# Patient Record
Sex: MALE | Race: WHITE | NOT HISPANIC OR LATINO | URBAN - METROPOLITAN AREA
[De-identification: names, ages, dates, MRNs, and addresses within clinical notes are randomized per-mention and may not be internally consistent; named-entity substitution may affect disease eponyms.]

---

## 2020-07-17 ENCOUNTER — OUTPATIENT (OUTPATIENT)
Dept: OUTPATIENT SERVICES | Facility: HOSPITAL | Age: 70
LOS: 1 days | End: 2020-07-17

## 2020-07-27 DIAGNOSIS — Z01.818 ENCOUNTER FOR OTHER PREPROCEDURAL EXAMINATION: ICD-10-CM

## 2020-07-28 ENCOUNTER — APPOINTMENT (OUTPATIENT)
Dept: DISASTER EMERGENCY | Facility: CLINIC | Age: 70
End: 2020-07-28

## 2020-07-29 LAB — SARS-COV-2 N GENE NPH QL NAA+PROBE: NOT DETECTED

## 2020-07-31 ENCOUNTER — OUTPATIENT (OUTPATIENT)
Dept: OUTPATIENT SERVICES | Facility: HOSPITAL | Age: 70
LOS: 1 days | End: 2020-07-31

## 2020-07-31 ENCOUNTER — INPATIENT (INPATIENT)
Facility: HOSPITAL | Age: 70
LOS: 2 days | Discharge: NURSING, REHAB OR LONG-TERM CARE | End: 2020-08-03
Payer: COMMERCIAL

## 2020-07-31 PROCEDURE — 73560 X-RAY EXAM OF KNEE 1 OR 2: CPT | Mod: 26,RT

## 2020-08-01 ENCOUNTER — OUTPATIENT (OUTPATIENT)
Dept: OUTPATIENT SERVICES | Facility: HOSPITAL | Age: 70
LOS: 1 days | End: 2020-08-01

## 2020-08-02 ENCOUNTER — OUTPATIENT (OUTPATIENT)
Dept: OUTPATIENT SERVICES | Facility: HOSPITAL | Age: 70
LOS: 1 days | End: 2020-08-02

## 2020-08-03 ENCOUNTER — OUTPATIENT (OUTPATIENT)
Dept: OUTPATIENT SERVICES | Facility: HOSPITAL | Age: 70
LOS: 1 days | End: 2020-08-03

## 2020-08-03 ENCOUNTER — INPATIENT (INPATIENT)
Facility: HOSPITAL | Age: 70
LOS: 8 days | Discharge: HOME | End: 2020-08-12
Attending: INTERNAL MEDICINE | Admitting: INTERNAL MEDICINE
Payer: COMMERCIAL

## 2020-08-06 ENCOUNTER — OUTPATIENT (OUTPATIENT)
Dept: OUTPATIENT SERVICES | Facility: HOSPITAL | Age: 70
LOS: 1 days | End: 2020-08-06

## 2020-08-07 ENCOUNTER — OUTPATIENT (OUTPATIENT)
Dept: OUTPATIENT SERVICES | Facility: HOSPITAL | Age: 70
LOS: 1 days | End: 2020-08-07

## 2020-08-07 PROCEDURE — 93970 EXTREMITY STUDY: CPT | Mod: 26

## 2020-08-08 ENCOUNTER — OUTPATIENT (OUTPATIENT)
Dept: OUTPATIENT SERVICES | Facility: HOSPITAL | Age: 70
LOS: 1 days | End: 2020-08-08

## 2020-08-10 ENCOUNTER — OUTPATIENT (OUTPATIENT)
Dept: OUTPATIENT SERVICES | Facility: HOSPITAL | Age: 70
LOS: 1 days | End: 2020-08-10

## 2020-08-11 ENCOUNTER — OUTPATIENT (OUTPATIENT)
Dept: OUTPATIENT SERVICES | Facility: HOSPITAL | Age: 70
LOS: 1 days | End: 2020-08-11

## 2020-08-12 ENCOUNTER — OUTPATIENT (OUTPATIENT)
Dept: OUTPATIENT SERVICES | Facility: HOSPITAL | Age: 70
LOS: 1 days | End: 2020-08-12

## 2020-10-29 ENCOUNTER — FORM ENCOUNTER (OUTPATIENT)
Age: 70
End: 2020-10-29

## 2021-06-28 ENCOUNTER — APPOINTMENT (OUTPATIENT)
Dept: ORTHOPEDIC SURGERY | Facility: CLINIC | Age: 71
End: 2021-06-28

## 2021-10-12 ENCOUNTER — APPOINTMENT (OUTPATIENT)
Dept: ORTHOPEDIC SURGERY | Facility: CLINIC | Age: 71
End: 2021-10-12

## 2022-07-18 ENCOUNTER — APPOINTMENT (OUTPATIENT)
Dept: ORTHOPEDIC SURGERY | Facility: CLINIC | Age: 72
End: 2022-07-18

## 2023-08-26 ENCOUNTER — EMERGENCY (EMERGENCY)
Facility: HOSPITAL | Age: 73
LOS: 1 days | Discharge: ANOTHER HOSPITAL | End: 2023-08-26
Attending: EMERGENCY MEDICINE | Admitting: EMERGENCY MEDICINE
Payer: COMMERCIAL

## 2023-08-26 VITALS
OXYGEN SATURATION: 99 % | TEMPERATURE: 99 F | RESPIRATION RATE: 16 BRPM | DIASTOLIC BLOOD PRESSURE: 67 MMHG | SYSTOLIC BLOOD PRESSURE: 155 MMHG | WEIGHT: 179.9 LBS | HEIGHT: 69 IN | HEART RATE: 90 BPM

## 2023-08-26 LAB
ALBUMIN SERPL ELPH-MCNC: 2.9 G/DL — LOW (ref 3.3–5)
ALP SERPL-CCNC: 77 U/L — SIGNIFICANT CHANGE UP (ref 40–120)
ALT FLD-CCNC: 30 U/L — SIGNIFICANT CHANGE UP (ref 12–78)
ANION GAP SERPL CALC-SCNC: 6 MMOL/L — SIGNIFICANT CHANGE UP (ref 5–17)
APPEARANCE UR: ABNORMAL
AST SERPL-CCNC: 23 U/L — SIGNIFICANT CHANGE UP (ref 15–37)
BASOPHILS # BLD AUTO: 0 K/UL — SIGNIFICANT CHANGE UP (ref 0–0.2)
BASOPHILS NFR BLD AUTO: 0 % — SIGNIFICANT CHANGE UP (ref 0–2)
BILIRUB SERPL-MCNC: 0.3 MG/DL — SIGNIFICANT CHANGE UP (ref 0.2–1.2)
BILIRUB UR-MCNC: NEGATIVE — SIGNIFICANT CHANGE UP
BUN SERPL-MCNC: 29 MG/DL — HIGH (ref 7–23)
CALCIUM SERPL-MCNC: 9 MG/DL — SIGNIFICANT CHANGE UP (ref 8.5–10.1)
CHLORIDE SERPL-SCNC: 109 MMOL/L — HIGH (ref 96–108)
CO2 SERPL-SCNC: 25 MMOL/L — SIGNIFICANT CHANGE UP (ref 22–31)
COLOR SPEC: YELLOW — SIGNIFICANT CHANGE UP
CREAT SERPL-MCNC: 2.1 MG/DL — HIGH (ref 0.5–1.3)
DIFF PNL FLD: ABNORMAL
EGFR: 33 ML/MIN/1.73M2 — LOW
EOSINOPHIL # BLD AUTO: 0.26 K/UL — SIGNIFICANT CHANGE UP (ref 0–0.5)
EOSINOPHIL NFR BLD AUTO: 4 % — SIGNIFICANT CHANGE UP (ref 0–6)
GLUCOSE SERPL-MCNC: 132 MG/DL — HIGH (ref 70–99)
GLUCOSE UR QL: NEGATIVE MG/DL — SIGNIFICANT CHANGE UP
HCT VFR BLD CALC: 34.8 % — LOW (ref 39–50)
HGB BLD-MCNC: 11 G/DL — LOW (ref 13–17)
KETONES UR-MCNC: NEGATIVE MG/DL — SIGNIFICANT CHANGE UP
LEUKOCYTE ESTERASE UR-ACNC: ABNORMAL
LYMPHOCYTES # BLD AUTO: 0.92 K/UL — LOW (ref 1–3.3)
LYMPHOCYTES # BLD AUTO: 14 % — SIGNIFICANT CHANGE UP (ref 13–44)
MAGNESIUM SERPL-MCNC: 1.6 MG/DL — SIGNIFICANT CHANGE UP (ref 1.6–2.6)
MCHC RBC-ENTMCNC: 28.2 PG — SIGNIFICANT CHANGE UP (ref 27–34)
MCHC RBC-ENTMCNC: 31.6 GM/DL — LOW (ref 32–36)
MCV RBC AUTO: 89.2 FL — SIGNIFICANT CHANGE UP (ref 80–100)
MONOCYTES # BLD AUTO: 1.12 K/UL — HIGH (ref 0–0.9)
MONOCYTES NFR BLD AUTO: 17 % — HIGH (ref 2–14)
NEUTROPHILS # BLD AUTO: 4.2 K/UL — SIGNIFICANT CHANGE UP (ref 1.8–7.4)
NEUTROPHILS NFR BLD AUTO: 64 % — SIGNIFICANT CHANGE UP (ref 43–77)
NITRITE UR-MCNC: NEGATIVE — SIGNIFICANT CHANGE UP
NRBC # BLD: SIGNIFICANT CHANGE UP /100 WBCS (ref 0–0)
PH UR: 5 — SIGNIFICANT CHANGE UP (ref 5–8)
PLATELET # BLD AUTO: 154 K/UL — SIGNIFICANT CHANGE UP (ref 150–400)
POTASSIUM SERPL-MCNC: 3.8 MMOL/L — SIGNIFICANT CHANGE UP (ref 3.5–5.3)
POTASSIUM SERPL-SCNC: 3.8 MMOL/L — SIGNIFICANT CHANGE UP (ref 3.5–5.3)
PROT SERPL-MCNC: 7.3 G/DL — SIGNIFICANT CHANGE UP (ref 6–8.3)
PROT UR-MCNC: 100 MG/DL
RBC # BLD: 3.9 M/UL — LOW (ref 4.2–5.8)
RBC # FLD: 16 % — HIGH (ref 10.3–14.5)
SODIUM SERPL-SCNC: 140 MMOL/L — SIGNIFICANT CHANGE UP (ref 135–145)
SP GR SPEC: 1.02 — SIGNIFICANT CHANGE UP (ref 1–1.03)
UROBILINOGEN FLD QL: 1 MG/DL — SIGNIFICANT CHANGE UP (ref 0.2–1)
WBC # BLD: 6.56 K/UL — SIGNIFICANT CHANGE UP (ref 3.8–10.5)
WBC # FLD AUTO: 6.56 K/UL — SIGNIFICANT CHANGE UP (ref 3.8–10.5)

## 2023-08-26 PROCEDURE — 93010 ELECTROCARDIOGRAM REPORT: CPT

## 2023-08-26 PROCEDURE — 99284 EMERGENCY DEPT VISIT MOD MDM: CPT

## 2023-08-26 PROCEDURE — 74176 CT ABD & PELVIS W/O CONTRAST: CPT | Mod: 26,MA

## 2023-08-26 PROCEDURE — 70450 CT HEAD/BRAIN W/O DYE: CPT | Mod: 26,MA

## 2023-08-26 PROCEDURE — 71250 CT THORAX DX C-: CPT | Mod: 26,MA

## 2023-08-26 PROCEDURE — 71045 X-RAY EXAM CHEST 1 VIEW: CPT | Mod: 26

## 2023-08-26 RX ORDER — CEFUROXIME AXETIL 250 MG
1 TABLET ORAL
Qty: 20 | Refills: 0
Start: 2023-08-26 | End: 2023-09-04

## 2023-08-26 RX ORDER — AZITHROMYCIN 500 MG/1
500 TABLET, FILM COATED ORAL ONCE
Refills: 0 | Status: COMPLETED | OUTPATIENT
Start: 2023-08-26 | End: 2023-08-26

## 2023-08-26 RX ORDER — AZITHROMYCIN 500 MG/1
250 TABLET, FILM COATED ORAL
Qty: 5 | Refills: 0
Start: 2023-08-26 | End: 2023-08-30

## 2023-08-26 RX ORDER — CEFTRIAXONE 500 MG/1
1000 INJECTION, POWDER, FOR SOLUTION INTRAMUSCULAR; INTRAVENOUS ONCE
Refills: 0 | Status: COMPLETED | OUTPATIENT
Start: 2023-08-26 | End: 2023-08-26

## 2023-08-26 NOTE — ED PROVIDER NOTE - NSFOLLOWUPINSTRUCTIONS_ED_ALL_ED_FT
Urinary Tract Infection, Adult    A urinary tract infection (UTI) is an infection of any part of the urinary tract. The urinary tract includes:  The kidneys.  The ureters.  The bladder.  The urethra.  These organs make, store, and get rid of pee (urine) in the body.    What are the causes?  This infection is caused by germs (bacteria) in your genital area. These germs grow and cause swelling (inflammation) of your urinary tract.    What increases the risk?  The following factors may make you more likely to develop this condition:  Using a small, thin tube (catheter) to drain pee.  Not being able to control when you pee or poop (incontinence).  Being female. If you are female, these things can increase the risk:  Using these methods to prevent pregnancy:  A medicine that kills sperm (spermicide).  A device that blocks sperm (diaphragm).  Having low levels of a female hormone (estrogen).  Being pregnant.  You are more likely to develop this condition if:  You have genes that add to your risk.  You are sexually active.  You take antibiotic medicines.  You have trouble peeing because of:  A prostate that is bigger than normal, if you are male.  A blockage in the part of your body that drains pee from the bladder.  A kidney stone.  A nerve condition that affects your bladder.  Not getting enough to drink.  Not peeing often enough.  You have other conditions, such as:  Diabetes.  A weak disease-fighting system (immune system).  Sickle cell disease.  Gout.  Injury of the spine.  What are the signs or symptoms?  Symptoms of this condition include:  Needing to pee right away.  Peeing small amounts often.  Pain or burning when peeing.  Blood in the pee.  Pee that smells bad or not like normal.  Trouble peeing.  Pee that is cloudy.  Fluid coming from the vagina, if you are female.  Pain in the belly or lower back.  Other symptoms include:  Vomiting.  Not feeling hungry.  Feeling mixed up (confused). This may be the first symptom in older adults.  Being tired and grouchy (irritable).  A fever.  Watery poop (diarrhea).  How is this treated?  Taking antibiotic medicine.  Taking other medicines.  Drinking enough water.  In some cases, you may need to see a specialist.    Follow these instructions at home:    Medicines    Take over-the-counter and prescription medicines only as told by your doctor.  If you were prescribed an antibiotic medicine, take it as told by your doctor. Do not stop taking it even if you start to feel better.  General instructions    Make sure you:  Pee until your bladder is empty.  Do not hold pee for a long time.  Empty your bladder after sex.  Wipe from front to back after peeing or pooping if you are a female. Use each tissue one time when you wipe.  Drink enough fluid to keep your pee pale yellow.  Keep all follow-up visits.  Contact a doctor if:  You do not get better after 1–2 days.  Your symptoms go away and then come back.  Get help right away if:  You have very bad back pain.  You have very bad pain in your lower belly.  You have a fever.  You have chills.  You feeling like you will vomit or you vomit.  Summary  A urinary tract infection (UTI) is an infection of any part of the urinary tract.  This condition is caused by germs in your genital area.  There are many risk factors for a UTI.  Treatment includes antibiotic medicines.  Drink enough fluid to keep your pee pale yellow.  This information is not intended to replace advice given to you by your health care provider. Make sure you discuss any questions you have with your health care provider.

## 2023-08-26 NOTE — ED ADULT NURSE NOTE - CHIEF COMPLAINT QUOTE
Patient brought in by ambulance from [place] as reported his blood sugar was 52 and was having altered mental status FS 52 was given glucose tabs and orange juice went up to 58 and rechecked again at 70; was on D10 IV given by EMS; reported taking his own medications on oxycodone was given narcan 1 mg IV; stated they noted right forearm swelling bug bite

## 2023-08-26 NOTE — ED ADULT NURSE NOTE - NEURO GAIT
s/p debridement  will need iv abx X 6 weeks   ?PICC  adjust per ID  f/u vascular/podiatry management   local wound care   co-morbidities optimization  supportive care prn    all consultants and team members management greatly appreciated  d/c planning requires assistance

## 2023-08-26 NOTE — ED PROVIDER NOTE - NSICDXPASTMEDICALHX_GEN_ALL_CORE_FT
PAST MEDICAL HISTORY:  CHF (congestive heart failure)     Chronic obstructive pulmonary disease (COPD)     Prostate cancer     Renal insufficiency     Type II diabetes mellitus

## 2023-08-26 NOTE — ED ADULT NURSE NOTE - NSFALLHARMRISKINTERV_ED_ALL_ED

## 2023-08-26 NOTE — ED PROVIDER NOTE - NS ED ATTENDING STATEMENT MOD
This was a shared visit with the [employer]. I reviewed and verified the documentation and independently performed the documented:

## 2023-08-26 NOTE — ED PROVIDER NOTE - OBJECTIVE STATEMENT
72 M hx prostate CA, DMII, diabetic foot ulcer, hld, ADAN, insomnia, encephalopathy, htn, chf, copd, akf bibems for ams, found to by hypoglycemia. Pt states he gets his sugar checked twice a day, was in 50s this morning, increased PO intake throughout the day, but was hypoglycemic again. Takes metformin and tradjenta. Having abd discomfort. Denies f/c, cp, sob, cough, vomiting, diarrhea.    Per ems, pt found to have AMS, sugar 52, was given glucose and orange juice, glucose increased to 58, was rechecked again and it was 70, D10 IV given by EMS, given narcan due to oxycodone    metformin 2000 BID; tradjenta 5mg qAM    pmd kym

## 2023-08-26 NOTE — ED PROVIDER NOTE - PATIENT PORTAL LINK FT
You can access the FollowMyHealth Patient Portal offered by [facility] by registering at the following website: [email or web address]. By joining [employer]’s FollowMyHealth portal, you will also be able to view your health information using other applications (apps) compatible with our system.

## 2023-08-26 NOTE — ED ADULT TRIAGE NOTE - CHIEF COMPLAINT QUOTE
Patient brought in by ambulance from [place] as reported his blood sugar was 52 and was having altered mental status FS 52 was given glucose tabs and orange juice went up to 58 and rechecked again at 70; was on D10 IV given by EMS; reported taking his own medications on oxycodone was given narcan 1 mg IV; stated they noted right forearm swelling bug bite

## 2023-08-27 VITALS
TEMPERATURE: 99 F | RESPIRATION RATE: 16 BRPM | OXYGEN SATURATION: 99 % | DIASTOLIC BLOOD PRESSURE: 72 MMHG | SYSTOLIC BLOOD PRESSURE: 138 MMHG | HEART RATE: 88 BPM

## 2023-08-27 PROCEDURE — 74176 CT ABD & PELVIS W/O CONTRAST: CPT | Mod: MA

## 2023-08-27 PROCEDURE — 80053 COMPREHEN METABOLIC PANEL: CPT

## 2023-08-27 PROCEDURE — 71250 CT THORAX DX C-: CPT | Mod: MA

## 2023-08-27 PROCEDURE — 36415 COLL VENOUS BLD VENIPUNCTURE: CPT

## 2023-08-27 PROCEDURE — 99285 EMERGENCY DEPT VISIT HI MDM: CPT | Mod: 25

## 2023-08-27 PROCEDURE — 71045 X-RAY EXAM CHEST 1 VIEW: CPT

## 2023-08-27 PROCEDURE — 85025 COMPLETE CBC W/AUTO DIFF WBC: CPT

## 2023-08-27 PROCEDURE — 70450 CT HEAD/BRAIN W/O DYE: CPT | Mod: MA

## 2023-08-27 PROCEDURE — 83735 ASSAY OF MAGNESIUM: CPT

## 2023-08-27 PROCEDURE — 96375 TX/PRO/DX INJ NEW DRUG ADDON: CPT

## 2023-08-27 PROCEDURE — 93005 ELECTROCARDIOGRAM TRACING: CPT

## 2023-08-27 PROCEDURE — 96374 THER/PROPH/DIAG INJ IV PUSH: CPT

## 2023-08-27 PROCEDURE — 81001 URINALYSIS AUTO W/SCOPE: CPT

## 2023-08-27 PROCEDURE — 82962 GLUCOSE BLOOD TEST: CPT

## 2023-08-27 RX ADMIN — CEFTRIAXONE 100 MILLIGRAM(S): 500 INJECTION, POWDER, FOR SOLUTION INTRAMUSCULAR; INTRAVENOUS at 00:19

## 2023-08-27 RX ADMIN — AZITHROMYCIN 255 MILLIGRAM(S): 500 TABLET, FILM COATED ORAL at 00:19

## 2023-09-15 ASSESSMENT — KOOS JR
KOOS JR RAW SCORE: 22
BENDING TO THE FLOOR TO PICK UP OBJECT: SEVERE
STRAIGHTENING KNEE FULLY: EXTREME
STRAIGHTENING KNEE FULLY: MILD
BENDING TO THE FLOOR TO PICK UP OBJECT: MODERATE
KOOS JR RAW SCORE: 12
BENDING TO THE FLOOR TO PICK UP OBJECT: EXTREME
IMPORTED KOOS JR SCORE: 31.31
RISING FROM SITTING: EXTREME
STANDING UPRIGHT: SEVERE
IMPORTED KOOS JR SCORE: 42.28
STRAIGHTENING KNEE FULLY: MODERATE
RISING FROM SITTING: SEVERE
KOOS JR RAW SCORE: 18
HOW SEVERE IS YOUR KNEE STIFFNESS AFTER FIRST WAKING IN MORNING: MODERATE
GOING UP OR DOWN STAIRS: MODERATE
STANDING UPRIGHT: MILD
KOOS JR RAW SCORE: 17
TWISING OR PIVOTING ON KNEE: SEVERE
TWISING OR PIVOTING ON KNEE: EXTREME
STANDING UPRIGHT: MODERATE
IMPORTED KOOS JR SCORE: 44.9
IMPORTED KOOS JR SCORE: 57.14
IMPORTED LATERALITY: RIGHT
HOW SEVERE IS YOUR KNEE STIFFNESS AFTER FIRST WAKING IN MORNING: SEVERE
IMPORTED FORM: YES

## 2024-01-02 ENCOUNTER — EMERGENCY (EMERGENCY)
Facility: HOSPITAL | Age: 74
LOS: 1 days | Discharge: HOME | End: 2024-01-02
Attending: EMERGENCY MEDICINE | Admitting: EMERGENCY MEDICINE
Payer: MEDICARE

## 2024-01-02 VITALS
OXYGEN SATURATION: 93 % | DIASTOLIC BLOOD PRESSURE: 62 MMHG | HEIGHT: 70 IN | RESPIRATION RATE: 18 BRPM | SYSTOLIC BLOOD PRESSURE: 99 MMHG | TEMPERATURE: 99 F | WEIGHT: 210.1 LBS | HEART RATE: 72 BPM

## 2024-01-02 VITALS
SYSTOLIC BLOOD PRESSURE: 130 MMHG | TEMPERATURE: 97 F | OXYGEN SATURATION: 95 % | RESPIRATION RATE: 18 BRPM | DIASTOLIC BLOOD PRESSURE: 74 MMHG | HEART RATE: 63 BPM

## 2024-01-02 PROBLEM — E11.9 TYPE 2 DIABETES MELLITUS WITHOUT COMPLICATIONS: Chronic | Status: ACTIVE | Noted: 2023-08-26

## 2024-01-02 PROBLEM — N28.9 DISORDER OF KIDNEY AND URETER, UNSPECIFIED: Chronic | Status: ACTIVE | Noted: 2023-08-26

## 2024-01-02 PROBLEM — J44.9 CHRONIC OBSTRUCTIVE PULMONARY DISEASE, UNSPECIFIED: Chronic | Status: ACTIVE | Noted: 2023-08-26

## 2024-01-02 PROBLEM — I50.9 HEART FAILURE, UNSPECIFIED: Chronic | Status: ACTIVE | Noted: 2023-08-26

## 2024-01-02 PROBLEM — C61 MALIGNANT NEOPLASM OF PROSTATE: Chronic | Status: ACTIVE | Noted: 2023-08-26

## 2024-01-02 PROCEDURE — 99282 EMERGENCY DEPT VISIT SF MDM: CPT

## 2024-01-02 PROCEDURE — 99283 EMERGENCY DEPT VISIT LOW MDM: CPT

## 2024-01-02 NOTE — ED PROVIDER NOTE - CARE PROVIDERS DIRECT ADDRESSES
,DirectAddress_Unknown,[email or web address] ,DirectAddress_Unknown,[email or web address]

## 2024-01-02 NOTE — ED PROVIDER NOTE - PATIENT PORTAL LINK FT
You can access the FollowMyHealth Patient Portal offered by [facility] by registering at the following website: [email or web address]. By joining [employer]’s FollowMyHealth portal, you will also be able to view your health information using other applications (apps) compatible with our system. You can access the FollowMyHealth Patient Portal offered by [facility] by registering at the following website: [email or web address]. By joining [employer]’s FollowMyHealth portal, you will also be able to view your health information using other applications (apps) compatible with our system.

## 2024-01-02 NOTE — ED ADULT NURSE NOTE - NSFALLUNIVINTERV_ED_ALL_ED
Bed/Stretcher in lowest position, wheels locked, appropriate side rails in place/Call bell, personal items and telephone in reach/Instruct patient to call for assistance before getting out of bed/chair/stretcher/Non-slip footwear applied when patient is off stretcher/Jordan to call system/Physically safe environment - no spills, clutter or unnecessary equipment/Purposeful proactive rounding/Room/bathroom lighting operational, light cord in reach Bed/Stretcher in lowest position, wheels locked, appropriate side rails in place/Call bell, personal items and telephone in reach/Instruct patient to call for assistance before getting out of bed/chair/stretcher/Non-slip footwear applied when patient is off stretcher/Halma to call system/Physically safe environment - no spills, clutter or unnecessary equipment/Purposeful proactive rounding/Room/bathroom lighting operational, light cord in reach

## 2024-01-02 NOTE — ED PROVIDER NOTE - NSFOLLOWUPINSTRUCTIONS_ED_ALL_ED_FT
Apply over the counter saline nasal spray 3 to 4 times daily as directed. Follow up with ENT.    A nosebleed is when blood comes out of the nose. Nosebleeds are common. Usually, they are not a sign of a serious condition.    Nosebleeds can happen if a blood vessel in your nose starts to bleed or if the lining of your nose (mucous membrane) cracks. They are commonly caused by:  Allergies.  Colds.  Picking your nose.  Blowing your nose too hard.  An injury from sticking an object into your nose or getting hit in the nose.  Dry or cold air.  Less common causes of nosebleeds include:  Toxic fumes.  Something abnormal in the nose or in the air-filled spaces in the bones of the face (sinuses).  Growths in the nose, such as polyps.  Blood thinners or conditions that cause blood to clot slowly.  Certain illnesses or procedures that irritate or dry out the nasal passages.  Follow these instructions at home:  When you have a nosebleed:      Sit down and tilt your head slightly forward.  Use a clean towel or tissue to pinch your nostrils under the bony part of your nose. After 5 minutes, let go of your nose and see if bleeding starts again. Do not release pressure before that time. If there is still bleeding, repeat the pinching and holding for 5 minutes or until the bleeding stops.  Do not place tissues or gauze in the nose to stop the bleeding.  Avoid lying down and avoid tilting your head backward. That may make blood collect in the throat and cause gagging or coughing.  Use a nasal spray decongestant to help with a nosebleed as told by your health care provider.  After a nosebleed:    Avoid blowing your nose or sniffing for a number of hours.  Avoid straining, lifting, or bending at the waist for several days. You may go back to other normal activities as you are able.  If you are taking aspirin or blood thinners and you have nosebleeds, talk to your health care provider. These medicines make bleeding more likely.  Ask your health care provider if you should stop taking the medicines or if you should adjust the dose.  Do not stop taking medicines that your health care provider has recommended unless he or she tells you to stop taking them.  If your nosebleed was caused by dry mucous membranes, use over-the-counter saline nasal spray or gel and a humidifier as told by your health care provider. This will keep the mucous membranes moist and allow them to heal. If you need to use one of these products:  Choose one that is water-soluble.  Use only as much as you need and use it only as often as needed.  Do not lie down right after you use it.  If you get nosebleeds often, talk with your health care provider about medical treatments. Options may include:  Nasal cautery. This treatment stops and prevents nosebleeds by using a chemical swab or electrical device to lightly burn tiny blood vessels inside the nose.  Nasal packing. A gauze or other material is placed in the nose to keep constant pressure on the bleeding area.  Contact a health care provider if you:  Have a fever.  Get nosebleeds often or more often than usual.  Bruise very easily.  Have a nosebleed from having something stuck in your nose.  Have bleeding in your mouth.  Vomit or cough up brown material.  Have a nosebleed after you start a new medicine.  Get help right away if:  You have a nosebleed after a fall or a head injury.  Your nosebleed does not go away after 20 minutes.  You feel dizzy or weak.  You have unusual bleeding from other parts of your body.  You have unusual bruising on other parts of your body.  You become sweaty.  You vomit blood.  Summary  A nosebleed is when blood comes out of the nose. Common causes include allergies, an injury to the nose, or cold or dry air.  Initial treatment includes applying pressure for 5 minutes.  Moisturizing the nose with saline nasal spray or gel after a nosebleed may help prevent future bleeding.  Get help right away if your nosebleed does not go away after 20 minutes.  This information is not intended to replace advice given to you by your health care provider. Make sure you discuss any questions you have with your health care provider. Apply over the counter saline nasal spray 3 to 4 times daily as directed. Follow up with ENT. Follow up with [facility] for your right foot.    A nosebleed is when blood comes out of the nose. Nosebleeds are common. Usually, they are not a sign of a serious condition.    Nosebleeds can happen if a blood vessel in your nose starts to bleed or if the lining of your nose (mucous membrane) cracks. They are commonly caused by:  Allergies.  Colds.  Picking your nose.  Blowing your nose too hard.  An injury from sticking an object into your nose or getting hit in the nose.  Dry or cold air.  Less common causes of nosebleeds include:  Toxic fumes.  Something abnormal in the nose or in the air-filled spaces in the bones of the face (sinuses).  Growths in the nose, such as polyps.  Blood thinners or conditions that cause blood to clot slowly.  Certain illnesses or procedures that irritate or dry out the nasal passages.  Follow these instructions at home:  When you have a nosebleed:      Sit down and tilt your head slightly forward.  Use a clean towel or tissue to pinch your nostrils under the bony part of your nose. After 5 minutes, let go of your nose and see if bleeding starts again. Do not release pressure before that time. If there is still bleeding, repeat the pinching and holding for 5 minutes or until the bleeding stops.  Do not place tissues or gauze in the nose to stop the bleeding.  Avoid lying down and avoid tilting your head backward. That may make blood collect in the throat and cause gagging or coughing.  Use a nasal spray decongestant to help with a nosebleed as told by your health care provider.  After a nosebleed:    Avoid blowing your nose or sniffing for a number of hours.  Avoid straining, lifting, or bending at the waist for several days. You may go back to other normal activities as you are able.  If you are taking aspirin or blood thinners and you have nosebleeds, talk to your health care provider. These medicines make bleeding more likely.  Ask your health care provider if you should stop taking the medicines or if you should adjust the dose.  Do not stop taking medicines that your health care provider has recommended unless he or she tells you to stop taking them.  If your nosebleed was caused by dry mucous membranes, use over-the-counter saline nasal spray or gel and a humidifier as told by your health care provider. This will keep the mucous membranes moist and allow them to heal. If you need to use one of these products:  Choose one that is water-soluble.  Use only as much as you need and use it only as often as needed.  Do not lie down right after you use it.  If you get nosebleeds often, talk with your health care provider about medical treatments. Options may include:  Nasal cautery. This treatment stops and prevents nosebleeds by using a chemical swab or electrical device to lightly burn tiny blood vessels inside the nose.  Nasal packing. A gauze or other material is placed in the nose to keep constant pressure on the bleeding area.  Contact a health care provider if you:  Have a fever.  Get nosebleeds often or more often than usual.  Bruise very easily.  Have a nosebleed from having something stuck in your nose.  Have bleeding in your mouth.  Vomit or cough up brown material.  Have a nosebleed after you start a new medicine.  Get help right away if:  You have a nosebleed after a fall or a head injury.  Your nosebleed does not go away after 20 minutes.  You feel dizzy or weak.  You have unusual bleeding from other parts of your body.  You have unusual bruising on other parts of your body.  You become sweaty.  You vomit blood.  Summary  A nosebleed is when blood comes out of the nose. Common causes include allergies, an injury to the nose, or cold or dry air.  Initial treatment includes applying pressure for 5 minutes.  Moisturizing the nose with saline nasal spray or gel after a nosebleed may help prevent future bleeding.  Get help right away if your nosebleed does not go away after 20 minutes.  This information is not intended to replace advice given to you by your health care provider. Make sure you discuss any questions you have with your health care provider. Apply over the counter saline nasal spray 3 to 4 times daily as directed. Follow up with ENT. Follow up with [facility] wound care for your right foot.    A nosebleed is when blood comes out of the nose. Nosebleeds are common. Usually, they are not a sign of a serious condition.    Nosebleeds can happen if a blood vessel in your nose starts to bleed or if the lining of your nose (mucous membrane) cracks. They are commonly caused by:  Allergies.  Colds.  Picking your nose.  Blowing your nose too hard.  An injury from sticking an object into your nose or getting hit in the nose.  Dry or cold air.  Less common causes of nosebleeds include:  Toxic fumes.  Something abnormal in the nose or in the air-filled spaces in the bones of the face (sinuses).  Growths in the nose, such as polyps.  Blood thinners or conditions that cause blood to clot slowly.  Certain illnesses or procedures that irritate or dry out the nasal passages.  Follow these instructions at home:  When you have a nosebleed:      Sit down and tilt your head slightly forward.  Use a clean towel or tissue to pinch your nostrils under the bony part of your nose. After 5 minutes, let go of your nose and see if bleeding starts again. Do not release pressure before that time. If there is still bleeding, repeat the pinching and holding for 5 minutes or until the bleeding stops.  Do not place tissues or gauze in the nose to stop the bleeding.  Avoid lying down and avoid tilting your head backward. That may make blood collect in the throat and cause gagging or coughing.  Use a nasal spray decongestant to help with a nosebleed as told by your health care provider.  After a nosebleed:    Avoid blowing your nose or sniffing for a number of hours.  Avoid straining, lifting, or bending at the waist for several days. You may go back to other normal activities as you are able.  If you are taking aspirin or blood thinners and you have nosebleeds, talk to your health care provider. These medicines make bleeding more likely.  Ask your health care provider if you should stop taking the medicines or if you should adjust the dose.  Do not stop taking medicines that your health care provider has recommended unless he or she tells you to stop taking them.  If your nosebleed was caused by dry mucous membranes, use over-the-counter saline nasal spray or gel and a humidifier as told by your health care provider. This will keep the mucous membranes moist and allow them to heal. If you need to use one of these products:  Choose one that is water-soluble.  Use only as much as you need and use it only as often as needed.  Do not lie down right after you use it.  If you get nosebleeds often, talk with your health care provider about medical treatments. Options may include:  Nasal cautery. This treatment stops and prevents nosebleeds by using a chemical swab or electrical device to lightly burn tiny blood vessels inside the nose.  Nasal packing. A gauze or other material is placed in the nose to keep constant pressure on the bleeding area.  Contact a health care provider if you:  Have a fever.  Get nosebleeds often or more often than usual.  Bruise very easily.  Have a nosebleed from having something stuck in your nose.  Have bleeding in your mouth.  Vomit or cough up brown material.  Have a nosebleed after you start a new medicine.  Get help right away if:  You have a nosebleed after a fall or a head injury.  Your nosebleed does not go away after 20 minutes.  You feel dizzy or weak.  You have unusual bleeding from other parts of your body.  You have unusual bruising on other parts of your body.  You become sweaty.  You vomit blood.  Summary  A nosebleed is when blood comes out of the nose. Common causes include allergies, an injury to the nose, or cold or dry air.  Initial treatment includes applying pressure for 5 minutes.  Moisturizing the nose with saline nasal spray or gel after a nosebleed may help prevent future bleeding.  Get help right away if your nosebleed does not go away after 20 minutes.  This information is not intended to replace advice given to you by your health care provider. Make sure you discuss any questions you have with your health care provider.

## 2024-01-02 NOTE — ED PROVIDER NOTE - CARE PROVIDER_API CALL
Jer Rouse  Otolaryngology  [address]  [place] [address]  Phone: [phone]  Fax: [phone]  Scheduled Appointment: 01/05/2024 09:30 AM    Silvio Rodriguez  Podiatric Medicine and Surgery  [address] [place] [address]  Phone: [phone]  Fax: [phone]  Follow Up Time:    Jer Rouse  Otolaryngology  [address]  [place] [address]  Phone: [phone]  Fax: [phone]  Scheduled Appointment: 01/05/2024 09:30 AM    Silvio Rodriguez  Podiatric Medicine and Surgery  [address] [place] [address]  Phone: [phone]  Fax: [phone]  Follow Up Time:    Jer Rouse  Otolaryngology  [address]  [place] [address]  Phone: [phone]  Fax: [phone]  Scheduled Appointment: 01/05/2024 09:30 AM    Silvio Rodriguez  Podiatric Medicine and Surgery  [address] [place] [address]  Phone: [phone]  Fax: [phone]  Scheduled Appointment: 01/08/2024 12:00 PM   Jer Rouse  Otolaryngology  [address] [facility], [address]  [place] [address]  Phone: [phone]  Fax: [phone]  Scheduled Appointment: 01/05/2024 09:30 AM    Silvio Rodriguez  Podiatric Medicine and Surgery  [address] [place] [address]  Phone: [phone]  Fax: [phone]  Scheduled Appointment: 01/08/2024 12:00 PM

## 2024-01-02 NOTE — ED ADULT NURSE NOTE - OBJECTIVE STATEMENT
pt a&ox4 with c/o intermittent epistaxis x 2 weeks. thinks he takes 81 mg ASA but not best historian. denies pain. denies lightheadedness/dizziness. skin warm and dry speaking in full sentences. bleeding stopped at this time

## 2024-01-02 NOTE — ED PROVIDER NOTE - CLINICAL SUMMARY MEDICAL DECISION MAKING FREE TEXT BOX
73-year-old male with epistaxis, not on any anticoagulants, bleeding to be controlled in the ER, follow-up with outpatient ENT

## 2024-01-02 NOTE — ED PROVIDER NOTE - PROGRESS NOTE DETAILS
Bilateral nostrils irrigated with normal saline, clots removed from left nostril, applied Afrin nasal spray to each nostril, applied cautery to left nostril, applied Surgicel to left nostril, bleeding controlled, patient told to follow-up with ENT, referral coordinator is speaking with the patient to set up outpatient appointment. appointment made for ENT Dr. Rouse this Friday 9:30am, son states he will come to pick him up from the ER called [facility], [phone] no anwer, patient son Scott called, states he wants patient to be admitted, was explained that nose bleed was controlled and can f/u with ENT, son also concerned for right foot wound, was told we can set up appointment for outpatient, patient states he does not want to be admitted and agrees with plan to f/u as outpatient called [facility], [phone] no anwer, patient son Scott called, states he wants patient to be admitted, was explained that nose bleed was controlled and can f/u with ENT, son also concerned for right foot wound, was told we can set up appointment for outpatient, patient states he does not want to be admitted and agrees with plan to f/u as outpatient

## 2024-01-02 NOTE — ED ADULT TRIAGE NOTE - CHIEF COMPLAINT QUOTE
Pt brought in by EMS from assisted living facility c/o nose bleeds on and off x 1 week. Nose is not actively bleeding at this time.

## 2024-01-02 NOTE — ED PROVIDER NOTE - OBJECTIVE STATEMENT
73-year-old male PMH of prostate CA, DM2, HLD, HTN, CHF, COPD, right foot wound, presents to the emergency department by ambulance from assisted living facility with report of nosebleed.  Patient states that he has had a nosebleed on and off for the past 2 weeks mainly left nostril sometimes the right nostril, denies trauma, fever.  Patient states that he is not currently taking any anticoagulants or aspirin.

## 2024-01-02 NOTE — ED PROVIDER NOTE - PROVIDER TOKENS
PROVIDER:[[id number]],SCHEDULEDAPPT:[01/05/2024],SCHEDULEDAPPTTIME:[09:30 AM]],PROVIDER:[TOKEN:[[id number]]] PROVIDER:[TOKEN:[[id number]],SCHEDULEDAPPT:[01/05/2024],SCHEDULEDAPPTTIME:[09:30 AM]],PROVIDER:[[id number]],SCHEDULEDAPPT:[01/08/2024],SCHEDULEDAPPTTIME:[12:00 PM]]

## 2024-01-05 ENCOUNTER — APPOINTMENT (OUTPATIENT)
Dept: OTOLARYNGOLOGY | Facility: CLINIC | Age: 74
End: 2024-01-05
Payer: COMMERCIAL

## 2024-01-05 ENCOUNTER — NON-APPOINTMENT (OUTPATIENT)
Age: 74
End: 2024-01-05

## 2024-01-05 VITALS
HEART RATE: 80 BPM | WEIGHT: 220 LBS | DIASTOLIC BLOOD PRESSURE: 72 MMHG | BODY MASS INDEX: 30.8 KG/M2 | SYSTOLIC BLOOD PRESSURE: 119 MMHG | HEIGHT: 71 IN

## 2024-01-05 DIAGNOSIS — S91.309A UNSPECIFIED OPEN WOUND, UNSPECIFIED FOOT, INITIAL ENCOUNTER: ICD-10-CM

## 2024-01-05 DIAGNOSIS — D68.9 POISONING BY ANTICOAGULANT ANTAGONISTS, VITAMIN K AND OTHER COAGULANTS, ACCIDENTAL (UNINTENTIONAL), INITIAL ENCOUNTER: ICD-10-CM

## 2024-01-05 DIAGNOSIS — R04.0 EPISTAXIS: ICD-10-CM

## 2024-01-05 DIAGNOSIS — Z78.9 OTHER SPECIFIED HEALTH STATUS: ICD-10-CM

## 2024-01-05 DIAGNOSIS — J31.0 CHRONIC RHINITIS: ICD-10-CM

## 2024-01-05 DIAGNOSIS — E11.8 TYPE 2 DIABETES MELLITUS WITH UNSPECIFIED COMPLICATIONS: ICD-10-CM

## 2024-01-05 DIAGNOSIS — Z86.79 PERSONAL HISTORY OF OTHER DISEASES OF THE CIRCULATORY SYSTEM: ICD-10-CM

## 2024-01-05 DIAGNOSIS — Z87.09 PERSONAL HISTORY OF OTHER DISEASES OF THE RESPIRATORY SYSTEM: ICD-10-CM

## 2024-01-05 DIAGNOSIS — J34.2 DEVIATED NASAL SEPTUM: ICD-10-CM

## 2024-01-05 DIAGNOSIS — T45.7X1A POISONING BY ANTICOAGULANT ANTAGONISTS, VITAMIN K AND OTHER COAGULANTS, ACCIDENTAL (UNINTENTIONAL), INITIAL ENCOUNTER: ICD-10-CM

## 2024-01-05 PROCEDURE — 30903 CONTROL OF NOSEBLEED: CPT | Mod: 50

## 2024-01-05 PROCEDURE — 99204 OFFICE O/P NEW MOD 45 MIN: CPT | Mod: 25

## 2024-01-05 RX ORDER — SODIUM CHLORIDE 0.65 %
0.65 AEROSOL, SPRAY (ML) NASAL 4 TIMES DAILY
Qty: 2 | Refills: 0 | Status: ACTIVE | COMMUNITY
Start: 2024-01-05 | End: 1900-01-01

## 2024-01-05 RX ORDER — DOCUSATE SODIUM 100 MG/1
100 CAPSULE ORAL
Refills: 0 | Status: ACTIVE | COMMUNITY

## 2024-01-05 RX ORDER — CHLORHEXIDINE GLUCONATE 4 %
5 LIQUID (ML) TOPICAL
Refills: 0 | Status: ACTIVE | COMMUNITY

## 2024-01-05 RX ORDER — CEPHALEXIN 500 MG/1
500 CAPSULE ORAL
Qty: 20 | Refills: 0 | Status: ACTIVE | COMMUNITY
Start: 2024-01-05 | End: 1900-01-01

## 2024-01-05 RX ORDER — SAME BUTANEDISULFONATE/BETAINE 400-600 MG
250 POWDER IN PACKET (EA) ORAL
Refills: 0 | Status: ACTIVE | COMMUNITY

## 2024-01-05 NOTE — PROCEDURE
[FreeTextEntry1] : Left Nasal Cauterization and Left Electro Nasal Cauterization [FreeTextEntry2] : nosebleed [FreeTextEntry3] :  Procedure: Left Nasal Cauterization. After topical 4% xylocaine and oxymetazoline, the nasal vault was re-evaluated. Bleeding site identified. Cauterized with silver nitrate. Acetic acid sprayed in nostril. HemoPore inserted into nostril.  Post-procedure instructions given. Patient tolerated procedure well. Patient continued bleeding after the procedure.    Procedure: Left Electro Nasal Cauterization. 8 cc of 1% xyclocaine 1:121863 epi was injected into the left side of the nose. Cauterized with the electro-cautery. Post-procedure instructions given. Pt tolerated the procedure well.

## 2024-01-05 NOTE — HISTORY OF PRESENT ILLNESS
[de-identified] : Pt presents today with a nosebleed. Pt states that his nose has been bleeding from both nostrils. Pt states that these nosebleeds started 2 weeks ago. Pt states that his living space is dry. Pt states that he has been taking baby aspirin after his nosebleeds. Pt states that his nose was cauterized 2 days ago. Pt states that he is a diabetic.

## 2024-01-05 NOTE — REVIEW OF SYSTEMS
[Sneezing] : sneezing [Seasonal Allergies] : seasonal allergies [Post Nasal Drip] : post nasal drip [Hearing Loss] : hearing loss [Dizziness] : dizziness [Vertigo] : vertigo [Nasal Congestion] : nasal congestion [Nose Bleeds] : nose bleeds [Recurrent Sinus Infections] : recurrent sinus infections [Hoarseness] : hoarseness [Throat Clearing] : throat clearing [Negative] : Heme/Lymph

## 2024-01-05 NOTE — ASSESSMENT
[FreeTextEntry1] : Reviewed and reconciled medications, allergies, PMHx, PSHx, SocHx, FMHx.  Pt presents today with a nosebleed. Pt states that his nose has been bleeding from both nostrils. Pt states that these nosebleeds started 2 weeks ago. Pt states that his living space is dry. Pt states that he has been taking baby aspirin after his nosebleeds. Pt states that his nose was cauterized 2 days ago. Pt states that he is a diabetic.  Physical exam: right ear: cerumen impaction removed via curettage -right nostril filled with blood -left nostril filled with bloody packing. The bloody packing was removed from left nostril. Inserted cotton with Afrin and Lidocaine into nostrils bilaterally.  -2 small bleeding site on right side of septum and 1 site on the turbinate -bleeding site on left anterior septum and posterior turbinate -blood removed with suction from nose bilaterally -spur in left nostril  -deviated septum -blood leaking from the nose into the back of the throat  ENT Procedure: Left Nasal Cauterization and Left Electro Nasal Cauterization  Plan: saline spray on the right nostril only. Stop Aspirin petreolum gauze strip on the left side. Bled through the hemopore. Attempted electro cautery. Need to speak to primary care to clear him for possible cauterization in the operating room.

## 2024-01-05 NOTE — CONSULT LETTER
[Dear  ___] : Dear  [unfilled], [Consult Letter:] : I had the pleasure of evaluating your patient, [unfilled]. [Please see my note below.] : Please see my note below. [Consult Closing:] : Thank you very much for allowing me to participate in the care of this patient.  If you have any questions, please do not hesitate to contact me. [Sincerely,] : Sincerely, [FreeTextEntry3] :  Jer Rouse MD FACS

## 2024-01-05 NOTE — PHYSICAL EXAM
[] : septum deviated bilaterally [Normal] : mucosa is normal [Midline] : trachea located in midline position [Hearing Loss Right Only] : normal [Hearing Loss Left Only] : normal [FreeTextEntry8] :  cerumen impaction removed via curettage [de-identified] : right nostril filled with blood. left nostril filled with bloody packing. The bloody packing was removed from left nostril. Inserted cotton with Afrin and Lidocaine into nostrils bilaterally. 2 small bleeding site on right side of septum and 1 site on the turbinate. bleeding site on left anterior septum and posterior turbinate [de-identified] : blood leaking from the nose into the back of the throat

## 2024-01-08 ENCOUNTER — INPATIENT (INPATIENT)
Facility: HOSPITAL | Age: 74
LOS: 9 days | Discharge: HOME | End: 2024-01-18
Attending: HOSPITALIST | Admitting: HOSPITALIST
Payer: COMMERCIAL

## 2024-01-08 ENCOUNTER — APPOINTMENT (OUTPATIENT)
Dept: WOUND CARE | Facility: HOSPITAL | Age: 74
End: 2024-01-08

## 2024-01-08 VITALS
TEMPERATURE: 98 F | OXYGEN SATURATION: 97 % | RESPIRATION RATE: 16 BRPM | HEART RATE: 83 BPM | SYSTOLIC BLOOD PRESSURE: 121 MMHG | DIASTOLIC BLOOD PRESSURE: 68 MMHG | HEIGHT: 70 IN

## 2024-01-08 DIAGNOSIS — R04.0 EPISTAXIS: ICD-10-CM

## 2024-01-08 LAB
ALBUMIN SERPL ELPH-MCNC: 3.4 G/DL — SIGNIFICANT CHANGE UP (ref 3.3–5)
ALBUMIN SERPL ELPH-MCNC: 3.4 G/DL — SIGNIFICANT CHANGE UP (ref 3.3–5)
ALP SERPL-CCNC: 73 U/L — SIGNIFICANT CHANGE UP (ref 40–120)
ALP SERPL-CCNC: 73 U/L — SIGNIFICANT CHANGE UP (ref 40–120)
ALT FLD-CCNC: 13 U/L — SIGNIFICANT CHANGE UP (ref 4–41)
ALT FLD-CCNC: 13 U/L — SIGNIFICANT CHANGE UP (ref 4–41)
ANION GAP SERPL CALC-SCNC: 13 MMOL/L — SIGNIFICANT CHANGE UP (ref 7–14)
ANION GAP SERPL CALC-SCNC: 13 MMOL/L — SIGNIFICANT CHANGE UP (ref 7–14)
ANISOCYTOSIS BLD QL: SLIGHT — SIGNIFICANT CHANGE UP
ANISOCYTOSIS BLD QL: SLIGHT — SIGNIFICANT CHANGE UP
APTT BLD: 31.1 SEC — SIGNIFICANT CHANGE UP (ref 24.5–35.6)
APTT BLD: 31.1 SEC — SIGNIFICANT CHANGE UP (ref 24.5–35.6)
AST SERPL-CCNC: 13 U/L — SIGNIFICANT CHANGE UP (ref 4–40)
AST SERPL-CCNC: 13 U/L — SIGNIFICANT CHANGE UP (ref 4–40)
BASOPHILS # BLD AUTO: 0.21 K/UL — HIGH (ref 0–0.2)
BASOPHILS # BLD AUTO: 0.21 K/UL — HIGH (ref 0–0.2)
BASOPHILS NFR BLD AUTO: 2.6 % — HIGH (ref 0–2)
BASOPHILS NFR BLD AUTO: 2.6 % — HIGH (ref 0–2)
BILIRUB SERPL-MCNC: 0.2 MG/DL — SIGNIFICANT CHANGE UP (ref 0.2–1.2)
BILIRUB SERPL-MCNC: 0.2 MG/DL — SIGNIFICANT CHANGE UP (ref 0.2–1.2)
BLD GP AB SCN SERPL QL: NEGATIVE — SIGNIFICANT CHANGE UP
BUN SERPL-MCNC: 24 MG/DL — HIGH (ref 7–23)
BUN SERPL-MCNC: 24 MG/DL — HIGH (ref 7–23)
CALCIUM SERPL-MCNC: 9.2 MG/DL — SIGNIFICANT CHANGE UP (ref 8.4–10.5)
CALCIUM SERPL-MCNC: 9.2 MG/DL — SIGNIFICANT CHANGE UP (ref 8.4–10.5)
CHLORIDE SERPL-SCNC: 101 MMOL/L — SIGNIFICANT CHANGE UP (ref 98–107)
CHLORIDE SERPL-SCNC: 101 MMOL/L — SIGNIFICANT CHANGE UP (ref 98–107)
CO2 SERPL-SCNC: 24 MMOL/L — SIGNIFICANT CHANGE UP (ref 22–31)
CO2 SERPL-SCNC: 24 MMOL/L — SIGNIFICANT CHANGE UP (ref 22–31)
CREAT SERPL-MCNC: 1.08 MG/DL — SIGNIFICANT CHANGE UP (ref 0.5–1.3)
CREAT SERPL-MCNC: 1.08 MG/DL — SIGNIFICANT CHANGE UP (ref 0.5–1.3)
EGFR: 73 ML/MIN/1.73M2 — SIGNIFICANT CHANGE UP
EGFR: 73 ML/MIN/1.73M2 — SIGNIFICANT CHANGE UP
EOSINOPHIL # BLD AUTO: 0.21 K/UL — SIGNIFICANT CHANGE UP (ref 0–0.5)
EOSINOPHIL # BLD AUTO: 0.21 K/UL — SIGNIFICANT CHANGE UP (ref 0–0.5)
EOSINOPHIL NFR BLD AUTO: 2.6 % — SIGNIFICANT CHANGE UP (ref 0–6)
EOSINOPHIL NFR BLD AUTO: 2.6 % — SIGNIFICANT CHANGE UP (ref 0–6)
GIANT PLATELETS BLD QL SMEAR: PRESENT — SIGNIFICANT CHANGE UP
GIANT PLATELETS BLD QL SMEAR: PRESENT — SIGNIFICANT CHANGE UP
GLUCOSE SERPL-MCNC: 251 MG/DL — HIGH (ref 70–99)
GLUCOSE SERPL-MCNC: 251 MG/DL — HIGH (ref 70–99)
HCT VFR BLD CALC: 22.1 % — LOW (ref 39–50)
HCT VFR BLD CALC: 22.1 % — LOW (ref 39–50)
HGB BLD-MCNC: 6.9 G/DL — CRITICAL LOW (ref 13–17)
HGB BLD-MCNC: 6.9 G/DL — CRITICAL LOW (ref 13–17)
IANC: 3.61 K/UL — SIGNIFICANT CHANGE UP (ref 1.8–7.4)
IANC: 3.61 K/UL — SIGNIFICANT CHANGE UP (ref 1.8–7.4)
INR BLD: 1.16 RATIO — SIGNIFICANT CHANGE UP (ref 0.85–1.18)
INR BLD: 1.16 RATIO — SIGNIFICANT CHANGE UP (ref 0.85–1.18)
LYMPHOCYTES # BLD AUTO: 2.08 K/UL — SIGNIFICANT CHANGE UP (ref 1–3.3)
LYMPHOCYTES # BLD AUTO: 2.08 K/UL — SIGNIFICANT CHANGE UP (ref 1–3.3)
LYMPHOCYTES # BLD AUTO: 26.1 % — SIGNIFICANT CHANGE UP (ref 13–44)
LYMPHOCYTES # BLD AUTO: 26.1 % — SIGNIFICANT CHANGE UP (ref 13–44)
MACROCYTES BLD QL: SLIGHT — SIGNIFICANT CHANGE UP
MACROCYTES BLD QL: SLIGHT — SIGNIFICANT CHANGE UP
MCHC RBC-ENTMCNC: 28.5 PG — SIGNIFICANT CHANGE UP (ref 27–34)
MCHC RBC-ENTMCNC: 28.5 PG — SIGNIFICANT CHANGE UP (ref 27–34)
MCHC RBC-ENTMCNC: 31.2 GM/DL — LOW (ref 32–36)
MCHC RBC-ENTMCNC: 31.2 GM/DL — LOW (ref 32–36)
MCV RBC AUTO: 91.3 FL — SIGNIFICANT CHANGE UP (ref 80–100)
MCV RBC AUTO: 91.3 FL — SIGNIFICANT CHANGE UP (ref 80–100)
MONOCYTES # BLD AUTO: 0.56 K/UL — SIGNIFICANT CHANGE UP (ref 0–0.9)
MONOCYTES # BLD AUTO: 0.56 K/UL — SIGNIFICANT CHANGE UP (ref 0–0.9)
MONOCYTES NFR BLD AUTO: 7 % — SIGNIFICANT CHANGE UP (ref 2–14)
MONOCYTES NFR BLD AUTO: 7 % — SIGNIFICANT CHANGE UP (ref 2–14)
NEUTROPHILS # BLD AUTO: 4.71 K/UL — SIGNIFICANT CHANGE UP (ref 1.8–7.4)
NEUTROPHILS # BLD AUTO: 4.71 K/UL — SIGNIFICANT CHANGE UP (ref 1.8–7.4)
NEUTROPHILS NFR BLD AUTO: 59.1 % — SIGNIFICANT CHANGE UP (ref 43–77)
NEUTROPHILS NFR BLD AUTO: 59.1 % — SIGNIFICANT CHANGE UP (ref 43–77)
PLAT MORPH BLD: NORMAL — SIGNIFICANT CHANGE UP
PLAT MORPH BLD: NORMAL — SIGNIFICANT CHANGE UP
PLATELET # BLD AUTO: 165 K/UL — SIGNIFICANT CHANGE UP (ref 150–400)
PLATELET # BLD AUTO: 165 K/UL — SIGNIFICANT CHANGE UP (ref 150–400)
PLATELET COUNT - ESTIMATE: NORMAL — SIGNIFICANT CHANGE UP
PLATELET COUNT - ESTIMATE: NORMAL — SIGNIFICANT CHANGE UP
POLYCHROMASIA BLD QL SMEAR: SLIGHT — SIGNIFICANT CHANGE UP
POLYCHROMASIA BLD QL SMEAR: SLIGHT — SIGNIFICANT CHANGE UP
POTASSIUM SERPL-MCNC: 4.6 MMOL/L — SIGNIFICANT CHANGE UP (ref 3.5–5.3)
POTASSIUM SERPL-MCNC: 4.6 MMOL/L — SIGNIFICANT CHANGE UP (ref 3.5–5.3)
POTASSIUM SERPL-SCNC: 4.6 MMOL/L — SIGNIFICANT CHANGE UP (ref 3.5–5.3)
POTASSIUM SERPL-SCNC: 4.6 MMOL/L — SIGNIFICANT CHANGE UP (ref 3.5–5.3)
PROT SERPL-MCNC: 7 G/DL — SIGNIFICANT CHANGE UP (ref 6–8.3)
PROT SERPL-MCNC: 7 G/DL — SIGNIFICANT CHANGE UP (ref 6–8.3)
PROTHROM AB SERPL-ACNC: 12.9 SEC — SIGNIFICANT CHANGE UP (ref 9.5–13)
PROTHROM AB SERPL-ACNC: 12.9 SEC — SIGNIFICANT CHANGE UP (ref 9.5–13)
RBC # BLD: 2.42 M/UL — LOW (ref 4.2–5.8)
RBC # BLD: 2.42 M/UL — LOW (ref 4.2–5.8)
RBC # FLD: 15.7 % — HIGH (ref 10.3–14.5)
RBC # FLD: 15.7 % — HIGH (ref 10.3–14.5)
RBC BLD AUTO: ABNORMAL
RBC BLD AUTO: ABNORMAL
RH IG SCN BLD-IMP: POSITIVE — SIGNIFICANT CHANGE UP
SODIUM SERPL-SCNC: 138 MMOL/L — SIGNIFICANT CHANGE UP (ref 135–145)
SODIUM SERPL-SCNC: 138 MMOL/L — SIGNIFICANT CHANGE UP (ref 135–145)
TARGETS BLD QL SMEAR: SLIGHT — SIGNIFICANT CHANGE UP
TARGETS BLD QL SMEAR: SLIGHT — SIGNIFICANT CHANGE UP
VARIANT LYMPHS # BLD: 2.6 % — SIGNIFICANT CHANGE UP (ref 0–6)
VARIANT LYMPHS # BLD: 2.6 % — SIGNIFICANT CHANGE UP (ref 0–6)
WBC # BLD: 7.97 K/UL — SIGNIFICANT CHANGE UP (ref 3.8–10.5)
WBC # BLD: 7.97 K/UL — SIGNIFICANT CHANGE UP (ref 3.8–10.5)
WBC # FLD AUTO: 7.97 K/UL — SIGNIFICANT CHANGE UP (ref 3.8–10.5)
WBC # FLD AUTO: 7.97 K/UL — SIGNIFICANT CHANGE UP (ref 3.8–10.5)

## 2024-01-08 PROCEDURE — 99285 EMERGENCY DEPT VISIT HI MDM: CPT

## 2024-01-08 NOTE — CONSULT NOTE ADULT - ASSESSMENT
73M prostate CA, DM2, HLD, HTN, CHF, COPD, right foot wound, presented after failed outpatient treatment of epistaxis. Pt is s/p silver nitrate cautery+ surgicel placement 1/2, s/p electrocautery b/l nares with  petroleum gauze in left nares 1/5 now p/w continued bleeding 1/8

## 2024-01-08 NOTE — ED PROVIDER NOTE - PHYSICAL EXAMINATION
chronic R foot wound. In [facility]. chronic R foot wound. In [facility].

## 2024-01-08 NOTE — CONSULT NOTE ADULT - SUBJECTIVE AND OBJECTIVE BOX
CC:    HPI:       PAST MEDICAL & SURGICAL HISTORY:  Prostate cancer      Type II diabetes mellitus      Chronic obstructive pulmonary disease (COPD)      CHF (congestive heart failure)      Renal insufficiency        Allergies    IODINE (Unknown)  tetracycline (Unknown)    Intolerances      MEDICATIONS  (STANDING):    MEDICATIONS  (PRN):      Social History: **??**    Family history: No pertinent family history in first degree relatives    ROS:   ENT: all negative except as noted in HPI   CV: denies palpitations  Pulm: denies SOB, cough, hemoptysis  GI: denies change in appetite, indigestion, n/v  : denies pertinent urinary symptoms, urgency  Neuro: denies numbness/tingling, loss of sensation  Psych: denies anxiety  MS: denies muscle weakness, instability  Heme: denies easy bruising or bleeding  Endo: denies heat/cold intolerance, excessive sweating  Vascular: denies LE edema    Vital Signs Last 24 Hrs  T(C): 36.8 (08 Jan 2024 17:16), Max: 36.8 (08 Jan 2024 17:16)  T(F): 98.2 (08 Jan 2024 17:16), Max: 98.2 (08 Jan 2024 17:16)  HR: 83 (08 Jan 2024 17:16) (83 - 83)  BP: 121/68 (08 Jan 2024 17:16) (121/68 - 121/68)  BP(mean): --  RR: 16 (08 Jan 2024 17:16) (16 - 16)  SpO2: 97% (08 Jan 2024 17:16) (97% - 97%)    Parameters below as of 08 Jan 2024 17:16  Patient On (Oxygen Delivery Method): room air                              6.9    7.97  )-----------( 165      ( 08 Jan 2024 19:45 )             22.1    01-08    138  |  101  |  24<H>  ----------------------------<  251<H>  4.6   |  24  |  1.08    Ca    9.2      08 Jan 2024 19:45    TPro  7.0  /  Alb  3.4  /  TBili  0.2  /  DBili  x   /  AST  13  /  ALT  13  /  AlkPhos  73  01-08   PT/INR - ( 08 Jan 2024 19:45 )   PT: 12.9 sec;   INR: 1.16 ratio         PTT - ( 08 Jan 2024 19:45 )  PTT:31.1 sec    PHYSICAL EXAM:  Gen: NAD  Skin: No rashes, bruises, or lesions  Head: Normocephalic, Atraumatic  Face: no edema, erythema, or fluctuance. Parotid glands soft without mass  Eyes: no scleral injection  Ears: Right - ear canal clear, TM intact without effusion or erythema. No evidence of any fluid drainage. No mastoid tenderness, erythema, or ear bulging            Left - ear canal clear, TM intact without effusion or erythema. No evidence of any fluid drainage. No mastoid tenderness, erythema, or ear bulging  Nose: Nares bilaterally patent, no discharge  Mouth: No stridor, no drooling, no trismus.  Mucosa moist, tongue/uvula midline, oropharynx clear  Neck: Flat, supple, no lymphadenopathy, trachea midline, no masses  Lymphatic: No lymphadenopathy  Resp: breathing easily, no stridor  CV: no peripheral edema/cyanosis  GI: nondistended   Peripheral vascular: no JVD or edema  Neuro: facial nerve intact, no facial droop      Diagnostic Nasal Endoscopy: (Scope #2 used)    Fiberoptic Indirect laryngoscopy:  (Scope #2 used)    IMAGING/ADDITIONAL STUDIES:  CC: epistaxis    HPI: 73M DMII, HTN COPD, CKD presented after failed outpatient treatment of epistaxis      PAST MEDICAL & SURGICAL HISTORY:  Prostate cancer      Type II diabetes mellitus      Chronic obstructive pulmonary disease (COPD)      CHF (congestive heart failure)      Renal insufficiency        Allergies    IODINE (Unknown)  tetracycline (Unknown)    Intolerances      MEDICATIONS  (STANDING):    MEDICATIONS  (PRN):      Social History: **??**    Family history: No pertinent family history in first degree relatives    ROS:   ENT: all negative except as noted in HPI   CV: denies palpitations  Pulm: denies SOB, cough, hemoptysis  GI: denies change in appetite, indigestion, n/v  : denies pertinent urinary symptoms, urgency  Neuro: denies numbness/tingling, loss of sensation  Psych: denies anxiety  MS: denies muscle weakness, instability  Heme: denies easy bruising or bleeding  Endo: denies heat/cold intolerance, excessive sweating  Vascular: denies LE edema    Vital Signs Last 24 Hrs  T(C): 36.8 (08 Jan 2024 17:16), Max: 36.8 (08 Jan 2024 17:16)  T(F): 98.2 (08 Jan 2024 17:16), Max: 98.2 (08 Jan 2024 17:16)  HR: 83 (08 Jan 2024 17:16) (83 - 83)  BP: 121/68 (08 Jan 2024 17:16) (121/68 - 121/68)  BP(mean): --  RR: 16 (08 Jan 2024 17:16) (16 - 16)  SpO2: 97% (08 Jan 2024 17:16) (97% - 97%)    Parameters below as of 08 Jan 2024 17:16  Patient On (Oxygen Delivery Method): room air                              6.9    7.97  )-----------( 165      ( 08 Jan 2024 19:45 )             22.1    01-08    138  |  101  |  24<H>  ----------------------------<  251<H>  4.6   |  24  |  1.08    Ca    9.2      08 Jan 2024 19:45    TPro  7.0  /  Alb  3.4  /  TBili  0.2  /  DBili  x   /  AST  13  /  ALT  13  /  AlkPhos  73  01-08   PT/INR - ( 08 Jan 2024 19:45 )   PT: 12.9 sec;   INR: 1.16 ratio         PTT - ( 08 Jan 2024 19:45 )  PTT:31.1 sec    PHYSICAL EXAM:  Gen: NAD  Skin: No rashes, bruises, or lesions  Head: Normocephalic, Atraumatic  Face: no edema, erythema, or fluctuance. Parotid glands soft without mass  Eyes: no scleral injection  Ears: Right - ear canal clear, TM intact without effusion or erythema. No evidence of any fluid drainage. No mastoid tenderness, erythema, or ear bulging            Left - ear canal clear, TM intact without effusion or erythema. No evidence of any fluid drainage. No mastoid tenderness, erythema, or ear bulging  Nose: Nares bilaterally patent, no discharge  Mouth: No stridor, no drooling, no trismus.  Mucosa moist, tongue/uvula midline, oropharynx clear  Neck: Flat, supple, no lymphadenopathy, trachea midline, no masses  Lymphatic: No lymphadenopathy  Resp: breathing easily, no stridor  CV: no peripheral edema/cyanosis  GI: nondistended   Peripheral vascular: no JVD or edema  Neuro: facial nerve intact, no facial droop      Diagnostic Nasal Endoscopy: (Scope #2 used)    Fiberoptic Indirect laryngoscopy:  (Scope #2 used)    IMAGING/ADDITIONAL STUDIES:  CC: epistaxis    HPI:  73M prostate CA, DM2, HLD, HTN, CHF, COPD, right foot wound, presented after failed outpatient treatment of epistaxis. ENT consulted. Pt seen and examined at bedside. Pt wife at bedside. Pt explained- On 1/2 had an episode of severe  bleeding from b/l nares and was transported via ambulance from assisted living facility to [facility]. Prior to this episode patient was having nose bleeds on and off for about 2 weeks, without any known trauma.  At the hospital (silver nitrate cautery preformed and Surgicel placed-  able to stop it). Had f/u with Dr. Rouse 1/5 AM   in route to f/u appt started to bleed heavy- at appt Dr. Rouse  preformed electro cautery of b/l nares (actively coming from left nares) hard to reach d/t bone spur and placed petroleum gauze to L nares. Pt stopped taking ASA 81 1/5  Today 1/8 patient with moderate bleeding at first right nares then left nares at 12pm so notified outpatient ENT team and told to go to the ED ([facility]) for preopt. Pt explained no active bleeding since arriving to the ED, and that packing stayed in place- pt explained "tail end of packing" was dangling so I cut it. Pt reported taking antibiotic that was prescribed daily while packing in place. Pt reported weakness.      PAST MEDICAL & SURGICAL HISTORY:  Prostate cancer      Type II diabetes mellitus      Chronic obstructive pulmonary disease (COPD)      CHF (congestive heart failure)      Renal insufficiency        Allergies    IODINE (Unknown)  tetracycline (Unknown)    Intolerances      MEDICATIONS  (STANDING):    MEDICATIONS  (PRN):           Family history: No pertinent family history in first degree relatives     ROS as above     Vital Signs Last 24 Hrs  T(C): 36.8 (08 Jan 2024 17:16), Max: 36.8 (08 Jan 2024 17:16)  T(F): 98.2 (08 Jan 2024 17:16), Max: 98.2 (08 Jan 2024 17:16)  HR: 83 (08 Jan 2024 17:16) (83 - 83)  BP: 121/68 (08 Jan 2024 17:16) (121/68 - 121/68)  BP(mean): --  RR: 16 (08 Jan 2024 17:16) (16 - 16)  SpO2: 97% (08 Jan 2024 17:16) (97% - 97%)    Parameters below as of 08 Jan 2024 17:16  Patient On (Oxygen Delivery Method): room air                              6.9    7.97  )-----------( 165      ( 08 Jan 2024 19:45 )             22.1    01-08    138  |  101  |  24<H>  ----------------------------<  251<H>  4.6   |  24  |  1.08    Ca    9.2      08 Jan 2024 19:45    TPro  7.0  /  Alb  3.4  /  TBili  0.2  /  DBili  x   /  AST  13  /  ALT  13  /  AlkPhos  73  01-08   PT/INR - ( 08 Jan 2024 19:45 )   PT: 12.9 sec;   INR: 1.16 ratio         PTT - ( 08 Jan 2024 19:45 )  PTT:31.1 sec    PHYSICAL EXAM:  Gen: NAD  Skin: No rashes, bruises, or lesions  Head: Normocephalic, Atraumatic  Face: no edema, erythema, or fluctuance.    Eyes: no scleral injection  Nose: Left nares + packing in place, saturatted no oozing/active bleeding noted, RIGHT nares - minimal dried blood noted no active bleeding   Mouth: No stridor, no drooling, no trismus.  Mucosa moist, tongue/uvula midline, oropharynx clear- no dried and no active bleeding   Neck: Flat, supple   Lymphatic: No lymphadenopathy  Resp: breathing easily, no stridor  Peripheral vascular: Right lower foot wound         CC: epistaxis    HPI:  73M prostate CA, DM2, HLD, HTN, CHF, COPD, right foot wound, presented after failed outpatient treatment of epistaxis. ENT consulted. Pt seen and examined at bedside. Pt wife at bedside. Pt explained- On 1/2 had an episode of severe  bleeding from b/l nares and was transported via ambulance from assisted living facility to [facility]. Prior to this episode patient was having nose bleeds on and off for about 2 weeks, without any known trauma.  At the hospital (silver nitrate cautery preformed and Surgicel placed-  able to stop it). Had f/u with Dr. Rouse 1/5 AM   in route to f/u appt started to bleed heavy- at appt Dr. Rouse  preformed electro cautery of b/l nares (actively coming from left nares) hard to reach d/t bone spur and placed petroleum gauze to L nares. Pt stopped taking ASA 81 1/5  Today 1/8 patient with moderate bleeding at first right nares then left nares at 12pm so notified outpatient ENT team and told to go to the ED ([facility]) for preopt. Pt explained no active bleeding since arriving to the ED, and that packing stayed in place- pt explained "tail end of packing" was dangling so I cut it. Pt reported taking antibiotic that was prescribed daily while packing in place. Pt reported weakness.      PAST MEDICAL & SURGICAL HISTORY:  Prostate cancer      Type II diabetes mellitus      Chronic obstructive pulmonary disease (COPD)      CHF (congestive heart failure)      Renal insufficiency        Allergies    IODINE (Unknown)  tetracycline (Unknown)    Intolerances      MEDICATIONS  (STANDING):    MEDICATIONS  (PRN):           Family history: No pertinent family history in first degree relatives     ROS as above     Vital Signs Last 24 Hrs  T(C): 36.8 (08 Jan 2024 17:16), Max: 36.8 (08 Jan 2024 17:16)  T(F): 98.2 (08 Jan 2024 17:16), Max: 98.2 (08 Jan 2024 17:16)  HR: 83 (08 Jan 2024 17:16) (83 - 83)  BP: 121/68 (08 Jan 2024 17:16) (121/68 - 121/68)  BP(mean): --  RR: 16 (08 Jan 2024 17:16) (16 - 16)  SpO2: 97% (08 Jan 2024 17:16) (97% - 97%)    Parameters below as of 08 Jan 2024 17:16  Patient On (Oxygen Delivery Method): room air                              6.9    7.97  )-----------( 165      ( 08 Jan 2024 19:45 )             22.1    01-08    138  |  101  |  24<H>  ----------------------------<  251<H>  4.6   |  24  |  1.08    Ca    9.2      08 Jan 2024 19:45    TPro  7.0  /  Alb  3.4  /  TBili  0.2  /  DBili  x   /  AST  13  /  ALT  13  /  AlkPhos  73  01-08   PT/INR - ( 08 Jan 2024 19:45 )   PT: 12.9 sec;   INR: 1.16 ratio         PTT - ( 08 Jan 2024 19:45 )  PTT:31.1 sec    PHYSICAL EXAM:  Gen: NAD  Skin: No rashes, bruises, or lesions  Head: Normocephalic, Atraumatic  Face: no edema, erythema, or fluctuance.    Eyes: no scleral injection  Nose: Left nares + packing in place, saturatted no oozing/active bleeding noted, RIGHT nares - minimal dried blood noted no active bleeding   Mouth: No stridor, no drooling, no trismus.  Mucosa moist, tongue/uvula midline, oropharynx clear- no dried and no active bleeding   Neck: Flat, supple   Lymphatic: No lymphadenopathy  Resp: breathing easily, no stridor  Peripheral vascular: Right lower foot wound

## 2024-01-08 NOTE — ED ADULT NURSE NOTE - CHIEF COMPLAINT QUOTE
Pt. states he's been having intermittent nosebleeds since Tuesday. Seen at [place] and then seen at his PMD on Friday. Pt's left nostril packed since Friday. Nosebleed started again this AM. Denies blood thinner use. c/o headache, dizziness and fatigue. No active bleeding at this time. Pt. states he's been having intermittent nosebleeds since Tuesday. Seen at [place] and then seen at his PMD on Friday. Pt's left nostril packed since Friday. Nosebleed started again this AM. Denies blood thinner use. c/o headache, dizziness and fatigue. No active bleeding at this time.

## 2024-01-08 NOTE — ED PROVIDER NOTE - ENMT, MLM
Airway patent,  Mouth with normal mucosa. Throat has no vesicles, no oropharyngeal exudates and uvula is midline.  L nasal packing in place. dried blood around L nare

## 2024-01-08 NOTE — CONSULT NOTE ADULT - PROBLEM SELECTOR RECOMMENDATION 9
- LEFT nares + packing in place (leave in place) - needs to continue staph coverage (keflex)   - patient can wear mustache dressing - 4x4 gauze folded and taped incase of any slight oozing   - PLEASE notify ENT if patient is actively bleeding [id number]// [id number]   - Please optimize patient for OR, make NPO/IVF and obtain proper medical/ cardiac clearance. - LEFT nares + packing in place (leave in place) - needs to continue staph coverage (keflex)   - patient can wear mustache dressing - 4x4 gauze folded and taped incase of any slight oozing   - PLEASE notify ENT if patient is actively bleeding [phone]// [id number]   - Please optimize patient for OR, make NPO/IVF and obtain proper medical/ cardiac clearance.

## 2024-01-08 NOTE — ED PROVIDER NOTE - CLINICAL SUMMARY MEDICAL DECISION MAKING FREE TEXT BOX
73M DMII, HTN COPD, CKD presented after failed outpatient treatment of epistaxis. Pt seen at outside hospital on Jan 2nd for epistaxis, followed up with Dr. Rouse in office. irish had recurrent nose bleed. Was planned for OR at [facility] tomorrow however presented to [facility] with nose bleed. No active nose bleed. Pt report generalized weakness. Denies any other complaints at this time.     plan  - labs  - ent consult 73M DMII, HTN COPD, CKD presented after failed outpatient treatment of epistaxis. Pt seen at outside hospital on Jan 2nd for epistaxis, followed up with Dr. Rouse in office. irish had recurrent nose bleed. Was planned for OR at [facility] tomorrow however presented to [facility] with nose bleed. No active nose bleed. Pt report generalized weakness. Denies any other complaints at this time.     plan  - labs  - ent consult

## 2024-01-08 NOTE — ED ADULT NURSE NOTE - NSFALLUNIVINTERV_ED_ALL_ED
Bed/Stretcher in lowest position, wheels locked, appropriate side rails in place/Call bell, personal items and telephone in reach/Instruct patient to call for assistance before getting out of bed/chair/stretcher/Non-slip footwear applied when patient is off stretcher/[place] to call system/Physically safe environment - no spills, clutter or unnecessary equipment/Purposeful proactive rounding/Room/bathroom lighting operational, light cord in reach Bed/Stretcher in lowest position, wheels locked, appropriate side rails in place/Call bell, personal items and telephone in reach/Instruct patient to call for assistance before getting out of bed/chair/stretcher/Non-slip footwear applied when patient is off stretcher/[place] to call system/Physically safe environment - no spills, clutter or unnecessary equipment/Purposeful proactive rounding/Room/bathroom lighting operational, light cord in reach

## 2024-01-08 NOTE — ED ADULT TRIAGE NOTE - CHIEF COMPLAINT QUOTE
Pt. states he's been having intermittent nosebleeds since Tuesday. Seen at [place] and then seen at his PMD on Friday. Pt's left nostril packed since Friday. Nosebleed started again this AM. Denies blood thinner use. c/o headache, dizziness and fatigue. No active bleeding at this time. Pt. states he's been having intermittent nosebleeds since Tuesday. Seen at [place] and then seen at his PMD on Friday. Pt's left nostril packed since Friday. Nosebleed started again this AM. Denies blood thinner use. c/o headache, dizziness and fatigue. No active bleeding at this time.

## 2024-01-08 NOTE — ED ADULT NURSE NOTE - OBJECTIVE STATEMENT
pt received to intake 2, aox4.  pt c/o intermittent nose bleed x a few days, was seen at another hospital and had his nostril cauderized and has had intermittent bleeding since.  pt denies pain, no bleeding currently, deneiws dizziness, weakness and lightheadedness.  SL placed, labs sent.

## 2024-01-08 NOTE — ED ADULT NURSE REASSESSMENT NOTE - NS ED NURSE REASSESS COMMENT FT1
Pt a&ox4, denies CP, SOB, or dyspnea at this time. Airway is patent, speaking in clear and coherent sentences. No signs of active nose bleed. Additional 20GIV placed to right forearm, labs collected and sent off. Pt a&ox4, denies CP, SOB, or dyspnea at this time. Airway is patent, speaking in clear and coherent sentences. No signs of active nose bleed. Additional 20GIV placed to right forearm, labs collected and sent off. Pt changed and turned, no signs of pressure ulcer or skin breakdown noted to the sacral region. Pt endorses known ulcer on the right foot, medial lateral sign, is being followed by outpatient podiatry team. Pt is refusing for staff to remove dressing and examine wound due to recent skin graph procedure on 12/24/23. Skin assessment escalated to [place], no further interventions at this time.

## 2024-01-08 NOTE — ED PROVIDER NOTE - OBJECTIVE STATEMENT
73M DMII, HTN COPD, CKD presented after failed outpatient treatment of epistaxis. Pt seen at outside hospital on Jan 2nd for epistaxis, followed up with Dr. Rouse in office. irish had recurrent nose bleed. Was planned for OR at [facility] tomorrow however presented to [facility] with nose bleed. No active nose bleed. Pt report generalized weakness. Denies any other complaints at this time. 73M DMII, HTN COPD, CKD presented after failed outpatient treatment of epistaxis. Pt seen at outside hospital on Jan 2nd for epistaxis, followed up with Dr. Rouse in office. irish had recurrent nose bleed. Was planned for OR at [facility] tomorrow however presented to [facility] with nose bleed. No active nose bleed. Pt report generalized weakness. Denies any other complaints at this time.

## 2024-01-08 NOTE — ED PROVIDER NOTE - ATTENDING APP SHARED VISIT CONTRIBUTION OF CARE
73F h/o CHF prostate CA HTN COPD, not on AC.  P/w nose bleed intermittently x 1 week, seen at OSH tried cautery unsuccessful, went to ENT, packed.  Pt called his ENT who recc come to hospital due to bleeding through the packing.  No active bleeding right now.  ENT consult.  Plan check labs, coags.  D/w ENT - pt was meant to got to [facility] for a procedure.  They reccomend transfer however no ENT to accept pt overnight tonight, it will have to be done in the morning.  Pt is full care and not suitable for CDU.  Hgb 6.9 as well.  Admit to medicine.  Of note RLE is in a cast/hard splint/Unna boot including toes.  Apparently his podiatrist had done a skin graft RLE and was very particular about who did the dressing changes.  Pt denies pain in RLE, podiatry can be consulted in AM for eval.  No warmth, redness or fever to suggest infection there.   VS:  unremarkable    GEN - malaise, mild discomfort L nare packing, chronically ill appearing   A+O x3   HEAD - NC/AT     ENT - PEERL, EOMI, mucous membranes    moist , no discharge    L nare blood soaked packing in place, no active bleeding.  No bleeding in pharynx.  NECK: Neck supple, non-tender without lymphadenopathy, no masses, no JVD  PULM - CTA b/l,  symmetric breath sounds  COR -  normal heart sounds    ABD - , ND, NT, soft,  BACK - no CVA tenderness, nontender spine     EXTREMS - no edema, no deformity, warm and well perfused  except RLE is in a cast/hard splint/Unna boot including toes.  Proximal tibia area no warmth, redness.  Pt declining dressing take down due to instructions from his podiatrist.   SKIN - no rash    or bruising      NEUROLOGIC - alert, face symmetric, speech fluent, sensation nl, motor no focal deficit. 73F h/o CHF prostate CA HTN COPD, not on AC.  P/w nose bleed intermittently x 1 week, seen at OSH tried cautery unsuccessful, went to ENT, packed.  Pt called his ENT who recc come to hospital due to bleeding through the packing.  No active bleeding right now.  ENT consult.  Plan check labs, coags.  D/w ENT - pt was meant to got to [facility] for a procedure.  They reccomend transfer however no ENT to accept pt overnight tonight, it will have to be done in the morning.  Pt is full care and not suitable for CDU.  Hgb 6.9 as well.  Admit to medicine.  Of note RLE is in a cast/hard splint/Unna boot including toes.  Apparently his podiatrist had done a skin graft RLE and was very particular about who did the dressing changes.  Pt denies pain in RLE, podiatry can be consulted in AM for eval.  No warmth, redness or fever to suggest infection there.   VS:  unremarkable    GEN - malaise, mild discomfort L nare packing, chronically ill appearing   A+O x3   HEAD - NC/AT     ENT - PEERL, EOMI, mucous membranes    moist , no discharge    L nare blood soaked packing in place, no active bleeding.  No bleeding in pharynx.  NECK: Neck supple, non-tender without lymphadenopathy, no masses, no JVD  PULM - CTA b/l,  symmetric breath sounds  COR -  normal heart sounds    ABD - , ND, NT, soft,  BACK - no CVA tenderness, nontender spine     EXTREMS - no edema, no deformity, warm and well perfused  except RLE is in a cast/hard splint/Unna boot including toes.  Proximal tibia area no warmth, redness.  Pt declining dressing take down due to instructions from his podiatrist.   SKIN - no rash    or bruising      NEUROLOGIC - alert, face symmetric, speech fluent, sensation nl, motor no focal deficit.

## 2024-01-09 DIAGNOSIS — I50.32 CHRONIC DIASTOLIC (CONGESTIVE) HEART FAILURE: ICD-10-CM

## 2024-01-09 DIAGNOSIS — D62 ACUTE POSTHEMORRHAGIC ANEMIA: ICD-10-CM

## 2024-01-09 DIAGNOSIS — Z91.89 OTHER SPECIFIED PERSONAL RISK FACTORS, NOT ELSEWHERE CLASSIFIED: ICD-10-CM

## 2024-01-09 DIAGNOSIS — R63.8 OTHER SYMPTOMS AND SIGNS CONCERNING FOOD AND FLUID INTAKE: ICD-10-CM

## 2024-01-09 DIAGNOSIS — J44.9 CHRONIC OBSTRUCTIVE PULMONARY DISEASE, UNSPECIFIED: ICD-10-CM

## 2024-01-09 DIAGNOSIS — Z98.890 OTHER SPECIFIED POSTPROCEDURAL STATES: Chronic | ICD-10-CM

## 2024-01-09 LAB
ALBUMIN SERPL ELPH-MCNC: 3.2 G/DL — LOW (ref 3.3–5)
ALBUMIN SERPL ELPH-MCNC: 3.2 G/DL — LOW (ref 3.3–5)
ALP SERPL-CCNC: 66 U/L — SIGNIFICANT CHANGE UP (ref 40–120)
ALP SERPL-CCNC: 66 U/L — SIGNIFICANT CHANGE UP (ref 40–120)
ALT FLD-CCNC: 10 U/L — SIGNIFICANT CHANGE UP (ref 4–41)
ALT FLD-CCNC: 10 U/L — SIGNIFICANT CHANGE UP (ref 4–41)
ANION GAP SERPL CALC-SCNC: 13 MMOL/L — SIGNIFICANT CHANGE UP (ref 7–14)
ANION GAP SERPL CALC-SCNC: 13 MMOL/L — SIGNIFICANT CHANGE UP (ref 7–14)
APTT BLD: 24.1 SEC — LOW (ref 24.5–35.6)
APTT BLD: 24.1 SEC — LOW (ref 24.5–35.6)
AST SERPL-CCNC: 10 U/L — SIGNIFICANT CHANGE UP (ref 4–40)
AST SERPL-CCNC: 10 U/L — SIGNIFICANT CHANGE UP (ref 4–40)
BASOPHILS # BLD AUTO: 0.05 K/UL — SIGNIFICANT CHANGE UP (ref 0–0.2)
BASOPHILS # BLD AUTO: 0.05 K/UL — SIGNIFICANT CHANGE UP (ref 0–0.2)
BASOPHILS NFR BLD AUTO: 0.8 % — SIGNIFICANT CHANGE UP (ref 0–2)
BASOPHILS NFR BLD AUTO: 0.8 % — SIGNIFICANT CHANGE UP (ref 0–2)
BILIRUB SERPL-MCNC: 0.7 MG/DL — SIGNIFICANT CHANGE UP (ref 0.2–1.2)
BILIRUB SERPL-MCNC: 0.7 MG/DL — SIGNIFICANT CHANGE UP (ref 0.2–1.2)
BUN SERPL-MCNC: 21 MG/DL — SIGNIFICANT CHANGE UP (ref 7–23)
BUN SERPL-MCNC: 21 MG/DL — SIGNIFICANT CHANGE UP (ref 7–23)
CALCIUM SERPL-MCNC: 8.4 MG/DL — SIGNIFICANT CHANGE UP (ref 8.4–10.5)
CALCIUM SERPL-MCNC: 8.4 MG/DL — SIGNIFICANT CHANGE UP (ref 8.4–10.5)
CHLORIDE SERPL-SCNC: 102 MMOL/L — SIGNIFICANT CHANGE UP (ref 98–107)
CHLORIDE SERPL-SCNC: 102 MMOL/L — SIGNIFICANT CHANGE UP (ref 98–107)
CO2 SERPL-SCNC: 24 MMOL/L — SIGNIFICANT CHANGE UP (ref 22–31)
CO2 SERPL-SCNC: 24 MMOL/L — SIGNIFICANT CHANGE UP (ref 22–31)
CREAT SERPL-MCNC: 0.95 MG/DL — SIGNIFICANT CHANGE UP (ref 0.5–1.3)
CREAT SERPL-MCNC: 0.95 MG/DL — SIGNIFICANT CHANGE UP (ref 0.5–1.3)
EGFR: 85 ML/MIN/1.73M2 — SIGNIFICANT CHANGE UP
EGFR: 85 ML/MIN/1.73M2 — SIGNIFICANT CHANGE UP
EOSINOPHIL # BLD AUTO: 0.15 K/UL — SIGNIFICANT CHANGE UP (ref 0–0.5)
EOSINOPHIL # BLD AUTO: 0.15 K/UL — SIGNIFICANT CHANGE UP (ref 0–0.5)
EOSINOPHIL NFR BLD AUTO: 2.3 % — SIGNIFICANT CHANGE UP (ref 0–6)
EOSINOPHIL NFR BLD AUTO: 2.3 % — SIGNIFICANT CHANGE UP (ref 0–6)
GLUCOSE BLDC GLUCOMTR-MCNC: 206 MG/DL — HIGH (ref 70–99)
GLUCOSE BLDC GLUCOMTR-MCNC: 206 MG/DL — HIGH (ref 70–99)
GLUCOSE BLDC GLUCOMTR-MCNC: 212 MG/DL — HIGH (ref 70–99)
GLUCOSE BLDC GLUCOMTR-MCNC: 212 MG/DL — HIGH (ref 70–99)
GLUCOSE BLDC GLUCOMTR-MCNC: 216 MG/DL — HIGH (ref 70–99)
GLUCOSE BLDC GLUCOMTR-MCNC: 216 MG/DL — HIGH (ref 70–99)
GLUCOSE BLDC GLUCOMTR-MCNC: 243 MG/DL — HIGH (ref 70–99)
GLUCOSE BLDC GLUCOMTR-MCNC: 243 MG/DL — HIGH (ref 70–99)
GLUCOSE BLDC GLUCOMTR-MCNC: 247 MG/DL — HIGH (ref 70–99)
GLUCOSE BLDC GLUCOMTR-MCNC: 247 MG/DL — HIGH (ref 70–99)
GLUCOSE SERPL-MCNC: 221 MG/DL — HIGH (ref 70–99)
GLUCOSE SERPL-MCNC: 221 MG/DL — HIGH (ref 70–99)
HCT VFR BLD CALC: 22 % — LOW (ref 39–50)
HCT VFR BLD CALC: 22 % — LOW (ref 39–50)
HCT VFR BLD CALC: 22.6 % — LOW (ref 39–50)
HCT VFR BLD CALC: 22.6 % — LOW (ref 39–50)
HGB BLD-MCNC: 7.3 G/DL — LOW (ref 13–17)
HGB BLD-MCNC: 7.3 G/DL — LOW (ref 13–17)
HGB BLD-MCNC: 7.4 G/DL — LOW (ref 13–17)
HGB BLD-MCNC: 7.4 G/DL — LOW (ref 13–17)
IANC: 3 K/UL — SIGNIFICANT CHANGE UP (ref 1.8–7.4)
IANC: 3 K/UL — SIGNIFICANT CHANGE UP (ref 1.8–7.4)
IMM GRANULOCYTES NFR BLD AUTO: 5.3 % — HIGH (ref 0–0.9)
IMM GRANULOCYTES NFR BLD AUTO: 5.3 % — HIGH (ref 0–0.9)
INR BLD: 1.25 RATIO — HIGH (ref 0.85–1.18)
INR BLD: 1.25 RATIO — HIGH (ref 0.85–1.18)
LYMPHOCYTES # BLD AUTO: 2.22 K/UL — SIGNIFICANT CHANGE UP (ref 1–3.3)
LYMPHOCYTES # BLD AUTO: 2.22 K/UL — SIGNIFICANT CHANGE UP (ref 1–3.3)
LYMPHOCYTES # BLD AUTO: 33.7 % — SIGNIFICANT CHANGE UP (ref 13–44)
LYMPHOCYTES # BLD AUTO: 33.7 % — SIGNIFICANT CHANGE UP (ref 13–44)
MAGNESIUM SERPL-MCNC: 1.3 MG/DL — LOW (ref 1.6–2.6)
MAGNESIUM SERPL-MCNC: 1.3 MG/DL — LOW (ref 1.6–2.6)
MCHC RBC-ENTMCNC: 29 PG — SIGNIFICANT CHANGE UP (ref 27–34)
MCHC RBC-ENTMCNC: 29 PG — SIGNIFICANT CHANGE UP (ref 27–34)
MCHC RBC-ENTMCNC: 29.1 PG — SIGNIFICANT CHANGE UP (ref 27–34)
MCHC RBC-ENTMCNC: 29.1 PG — SIGNIFICANT CHANGE UP (ref 27–34)
MCHC RBC-ENTMCNC: 32.7 GM/DL — SIGNIFICANT CHANGE UP (ref 32–36)
MCHC RBC-ENTMCNC: 32.7 GM/DL — SIGNIFICANT CHANGE UP (ref 32–36)
MCHC RBC-ENTMCNC: 33.2 GM/DL — SIGNIFICANT CHANGE UP (ref 32–36)
MCHC RBC-ENTMCNC: 33.2 GM/DL — SIGNIFICANT CHANGE UP (ref 32–36)
MCV RBC AUTO: 87.6 FL — SIGNIFICANT CHANGE UP (ref 80–100)
MCV RBC AUTO: 87.6 FL — SIGNIFICANT CHANGE UP (ref 80–100)
MCV RBC AUTO: 88.6 FL — SIGNIFICANT CHANGE UP (ref 80–100)
MCV RBC AUTO: 88.6 FL — SIGNIFICANT CHANGE UP (ref 80–100)
MONOCYTES # BLD AUTO: 0.81 K/UL — SIGNIFICANT CHANGE UP (ref 0–0.9)
MONOCYTES # BLD AUTO: 0.81 K/UL — SIGNIFICANT CHANGE UP (ref 0–0.9)
MONOCYTES NFR BLD AUTO: 12.3 % — SIGNIFICANT CHANGE UP (ref 2–14)
MONOCYTES NFR BLD AUTO: 12.3 % — SIGNIFICANT CHANGE UP (ref 2–14)
NEUTROPHILS # BLD AUTO: 3 K/UL — SIGNIFICANT CHANGE UP (ref 1.8–7.4)
NEUTROPHILS # BLD AUTO: 3 K/UL — SIGNIFICANT CHANGE UP (ref 1.8–7.4)
NEUTROPHILS NFR BLD AUTO: 45.6 % — SIGNIFICANT CHANGE UP (ref 43–77)
NEUTROPHILS NFR BLD AUTO: 45.6 % — SIGNIFICANT CHANGE UP (ref 43–77)
NRBC # BLD: 0 /100 WBCS — SIGNIFICANT CHANGE UP (ref 0–0)
NRBC # FLD: 0 K/UL — SIGNIFICANT CHANGE UP (ref 0–0)
PHOSPHATE SERPL-MCNC: 3 MG/DL — SIGNIFICANT CHANGE UP (ref 2.5–4.5)
PHOSPHATE SERPL-MCNC: 3 MG/DL — SIGNIFICANT CHANGE UP (ref 2.5–4.5)
PLATELET # BLD AUTO: 147 K/UL — LOW (ref 150–400)
PLATELET # BLD AUTO: 147 K/UL — LOW (ref 150–400)
PLATELET # BLD AUTO: 168 K/UL — SIGNIFICANT CHANGE UP (ref 150–400)
PLATELET # BLD AUTO: 168 K/UL — SIGNIFICANT CHANGE UP (ref 150–400)
POTASSIUM SERPL-MCNC: 3.5 MMOL/L — SIGNIFICANT CHANGE UP (ref 3.5–5.3)
POTASSIUM SERPL-MCNC: 3.5 MMOL/L — SIGNIFICANT CHANGE UP (ref 3.5–5.3)
POTASSIUM SERPL-SCNC: 3.5 MMOL/L — SIGNIFICANT CHANGE UP (ref 3.5–5.3)
POTASSIUM SERPL-SCNC: 3.5 MMOL/L — SIGNIFICANT CHANGE UP (ref 3.5–5.3)
PROT SERPL-MCNC: 6.3 G/DL — SIGNIFICANT CHANGE UP (ref 6–8.3)
PROT SERPL-MCNC: 6.3 G/DL — SIGNIFICANT CHANGE UP (ref 6–8.3)
PROTHROM AB SERPL-ACNC: 14 SEC — HIGH (ref 9.5–13)
PROTHROM AB SERPL-ACNC: 14 SEC — HIGH (ref 9.5–13)
RBC # BLD: 2.51 M/UL — LOW (ref 4.2–5.8)
RBC # BLD: 2.51 M/UL — LOW (ref 4.2–5.8)
RBC # BLD: 2.55 M/UL — LOW (ref 4.2–5.8)
RBC # BLD: 2.55 M/UL — LOW (ref 4.2–5.8)
RBC # FLD: 15.4 % — HIGH (ref 10.3–14.5)
RBC # FLD: 15.4 % — HIGH (ref 10.3–14.5)
RBC # FLD: 15.5 % — HIGH (ref 10.3–14.5)
RBC # FLD: 15.5 % — HIGH (ref 10.3–14.5)
SODIUM SERPL-SCNC: 139 MMOL/L — SIGNIFICANT CHANGE UP (ref 135–145)
SODIUM SERPL-SCNC: 139 MMOL/L — SIGNIFICANT CHANGE UP (ref 135–145)
WBC # BLD: 6.58 K/UL — SIGNIFICANT CHANGE UP (ref 3.8–10.5)
WBC # BLD: 6.58 K/UL — SIGNIFICANT CHANGE UP (ref 3.8–10.5)
WBC # BLD: 7.16 K/UL — SIGNIFICANT CHANGE UP (ref 3.8–10.5)
WBC # BLD: 7.16 K/UL — SIGNIFICANT CHANGE UP (ref 3.8–10.5)
WBC # FLD AUTO: 6.58 K/UL — SIGNIFICANT CHANGE UP (ref 3.8–10.5)
WBC # FLD AUTO: 6.58 K/UL — SIGNIFICANT CHANGE UP (ref 3.8–10.5)
WBC # FLD AUTO: 7.16 K/UL — SIGNIFICANT CHANGE UP (ref 3.8–10.5)
WBC # FLD AUTO: 7.16 K/UL — SIGNIFICANT CHANGE UP (ref 3.8–10.5)

## 2024-01-09 PROCEDURE — 93010 ELECTROCARDIOGRAM REPORT: CPT

## 2024-01-09 PROCEDURE — 99223 1ST HOSP IP/OBS HIGH 75: CPT

## 2024-01-09 RX ORDER — DEXTROSE 50 % IN WATER 50 %
25 SYRINGE (ML) INTRAVENOUS ONCE
Refills: 0 | Status: DISCONTINUED | OUTPATIENT
Start: 2024-01-09 | End: 2024-01-18

## 2024-01-09 RX ORDER — DEXTROSE 50 % IN WATER 50 %
12.5 SYRINGE (ML) INTRAVENOUS ONCE
Refills: 0 | Status: DISCONTINUED | OUTPATIENT
Start: 2024-01-09 | End: 2024-01-18

## 2024-01-09 RX ORDER — SODIUM CHLORIDE 9 MG/ML
1000 INJECTION, SOLUTION INTRAVENOUS
Refills: 0 | Status: DISCONTINUED | OUTPATIENT
Start: 2024-01-09 | End: 2024-01-18

## 2024-01-09 RX ORDER — LANOLIN ALCOHOL/MO/W.PET/CERES
3 CREAM (GRAM) TOPICAL AT BEDTIME
Refills: 0 | Status: DISCONTINUED | OUTPATIENT
Start: 2024-01-09 | End: 2024-01-18

## 2024-01-09 RX ORDER — ONDANSETRON 8 MG/1
4 TABLET, FILM COATED ORAL EVERY 12 HOURS
Refills: 0 | Status: DISCONTINUED | OUTPATIENT
Start: 2024-01-09 | End: 2024-01-18

## 2024-01-09 RX ORDER — ATORVASTATIN CALCIUM 80 MG/1
80 TABLET, FILM COATED ORAL AT BEDTIME
Refills: 0 | Status: DISCONTINUED | OUTPATIENT
Start: 2024-01-09 | End: 2024-01-18

## 2024-01-09 RX ORDER — INSULIN LISPRO 100/ML
VIAL (ML) SUBCUTANEOUS EVERY 6 HOURS
Refills: 0 | Status: DISCONTINUED | OUTPATIENT
Start: 2024-01-09 | End: 2024-01-09

## 2024-01-09 RX ORDER — OXYCODONE HYDROCHLORIDE 5 MG/1
5 TABLET ORAL DAILY
Refills: 0 | Status: DISCONTINUED | OUTPATIENT
Start: 2024-01-09 | End: 2024-01-09

## 2024-01-09 RX ORDER — INSULIN LISPRO 100/ML
VIAL (ML) SUBCUTANEOUS AT BEDTIME
Refills: 0 | Status: DISCONTINUED | OUTPATIENT
Start: 2024-01-09 | End: 2024-01-10

## 2024-01-09 RX ORDER — ACETAMINOPHEN 500 MG
325 TABLET ORAL EVERY 8 HOURS
Refills: 0 | Status: DISCONTINUED | OUTPATIENT
Start: 2024-01-09 | End: 2024-01-18

## 2024-01-09 RX ORDER — CEPHALEXIN 500 MG
500 CAPSULE ORAL EVERY 12 HOURS
Refills: 0 | Status: COMPLETED | OUTPATIENT
Start: 2024-01-09 | End: 2024-01-10

## 2024-01-09 RX ORDER — DEXTROSE 50 % IN WATER 50 %
15 SYRINGE (ML) INTRAVENOUS ONCE
Refills: 0 | Status: DISCONTINUED | OUTPATIENT
Start: 2024-01-09 | End: 2024-01-18

## 2024-01-09 RX ORDER — INSULIN LISPRO 100/ML
VIAL (ML) SUBCUTANEOUS
Refills: 0 | Status: DISCONTINUED | OUTPATIENT
Start: 2024-01-09 | End: 2024-01-10

## 2024-01-09 RX ORDER — FUROSEMIDE 40 MG
20 TABLET ORAL ONCE
Refills: 0 | Status: COMPLETED | OUTPATIENT
Start: 2024-01-09 | End: 2024-01-09

## 2024-01-09 RX ORDER — SENNA PLUS 8.6 MG/1
2 TABLET ORAL AT BEDTIME
Refills: 0 | Status: DISCONTINUED | OUTPATIENT
Start: 2024-01-09 | End: 2024-01-18

## 2024-01-09 RX ORDER — KETOROLAC TROMETHAMINE 30 MG/ML
15 SYRINGE (ML) INJECTION ONCE
Refills: 0 | Status: DISCONTINUED | OUTPATIENT
Start: 2024-01-09 | End: 2024-01-09

## 2024-01-09 RX ORDER — MONTELUKAST 4 MG/1
10 TABLET, CHEWABLE ORAL DAILY
Refills: 0 | Status: DISCONTINUED | OUTPATIENT
Start: 2024-01-09 | End: 2024-01-18

## 2024-01-09 RX ORDER — METOPROLOL TARTRATE 50 MG
12.5 TABLET ORAL EVERY 12 HOURS
Refills: 0 | Status: DISCONTINUED | OUTPATIENT
Start: 2024-01-09 | End: 2024-01-12

## 2024-01-09 RX ORDER — INSULIN GLARGINE 100 [IU]/ML
10 INJECTION, SOLUTION SUBCUTANEOUS AT BEDTIME
Refills: 0 | Status: DISCONTINUED | OUTPATIENT
Start: 2024-01-09 | End: 2024-01-18

## 2024-01-09 RX ORDER — CLONAZEPAM 1 MG
0.75 TABLET ORAL EVERY 12 HOURS
Refills: 0 | Status: DISCONTINUED | OUTPATIENT
Start: 2024-01-09 | End: 2024-01-11

## 2024-01-09 RX ORDER — SERTRALINE 25 MG/1
100 TABLET, FILM COATED ORAL DAILY
Refills: 0 | Status: DISCONTINUED | OUTPATIENT
Start: 2024-01-09 | End: 2024-01-18

## 2024-01-09 RX ORDER — GLUCAGON INJECTION, SOLUTION 0.5 MG/.1ML
1 INJECTION, SOLUTION SUBCUTANEOUS ONCE
Refills: 0 | Status: DISCONTINUED | OUTPATIENT
Start: 2024-01-09 | End: 2024-01-18

## 2024-01-09 RX ORDER — CHLORHEXIDINE GLUCONATE 213 G/1000ML
1 SOLUTION TOPICAL DAILY
Refills: 0 | Status: DISCONTINUED | OUTPATIENT
Start: 2024-01-09 | End: 2024-01-09

## 2024-01-09 RX ORDER — ALBUTEROL 90 UG/1
1 AEROSOL, METERED ORAL EVERY 4 HOURS
Refills: 0 | Status: DISCONTINUED | OUTPATIENT
Start: 2024-01-09 | End: 2024-01-18

## 2024-01-09 RX ORDER — BUDESONIDE AND FORMOTEROL FUMARATE DIHYDRATE 160; 4.5 UG/1; UG/1
2 AEROSOL RESPIRATORY (INHALATION)
Refills: 0 | Status: DISCONTINUED | OUTPATIENT
Start: 2024-01-09 | End: 2024-01-18

## 2024-01-09 RX ORDER — MAGNESIUM SULFATE 500 MG/ML
2 VIAL (ML) INJECTION ONCE
Refills: 0 | Status: COMPLETED | OUTPATIENT
Start: 2024-01-09 | End: 2024-01-09

## 2024-01-09 RX ORDER — ALBUTEROL 90 UG/1
2.5 AEROSOL, METERED ORAL EVERY 6 HOURS
Refills: 0 | Status: DISCONTINUED | OUTPATIENT
Start: 2024-01-09 | End: 2024-01-18

## 2024-01-09 RX ORDER — SACCHAROMYCES BOULARDII 250 MG
250 POWDER IN PACKET (EA) ORAL DAILY
Refills: 0 | Status: DISCONTINUED | OUTPATIENT
Start: 2024-01-09 | End: 2024-01-18

## 2024-01-09 RX ADMIN — Medication 500 MILLIGRAM(S): at 03:45

## 2024-01-09 RX ADMIN — BUDESONIDE AND FORMOTEROL FUMARATE DIHYDRATE 2 PUFF(S): 160; 4.5 AEROSOL RESPIRATORY (INHALATION) at 22:25

## 2024-01-09 RX ADMIN — Medication 4: at 06:58

## 2024-01-09 RX ADMIN — Medication 4: at 12:20

## 2024-01-09 RX ADMIN — BUDESONIDE AND FORMOTEROL FUMARATE DIHYDRATE 2 PUFF(S): 160; 4.5 AEROSOL RESPIRATORY (INHALATION) at 08:48

## 2024-01-09 RX ADMIN — ALBUTEROL 2.5 MILLIGRAM(S): 90 AEROSOL, METERED ORAL at 10:21

## 2024-01-09 RX ADMIN — Medication 12.5 MILLIGRAM(S): at 06:14

## 2024-01-09 RX ADMIN — Medication 0.75 MILLIGRAM(S): at 03:45

## 2024-01-09 RX ADMIN — Medication 0.75 MILLIGRAM(S): at 21:49

## 2024-01-09 RX ADMIN — Medication 500 MILLIGRAM(S): at 21:51

## 2024-01-09 RX ADMIN — Medication 12.5 MILLIGRAM(S): at 18:09

## 2024-01-09 RX ADMIN — OXYCODONE HYDROCHLORIDE 5 MILLIGRAM(S): 5 TABLET ORAL at 00:37

## 2024-01-09 RX ADMIN — Medication 15 MILLIGRAM(S): at 18:07

## 2024-01-09 RX ADMIN — Medication 20 MILLIGRAM(S): at 03:46

## 2024-01-09 RX ADMIN — INSULIN GLARGINE 10 UNIT(S): 100 INJECTION, SOLUTION SUBCUTANEOUS at 22:41

## 2024-01-09 RX ADMIN — ALBUTEROL 2.5 MILLIGRAM(S): 90 AEROSOL, METERED ORAL at 03:58

## 2024-01-09 RX ADMIN — SENNA PLUS 2 TABLET(S): 8.6 TABLET ORAL at 21:51

## 2024-01-09 RX ADMIN — MONTELUKAST 10 MILLIGRAM(S): 4 TABLET, CHEWABLE ORAL at 12:18

## 2024-01-09 RX ADMIN — ALBUTEROL 2.5 MILLIGRAM(S): 90 AEROSOL, METERED ORAL at 16:03

## 2024-01-09 RX ADMIN — Medication 4: at 18:07

## 2024-01-09 RX ADMIN — ATORVASTATIN CALCIUM 80 MILLIGRAM(S): 80 TABLET, FILM COATED ORAL at 21:51

## 2024-01-09 RX ADMIN — SERTRALINE 100 MILLIGRAM(S): 25 TABLET, FILM COATED ORAL at 12:18

## 2024-01-09 RX ADMIN — Medication 25 GRAM(S): at 08:48

## 2024-01-09 NOTE — PATIENT PROFILE ADULT - FUNCTIONAL ASSESSMENT - BASIC MOBILITY 6.
3-calculated by average/Not able to assess (calculate score using [facility] averaging method)  3-calculated by average/Not able to assess (calculate score using [facility] averaging method)

## 2024-01-09 NOTE — H&P ADULT - HISTORY OF PRESENT ILLNESS
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 ROS otherwise negative. Had Hg 6.9 given 1 unit pRBC, packing done by ENT. ROS otherwise negative.     Vital Signs Last 24 Hrs  T(C): 36.6 (09 Jan 2024 03:39), Max: 37.1 (09 Jan 2024 00:19)  T(F): 97.8 (09 Jan 2024 03:39), Max: 98.7 (09 Jan 2024 00:19)  HR: 90 (09 Jan 2024 03:39) (83 - 95)  BP: 128/74 (09 Jan 2024 03:39) (121/68 - 148/73)  BP(mean): --  RR: 18 (09 Jan 2024 03:39) (16 - 18)  SpO2: 98% (09 Jan 2024 03:39) (96% - 99%)    Parameters below as of 09 Jan 2024 03:39  Patient On (Oxygen Delivery Method): room air

## 2024-01-09 NOTE — ED ADULT NURSE REASSESSMENT NOTE - NS ED NURSE REASSESS COMMENT FT1
Break RN: pt a&ox4, denying chest pain, sob, n+v, headache, dizziness. Breathing even, unlabored. No active bleeding at this time. Pt to remain NPO except meds as per MD Padron at bedside.

## 2024-01-09 NOTE — CHART NOTE - NSCHARTNOTEFT_GEN_A_CORE
[facility] NIGHT MEDICINE COVERAGE.    Notified by RN, pt requesting Oxycodone for pain control. iSTOP performed (Reference #: [id number]).     Practitioner Count: 2  Pharmacy Count: 1  Current Opioid Prescriptions: 0  Current Benzodiazepine Prescriptions: 1  Current Stimulant Prescriptions: 0      Patient Demographic Information (PDI)       PDI	First Name	Last Name	Birth Date	Gender	Street Address	[facility]	State	Zip Code  A	Armando Littlejohn	1950	Male	[address] [facility]	[address]  B	Armando Littlejohn	1950	Male	[address]	[facility]	[address]  C	Armando Littlejohn	1950	Male	[address]	[facility]	[address]    Prescription Information      PDI Filter:    PDI	My Rx	Current Rx	Drug Type	Rx Written	Rx Dispensed	Drug	Quantity	Days Supply	Prescriber Name	Prescriber SHRADDHA #	Payment Method	Dispenser  A	N	N	B	07/31/2023	08/04/2023	clonazepam 1 mg tablet	90	30	John Paul Navarrete MD	[id number]	Insurance	[facility] #[id number]  A	N	N	B	06/15/2023	06/15/2023	clonazepam 1 mg tablet	90	30	John Paul Navarrete MD	[id number]	Insurance	[facility] #[id number]  A	N	N	B	04/28/2023	05/08/2023	clonazepam 1 mg tablet	90	30	John Paul Navarrete MD	[id number]	Insurance	[facility] #[id number]  A	N	N		10/19/2022	04/08/2023	pregabalin 150 mg capsule	60	30	John Paul Navarrete MD	[id number]	[id number]  A	N	N	B	03/10/2023	03/18/2023	clonazepam 1 mg tablet	90	30	John Paul Navarrete MD	[id number]	Insurance	[facility] #[id number]  A	N	N		10/19/2022	03/11/2023	pregabalin 150 mg capsule	60	30	John Paul Navarrete MD	[id number]	Insurance	[facility] #[id number]  A	N	N	B	02/17/2023	02/18/2023	clonazepam 1 mg tablet	90	30	John Paul Navarrete MD	[id number]	Insurance	[facility] #[id number]  A	N	N		10/19/2022	01/29/2023	pregabalin 150 mg capsule	60	30	John Paul Navarrete MD	[id number]	Insurance	[facility] #[id number]  A	N	N	B	01/17/2023	01/17/2023	clonazepam 1 mg tablet	90	30	John Paul Navarrete MD	[id number]	Insurance	[facility] #[id number]  B	N	Y	B	01/03/2024	01/03/2024	clonazepam 1 mg tablet	60	30	Haroon Walter	[id number]	Medicare	[facility] #[id number]  B	N	Y		08/21/2023	12/27/2023	pregabalin 150 mg capsule	60	30	MilagroJose MD	[id number]	Medicare	[facility] #[id number]  B	N	N	O	11/28/2023	11/28/2023	oxycodone hcl (ir) 5 mg tablet	60	30	MilagroJose MD	[id number]	Medicare	[facility] #[id number]  B	N	N	B	11/25/2023	11/25/2023	clonazepam 1 mg tablet	60	30	WalterHaroon	[id number]	Medicare	[facility] #[id number]  B	N	N		08/21/2023	11/18/2023	pregabalin 150 mg capsule	60	30	MilagroJose MD	[id number]	Medicare	[facility] #[id number]  B	N	N	O	10/20/2023	10/21/2023	oxycodone hcl (ir) 5 mg tablet	60	30	MilagroJose MD	[id number]	Medicare	[facility] #[id number]  B	N	N	B	10/17/2023	10/20/2023	clonazepam 1 mg tablet	60	30	WalterHaroon	[id number]	Medicare	[facility] #[id number]  B	N	N		08/21/2023	10/17/2023	pregabalin 150 mg capsule	60	30	MilagroJose MD	[id number]	Medicare	[facility] #[id number]  B	N	N	B	10/06/2023	10/06/2023	clonazepam 1 mg tablet	30	15	MilagroJose MD	[id number]	Medicare	[facility] #[id number]  B	N	N		08/21/2023	09/19/2023	pregabalin 150 mg capsule	60	30	MilagroJose MD	[id number]	Medicare	[facility] #[id number]  B	N	N	O	09/09/2023	09/09/2023	oxycodone hcl (ir) 5 mg tablet	60	30	Jose Humphrey MD	[id number]	Medicare	[facility] #[id number]  B	N	N	B	09/05/2023	09/06/2023	clonazepam 1 mg tablet	60	30	Jose Humphrey MD	[id number]	Medicare	[facility] #[id number]  B	N	N	B	08/14/2023	08/27/2023	clonazepam 1 mg tablet	14	7	Kendal Gomez MD	[id number]	Medicare	[facility] #[id number]  B	N	N		08/21/2023	08/21/2023	pregabalin 150 mg capsule	60	30	Jose Humphrey MD	[id number]	Medicare	[facility] #[id number]  B	N	N	O	08/14/2023	08/14/2023	oxycodone hcl (ir) 5 mg tablet	28	7	Kendal Gomez MD	[id number]	Medicare	[facility] #[id number]  B	N	N		08/14/2023	08/14/2023	pregabalin 150 mg capsule	14	7	Kendal Gomez MD	[id number]	Medicare	[facility] #[id number]  C	N	N	B	08/01/2023	08/01/2023	clonazepam 1 mg tablet	14	7	Kendal Gomez MD	[id number]	Cash	[facility]  C	N	N	B	07/28/2023	07/28/2023	clonazepam 1 mg tablet	45	15	Kendal Gomez MD	[id number]	Cash	[facility]  C	N	N		07/28/2023	07/28/2023	pregabalin 150 mg capsule	30	15	Kendal Gomez MD	[id number]	Cash	[facility]  C	N	N	O	07/26/2023	07/26/2023	oxycodone hcl (ir) 5 mg tablet	30	15	Kendal Gomez MD	[id number]	Cash	[facility]  C	N	N		07/11/2023	07/11/2023	pregabalin 150 mg capsule	28	14	Kendal Gomez MD	[id number]	Cash	[facility]  C	N	N	B	06/29/2023	06/29/2023	clonazepam 1 mg tablet	42	14	Kendal Gomez MD	[id number]	Cash	[facility]  C	N	N		06/29/2023	06/29/2023	pregabalin 150 mg capsule	28	14	Kendal Gomez MD	[id number]	Insurance	[facility]  C	N	N	O	06/29/2023	06/29/2023	oxycodone hcl (ir) 5 mg tablet	28	7	Kendal Gomez MD	[id number]	Insurance	[facility]  C	N	N		06/13/2023	06/13/2023	pregabalin 150 mg capsule	14	7	Kendal Gomez MD	[id number]	Insurance	[facility]  C	N	N	O	06/09/2023	06/09/2023	oxycodone hcl (ir) 5 mg tablet	28	7	Kendal Gomez MD	[id number]	Insurance	[facility]  C	N	N		05/23/2023	06/03/2023	pregabalin 150 mg capsule	14	7	Kendal Gomez MD	[id number]	Cash	[facility]  C	N	N		05/17/2023	05/23/2023	pregabalin 150 mg capsule	30	15	Kendal Gomez MD	[id number]	Cash	[facility]  C	N	N		05/12/2023	05/17/2023	pregabalin 150 mg capsule	14	7	Kendal Gomez MD	[id number]	Cash	[facility]  C	N	N	B	05/12/2023	05/12/2023	clonazepam 1 mg tablet	42	14	Kendal Gomez MD	[id number]	Cash	[facility]  C	N	N	O	05/12/2023	05/12/2023	oxycodone hcl (ir) 5 mg tablet	30	7	Kendal Gomez MD	[id number]	Cash	[facility]  C	N	N		05/05/2023	05/05/2023	pregabalin 150 mg capsule	28	14	Kendal Gomez MD	[id number]	Cash	[facility]  C	N	N	B	04/28/2023	04/28/2023	clonazepam 1 mg tablet	21	7	Kendal Gomez MD	[id number]	Cash	[facility]  C	N	N		04/28/2023	04/28/2023	pregabalin 150 mg capsule	14	7	Kendal Gomez MD	[id number]	[facility]  C	N	N	O	04/28/2023	04/28/2023	oxycodone hcl (ir) 5 mg tablet	28	7	Kendal Gomez MD	[id number]	[facility] NIGHT MEDICINE COVERAGE.    Notified by RN, pt requesting Oxycodone for pain control. iSTOP performed (Reference #: [id number]).     Practitioner Count: 2  Pharmacy Count: 1  Current Opioid Prescriptions: 0  Current Benzodiazepine Prescriptions: 1  Current Stimulant Prescriptions: 0      Patient Demographic Information (PDI)       PDI	First Name	Last Name	Birth Date	Gender	Street Address	[facility]	State	Zip Code  A	Armando Littlejohn	1950	Male	[address] [facility]	[address]  B	Armando Littlejohn	1950	Male	[address]	[facility]	[address]  C	Armando Littlejohn	1950	Male	[address]	[facility]	[address]    Prescription Information      PDI Filter:    PDI	My Rx	Current Rx	Drug Type	Rx Written	Rx Dispensed	Drug	Quantity	Days Supply	Prescriber Name	Prescriber SHRADDHA #	Payment Method	Dispenser  A	N	N	B	07/31/2023	08/04/2023	clonazepam 1 mg tablet	90	30	John Paul Navarrete MD	[id number]	Insurance	[facility] #[id number]  A	N	N	B	06/15/2023	06/15/2023	clonazepam 1 mg tablet	90	30	John Paul Navarrete MD	[id number]	Insurance	[facility] #[id number]  A	N	N	B	04/28/2023	05/08/2023	clonazepam 1 mg tablet	90	30	John Paul Navarrete MD	[id number]	Insurance	[facility] #[id number]  A	N	N		10/19/2022	04/08/2023	pregabalin 150 mg capsule	60	30	John Paul Navarrete MD	[id number]	Insurance	[id number]  A	N	N	B	03/10/2023	03/18/2023	clonazepam 1 mg tablet	90	30	John Paul Navarrete MD	[id number]	Insurance	[facility] #[id number]  A	N	N		10/19/2022	03/11/2023	pregabalin 150 mg capsule	60	30	John Paul Navarrete MD	[id number]	Insurance	[facility] #[id number]  A	N	N	B	02/17/2023	02/18/2023	clonazepam 1 mg tablet	90	30	John Paul Navarrete MD	[id number]	Insurance	[facility] #[id number]  A	N	N		10/19/2022	01/29/2023	pregabalin 150 mg capsule	60	30	John Paul Navarrete MD	[id number]	Insurance	[facility] #[id number]  A	N	N	B	01/17/2023	01/17/2023	clonazepam 1 mg tablet	90	30	John Paul Navarrete MD	[id number]	Insurance	[facility] #[id number]  B	N	Y	B	01/03/2024	01/03/2024	clonazepam 1 mg tablet	60	30	Haroon Walter	[id number]	Medicare	[facility] #[id number]  B	N	Y		08/21/2023	12/27/2023	pregabalin 150 mg capsule	60	30	MilagroJose MD	[id number]	Medicare	[facility] #[id number]  B	N	N	O	11/28/2023	11/28/2023	oxycodone hcl (ir) 5 mg tablet	60	30	MilagroJose MD	[id number]	Medicare	[facility] #[id number]  B	N	N	B	11/25/2023	11/25/2023	clonazepam 1 mg tablet	60	30	WalterHaroon	[id number]	Medicare	[facility] #[id number]  B	N	N		08/21/2023	11/18/2023	pregabalin 150 mg capsule	60	30	MilagroJose MD	[id number]	Medicare	[facility] #[id number]  B	N	N	O	10/20/2023	10/21/2023	oxycodone hcl (ir) 5 mg tablet	60	30	MilagroJose MD	[id number]	Medicare	[facility] #[id number]  B	N	N	B	10/17/2023	10/20/2023	clonazepam 1 mg tablet	60	30	WalterHaroon	[id number]	Medicare	[facility] #[id number]  B	N	N		08/21/2023	10/17/2023	pregabalin 150 mg capsule	60	30	MilagroJose MD	[id number]	Medicare	[facility] #[id number]  B	N	N	B	10/06/2023	10/06/2023	clonazepam 1 mg tablet	30	15	MilagroJose MD	[id number]	Medicare	[facility] #[id number]  B	N	N		08/21/2023	09/19/2023	pregabalin 150 mg capsule	60	30	MilagroJose MD	[id number]	Medicare	[facility] #[id number]  B	N	N	O	09/09/2023	09/09/2023	oxycodone hcl (ir) 5 mg tablet	60	30	Jose Humphrey MD	[id number]	Medicare	[facility] #[id number]  B	N	N	B	09/05/2023	09/06/2023	clonazepam 1 mg tablet	60	30	Jose Humphrey MD	[id number]	Medicare	[facility] #[id number]  B	N	N	B	08/14/2023	08/27/2023	clonazepam 1 mg tablet	14	7	Kendal Gomez MD	[id number]	Medicare	[facility] #[id number]  B	N	N		08/21/2023	08/21/2023	pregabalin 150 mg capsule	60	30	Jose Humphrey MD	[id number]	Medicare	[facility] #[id number]  B	N	N	O	08/14/2023	08/14/2023	oxycodone hcl (ir) 5 mg tablet	28	7	Kendal Gomez MD	[id number]	Medicare	[facility] #[id number]  B	N	N		08/14/2023	08/14/2023	pregabalin 150 mg capsule	14	7	Kendal Gomez MD	[id number]	Medicare	[facility] #[id number]  C	N	N	B	08/01/2023	08/01/2023	clonazepam 1 mg tablet	14	7	Kendal Gomez MD	[id number]	Cash	[facility]  C	N	N	B	07/28/2023	07/28/2023	clonazepam 1 mg tablet	45	15	Kendal Gomez MD	[id number]	Cash	[facility]  C	N	N		07/28/2023	07/28/2023	pregabalin 150 mg capsule	30	15	Kendal Gomze MD	[id number]	Cash	[facility]  C	N	N	O	07/26/2023	07/26/2023	oxycodone hcl (ir) 5 mg tablet	30	15	Kendal Gomez MD	[id number]	Cash	[facility]  C	N	N		07/11/2023	07/11/2023	pregabalin 150 mg capsule	28	14	Kendal Gomez MD	[id number]	Cash	[facility]  C	N	N	B	06/29/2023	06/29/2023	clonazepam 1 mg tablet	42	14	Kendal Gomez MD	[id number]	Cash	[facility]  C	N	N		06/29/2023	06/29/2023	pregabalin 150 mg capsule	28	14	Kendal Gomez MD	[id number]	Insurance	[facility]  C	N	N	O	06/29/2023	06/29/2023	oxycodone hcl (ir) 5 mg tablet	28	7	Kendal Gomez MD	[id number]	Insurance	[facility]  C	N	N		06/13/2023	06/13/2023	pregabalin 150 mg capsule	14	7	Kendal Gomez MD	[id number]	Insurance	[facility]  C	N	N	O	06/09/2023	06/09/2023	oxycodone hcl (ir) 5 mg tablet	28	7	Kendal Gomez MD	[id number]	Insurance	[facility]  C	N	N		05/23/2023	06/03/2023	pregabalin 150 mg capsule	14	7	Kendal Gomez MD	[id number]	Cash	[facility]  C	N	N		05/17/2023	05/23/2023	pregabalin 150 mg capsule	30	15	Kendal Gomez MD	[id number]	Cash	[facility]  C	N	N		05/12/2023	05/17/2023	pregabalin 150 mg capsule	14	7	Kendal Gomez MD	[id number]	Cash	[facility]  C	N	N	B	05/12/2023	05/12/2023	clonazepam 1 mg tablet	42	14	Kendal Gomez MD	[id number]	Cash	[facility]  C	N	N	O	05/12/2023	05/12/2023	oxycodone hcl (ir) 5 mg tablet	30	7	Kendal Gomez MD	[id number]	Cash	[facility]  C	N	N		05/05/2023	05/05/2023	pregabalin 150 mg capsule	28	14	Kendal Gomez MD	[id number]	Cash	[facility]  C	N	N	B	04/28/2023	04/28/2023	clonazepam 1 mg tablet	21	7	Kendal Gomez MD	[id number]	Cash	[facility]  C	N	N		04/28/2023	04/28/2023	pregabalin 150 mg capsule	14	7	Kendal Gomez MD	[id number]	[facility]  C	N	N	O	04/28/2023	04/28/2023	oxycodone hcl (ir) 5 mg tablet	28	7	Kendal Gomez MD	[id number]	[facility]

## 2024-01-09 NOTE — ED ADULT NURSE REASSESSMENT NOTE - NS ED NURSE REASSESS COMMENT FT1
Report received from DAVINA Garcia. pt a&ox4, denying chest pain, sob, n+v, headache, dizziness, fever, chills. Breathing even, unlabored. No active bleeding at this time.

## 2024-01-09 NOTE — ED ADULT NURSE REASSESSMENT NOTE - AS PAIN REST
4 (moderate pain)
0 (no pain/absence of nonverbal indicators of pain)
8 (severe pain)
0 (no pain/absence of nonverbal indicators of pain)
7 (severe pain)
Strong peripheral pulses

## 2024-01-09 NOTE — CHART NOTE - NSCHARTNOTEFT_GEN_A_CORE
Notified about bleeding Left nares  -Pt seen and examined at bedside- noted packing was completely out of nares on examination and pt was holding gauze to nares- active bleeding   - Pt nares was irrigated b/l and suctioned. Surgiflo placed + surgicel placed anteriorly in LEFT nares, RIGHT nares no active bleeding   - Posterior oropharynx no bleeding noted    CBC post transfusion results are not back yet Notified about bleeding Left nares  -Pt seen and examined at bedside- noted packing was completely out of nares on examination and pt was holding gauze to nares- active bleeding   - Pt nares was irrigated b/l and suctioned. Surgiflo placed + surgicel placed anteriorly in LEFT nares, RIGHT nares no active bleeding   - Posterior oropharynx no bleeding noted    CBC post transfusion results are not back yet      PLEASE arrange for transport to [facility] for IR embo per Dr. Rouse Notified about bleeding Left nares  -Pt seen and examined at bedside- noted packing was completely out of nares on examination and pt was holding gauze to nares- active bleeding   - Pt nares was irrigated b/l and suctioned. Surgiflo placed + surgicel placed anteriorly in LEFT nares, RIGHT nares no active bleeding   - Posterior oropharynx no bleeding noted    CBC post transfusion results are not back yet      PLEASE arrange for transport to [facility] for IR embo per Dr. Rouse Notified about bleeding Left nares  -Pt seen and examined at bedside- noted packing was completely out of nares on examination and pt was holding gauze to nares- active bleeding   - Pt nares was irrigated b/l and suctioned. Surgiflo placed + surgicel placed anteriorly in LEFT nares, RIGHT nares no active bleeding   - Posterior oropharynx no bleeding noted    CBC post transfusion results are not back yet      PLEASE arrange for transport to [facility] for IR embo per Dr. Rouse - Medicine to medicine transfer Notified about bleeding Left nares  -Pt seen and examined at bedside- noted packing was completely out of nares on examination and pt was holding gauze to nares- active bleeding   - Pt nares was irrigated b/l and suctioned. Surgiflo placed + surgicel placed anteriorly in LEFT nares, RIGHT nares no active bleeding   - Posterior oropharynx no bleeding noted    CBC post transfusion results are not back yet      PLEASE arrange for transport to [facility] for IR embo per Dr. Rouse - Medicine to medicine transfer

## 2024-01-09 NOTE — ED ADULT NURSE REASSESSMENT NOTE - NS ED NURSE REASSESS COMMENT FT1
Pt a&ox4, endorsing right arm pain, which is chronic and intermittent for the past few months. Pt educated on signs and symptoms of blood transfusion reaction, endorses understanding and will notify ED staff if it occurs. Airway is patent, speaking in clear and coherent sentences. JODY Gloria made aware, informed author to administer PO Oxycodone.

## 2024-01-09 NOTE — PROGRESS NOTE ADULT - PROBLEM SELECTOR PLAN 3
-as above  -lopressor 12.5 mg BID  -lipitor 80 for HLD      DM2-  sliding scale  adding on 10 units of Lantus given uncontrolled blood sugars   goal blood sugar 140-200

## 2024-01-09 NOTE — ED ADULT NURSE REASSESSMENT NOTE - NS ED NURSE REASSESS COMMENT FT1
Pt a&ox4, denies CP, SOB, or dyspnea at this time. No signs of blood transfusion or rash at this time. Airway is patent, speaking in clear and coherent sentences. Respirations are even and unlabored, no signs of respiratory distress.

## 2024-01-09 NOTE — CONSULT NOTE ADULT - ASSESSMENT
73M w right foot wound  - Patient seen and evaluated  - Afebrile, WBC 7.16  - s/p right foot medial 1st MJ wound debridement with graft application: suture intact, no drainage, no pus, no malodor, no acute signs of infection. left foot with no acute signs of infection   - no cultures taken 2/2 to no open wounds and no open lesions   - pt is s/p surical intervention with his podiatrist Dr Vazquez : no wound care recommendation needed.  - Applied 4x4 gauze, ABD, and joanie.  - No acute intervention form a podiatry standpoint  - Patient stable for discharge from a podiatry standpoint  - Patient to follow up with his podiatrist once cleared for discharge. Has an appointment next week  - Discussed with attending

## 2024-01-09 NOTE — PROGRESS NOTE ADULT - PROBLEM SELECTOR PLAN 6
F-none  E-replete  N-NPO  hold AC given anemia, no SCD given right foot wound F-none  E-replete  N-NPO  hold AC given anemia, no SCD given right foot wound    2nd touch

## 2024-01-09 NOTE — PATIENT PROFILE ADULT - FALL HARM RISK - HARM RISK INTERVENTIONS
Assistance with ambulation/Assistance OOB with selected safe patient handling equipment/Communicate Risk of Fall with Harm to all staff/Discuss with provider need for PT consult/Monitor gait and stability/Provide patient with walking aids - walker, cane, crutches/Reinforce activity limits and safety measures with patient and family/Tailored Fall Risk Interventions/Visual Cue: Yellow wristband and red socks/Bed in lowest position, wheels locked, appropriate side rails in place/Call bell, personal items and telephone in reach/Instruct patient to call for assistance before getting out of bed or chair/Non-slip footwear when patient is out of bed/[place] to call system/Physically safe environment - no spills, clutter or unnecessary equipment/Purposeful Proactive Rounding/Room/bathroom lighting operational, light cord in reach Assistance with ambulation/Assistance OOB with selected safe patient handling equipment/Communicate Risk of Fall with Harm to all staff/Discuss with provider need for PT consult/Monitor gait and stability/Provide patient with walking aids - walker, cane, crutches/Reinforce activity limits and safety measures with patient and family/Tailored Fall Risk Interventions/Visual Cue: Yellow wristband and red socks/Bed in lowest position, wheels locked, appropriate side rails in place/Call bell, personal items and telephone in reach/Instruct patient to call for assistance before getting out of bed or chair/Non-slip footwear when patient is out of bed/[place] to call system/Physically safe environment - no spills, clutter or unnecessary equipment/Purposeful Proactive Rounding/Room/bathroom lighting operational, light cord in reach

## 2024-01-09 NOTE — H&P ADULT - ASSESSMENT
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

## 2024-01-09 NOTE — H&P ADULT - PROBLEM SELECTOR PLAN 1
-appreciate ENT, packing done  -elevated bed 45 degrees, continuous pulse ox  -RCRI 1, mets about 2-3, sob w/ walking 1 block  -EKG Qtc 480, left axis, no ST elevation or pathologic Q waves medically optimized for procedure. -appreciate ENT, packing done  -elevated bed 45 degrees, continuous pulse ox  -RCRI 1, mets about 2-3, sob w/ walking 1 block  -EKG Qtc 480, left axis, no ST elevation or pathologic Q waves medically optimized for procedure.  -complete keflex course -appreciate ENT, packing done  -elevated bed 45 degrees, continuous pulse ox  -RCRI 1, mets about 2-3, sob w/ walking 1 block  -EKG Qtc 480, left axis, no ST elevation or pathologic Q waves medically optimized for procedure.  -complete keflex course  -plan to transfer to [facility] for intervention (vein vs artery occlusion?) -appreciate ENT, packing done  -elevated bed 45 degrees, continuous pulse ox  -RCRI 1, mets about 2-3, sob w/ walking 1 block  -EKG Qtc 480, left axis, no ST elevation or pathologic Q waves medically optimized for procedure.  -complete keflex course  -plan to transfer to [facility] for intervention (vein vs artery occlusion?)

## 2024-01-09 NOTE — PROGRESS NOTE ADULT - PROBLEM SELECTOR PLAN 1
-appreciate ENT, packing done  -elevated bed 45 degrees, continuous pulse ox  -RCRI 1, mets about 2-3, sob w/ walking 1 block  -EKG Qtc 480, left axis, no ST elevation or pathologic Q waves medically optimized for procedure.  -complete keflex course  -plan to transfer to [facility] for intervention (vein vs artery occlusion?) -appreciate ENT, packing done  -elevated bed 45 degrees, continuous pulse ox  -RCRI 1, mets about 2-3, sob w/ walking 1 block  -EKG Qtc 480, left axis, no ST elevation or pathologic Q waves medically optimized for procedure.  -complete keflex course  -plan to transfer to [facility] for intervention (vein vs artery occlusion?) -appreciate ENT, packing done  -elevated bed 45 degrees, continuous pulse ox  -RCRI 1, mets about 2-3, sob w/ walking 1 block  -EKG Qtc 480, left axis, no ST elevation or pathologic Q waves medically optimized for procedure.  -complete keflex course  -plan to transfer to [facility] for intervention (IR embolization) -appreciate ENT, packing done  -elevated bed 45 degrees, continuous pulse ox  -RCRI 1, mets about 2-3, sob w/ walking 1 block  -EKG Qtc 480, left axis, no ST elevation or pathologic Q waves medically optimized for procedure.  -complete keflex course  -plan to transfer to [facility] for intervention (IR embolization)

## 2024-01-09 NOTE — CONSULT NOTE ADULT - SUBJECTIVE AND OBJECTIVE BOX
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 ROS otherwise negative. Had Hg 6.9 given 1 unit pRBC, packing done by ENT. ROS otherwise negative.     Vital Signs Last 24 Hrs  T(C): 36.6 (09 Jan 2024 03:39), Max: 37.1 (09 Jan 2024 00:19)  T(F): 97.8 (09 Jan 2024 03:39), Max: 98.7 (09 Jan 2024 00:19)  HR: 90 (09 Jan 2024 03:39) (83 - 95)  BP: 128/74 (09 Jan 2024 03:39) (121/68 - 148/73)  BP(mean): --  RR: 18 (09 Jan 2024 03:39) (16 - 18)  SpO2: 98% (09 Jan 2024 03:39) (96% - 99%)    Parameters below as of 09 Jan 2024 03:39  Patient On (Oxygen Delivery Method): room air     (09 Jan 2024 04:45)      PAST MEDICAL & SURGICAL HISTORY:  Prostate cancer      Type II diabetes mellitus      Chronic obstructive pulmonary disease (COPD)      CHF (congestive heart failure)      Renal insufficiency      S/P foot surgery          MEDICATIONS  (STANDING):  albuterol    0.083% 2.5 milliGRAM(s) Nebulizer every 6 hours  albuterol    90 MICROgram(s) HFA Inhaler 1 Puff(s) Inhalation every 4 hours  atorvastatin 80 milliGRAM(s) Oral at bedtime  budesonide 160 MICROgram(s)/formoterol 4.5 MICROgram(s) Inhaler 2 Puff(s) Inhalation two times a day  cephalexin 500 milliGRAM(s) Oral every 12 hours  clonazePAM Oral Disintegrating Tablet 0.75 milliGRAM(s) Oral every 12 hours  dextrose 5%. 1000 milliLiter(s) (50 mL/Hr) IV Continuous <Continuous>  dextrose 5%. 1000 milliLiter(s) (100 mL/Hr) IV Continuous <Continuous>  dextrose 50% Injectable 12.5 Gram(s) IV Push once  dextrose 50% Injectable 25 Gram(s) IV Push once  dextrose 50% Injectable 25 Gram(s) IV Push once  glucagon  Injectable 1 milliGRAM(s) IntraMuscular once  insulin glargine Injectable (LANTUS) 10 Unit(s) SubCutaneous at bedtime  insulin lispro (ADMELOG) corrective regimen sliding scale   SubCutaneous every 6 hours  ketorolac   Injectable 15 milliGRAM(s) IV Push once  metoprolol tartrate 12.5 milliGRAM(s) Oral every 12 hours  montelukast 10 milliGRAM(s) Oral daily  saccharomyces boulardii 250 milliGRAM(s) Oral daily  senna 2 Tablet(s) Oral at bedtime  sertraline 100 milliGRAM(s) Oral daily    MEDICATIONS  (PRN):  acetaminophen     Tablet .. 325 milliGRAM(s) Oral every 8 hours PRN Temp greater or equal to 38C (100.4F), Mild Pain (1 - 3)  dextrose Oral Gel 15 Gram(s) Oral once PRN Blood Glucose LESS THAN 70 milliGRAM(s)/deciliter  melatonin 3 milliGRAM(s) Oral at bedtime PRN Insomnia  ondansetron Injectable 4 milliGRAM(s) IV Push every 12 hours PRN Nausea and/or Vomiting      Allergies    IODINE (Unknown)  tetracycline (Unknown)    Intolerances        VITALS:    Vital Signs Last 24 Hrs  T(C): 36.8 (09 Jan 2024 16:05), Max: 37.1 (09 Jan 2024 00:19)  T(F): 98.3 (09 Jan 2024 16:05), Max: 98.7 (09 Jan 2024 00:19)  HR: 60 (09 Jan 2024 16:05) (52 - 102)  BP: 135/71 (09 Jan 2024 16:05) (116/60 - 148/73)  BP(mean): --  RR: 18 (09 Jan 2024 16:05) (16 - 18)  SpO2: 100% (09 Jan 2024 16:05) (96% - 100%)    Parameters below as of 09 Jan 2024 16:05  Patient On (Oxygen Delivery Method): room air        LABS:                          7.4    7.16  )-----------( 168      ( 09 Jan 2024 07:47 )             22.6       01-09    139  |  102  |  21  ----------------------------<  221<H>  3.5   |  24  |  0.95    Ca    8.4      09 Jan 2024 06:00  Phos  3.0     01-09  Mg     1.30     01-09    TPro  6.3  /  Alb  3.2<L>  /  TBili  0.7  /  DBili  x   /  AST  10  /  ALT  10  /  AlkPhos  66  01-09      CAPILLARY BLOOD GLUCOSE      POCT Blood Glucose.: 243 mg/dL (09 Jan 2024 17:57)  POCT Blood Glucose.: 216 mg/dL (09 Jan 2024 12:16)  POCT Blood Glucose.: 212 mg/dL (09 Jan 2024 09:00)  POCT Blood Glucose.: 247 mg/dL (09 Jan 2024 06:13)      PT/INR - ( 09 Jan 2024 06:00 )   PT: 14.0 sec;   INR: 1.25 ratio         PTT - ( 09 Jan 2024 06:00 )  PTT:24.1 sec    LOWER EXTREMITY PHYSICAL EXAM:    Vascular: DP/PT 2/4, B/L, CFT <3 seconds B/L, Temperature gradient  warm to cool, B/L.   Neuro: Epicritic sensation intact to the level of toes, B/L.  Musculoskeletal/Ortho:  Skin: s/p right foot medial 1st MJ wound debridement with graft application: suture intact, no drainage, no pus, no malodor. left foot with no acute signs of infection        RADIOLOGY & ADDITIONAL STUDIES:

## 2024-01-09 NOTE — H&P ADULT - NSHPLABSRESULTS_GEN_ALL_CORE
.  LABS:                         6.9    7.97  )-----------( 165      ( 08 Jan 2024 19:45 )             22.1     01-08    138  |  101  |  24<H>  ----------------------------<  251<H>  4.6   |  24  |  1.08    Ca    9.2      08 Jan 2024 19:45    TPro  7.0  /  Alb  3.4  /  TBili  0.2  /  DBili  x   /  AST  13  /  ALT  13  /  AlkPhos  73  01-08    PT/INR - ( 08 Jan 2024 19:45 )   PT: 12.9 sec;   INR: 1.16 ratio         PTT - ( 08 Jan 2024 19:45 )  PTT:31.1 sec  Urinalysis Basic - ( 08 Jan 2024 19:45 )    Color: x / Appearance: x / SG: x / pH: x  Gluc: 251 mg/dL / Ketone: x  / Bili: x / Urobili: x   Blood: x / Protein: x / Nitrite: x   Leuk Esterase: x / RBC: x / WBC x   Sq Epi: x / Non Sq Epi: x / Bacteria: x                RADIOLOGY, EKG & ADDITIONAL TESTS: Reviewed.

## 2024-01-09 NOTE — CHART NOTE - NSCHARTNOTEFT_GEN_A_CORE
Bleeding from Nares    - Notified about active nose bleed  - Pt seen and examined at bedside- pt noted to have active blood from RIGHT nares and slight oozing from LEFT   - Right nares was irrigated and suctioned, no further bleeding occurred  - Left nares noted to have the petroleum gauze in place - nares inspected, part of petroleum gauze removed, tail left and Surgicel placed anteriorly   - Posterior oropharynx inspected- no active bleeding noted   - Pt notified to call if bleeding occurred again

## 2024-01-09 NOTE — H&P ADULT - NSHPOUTPATIENTPROVIDERS_GEN_ALL_CORE
cardiology [facility] Cardiology at [facility] cardiology [facility]iology at [facility]

## 2024-01-09 NOTE — ED ADULT NURSE REASSESSMENT NOTE - NS ED NURSE REASSESS COMMENT FT1
Report received from overnight RN. A&Ox3. NAD. pt denies SOB, chest pain, dizziness, weakness, urinary symptoms, HA, n/v/d, fevers, chills, pain. respirations are even and un labored. skin intact. IV placed prior to shift and is patent. jany Garcia aware of PTs heart rate and repeat EKG. call bell at bedside. PT cleaned and linens changed. safety precautions maintained.

## 2024-01-09 NOTE — H&P ADULT - NSHPPHYSICALEXAM_GEN_ALL_CORE
PHYSICAL EXAM:  GENERAL: NAD, well-developed  HEAD:  Atraumatic, Normocephalic  ENT: w/ nasal packing  EYES: EOMI, PERRLA, conjunctiva and sclera clear  NECK: Supple, No JVD  CHEST/LUNG: Clear to auscultation bilaterally; No wheeze  HEART: Regular rate and rhythm; No murmurs, rubs, or gallops  ABDOMEN: Soft, Nontender, Nondistended; Bowel sounds present  EXTREMITIES:  2+ Peripheral Pulses, No clubbing, cyanosis, or edema  PSYCH: AAOx3  NEUROLOGY: non-focal  SKIN: right foot wound wrapped, healing well, no pus

## 2024-01-09 NOTE — H&P ADULT - PROBLEM SELECTOR PLAN 5
reduce clonazepam to 0.75 mg BID while anemic, can uptitrate if Hg stable back to home dose  c/w sertraline 100 mg

## 2024-01-09 NOTE — PROGRESS NOTE ADULT - SUBJECTIVE AND OBJECTIVE BOX
Jessica Pringle MD   [employer], Pager [phone]    INTERVAL HPI/OVERNIGHT EVENTS:  Patient was seen and examined at bedside. As per nurse and patient, no o/n events, patient resting comfortably. No complaints at this time. Patient denies: fever, chills, dizziness, weakness, HA, Changes in vision, CP, palpitations, SOB, cough, N/V/D/C, dysuria, changes in bowel movements, LE edema. ROS otherwise negative.    VITAL SIGNS:  T(F): 98 (01-09-24 @ 12:23)  HR: 102 (01-09-24 @ 12:23)  BP: 136/58 (01-09-24 @ 12:23)  RR: 18 (01-09-24 @ 12:23)  SpO2: 100% (01-09-24 @ 12:23)  Wt(kg): --    PHYSICAL EXAM:    Constitutional: WDWN, NAD  HEENT: PERRL, EOMI, sclera non-icteric, neck supple, trachea midline, no masses, no JVD, MMM, good dentition  Respiratory: CTA b/l, good air entry b/l, no wheezing, no rhonchi, no rales, without accessory muscle use and no intercostal retractions  Cardiovascular: RRR, normal S1S2, no M/R/G  Gastrointestinal: soft, NTND, no masses palpable, BS normal  Extremities: Warm, well perfused, pulses equal bilateral upper and lower extremities, no edema, no clubbing  Neurological: AAOx3, CN Grossly intact  Skin: Normal temperature, warm, dry    MEDICATIONS  (STANDING):  albuterol    0.083% 2.5 milliGRAM(s) Nebulizer every 6 hours  albuterol    90 MICROgram(s) HFA Inhaler 1 Puff(s) Inhalation every 4 hours  atorvastatin 80 milliGRAM(s) Oral at bedtime  budesonide 160 MICROgram(s)/formoterol 4.5 MICROgram(s) Inhaler 2 Puff(s) Inhalation two times a day  cephalexin 500 milliGRAM(s) Oral every 12 hours  clonazePAM Oral Disintegrating Tablet 0.75 milliGRAM(s) Oral every 12 hours  dextrose 5%. 1000 milliLiter(s) (50 mL/Hr) IV Continuous <Continuous>  dextrose 5%. 1000 milliLiter(s) (100 mL/Hr) IV Continuous <Continuous>  dextrose 50% Injectable 25 Gram(s) IV Push once  dextrose 50% Injectable 25 Gram(s) IV Push once  dextrose 50% Injectable 12.5 Gram(s) IV Push once  glucagon  Injectable 1 milliGRAM(s) IntraMuscular once  insulin lispro (ADMELOG) corrective regimen sliding scale   SubCutaneous every 6 hours  metoprolol tartrate 12.5 milliGRAM(s) Oral every 12 hours  montelukast 10 milliGRAM(s) Oral daily  saccharomyces boulardii 250 milliGRAM(s) Oral daily  senna 2 Tablet(s) Oral at bedtime  sertraline 100 milliGRAM(s) Oral daily    MEDICATIONS  (PRN):  acetaminophen     Tablet .. 325 milliGRAM(s) Oral every 8 hours PRN Temp greater or equal to 38C (100.4F), Mild Pain (1 - 3)  dextrose Oral Gel 15 Gram(s) Oral once PRN Blood Glucose LESS THAN 70 milliGRAM(s)/deciliter  melatonin 3 milliGRAM(s) Oral at bedtime PRN Insomnia  ondansetron Injectable 4 milliGRAM(s) IV Push every 12 hours PRN Nausea and/or Vomiting      Allergies    IODINE (Unknown)  tetracycline (Unknown)    Intolerances        LABS:                        7.4    7.16  )-----------( 168      ( 09 Jan 2024 07:47 )             22.6     01-09    139  |  102  |  21  ----------------------------<  221<H>  3.5   |  24  |  0.95    Ca    8.4      09 Jan 2024 06:00  Phos  3.0     01-09  Mg     1.30     01-09    TPro  6.3  /  Alb  3.2<L>  /  TBili  0.7  /  DBili  x   /  AST  10  /  ALT  10  /  AlkPhos  66  01-09    PT/INR - ( 09 Jan 2024 06:00 )   PT: 14.0 sec;   INR: 1.25 ratio         PTT - ( 09 Jan 2024 06:00 )  PTT:24.1 sec  Urinalysis Basic - ( 09 Jan 2024 06:00 )    Color: x / Appearance: x / SG: x / pH: x  Gluc: 221 mg/dL / Ketone: x  / Bili: x / Urobili: x   Blood: x / Protein: x / Nitrite: x   Leuk Esterase: x / RBC: x / WBC x   Sq Epi: x / Non Sq Epi: x / Bacteria: x        RADIOLOGY & ADDITIONAL TESTS:  Reviewed Jessica Pringle MD   [employer], Pager [phone]    INTERVAL HPI/OVERNIGHT EVENTS:  Patient was seen and examined at bedside. As per nurse and patient, no o/n events, patient resting comfortably. No complaints at this time. Patient denies: fever, chills, dizziness, weakness, HA, Changes in vision, CP, palpitations, SOB, cough, N/V/D/C, dysuria, changes in bowel movements, LE edema. ROS otherwise negative.    VITAL SIGNS:  T(F): 98 (01-09-24 @ 12:23)  HR: 102 (01-09-24 @ 12:23)  BP: 136/58 (01-09-24 @ 12:23)  RR: 18 (01-09-24 @ 12:23)  SpO2: 100% (01-09-24 @ 12:23)  Wt(kg): --    PHYSICAL EXAM:    Constitutional: WDWN, NAD  HEENT: PERRL, EOMI, sclera non-icteric, neck supple, trachea midline, no masses, no JVD, MMM, good dentition  Respiratory: CTA b/l, good air entry b/l, no wheezing, no rhonchi, no rales, without accessory muscle use and no intercostal retractions  Cardiovascular: RRR, normal S1S2, no M/R/G  Gastrointestinal: soft, NTND, no masses palpable, BS normal  Extremities: Warm, well perfused, pulses equal bilateral upper and lower extremities, no edema, no clubbing  Neurological: AAOx3, CN Grossly intact  Skin: Normal temperature, warm, dry    MEDICATIONS  (STANDING):  albuterol    0.083% 2.5 milliGRAM(s) Nebulizer every 6 hours  albuterol    90 MICROgram(s) HFA Inhaler 1 Puff(s) Inhalation every 4 hours  atorvastatin 80 milliGRAM(s) Oral at bedtime  budesonide 160 MICROgram(s)/formoterol 4.5 MICROgram(s) Inhaler 2 Puff(s) Inhalation two times a day  cephalexin 500 milliGRAM(s) Oral every 12 hours  clonazePAM Oral Disintegrating Tablet 0.75 milliGRAM(s) Oral every 12 hours  dextrose 5%. 1000 milliLiter(s) (50 mL/Hr) IV Continuous <Continuous>  dextrose 5%. 1000 milliLiter(s) (100 mL/Hr) IV Continuous <Continuous>  dextrose 50% Injectable 25 Gram(s) IV Push once  dextrose 50% Injectable 25 Gram(s) IV Push once  dextrose 50% Injectable 12.5 Gram(s) IV Push once  glucagon  Injectable 1 milliGRAM(s) IntraMuscular once  insulin lispro (ADMELOG) corrective regimen sliding scale   SubCutaneous every 6 hours  metoprolol tartrate 12.5 milliGRAM(s) Oral every 12 hours  montelukast 10 milliGRAM(s) Oral daily  saccharomyces boulardii 250 milliGRAM(s) Oral daily  senna 2 Tablet(s) Oral at bedtime  sertraline 100 milliGRAM(s) Oral daily    MEDICATIONS  (PRN):  acetaminophen     Tablet .. 325 milliGRAM(s) Oral every 8 hours PRN Temp greater or equal to 38C (100.4F), Mild Pain (1 - 3)  dextrose Oral Gel 15 Gram(s) Oral once PRN Blood Glucose LESS THAN 70 milliGRAM(s)/deciliter  melatonin 3 milliGRAM(s) Oral at bedtime PRN Insomnia  ondansetron Injectable 4 milliGRAM(s) IV Push every 12 hours PRN Nausea and/or Vomiting      Allergies    IODINE (Unknown)  tetracycline (Unknown)    Intolerances        LABS:                        7.4    7.16  )-----------( 168      ( 09 Jan 2024 07:47 )             22.6     01-09    139  |  102  |  21  ----------------------------<  221<H>  3.5   |  24  |  0.95    Ca    8.4      09 Jan 2024 06:00  Phos  3.0     01-09  Mg     1.30     01-09    TPro  6.3  /  Alb  3.2<L>  /  TBili  0.7  /  DBili  x   /  AST  10  /  ALT  10  /  AlkPhos  66  01-09    PT/INR - ( 09 Jan 2024 06:00 )   PT: 14.0 sec;   INR: 1.25 ratio         PTT - ( 09 Jan 2024 06:00 )  PTT:24.1 sec  Urinalysis Basic - ( 09 Jan 2024 06:00 )    Color: x / Appearance: x / SG: x / pH: x  Gluc: 221 mg/dL / Ketone: x  / Bili: x / Urobili: x   Blood: x / Protein: x / Nitrite: x   Leuk Esterase: x / RBC: x / WBC x   Sq Epi: x / Non Sq Epi: x / Bacteria: x        RADIOLOGY & ADDITIONAL TESTS:  Reviewed Jessica Pringle MD   [employer], Pager [phone]    INTERVAL HPI/OVERNIGHT EVENTS:  Patient was seen and examined. Patient reports epistaxis. Now w/ packing. ROS otherwise negative.   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     VITAL SIGNS:  T(F): 98 (01-09-24 @ 12:23)  HR: 102 (01-09-24 @ 12:23)  BP: 136/58 (01-09-24 @ 12:23)  RR: 18 (01-09-24 @ 12:23)  SpO2: 100% (01-09-24 @ 12:23)  Wt(kg): --    PHYSICAL EXAM:    Constitutional: Elderly man, NAD  HEENT: sclera non-icteric, neck supple, trachea midline, no masses, no JVD, MMM  Respiratory: CTA b/l, good air entry b/l, no wheezing, no rhonchi, no rales, without accessory muscle use and no intercostal retractions  Cardiovascular: RRR, normal S1S2, no M/R/G  Gastrointestinal: soft, NTND, no masses palpable, BS normal  Skin: Normal temperature, warm, dry    MEDICATIONS  (STANDING):  albuterol    0.083% 2.5 milliGRAM(s) Nebulizer every 6 hours  albuterol    90 MICROgram(s) HFA Inhaler 1 Puff(s) Inhalation every 4 hours  atorvastatin 80 milliGRAM(s) Oral at bedtime  budesonide 160 MICROgram(s)/formoterol 4.5 MICROgram(s) Inhaler 2 Puff(s) Inhalation two times a day  cephalexin 500 milliGRAM(s) Oral every 12 hours  clonazePAM Oral Disintegrating Tablet 0.75 milliGRAM(s) Oral every 12 hours  dextrose 5%. 1000 milliLiter(s) (50 mL/Hr) IV Continuous <Continuous>  dextrose 5%. 1000 milliLiter(s) (100 mL/Hr) IV Continuous <Continuous>  dextrose 50% Injectable 25 Gram(s) IV Push once  dextrose 50% Injectable 25 Gram(s) IV Push once  dextrose 50% Injectable 12.5 Gram(s) IV Push once  glucagon  Injectable 1 milliGRAM(s) IntraMuscular once  insulin lispro (ADMELOG) corrective regimen sliding scale   SubCutaneous every 6 hours  metoprolol tartrate 12.5 milliGRAM(s) Oral every 12 hours  montelukast 10 milliGRAM(s) Oral daily  saccharomyces boulardii 250 milliGRAM(s) Oral daily  senna 2 Tablet(s) Oral at bedtime  sertraline 100 milliGRAM(s) Oral daily    MEDICATIONS  (PRN):  acetaminophen     Tablet .. 325 milliGRAM(s) Oral every 8 hours PRN Temp greater or equal to 38C (100.4F), Mild Pain (1 - 3)  dextrose Oral Gel 15 Gram(s) Oral once PRN Blood Glucose LESS THAN 70 milliGRAM(s)/deciliter  melatonin 3 milliGRAM(s) Oral at bedtime PRN Insomnia  ondansetron Injectable 4 milliGRAM(s) IV Push every 12 hours PRN Nausea and/or Vomiting      Allergies    IODINE (Unknown)  tetracycline (Unknown)    Intolerances        LABS:                        7.4    7.16  )-----------( 168      ( 09 Jan 2024 07:47 )             22.6     01-09    139  |  102  |  21  ----------------------------<  221<H>  3.5   |  24  |  0.95    Ca    8.4      09 Jan 2024 06:00  Phos  3.0     01-09  Mg     1.30     01-09    TPro  6.3  /  Alb  3.2<L>  /  TBili  0.7  /  DBili  x   /  AST  10  /  ALT  10  /  AlkPhos  66  01-09    PT/INR - ( 09 Jan 2024 06:00 )   PT: 14.0 sec;   INR: 1.25 ratio         PTT - ( 09 Jan 2024 06:00 )  PTT:24.1 sec  Urinalysis Basic - ( 09 Jan 2024 06:00 )    Color: x / Appearance: x / SG: x / pH: x  Gluc: 221 mg/dL / Ketone: x  / Bili: x / Urobili: x   Blood: x / Protein: x / Nitrite: x   Leuk Esterase: x / RBC: x / WBC x   Sq Epi: x / Non Sq Epi: x / Bacteria: x        RADIOLOGY & ADDITIONAL TESTS:  Reviewed Jessica Pringle MD   [employer], Pager [phone]    INTERVAL HPI/OVERNIGHT EVENTS:  Patient was seen and examined. Patient reports epistaxis. Now w/ packing. ROS otherwise negative.   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     VITAL SIGNS:  T(F): 98 (01-09-24 @ 12:23)  HR: 102 (01-09-24 @ 12:23)  BP: 136/58 (01-09-24 @ 12:23)  RR: 18 (01-09-24 @ 12:23)  SpO2: 100% (01-09-24 @ 12:23)  Wt(kg): --    PHYSICAL EXAM:    Constitutional: Elderly man, NAD  HEENT: sclera non-icteric, neck supple, trachea midline, no masses, no JVD, MMM  Respiratory: CTA b/l, good air entry b/l, no wheezing, no rhonchi, no rales, without accessory muscle use and no intercostal retractions  Cardiovascular: RRR, normal S1S2, no M/R/G  Gastrointestinal: soft, NTND, no masses palpable, BS normal  Skin: Normal temperature, warm, dry    MEDICATIONS  (STANDING):  albuterol    0.083% 2.5 milliGRAM(s) Nebulizer every 6 hours  albuterol    90 MICROgram(s) HFA Inhaler 1 Puff(s) Inhalation every 4 hours  atorvastatin 80 milliGRAM(s) Oral at bedtime  budesonide 160 MICROgram(s)/formoterol 4.5 MICROgram(s) Inhaler 2 Puff(s) Inhalation two times a day  cephalexin 500 milliGRAM(s) Oral every 12 hours  clonazePAM Oral Disintegrating Tablet 0.75 milliGRAM(s) Oral every 12 hours  dextrose 5%. 1000 milliLiter(s) (50 mL/Hr) IV Continuous <Continuous>  dextrose 5%. 1000 milliLiter(s) (100 mL/Hr) IV Continuous <Continuous>  dextrose 50% Injectable 25 Gram(s) IV Push once  dextrose 50% Injectable 25 Gram(s) IV Push once  dextrose 50% Injectable 12.5 Gram(s) IV Push once  glucagon  Injectable 1 milliGRAM(s) IntraMuscular once  insulin lispro (ADMELOG) corrective regimen sliding scale   SubCutaneous every 6 hours  metoprolol tartrate 12.5 milliGRAM(s) Oral every 12 hours  montelukast 10 milliGRAM(s) Oral daily  saccharomyces boulardii 250 milliGRAM(s) Oral daily  senna 2 Tablet(s) Oral at bedtime  sertraline 100 milliGRAM(s) Oral daily    MEDICATIONS  (PRN):  acetaminophen     Tablet .. 325 milliGRAM(s) Oral every 8 hours PRN Temp greater or equal to 38C (100.4F), Mild Pain (1 - 3)  dextrose Oral Gel 15 Gram(s) Oral once PRN Blood Glucose LESS THAN 70 milliGRAM(s)/deciliter  melatonin 3 milliGRAM(s) Oral at bedtime PRN Insomnia  ondansetron Injectable 4 milliGRAM(s) IV Push every 12 hours PRN Nausea and/or Vomiting      Allergies    IODINE (Unknown)  tetracycline (Unknown)    Intolerances        LABS:                        7.4    7.16  )-----------( 168      ( 09 Jan 2024 07:47 )             22.6     01-09    139  |  102  |  21  ----------------------------<  221<H>  3.5   |  24  |  0.95    Ca    8.4      09 Jan 2024 06:00  Phos  3.0     01-09  Mg     1.30     01-09    TPro  6.3  /  Alb  3.2<L>  /  TBili  0.7  /  DBili  x   /  AST  10  /  ALT  10  /  AlkPhos  66  01-09    PT/INR - ( 09 Jan 2024 06:00 )   PT: 14.0 sec;   INR: 1.25 ratio         PTT - ( 09 Jan 2024 06:00 )  PTT:24.1 sec  Urinalysis Basic - ( 09 Jan 2024 06:00 )    Color: x / Appearance: x / SG: x / pH: x  Gluc: 221 mg/dL / Ketone: x  / Bili: x / Urobili: x   Blood: x / Protein: x / Nitrite: x   Leuk Esterase: x / RBC: x / WBC x   Sq Epi: x / Non Sq Epi: x / Bacteria: x        RADIOLOGY & ADDITIONAL TESTS:  Reviewed

## 2024-01-09 NOTE — ED ADULT NURSE REASSESSMENT NOTE - NS ED NURSE REASSESS COMMENT FT1
Report given to [facility] RN. Pt a&ox4, denying chest pain, sob, n+v, headache. Breathing even, unlabored. No active bleeding. Safety maintained.

## 2024-01-09 NOTE — ED ADULT NURSE REASSESSMENT NOTE - PAIN RATING/NUMBER SCALE (0-10): ACTIVITY
0 (no pain/absence of nonverbal indicators of pain)
0 (no pain/absence of nonverbal indicators of pain)
8 (severe pain)
4 (moderate pain)
7 (severe pain)

## 2024-01-10 ENCOUNTER — TRANSCRIPTION ENCOUNTER (OUTPATIENT)
Age: 74
End: 2024-01-10

## 2024-01-10 LAB
A1C WITH ESTIMATED AVERAGE GLUCOSE RESULT: 7.7 % — HIGH (ref 4–5.6)
A1C WITH ESTIMATED AVERAGE GLUCOSE RESULT: 7.7 % — HIGH (ref 4–5.6)
ALBUMIN SERPL ELPH-MCNC: 3.5 G/DL — SIGNIFICANT CHANGE UP (ref 3.3–5)
ALBUMIN SERPL ELPH-MCNC: 3.5 G/DL — SIGNIFICANT CHANGE UP (ref 3.3–5)
ALP SERPL-CCNC: 71 U/L — SIGNIFICANT CHANGE UP (ref 40–120)
ALP SERPL-CCNC: 71 U/L — SIGNIFICANT CHANGE UP (ref 40–120)
ALT FLD-CCNC: 13 U/L — SIGNIFICANT CHANGE UP (ref 4–41)
ALT FLD-CCNC: 13 U/L — SIGNIFICANT CHANGE UP (ref 4–41)
ANION GAP SERPL CALC-SCNC: 12 MMOL/L — SIGNIFICANT CHANGE UP (ref 7–14)
ANION GAP SERPL CALC-SCNC: 12 MMOL/L — SIGNIFICANT CHANGE UP (ref 7–14)
AST SERPL-CCNC: 15 U/L — SIGNIFICANT CHANGE UP (ref 4–40)
AST SERPL-CCNC: 15 U/L — SIGNIFICANT CHANGE UP (ref 4–40)
BASOPHILS # BLD AUTO: 0.04 K/UL — SIGNIFICANT CHANGE UP (ref 0–0.2)
BASOPHILS # BLD AUTO: 0.04 K/UL — SIGNIFICANT CHANGE UP (ref 0–0.2)
BASOPHILS NFR BLD AUTO: 0.8 % — SIGNIFICANT CHANGE UP (ref 0–2)
BASOPHILS NFR BLD AUTO: 0.8 % — SIGNIFICANT CHANGE UP (ref 0–2)
BILIRUB SERPL-MCNC: 0.4 MG/DL — SIGNIFICANT CHANGE UP (ref 0.2–1.2)
BILIRUB SERPL-MCNC: 0.4 MG/DL — SIGNIFICANT CHANGE UP (ref 0.2–1.2)
BUN SERPL-MCNC: 19 MG/DL — SIGNIFICANT CHANGE UP (ref 7–23)
BUN SERPL-MCNC: 19 MG/DL — SIGNIFICANT CHANGE UP (ref 7–23)
CALCIUM SERPL-MCNC: 9.4 MG/DL — SIGNIFICANT CHANGE UP (ref 8.4–10.5)
CALCIUM SERPL-MCNC: 9.4 MG/DL — SIGNIFICANT CHANGE UP (ref 8.4–10.5)
CHLORIDE SERPL-SCNC: 102 MMOL/L — SIGNIFICANT CHANGE UP (ref 98–107)
CHLORIDE SERPL-SCNC: 102 MMOL/L — SIGNIFICANT CHANGE UP (ref 98–107)
CO2 SERPL-SCNC: 26 MMOL/L — SIGNIFICANT CHANGE UP (ref 22–31)
CO2 SERPL-SCNC: 26 MMOL/L — SIGNIFICANT CHANGE UP (ref 22–31)
CREAT SERPL-MCNC: 1.02 MG/DL — SIGNIFICANT CHANGE UP (ref 0.5–1.3)
CREAT SERPL-MCNC: 1.02 MG/DL — SIGNIFICANT CHANGE UP (ref 0.5–1.3)
EGFR: 78 ML/MIN/1.73M2 — SIGNIFICANT CHANGE UP
EGFR: 78 ML/MIN/1.73M2 — SIGNIFICANT CHANGE UP
EOSINOPHIL # BLD AUTO: 0.13 K/UL — SIGNIFICANT CHANGE UP (ref 0–0.5)
EOSINOPHIL # BLD AUTO: 0.13 K/UL — SIGNIFICANT CHANGE UP (ref 0–0.5)
EOSINOPHIL NFR BLD AUTO: 2.7 % — SIGNIFICANT CHANGE UP (ref 0–6)
EOSINOPHIL NFR BLD AUTO: 2.7 % — SIGNIFICANT CHANGE UP (ref 0–6)
ESTIMATED AVERAGE GLUCOSE: 174 — SIGNIFICANT CHANGE UP
ESTIMATED AVERAGE GLUCOSE: 174 — SIGNIFICANT CHANGE UP
GLUCOSE BLDC GLUCOMTR-MCNC: 153 MG/DL — HIGH (ref 70–99)
GLUCOSE BLDC GLUCOMTR-MCNC: 153 MG/DL — HIGH (ref 70–99)
GLUCOSE BLDC GLUCOMTR-MCNC: 162 MG/DL — HIGH (ref 70–99)
GLUCOSE BLDC GLUCOMTR-MCNC: 162 MG/DL — HIGH (ref 70–99)
GLUCOSE BLDC GLUCOMTR-MCNC: 210 MG/DL — HIGH (ref 70–99)
GLUCOSE BLDC GLUCOMTR-MCNC: 210 MG/DL — HIGH (ref 70–99)
GLUCOSE BLDC GLUCOMTR-MCNC: 226 MG/DL — HIGH (ref 70–99)
GLUCOSE BLDC GLUCOMTR-MCNC: 226 MG/DL — HIGH (ref 70–99)
GLUCOSE SERPL-MCNC: 211 MG/DL — HIGH (ref 70–99)
GLUCOSE SERPL-MCNC: 211 MG/DL — HIGH (ref 70–99)
HCT VFR BLD CALC: 22.6 % — LOW (ref 39–50)
HCT VFR BLD CALC: 22.6 % — LOW (ref 39–50)
HCV AB S/CO SERPL IA: 0.29 S/CO — SIGNIFICANT CHANGE UP (ref 0–0.99)
HCV AB S/CO SERPL IA: 0.29 S/CO — SIGNIFICANT CHANGE UP (ref 0–0.99)
HCV AB SERPL-IMP: SIGNIFICANT CHANGE UP
HCV AB SERPL-IMP: SIGNIFICANT CHANGE UP
HGB BLD-MCNC: 7.2 G/DL — LOW (ref 13–17)
HGB BLD-MCNC: 7.2 G/DL — LOW (ref 13–17)
IANC: 2.08 K/UL — SIGNIFICANT CHANGE UP (ref 1.8–7.4)
IANC: 2.08 K/UL — SIGNIFICANT CHANGE UP (ref 1.8–7.4)
IMM GRANULOCYTES NFR BLD AUTO: 7.4 % — HIGH (ref 0–0.9)
IMM GRANULOCYTES NFR BLD AUTO: 7.4 % — HIGH (ref 0–0.9)
LYMPHOCYTES # BLD AUTO: 1.63 K/UL — SIGNIFICANT CHANGE UP (ref 1–3.3)
LYMPHOCYTES # BLD AUTO: 1.63 K/UL — SIGNIFICANT CHANGE UP (ref 1–3.3)
LYMPHOCYTES # BLD AUTO: 33.4 % — SIGNIFICANT CHANGE UP (ref 13–44)
LYMPHOCYTES # BLD AUTO: 33.4 % — SIGNIFICANT CHANGE UP (ref 13–44)
MAGNESIUM SERPL-MCNC: 1.9 MG/DL — SIGNIFICANT CHANGE UP (ref 1.6–2.6)
MAGNESIUM SERPL-MCNC: 1.9 MG/DL — SIGNIFICANT CHANGE UP (ref 1.6–2.6)
MCHC RBC-ENTMCNC: 28.3 PG — SIGNIFICANT CHANGE UP (ref 27–34)
MCHC RBC-ENTMCNC: 28.3 PG — SIGNIFICANT CHANGE UP (ref 27–34)
MCHC RBC-ENTMCNC: 31.9 GM/DL — LOW (ref 32–36)
MCHC RBC-ENTMCNC: 31.9 GM/DL — LOW (ref 32–36)
MCV RBC AUTO: 89 FL — SIGNIFICANT CHANGE UP (ref 80–100)
MCV RBC AUTO: 89 FL — SIGNIFICANT CHANGE UP (ref 80–100)
MONOCYTES # BLD AUTO: 0.64 K/UL — SIGNIFICANT CHANGE UP (ref 0–0.9)
MONOCYTES # BLD AUTO: 0.64 K/UL — SIGNIFICANT CHANGE UP (ref 0–0.9)
MONOCYTES NFR BLD AUTO: 13.1 % — SIGNIFICANT CHANGE UP (ref 2–14)
MONOCYTES NFR BLD AUTO: 13.1 % — SIGNIFICANT CHANGE UP (ref 2–14)
NEUTROPHILS # BLD AUTO: 2.08 K/UL — SIGNIFICANT CHANGE UP (ref 1.8–7.4)
NEUTROPHILS # BLD AUTO: 2.08 K/UL — SIGNIFICANT CHANGE UP (ref 1.8–7.4)
NEUTROPHILS NFR BLD AUTO: 42.6 % — LOW (ref 43–77)
NEUTROPHILS NFR BLD AUTO: 42.6 % — LOW (ref 43–77)
NRBC # BLD: 0 /100 WBCS — SIGNIFICANT CHANGE UP (ref 0–0)
NRBC # BLD: 0 /100 WBCS — SIGNIFICANT CHANGE UP (ref 0–0)
NRBC # FLD: 0 K/UL — SIGNIFICANT CHANGE UP (ref 0–0)
NRBC # FLD: 0 K/UL — SIGNIFICANT CHANGE UP (ref 0–0)
PHOSPHATE SERPL-MCNC: 3 MG/DL — SIGNIFICANT CHANGE UP (ref 2.5–4.5)
PHOSPHATE SERPL-MCNC: 3 MG/DL — SIGNIFICANT CHANGE UP (ref 2.5–4.5)
PLATELET # BLD AUTO: 179 K/UL — SIGNIFICANT CHANGE UP (ref 150–400)
PLATELET # BLD AUTO: 179 K/UL — SIGNIFICANT CHANGE UP (ref 150–400)
POTASSIUM SERPL-MCNC: 4.1 MMOL/L — SIGNIFICANT CHANGE UP (ref 3.5–5.3)
POTASSIUM SERPL-MCNC: 4.1 MMOL/L — SIGNIFICANT CHANGE UP (ref 3.5–5.3)
POTASSIUM SERPL-SCNC: 4.1 MMOL/L — SIGNIFICANT CHANGE UP (ref 3.5–5.3)
POTASSIUM SERPL-SCNC: 4.1 MMOL/L — SIGNIFICANT CHANGE UP (ref 3.5–5.3)
PROT SERPL-MCNC: 6.6 G/DL — SIGNIFICANT CHANGE UP (ref 6–8.3)
PROT SERPL-MCNC: 6.6 G/DL — SIGNIFICANT CHANGE UP (ref 6–8.3)
RBC # BLD: 2.54 M/UL — LOW (ref 4.2–5.8)
RBC # BLD: 2.54 M/UL — LOW (ref 4.2–5.8)
RBC # FLD: 14.9 % — HIGH (ref 10.3–14.5)
RBC # FLD: 14.9 % — HIGH (ref 10.3–14.5)
SODIUM SERPL-SCNC: 140 MMOL/L — SIGNIFICANT CHANGE UP (ref 135–145)
SODIUM SERPL-SCNC: 140 MMOL/L — SIGNIFICANT CHANGE UP (ref 135–145)
WBC # BLD: 4.88 K/UL — SIGNIFICANT CHANGE UP (ref 3.8–10.5)
WBC # BLD: 4.88 K/UL — SIGNIFICANT CHANGE UP (ref 3.8–10.5)
WBC # FLD AUTO: 4.88 K/UL — SIGNIFICANT CHANGE UP (ref 3.8–10.5)
WBC # FLD AUTO: 4.88 K/UL — SIGNIFICANT CHANGE UP (ref 3.8–10.5)

## 2024-01-10 PROCEDURE — 99232 SBSQ HOSP IP/OBS MODERATE 35: CPT

## 2024-01-10 PROCEDURE — 70548 MR ANGIOGRAPHY NECK W/DYE: CPT | Mod: 26

## 2024-01-10 PROCEDURE — 70544 MR ANGIOGRAPHY HEAD W/O DYE: CPT | Mod: 26

## 2024-01-10 PROCEDURE — 73630 X-RAY EXAM OF FOOT: CPT | Mod: 26,RT

## 2024-01-10 RX ORDER — CLONAZEPAM 1 MG
1 TABLET ORAL
Refills: 0 | DISCHARGE

## 2024-01-10 RX ORDER — CEPHALEXIN 500 MG
1 CAPSULE ORAL
Refills: 0 | DISCHARGE

## 2024-01-10 RX ORDER — INSULIN LISPRO 100/ML
VIAL (ML) SUBCUTANEOUS AT BEDTIME
Refills: 0 | Status: DISCONTINUED | OUTPATIENT
Start: 2024-01-10 | End: 2024-01-10

## 2024-01-10 RX ORDER — DIPHENHYDRAMINE HCL 50 MG
50 CAPSULE ORAL ONCE
Refills: 0 | Status: COMPLETED | OUTPATIENT
Start: 2024-01-10 | End: 2024-01-10

## 2024-01-10 RX ORDER — ONDANSETRON 8 MG/1
1 TABLET, FILM COATED ORAL
Refills: 0 | DISCHARGE

## 2024-01-10 RX ORDER — IPRATROPIUM/ALBUTEROL SULFATE 18-103MCG
3 AEROSOL WITH ADAPTER (GRAM) INHALATION
Refills: 0 | DISCHARGE

## 2024-01-10 RX ORDER — ATORVASTATIN CALCIUM 80 MG/1
1 TABLET, FILM COATED ORAL
Qty: 0 | Refills: 0 | DISCHARGE
Start: 2024-01-10

## 2024-01-10 RX ORDER — MONTELUKAST 4 MG/1
1 TABLET, CHEWABLE ORAL
Qty: 0 | Refills: 0 | DISCHARGE
Start: 2024-01-10

## 2024-01-10 RX ORDER — TIOTROPIUM BROMIDE 18 UG/1
1 CAPSULE ORAL; RESPIRATORY (INHALATION)
Refills: 0 | DISCHARGE

## 2024-01-10 RX ORDER — SODIUM CHLORIDE 0.65 %
1 AEROSOL, SPRAY (ML) NASAL
Refills: 0 | DISCHARGE

## 2024-01-10 RX ORDER — LANOLIN ALCOHOL/MO/W.PET/CERES
1 CREAM (GRAM) TOPICAL
Refills: 0 | DISCHARGE

## 2024-01-10 RX ORDER — INFLUENZA VIRUS VACCINE 15; 15; 15; 15 UG/.5ML; UG/.5ML; UG/.5ML; UG/.5ML
0.7 SUSPENSION INTRAMUSCULAR ONCE
Refills: 0 | Status: DISCONTINUED | OUTPATIENT
Start: 2024-01-10 | End: 2024-01-18

## 2024-01-10 RX ORDER — SODIUM HYPOCHLORITE 0.125 %
1 SOLUTION, NON-ORAL MISCELLANEOUS
Refills: 0 | DISCHARGE

## 2024-01-10 RX ORDER — LANOLIN ALCOHOL/MO/W.PET/CERES
1 CREAM (GRAM) TOPICAL
Qty: 0 | Refills: 0 | DISCHARGE
Start: 2024-01-10

## 2024-01-10 RX ORDER — CLONAZEPAM 1 MG
3 TABLET ORAL
Qty: 0 | Refills: 0 | DISCHARGE
Start: 2024-01-10

## 2024-01-10 RX ORDER — OXYCODONE HYDROCHLORIDE 5 MG/1
2.5 TABLET ORAL ONCE
Refills: 0 | Status: DISCONTINUED | OUTPATIENT
Start: 2024-01-10 | End: 2024-01-10

## 2024-01-10 RX ORDER — ALBUTEROL 90 UG/1
3 AEROSOL, METERED ORAL
Refills: 0 | DISCHARGE

## 2024-01-10 RX ORDER — ACETAMINOPHEN 500 MG
1 TABLET ORAL
Qty: 0 | Refills: 0 | DISCHARGE
Start: 2024-01-10

## 2024-01-10 RX ORDER — FUROSEMIDE 40 MG
1 TABLET ORAL
Refills: 0 | DISCHARGE

## 2024-01-10 RX ORDER — INSULIN LISPRO 100/ML
VIAL (ML) SUBCUTANEOUS
Refills: 0 | Status: DISCONTINUED | OUTPATIENT
Start: 2024-01-10 | End: 2024-01-12

## 2024-01-10 RX ORDER — ALBUTEROL 90 UG/1
1 AEROSOL, METERED ORAL
Qty: 0 | Refills: 0 | DISCHARGE
Start: 2024-01-10

## 2024-01-10 RX ORDER — SERTRALINE 25 MG/1
1 TABLET, FILM COATED ORAL
Refills: 0 | DISCHARGE

## 2024-01-10 RX ORDER — SERTRALINE 25 MG/1
1 TABLET, FILM COATED ORAL
Qty: 0 | Refills: 0 | DISCHARGE
Start: 2024-01-10

## 2024-01-10 RX ORDER — MONTELUKAST 4 MG/1
1 TABLET, CHEWABLE ORAL
Refills: 0 | DISCHARGE

## 2024-01-10 RX ORDER — DOCUSATE SODIUM 100 MG
2 CAPSULE ORAL
Refills: 0 | DISCHARGE

## 2024-01-10 RX ORDER — SENNA PLUS 8.6 MG/1
2 TABLET ORAL
Qty: 0 | Refills: 0 | DISCHARGE
Start: 2024-01-10

## 2024-01-10 RX ORDER — SACCHAROMYCES BOULARDII 250 MG
1 POWDER IN PACKET (EA) ORAL
Qty: 0 | Refills: 0 | DISCHARGE
Start: 2024-01-10

## 2024-01-10 RX ORDER — BUDESONIDE AND FORMOTEROL FUMARATE DIHYDRATE 160; 4.5 UG/1; UG/1
2 AEROSOL RESPIRATORY (INHALATION)
Qty: 0 | Refills: 0 | DISCHARGE
Start: 2024-01-10

## 2024-01-10 RX ORDER — INSULIN LISPRO 100/ML
VIAL (ML) SUBCUTANEOUS AT BEDTIME
Refills: 0 | Status: DISCONTINUED | OUTPATIENT
Start: 2024-01-10 | End: 2024-01-12

## 2024-01-10 RX ORDER — ACETAMINOPHEN 500 MG
1 TABLET ORAL
Refills: 0 | DISCHARGE

## 2024-01-10 RX ORDER — ROSUVASTATIN CALCIUM 5 MG/1
1 TABLET ORAL
Refills: 0 | DISCHARGE

## 2024-01-10 RX ORDER — OXYMETAZOLINE HYDROCHLORIDE 0.5 MG/ML
2 SPRAY NASAL
Refills: 0 | DISCHARGE

## 2024-01-10 RX ORDER — LOSARTAN POTASSIUM 100 MG/1
1 TABLET, FILM COATED ORAL
Refills: 0 | DISCHARGE

## 2024-01-10 RX ORDER — CEPHALEXIN 500 MG
1 CAPSULE ORAL
Qty: 0 | Refills: 0 | DISCHARGE
Start: 2024-01-10

## 2024-01-10 RX ORDER — SACCHAROMYCES BOULARDII 250 MG
1 POWDER IN PACKET (EA) ORAL
Refills: 0 | DISCHARGE

## 2024-01-10 RX ORDER — INSULIN GLARGINE 100 [IU]/ML
10 INJECTION, SOLUTION SUBCUTANEOUS
Qty: 0 | Refills: 0 | DISCHARGE
Start: 2024-01-10

## 2024-01-10 RX ORDER — METFORMIN HYDROCHLORIDE 850 MG/1
1 TABLET ORAL
Refills: 0 | DISCHARGE

## 2024-01-10 RX ORDER — FLUTICASONE PROPIONATE AND SALMETEROL 50; 250 UG/1; UG/1
1 POWDER ORAL; RESPIRATORY (INHALATION)
Refills: 0 | DISCHARGE

## 2024-01-10 RX ORDER — INSULIN LISPRO 100/ML
VIAL (ML) SUBCUTANEOUS EVERY 6 HOURS
Refills: 0 | Status: DISCONTINUED | OUTPATIENT
Start: 2024-01-10 | End: 2024-01-10

## 2024-01-10 RX ORDER — OXYCODONE HYDROCHLORIDE 5 MG/1
1 TABLET ORAL
Refills: 0 | DISCHARGE

## 2024-01-10 RX ADMIN — Medication 0.75 MILLIGRAM(S): at 23:53

## 2024-01-10 RX ADMIN — ALBUTEROL 2.5 MILLIGRAM(S): 90 AEROSOL, METERED ORAL at 10:54

## 2024-01-10 RX ADMIN — Medication 0.75 MILLIGRAM(S): at 11:58

## 2024-01-10 RX ADMIN — INSULIN GLARGINE 10 UNIT(S): 100 INJECTION, SOLUTION SUBCUTANEOUS at 22:56

## 2024-01-10 RX ADMIN — Medication 500 MILLIGRAM(S): at 09:12

## 2024-01-10 RX ADMIN — Medication 50 MILLIGRAM(S): at 00:15

## 2024-01-10 RX ADMIN — Medication 100 MILLIGRAM(S): at 00:15

## 2024-01-10 RX ADMIN — Medication 4: at 12:41

## 2024-01-10 RX ADMIN — Medication 500 MILLIGRAM(S): at 22:35

## 2024-01-10 RX ADMIN — SERTRALINE 100 MILLIGRAM(S): 25 TABLET, FILM COATED ORAL at 13:52

## 2024-01-10 RX ADMIN — Medication 4: at 09:04

## 2024-01-10 RX ADMIN — Medication 12.5 MILLIGRAM(S): at 06:15

## 2024-01-10 RX ADMIN — MONTELUKAST 10 MILLIGRAM(S): 4 TABLET, CHEWABLE ORAL at 11:58

## 2024-01-10 RX ADMIN — ALBUTEROL 2.5 MILLIGRAM(S): 90 AEROSOL, METERED ORAL at 00:10

## 2024-01-10 RX ADMIN — ATORVASTATIN CALCIUM 80 MILLIGRAM(S): 80 TABLET, FILM COATED ORAL at 22:35

## 2024-01-10 RX ADMIN — Medication 250 MILLIGRAM(S): at 13:52

## 2024-01-10 RX ADMIN — Medication 2: at 17:57

## 2024-01-10 RX ADMIN — ALBUTEROL 2.5 MILLIGRAM(S): 90 AEROSOL, METERED ORAL at 22:33

## 2024-01-10 RX ADMIN — OXYCODONE HYDROCHLORIDE 2.5 MILLIGRAM(S): 5 TABLET ORAL at 22:35

## 2024-01-10 RX ADMIN — BUDESONIDE AND FORMOTEROL FUMARATE DIHYDRATE 2 PUFF(S): 160; 4.5 AEROSOL RESPIRATORY (INHALATION) at 09:05

## 2024-01-10 RX ADMIN — ALBUTEROL 2.5 MILLIGRAM(S): 90 AEROSOL, METERED ORAL at 04:55

## 2024-01-10 RX ADMIN — BUDESONIDE AND FORMOTEROL FUMARATE DIHYDRATE 2 PUFF(S): 160; 4.5 AEROSOL RESPIRATORY (INHALATION) at 22:36

## 2024-01-10 RX ADMIN — Medication 12.5 MILLIGRAM(S): at 17:55

## 2024-01-10 NOTE — DISCHARGE NOTE PROVIDER - HOSPITAL COURSE
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 Patient seen by ENT had packing done. With continued bleeding. Patient prescribed keflex for empiric coverage for nose bleeding and packing. [facility] IR consulted for IR emobolization of vessel contributing to bleeding. Plan for transfer to [facility] for procedure. Patient ordered for MRA head and neck prior to procedure at [facility]. Patient w/ severe anaphylactic iodine contrast allergy. IR at [facility] notified of contrast allergy and plan to pre-medicate for procedure. Patient required a unit of PRBC. Patient given 20mg IV Lasix to avoid fluid overload. Course complicated by hyperglycemia. Managed w/ basal insulin and sliding scale. Patient not optimized outpatient per family bedside blood sugars have been 200+. Will need adjustment of home diabetes meds when discharged from hospital setting.     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 Patient seen by ENT had packing done. With continued bleeding. Patient prescribed keflex for empiric coverage for nose bleeding and packing. [facility] IR consulted for IR emobolization of vessel contributing to bleeding. Plan for transfer to [facility] for procedure. Patient ordered for MRA head and neck prior to procedure at [facility]. Patient w/ severe anaphylactic iodine contrast allergy. IR at [facility] notified of contrast allergy and plan to pre-medicate for procedure. Patient required a unit of PRBC. Patient given 20mg IV Lasix to avoid fluid overload. Course complicated by hyperglycemia. Managed w/ basal insulin and sliding scale. Patient not optimized outpatient per family bedside blood sugars have been 200+. Will need adjustment of home diabetes meds when discharged from hospital setting.     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 Patient seen by ENT had packing done. With continued bleeding. Patient prescribed keflex for empiric coverage for nose bleeding and packing. [facility] IR consulted for IR emobolization of vessel contributing to bleeding. Plan for transfer to [facility] for procedure. Patient ordered for MRA head and neck prior to procedure at [facility]. Patient w/ severe anaphylactic iodine contrast allergy. IR at [facility] notified of contrast allergy and plan to pre-medicate for procedure. Patient required a unit of PRBC. Patient given 20mg IV Lasix to avoid fluid overload. Course complicated by hyperglycemia. Managed w/ basal insulin and sliding scale. Patient not optimized outpatient per family bedside blood sugars have been 200+. Will need adjustment of home diabetes meds when discharged from hospital setting.     Patient medically optimized for transfer to [facility]     73M DM2 w/ PAD - Rt foot wound, HTN, COPD, CKD, HFpEF, arrythmia, Prostate CA s/p resection, Depression/Anxiety, p/w Epistaxis w/ acute anemia - evaluated for IR embolization at [facility] on however cancelled d/t bradycardia, Ventricular bigeminy, iodine allergy.    Problem/Plan - 1:  ·  Problem: Bradycardia.   ·  Plan: frequent PVS  - HR improved  - continue tele monitor  - lopressor 50 BID  - EP consult appreciated.     Problem/Plan - 2:  ·  Problem: Epistaxis.   ·  Plan: Admitted evaluation for IR embolization d/t failure to achieve hemostasis  - c/b acute blood loss anemia  - IR [facility] - initial procedure cancelled d/t bradycardia.  - No evidence of epistaxis today  - House ENT discussed with outpt ENT - no indication for IR embolization given Hgb stable  - no further ENT procedure planned.     Problem/Plan - 3:  ·  Problem: Chronic diastolic congestive heart failure.   ·  Plan: no acute exacerbation at this time  - continue close monitoring of fluid status.     Problem/Plan - 4:  ·  Problem: Anemia due to acute blood loss.   ·  Plan: d/t epistasis  - monitor H/H  - will transfuse 1u PRBC to keep baseline Hgb elevated for transfer back to Assisted Living.     Problem/Plan - 5:  ·  Problem: COPD, mild.   ·  Plan: no acute exacerbation at this time.  - continue symbicort.     Problem/Plan - 6:  ·  Problem: DM (diabetes mellitus), type 2.   ·  Plan: - FS QID, AISS  - Goal -180.     Problem/Plan - 7:  ·  Problem: Chronic kidney disease, unspecified CKD stage.   ·  Plan: no acute management needed at this time.  - avoid nephrotoxic agents.     Problem/Plan - 8:  ·  Problem: Prostate cancer.   ·  Plan: s/p resection  - resume Hep SC for VTE ppx.     Problem/Plan - 9:  ·  Problem: Anxiety and depression.   ·  Plan: continue zoloft, klonopin. 73M DM2 w/ PAD - Rt foot wound, HTN, COPD, CKD, HFpEF, arrythmia, Prostate CA s/p resection, Depression/Anxiety, p/w Epistaxis w/ acute anemia - evaluated for IR embolization at [facility] on however cancelled d/t bradycardia, Ventricular bigeminy, iodine allergy.    Problem/Plan - 1:  ·  Problem: Bradycardia.   ·  Plan: frequent PVS  - HR improved  - continue tele monitor  - lopressor 50 BID  - EP consult appreciated.     Problem/Plan - 2:  ·  Problem: Epistaxis.   ·  Plan: Admitted evaluation for IR embolization d/t failure to achieve hemostasis  - c/b acute blood loss anemia  - IR [facility] - initial procedure cancelled d/t bradycardia.  - No evidence of epistaxis today  - House ENT discussed with outpt ENT - no indication for IR embolization given Hgb stable  - no further ENT procedure planned.     Problem/Plan - 3:  ·  Problem: Chronic diastolic congestive heart failure.   ·  Plan: no acute exacerbation at this time  - continue close monitoring of fluid status.     Problem/Plan - 4:  ·  Problem: Anemia due to acute blood loss.   ·  Plan: d/t epistasis  - monitor H/H  - will transfuse 1u PRBC to keep baseline Hgb elevated for transfer back to Assisted Living.     Problem/Plan - 5:  ·  Problem: COPD, mild.   ·  Plan: no acute exacerbation at this time.  - continue symbicort.     Problem/Plan - 6:  ·  Problem: DM (diabetes mellitus), type 2.   ·  Plan: - FS QID, AISS  - Goal -180.     Problem/Plan - 7:  ·  Problem: Chronic kidney disease, unspecified CKD stage.   ·  Plan: no acute management needed at this time.  - avoid nephrotoxic agents.     Problem/Plan - 8:  ·  Problem: Prostate cancer.   ·  Plan: s/p resection  - resume Hep SC for VTE ppx.     Problem/Plan - 9:  ·  Problem: Anxiety and depression.   ·  Plan: continue zoloft, klonopin. 73M DM2 w/ PAD - Rt foot wound, HTN, COPD, CKD, HFpEF, arrythmia, Prostate CA s/p resection, Depression/Anxiety, p/w Epistaxis w/ acute anemia - evaluated for IR embolization at [facility] on however cancelled d/t bradycardia, Ventricular bigeminy, iodine allergy.    Problem/Plan - 1:  ·  Problem: Bradycardia.   ·  Plan: frequent PVS  - HR improved  - continue tele monitor  - lopressor 50 BID  - EP consult appreciated.     Problem/Plan - 2:  ·  Problem: Epistaxis.   ·  Plan: Admitted evaluation for IR embolization d/t failure to achieve hemostasis  - c/b acute blood loss anemia  - IR [facility] - initial procedure cancelled d/t bradycardia.  - No evidence of epistaxis today  - House ENT discussed with outpt ENT - no indication for IR embolization given Hgb stable  - no further ENT procedure planned.     Problem/Plan - 3:  ·  Problem: Chronic diastolic congestive heart failure.   ·  Plan: no acute exacerbation at this time  - continue close monitoring of fluid status.     Problem/Plan - 4:  ·  Problem: Anemia due to acute blood loss.   ·  Plan: d/t epistasis  - monitor H/H  - will transfuse 1u PRBC to keep baseline Hgb elevated for transfer back to Assisted Living.     Problem/Plan - 5:  ·  Problem: COPD, mild.   ·  Plan: no acute exacerbation at this time.  - continue symbicort.     Problem/Plan - 6:  ·  Problem: DM (diabetes mellitus), type 2.   ·  Plan: - FS QID, AISS  - Goal -180.     Problem/Plan - 7:  ·  Problem: Chronic kidney disease, unspecified CKD stage.   ·  Plan: no acute management needed at this time.  - avoid nephrotoxic agents.     Problem/Plan - 8:  ·  Problem: Prostate cancer.   ·  Plan: s/p resection  - resume Hep SC for VTE ppx.     Problem/Plan - 9:  ·  Problem: Anxiety and depression.   ·  Plan: continue zoloft, klonopin. 73M DM2 w/ PAD - Rt foot wound, HTN, COPD, CKD, HFpEF, arrythmia, Prostate CA s/p resection, Depression/Anxiety, p/w Epistaxis w/ acute anemia - evaluated for IR embolization at [facility] on however cancelled d/t bradycardia, Ventricular bigeminy, iodine allergy.    Hospital course:   Bradycardia.   - frequent PVS  - HR improved  - continue tele monitor  - lopressor 50 BID  - EP consult appreciated.    Epistaxis.   - Admitted evaluation for IR embolization d/t failure to achieve hemostasis  - c/b acute blood loss anemia  - IR [facility] - initial procedure cancelled d/t bradycardia.  - No evidence of epistaxis today  - House ENT discussed with outpt ENT - no indication for IR embolization given Hgb stable  - no further ENT procedure planned. Epistaxis resolved     Chronic diastolic congestive heart failure.   - No acute exacerbation at this time  - continue close monitoring of fluid status.    Anemia due to acute blood loss.   - d/t epistasis  - monitor H/H  - will transfuse 1u PRBC to keep baseline Hgb elevated for transfer back to Assisted Living.    COPD, mild.   - no acute exacerbation at this time.  - continue symbicort.    DM (diabetes mellitus), type 2.    - FS QID, AISS  - Goal -180.    Chronic kidney disease, unspecified CKD stage.   - no acute management needed at this time.  - avoid nephrotoxic agents.    Prostate cancer.   - s/p resection  - resume Hep SC for VTE ppx.    Anxiety and depression.   - continue zoloft, klonopin.    On 1/18/24, case discussed with Dr. Jones, pt is medically cleared/stable for discharge back to [facility]. 73M DM2 w/ PAD - Rt foot wound, HTN, COPD, CKD, HFpEF, arrythmia, Prostate CA s/p resection, Depression/Anxiety, p/w Epistaxis w/ acute anemia - evaluated for IR embolization at [facility] on however cancelled d/t bradycardia, Ventricular bigeminy, iodine allergy.    Hospital course:   Bradycardia.   - frequent PVS  - HR improved  - continue tele monitor  - lopressor 50 BID  - EP consult appreciated.    Epistaxis.   - Admitted evaluation for IR embolization d/t failure to achieve hemostasis  - c/b acute blood loss anemia  - IR [facility] - initial procedure cancelled d/t bradycardia.  - No evidence of epistaxis today  - House ENT discussed with outpt ENT - no indication for IR embolization given Hgb stable  - no further ENT procedure planned. Epistaxis resolved     Chronic diastolic congestive heart failure.   - No acute exacerbation at this time  - continue close monitoring of fluid status.    Anemia due to acute blood loss.   - d/t epistasis  - monitor H/H  - will transfuse 1u PRBC to keep baseline Hgb elevated for transfer back to Assisted Living.    COPD, mild.   - no acute exacerbation at this time.  - continue symbicort.    DM (diabetes mellitus), type 2.    - FS QID, AISS  - Goal -180.    Chronic kidney disease, unspecified CKD stage.   - no acute management needed at this time.  - avoid nephrotoxic agents.    Prostate cancer.   - s/p resection  - resume Hep SC for VTE ppx.    Anxiety and depression.   - continue zoloft, klonopin.    On 1/18/24, case discussed with Dr. Jones, pt is medically cleared/stable for discharge back to [facility]. 73M DM2 w/ PAD - Rt foot wound, HTN, COPD, CKD, HFpEF, arrythmia, Prostate CA s/p resection, Depression/Anxiety, p/w Epistaxis w/ acute anemia - evaluated for IR embolization at [facility] on however cancelled d/t bradycardia, Ventricular bigeminy, iodine allergy.    Hospital course:   Bradycardia.   - frequent PVS  - HR improved  - continue tele monitor  - lopressor 50 BID  - EP consult appreciated.    Epistaxis.   - Admitted evaluation for IR embolization d/t failure to achieve hemostasis  - c/b acute blood loss anemia  - IR [facility] - initial procedure cancelled d/t bradycardia.  - No evidence of epistaxis today  - House ENT discussed with outpt ENT - no indication for IR embolization given Hgb stable  - no further ENT procedure planned. Epistaxis resolved     Chronic diastolic congestive heart failure.   - No acute exacerbation at this time  - continue close monitoring of fluid status.    Anemia due to acute blood loss.   - d/t epistasis  - monitor H/H  - will transfuse 1u PRBC to keep baseline Hgb elevated for transfer back to Assisted Living.    COPD, mild.   - no acute exacerbation at this time.  - continue symbicort.    DM (diabetes mellitus), type 2.    - FS QID, AISS  - Goal -180.    Chronic kidney disease, unspecified CKD stage.   - no acute management needed at this time.  - avoid nephrotoxic agents.    Prostate cancer.   - s/p resection  - resume Hep SC for VTE ppx.    Anxiety and depression.   - continue zoloft, klonopin.    On 1/18/24, case discussed with Dr. Jones, pt is medically cleared/stable for discharge back to [facility].

## 2024-01-10 NOTE — DISCHARGE NOTE PROVIDER - NSDCMRMEDTOKEN_GEN_ALL_CORE_FT
acetaminophen 325 mg oral tablet: 1 tab(s) orally every 6 hours as needed for pain  Advair Diskus 500 mcg-50 mcg inhalation powder: 1 inhaled 2 times a day  albuterol 2.5 mg/3 mL (0.083%) inhalation solution: 3 milliliter(s) by nebulizer every 6 hours as needed  cephalexin 500 mg oral capsule: 1 cap(s) orally 2 times a day until 1/15  clonazePAM 1 mg oral tablet: 1 tab(s) orally 2 times a day  Colace 100 mg oral capsule: 2 cap(s) orally once a day  Dakins Quarter Strength 0.125% topical solution: Apply topically to affected area on wound  DuoNeb 0.5 mg-2.5 mg/3 mL inhalation solution: 3 milliliter(s) by nebulizer every 6 hours as needed  furosemide 20 mg oral tablet: 1 tab(s) orally once a day  lidocaine patch 4% as needed:   losartan 25 mg oral tablet: 1 tab(s) orally once a day  melatonin 5 mg oral tablet: 1 tab(s) orally once a day (at bedtime)  metFORMIN 1000 mg oral tablet, extended release: 1 tab(s) orally 2 times a day  Nostrilla 0.05% nasal spray: 2 spray(s) in each nostril once a day  oxyCODONE 5 mg oral capsule: 1 cap(s) orally 2 times a day as needed for pain  pregabalin 150 mg oral capsule: 1 cap(s) orally once a day  rosuvastatin 20 mg oral capsule: 1 cap(s) orally once a day  saccharomyces boulardii lyo 250 mg oral capsule: 1 cap(s) orally once a day  sertraline 100 mg oral tablet: 1 tab(s) orally once a day  Singulair 10 mg oral tablet: 1 tab(s) orally once a day  sodium chloride nasal gel: 1 application nasal 2 times a day  Spiriva HandiHaler 18 mcg inhalation capsule: 1 cap(s) inhaled once a day  Zofran 4 mg oral tablet: 1 tab(s) orally 2 times a day as needed for nausea   acetaminophen 325 mg oral tablet: 1 tab(s) orally every 8 hours As needed Temp greater or equal to 38C (100.4F), Mild Pain (1 - 3)  albuterol 90 mcg/inh inhalation aerosol: 1 puff(s) inhaled every 4 hours  atorvastatin 80 mg oral tablet: 1 tab(s) orally once a day (at bedtime)  cephalexin 500 mg oral capsule: 1 cap(s) orally every 12 hours  clonazePAM 0.25 mg oral tablet, disintegrating: 3 tab(s) orally every 12 hours  insulin glargine 100 units/mL subcutaneous solution: 10 unit(s) subcutaneous once a day (at bedtime)  melatonin 3 mg oral tablet: 1 tab(s) orally once a day (at bedtime) As needed Insomnia  montelukast 10 mg oral tablet: 1 tab(s) orally once a day  saccharomyces boulardii lyo 250 mg oral capsule: 1 cap(s) orally once a day  senna leaf extract oral tablet: 2 tab(s) orally once a day (at bedtime)  sertraline 100 mg oral tablet: 1 tab(s) orally once a day  Symbicort 160 mcg-4.5 mcg/inh inhalation aerosol: 2 puff(s) inhaled 2 times a day   acetaminophen 325 mg oral tablet: 1 tab(s) orally every 8 hours as needed for Temp greater or equal to 38C (100.4F), Mild Pain (1 - 3)  albuterol 90 mcg/inh inhalation aerosol: 1 puff(s) inhaled every 4 hours  atorvastatin 80 mg oral tablet: 1 tab(s) orally once a day (at bedtime)  clonazePAM 0.25 mg oral tablet, disintegrating: 3 tab(s) orally 2 times a day MDD: 1.5mg  Lantus Solostar Pen 100 units/mL subcutaneous solution: 10 unit(s) subcutaneous once a day (at bedtime)  melatonin 3 mg oral tablet: 1 tab(s) orally once a day (at bedtime) as needed for Insomnia  metoprolol tartrate 50 mg oral tablet: 1 tab(s) orally 2 times a day  montelukast 10 mg oral tablet: 1 tab(s) orally once a day  oxyCODONE 5 mg oral tablet: 1 tab(s) orally every 12 hours as needed for Severe Pain (7 - 10) MDD: 10mg  saccharomyces boulardii lyo 250 mg oral capsule: 1 cap(s) orally once a day  senna leaf extract oral tablet: 2 tab(s) orally once a day (at bedtime)  sertraline 100 mg oral tablet: 1 tab(s) orally once a day MDD: 100mg  Symbicort 160 mcg-4.5 mcg/inh inhalation aerosol: 2 puff(s) inhaled 2 times a day

## 2024-01-10 NOTE — PROGRESS NOTE ADULT - PROBLEM SELECTOR PLAN 1
-appreciate ENT, packing done  -elevated bed 45 degrees, continuous pulse ox  -RCRI 1, mets about 2-3, sob w/ walking 1 block  -EKG Qtc 480, left axis, no ST elevation or pathologic Q waves medically optimized for procedure.  -complete keflex course  -plan to transfer to [facility] for intervention (IR embolization) -appreciate ENT, packing done  -elevated bed 45 degrees, continuous pulse ox  -RCRI 1, mets about 2-3, sob w/ walking 1 block  -EKG Qtc 480, left axis, no ST elevation or pathologic Q waves medically optimized for procedure.  -complete keflex course  -plan to transfer to [facility] for intervention (IR embolization) -appreciate ENT, packing done  -elevated bed 45 degrees, continuous pulse ox  -RCRI 1, mets about 2-3, sob w/ walking 1 block  -EKG Qtc 480, left axis, no ST elevation or pathologic Q waves medically optimized for procedure.  -complete keflex course  -plan to transfer to [facility] for intervention (IR embolization)---> may be tomorrow  awaiting MRA of head/neck -appreciate ENT, packing done  -elevated bed 45 degrees, continuous pulse ox  -RCRI 1, mets about 2-3, sob w/ walking 1 block  -EKG Qtc 480, left axis, no ST elevation or pathologic Q waves medically optimized for procedure.  -complete keflex course  -plan to transfer to [facility] for intervention (IR embolization)---> may be tomorrow  awaiting MRA of head/neck

## 2024-01-10 NOTE — PROGRESS NOTE ADULT - SUBJECTIVE AND OBJECTIVE BOX
Internal medicine Accept note   Internal medicine Accept note  Patient is a 73y old  Male who presents with a chief complaint of epistaxis (10 Jatin 2024 13:07)      SUBJECTIVE / OVERNIGHT EVENTS:  d/w prior MD, patient is to get mra of head/ neck to asses bleed due to severe iodine allergy  Plan to be transferred to [facility] for embolization of vessel to nare    MEDICATIONS  (STANDING):  albuterol    0.083% 2.5 milliGRAM(s) Nebulizer every 6 hours  albuterol    90 MICROgram(s) HFA Inhaler 1 Puff(s) Inhalation every 4 hours  atorvastatin 80 milliGRAM(s) Oral at bedtime  budesonide 160 MICROgram(s)/formoterol 4.5 MICROgram(s) Inhaler 2 Puff(s) Inhalation two times a day  cephalexin 500 milliGRAM(s) Oral every 12 hours  clonazePAM Oral Disintegrating Tablet 0.75 milliGRAM(s) Oral every 12 hours  dextrose 5%. 1000 milliLiter(s) (50 mL/Hr) IV Continuous <Continuous>  dextrose 5%. 1000 milliLiter(s) (100 mL/Hr) IV Continuous <Continuous>  dextrose 50% Injectable 12.5 Gram(s) IV Push once  dextrose 50% Injectable 25 Gram(s) IV Push once  dextrose 50% Injectable 25 Gram(s) IV Push once  glucagon  Injectable 1 milliGRAM(s) IntraMuscular once  influenza  Vaccine (HIGH DOSE) 0.7 milliLiter(s) IntraMuscular once  insulin glargine Injectable (LANTUS) 10 Unit(s) SubCutaneous at bedtime  insulin lispro (ADMELOG) corrective regimen sliding scale   SubCutaneous at bedtime  insulin lispro (ADMELOG) corrective regimen sliding scale   SubCutaneous three times a day before meals  metoprolol tartrate 12.5 milliGRAM(s) Oral every 12 hours  montelukast 10 milliGRAM(s) Oral daily  saccharomyces boulardii 250 milliGRAM(s) Oral daily  senna 2 Tablet(s) Oral at bedtime  sertraline 100 milliGRAM(s) Oral daily    MEDICATIONS  (PRN):  acetaminophen     Tablet .. 325 milliGRAM(s) Oral every 8 hours PRN Temp greater or equal to 38C (100.4F), Mild Pain (1 - 3)  dextrose Oral Gel 15 Gram(s) Oral once PRN Blood Glucose LESS THAN 70 milliGRAM(s)/deciliter  melatonin 3 milliGRAM(s) Oral at bedtime PRN Insomnia  ondansetron Injectable 4 milliGRAM(s) IV Push every 12 hours PRN Nausea and/or Vomiting        CAPILLARY BLOOD GLUCOSE    POCT Blood Glucose.: 210 mg/dL (10 Jatin 2024 12:20)  POCT Blood Glucose.: 226 mg/dL (10 Jatin 2024 08:54)  POCT Blood Glucose.: 206 mg/dL (09 Jan 2024 22:12)  POCT Blood Glucose.: 243 mg/dL (09 Jan 2024 17:57)    I&O's Summary    09 Jan 2024 07:01  -  10 Jatin 2024 07:00  --------------------------------------------------------  IN: 240 mL / OUT: 0 mL / NET: 240 mL    10 Jatin 2024 07:01  -  10 Jatin 2024 13:11  --------------------------------------------------------  IN: 200 mL / OUT: 0 mL / NET: 200 mL      Vital Signs Last 24 Hrs  T(C): 36.9 (10 Jatin 2024 09:45), Max: 37 (09 Jan 2024 20:46)  T(F): 98.4 (10 Jatin 2024 09:45), Max: 98.6 (09 Jan 2024 20:46)  HR: 78 (10 Jatin 2024 11:07) (60 - 106)  BP: 122/78 (10 Jatin 2024 09:45) (114/67 - 145/71)  BP(mean): --  RR: 18 (10 Jatin 2024 09:45) (18 - 19)  SpO2: 98% (10 Jatin 2024 11:07) (96% - 100%)    Parameters below as of 10 Jatin 2024 11:07  Patient On (Oxygen Delivery Method): room air      PHYSICAL EXAM:  GENERAL: NAD,   HEAD:  Atraumatic, Normocephalic  EYES: EOMI, PERRLA, conjunctiva and sclera clear  NECK: Supple, No JVD  CHEST/LUNG: Clear to auscultation bilaterally; No wheeze  HEART: Regular rate and rhythm; No murmurs, rubs, or gallops  ABDOMEN: Soft, Nontender, Nondistended; Bowel sounds present  EXTREMITIES:  2+ Peripheral Pulses, No clubbing, cyanosis, or edema  PSYCH: AAOx3  NEUROLOGY: non-focal      LABS:                        7.2    4.88  )-----------( 179      ( 10 Jatin 2024 05:30 )             22.6     01-10    140  |  102  |  19  ----------------------------<  211<H>  4.1   |  26  |  1.02    Ca    9.4      10 Jatin 2024 05:30  Phos  3.0     01-10  Mg     1.90     01-10    TPro  6.6  /  Alb  3.5  /  TBili  0.4  /  DBili  x   /  AST  15  /  ALT  13  /  AlkPhos  71  01-10    PT/INR - ( 09 Jan 2024 06:00 )   PT: 14.0 sec;   INR: 1.25 ratio         PTT - ( 09 Jan 2024 06:00 )  PTT:24.1 sec    Consultant(s) Notes Reviewed:      Care Discussed with Consultants/Other Providers:  d/w Dr marquez--> may go to [facility] today      Internal medicine Accept note  Patient is a 73y old  Male who presents with a chief complaint of epistaxis (10 Jatin 2024 13:07)      SUBJECTIVE / OVERNIGHT EVENTS:  d/w prior MD, patient is to get mra of head/ neck to asses bleed due to severe iodine allergy  Plan to be transferred to [facility] for embolization of vessel to nare    MEDICATIONS  (STANDING):  albuterol    0.083% 2.5 milliGRAM(s) Nebulizer every 6 hours  albuterol    90 MICROgram(s) HFA Inhaler 1 Puff(s) Inhalation every 4 hours  atorvastatin 80 milliGRAM(s) Oral at bedtime  budesonide 160 MICROgram(s)/formoterol 4.5 MICROgram(s) Inhaler 2 Puff(s) Inhalation two times a day  cephalexin 500 milliGRAM(s) Oral every 12 hours  clonazePAM Oral Disintegrating Tablet 0.75 milliGRAM(s) Oral every 12 hours  dextrose 5%. 1000 milliLiter(s) (50 mL/Hr) IV Continuous <Continuous>  dextrose 5%. 1000 milliLiter(s) (100 mL/Hr) IV Continuous <Continuous>  dextrose 50% Injectable 12.5 Gram(s) IV Push once  dextrose 50% Injectable 25 Gram(s) IV Push once  dextrose 50% Injectable 25 Gram(s) IV Push once  glucagon  Injectable 1 milliGRAM(s) IntraMuscular once  influenza  Vaccine (HIGH DOSE) 0.7 milliLiter(s) IntraMuscular once  insulin glargine Injectable (LANTUS) 10 Unit(s) SubCutaneous at bedtime  insulin lispro (ADMELOG) corrective regimen sliding scale   SubCutaneous at bedtime  insulin lispro (ADMELOG) corrective regimen sliding scale   SubCutaneous three times a day before meals  metoprolol tartrate 12.5 milliGRAM(s) Oral every 12 hours  montelukast 10 milliGRAM(s) Oral daily  saccharomyces boulardii 250 milliGRAM(s) Oral daily  senna 2 Tablet(s) Oral at bedtime  sertraline 100 milliGRAM(s) Oral daily    MEDICATIONS  (PRN):  acetaminophen     Tablet .. 325 milliGRAM(s) Oral every 8 hours PRN Temp greater or equal to 38C (100.4F), Mild Pain (1 - 3)  dextrose Oral Gel 15 Gram(s) Oral once PRN Blood Glucose LESS THAN 70 milliGRAM(s)/deciliter  melatonin 3 milliGRAM(s) Oral at bedtime PRN Insomnia  ondansetron Injectable 4 milliGRAM(s) IV Push every 12 hours PRN Nausea and/or Vomiting        CAPILLARY BLOOD GLUCOSE    POCT Blood Glucose.: 210 mg/dL (10 Jatin 2024 12:20)  POCT Blood Glucose.: 226 mg/dL (10 Jatin 2024 08:54)  POCT Blood Glucose.: 206 mg/dL (09 Jan 2024 22:12)  POCT Blood Glucose.: 243 mg/dL (09 Jan 2024 17:57)    I&O's Summary    09 Jan 2024 07:01  -  10 Jatin 2024 07:00  --------------------------------------------------------  IN: 240 mL / OUT: 0 mL / NET: 240 mL    10 Jatin 2024 07:01  -  10 Jatin 2024 13:11  --------------------------------------------------------  IN: 200 mL / OUT: 0 mL / NET: 200 mL      Vital Signs Last 24 Hrs  T(C): 36.9 (10 Jatin 2024 09:45), Max: 37 (09 Jan 2024 20:46)  T(F): 98.4 (10 Jatin 2024 09:45), Max: 98.6 (09 Jan 2024 20:46)  HR: 78 (10 Jatin 2024 11:07) (60 - 106)  BP: 122/78 (10 Jatin 2024 09:45) (114/67 - 145/71)  BP(mean): --  RR: 18 (10 Jatin 2024 09:45) (18 - 19)  SpO2: 98% (10 Jatin 2024 11:07) (96% - 100%)    Parameters below as of 10 Jatin 2024 11:07  Patient On (Oxygen Delivery Method): room air      PHYSICAL EXAM:  GENERAL: NAD,   HEAD:  Atraumatic, Normocephalic  EYES: EOMI, PERRLA, conjunctiva and sclera clear  NECK: Supple, No JVD  CHEST/LUNG: Clear to auscultation bilaterally; No wheeze  HEART: Regular rate and rhythm; No murmurs, rubs, or gallops  ABDOMEN: Soft, Nontender, Nondistended; Bowel sounds present  EXTREMITIES:  2+ Peripheral Pulses, No clubbing, cyanosis, or edema  PSYCH: AAOx3  NEUROLOGY: non-focal      LABS:                        7.2    4.88  )-----------( 179      ( 10 Jatin 2024 05:30 )             22.6     01-10    140  |  102  |  19  ----------------------------<  211<H>  4.1   |  26  |  1.02    Ca    9.4      10 Jatin 2024 05:30  Phos  3.0     01-10  Mg     1.90     01-10    TPro  6.6  /  Alb  3.5  /  TBili  0.4  /  DBili  x   /  AST  15  /  ALT  13  /  AlkPhos  71  01-10    PT/INR - ( 09 Jan 2024 06:00 )   PT: 14.0 sec;   INR: 1.25 ratio         PTT - ( 09 Jan 2024 06:00 )  PTT:24.1 sec    Consultant(s) Notes Reviewed:      Care Discussed with Consultants/Other Providers:  d/w Dr marquez--> may go to [facility] today

## 2024-01-10 NOTE — PROGRESS NOTE ADULT - NSPROGADDITIONALINFOA_GEN_ALL_CORE
1/10/24 Addendum   Transfer center may do shuttle to [facility] , [place] procedure, then Back to [facility] on 1/11/24 1/10/24 Addendum   Transfer center may do shuttle to [facility] , [place] procedure, then Back to [facility] on 1/11/24

## 2024-01-10 NOTE — DISCHARGE NOTE PROVIDER - NSDCCPCAREPLAN_GEN_ALL_CORE_FT
PRINCIPAL DISCHARGE DIAGNOSIS  Diagnosis: Epistaxis  Assessment and Plan of Treatment: You were seen by the ear nose and throat doctors for nose bleed. You were deemed to require a procedure to stop the bleeding. You will be transferred to [facility] for this procedure. You required a blood transfusion due to low blood counts (anemia) due to your nose bleed.     PRINCIPAL DISCHARGE DIAGNOSIS  Diagnosis: Epistaxis  Assessment and Plan of Treatment: You were seen by the ear nose and throat doctors for nose bleed. You were deemed to require a procedure to stop the bleeding. You will be transferred to [facility] for this procedure. You required a blood transfusion due to low blood counts (anemia) due to your nose bleed.     PRINCIPAL DISCHARGE DIAGNOSIS  Diagnosis: Epistaxis  Assessment and Plan of Treatment: You were seen by the ear nose and throat doctors for nose bleed. You were deemed to require a procedure to stop the bleeding. You will be transferred to [facility] for this procedure. You required a blood transfusion due to low blood counts (anemia) due to your nose bleed.     PRINCIPAL DISCHARGE DIAGNOSIS  Diagnosis: Epistaxis  Assessment and Plan of Treatment: You were seen by the ear nose and throat doctors for nose bleed. You were evaluated for IR embolization but hemostasis was achieved without the embolization.  ENT service discussed the case with outpatient ENT surgeon Dr. Sheth and determined that you are medically optimized for [facility].  Patient was given instructions to go directly yo [facility] if he re bleeds for IR embolization due to [facility] not having Neuro IR.     PRINCIPAL DISCHARGE DIAGNOSIS  Diagnosis: Epistaxis  Assessment and Plan of Treatment: You were seen by the ear nose and throat doctors for nose bleed. You were evaluated for IR embolization but hemostasis was achieved without the embolization.  ENT service discussed the case with outpatient ENT surgeon Dr. Sheth and determined that you are medically optimized for [facility].  Patient was given instructions to go directly yo [facility] if he re bleeds for IR embolization due to [facility] not having Neuro IR.     PRINCIPAL DISCHARGE DIAGNOSIS  Diagnosis: Epistaxis  Assessment and Plan of Treatment: You were seen by the ear nose and throat doctors for nose bleed. You were evaluated for IR embolization but hemostasis was achieved without the embolization.  ENT service discussed the case with outpatient ENT surgeon Dr. Sheth and determined that you are medically optimized for [facility].  Patient was given instructions to go directly yo [facility] if he re bleeds for IR embolization due to [facility] not having Neuro IR.     PRINCIPAL DISCHARGE DIAGNOSIS  Diagnosis: Epistaxis  Assessment and Plan of Treatment: You were seen by the ear nose and throat doctors for nose bleed. You were evaluated for IR embolization but hemostasis was achieved without the embolization.  ENT service discussed the case with outpatient ENT surgeon Dr. Sheth and determined that you are medically optimized for [facility]. You were given instructions to go directly yo [facility] if he re bleeds for IR embolization due to [facility] not having Neuro IR.      SECONDARY DISCHARGE DIAGNOSES  Diagnosis: Anemia due to acute blood loss  Assessment and Plan of Treatment: You recieved transfusion for acute blood loss and your hemoglobin is stable at this time. Please continue to monitor your blood count as outpatient with your [facility] provider for further management and monitoring.    Diagnosis: Bradycardia  Assessment and Plan of Treatment: Continue with current medications and follow up with outpatient electrophysiology doctor for further management of bradycardia (slow heart rate).     PRINCIPAL DISCHARGE DIAGNOSIS  Diagnosis: Epistaxis  Assessment and Plan of Treatment: You were seen by the ear nose and throat doctors for nose bleed. You were evaluated for IR embolization but hemostasis was achieved without the embolization.  ENT service discussed the case with outpatient ENT surgeon Dr. Sheth and determined that you are medically optimized for [facility]. You were given instructions to go directly yo [facility] if he re bleeds for IR embolization due to [facility] not having Neuro IR.      SECONDARY DISCHARGE DIAGNOSES  Diagnosis: Anemia due to acute blood loss  Assessment and Plan of Treatment: You recieved transfusion for acute blood loss and your hemoglobin is stable at this time. Please continue to monitor your blood count as outpatient with your [facility] provider for further management and monitoring.    Diagnosis: Bradycardia  Assessment and Plan of Treatment: Continue with current medications and follow up with outpatient electrophysiology doctor for further management of bradycardia (slow heart rate).     PRINCIPAL DISCHARGE DIAGNOSIS  Diagnosis: Epistaxis  Assessment and Plan of Treatment: You were seen by the ear nose and throat doctors for nose bleed. You were evaluated for IR embolization but hemostasis was achieved without the embolization.  ENT service discussed the case with outpatient ENT surgeon Dr. Sheth and determined that you are medically optimized for [facility]. You were given instructions to go directly yo [facility] if he re bleeds for IR embolization due to [facility] not having Neuro IR.      SECONDARY DISCHARGE DIAGNOSES  Diagnosis: Anemia due to acute blood loss  Assessment and Plan of Treatment: You recieved transfusion for acute blood loss and your hemoglobin is stable at this time. Please continue to monitor your blood count as outpatient with your [facility] provider for further management and monitoring.    Diagnosis: Bradycardia  Assessment and Plan of Treatment: Continue with current medications and follow up with outpatient electrophysiology doctor for further management of bradycardia (slow heart rate).

## 2024-01-10 NOTE — SWALLOW BEDSIDE ASSESSMENT ADULT - COMMENTS
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 Per chart note 1/9/24, "PLEASE arrange for transport to [facility] for IR embo per Dr. Rouse - Medicine to medicine transfer." Discussed with ACP Suha Amaro, patient now NPO and pending MRI/transfer to [facility]. Per ACP, swallow evaluation to be deferred at this time. 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 Per chart note 1/9/24, "PLEASE arrange for transport to [facility] for IR embo per Dr. Rouse - Medicine to medicine transfer." Discussed with ACP Suha Amaro, patient now NPO and pending MRI/transfer to [facility]. Per ACP, swallow evaluation to be deferred at this time.

## 2024-01-10 NOTE — PROGRESS NOTE ADULT - PROBLEM SELECTOR PLAN 6
F-none  E-replete  N-NPO  hold AC given anemia, no SCD given right foot wound    2nd touch F-none  E-replete  N-NPO  hold AC given anemia, no SCD given right foot wound

## 2024-01-10 NOTE — DISCHARGE NOTE PROVIDER - NSDCFUSCHEDAPPT_GEN_ALL_CORE_FT
Jer Rouse  [facility]  [address]  Scheduled Appointment: 01/15/2024     Jer Rouse  [facility]  [address]  Scheduled Appointment: 01/15/2024     Jer Rouse  [facility]  [address] [address]  Scheduled Appointment: 01/15/2024

## 2024-01-11 LAB
BASOPHILS # BLD AUTO: 0.06 K/UL — SIGNIFICANT CHANGE UP (ref 0–0.2)
BASOPHILS # BLD AUTO: 0.06 K/UL — SIGNIFICANT CHANGE UP (ref 0–0.2)
BASOPHILS NFR BLD AUTO: 1 % — SIGNIFICANT CHANGE UP (ref 0–2)
BASOPHILS NFR BLD AUTO: 1 % — SIGNIFICANT CHANGE UP (ref 0–2)
EOSINOPHIL # BLD AUTO: 0.13 K/UL — SIGNIFICANT CHANGE UP (ref 0–0.5)
EOSINOPHIL # BLD AUTO: 0.13 K/UL — SIGNIFICANT CHANGE UP (ref 0–0.5)
EOSINOPHIL NFR BLD AUTO: 2.1 % — SIGNIFICANT CHANGE UP (ref 0–6)
EOSINOPHIL NFR BLD AUTO: 2.1 % — SIGNIFICANT CHANGE UP (ref 0–6)
GLUCOSE BLDC GLUCOMTR-MCNC: 158 MG/DL — HIGH (ref 70–99)
GLUCOSE BLDC GLUCOMTR-MCNC: 158 MG/DL — HIGH (ref 70–99)
GLUCOSE BLDC GLUCOMTR-MCNC: 208 MG/DL — HIGH (ref 70–99)
GLUCOSE BLDC GLUCOMTR-MCNC: 208 MG/DL — HIGH (ref 70–99)
GLUCOSE BLDC GLUCOMTR-MCNC: 238 MG/DL — HIGH (ref 70–99)
GLUCOSE BLDC GLUCOMTR-MCNC: 238 MG/DL — HIGH (ref 70–99)
GLUCOSE BLDC GLUCOMTR-MCNC: 251 MG/DL — HIGH (ref 70–99)
GLUCOSE BLDC GLUCOMTR-MCNC: 251 MG/DL — HIGH (ref 70–99)
GLUCOSE BLDC GLUCOMTR-MCNC: 292 MG/DL — HIGH (ref 70–99)
GLUCOSE BLDC GLUCOMTR-MCNC: 292 MG/DL — HIGH (ref 70–99)
HCT VFR BLD CALC: 23.5 % — LOW (ref 39–50)
HCT VFR BLD CALC: 23.5 % — LOW (ref 39–50)
HGB BLD-MCNC: 7.7 G/DL — LOW (ref 13–17)
HGB BLD-MCNC: 7.7 G/DL — LOW (ref 13–17)
IANC: 2.64 K/UL — SIGNIFICANT CHANGE UP (ref 1.8–7.4)
IANC: 2.64 K/UL — SIGNIFICANT CHANGE UP (ref 1.8–7.4)
IMM GRANULOCYTES NFR BLD AUTO: 7.4 % — HIGH (ref 0–0.9)
IMM GRANULOCYTES NFR BLD AUTO: 7.4 % — HIGH (ref 0–0.9)
LYMPHOCYTES # BLD AUTO: 2.11 K/UL — SIGNIFICANT CHANGE UP (ref 1–3.3)
LYMPHOCYTES # BLD AUTO: 2.11 K/UL — SIGNIFICANT CHANGE UP (ref 1–3.3)
LYMPHOCYTES # BLD AUTO: 33.8 % — SIGNIFICANT CHANGE UP (ref 13–44)
LYMPHOCYTES # BLD AUTO: 33.8 % — SIGNIFICANT CHANGE UP (ref 13–44)
MCHC RBC-ENTMCNC: 28.9 PG — SIGNIFICANT CHANGE UP (ref 27–34)
MCHC RBC-ENTMCNC: 28.9 PG — SIGNIFICANT CHANGE UP (ref 27–34)
MCHC RBC-ENTMCNC: 32.8 GM/DL — SIGNIFICANT CHANGE UP (ref 32–36)
MCHC RBC-ENTMCNC: 32.8 GM/DL — SIGNIFICANT CHANGE UP (ref 32–36)
MCV RBC AUTO: 88.3 FL — SIGNIFICANT CHANGE UP (ref 80–100)
MCV RBC AUTO: 88.3 FL — SIGNIFICANT CHANGE UP (ref 80–100)
MONOCYTES # BLD AUTO: 0.85 K/UL — SIGNIFICANT CHANGE UP (ref 0–0.9)
MONOCYTES # BLD AUTO: 0.85 K/UL — SIGNIFICANT CHANGE UP (ref 0–0.9)
MONOCYTES NFR BLD AUTO: 13.6 % — SIGNIFICANT CHANGE UP (ref 2–14)
MONOCYTES NFR BLD AUTO: 13.6 % — SIGNIFICANT CHANGE UP (ref 2–14)
NEUTROPHILS # BLD AUTO: 2.64 K/UL — SIGNIFICANT CHANGE UP (ref 1.8–7.4)
NEUTROPHILS # BLD AUTO: 2.64 K/UL — SIGNIFICANT CHANGE UP (ref 1.8–7.4)
NEUTROPHILS NFR BLD AUTO: 42.1 % — LOW (ref 43–77)
NEUTROPHILS NFR BLD AUTO: 42.1 % — LOW (ref 43–77)
NRBC # BLD: 0 /100 WBCS — SIGNIFICANT CHANGE UP (ref 0–0)
NRBC # BLD: 0 /100 WBCS — SIGNIFICANT CHANGE UP (ref 0–0)
NRBC # FLD: 0 K/UL — SIGNIFICANT CHANGE UP (ref 0–0)
NRBC # FLD: 0 K/UL — SIGNIFICANT CHANGE UP (ref 0–0)
PLATELET # BLD AUTO: 211 K/UL — SIGNIFICANT CHANGE UP (ref 150–400)
PLATELET # BLD AUTO: 211 K/UL — SIGNIFICANT CHANGE UP (ref 150–400)
RBC # BLD: 2.66 M/UL — LOW (ref 4.2–5.8)
RBC # BLD: 2.66 M/UL — LOW (ref 4.2–5.8)
RBC # FLD: 15.1 % — HIGH (ref 10.3–14.5)
RBC # FLD: 15.1 % — HIGH (ref 10.3–14.5)
WBC # BLD: 6.25 K/UL — SIGNIFICANT CHANGE UP (ref 3.8–10.5)
WBC # BLD: 6.25 K/UL — SIGNIFICANT CHANGE UP (ref 3.8–10.5)
WBC # FLD AUTO: 6.25 K/UL — SIGNIFICANT CHANGE UP (ref 3.8–10.5)
WBC # FLD AUTO: 6.25 K/UL — SIGNIFICANT CHANGE UP (ref 3.8–10.5)

## 2024-01-11 PROCEDURE — 99232 SBSQ HOSP IP/OBS MODERATE 35: CPT

## 2024-01-11 RX ORDER — CLONAZEPAM 1 MG
0.75 TABLET ORAL EVERY 12 HOURS
Refills: 0 | Status: DISCONTINUED | OUTPATIENT
Start: 2024-01-11 | End: 2024-01-18

## 2024-01-11 RX ORDER — OXYCODONE HYDROCHLORIDE 5 MG/1
5 TABLET ORAL EVERY 12 HOURS
Refills: 0 | Status: DISCONTINUED | OUTPATIENT
Start: 2024-01-11 | End: 2024-01-18

## 2024-01-11 RX ADMIN — Medication 12.5 MILLIGRAM(S): at 05:48

## 2024-01-11 RX ADMIN — Medication 3: at 18:39

## 2024-01-11 RX ADMIN — Medication 3 MILLIGRAM(S): at 22:16

## 2024-01-11 RX ADMIN — Medication 0.75 MILLIGRAM(S): at 19:19

## 2024-01-11 RX ADMIN — Medication 250 MILLIGRAM(S): at 12:54

## 2024-01-11 RX ADMIN — ALBUTEROL 2.5 MILLIGRAM(S): 90 AEROSOL, METERED ORAL at 10:15

## 2024-01-11 RX ADMIN — ATORVASTATIN CALCIUM 80 MILLIGRAM(S): 80 TABLET, FILM COATED ORAL at 22:15

## 2024-01-11 RX ADMIN — ALBUTEROL 2.5 MILLIGRAM(S): 90 AEROSOL, METERED ORAL at 16:48

## 2024-01-11 RX ADMIN — Medication 12.5 MILLIGRAM(S): at 18:41

## 2024-01-11 RX ADMIN — ALBUTEROL 2.5 MILLIGRAM(S): 90 AEROSOL, METERED ORAL at 04:24

## 2024-01-11 RX ADMIN — Medication 2: at 08:49

## 2024-01-11 RX ADMIN — ALBUTEROL 2.5 MILLIGRAM(S): 90 AEROSOL, METERED ORAL at 21:20

## 2024-01-11 RX ADMIN — OXYCODONE HYDROCHLORIDE 5 MILLIGRAM(S): 5 TABLET ORAL at 14:56

## 2024-01-11 RX ADMIN — OXYCODONE HYDROCHLORIDE 5 MILLIGRAM(S): 5 TABLET ORAL at 14:26

## 2024-01-11 RX ADMIN — MONTELUKAST 10 MILLIGRAM(S): 4 TABLET, CHEWABLE ORAL at 12:56

## 2024-01-11 RX ADMIN — SERTRALINE 100 MILLIGRAM(S): 25 TABLET, FILM COATED ORAL at 18:41

## 2024-01-11 RX ADMIN — Medication 0.75 MILLIGRAM(S): at 12:55

## 2024-01-11 RX ADMIN — BUDESONIDE AND FORMOTEROL FUMARATE DIHYDRATE 2 PUFF(S): 160; 4.5 AEROSOL RESPIRATORY (INHALATION) at 14:13

## 2024-01-11 RX ADMIN — INSULIN GLARGINE 10 UNIT(S): 100 INJECTION, SOLUTION SUBCUTANEOUS at 22:16

## 2024-01-11 RX ADMIN — Medication 3: at 14:13

## 2024-01-11 NOTE — PROGRESS NOTE ADULT - SUBJECTIVE AND OBJECTIVE BOX
Patient is a 73y old  Male who presents with a chief complaint of epistaxis (10 Jatin 2024 13:07)      SUBJECTIVE / OVERNIGHT EVENTS:  Informed that wife wants updates    MEDICATIONS  (STANDING):  albuterol    0.083% 2.5 milliGRAM(s) Nebulizer every 6 hours  albuterol    90 MICROgram(s) HFA Inhaler 1 Puff(s) Inhalation every 4 hours  atorvastatin 80 milliGRAM(s) Oral at bedtime  budesonide 160 MICROgram(s)/formoterol 4.5 MICROgram(s) Inhaler 2 Puff(s) Inhalation two times a day  clonazePAM Oral Disintegrating Tablet 0.75 milliGRAM(s) Oral every 12 hours  dextrose 5%. 1000 milliLiter(s) (50 mL/Hr) IV Continuous <Continuous>  dextrose 5%. 1000 milliLiter(s) (100 mL/Hr) IV Continuous <Continuous>  dextrose 50% Injectable 12.5 Gram(s) IV Push once  dextrose 50% Injectable 25 Gram(s) IV Push once  dextrose 50% Injectable 25 Gram(s) IV Push once  glucagon  Injectable 1 milliGRAM(s) IntraMuscular once  influenza  Vaccine (HIGH DOSE) 0.7 milliLiter(s) IntraMuscular once  insulin glargine Injectable (LANTUS) 10 Unit(s) SubCutaneous at bedtime  insulin lispro (ADMELOG) corrective regimen sliding scale   SubCutaneous three times a day before meals  insulin lispro (ADMELOG) corrective regimen sliding scale   SubCutaneous at bedtime  metoprolol tartrate 12.5 milliGRAM(s) Oral every 12 hours  montelukast 10 milliGRAM(s) Oral daily  saccharomyces boulardii 250 milliGRAM(s) Oral daily  senna 2 Tablet(s) Oral at bedtime  sertraline 100 milliGRAM(s) Oral daily    MEDICATIONS  (PRN):  acetaminophen     Tablet .. 325 milliGRAM(s) Oral every 8 hours PRN Temp greater or equal to 38C (100.4F), Mild Pain (1 - 3)  dextrose Oral Gel 15 Gram(s) Oral once PRN Blood Glucose LESS THAN 70 milliGRAM(s)/deciliter  melatonin 3 milliGRAM(s) Oral at bedtime PRN Insomnia  ondansetron Injectable 4 milliGRAM(s) IV Push every 12 hours PRN Nausea and/or Vomiting  oxyCODONE    IR 5 milliGRAM(s) Oral every 12 hours PRN Severe Pain (7 - 10)        CAPILLARY BLOOD GLUCOSE  POCT Blood Glucose.: 238 mg/dL (11 Jan 2024 12:12)  POCT Blood Glucose.: 208 mg/dL (11 Jan 2024 08:20)  POCT Blood Glucose.: 153 mg/dL (10 Jatin 2024 22:43)  POCT Blood Glucose.: 162 mg/dL (10 Jatin 2024 17:33)    I&O's Summary    10 Jatin 2024 07:01  -  11 Jan 2024 07:00  --------------------------------------------------------  IN: 560 mL / OUT: 200 mL / NET: 360 mL    11 Jan 2024 07:01  -  11 Jan 2024 13:38  --------------------------------------------------------  IN: 0 mL / OUT: 0 mL / NET: 0 mL      Vital Signs Last 24 Hrs  T(C): 36.8 (11 Jan 2024 09:49), Max: 37 (10 Jatin 2024 14:17)  T(F): 98.2 (11 Jan 2024 09:49), Max: 98.6 (10 Jatin 2024 14:17)  HR: 80 (11 Jan 2024 10:15) (78 - 95)  BP: 128/87 (11 Jan 2024 09:49) (119/60 - 150/83)  BP(mean): --  RR: 18 (11 Jan 2024 09:49) (16 - 18)  SpO2: 97% (11 Jan 2024 10:15) (94% - 100%)    Parameters below as of 11 Jan 2024 10:15  Patient On (Oxygen Delivery Method): room air      PHYSICAL EXAM:  GENERAL: NAD,   HEAD:  Atraumatic, Normocephalic  EYES: EOMI, PERRLA, conjunctiva and sclera clear  NECK: Supple, No JVD  CHEST/LUNG: Clear to auscultation bilaterally; No wheeze  HEART: Regular rate and rhythm; No murmurs, rubs, or gallops  ABDOMEN: Soft, Nontender, Nondistended; Bowel sounds present  EXTREMITIES:  2+ Peripheral Pulses, No clubbing, cyanosis, or edema  PSYCH: AAOx3  NEUROLOGY: non-focal  SKIN: No rashes or lesions    LABS:                        7.7    6.25  )-----------( 211      ( 11 Jan 2024 08:04 )             23.5     01-10    140  |  102  |  19  ----------------------------<  211<H>  4.1   |  26  |  1.02    Ca    9.4      10 Jatin 2024 05:30  Phos  3.0     01-10  Mg     1.90     01-10    TPro  6.6  /  Alb  3.5  /  TBili  0.4  /  DBili  x   /  AST  15  /  ALT  13  /  AlkPhos  71  01-10      RADIOLOGY & ADDITIONAL TESTS:  rad< from: MR Angio Head No Cont (01.10.24 @ 15:39) >  IMPRESSION:  MRA NECK: Unremarkable study.  MRA HEAD: Unremarkable study.        Care Discussed with Consultants/Other Providers:   Patient is a 73y old  Male who presents with a chief complaint of epistaxis (10 Jatin 2024 13:07)      SUBJECTIVE / OVERNIGHT EVENTS:  Informed that wife wants updates by UR  Patient w/o complaints. Explained to him that MRA is normal and that he will go to [facility] for prcedure on 1/12 and return to [facility].  Patient agrees to update wife    MEDICATIONS  (STANDING):  albuterol    0.083% 2.5 milliGRAM(s) Nebulizer every 6 hours  albuterol    90 MICROgram(s) HFA Inhaler 1 Puff(s) Inhalation every 4 hours  atorvastatin 80 milliGRAM(s) Oral at bedtime  budesonide 160 MICROgram(s)/formoterol 4.5 MICROgram(s) Inhaler 2 Puff(s) Inhalation two times a day  clonazePAM Oral Disintegrating Tablet 0.75 milliGRAM(s) Oral every 12 hours  dextrose 5%. 1000 milliLiter(s) (50 mL/Hr) IV Continuous <Continuous>  dextrose 5%. 1000 milliLiter(s) (100 mL/Hr) IV Continuous <Continuous>  dextrose 50% Injectable 12.5 Gram(s) IV Push once  dextrose 50% Injectable 25 Gram(s) IV Push once  dextrose 50% Injectable 25 Gram(s) IV Push once  glucagon  Injectable 1 milliGRAM(s) IntraMuscular once  influenza  Vaccine (HIGH DOSE) 0.7 milliLiter(s) IntraMuscular once  insulin glargine Injectable (LANTUS) 10 Unit(s) SubCutaneous at bedtime  insulin lispro (ADMELOG) corrective regimen sliding scale   SubCutaneous three times a day before meals  insulin lispro (ADMELOG) corrective regimen sliding scale   SubCutaneous at bedtime  metoprolol tartrate 12.5 milliGRAM(s) Oral every 12 hours  montelukast 10 milliGRAM(s) Oral daily  saccharomyces boulardii 250 milliGRAM(s) Oral daily  senna 2 Tablet(s) Oral at bedtime  sertraline 100 milliGRAM(s) Oral daily    MEDICATIONS  (PRN):  acetaminophen     Tablet .. 325 milliGRAM(s) Oral every 8 hours PRN Temp greater or equal to 38C (100.4F), Mild Pain (1 - 3)  dextrose Oral Gel 15 Gram(s) Oral once PRN Blood Glucose LESS THAN 70 milliGRAM(s)/deciliter  melatonin 3 milliGRAM(s) Oral at bedtime PRN Insomnia  ondansetron Injectable 4 milliGRAM(s) IV Push every 12 hours PRN Nausea and/or Vomiting  oxyCODONE    IR 5 milliGRAM(s) Oral every 12 hours PRN Severe Pain (7 - 10)        CAPILLARY BLOOD GLUCOSE  POCT Blood Glucose.: 238 mg/dL (11 Jan 2024 12:12)  POCT Blood Glucose.: 208 mg/dL (11 Jan 2024 08:20)  POCT Blood Glucose.: 153 mg/dL (10 Jatin 2024 22:43)  POCT Blood Glucose.: 162 mg/dL (10 Jatin 2024 17:33)    I&O's Summary    10 Jatin 2024 07:01  -  11 Jan 2024 07:00  --------------------------------------------------------  IN: 560 mL / OUT: 200 mL / NET: 360 mL    11 Jan 2024 07:01  -  11 Jan 2024 13:38  --------------------------------------------------------  IN: 0 mL / OUT: 0 mL / NET: 0 mL      Vital Signs Last 24 Hrs  T(C): 36.8 (11 Jan 2024 09:49), Max: 37 (10 Ajtin 2024 14:17)  T(F): 98.2 (11 Jan 2024 09:49), Max: 98.6 (10 Jatin 2024 14:17)  HR: 80 (11 Jan 2024 10:15) (78 - 95)  BP: 128/87 (11 Jan 2024 09:49) (119/60 - 150/83)  BP(mean): --  RR: 18 (11 Jan 2024 09:49) (16 - 18)  SpO2: 97% (11 Jan 2024 10:15) (94% - 100%)    Parameters below as of 11 Jan 2024 10:15  Patient On (Oxygen Delivery Method): room air      PHYSICAL EXAM:  GENERAL: NAD,   HEAD:  Atraumatic, Normocephalic  EYES: EOMI, PERRLA, conjunctiva and sclera clear  NECK: Supple, No JVD  CHEST/LUNG: Clear to auscultation bilaterally; No wheeze  HEART: Regular rate and rhythm; No murmurs, rubs, or gallops  ABDOMEN: Soft, Nontender, Nondistended; Bowel sounds present  EXTREMITIES:  2+ Peripheral Pulses, No clubbing, cyanosis, or edema  R foot with wrap.  PSYCH: AAOx3  NEUROLOGY: non-focal  SKIN: No rashes or lesions    LABS:                        7.7    6.25  )-----------( 211      ( 11 Jan 2024 08:04 )             23.5     01-10    140  |  102  |  19  ----------------------------<  211<H>  4.1   |  26  |  1.02    Ca    9.4      10 Jatin 2024 05:30  Phos  3.0     01-10  Mg     1.90     01-10    TPro  6.6  /  Alb  3.5  /  TBili  0.4  /  DBili  x   /  AST  15  /  ALT  13  /  AlkPhos  71  01-10      RADIOLOGY & ADDITIONAL TESTS:  rad< from: MR Angio Head No Cont (01.10.24 @ 15:39) >  IMPRESSION:  MRA NECK: Unremarkable study.  MRA HEAD: Unremarkable study.        Care Discussed with Consultants/Other Providers:   Patient is a 73y old  Male who presents with a chief complaint of epistaxis (10 Jatin 2024 13:07)      SUBJECTIVE / OVERNIGHT EVENTS:  Informed that wife wants updates by UR  Patient w/o complaints. Explained to him that MRA is normal and that he will go to [facility] for prcedure on 1/12 and return to [facility]  Patient agrees to update wife    MEDICATIONS  (STANDING):  albuterol    0.083% 2.5 milliGRAM(s) Nebulizer every 6 hours  albuterol    90 MICROgram(s) HFA Inhaler 1 Puff(s) Inhalation every 4 hours  atorvastatin 80 milliGRAM(s) Oral at bedtime  budesonide 160 MICROgram(s)/formoterol 4.5 MICROgram(s) Inhaler 2 Puff(s) Inhalation two times a day  clonazePAM Oral Disintegrating Tablet 0.75 milliGRAM(s) Oral every 12 hours  dextrose 5%. 1000 milliLiter(s) (50 mL/Hr) IV Continuous <Continuous>  dextrose 5%. 1000 milliLiter(s) (100 mL/Hr) IV Continuous <Continuous>  dextrose 50% Injectable 12.5 Gram(s) IV Push once  dextrose 50% Injectable 25 Gram(s) IV Push once  dextrose 50% Injectable 25 Gram(s) IV Push once  glucagon  Injectable 1 milliGRAM(s) IntraMuscular once  influenza  Vaccine (HIGH DOSE) 0.7 milliLiter(s) IntraMuscular once  insulin glargine Injectable (LANTUS) 10 Unit(s) SubCutaneous at bedtime  insulin lispro (ADMELOG) corrective regimen sliding scale   SubCutaneous three times a day before meals  insulin lispro (ADMELOG) corrective regimen sliding scale   SubCutaneous at bedtime  metoprolol tartrate 12.5 milliGRAM(s) Oral every 12 hours  montelukast 10 milliGRAM(s) Oral daily  saccharomyces boulardii 250 milliGRAM(s) Oral daily  senna 2 Tablet(s) Oral at bedtime  sertraline 100 milliGRAM(s) Oral daily    MEDICATIONS  (PRN):  acetaminophen     Tablet .. 325 milliGRAM(s) Oral every 8 hours PRN Temp greater or equal to 38C (100.4F), Mild Pain (1 - 3)  dextrose Oral Gel 15 Gram(s) Oral once PRN Blood Glucose LESS THAN 70 milliGRAM(s)/deciliter  melatonin 3 milliGRAM(s) Oral at bedtime PRN Insomnia  ondansetron Injectable 4 milliGRAM(s) IV Push every 12 hours PRN Nausea and/or Vomiting  oxyCODONE    IR 5 milliGRAM(s) Oral every 12 hours PRN Severe Pain (7 - 10)        CAPILLARY BLOOD GLUCOSE  POCT Blood Glucose.: 238 mg/dL (11 Jan 2024 12:12)  POCT Blood Glucose.: 208 mg/dL (11 Jan 2024 08:20)  POCT Blood Glucose.: 153 mg/dL (10 Jatin 2024 22:43)  POCT Blood Glucose.: 162 mg/dL (10 Jatin 2024 17:33)    I&O's Summary    10 Jatin 2024 07:01  -  11 Jan 2024 07:00  --------------------------------------------------------  IN: 560 mL / OUT: 200 mL / NET: 360 mL    11 Jan 2024 07:01  -  11 Jan 2024 13:38  --------------------------------------------------------  IN: 0 mL / OUT: 0 mL / NET: 0 mL      Vital Signs Last 24 Hrs  T(C): 36.8 (11 Jan 2024 09:49), Max: 37 (10 Jatin 2024 14:17)  T(F): 98.2 (11 Jan 2024 09:49), Max: 98.6 (10 Jatin 2024 14:17)  HR: 80 (11 Jan 2024 10:15) (78 - 95)  BP: 128/87 (11 Jan 2024 09:49) (119/60 - 150/83)  BP(mean): --  RR: 18 (11 Jan 2024 09:49) (16 - 18)  SpO2: 97% (11 Jan 2024 10:15) (94% - 100%)    Parameters below as of 11 Jan 2024 10:15  Patient On (Oxygen Delivery Method): room air      PHYSICAL EXAM:  GENERAL: NAD,   HEAD:  Atraumatic, Normocephalic  EYES: EOMI, PERRLA, conjunctiva and sclera clear  NECK: Supple, No JVD  CHEST/LUNG: Clear to auscultation bilaterally; No wheeze  HEART: Regular rate and rhythm; No murmurs, rubs, or gallops  ABDOMEN: Soft, Nontender, Nondistended; Bowel sounds present  EXTREMITIES:  2+ Peripheral Pulses, No clubbing, cyanosis, or edema  R foot with wrap.  PSYCH: AAOx3  NEUROLOGY: non-focal  SKIN: No rashes or lesions    LABS:                        7.7    6.25  )-----------( 211      ( 11 Jan 2024 08:04 )             23.5     01-10    140  |  102  |  19  ----------------------------<  211<H>  4.1   |  26  |  1.02    Ca    9.4      10 Jatin 2024 05:30  Phos  3.0     01-10  Mg     1.90     01-10    TPro  6.6  /  Alb  3.5  /  TBili  0.4  /  DBili  x   /  AST  15  /  ALT  13  /  AlkPhos  71  01-10      RADIOLOGY & ADDITIONAL TESTS:  rad< from: MR Angio Head No Cont (01.10.24 @ 15:39) >  IMPRESSION:  MRA NECK: Unremarkable study.  MRA HEAD: Unremarkable study.        Care Discussed with Consultants/Other Providers:   Patient is a 73y old  Male who presents with a chief complaint of epistaxis (10 Jatin 2024 13:07)      SUBJECTIVE / OVERNIGHT EVENTS:  Informed that wife wants updates by UR  Patient w/o complaints. Explained to him that MRA is normal and that he will go to [facility] for prcedure on 1/12 and return to [facility].  Patient agrees to update Jose Elias    MEDICATIONS  (STANDING):  albuterol    0.083% 2.5 milliGRAM(s) Nebulizer every 6 hours  albuterol    90 MICROgram(s) HFA Inhaler 1 Puff(s) Inhalation every 4 hours  atorvastatin 80 milliGRAM(s) Oral at bedtime  budesonide 160 MICROgram(s)/formoterol 4.5 MICROgram(s) Inhaler 2 Puff(s) Inhalation two times a day  clonazePAM Oral Disintegrating Tablet 0.75 milliGRAM(s) Oral every 12 hours  dextrose 5%. 1000 milliLiter(s) (50 mL/Hr) IV Continuous <Continuous>  dextrose 5%. 1000 milliLiter(s) (100 mL/Hr) IV Continuous <Continuous>  dextrose 50% Injectable 12.5 Gram(s) IV Push once  dextrose 50% Injectable 25 Gram(s) IV Push once  dextrose 50% Injectable 25 Gram(s) IV Push once  glucagon  Injectable 1 milliGRAM(s) IntraMuscular once  influenza  Vaccine (HIGH DOSE) 0.7 milliLiter(s) IntraMuscular once  insulin glargine Injectable (LANTUS) 10 Unit(s) SubCutaneous at bedtime  insulin lispro (ADMELOG) corrective regimen sliding scale   SubCutaneous three times a day before meals  insulin lispro (ADMELOG) corrective regimen sliding scale   SubCutaneous at bedtime  metoprolol tartrate 12.5 milliGRAM(s) Oral every 12 hours  montelukast 10 milliGRAM(s) Oral daily  saccharomyces boulardii 250 milliGRAM(s) Oral daily  senna 2 Tablet(s) Oral at bedtime  sertraline 100 milliGRAM(s) Oral daily    MEDICATIONS  (PRN):  acetaminophen     Tablet .. 325 milliGRAM(s) Oral every 8 hours PRN Temp greater or equal to 38C (100.4F), Mild Pain (1 - 3)  dextrose Oral Gel 15 Gram(s) Oral once PRN Blood Glucose LESS THAN 70 milliGRAM(s)/deciliter  melatonin 3 milliGRAM(s) Oral at bedtime PRN Insomnia  ondansetron Injectable 4 milliGRAM(s) IV Push every 12 hours PRN Nausea and/or Vomiting  oxyCODONE    IR 5 milliGRAM(s) Oral every 12 hours PRN Severe Pain (7 - 10)        CAPILLARY BLOOD GLUCOSE  POCT Blood Glucose.: 238 mg/dL (11 Jan 2024 12:12)  POCT Blood Glucose.: 208 mg/dL (11 Jan 2024 08:20)  POCT Blood Glucose.: 153 mg/dL (10 Jatin 2024 22:43)  POCT Blood Glucose.: 162 mg/dL (10 Jatin 2024 17:33)    I&O's Summary    10 Jatin 2024 07:01  -  11 Jan 2024 07:00  --------------------------------------------------------  IN: 560 mL / OUT: 200 mL / NET: 360 mL    11 Jan 2024 07:01  -  11 Jan 2024 13:38  --------------------------------------------------------  IN: 0 mL / OUT: 0 mL / NET: 0 mL      Vital Signs Last 24 Hrs  T(C): 36.8 (11 Jan 2024 09:49), Max: 37 (10 Jatin 2024 14:17)  T(F): 98.2 (11 Jan 2024 09:49), Max: 98.6 (10 Jatin 2024 14:17)  HR: 80 (11 Jan 2024 10:15) (78 - 95)  BP: 128/87 (11 Jan 2024 09:49) (119/60 - 150/83)  BP(mean): --  RR: 18 (11 Jan 2024 09:49) (16 - 18)  SpO2: 97% (11 Jan 2024 10:15) (94% - 100%)    Parameters below as of 11 Jan 2024 10:15  Patient On (Oxygen Delivery Method): room air      PHYSICAL EXAM:  GENERAL: NAD,   HEAD:  Atraumatic, Normocephalic  EYES: EOMI, PERRLA, conjunctiva and sclera clear  NECK: Supple, No JVD  CHEST/LUNG: Clear to auscultation bilaterally; No wheeze  HEART: Regular rate and rhythm; No murmurs, rubs, or gallops  ABDOMEN: Soft, Nontender, Nondistended; Bowel sounds present  EXTREMITIES:  2+ Peripheral Pulses, No clubbing, cyanosis, or edema  R foot with wrap.  PSYCH: AAOx3  NEUROLOGY: non-focal  SKIN: No rashes or lesions    LABS:                        7.7    6.25  )-----------( 211      ( 11 Jan 2024 08:04 )             23.5     01-10    140  |  102  |  19  ----------------------------<  211<H>  4.1   |  26  |  1.02    Ca    9.4      10 Jatin 2024 05:30  Phos  3.0     01-10  Mg     1.90     01-10    TPro  6.6  /  Alb  3.5  /  TBili  0.4  /  DBili  x   /  AST  15  /  ALT  13  /  AlkPhos  71  01-10      RADIOLOGY & ADDITIONAL TESTS:  rad< from: MR Angio Head No Cont (01.10.24 @ 15:39) >  IMPRESSION:  MRA NECK: Unremarkable study.  MRA HEAD: Unremarkable study.        Care Discussed with Consultants/Other Providers:   Patient is a 73y old  Male who presents with a chief complaint of epistaxis (10 Jatin 2024 13:07)      SUBJECTIVE / OVERNIGHT EVENTS:  Informed that wife wants updates by UR  Patient w/o complaints. Explained to him that MRA is normal and that he will go to [facility] for prcedure on 1/12 and return to [facility].  Patient agrees to update Jose Elias    MEDICATIONS  (STANDING):  albuterol    0.083% 2.5 milliGRAM(s) Nebulizer every 6 hours  albuterol    90 MICROgram(s) HFA Inhaler 1 Puff(s) Inhalation every 4 hours  atorvastatin 80 milliGRAM(s) Oral at bedtime  budesonide 160 MICROgram(s)/formoterol 4.5 MICROgram(s) Inhaler 2 Puff(s) Inhalation two times a day  clonazePAM Oral Disintegrating Tablet 0.75 milliGRAM(s) Oral every 12 hours  dextrose 5%. 1000 milliLiter(s) (50 mL/Hr) IV Continuous <Continuous>  dextrose 5%. 1000 milliLiter(s) (100 mL/Hr) IV Continuous <Continuous>  dextrose 50% Injectable 12.5 Gram(s) IV Push once  dextrose 50% Injectable 25 Gram(s) IV Push once  dextrose 50% Injectable 25 Gram(s) IV Push once  glucagon  Injectable 1 milliGRAM(s) IntraMuscular once  influenza  Vaccine (HIGH DOSE) 0.7 milliLiter(s) IntraMuscular once  insulin glargine Injectable (LANTUS) 10 Unit(s) SubCutaneous at bedtime  insulin lispro (ADMELOG) corrective regimen sliding scale   SubCutaneous three times a day before meals  insulin lispro (ADMELOG) corrective regimen sliding scale   SubCutaneous at bedtime  metoprolol tartrate 12.5 milliGRAM(s) Oral every 12 hours  montelukast 10 milliGRAM(s) Oral daily  saccharomyces boulardii 250 milliGRAM(s) Oral daily  senna 2 Tablet(s) Oral at bedtime  sertraline 100 milliGRAM(s) Oral daily    MEDICATIONS  (PRN):  acetaminophen     Tablet .. 325 milliGRAM(s) Oral every 8 hours PRN Temp greater or equal to 38C (100.4F), Mild Pain (1 - 3)  dextrose Oral Gel 15 Gram(s) Oral once PRN Blood Glucose LESS THAN 70 milliGRAM(s)/deciliter  melatonin 3 milliGRAM(s) Oral at bedtime PRN Insomnia  ondansetron Injectable 4 milliGRAM(s) IV Push every 12 hours PRN Nausea and/or Vomiting  oxyCODONE    IR 5 milliGRAM(s) Oral every 12 hours PRN Severe Pain (7 - 10)        CAPILLARY BLOOD GLUCOSE  POCT Blood Glucose.: 238 mg/dL (11 Jan 2024 12:12)  POCT Blood Glucose.: 208 mg/dL (11 Jan 2024 08:20)  POCT Blood Glucose.: 153 mg/dL (10 Jatin 2024 22:43)  POCT Blood Glucose.: 162 mg/dL (10 Jatin 2024 17:33)    I&O's Summary    10 Jatin 2024 07:01  -  11 Jan 2024 07:00  --------------------------------------------------------  IN: 560 mL / OUT: 200 mL / NET: 360 mL    11 Jan 2024 07:01  -  11 Jan 2024 13:38  --------------------------------------------------------  IN: 0 mL / OUT: 0 mL / NET: 0 mL      Vital Signs Last 24 Hrs  T(C): 36.8 (11 Jan 2024 09:49), Max: 37 (10 Jatin 2024 14:17)  T(F): 98.2 (11 Jan 2024 09:49), Max: 98.6 (10 Jatin 2024 14:17)  HR: 80 (11 Jan 2024 10:15) (78 - 95)  BP: 128/87 (11 Jan 2024 09:49) (119/60 - 150/83)  BP(mean): --  RR: 18 (11 Jan 2024 09:49) (16 - 18)  SpO2: 97% (11 Jan 2024 10:15) (94% - 100%)    Parameters below as of 11 Jan 2024 10:15  Patient On (Oxygen Delivery Method): room air      PHYSICAL EXAM:  GENERAL: NAD,   HEAD:  Atraumatic, Normocephalic  EYES: EOMI, PERRLA, conjunctiva and sclera clear  NECK: Supple, No JVD  CHEST/LUNG: Clear to auscultation bilaterally; No wheeze  HEART: Regular rate and rhythm; No murmurs, rubs, or gallops  ABDOMEN: Soft, Nontender, Nondistended; Bowel sounds present  EXTREMITIES:  2+ Peripheral Pulses, No clubbing, cyanosis, or edema  R foot with wrap.  PSYCH: AAOx3  NEUROLOGY: non-focal  SKIN: No rashes or lesions    LABS:                        7.7    6.25  )-----------( 211      ( 11 Jan 2024 08:04 )             23.5     01-10    140  |  102  |  19  ----------------------------<  211<H>  4.1   |  26  |  1.02    Ca    9.4      10 Jatin 2024 05:30  Phos  3.0     01-10  Mg     1.90     01-10    TPro  6.6  /  Alb  3.5  /  TBili  0.4  /  DBili  x   /  AST  15  /  ALT  13  /  AlkPhos  71  01-10      RADIOLOGY & ADDITIONAL TESTS:  rad< from: MR Angio Head No Cont (01.10.24 @ 15:39) >  IMPRESSION:  MRA NECK: Unremarkable study.  MRA HEAD: Unremarkable study.        Care Discussed with Consultants/Other Providers:

## 2024-01-11 NOTE — CHART NOTE - NSCHARTNOTEFT_GEN_A_CORE
iSTOP Reference #: [id number]    Practitioner Count: 2  Pharmacy Count: 1  Current Opioid Prescriptions: 1  Current Benzodiazepine Prescriptions: 1  Current Stimulant Prescriptions: 0      Patient Demographic Information (PDI)       PDI	First Name	Last Name	Birth Date	Gender	Street Address	[facility] Code  A	Armando Littlejohn	1950	Male	[address]	[facility]	[address]  B	Armando Littlejohn	1950	Male	[address]	[facility]	[address]  C	Armando Littlejohn	1950	Male	[address]	[facility]	[address]    Prescription Information      PDI Filter:    PDI	Current Rx	Drug Type	Rx Written	Rx Dispensed	Drug	Quantity	Days Supply	Prescriber Name	Prescriber SHRADDHA #	Payment Method	Dispenser  B	Y	O	01/08/2024	01/08/2024	oxycodone hcl (ir) 5 mg tablet	60	30	Jose Humphrey MD	[id number]	Medicare	[facility] #[id number]  B	Y	B	01/03/2024	01/03/2024	clonazepam 1 mg tablet	60	30	Haroon Walter	[id number]	Medicare	[facility] #[id number]  B	N	O	11/28/2023	11/28/2023	oxycodone hcl (ir) 5 mg tablet	60	30	Jose Humphrey MD	[id number]	Medicare	[facility] #[id number]  B	N	B	11/25/2023	11/25/2023	clonazepam 1 mg tablet	60	30	Haroon Walter	[id number]	Medicare	[facility] #[id number]  B	N	O	10/20/2023	10/21/2023	oxycodone hcl (ir) 5 mg tablet	60	30	Jose Humphrey MD	[id number]	Medicare	[facility] #[id number]  B	N	B	10/17/2023	10/20/2023	clonazepam 1 mg tablet	60	30	Haroon Walter	[id number]	Medicare	[facility] #[id number]  B	N	B	10/06/2023	10/06/2023	clonazepam 1 mg tablet	30	15	Jose Humphrey MD	[id number]	Medicare	[facility] #[id number]  B	N	O	09/09/2023	09/09/2023	oxycodone hcl (ir) 5 mg tablet	60	30	Jose Humphrey MD	[id number]	Medicare	[facility] #[id number]  B	N	B	09/05/2023	09/06/2023	clonazepam 1 mg tablet	60	30	Milagro, Jose HURT MD	[id number]	Medicare	[facility] #[id number]  B	N		08/21/2023	08/21/2023	pregabalin 150 mg capsule	60	30	Milagro, Jose HURT MD	[id number]	Medicare	[facility] #[id number]  B	N		08/21/2023	09/19/2023	pregabalin 150 mg capsule	60	30	Milagro, Jose HURT MD	[id number]	Medicare	[facility] #[id number]  B	N		08/21/2023	10/17/2023	pregabalin 150 mg capsule	60	30	Milagro, Jose HURT MD	[id number]	Medicare	[facility] #[id number]  B	N		08/21/2023	11/18/2023	pregabalin 150 mg capsule	60	30	Milagro, Jose HURT MD	[id number]	Medicare	[facility] #[id number]  B	Y		08/21/2023	12/27/2023	pregabalin 150 mg capsule	60	30	Milagro, Jose HURT MD	[id number]	Medicare	[facility] #[id number]  B	N	O	08/14/2023	08/14/2023	oxycodone hcl (ir) 5 mg tablet	28	7	Kendal Gomez MD	[id number]	Medicare	[facility] #[id number]  B	N		08/14/2023	08/14/2023	pregabalin 150 mg capsule	14	7	Kendal Gomez MD	[id number]	Medicare	[facility] #[id number]  B	N	B	08/14/2023	08/27/2023	clonazepam 1 mg tablet	14	7	Kendal Gomez MD	[id number]	Medicare	[facility] #[id number]  C	N	B	08/01/2023	08/01/2023	clonazepam 1 mg tablet	14	7	Kendal Gomez MD	[id number]	Cash	[facility]  A	N	B	07/31/2023	08/04/2023	clonazepam 1 mg tablet	90	30	John Paul Navarrete MD	[id number]	[facility] #[id number]  C	N	B	07/28/2023	07/28/2023	clonazepam 1 mg tablet	45	15	Kendal Gomez MD	[id number]	Cash	[facility]  C	N		07/28/2023	07/28/2023	pregabalin 150 mg capsule	30	15	Kendal Gomez MD	[id number]	Cash	[facility]  C	N	O	07/26/2023	07/26/2023	oxycodone hcl (ir) 5 mg tablet	30	15	Kendal Gomez MD	[id number]	Cash	[facility]  C	N		07/11/2023	07/11/2023	pregabalin 150 mg capsule	28	14	Kendal Gomez MD	[id number]	Cash	[facility]  C	N	B	06/29/2023	06/29/2023	clonazepam 1 mg tablet	42	14	Kendal Gomez MD	[id number]	Cash	[facility]  C	N		06/29/2023	06/29/2023	pregabalin 150 mg capsule	28	14	Kendal Gomez MD	[id number]	Insurance	[facility]  C	N	O	06/29/2023	06/29/2023	oxycodone hcl (ir) 5 mg tablet	28	7	Kednal Gomez MD	[id number]	Insurance	[facility]  A	N	B	06/15/2023	06/15/2023	clonazepam 1 mg tablet	90	30	John Paul Navarrete MD	[id number]	[facility] #[id number]  C	N		06/13/2023	06/13/2023	pregabalin 150 mg capsule	14	7	Kendal Gomez MD	[id number]	Insurance	[facility]  C	N	O	06/09/2023	06/09/2023	oxycodone hcl (ir) 5 mg tablet	28	7	Kendal Gomez MD	[id number]	Insurance	[facility]  C	N		05/23/2023	06/03/2023	pregabalin 150 mg capsule	14	7	Kendal Gomez MD	[id number]	Cash	[facility]  C	N		05/17/2023	05/23/2023	pregabalin 150 mg capsule	30	15	Kendal Gomez MD	[id number]	Cash	[facility]  C	N	B	05/12/2023	05/12/2023	clonazepam 1 mg tablet	42	14	Kendal Gomez MD	[id number]	Cash	[facility]  C	N	O	05/12/2023	05/12/2023	oxycodone hcl (ir) 5 mg tablet	30	7	Kendal Gomez MD	[id number]	Cash	[facility]  C	N		05/12/2023	05/17/2023	pregabalin 150 mg capsule	14	7	Kendal Gomez MD	[id number]	Cash	[facility]  C	N		05/05/2023	05/05/2023	pregabalin 150 mg capsule	28	14	Kendal Gomez MD	[id number]	Cash	[facility]  C	N	B	04/28/2023	04/28/2023	clonazepam 1 mg tablet	21	7	Kendal Gomez MD	[id number]	Cash	[facility]  C	N		04/28/2023	04/28/2023	pregabalin 150 mg capsule	14	7	Kendal Gomez MD	[id number]	Cash	[facility]  C	N	O	04/28/2023	04/28/2023	oxycodone hcl (ir) 5 mg tablet	28	7	Kendal Gomez MD	[id number]	Cash	[facility]  A	N	B	04/28/2023	05/08/2023	clonazepam 1 mg tablet	90	30	John Paul Navarrete MD	[id number]	Insurance	[facility] #[id number]  A	N	B	03/10/2023	03/18/2023	clonazepam 1 mg tablet	90	30	John Paul Navarrete MD	[id number]	Insurance	[facility] #[id number]  A	N	B	02/17/2023	02/18/2023	clonazepam 1 mg tablet	90	30	John Paul Navarrete MD	[id number]	Insurance	[facility] #[id number]  A	N	B	01/17/2023	01/17/2023	clonazepam 1 mg tablet	90	30	John Paul Navarrete MD	[id number]	Insurance	[facility] #[id number]  A	N		10/19/2022	01/29/2023	pregabalin 150 mg capsule	60	30	John Paul Navarrete MD	[id number]	Insurance	[facility] #[id number]  A	N		10/19/2022	03/11/2023	pregabalin 150 mg capsule	60	30	John Paul Navarrete MD	[id number]	[facility] #[id number]  A	N		10/19/2022	04/08/2023	pregabalin 150 mg capsule	60	30	John Paul Navarrete MD	[id number]	[facility] #[id number] iSTOP Reference #: [id number]    Practitioner Count: 2  Pharmacy Count: 1  Current Opioid Prescriptions: 1  Current Benzodiazepine Prescriptions: 1  Current Stimulant Prescriptions: 0      Patient Demographic Information (PDI)       PDI	First Name	Last Name	Birth Date	Gender	Street Address	[facility] Code  A	Armando Littlejohn	1950	Male	[address]	[facility]	[address]  B	Armando Littlejohn	1950	Male	[address]	[facility]	[address]  C	Armando Littlejohn	1950	Male	[address]	[facility]	[address]    Prescription Information      PDI Filter:    PDI	Current Rx	Drug Type	Rx Written	Rx Dispensed	Drug	Quantity	Days Supply	Prescriber Name	Prescriber SHRADDHA #	Payment Method	Dispenser  B	Y	O	01/08/2024	01/08/2024	oxycodone hcl (ir) 5 mg tablet	60	30	Jose Humphrey MD	[id number]	Medicare	[facility] #[id number]  B	Y	B	01/03/2024	01/03/2024	clonazepam 1 mg tablet	60	30	Haroon Walter	[id number]	Medicare	[facility] #[id number]  B	N	O	11/28/2023	11/28/2023	oxycodone hcl (ir) 5 mg tablet	60	30	Jose Humphrey MD	[id number]	Medicare	[facility] #[id number]  B	N	B	11/25/2023	11/25/2023	clonazepam 1 mg tablet	60	30	Haroon Walter	[id number]	Medicare	[facility] #[id number]  B	N	O	10/20/2023	10/21/2023	oxycodone hcl (ir) 5 mg tablet	60	30	Jose Humphrey MD	[id number]	Medicare	[facility] #[id number]  B	N	B	10/17/2023	10/20/2023	clonazepam 1 mg tablet	60	30	Haroon Walter	[id number]	Medicare	[facility] #[id number]  B	N	B	10/06/2023	10/06/2023	clonazepam 1 mg tablet	30	15	Jose Humphrey MD	[id number]	Medicare	[facility] #[id number]  B	N	O	09/09/2023	09/09/2023	oxycodone hcl (ir) 5 mg tablet	60	30	Jose Humphrey MD	[id number]	Medicare	[facility] #[id number]  B	N	B	09/05/2023	09/06/2023	clonazepam 1 mg tablet	60	30	Milagro, Jose HURT MD	[id number]	Medicare	[facility] #[id number]  B	N		08/21/2023	08/21/2023	pregabalin 150 mg capsule	60	30	Milagro, Jose HURT MD	[id number]	Medicare	[facility] #[id number]  B	N		08/21/2023	09/19/2023	pregabalin 150 mg capsule	60	30	Milagro, Jose HURT MD	[id number]	Medicare	[facility] #[id number]  B	N		08/21/2023	10/17/2023	pregabalin 150 mg capsule	60	30	Milagro, Jose HURT MD	[id number]	Medicare	[facility] #[id number]  B	N		08/21/2023	11/18/2023	pregabalin 150 mg capsule	60	30	Milagro, Jose HURT MD	[id number]	Medicare	[facility] #[id number]  B	Y		08/21/2023	12/27/2023	pregabalin 150 mg capsule	60	30	Milagro, Jose HURT MD	[id number]	Medicare	[facility] #[id number]  B	N	O	08/14/2023	08/14/2023	oxycodone hcl (ir) 5 mg tablet	28	7	Kendal Gomez MD	[id number]	Medicare	[facility] #[id number]  B	N		08/14/2023	08/14/2023	pregabalin 150 mg capsule	14	7	Kendal Gomez MD	[id number]	Medicare	[facility] #[id number]  B	N	B	08/14/2023	08/27/2023	clonazepam 1 mg tablet	14	7	Kendal Gomez MD	[id number]	Medicare	[facility] #[id number]  C	N	B	08/01/2023	08/01/2023	clonazepam 1 mg tablet	14	7	Kendal Gomez MD	[id number]	Cash	[facility]  A	N	B	07/31/2023	08/04/2023	clonazepam 1 mg tablet	90	30	John Paul Navarrete MD	[id number]	[facility] #[id number]  C	N	B	07/28/2023	07/28/2023	clonazepam 1 mg tablet	45	15	Kendal Gomez MD	[id number]	Cash	[facility]  C	N		07/28/2023	07/28/2023	pregabalin 150 mg capsule	30	15	Kendal Gomez MD	[id number]	Cash	[facility]  C	N	O	07/26/2023	07/26/2023	oxycodone hcl (ir) 5 mg tablet	30	15	Kendal Gomez MD	[id number]	Cash	[facility]  C	N		07/11/2023	07/11/2023	pregabalin 150 mg capsule	28	14	Kendal Gomez MD	[id number]	Cash	[facility]  C	N	B	06/29/2023	06/29/2023	clonazepam 1 mg tablet	42	14	Kendal Gomez MD	[id number]	Cash	[facility]  C	N		06/29/2023	06/29/2023	pregabalin 150 mg capsule	28	14	Kendal Gomez MD	[id number]	Insurance	[facility]  C	N	O	06/29/2023	06/29/2023	oxycodone hcl (ir) 5 mg tablet	28	7	Kendal Gomez MD	[id number]	Insurance	[facility]  A	N	B	06/15/2023	06/15/2023	clonazepam 1 mg tablet	90	30	John Paul Navarrete MD	[id number]	[facility] #[id number]  C	N		06/13/2023	06/13/2023	pregabalin 150 mg capsule	14	7	Kendal Gomez MD	[id number]	Insurance	[facility]  C	N	O	06/09/2023	06/09/2023	oxycodone hcl (ir) 5 mg tablet	28	7	Kendal Gomez MD	[id number]	Insurance	[facility]  C	N		05/23/2023	06/03/2023	pregabalin 150 mg capsule	14	7	Kendal Gomez MD	[id number]	Cash	[facility]  C	N		05/17/2023	05/23/2023	pregabalin 150 mg capsule	30	15	Kendal Gomez MD	[id number]	Cash	[facility]  C	N	B	05/12/2023	05/12/2023	clonazepam 1 mg tablet	42	14	Kendal Gomez MD	[id number]	Cash	[facility]  C	N	O	05/12/2023	05/12/2023	oxycodone hcl (ir) 5 mg tablet	30	7	Kendal Gomez MD	[id number]	Cash	[facility]  C	N		05/12/2023	05/17/2023	pregabalin 150 mg capsule	14	7	Kendal Gomez MD	[id number]	Cash	[facility]  C	N		05/05/2023	05/05/2023	pregabalin 150 mg capsule	28	14	Kendal Gomez MD	[id number]	Cash	[facility]  C	N	B	04/28/2023	04/28/2023	clonazepam 1 mg tablet	21	7	Kendal Gomez MD	[id number]	Cash	[facility]  C	N		04/28/2023	04/28/2023	pregabalin 150 mg capsule	14	7	Kendal Gomez MD	[id number]	Cash	[facility]  C	N	O	04/28/2023	04/28/2023	oxycodone hcl (ir) 5 mg tablet	28	7	Kendal Gomez MD	[id number]	Cash	[facility]  A	N	B	04/28/2023	05/08/2023	clonazepam 1 mg tablet	90	30	John Paul Navarrete MD	[id number]	Insurance	[facility] #[id number]  A	N	B	03/10/2023	03/18/2023	clonazepam 1 mg tablet	90	30	John Paul Navarrete MD	[id number]	Insurance	[facility] #[id number]  A	N	B	02/17/2023	02/18/2023	clonazepam 1 mg tablet	90	30	John Paul Navarrete MD	[id number]	Insurance	[facility] #[id number]  A	N	B	01/17/2023	01/17/2023	clonazepam 1 mg tablet	90	30	John Paul Navarrete MD	[id number]	Insurance	[facility] #[id number]  A	N		10/19/2022	01/29/2023	pregabalin 150 mg capsule	60	30	John Paul Navarrete MD	[id number]	Insurance	[facility] #[id number]  A	N		10/19/2022	03/11/2023	pregabalin 150 mg capsule	60	30	John Paul Navarrete MD	[id number]	[facility] #[id number]  A	N		10/19/2022	04/08/2023	pregabalin 150 mg capsule	60	30	John Paul Navarrete MD	[id number]	[facility] #[id number]

## 2024-01-11 NOTE — PROGRESS NOTE ADULT - PROBLEM SELECTOR PLAN 6
F-none  E-replete  N-NPO  hold AC given anemia, no SCD given right foot wound F-none  E-replete  N-NPO  hold AC given anemia, no SCD given right foot wound    1/11/24 called wife Lida [phone] and updated F-none  E-replete  N-NPO  hold AC given anemia, no SCD given right foot wound    1/11/24 called wife Lida [phone] and updated F-none  E-replete  N-NPO  hold AC given anemia, no SCD given right foot wound    1/11/24 called Lida [phone] and updated F-none  E-replete  N-NPO  hold AC given anemia, no SCD given right foot wound    1/11/24 called Lida [phone] and updated

## 2024-01-11 NOTE — PROGRESS NOTE ADULT - PROBLEM SELECTOR PLAN 1
-appreciate ENT, packing done  -elevated bed 45 degrees, continuous pulse ox  -RCRI 1, mets about 2-3, sob w/ walking 1 block  -EKG Qtc 480, left axis, no ST elevation or pathologic Q waves medically optimized for procedure.  -complete keflex course  from: MR Angio Head No Cont (01.10.24 @ 15:39) >  IMPRESSION:  MRA NECK: Unremarkable study.  MRA HEAD: Unremarkable study.  -plan to transfer to [facility] for intervention (IR embolization)---> on 1/12 then return to [facility] as per Transfer center. -appreciate ENT, packing done  -elevated bed 45 degrees, continuous pulse ox  -RCRI 1, mets about 2-3, sob w/ walking 1 block  -EKG Qtc 480, left axis, no ST elevation or pathologic Q waves medically optimized for procedure.  -complete keflex course  from: MR Angio Head No Cont (01.10.24 @ 15:39) >  IMPRESSION:  MRA NECK: Unremarkable study.  MRA HEAD: Unremarkable study.  -plan to transfer to [facility] for intervention (IR embolization)---> on 1/12 then return to [facility] as per Transfer center.

## 2024-01-12 DIAGNOSIS — R00.1 BRADYCARDIA, UNSPECIFIED: ICD-10-CM

## 2024-01-12 LAB
ANION GAP SERPL CALC-SCNC: 13 MMOL/L — SIGNIFICANT CHANGE UP (ref 7–14)
ANION GAP SERPL CALC-SCNC: 13 MMOL/L — SIGNIFICANT CHANGE UP (ref 7–14)
BUN SERPL-MCNC: 14 MG/DL — SIGNIFICANT CHANGE UP (ref 7–23)
BUN SERPL-MCNC: 14 MG/DL — SIGNIFICANT CHANGE UP (ref 7–23)
CALCIUM SERPL-MCNC: 9.2 MG/DL — SIGNIFICANT CHANGE UP (ref 8.4–10.5)
CALCIUM SERPL-MCNC: 9.2 MG/DL — SIGNIFICANT CHANGE UP (ref 8.4–10.5)
CHLORIDE SERPL-SCNC: 104 MMOL/L — SIGNIFICANT CHANGE UP (ref 98–107)
CHLORIDE SERPL-SCNC: 104 MMOL/L — SIGNIFICANT CHANGE UP (ref 98–107)
CO2 SERPL-SCNC: 23 MMOL/L — SIGNIFICANT CHANGE UP (ref 22–31)
CO2 SERPL-SCNC: 23 MMOL/L — SIGNIFICANT CHANGE UP (ref 22–31)
CREAT SERPL-MCNC: 0.94 MG/DL — SIGNIFICANT CHANGE UP (ref 0.5–1.3)
CREAT SERPL-MCNC: 0.94 MG/DL — SIGNIFICANT CHANGE UP (ref 0.5–1.3)
EGFR: 86 ML/MIN/1.73M2 — SIGNIFICANT CHANGE UP
EGFR: 86 ML/MIN/1.73M2 — SIGNIFICANT CHANGE UP
GLUCOSE BLDC GLUCOMTR-MCNC: 154 MG/DL — HIGH (ref 70–99)
GLUCOSE BLDC GLUCOMTR-MCNC: 154 MG/DL — HIGH (ref 70–99)
GLUCOSE BLDC GLUCOMTR-MCNC: 164 MG/DL — HIGH (ref 70–99)
GLUCOSE BLDC GLUCOMTR-MCNC: 164 MG/DL — HIGH (ref 70–99)
GLUCOSE BLDC GLUCOMTR-MCNC: 182 MG/DL — HIGH (ref 70–99)
GLUCOSE BLDC GLUCOMTR-MCNC: 182 MG/DL — HIGH (ref 70–99)
GLUCOSE BLDC GLUCOMTR-MCNC: 217 MG/DL — HIGH (ref 70–99)
GLUCOSE BLDC GLUCOMTR-MCNC: 217 MG/DL — HIGH (ref 70–99)
GLUCOSE SERPL-MCNC: 192 MG/DL — HIGH (ref 70–99)
GLUCOSE SERPL-MCNC: 192 MG/DL — HIGH (ref 70–99)
HCT VFR BLD CALC: 24.5 % — LOW (ref 39–50)
HCT VFR BLD CALC: 24.5 % — LOW (ref 39–50)
HGB BLD-MCNC: 7.7 G/DL — LOW (ref 13–17)
HGB BLD-MCNC: 7.7 G/DL — LOW (ref 13–17)
MAGNESIUM SERPL-MCNC: 1.8 MG/DL — SIGNIFICANT CHANGE UP (ref 1.6–2.6)
MAGNESIUM SERPL-MCNC: 1.8 MG/DL — SIGNIFICANT CHANGE UP (ref 1.6–2.6)
MCHC RBC-ENTMCNC: 28.1 PG — SIGNIFICANT CHANGE UP (ref 27–34)
MCHC RBC-ENTMCNC: 28.1 PG — SIGNIFICANT CHANGE UP (ref 27–34)
MCHC RBC-ENTMCNC: 31.4 GM/DL — LOW (ref 32–36)
MCHC RBC-ENTMCNC: 31.4 GM/DL — LOW (ref 32–36)
MCV RBC AUTO: 89.4 FL — SIGNIFICANT CHANGE UP (ref 80–100)
MCV RBC AUTO: 89.4 FL — SIGNIFICANT CHANGE UP (ref 80–100)
NRBC # BLD: 0 /100 WBCS — SIGNIFICANT CHANGE UP (ref 0–0)
NRBC # BLD: 0 /100 WBCS — SIGNIFICANT CHANGE UP (ref 0–0)
NRBC # FLD: 0 K/UL — SIGNIFICANT CHANGE UP (ref 0–0)
NRBC # FLD: 0 K/UL — SIGNIFICANT CHANGE UP (ref 0–0)
NT-PROBNP SERPL-SCNC: 788 PG/ML — HIGH
NT-PROBNP SERPL-SCNC: 788 PG/ML — HIGH
PHOSPHATE SERPL-MCNC: 3.5 MG/DL — SIGNIFICANT CHANGE UP (ref 2.5–4.5)
PHOSPHATE SERPL-MCNC: 3.5 MG/DL — SIGNIFICANT CHANGE UP (ref 2.5–4.5)
PLATELET # BLD AUTO: 207 K/UL — SIGNIFICANT CHANGE UP (ref 150–400)
PLATELET # BLD AUTO: 207 K/UL — SIGNIFICANT CHANGE UP (ref 150–400)
POTASSIUM SERPL-MCNC: 3.6 MMOL/L — SIGNIFICANT CHANGE UP (ref 3.5–5.3)
POTASSIUM SERPL-MCNC: 3.6 MMOL/L — SIGNIFICANT CHANGE UP (ref 3.5–5.3)
POTASSIUM SERPL-SCNC: 3.6 MMOL/L — SIGNIFICANT CHANGE UP (ref 3.5–5.3)
POTASSIUM SERPL-SCNC: 3.6 MMOL/L — SIGNIFICANT CHANGE UP (ref 3.5–5.3)
RBC # BLD: 2.74 M/UL — LOW (ref 4.2–5.8)
RBC # BLD: 2.74 M/UL — LOW (ref 4.2–5.8)
RBC # FLD: 15 % — HIGH (ref 10.3–14.5)
RBC # FLD: 15 % — HIGH (ref 10.3–14.5)
SODIUM SERPL-SCNC: 140 MMOL/L — SIGNIFICANT CHANGE UP (ref 135–145)
SODIUM SERPL-SCNC: 140 MMOL/L — SIGNIFICANT CHANGE UP (ref 135–145)
TROPONIN T, HIGH SENSITIVITY RESULT: 41 NG/L — SIGNIFICANT CHANGE UP
TROPONIN T, HIGH SENSITIVITY RESULT: 41 NG/L — SIGNIFICANT CHANGE UP
WBC # BLD: 6.09 K/UL — SIGNIFICANT CHANGE UP (ref 3.8–10.5)
WBC # BLD: 6.09 K/UL — SIGNIFICANT CHANGE UP (ref 3.8–10.5)
WBC # FLD AUTO: 6.09 K/UL — SIGNIFICANT CHANGE UP (ref 3.8–10.5)
WBC # FLD AUTO: 6.09 K/UL — SIGNIFICANT CHANGE UP (ref 3.8–10.5)

## 2024-01-12 PROCEDURE — 93010 ELECTROCARDIOGRAM REPORT: CPT

## 2024-01-12 PROCEDURE — 76376 3D RENDER W/INTRP POSTPROCES: CPT | Mod: 26

## 2024-01-12 PROCEDURE — 93306 TTE W/DOPPLER COMPLETE: CPT | Mod: 26

## 2024-01-12 PROCEDURE — 99233 SBSQ HOSP IP/OBS HIGH 50: CPT

## 2024-01-12 RX ORDER — METOPROLOL TARTRATE 50 MG
12.5 TABLET ORAL EVERY 12 HOURS
Refills: 0 | Status: DISCONTINUED | OUTPATIENT
Start: 2024-01-12 | End: 2024-01-15

## 2024-01-12 RX ORDER — INSULIN LISPRO 100/ML
VIAL (ML) SUBCUTANEOUS
Refills: 0 | Status: DISCONTINUED | OUTPATIENT
Start: 2024-01-12 | End: 2024-01-18

## 2024-01-12 RX ORDER — INSULIN LISPRO 100/ML
VIAL (ML) SUBCUTANEOUS EVERY 6 HOURS
Refills: 0 | Status: DISCONTINUED | OUTPATIENT
Start: 2024-01-12 | End: 2024-01-12

## 2024-01-12 RX ORDER — METOPROLOL TARTRATE 50 MG
12.5 TABLET ORAL ONCE
Refills: 0 | Status: COMPLETED | OUTPATIENT
Start: 2024-01-12 | End: 2024-01-12

## 2024-01-12 RX ADMIN — Medication 1: at 18:24

## 2024-01-12 RX ADMIN — Medication 12.5 MILLIGRAM(S): at 21:46

## 2024-01-12 RX ADMIN — SERTRALINE 100 MILLIGRAM(S): 25 TABLET, FILM COATED ORAL at 21:05

## 2024-01-12 RX ADMIN — Medication 1: at 05:12

## 2024-01-12 RX ADMIN — ALBUTEROL 2.5 MILLIGRAM(S): 90 AEROSOL, METERED ORAL at 22:30

## 2024-01-12 RX ADMIN — Medication 0.75 MILLIGRAM(S): at 19:23

## 2024-01-12 RX ADMIN — Medication 2: at 11:46

## 2024-01-12 RX ADMIN — MONTELUKAST 10 MILLIGRAM(S): 4 TABLET, CHEWABLE ORAL at 12:50

## 2024-01-12 RX ADMIN — ATORVASTATIN CALCIUM 80 MILLIGRAM(S): 80 TABLET, FILM COATED ORAL at 21:06

## 2024-01-12 RX ADMIN — ALBUTEROL 2.5 MILLIGRAM(S): 90 AEROSOL, METERED ORAL at 03:23

## 2024-01-12 RX ADMIN — INSULIN GLARGINE 10 UNIT(S): 100 INJECTION, SOLUTION SUBCUTANEOUS at 22:12

## 2024-01-12 RX ADMIN — Medication 250 MILLIGRAM(S): at 14:51

## 2024-01-12 RX ADMIN — ALBUTEROL 2.5 MILLIGRAM(S): 90 AEROSOL, METERED ORAL at 10:11

## 2024-01-12 RX ADMIN — BUDESONIDE AND FORMOTEROL FUMARATE DIHYDRATE 2 PUFF(S): 160; 4.5 AEROSOL RESPIRATORY (INHALATION) at 12:49

## 2024-01-12 RX ADMIN — BUDESONIDE AND FORMOTEROL FUMARATE DIHYDRATE 2 PUFF(S): 160; 4.5 AEROSOL RESPIRATORY (INHALATION) at 21:10

## 2024-01-12 NOTE — PROGRESS NOTE ADULT - PROBLEM SELECTOR PLAN 4
-proair prn, symbicort BID -as above  -lopressor 12.5 mg BID  -lipitor 80 for HLD      DM2-  sliding scale  adding on 10 units of Lantus given uncontrolled blood sugars   goal blood sugar 140-200

## 2024-01-12 NOTE — PROVIDER CONTACT NOTE (OTHER) - BACKGROUND
PMHx: prostate cancer, type 2 DM, COPD, renal insufficiency.   POC: [facility] for embolization 1/12 PMHx: prostate cancer, type 2 DM, COPD, renal insufficiency.   POC: [facility] for embolization 1/12

## 2024-01-12 NOTE — PROGRESS NOTE ADULT - PROBLEM SELECTOR PLAN 5
reduce clonazepam to 0.75 mg BID while anemic, can uptitrate if Hg stable back to home dose  c/w sertraline 100 mg -proair prn, symbicort BID

## 2024-01-12 NOTE — PROGRESS NOTE ADULT - PROBLEM SELECTOR PLAN 6
F-none  E-replete  N-NPO  hold AC given anemia, no SCD given right foot wound    1/11/24 called Lida [phone] and updated F-none  E-replete  N-NPO  hold AC given anemia, no SCD given right foot wound    1/11/24 called Lida [phone] and updated reduce clonazepam to 0.75 mg BID while anemic, can uptitrate if Hg stable back to home dose  c/w sertraline 100 mg

## 2024-01-12 NOTE — PROGRESS NOTE ADULT - ASSESSMENT
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     EKG 1/12/24 Vent rate 89  Care Discussed with Consultants/Other Providers:  Cardiology to see patient---> EP called [phone]  Transfer to tele  IR at [facility] cancelled procedure --> NP noted premeds were not given

## 2024-01-12 NOTE — CONSULT NOTE ADULT - NS ATTEND AMEND GEN_ALL_CORE FT
Functional bradycardia from asymptomatic PVCs. Patient with long history of PVCs that he knows about and does not feel. Please obtain Echo. Would start BB.

## 2024-01-12 NOTE — CONSULT NOTE ADULT - ASSESSMENT
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 Nasal packing by ENT and blood transfusion x 1 for Hgb 6.9. Patient was to undergo IR embolization d/t epistaxs at [facility] today. However, on transport telemetry patient  found to be sinus bradycardia with ventricular bigeminy. He remained A&O x3 and hemodynamically stable. No associated chest pain, palpitations, shortness of breath, or dizziness. States he has a long history of an arrhythmia, (reported as "an extra beat") in excess of 15 years,  for which he takes metoprolol.    EKG  from 1/9 reveals SR with 1st degree AVB and PVC's.    EKG 1/12/24: Sinus bradycardia with ventricular bigeminy. LAD. QRSd 84ms     Echo 1/12/2024: LV systolic function normal with an EF 70%. RV systolic function normal. Mild MR. Borderline pulmonary hypertension.  Transfer cancelled and EP consulted.     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 Nasal packing by ENT and blood transfusion x 1 for Hgb 6.9. Patient was to undergo IR embolization d/t epistaxs at [facility] today. However, on transport telemetry patient  found to be sinus bradycardia with ventricular bigeminy. He remained A&O x3 and hemodynamically stable. No associated chest pain, palpitations, shortness of breath, or dizziness. States he has a long history of an arrhythmia, (reported as "an extra beat") in excess of 15 years,  for which he takes metoprolol.    EKG  from 1/9 reveals SR with 1st degree AVB and PVC's.    EKG 1/12/24: Sinus bradycardia with ventricular bigeminy. LAD. QRSd 84ms     Echo 1/12/2024: LV systolic function normal with an EF 70%. RV systolic function normal. Mild MR. Borderline pulmonary hypertension.  Transfer cancelled and EP consulted.     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 Nasal packing by ENT and blood transfusion x 1 for Hgb 6.9. Patient was to undergo IR embolization d/t epistaxs at [facility] today. However, on transport telemetry patient  found to be sinus bradycardia with ventricular bigeminy. He remained A&O x3 and hemodynamically stable. No associated chest pain, palpitations, shortness of breath, or dizziness. States he has a long history of an arrhythmia, (reported as "an extra beat") in excess of 15 years,  for which he takes metoprolol.    EKG  from 1/9 reveals SR with 1st degree AVB and PVC's.    EKG 1/12/24: Sinus bradycardia with ventricular bigeminy. LAD. QRSd 84ms     Echo 1/12/2024: LV systolic function normal with an EF 70%. RV systolic function normal. Mild MR. Borderline pulmonary hypertension.  Transfer cancelled and EP consulted.     Plan:   Patient was on metoprolol succinate 25mg daily at home. Would continue metoprolol for suppression of PVC's which would allow the sinus rate to increase.   No EP contraindication for patient to undergo embolization at [facility].   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 Nasal packing by ENT and blood transfusion x 1 for Hgb 6.9. Patient was to undergo IR embolization d/t epistaxs at [facility] today. However, on transport telemetry patient  found to be sinus bradycardia with ventricular bigeminy. He remained A&O x3 and hemodynamically stable. No associated chest pain, palpitations, shortness of breath, or dizziness. States he has a long history of an arrhythmia, (reported as "an extra beat") in excess of 15 years,  for which he takes metoprolol.    EKG  from 1/9 reveals SR with 1st degree AVB and PVC's.    EKG 1/12/24: Sinus bradycardia with ventricular bigeminy. LAD. QRSd 84ms     Echo 1/12/2024: LV systolic function normal with an EF 70%. RV systolic function normal. Mild MR. Borderline pulmonary hypertension.  Transfer cancelled and EP consulted.     Plan:   Patient was on metoprolol succinate 25mg daily at home. Would continue metoprolol for suppression of PVC's which would allow the sinus rate to increase.   No EP contraindication for patient to undergo embolization at [facility].   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 Nasal packing by ENT and blood transfusion x 1 for Hgb 6.9. Patient was to undergo IR embolization d/t epistaxs at [facility] today. However, on transport telemetry patient  found to be sinus bradycardia with ventricular bigeminy. He remained A&O x3 and hemodynamically stable. No associated chest pain, palpitations, shortness of breath, or dizziness. States he has a long history of an arrhythmia, (reported as "an extra beat") in excess of 15 years,  for which he takes metoprolol.    EKG  from 1/9 reveals SR with 1st degree AVB and PVC's.    EKG 1/12/24: Sinus bradycardia with ventricular bigeminy. LAD. QRSd 84ms     Echo 1/12/2024: LV systolic function normal with an EF 70%. RV systolic function normal. Mild MR. Borderline pulmonary hypertension.  Transfer cancelled and EP consulted.     Plan:   Patient was on metoprolol succinate 25mg daily at home. Would continue metoprolol for suppression of PVC's which will allow the sinus rate to increase.   No EP contraindication for patient to undergo embolization at [facility].   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 Nasal packing by ENT and blood transfusion x 1 for Hgb 6.9. Patient was to undergo IR embolization d/t epistaxs at [facility] today. However, on transport telemetry patient  found to be sinus bradycardia with ventricular bigeminy. He remained A&O x3 and hemodynamically stable. No associated chest pain, palpitations, shortness of breath, or dizziness. States he has a long history of an arrhythmia, (reported as "an extra beat") in excess of 15 years,  for which he takes metoprolol.    EKG  from 1/9 reveals SR with 1st degree AVB and PVC's.    EKG 1/12/24: Sinus bradycardia with ventricular bigeminy. LAD. QRSd 84ms     Echo 1/12/2024: LV systolic function normal with an EF 70%. RV systolic function normal. Mild MR. Borderline pulmonary hypertension.  Transfer cancelled and EP consulted.     Plan:   Patient was on metoprolol succinate 25mg daily at home. Would continue metoprolol for suppression of PVC's which will allow the sinus rate to increase.   No EP contraindication for patient to undergo embolization at [facility].

## 2024-01-12 NOTE — CHART NOTE - NSCHARTNOTEFT_GEN_A_CORE
Trop 44 and ProBNP 788 results reviewed with Attending. Called and discussed findings with Cards Fellow, results within nL range given patient's age. Will continue to monitor.

## 2024-01-12 NOTE — PROGRESS NOTE ADULT - SUBJECTIVE AND OBJECTIVE BOX
Patient is a 73y old  Male who presents with a chief complaint of epistaxis (12 Jan 2024 08:49)      SUBJECTIVE / OVERNIGHT EVENTS:  Patient is in bed still awaiting transfer to [facility] for procedure.  Np reported prophylactic meds given as req by ir at [facility] for iodine allergy  Overnight RN noted bradycardia to 40 s  EKG ord,  labs sent  no nose bleed overnight    MEDICATIONS  (STANDING):  albuterol    0.083% 2.5 milliGRAM(s) Nebulizer every 6 hours  albuterol    90 MICROgram(s) HFA Inhaler 1 Puff(s) Inhalation every 4 hours  atorvastatin 80 milliGRAM(s) Oral at bedtime  budesonide 160 MICROgram(s)/formoterol 4.5 MICROgram(s) Inhaler 2 Puff(s) Inhalation two times a day  clonazePAM Oral Disintegrating Tablet 0.75 milliGRAM(s) Oral every 12 hours  dextrose 5%. 1000 milliLiter(s) (50 mL/Hr) IV Continuous <Continuous>  dextrose 5%. 1000 milliLiter(s) (100 mL/Hr) IV Continuous <Continuous>  dextrose 50% Injectable 12.5 Gram(s) IV Push once  dextrose 50% Injectable 25 Gram(s) IV Push once  dextrose 50% Injectable 25 Gram(s) IV Push once  glucagon  Injectable 1 milliGRAM(s) IntraMuscular once  influenza  Vaccine (HIGH DOSE) 0.7 milliLiter(s) IntraMuscular once  insulin glargine Injectable (LANTUS) 10 Unit(s) SubCutaneous at bedtime  insulin lispro (ADMELOG) corrective regimen sliding scale   SubCutaneous every 6 hours  metoprolol tartrate 12.5 milliGRAM(s) Oral every 12 hours  montelukast 10 milliGRAM(s) Oral daily  saccharomyces boulardii 250 milliGRAM(s) Oral daily  senna 2 Tablet(s) Oral at bedtime  sertraline 100 milliGRAM(s) Oral daily    MEDICATIONS  (PRN):  acetaminophen     Tablet .. 325 milliGRAM(s) Oral every 8 hours PRN Temp greater or equal to 38C (100.4F), Mild Pain (1 - 3)  dextrose Oral Gel 15 Gram(s) Oral once PRN Blood Glucose LESS THAN 70 milliGRAM(s)/deciliter  melatonin 3 milliGRAM(s) Oral at bedtime PRN Insomnia  ondansetron Injectable 4 milliGRAM(s) IV Push every 12 hours PRN Nausea and/or Vomiting  oxyCODONE    IR 5 milliGRAM(s) Oral every 12 hours PRN Severe Pain (7 - 10)        CAPILLARY BLOOD GLUCOSE  POCT Blood Glucose.: 182 mg/dL (12 Jan 2024 05:10)  POCT Blood Glucose.: 158 mg/dL (11 Jan 2024 21:42)  POCT Blood Glucose.: 251 mg/dL (11 Jan 2024 17:44)  POCT Blood Glucose.: 292 mg/dL (11 Jan 2024 14:11)  POCT Blood Glucose.: 238 mg/dL (11 Jan 2024 12:12)    I&O's Summary    11 Jan 2024 07:01  -  12 Jan 2024 07:00  --------------------------------------------------------  IN: 870 mL / OUT: 401 mL / NET: 469 mL      Vital Signs Last 24 Hrs    T(F): 97.8 (12 Jan 2024 06:00), Max: 98.2 (11 Jan 2024 09:49)  HR: 55 (12 Jan 2024 06:00) (55 - 96)  BP: 141/65 (12 Jan 2024 06:00) (119/73 - 141/65)  RR: 18 (12 Jan 2024 06:00) (17 - 18)  SpO2: 100%  room air      PHYSICAL EXAM:  GENERAL: NAD,  HEAD:  Atraumatic, Normocephalic  EYES: EOMI, PERRLA, conjunctiva and sclera clear  NECK: Supple, No JVD  CHEST/LUNG: Clear to auscultation bilaterally; No wheeze  HEART: Regular rate and rhythm; No murmurs, rubs, or gallops  ABDOMEN: Soft, Nontender, Nondistended; Bowel sounds present  EXTREMITIES:  2+ Peripheral Pulses, No clubbing, cyanosis, or edema  R foot with dressing  PSYCH: AAOx3  NEUROLOGY: non-focal      LABS:                        7.7    6.25  )-----------( 211      ( 11 Jan 2024 08:04 )             23.5       EKG 1/12/24 Vent rate 89    Care Discussed with Consultants/Other Providers:  Cardiology to see patient   Patient is a 73y old  Male who presents with a chief complaint of epistaxis (12 Jan 2024 08:49)      SUBJECTIVE / OVERNIGHT EVENTS:  Patient is in bed still awaiting transfer to [facility] for procedure.  Np reported prophylactic meds given as req by ir at [facility] for iodine allergy  Overnight RN noted bradycardia to 40 s  EKG ord,  labs sent  no nose bleed overnight    MEDICATIONS  (STANDING):  albuterol    0.083% 2.5 milliGRAM(s) Nebulizer every 6 hours  albuterol    90 MICROgram(s) HFA Inhaler 1 Puff(s) Inhalation every 4 hours  atorvastatin 80 milliGRAM(s) Oral at bedtime  budesonide 160 MICROgram(s)/formoterol 4.5 MICROgram(s) Inhaler 2 Puff(s) Inhalation two times a day  clonazePAM Oral Disintegrating Tablet 0.75 milliGRAM(s) Oral every 12 hours  dextrose 5%. 1000 milliLiter(s) (50 mL/Hr) IV Continuous <Continuous>  dextrose 5%. 1000 milliLiter(s) (100 mL/Hr) IV Continuous <Continuous>  dextrose 50% Injectable 12.5 Gram(s) IV Push once  dextrose 50% Injectable 25 Gram(s) IV Push once  dextrose 50% Injectable 25 Gram(s) IV Push once  glucagon  Injectable 1 milliGRAM(s) IntraMuscular once  influenza  Vaccine (HIGH DOSE) 0.7 milliLiter(s) IntraMuscular once  insulin glargine Injectable (LANTUS) 10 Unit(s) SubCutaneous at bedtime  insulin lispro (ADMELOG) corrective regimen sliding scale   SubCutaneous every 6 hours  metoprolol tartrate 12.5 milliGRAM(s) Oral every 12 hours  montelukast 10 milliGRAM(s) Oral daily  saccharomyces boulardii 250 milliGRAM(s) Oral daily  senna 2 Tablet(s) Oral at bedtime  sertraline 100 milliGRAM(s) Oral daily    MEDICATIONS  (PRN):  acetaminophen     Tablet .. 325 milliGRAM(s) Oral every 8 hours PRN Temp greater or equal to 38C (100.4F), Mild Pain (1 - 3)  dextrose Oral Gel 15 Gram(s) Oral once PRN Blood Glucose LESS THAN 70 milliGRAM(s)/deciliter  melatonin 3 milliGRAM(s) Oral at bedtime PRN Insomnia  ondansetron Injectable 4 milliGRAM(s) IV Push every 12 hours PRN Nausea and/or Vomiting  oxyCODONE    IR 5 milliGRAM(s) Oral every 12 hours PRN Severe Pain (7 - 10)        CAPILLARY BLOOD GLUCOSE  POCT Blood Glucose.: 182 mg/dL (12 Jan 2024 05:10)  POCT Blood Glucose.: 158 mg/dL (11 Jan 2024 21:42)  POCT Blood Glucose.: 251 mg/dL (11 Jan 2024 17:44)  POCT Blood Glucose.: 292 mg/dL (11 Jan 2024 14:11)  POCT Blood Glucose.: 238 mg/dL (11 Jan 2024 12:12)    I&O's Summary    11 Jan 2024 07:01  -  12 Jan 2024 07:00  --------------------------------------------------------  IN: 870 mL / OUT: 401 mL / NET: 469 mL      Vital Signs Last 24 Hrs    T(F): 97.8 (12 Jan 2024 06:00), Max: 98.2 (11 Jan 2024 09:49)  HR: 55 (12 Jan 2024 06:00) (55 - 96)  BP: 141/65 (12 Jan 2024 06:00) (119/73 - 141/65)  RR: 18 (12 Jan 2024 06:00) (17 - 18)  SpO2: 100%  room air      PHYSICAL EXAM:  GENERAL: NAD,  HEAD:  Atraumatic, Normocephalic  EYES: EOMI, PERRLA, conjunctiva and sclera clear  NECK: Supple, No JVD  CHEST/LUNG: Clear to auscultation bilaterally; No wheeze  HEART: Regular rate and rhythm; No murmurs, rubs, or gallops  ABDOMEN: Soft, Nontender, Nondistended; Bowel sounds present  EXTREMITIES:  2+ Peripheral Pulses, No clubbing, cyanosis, or edema  R foot with dressing  PSYCH: AAOx3  NEUROLOGY: non-focal      LABS:                        7.7    6.25  )-----------( 211      ( 11 Jan 2024 08:04 )             23.5       EKG 1/12/24 Vent rate 89    Care Discussed with Consultants/Other Providers:  Cardiology to see patient   Patient is a 73y old  Male who presents with a chief complaint of epistaxis (12 Jan 2024 08:49)      SUBJECTIVE / OVERNIGHT EVENTS:  Patient is in bed still awaiting transfer to [facility] for procedure.  Np reported prophylactic meds given as req by ir at [facility] for iodine allergy  Overnight RN noted bradycardia to 40 s  EKG ord,  labs sent  no nose bleed overnight    MEDICATIONS  (STANDING):  albuterol    0.083% 2.5 milliGRAM(s) Nebulizer every 6 hours  albuterol    90 MICROgram(s) HFA Inhaler 1 Puff(s) Inhalation every 4 hours  atorvastatin 80 milliGRAM(s) Oral at bedtime  budesonide 160 MICROgram(s)/formoterol 4.5 MICROgram(s) Inhaler 2 Puff(s) Inhalation two times a day  clonazePAM Oral Disintegrating Tablet 0.75 milliGRAM(s) Oral every 12 hours  dextrose 5%. 1000 milliLiter(s) (50 mL/Hr) IV Continuous <Continuous>  dextrose 5%. 1000 milliLiter(s) (100 mL/Hr) IV Continuous <Continuous>  dextrose 50% Injectable 12.5 Gram(s) IV Push once  dextrose 50% Injectable 25 Gram(s) IV Push once  dextrose 50% Injectable 25 Gram(s) IV Push once  glucagon  Injectable 1 milliGRAM(s) IntraMuscular once  influenza  Vaccine (HIGH DOSE) 0.7 milliLiter(s) IntraMuscular once  insulin glargine Injectable (LANTUS) 10 Unit(s) SubCutaneous at bedtime  insulin lispro (ADMELOG) corrective regimen sliding scale   SubCutaneous every 6 hours  metoprolol tartrate 12.5 milliGRAM(s) Oral every 12 hours  montelukast 10 milliGRAM(s) Oral daily  saccharomyces boulardii 250 milliGRAM(s) Oral daily  senna 2 Tablet(s) Oral at bedtime  sertraline 100 milliGRAM(s) Oral daily    MEDICATIONS  (PRN):  acetaminophen     Tablet .. 325 milliGRAM(s) Oral every 8 hours PRN Temp greater or equal to 38C (100.4F), Mild Pain (1 - 3)  dextrose Oral Gel 15 Gram(s) Oral once PRN Blood Glucose LESS THAN 70 milliGRAM(s)/deciliter  melatonin 3 milliGRAM(s) Oral at bedtime PRN Insomnia  ondansetron Injectable 4 milliGRAM(s) IV Push every 12 hours PRN Nausea and/or Vomiting  oxyCODONE    IR 5 milliGRAM(s) Oral every 12 hours PRN Severe Pain (7 - 10)        CAPILLARY BLOOD GLUCOSE  POCT Blood Glucose.: 182 mg/dL (12 Jan 2024 05:10)  POCT Blood Glucose.: 158 mg/dL (11 Jan 2024 21:42)  POCT Blood Glucose.: 251 mg/dL (11 Jan 2024 17:44)  POCT Blood Glucose.: 292 mg/dL (11 Jan 2024 14:11)  POCT Blood Glucose.: 238 mg/dL (11 Jan 2024 12:12)    I&O's Summary    11 Jan 2024 07:01  -  12 Jan 2024 07:00  --------------------------------------------------------  IN: 870 mL / OUT: 401 mL / NET: 469 mL      Vital Signs Last 24 Hrs    T(F): 97.8 (12 Jan 2024 06:00), Max: 98.2 (11 Jan 2024 09:49)  HR: 55 (12 Jan 2024 06:00) (55 - 96)  BP: 141/65 (12 Jan 2024 06:00) (119/73 - 141/65)  RR: 18 (12 Jan 2024 06:00) (17 - 18)  SpO2: 100%  room air      PHYSICAL EXAM:  GENERAL: NAD,  HEAD:  Atraumatic, Normocephalic  EYES: EOMI, PERRLA, conjunctiva and sclera clear  NECK: Supple, No JVD  CHEST/LUNG: Clear to auscultation bilaterally; No wheeze  HEART: Regular rate and rhythm; No murmurs, rubs, or gallops  ABDOMEN: Soft, Nontender, Nondistended; Bowel sounds present  EXTREMITIES:  2+ Peripheral Pulses, No clubbing, cyanosis, or edema  R foot with dressing  PSYCH: AAOx3  NEUROLOGY: non-focal      LABS:                        7.7    6.25  )-----------( 211      ( 11 Jan 2024 08:04 )             23.5       EKG 1/12/24 Vent rate 89    Care Discussed with Consultants/Other Providers:  Cardiology to see patient---> EP called [phone]  Transfer to tele  IR at [facility] cancelled procedure --> NP noted premeds were not given   Patient is a 73y old  Male who presents with a chief complaint of epistaxis (12 Jan 2024 08:49)      SUBJECTIVE / OVERNIGHT EVENTS:  Patient is in bed still awaiting transfer to [facility] for procedure.  Np reported prophylactic meds given as req by ir at [facility] for iodine allergy  Overnight RN noted bradycardia to 40 s  EKG ord,  labs sent  no nose bleed overnight    MEDICATIONS  (STANDING):  albuterol    0.083% 2.5 milliGRAM(s) Nebulizer every 6 hours  albuterol    90 MICROgram(s) HFA Inhaler 1 Puff(s) Inhalation every 4 hours  atorvastatin 80 milliGRAM(s) Oral at bedtime  budesonide 160 MICROgram(s)/formoterol 4.5 MICROgram(s) Inhaler 2 Puff(s) Inhalation two times a day  clonazePAM Oral Disintegrating Tablet 0.75 milliGRAM(s) Oral every 12 hours  dextrose 5%. 1000 milliLiter(s) (50 mL/Hr) IV Continuous <Continuous>  dextrose 5%. 1000 milliLiter(s) (100 mL/Hr) IV Continuous <Continuous>  dextrose 50% Injectable 12.5 Gram(s) IV Push once  dextrose 50% Injectable 25 Gram(s) IV Push once  dextrose 50% Injectable 25 Gram(s) IV Push once  glucagon  Injectable 1 milliGRAM(s) IntraMuscular once  influenza  Vaccine (HIGH DOSE) 0.7 milliLiter(s) IntraMuscular once  insulin glargine Injectable (LANTUS) 10 Unit(s) SubCutaneous at bedtime  insulin lispro (ADMELOG) corrective regimen sliding scale   SubCutaneous every 6 hours  metoprolol tartrate 12.5 milliGRAM(s) Oral every 12 hours  montelukast 10 milliGRAM(s) Oral daily  saccharomyces boulardii 250 milliGRAM(s) Oral daily  senna 2 Tablet(s) Oral at bedtime  sertraline 100 milliGRAM(s) Oral daily    MEDICATIONS  (PRN):  acetaminophen     Tablet .. 325 milliGRAM(s) Oral every 8 hours PRN Temp greater or equal to 38C (100.4F), Mild Pain (1 - 3)  dextrose Oral Gel 15 Gram(s) Oral once PRN Blood Glucose LESS THAN 70 milliGRAM(s)/deciliter  melatonin 3 milliGRAM(s) Oral at bedtime PRN Insomnia  ondansetron Injectable 4 milliGRAM(s) IV Push every 12 hours PRN Nausea and/or Vomiting  oxyCODONE    IR 5 milliGRAM(s) Oral every 12 hours PRN Severe Pain (7 - 10)        CAPILLARY BLOOD GLUCOSE  POCT Blood Glucose.: 182 mg/dL (12 Jan 2024 05:10)  POCT Blood Glucose.: 158 mg/dL (11 Jan 2024 21:42)  POCT Blood Glucose.: 251 mg/dL (11 Jan 2024 17:44)  POCT Blood Glucose.: 292 mg/dL (11 Jan 2024 14:11)  POCT Blood Glucose.: 238 mg/dL (11 Jan 2024 12:12)    I&O's Summary    11 Jan 2024 07:01  -  12 Jan 2024 07:00  --------------------------------------------------------  IN: 870 mL / OUT: 401 mL / NET: 469 mL      Vital Signs Last 24 Hrs    T(F): 97.8 (12 Jan 2024 06:00), Max: 98.2 (11 Jan 2024 09:49)  HR: 55 (12 Jan 2024 06:00) (55 - 96)  BP: 141/65 (12 Jan 2024 06:00) (119/73 - 141/65)  RR: 18 (12 Jan 2024 06:00) (17 - 18)  SpO2: 100%  room air      PHYSICAL EXAM:  GENERAL: NAD,  HEAD:  Atraumatic, Normocephalic  EYES: EOMI, PERRLA, conjunctiva and sclera clear  NECK: Supple, No JVD  CHEST/LUNG: Clear to auscultation bilaterally; No wheeze  HEART: Regular rate and rhythm; No murmurs, rubs, or gallops  ABDOMEN: Soft, Nontender, Nondistended; Bowel sounds present  EXTREMITIES:  2+ Peripheral Pulses, No clubbing, cyanosis, or edema  R foot with dressing  PSYCH: AAOx3  NEUROLOGY: non-focal      LABS:                        7.7    6.25  )-----------( 211      ( 11 Jan 2024 08:04 )             23.5       EKG 1/12/24 Vent rate 89    Care Discussed with Consultants/Other Providers:  Cardiology to see patient---> EP called [phone]  Transfer to tele  IR at [facility] cancelled procedure --> NP noted premeds were not given

## 2024-01-12 NOTE — PROGRESS NOTE ADULT - PROBLEM SELECTOR PLAN 1
-appreciate ENT, packing done  -elevated bed 45 degrees, continuous pulse ox  -RCRI 1, mets about 2-3, sob w/ walking 1 block  -EKG Qtc 480, left axis, no ST elevation or pathologic Q waves medically optimized for procedure.  -complete keflex course  from: MR Angio Head No Cont (01.10.24 @ 15:39) >  IMPRESSION:  MRA NECK: Unremarkable study.  MRA HEAD: Unremarkable study.  -plan to transfer to [facility] for intervention (IR embolization)---> on 1/12 then return to [facility] as per Transfer center. -appreciate ENT, packing done  -elevated bed 45 degrees, continuous pulse ox  -RCRI 1, mets about 2-3, sob w/ walking 1 block  -EKG Qtc 480, left axis, no ST elevation or pathologic Q waves medically optimized for procedure.  -complete keflex course  from: MR Angio Head No Cont (01.10.24 @ 15:39) >  IMPRESSION:  MRA NECK: Unremarkable study.  MRA HEAD: Unremarkable study.  -plan to transfer to [facility] for intervention (IR embolization)---> on 1/12 then return to [facility] as per Transfer center. -appreciate ENT, packing done  -elevated bed 45 degrees, continuous pulse ox  -RCRI 1, mets about 2-3, sob w/ walking 1 block  -EKG Qtc 480, left axis, no ST elevation or pathologic Q waves medically optimized for procedure.  -complete keflex course  from: MR Angio Head No Cont (01.10.24 @ 15:39) >  IMPRESSION:  MRA NECK: Unremarkable study.  MRA HEAD: Unremarkable study.  -plan to transfer to [facility] for intervention (IR embolization)---> on 1/12 then return to [facility] as per Transfer center.  1/12 [facility] 40s, EP called. Proceedure cancelled -appreciate ENT, packing done  -elevated bed 45 degrees, continuous pulse ox  -RCRI 1, mets about 2-3, sob w/ walking 1 block  -EKG Qtc 480, left axis, no ST elevation or pathologic Q waves medically optimized for procedure.  -complete keflex course  from: MR Angio Head No Cont (01.10.24 @ 15:39) >  IMPRESSION:  MRA NECK: Unremarkable study.  MRA HEAD: Unremarkable study.  -plan to transfer to [facility] for intervention (IR embolization)---> on 1/12 then return to [facility] as per Transfer center.  1/12 [address]d[facility] 40s, EP called. Proceedure cancelled

## 2024-01-12 NOTE — CHART NOTE - NSCHARTNOTEFT_GEN_A_CORE
Called at 0715, transport at patient's bedside, patient pending IR embolization d/t epistaxs at [facility] today. Upon placing on transport tele patient found to be in SR w/1st AVB, bigeminal rhythm. Patient A&O x3, vital signs stable. Patient reports having long history of having an arrhythmia. Denies any chest pain, palpitations, shortness of breath, or any other c/o's or new concerns. EKG  from 1/9 reveals SR with 1st degree AVB, with bigeminal PVC's. No evidence of epistaxis at this time. Called and discussed patient case with [facility] Dr Carmen, transport held for now, will f/u STAT Trop, BMP, BNP, CBC, and EKG. Patient aware of current plan. Dr Lemus updated, will also call Cardiology, and follow up recs. Called at 0715, transport at patient's bedside, patient pending IR embolization d/t epistaxs at [facility] today. Upon placing on transport tele patient found to be in SR w/1st AVB, bigeminal rhythm. Patient A&O x3, vital signs stable. Patient reports having long history of having an arrhythmia. Denies any chest pain, palpitations, shortness of breath, or any other c/o's or new concerns. EKG  from 1/9 reveals SR with 1st degree AVB, with bigeminal PVC's. No evidence of epistaxis at this time. Called and discussed patient case with [facility] Dr Carmen, transport held for now, will f/u STAT Trop, BMP, BNP, CBC, and EKG. Patient aware of current plan. Dr Lemus updated, will also call Cardiology, and follow up recs.

## 2024-01-12 NOTE — CONSULT NOTE ADULT - SUBJECTIVE AND OBJECTIVE BOX
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 Nasla packing by ENT and blood transfusion x 1 for Hgb 6.9.         PAST MEDICAL & SURGICAL HISTORY:  Prostate cancer s/p resection  Type II diabetes mellitus  Chronic obstructive pulmonary disease (COPD)  CHF (congestive heart failure)- diastolic  Renal insufficiency  S/P right foot wound with surgery (gangrene)      MEDICATIONS  (STANDING):  albuterol    0.083% 2.5 milliGRAM(s) Nebulizer every 6 hours  albuterol    90 MICROgram(s) HFA Inhaler 1 Puff(s) Inhalation every 4 hours  atorvastatin 80 milliGRAM(s) Oral at bedtime  budesonide 160 MICROgram(s)/formoterol 4.5 MICROgram(s) Inhaler 2 Puff(s) Inhalation two times a day  clonazePAM Oral Disintegrating Tablet 0.75 milliGRAM(s) Oral every 12 hours  dextrose 5%. 1000 milliLiter(s) (50 mL/Hr) IV Continuous <Continuous>  dextrose 5%. 1000 milliLiter(s) (100 mL/Hr) IV Continuous <Continuous>  dextrose 50% Injectable 25 Gram(s) IV Push once  dextrose 50% Injectable 25 Gram(s) IV Push once  dextrose 50% Injectable 12.5 Gram(s) IV Push once  glucagon  Injectable 1 milliGRAM(s) IntraMuscular once  influenza  Vaccine (HIGH DOSE) 0.7 milliLiter(s) IntraMuscular once  insulin glargine Injectable (LANTUS) 10 Unit(s) SubCutaneous at bedtime  insulin lispro (ADMELOG) corrective regimen sliding scale   SubCutaneous every 6 hours  montelukast 10 milliGRAM(s) Oral daily  saccharomyces boulardii 250 milliGRAM(s) Oral daily  senna 2 Tablet(s) Oral at bedtime  sertraline 100 milliGRAM(s) Oral daily    MEDICATIONS  (PRN):  acetaminophen     Tablet .. 325 milliGRAM(s) Oral every 8 hours PRN Temp greater or equal to 38C (100.4F), Mild Pain (1 - 3)  dextrose Oral Gel 15 Gram(s) Oral once PRN Blood Glucose LESS THAN 70 milliGRAM(s)/deciliter  melatonin 3 milliGRAM(s) Oral at bedtime PRN Insomnia  ondansetron Injectable 4 milliGRAM(s) IV Push every 12 hours PRN Nausea and/or Vomiting  oxyCODONE    IR 5 milliGRAM(s) Oral every 12 hours PRN Severe Pain (7 - 10)      FAMILY HISTORY:  No pertinent family history in first degree relatives        SOCIAL HISTORY:    CIGARETTES:    ALCOHOL:    REVIEW OF SYSTEMS:    CONSTITUTIONAL: No fever, weight loss, chills, shakes, or fatigue  EYES: No eye pain, visual disturbances, or discharge  ENMT:  No difficulty hearing, tinnitus, vertigo; No sinus or throat pain  NECK: No pain or stiffness  BREASTS: No pain, masses, or nipple discharge  RESPIRATORY: No cough, wheezing, hemoptysis, or shortness of breath  CARDIOVASCULAR: No chest pain, dyspnea, palpitations, dizziness, syncope, paroxysmal nocturnal dyspnea, orthopnea, or arm or leg swelling  GASTROINTESTINAL: No abdominal  or epigastric pain, nausea, vomiting, hematemesis, diarrhea, constipation, melena or bright red blood.  GENITOURINARY: No dysuria, nocturia, hematuria, or urinary incontinence  NEUROLOGICAL: No headaches, memory loss, slurred speech, limb weakness, loss of strength, numbness, or tremors  SKIN: No itching, burning, rashes, or lesions   LYMPH NODES: No enlarged glands  ENDOCRINE: No heat or cold intolerance, or hair loss  MUSCULOSKELETAL: No joint pain or swelling, muscle, back, or extremity pain  PSYCHIATRIC: No depression, anxiety, or difficulty sleeping  HEME/LYMPH: No easy bruising or bleeding gums  ALLERY AND IMMUNOLOGIC: No hives or rash.      Vital Signs Last 24 Hrs  T(C): 36.6 (12 Jan 2024 12:21), Max: 36.7 (11 Jan 2024 20:56)  T(F): 97.8 (12 Jan 2024 12:21), Max: 98.1 (12 Jan 2024 02:00)  HR: 90 (12 Jan 2024 12:21) (49 - 96)  BP: 139/68 (12 Jan 2024 12:21) (119/73 - 145/64)  BP(mean): --  RR: 18 (12 Jan 2024 12:21) (17 - 18)  SpO2: 99% (12 Jan 2024 12:21) (95% - 100%)    Parameters below as of 12 Jan 2024 12:21  Patient On (Oxygen Delivery Method): room air        PHYSICAL EXAM:    GENERAL: In no apparent distress, well nourished, and hydrated.  HEAD:  Atraumatic, Normocephalic  EYES: EOMI, PERRLA, conjunctiva and sclera clear  ENMT: No tonsillar erythema, exudates, or enlargement; Moist mucous membranes, Good dentition, No lesions  NECK: Supple and normal thyroid.  No JVD or carotid bruit.  Carotid pulse is 2+ bilaterally.  HEART: Regular rate and rhythm; No murmurs, rubs, or gallops.  PULMONARY: Clear to auscultation and perfusion.  No rales, wheezing, or rhonchi bilaterally.  ABDOMEN: Soft, Nontender, Nondistended; Bowel sounds present  EXTREMITIES:  2+ Peripheral Pulses, No clubbing, cyanosis, or edema  LYMPH: No lymphadenopathy noted  NEUROLOGICAL: Grossly nonfocal          INTERPRETATION OF TELEMETRY:    ECG:    I&O's Detail    11 Jan 2024 07:01  -  12 Jan 2024 07:00  --------------------------------------------------------  IN:    Oral Fluid: 870 mL  Total IN: 870 mL    OUT:    Voided (mL): 401 mL  Total OUT: 401 mL    Total NET: 469 mL          LABS:                        7.7    6.09  )-----------( 207      ( 12 Jan 2024 08:53 )             24.5     01-12    140  |  104  |  14  ----------------------------<  192<H>  3.6   |  23  |  0.94    Ca    9.2      12 Jan 2024 08:53  Phos  3.5     01-12  Mg     1.80     01-12            Urinalysis Basic - ( 12 Jan 2024 08:53 )    Color: x / Appearance: x / SG: x / pH: x  Gluc: 192 mg/dL / Ketone: x  / Bili: x / Urobili: x   Blood: x / Protein: x / Nitrite: x   Leuk Esterase: x / RBC: x / WBC x   Sq Epi: x / Non Sq Epi: x / Bacteria: x      BNP  I&O's Detail    11 Jan 2024 07:01  -  12 Jan 2024 07:00  --------------------------------------------------------  IN:    Oral Fluid: 870 mL  Total IN: 870 mL    OUT:    Voided (mL): 401 mL  Total OUT: 401 mL    Total NET: 469 mL        Daily     Daily     RADIOLOGY & ADDITIONAL STUDIES:  PREVIOUS DIAGNOSTIC TESTING:      ECHO  FINDINGS:    STRESS  FINDINGS:    CATHETERIZATION  FINDINGS:      ASSESSMENT:        RECOMMENDATIONS: Patient is a 73y old  Male with past medical history of HTN, COPD, DMT2, chronic diastolic heart failure, anxiety, 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 Nasal packing by ENT and blood transfusion x 1 for Hgb 6.9. Patient was to undergo IR embolization d/t epistaxs at [facility] today. However, on transport telemetry patient found to be in NSR w/1st AVB and ventricular bigeminy. He remained A&O x3 and hemodynamically stable. No associated chest pain, palpitations, shortness of breath, or dizziness. States hea has a long history of an arrhythmia.    EKG  from 1/9 reveals SR with 1st degree AVB and PVC's.    Transfer cancelled and EP consulted.   Currently no epistaxis .         PAST MEDICAL & SURGICAL HISTORY:  Prostate cancer s/p resection  Type II diabetes mellitus  Chronic obstructive pulmonary disease (COPD)  CHF (congestive heart failure)- diastolic  Renal insufficiency  S/P right foot wound with surgery (gangrene)      MEDICATIONS  (STANDING):  albuterol    0.083% 2.5 milliGRAM(s) Nebulizer every 6 hours  albuterol    90 MICROgram(s) HFA Inhaler 1 Puff(s) Inhalation every 4 hours  atorvastatin 80 milliGRAM(s) Oral at bedtime  budesonide 160 MICROgram(s)/formoterol 4.5 MICROgram(s) Inhaler 2 Puff(s) Inhalation two times a day  clonazePAM Oral Disintegrating Tablet 0.75 milliGRAM(s) Oral every 12 hours  dextrose 5%. 1000 milliLiter(s) (50 mL/Hr) IV Continuous <Continuous>  dextrose 5%. 1000 milliLiter(s) (100 mL/Hr) IV Continuous <Continuous>  dextrose 50% Injectable 25 Gram(s) IV Push once  dextrose 50% Injectable 25 Gram(s) IV Push once  dextrose 50% Injectable 12.5 Gram(s) IV Push once  glucagon  Injectable 1 milliGRAM(s) IntraMuscular once  influenza  Vaccine (HIGH DOSE) 0.7 milliLiter(s) IntraMuscular once  insulin glargine Injectable (LANTUS) 10 Unit(s) SubCutaneous at bedtime  insulin lispro (ADMELOG) corrective regimen sliding scale   SubCutaneous every 6 hours  montelukast 10 milliGRAM(s) Oral daily  saccharomyces boulardii 250 milliGRAM(s) Oral daily  senna 2 Tablet(s) Oral at bedtime  sertraline 100 milliGRAM(s) Oral daily    MEDICATIONS  (PRN):  acetaminophen     Tablet .. 325 milliGRAM(s) Oral every 8 hours PRN Temp greater or equal to 38C (100.4F), Mild Pain (1 - 3)  dextrose Oral Gel 15 Gram(s) Oral once PRN Blood Glucose LESS THAN 70 milliGRAM(s)/deciliter  melatonin 3 milliGRAM(s) Oral at bedtime PRN Insomnia  ondansetron Injectable 4 milliGRAM(s) IV Push every 12 hours PRN Nausea and/or Vomiting  oxyCODONE    IR 5 milliGRAM(s) Oral every 12 hours PRN Severe Pain (7 - 10)      FAMILY HISTORY:  No pertinent family history in first degree relatives        SOCIAL HISTORY:    CIGARETTES:    ALCOHOL:    REVIEW OF SYSTEMS:    CONSTITUTIONAL: No fever, weight loss, chills, shakes, or fatigue  EYES: No eye pain, visual disturbances, or discharge  ENMT:  No difficulty hearing, tinnitus, vertigo; No sinus or throat pain  NECK: No pain or stiffness  BREASTS: No pain, masses, or nipple discharge  RESPIRATORY: No cough, wheezing, hemoptysis, or shortness of breath  CARDIOVASCULAR: No chest pain, dyspnea, palpitations, dizziness, syncope, paroxysmal nocturnal dyspnea, orthopnea, or arm or leg swelling  GASTROINTESTINAL: No abdominal  or epigastric pain, nausea, vomiting, hematemesis, diarrhea, constipation, melena or bright red blood.  GENITOURINARY: No dysuria, nocturia, hematuria, or urinary incontinence  NEUROLOGICAL: No headaches, memory loss, slurred speech, limb weakness, loss of strength, numbness, or tremors  SKIN: No itching, burning, rashes, or lesions   LYMPH NODES: No enlarged glands  ENDOCRINE: No heat or cold intolerance, or hair loss  MUSCULOSKELETAL: No joint pain or swelling, muscle, back, or extremity pain  PSYCHIATRIC: No depression, anxiety, or difficulty sleeping  HEME/LYMPH: No easy bruising or bleeding gums  ALLERY AND IMMUNOLOGIC: No hives or rash.      Vital Signs Last 24 Hrs  T(C): 36.6 (12 Jan 2024 12:21), Max: 36.7 (11 Jan 2024 20:56)  T(F): 97.8 (12 Jan 2024 12:21), Max: 98.1 (12 Jan 2024 02:00)  HR: 90 (12 Jan 2024 12:21) (49 - 96)  BP: 139/68 (12 Jan 2024 12:21) (119/73 - 145/64)  BP(mean): --  RR: 18 (12 Jan 2024 12:21) (17 - 18)  SpO2: 99% (12 Jan 2024 12:21) (95% - 100%)    Parameters below as of 12 Jan 2024 12:21  Patient On (Oxygen Delivery Method): room air        PHYSICAL EXAM:    GENERAL: In no apparent distress, well nourished, and hydrated.  HEAD:  Atraumatic, Normocephalic  EYES: EOMI, PERRLA, conjunctiva and sclera clear  ENMT: No tonsillar erythema, exudates, or enlargement; Moist mucous membranes, Good dentition, No lesions  NECK: Supple and normal thyroid.  No JVD or carotid bruit.  Carotid pulse is 2+ bilaterally.  HEART: Regular rate and rhythm; No murmurs, rubs, or gallops.  PULMONARY: Clear to auscultation and perfusion.  No rales, wheezing, or rhonchi bilaterally.  ABDOMEN: Soft, Nontender, Nondistended; Bowel sounds present  EXTREMITIES:  2+ Peripheral Pulses, No clubbing, cyanosis, or edema  LYMPH: No lymphadenopathy noted  NEUROLOGICAL: Grossly nonfocal          INTERPRETATION OF TELEMETRY:    ECG:    I&O's Detail    11 Jan 2024 07:01  -  12 Jan 2024 07:00  --------------------------------------------------------  IN:    Oral Fluid: 870 mL  Total IN: 870 mL    OUT:    Voided (mL): 401 mL  Total OUT: 401 mL    Total NET: 469 mL          LABS:                        7.7    6.09  )-----------( 207      ( 12 Jan 2024 08:53 )             24.5     01-12    140  |  104  |  14  ----------------------------<  192<H>  3.6   |  23  |  0.94    Ca    9.2      12 Jan 2024 08:53  Phos  3.5     01-12  Mg     1.80     01-12            Urinalysis Basic - ( 12 Jan 2024 08:53 )    Color: x / Appearance: x / SG: x / pH: x  Gluc: 192 mg/dL / Ketone: x  / Bili: x / Urobili: x   Blood: x / Protein: x / Nitrite: x   Leuk Esterase: x / RBC: x / WBC x   Sq Epi: x / Non Sq Epi: x / Bacteria: x      BNP  I&O's Detail    11 Jan 2024 07:01  -  12 Jan 2024 07:00  --------------------------------------------------------  IN:    Oral Fluid: 870 mL  Total IN: 870 mL    OUT:    Voided (mL): 401 mL  Total OUT: 401 mL    Total NET: 469 mL        Daily     Daily     RADIOLOGY & ADDITIONAL STUDIES:  PREVIOUS DIAGNOSTIC TESTING:      ECHO  FINDINGS:    STRESS  FINDINGS:    CATHETERIZATION  FINDINGS:      ASSESSMENT:        RECOMMENDATIONS: Patient is a 73y old  Male with past medical history of HTN, COPD, DMT2, chronic diastolic heart failure, anxiety, 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 Nasal packing by ENT and blood transfusion x 1 for Hgb 6.9. Patient was to undergo IR embolization d/t epistaxs at [facility] today. However, on transport telemetry patient found to be in NSR w/1st AVB and ventricular bigeminy. He remained A&O x3 and hemodynamically stable. No associated chest pain, palpitations, shortness of breath, or dizziness. States hea has a long history of an arrhythmia.    EKG  from 1/9 reveals SR with 1st degree AVB and PVC's.    Transfer cancelled and EP consulted.   Currently no epistaxis .         PAST MEDICAL & SURGICAL HISTORY:  Prostate cancer s/p resection  Type II diabetes mellitus  Chronic obstructive pulmonary disease (COPD)  CHF (congestive heart failure)- diastolic  Renal insufficiency  S/P right foot wound with surgery (gangrene)      MEDICATIONS  (STANDING):  albuterol    0.083% 2.5 milliGRAM(s) Nebulizer every 6 hours  albuterol    90 MICROgram(s) HFA Inhaler 1 Puff(s) Inhalation every 4 hours  atorvastatin 80 milliGRAM(s) Oral at bedtime  budesonide 160 MICROgram(s)/formoterol 4.5 MICROgram(s) Inhaler 2 Puff(s) Inhalation two times a day  clonazePAM Oral Disintegrating Tablet 0.75 milliGRAM(s) Oral every 12 hours  dextrose 5%. 1000 milliLiter(s) (50 mL/Hr) IV Continuous <Continuous>  dextrose 5%. 1000 milliLiter(s) (100 mL/Hr) IV Continuous <Continuous>  dextrose 50% Injectable 25 Gram(s) IV Push once  dextrose 50% Injectable 25 Gram(s) IV Push once  dextrose 50% Injectable 12.5 Gram(s) IV Push once  glucagon  Injectable 1 milliGRAM(s) IntraMuscular once  influenza  Vaccine (HIGH DOSE) 0.7 milliLiter(s) IntraMuscular once  insulin glargine Injectable (LANTUS) 10 Unit(s) SubCutaneous at bedtime  insulin lispro (ADMELOG) corrective regimen sliding scale   SubCutaneous every 6 hours  montelukast 10 milliGRAM(s) Oral daily  saccharomyces boulardii 250 milliGRAM(s) Oral daily  senna 2 Tablet(s) Oral at bedtime  sertraline 100 milliGRAM(s) Oral daily    MEDICATIONS  (PRN):  acetaminophen     Tablet .. 325 milliGRAM(s) Oral every 8 hours PRN Temp greater or equal to 38C (100.4F), Mild Pain (1 - 3)  dextrose Oral Gel 15 Gram(s) Oral once PRN Blood Glucose LESS THAN 70 milliGRAM(s)/deciliter  melatonin 3 milliGRAM(s) Oral at bedtime PRN Insomnia  ondansetron Injectable 4 milliGRAM(s) IV Push every 12 hours PRN Nausea and/or Vomiting  oxyCODONE    IR 5 milliGRAM(s) Oral every 12 hours PRN Severe Pain (7 - 10)      FAMILY HISTORY:  No pertinent family history in first degree relatives        SOCIAL HISTORY:    CIGARETTES:    ALCOHOL:    REVIEW OF SYSTEMS:    CONSTITUTIONAL: No fever, weight loss, chills, shakes, or fatigue  EYES: No eye pain, visual disturbances, or discharge  ENMT:  No difficulty hearing, tinnitus, vertigo; No sinus or throat pain  NECK: No pain or stiffness  BREASTS: No pain, masses, or nipple discharge  RESPIRATORY: No cough, wheezing, hemoptysis, or shortness of breath  CARDIOVASCULAR: No chest pain, dyspnea, palpitations, dizziness, syncope, paroxysmal nocturnal dyspnea, orthopnea, or arm or leg swelling  GASTROINTESTINAL: No abdominal  or epigastric pain, nausea, vomiting, hematemesis, diarrhea, constipation, melena or bright red blood.  GENITOURINARY: No dysuria, nocturia, hematuria, or urinary incontinence  NEUROLOGICAL: No headaches, memory loss, slurred speech, limb weakness, loss of strength, numbness, or tremors  SKIN: No itching, burning, rashes, or lesions   LYMPH NODES: No enlarged glands  ENDOCRINE: No heat or cold intolerance, or hair loss  MUSCULOSKELETAL: No joint pain or swelling, muscle, back, or extremity pain  PSYCHIATRIC: No depression, anxiety, or difficulty sleeping  HEME/LYMPH: No easy bruising or bleeding gums  ALLERY AND IMMUNOLOGIC: No hives or rash.      Vital Signs Last 24 Hrs  T(C): 36.6 (12 Jan 2024 12:21), Max: 36.7 (11 Jan 2024 20:56)  T(F): 97.8 (12 Jan 2024 12:21), Max: 98.1 (12 Jan 2024 02:00)  HR: 90 (12 Jan 2024 12:21) (49 - 96)  BP: 139/68 (12 Jan 2024 12:21) (119/73 - 145/64)  BP(mean): --  RR: 18 (12 Jan 2024 12:21) (17 - 18)  SpO2: 99% (12 Jan 2024 12:21) (95% - 100%)    Parameters below as of 12 Jan 2024 12:21  Patient On (Oxygen Delivery Method): room air        PHYSICAL EXAM:    GENERAL: In no apparent distress, well nourished, and hydrated.  HEAD:  Atraumatic, Normocephalic  EYES: EOMI, PERRLA, conjunctiva and sclera clear  ENMT: No tonsillar erythema, exudates, or enlargement; Moist mucous membranes, Good dentition, No lesions  NECK: Supple and normal thyroid.  No JVD or carotid bruit.  Carotid pulse is 2+ bilaterally.  HEART: Regular rate and rhythm; No murmurs, rubs, or gallops.  PULMONARY: Clear to auscultation and perfusion.  No rales, wheezing, or rhonchi bilaterally.  ABDOMEN: Soft, Nontender, Nondistended; Bowel sounds present  EXTREMITIES:  2+ Peripheral Pulses, No clubbing, cyanosis, or edema  LYMPH: No lymphadenopathy noted  NEUROLOGICAL: Grossly nonfocal          INTERPRETATION OF TELEMETRY:    ECG:    I&O's Detail    11 Jan 2024 07:01  -  12 Jan 2024 07:00  --------------------------------------------------------  IN:    Oral Fluid: 870 mL  Total IN: 870 mL    OUT:    Voided (mL): 401 mL  Total OUT: 401 mL    Total NET: 469 mL          LABS:                        7.7    6.09  )-----------( 207      ( 12 Jan 2024 08:53 )             24.5     01-12    140  |  104  |  14  ----------------------------<  192<H>  3.6   |  23  |  0.94    Ca    9.2      12 Jan 2024 08:53  Phos  3.5     01-12  Mg     1.80     01-12            Urinalysis Basic - ( 12 Jan 2024 08:53 )    Color: x / Appearance: x / SG: x / pH: x  Gluc: 192 mg/dL / Ketone: x  / Bili: x / Urobili: x   Blood: x / Protein: x / Nitrite: x   Leuk Esterase: x / RBC: x / WBC x   Sq Epi: x / Non Sq Epi: x / Bacteria: x      BNP  I&O's Detail    11 Jan 2024 07:01  -  12 Jan 2024 07:00  --------------------------------------------------------  IN:    Oral Fluid: 870 mL  Total IN: 870 mL    OUT:    Voided (mL): 401 mL  Total OUT: 401 mL    Total NET: 469 mL        Daily     Daily     RADIOLOGY & ADDITIONAL STUDIES:  PREVIOUS DIAGNOSTIC TESTING:      ECHO  FINDINGS:    STRESS  FINDINGS:    CATHETERIZATION  FINDINGS:      ASSESSMENT:        RECOMMENDATIONS: Patient is a 73y old  Male with past medical history of HTN, COPD, DMT2, chronic diastolic heart failure, anxiety, 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 Nasal packing by ENT and blood transfusion x 1 for Hgb 6.9. Patient was to undergo IR embolization d/t epistaxs at [facility] today. However, on transport telemetry patient found to be in NSR w/1st AVB and ventricular bigeminy. He remained A&O x3 and hemodynamically stable. No associated chest pain, palpitations, shortness of breath, or dizziness. States he has a long history of an arrhythmia, (reported as "an extra beat") in excess of 15 years,  for which he takes metoprolol.     EKG  from 1/9 reveals SR with 1st degree AVB and PVC's.  EKG 1/12/24:   Transfer cancelled and EP consulted.   Currently no epistaxis .         PAST MEDICAL & SURGICAL HISTORY:  Prostate cancer s/p resection  Type II diabetes mellitus  Chronic obstructive pulmonary disease (COPD)  CHF (congestive heart failure)- diastolic  Renal insufficiency  S/P right foot wound with surgery (gangrene)      MEDICATIONS  (STANDING):  albuterol    0.083% 2.5 milliGRAM(s) Nebulizer every 6 hours  albuterol    90 MICROgram(s) HFA Inhaler 1 Puff(s) Inhalation every 4 hours  atorvastatin 80 milliGRAM(s) Oral at bedtime  budesonide 160 MICROgram(s)/formoterol 4.5 MICROgram(s) Inhaler 2 Puff(s) Inhalation two times a day  clonazePAM Oral Disintegrating Tablet 0.75 milliGRAM(s) Oral every 12 hours  dextrose 5%. 1000 milliLiter(s) (50 mL/Hr) IV Continuous <Continuous>  dextrose 5%. 1000 milliLiter(s) (100 mL/Hr) IV Continuous <Continuous>  dextrose 50% Injectable 25 Gram(s) IV Push once  dextrose 50% Injectable 25 Gram(s) IV Push once  dextrose 50% Injectable 12.5 Gram(s) IV Push once  glucagon  Injectable 1 milliGRAM(s) IntraMuscular once  influenza  Vaccine (HIGH DOSE) 0.7 milliLiter(s) IntraMuscular once  insulin glargine Injectable (LANTUS) 10 Unit(s) SubCutaneous at bedtime  insulin lispro (ADMELOG) corrective regimen sliding scale   SubCutaneous every 6 hours  montelukast 10 milliGRAM(s) Oral daily  saccharomyces boulardii 250 milliGRAM(s) Oral daily  senna 2 Tablet(s) Oral at bedtime  sertraline 100 milliGRAM(s) Oral daily    MEDICATIONS  (PRN):  acetaminophen     Tablet .. 325 milliGRAM(s) Oral every 8 hours PRN Temp greater or equal to 38C (100.4F), Mild Pain (1 - 3)  dextrose Oral Gel 15 Gram(s) Oral once PRN Blood Glucose LESS THAN 70 milliGRAM(s)/deciliter  melatonin 3 milliGRAM(s) Oral at bedtime PRN Insomnia  ondansetron Injectable 4 milliGRAM(s) IV Push every 12 hours PRN Nausea and/or Vomiting  oxyCODONE    IR 5 milliGRAM(s) Oral every 12 hours PRN Severe Pain (7 - 10)      FAMILY HISTORY:  No pertinent family history in first degree relatives        SOCIAL HISTORY:    CIGARETTES:    ALCOHOL:    REVIEW OF SYSTEMS:    CONSTITUTIONAL: No fever, weight loss, chills, shakes, or fatigue  EYES: No eye pain, visual disturbances, or discharge  ENMT:  No difficulty hearing, tinnitus, vertigo; No sinus or throat pain  NECK: No pain or stiffness  BREASTS: No pain, masses, or nipple discharge  RESPIRATORY: No cough, wheezing, hemoptysis, or shortness of breath  CARDIOVASCULAR: No chest pain, dyspnea, palpitations, dizziness, syncope, paroxysmal nocturnal dyspnea, orthopnea, or arm or leg swelling  GASTROINTESTINAL: No abdominal  or epigastric pain, nausea, vomiting, hematemesis, diarrhea, constipation, melena or bright red blood.  GENITOURINARY: No dysuria, nocturia, hematuria, or urinary incontinence  NEUROLOGICAL: No headaches, memory loss, slurred speech, limb weakness, loss of strength, numbness, or tremors  SKIN: No itching, burning, rashes, or lesions   LYMPH NODES: No enlarged glands  ENDOCRINE: No heat or cold intolerance, or hair loss  MUSCULOSKELETAL: No joint pain or swelling, muscle, back, or extremity pain  PSYCHIATRIC: No depression, anxiety, or difficulty sleeping  HEME/LYMPH: No easy bruising or bleeding gums  ALLERY AND IMMUNOLOGIC: No hives or rash.      Vital Signs Last 24 Hrs  T(C): 36.6 (12 Jan 2024 12:21), Max: 36.7 (11 Jan 2024 20:56)  T(F): 97.8 (12 Jan 2024 12:21), Max: 98.1 (12 Jan 2024 02:00)  HR: 90 (12 Jan 2024 12:21) (49 - 96)  BP: 139/68 (12 Jan 2024 12:21) (119/73 - 145/64)  BP(mean): --  RR: 18 (12 Jan 2024 12:21) (17 - 18)  SpO2: 99% (12 Jan 2024 12:21) (95% - 100%)    Parameters below as of 12 Jan 2024 12:21  Patient On (Oxygen Delivery Method): room air        PHYSICAL EXAM:    GENERAL: In no apparent distress, well nourished, and hydrated.  HEAD:  Atraumatic, Normocephalic  EYES: EOMI, PERRLA, conjunctiva and sclera clear  ENMT: No tonsillar erythema, exudates, or enlargement; Moist mucous membranes, Good dentition, No lesions  NECK: Supple and normal thyroid.  No JVD or carotid bruit.  Carotid pulse is 2+ bilaterally.  HEART: Regular rate and rhythm; No murmurs, rubs, or gallops.  PULMONARY: Clear to auscultation and perfusion.  No rales, wheezing, or rhonchi bilaterally.  ABDOMEN: Soft, Nontender, Nondistended; Bowel sounds present  EXTREMITIES:  2+ Peripheral Pulses, No clubbing, cyanosis, or edema  LYMPH: No lymphadenopathy noted  NEUROLOGICAL: Grossly nonfocal          INTERPRETATION OF TELEMETRY:    ECG:    I&O's Detail    11 Jan 2024 07:01  -  12 Jan 2024 07:00  --------------------------------------------------------  IN:    Oral Fluid: 870 mL  Total IN: 870 mL    OUT:    Voided (mL): 401 mL  Total OUT: 401 mL    Total NET: 469 mL          LABS:                        7.7    6.09  )-----------( 207      ( 12 Jan 2024 08:53 )             24.5     01-12    140  |  104  |  14  ----------------------------<  192<H>  3.6   |  23  |  0.94    Ca    9.2      12 Jan 2024 08:53  Phos  3.5     01-12  Mg     1.80     01-12            Urinalysis Basic - ( 12 Jan 2024 08:53 )    Color: x / Appearance: x / SG: x / pH: x  Gluc: 192 mg/dL / Ketone: x  / Bili: x / Urobili: x   Blood: x / Protein: x / Nitrite: x   Leuk Esterase: x / RBC: x / WBC x   Sq Epi: x / Non Sq Epi: x / Bacteria: x      BNP  I&O's Detail    11 Jan 2024 07:01  -  12 Jan 2024 07:00  --------------------------------------------------------  IN:    Oral Fluid: 870 mL  Total IN: 870 mL    OUT:    Voided (mL): 401 mL  Total OUT: 401 mL    Total NET: 469 mL        Daily     Daily     RADIOLOGY & ADDITIONAL STUDIES:  PREVIOUS DIAGNOSTIC TESTING:      ECHO  FINDINGS:    STRESS  FINDINGS:    CATHETERIZATION  FINDINGS:      ASSESSMENT:        RECOMMENDATIONS: Patient is a 73y old  Male with past medical history of HTN, COPD, DMT2, chronic diastolic heart failure, anxiety, 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 Nasal packing by ENT and blood transfusion x 1 for Hgb 6.9. Patient was to undergo IR embolization d/t epistaxs at [facility] today. However, on transport telemetry patient found to be in NSR w/1st AVB and ventricular bigeminy. He remained A&O x3 and hemodynamically stable. No associated chest pain, palpitations, shortness of breath, or dizziness. States he has a long history of an arrhythmia, (reported as "an extra beat") in excess of 15 years,  for which he takes metoprolol.     EKG  from 1/9 reveals SR with 1st degree AVB and PVC's.  EKG 1/12/24:   Transfer cancelled and EP consulted.   Currently no epistaxis .         PAST MEDICAL & SURGICAL HISTORY:  Prostate cancer s/p resection  Type II diabetes mellitus  Chronic obstructive pulmonary disease (COPD)  CHF (congestive heart failure)- diastolic  Renal insufficiency  S/P right foot wound with surgery (gangrene)      MEDICATIONS  (STANDING):  albuterol    0.083% 2.5 milliGRAM(s) Nebulizer every 6 hours  albuterol    90 MICROgram(s) HFA Inhaler 1 Puff(s) Inhalation every 4 hours  atorvastatin 80 milliGRAM(s) Oral at bedtime  budesonide 160 MICROgram(s)/formoterol 4.5 MICROgram(s) Inhaler 2 Puff(s) Inhalation two times a day  clonazePAM Oral Disintegrating Tablet 0.75 milliGRAM(s) Oral every 12 hours  dextrose 5%. 1000 milliLiter(s) (50 mL/Hr) IV Continuous <Continuous>  dextrose 5%. 1000 milliLiter(s) (100 mL/Hr) IV Continuous <Continuous>  dextrose 50% Injectable 25 Gram(s) IV Push once  dextrose 50% Injectable 25 Gram(s) IV Push once  dextrose 50% Injectable 12.5 Gram(s) IV Push once  glucagon  Injectable 1 milliGRAM(s) IntraMuscular once  influenza  Vaccine (HIGH DOSE) 0.7 milliLiter(s) IntraMuscular once  insulin glargine Injectable (LANTUS) 10 Unit(s) SubCutaneous at bedtime  insulin lispro (ADMELOG) corrective regimen sliding scale   SubCutaneous every 6 hours  montelukast 10 milliGRAM(s) Oral daily  saccharomyces boulardii 250 milliGRAM(s) Oral daily  senna 2 Tablet(s) Oral at bedtime  sertraline 100 milliGRAM(s) Oral daily    MEDICATIONS  (PRN):  acetaminophen     Tablet .. 325 milliGRAM(s) Oral every 8 hours PRN Temp greater or equal to 38C (100.4F), Mild Pain (1 - 3)  dextrose Oral Gel 15 Gram(s) Oral once PRN Blood Glucose LESS THAN 70 milliGRAM(s)/deciliter  melatonin 3 milliGRAM(s) Oral at bedtime PRN Insomnia  ondansetron Injectable 4 milliGRAM(s) IV Push every 12 hours PRN Nausea and/or Vomiting  oxyCODONE    IR 5 milliGRAM(s) Oral every 12 hours PRN Severe Pain (7 - 10)      FAMILY HISTORY:  No pertinent family history in first degree relatives        SOCIAL HISTORY:    CIGARETTES:    ALCOHOL:    REVIEW OF SYSTEMS:    CONSTITUTIONAL: No fever, weight loss, chills, shakes, or fatigue  EYES: No eye pain, visual disturbances, or discharge  ENMT:  No difficulty hearing, tinnitus, vertigo; No sinus or throat pain  NECK: No pain or stiffness  BREASTS: No pain, masses, or nipple discharge  RESPIRATORY: No cough, wheezing, hemoptysis, or shortness of breath  CARDIOVASCULAR: No chest pain, dyspnea, palpitations, dizziness, syncope, paroxysmal nocturnal dyspnea, orthopnea, or arm or leg swelling  GASTROINTESTINAL: No abdominal  or epigastric pain, nausea, vomiting, hematemesis, diarrhea, constipation, melena or bright red blood.  GENITOURINARY: No dysuria, nocturia, hematuria, or urinary incontinence  NEUROLOGICAL: No headaches, memory loss, slurred speech, limb weakness, loss of strength, numbness, or tremors  SKIN: No itching, burning, rashes, or lesions   LYMPH NODES: No enlarged glands  ENDOCRINE: No heat or cold intolerance, or hair loss  MUSCULOSKELETAL: No joint pain or swelling, muscle, back, or extremity pain  PSYCHIATRIC: No depression, anxiety, or difficulty sleeping  HEME/LYMPH: No easy bruising or bleeding gums  ALLERY AND IMMUNOLOGIC: No hives or rash.      Vital Signs Last 24 Hrs  T(C): 36.6 (12 Jan 2024 12:21), Max: 36.7 (11 Jan 2024 20:56)  T(F): 97.8 (12 Jan 2024 12:21), Max: 98.1 (12 Jan 2024 02:00)  HR: 90 (12 Jan 2024 12:21) (49 - 96)  BP: 139/68 (12 Jan 2024 12:21) (119/73 - 145/64)  BP(mean): --  RR: 18 (12 Jan 2024 12:21) (17 - 18)  SpO2: 99% (12 Jan 2024 12:21) (95% - 100%)    Parameters below as of 12 Jan 2024 12:21  Patient On (Oxygen Delivery Method): room air        PHYSICAL EXAM:    GENERAL: In no apparent distress, well nourished, and hydrated.  HEAD:  Atraumatic, Normocephalic  EYES: EOMI, PERRLA, conjunctiva and sclera clear  ENMT: No tonsillar erythema, exudates, or enlargement; Moist mucous membranes, Good dentition, No lesions  NECK: Supple and normal thyroid.  No JVD or carotid bruit.  Carotid pulse is 2+ bilaterally.  HEART: Regular rate and rhythm; No murmurs, rubs, or gallops.  PULMONARY: Clear to auscultation and perfusion.  No rales, wheezing, or rhonchi bilaterally.  ABDOMEN: Soft, Nontender, Nondistended; Bowel sounds present  EXTREMITIES:  2+ Peripheral Pulses, No clubbing, cyanosis, or edema  LYMPH: No lymphadenopathy noted  NEUROLOGICAL: Grossly nonfocal          INTERPRETATION OF TELEMETRY:    ECG:    I&O's Detail    11 Jan 2024 07:01  -  12 Jan 2024 07:00  --------------------------------------------------------  IN:    Oral Fluid: 870 mL  Total IN: 870 mL    OUT:    Voided (mL): 401 mL  Total OUT: 401 mL    Total NET: 469 mL          LABS:                        7.7    6.09  )-----------( 207      ( 12 Jan 2024 08:53 )             24.5     01-12    140  |  104  |  14  ----------------------------<  192<H>  3.6   |  23  |  0.94    Ca    9.2      12 Jan 2024 08:53  Phos  3.5     01-12  Mg     1.80     01-12            Urinalysis Basic - ( 12 Jan 2024 08:53 )    Color: x / Appearance: x / SG: x / pH: x  Gluc: 192 mg/dL / Ketone: x  / Bili: x / Urobili: x   Blood: x / Protein: x / Nitrite: x   Leuk Esterase: x / RBC: x / WBC x   Sq Epi: x / Non Sq Epi: x / Bacteria: x      BNP  I&O's Detail    11 Jan 2024 07:01  -  12 Jan 2024 07:00  --------------------------------------------------------  IN:    Oral Fluid: 870 mL  Total IN: 870 mL    OUT:    Voided (mL): 401 mL  Total OUT: 401 mL    Total NET: 469 mL        Daily     Daily     RADIOLOGY & ADDITIONAL STUDIES:  PREVIOUS DIAGNOSTIC TESTING:      ECHO  FINDINGS:    STRESS  FINDINGS:    CATHETERIZATION  FINDINGS:      ASSESSMENT:        RECOMMENDATIONS: Patient is a 73y old  Male with past medical history of HTN, COPD, DMT2, chronic diastolic heart failure, anxiety, 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 Nasal packing by ENT and blood transfusion x 1 for Hgb 6.9. Patient was to undergo IR embolization d/t epistaxs at [facility] today. However, on transport telemetry patient found to be in NSR w/1st AVB and ventricular bigeminy. He remained A&O x3 and hemodynamically stable. No associated chest pain, palpitations, shortness of breath, or dizziness. States he has a long history of an arrhythmia, (reported as "an extra beat") in excess of 15 years,  for which he takes metoprolol.     EKG  from 1/9 reveals SR with 1st degree AVB and PVC's.  EKG 1/12/24: Normal sinus rhythm with ventricular bigeminy. LAD. QRSd 84ms     Transfer cancelled and EP consulted.   Currently no epistaxis .         PAST MEDICAL & SURGICAL HISTORY:  Prostate cancer s/p resection  Type II diabetes mellitus  Chronic obstructive pulmonary disease (COPD)  CHF (congestive heart failure)- diastolic  Renal insufficiency  S/P right foot wound with surgery (gangrene)    ALLERGIES:  shellfish and contrast dye.     MEDICATIONS  (STANDING):  albuterol    0.083% 2.5 milliGRAM(s) Nebulizer every 6 hours  albuterol    90 MICROgram(s) HFA Inhaler 1 Puff(s) Inhalation every 4 hours  atorvastatin 80 milliGRAM(s) Oral at bedtime  budesonide 160 MICROgram(s)/formoterol 4.5 MICROgram(s) Inhaler 2 Puff(s) Inhalation two times a day  clonazePAM Oral Disintegrating Tablet 0.75 milliGRAM(s) Oral every 12 hours  dextrose 5%. 1000 milliLiter(s) (50 mL/Hr) IV Continuous <Continuous>  dextrose 5%. 1000 milliLiter(s) (100 mL/Hr) IV Continuous <Continuous>  dextrose 50% Injectable 25 Gram(s) IV Push once  dextrose 50% Injectable 25 Gram(s) IV Push once  dextrose 50% Injectable 12.5 Gram(s) IV Push once  glucagon  Injectable 1 milliGRAM(s) IntraMuscular once  influenza  Vaccine (HIGH DOSE) 0.7 milliLiter(s) IntraMuscular once  insulin glargine Injectable (LANTUS) 10 Unit(s) SubCutaneous at bedtime  insulin lispro (ADMELOG) corrective regimen sliding scale   SubCutaneous every 6 hours  montelukast 10 milliGRAM(s) Oral daily  saccharomyces boulardii 250 milliGRAM(s) Oral daily  senna 2 Tablet(s) Oral at bedtime  sertraline 100 milliGRAM(s) Oral daily    MEDICATIONS  (PRN):  acetaminophen     Tablet .. 325 milliGRAM(s) Oral every 8 hours PRN Temp greater or equal to 38C (100.4F), Mild Pain (1 - 3)  dextrose Oral Gel 15 Gram(s) Oral once PRN Blood Glucose LESS THAN 70 milliGRAM(s)/deciliter  melatonin 3 milliGRAM(s) Oral at bedtime PRN Insomnia  ondansetron Injectable 4 milliGRAM(s) IV Push every 12 hours PRN Nausea and/or Vomiting  oxyCODONE    IR 5 milliGRAM(s) Oral every 12 hours PRN Severe Pain (7 - 10)      FAMILY HISTORY:  No pertinent family history in first degree relatives        SOCIAL HISTORY: lives in [place] assisted living    CIGARETTES: quit many years ago  DRUGS: none  ALCOHOL: drinks wine almost nightly    REVIEW OF SYSTEMS:    CONSTITUTIONAL: No fever, weight loss, chills, shakes, or fatigue  EYES: No eye pain, visual disturbances, or discharge  ENMT:  No difficulty hearing, tinnitus, vertigo; +epistaxis  NECK: No pain or stiffness  BREASTS: No pain, masses, or nipple discharge  RESPIRATORY: No cough or hemoptysis, + shortness of breath and wheezing (chronic)  CARDIOVASCULAR: No chest pain, dyspnea, palpitations, dizziness, syncope, paroxysmal nocturnal dyspnea, orthopnea, or arm or leg swelling  GASTROINTESTINAL: No abdominal  or epigastric pain, nausea, vomiting, hematemesis, diarrhea, constipation, melena or bright red blood.  GENITOURINARY: No dysuria, nocturia, hematuria,   + urinary incontinence since prostate resection for cancer  NEUROLOGICAL: No headaches, memory loss, slurred speech, limb weakness, loss of strength, numbness + resting tremor  lower leg neuropathy  SKIN: No itching, burning, rashes, or lesions> Hx of right foot gangrene with surgery  LYMPH NODES: No enlarged glands  ENDOCRINE: No heat or cold intolerance, or hair loss  MUSCULOSKELETAL: No joint pain or swelling, muscle, back, or extremity pain  PSYCHIATRIC: No depression or difficulty sleeping  +anxiety  HEME/LYMPH: No easy bruising or bleeding gums  +epistaxis  ALLERGY AND IMMUNOLOGIC: No hives or rash.      Vital Signs Last 24 Hrs  T(C): 36.6 (12 Jan 2024 12:21), Max: 36.7 (11 Jan 2024 20:56)  T(F): 97.8 (12 Jan 2024 12:21), Max: 98.1 (12 Jan 2024 02:00)  HR: 90 (12 Jan 2024 12:21) (49 - 96)  BP: 139/68 (12 Jan 2024 12:21) (119/73 - 145/64)  BP(mean): --  RR: 18 (12 Jan 2024 12:21) (17 - 18)  SpO2: 99% (12 Jan 2024 12:21) (95% - 100%)    Parameters below as of 12 Jan 2024 12:21  Patient On (Oxygen Delivery Method): room air        PHYSICAL EXAM:    GENERAL: In no apparent distress, well nourished, and hydrated.  HEAD:  Atraumatic, Normocephalic  EYES: EOMI, PERRLA, conjunctiva and sclera clear  ENMT: No tonsillar erythema, exudates, or enlargement; Moist mucous membranes, Good dentition, No lesions  NECK: Supple and normal thyroid.  No JVD or carotid bruit.  Carotid pulse is 2+ bilaterally.  HEART: Regular rate and rhythm; No murmurs, rubs, or gallops.  PULMONARY: Clear to auscultation and perfusion.  No rales, wheezing, or rhonchi bilaterally.  ABDOMEN: Soft, Nontender, Nondistended; Bowel sounds present  EXTREMITIES:  2+ Peripheral Pulses, No clubbing, cyanosis, or edema  LYMPH: No lymphadenopathy noted  NEUROLOGICAL: Grossly nonfocal          INTERPRETATION OF TELEMETRY:  Normal sinus rhythm 70's.      ECG:    I&O's Detail    11 Jan 2024 07:01  -  12 Jan 2024 07:00  --------------------------------------------------------  IN:    Oral Fluid: 870 mL  Total IN: 870 mL    OUT:    Voided (mL): 401 mL  Total OUT: 401 mL    Total NET: 469 mL          LABS:                        7.7    6.09  )-----------( 207      ( 12 Jan 2024 08:53 )             24.5     01-12    140  |  104  |  14  ----------------------------<  192<H>  3.6   |  23  |  0.94    Ca    9.2      12 Jan 2024 08:53  Phos  3.5     01-12  Mg     1.80     01-12            Urinalysis Basic - ( 12 Jan 2024 08:53 )    Color: x / Appearance: x / SG: x / pH: x  Gluc: 192 mg/dL / Ketone: x  / Bili: x / Urobili: x   Blood: x / Protein: x / Nitrite: x   Leuk Esterase: x / RBC: x / WBC x   Sq Epi: x / Non Sq Epi: x / Bacteria: x      BNP  I&O's Detail    11 Jan 2024 07:01  -  12 Jan 2024 07:00  --------------------------------------------------------  IN:    Oral Fluid: 870 mL  Total IN: 870 mL    OUT:    Voided (mL): 401 mL  Total OUT: 401 mL    Total NET: 469 mL        Daily     Daily     RADIOLOGY & ADDITIONAL STUDIES:  PREVIOUS DIAGNOSTIC TESTING:      ECHO  FINDINGS:    STRESS  FINDINGS:    CATHETERIZATION  FINDINGS:      ASSESSMENT:        RECOMMENDATIONS: Patient is a 73y old  Male with past medical history of HTN, COPD, DMT2, chronic diastolic heart failure, anxiety, 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 Nasal packing by ENT and blood transfusion x 1 for Hgb 6.9. Patient was to undergo IR embolization d/t epistaxs at [facility] today. However, on transport telemetry patient found to be in NSR w/1st AVB and ventricular bigeminy. He remained A&O x3 and hemodynamically stable. No associated chest pain, palpitations, shortness of breath, or dizziness. States he has a long history of an arrhythmia, (reported as "an extra beat") in excess of 15 years,  for which he takes metoprolol.     EKG  from 1/9 reveals SR with 1st degree AVB and PVC's.  EKG 1/12/24: Normal sinus rhythm with ventricular bigeminy. LAD. QRSd 84ms     Transfer cancelled and EP consulted.   Currently no epistaxis .         PAST MEDICAL & SURGICAL HISTORY:  Prostate cancer s/p resection  Type II diabetes mellitus  Chronic obstructive pulmonary disease (COPD)  CHF (congestive heart failure)- diastolic  Renal insufficiency  S/P right foot wound with surgery (gangrene)    ALLERGIES:  shellfish and contrast dye.     MEDICATIONS  (STANDING):  albuterol    0.083% 2.5 milliGRAM(s) Nebulizer every 6 hours  albuterol    90 MICROgram(s) HFA Inhaler 1 Puff(s) Inhalation every 4 hours  atorvastatin 80 milliGRAM(s) Oral at bedtime  budesonide 160 MICROgram(s)/formoterol 4.5 MICROgram(s) Inhaler 2 Puff(s) Inhalation two times a day  clonazePAM Oral Disintegrating Tablet 0.75 milliGRAM(s) Oral every 12 hours  dextrose 5%. 1000 milliLiter(s) (50 mL/Hr) IV Continuous <Continuous>  dextrose 5%. 1000 milliLiter(s) (100 mL/Hr) IV Continuous <Continuous>  dextrose 50% Injectable 25 Gram(s) IV Push once  dextrose 50% Injectable 25 Gram(s) IV Push once  dextrose 50% Injectable 12.5 Gram(s) IV Push once  glucagon  Injectable 1 milliGRAM(s) IntraMuscular once  influenza  Vaccine (HIGH DOSE) 0.7 milliLiter(s) IntraMuscular once  insulin glargine Injectable (LANTUS) 10 Unit(s) SubCutaneous at bedtime  insulin lispro (ADMELOG) corrective regimen sliding scale   SubCutaneous every 6 hours  montelukast 10 milliGRAM(s) Oral daily  saccharomyces boulardii 250 milliGRAM(s) Oral daily  senna 2 Tablet(s) Oral at bedtime  sertraline 100 milliGRAM(s) Oral daily    MEDICATIONS  (PRN):  acetaminophen     Tablet .. 325 milliGRAM(s) Oral every 8 hours PRN Temp greater or equal to 38C (100.4F), Mild Pain (1 - 3)  dextrose Oral Gel 15 Gram(s) Oral once PRN Blood Glucose LESS THAN 70 milliGRAM(s)/deciliter  melatonin 3 milliGRAM(s) Oral at bedtime PRN Insomnia  ondansetron Injectable 4 milliGRAM(s) IV Push every 12 hours PRN Nausea and/or Vomiting  oxyCODONE    IR 5 milliGRAM(s) Oral every 12 hours PRN Severe Pain (7 - 10)      FAMILY HISTORY:  No pertinent family history in first degree relatives        SOCIAL HISTORY: lives in [place] assisted [facility]    CIGARETTES: quit many years ago  DRUGS: none  ALCOHOL: drinks wine almost nightly    REVIEW OF SYSTEMS:    CONSTITUTIONAL: No fever, weight loss, chills, shakes, or fatigue  EYES: No eye pain, visual disturbances, or discharge  ENMT:  No difficulty hearing, tinnitus, vertigo; +epistaxis  NECK: No pain or stiffness  BREASTS: No pain, masses, or nipple discharge  RESPIRATORY: No cough or hemoptysis, + shortness of breath and wheezing (chronic)  CARDIOVASCULAR: No chest pain, dyspnea, palpitations, dizziness, syncope, paroxysmal nocturnal dyspnea, orthopnea, or arm or leg swelling  GASTROINTESTINAL: No abdominal  or epigastric pain, nausea, vomiting, hematemesis, diarrhea, constipation, melena or bright red blood.  GENITOURINARY: No dysuria, nocturia, hematuria,   + urinary incontinence since prostate resection for cancer  NEUROLOGICAL: No headaches, memory loss, slurred speech, limb weakness, loss of strength, numbness + resting tremor  lower leg neuropathy  SKIN: No itching, burning, rashes, or lesions> Hx of right foot gangrene with surgery  LYMPH NODES: No enlarged glands  ENDOCRINE: No heat or cold intolerance, or hair loss  MUSCULOSKELETAL: No joint pain or swelling, muscle, back, or extremity pain  PSYCHIATRIC: No depression or difficulty sleeping  +anxiety  HEME/LYMPH: No easy bruising or bleeding gums  +epistaxis  ALLERGY AND IMMUNOLOGIC: No hives or rash.      Vital Signs Last 24 Hrs  T(C): 36.6 (12 Jan 2024 12:21), Max: 36.7 (11 Jan 2024 20:56)  T(F): 97.8 (12 Jan 2024 12:21), Max: 98.1 (12 Jan 2024 02:00)  HR: 90 (12 Jan 2024 12:21) (49 - 96)  BP: 139/68 (12 Jan 2024 12:21) (119/73 - 145/64)  BP(mean): --  RR: 18 (12 Jan 2024 12:21) (17 - 18)  SpO2: 99% (12 Jan 2024 12:21) (95% - 100%)    Parameters below as of 12 Jan 2024 12:21  Patient On (Oxygen Delivery Method): room air        PHYSICAL EXAM:    GENERAL: In no apparent distress, well nourished, and hydrated.  HEAD:  Atraumatic, Normocephalic  EYES: EOMI, PERRLA, conjunctiva and sclera clear  ENMT: No tonsillar erythema, exudates, or enlargement; Moist mucous membranes, Good dentition, No lesions  NECK: Supple and normal thyroid.  No JVD or carotid bruit.  Carotid pulse is 2+ bilaterally.  HEART: Regular rate and rhythm; No murmurs, rubs, or gallops.  PULMONARY: Clear to auscultation and perfusion.  No rales, wheezing, or rhonchi bilaterally.  ABDOMEN: Soft, Nontender, Nondistended; Bowel sounds present  EXTREMITIES:  2+ Peripheral Pulses, No clubbing, cyanosis, or edema  LYMPH: No lymphadenopathy noted  NEUROLOGICAL: Grossly nonfocal          INTERPRETATION OF TELEMETRY:  Normal sinus rhythm 70's.      ECG:    I&O's Detail    11 Jan 2024 07:01  -  12 Jan 2024 07:00  --------------------------------------------------------  IN:    Oral Fluid: 870 mL  Total IN: 870 mL    OUT:    Voided (mL): 401 mL  Total OUT: 401 mL    Total NET: 469 mL          LABS:                        7.7    6.09  )-----------( 207      ( 12 Jan 2024 08:53 )             24.5     01-12    140  |  104  |  14  ----------------------------<  192<H>  3.6   |  23  |  0.94    Ca    9.2      12 Jan 2024 08:53  Phos  3.5     01-12  Mg     1.80     01-12            Urinalysis Basic - ( 12 Jan 2024 08:53 )    Color: x / Appearance: x / SG: x / pH: x  Gluc: 192 mg/dL / Ketone: x  / Bili: x / Urobili: x   Blood: x / Protein: x / Nitrite: x   Leuk Esterase: x / RBC: x / WBC x   Sq Epi: x / Non Sq Epi: x / Bacteria: x      BNP  I&O's Detail    11 Jan 2024 07:01  -  12 Jan 2024 07:00  --------------------------------------------------------  IN:    Oral Fluid: 870 mL  Total IN: 870 mL    OUT:    Voided (mL): 401 mL  Total OUT: 401 mL    Total NET: 469 mL        Daily     Daily     RADIOLOGY & ADDITIONAL STUDIES:  PREVIOUS DIAGNOSTIC TESTING:      ECHO  FINDINGS:    STRESS  FINDINGS:    CATHETERIZATION  FINDINGS:      ASSESSMENT:        RECOMMENDATIONS: Patient is a 73y old  Male with past medical history of HTN, COPD, DMT2, chronic diastolic heart failure, anxiety, 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 Nasal packing by ENT and blood transfusion x 1 for Hgb 6.9. Patient was to undergo IR embolization d/t epistaxs at [facility] today. However, on transport telemetry patient  found to be sinus bradycardia with ventricular bigeminy. He remained A&O x3 and hemodynamically stable. No associated chest pain, palpitations, shortness of breath, or dizziness. States he has a long history of an arrhythmia, (reported as "an extra beat") in excess of 15 years,  for which he takes metoprolol.     EKG  from 1/9 reveals SR with 1st degree AVB and PVC's.  EKG 1/12/24: Sinus bradycardia with ventricular bigeminy. LAD. QRSd 84ms     Transfer cancelled and EP consulted.   Currently no epistaxis .         PAST MEDICAL & SURGICAL HISTORY:  Prostate cancer s/p resection  Type II diabetes mellitus  Chronic obstructive pulmonary disease (COPD)  CHF (congestive heart failure)- diastolic  Renal insufficiency  S/P right foot wound with surgery (gangrene)    ALLERGIES:  shellfish and contrast dye.     MEDICATIONS  (STANDING):  albuterol    0.083% 2.5 milliGRAM(s) Nebulizer every 6 hours  albuterol    90 MICROgram(s) HFA Inhaler 1 Puff(s) Inhalation every 4 hours  atorvastatin 80 milliGRAM(s) Oral at bedtime  budesonide 160 MICROgram(s)/formoterol 4.5 MICROgram(s) Inhaler 2 Puff(s) Inhalation two times a day  clonazePAM Oral Disintegrating Tablet 0.75 milliGRAM(s) Oral every 12 hours  dextrose 5%. 1000 milliLiter(s) (50 mL/Hr) IV Continuous <Continuous>  dextrose 5%. 1000 milliLiter(s) (100 mL/Hr) IV Continuous <Continuous>  dextrose 50% Injectable 25 Gram(s) IV Push once  dextrose 50% Injectable 25 Gram(s) IV Push once  dextrose 50% Injectable 12.5 Gram(s) IV Push once  glucagon  Injectable 1 milliGRAM(s) IntraMuscular once  influenza  Vaccine (HIGH DOSE) 0.7 milliLiter(s) IntraMuscular once  insulin glargine Injectable (LANTUS) 10 Unit(s) SubCutaneous at bedtime  insulin lispro (ADMELOG) corrective regimen sliding scale   SubCutaneous every 6 hours  montelukast 10 milliGRAM(s) Oral daily  saccharomyces boulardii 250 milliGRAM(s) Oral daily  senna 2 Tablet(s) Oral at bedtime  sertraline 100 milliGRAM(s) Oral daily    MEDICATIONS  (PRN):  acetaminophen     Tablet .. 325 milliGRAM(s) Oral every 8 hours PRN Temp greater or equal to 38C (100.4F), Mild Pain (1 - 3)  dextrose Oral Gel 15 Gram(s) Oral once PRN Blood Glucose LESS THAN 70 milliGRAM(s)/deciliter  melatonin 3 milliGRAM(s) Oral at bedtime PRN Insomnia  ondansetron Injectable 4 milliGRAM(s) IV Push every 12 hours PRN Nausea and/or Vomiting  oxyCODONE    IR 5 milliGRAM(s) Oral every 12 hours PRN Severe Pain (7 - 10)      FAMILY HISTORY:  No pertinent family history in first degree relatives        SOCIAL HISTORY: lives in [place] assisted living    CIGARETTES: quit many years ago  DRUGS: none  ALCOHOL: drinks wine almost nightly    REVIEW OF SYSTEMS:    CONSTITUTIONAL: No fever, weight loss, chills, shakes, or fatigue  EYES: No eye pain, visual disturbances, or discharge  ENMT:  No difficulty hearing, tinnitus, vertigo; +epistaxis  NECK: No pain or stiffness  BREASTS: No pain, masses, or nipple discharge  RESPIRATORY: No cough or hemoptysis, + shortness of breath and wheezing (chronic)  CARDIOVASCULAR: No chest pain, dyspnea, palpitations, dizziness, syncope, paroxysmal nocturnal dyspnea, orthopnea, or arm or leg swelling  GASTROINTESTINAL: No abdominal  or epigastric pain, nausea, vomiting, hematemesis, diarrhea, constipation, melena or bright red blood.  GENITOURINARY: No dysuria, nocturia, hematuria,   + urinary incontinence since prostate resection for cancer  NEUROLOGICAL: No headaches, memory loss, slurred speech, limb weakness, loss of strength, numbness + resting tremor  lower leg neuropathy  SKIN: No itching, burning, rashes, or lesions> Hx of right foot gangrene with surgery  LYMPH NODES: No enlarged glands  ENDOCRINE: No heat or cold intolerance, or hair loss  MUSCULOSKELETAL: No joint pain or swelling, muscle, back, or extremity pain  PSYCHIATRIC: No depression or difficulty sleeping  +anxiety  HEME/LYMPH: No easy bruising or bleeding gums  +epistaxis  ALLERGY AND IMMUNOLOGIC: No hives or rash.      Vital Signs Last 24 Hrs  T(C): 36.6 (12 Jan 2024 12:21), Max: 36.7 (11 Jan 2024 20:56)  T(F): 97.8 (12 Jan 2024 12:21), Max: 98.1 (12 Jan 2024 02:00)  HR: 90 (12 Jan 2024 12:21) (49 - 96)  BP: 139/68 (12 Jan 2024 12:21) (119/73 - 145/64)  BP(mean): --  RR: 18 (12 Jan 2024 12:21) (17 - 18)  SpO2: 99% (12 Jan 2024 12:21) (95% - 100%)    Parameters below as of 12 Jan 2024 12:21  Patient On (Oxygen Delivery Method): room air        PHYSICAL EXAM:    GENERAL: In no apparent distress, well nourished, and hydrated.  HEAD:  Atraumatic, Normocephalic  EYES: EOMI, PERRLA, conjunctiva and sclera clear  ENMT: No tonsillar erythema, exudates, or enlargement; Moist mucous membranes, Good dentition, No lesions  NECK: Supple and normal thyroid.  No JVD or carotid bruit.  Carotid pulse is 2+ bilaterally.  HEART: Regular rate and rhythm; No murmurs, rubs, or gallops.  PULMONARY: Clear to auscultation and perfusion.  No rales, wheezing, or rhonchi bilaterally.  ABDOMEN: Soft, Nontender, Nondistended; Bowel sounds present  EXTREMITIES:  2+ Peripheral Pulses, No clubbing, cyanosis, or edema  LYMPH: No lymphadenopathy noted  NEUROLOGICAL: Grossly nonfocal          INTERPRETATION OF TELEMETRY:  Normal sinus rhythm 70's.      ECG:    I&O's Detail    11 Jan 2024 07:01  -  12 Jan 2024 07:00  --------------------------------------------------------  IN:    Oral Fluid: 870 mL  Total IN: 870 mL    OUT:    Voided (mL): 401 mL  Total OUT: 401 mL    Total NET: 469 mL          LABS:                        7.7    6.09  )-----------( 207      ( 12 Jan 2024 08:53 )             24.5     01-12    140  |  104  |  14  ----------------------------<  192<H>  3.6   |  23  |  0.94    Ca    9.2      12 Jan 2024 08:53  Phos  3.5     01-12  Mg     1.80     01-12            Urinalysis Basic - ( 12 Jan 2024 08:53 )    Color: x / Appearance: x / SG: x / pH: x  Gluc: 192 mg/dL / Ketone: x  / Bili: x / Urobili: x   Blood: x / Protein: x / Nitrite: x   Leuk Esterase: x / RBC: x / WBC x   Sq Epi: x / Non Sq Epi: x / Bacteria: x      BNP  I&O's Detail    11 Jan 2024 07:01  -  12 Jan 2024 07:00  --------------------------------------------------------  IN:    Oral Fluid: 870 mL  Total IN: 870 mL    OUT:    Voided (mL): 401 mL  Total OUT: 401 mL    Total NET: 469 mL        Daily     Daily     RADIOLOGY & ADDITIONAL STUDIES:  PREVIOUS DIAGNOSTIC TESTING:      ECHO  FINDINg  < from: TTE W or WO Ultrasound Enhancing Agent (01.12.24 @ 16:27) >  TRANSTHORACIC ECHOCARDIOGRAM REPORT  ________________________________________________________________________________                                      _______       Pt. Name:       WAYNE SERRANO Study Date:    1/12/2024  MRN:            [id number]      YOB: 1950  Accession #:    [id number]     Age:           73 years  Account#:       [id number]      Gender:        M  Heart Rate:                   Height:        71.00 in (180.34 cm)  Rhythm:                       Weight:        215.00 lb (97.52 kg)  Blood Pressure: 139/68 mmHg   BSA/BMI:       2.17 m² / 29.99 kg/m²  ________________________________________________________________________________________  Referring Physician:    [id number] Mary Lemus MD  Interpreting Physician: Anurag Delatorre MD, PhD  Primary Sonographer:    Marylu Schilling [facility]    CPT:               ECHO TTE WO CON COMP W DOP - 24265.m;3D RECONST W/O                     WKSTATION - 84516.m  Indication(s):     Abnormal electrocardiogram ECG/EKG - R94.31  Procedure:         Transthoracic echocardiogram with 2-D, M-mode and complete                     spectral and color flow Doppler. Real time and full volume                     3-dimensional imaging performed at the echo machine.  Ordering Location: [facility]  Admission Status:  Inpatient  Study Information: Image quality for this study is good.    _______________________________________________________________________________________     CONCLUSIONS:      1. Left ventricular systolic function is normalwith an ejection fraction of 70 % by Valencia's method of disks.   2. Normal right ventricular cavity size, wall thickness, and normal systolic function. Tricuspid annular plane systolic excursion (TAPSE) is 2.5 cm (normal >=1.7 cm).   3. Mild mitral regurgitation.   4. Mild tricuspid regurgitation.   5. Estimated pulmonary artery systolic pressure is 39 mmHg, consistent with borderline pulmonary hypertension.   6. The inferior vena cava is normal in size (normal <2.1cm) with normal inspiratory collapse (normal >50%) consistent with normal right atrial pressure (~3, range 0-5mmHg).   7. No pericardial effusion seen.    ________________________________________________________________________________________  FINDINGS:  < from: TTE W or WO Ultrasound Enhancing Agent (01.12.24 @ 16:27) >  Left Ventricle:  The left ventricular cavity is normal. Left ventricular systolic function is normal with a calculated ejection fraction of 70 % by the Valencia's biplane method of disks. No left ventricular hypertrophy.     Right Ventricle:  The right ventricular cavity isnormal in size, normal wall thickness and normal systolic function. Tricuspid annular plane systolic excursion (TAPSE) is 2.5 cm (normal >=1.7 cm).     Left Atrium:  The left atrium is normal with an indexed volume of 20.19 ml/m².     Right Atrium:  The right atrium is normal in size with an indexed volume of 302.57 ml/m².     Aortic Valve:  The aortic valve is tricuspid with normal leaflet excursion. There is calcification of the aortic valve leaflets. There is trace aortic regurgitation.     Mitral Valve:  There is mitral valve thickening of the anterior and posterior leaflets. There is mild mitral regurgitation.     Tricuspid Valve:  Structurally normal tricuspid valve with normal leaflet excursion. There is mild tricuspid regurgitation. Estimated pulmonary artery systolic pressure is 39 mmHg, consistent with borderline pulmonary hypertension.     Pulmonic Valve:  Structurally normal pulmonic valve with normal leaflet excursion. There is no evidence of pulmonic regurgitation.   < from: TTE W or WO Ultrasound Enhancing Agent (01.12.24 @ 16:27) >  Pericardium:  No pericardial effusion seen.     Systemic Veins:  The inferior vena cava is normal in size (normal <2.1cm) with normal inspiratory collapse (normal >50%) consistent with normal right atrial pressure (~3, range 0-5mmHg).  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 Nasal packing by ENT and blood transfusion x 1 for Hgb 6.9. Patient was to undergo IR embolization d/t epistaxs at [facility] today. However, on transport telemetry patient  found to be sinus bradycardia with ventricular bigeminy. He remained A&O x3 and hemodynamically stable. No associated chest pain, palpitations, shortness of breath, or dizziness. States he has a long history of an arrhythmia, (reported as "an extra beat") in excess of 15 years,  for which he takes metoprolol.     EKG  from 1/9 reveals SR with 1st degree AVB and PVC's.  EKG 1/12/24: Sinus bradycardia with ventricular bigeminy. LAD. QRSd 84ms     Transfer cancelled and EP consulted.   Currently no epistaxis .         PAST MEDICAL & SURGICAL HISTORY:  Prostate cancer s/p resection  Type II diabetes mellitus  Chronic obstructive pulmonary disease (COPD)  CHF (congestive heart failure)- diastolic  Renal insufficiency  S/P right foot wound with surgery (gangrene)    ALLERGIES:  shellfish and contrast dye.     MEDICATIONS  (STANDING):  albuterol    0.083% 2.5 milliGRAM(s) Nebulizer every 6 hours  albuterol    90 MICROgram(s) HFA Inhaler 1 Puff(s) Inhalation every 4 hours  atorvastatin 80 milliGRAM(s) Oral at bedtime  budesonide 160 MICROgram(s)/formoterol 4.5 MICROgram(s) Inhaler 2 Puff(s) Inhalation two times a day  clonazePAM Oral Disintegrating Tablet 0.75 milliGRAM(s) Oral every 12 hours  dextrose 5%. 1000 milliLiter(s) (50 mL/Hr) IV Continuous <Continuous>  dextrose 5%. 1000 milliLiter(s) (100 mL/Hr) IV Continuous <Continuous>  dextrose 50% Injectable 25 Gram(s) IV Push once  dextrose 50% Injectable 25 Gram(s) IV Push once  dextrose 50% Injectable 12.5 Gram(s) IV Push once  glucagon  Injectable 1 milliGRAM(s) IntraMuscular once  influenza  Vaccine (HIGH DOSE) 0.7 milliLiter(s) IntraMuscular once  insulin glargine Injectable (LANTUS) 10 Unit(s) SubCutaneous at bedtime  insulin lispro (ADMELOG) corrective regimen sliding scale   SubCutaneous every 6 hours  montelukast 10 milliGRAM(s) Oral daily  saccharomyces boulardii 250 milliGRAM(s) Oral daily  senna 2 Tablet(s) Oral at bedtime  sertraline 100 milliGRAM(s) Oral daily    MEDICATIONS  (PRN):  acetaminophen     Tablet .. 325 milliGRAM(s) Oral every 8 hours PRN Temp greater or equal to 38C (100.4F), Mild Pain (1 - 3)  dextrose Oral Gel 15 Gram(s) Oral once PRN Blood Glucose LESS THAN 70 milliGRAM(s)/deciliter  melatonin 3 milliGRAM(s) Oral at bedtime PRN Insomnia  ondansetron Injectable 4 milliGRAM(s) IV Push every 12 hours PRN Nausea and/or Vomiting  oxyCODONE    IR 5 milliGRAM(s) Oral every 12 hours PRN Severe Pain (7 - 10)      FAMILY HISTORY:  No pertinent family history in first degree relatives        SOCIAL HISTORY: lives in [place] assisted living    CIGARETTES: quit many years ago  DRUGS: none  ALCOHOL: drinks wine almost nightly    REVIEW OF SYSTEMS:    CONSTITUTIONAL: No fever, weight loss, chills, shakes, or fatigue  EYES: No eye pain, visual disturbances, or discharge  ENMT:  No difficulty hearing, tinnitus, vertigo; +epistaxis  NECK: No pain or stiffness  BREASTS: No pain, masses, or nipple discharge  RESPIRATORY: No cough or hemoptysis, + shortness of breath and wheezing (chronic)  CARDIOVASCULAR: No chest pain, dyspnea, palpitations, dizziness, syncope, paroxysmal nocturnal dyspnea, orthopnea, or arm or leg swelling  GASTROINTESTINAL: No abdominal  or epigastric pain, nausea, vomiting, hematemesis, diarrhea, constipation, melena or bright red blood.  GENITOURINARY: No dysuria, nocturia, hematuria,   + urinary incontinence since prostate resection for cancer  NEUROLOGICAL: No headaches, memory loss, slurred speech, limb weakness, loss of strength, numbness + resting tremor  lower leg neuropathy  SKIN: No itching, burning, rashes, or lesions> Hx of right foot gangrene with surgery  LYMPH NODES: No enlarged glands  ENDOCRINE: No heat or cold intolerance, or hair loss  MUSCULOSKELETAL: No joint pain or swelling, muscle, back, or extremity pain  PSYCHIATRIC: No depression or difficulty sleeping  +anxiety  HEME/LYMPH: No easy bruising or bleeding gums  +epistaxis  ALLERGY AND IMMUNOLOGIC: No hives or rash.      Vital Signs Last 24 Hrs  T(C): 36.6 (12 Jan 2024 12:21), Max: 36.7 (11 Jan 2024 20:56)  T(F): 97.8 (12 Jan 2024 12:21), Max: 98.1 (12 Jan 2024 02:00)  HR: 90 (12 Jan 2024 12:21) (49 - 96)  BP: 139/68 (12 Jan 2024 12:21) (119/73 - 145/64)  BP(mean): --  RR: 18 (12 Jan 2024 12:21) (17 - 18)  SpO2: 99% (12 Jan 2024 12:21) (95% - 100%)    Parameters below as of 12 Jan 2024 12:21  Patient On (Oxygen Delivery Method): room air        PHYSICAL EXAM:    GENERAL: In no apparent distress, well nourished, and hydrated.  HEAD:  Atraumatic, Normocephalic  EYES: EOMI, PERRLA, conjunctiva and sclera clear  ENMT: No tonsillar erythema, exudates, or enlargement; Moist mucous membranes, Good dentition, No lesions  NECK: Supple and normal thyroid.  No JVD or carotid bruit.  Carotid pulse is 2+ bilaterally.  HEART: Regular rate and rhythm; No murmurs, rubs, or gallops.  PULMONARY: Clear to auscultation and perfusion.  No rales, wheezing, or rhonchi bilaterally.  ABDOMEN: Soft, Nontender, Nondistended; Bowel sounds present  EXTREMITIES:  2+ Peripheral Pulses, No clubbing, cyanosis, or edema  LYMPH: No lymphadenopathy noted  NEUROLOGICAL: Grossly nonfocal          INTERPRETATION OF TELEMETRY:  Normal sinus rhythm 70's.      ECG:    I&O's Detail    11 Jan 2024 07:01  -  12 Jan 2024 07:00  --------------------------------------------------------  IN:    Oral Fluid: 870 mL  Total IN: 870 mL    OUT:    Voided (mL): 401 mL  Total OUT: 401 mL    Total NET: 469 mL          LABS:                        7.7    6.09  )-----------( 207      ( 12 Jan 2024 08:53 )             24.5     01-12    140  |  104  |  14  ----------------------------<  192<H>  3.6   |  23  |  0.94    Ca    9.2      12 Jan 2024 08:53  Phos  3.5     01-12  Mg     1.80     01-12            Urinalysis Basic - ( 12 Jan 2024 08:53 )    Color: x / Appearance: x / SG: x / pH: x  Gluc: 192 mg/dL / Ketone: x  / Bili: x / Urobili: x   Blood: x / Protein: x / Nitrite: x   Leuk Esterase: x / RBC: x / WBC x   Sq Epi: x / Non Sq Epi: x / Bacteria: x      BNP  I&O's Detail    11 Jan 2024 07:01  -  12 Jan 2024 07:00  --------------------------------------------------------  IN:    Oral Fluid: 870 mL  Total IN: 870 mL    OUT:    Voided (mL): 401 mL  Total OUT: 401 mL    Total NET: 469 mL        Daily     Daily     RADIOLOGY & ADDITIONAL STUDIES:  PREVIOUS DIAGNOSTIC TESTING:      ECHO  FINDINg  < from: TTE W or WO Ultrasound Enhancing Agent (01.12.24 @ 16:27) >  TRANSTHORACIC ECHOCARDIOGRAM REPORT  ________________________________________________________________________________                                      _______       Pt. Name:       WAYNE SERRANO Study Date:    1/12/2024  MRN:            [id number]      YOB: 1950  Accession #:    [id number]     Age:           73 years  Account#:       [id number]      Gender:        M  Heart Rate:                   Height:        71.00 in (180.34 cm)  Rhythm:                       Weight:        215.00 lb (97.52 kg)  Blood Pressure: 139/68 mmHg   BSA/BMI:       2.17 m² / 29.99 kg/m²  ________________________________________________________________________________________  Referring Physician:    [id number] Mary Lemus MD  Interpreting Physician: Anurag Delatorre MD, PhD  Primary Sonographer:    Marylu Schilling [facility]    CPT:               ECHO TTE WO CON COMP W DOP - 61674.m;3D RECONST W/O                     WKSTATION - 85128.m  Indication(s):     Abnormal electrocardiogram ECG/EKG - R94.31  Procedure:         Transthoracic echocardiogram with 2-D, M-mode and complete                     spectral and color flow Doppler. Real time and full volume                     3-dimensional imaging performed at the echo machine.  Ordering Location: [facility]  Admission Status:  Inpatient  Study Information: Image quality for this study is good.    _______________________________________________________________________________________     CONCLUSIONS:      1. Left ventricular systolic function is normalwith an ejection fraction of 70 % by Valencia's method of disks.   2. Normal right ventricular cavity size, wall thickness, and normal systolic function. Tricuspid annular plane systolic excursion (TAPSE) is 2.5 cm (normal >=1.7 cm).   3. Mild mitral regurgitation.   4. Mild tricuspid regurgitation.   5. Estimated pulmonary artery systolic pressure is 39 mmHg, consistent with borderline pulmonary hypertension.   6. The inferior vena cava is normal in size (normal <2.1cm) with normal inspiratory collapse (normal >50%) consistent with normal right atrial pressure (~3, range 0-5mmHg).   7. No pericardial effusion seen.    ________________________________________________________________________________________  FINDINGS:  < from: TTE W or WO Ultrasound Enhancing Agent (01.12.24 @ 16:27) >  Left Ventricle:  The left ventricular cavity is normal. Left ventricular systolic function is normal with a calculated ejection fraction of 70 % by the Valencia's biplane method of disks. No left ventricular hypertrophy.     Right Ventricle:  The right ventricular cavity isnormal in size, normal wall thickness and normal systolic function. Tricuspid annular plane systolic excursion (TAPSE) is 2.5 cm (normal >=1.7 cm).     Left Atrium:  The left atrium is normal with an indexed volume of 20.19 ml/m².     Right Atrium:  The right atrium is normal in size with an indexed volume of 302.57 ml/m².     Aortic Valve:  The aortic valve is tricuspid with normal leaflet excursion. There is calcification of the aortic valve leaflets. There is trace aortic regurgitation.     Mitral Valve:  There is mitral valve thickening of the anterior and posterior leaflets. There is mild mitral regurgitation.     Tricuspid Valve:  Structurally normal tricuspid valve with normal leaflet excursion. There is mild tricuspid regurgitation. Estimated pulmonary artery systolic pressure is 39 mmHg, consistent with borderline pulmonary hypertension.     Pulmonic Valve:  Structurally normal pulmonic valve with normal leaflet excursion. There is no evidence of pulmonic regurgitation.   < from: TTE W or WO Ultrasound Enhancing Agent (01.12.24 @ 16:27) >  Pericardium:  No pericardial effusion seen.     Systemic Veins:  The inferior vena cava is normal in size (normal <2.1cm) with normal inspiratory collapse (normal >50%) consistent with normal right atrial pressure (~3, range 0-5mmHg).  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 Nasal packing by ENT and blood transfusion x 1 for Hgb 6.9. Patient was to undergo IR embolization d/t epistaxs at [facility] today. However, on transport telemetry patient  found to be sinus bradycardia with ventricular bigeminy. He remained A&O x3 and hemodynamically stable. No associated chest pain, palpitations, shortness of breath, or dizziness. States he has a long history of an arrhythmia, (reported as "an extra beat") in excess of 15 years,  for which he takes metoprolol.     EKG  from 1/9 reveals SR with 1st degree AVB and PVC's.  EKG 1/12/24: Sinus bradycardia with ventricular bigeminy. LAD. QRSd 84ms     Transfer cancelled and EP consulted.   Currently no epistaxis .         PAST MEDICAL & SURGICAL HISTORY:  Prostate cancer s/p resection  Type II diabetes mellitus  Chronic obstructive pulmonary disease (COPD)  CHF (congestive heart failure)- diastolic  Renal insufficiency  S/P right foot wound with surgery (gangrene)    ALLERGIES:  shellfish and contrast dye.     MEDICATIONS  (STANDING):  albuterol    0.083% 2.5 milliGRAM(s) Nebulizer every 6 hours  albuterol    90 MICROgram(s) HFA Inhaler 1 Puff(s) Inhalation every 4 hours  atorvastatin 80 milliGRAM(s) Oral at bedtime  budesonide 160 MICROgram(s)/formoterol 4.5 MICROgram(s) Inhaler 2 Puff(s) Inhalation two times a day  clonazePAM Oral Disintegrating Tablet 0.75 milliGRAM(s) Oral every 12 hours  dextrose 5%. 1000 milliLiter(s) (50 mL/Hr) IV Continuous <Continuous>  dextrose 5%. 1000 milliLiter(s) (100 mL/Hr) IV Continuous <Continuous>  dextrose 50% Injectable 25 Gram(s) IV Push once  dextrose 50% Injectable 25 Gram(s) IV Push once  dextrose 50% Injectable 12.5 Gram(s) IV Push once  glucagon  Injectable 1 milliGRAM(s) IntraMuscular once  influenza  Vaccine (HIGH DOSE) 0.7 milliLiter(s) IntraMuscular once  insulin glargine Injectable (LANTUS) 10 Unit(s) SubCutaneous at bedtime  insulin lispro (ADMELOG) corrective regimen sliding scale   SubCutaneous every 6 hours  montelukast 10 milliGRAM(s) Oral daily  saccharomyces boulardii 250 milliGRAM(s) Oral daily  senna 2 Tablet(s) Oral at bedtime  sertraline 100 milliGRAM(s) Oral daily    MEDICATIONS  (PRN):  acetaminophen     Tablet .. 325 milliGRAM(s) Oral every 8 hours PRN Temp greater or equal to 38C (100.4F), Mild Pain (1 - 3)  dextrose Oral Gel 15 Gram(s) Oral once PRN Blood Glucose LESS THAN 70 milliGRAM(s)/deciliter  melatonin 3 milliGRAM(s) Oral at bedtime PRN Insomnia  ondansetron Injectable 4 milliGRAM(s) IV Push every 12 hours PRN Nausea and/or Vomiting  oxyCODONE    IR 5 milliGRAM(s) Oral every 12 hours PRN Severe Pain (7 - 10)      FAMILY HISTORY:  No pertinent family history in first degree relatives        SOCIAL HISTORY: lives in [place] assisted living    CIGARETTES: quit many years ago  DRUGS: none  ALCOHOL: drinks wine almost nightly    REVIEW OF SYSTEMS:    CONSTITUTIONAL: No fever, weight loss, chills, shakes, or fatigue  EYES: No eye pain, visual disturbances, or discharge  ENMT:  No difficulty hearing, tinnitus, vertigo; +epistaxis  NECK: No pain or stiffness  BREASTS: No pain, masses, or nipple discharge  RESPIRATORY: No cough or hemoptysis, + shortness of breath and wheezing (chronic)  CARDIOVASCULAR: No chest pain, dyspnea, palpitations, dizziness, syncope, paroxysmal nocturnal dyspnea, orthopnea, or arm or leg swelling  GASTROINTESTINAL: No abdominal  or epigastric pain, nausea, vomiting, hematemesis, diarrhea, constipation, melena or bright red blood.  GENITOURINARY: No dysuria, nocturia, hematuria,   + urinary incontinence since prostate resection for cancer  NEUROLOGICAL: No headaches, memory loss, slurred speech, limb weakness, loss of strength, numbness + resting tremor  lower leg neuropathy  SKIN: No itching, burning, rashes, or lesions> Hx of right foot gangrene with surgery  LYMPH NODES: No enlarged glands  ENDOCRINE: No heat or cold intolerance, or hair loss  MUSCULOSKELETAL: No joint pain or swelling, muscle, back, or extremity pain  PSYCHIATRIC: No depression or difficulty sleeping  +anxiety  HEME/LYMPH: No easy bruising or bleeding gums  +epistaxis  ALLERGY AND IMMUNOLOGIC: No hives or rash.      Vital Signs Last 24 Hrs  T(C): 36.6 (12 Jan 2024 12:21), Max: 36.7 (11 Jan 2024 20:56)  T(F): 97.8 (12 Jan 2024 12:21), Max: 98.1 (12 Jan 2024 02:00)  HR: 90 (12 Jan 2024 12:21) (49 - 96)  BP: 139/68 (12 Jan 2024 12:21) (119/73 - 145/64)  BP(mean): --  RR: 18 (12 Jan 2024 12:21) (17 - 18)  SpO2: 99% (12 Jan 2024 12:21) (95% - 100%)    Parameters below as of 12 Jan 2024 12:21  Patient On (Oxygen Delivery Method): room air        PHYSICAL EXAM:    GENERAL: In no apparent distress, well nourished, and hydrated.  HEAD:  Atraumatic, Normocephalic  EYES: EOMI, PERRLA, conjunctiva and sclera clear  ENMT: No tonsillar erythema, exudates, or enlargement; Moist mucous membranes, Good dentition, No lesions  NECK: Supple and normal thyroid.  No JVD or carotid bruit.  Carotid pulse is 2+ bilaterally.  HEART: Regular rate and rhythm; No murmurs, rubs, or gallops.  PULMONARY: Clear to auscultation and perfusion.  No rales, wheezing, or rhonchi bilaterally.  ABDOMEN: Soft, Nontender, Nondistended; Bowel sounds present  EXTREMITIES:  2+ Peripheral Pulses, No clubbing, cyanosis, or edema  LYMPH: No lymphadenopathy noted  NEUROLOGICAL: Grossly nonfocal          INTERPRETATION OF TELEMETRY:  Normal sinus rhythm 70's.      ECG:    I&O's Detail    11 Jan 2024 07:01  -  12 Jan 2024 07:00  --------------------------------------------------------  IN:    Oral Fluid: 870 mL  Total IN: 870 mL    OUT:    Voided (mL): 401 mL  Total OUT: 401 mL    Total NET: 469 mL          LABS:                        7.7    6.09  )-----------( 207      ( 12 Jan 2024 08:53 )             24.5     01-12    140  |  104  |  14  ----------------------------<  192<H>  3.6   |  23  |  0.94    Ca    9.2      12 Jan 2024 08:53  Phos  3.5     01-12  Mg     1.80     01-12            Urinalysis Basic - ( 12 Jan 2024 08:53 )    Color: x / Appearance: x / SG: x / pH: x  Gluc: 192 mg/dL / Ketone: x  / Bili: x / Urobili: x   Blood: x / Protein: x / Nitrite: x   Leuk Esterase: x / RBC: x / WBC x   Sq Epi: x / Non Sq Epi: x / Bacteria: x      BNP  I&O's Detail    11 Jan 2024 07:01  -  12 Jan 2024 07:00  --------------------------------------------------------  IN:    Oral Fluid: 870 mL  Total IN: 870 mL    OUT:    Voided (mL): 401 mL  Total OUT: 401 mL    Total NET: 469 mL        Daily     Daily     RADIOLOGY & ADDITIONAL STUDIES:  PREVIOUS DIAGNOSTIC TESTING:      ECHO  FINDINg  < from: TTE W or WO Ultrasound Enhancing Agent (01.12.24 @ 16:27) >  TRANSTHORACIC ECHOCARDIOGRAM REPORT  ________________________________________________________________________________                                      _______       Pt. Name:       WAYNE SERRANO Study Date:    1/12/2024  MRN:            [id number]      YOB: 1950  Accession #:    [id number]     Age:           73 years  Account#:       [id number]      Gender:        M  Heart Rate:                   Height:        71.00 in (180.34 cm)  Rhythm:                       Weight:        215.00 lb (97.52 kg)  Blood Pressure: 139/68 mmHg   BSA/BMI:       2.17 m² / 29.99 kg/m²  ________________________________________________________________________________________  Referring Physician:    [id number] Mary Lemus MD  Interpreting Physician: Anurag Delatorre MD, PhD  Primary Sonographer:    Marylu Schilling [facility]    CPT:               ECHO TTE WO CON COMP W DOP - 47670.m;3D RECONST W/O                     WKSTATION - 67693.m  Indication(s):     Abnormal electrocardiogram ECG/EKG - R94.31  Procedure:         Transthoracic echocardiogram with 2-D, M-mode and complete                     spectral and color flow Doppler. Real time and full volume                     3-dimensional imaging performed at the echo machine.  Ordering Location: [facility]  Admission Status:  Inpatient  Study Information: Image quality for this study is good.    _______________________________________________________________________________________     CONCLUSIONS:      1. Left ventricular systolic function is normal with an ejection fraction of 70 % by Valencia's method of disks.   2. Normal right ventricular cavity size, wall thickness, and normal systolic function. Tricuspid annular plane systolic excursion (TAPSE) is 2.5 cm (normal >=1.7 cm).   3. Mild mitral regurgitation.   4. Mild tricuspid regurgitation.   5. Estimated pulmonary artery systolic pressure is 39 mmHg, consistent with borderline pulmonary hypertension.   6. The inferior vena cava is normal in size (normal <2.1cm) with normal inspiratory collapse (normal >50%) consistent with normal right atrial pressure (~3, range 0-5mmHg).   7. No pericardial effusion seen.    ________________________________________________________________________________________  FINDINGS:  < from: TTE W or WO Ultrasound Enhancing Agent (01.12.24 @ 16:27) >  Left Ventricle:  The left ventricular cavity is normal. Left ventricular systolic function is normal with a calculated ejection fraction of 70 % by the Valencia's biplane method of disks. No left ventricular hypertrophy.     Right Ventricle:  The right ventricular cavity isnormal in size, normal wall thickness and normal systolic function. Tricuspid annular plane systolic excursion (TAPSE) is 2.5 cm (normal >=1.7 cm).     Left Atrium:  The left atrium is normal with an indexed volume of 20.19 ml/m².     Right Atrium:  The right atrium is normal in size with an indexed volume of 302.57 ml/m².     Aortic Valve:  The aortic valve is tricuspid with normal leaflet excursion. There is calcification of the aortic valve leaflets. There is trace aortic regurgitation.     Mitral Valve:  There is mitral valve thickening of the anterior and posterior leaflets. There is mild mitral regurgitation.     Tricuspid Valve:  Structurally normal tricuspid valve with normal leaflet excursion. There is mild tricuspid regurgitation. Estimated pulmonary artery systolic pressure is 39 mmHg, consistent with borderline pulmonary hypertension.     Pulmonic Valve:  Structurally normal pulmonic valve with normal leaflet excursion. There is no evidence of pulmonic regurgitation.   < from: TTE W or WO Ultrasound Enhancing Agent (01.12.24 @ 16:27) >  Pericardium:  No pericardial effusion seen.     Systemic Veins:  The inferior vena cava is normal in size (normal <2.1cm) with normal inspiratory collapse (normal >50%) consistent with normal right atrial pressure (~3, range 0-5mmHg).  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 Nasal packing by ENT and blood transfusion x 1 for Hgb 6.9. Patient was to undergo IR embolization d/t epistaxs at [facility] today. However, on transport telemetry patient  found to be sinus bradycardia with ventricular bigeminy. He remained A&O x3 and hemodynamically stable. No associated chest pain, palpitations, shortness of breath, or dizziness. States he has a long history of an arrhythmia, (reported as "an extra beat") in excess of 15 years,  for which he takes metoprolol.     EKG  from 1/9 reveals SR with 1st degree AVB and PVC's.  EKG 1/12/24: Sinus bradycardia with ventricular bigeminy. LAD. QRSd 84ms     Transfer cancelled and EP consulted.   Currently no epistaxis .         PAST MEDICAL & SURGICAL HISTORY:  Prostate cancer s/p resection  Type II diabetes mellitus  Chronic obstructive pulmonary disease (COPD)  CHF (congestive heart failure)- diastolic  Renal insufficiency  S/P right foot wound with surgery (gangrene)    ALLERGIES:  shellfish and contrast dye.     MEDICATIONS  (STANDING):  albuterol    0.083% 2.5 milliGRAM(s) Nebulizer every 6 hours  albuterol    90 MICROgram(s) HFA Inhaler 1 Puff(s) Inhalation every 4 hours  atorvastatin 80 milliGRAM(s) Oral at bedtime  budesonide 160 MICROgram(s)/formoterol 4.5 MICROgram(s) Inhaler 2 Puff(s) Inhalation two times a day  clonazePAM Oral Disintegrating Tablet 0.75 milliGRAM(s) Oral every 12 hours  dextrose 5%. 1000 milliLiter(s) (50 mL/Hr) IV Continuous <Continuous>  dextrose 5%. 1000 milliLiter(s) (100 mL/Hr) IV Continuous <Continuous>  dextrose 50% Injectable 25 Gram(s) IV Push once  dextrose 50% Injectable 25 Gram(s) IV Push once  dextrose 50% Injectable 12.5 Gram(s) IV Push once  glucagon  Injectable 1 milliGRAM(s) IntraMuscular once  influenza  Vaccine (HIGH DOSE) 0.7 milliLiter(s) IntraMuscular once  insulin glargine Injectable (LANTUS) 10 Unit(s) SubCutaneous at bedtime  insulin lispro (ADMELOG) corrective regimen sliding scale   SubCutaneous every 6 hours  montelukast 10 milliGRAM(s) Oral daily  saccharomyces boulardii 250 milliGRAM(s) Oral daily  senna 2 Tablet(s) Oral at bedtime  sertraline 100 milliGRAM(s) Oral daily    MEDICATIONS  (PRN):  acetaminophen     Tablet .. 325 milliGRAM(s) Oral every 8 hours PRN Temp greater or equal to 38C (100.4F), Mild Pain (1 - 3)  dextrose Oral Gel 15 Gram(s) Oral once PRN Blood Glucose LESS THAN 70 milliGRAM(s)/deciliter  melatonin 3 milliGRAM(s) Oral at bedtime PRN Insomnia  ondansetron Injectable 4 milliGRAM(s) IV Push every 12 hours PRN Nausea and/or Vomiting  oxyCODONE    IR 5 milliGRAM(s) Oral every 12 hours PRN Severe Pain (7 - 10)      FAMILY HISTORY:  No pertinent family history in first degree relatives        SOCIAL HISTORY: lives in [place] assisted [facility]    CIGARETTES: quit many years ago  DRUGS: none  ALCOHOL: drinks wine almost nightly    REVIEW OF SYSTEMS:    CONSTITUTIONAL: No fever, weight loss, chills, shakes, or fatigue  EYES: No eye pain, visual disturbances, or discharge  ENMT:  No difficulty hearing, tinnitus, vertigo; +epistaxis  NECK: No pain or stiffness  BREASTS: No pain, masses, or nipple discharge  RESPIRATORY: No cough or hemoptysis, + shortness of breath and wheezing (chronic)  CARDIOVASCULAR: No chest pain, dyspnea, palpitations, dizziness, syncope, paroxysmal nocturnal dyspnea, orthopnea, or arm or leg swelling  GASTROINTESTINAL: No abdominal  or epigastric pain, nausea, vomiting, hematemesis, diarrhea, constipation, melena or bright red blood.  GENITOURINARY: No dysuria, nocturia, hematuria,   + urinary incontinence since prostate resection for cancer  NEUROLOGICAL: No headaches, memory loss, slurred speech, limb weakness, loss of strength, numbness + resting tremor  lower leg neuropathy  SKIN: No itching, burning, rashes, or lesions> Hx of right foot gangrene with surgery  LYMPH NODES: No enlarged glands  ENDOCRINE: No heat or cold intolerance, or hair loss  MUSCULOSKELETAL: No joint pain or swelling, muscle, back, or extremity pain  PSYCHIATRIC: No depression or difficulty sleeping  +anxiety  HEME/LYMPH: No easy bruising or bleeding gums  +epistaxis  ALLERGY AND IMMUNOLOGIC: No hives or rash.      Vital Signs Last 24 Hrs  T(C): 36.6 (12 Jan 2024 12:21), Max: 36.7 (11 Jan 2024 20:56)  T(F): 97.8 (12 Jan 2024 12:21), Max: 98.1 (12 Jan 2024 02:00)  HR: 90 (12 Jan 2024 12:21) (49 - 96)  BP: 139/68 (12 Jan 2024 12:21) (119/73 - 145/64)  BP(mean): --  RR: 18 (12 Jan 2024 12:21) (17 - 18)  SpO2: 99% (12 Jan 2024 12:21) (95% - 100%)    Parameters below as of 12 Jan 2024 12:21  Patient On (Oxygen Delivery Method): room air        PHYSICAL EXAM:    GENERAL: In no apparent distress, well nourished, and hydrated.  HEAD:  Atraumatic, Normocephalic  EYES: EOMI, PERRLA, conjunctiva and sclera clear  ENMT: No tonsillar erythema, exudates, or enlargement; Moist mucous membranes, Good dentition, No lesions  NECK: Supple and normal thyroid.  No JVD or carotid bruit.  Carotid pulse is 2+ bilaterally.  HEART: Regular rate and rhythm; No murmurs, rubs, or gallops.  PULMONARY: Clear to auscultation and perfusion.  No rales, wheezing, or rhonchi bilaterally.  ABDOMEN: Soft, Nontender, Nondistended; Bowel sounds present  EXTREMITIES:  2+ Peripheral Pulses, No clubbing, cyanosis, or edema  LYMPH: No lymphadenopathy noted  NEUROLOGICAL: Grossly nonfocal          INTERPRETATION OF TELEMETRY:  Normal sinus rhythm 70's.      ECG:    I&O's Detail    11 Jan 2024 07:01  -  12 Jan 2024 07:00  --------------------------------------------------------  IN:    Oral Fluid: 870 mL  Total IN: 870 mL    OUT:    Voided (mL): 401 mL  Total OUT: 401 mL    Total NET: 469 mL          LABS:                        7.7    6.09  )-----------( 207      ( 12 Jan 2024 08:53 )             24.5     01-12    140  |  104  |  14  ----------------------------<  192<H>  3.6   |  23  |  0.94    Ca    9.2      12 Jan 2024 08:53  Phos  3.5     01-12  Mg     1.80     01-12            Urinalysis Basic - ( 12 Jan 2024 08:53 )    Color: x / Appearance: x / SG: x / pH: x  Gluc: 192 mg/dL / Ketone: x  / Bili: x / Urobili: x   Blood: x / Protein: x / Nitrite: x   Leuk Esterase: x / RBC: x / WBC x   Sq Epi: x / Non Sq Epi: x / Bacteria: x      BNP  I&O's Detail    11 Jan 2024 07:01  -  12 Jan 2024 07:00  --------------------------------------------------------  IN:    Oral Fluid: 870 mL  Total IN: 870 mL    OUT:    Voided (mL): 401 mL  Total OUT: 401 mL    Total NET: 469 mL        Daily     Daily     RADIOLOGY & ADDITIONAL STUDIES:  PREVIOUS DIAGNOSTIC TESTING:      ECHO  FINDINg  < from: TTE W or WO Ultrasound Enhancing Agent (01.12.24 @ 16:27) >  TRANSTHORACIC ECHOCARDIOGRAM REPORT  ________________________________________________________________________________                                      _______       Pt. Name:       WAYNE SERRANO Study Date:    1/12/2024  MRN:            [id number]      YOB: 1950  Accession #:    [id number]     Age:           73 years  Account#:       [id number]      Gender:        M  Heart Rate:                   Height:        71.00 in (180.34 cm)  Rhythm:                       Weight:        215.00 lb (97.52 kg)  Blood Pressure: 139/68 mmHg   BSA/BMI:       2.17 m² / 29.99 kg/m²  ________________________________________________________________________________________  Referring Physician:    [id number] Mary Lemus MD  Interpreting Physician: Anurag Delatorre MD, PhD  Primary Sonographer:    Marylu Schilling [facility]    CPT:               ECHO TTE WO CON COMP W DOP - 56211.m;3D RECONST W/O                     WKSTATION - 14210.m  Indication(s):     Abnormal electrocardiogram ECG/EKG - R94.31  Procedure:         Transthoracic echocardiogram with 2-D, M-mode and complete                     spectral and color flow Doppler. Real time and full volume                     3-dimensional imaging performed at the echo machine.  Ordering Location: [facility]  Admission Status:  Inpatient  Study Information: Image quality for this study is good.    _______________________________________________________________________________________     CONCLUSIONS:      1. Left ventricular systolic function is normal with an ejection fraction of 70 % by Valencia's method of disks.   2. Normal right ventricular cavity size, wall thickness, and normal systolic function. Tricuspid annular plane systolic excursion (TAPSE) is 2.5 cm (normal >=1.7 cm).   3. Mild mitral regurgitation.   4. Mild tricuspid regurgitation.   5. Estimated pulmonary artery systolic pressure is 39 mmHg, consistent with borderline pulmonary hypertension.   6. The inferior vena cava is normal in size (normal <2.1cm) with normal inspiratory collapse (normal >50%) consistent with normal right atrial pressure (~3, range 0-5mmHg).   7. No pericardial effusion seen.    ________________________________________________________________________________________  FINDINGS:  < from: TTE W or WO Ultrasound Enhancing Agent (01.12.24 @ 16:27) >  Left Ventricle:  The left ventricular cavity is normal. Left ventricular systolic function is normal with a calculated ejection fraction of 70 % by the Valencia's biplane method of disks. No left ventricular hypertrophy.     Right Ventricle:  The right ventricular cavity isnormal in size, normal wall thickness and normal systolic function. Tricuspid annular plane systolic excursion (TAPSE) is 2.5 cm (normal >=1.7 cm).     Left Atrium:  The left atrium is normal with an indexed volume of 20.19 ml/m².     Right Atrium:  The right atrium is normal in size with an indexed volume of 302.57 ml/m².     Aortic Valve:  The aortic valve is tricuspid with normal leaflet excursion. There is calcification of the aortic valve leaflets. There is trace aortic regurgitation.     Mitral Valve:  There is mitral valve thickening of the anterior and posterior leaflets. There is mild mitral regurgitation.     Tricuspid Valve:  Structurally normal tricuspid valve with normal leaflet excursion. There is mild tricuspid regurgitation. Estimated pulmonary artery systolic pressure is 39 mmHg, consistent with borderline pulmonary hypertension.     Pulmonic Valve:  Structurally normal pulmonic valve with normal leaflet excursion. There is no evidence of pulmonic regurgitation.   < from: TTE W or WO Ultrasound Enhancing Agent (01.12.24 @ 16:27) >  Pericardium:  No pericardial effusion seen.     Systemic Veins:  The inferior vena cava is normal in size (normal <2.1cm) with normal inspiratory collapse (normal >50%) consistent with normal right atrial pressure (~3, range 0-5mmHg).  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 Nasal packing by ENT and blood transfusion x 1 for Hgb 6.9. Patient was to undergo IR embolization d/t epistaxs at [facility] today. However, on transport telemetry patient  found to be sinus bradycardia with ventricular bigeminy. He remained A&O x3 and hemodynamically stable. No associated chest pain, palpitations, shortness of breath, or dizziness. States he has a long history of an arrhythmia, (reported as "an extra beat") in excess of 15 years,  for which he takes metoprolol.     EKG  from 1/9 reveals SR with borderline 1st degree AVB and PVC's.    EKG 1/12/24: Sinus bradycardia with ventricular bigeminy. LAD. QRSd 84ms     Transfer cancelled and EP consulted.   Currently no epistaxis .         PAST MEDICAL & SURGICAL HISTORY:  Prostate cancer s/p resection  Type II diabetes mellitus  Chronic obstructive pulmonary disease (COPD)  CHF (congestive heart failure)- diastolic  Renal insufficiency  S/P right foot wound with surgery (gangrene)    ALLERGIES:  shellfish and contrast dye.     MEDICATIONS  (STANDING):  albuterol    0.083% 2.5 milliGRAM(s) Nebulizer every 6 hours  albuterol    90 MICROgram(s) HFA Inhaler 1 Puff(s) Inhalation every 4 hours  atorvastatin 80 milliGRAM(s) Oral at bedtime  budesonide 160 MICROgram(s)/formoterol 4.5 MICROgram(s) Inhaler 2 Puff(s) Inhalation two times a day  clonazePAM Oral Disintegrating Tablet 0.75 milliGRAM(s) Oral every 12 hours  dextrose 5%. 1000 milliLiter(s) (50 mL/Hr) IV Continuous <Continuous>  dextrose 5%. 1000 milliLiter(s) (100 mL/Hr) IV Continuous <Continuous>  dextrose 50% Injectable 25 Gram(s) IV Push once  dextrose 50% Injectable 25 Gram(s) IV Push once  dextrose 50% Injectable 12.5 Gram(s) IV Push once  glucagon  Injectable 1 milliGRAM(s) IntraMuscular once  influenza  Vaccine (HIGH DOSE) 0.7 milliLiter(s) IntraMuscular once  insulin glargine Injectable (LANTUS) 10 Unit(s) SubCutaneous at bedtime  insulin lispro (ADMELOG) corrective regimen sliding scale   SubCutaneous every 6 hours  montelukast 10 milliGRAM(s) Oral daily  saccharomyces boulardii 250 milliGRAM(s) Oral daily  senna 2 Tablet(s) Oral at bedtime  sertraline 100 milliGRAM(s) Oral daily    MEDICATIONS  (PRN):  acetaminophen     Tablet .. 325 milliGRAM(s) Oral every 8 hours PRN Temp greater or equal to 38C (100.4F), Mild Pain (1 - 3)  dextrose Oral Gel 15 Gram(s) Oral once PRN Blood Glucose LESS THAN 70 milliGRAM(s)/deciliter  melatonin 3 milliGRAM(s) Oral at bedtime PRN Insomnia  ondansetron Injectable 4 milliGRAM(s) IV Push every 12 hours PRN Nausea and/or Vomiting  oxyCODONE    IR 5 milliGRAM(s) Oral every 12 hours PRN Severe Pain (7 - 10)      FAMILY HISTORY:  No pertinent family history in first degree relatives        SOCIAL HISTORY: lives in [place] assisted [facility]    CIGARETTES: quit many years ago  DRUGS: none  ALCOHOL: drinks wine almost nightly    REVIEW OF SYSTEMS:    CONSTITUTIONAL: No fever, weight loss, chills, shakes, or fatigue  EYES: No eye pain, visual disturbances, or discharge  ENMT:  No difficulty hearing, tinnitus, vertigo; +epistaxis  NECK: No pain or stiffness  BREASTS: No pain, masses, or nipple discharge  RESPIRATORY: No cough or hemoptysis, + shortness of breath and wheezing (chronic)  CARDIOVASCULAR: No chest pain, dyspnea, palpitations, dizziness, syncope, paroxysmal nocturnal dyspnea, orthopnea, or arm or leg swelling  GASTROINTESTINAL: No abdominal  or epigastric pain, nausea, vomiting, hematemesis, diarrhea, constipation, melena or bright red blood.  GENITOURINARY: No dysuria, nocturia, hematuria,   + urinary incontinence since prostate resection for cancer  NEUROLOGICAL: No headaches, memory loss, slurred speech, limb weakness, loss of strength, numbness + resting tremor  lower leg neuropathy  SKIN: No itching, burning, rashes, or lesions> Hx of right foot gangrene with surgery  LYMPH NODES: No enlarged glands  ENDOCRINE: No heat or cold intolerance, or hair loss  MUSCULOSKELETAL: No joint pain or swelling, muscle, back, or extremity pain  PSYCHIATRIC: No depression or difficulty sleeping  +anxiety  HEME/LYMPH: No easy bruising or bleeding gums  +epistaxis  ALLERGY AND IMMUNOLOGIC: No hives or rash.      Vital Signs Last 24 Hrs  T(C): 36.6 (12 Jan 2024 12:21), Max: 36.7 (11 Jan 2024 20:56)  T(F): 97.8 (12 Jan 2024 12:21), Max: 98.1 (12 Jan 2024 02:00)  HR: 90 (12 Jan 2024 12:21) (49 - 96)  BP: 139/68 (12 Jan 2024 12:21) (119/73 - 145/64)  BP(mean): --  RR: 18 (12 Jan 2024 12:21) (17 - 18)  SpO2: 99% (12 Jan 2024 12:21) (95% - 100%)    Parameters below as of 12 Jan 2024 12:21  Patient On (Oxygen Delivery Method): room air        PHYSICAL EXAM:    GENERAL: In no apparent distress, well nourished, and hydrated.  HEAD:  Atraumatic, Normocephalic  EYES: EOMI, PERRLA, conjunctiva and sclera clear  ENMT: No tonsillar erythema, exudates, or enlargement; Moist mucous membranes, Good dentition, No lesions  NECK: Supple and normal thyroid.  No JVD or carotid bruit.  Carotid pulse is 2+ bilaterally.  HEART: Regular rate and rhythm; No murmurs, rubs, or gallops.  PULMONARY: Clear to auscultation and perfusion.  No rales, wheezing, or rhonchi bilaterally.  ABDOMEN: Soft, Nontender, Nondistended; Bowel sounds present  EXTREMITIES:  2+ Peripheral Pulses, No clubbing, cyanosis, or edema  LYMPH: No lymphadenopathy noted  NEUROLOGICAL: Grossly nonfocal          INTERPRETATION OF TELEMETRY:  Normal sinus rhythm 70's.      ECG:    I&O's Detail    11 Jan 2024 07:01  -  12 Jan 2024 07:00  --------------------------------------------------------  IN:    Oral Fluid: 870 mL  Total IN: 870 mL    OUT:    Voided (mL): 401 mL  Total OUT: 401 mL    Total NET: 469 mL          LABS:                        7.7    6.09  )-----------( 207      ( 12 Jan 2024 08:53 )             24.5     01-12    140  |  104  |  14  ----------------------------<  192<H>  3.6   |  23  |  0.94    Ca    9.2      12 Jan 2024 08:53  Phos  3.5     01-12  Mg     1.80     01-12            Urinalysis Basic - ( 12 Jan 2024 08:53 )    Color: x / Appearance: x / SG: x / pH: x  Gluc: 192 mg/dL / Ketone: x  / Bili: x / Urobili: x   Blood: x / Protein: x / Nitrite: x   Leuk Esterase: x / RBC: x / WBC x   Sq Epi: x / Non Sq Epi: x / Bacteria: x      BNP  I&O's Detail    11 Jan 2024 07:01  -  12 Jan 2024 07:00  --------------------------------------------------------  IN:    Oral Fluid: 870 mL  Total IN: 870 mL    OUT:    Voided (mL): 401 mL  Total OUT: 401 mL    Total NET: 469 mL        Daily     Daily     RADIOLOGY & ADDITIONAL STUDIES:  PREVIOUS DIAGNOSTIC TESTING:      ECHO  FINDINg  < from: TTE W or WO Ultrasound Enhancing Agent (01.12.24 @ 16:27) >  TRANSTHORACIC ECHOCARDIOGRAM REPORT  ________________________________________________________________________________                                      _______       Pt. Name:       WAYNE SERRANO Study Date:    1/12/2024  MRN:            [id number]      YOB: 1950  Accession #:    [id number]     Age:           73 years  Account#:       [id number]      Gender:        M  Heart Rate:                   Height:        71.00 in (180.34 cm)  Rhythm:                       Weight:        215.00 lb (97.52 kg)  Blood Pressure: 139/68 mmHg   BSA/BMI:       2.17 m² / 29.99 kg/m²  ________________________________________________________________________________________  Referring Physician:    [id number] Mary Lemus MD  Interpreting Physician: Anurag Delatorre MD, PhD  Primary Sonographer:    Marylu Schilling [facility]    CPT:               ECHO TTE WO CON COMP W [facility] - 04159.m;3D RECONST W/O                     WKSTATION - 33211.m  Indication(s):     Abnormal electrocardiogram ECG/EKG - R94.31  Procedure:         Transthoracic echocardiogram with 2-D, M-mode and complete                     spectral and color flow Doppler. Real time and full volume                     3-dimensional imaging performed at the echo machine.  Ordering Location: [facility]  Admission Status:  Inpatient  Study Information: Image quality for this study is good.    _______________________________________________________________________________________     CONCLUSIONS:      1. Left ventricular systolic function is normal with an ejection fraction of 70 % by Valencia's method of disks.   2. Normal right ventricular cavity size, wall thickness, and normal systolic function. Tricuspid annular plane systolic excursion (TAPSE) is 2.5 cm (normal >=1.7 cm).   3. Mild mitral regurgitation.   4. Mild tricuspid regurgitation.   5. Estimated pulmonary artery systolic pressure is 39 mmHg, consistent with borderline pulmonary hypertension.   6. The inferior vena cava is normal in size (normal <2.1cm) with normal inspiratory collapse (normal >50%) consistent with normal right atrial pressure (~3, range 0-5mmHg).   7. No pericardial effusion seen.    ________________________________________________________________________________________  FINDINGS:  Left Ventricle:  The left ventricular cavity is normal. Left ventricular systolic function is normal with a calculated ejection fraction of 70 % by the Valencia's biplane method of disks. No left ventricular hypertrophy.     Right Ventricle:  The right ventricular cavity isnormal in size, normal wall thickness and normal systolic function. Tricuspid annular plane systolic excursion (TAPSE) is 2.5 cm (normal >=1.7 cm).     Left Atrium:  The left atrium is normal with an indexed volume of 20.19 ml/m².     Right Atrium:  The right atrium is normal in size with an indexed volume of 302.57 ml/m².     Aortic Valve:  The aortic valve is tricuspid with normal leaflet excursion. There is calcification of the aortic valve leaflets. There is trace aortic regurgitation.     Mitral Valve:  There is mitral valve thickening of the anterior and posterior leaflets. There is mild mitral regurgitation.     Tricuspid Valve:  Structurally normal tricuspid valve with normal leaflet excursion. There is mild tricuspid regurgitation. Estimated pulmonary artery systolic pressure is 39 mmHg, consistent with borderline pulmonary hypertension.     Pulmonic Valve:  Structurally normal pulmonic valve with normal leaflet excursion. There is no evidence of pulmonic regurgitation.   < from: TTE W or WO Ultrasound Enhancing Agent (01.12.24 @ 16:27) >  Pericardium:  No pericardial effusion seen.     Systemic Veins:  The inferior vena cava is normal in size (normal <2.1cm) with normal inspiratory collapse (normal >50%) consistent with normal right atrial pressure (~3, range 0-5mmHg).  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 Nasal packing by ENT and blood transfusion x 1 for Hgb 6.9. Patient was to undergo IR embolization d/t epistaxs at [facility] today. However, on transport telemetry patient  found to be sinus bradycardia with ventricular bigeminy. He remained A&O x3 and hemodynamically stable. No associated chest pain, palpitations, shortness of breath, or dizziness. States he has a long history of an arrhythmia, (reported as "an extra beat") in excess of 15 years,  for which he takes metoprolol.     EKG  from 1/9 reveals SR with borderline 1st degree AVB and PVC's.    EKG 1/12/24: Sinus bradycardia with ventricular bigeminy. LAD. QRSd 84ms     Transfer cancelled and EP consulted.   Currently no epistaxis .         PAST MEDICAL & SURGICAL HISTORY:  Prostate cancer s/p resection  Type II diabetes mellitus  Chronic obstructive pulmonary disease (COPD)  CHF (congestive heart failure)- diastolic  Renal insufficiency  S/P right foot wound with surgery (gangrene)    ALLERGIES:  shellfish and contrast dye.     MEDICATIONS  (STANDING):  albuterol    0.083% 2.5 milliGRAM(s) Nebulizer every 6 hours  albuterol    90 MICROgram(s) HFA Inhaler 1 Puff(s) Inhalation every 4 hours  atorvastatin 80 milliGRAM(s) Oral at bedtime  budesonide 160 MICROgram(s)/formoterol 4.5 MICROgram(s) Inhaler 2 Puff(s) Inhalation two times a day  clonazePAM Oral Disintegrating Tablet 0.75 milliGRAM(s) Oral every 12 hours  dextrose 5%. 1000 milliLiter(s) (50 mL/Hr) IV Continuous <Continuous>  dextrose 5%. 1000 milliLiter(s) (100 mL/Hr) IV Continuous <Continuous>  dextrose 50% Injectable 25 Gram(s) IV Push once  dextrose 50% Injectable 25 Gram(s) IV Push once  dextrose 50% Injectable 12.5 Gram(s) IV Push once  glucagon  Injectable 1 milliGRAM(s) IntraMuscular once  influenza  Vaccine (HIGH DOSE) 0.7 milliLiter(s) IntraMuscular once  insulin glargine Injectable (LANTUS) 10 Unit(s) SubCutaneous at bedtime  insulin lispro (ADMELOG) corrective regimen sliding scale   SubCutaneous every 6 hours  montelukast 10 milliGRAM(s) Oral daily  saccharomyces boulardii 250 milliGRAM(s) Oral daily  senna 2 Tablet(s) Oral at bedtime  sertraline 100 milliGRAM(s) Oral daily    MEDICATIONS  (PRN):  acetaminophen     Tablet .. 325 milliGRAM(s) Oral every 8 hours PRN Temp greater or equal to 38C (100.4F), Mild Pain (1 - 3)  dextrose Oral Gel 15 Gram(s) Oral once PRN Blood Glucose LESS THAN 70 milliGRAM(s)/deciliter  melatonin 3 milliGRAM(s) Oral at bedtime PRN Insomnia  ondansetron Injectable 4 milliGRAM(s) IV Push every 12 hours PRN Nausea and/or Vomiting  oxyCODONE    IR 5 milliGRAM(s) Oral every 12 hours PRN Severe Pain (7 - 10)      FAMILY HISTORY:  No pertinent family history in first degree relatives        SOCIAL HISTORY: lives in [facility]    CIGARETTES: quit many years ago  DRUGS: none  ALCOHOL: drinks wine almost nightly    REVIEW OF SYSTEMS:    CONSTITUTIONAL: No fever, weight loss, chills, shakes, or fatigue  EYES: No eye pain, visual disturbances, or discharge  ENMT:  No difficulty hearing, tinnitus, vertigo; +epistaxis  NECK: No pain or stiffness  BREASTS: No pain, masses, or nipple discharge  RESPIRATORY: No cough or hemoptysis, + shortness of breath and wheezing (chronic)  CARDIOVASCULAR: No chest pain, dyspnea, palpitations, dizziness, syncope, paroxysmal nocturnal dyspnea, orthopnea, or arm or leg swelling  GASTROINTESTINAL: No abdominal  or epigastric pain, nausea, vomiting, hematemesis, diarrhea, constipation, melena or bright red blood.  GENITOURINARY: No dysuria, nocturia, hematuria,   + urinary incontinence since prostate resection for cancer  NEUROLOGICAL: No headaches, memory loss, slurred speech, limb weakness, loss of strength, numbness + resting tremor  lower leg neuropathy  SKIN: No itching, burning, rashes, or lesions> Hx of right foot gangrene with surgery  LYMPH NODES: No enlarged glands  ENDOCRINE: No heat or cold intolerance, or hair loss  MUSCULOSKELETAL: No joint pain or swelling, muscle, back, or extremity pain  PSYCHIATRIC: No depression or difficulty sleeping  +anxiety  HEME/LYMPH: No easy bruising or bleeding gums  +epistaxis  ALLERGY AND IMMUNOLOGIC: No hives or rash.      Vital Signs Last 24 Hrs  T(C): 36.6 (12 Jan 2024 12:21), Max: 36.7 (11 Jan 2024 20:56)  T(F): 97.8 (12 Jan 2024 12:21), Max: 98.1 (12 Jan 2024 02:00)  HR: 90 (12 Jan 2024 12:21) (49 - 96)  BP: 139/68 (12 Jan 2024 12:21) (119/73 - 145/64)  BP(mean): --  RR: 18 (12 Jan 2024 12:21) (17 - 18)  SpO2: 99% (12 Jan 2024 12:21) (95% - 100%)    Parameters below as of 12 Jan 2024 12:21  Patient On (Oxygen Delivery Method): room air        PHYSICAL EXAM:    GENERAL: In no apparent distress, well nourished, and hydrated.  HEAD:  Atraumatic, Normocephalic  EYES: EOMI, PERRLA, conjunctiva and sclera clear  ENMT: No tonsillar erythema, exudates, or enlargement; Moist mucous membranes, Good dentition, No lesions  NECK: Supple and normal thyroid.  No JVD or carotid bruit.  Carotid pulse is 2+ bilaterally.  HEART: Regular rate and rhythm; No murmurs, rubs, or gallops.  PULMONARY: Clear to auscultation and perfusion.  No rales, wheezing, or rhonchi bilaterally.  ABDOMEN: Soft, Nontender, Nondistended; Bowel sounds present  EXTREMITIES:  2+ Peripheral Pulses, No clubbing, cyanosis, or edema  LYMPH: No lymphadenopathy noted  NEUROLOGICAL: Grossly nonfocal          INTERPRETATION OF TELEMETRY:  Normal sinus rhythm 70's.      ECG:    I&O's Detail    11 Jan 2024 07:01  -  12 Jan 2024 07:00  --------------------------------------------------------  IN:    Oral Fluid: 870 mL  Total IN: 870 mL    OUT:    Voided (mL): 401 mL  Total OUT: 401 mL    Total NET: 469 mL          LABS:                        7.7    6.09  )-----------( 207      ( 12 Jan 2024 08:53 )             24.5     01-12    140  |  104  |  14  ----------------------------<  192<H>  3.6   |  23  |  0.94    Ca    9.2      12 Jan 2024 08:53  Phos  3.5     01-12  Mg     1.80     01-12            Urinalysis Basic - ( 12 Jan 2024 08:53 )    Color: x / Appearance: x / SG: x / pH: x  Gluc: 192 mg/dL / Ketone: x  / Bili: x / Urobili: x   Blood: x / Protein: x / Nitrite: x   Leuk Esterase: x / RBC: x / WBC x   Sq Epi: x / Non Sq Epi: x / Bacteria: x      BNP  I&O's Detail    11 Jan 2024 07:01  -  12 Jan 2024 07:00  --------------------------------------------------------  IN:    Oral Fluid: 870 mL  Total IN: 870 mL    OUT:    Voided (mL): 401 mL  Total OUT: 401 mL    Total NET: 469 mL        Daily     Daily     RADIOLOGY & ADDITIONAL STUDIES:  PREVIOUS DIAGNOSTIC TESTING:      ECHO  FINDINg  < from: TTE W or WO Ultrasound Enhancing Agent (01.12.24 @ 16:27) >  TRANSTHORACIC ECHOCARDIOGRAM REPORT  ________________________________________________________________________________                                      _______       Pt. Name:       WAYNE SERRANO Study Date:    1/12/2024  MRN:            [id number]      YOB: 1950  Accession #:    [id number]     Age:           73 years  Account#:       [id number]      Gender:        M  Heart Rate:                   Height:        71.00 in (180.34 cm)  Rhythm:                       Weight:        215.00 lb (97.52 kg)  Blood Pressure: 139/68 mmHg   BSA/BMI:       2.17 m² / 29.99 kg/m²  ________________________________________________________________________________________  Referring Physician:    [id number] Mary Lemus MD  Interpreting Physician: Anurag Delatorre MD, PhD  Primary Sonographer:    Marylu Schilling [facility]    CPT:               ECHO TTE WO CON COMP W [facility] - 50091.m;3D RECONST W/O                     [facility] - 28499.m  Indication(s):     Abnormal electrocardiogram ECG/EKG - R94.31  Procedure:         Transthoracic echocardiogram with 2-D, M-mode and complete                     spectral and color flow Doppler. Real time and full volume                     3-dimensional imaging performed at the echo machine.  Ordering Location: [facility]  Admission Status:  Inpatient  Study Information: Image quality for this study is good.    _______________________________________________________________________________________     CONCLUSIONS:      1. Left ventricular systolic function is normal with an ejection fraction of 70 % by Valencia's method of disks.   2. Normal right ventricular cavity size, wall thickness, and normal systolic function. Tricuspid annular plane systolic excursion (TAPSE) is 2.5 cm (normal >=1.7 cm).   3. Mild mitral regurgitation.   4. Mild tricuspid regurgitation.   5. Estimated pulmonary artery systolic pressure is 39 mmHg, consistent with borderline pulmonary hypertension.   6. The inferior vena cava is normal in size (normal <2.1cm) with normal inspiratory collapse (normal >50%) consistent with normal right atrial pressure (~3, range 0-5mmHg).   7. No pericardial effusion seen.    ________________________________________________________________________________________  FINDINGS:  Left Ventricle:  The left ventricular cavity is normal. Left ventricular systolic function is normal with a calculated ejection fraction of 70 % by the Valencia's biplane method of disks. No left ventricular hypertrophy.     Right Ventricle:  The right ventricular cavity isnormal in size, normal wall thickness and normal systolic function. Tricuspid annular plane systolic excursion (TAPSE) is 2.5 cm (normal >=1.7 cm).     Left Atrium:  The left atrium is normal with an indexed volume of 20.19 ml/m².     Right Atrium:  The right atrium is normal in size with an indexed volume of 302.57 ml/m².     Aortic Valve:  The aortic valve is tricuspid with normal leaflet excursion. There is calcification of the aortic valve leaflets. There is trace aortic regurgitation.     Mitral Valve:  There is mitral valve thickening of the anterior and posterior leaflets. There is mild mitral regurgitation.     Tricuspid Valve:  Structurally normal tricuspid valve with normal leaflet excursion. There is mild tricuspid regurgitation. Estimated pulmonary artery systolic pressure is 39 mmHg, consistent with borderline pulmonary hypertension.     Pulmonic Valve:  Structurally normal pulmonic valve with normal leaflet excursion. There is no evidence of pulmonic regurgitation.   < from: TTE W or WO Ultrasound Enhancing Agent (01.12.24 @ 16:27) >  Pericardium:  No pericardial effusion seen.     Systemic Veins:  The inferior vena cava is normal in size (normal <2.1cm) with normal inspiratory collapse (normal >50%) consistent with normal right atrial pressure (~3, range 0-5mmHg).  ____________________________________________________________________

## 2024-01-12 NOTE — PROVIDER CONTACT NOTE (OTHER) - NAME OF MD/NP/PA/DO NOTIFIED:
[facility] ext [phone] Marilee Griffin [facility] ext [phone] Marilee Griffin

## 2024-01-12 NOTE — PROGRESS NOTE ADULT - PROBLEM SELECTOR PLAN 3
-as above  -lopressor 12.5 mg BID  -lipitor 80 for HLD      DM2-  sliding scale  adding on 10 units of Lantus given uncontrolled blood sugars   goal blood sugar 140-200 -transfuse Hg <7 or if hypotensive  -s/p 20mg IV lasix  1/11 Hgb 7.7  f/u hgb today

## 2024-01-12 NOTE — CHART NOTE - NSCHARTNOTEFT_GEN_A_CORE
Patient case reviewed with EP NP, okay to resume diet, will hold metoprolol for now, and f/u TTE, Attending updated on recommendations.

## 2024-01-12 NOTE — PROGRESS NOTE ADULT - PROBLEM SELECTOR PLAN 7
F-none  E-replete  N-NPO  hold AC given anemia, no SCD given right foot wound    1/11/24 called Lida [phone] and updated F-none  E-replete  N-NPO  hold AC given anemia, no SCD given right foot wound    1/11/24 called Lida [phone] and updated

## 2024-01-13 LAB
ANION GAP SERPL CALC-SCNC: 16 MMOL/L — HIGH (ref 7–14)
ANION GAP SERPL CALC-SCNC: 16 MMOL/L — HIGH (ref 7–14)
BUN SERPL-MCNC: 12 MG/DL — SIGNIFICANT CHANGE UP (ref 7–23)
BUN SERPL-MCNC: 12 MG/DL — SIGNIFICANT CHANGE UP (ref 7–23)
CALCIUM SERPL-MCNC: 9.1 MG/DL — SIGNIFICANT CHANGE UP (ref 8.4–10.5)
CALCIUM SERPL-MCNC: 9.1 MG/DL — SIGNIFICANT CHANGE UP (ref 8.4–10.5)
CHLORIDE SERPL-SCNC: 103 MMOL/L — SIGNIFICANT CHANGE UP (ref 98–107)
CHLORIDE SERPL-SCNC: 103 MMOL/L — SIGNIFICANT CHANGE UP (ref 98–107)
CO2 SERPL-SCNC: 19 MMOL/L — LOW (ref 22–31)
CO2 SERPL-SCNC: 19 MMOL/L — LOW (ref 22–31)
CREAT SERPL-MCNC: 0.94 MG/DL — SIGNIFICANT CHANGE UP (ref 0.5–1.3)
CREAT SERPL-MCNC: 0.94 MG/DL — SIGNIFICANT CHANGE UP (ref 0.5–1.3)
EGFR: 86 ML/MIN/1.73M2 — SIGNIFICANT CHANGE UP
EGFR: 86 ML/MIN/1.73M2 — SIGNIFICANT CHANGE UP
GLUCOSE BLDC GLUCOMTR-MCNC: 238 MG/DL — HIGH (ref 70–99)
GLUCOSE BLDC GLUCOMTR-MCNC: 238 MG/DL — HIGH (ref 70–99)
GLUCOSE BLDC GLUCOMTR-MCNC: 255 MG/DL — HIGH (ref 70–99)
GLUCOSE BLDC GLUCOMTR-MCNC: 255 MG/DL — HIGH (ref 70–99)
GLUCOSE BLDC GLUCOMTR-MCNC: 259 MG/DL — HIGH (ref 70–99)
GLUCOSE BLDC GLUCOMTR-MCNC: 259 MG/DL — HIGH (ref 70–99)
GLUCOSE BLDC GLUCOMTR-MCNC: 367 MG/DL — HIGH (ref 70–99)
GLUCOSE BLDC GLUCOMTR-MCNC: 367 MG/DL — HIGH (ref 70–99)
GLUCOSE SERPL-MCNC: 181 MG/DL — HIGH (ref 70–99)
GLUCOSE SERPL-MCNC: 181 MG/DL — HIGH (ref 70–99)
HCT VFR BLD CALC: 24.5 % — LOW (ref 39–50)
HCT VFR BLD CALC: 24.5 % — LOW (ref 39–50)
HGB BLD-MCNC: 7.8 G/DL — LOW (ref 13–17)
HGB BLD-MCNC: 7.8 G/DL — LOW (ref 13–17)
MAGNESIUM SERPL-MCNC: 1.8 MG/DL — SIGNIFICANT CHANGE UP (ref 1.6–2.6)
MAGNESIUM SERPL-MCNC: 1.8 MG/DL — SIGNIFICANT CHANGE UP (ref 1.6–2.6)
MCHC RBC-ENTMCNC: 28.1 PG — SIGNIFICANT CHANGE UP (ref 27–34)
MCHC RBC-ENTMCNC: 28.1 PG — SIGNIFICANT CHANGE UP (ref 27–34)
MCHC RBC-ENTMCNC: 31.8 GM/DL — LOW (ref 32–36)
MCHC RBC-ENTMCNC: 31.8 GM/DL — LOW (ref 32–36)
MCV RBC AUTO: 88.1 FL — SIGNIFICANT CHANGE UP (ref 80–100)
MCV RBC AUTO: 88.1 FL — SIGNIFICANT CHANGE UP (ref 80–100)
NRBC # BLD: 0 /100 WBCS — SIGNIFICANT CHANGE UP (ref 0–0)
NRBC # BLD: 0 /100 WBCS — SIGNIFICANT CHANGE UP (ref 0–0)
NRBC # FLD: 0 K/UL — SIGNIFICANT CHANGE UP (ref 0–0)
NRBC # FLD: 0 K/UL — SIGNIFICANT CHANGE UP (ref 0–0)
PHOSPHATE SERPL-MCNC: 3.7 MG/DL — SIGNIFICANT CHANGE UP (ref 2.5–4.5)
PHOSPHATE SERPL-MCNC: 3.7 MG/DL — SIGNIFICANT CHANGE UP (ref 2.5–4.5)
PLATELET # BLD AUTO: 222 K/UL — SIGNIFICANT CHANGE UP (ref 150–400)
PLATELET # BLD AUTO: 222 K/UL — SIGNIFICANT CHANGE UP (ref 150–400)
POTASSIUM SERPL-MCNC: 3.7 MMOL/L — SIGNIFICANT CHANGE UP (ref 3.5–5.3)
POTASSIUM SERPL-MCNC: 3.7 MMOL/L — SIGNIFICANT CHANGE UP (ref 3.5–5.3)
POTASSIUM SERPL-SCNC: 3.7 MMOL/L — SIGNIFICANT CHANGE UP (ref 3.5–5.3)
POTASSIUM SERPL-SCNC: 3.7 MMOL/L — SIGNIFICANT CHANGE UP (ref 3.5–5.3)
RBC # BLD: 2.78 M/UL — LOW (ref 4.2–5.8)
RBC # BLD: 2.78 M/UL — LOW (ref 4.2–5.8)
RBC # FLD: 14.6 % — HIGH (ref 10.3–14.5)
RBC # FLD: 14.6 % — HIGH (ref 10.3–14.5)
SODIUM SERPL-SCNC: 138 MMOL/L — SIGNIFICANT CHANGE UP (ref 135–145)
SODIUM SERPL-SCNC: 138 MMOL/L — SIGNIFICANT CHANGE UP (ref 135–145)
WBC # BLD: 5.65 K/UL — SIGNIFICANT CHANGE UP (ref 3.8–10.5)
WBC # BLD: 5.65 K/UL — SIGNIFICANT CHANGE UP (ref 3.8–10.5)
WBC # FLD AUTO: 5.65 K/UL — SIGNIFICANT CHANGE UP (ref 3.8–10.5)
WBC # FLD AUTO: 5.65 K/UL — SIGNIFICANT CHANGE UP (ref 3.8–10.5)

## 2024-01-13 PROCEDURE — 99233 SBSQ HOSP IP/OBS HIGH 50: CPT

## 2024-01-13 RX ADMIN — ALBUTEROL 2.5 MILLIGRAM(S): 90 AEROSOL, METERED ORAL at 04:19

## 2024-01-13 RX ADMIN — Medication 3: at 12:16

## 2024-01-13 RX ADMIN — SERTRALINE 100 MILLIGRAM(S): 25 TABLET, FILM COATED ORAL at 12:16

## 2024-01-13 RX ADMIN — OXYCODONE HYDROCHLORIDE 5 MILLIGRAM(S): 5 TABLET ORAL at 12:47

## 2024-01-13 RX ADMIN — Medication 12.5 MILLIGRAM(S): at 05:02

## 2024-01-13 RX ADMIN — Medication 5: at 18:40

## 2024-01-13 RX ADMIN — ALBUTEROL 2.5 MILLIGRAM(S): 90 AEROSOL, METERED ORAL at 21:33

## 2024-01-13 RX ADMIN — Medication 3: at 22:05

## 2024-01-13 RX ADMIN — Medication 3 MILLIGRAM(S): at 01:25

## 2024-01-13 RX ADMIN — ALBUTEROL 2.5 MILLIGRAM(S): 90 AEROSOL, METERED ORAL at 14:40

## 2024-01-13 RX ADMIN — Medication 0.75 MILLIGRAM(S): at 05:02

## 2024-01-13 RX ADMIN — BUDESONIDE AND FORMOTEROL FUMARATE DIHYDRATE 2 PUFF(S): 160; 4.5 AEROSOL RESPIRATORY (INHALATION) at 08:52

## 2024-01-13 RX ADMIN — BUDESONIDE AND FORMOTEROL FUMARATE DIHYDRATE 2 PUFF(S): 160; 4.5 AEROSOL RESPIRATORY (INHALATION) at 22:05

## 2024-01-13 RX ADMIN — ALBUTEROL 2.5 MILLIGRAM(S): 90 AEROSOL, METERED ORAL at 08:06

## 2024-01-13 RX ADMIN — ATORVASTATIN CALCIUM 80 MILLIGRAM(S): 80 TABLET, FILM COATED ORAL at 22:04

## 2024-01-13 RX ADMIN — SENNA PLUS 2 TABLET(S): 8.6 TABLET ORAL at 22:04

## 2024-01-13 RX ADMIN — OXYCODONE HYDROCHLORIDE 5 MILLIGRAM(S): 5 TABLET ORAL at 13:17

## 2024-01-13 RX ADMIN — INSULIN GLARGINE 10 UNIT(S): 100 INJECTION, SOLUTION SUBCUTANEOUS at 22:06

## 2024-01-13 RX ADMIN — MONTELUKAST 10 MILLIGRAM(S): 4 TABLET, CHEWABLE ORAL at 12:15

## 2024-01-13 RX ADMIN — Medication 12.5 MILLIGRAM(S): at 18:46

## 2024-01-13 RX ADMIN — Medication 2: at 08:51

## 2024-01-13 RX ADMIN — Medication 0.75 MILLIGRAM(S): at 18:44

## 2024-01-13 NOTE — PROGRESS NOTE ADULT - PROBLEM SELECTOR PLAN 1
-appreciate ENT, packing done  -elevated bed 45 degrees, continuous pulse ox  -RCRI 1, mets about 2-3, sob w/ walking 1 block  -EKG Qtc 480, left axis, no ST elevation or pathologic Q waves medically optimized for procedure.  -complete keflex course  from: MR Angio Head No Cont (01.10.24 @ 15:39) >  IMPRESSION:  MRA NECK: Unremarkable study.  MRA HEAD: Unremarkable study.  -plan to transfer to [facility] for intervention (IR embolization)---> on 1/12 then return to [facility] as per Transfer center.  1/12 [facility] 40[facility], EP called. Proceedure cancelled -appreciate ENT, packing done  -elevated bed 45 degrees, continuous pulse ox  -RCRI 1, mets about 2-3, sob w/ walking 1 block  -EKG Qtc 480, left axis, no ST elevation or pathologic Q waves medically optimized for procedure.  -complete keflex course  from: MR Angio Head No Cont (01.10.24 @ 15:39) >  IMPRESSION:  MRA NECK: Unremarkable study.  MRA HEAD: Unremarkable study.  -plan to transfer to [facility] for intervention (IR embolization)---> on 1/12 then return to [facility] as per Transfer center.  1/12 [address] 40s, EP called. Proceedure cancelled -appreciate ENT, packing done  -elevated bed 45 degrees, continuous pulse ox  -RCRI 1, mets about 2-3, sob w/ walking 1 block  -EKG Qtc 480, left axis, no ST elevation or pathologic Q waves medically optimized for procedure.  -complete keflex course  from: MR Angio Head No Cont (01.10.24 @ 15:39) >  IMPRESSION:  MRA NECK: Unremarkable study.  MRA HEAD: Unremarkable study.  -plan to transfer to [facility] for intervention (IR embolization)---> on 1/12 then return to [facility] as per Transfer center.  1/12 [facility] 40s, EP called. Procedure cancelled  1/13 Hgb 7.8 , ENT to f/u ree need for procedure. -appreciate ENT, packing done  -elevated bed 45 degrees, continuous pulse ox  -RCRI 1, mets about 2-3, sob w/ walking 1 block  -EKG Qtc 480, left axis, no ST elevation or pathologic Q waves medically optimized for procedure.  -complete keflex course  from: MR Angio Head No Cont (01.10.24 @ 15:39) >  IMPRESSION:  MRA NECK: Unremarkable study.  MRA HEAD: Unremarkable study.  -plan to transfer to [facility] for intervention (IR embolization)---> on 1/12 then return to [facility] as per Transfer center.  1/12 [facility]ino 40s, EP called. Procedure cancelled  1/13 Hgb 7.8 , ENT to f/u ree need for procedure. -appreciate ENT, packing done  -elevated bed 45 degrees, continuous pulse ox  -RCRI 1, mets about 2-3, sob w/ walking 1 block  -EKG Qtc 480, left axis, no ST elevation or pathologic Q waves medically optimized for procedure.  -complete keflex course  from: MR Angio Head No Cont (01.10.24 @ 15:39) >  IMPRESSION:  MRA NECK: Unremarkable study.  MRA HEAD: Unremarkable study.  -plan to transfer to [facility] for intervention (IR embolization)---> on 1/12 then return to [facility] as per Transfer center.  1/12 [facility] 40s, EP called. Procedure cancelled  1/13 Hgb 7.8 , ENT to f/u ree need for procedure. [place] at [phone] -appreciate ENT, packing done  -elevated bed 45 degrees, continuous pulse ox  -RCRI 1, mets about 2-3, sob w/ walking 1 block  -EKG Qtc 480, left axis, no ST elevation or pathologic Q waves medically optimized for procedure.  -complete keflex course  from: MR Angio Head No Cont (01.10.24 @ 15:39) >  IMPRESSION:  MRA NECK: Unremarkable study.  MRA HEAD: Unremarkable study.  -plan to transfer to [facility] for intervention (IR embolization)---> on 1/12 then return to [facility] as per Transfer center.  1/12 [address] 40s, EP called. Procedure cancelled  1/13 Hgb 7.8 , ENT to f/u ree need for procedure. [place]oke [place] Antionette at [phone]

## 2024-01-13 NOTE — PROGRESS NOTE ADULT - SUBJECTIVE AND OBJECTIVE BOX
f/u EP/ card Patient is a 73y old  Male who presents with a chief complaint of epistaxis (13 Jan 2024 11:09)      SUBJECTIVE / OVERNIGHT EVENTS:  No more epistaxis.  Ablation on hold.  Hgb 7.8 today    MEDICATIONS  (STANDING):  albuterol    0.083% 2.5 milliGRAM(s) Nebulizer every 6 hours  albuterol    90 MICROgram(s) HFA Inhaler 1 Puff(s) Inhalation every 4 hours  atorvastatin 80 milliGRAM(s) Oral at bedtime  budesonide 160 MICROgram(s)/formoterol 4.5 MICROgram(s) Inhaler 2 Puff(s) Inhalation two times a day  clonazePAM Oral Disintegrating Tablet 0.75 milliGRAM(s) Oral every 12 hours  dextrose 5%. 1000 milliLiter(s) (50 mL/Hr) IV Continuous <Continuous>  dextrose 5%. 1000 milliLiter(s) (100 mL/Hr) IV Continuous <Continuous>  dextrose 50% Injectable 12.5 Gram(s) IV Push once  dextrose 50% Injectable 25 Gram(s) IV Push once  dextrose 50% Injectable 25 Gram(s) IV Push once  glucagon  Injectable 1 milliGRAM(s) IntraMuscular once  influenza  Vaccine (HIGH DOSE) 0.7 milliLiter(s) IntraMuscular once  insulin glargine Injectable (LANTUS) 10 Unit(s) SubCutaneous at bedtime  insulin lispro (ADMELOG) corrective regimen sliding scale   SubCutaneous four times a day before meals  metoprolol tartrate 12.5 milliGRAM(s) Oral every 12 hours  montelukast 10 milliGRAM(s) Oral daily  saccharomyces boulardii 250 milliGRAM(s) Oral daily  senna 2 Tablet(s) Oral at bedtime  sertraline 100 milliGRAM(s) Oral daily    MEDICATIONS  (PRN):  acetaminophen     Tablet .. 325 milliGRAM(s) Oral every 8 hours PRN Temp greater or equal to 38C (100.4F), Mild Pain (1 - 3)  dextrose Oral Gel 15 Gram(s) Oral once PRN Blood Glucose LESS THAN 70 milliGRAM(s)/deciliter  melatonin 3 milliGRAM(s) Oral at bedtime PRN Insomnia  ondansetron Injectable 4 milliGRAM(s) IV Push every 12 hours PRN Nausea and/or Vomiting  oxyCODONE    IR 5 milliGRAM(s) Oral every 12 hours PRN Severe Pain (7 - 10)        CAPILLARY BLOOD GLUCOSE  POCT Blood Glucose.: 259 mg/dL (13 Jan 2024 12:02)  POCT Blood Glucose.: 238 mg/dL (13 Jan 2024 08:39)  POCT Blood Glucose.: 154 mg/dL (12 Jan 2024 21:39)  POCT Blood Glucose.: 164 mg/dL (12 Jan 2024 17:42)    I&O's Summary    Vital Signs Last 24 Hrs  T(C): 36.8 (13 Jan 2024 05:00), Max: 37.2 (12 Jan 2024 20:25)  T(F): 98.3 (13 Jan 2024 05:00), Max: 98.9 (12 Jan 2024 20:25)  HR: 64 (13 Jan 2024 08:16) (62 - 92)  BP: 142/51 (13 Jan 2024 05:00) (110/56 - 160/67)  BP(mean): 83 (13 Jan 2024 01:00) (83 - 83)  RR: 18 (13 Jan 2024 05:00) (18 - 21)  SpO2: 98% (13 Jan 2024 08:16) (95% - 98%)    Parameters below as of 13 Jan 2024 08:16  Patient On (Oxygen Delivery Method): room air      PHYSICAL EXAM:  GENERAL: NAD,   HEAD:  Atraumatic, Normocephalic  EYES: EOMI, PERRLA, conjunctiva and sclera clear  NECK: Supple, No JVD  CHEST/LUNG: Clear to auscultation bilaterally; No wheeze  HEART: Regular rate and rhythm; No murmurs, rubs, or gallops  ABDOMEN: Soft, Nontender, Nondistended; Bowel sounds present  EXTREMITIES:  2+ Peripheral Pulses, No clubbing, cyanosis, or edema  R foot dressing  PSYCH: AAOx3  NEUROLOGY: non-focal  SKIN: No rashes or lesions    LABS:                        7.8    5.65  )-----------( 222      ( 13 Jan 2024 06:30 )             24.5     01-13    138  |  103  |  12  ----------------------------<  181<H>  3.7   |  19<L>  |  0.94    Ca    9.1      13 Jan 2024 06:30  Phos  3.7     01-13  Mg     1.80     01-13            Care Discussed with Consultants/Other Providers:  F/u EP/ Card Patient is a 73y old  Male who presents with a chief complaint of epistaxis (13 Jan 2024 11:09)      SUBJECTIVE / OVERNIGHT EVENTS:  No more epistaxis.  Ablation on hold.  Hgb 7.8 today  Patient seen with Gely at bedside, wants to speak with ENT    MEDICATIONS  (STANDING):  albuterol    0.083% 2.5 milliGRAM(s) Nebulizer every 6 hours  albuterol    90 MICROgram(s) HFA Inhaler 1 Puff(s) Inhalation every 4 hours  atorvastatin 80 milliGRAM(s) Oral at bedtime  budesonide 160 MICROgram(s)/formoterol 4.5 MICROgram(s) Inhaler 2 Puff(s) Inhalation two times a day  clonazePAM Oral Disintegrating Tablet 0.75 milliGRAM(s) Oral every 12 hours  dextrose 5%. 1000 milliLiter(s) (50 mL/Hr) IV Continuous <Continuous>  dextrose 5%. 1000 milliLiter(s) (100 mL/Hr) IV Continuous <Continuous>  dextrose 50% Injectable 12.5 Gram(s) IV Push once  dextrose 50% Injectable 25 Gram(s) IV Push once  dextrose 50% Injectable 25 Gram(s) IV Push once  glucagon  Injectable 1 milliGRAM(s) IntraMuscular once  influenza  Vaccine (HIGH DOSE) 0.7 milliLiter(s) IntraMuscular once  insulin glargine Injectable (LANTUS) 10 Unit(s) SubCutaneous at bedtime  insulin lispro (ADMELOG) corrective regimen sliding scale   SubCutaneous four times a day before meals  metoprolol tartrate 12.5 milliGRAM(s) Oral every 12 hours  montelukast 10 milliGRAM(s) Oral daily  saccharomyces boulardii 250 milliGRAM(s) Oral daily  senna 2 Tablet(s) Oral at bedtime  sertraline 100 milliGRAM(s) Oral daily    MEDICATIONS  (PRN):  acetaminophen     Tablet .. 325 milliGRAM(s) Oral every 8 hours PRN Temp greater or equal to 38C (100.4F), Mild Pain (1 - 3)  dextrose Oral Gel 15 Gram(s) Oral once PRN Blood Glucose LESS THAN 70 milliGRAM(s)/deciliter  melatonin 3 milliGRAM(s) Oral at bedtime PRN Insomnia  ondansetron Injectable 4 milliGRAM(s) IV Push every 12 hours PRN Nausea and/or Vomiting  oxyCODONE    IR 5 milliGRAM(s) Oral every 12 hours PRN Severe Pain (7 - 10)        CAPILLARY BLOOD GLUCOSE  POCT Blood Glucose.: 259 mg/dL (13 Jan 2024 12:02)  POCT Blood Glucose.: 238 mg/dL (13 Jan 2024 08:39)  POCT Blood Glucose.: 154 mg/dL (12 Jan 2024 21:39)  POCT Blood Glucose.: 164 mg/dL (12 Jan 2024 17:42)    I&O's Summary    Vital Signs Last 24 Hrs  T(C): 36.8 (13 Jan 2024 05:00), Max: 37.2 (12 Jan 2024 20:25)  T(F): 98.3 (13 Jan 2024 05:00), Max: 98.9 (12 Jan 2024 20:25)  HR: 64 (13 Jan 2024 08:16) (62 - 92)  BP: 142/51 (13 Jan 2024 05:00) (110/56 - 160/67)  BP(mean): 83 (13 Jan 2024 01:00) (83 - 83)  RR: 18 (13 Jan 2024 05:00) (18 - 21)  SpO2: 98% (13 Jan 2024 08:16) (95% - 98%)    Parameters below as of 13 Jan 2024 08:16  Patient On (Oxygen Delivery Method): room air      PHYSICAL EXAM:  GENERAL: NAD,   HEAD:  Atraumatic, Normocephalic  EYES: EOMI, PERRLA, conjunctiva and sclera clear  NECK: Supple, No JVD  CHEST/LUNG: Clear to auscultation bilaterally; No wheeze  HEART: Regular rate and rhythm; No murmurs, rubs, or gallops  ABDOMEN: Soft, Nontender, Nondistended; Bowel sounds present  EXTREMITIES:  2+ Peripheral Pulses, No clubbing, cyanosis, or edema  R foot dressing  PSYCH: AAOx3  NEUROLOGY: non-focal  SKIN: No rashes or lesions    LABS:                        7.8    5.65  )-----------( 222      ( 13 Jan 2024 06:30 )             24.5     01-13    138  |  103  |  12  ----------------------------<  181<H>  3.7   |  19<L>  |  0.94    Ca    9.1      13 Jan 2024 06:30  Phos  3.7     01-13  Mg     1.80     01-13      Care Discussed with Consultants/Other Providers:  F/u EP/ Card  d/w Gely/patient PA

## 2024-01-13 NOTE — CHART NOTE - NSCHARTNOTEFT_GEN_A_CORE
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 Nasal packing by ENT and blood transfusion x 1 for Hgb 6.9. Patient was to undergo IR embolization d/t epistaxs at [facility] today. However, on transport telemetry patient  found to be sinus bradycardia with ventricular bigeminy. He remained A&O x3 and hemodynamically stable. No associated chest pain, palpitations, shortness of breath, or dizziness. States he has a long history of an arrhythmia, (reported as "an extra beat") in excess of 15 years,  for which he takes metoprolol.     EKG  from 1/9 reveals SR with borderline 1st degree AVB and PVC's.    EKG 1/12/24: Sinus bradycardia with ventricular bigeminy. LAD. QRSd 84ms     Transfer cancelled and EP consulted.   Currently no epistaxis .     TTE (01/12/24) -    1. Left ventricular systolic function is normal with an ejection fraction of 70 % by Valencia's method of disks.   2. Normal right ventricular cavity size, wall thickness, and normal systolic function. Tricuspid annular plane systolic excursion (TAPSE) is 2.5 cm (normal >=1.7 cm).   3. Mild mitral regurgitation.   4. Mild tricuspid regurgitation.   5. Estimated pulmonary artery systolic pressure is 39 mmHg, consistent with borderline pulmonary hypertension.   6. The inferior vena cava is normal in size (normal <2.1cm) with normal inspiratory collapse (normal >50%) consistent with normal right atrial pressure (~3, range 0-5mmHg).   7. No pericardial effusion seen.    - tele reviewed: NSR, ventricular bigeminy, rates 90s, no events  - functional bradycardia from asymptomatic PVCs. Patient with long history of PVCs that he knows about and does not feel  - would resume home metoprolol    Marbin Vanegas MD  Cardiology Fellow - PGY7    For all New Consults and Questions:  [email or web address]   Login: [email or web address]    *** Recommendations are preliminary until cosigned by the attending. 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 Nasal packing by ENT and blood transfusion x 1 for Hgb 6.9. Patient was to undergo IR embolization d/t epistaxs at [facility] today. However, on transport telemetry patient  found to be sinus bradycardia with ventricular bigeminy. He remained A&O x3 and hemodynamically stable. No associated chest pain, palpitations, shortness of breath, or dizziness. States he has a long history of an arrhythmia, (reported as "an extra beat") in excess of 15 years,  for which he takes metoprolol.     EKG  from 1/9 reveals SR with borderline 1st degree AVB and PVC's.    EKG 1/12/24: Sinus bradycardia with ventricular bigeminy. LAD. QRSd 84ms     Transfer cancelled and EP consulted.   Currently no epistaxis .     TTE (01/12/24) -    1. Left ventricular systolic function is normal with an ejection fraction of 70 % by Valencia's method of disks.   2. Normal right ventricular cavity size, wall thickness, and normal systolic function. Tricuspid annular plane systolic excursion (TAPSE) is 2.5 cm (normal >=1.7 cm).   3. Mild mitral regurgitation.   4. Mild tricuspid regurgitation.   5. Estimated pulmonary artery systolic pressure is 39 mmHg, consistent with borderline pulmonary hypertension.   6. The inferior vena cava is normal in size (normal <2.1cm) with normal inspiratory collapse (normal >50%) consistent with normal right atrial pressure (~3, range 0-5mmHg).   7. No pericardial effusion seen.    - tele reviewed: NSR, ventricular bigeminy, rates 90s, no events  - functional bradycardia from asymptomatic PVCs. Patient with long history of PVCs that he knows about and does not feel  - would resume home metoprolol    Marbin Vanegas MD  Cardiology Fellow - PGY7    For all New Consults and Questions:  [email or web address]   Login: [email or web address]    *** Recommendations are preliminary until cosigned by the attending.

## 2024-01-13 NOTE — PROGRESS NOTE ADULT - ASSESSMENT
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     EKG 1/12/24 Vent rate 89  Care Discussed with Consultants/Other Providers:  Cardiology to see patient---> EP called [phone]  Transfer to tele  IR at [facility] cancelled procedure --> NP noted premeds were not given

## 2024-01-13 NOTE — PROGRESS NOTE ADULT - PROBLEM SELECTOR PLAN 7
F-none  E-replete  N-NPO  hold AC given anemia, no SCD given right foot wound    1/11/24 called Lida [phone] and updated F-none  E-replete  N-NPO  hold AC given anemia, no SCD given right foot wound    1/11/24 called Lida [phone] and updated F-none  E-replete  N-NPO  hold AC given anemia, no SCD given right foot wound    1/11/24 called Lida [phone] and updated  1/13 Lida updated at bedside  Noted he is followed by Dr LEROY Damon at [facility] and lives in Assisted Living. F-none  E-replete  N-NPO  hold AC given anemia, no SCD given right foot wound    1/11/24 called Lida [phone] and updated  1/13 Lida updated at bedside  Noted he is followed by Dr LEROY Damon at [facility] and lives in Assisted Living.

## 2024-01-13 NOTE — PROGRESS NOTE ADULT - PROBLEM SELECTOR PLAN 2
Trans to tele  EP rec: Trans to tele  EP rec: 1/12  Patient was on metoprolol succinate 25mg daily at home. Would continue metoprolol for suppression of PVC's which will allow the sinus rate to increase.   No EP contraindication for patient to undergo embolization at [facility].      metoprolol restarted HR 64 today 1/13 OBSERVE Trans to tele  EP rec: 1/12  Patient was on metoprolol succinate 25mg daily at home. Would continue metoprolol for suppression of PVC's which will allow the sinus rate to increase.   No EP contraindication for patient to undergo embolization at [facility].      metoprolol restarted HR 64 today 1/13 OBSERVE

## 2024-01-13 NOTE — SWALLOW BEDSIDE ASSESSMENT ADULT - ADDITIONAL RECOMMENDATIONS
1). Pt. judged to be safe and functional for upgrade to baseline diet - Regular with Thin liquids.   2). Given no overt s/s of airway compromise, no report of dysphagia, WNL WBC and no acute concern for aspiration PNA, further instrumental testing is not indicated.   3). This service to f/u as scheduling permits to assess diet tolerance  4). Medical team to re-consult if concern for diet tolerance and/or change in medical status occurs

## 2024-01-13 NOTE — SWALLOW BEDSIDE ASSESSMENT ADULT - SWALLOW EVAL: DIAGNOSIS
Pt. was given trials of Thin liquids, Puree, Soft and Bite sized and Regular solids. 1. Functional oral stage across trials marked by adequate bolus retrieval with complete oral containment. Prompt swallow initiation with efficient bolus manipulation/formation and mastication. Subsequent timely posterior bolus transfer with complete oral clearance noted. 2. Functional pharyngeal stage across trials marked by present swallow trigger judged via hyolaryngeal digital palpation. No overt s/s of airway compromise observed with any trial.

## 2024-01-13 NOTE — CHART NOTE - NSCHARTNOTEFT_GEN_A_CORE
Patient discussed with Dr. Rouse. Given some packing remains in nares, would continue to recommend IR embolization. Discussed this with patient and he is in agreement.

## 2024-01-13 NOTE — SWALLOW BEDSIDE ASSESSMENT ADULT - SWALLOW EVAL: RECOMMENDED FEEDING/EATING TECHNIQUES
Problem: Depression  Goal: Refrain from self-neglect  Outcome: Outcome(s) achieved Date Met:  06/16/17       maintain upright posture during/after eating for 30 mins/position upright (90 degrees)/small sips/bites

## 2024-01-14 LAB
ANION GAP SERPL CALC-SCNC: 15 MMOL/L — HIGH (ref 7–14)
ANION GAP SERPL CALC-SCNC: 15 MMOL/L — HIGH (ref 7–14)
BUN SERPL-MCNC: 14 MG/DL — SIGNIFICANT CHANGE UP (ref 7–23)
BUN SERPL-MCNC: 14 MG/DL — SIGNIFICANT CHANGE UP (ref 7–23)
CALCIUM SERPL-MCNC: 9.1 MG/DL — SIGNIFICANT CHANGE UP (ref 8.4–10.5)
CALCIUM SERPL-MCNC: 9.1 MG/DL — SIGNIFICANT CHANGE UP (ref 8.4–10.5)
CHLORIDE SERPL-SCNC: 104 MMOL/L — SIGNIFICANT CHANGE UP (ref 98–107)
CHLORIDE SERPL-SCNC: 104 MMOL/L — SIGNIFICANT CHANGE UP (ref 98–107)
CO2 SERPL-SCNC: 21 MMOL/L — LOW (ref 22–31)
CO2 SERPL-SCNC: 21 MMOL/L — LOW (ref 22–31)
CREAT SERPL-MCNC: 1.07 MG/DL — SIGNIFICANT CHANGE UP (ref 0.5–1.3)
CREAT SERPL-MCNC: 1.07 MG/DL — SIGNIFICANT CHANGE UP (ref 0.5–1.3)
EGFR: 74 ML/MIN/1.73M2 — SIGNIFICANT CHANGE UP
EGFR: 74 ML/MIN/1.73M2 — SIGNIFICANT CHANGE UP
GLUCOSE BLDC GLUCOMTR-MCNC: 182 MG/DL — HIGH (ref 70–99)
GLUCOSE BLDC GLUCOMTR-MCNC: 182 MG/DL — HIGH (ref 70–99)
GLUCOSE BLDC GLUCOMTR-MCNC: 188 MG/DL — HIGH (ref 70–99)
GLUCOSE BLDC GLUCOMTR-MCNC: 188 MG/DL — HIGH (ref 70–99)
GLUCOSE BLDC GLUCOMTR-MCNC: 213 MG/DL — HIGH (ref 70–99)
GLUCOSE BLDC GLUCOMTR-MCNC: 213 MG/DL — HIGH (ref 70–99)
GLUCOSE BLDC GLUCOMTR-MCNC: 217 MG/DL — HIGH (ref 70–99)
GLUCOSE BLDC GLUCOMTR-MCNC: 217 MG/DL — HIGH (ref 70–99)
GLUCOSE SERPL-MCNC: 155 MG/DL — HIGH (ref 70–99)
GLUCOSE SERPL-MCNC: 155 MG/DL — HIGH (ref 70–99)
HCT VFR BLD CALC: 24.2 % — LOW (ref 39–50)
HCT VFR BLD CALC: 24.2 % — LOW (ref 39–50)
HGB BLD-MCNC: 7.8 G/DL — LOW (ref 13–17)
HGB BLD-MCNC: 7.8 G/DL — LOW (ref 13–17)
MAGNESIUM SERPL-MCNC: 1.7 MG/DL — SIGNIFICANT CHANGE UP (ref 1.6–2.6)
MAGNESIUM SERPL-MCNC: 1.7 MG/DL — SIGNIFICANT CHANGE UP (ref 1.6–2.6)
MCHC RBC-ENTMCNC: 28.5 PG — SIGNIFICANT CHANGE UP (ref 27–34)
MCHC RBC-ENTMCNC: 28.5 PG — SIGNIFICANT CHANGE UP (ref 27–34)
MCHC RBC-ENTMCNC: 32.2 GM/DL — SIGNIFICANT CHANGE UP (ref 32–36)
MCHC RBC-ENTMCNC: 32.2 GM/DL — SIGNIFICANT CHANGE UP (ref 32–36)
MCV RBC AUTO: 88.3 FL — SIGNIFICANT CHANGE UP (ref 80–100)
MCV RBC AUTO: 88.3 FL — SIGNIFICANT CHANGE UP (ref 80–100)
NRBC # BLD: 0 /100 WBCS — SIGNIFICANT CHANGE UP (ref 0–0)
NRBC # BLD: 0 /100 WBCS — SIGNIFICANT CHANGE UP (ref 0–0)
NRBC # FLD: 0 K/UL — SIGNIFICANT CHANGE UP (ref 0–0)
NRBC # FLD: 0 K/UL — SIGNIFICANT CHANGE UP (ref 0–0)
PHOSPHATE SERPL-MCNC: 3.8 MG/DL — SIGNIFICANT CHANGE UP (ref 2.5–4.5)
PHOSPHATE SERPL-MCNC: 3.8 MG/DL — SIGNIFICANT CHANGE UP (ref 2.5–4.5)
PLATELET # BLD AUTO: 247 K/UL — SIGNIFICANT CHANGE UP (ref 150–400)
PLATELET # BLD AUTO: 247 K/UL — SIGNIFICANT CHANGE UP (ref 150–400)
POTASSIUM SERPL-MCNC: 3.7 MMOL/L — SIGNIFICANT CHANGE UP (ref 3.5–5.3)
POTASSIUM SERPL-MCNC: 3.7 MMOL/L — SIGNIFICANT CHANGE UP (ref 3.5–5.3)
POTASSIUM SERPL-SCNC: 3.7 MMOL/L — SIGNIFICANT CHANGE UP (ref 3.5–5.3)
POTASSIUM SERPL-SCNC: 3.7 MMOL/L — SIGNIFICANT CHANGE UP (ref 3.5–5.3)
RBC # BLD: 2.74 M/UL — LOW (ref 4.2–5.8)
RBC # BLD: 2.74 M/UL — LOW (ref 4.2–5.8)
RBC # FLD: 14.6 % — HIGH (ref 10.3–14.5)
RBC # FLD: 14.6 % — HIGH (ref 10.3–14.5)
SODIUM SERPL-SCNC: 140 MMOL/L — SIGNIFICANT CHANGE UP (ref 135–145)
SODIUM SERPL-SCNC: 140 MMOL/L — SIGNIFICANT CHANGE UP (ref 135–145)
WBC # BLD: 6.7 K/UL — SIGNIFICANT CHANGE UP (ref 3.8–10.5)
WBC # BLD: 6.7 K/UL — SIGNIFICANT CHANGE UP (ref 3.8–10.5)
WBC # FLD AUTO: 6.7 K/UL — SIGNIFICANT CHANGE UP (ref 3.8–10.5)
WBC # FLD AUTO: 6.7 K/UL — SIGNIFICANT CHANGE UP (ref 3.8–10.5)

## 2024-01-14 PROCEDURE — 99232 SBSQ HOSP IP/OBS MODERATE 35: CPT

## 2024-01-14 RX ADMIN — Medication 2: at 12:58

## 2024-01-14 RX ADMIN — Medication 0.75 MILLIGRAM(S): at 05:22

## 2024-01-14 RX ADMIN — Medication 2: at 18:34

## 2024-01-14 RX ADMIN — ALBUTEROL 2.5 MILLIGRAM(S): 90 AEROSOL, METERED ORAL at 10:06

## 2024-01-14 RX ADMIN — Medication 12.5 MILLIGRAM(S): at 18:37

## 2024-01-14 RX ADMIN — ATORVASTATIN CALCIUM 80 MILLIGRAM(S): 80 TABLET, FILM COATED ORAL at 21:41

## 2024-01-14 RX ADMIN — Medication 0.75 MILLIGRAM(S): at 18:36

## 2024-01-14 RX ADMIN — BUDESONIDE AND FORMOTEROL FUMARATE DIHYDRATE 2 PUFF(S): 160; 4.5 AEROSOL RESPIRATORY (INHALATION) at 21:40

## 2024-01-14 RX ADMIN — BUDESONIDE AND FORMOTEROL FUMARATE DIHYDRATE 2 PUFF(S): 160; 4.5 AEROSOL RESPIRATORY (INHALATION) at 12:58

## 2024-01-14 RX ADMIN — OXYCODONE HYDROCHLORIDE 5 MILLIGRAM(S): 5 TABLET ORAL at 13:03

## 2024-01-14 RX ADMIN — OXYCODONE HYDROCHLORIDE 5 MILLIGRAM(S): 5 TABLET ORAL at 13:33

## 2024-01-14 RX ADMIN — ALBUTEROL 2.5 MILLIGRAM(S): 90 AEROSOL, METERED ORAL at 16:35

## 2024-01-14 RX ADMIN — Medication 3 MILLIGRAM(S): at 21:41

## 2024-01-14 RX ADMIN — Medication 1: at 09:14

## 2024-01-14 RX ADMIN — ALBUTEROL 2.5 MILLIGRAM(S): 90 AEROSOL, METERED ORAL at 04:48

## 2024-01-14 RX ADMIN — OXYCODONE HYDROCHLORIDE 5 MILLIGRAM(S): 5 TABLET ORAL at 00:48

## 2024-01-14 RX ADMIN — MONTELUKAST 10 MILLIGRAM(S): 4 TABLET, CHEWABLE ORAL at 12:59

## 2024-01-14 RX ADMIN — Medication 12.5 MILLIGRAM(S): at 05:23

## 2024-01-14 RX ADMIN — INSULIN GLARGINE 10 UNIT(S): 100 INJECTION, SOLUTION SUBCUTANEOUS at 21:39

## 2024-01-14 RX ADMIN — ALBUTEROL 2.5 MILLIGRAM(S): 90 AEROSOL, METERED ORAL at 22:18

## 2024-01-14 RX ADMIN — SERTRALINE 100 MILLIGRAM(S): 25 TABLET, FILM COATED ORAL at 12:59

## 2024-01-14 NOTE — PROGRESS NOTE ADULT - PROBLEM SELECTOR PLAN 1
-appreciate ENT, packing done  -elevated bed 45 degrees, continuous pulse ox  -RCRI 1, mets about 2-3, sob w/ walking 1 block  -EKG Qtc 480, left axis, no ST elevation or pathologic Q waves medically optimized for procedure.  -complete keflex course  from: MR Angio Head No Cont (01.10.24 @ 15:39) >  IMPRESSION:  MRA NECK: Unremarkable study.  MRA HEAD: Unremarkable study.  -plan to transfer to [facility] for intervention (IR embolization)---> on 1/12 then return to [facility] as per Transfer center.  1/12 [address] 40[address], EP called. Procedure cancelled  1/13 Hgb 7.8 , ENT to f/u ree need for procedure. Spoke with Antionette at [phone]-->   Patient discussed with Dr. Rouse. Given some packing remains in nares, would continue to recommend IR embolization. Discussed this with patient and he is in agreement. -appreciate ENT, packing done  -elevated bed 45 degrees, continuous pulse ox  -RCRI 1, mets about 2-3, sob w/ walking 1 block  -EKG Qtc 480, left axis, no ST elevation or pathologic Q waves medically optimized for procedure.  -complete keflex course  from: MR Angio Head No Cont (01.10.24 @ 15:39) >  IMPRESSION:  MRA NECK: Unremarkable study.  MRA HEAD: Unremarkable study.  -plan to transfer to [facility] for intervention (IR embolization)---> on 1/12 then return to [facility] as per Transfer center.  1/12 [address] 40s, EP called. Procedure cancelled  1/13 Hgb 7.8 , ENT to f/u ree need for procedure. Spoke with Antionette at [phone]-->   Patient discussed with Dr. Rouse. Given some packing remains in nares, would continue to recommend IR embolization. Discussed this with patient and he is in agreement.

## 2024-01-14 NOTE — PROGRESS NOTE ADULT - PROBLEM SELECTOR PLAN 2
Trans to tele  EP rec: 1/12  Patient was on metoprolol succinate 25mg daily at home. Would continue metoprolol for suppression of PVC's which will allow the sinus rate to increase.   No EP contraindication for patient to undergo embolization at [facility].      metoprolol restarted HR 64 today 1/13 OBSERVE Trans to tele  EP rec: 1/12  Patient was on metoprolol succinate 25mg daily at home. Would continue metoprolol for suppression of PVC's which will allow the sinus rate to increase.   No EP contraindication for patient to undergo embolization at [facility].      metoprolol restarted HR 64 today 1/13 OBSERVE

## 2024-01-14 NOTE — PROGRESS NOTE ADULT - ASSESSMENT
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     EKG 1/12/24 Vent rate 89  Care Discussed with Consultants/Other Providers:  Cardiology to see patient---> EP called [phone]  Transfer to tele  IR at [facility] cancelled procedure --> NP noted premeds were not given      1/13/24--ENTnotes-> Patient discussed with Dr. Rouse. Given some packing remains in nares, would continue to recommend IR embolization. Discussed this with patient and he is in agreement.     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     EKG 1/12/24 Vent rate 89  Care Discussed with Consultants/Other Providers:  Cardiology to see patient---> EP called [phone]  Transfer to tele  IR at [facility] cancelled procedure --> NP noted premeds were not given      1/13/24--ENTnotes-> Patient discussed with Dr. Rouse. Given some packing remains in nares, would continue to recommend IR embolization. Discussed this with patient and he is in agreement.

## 2024-01-14 NOTE — CHART NOTE - NSCHARTNOTEFT_GEN_A_CORE
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 Nasal packing by ENT and blood transfusion x 1 for Hgb 6.9. Patient was to undergo IR embolization d/t epistaxs at [facility] today. However, on transport telemetry patient  found to be sinus bradycardia with ventricular bigeminy. He remained A&O x3 and hemodynamically stable. No associated chest pain, palpitations, shortness of breath, or dizziness. States he has a long history of an arrhythmia, (reported as "an extra beat") in excess of 15 years,  for which he takes metoprolol.     EKG  from 1/9 reveals SR with borderline 1st degree AVB and PVC's.    EKG 1/12/24: Sinus bradycardia with ventricular bigeminy. LAD. QRSd 84ms     Transfer cancelled and EP consulted.   Currently no epistaxis .     TTE (01/12/24) -    1. Left ventricular systolic function is normal with an ejection fraction of 70 % by Valencia's method of disks.   2. Normal right ventricular cavity size, wall thickness, and normal systolic function. Tricuspid annular plane systolic excursion (TAPSE) is 2.5 cm (normal >=1.7 cm).   3. Mild mitral regurgitation.   4. Mild tricuspid regurgitation.   5. Estimated pulmonary artery systolic pressure is 39 mmHg, consistent with borderline pulmonary hypertension.   6. The inferior vena cava is normal in size (normal <2.1cm) with normal inspiratory collapse (normal >50%) consistent with normal right atrial pressure (~3, range 0-5mmHg).   7. No pericardial effusion seen.    - tele reviewed: NSR, periods of ventricular bigeminy, PVCS, rates 110s, no events  - functional bradycardia from asymptomatic PVCs. Patient with long history of PVCs that he knows about and does not feel  - would resume home dose of metoprolol    Marbin Vanegas MD  Cardiology Fellow - PGY7    For all New Consults and Questions:  [email or web address]   Login: [email or web address]    *** Recommendations are preliminary until cosigned by the attending. 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 Nasal packing by ENT and blood transfusion x 1 for Hgb 6.9. Patient was to undergo IR embolization d/t epistaxs at [facility] today. However, on transport telemetry patient  found to be sinus bradycardia with ventricular bigeminy. He remained A&O x3 and hemodynamically stable. No associated chest pain, palpitations, shortness of breath, or dizziness. States he has a long history of an arrhythmia, (reported as "an extra beat") in excess of 15 years,  for which he takes metoprolol.     EKG  from 1/9 reveals SR with borderline 1st degree AVB and PVC's.    EKG 1/12/24: Sinus bradycardia with ventricular bigeminy. LAD. QRSd 84ms     Transfer cancelled and EP consulted.   Currently no epistaxis .     TTE (01/12/24) -    1. Left ventricular systolic function is normal with an ejection fraction of 70 % by Valencia's method of disks.   2. Normal right ventricular cavity size, wall thickness, and normal systolic function. Tricuspid annular plane systolic excursion (TAPSE) is 2.5 cm (normal >=1.7 cm).   3. Mild mitral regurgitation.   4. Mild tricuspid regurgitation.   5. Estimated pulmonary artery systolic pressure is 39 mmHg, consistent with borderline pulmonary hypertension.   6. The inferior vena cava is normal in size (normal <2.1cm) with normal inspiratory collapse (normal >50%) consistent with normal right atrial pressure (~3, range 0-5mmHg).   7. No pericardial effusion seen.    - tele reviewed: NSR, periods of ventricular bigeminy, PVCS, rates 110s, no events  - functional bradycardia from asymptomatic PVCs. Patient with long history of PVCs that he knows about and does not feel  - would resume home dose of metoprolol    Marbin Vanegas MD  Cardiology Fellow - PGY7    For all New Consults and Questions:  [email or web address]   Login: [email or web address]    *** Recommendations are preliminary until cosigned by the attending. 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 Nasal packing by ENT and blood transfusion x 1 for Hgb 6.9. Patient was to undergo IR embolization d/t epistaxs at [facility] today. However, on transport telemetry patient  found to be sinus bradycardia with ventricular bigeminy. He remained A&O x3 and hemodynamically stable. No associated chest pain, palpitations, shortness of breath, or dizziness. States he has a long history of an arrhythmia, (reported as "an extra beat") in excess of 15 years,  for which he takes metoprolol.     EKG  from 1/9 reveals SR with borderline 1st degree AVB and PVC's.    EKG 1/12/24: Sinus bradycardia with ventricular bigeminy. LAD. QRSd 84ms     Transfer cancelled and EP consulted.   Currently no epistaxis .     TTE (01/12/24) -    1. Left ventricular systolic function is normal with an ejection fraction of 70 % by Valencia's method of disks.   2. Normal right ventricular cavity size, wall thickness, and normal systolic function. Tricuspid annular plane systolic excursion (TAPSE) is 2.5 cm (normal >=1.7 cm).   3. Mild mitral regurgitation.   4. Mild tricuspid regurgitation.   5. Estimated pulmonary artery systolic pressure is 39 mmHg, consistent with borderline pulmonary hypertension.   6. The inferior vena cava is normal in size (normal <2.1cm) with normal inspiratory collapse (normal >50%) consistent with normal right atrial pressure (~3, range 0-5mmHg).   7. No pericardial effusion seen.    - tele reviewed: NSR, periods of ventricular bigeminy, PVCS, rates 110s, no events  - functional bradycardia from asymptomatic PVCs. Patient with long history of PVCs that he knows about and does not feel  - would resume home dose of metoprolol    Marbin Vanegas MD  Cardiology Fellow - PGY7    For all New Consults and Questions:  [email or web address]   Login: [email or web address]    *** Recommendations are preliminary until cosigned by the attending.

## 2024-01-14 NOTE — PROGRESS NOTE ADULT - PROBLEM SELECTOR PLAN 7
F-none  E-replete  N-NPO  hold AC given anemia, no SCD given right foot wound    1/11/24 called Lida [phone] and updated  1/13 Lida updated at bedside  Noted he is followed by Dr LEROY Damon at [facility] and lives in Assisted Living. F-none  E-replete  N-NPO  hold AC given anemia, no SCD given right foot wound    1/11/24 called Lida [phone] and updated  1/13 Lida updated at bedside  Noted he is followed by Dr LEROY Damon at [facility] and lives in Assisted Living. F-none  E-replete  N-NPO  hold AC given anemia, no SCD given right foot wound  1/11/24 called Lida [phone] and updated  1/13 Lida updated at bedside  Noted he is followed by Dr LEROY Damon at [facility] and lives in Assisted Living.    1/14 ENT still wants ablation to be done at [facility]. D/w PA at length.  1- need time with IR at [facility]  and date  2- need NPO after MN and give Solu-cortef 4 hours before for iodine and am labs cbc and bmp once date is obtained--> hopefully Wednesday  3-need transport to [facility] and RN and Nurse manager made aware.  HOPE to do on Wednesday. 1/17 after arrangement made on Tuesday1/16. F-none  E-replete  N-NPO  hold AC given anemia, no SCD given right foot wound  1/11/24 called Lida [phone] and updated  1/13 Lida updated at bedside  Noted he is followed by Dr LEROY Damon at [facility] and lives in Assisted Living.    1/14 ENT still wants ablation to be done at [facility]. D/w PA at length.  1- need time with IR at [facility]  and date  2- need NPO after MN and give Solu-cortef 4 hours before for iodine and am labs cbc and bmp once date is obtained--> hopefully Wednesday  3-need transport to [facility] and RN and Nurse manager made aware.  HOPE to do on Wednesday. 1/17 after arrangement made on Tuesday1/16. F-none  E-replete  N-NPO  hold AC given anemia, no SCD given right foot wound  1/11/24 called Lida [phone] and updated  1/13 Lida updated at bedside  Noted he is followed by Dr LEROY Damon at [facility] and lives in Assisted Living.    1/14 ENT still wants ablation to be done at [facility]. D/w PA at length.  1- need time with IR at [facility]  and date  2- need NPO after MN and give Hydrocortisone 200 mg iv, 4 hours before for iodine and am labs cbc and bmp once date is obtained--> hopefully Wednesday  3-need transport to [facility] and RN and Nurse manager made aware.  HOPE to do on Wednesday. 1/17 after arrangement made on Tuesday1/16. F-none  E-replete  N-NPO  hold AC given anemia, no SCD given right foot wound  1/11/24 called Lida [phone] and updated  1/13 Lida updated at bedside  Noted he is followed by Dr LEROY Damon at [facility] and lives in Assisted Living.    1/14 ENT still wants ablation to be done at [facility]. D/w PA at length.  1- need time with IR at [facility]  and date  2- need NPO after MN and give Hydrocortisone 200 mg iv, 4 hours before for iodine and am labs cbc and bmp once date is obtained--> hopefully Wednesday  3-need transport to [facility] and RN and Nurse manager made aware.  HOPE to do on Wednesday. 1/17 after arrangement made on Tuesday1/16.

## 2024-01-14 NOTE — PROGRESS NOTE ADULT - SUBJECTIVE AND OBJECTIVE BOX
Patient is a 73y old  Male who presents with a chief complaint of epistaxis (13 Jan 2024 11:09)      SUBJECTIVE / OVERNIGHT EVENTS:  resting in bed    MEDICATIONS  (STANDING):  albuterol    0.083% 2.5 milliGRAM(s) Nebulizer every 6 hours  albuterol    90 MICROgram(s) HFA Inhaler 1 Puff(s) Inhalation every 4 hours  atorvastatin 80 milliGRAM(s) Oral at bedtime  budesonide 160 MICROgram(s)/formoterol 4.5 MICROgram(s) Inhaler 2 Puff(s) Inhalation two times a day  clonazePAM Oral Disintegrating Tablet 0.75 milliGRAM(s) Oral every 12 hours  dextrose 5%. 1000 milliLiter(s) (50 mL/Hr) IV Continuous <Continuous>  dextrose 5%. 1000 milliLiter(s) (100 mL/Hr) IV Continuous <Continuous>  dextrose 50% Injectable 12.5 Gram(s) IV Push once  dextrose 50% Injectable 25 Gram(s) IV Push once  dextrose 50% Injectable 25 Gram(s) IV Push once  glucagon  Injectable 1 milliGRAM(s) IntraMuscular once  influenza  Vaccine (HIGH DOSE) 0.7 milliLiter(s) IntraMuscular once  insulin glargine Injectable (LANTUS) 10 Unit(s) SubCutaneous at bedtime  insulin lispro (ADMELOG) corrective regimen sliding scale   SubCutaneous four times a day before meals  metoprolol tartrate 12.5 milliGRAM(s) Oral every 12 hours  montelukast 10 milliGRAM(s) Oral daily  saccharomyces boulardii 250 milliGRAM(s) Oral daily  senna 2 Tablet(s) Oral at bedtime  sertraline 100 milliGRAM(s) Oral daily    MEDICATIONS  (PRN):  acetaminophen     Tablet .. 325 milliGRAM(s) Oral every 8 hours PRN Temp greater or equal to 38C (100.4F), Mild Pain (1 - 3)  dextrose Oral Gel 15 Gram(s) Oral once PRN Blood Glucose LESS THAN 70 milliGRAM(s)/deciliter  melatonin 3 milliGRAM(s) Oral at bedtime PRN Insomnia  ondansetron Injectable 4 milliGRAM(s) IV Push every 12 hours PRN Nausea and/or Vomiting  oxyCODONE    IR 5 milliGRAM(s) Oral every 12 hours PRN Severe Pain (7 - 10)        CAPILLARY BLOOD GLUCOSE      POCT Blood Glucose.: 213 mg/dL (14 Jan 2024 12:47)  POCT Blood Glucose.: 182 mg/dL (14 Jan 2024 08:57)  POCT Blood Glucose.: 255 mg/dL (13 Jan 2024 21:12)  POCT Blood Glucose.: 367 mg/dL (13 Jan 2024 17:47)    I&O's Summary    Vital Signs Last 24 Hrs  T(C): 37.2 (14 Jan 2024 13:11), Max: 37.2 (14 Jan 2024 13:11)  T(F): 99 (14 Jan 2024 13:11), Max: 99 (14 Jan 2024 13:11)  HR: 105 (14 Jan 2024 13:11) (78 - 114)  BP: 148/87 (14 Jan 2024 13:11) (126/75 - 150/69)  RR: 18 (14 Jan 2024 13:11) (18 - 18)  SpO2: 96% (14 Jan 2024 13:11) (95% - 100%)    Parameters below as of 14 Jan 2024 13:11  Patient On (Oxygen Delivery Method): room air      PHYSICAL EXAM:  GENERAL: NAD,   HEAD:  Atraumatic, Normocephalic  EYES: EOMI, PERRLA, conjunctiva and sclera clear  NECK: Supple, No JVD  CHEST/LUNG: Clear to auscultation bilaterally; No wheeze  HEART: Regular rate and rhythm; No murmurs, rubs, or gallops  ABDOMEN: Soft, Nontender, Nondistended; Bowel sounds present  EXTREMITIES:  2+ Peripheral Pulses, No clubbing, cyanosis, or edema  R foot dressing   PSYCH: AAOx3  NEUROLOGY: non-focal      LABS:                        7.8    6.70  )-----------( 247      ( 14 Jan 2024 05:40 )             24.2     01-14    140  |  104  |  14  ----------------------------<  155<H>  3.7   |  21<L>  |  1.07    Ca    9.1      14 Jan 2024 05:40  Phos  3.8     01-14  Mg     1.70     01-14        Care Discussed with Consultants/Other Providers:      Seen by ENT 1/13  "Patient discussed with Dr. Rouse. Given some packing remains in nares, would continue to recommend IR embolization. Discussed this with patient and he is in agreement."     Patient is a 73y old  Male who presents with a chief complaint of epistaxis (13 Jan 2024 11:09)      SUBJECTIVE / OVERNIGHT EVENTS:  resting in bed  patient agreeable to stay for IR Emb of nasal bleed    MEDICATIONS  (STANDING):  albuterol    0.083% 2.5 milliGRAM(s) Nebulizer every 6 hours  albuterol    90 MICROgram(s) HFA Inhaler 1 Puff(s) Inhalation every 4 hours  atorvastatin 80 milliGRAM(s) Oral at bedtime  budesonide 160 MICROgram(s)/formoterol 4.5 MICROgram(s) Inhaler 2 Puff(s) Inhalation two times a day  clonazePAM Oral Disintegrating Tablet 0.75 milliGRAM(s) Oral every 12 hours  dextrose 5%. 1000 milliLiter(s) (50 mL/Hr) IV Continuous <Continuous>  dextrose 5%. 1000 milliLiter(s) (100 mL/Hr) IV Continuous <Continuous>  dextrose 50% Injectable 12.5 Gram(s) IV Push once  dextrose 50% Injectable 25 Gram(s) IV Push once  dextrose 50% Injectable 25 Gram(s) IV Push once  glucagon  Injectable 1 milliGRAM(s) IntraMuscular once  influenza  Vaccine (HIGH DOSE) 0.7 milliLiter(s) IntraMuscular once  insulin glargine Injectable (LANTUS) 10 Unit(s) SubCutaneous at bedtime  insulin lispro (ADMELOG) corrective regimen sliding scale   SubCutaneous four times a day before meals  metoprolol tartrate 12.5 milliGRAM(s) Oral every 12 hours  montelukast 10 milliGRAM(s) Oral daily  saccharomyces boulardii 250 milliGRAM(s) Oral daily  senna 2 Tablet(s) Oral at bedtime  sertraline 100 milliGRAM(s) Oral daily    MEDICATIONS  (PRN):  acetaminophen     Tablet .. 325 milliGRAM(s) Oral every 8 hours PRN Temp greater or equal to 38C (100.4F), Mild Pain (1 - 3)  dextrose Oral Gel 15 Gram(s) Oral once PRN Blood Glucose LESS THAN 70 milliGRAM(s)/deciliter  melatonin 3 milliGRAM(s) Oral at bedtime PRN Insomnia  ondansetron Injectable 4 milliGRAM(s) IV Push every 12 hours PRN Nausea and/or Vomiting  oxyCODONE    IR 5 milliGRAM(s) Oral every 12 hours PRN Severe Pain (7 - 10)        CAPILLARY BLOOD GLUCOSE      POCT Blood Glucose.: 213 mg/dL (14 Jan 2024 12:47)  POCT Blood Glucose.: 182 mg/dL (14 Jan 2024 08:57)  POCT Blood Glucose.: 255 mg/dL (13 Jan 2024 21:12)  POCT Blood Glucose.: 367 mg/dL (13 Jan 2024 17:47)    I&O's Summary    Vital Signs Last 24 Hrs  T(C): 37.2 (14 Jan 2024 13:11), Max: 37.2 (14 Jan 2024 13:11)  T(F): 99 (14 Jan 2024 13:11), Max: 99 (14 Jan 2024 13:11)  HR: 105 (14 Jan 2024 13:11) (78 - 114)  BP: 148/87 (14 Jan 2024 13:11) (126/75 - 150/69)  RR: 18 (14 Jan 2024 13:11) (18 - 18)  SpO2: 96% (14 Jan 2024 13:11) (95% - 100%)    Parameters below as of 14 Jan 2024 13:11  Patient On (Oxygen Delivery Method): room air      PHYSICAL EXAM:  GENERAL: NAD,   HEAD:  Atraumatic, Normocephalic  EYES: EOMI, PERRLA, conjunctiva and sclera clear  NECK: Supple, No JVD  CHEST/LUNG: Clear to auscultation bilaterally; No wheeze  HEART: Regular rate and rhythm; No murmurs, rubs, or gallops  ABDOMEN: Soft, Nontender, Nondistended; Bowel sounds present  EXTREMITIES:  2+ Peripheral Pulses, No clubbing, cyanosis, or edema  R foot dressing   PSYCH: AAOx3  NEUROLOGY: non-focal      LABS:                        7.8    6.70  )-----------( 247      ( 14 Jan 2024 05:40 )             24.2     01-14    140  |  104  |  14  ----------------------------<  155<H>  3.7   |  21<L>  |  1.07    Ca    9.1      14 Jan 2024 05:40  Phos  3.8     01-14  Mg     1.70     01-14        Care Discussed with Consultants/Other Providers:      Seen by ENT 1/13  "Patient discussed with Dr. Rouse. Given some packing remains in nares, would continue to recommend IR embolization. Discussed this with patient and he is in agreement."

## 2024-01-15 ENCOUNTER — APPOINTMENT (OUTPATIENT)
Dept: OTOLARYNGOLOGY | Facility: CLINIC | Age: 74
End: 2024-01-15

## 2024-01-15 LAB
ANION GAP SERPL CALC-SCNC: 13 MMOL/L — SIGNIFICANT CHANGE UP (ref 7–14)
ANION GAP SERPL CALC-SCNC: 13 MMOL/L — SIGNIFICANT CHANGE UP (ref 7–14)
BUN SERPL-MCNC: 15 MG/DL — SIGNIFICANT CHANGE UP (ref 7–23)
BUN SERPL-MCNC: 15 MG/DL — SIGNIFICANT CHANGE UP (ref 7–23)
CALCIUM SERPL-MCNC: 9.4 MG/DL — SIGNIFICANT CHANGE UP (ref 8.4–10.5)
CALCIUM SERPL-MCNC: 9.4 MG/DL — SIGNIFICANT CHANGE UP (ref 8.4–10.5)
CHLORIDE SERPL-SCNC: 103 MMOL/L — SIGNIFICANT CHANGE UP (ref 98–107)
CHLORIDE SERPL-SCNC: 103 MMOL/L — SIGNIFICANT CHANGE UP (ref 98–107)
CO2 SERPL-SCNC: 23 MMOL/L — SIGNIFICANT CHANGE UP (ref 22–31)
CO2 SERPL-SCNC: 23 MMOL/L — SIGNIFICANT CHANGE UP (ref 22–31)
CREAT SERPL-MCNC: 0.95 MG/DL — SIGNIFICANT CHANGE UP (ref 0.5–1.3)
CREAT SERPL-MCNC: 0.95 MG/DL — SIGNIFICANT CHANGE UP (ref 0.5–1.3)
EGFR: 85 ML/MIN/1.73M2 — SIGNIFICANT CHANGE UP
EGFR: 85 ML/MIN/1.73M2 — SIGNIFICANT CHANGE UP
GLUCOSE BLDC GLUCOMTR-MCNC: 181 MG/DL — HIGH (ref 70–99)
GLUCOSE BLDC GLUCOMTR-MCNC: 181 MG/DL — HIGH (ref 70–99)
GLUCOSE BLDC GLUCOMTR-MCNC: 217 MG/DL — HIGH (ref 70–99)
GLUCOSE BLDC GLUCOMTR-MCNC: 217 MG/DL — HIGH (ref 70–99)
GLUCOSE BLDC GLUCOMTR-MCNC: 252 MG/DL — HIGH (ref 70–99)
GLUCOSE BLDC GLUCOMTR-MCNC: 277 MG/DL — HIGH (ref 70–99)
GLUCOSE SERPL-MCNC: 162 MG/DL — HIGH (ref 70–99)
GLUCOSE SERPL-MCNC: 162 MG/DL — HIGH (ref 70–99)
HCT VFR BLD CALC: 26.9 % — LOW (ref 39–50)
HCT VFR BLD CALC: 26.9 % — LOW (ref 39–50)
HGB BLD-MCNC: 8.6 G/DL — LOW (ref 13–17)
HGB BLD-MCNC: 8.6 G/DL — LOW (ref 13–17)
MAGNESIUM SERPL-MCNC: 2.1 MG/DL — SIGNIFICANT CHANGE UP (ref 1.6–2.6)
MAGNESIUM SERPL-MCNC: 2.1 MG/DL — SIGNIFICANT CHANGE UP (ref 1.6–2.6)
MCHC RBC-ENTMCNC: 28.7 PG — SIGNIFICANT CHANGE UP (ref 27–34)
MCHC RBC-ENTMCNC: 28.7 PG — SIGNIFICANT CHANGE UP (ref 27–34)
MCHC RBC-ENTMCNC: 32 GM/DL — SIGNIFICANT CHANGE UP (ref 32–36)
MCHC RBC-ENTMCNC: 32 GM/DL — SIGNIFICANT CHANGE UP (ref 32–36)
MCV RBC AUTO: 89.7 FL — SIGNIFICANT CHANGE UP (ref 80–100)
MCV RBC AUTO: 89.7 FL — SIGNIFICANT CHANGE UP (ref 80–100)
NRBC # BLD: 0 /100 WBCS — SIGNIFICANT CHANGE UP (ref 0–0)
NRBC # BLD: 0 /100 WBCS — SIGNIFICANT CHANGE UP (ref 0–0)
NRBC # FLD: 0 K/UL — SIGNIFICANT CHANGE UP (ref 0–0)
NRBC # FLD: 0 K/UL — SIGNIFICANT CHANGE UP (ref 0–0)
PHOSPHATE SERPL-MCNC: 3.6 MG/DL — SIGNIFICANT CHANGE UP (ref 2.5–4.5)
PHOSPHATE SERPL-MCNC: 3.6 MG/DL — SIGNIFICANT CHANGE UP (ref 2.5–4.5)
PLATELET # BLD AUTO: 256 K/UL — SIGNIFICANT CHANGE UP (ref 150–400)
PLATELET # BLD AUTO: 256 K/UL — SIGNIFICANT CHANGE UP (ref 150–400)
POTASSIUM SERPL-MCNC: 4.1 MMOL/L — SIGNIFICANT CHANGE UP (ref 3.5–5.3)
POTASSIUM SERPL-MCNC: 4.1 MMOL/L — SIGNIFICANT CHANGE UP (ref 3.5–5.3)
POTASSIUM SERPL-SCNC: 4.1 MMOL/L — SIGNIFICANT CHANGE UP (ref 3.5–5.3)
POTASSIUM SERPL-SCNC: 4.1 MMOL/L — SIGNIFICANT CHANGE UP (ref 3.5–5.3)
RBC # BLD: 3 M/UL — LOW (ref 4.2–5.8)
RBC # BLD: 3 M/UL — LOW (ref 4.2–5.8)
RBC # FLD: 14.6 % — HIGH (ref 10.3–14.5)
RBC # FLD: 14.6 % — HIGH (ref 10.3–14.5)
SODIUM SERPL-SCNC: 139 MMOL/L — SIGNIFICANT CHANGE UP (ref 135–145)
SODIUM SERPL-SCNC: 139 MMOL/L — SIGNIFICANT CHANGE UP (ref 135–145)
WBC # BLD: 6.48 K/UL — SIGNIFICANT CHANGE UP (ref 3.8–10.5)
WBC # BLD: 6.48 K/UL — SIGNIFICANT CHANGE UP (ref 3.8–10.5)
WBC # FLD AUTO: 6.48 K/UL — SIGNIFICANT CHANGE UP (ref 3.8–10.5)
WBC # FLD AUTO: 6.48 K/UL — SIGNIFICANT CHANGE UP (ref 3.8–10.5)

## 2024-01-15 PROCEDURE — 99232 SBSQ HOSP IP/OBS MODERATE 35: CPT

## 2024-01-15 RX ORDER — METOPROLOL TARTRATE 50 MG
25 TABLET ORAL
Refills: 0 | Status: DISCONTINUED | OUTPATIENT
Start: 2024-01-15 | End: 2024-01-17

## 2024-01-15 RX ORDER — ASPIRIN 81 MG
81 TABLET, DELAYED RELEASE (ENTERIC COATED) ORAL
Refills: 0 | Status: ACTIVE | COMMUNITY

## 2024-01-15 RX ADMIN — Medication 12.5 MILLIGRAM(S): at 06:17

## 2024-01-15 RX ADMIN — Medication 0.75 MILLIGRAM(S): at 06:17

## 2024-01-15 RX ADMIN — ATORVASTATIN CALCIUM 80 MILLIGRAM(S): 80 TABLET, FILM COATED ORAL at 22:13

## 2024-01-15 RX ADMIN — BUDESONIDE AND FORMOTEROL FUMARATE DIHYDRATE 2 PUFF(S): 160; 4.5 AEROSOL RESPIRATORY (INHALATION) at 20:22

## 2024-01-15 RX ADMIN — Medication 1: at 09:17

## 2024-01-15 RX ADMIN — OXYCODONE HYDROCHLORIDE 5 MILLIGRAM(S): 5 TABLET ORAL at 13:28

## 2024-01-15 RX ADMIN — Medication 25 MILLIGRAM(S): at 18:43

## 2024-01-15 RX ADMIN — ALBUTEROL 2.5 MILLIGRAM(S): 90 AEROSOL, METERED ORAL at 16:58

## 2024-01-15 RX ADMIN — Medication 3: at 22:21

## 2024-01-15 RX ADMIN — Medication 0.75 MILLIGRAM(S): at 18:42

## 2024-01-15 RX ADMIN — Medication 2: at 12:29

## 2024-01-15 RX ADMIN — SERTRALINE 100 MILLIGRAM(S): 25 TABLET, FILM COATED ORAL at 12:28

## 2024-01-15 RX ADMIN — MONTELUKAST 10 MILLIGRAM(S): 4 TABLET, CHEWABLE ORAL at 12:28

## 2024-01-15 RX ADMIN — ALBUTEROL 2.5 MILLIGRAM(S): 90 AEROSOL, METERED ORAL at 20:21

## 2024-01-15 RX ADMIN — Medication 3 MILLIGRAM(S): at 22:12

## 2024-01-15 RX ADMIN — Medication 3: at 18:34

## 2024-01-15 RX ADMIN — ALBUTEROL 2.5 MILLIGRAM(S): 90 AEROSOL, METERED ORAL at 10:59

## 2024-01-15 RX ADMIN — Medication 250 MILLIGRAM(S): at 12:28

## 2024-01-15 RX ADMIN — OXYCODONE HYDROCHLORIDE 5 MILLIGRAM(S): 5 TABLET ORAL at 12:28

## 2024-01-15 RX ADMIN — BUDESONIDE AND FORMOTEROL FUMARATE DIHYDRATE 2 PUFF(S): 160; 4.5 AEROSOL RESPIRATORY (INHALATION) at 09:17

## 2024-01-15 RX ADMIN — ALBUTEROL 2.5 MILLIGRAM(S): 90 AEROSOL, METERED ORAL at 04:52

## 2024-01-15 RX ADMIN — INSULIN GLARGINE 10 UNIT(S): 100 INJECTION, SOLUTION SUBCUTANEOUS at 22:11

## 2024-01-15 NOTE — PROGRESS NOTE ADULT - SUBJECTIVE AND OBJECTIVE BOX
Patient is a 73y old  Male who presents with a chief complaint of epistaxis (14 Jan 2024 13:28)      SUBJECTIVE / OVERNIGHT EVENTS:  resting in bed  still awaiting ablation. most likely Tuesdays or Wednesday    MEDICATIONS  (STANDING):  albuterol    0.083% 2.5 milliGRAM(s) Nebulizer every 6 hours  albuterol    90 MICROgram(s) HFA Inhaler 1 Puff(s) Inhalation every 4 hours  atorvastatin 80 milliGRAM(s) Oral at bedtime  budesonide 160 MICROgram(s)/formoterol 4.5 MICROgram(s) Inhaler 2 Puff(s) Inhalation two times a day  clonazePAM Oral Disintegrating Tablet 0.75 milliGRAM(s) Oral every 12 hours  dextrose 5%. 1000 milliLiter(s) (50 mL/Hr) IV Continuous <Continuous>  dextrose 5%. 1000 milliLiter(s) (100 mL/Hr) IV Continuous <Continuous>  dextrose 50% Injectable 25 Gram(s) IV Push once  dextrose 50% Injectable 25 Gram(s) IV Push once  dextrose 50% Injectable 12.5 Gram(s) IV Push once  glucagon  Injectable 1 milliGRAM(s) IntraMuscular once  influenza  Vaccine (HIGH DOSE) 0.7 milliLiter(s) IntraMuscular once  insulin glargine Injectable (LANTUS) 10 Unit(s) SubCutaneous at bedtime  insulin lispro (ADMELOG) corrective regimen sliding scale   SubCutaneous four times a day before meals  metoprolol tartrate 12.5 milliGRAM(s) Oral every 12 hours  montelukast 10 milliGRAM(s) Oral daily  saccharomyces boulardii 250 milliGRAM(s) Oral daily  senna 2 Tablet(s) Oral at bedtime  sertraline 100 milliGRAM(s) Oral daily    MEDICATIONS  (PRN):  acetaminophen     Tablet .. 325 milliGRAM(s) Oral every 8 hours PRN Temp greater or equal to 38C (100.4F), Mild Pain (1 - 3)  dextrose Oral Gel 15 Gram(s) Oral once PRN Blood Glucose LESS THAN 70 milliGRAM(s)/deciliter  melatonin 3 milliGRAM(s) Oral at bedtime PRN Insomnia  ondansetron Injectable 4 milliGRAM(s) IV Push every 12 hours PRN Nausea and/or Vomiting  oxyCODONE    IR 5 milliGRAM(s) Oral every 12 hours PRN Severe Pain (7 - 10)        CAPILLARY BLOOD GLUCOSE      POCT Blood Glucose.: 181 mg/dL (15 Jatin 2024 08:48)  POCT Blood Glucose.: 188 mg/dL (14 Jan 2024 21:18)  POCT Blood Glucose.: 217 mg/dL (14 Jan 2024 17:38)  POCT Blood Glucose.: 213 mg/dL (14 Jan 2024 12:47)    I&O's Summary    Vital Signs Last 24 Hrs  T(C): 36.8 (15 Jatin 2024 06:15), Max: 37.2 (14 Jan 2024 13:11)  T(F): 98.2 (15 Jatin 2024 06:15), Max: 99 (14 Jan 2024 13:11)  HR: 96 (15 Jatin 2024 06:15) (96 - 110)  BP: 135/93 (15 Jatin 2024 06:15) (128/68 - 148/87)  BP(mean): --  RR: 18 (15 Jatin 2024 06:15) (18 - 18)  SpO2: 98% (15 Jatin 2024 06:15) (96% - 100%)    Parameters below as of 15 Jatin 2024 06:15  Patient On (Oxygen Delivery Method): room air      PHYSICAL EXAM:  GENERAL: NAD, well  HEAD:  Atraumatic, Normocephalic  EYES: EOMI, PERRLA, conjunctiva and sclera clear  NECK: Supple, No JVD  CHEST/LUNG: Clear to auscultation bilaterally; No wheeze  HEART: Regular rate and rhythm; No murmurs, rubs, or gallops  ABDOMEN: Soft, Nontender, Nondistended; Bowel sounds present  EXTREMITIES:  2+ Peripheral Pulses, No clubbing, cyanosis, or edema  L foot with dressing  PSYCH: AAOx3  NEUROLOGY: non-focal  SKIN: No rashes or lesions    LABS:                        8.6    6.48  )-----------( 256      ( 15 Jatin 2024 05:47 )             26.9     01-15    139  |  103  |  15  ----------------------------<  162<H>  4.1   |  23  |  0.95    Ca    9.4      15 Jatin 2024 05:47  Phos  3.6     01-15  Mg     2.10     01-15            Care Discussed with Consultants/Other Providers:  patient/pa

## 2024-01-15 NOTE — CHART NOTE - NSCHARTNOTEFT_GEN_A_CORE
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 Nasal packing by ENT and blood transfusion x 1 for Hgb 6.9. Patient was to undergo IR embolization d/t epistaxs at [facility] today. However, on transport telemetry patient  found to be sinus bradycardia with ventricular bigeminy. He remained A&O x3 and hemodynamically stable. No associated chest pain, palpitations, shortness of breath, or dizziness. States he has a long history of an arrhythmia, (reported as "an extra beat") in excess of 15 years,  for which he takes metoprolol.     EKG  from 1/9 reveals SR with borderline 1st degree AVB and PVC's.    EKG 1/12/24: Sinus bradycardia with ventricular bigeminy. LAD. QRSd 84ms     Transfer cancelled and EP consulted.   Currently no epistaxis .     - tele reviewed: NSR, periods of ventricular bigeminy, otherwise frequent PVCs, HR in 80s  - functional bradycardia from asymptomatic PVCs. Patient with long history of PVCs that he knows about and does not feel  - c/t metoprolol, consider increasing to 25 mg PO BID for better PVC suppression    Marbin Vanegas MD  Cardiology Fellow - PGY7    For all New Consults and Questions:  [email or web address]   Login: [email or web address]    *** Recommendations are preliminary until cosigned by the attending. 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 Nasal packing by ENT and blood transfusion x 1 for Hgb 6.9. Patient was to undergo IR embolization d/t epistaxs at [facility] today. However, on transport telemetry patient  found to be sinus bradycardia with ventricular bigeminy. He remained A&O x3 and hemodynamically stable. No associated chest pain, palpitations, shortness of breath, or dizziness. States he has a long history of an arrhythmia, (reported as "an extra beat") in excess of 15 years,  for which he takes metoprolol.     EKG  from 1/9 reveals SR with borderline 1st degree AVB and PVC's.    EKG 1/12/24: Sinus bradycardia with ventricular bigeminy. LAD. QRSd 84ms     Transfer cancelled and EP consulted.   Currently no epistaxis .     - tele reviewed: NSR, periods of ventricular bigeminy, otherwise frequent PVCs, HR in 80s  - functional bradycardia from asymptomatic PVCs. Patient with long history of PVCs that he knows about and does not feel  - c/t metoprolol, consider increasing to 25 mg PO BID for better PVC suppression    Marbin Vanegas MD  Cardiology Fellow - PGY7    For all New Consults and Questions:  [email or web address]   Login: [email or web address]    *** Recommendations are preliminary until cosigned by the attending. 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 Nasal packing by ENT and blood transfusion x 1 for Hgb 6.9. Patient was to undergo IR embolization d/t epistaxs at [facility] today. However, on transport telemetry patient  found to be sinus bradycardia with ventricular bigeminy. He remained A&O x3 and hemodynamically stable. No associated chest pain, palpitations, shortness of breath, or dizziness. States he has a long history of an arrhythmia, (reported as "an extra beat") in excess of 15 years,  for which he takes metoprolol.     EKG  from 1/9 reveals SR with borderline 1st degree AVB and PVC's.    EKG 1/12/24: Sinus bradycardia with ventricular bigeminy. LAD. QRSd 84ms     Transfer cancelled and EP consulted.   Currently no epistaxis .     - tele reviewed: NSR, periods of ventricular bigeminy, otherwise frequent PVCs, HR in 80s  - functional bradycardia from asymptomatic PVCs. Patient with long history of PVCs that he knows about and does not feel  - c/t metoprolol, consider increasing to 25 mg PO BID for better PVC suppression    Marbin Vanegas MD  Cardiology Fellow - PGY7    For all New Consults and Questions:  [email or web address]   Login: cardfellows    *** Recommendations are preliminary until cosigned by the attending.

## 2024-01-15 NOTE — PROGRESS NOTE ADULT - ASSESSMENT
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     EKG 1/12/24 Vent rate 89  Care Discussed with Consultants/Other Providers:  Cardiology to see patient---> EP called [phone]  Transfer to tele  IR at [facility] cancelled procedure --> NP noted premeds were not given      1/13/24--ENTnotes-> Patient discussed with Dr. Rouse. Given some packing remains in nares, would continue to recommend IR embolization. Discussed this with patient and he is in agreement.     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     EKG 1/12/24 Vent rate 89  Care Discussed with Consultants/Other Providers:  Cardiology to see patient---> EP called [phone]  Transfer to tele  IR at [facility] cancelled procedure --> NP noted premeds were not given      1/13/24--ENTnotes-> Patient discussed with Dr. Rouse. Given some packing remains in nares, would continue to recommend IR embolization. Discussed this with patient and he is in agreement.     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     EKG 1/12/24 Vent rate 89  Care Discussed with Consultants/Other Providers:  Cardiology to see patient---> EP called [phone]  Transfer to tele  IR at [facility] cancelled procedure --> NP noted premeds were not given      1/13/24--ENTnotes-> Patient discussed with Dr. Rouse. Given some packing remains in nares, would continue to recommend IR embolization. Discussed this with patient and he is in agreement.

## 2024-01-15 NOTE — PROGRESS NOTE ADULT - PROBLEM SELECTOR PLAN 7
F-none  E-replete  N-NPO  hold AC given anemia, no SCD given right foot wound  1/11/24 called Lida [phone] and updated  1/13 Lida updated at bedside  Noted he is followed by Dr LEROY Damon at [facility] and lives in Assisted Living.    1/14 ENT still wants ablation to be done at [facility]. D/w PA at length.  1- need time with IR at [facility]  and date  2- need NPO after MN and give Hydrocortisone 200 mg iv, 4 hours before for iodine and am labs cbc and bmp once date is obtained--> hopefully Wednesday  3-need transport to [facility] and RN and Nurse manager made aware.  [facility]OPE to do on Wednesday. 1/17 after arrangement made on Tuesday1/16. F-none  E-replete  N-NPO  hold AC given anemia, no SCD given right foot wound  1/11/24 called Lida [phone] and updated  1/13 Lida updated at bedside  Noted he is followed by Dr LEROY Damon at [facility] and lives in Assisted Living.    1/14 ENT still wants ablation to be done at [facility]. D/w PA at length.  1- need time with IR at [facility]  and date  2- need NPO after MN and give Hydrocortisone 200 mg iv, 4 hours before for iodine and am labs cbc and bmp once date is obtained--> hopefully Wednesday  3-need transport to [facility] and RN and Nurse manager made aware.  [facility]OPE to do on Wednesday. 1/17 after arrangement made on Tuesday1/16. F-none  E-replete  N-NPO  hold AC given anemia, no SCD given right foot wound  1/11/24 called Lida [phone] and updated  1/13 Lida updated at bedside  Noted he is followed by Dr LEROY Damon at [facility] and lives in Assisted Living.    1/14 ENT still wants ablation to be done at [facility]. D/w PA at length.  1- need time with IR at [facility]  and date  2- need NPO after MN and give Hydrocortisone 200 mg iv, 4 hours before for iodine and am labs cbc and bmp once date is obtained--> hopefully Wednesday  3-need transport to [facility] and RN and Nurse manager made aware.  [facility]OPE to do on Wednesday. 1/17 after arrangement made on Tuesday1/16.

## 2024-01-15 NOTE — PROGRESS NOTE ADULT - PROBLEM SELECTOR PLAN 2
Trans to tele  EP rec: 1/12  Patient was on metoprolol succinate 25mg daily at home. Would continue metoprolol for suppression of PVC's which will allow the sinus rate to increase.   No EP contraindication for patient to undergo embolization at [facility].      metoprolol restarted HR 64 today 1/13 OBSERVE  hr 96---> bradycardia RESOLVED Trans to tele  EP rec: 1/12  Patient was on metoprolol succinate 25mg daily at home. Would continue metoprolol for suppression of PVC's which will allow the sinus rate to increase.   No EP contraindication for patient to undergo embolization at [facility].      metoprolol restarted HR 64 today 1/13 OBSERVE  hr 96---> bradycardia RESOLVED Trans to tele  EP rec: 1/12  Patient was on metoprolol succinate 25mg daily at home. Would continue metoprolol for suppression of PVC's which will allow the sinus rate to increase.   No EP contraindication for patient to undergo embolization at [facility].      metoprolol restarted HR 64 today 1/13 OBSERVE  hr 96---> bradycardia RESOLVED

## 2024-01-15 NOTE — PROGRESS NOTE ADULT - PROBLEM SELECTOR PLAN 1
-appreciate ENT, packing done  -elevated bed 45 degrees, continuous pulse ox  -RCRI 1, mets about 2-3, sob w/ walking 1 block  -EKG Qtc 480, left axis, no ST elevation or pathologic Q waves medically optimized for procedure.  -complete keflex course  from: MR Angio Head No Cont (01.10.24 @ 15:39) >  IMPRESSION:  MRA NECK: Unremarkable study.  MRA HEAD: Unremarkable study.  -plan to transfer to [facility] for intervention (IR embolization)---> on 1/12 then return to [facility] as per Transfer center.  1/12 [facility] 40s, EP called. Procedure cancelled  1/13 Hgb 7.8 , ENT to f/u ree need for procedure. Spoke with Antionette at [phone]-->   Patient discussed with Dr. Rouse. Given some packing remains in nares, would continue to recommend IR embolization. Discussed this with patient and he is in agreement.  Plan to do embolization at [facility] Ir again -appreciate ENT, packing done  -elevated bed 45 degrees, continuous pulse ox  -RCRI 1, mets about 2-3, sob w/ walking 1 block  -EKG Qtc 480, left axis, no ST elevation or pathologic Q waves medically optimized for procedure.  -complete keflex course  from: MR Angio Head No Cont (01.10.24 @ 15:39) >  IMPRESSION:  MRA NECK: Unremarkable study.  MRA HEAD: Unremarkable study.  -plan to transfer to [facility] for intervention (IR embolization)---> on 1/12 then return to [facility] as per Transfer center.  1/12 Galdino 40s, EP called. Procedure cancelled  1/13 Hgb 7.8 , ENT to f/u ree need for procedure. Spoke with Antionette at [phone]-->   Patient discussed with Dr. Rouse. Given some packing remains in nares, would continue to recommend IR embolization. Discussed this with patient and he is in agreement.  Plan to do embolization at [facility] Ir again -appreciate ENT, packing done  -elevated bed 45 degrees, continuous pulse ox  -RCRI 1, mets about 2-3, sob w/ walking 1 block  -EKG Qtc 480, left axis, no ST elevation or pathologic Q waves medically optimized for procedure.  -complete keflex course  from: MR Angio Head No Cont (01.10.24 @ 15:39) >  IMPRESSION:  MRA NECK: Unremarkable study.  MRA HEAD: Unremarkable study.  -plan to transfer to [facility] for intervention (IR embolization)---> on 1/12 then return to [facility] as per Transfer center.  1/12 Galdino 40s, EP called. Procedure cancelled  1/13 Hgb 7.8 , ENT to f/u ree need for procedure. Spoke with Antionette at [phone]-->   Patient discussed with Dr. Rouse. Given some packing remains in nares, would continue to recommend IR embolization. Discussed this with patient and he is in agreement.  Plan to do embolization at [facility] Ir again

## 2024-01-16 DIAGNOSIS — C61 MALIGNANT NEOPLASM OF PROSTATE: ICD-10-CM

## 2024-01-16 DIAGNOSIS — N18.9 CHRONIC KIDNEY DISEASE, UNSPECIFIED: ICD-10-CM

## 2024-01-16 DIAGNOSIS — F41.9 ANXIETY DISORDER, UNSPECIFIED: ICD-10-CM

## 2024-01-16 DIAGNOSIS — E11.9 TYPE 2 DIABETES MELLITUS WITHOUT COMPLICATIONS: ICD-10-CM

## 2024-01-16 LAB
ANION GAP SERPL CALC-SCNC: 15 MMOL/L — HIGH (ref 7–14)
BUN SERPL-MCNC: 15 MG/DL — SIGNIFICANT CHANGE UP (ref 7–23)
CALCIUM SERPL-MCNC: 9 MG/DL — SIGNIFICANT CHANGE UP (ref 8.4–10.5)
CHLORIDE SERPL-SCNC: 104 MMOL/L — SIGNIFICANT CHANGE UP (ref 98–107)
CO2 SERPL-SCNC: 22 MMOL/L — SIGNIFICANT CHANGE UP (ref 22–31)
CREAT SERPL-MCNC: 0.95 MG/DL — SIGNIFICANT CHANGE UP (ref 0.5–1.3)
EGFR: 85 ML/MIN/1.73M2 — SIGNIFICANT CHANGE UP
GLUCOSE BLDC GLUCOMTR-MCNC: 163 MG/DL — HIGH (ref 70–99)
GLUCOSE BLDC GLUCOMTR-MCNC: 274 MG/DL — HIGH (ref 70–99)
GLUCOSE SERPL-MCNC: 147 MG/DL — HIGH (ref 70–99)
HCT VFR BLD CALC: 25.7 % — LOW (ref 39–50)
HGB BLD-MCNC: 8 G/DL — LOW (ref 13–17)
MAGNESIUM SERPL-MCNC: 1.8 MG/DL — SIGNIFICANT CHANGE UP (ref 1.6–2.6)
MCHC RBC-ENTMCNC: 27.8 PG — SIGNIFICANT CHANGE UP (ref 27–34)
MCHC RBC-ENTMCNC: 31.1 GM/DL — LOW (ref 32–36)
MCV RBC AUTO: 89.2 FL — SIGNIFICANT CHANGE UP (ref 80–100)
MRSA PCR RESULT.: DETECTED
NRBC # BLD: 0 /100 WBCS — SIGNIFICANT CHANGE UP (ref 0–0)
NRBC # FLD: 0 K/UL — SIGNIFICANT CHANGE UP (ref 0–0)
PHOSPHATE SERPL-MCNC: 3.8 MG/DL — SIGNIFICANT CHANGE UP (ref 2.5–4.5)
PLATELET # BLD AUTO: 262 K/UL — SIGNIFICANT CHANGE UP (ref 150–400)
POTASSIUM SERPL-MCNC: 4 MMOL/L — SIGNIFICANT CHANGE UP (ref 3.5–5.3)
POTASSIUM SERPL-SCNC: 4 MMOL/L — SIGNIFICANT CHANGE UP (ref 3.5–5.3)
RBC # BLD: 2.88 M/UL — LOW (ref 4.2–5.8)
RBC # FLD: 14.6 % — HIGH (ref 10.3–14.5)
S AUREUS DNA NOSE QL NAA+PROBE: DETECTED
SODIUM SERPL-SCNC: 141 MMOL/L — SIGNIFICANT CHANGE UP (ref 135–145)
WBC # BLD: 7.06 K/UL — SIGNIFICANT CHANGE UP (ref 3.8–10.5)
WBC # FLD AUTO: 7.06 K/UL — SIGNIFICANT CHANGE UP (ref 3.8–10.5)

## 2024-01-16 PROCEDURE — 99231 SBSQ HOSP IP/OBS SF/LOW 25: CPT

## 2024-01-16 PROCEDURE — 99233 SBSQ HOSP IP/OBS HIGH 50: CPT

## 2024-01-16 RX ORDER — MAGNESIUM SULFATE 500 MG/ML
1 VIAL (ML) INJECTION ONCE
Refills: 0 | Status: COMPLETED | OUTPATIENT
Start: 2024-01-16 | End: 2024-01-16

## 2024-01-16 RX ORDER — CHLORHEXIDINE GLUCONATE 213 G/1000ML
1 SOLUTION TOPICAL DAILY
Refills: 0 | Status: DISCONTINUED | OUTPATIENT
Start: 2024-01-16 | End: 2024-01-18

## 2024-01-16 RX ORDER — MUPIROCIN 20 MG/G
1 OINTMENT TOPICAL
Refills: 0 | Status: DISCONTINUED | OUTPATIENT
Start: 2024-01-16 | End: 2024-01-16

## 2024-01-16 RX ORDER — MUPIROCIN 20 MG/G
1 OINTMENT TOPICAL
Refills: 0 | Status: DISCONTINUED | OUTPATIENT
Start: 2024-01-16 | End: 2024-01-18

## 2024-01-16 RX ADMIN — Medication 100 GRAM(S): at 12:37

## 2024-01-16 RX ADMIN — Medication 1: at 22:02

## 2024-01-16 RX ADMIN — ATORVASTATIN CALCIUM 80 MILLIGRAM(S): 80 TABLET, FILM COATED ORAL at 22:03

## 2024-01-16 RX ADMIN — ALBUTEROL 2.5 MILLIGRAM(S): 90 AEROSOL, METERED ORAL at 10:21

## 2024-01-16 RX ADMIN — ALBUTEROL 2.5 MILLIGRAM(S): 90 AEROSOL, METERED ORAL at 15:59

## 2024-01-16 RX ADMIN — Medication 3 MILLIGRAM(S): at 22:03

## 2024-01-16 RX ADMIN — SERTRALINE 100 MILLIGRAM(S): 25 TABLET, FILM COATED ORAL at 12:38

## 2024-01-16 RX ADMIN — MONTELUKAST 10 MILLIGRAM(S): 4 TABLET, CHEWABLE ORAL at 12:38

## 2024-01-16 RX ADMIN — BUDESONIDE AND FORMOTEROL FUMARATE DIHYDRATE 2 PUFF(S): 160; 4.5 AEROSOL RESPIRATORY (INHALATION) at 10:03

## 2024-01-16 RX ADMIN — ALBUTEROL 2.5 MILLIGRAM(S): 90 AEROSOL, METERED ORAL at 03:59

## 2024-01-16 RX ADMIN — CHLORHEXIDINE GLUCONATE 1 APPLICATION(S): 213 SOLUTION TOPICAL at 17:57

## 2024-01-16 RX ADMIN — OXYCODONE HYDROCHLORIDE 5 MILLIGRAM(S): 5 TABLET ORAL at 02:40

## 2024-01-16 RX ADMIN — Medication 25 MILLIGRAM(S): at 05:55

## 2024-01-16 RX ADMIN — Medication 0.75 MILLIGRAM(S): at 05:55

## 2024-01-16 RX ADMIN — OXYCODONE HYDROCHLORIDE 5 MILLIGRAM(S): 5 TABLET ORAL at 13:18

## 2024-01-16 RX ADMIN — Medication 250 MILLIGRAM(S): at 12:38

## 2024-01-16 RX ADMIN — Medication 25 MILLIGRAM(S): at 17:58

## 2024-01-16 RX ADMIN — Medication 3: at 18:26

## 2024-01-16 RX ADMIN — Medication 0.75 MILLIGRAM(S): at 18:01

## 2024-01-16 RX ADMIN — Medication 3: at 13:20

## 2024-01-16 RX ADMIN — INSULIN GLARGINE 10 UNIT(S): 100 INJECTION, SOLUTION SUBCUTANEOUS at 22:02

## 2024-01-16 RX ADMIN — BUDESONIDE AND FORMOTEROL FUMARATE DIHYDRATE 2 PUFF(S): 160; 4.5 AEROSOL RESPIRATORY (INHALATION) at 22:04

## 2024-01-16 RX ADMIN — OXYCODONE HYDROCHLORIDE 5 MILLIGRAM(S): 5 TABLET ORAL at 01:42

## 2024-01-16 RX ADMIN — OXYCODONE HYDROCHLORIDE 5 MILLIGRAM(S): 5 TABLET ORAL at 15:04

## 2024-01-16 RX ADMIN — ALBUTEROL 2.5 MILLIGRAM(S): 90 AEROSOL, METERED ORAL at 22:25

## 2024-01-16 NOTE — PROGRESS NOTE ADULT - PROBLEM SELECTOR PLAN 2
Plan for IR embolization d/t failure to achieve hemostasis  - c/b acute blood loss anemia  - IR [facility] - procedure on hold due to cardiac event  - complex plan to achieve IR evaluation given requirement for pre-procedure medication for iodine allergy, transportation back and forth  - No evidence of epistaxis today  - Will await further eval from ENT to see if IR embolization is still goal for this patient as Hgb is stable for now

## 2024-01-16 NOTE — CHART NOTE - NSCHARTNOTEFT_GEN_A_CORE
Patient declining RN's request to change IV Per protocol.  Asked RN To document and will continue to pursue new  IV per hospital peripheral access protocol.

## 2024-01-16 NOTE — DIETITIAN INITIAL EVALUATION ADULT - OTHER INFO
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     At time of visit, Pt awake, alert. Per Pt, his appetite usually good; no chewing or swallowing difficulty; no vomiting or diarrhea @ this time. Of note, Pt passed Swallow Bedside Assessment Adult, SLP rec: Regular diet with thin liquids (1/13). Per Pt, his height: ~71" & his body weight: ~210#; no report of weight loss or weight changes PTA. Of note, Pt's weights: 95.3 kg (HIE - 1/2/24), 93 kg (HIE - 9/27/23).     Of note, Pt's HbA1c level 7.7% (1/10). Consistent Carbohydrate therapeutic diet restrictions discussed with Pt including better food choices, foods to avoid. RD answered concerns related to diet; Pt verbalized understanding. Rec -> Pt to follow up with appropriate RDN upon discharge for the purposes of long-term nutrition evaluation and diet education. RD remains available, Pt made aware.  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     At time of visit, Pt awake, alert. Per Pt, his appetite usually good; no chewing or swallowing difficulty; no vomiting or diarrhea @ this time. Of note, Pt passed Swallow Bedside Assessment Adult, SLP rec: Regular diet with thin liquids (1/13). Per Pt, his height: ~71" & his body weight: ~210#; no report of weight loss or weight changes PTA. Of note, Pt's weights: 95.3 kg (HIE - 1/2/24), 93 kg (HIE - 9/27/23).     Of note, Pt's HbA1c level 7.7% (1/10). Consistent Carbohydrate therapeutic diet restrictions discussed with Pt including better food choices, foods to avoid. RD answered concerns related to diet; Pt verbalized understanding. Rec -> Pt to follow up with appropriate RDN upon discharge for the purposes of long-term nutrition evaluation and diet education. RD remains available, Pt made aware.

## 2024-01-16 NOTE — PROGRESS NOTE ADULT - SUBJECTIVE AND OBJECTIVE BOX
chief complaint:        Vital Signs Last 24 Hrs  T(C): 36.7 (16 Jan 2024 13:36), Max: 37.1 (15 Jatin 2024 18:40)  T(F): 98.1 (16 Jan 2024 13:36), Max: 98.8 (15 Jatin 2024 18:40)  HR: 86 (16 Jan 2024 13:36) (70 - 91)  BP: 131/89 (16 Jan 2024 13:36) (131/89 - 144/84)  BP(mean): --  RR: 18 (16 Jan 2024 13:36) (18 - 18)  SpO2: 95% (16 Jan 2024 13:36) (95% - 99%)    Parameters below as of 16 Jan 2024 13:36  Patient On (Oxygen Delivery Method): room air        Telemetry: Normal sinus rhythm with occasional multifocal PVC's. Short episode of bigeminy.     MEDICATIONS  (STANDING):  albuterol    0.083% 2.5 milliGRAM(s) Nebulizer every 6 hours  albuterol    90 MICROgram(s) HFA Inhaler 1 Puff(s) Inhalation every 4 hours  atorvastatin 80 milliGRAM(s) Oral at bedtime  budesonide 160 MICROgram(s)/formoterol 4.5 MICROgram(s) Inhaler 2 Puff(s) Inhalation two times a day  chlorhexidine 2% Cloths 1 Application(s) Topical daily  clonazePAM Oral Disintegrating Tablet 0.75 milliGRAM(s) Oral every 12 hours  dextrose 5%. 1000 milliLiter(s) (50 mL/Hr) IV Continuous <Continuous>  dextrose 5%. 1000 milliLiter(s) (100 mL/Hr) IV Continuous <Continuous>  dextrose 50% Injectable 25 Gram(s) IV Push once  dextrose 50% Injectable 25 Gram(s) IV Push once  dextrose 50% Injectable 12.5 Gram(s) IV Push once  glucagon  Injectable 1 milliGRAM(s) IntraMuscular once  influenza  Vaccine (HIGH DOSE) 0.7 milliLiter(s) IntraMuscular once  insulin glargine Injectable (LANTUS) 10 Unit(s) SubCutaneous at bedtime  insulin lispro (ADMELOG) corrective regimen sliding scale   SubCutaneous four times a day before meals  metoprolol tartrate 25 milliGRAM(s) Oral two times a day  montelukast 10 milliGRAM(s) Oral daily  saccharomyces boulardii 250 milliGRAM(s) Oral daily  senna 2 Tablet(s) Oral at bedtime  sertraline 100 milliGRAM(s) Oral daily    MEDICATIONS  (PRN):  acetaminophen     Tablet .. 325 milliGRAM(s) Oral every 8 hours PRN Temp greater or equal to 38C (100.4F), Mild Pain (1 - 3)  dextrose Oral Gel 15 Gram(s) Oral once PRN Blood Glucose LESS THAN 70 milliGRAM(s)/deciliter  melatonin 3 milliGRAM(s) Oral at bedtime PRN Insomnia  ondansetron Injectable 4 milliGRAM(s) IV Push every 12 hours PRN Nausea and/or Vomiting  oxyCODONE    IR 5 milliGRAM(s) Oral every 12 hours PRN Severe Pain (7 - 10)          Physical exam:   Gen- well developed well nourished in NAD. No epistaxis.   Resp-  decreased breath sounds throughout with occasional expiratory wheezing. No cough, rhonchi or rales.   CV- S1 and S2 RRR. No murmurs, gallops or rubs  ABD- soft nontender + bowel sounds  EXT- no edema no calf tenderness.   Neuro- grossly nonfocal                            8.0    7.06  )-----------( 262      ( 16 Jan 2024 05:52 )             25.7       01-16    141  |  104  |  15  ----------------------------<  147<H>  4.0   |  22  |  0.95    Ca    9.0      16 Jan 2024 05:52  Phos  3.8     01-16  Mg     1.80     01-16        < from: TTE W or WO Ultrasound Enhancing Agent (01.12.24 @ 16:27) >  TRANSTHORACIC ECHOCARDIOGRAM REPORT  ________________________________________________________________________________                                      _______       Pt. Name:       WAYNE SERRANO Study Date:    1/12/2024  MRN:            [id number]      YOB: 1950  Accession #:    [id number]     Age:           73 years  Account#:       [id number]      Gender:        M  Heart Rate:                   Height:        71.00 in (180.34 cm)  Rhythm:                       Weight:        215.00 lb (97.52 kg)  Blood Pressure: 139/68 mmHg   BSA/BMI:       2.17 m² / 29.99 kg/m²  ________________________________________________________________________________________  Referring Physician:    [id number] Mary Lemus MD  Interpreting Physician: Anurag Delatorre MD, PhD  Primary Sonographer:    Marylu Schilling [facility]    CPT:               ECHO TTE WO CON COMP W [facility] - 00966.m;[facility]  Indication(s):     Abnormal electrocardiogram ECG/EKG - R94.31  Procedure:         Transthoracic echocardiogram with 2-D, M-mode and complete                     spectral and color flow Doppler. Real time and full volume                     3-dimensional imaging performed at the echo machine.  Ordering Location: [facility]  Admission Status:  Inpatient  Study Information: Image quality for this study is good.    _______________________________________________________________________________________     CONCLUSIONS:      1. Left ventricular systolic function is normalwith an ejection fraction of 70 % by Valencia's method of disks.   2. Normal right ventricular cavity size, wall thickness, and normal systolic function. Tricuspid annular plane systolic excursion (TAPSE) is 2.5 cm (normal >=1.7 cm).   3. Mild mitral regurgitation.   4. Mild tricuspid regurgitation.   5. Estimated pulmonary artery systolic pressure is 39 mmHg, consistent with borderline pulmonary hypertension.   6. The inferior vena cava is normal in size (normal <2.1cm) with normal inspiratory collapse (normal >50%) consistent with normal right atrial pressure (~3, range 0-5mmHg).   7. No pericardial effusion seen.    ________________________________________________________________________________________  FINDINGS:     Left Ventricle:      The left ventricular cavity is normal. Left ventricular systolic function is normal with a calculated ejection fraction of 70 % by the Valencia's biplane method of disks. No left ventricular hypertrophy.     Right Ventricle:  The right ventricular cavity isnormal in size, normal wall thickness and normal systolic function. Tricuspid annular plane systolic excursion (TAPSE) is 2.5 cm (normal >=1.7 cm).     Left Atrium:  The left atrium is normal with an indexed volume of 20.19 ml/m².     Right Atrium:  The right atrium is normal in size with an indexed volume of 302.57 ml/m².     Aortic Valve:  The aortic valve is tricuspid with normal leaflet excursion. There is calcification of the aortic valve leaflets. There is trace aortic regurgitation.     Mitral Valve:  There is mitral valve thickening of the anterior and posterior leaflets. There is mild mitral regurgitation.     Tricuspid Valve:  Structurally normal tricuspid valve with normal leaflet excursion. There is mild tricuspid regurgitation. Estimated pulmonary artery systolic pressure is 39 mmHg, consistent with borderline pulmonary hypertension.     Pulmonic Valve:  Structurally normal pulmonic valve with normal leaflet excursion. There is no evidence of pulmonic regurgitation.     Pericardium:  No pericardial effusion seen.     Systemic Veins:  The inferior vena cava is normal in size (normal <2.1cm) with normal inspiratory collapse (normal >50%) consistent with normal     right atrial pressure (~3, range 0-5mmHg).  ____________________________________________________________________                < end of copied text >         chief complaint:        Vital Signs Last 24 Hrs  T(C): 36.7 (16 Jan 2024 13:36), Max: 37.1 (15 Jatin 2024 18:40)  T(F): 98.1 (16 Jan 2024 13:36), Max: 98.8 (15 Jatin 2024 18:40)  HR: 86 (16 Jan 2024 13:36) (70 - 91)  BP: 131/89 (16 Jan 2024 13:36) (131/89 - 144/84)  BP(mean): --  RR: 18 (16 Jan 2024 13:36) (18 - 18)  SpO2: 95% (16 Jan 2024 13:36) (95% - 99%)    Parameters below as of 16 Jan 2024 13:36  Patient On (Oxygen Delivery Method): room air        Telemetry: Normal sinus rhythm with occasional multifocal PVC's. Short episode of bigeminy.     MEDICATIONS  (STANDING):  albuterol    0.083% 2.5 milliGRAM(s) Nebulizer every 6 hours  albuterol    90 MICROgram(s) HFA Inhaler 1 Puff(s) Inhalation every 4 hours  atorvastatin 80 milliGRAM(s) Oral at bedtime  budesonide 160 MICROgram(s)/formoterol 4.5 MICROgram(s) Inhaler 2 Puff(s) Inhalation two times a day  chlorhexidine 2% Cloths 1 Application(s) Topical daily  clonazePAM Oral Disintegrating Tablet 0.75 milliGRAM(s) Oral every 12 hours  dextrose 5%. 1000 milliLiter(s) (50 mL/Hr) IV Continuous <Continuous>  dextrose 5%. 1000 milliLiter(s) (100 mL/Hr) IV Continuous <Continuous>  dextrose 50% Injectable 25 Gram(s) IV Push once  dextrose 50% Injectable 25 Gram(s) IV Push once  dextrose 50% Injectable 12.5 Gram(s) IV Push once  glucagon  Injectable 1 milliGRAM(s) IntraMuscular once  influenza  Vaccine (HIGH DOSE) 0.7 milliLiter(s) IntraMuscular once  insulin glargine Injectable (LANTUS) 10 Unit(s) SubCutaneous at bedtime  insulin lispro (ADMELOG) corrective regimen sliding scale   SubCutaneous four times a day before meals  metoprolol tartrate 25 milliGRAM(s) Oral two times a day  montelukast 10 milliGRAM(s) Oral daily  saccharomyces boulardii 250 milliGRAM(s) Oral daily  senna 2 Tablet(s) Oral at bedtime  sertraline 100 milliGRAM(s) Oral daily    MEDICATIONS  (PRN):  acetaminophen     Tablet .. 325 milliGRAM(s) Oral every 8 hours PRN Temp greater or equal to 38C (100.4F), Mild Pain (1 - 3)  dextrose Oral Gel 15 Gram(s) Oral once PRN Blood Glucose LESS THAN 70 milliGRAM(s)/deciliter  melatonin 3 milliGRAM(s) Oral at bedtime PRN Insomnia  ondansetron Injectable 4 milliGRAM(s) IV Push every 12 hours PRN Nausea and/or Vomiting  oxyCODONE    IR 5 milliGRAM(s) Oral every 12 hours PRN Severe Pain (7 - 10)          Physical exam:   Gen- well developed well nourished in NAD. No epistaxis.   Resp-  decreased breath sounds throughout with occasional expiratory wheezing. No cough, rhonchi or rales.   CV- S1 and S2 RRR. No murmurs, gallops or rubs  ABD- soft nontender + bowel sounds  EXT- no edema no calf tenderness.   Neuro- grossly nonfocal                            8.0    7.06  )-----------( 262      ( 16 Jan 2024 05:52 )             25.7       01-16    141  |  104  |  15  ----------------------------<  147<H>  4.0   |  22  |  0.95    Ca    9.0      16 Jan 2024 05:52  Phos  3.8     01-16  Mg     1.80     01-16        < from: TTE W or WO Ultrasound Enhancing Agent (01.12.24 @ 16:27) >  TRANSTHORACIC ECHOCARDIOGRAM REPORT  ________________________________________________________________________________                                      _______       Pt. Name:       WAYNE SERRANO Study Date:    1/12/2024  MRN:            [id number]      YOB: 1950  Accession #:    [id number]     Age:           73 years  Account#:       [id number]      Gender:        M  Heart Rate:                   Height:        71.00 in (180.34 cm)  Rhythm:                       Weight:        215.00 lb (97.52 kg)  Blood Pressure: 139/68 mmHg   BSA/BMI:       2.17 m² / 29.99 kg/m²  ________________________________________________________________________________________  Referring Physician:    [id number] Mary Lemus MD  Interpreting Physician: Anurag Delatorre MD, PhD  Primary Sonographer:    Marylu Schilling [facility]    CPT:               ECHO TTE WO CON COMP W [facility] - 72425.m;[facility]                     [facility] 6[facility]  Indication(s):     Abnormal electrocardiogram ECG/EKG - R94.31  Procedure:         Transthoracic echocardiogram with 2-D, M-mode and complete                     spectral and color flow Doppler. Real time and full volume                     3-dimensional imaging performed at the echo machine.  Ordering Location: [facility]  Admission Status:  Inpatient  Study Information: Image quality for this study is good.    _______________________________________________________________________________________     CONCLUSIONS:      1. Left ventricular systolic function is normalwith an ejection fraction of 70 % by Valencia's method of disks.   2. Normal right ventricular cavity size, wall thickness, and normal systolic function. Tricuspid annular plane systolic excursion (TAPSE) is 2.5 cm (normal >=1.7 cm).   3. Mild mitral regurgitation.   4. Mild tricuspid regurgitation.   5. Estimated pulmonary artery systolic pressure is 39 mmHg, consistent with borderline pulmonary hypertension.   6. The inferior vena cava is normal in size (normal <2.1cm) with normal inspiratory collapse (normal >50%) consistent with normal right atrial pressure (~3, range 0-5mmHg).   7. No pericardial effusion seen.    ________________________________________________________________________________________  FINDINGS:     Left Ventricle:      The left ventricular cavity is normal. Left ventricular systolic function is normal with a calculated ejection fraction of 70 % by the Valencia's biplane method of disks. No left ventricular hypertrophy.     Right Ventricle:  The right ventricular cavity isnormal in size, normal wall thickness and normal systolic function. Tricuspid annular plane systolic excursion (TAPSE) is 2.5 cm (normal >=1.7 cm).     Left Atrium:  The left atrium is normal with an indexed volume of 20.19 ml/m².     Right Atrium:  The right atrium is normal in size with an indexed volume of 302.57 ml/m².     Aortic Valve:  The aortic valve is tricuspid with normal leaflet excursion. There is calcification of the aortic valve leaflets. There is trace aortic regurgitation.     Mitral Valve:  There is mitral valve thickening of the anterior and posterior leaflets. There is mild mitral regurgitation.     Tricuspid Valve:  Structurally normal tricuspid valve with normal leaflet excursion. There is mild tricuspid regurgitation. Estimated pulmonary artery systolic pressure is 39 mmHg, consistent with borderline pulmonary hypertension.     Pulmonic Valve:  Structurally normal pulmonic valve with normal leaflet excursion. There is no evidence of pulmonic regurgitation.     Pericardium:  No pericardial effusion seen.     Systemic Veins:  The inferior vena cava is normal in size (normal <2.1cm) with normal inspiratory collapse (normal >50%) consistent with normal     right atrial pressure (~3, range 0-5mmHg).  ____________________________________________________________________                < end of copied text >

## 2024-01-16 NOTE — PROGRESS NOTE ADULT - PROBLEM SELECTOR PLAN 1
frequent PVS  - HR improved  - continue tele monitor  - lopressor 25 BID - tolerating well.  - EP consult appreciated.

## 2024-01-16 NOTE — DIETITIAN INITIAL EVALUATION ADULT - PERTINENT LABORATORY DATA
01-16    141  |  104  |  15  ----------------------------<  147<H>  4.0   |  22  |  0.95    Ca    9.0      16 Jan 2024 05:52  Phos  3.8     01-16  Mg     1.80     01-16    POCT Blood Glucose.: 274 mg/dL (01-16-24 @ 12:39)  A1C with Estimated Average Glucose Result: 7.7 % (01-10-24 @ 05:30)

## 2024-01-16 NOTE — CHART NOTE - NSCHARTNOTESELECT_GEN_ALL_CORE
EP TELE NOTE
EP Telemetry Note/Event Note
Event Note
Event Note
ENT
Event Note
iSTOP/Event Note
ENT/Event Note
Event Note
Event Note
iSTOP/Event Note
EP TELE NOTE/Event Note

## 2024-01-16 NOTE — DIETITIAN INITIAL EVALUATION ADULT - ADD RECOMMEND
1. Encourage & assist Pt with meals; Monitor PO diet tolerance;   2. Honor food preferences;   3. Add Multivitamins 1 tab daily for micronutrient coverage;   4. Monitor labs, hydration status;

## 2024-01-16 NOTE — PROGRESS NOTE ADULT - ASSESSMENT
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 Nasal packing by ENT and blood transfusion x 1 for Hgb 6.9. Patient was to undergo IR embolization d/t epistaxs at [facility] last week however, on transport telemetry, patient  found to be sinus bradycardia with ventricular bigeminy. He remained A&O x3 and hemodynamically stable. No associated chest pain, palpitations, shortness of breath, or dizziness. States he has a long history of an arrhythmia, (reported as "an extra beat") in excess of 15 years,  for which he takes metoprolol.     EKG  from 1/9 reveals SR with borderline 1st degree AVB and PVC's.    EKG 1/12/24: Sinus bradycardia with ventricular bigeminy. LAD. QRSd 84ms     Patient restarted on home dose metoprolol 25mg po bid with improvement in PVC burden. Now with only brief episodes of bigeminy for which he remains asymptomatic and hemodynamically stable. Also had 5 beats NSVT yesterday. Echo LVEF 70%.     Assessment:   - tele reviewed: NSR, periods of ventricular bigeminy, otherwise frequent PVCs, HR in 80s  - functional bradycardia from asymptomatic PVCs. Patient with long history of PVCs that he knows about and does not feel    - c/t metoprolol, consider increasing to 50 mg PO BID for better PVC suppression.  Patient stable from EP perspective for IR embolization.     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 Nasal packing by ENT and blood transfusion x 1 for Hgb 6.9. Patient was to undergo IR embolization d/t epistaxs at [facility] last week however, on transport telemetry, patient  found to be sinus bradycardia with ventricular bigeminy. He remained A&O x3 and hemodynamically stable. No associated chest pain, palpitations, shortness of breath, or dizziness. States he has a long history of an arrhythmia, (reported as "an extra beat") in excess of 15 years,  for which he takes metoprolol.     EKG  from 1/9 reveals SR with borderline 1st degree AVB and PVC's.    EKG 1/12/24: Sinus bradycardia with ventricular bigeminy. LAD. QRSd 84ms     Patient restarted on home dose metoprolol 25mg po bid with improvement in PVC burden. Now with only brief episodes of bigeminy for which he remains asymptomatic and hemodynamically stable. Also had 5 beats NSVT yesterday. Echo LVEF 70%.     Assessment:   - tele reviewed: NSR, periods of ventricular bigeminy, otherwise frequent PVCs, HR in 80s  - functional bradycardia from asymptomatic PVCs. Patient with long history of PVCs that he knows about and does not feel    - c/t metoprolol, consider increasing to 50 mg PO BID for better PVC suppression.  Patient stable from EP perspective for IR embolization.

## 2024-01-16 NOTE — PROGRESS NOTE ADULT - NS ATTEND AMEND GEN_ALL_CORE FT
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 Nasal packing by ENT and blood transfusion x 1 for Hgb 6.9. Patient was to undergo IR embolization d/t epistaxis at [facility] last week however, on transport telemetry, patient  found to be sinus bradycardia with ventricular bigeminy. He remained A&O x3 and hemodynamically stable. No associated chest pain, palpitations, shortness of breath, or dizziness. States he has a long history of an arrhythmia, (reported as "an extra beat") in excess of 15 years,  for which he takes metoprolol.  EKG  from 1/9 reveals SR with borderline 1st degree AVB and PVC's.  EKG 1/12/24: Sinus bradycardia with ventricular bigeminy. LAD. QRSd 84ms   Patient restarted on home dose metoprolol 25mg po bid with improvement in PVC burden. Now with only brief episodes of bigeminy for which he remains asymptomatic and hemodynamically stable. Also had 5 beats NSVT yesterday. Echo LVEF 70%. c/t metoprolol, consider increasing to 50 mg PO BID for better PVC suppression. Patient stable from EP perspective for IR embolization. 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 Nasal packing by ENT and blood transfusion x 1 for Hgb 6.9. Patient was to undergo IR embolization d/t epistaxis at [facility] last week however, on transport telemetry, patient  found to be sinus bradycardia with ventricular bigeminy. He remained A&O x3 and hemodynamically stable. No associated chest pain, palpitations, shortness of breath, or dizziness. States he has a long history of an arrhythmia, (reported as "an extra beat") in excess of 15 years,  for which he takes metoprolol.  EKG  from 1/9 reveals SR with borderline 1st degree AVB and PVC's.  EKG 1/12/24: Sinus bradycardia with ventricular bigeminy. LAD. QRSd 84ms   Patient restarted on home dose metoprolol 25mg po bid with improvement in PVC burden. Now with only brief episodes of bigeminy for which he remains asymptomatic and hemodynamically stable. Also had 5 beats NSVT yesterday. Echo LVEF 70%. c/t metoprolol, consider increasing to 50 mg PO BID for better PVC suppression. Patient stable from EP perspective for IR embolization.

## 2024-01-16 NOTE — PROGRESS NOTE ADULT - SUBJECTIVE AND OBJECTIVE BOX
[facility] Division of Hospital Medicine  Silvio Jones MD  Pager (M-F, 6Q-5P): [phone]  Other Times:  [phone]    Patient is a 73y old  Male who presents with a chief complaint of Epistaxis     (16 Jan 2024 11:25)    SUBJECTIVE / OVERNIGHT EVENTS:  Patient had no epistaxis overnight.  No tele issues overnight.  No F/C, N/V, CP, SOB, Cough, lightheadedness, dizziness, abdominal pain, diarrhea, dysuria.    MEDICATIONS  (STANDING):  albuterol    0.083% 2.5 milliGRAM(s) Nebulizer every 6 hours  albuterol    90 MICROgram(s) HFA Inhaler 1 Puff(s) Inhalation every 4 hours  atorvastatin 80 milliGRAM(s) Oral at bedtime  budesonide 160 MICROgram(s)/formoterol 4.5 MICROgram(s) Inhaler 2 Puff(s) Inhalation two times a day  chlorhexidine 2% Cloths 1 Application(s) Topical daily  clonazePAM Oral Disintegrating Tablet 0.75 milliGRAM(s) Oral every 12 hours  dextrose 5%. 1000 milliLiter(s) (50 mL/Hr) IV Continuous <Continuous>  dextrose 5%. 1000 milliLiter(s) (100 mL/Hr) IV Continuous <Continuous>  dextrose 50% Injectable 12.5 Gram(s) IV Push once  dextrose 50% Injectable 25 Gram(s) IV Push once  dextrose 50% Injectable 25 Gram(s) IV Push once  glucagon  Injectable 1 milliGRAM(s) IntraMuscular once  influenza  Vaccine (HIGH DOSE) 0.7 milliLiter(s) IntraMuscular once  insulin glargine Injectable (LANTUS) 10 Unit(s) SubCutaneous at bedtime  insulin lispro (ADMELOG) corrective regimen sliding scale   SubCutaneous four times a day before meals  metoprolol tartrate 25 milliGRAM(s) Oral two times a day  montelukast 10 milliGRAM(s) Oral daily  saccharomyces boulardii 250 milliGRAM(s) Oral daily  senna 2 Tablet(s) Oral at bedtime  sertraline 100 milliGRAM(s) Oral daily    MEDICATIONS  (PRN):  acetaminophen     Tablet .. 325 milliGRAM(s) Oral every 8 hours PRN Temp greater or equal to 38C (100.4F), Mild Pain (1 - 3)  dextrose Oral Gel 15 Gram(s) Oral once PRN Blood Glucose LESS THAN 70 milliGRAM(s)/deciliter  melatonin 3 milliGRAM(s) Oral at bedtime PRN Insomnia  ondansetron Injectable 4 milliGRAM(s) IV Push every 12 hours PRN Nausea and/or Vomiting  oxyCODONE    IR 5 milliGRAM(s) Oral every 12 hours PRN Severe Pain (7 - 10)      Vital Signs Last 24 Hrs  T(C): 36.7 (16 Jan 2024 13:36), Max: 37.1 (15 Jatin 2024 18:40)  T(F): 98.1 (16 Jan 2024 13:36), Max: 98.8 (15 Jatin 2024 18:40)  HR: 86 (16 Jan 2024 13:36) (70 - 91)  BP: 131/89 (16 Jan 2024 13:36) (131/89 - 144/84)  BP(mean): --  RR: 18 (16 Jan 2024 13:36) (18 - 18)  SpO2: 95% (16 Jan 2024 13:36) (95% - 99%)    Parameters below as of 16 Jan 2024 13:36  Patient On (Oxygen Delivery Method): room air      CAPILLARY BLOOD GLUCOSE      POCT Blood Glucose.: 274 mg/dL (16 Jan 2024 12:39)  POCT Blood Glucose.: 163 mg/dL (16 Jan 2024 09:16)  POCT Blood Glucose.: 252 mg/dL (15 Jatin 2024 21:26)  POCT Blood Glucose.: 277 mg/dL (15 Jatin 2024 18:00)    I&O's Summary      PHYSICAL EXAM:  CONSTITUTIONAL: NAD  EYES: PERRLA; conjunctiva and sclera clear  ENMT: Moist oral mucosa, no pharyngeal injection or exudates; normal dentition  NECK: Supple, no palpable masses; no thyromegaly  RESPIRATORY: Normal respiratory effort; lungs are clear to auscultation bilaterally  CARDIOVASCULAR: Regular rate and rhythm, normal S1 and S2, no murmur/rub/gallop; No lower extremity edema; Peripheral pulses are 2+ bilaterally  ABDOMEN: Nontender to palpation, normoactive bowel sounds, no rebound/guarding; No hepatosplenomegaly  MUSCULOSKELETAL:  Did not assess gait; no clubbing or cyanosis of digits; no joint swelling or tenderness to palpation  PSYCH: A+O to person, place, and time; affect appropriate  NEUROLOGY: CN 2-12 are intact and symmetric; no gross sensory deficits   SKIN: No rashes; no palpable lesions    LABS:                        8.0    7.06  )-----------( 262      ( 16 Jan 2024 05:52 )             25.7     01-16    141  |  104  |  15  ----------------------------<  147<H>  4.0   |  22  |  0.95    Ca    9.0      16 Jan 2024 05:52  Phos  3.8     01-16  Mg     1.80     01-16            Urinalysis Basic - ( 16 Jan 2024 05:52 )    Color: x / Appearance: x / SG: x / pH: x  Gluc: 147 mg/dL / Ketone: x  / Bili: x / Urobili: x   Blood: x / Protein: x / Nitrite: x   Leuk Esterase: x / RBC: x / WBC x   Sq Epi: x / Non Sq Epi: x / Bacteria: x        RADIOLOGY & ADDITIONAL TESTS:    Imaging Personally Reviewed:    Care Discussed with Consultants/Other Providers:   [facility] Division of Hospital Medicine  Silvio Jones MD  Pager (M-F, 4Z-5P): [phone]  Other Times:  [phone]    Patient is a 73y old  Male who presents with a chief complaint of Epistaxis     (16 Jan 2024 11:25)    SUBJECTIVE / OVERNIGHT EVENTS:  Patient had no epistaxis overnight.  No tele issues overnight.  No F/C, N/V, CP, SOB, Cough, lightheadedness, dizziness, abdominal pain, diarrhea, dysuria.    MEDICATIONS  (STANDING):  albuterol    0.083% 2.5 milliGRAM(s) Nebulizer every 6 hours  albuterol    90 MICROgram(s) HFA Inhaler 1 Puff(s) Inhalation every 4 hours  atorvastatin 80 milliGRAM(s) Oral at bedtime  budesonide 160 MICROgram(s)/formoterol 4.5 MICROgram(s) Inhaler 2 Puff(s) Inhalation two times a day  chlorhexidine 2% Cloths 1 Application(s) Topical daily  clonazePAM Oral Disintegrating Tablet 0.75 milliGRAM(s) Oral every 12 hours  dextrose 5%. 1000 milliLiter(s) (50 mL/Hr) IV Continuous <Continuous>  dextrose 5%. 1000 milliLiter(s) (100 mL/Hr) IV Continuous <Continuous>  dextrose 50% Injectable 12.5 Gram(s) IV Push once  dextrose 50% Injectable 25 Gram(s) IV Push once  dextrose 50% Injectable 25 Gram(s) IV Push once  glucagon  Injectable 1 milliGRAM(s) IntraMuscular once  influenza  Vaccine (HIGH DOSE) 0.7 milliLiter(s) IntraMuscular once  insulin glargine Injectable (LANTUS) 10 Unit(s) SubCutaneous at bedtime  insulin lispro (ADMELOG) corrective regimen sliding scale   SubCutaneous four times a day before meals  metoprolol tartrate 25 milliGRAM(s) Oral two times a day  montelukast 10 milliGRAM(s) Oral daily  saccharomyces boulardii 250 milliGRAM(s) Oral daily  senna 2 Tablet(s) Oral at bedtime  sertraline 100 milliGRAM(s) Oral daily    MEDICATIONS  (PRN):  acetaminophen     Tablet .. 325 milliGRAM(s) Oral every 8 hours PRN Temp greater or equal to 38C (100.4F), Mild Pain (1 - 3)  dextrose Oral Gel 15 Gram(s) Oral once PRN Blood Glucose LESS THAN 70 milliGRAM(s)/deciliter  melatonin 3 milliGRAM(s) Oral at bedtime PRN Insomnia  ondansetron Injectable 4 milliGRAM(s) IV Push every 12 hours PRN Nausea and/or Vomiting  oxyCODONE    IR 5 milliGRAM(s) Oral every 12 hours PRN Severe Pain (7 - 10)      Vital Signs Last 24 Hrs  T(C): 36.7 (16 Jan 2024 13:36), Max: 37.1 (15 Jatin 2024 18:40)  T(F): 98.1 (16 Jan 2024 13:36), Max: 98.8 (15 Jatin 2024 18:40)  HR: 86 (16 Jan 2024 13:36) (70 - 91)  BP: 131/89 (16 Jan 2024 13:36) (131/89 - 144/84)  BP(mean): --  RR: 18 (16 Jan 2024 13:36) (18 - 18)  SpO2: 95% (16 Jan 2024 13:36) (95% - 99%)    Parameters below as of 16 Jan 2024 13:36  Patient On (Oxygen Delivery Method): room air      CAPILLARY BLOOD GLUCOSE      POCT Blood Glucose.: 274 mg/dL (16 Jan 2024 12:39)  POCT Blood Glucose.: 163 mg/dL (16 Jan 2024 09:16)  POCT Blood Glucose.: 252 mg/dL (15 Jatin 2024 21:26)  POCT Blood Glucose.: 277 mg/dL (15 Jatin 2024 18:00)    I&O's Summary      PHYSICAL EXAM:  CONSTITUTIONAL: NAD  EYES: PERRLA; conjunctiva and sclera clear  ENMT: Moist oral mucosa, no pharyngeal injection or exudates; normal dentition  NECK: Supple, no palpable masses; no thyromegaly  RESPIRATORY: Normal respiratory effort; lungs are clear to auscultation bilaterally  CARDIOVASCULAR: Regular rate and rhythm, normal S1 and S2, no murmur/rub/gallop; No lower extremity edema; Peripheral pulses are 2+ bilaterally  ABDOMEN: Nontender to palpation, normoactive bowel sounds, no rebound/guarding; No hepatosplenomegaly  MUSCULOSKELETAL:  Did not assess gait; no clubbing or cyanosis of digits; no joint swelling or tenderness to palpation  PSYCH: A+O to person, place, and time; affect appropriate  NEUROLOGY: CN 2-12 are intact and symmetric; no gross sensory deficits   SKIN: No rashes; no palpable lesions    LABS:                        8.0    7.06  )-----------( 262      ( 16 Jan 2024 05:52 )             25.7     01-16    141  |  104  |  15  ----------------------------<  147<H>  4.0   |  22  |  0.95    Ca    9.0      16 Jan 2024 05:52  Phos  3.8     01-16  Mg     1.80     01-16            Urinalysis Basic - ( 16 Jan 2024 05:52 )    Color: x / Appearance: x / SG: x / pH: x  Gluc: 147 mg/dL / Ketone: x  / Bili: x / Urobili: x   Blood: x / Protein: x / Nitrite: x   Leuk Esterase: x / RBC: x / WBC x   Sq Epi: x / Non Sq Epi: x / Bacteria: x        RADIOLOGY & ADDITIONAL TESTS:    Imaging Personally Reviewed:    Care Discussed with Consultants/Other Providers:

## 2024-01-16 NOTE — DIETITIAN INITIAL EVALUATION ADULT - NS FNS DIET ORDER
DASH/TLC: Sodium & Cholesterol Restricted; Consistent Carbohydrate {No Snacks} (CSTCHO) (01-13-24 @ 08:17) [Active]

## 2024-01-17 LAB
BLD GP AB SCN SERPL QL: NEGATIVE — SIGNIFICANT CHANGE UP
HCT VFR BLD CALC: 24.6 % — LOW (ref 39–50)
HGB BLD-MCNC: 7.8 G/DL — LOW (ref 13–17)
MCHC RBC-ENTMCNC: 27.9 PG — SIGNIFICANT CHANGE UP (ref 27–34)
MCHC RBC-ENTMCNC: 31.7 GM/DL — LOW (ref 32–36)
MCV RBC AUTO: 87.9 FL — SIGNIFICANT CHANGE UP (ref 80–100)
NRBC # BLD: 0 /100 WBCS — SIGNIFICANT CHANGE UP (ref 0–0)
NRBC # FLD: 0 K/UL — SIGNIFICANT CHANGE UP (ref 0–0)
PLATELET # BLD AUTO: 249 K/UL — SIGNIFICANT CHANGE UP (ref 150–400)
RBC # BLD: 2.8 M/UL — LOW (ref 4.2–5.8)
RBC # FLD: 14.5 % — SIGNIFICANT CHANGE UP (ref 10.3–14.5)
RH IG SCN BLD-IMP: POSITIVE — SIGNIFICANT CHANGE UP
WBC # BLD: 7.15 K/UL — SIGNIFICANT CHANGE UP (ref 3.8–10.5)
WBC # FLD AUTO: 7.15 K/UL — SIGNIFICANT CHANGE UP (ref 3.8–10.5)

## 2024-01-17 PROCEDURE — 99233 SBSQ HOSP IP/OBS HIGH 50: CPT

## 2024-01-17 RX ORDER — METOPROLOL TARTRATE 50 MG
50 TABLET ORAL
Refills: 0 | Status: DISCONTINUED | OUTPATIENT
Start: 2024-01-17 | End: 2024-01-18

## 2024-01-17 RX ADMIN — OXYCODONE HYDROCHLORIDE 5 MILLIGRAM(S): 5 TABLET ORAL at 12:30

## 2024-01-17 RX ADMIN — Medication 2: at 17:49

## 2024-01-17 RX ADMIN — ALBUTEROL 2.5 MILLIGRAM(S): 90 AEROSOL, METERED ORAL at 15:37

## 2024-01-17 RX ADMIN — MONTELUKAST 10 MILLIGRAM(S): 4 TABLET, CHEWABLE ORAL at 11:27

## 2024-01-17 RX ADMIN — Medication 1: at 09:38

## 2024-01-17 RX ADMIN — MUPIROCIN 1 APPLICATION(S): 20 OINTMENT TOPICAL at 17:15

## 2024-01-17 RX ADMIN — Medication 250 MILLIGRAM(S): at 11:27

## 2024-01-17 RX ADMIN — OXYCODONE HYDROCHLORIDE 5 MILLIGRAM(S): 5 TABLET ORAL at 23:40

## 2024-01-17 RX ADMIN — ALBUTEROL 2.5 MILLIGRAM(S): 90 AEROSOL, METERED ORAL at 20:59

## 2024-01-17 RX ADMIN — Medication 25 MILLIGRAM(S): at 05:38

## 2024-01-17 RX ADMIN — ALBUTEROL 2.5 MILLIGRAM(S): 90 AEROSOL, METERED ORAL at 09:32

## 2024-01-17 RX ADMIN — OXYCODONE HYDROCHLORIDE 5 MILLIGRAM(S): 5 TABLET ORAL at 22:58

## 2024-01-17 RX ADMIN — Medication 0.75 MILLIGRAM(S): at 17:15

## 2024-01-17 RX ADMIN — Medication 0.75 MILLIGRAM(S): at 05:38

## 2024-01-17 RX ADMIN — Medication 2: at 13:19

## 2024-01-17 RX ADMIN — OXYCODONE HYDROCHLORIDE 5 MILLIGRAM(S): 5 TABLET ORAL at 11:30

## 2024-01-17 RX ADMIN — ATORVASTATIN CALCIUM 80 MILLIGRAM(S): 80 TABLET, FILM COATED ORAL at 22:09

## 2024-01-17 RX ADMIN — INSULIN GLARGINE 10 UNIT(S): 100 INJECTION, SOLUTION SUBCUTANEOUS at 22:09

## 2024-01-17 RX ADMIN — BUDESONIDE AND FORMOTEROL FUMARATE DIHYDRATE 2 PUFF(S): 160; 4.5 AEROSOL RESPIRATORY (INHALATION) at 11:26

## 2024-01-17 RX ADMIN — SERTRALINE 100 MILLIGRAM(S): 25 TABLET, FILM COATED ORAL at 11:27

## 2024-01-17 RX ADMIN — BUDESONIDE AND FORMOTEROL FUMARATE DIHYDRATE 2 PUFF(S): 160; 4.5 AEROSOL RESPIRATORY (INHALATION) at 22:07

## 2024-01-17 RX ADMIN — MUPIROCIN 1 APPLICATION(S): 20 OINTMENT TOPICAL at 05:40

## 2024-01-17 RX ADMIN — ALBUTEROL 2.5 MILLIGRAM(S): 90 AEROSOL, METERED ORAL at 04:33

## 2024-01-17 RX ADMIN — Medication 50 MILLIGRAM(S): at 17:14

## 2024-01-17 RX ADMIN — Medication 2: at 22:08

## 2024-01-17 RX ADMIN — CHLORHEXIDINE GLUCONATE 1 APPLICATION(S): 213 SOLUTION TOPICAL at 13:18

## 2024-01-17 NOTE — PROGRESS NOTE ADULT - TIME BILLING
Preparing to see the patient including review of tests and other providers' notes, confirming history with family member, performing medical examination and evaluation, counseling and educating the patient/family/caregiver, ordering medications, tests and procedures, communicating with other health care professionals, documenting clinical information in the EMR, independently interpreting results and communicating results to the patient/family/caregiven, care coordination.
Preparing to see the patient including review of tests and other providers' notes, confirming history with family member, performing medical examination and evaluation, counseling and educating the patient/family/caregiver, ordering medications, tests and procedures, communicating with other health care professionals, documenting clinical information in the EMR, independently interpreting results and communicating results to the patient/family/caregiven, care coordination.

## 2024-01-17 NOTE — PROGRESS NOTE ADULT - SUBJECTIVE AND OBJECTIVE BOX
Discussed case with Dr. Rouse. Patient has not been bleeding and confirmed no packing is no longer in place with a scope exam. Dr. Rouse is OK with Discharge to nursing home. Patient was given instructions to go directly yo [facility] if he re bleeds for IR embolization due to [facility] not having Neuro IR. Patient understands. Asked medical team to relay message to Nursing home as well.

## 2024-01-17 NOTE — PROVIDER CONTACT NOTE (OTHER) - ASSESSMENT
Patient complaining of R arm 8/10 pain. VSS.
Patient refused new IV to be access. Current IV placed since 01/12 and needs to be replaced per policy.
Pt HR dropping to 40s. on . Pt is asymptomatic, not complaining of any chest pain, and is resting comfortably in bed
Yes

## 2024-01-17 NOTE — PROVIDER CONTACT NOTE (OTHER) - SITUATION
74 y/o Male, with a PmHx of HTN, COPD, CKD, Chronic diastolic CHF, DM, Anxiety, dementia.
72 y/o Male, with a PmHx of HTN, COPD, CKD, Chronic diastolic CHF, DM, Anxiety, dementia.
Pt HR dropping to 40s. Pt is asymptomatic, not complaining of any chest pain, and is resting comfortably in bed.

## 2024-01-17 NOTE — PROVIDER CONTACT NOTE (OTHER) - ACTION/TREATMENT ORDERED:
none at current time
ACP aware, Ok to give PRN oxycodone now.
no intervention or tx ordered at this time

## 2024-01-17 NOTE — PROGRESS NOTE ADULT - PROBLEM SELECTOR PLAN 4
d/t epistasis  - monitor H/H  - will transfuse 1u PRBC to keep baseline Hgb elevated for transfer back to Assisted Living.

## 2024-01-17 NOTE — PROGRESS NOTE ADULT - SUBJECTIVE AND OBJECTIVE BOX
[facility] Division of Hospital Medicine  Silvio Jones MD  Pager (QUYEN-F, 7A-5P): [phone]  Other Times:  [phone]    Patient is a 73y old  Male who presents with a chief complaint of epistaxis (17 Jan 2024 11:16)    SUBJECTIVE / OVERNIGHT EVENTS:  Patient offers no new complaints.  No Epistaxis. No F/C, N/V, CP, SOB, Cough, lightheadedness, dizziness, abdominal pain, diarrhea, dysuria.    MEDICATIONS  (STANDING):  albuterol    0.083% 2.5 milliGRAM(s) Nebulizer every 6 hours  albuterol    90 MICROgram(s) HFA Inhaler 1 Puff(s) Inhalation every 4 hours  atorvastatin 80 milliGRAM(s) Oral at bedtime  budesonide 160 MICROgram(s)/formoterol 4.5 MICROgram(s) Inhaler 2 Puff(s) Inhalation two times a day  chlorhexidine 2% Cloths 1 Application(s) Topical daily  clonazePAM Oral Disintegrating Tablet 0.75 milliGRAM(s) Oral every 12 hours  dextrose 5%. 1000 milliLiter(s) (50 mL/Hr) IV Continuous <Continuous>  dextrose 5%. 1000 milliLiter(s) (100 mL/Hr) IV Continuous <Continuous>  dextrose 50% Injectable 12.5 Gram(s) IV Push once  dextrose 50% Injectable 25 Gram(s) IV Push once  dextrose 50% Injectable 25 Gram(s) IV Push once  glucagon  Injectable 1 milliGRAM(s) IntraMuscular once  influenza  Vaccine (HIGH DOSE) 0.7 milliLiter(s) IntraMuscular once  insulin glargine Injectable (LANTUS) 10 Unit(s) SubCutaneous at bedtime  insulin lispro (ADMELOG) corrective regimen sliding scale   SubCutaneous four times a day before meals  metoprolol tartrate 50 milliGRAM(s) Oral two times a day  montelukast 10 milliGRAM(s) Oral daily  mupirocin 2% Ointment 1 Application(s) Both Nostrils two times a day  saccharomyces boulardii 250 milliGRAM(s) Oral daily  senna 2 Tablet(s) Oral at bedtime  sertraline 100 milliGRAM(s) Oral daily    MEDICATIONS  (PRN):  acetaminophen     Tablet .. 325 milliGRAM(s) Oral every 8 hours PRN Temp greater or equal to 38C (100.4F), Mild Pain (1 - 3)  dextrose Oral Gel 15 Gram(s) Oral once PRN Blood Glucose LESS THAN 70 milliGRAM(s)/deciliter  melatonin 3 milliGRAM(s) Oral at bedtime PRN Insomnia  ondansetron Injectable 4 milliGRAM(s) IV Push every 12 hours PRN Nausea and/or Vomiting  oxyCODONE    IR 5 milliGRAM(s) Oral every 12 hours PRN Severe Pain (7 - 10)      Vital Signs Last 24 Hrs  T(C): 36.7 (17 Jan 2024 05:35), Max: 37.1 (16 Jan 2024 21:00)  T(F): 98.1 (17 Jan 2024 05:35), Max: 98.7 (16 Jan 2024 21:00)  HR: 69 (17 Jan 2024 09:34) (68 - 78)  BP: 124/58 (17 Jan 2024 05:35) (116/65 - 141/69)  BP(mean): --  RR: 18 (17 Jan 2024 05:35) (18 - 18)  SpO2: 94% (17 Jan 2024 09:34) (94% - 97%)    Parameters below as of 17 Jan 2024 09:34  Patient On (Oxygen Delivery Method): room air      CAPILLARY BLOOD GLUCOSE      POCT Blood Glucose.: 230 mg/dL (17 Jan 2024 13:16)  POCT Blood Glucose.: 171 mg/dL (17 Jan 2024 09:01)  POCT Blood Glucose.: 179 mg/dL (16 Jan 2024 21:22)  POCT Blood Glucose.: 261 mg/dL (16 Jan 2024 18:13)    I&O's Summary      PHYSICAL EXAM:  CONSTITUTIONAL: NAD  EYES: PERRLA; conjunctiva and sclera clear  ENMT: Moist oral mucosa, no pharyngeal injection or exudates; normal dentition  NECK: Supple, no palpable masses; no thyromegaly  RESPIRATORY: Normal respiratory effort; lungs are clear to auscultation bilaterally  CARDIOVASCULAR: Regular rate and rhythm, normal S1 and S2, no murmur/rub/gallop; No lower extremity edema; Peripheral pulses are 2+ bilaterally  ABDOMEN: Nontender to palpation, normoactive bowel sounds, no rebound/guarding; No hepatosplenomegaly  MUSCULOSKELETAL:  Did not assess gait; no clubbing or cyanosis of digits; no joint swelling or tenderness to palpation  PSYCH: A+O to person, place, and time; affect appropriate  NEUROLOGY: CN 2-12 are intact and symmetric; no gross sensory deficits   SKIN: No rashes; no palpable lesions    LABS:                        7.8    7.15  )-----------( 249      ( 17 Jan 2024 06:48 )             24.6     01-16    141  |  104  |  15  ----------------------------<  147<H>  4.0   |  22  |  0.95    Ca    9.0      16 Jan 2024 05:52  Phos  3.8     01-16  Mg     1.80     01-16            Urinalysis Basic - ( 16 Jan 2024 05:52 )    Color: x / Appearance: x / SG: x / pH: x  Gluc: 147 mg/dL / Ketone: x  / Bili: x / Urobili: x   Blood: x / Protein: x / Nitrite: x   Leuk Esterase: x / RBC: x / WBC x   Sq Epi: x / Non Sq Epi: x / Bacteria: x        RADIOLOGY & ADDITIONAL TESTS:    Imaging Personally Reviewed:    Care Discussed with Consultants/Other Providers:

## 2024-01-17 NOTE — PROGRESS NOTE ADULT - PROBLEM SELECTOR PLAN 2
Admitted evaluation for IR embolization d/t failure to achieve hemostasis  - c/b acute blood loss anemia  - IR [facility] - initial procedure cancelled d/t bradycardia.  - No evidence of epistaxis today  - House ENT discussed with outpt ENT - no indication for IR embolization given Hgb stable  - no further ENT procedure planned.

## 2024-01-17 NOTE — PROGRESS NOTE ADULT - ASSESSMENT
73M DM2 w/ PAD - Rt foot wound, HTN, COPD, CKD, HFpEF, arrythmia, Prostate CA s/p resection, Depression/Anxiety, p/w Epistaxis w/ acute anemia - evaluated for IR embolization at [facility] on however cancelled d/t bradycardia, Ventricular bigeminy, iodine allergy.

## 2024-01-18 ENCOUNTER — TRANSCRIPTION ENCOUNTER (OUTPATIENT)
Age: 74
End: 2024-01-18

## 2024-01-18 VITALS
TEMPERATURE: 99 F | OXYGEN SATURATION: 100 % | SYSTOLIC BLOOD PRESSURE: 130 MMHG | RESPIRATION RATE: 18 BRPM | HEART RATE: 74 BPM | DIASTOLIC BLOOD PRESSURE: 73 MMHG

## 2024-01-18 LAB
HCT VFR BLD CALC: 28.9 % — LOW (ref 39–50)
HGB BLD-MCNC: 9.1 G/DL — LOW (ref 13–17)
MCHC RBC-ENTMCNC: 27.8 PG — SIGNIFICANT CHANGE UP (ref 27–34)
MCHC RBC-ENTMCNC: 31.5 GM/DL — LOW (ref 32–36)
MCV RBC AUTO: 88.4 FL — SIGNIFICANT CHANGE UP (ref 80–100)
NRBC # BLD: 0 /100 WBCS — SIGNIFICANT CHANGE UP (ref 0–0)
NRBC # FLD: 0 K/UL — SIGNIFICANT CHANGE UP (ref 0–0)
PLATELET # BLD AUTO: 152 K/UL — SIGNIFICANT CHANGE UP (ref 150–400)
RBC # BLD: 3.27 M/UL — LOW (ref 4.2–5.8)
RBC # FLD: 14.6 % — HIGH (ref 10.3–14.5)
WBC # BLD: 8.97 K/UL — SIGNIFICANT CHANGE UP (ref 3.8–10.5)
WBC # FLD AUTO: 8.97 K/UL — SIGNIFICANT CHANGE UP (ref 3.8–10.5)

## 2024-01-18 PROCEDURE — 99239 HOSP IP/OBS DSCHRG MGMT >30: CPT

## 2024-01-18 RX ORDER — OXYCODONE HYDROCHLORIDE 5 MG/1
1 TABLET ORAL
Qty: 60 | Refills: 0
Start: 2024-01-18 | End: 2024-02-16

## 2024-01-18 RX ORDER — SENNA PLUS 8.6 MG/1
2 TABLET ORAL
Qty: 60 | Refills: 0
Start: 2024-01-18 | End: 2024-02-16

## 2024-01-18 RX ORDER — MONTELUKAST 4 MG/1
1 TABLET, CHEWABLE ORAL
Qty: 30 | Refills: 0
Start: 2024-01-18 | End: 2024-02-16

## 2024-01-18 RX ORDER — SERTRALINE 25 MG/1
1 TABLET, FILM COATED ORAL
Qty: 30 | Refills: 0
Start: 2024-01-18 | End: 2024-02-16

## 2024-01-18 RX ORDER — BUDESONIDE AND FORMOTEROL FUMARATE DIHYDRATE 160; 4.5 UG/1; UG/1
2 AEROSOL RESPIRATORY (INHALATION)
Qty: 1 | Refills: 0
Start: 2024-01-18 | End: 2024-02-16

## 2024-01-18 RX ORDER — CLONAZEPAM 1 MG
3 TABLET ORAL
Qty: 180 | Refills: 0
Start: 2024-01-18 | End: 2024-02-16

## 2024-01-18 RX ORDER — METOPROLOL TARTRATE 50 MG
1 TABLET ORAL
Qty: 60 | Refills: 0
Start: 2024-01-18 | End: 2024-02-16

## 2024-01-18 RX ORDER — ATORVASTATIN CALCIUM 80 MG/1
1 TABLET, FILM COATED ORAL
Qty: 30 | Refills: 0
Start: 2024-01-18 | End: 2024-02-16

## 2024-01-18 RX ORDER — SACCHAROMYCES BOULARDII 250 MG
1 POWDER IN PACKET (EA) ORAL
Qty: 30 | Refills: 0
Start: 2024-01-18 | End: 2024-02-16

## 2024-01-18 RX ORDER — INSULIN GLARGINE 100 [IU]/ML
10 INJECTION, SOLUTION SUBCUTANEOUS
Qty: 1 | Refills: 0
Start: 2024-01-18 | End: 2024-02-16

## 2024-01-18 RX ORDER — CLONAZEPAM 1 MG
0.75 TABLET ORAL EVERY 12 HOURS
Refills: 0 | Status: DISCONTINUED | OUTPATIENT
Start: 2024-01-18 | End: 2024-01-18

## 2024-01-18 RX ORDER — OXYCODONE HYDROCHLORIDE 5 MG/1
5 TABLET ORAL EVERY 12 HOURS
Refills: 0 | Status: DISCONTINUED | OUTPATIENT
Start: 2024-01-18 | End: 2024-01-18

## 2024-01-18 RX ORDER — ACETAMINOPHEN 500 MG
1 TABLET ORAL
Qty: 90 | Refills: 0
Start: 2024-01-18 | End: 2024-02-16

## 2024-01-18 RX ORDER — LANOLIN ALCOHOL/MO/W.PET/CERES
1 CREAM (GRAM) TOPICAL
Qty: 30 | Refills: 0
Start: 2024-01-18 | End: 2024-02-16

## 2024-01-18 RX ORDER — CLONAZEPAM 1 MG
3 TABLET ORAL
Qty: 30 | Refills: 0
Start: 2024-01-18 | End: 2024-02-16

## 2024-01-18 RX ORDER — ALBUTEROL 90 UG/1
1 AEROSOL, METERED ORAL
Qty: 1 | Refills: 0
Start: 2024-01-18 | End: 2024-02-16

## 2024-01-18 RX ADMIN — Medication 250 MILLIGRAM(S): at 12:37

## 2024-01-18 RX ADMIN — Medication 1: at 08:53

## 2024-01-18 RX ADMIN — OXYCODONE HYDROCHLORIDE 5 MILLIGRAM(S): 5 TABLET ORAL at 13:06

## 2024-01-18 RX ADMIN — Medication 2: at 12:36

## 2024-01-18 RX ADMIN — SERTRALINE 100 MILLIGRAM(S): 25 TABLET, FILM COATED ORAL at 12:37

## 2024-01-18 RX ADMIN — MONTELUKAST 10 MILLIGRAM(S): 4 TABLET, CHEWABLE ORAL at 12:38

## 2024-01-18 RX ADMIN — ALBUTEROL 2.5 MILLIGRAM(S): 90 AEROSOL, METERED ORAL at 08:50

## 2024-01-18 RX ADMIN — Medication 0.75 MILLIGRAM(S): at 06:49

## 2024-01-18 RX ADMIN — ALBUTEROL 2.5 MILLIGRAM(S): 90 AEROSOL, METERED ORAL at 03:38

## 2024-01-18 RX ADMIN — CHLORHEXIDINE GLUCONATE 1 APPLICATION(S): 213 SOLUTION TOPICAL at 12:40

## 2024-01-18 RX ADMIN — OXYCODONE HYDROCHLORIDE 5 MILLIGRAM(S): 5 TABLET ORAL at 12:36

## 2024-01-18 RX ADMIN — MUPIROCIN 1 APPLICATION(S): 20 OINTMENT TOPICAL at 06:45

## 2024-01-18 RX ADMIN — BUDESONIDE AND FORMOTEROL FUMARATE DIHYDRATE 2 PUFF(S): 160; 4.5 AEROSOL RESPIRATORY (INHALATION) at 10:23

## 2024-01-18 RX ADMIN — Medication 50 MILLIGRAM(S): at 06:49

## 2024-01-18 NOTE — DISCHARGE NOTE NURSING/CASE MANAGEMENT/SOCIAL WORK - CAREGIVER PHONE NUMBER
unknown at this time Partial Purse String (Simple) Text: Given the location of the defect and the characteristics of the surrounding skin a simple purse string closure was deemed most appropriate.  Undermining was performed circumferentially around the surgical defect.  A purse string suture was then placed and tightened. Wound tension of the circular defect prevented complete closure of the wound.

## 2024-01-18 NOTE — PROGRESS NOTE ADULT - PROVIDER SPECIALTY LIST ADULT
Electrophysiology
Hospitalist
ENT
Hospitalist

## 2024-01-18 NOTE — DISCHARGE NOTE NURSING/CASE MANAGEMENT/SOCIAL WORK - NSDCPEFALRISK_GEN_ALL_CORE
For information on Fall & Injury Prevention, visit: [email or web address] OR  [email or web address] OR  https://www.cdc.gov/steadi/patient.html

## 2024-01-18 NOTE — PROGRESS NOTE ADULT - PROBLEM SELECTOR PLAN 2
Admitted evaluation for IR embolization d/t failure to achieve hemostasis  - c/b acute blood loss anemia  - IR [facility] - initial procedure cancelled d/t bradycardia.  - No evidence of epistaxis today  - House ENT discussed with outpt ENT - no indication for IR embolization given Hgb stable  - no further ENT procedure planned.

## 2024-01-18 NOTE — PROGRESS NOTE ADULT - NSPROGADDITIONALINFOA_GEN_ALL_CORE
Discussed with Sister Lida on the phone/by the bedside on 1/18 for 15 minutes  Answered all the questions.

## 2024-01-18 NOTE — PROGRESS NOTE ADULT - PROBLEM SELECTOR PROBLEM 1
Bradycardia
Bradycardia
Epistaxis
Epistaxis
Bradycardia
Epistaxis

## 2024-01-18 NOTE — PROGRESS NOTE ADULT - PROBLEM SELECTOR PROBLEM 7
Nutrition, metabolism, and development symptoms
Chronic kidney disease, unspecified CKD stage
Nutrition, metabolism, and development symptoms
Constricted

## 2024-01-18 NOTE — PROGRESS NOTE ADULT - PROBLEM SELECTOR PLAN 8
s/p resection  - DVT PPX on hold due to acute blood loss anemia.
s/p resection  - resume Hep SC for VTE ppx
s/p resection  - resume Hep SC for VTE ppx

## 2024-01-18 NOTE — PROGRESS NOTE ADULT - SUBJECTIVE AND OBJECTIVE BOX
[facility] Division of Hospital Medicine  Silvio Jones MD  Pager (M-F, 8J-5P): [phone]  Other Times:  [phone]    Patient is a 73y old  Male who presents with a chief complaint of epistaxis (17 Jan 2024 14:55)    SUBJECTIVE / OVERNIGHT EVENTS:    MEDICATIONS  (STANDING):  albuterol    0.083% 2.5 milliGRAM(s) Nebulizer every 6 hours  albuterol    90 MICROgram(s) HFA Inhaler 1 Puff(s) Inhalation every 4 hours  atorvastatin 80 milliGRAM(s) Oral at bedtime  budesonide 160 MICROgram(s)/formoterol 4.5 MICROgram(s) Inhaler 2 Puff(s) Inhalation two times a day  chlorhexidine 2% Cloths 1 Application(s) Topical daily  clonazePAM Oral Disintegrating Tablet 0.75 milliGRAM(s) Oral every 12 hours  dextrose 5%. 1000 milliLiter(s) (50 mL/Hr) IV Continuous <Continuous>  dextrose 5%. 1000 milliLiter(s) (100 mL/Hr) IV Continuous <Continuous>  dextrose 50% Injectable 12.5 Gram(s) IV Push once  dextrose 50% Injectable 25 Gram(s) IV Push once  dextrose 50% Injectable 25 Gram(s) IV Push once  glucagon  Injectable 1 milliGRAM(s) IntraMuscular once  influenza  Vaccine (HIGH DOSE) 0.7 milliLiter(s) IntraMuscular once  insulin glargine Injectable (LANTUS) 10 Unit(s) SubCutaneous at bedtime  insulin lispro (ADMELOG) corrective regimen sliding scale   SubCutaneous four times a day before meals  metoprolol tartrate 50 milliGRAM(s) Oral two times a day  montelukast 10 milliGRAM(s) Oral daily  mupirocin 2% Ointment 1 Application(s) Both Nostrils two times a day  saccharomyces boulardii 250 milliGRAM(s) Oral daily  senna 2 Tablet(s) Oral at bedtime  sertraline 100 milliGRAM(s) Oral daily    MEDICATIONS  (PRN):  acetaminophen     Tablet .. 325 milliGRAM(s) Oral every 8 hours PRN Temp greater or equal to 38C (100.4F), Mild Pain (1 - 3)  dextrose Oral Gel 15 Gram(s) Oral once PRN Blood Glucose LESS THAN 70 milliGRAM(s)/deciliter  melatonin 3 milliGRAM(s) Oral at bedtime PRN Insomnia  ondansetron Injectable 4 milliGRAM(s) IV Push every 12 hours PRN Nausea and/or Vomiting  oxyCODONE    IR 5 milliGRAM(s) Oral every 12 hours PRN Severe Pain (7 - 10)      Vital Signs Last 24 Hrs  T(C): 37 (18 Jan 2024 06:40), Max: 37.1 (18 Jan 2024 03:00)  T(F): 98.6 (18 Jan 2024 06:40), Max: 98.7 (18 Jan 2024 03:00)  HR: 73 (18 Jan 2024 06:40) (65 - 94)  BP: 111/67 (18 Jan 2024 06:40) (111/67 - 150/68)  BP(mean): --  RR: 16 (18 Jan 2024 06:40) (16 - 18)  SpO2: 95% (18 Jan 2024 06:40) (94% - 100%)    Parameters below as of 18 Jan 2024 06:40  Patient On (Oxygen Delivery Method): room air      CAPILLARY BLOOD GLUCOSE      POCT Blood Glucose.: 179 mg/dL (18 Jan 2024 08:29)  POCT Blood Glucose.: 203 mg/dL (17 Jan 2024 21:32)  POCT Blood Glucose.: 228 mg/dL (17 Jan 2024 17:40)  POCT Blood Glucose.: 230 mg/dL (17 Jan 2024 13:16)    I&O's Summary      PHYSICAL EXAM:  CONSTITUTIONAL: NAD  EYES: PERRLA; conjunctiva and sclera clear  ENMT: Moist oral mucosa, no pharyngeal injection or exudates; normal dentition  NECK: Supple, no palpable masses; no thyromegaly  RESPIRATORY: Normal respiratory effort; lungs are clear to auscultation bilaterally  CARDIOVASCULAR: Regular rate and rhythm, normal S1 and S2, no murmur/rub/gallop; No lower extremity edema; Peripheral pulses are 2+ bilaterally  ABDOMEN: Nontender to palpation, normoactive bowel sounds, no rebound/guarding; No hepatosplenomegaly  MUSCULOSKELETAL:  Did not assess gait; no clubbing or cyanosis of digits; no joint swelling or tenderness to palpation  PSYCH: A+O to person, place, and time; affect appropriate  NEUROLOGY: CN 2-12 are intact and symmetric; no gross sensory deficits   SKIN: No rashes; no palpable lesions    LABS:                        9.1    8.97  )-----------( 152      ( 18 Jan 2024 05:30 )             28.9                     RADIOLOGY & ADDITIONAL TESTS:    Imaging Personally Reviewed:    Care Discussed with Consultants/Other Providers:

## 2024-01-18 NOTE — DISCHARGE NOTE NURSING/CASE MANAGEMENT/SOCIAL WORK - PATIENT PORTAL LINK FT
You can access the FollowMyHealth Patient Portal offered by [facility] by registering at the following website: [email or web address]. By joining [employer]’s FollowMyHealth portal, you will also be able to view your health information using other applications (apps) compatible with our system.

## 2024-01-18 NOTE — PROGRESS NOTE ADULT - REASON FOR ADMISSION
epistaxis

## 2024-01-18 NOTE — PROGRESS NOTE ADULT - ASSESSMENT
73M DM2 w/ PAD - Rt foot wound, HTN, COPD, CKD, HFpEF, arrythmia, Prostate CA s/p resection, Depression/Anxiety, p/w Epistaxis w/ acute anemia - evaluated for IR embolization at [facility] on however cancelled d/t bradycardia, Ventricular bigeminy, iodine allergy.

## 2024-04-15 ENCOUNTER — APPOINTMENT (OUTPATIENT)
Dept: WOUND CARE | Facility: HOSPITAL | Age: 74
End: 2024-04-15

## 2024-06-18 ENCOUNTER — EMERGENCY (EMERGENCY)
Facility: HOSPITAL | Age: 74
LOS: 1 days | Discharge: HOME | End: 2024-06-18
Attending: EMERGENCY MEDICINE | Admitting: EMERGENCY MEDICINE
Payer: COMMERCIAL

## 2024-06-18 VITALS
SYSTOLIC BLOOD PRESSURE: 129 MMHG | OXYGEN SATURATION: 95 % | RESPIRATION RATE: 16 BRPM | TEMPERATURE: 99 F | HEART RATE: 65 BPM | DIASTOLIC BLOOD PRESSURE: 75 MMHG

## 2024-06-18 VITALS
SYSTOLIC BLOOD PRESSURE: 129 MMHG | WEIGHT: 216.93 LBS | OXYGEN SATURATION: 94 % | HEART RATE: 73 BPM | TEMPERATURE: 98 F | RESPIRATION RATE: 16 BRPM | DIASTOLIC BLOOD PRESSURE: 80 MMHG

## 2024-06-18 DIAGNOSIS — Z98.890 OTHER SPECIFIED POSTPROCEDURAL STATES: Chronic | ICD-10-CM

## 2024-06-18 LAB
ALBUMIN SERPL ELPH-MCNC: 3.3 G/DL — SIGNIFICANT CHANGE UP (ref 3.3–5)
ALP SERPL-CCNC: 77 U/L — SIGNIFICANT CHANGE UP (ref 40–120)
ALT FLD-CCNC: 26 U/L — SIGNIFICANT CHANGE UP (ref 12–78)
ANION GAP SERPL CALC-SCNC: 5 MMOL/L — SIGNIFICANT CHANGE UP (ref 5–17)
AST SERPL-CCNC: 18 U/L — SIGNIFICANT CHANGE UP (ref 15–37)
BASOPHILS # BLD AUTO: 0.05 K/UL — SIGNIFICANT CHANGE UP (ref 0–0.2)
BASOPHILS NFR BLD AUTO: 0.5 % — SIGNIFICANT CHANGE UP (ref 0–2)
BILIRUB SERPL-MCNC: 0.6 MG/DL — SIGNIFICANT CHANGE UP (ref 0.2–1.2)
BUN SERPL-MCNC: 25 MG/DL — HIGH (ref 7–23)
CALCIUM SERPL-MCNC: 9.6 MG/DL — SIGNIFICANT CHANGE UP (ref 8.5–10.1)
CHLORIDE SERPL-SCNC: 102 MMOL/L — SIGNIFICANT CHANGE UP (ref 96–108)
CO2 SERPL-SCNC: 31 MMOL/L — SIGNIFICANT CHANGE UP (ref 22–31)
CREAT SERPL-MCNC: 1.5 MG/DL — HIGH (ref 0.5–1.3)
EGFR: 49 ML/MIN/1.73M2 — LOW
EOSINOPHIL # BLD AUTO: 0.19 K/UL — SIGNIFICANT CHANGE UP (ref 0–0.5)
EOSINOPHIL NFR BLD AUTO: 2 % — SIGNIFICANT CHANGE UP (ref 0–6)
GLUCOSE SERPL-MCNC: 321 MG/DL — HIGH (ref 70–99)
HCT VFR BLD CALC: 32.4 % — LOW (ref 39–50)
HGB BLD-MCNC: 10.1 G/DL — LOW (ref 13–17)
IMM GRANULOCYTES NFR BLD AUTO: 4.6 % — HIGH (ref 0–0.9)
LYMPHOCYTES # BLD AUTO: 3.84 K/UL — HIGH (ref 1–3.3)
LYMPHOCYTES # BLD AUTO: 41.3 % — SIGNIFICANT CHANGE UP (ref 13–44)
MCHC RBC-ENTMCNC: 31.2 GM/DL — LOW (ref 32–36)
MCHC RBC-ENTMCNC: 32 PG — SIGNIFICANT CHANGE UP (ref 27–34)
MCV RBC AUTO: 102.5 FL — HIGH (ref 80–100)
MONOCYTES # BLD AUTO: 1.09 K/UL — HIGH (ref 0–0.9)
MONOCYTES NFR BLD AUTO: 11.7 % — SIGNIFICANT CHANGE UP (ref 2–14)
NEUTROPHILS # BLD AUTO: 3.7 K/UL — SIGNIFICANT CHANGE UP (ref 1.8–7.4)
NEUTROPHILS NFR BLD AUTO: 39.9 % — LOW (ref 43–77)
NRBC # BLD: 0 /100 WBCS — SIGNIFICANT CHANGE UP (ref 0–0)
PLATELET # BLD AUTO: 132 K/UL — LOW (ref 150–400)
POTASSIUM SERPL-MCNC: 4.4 MMOL/L — SIGNIFICANT CHANGE UP (ref 3.5–5.3)
POTASSIUM SERPL-SCNC: 4.4 MMOL/L — SIGNIFICANT CHANGE UP (ref 3.5–5.3)
PROT SERPL-MCNC: 7.2 G/DL — SIGNIFICANT CHANGE UP (ref 6–8.3)
RBC # BLD: 3.16 M/UL — LOW (ref 4.2–5.8)
RBC # FLD: 14.6 % — HIGH (ref 10.3–14.5)
SODIUM SERPL-SCNC: 138 MMOL/L — SIGNIFICANT CHANGE UP (ref 135–145)
WBC # BLD: 9.3 K/UL — SIGNIFICANT CHANGE UP (ref 3.8–10.5)
WBC # FLD AUTO: 9.3 K/UL — SIGNIFICANT CHANGE UP (ref 3.8–10.5)

## 2024-06-18 PROCEDURE — 94640 AIRWAY INHALATION TREATMENT: CPT

## 2024-06-18 PROCEDURE — 70450 CT HEAD/BRAIN W/O DYE: CPT | Mod: MC

## 2024-06-18 PROCEDURE — 99285 EMERGENCY DEPT VISIT HI MDM: CPT | Mod: 25

## 2024-06-18 PROCEDURE — 70450 CT HEAD/BRAIN W/O DYE: CPT | Mod: 26,MC

## 2024-06-18 PROCEDURE — 71250 CT THORAX DX C-: CPT | Mod: 26,MC

## 2024-06-18 PROCEDURE — 85025 COMPLETE CBC W/AUTO DIFF WBC: CPT

## 2024-06-18 PROCEDURE — 80053 COMPREHEN METABOLIC PANEL: CPT

## 2024-06-18 PROCEDURE — 99284 EMERGENCY DEPT VISIT MOD MDM: CPT

## 2024-06-18 PROCEDURE — 71250 CT THORAX DX C-: CPT | Mod: MC

## 2024-06-18 PROCEDURE — 36415 COLL VENOUS BLD VENIPUNCTURE: CPT

## 2024-06-18 PROCEDURE — 93005 ELECTROCARDIOGRAM TRACING: CPT

## 2024-06-18 PROCEDURE — 93010 ELECTROCARDIOGRAM REPORT: CPT

## 2024-06-18 RX ORDER — IPRATROPIUM/ALBUTEROL SULFATE 18-103MCG
3 AEROSOL WITH ADAPTER (GRAM) INHALATION ONCE
Refills: 0 | Status: COMPLETED | OUTPATIENT
Start: 2024-06-18 | End: 2024-06-18

## 2024-06-18 RX ADMIN — Medication 3 MILLILITER(S): at 10:26

## 2024-06-18 NOTE — ED ADULT TRIAGE NOTE - ESI TRIAGE ACUITY LEVEL, MLM
[facility] [place] [facility]    [address]    [place] [address]    Phone:  [phone]    Fax:  [phone]       Thank You for choosing us for your health care visit. We are glad to serve you and happy to provide you with this summary of your visit. Please help us to ensure we have accurate records. If you find anything that needs to be changed, please let our staff know as soon as possible.          Your Demographic Information     Patient Name Sex Randi Landon Female 1965       Ethnic Group Patient Race    Not of  or  Origin White      Your Visit Details     Date & Time Provider Department    2017 11:20 AM Dc Dawkins MD [facility] [place] [facility]      Your Upcoming Appointment*(Max 10)     2017  9:00 AM CDT   Lab Visit with [facility]   [facility] [place] [facility] ([facility] [place])    [address]  [place] [address]   [phone]            2017 11:30 AM CDT   Office Visit with Dc Dawkins MD   [facility] [place] [facility] ([facility] [place])    [address]  [place] [address]   [phone]            2017  1:00 PM CDT   Medicare Well Visit with Dc Dawkins MD   [facility] [place] [facility] ([facility] [place])    [address]  [place] [address]   [phone]              Your To Do List     Future Orders Please Complete On or Around Expires    MICROALBUMIN URINE RANDOM      Follow-Up    Return in about 3 months (around 2017) for med check.      Conditions Discussed Today or Order-Related Diagnoses        Comments    Failed back syndrome of lumbar spine    -  Primary     Type 2 diabetes mellitus without complication, with long-term current use of insulin         Essential hypertension, benign         Pure hypercholesterolemia         Tobacco abuse           Your Vitals Were     BP Pulse Height Weight BMI Smoking Status    130/70 72 5' 5\" (1.651 m) 256 lb (116.1 kg) 42.6 kg/m2 Current Every Day Smoker      Medications Prescribed or Re-Ordered Today     methaDONE (DOLOPHINE) 10 MG tablet    Starting on: 2/22/2017    Sig: Take 3 in morning, 2 afternoon and 3 pm.    Class: Normal    Pharmacy: [facility] [id number] [place] [address] [address] [facility] Ph #: [phone]    HYDROmorphone (DILAUDID) 4 MG tablet    Sig - Route: Take 1 tablet by mouth every 4 hours as needed for Pain. - Oral    Class: Normal    Pharmacy: [facility] [id number] [facility] [address] [facility] Ph #: [phone]      Your Current Medications Are        Disp Refills Start End    methaDONE (DOLOPHINE) 10 MG tablet 240 tablet 0 2/22/2017     Sig: Take 3 in morning, 2 afternoon and 3 pm.    HYDROmorphone (DILAUDID) 4 MG tablet 90 tablet 0 1/25/2017     Sig - Route: Take 1 tablet by mouth every 4 hours as needed for Pain. - Oral    esomeprazole (NEXIUM) 40 MG capsule   11/11/2016     Sig - Route: Take 1 capsule by mouth every other day. - Oral    Class: [facility] PENTIPS PLUS 31G X 5 MM Misc 100 each 10 11/4/2016     Sig: USE THREE TO FOUR TIMES DAILY    Class: Eprescribe    LANTUS SOLOSTAR 100 UNIT/ML injection 15 mL 1 10/26/2016     Sig: INJECT 60 UNITS INTO THE SKIN NIGHTLY    Class: Eprescribe    amLODIPine (NORVASC) 5 MG tablet 90 tablet 3 10/8/2016     Sig - Route: Take 1 tablet by mouth daily. - Oral    Class: Eprescribe    losartan-hydrochlorothiazide (HYZAAR) 100-12.5 MG per tablet 90 tablet 3 10/8/2016     Sig - Route: Take 1 tablet by mouth daily. - Oral    Class: Eprescribe    metformin (GLUCOPHAGE-XR) 500 MG 24 hr tablet 180 tablet 3 10/8/2016     Sig - Route: Take 2 tablets by mouth daily (with breakfast). - Oral    Class: Eprescribe    pravastatin (PRAVACHOL) 40 MG tablet 90 tablet 3 10/8/2016     Sig - Route: Take 1 tablet by mouth daily. - Oral    Class: Eprescribe    polyethylene glycol (MIRALAX) powder 1530 g 3 10/8/2016      Sig - Route: Take 17 g by mouth daily. - Oral    Class: Eprescribe    B-D ULTRA-FINE 33 LANCETS Misc 100 each 11 6/30/2016     Sig: CHECK BLOOD SUGAR 3 TO 4 TIMES DAILY.    Class: Eprescribe    nystatin (MYCOSTATIN) cream 30 g 11 4/27/2016     Sig: APPLY TO AFFECTED AREA AS NEEDED.    Class: Eprescribe    zolpidem (AMBIEN) 5 MG tablet        Sig - Route: Take 5 mg by mouth nightly as needed for Sleep. - Oral    Class: Historical Med    NYSTOP (NYSTOP) 834271 UNIT/GM Powder 30 g 5 4/22/2016     Sig: Apply tid prn    Class: Eprescribe    fluticasone (FLOVENT HFA) 220 MCG/ACT inhaler 1 Inhaler 2 3/8/2016     Sig - Route: Inhale 1 puff into the lungs 2 times daily. - Inhalation    Class: Eprescribe    PROAIR  (90 BASE) MCG/ACT inhaler 9 g 9 3/7/2016     Sig: INHALE 2 PUFFS INTO THE LUNGS EVERY 4 HOURS AS NEEDED FOR SHORTNESS OF BREATH OR WHEEZE.    Class: Eprescribe    NOVOLOG FLEXPEN 100 UNIT/ML injection 15 mL 0 2/29/2016     Sig: USE PER SLIDING SCALE BEFORE MEALS THREE TIMES DAILY    Class: Eprescribe    fluticasone (FLONASE) 50 MCG/ACT nasal spray 16 g 11 8/5/2015     Sig - Route: Spray 2 sprays in each nostril daily. - Nasal    Class: Eprescribe    DISPENSE 200 each 3 5/15/2015     Sig: One touch ultra soft lancets test bid    Class: Eprescribe    DISPENSE 180 each 3 5/15/2015     Sig: One touch ultra soft test strips test sugars bid    Class: Eprescribe    alprazolam (XANAX) 2 MG tablet        Sig - Route: Take 2 mg by mouth 3 times daily as needed. - Oral    Class: Historical Med    cholecalciferol (VITAMIN D3) 1000 UNITS tablet        Sig - Route: Take 1,000 Units by mouth daily. - Oral    Class: Historical Med      Discontinued Medications        Reason for Discontinue    nicotine (NICODERM) 21 MG/24HR Therapy Completed      Allergies     Penicillins RASH      Immunizations History as of 1/25/2017     Name Date    INFLUENZA QUADRIVALENT 10/7/2016, 11/11/2015 10:28 AM    Influenza 1/9/2014, 12/12/2012,  11/4/2011, 9/23/2010    Influenza Quadrivalent Preservative Free 9/2/2014  3:20 PM    Pneumococcal Polysaccharide Adult 1/12/2011    Tdap 4/16/2012, 4/16/2012      Problem List as of 1/25/2017     Type II or unspecified type diabetes mellitus without mention of complication, not stated as uncontrolled    Essential hypertension, benign    Pure hypercholesterolemia    ADHD (attention deficit hyperactivity disorder)    Anxiety state, unspecified    Tobacco abuse    Asthma    GERD (gastroesophageal reflux disease)    Allergic rhinitis    Chronic back pain    Open abdominal wall wound    DM (diabetes mellitus), type 2, uncontrolled    Abscess or cellulitis of abdominal wall    Failed back syndrome of lumbar spine    Chronic constipation            Patient Instructions     None       4

## 2024-06-18 NOTE — ED PROVIDER NOTE - PATIENT PORTAL LINK FT
You can access the FollowMyHealth Patient Portal offered by [facility] by registering at the following website: [email or web address]. By joining [facility]’s FollowMyHealth portal, you will also be able to view your health information using other applications (apps) compatible with our system.

## 2024-06-18 NOTE — ED PROVIDER NOTE - OBJECTIVE STATEMENT
Patient states that he was sitting on the toilet bowl this morning, fell asleep and then fell forward and hit his head on the wall.  Patient states that he is just getting over pneumonia.  He received antibiotics and finished the course a week ago.  Patient states he had a repeat x-ray that showed improvement in the pneumonia.  Patient reports chronic COPD is on home nebulizers as needed as well as rescue inhalers.  Patient reports increased lethargy and sleepiness over the last 3 to 4 days stating he is not sure why.  Patient denies any recent fever, reports mild chronic intermittent cough.

## 2024-06-18 NOTE — ED ADULT NURSE NOTE - HISTORY OF COVID-19 VACCINATION
Refill request recieved via interface from pharmacy.    Last office visit 01/11/21; pending appt None    Script set to e-scribe pending MD approval.      No

## 2024-06-18 NOTE — ED PROVIDER NOTE - CLINICAL SUMMARY MEDICAL DECISION MAKING FREE TEXT BOX
pt s/p fall and head trauma pt s/p fall and head trauma, ED work up unremarkable for any acute injury

## 2024-06-18 NOTE — ED ADULT TRIAGE NOTE - CHIEF COMPLAINT QUOTE
brought in by EMS from assisted leaving c/o slight soreness to his forehead, fell asleep while sitting in the toilet and fell, not on any blood thinner

## 2024-06-18 NOTE — ED ADULT NURSE NOTE - CAS EDN DISCHARGE INTERVENTIONS
BACK PAIN:  - xray today  - ortho appt 12/14  - Physical therapy referral provided  - try Naproxen INSTEAD Of advil/motrin; take with food, can give 2x per day as needed spaced by 8-12 hours  
IV discontinued, cath removed intact

## 2024-06-18 NOTE — ED ADULT NURSE NOTE - OBJECTIVE STATEMENT
States fell asleep on toilet with subsequent fall.  A/Ox4.  States slight headache.  No noted injuries.

## 2024-06-18 NOTE — ED PROVIDER NOTE - NSFOLLOWUPINSTRUCTIONS_ED_ALL_ED_FT
-- You should update your primary care physician on your Emergency Department visit and follow up with them.  If you do not have a physician or have difficulty following up, please call: [phone] to obtain a [facility] doctor or specialist who can provide follow up.    -- Return to the ER for worsening or persistent symptoms, and/or ANY NEW OR CONCERNING SYMPTOMS.

## 2024-08-02 NOTE — ED ADULT NURSE NOTE - NS ED NOTE  TALK SOMEONE YN
Patient contacted and informed of message per on call Dr Olson re: possible medication related joint pain :    follow up with nephrology for possible change of Renvela dose as phosphorus concentrations could contribute to joint pain.  Crestor is a chronic med and less likely to cause joint pain, it is also very low risk for causing muscle pain compared to other statins.          Patient verbalized understanding and stated he will contact Nephrology to inquire.         No

## 2024-08-27 ENCOUNTER — APPOINTMENT (OUTPATIENT)
Dept: WOUND CARE | Facility: HOSPITAL | Age: 74
End: 2024-08-27
Payer: COMMERCIAL

## 2024-08-27 ENCOUNTER — OUTPATIENT (OUTPATIENT)
Dept: OUTPATIENT SERVICES | Facility: HOSPITAL | Age: 74
LOS: 1 days | Discharge: HOME | End: 2024-08-27
Payer: COMMERCIAL

## 2024-08-27 VITALS
WEIGHT: 215 LBS | SYSTOLIC BLOOD PRESSURE: 97 MMHG | HEIGHT: 70 IN | TEMPERATURE: 98 F | DIASTOLIC BLOOD PRESSURE: 58 MMHG | OXYGEN SATURATION: 95 % | BODY MASS INDEX: 30.78 KG/M2 | RESPIRATION RATE: 18 BRPM | HEART RATE: 72 BPM

## 2024-08-27 DIAGNOSIS — L97.512 NON-PRESSURE CHRONIC ULCER OF OTHER PART OF RIGHT FOOT WITH FAT LAYER EXPOSED: ICD-10-CM

## 2024-08-27 DIAGNOSIS — Z98.890 OTHER SPECIFIED POSTPROCEDURAL STATES: Chronic | ICD-10-CM

## 2024-08-27 DIAGNOSIS — I95.9 HYPOTENSION, UNSPECIFIED: ICD-10-CM

## 2024-08-27 DIAGNOSIS — R60.0 LOCALIZED EDEMA: ICD-10-CM

## 2024-08-27 DIAGNOSIS — J45.909 UNSPECIFIED ASTHMA, UNCOMPLICATED: ICD-10-CM

## 2024-08-27 DIAGNOSIS — E11.628 TYPE 2 DIABETES MELLITUS WITH OTHER SKIN COMPLICATIONS: ICD-10-CM

## 2024-08-27 DIAGNOSIS — Z78.9 OTHER SPECIFIED HEALTH STATUS: ICD-10-CM

## 2024-08-27 DIAGNOSIS — R00.1 BRADYCARDIA, UNSPECIFIED: ICD-10-CM

## 2024-08-27 DIAGNOSIS — S91.309A UNSPECIFIED OPEN WOUND, UNSPECIFIED FOOT, INITIAL ENCOUNTER: ICD-10-CM

## 2024-08-27 DIAGNOSIS — E11.59 TYPE 2 DIABETES MELLITUS WITH OTHER CIRCULATORY COMPLICATIONS: ICD-10-CM

## 2024-08-27 DIAGNOSIS — G62.9 POLYNEUROPATHY, UNSPECIFIED: ICD-10-CM

## 2024-08-27 DIAGNOSIS — E11.22 TYPE 2 DIABETES MELLITUS WITH DIABETIC CHRONIC KIDNEY DISEASE: ICD-10-CM

## 2024-08-27 DIAGNOSIS — Z87.898 PERSONAL HISTORY OF OTHER SPECIFIED CONDITIONS: ICD-10-CM

## 2024-08-27 DIAGNOSIS — R26.89 OTHER ABNORMALITIES OF GAIT AND MOBILITY: ICD-10-CM

## 2024-08-27 DIAGNOSIS — F41.9 ANXIETY DISORDER, UNSPECIFIED: ICD-10-CM

## 2024-08-27 DIAGNOSIS — Z85.46 PERSONAL HISTORY OF MALIGNANT NEOPLASM OF PROSTATE: ICD-10-CM

## 2024-08-27 DIAGNOSIS — I50.9 HEART FAILURE, UNSPECIFIED: ICD-10-CM

## 2024-08-27 DIAGNOSIS — E11.621 TYPE 2 DIABETES MELLITUS WITH FOOT ULCER: ICD-10-CM

## 2024-08-27 DIAGNOSIS — E11.40 TYPE 2 DIABETES MELLITUS WITH DIABETIC NEUROPATHY, UNSPECIFIED: ICD-10-CM

## 2024-08-27 DIAGNOSIS — L97.519 TYPE 2 DIABETES MELLITUS WITH FOOT ULCER: ICD-10-CM

## 2024-08-27 DIAGNOSIS — D64.9 ANEMIA, UNSPECIFIED: ICD-10-CM

## 2024-08-27 DIAGNOSIS — N18.9 CHRONIC KIDNEY DISEASE, UNSPECIFIED: ICD-10-CM

## 2024-08-27 PROCEDURE — 99203 OFFICE O/P NEW LOW 30 MIN: CPT

## 2024-08-27 PROCEDURE — G0463: CPT

## 2024-08-27 RX ORDER — FUROSEMIDE 20 MG/1
20 TABLET ORAL DAILY
Refills: 0 | Status: ACTIVE | COMMUNITY

## 2024-08-27 RX ORDER — METOPROLOL SUCCINATE 25 MG/1
25 TABLET, EXTENDED RELEASE ORAL DAILY
Refills: 0 | Status: ACTIVE | COMMUNITY

## 2024-08-27 RX ORDER — BUDESONIDE AND FORMOTEROL FUMARATE DIHYDRATE 160; 4.5 UG/1; UG/1
160-4.5 AEROSOL RESPIRATORY (INHALATION) TWICE DAILY
Qty: 3 | Refills: 1 | Status: ACTIVE | COMMUNITY

## 2024-08-27 RX ORDER — SERTRALINE HYDROCHLORIDE 100 MG/1
100 TABLET, FILM COATED ORAL DAILY
Refills: 0 | Status: ACTIVE | COMMUNITY

## 2024-08-27 RX ORDER — PREGABALIN 150 MG/1
150 CAPSULE ORAL TWICE DAILY
Refills: 0 | Status: ACTIVE | COMMUNITY

## 2024-08-27 RX ORDER — SACUBITRIL AND VALSARTAN 24; 26 MG/1; MG/1
24-26 TABLET, FILM COATED ORAL TWICE DAILY
Refills: 0 | Status: ACTIVE | COMMUNITY

## 2024-08-27 RX ORDER — OXYCODONE HYDROCHLORIDE 5 MG/1
5 CAPSULE ORAL
Refills: 0 | Status: ACTIVE | COMMUNITY

## 2024-08-27 RX ORDER — LORATADINE 10 MG/1
10 TABLET ORAL
Refills: 0 | Status: ACTIVE | COMMUNITY

## 2024-08-27 RX ORDER — INSULIN GLARGINE 100 [IU]/ML
100 INJECTION, SOLUTION SUBCUTANEOUS
Refills: 0 | Status: ACTIVE | COMMUNITY

## 2024-08-27 RX ORDER — MONTELUKAST 10 MG/1
10 TABLET, FILM COATED ORAL
Refills: 0 | Status: ACTIVE | COMMUNITY

## 2024-08-27 RX ORDER — ROSUVASTATIN CALCIUM 40 MG/1
40 TABLET, FILM COATED ORAL DAILY
Refills: 0 | Status: ACTIVE | COMMUNITY

## 2024-08-27 RX ORDER — CLONAZEPAM 0.25 MG/1
0.25 TABLET, ORALLY DISINTEGRATING ORAL
Refills: 0 | Status: ACTIVE | COMMUNITY

## 2024-08-27 RX ORDER — METFORMIN HYDROCHLORIDE 500 MG/1
500 TABLET, COATED ORAL
Refills: 0 | Status: ACTIVE | COMMUNITY

## 2024-08-27 RX ORDER — TIOTROPIUM BROMIDE 18 UG/1
18 CAPSULE ORAL; RESPIRATORY (INHALATION)
Refills: 0 | Status: ACTIVE | COMMUNITY

## 2024-08-27 RX ORDER — CHLORHEXIDINE GLUCONATE 4 %
325 (65 FE) LIQUID (ML) TOPICAL
Refills: 0 | Status: ACTIVE | COMMUNITY

## 2024-08-27 NOTE — HISTORY OF PRESENT ILLNESS
[FreeTextEntry1] : WAYNE SERRANO is being seen for a initial nursing assessment visit. Patient presents to the [facility] with a right foot ulcer x 1 year secondary to pressure. Patient recently treated by Red Vazquez DPM ([facility] [address] [place] [address], [address], [place] [address]). Who of which patient's sister states patient has had a series of STEM cell grafting. Patient has had recent vascular consultation with Spring Collier MD (Vascular Surgery). Patient's Sister states recent MRI (April 2024) showed no bone infection. Patient resides at [facility] where his wound is treated. Patient and Patient's Sister are here for a second opinion from Dr. Vazquez (Podiatry) and possibly Hyperbaric treatment due to chronic nature of right great toe. Patient accompanied by family member. IDDM with DFU 2 medial right 1st metatarsal head

## 2024-08-27 NOTE — REVIEW OF SYSTEMS
[Joint Stiffness] : joint stiffness [Skin Wound] : skin wound [Negative] : Genitourinary [Fever] : no fever [Chills] : no chills [Eye Pain] : no eye pain [Loss Of Hearing] : no hearing loss [Shortness Of Breath] : no shortness of breath [Abdominal Pain] : no abdominal pain [Vomiting] : no vomiting [Anxiety] : no anxiety [Easy Bleeding] : no tendency for easy bleeding [FreeTextEntry5] : HTN, CHF [FreeTextEntry9] : Severe bilateral bunion deformities  [de-identified] : DFU 2 medial right 1st metatarsal head , skin , subcut and fat  [de-identified] : IDDM with neuropathy  [de-identified] : ASHVIN

## 2024-08-27 NOTE — PLAN
[FreeTextEntry1] : Patient sent for vascular studies , MRI and x rays  . Patient has multiple co morbid factors  . Patient may need surgical intervention if he can be medically cleared . Continue wound care  Patient was told if there are signs of infection ( fever, chills, nausea, vomiting ) or changes in the condition of the wound ( pain , cellulitis , purulent drainage or odor ) they should proceed to the ER as soon as possible   patient high risk for limb loss and life threatening sepsis Spent 30 minutes for patient care and medical decision making.

## 2024-08-27 NOTE — ASSESSMENT
[] : Yes [Verbal] : Verbal [Written] : Written [Demo] : Demo [Patient] : Patient [Family member] : Family member [Fair - mild discomfort, physical impairment, low acceptance] : Fair - mild discomfort, physical impairment, low acceptance [Needs reinforcement] : needs reinforcement [Dressing changes] : dressing changes [Foot Care] : foot care [Skin Care] : skin care [Signs and symptoms of infection] : sign and symptoms of infection [Surgery] : surgery [Nutrition] : nutrition [How and When to Call] : how and when to call [Labs and Tests] : labs and tests [Off-loading] : off-loading [Home Health] : home health [Patient responsibility to plan of care] : patient responsibility to plan of care [Glycemic Control] : glycemic control [Stable] : stable [LTC] : LTC [Wheelchair] : Wheelchair [Not Applicable - Long Term Care/Home Health Agency] : Long Term Care/Home Health Agency: Not Applicable [FreeTextEntry2] : Infection Prevention Foot and nail care Nutrition and wound healing Pt Demonstrates use of both nonpharmacological and pharmacological pain relief strategies. Glycemic control Nephrology F/U [FreeTextEntry3] : Initial Visit [FreeTextEntry4] : Medications reconciled Patient resides at [facility] living [facility] in [place], [place]. Patient has resided at [facility] for 1 year. Patient believes he will reside permanently in that facility.  VIC Rodriguez discussed with patient possible surgical intervention of R Foot. Discussed need for repeat imagining (MRI) if surgery was an option. Surgical intervention would be performed IN-PATIENT SETTING. Patient provided with wound instructions.   No hyperbaric treatment indicated. Not a WGIIIU, wound is superficial, no bone involvement.  MRI ordered. Xray ordered. Auth submitted Vascular studies ordered. Consultation submitted. F/U 1 - 2 Weeks

## 2024-08-31 DIAGNOSIS — Z79.899 OTHER LONG TERM (CURRENT) DRUG THERAPY: ICD-10-CM

## 2024-08-31 DIAGNOSIS — E11.621 TYPE 2 DIABETES MELLITUS WITH FOOT ULCER: ICD-10-CM

## 2024-08-31 DIAGNOSIS — Z79.84 LONG TERM (CURRENT) USE OF ORAL HYPOGLYCEMIC DRUGS: ICD-10-CM

## 2024-08-31 DIAGNOSIS — D64.9 ANEMIA, UNSPECIFIED: ICD-10-CM

## 2024-08-31 DIAGNOSIS — E11.59 TYPE 2 DIABETES MELLITUS WITH OTHER CIRCULATORY COMPLICATIONS: ICD-10-CM

## 2024-08-31 DIAGNOSIS — Z79.51 LONG TERM (CURRENT) USE OF INHALED STEROIDS: ICD-10-CM

## 2024-08-31 DIAGNOSIS — I50.9 HEART FAILURE, UNSPECIFIED: ICD-10-CM

## 2024-08-31 DIAGNOSIS — J45.909 UNSPECIFIED ASTHMA, UNCOMPLICATED: ICD-10-CM

## 2024-08-31 DIAGNOSIS — N18.9 CHRONIC KIDNEY DISEASE, UNSPECIFIED: ICD-10-CM

## 2024-08-31 DIAGNOSIS — L97.512 NON-PRESSURE CHRONIC ULCER OF OTHER PART OF RIGHT FOOT WITH FAT LAYER EXPOSED: ICD-10-CM

## 2024-08-31 DIAGNOSIS — E11.40 TYPE 2 DIABETES MELLITUS WITH DIABETIC NEUROPATHY, UNSPECIFIED: ICD-10-CM

## 2024-08-31 DIAGNOSIS — Z98.49 CATARACT EXTRACTION STATUS, UNSPECIFIED EYE: ICD-10-CM

## 2024-08-31 DIAGNOSIS — E11.22 TYPE 2 DIABETES MELLITUS WITH DIABETIC CHRONIC KIDNEY DISEASE: ICD-10-CM

## 2024-09-10 ENCOUNTER — OUTPATIENT (OUTPATIENT)
Dept: OUTPATIENT SERVICES | Facility: HOSPITAL | Age: 74
LOS: 1 days | Discharge: HOME | End: 2024-09-10
Payer: COMMERCIAL

## 2024-09-10 ENCOUNTER — APPOINTMENT (OUTPATIENT)
Dept: WOUND CARE | Facility: HOSPITAL | Age: 74
End: 2024-09-10
Payer: COMMERCIAL

## 2024-09-10 VITALS
HEIGHT: 70 IN | HEART RATE: 75 BPM | DIASTOLIC BLOOD PRESSURE: 64 MMHG | TEMPERATURE: 98.5 F | WEIGHT: 215 LBS | RESPIRATION RATE: 17 BRPM | BODY MASS INDEX: 30.78 KG/M2 | OXYGEN SATURATION: 93 % | SYSTOLIC BLOOD PRESSURE: 104 MMHG

## 2024-09-10 DIAGNOSIS — E11.621 TYPE 2 DIABETES MELLITUS WITH FOOT ULCER: ICD-10-CM

## 2024-09-10 DIAGNOSIS — L97.512 NON-PRESSURE CHRONIC ULCER OF OTHER PART OF RIGHT FOOT WITH FAT LAYER EXPOSED: ICD-10-CM

## 2024-09-10 DIAGNOSIS — E11.59 TYPE 2 DIABETES MELLITUS WITH OTHER CIRCULATORY COMPLICATIONS: ICD-10-CM

## 2024-09-10 DIAGNOSIS — L97.519 TYPE 2 DIABETES MELLITUS WITH FOOT ULCER: ICD-10-CM

## 2024-09-10 DIAGNOSIS — Z98.890 OTHER SPECIFIED POSTPROCEDURAL STATES: Chronic | ICD-10-CM

## 2024-09-10 DIAGNOSIS — E11.40 TYPE 2 DIABETES MELLITUS WITH DIABETIC NEUROPATHY, UNSPECIFIED: ICD-10-CM

## 2024-09-10 DIAGNOSIS — E11.22 TYPE 2 DIABETES MELLITUS WITH DIABETIC CHRONIC KIDNEY DISEASE: ICD-10-CM

## 2024-09-10 PROCEDURE — G0463: CPT

## 2024-09-10 PROCEDURE — 99213 OFFICE O/P EST LOW 20 MIN: CPT

## 2024-09-10 NOTE — PLAN
[FreeTextEntry1] : Patient to have vascular studies and MRI . no soi  Patient to see nephrologist  Patient was told if there are signs of infection ( fever, chills, nausea, vomiting ) or changes in the condition of the wound ( pain , cellulitis , purulent drainage or odor ) they should proceed to the ER as soon as possible Spent 20 minutes for patient care and medical decision making. PTR 2 weeks

## 2024-09-10 NOTE — REVIEW OF SYSTEMS
[Fever] : no fever [Chills] : no chills [Eye Pain] : no eye pain [Loss Of Hearing] : no hearing loss [Shortness Of Breath] : no shortness of breath [Abdominal Pain] : no abdominal pain [Vomiting] : no vomiting [Joint Stiffness] : joint stiffness [Skin Wound] : skin wound [Anxiety] : no anxiety [Easy Bleeding] : no tendency for easy bleeding [Negative] : Genitourinary [FreeTextEntry5] : HTN, CHF [FreeTextEntry9] : Severe bilateral bunion deformities  [de-identified] : DFU 2 medial right 1st metatarsal head , skin , subcut and fat  [de-identified] : IDDM with neuropathy  [de-identified] : ASHVIN

## 2024-09-10 NOTE — PHYSICAL EXAM
[4 x 4] : 4 x 4  [0] : left 0 [1+] : left 1+ [Ankle Swelling (On Exam)] : present [Ankle Swelling Bilaterally] : bilaterally  [Ankle Swelling On The Left] : moderate [Varicose Veins Of Lower Extremities] : bilaterally [] : bilaterally [Purpura] : no purpura  [Petechiae] : no petechiae [Skin Ulcer] : ulcer [Skin Induration] : no induration [Alert] : alert [Oriented to Person] : oriented to person [Oriented to Place] : oriented to place [Calm] : calm [de-identified] : calm , accompanied by sister [de-identified] : HTN, CHF [de-identified] : Bilateral bunion deformities  [de-identified] : DFU 2 medial 1st metatarsal head  [de-identified] : IDDM with neuropathy  [FreeTextEntry1] : Right Hallux [FreeTextEntry2] : 2.0 [FreeTextEntry3] : 1.5 [FreeTextEntry4] : 0.1 [de-identified] : Serosanguinous [de-identified] : Edema [de-identified] : 80% [de-identified] : Silver Alginate [de-identified] : Mechanically cleansed with sterile gauze and normal saline 0.9% Kerlix  [TWNoteComboBox4] : Moderate [TWNoteComboBox5] : No [TWNoteComboBox6] : Pressure [de-identified] : No [de-identified] : Erythema [de-identified] : None [de-identified] : >75% [de-identified] : <20% [de-identified] : 3x Weekly [de-identified] : Primary Dressing

## 2024-09-10 NOTE — ASSESSMENT
[Verbal] : Verbal [Written] : Written [Demo] : Demo [Patient] : Patient [Family member] : Family member [Fair - mild discomfort, physical impairment, low acceptance] : Fair - mild discomfort, physical impairment, low acceptance [Needs reinforcement] : needs reinforcement [Dressing changes] : dressing changes [Foot Care] : foot care [Skin Care] : skin care [Signs and symptoms of infection] : sign and symptoms of infection [Surgery] : surgery [Nutrition] : nutrition [How and When to Call] : how and when to call [Labs and Tests] : labs and tests [Off-loading] : off-loading [Home Health] : home health [Patient responsibility to plan of care] : patient responsibility to plan of care [Glycemic Control] : glycemic control [Stable] : stable [LTC] : LTC [Wheelchair] : Wheelchair [Faxed - Long Term Care/Home Health Agency] : Long Term Care/Home Health Agency: Faxed [] : No [FreeTextEntry2] : Infection Prevention Foot and nail care Nutrition and wound healing Pt Demonstrates use of both nonpharmacological and pharmacological pain relief strategies. Glycemic control Nephrology F/U [FreeTextEntry3] : increased erythema, necrotic tissue [FreeTextEntry4] : Patient provided with wound instructions and small amount of supplies. F/U 2 Weeks Patient resides at [facility], facility does not provide skilled nursing care and are not able to perform wound care. Pt's family are not able to come routinely to perform required wound care. Pt is not able to perform his own wound care efficiently due to body mechanics, he is not able to reach the wound site. [place] stated sending out requests to different agency's for home care to come to facility for skilled wound care without success. [FreeTextEntry1] : [facility]-submitted today

## 2024-09-12 DIAGNOSIS — Z79.899 OTHER LONG TERM (CURRENT) DRUG THERAPY: ICD-10-CM

## 2024-09-12 DIAGNOSIS — E11.621 TYPE 2 DIABETES MELLITUS WITH FOOT ULCER: ICD-10-CM

## 2024-09-12 DIAGNOSIS — E11.59 TYPE 2 DIABETES MELLITUS WITH OTHER CIRCULATORY COMPLICATIONS: ICD-10-CM

## 2024-09-12 DIAGNOSIS — E11.22 TYPE 2 DIABETES MELLITUS WITH DIABETIC CHRONIC KIDNEY DISEASE: ICD-10-CM

## 2024-09-12 DIAGNOSIS — Z79.51 LONG TERM (CURRENT) USE OF INHALED STEROIDS: ICD-10-CM

## 2024-09-12 DIAGNOSIS — L97.512 NON-PRESSURE CHRONIC ULCER OF OTHER PART OF RIGHT FOOT WITH FAT LAYER EXPOSED: ICD-10-CM

## 2024-09-12 DIAGNOSIS — Z85.46 PERSONAL HISTORY OF MALIGNANT NEOPLASM OF PROSTATE: ICD-10-CM

## 2024-09-12 DIAGNOSIS — J45.909 UNSPECIFIED ASTHMA, UNCOMPLICATED: ICD-10-CM

## 2024-09-12 DIAGNOSIS — N18.9 CHRONIC KIDNEY DISEASE, UNSPECIFIED: ICD-10-CM

## 2024-09-12 DIAGNOSIS — I50.9 HEART FAILURE, UNSPECIFIED: ICD-10-CM

## 2024-09-12 DIAGNOSIS — E11.40 TYPE 2 DIABETES MELLITUS WITH DIABETIC NEUROPATHY, UNSPECIFIED: ICD-10-CM

## 2024-09-12 DIAGNOSIS — Z79.84 LONG TERM (CURRENT) USE OF ORAL HYPOGLYCEMIC DRUGS: ICD-10-CM

## 2024-09-12 DIAGNOSIS — D64.9 ANEMIA, UNSPECIFIED: ICD-10-CM

## 2024-09-12 DIAGNOSIS — Z98.49 CATARACT EXTRACTION STATUS, UNSPECIFIED EYE: ICD-10-CM

## 2024-09-16 ENCOUNTER — NON-APPOINTMENT (OUTPATIENT)
Age: 74
End: 2024-09-16

## 2024-09-20 ENCOUNTER — OUTPATIENT (OUTPATIENT)
Dept: OUTPATIENT SERVICES | Facility: HOSPITAL | Age: 74
LOS: 1 days | Discharge: HOME | End: 2024-09-20
Payer: COMMERCIAL

## 2024-09-20 ENCOUNTER — OUTPATIENT (OUTPATIENT)
Dept: OUTPATIENT SERVICES | Facility: HOSPITAL | Age: 74
LOS: 1 days | End: 2024-09-20
Payer: COMMERCIAL

## 2024-09-20 ENCOUNTER — RESULT REVIEW (OUTPATIENT)
Age: 74
End: 2024-09-20

## 2024-09-20 ENCOUNTER — APPOINTMENT (OUTPATIENT)
Dept: WOUND CARE | Facility: HOSPITAL | Age: 74
End: 2024-09-20
Payer: COMMERCIAL

## 2024-09-20 VITALS
WEIGHT: 215 LBS | SYSTOLIC BLOOD PRESSURE: 103 MMHG | RESPIRATION RATE: 15 BRPM | HEART RATE: 66 BPM | DIASTOLIC BLOOD PRESSURE: 62 MMHG | BODY MASS INDEX: 30.78 KG/M2 | HEIGHT: 70 IN | OXYGEN SATURATION: 94 % | TEMPERATURE: 98.6 F

## 2024-09-20 DIAGNOSIS — Z98.890 OTHER SPECIFIED POSTPROCEDURAL STATES: Chronic | ICD-10-CM

## 2024-09-20 DIAGNOSIS — E11.621 TYPE 2 DIABETES MELLITUS WITH FOOT ULCER: ICD-10-CM

## 2024-09-20 DIAGNOSIS — L97.512 NON-PRESSURE CHRONIC ULCER OF OTHER PART OF RIGHT FOOT WITH FAT LAYER EXPOSED: ICD-10-CM

## 2024-09-20 PROCEDURE — 93923 UPR/LXTR ART STDY 3+ LVLS: CPT | Mod: 26

## 2024-09-20 PROCEDURE — 73720 MRI LWR EXTREMITY W/O&W/DYE: CPT

## 2024-09-20 PROCEDURE — 93923 UPR/LXTR ART STDY 3+ LVLS: CPT

## 2024-09-20 PROCEDURE — A9579: CPT

## 2024-09-20 PROCEDURE — G0463: CPT

## 2024-09-20 PROCEDURE — ZZZZZ: CPT

## 2024-09-20 PROCEDURE — 73720 MRI LWR EXTREMITY W/O&W/DYE: CPT | Mod: 26,RT,MH

## 2024-09-24 ENCOUNTER — OUTPATIENT (OUTPATIENT)
Dept: OUTPATIENT SERVICES | Facility: HOSPITAL | Age: 74
LOS: 1 days | Discharge: HOME | End: 2024-09-24
Payer: COMMERCIAL

## 2024-09-24 ENCOUNTER — EMERGENCY (EMERGENCY)
Facility: HOSPITAL | Age: 74
LOS: 1 days | Discharge: HOME | End: 2024-09-24
Attending: STUDENT IN AN ORGANIZED HEALTH CARE EDUCATION/TRAINING PROGRAM | Admitting: STUDENT IN AN ORGANIZED HEALTH CARE EDUCATION/TRAINING PROGRAM
Payer: COMMERCIAL

## 2024-09-24 ENCOUNTER — APPOINTMENT (OUTPATIENT)
Dept: WOUND CARE | Facility: HOSPITAL | Age: 74
End: 2024-09-24
Payer: COMMERCIAL

## 2024-09-24 VITALS
TEMPERATURE: 98 F | HEART RATE: 70 BPM | OXYGEN SATURATION: 98 % | SYSTOLIC BLOOD PRESSURE: 130 MMHG | RESPIRATION RATE: 18 BRPM | DIASTOLIC BLOOD PRESSURE: 75 MMHG

## 2024-09-24 VITALS
OXYGEN SATURATION: 96 % | TEMPERATURE: 98 F | RESPIRATION RATE: 18 BRPM | HEART RATE: 75 BPM | WEIGHT: 220.02 LBS | SYSTOLIC BLOOD PRESSURE: 135 MMHG | HEIGHT: 70 IN | DIASTOLIC BLOOD PRESSURE: 80 MMHG

## 2024-09-24 DIAGNOSIS — E11.621 TYPE 2 DIABETES MELLITUS WITH FOOT ULCER: ICD-10-CM

## 2024-09-24 DIAGNOSIS — Z98.890 OTHER SPECIFIED POSTPROCEDURAL STATES: Chronic | ICD-10-CM

## 2024-09-24 DIAGNOSIS — L97.519 TYPE 2 DIABETES MELLITUS WITH FOOT ULCER: ICD-10-CM

## 2024-09-24 DIAGNOSIS — L97.512 NON-PRESSURE CHRONIC ULCER OF OTHER PART OF RIGHT FOOT WITH FAT LAYER EXPOSED: ICD-10-CM

## 2024-09-24 LAB
ALBUMIN SERPL ELPH-MCNC: 3.6 G/DL — SIGNIFICANT CHANGE UP (ref 3.3–5)
ALP SERPL-CCNC: 85 U/L — SIGNIFICANT CHANGE UP (ref 40–120)
ALT FLD-CCNC: 24 U/L — SIGNIFICANT CHANGE UP (ref 12–78)
ANION GAP SERPL CALC-SCNC: 7 MMOL/L — SIGNIFICANT CHANGE UP (ref 5–17)
AST SERPL-CCNC: 18 U/L — SIGNIFICANT CHANGE UP (ref 15–37)
BASOPHILS # BLD AUTO: 0.06 K/UL — SIGNIFICANT CHANGE UP (ref 0–0.2)
BASOPHILS NFR BLD AUTO: 0.7 % — SIGNIFICANT CHANGE UP (ref 0–2)
BILIRUB SERPL-MCNC: 0.3 MG/DL — SIGNIFICANT CHANGE UP (ref 0.2–1.2)
BUN SERPL-MCNC: 33 MG/DL — HIGH (ref 7–23)
CALCIUM SERPL-MCNC: 10.1 MG/DL — SIGNIFICANT CHANGE UP (ref 8.5–10.1)
CHLORIDE SERPL-SCNC: 104 MMOL/L — SIGNIFICANT CHANGE UP (ref 96–108)
CO2 SERPL-SCNC: 28 MMOL/L — SIGNIFICANT CHANGE UP (ref 22–31)
CREAT SERPL-MCNC: 1.3 MG/DL — SIGNIFICANT CHANGE UP (ref 0.5–1.3)
EGFR: 58 ML/MIN/1.73M2 — LOW
EOSINOPHIL # BLD AUTO: 0.22 K/UL — SIGNIFICANT CHANGE UP (ref 0–0.5)
EOSINOPHIL NFR BLD AUTO: 2.5 % — SIGNIFICANT CHANGE UP (ref 0–6)
ERYTHROCYTE [SEDIMENTATION RATE] IN BLOOD: 65 MM/HR — HIGH (ref 0–20)
GLUCOSE SERPL-MCNC: 111 MG/DL — HIGH (ref 70–99)
HCT VFR BLD CALC: 36 % — LOW (ref 39–50)
HGB BLD-MCNC: 11.2 G/DL — LOW (ref 13–17)
IMM GRANULOCYTES NFR BLD AUTO: 2.3 % — HIGH (ref 0–0.9)
LYMPHOCYTES # BLD AUTO: 1.35 K/UL — SIGNIFICANT CHANGE UP (ref 1–3.3)
LYMPHOCYTES # BLD AUTO: 15.4 % — SIGNIFICANT CHANGE UP (ref 13–44)
MCHC RBC-ENTMCNC: 29.1 PG — SIGNIFICANT CHANGE UP (ref 27–34)
MCHC RBC-ENTMCNC: 31.1 GM/DL — LOW (ref 32–36)
MCV RBC AUTO: 93.5 FL — SIGNIFICANT CHANGE UP (ref 80–100)
MONOCYTES # BLD AUTO: 1.01 K/UL — HIGH (ref 0–0.9)
MONOCYTES NFR BLD AUTO: 11.5 % — SIGNIFICANT CHANGE UP (ref 2–14)
NEUTROPHILS # BLD AUTO: 5.94 K/UL — SIGNIFICANT CHANGE UP (ref 1.8–7.4)
NEUTROPHILS NFR BLD AUTO: 67.6 % — SIGNIFICANT CHANGE UP (ref 43–77)
NRBC # BLD: 0 /100 WBCS — SIGNIFICANT CHANGE UP (ref 0–0)
PLATELET # BLD AUTO: 148 K/UL — LOW (ref 150–400)
POTASSIUM SERPL-MCNC: 4 MMOL/L — SIGNIFICANT CHANGE UP (ref 3.5–5.3)
POTASSIUM SERPL-SCNC: 4 MMOL/L — SIGNIFICANT CHANGE UP (ref 3.5–5.3)
PROT SERPL-MCNC: 8.4 G/DL — HIGH (ref 6–8.3)
RBC # BLD: 3.85 M/UL — LOW (ref 4.2–5.8)
RBC # FLD: 14.2 % — SIGNIFICANT CHANGE UP (ref 10.3–14.5)
SODIUM SERPL-SCNC: 139 MMOL/L — SIGNIFICANT CHANGE UP (ref 135–145)
WBC # BLD: 8.61 K/UL — SIGNIFICANT CHANGE UP (ref 3.8–10.5)
WBC # FLD AUTO: 8.61 K/UL — SIGNIFICANT CHANGE UP (ref 3.8–10.5)

## 2024-09-24 PROCEDURE — 80053 COMPREHEN METABOLIC PANEL: CPT

## 2024-09-24 PROCEDURE — 99284 EMERGENCY DEPT VISIT MOD MDM: CPT | Mod: 25

## 2024-09-24 PROCEDURE — 96374 THER/PROPH/DIAG INJ IV PUSH: CPT

## 2024-09-24 PROCEDURE — 70450 CT HEAD/BRAIN W/O DYE: CPT | Mod: 26,MC

## 2024-09-24 PROCEDURE — 86140 C-REACTIVE PROTEIN: CPT

## 2024-09-24 PROCEDURE — 85652 RBC SED RATE AUTOMATED: CPT

## 2024-09-24 PROCEDURE — 99213 OFFICE O/P EST LOW 20 MIN: CPT

## 2024-09-24 PROCEDURE — 36415 COLL VENOUS BLD VENIPUNCTURE: CPT

## 2024-09-24 PROCEDURE — 85025 COMPLETE CBC W/AUTO DIFF WBC: CPT

## 2024-09-24 PROCEDURE — 70450 CT HEAD/BRAIN W/O DYE: CPT | Mod: MC

## 2024-09-24 PROCEDURE — G0463: CPT

## 2024-09-24 PROCEDURE — 99285 EMERGENCY DEPT VISIT HI MDM: CPT

## 2024-09-24 RX ORDER — ACETAMINOPHEN 325 MG/1
1000 TABLET ORAL ONCE
Refills: 0 | Status: COMPLETED | OUTPATIENT
Start: 2024-09-24 | End: 2024-09-24

## 2024-09-24 RX ADMIN — ACETAMINOPHEN 400 MILLIGRAM(S): 325 TABLET ORAL at 14:15

## 2024-09-24 NOTE — REVIEW OF SYSTEMS
[Fever] : no fever [Chills] : no chills [Eye Pain] : no eye pain [Loss Of Hearing] : no hearing loss [Shortness Of Breath] : no shortness of breath [Abdominal Pain] : no abdominal pain [Vomiting] : no vomiting [Joint Stiffness] : joint stiffness [Skin Wound] : skin wound [Anxiety] : no anxiety [Easy Bleeding] : no tendency for easy bleeding [Negative] : Genitourinary [FreeTextEntry5] : HTN, CHF [FreeTextEntry9] : Severe bilateral bunion deformities + OM right 1st metatarsal head , chronic [de-identified] : DFU 2 medial right 1st metatarsal head , skin , subcut and fat  [de-identified] : IDDM with neuropathy  [de-identified] : ASHVIN

## 2024-09-24 NOTE — ED PROVIDER NOTE - NSFOLLOWUPINSTRUCTIONS_ED_ALL_ED_FT
- Return to the ED for any new or worsening symptoms.   - Follow-up with neurologist in office tomorrow 9/25 for further evaluation of headache  - You need to undergo temporal artery biopsy for further evaluation of headache   - Follow-up with ophthalmologist for further evaluation    Headache    A headache is pain or discomfort felt around the head or neck area. The specific cause of a headache may not be found as there are many types including tension headaches, migraine headaches, and cluster headaches. Watch your condition for any changes. Things you can do to manage your pain include taking over the counter and prescription medications as instructed by your health care provider, lying down in a dark quiet room, limiting stress, getting regular sleep, and refraining from alcohol and tobacco products.    SEEK IMMEDIATE MEDICAL CARE IF YOU HAVE ANY OF THE FOLLOWING SYMPTOMS: fever, vomiting, stiff neck, loss of vision, problems with speech, muscle weakness, loss of balance, trouble walking, passing out, or confusion.

## 2024-09-24 NOTE — ED PROVIDER NOTE - NS ED ATTENDING NAME FT
Detail Level: Zone
tbd
Render In Strict Bullet Format?: No
Defer Treatment (Provide Reason For Deferment In Text Field Below): 5-FU Cream at this time: Waiting till Fall/ Winter\\nCounseled pt he can treat back of neck
Plan: ALA/PDT

## 2024-09-24 NOTE — PLAN
[FreeTextEntry1] : Patient will need metatarsal head resection right foot to resolve the chronic ulcer  All risks , benefits , complications have been discussed with the patient . All his questions have been thoroughly answered . Patient agrees with the plan and understands all the risks and benefits Patient was told if there are signs of infection ( fever, chills, nausea, vomiting ) or changes in the condition of the wound ( pain , cellulitis , purulent drainage or odor ) they should proceed to the ER as soon as possible  Spent 20 minutes for patient care and medical decision making. Continue wound care , vascular consult asap . Patient will be admitted to the hospital in the near future

## 2024-09-24 NOTE — ED PROVIDER NOTE - PHYSICAL EXAMINATION
Gen: No acute distress, non toxic  HENT: NCAT, +Reproducible scalp tenderness to left temporal region. Mucous membranes moist, Oropharynx without exudates, uvula midline  Eyes: pink conjunctivae, EOMI, PERRL  CV: RRR, nl s1/s2.  Resp: CTAB, normal rate and effort  GI: Abdomen soft, ventral hernia (chronic), No rebound, no guarding  : No CVAT  Neuro: A&O x 3, CN II-XII intact, sensorimotor intact without deficits, no pronator drift  MSK: No spine or joint tenderness to palpation, Full ROM ext x 4  Skin: No rashes. intact and perfused. Right foot wrapped due to chronic wound

## 2024-09-24 NOTE — ED ADULT NURSE NOTE - SUICIDE SCREENING QUESTION 1
Refill Routing Note   Medication(s) are not appropriate for processing by [facility] for the following reason(s):        Drug-disease interaction  Drug-Disease: propranoloL and Reactive airway disease; Mild intermittent reactive airway disease without complication    [facility] action(s):  Defer  Approve               Appointments  past 12m or future 3m with PCP    Date Provider   Last Visit   5/20/2024 Breanne James MD   Next Visit   6/25/2024 Breanne James MD   ED visits in past 90 days: 0        Note composed:2:53 AM 06/18/2024            No

## 2024-09-24 NOTE — ED PROVIDER NOTE - PATIENT PORTAL LINK FT
You can access the FollowMyHealth Patient Portal offered by [facility] by registering at the following website: [email or web address]. By joining [email or web address]’s FollowMyHealth portal, you will also be able to view your health information using other applications (apps) compatible with our system.

## 2024-09-24 NOTE — ED PROVIDER NOTE - DIFFERENTIAL DIAGNOSIS
including but not limited to migraine, ICH, mass, temporal arteritis, sinusitis, tension HA Differential Diagnosis

## 2024-09-24 NOTE — ASSESSMENT
[Verbal] : Verbal [Written] : Written [Demo] : Demo [Patient] : Patient [Good - alert, interested, motivated] : Good - alert, interested, motivated [Verbalizes knowledge/Understanding] : Verbalizes knowledge/understanding [Dressing changes] : dressing changes [Foot Care] : foot care [Skin Care] : skin care [Pressure relief] : pressure relief [Signs and symptoms of infection] : sign and symptoms of infection [Nutrition] : nutrition [How and When to Call] : how and when to call [Off-loading] : off-loading [Patient responsibility to plan of care] : patient responsibility to plan of care [Glycemic Control] : glycemic control [] : Yes [Stable] : stable [LTC] : LTC [Wheelchair] : Wheelchair [Faxed - Long Term Care/Home Health Agency] : Long Term Care/Home Health Agency: Faxed [FreeTextEntry2] : Infection prevention / control  Localized wound care Promote optimal skin integrity  Maintain acceptable level of pain with use of pharmacological and nonpharmacological interventions Offloading / Pressure relief  Daily foot care / monitoring Nutrition to promote wound healing Diabetic diet  [FreeTextEntry4] : Follow up for an assessment in one week Discussed surgical intervention with the patient with potential admission date being 10/7/24. Patient to have vascular consult to discuss results from vascular studies.  Patient currently has an appointment scheduled for tomorrow at 2pm but needs to change it.  Coordinator notified to reschedule.  Patient has wound care performed by [facility].  Wound care instructions and prescription for supplies was submitted today.  Patient was hospitalized at [facility] as per recommendation from cardiologist for edema of the abdomen and lower extremities from 9/12-9/17/24.  Patient to follow up with cardiologist Patient presented today with c/o headache that patient states has be ongoing for the last week.  Patient advised to go to ED for further evaluation, which patient agreed.   [FreeTextEntry1] : [facility] [place]

## 2024-09-24 NOTE — HISTORY OF PRESENT ILLNESS
[FreeTextEntry1] : DFU 3 dorsal medial right 1st metatarsal head , granular , + probes to bone , no acute infection MRI + for chronic OM

## 2024-09-24 NOTE — PHYSICAL EXAM
[4 x 4] : 4 x 4  [0] : left 0 [1+] : left 1+ [Ankle Swelling (On Exam)] : present [Ankle Swelling Bilaterally] : bilaterally  [Ankle Swelling On The Left] : moderate [Varicose Veins Of Lower Extremities] : bilaterally [] : bilaterally [Purpura] : no purpura  [Petechiae] : no petechiae [Skin Ulcer] : ulcer [Skin Induration] : no induration [Alert] : alert [Oriented to Person] : oriented to person [Oriented to Place] : oriented to place [Calm] : calm [de-identified] : calm , accompanied by sister [de-identified] : HTN, CHF [de-identified] : Bilateral bunion deformities  [de-identified] : DFU 3 medial 1st metatarsal head , probes to bone , + OM on MRI [de-identified] : IDDM with neuropathy  [FreeTextEntry1] : Right Hallux [FreeTextEntry2] : 1.6 [FreeTextEntry3] : 2.0 [FreeTextEntry4] : 0.1 [de-identified] : Serosanguinous [de-identified] : Edema [de-identified] : <25% [de-identified] : Silver Alginate [de-identified] : Cleansed with 0.9% Normal Saline  Kerlix  [TWNoteComboBox4] : Moderate [TWNoteComboBox5] : No [TWNoteComboBox6] : Pressure [de-identified] : No [de-identified] : Erythema [de-identified] : None [de-identified] : None [de-identified] : >75% [de-identified] : Yes [de-identified] : 3x Weekly [de-identified] : Primary Dressing

## 2024-09-24 NOTE — ED PROVIDER NOTE - CARE PROVIDERS DIRECT ADDRESSES
,DirectAddress_Unknown,[email or web address]

## 2024-09-24 NOTE — ED PROVIDER NOTE - CLINICAL SUMMARY MEDICAL DECISION MAKING FREE TEXT BOX
Dr. Victor M Roberson    Patient is a 73 YOM with PMH of CHF, prostate CA s/p resection who presents to the ED today for L sided HA for the last 1 week. HA is constant and pressure like. has tried tylenol and oxycodone at home with some relief. waxes and wanes in intensity. denies fever, chills, tinnitus, hearing  loss, blurry vision, double vision, neck pain, chest pain, palpations, n/v, numbness or weakness.    Patient denies sudden onset or thunderclap ha. denies fever, neck stiffness, worse with valsalva, worse in morning or night, change in HA, personal or FH of SAH, cerebral aneurysm, or AVM; CA, immunosuppression, hormone therapy, temporal pain, visual disturbances, neck pain, eye pain    General: well appearing, no acute distress, overweight  HENT:  Head: normocephalic, atraumatic. TTP along the L temporal area.  Ears: canal clear, TM intact with good light reflex  Eyes: EOMI, PERRLA, no nystagmus  Nose: atraumatic  Oropharynx: mucosa pink, moist, no lesions, uvula midline.  Neck: no lymphadenopathy    Cardiac: heart RRR, no murmurs, rubs, gallops, pulses 2+ x4  Pulm: lungs CTA  Neuro: A&Ox3, CN2-12 intact, sensation intact, strength 5/5, negative Romberg's, no pronator drift, normal finger to nose. normal rapid alternating movements.  Psych: no SI, HI. negative auditory or visual hallucinations. normal affect  Skin: warm, dry, no rash, laceration, abrasion, contusion. R foot with bandage 2/2 nonhealing foot ulcer. patient getting OP treatment at wound care for this.    patient well appearing. reevaluated. NAD. after IV tylenol, patient no longer having HA. neuro intact. PA discussed with neuro, Dr. Mccartney, about elected ESR in setting of L temporal pain and TTP concerning for GCA. patient neuro and visually intact. no need for acute hospitalization. okay for OP follow up with neuro and optho and PCP. will DC patient home with strict return precautions.

## 2024-09-24 NOTE — ED PROVIDER NOTE - PROGRESS NOTE DETAILS
JODY Guerra: pt reporting resolution of HA after ofirmev. CTH negative. ESR 65 otherwise labs grossly unremarkable. Discussed with Neurology Dr. Mccartney, recommending outpt temporal artery biopsy, states pt may f/u in his office tomorrow. Also recommended outpt ophtho f/u. All results and plan of care d/w pt. provided copy of all results and f/u information for neurology and ophthalmology. return precautions discussed.

## 2024-09-24 NOTE — VITALS
[] : No [de-identified] : Patient reports pain 0/10 regarding wound  [FreeTextEntry5] : d/c Entresto

## 2024-09-24 NOTE — ED PROVIDER NOTE - CARE PROVIDER_API CALL
Dilcia Mccartney  Neurology  [address] [place] [address]  Phone: [phone]  Fax: [phone]  Follow Up Time:     Jason Hines  Ophthalmology  [address], [address]  [place] [address]  Phone: [phone]  Fax: [phone]  Follow Up Time:

## 2024-09-24 NOTE — ED PROVIDER NOTE - OBJECTIVE STATEMENT
74yo M PMhx DM2 w/ PAD - chronic Rt foot wound, HTN, COPD, CKD, HFpEF, arrythmia, Prostate CA s/p resection, Depression/Anxiety presents to ED c/o left sided HA x 1 week. States he has been taking tylenol prn for headache as well as his daily oxycodone. States both help headache but headache has persisted x 7 days. Reports deep pain as well as pain to touch when he touches left side of forehead and temple. No hx migraines. Denies visual changes, nausea, vomiting, dizziness, CP, SOB, neck pain/stiffness, fever/chills, AC use, numbness, tingling, weakness, slurred speech, facial droop.

## 2024-09-25 ENCOUNTER — APPOINTMENT (OUTPATIENT)
Dept: WOUND CARE | Facility: HOSPITAL | Age: 74
End: 2024-09-25

## 2024-09-25 DIAGNOSIS — I50.9 HEART FAILURE, UNSPECIFIED: ICD-10-CM

## 2024-09-25 DIAGNOSIS — N18.9 CHRONIC KIDNEY DISEASE, UNSPECIFIED: ICD-10-CM

## 2024-09-25 DIAGNOSIS — Z79.899 OTHER LONG TERM (CURRENT) DRUG THERAPY: ICD-10-CM

## 2024-09-25 DIAGNOSIS — D64.9 ANEMIA, UNSPECIFIED: ICD-10-CM

## 2024-09-25 DIAGNOSIS — Z79.84 LONG TERM (CURRENT) USE OF ORAL HYPOGLYCEMIC DRUGS: ICD-10-CM

## 2024-09-25 DIAGNOSIS — E11.69 TYPE 2 DIABETES MELLITUS WITH OTHER SPECIFIED COMPLICATION: ICD-10-CM

## 2024-09-25 DIAGNOSIS — Z79.51 LONG TERM (CURRENT) USE OF INHALED STEROIDS: ICD-10-CM

## 2024-09-25 DIAGNOSIS — Z98.49 CATARACT EXTRACTION STATUS, UNSPECIFIED EYE: ICD-10-CM

## 2024-09-25 DIAGNOSIS — E11.22 TYPE 2 DIABETES MELLITUS WITH DIABETIC CHRONIC KIDNEY DISEASE: ICD-10-CM

## 2024-09-25 DIAGNOSIS — E11.59 TYPE 2 DIABETES MELLITUS WITH OTHER CIRCULATORY COMPLICATIONS: ICD-10-CM

## 2024-09-25 DIAGNOSIS — J45.909 UNSPECIFIED ASTHMA, UNCOMPLICATED: ICD-10-CM

## 2024-09-25 DIAGNOSIS — M86.671 OTHER CHRONIC OSTEOMYELITIS, RIGHT ANKLE AND FOOT: ICD-10-CM

## 2024-09-25 DIAGNOSIS — E11.40 TYPE 2 DIABETES MELLITUS WITH DIABETIC NEUROPATHY, UNSPECIFIED: ICD-10-CM

## 2024-09-25 DIAGNOSIS — L97.512 NON-PRESSURE CHRONIC ULCER OF OTHER PART OF RIGHT FOOT WITH FAT LAYER EXPOSED: ICD-10-CM

## 2024-09-25 LAB — CRP SERPL-MCNC: 28 MG/L — HIGH

## 2024-10-01 ENCOUNTER — INPATIENT (INPATIENT)
Facility: HOSPITAL | Age: 74
LOS: 9 days | Discharge: OTHER INSTITUTION | DRG: 605 | End: 2024-10-11
Attending: INTERNAL MEDICINE | Admitting: INTERNAL MEDICINE
Payer: COMMERCIAL

## 2024-10-01 ENCOUNTER — APPOINTMENT (OUTPATIENT)
Dept: WOUND CARE | Facility: HOSPITAL | Age: 74
End: 2024-10-01
Payer: COMMERCIAL

## 2024-10-01 ENCOUNTER — OUTPATIENT (OUTPATIENT)
Dept: OUTPATIENT SERVICES | Facility: HOSPITAL | Age: 74
LOS: 1 days | Discharge: ANOTHER HOSPITAL | End: 2024-10-01
Payer: COMMERCIAL

## 2024-10-01 VITALS
SYSTOLIC BLOOD PRESSURE: 116 MMHG | TEMPERATURE: 99.6 F | DIASTOLIC BLOOD PRESSURE: 67 MMHG | HEART RATE: 78 BPM | RESPIRATION RATE: 18 BRPM | OXYGEN SATURATION: 95 % | BODY MASS INDEX: 30.78 KG/M2 | HEIGHT: 70 IN | WEIGHT: 215 LBS

## 2024-10-01 VITALS
RESPIRATION RATE: 18 BRPM | OXYGEN SATURATION: 94 % | WEIGHT: 220.02 LBS | SYSTOLIC BLOOD PRESSURE: 104 MMHG | TEMPERATURE: 97 F | HEIGHT: 70 IN | HEART RATE: 78 BPM | DIASTOLIC BLOOD PRESSURE: 54 MMHG

## 2024-10-01 DIAGNOSIS — E11.9 TYPE 2 DIABETES MELLITUS WITHOUT COMPLICATIONS: ICD-10-CM

## 2024-10-01 DIAGNOSIS — S91.309A UNSPECIFIED OPEN WOUND, UNSPECIFIED FOOT, INITIAL ENCOUNTER: ICD-10-CM

## 2024-10-01 DIAGNOSIS — L97.514 NON-PRESSURE CHRONIC ULCER OF OTHER PART OF RIGHT FOOT WITH NECROSIS OF BONE: ICD-10-CM

## 2024-10-01 DIAGNOSIS — J44.9 CHRONIC OBSTRUCTIVE PULMONARY DISEASE, UNSPECIFIED: ICD-10-CM

## 2024-10-01 DIAGNOSIS — Z98.890 OTHER SPECIFIED POSTPROCEDURAL STATES: Chronic | ICD-10-CM

## 2024-10-01 DIAGNOSIS — M86.671 OTHER CHRONIC OSTEOMYELITIS, RIGHT ANKLE AND FOOT: ICD-10-CM

## 2024-10-01 DIAGNOSIS — F32.9 MAJOR DEPRESSIVE DISORDER, SINGLE EPISODE, UNSPECIFIED: ICD-10-CM

## 2024-10-01 DIAGNOSIS — K59.00 CONSTIPATION, UNSPECIFIED: ICD-10-CM

## 2024-10-01 DIAGNOSIS — I50.9 HEART FAILURE, UNSPECIFIED: ICD-10-CM

## 2024-10-01 DIAGNOSIS — E11.69 TYPE 2 DIABETES MELLITUS WITH OTHER SPECIFIED COMPLICATION: ICD-10-CM

## 2024-10-01 DIAGNOSIS — E11.621 TYPE 2 DIABETES MELLITUS WITH FOOT ULCER: ICD-10-CM

## 2024-10-01 DIAGNOSIS — N28.9 DISORDER OF KIDNEY AND URETER, UNSPECIFIED: ICD-10-CM

## 2024-10-01 DIAGNOSIS — S91.301A UNSPECIFIED OPEN WOUND, RIGHT FOOT, INITIAL ENCOUNTER: ICD-10-CM

## 2024-10-01 DIAGNOSIS — L03.115 CELLULITIS OF RIGHT LOWER LIMB: ICD-10-CM

## 2024-10-01 DIAGNOSIS — G62.9 POLYNEUROPATHY, UNSPECIFIED: ICD-10-CM

## 2024-10-01 DIAGNOSIS — D63.8 TYPE 2 DIABETES MELLITUS WITH OTHER SPECIFIED COMPLICATION: ICD-10-CM

## 2024-10-01 DIAGNOSIS — Z29.9 ENCOUNTER FOR PROPHYLACTIC MEASURES, UNSPECIFIED: ICD-10-CM

## 2024-10-01 DIAGNOSIS — C61 MALIGNANT NEOPLASM OF PROSTATE: ICD-10-CM

## 2024-10-01 LAB
ALBUMIN SERPL ELPH-MCNC: 2.9 G/DL — LOW (ref 3.3–5)
ALP SERPL-CCNC: 64 U/L — SIGNIFICANT CHANGE UP (ref 40–120)
ALT FLD-CCNC: 23 U/L — SIGNIFICANT CHANGE UP (ref 12–78)
ANION GAP SERPL CALC-SCNC: 9 MMOL/L — SIGNIFICANT CHANGE UP (ref 5–17)
APTT BLD: 33.7 SEC — SIGNIFICANT CHANGE UP (ref 24.5–35.6)
AST SERPL-CCNC: 15 U/L — SIGNIFICANT CHANGE UP (ref 15–37)
BASOPHILS # BLD AUTO: 0 K/UL — SIGNIFICANT CHANGE UP (ref 0–0.2)
BASOPHILS NFR BLD AUTO: 0 % — SIGNIFICANT CHANGE UP (ref 0–2)
BILIRUB SERPL-MCNC: 0.3 MG/DL — SIGNIFICANT CHANGE UP (ref 0.2–1.2)
BUN SERPL-MCNC: 23 MG/DL — SIGNIFICANT CHANGE UP (ref 7–23)
CALCIUM SERPL-MCNC: 9.3 MG/DL — SIGNIFICANT CHANGE UP (ref 8.5–10.1)
CHLORIDE SERPL-SCNC: 102 MMOL/L — SIGNIFICANT CHANGE UP (ref 96–108)
CO2 SERPL-SCNC: 28 MMOL/L — SIGNIFICANT CHANGE UP (ref 22–31)
CREAT SERPL-MCNC: 1.3 MG/DL — SIGNIFICANT CHANGE UP (ref 0.5–1.3)
EGFR: 58 ML/MIN/1.73M2 — LOW
EOSINOPHIL # BLD AUTO: 0.07 K/UL — SIGNIFICANT CHANGE UP (ref 0–0.5)
EOSINOPHIL NFR BLD AUTO: 1 % — SIGNIFICANT CHANGE UP (ref 0–6)
GLUCOSE SERPL-MCNC: 108 MG/DL — HIGH (ref 70–99)
HCT VFR BLD CALC: 31.5 % — LOW (ref 39–50)
HGB BLD-MCNC: 9.8 G/DL — LOW (ref 13–17)
INR BLD: 1.14 RATIO — SIGNIFICANT CHANGE UP (ref 0.85–1.16)
LACTATE SERPL-SCNC: 1.9 MMOL/L — SIGNIFICANT CHANGE UP (ref 0.7–2)
LACTATE SERPL-SCNC: 2.3 MMOL/L — HIGH (ref 0.7–2)
LYMPHOCYTES # BLD AUTO: 1.34 K/UL — SIGNIFICANT CHANGE UP (ref 1–3.3)
LYMPHOCYTES # BLD AUTO: 20 % — SIGNIFICANT CHANGE UP (ref 13–44)
MCHC RBC-ENTMCNC: 29 PG — SIGNIFICANT CHANGE UP (ref 27–34)
MCHC RBC-ENTMCNC: 31.1 GM/DL — LOW (ref 32–36)
MCV RBC AUTO: 93.2 FL — SIGNIFICANT CHANGE UP (ref 80–100)
MONOCYTES # BLD AUTO: 1.07 K/UL — HIGH (ref 0–0.9)
MONOCYTES NFR BLD AUTO: 16 % — HIGH (ref 2–14)
NEUTROPHILS # BLD AUTO: 4.21 K/UL — SIGNIFICANT CHANGE UP (ref 1.8–7.4)
NEUTROPHILS NFR BLD AUTO: 63 % — SIGNIFICANT CHANGE UP (ref 43–77)
NRBC # BLD: SIGNIFICANT CHANGE UP /100 WBCS (ref 0–0)
PLATELET # BLD AUTO: 163 K/UL — SIGNIFICANT CHANGE UP (ref 150–400)
POTASSIUM SERPL-MCNC: 3.7 MMOL/L — SIGNIFICANT CHANGE UP (ref 3.5–5.3)
POTASSIUM SERPL-SCNC: 3.7 MMOL/L — SIGNIFICANT CHANGE UP (ref 3.5–5.3)
PROT SERPL-MCNC: 7.3 G/DL — SIGNIFICANT CHANGE UP (ref 6–8.3)
PROTHROM AB SERPL-ACNC: 13.4 SEC — SIGNIFICANT CHANGE UP (ref 9.9–13.4)
RBC # BLD: 3.38 M/UL — LOW (ref 4.2–5.8)
RBC # FLD: 14.2 % — SIGNIFICANT CHANGE UP (ref 10.3–14.5)
SODIUM SERPL-SCNC: 139 MMOL/L — SIGNIFICANT CHANGE UP (ref 135–145)
WBC # BLD: 6.68 K/UL — SIGNIFICANT CHANGE UP (ref 3.8–10.5)
WBC # FLD AUTO: 6.68 K/UL — SIGNIFICANT CHANGE UP (ref 3.8–10.5)

## 2024-10-01 PROCEDURE — 71045 X-RAY EXAM CHEST 1 VIEW: CPT | Mod: 26

## 2024-10-01 PROCEDURE — 99285 EMERGENCY DEPT VISIT HI MDM: CPT

## 2024-10-01 PROCEDURE — 87070 CULTURE OTHR SPECIMN AEROBIC: CPT

## 2024-10-01 PROCEDURE — G0463: CPT

## 2024-10-01 PROCEDURE — 87186 SC STD MICRODIL/AGAR DIL: CPT

## 2024-10-01 PROCEDURE — 99214 OFFICE O/P EST MOD 30 MIN: CPT

## 2024-10-01 PROCEDURE — 87077 CULTURE AEROBIC IDENTIFY: CPT

## 2024-10-01 RX ORDER — ALCOHOL ANTISEPTIC PADS
25 PADS, MEDICATED (EA) TOPICAL ONCE
Refills: 0 | Status: DISCONTINUED | OUTPATIENT
Start: 2024-10-01 | End: 2024-10-03

## 2024-10-01 RX ORDER — ONDANSETRON HCL/PF 4 MG/2 ML
4 VIAL (ML) INJECTION EVERY 8 HOURS
Refills: 0 | Status: DISCONTINUED | OUTPATIENT
Start: 2024-10-01 | End: 2024-10-03

## 2024-10-01 RX ORDER — PREGABALIN 25 MG/1
150 CAPSULE ORAL
Refills: 0 | Status: DISCONTINUED | OUTPATIENT
Start: 2024-10-01 | End: 2024-10-03

## 2024-10-01 RX ORDER — MONTELUKAST SODIUM 10 MG/1
10 TABLET, FILM COATED ORAL DAILY
Refills: 0 | Status: DISCONTINUED | OUTPATIENT
Start: 2024-10-01 | End: 2024-10-03

## 2024-10-01 RX ORDER — TIOTROPIUM BROMIDE INHALATION SPRAY 3.12 UG/1
1 SPRAY, METERED RESPIRATORY (INHALATION)
Refills: 0 | DISCHARGE

## 2024-10-01 RX ORDER — FUROSEMIDE 10 MG/ML
1 INJECTION INTRAVENOUS
Refills: 0 | DISCHARGE

## 2024-10-01 RX ORDER — GLUCAGON INJECTION, SOLUTION 0.5 MG/.1ML
1 INJECTION, SOLUTION SUBCUTANEOUS ONCE
Refills: 0 | Status: DISCONTINUED | OUTPATIENT
Start: 2024-10-01 | End: 2024-10-03

## 2024-10-01 RX ORDER — DAPTOMYCIN 500 MG/10ML
500 INJECTION, POWDER, LYOPHILIZED, FOR SOLUTION INTRAVENOUS EVERY 24 HOURS
Refills: 0 | Status: DISCONTINUED | OUTPATIENT
Start: 2024-10-02 | End: 2024-10-03

## 2024-10-01 RX ORDER — TRAMADOL HYDROCHLORIDE 50 MG/1
50 TABLET, COATED ORAL THREE TIMES A DAY
Refills: 0 | Status: DISCONTINUED | OUTPATIENT
Start: 2024-10-01 | End: 2024-10-03

## 2024-10-01 RX ORDER — ALBUTEROL 90 MCG
2 AEROSOL (GRAM) INHALATION EVERY 6 HOURS
Refills: 0 | Status: DISCONTINUED | OUTPATIENT
Start: 2024-10-01 | End: 2024-10-03

## 2024-10-01 RX ORDER — FERROUS SULFATE 325(65) MG
325 TABLET ORAL DAILY
Refills: 0 | Status: DISCONTINUED | OUTPATIENT
Start: 2024-10-01 | End: 2024-10-03

## 2024-10-01 RX ORDER — SODIUM CHLORIDE 0.9 % (FLUSH) 0.9 %
1000 SYRINGE (ML) INJECTION ONCE
Refills: 0 | Status: COMPLETED | OUTPATIENT
Start: 2024-10-01 | End: 2024-10-01

## 2024-10-01 RX ORDER — SODIUM CHLORIDE IRRIG SOLUTION 0.9 %
1000 SOLUTION, IRRIGATION IRRIGATION
Refills: 0 | Status: DISCONTINUED | OUTPATIENT
Start: 2024-10-01 | End: 2024-10-03

## 2024-10-01 RX ORDER — ACETAMINOPHEN 325 MG
650 TABLET ORAL EVERY 6 HOURS
Refills: 0 | Status: DISCONTINUED | OUTPATIENT
Start: 2024-10-01 | End: 2024-10-03

## 2024-10-01 RX ORDER — SENNOSIDES 8.6 MG
2 TABLET ORAL AT BEDTIME
Refills: 0 | Status: DISCONTINUED | OUTPATIENT
Start: 2024-10-01 | End: 2024-10-03

## 2024-10-01 RX ORDER — SODIUM CHLORIDE 0.65 %
1 AEROSOL, SPRAY (ML) NASAL
Refills: 0 | Status: DISCONTINUED | OUTPATIENT
Start: 2024-10-01 | End: 2024-10-03

## 2024-10-01 RX ORDER — FLUTICASONE PROPION/SALMETEROL 100-50 MCG
1 BLISTER, WITH INHALATION DEVICE INHALATION
Refills: 0 | Status: DISCONTINUED | OUTPATIENT
Start: 2024-10-01 | End: 2024-10-03

## 2024-10-01 RX ORDER — INFLUENZA VIRUS VACCINE 15; 15; 15; 15 UG/.5ML; UG/.5ML; UG/.5ML; UG/.5ML
0.5 SUSPENSION INTRAMUSCULAR ONCE
Refills: 0 | Status: DISCONTINUED | OUTPATIENT
Start: 2024-10-01 | End: 2024-10-11

## 2024-10-01 RX ORDER — ALCOHOL ANTISEPTIC PADS
15 PADS, MEDICATED (EA) TOPICAL ONCE
Refills: 0 | Status: DISCONTINUED | OUTPATIENT
Start: 2024-10-01 | End: 2024-10-03

## 2024-10-01 RX ORDER — CLONAZEPAM 1 MG
0.75 TABLET ORAL
Refills: 0 | Status: DISCONTINUED | OUTPATIENT
Start: 2024-10-01 | End: 2024-10-03

## 2024-10-01 RX ORDER — ALCOHOL ANTISEPTIC PADS
12.5 PADS, MEDICATED (EA) TOPICAL ONCE
Refills: 0 | Status: DISCONTINUED | OUTPATIENT
Start: 2024-10-01 | End: 2024-10-03

## 2024-10-01 RX ORDER — MORPHINE SULFATE 30 MG/1
4 TABLET, FILM COATED, EXTENDED RELEASE ORAL ONCE
Refills: 0 | Status: DISCONTINUED | OUTPATIENT
Start: 2024-10-01 | End: 2024-10-01

## 2024-10-01 RX ORDER — ENOXAPARIN SODIUM 150 MG/ML
40 INJECTION SUBCUTANEOUS EVERY 24 HOURS
Refills: 0 | Status: DISCONTINUED | OUTPATIENT
Start: 2024-10-01 | End: 2024-10-03

## 2024-10-01 RX ORDER — LACTOBACILLUS ACIDOPHILUS 25MM CELL
1 CAPSULE ORAL EVERY 12 HOURS
Refills: 0 | Status: DISCONTINUED | OUTPATIENT
Start: 2024-10-01 | End: 2024-10-03

## 2024-10-01 RX ORDER — ROSUVASTATIN CALCIUM 20 MG/1
40 TABLET, COATED ORAL AT BEDTIME
Refills: 0 | Status: DISCONTINUED | OUTPATIENT
Start: 2024-10-01 | End: 2024-10-03

## 2024-10-01 RX ORDER — CEFAZOLIN SODIUM 1 G
1000 VIAL (EA) INJECTION EVERY 8 HOURS
Refills: 0 | Status: DISCONTINUED | OUTPATIENT
Start: 2024-10-01 | End: 2024-10-02

## 2024-10-01 RX ORDER — METOPROLOL TARTRATE 50 MG
25 TABLET ORAL DAILY
Refills: 0 | Status: DISCONTINUED | OUTPATIENT
Start: 2024-10-01 | End: 2024-10-03

## 2024-10-01 RX ORDER — OXYCODONE HYDROCHLORIDE 30 MG/1
5 TABLET, FILM COATED, EXTENDED RELEASE ORAL ONCE
Refills: 0 | Status: DISCONTINUED | OUTPATIENT
Start: 2024-10-01 | End: 2024-10-01

## 2024-10-01 RX ORDER — 5-HYDROXYTRYPTOPHAN (5-HTP) 100 MG
3 TABLET,DISINTEGRATING ORAL AT BEDTIME
Refills: 0 | Status: DISCONTINUED | OUTPATIENT
Start: 2024-10-01 | End: 2024-10-03

## 2024-10-01 RX ORDER — CLINDAMYCIN PHOSPHATE 150 MG/ML
900 INJECTION, SOLUTION INTRAVENOUS ONCE
Refills: 0 | Status: COMPLETED | OUTPATIENT
Start: 2024-10-01 | End: 2024-10-01

## 2024-10-01 RX ORDER — RUXOLITINIB 15 MG/G
1 CREAM TOPICAL
Refills: 0 | DISCHARGE

## 2024-10-01 RX ORDER — PANTOPRAZOLE SODIUM 40 MG/1
40 TABLET, DELAYED RELEASE ORAL
Refills: 0 | Status: DISCONTINUED | OUTPATIENT
Start: 2024-10-01 | End: 2024-10-03

## 2024-10-01 RX ORDER — FLUTICASONE PROPIONATE 50 UG/1
1 SPRAY, METERED NASAL
Refills: 0 | DISCHARGE

## 2024-10-01 RX ORDER — INSULIN GLARGINE 300 U/ML
7 INJECTION, SOLUTION SUBCUTANEOUS AT BEDTIME
Refills: 0 | Status: DISCONTINUED | OUTPATIENT
Start: 2024-10-01 | End: 2024-10-03

## 2024-10-01 RX ORDER — CLINDAMYCIN PHOSPHATE 150 MG/ML
900 INJECTION, SOLUTION INTRAVENOUS EVERY 8 HOURS
Refills: 0 | Status: DISCONTINUED | OUTPATIENT
Start: 2024-10-01 | End: 2024-10-01

## 2024-10-01 RX ORDER — MAG HYDROX/ALUMINUM HYD/SIMETH 200-200-20
30 SUSPENSION, ORAL (FINAL DOSE FORM) ORAL EVERY 4 HOURS
Refills: 0 | Status: DISCONTINUED | OUTPATIENT
Start: 2024-10-01 | End: 2024-10-03

## 2024-10-01 RX ORDER — SODIUM CHLORIDE 0.65 %
1 AEROSOL, SPRAY (ML) NASAL
Refills: 0 | DISCHARGE

## 2024-10-01 RX ORDER — SERTRALINE HYDROCHLORIDE 100 MG/1
100 TABLET, FILM COATED ORAL DAILY
Refills: 0 | Status: DISCONTINUED | OUTPATIENT
Start: 2024-10-01 | End: 2024-10-03

## 2024-10-01 RX ORDER — ROSUVASTATIN CALCIUM 20 MG/1
1 TABLET, COATED ORAL
Refills: 0 | DISCHARGE

## 2024-10-01 RX ORDER — DAPTOMYCIN 500 MG/10ML
INJECTION, POWDER, LYOPHILIZED, FOR SOLUTION INTRAVENOUS
Refills: 0 | Status: DISCONTINUED | OUTPATIENT
Start: 2024-10-01 | End: 2024-10-03

## 2024-10-01 RX ORDER — METFORMIN HCL 500 MG
2 TABLET ORAL
Refills: 0 | DISCHARGE

## 2024-10-01 RX ORDER — AZELAIC ACID 0.15 G/G
1 GEL TOPICAL
Refills: 0 | DISCHARGE

## 2024-10-01 RX ORDER — DAPTOMYCIN 500 MG/10ML
500 INJECTION, POWDER, LYOPHILIZED, FOR SOLUTION INTRAVENOUS ONCE
Refills: 0 | Status: COMPLETED | OUTPATIENT
Start: 2024-10-01 | End: 2024-10-01

## 2024-10-01 RX ORDER — SODIUM CHLORIDE 0.9 % (FLUSH) 0.9 %
1000 SYRINGE (ML) INJECTION
Refills: 0 | Status: DISCONTINUED | OUTPATIENT
Start: 2024-10-01 | End: 2024-10-02

## 2024-10-01 RX ORDER — INSULIN LISPRO 100/ML
VIAL (ML) SUBCUTANEOUS
Refills: 0 | Status: DISCONTINUED | OUTPATIENT
Start: 2024-10-01 | End: 2024-10-03

## 2024-10-01 RX ADMIN — PREGABALIN 150 MILLIGRAM(S): 25 CAPSULE ORAL at 22:24

## 2024-10-01 RX ADMIN — MORPHINE SULFATE 4 MILLIGRAM(S): 30 TABLET, FILM COATED, EXTENDED RELEASE ORAL at 13:51

## 2024-10-01 RX ADMIN — INSULIN GLARGINE 7 UNIT(S): 300 INJECTION, SOLUTION SUBCUTANEOUS at 22:44

## 2024-10-01 RX ADMIN — ENOXAPARIN SODIUM 40 MILLIGRAM(S): 150 INJECTION SUBCUTANEOUS at 22:45

## 2024-10-01 RX ADMIN — Medication 3 MILLIGRAM(S): at 22:56

## 2024-10-01 RX ADMIN — ROSUVASTATIN CALCIUM 40 MILLIGRAM(S): 20 TABLET, COATED ORAL at 21:29

## 2024-10-01 RX ADMIN — MORPHINE SULFATE 4 MILLIGRAM(S): 30 TABLET, FILM COATED, EXTENDED RELEASE ORAL at 13:21

## 2024-10-01 RX ADMIN — Medication 1 TABLET(S): at 18:42

## 2024-10-01 RX ADMIN — OXYCODONE HYDROCHLORIDE 5 MILLIGRAM(S): 30 TABLET, FILM COATED, EXTENDED RELEASE ORAL at 21:16

## 2024-10-01 RX ADMIN — CLINDAMYCIN PHOSPHATE 100 MILLIGRAM(S): 150 INJECTION, SOLUTION INTRAVENOUS at 13:22

## 2024-10-01 RX ADMIN — DAPTOMYCIN 120 MILLIGRAM(S): 500 INJECTION, POWDER, LYOPHILIZED, FOR SOLUTION INTRAVENOUS at 21:43

## 2024-10-01 RX ADMIN — OXYCODONE HYDROCHLORIDE 5 MILLIGRAM(S): 30 TABLET, FILM COATED, EXTENDED RELEASE ORAL at 20:16

## 2024-10-01 RX ADMIN — Medication 0.75 MILLIGRAM(S): at 22:24

## 2024-10-01 RX ADMIN — Medication 50 MILLILITER(S): at 18:42

## 2024-10-01 RX ADMIN — Medication 650 MILLIGRAM(S): at 22:55

## 2024-10-01 RX ADMIN — Medication 1000 MILLILITER(S): at 13:21

## 2024-10-01 RX ADMIN — Medication 650 MILLIGRAM(S): at 23:21

## 2024-10-01 RX ADMIN — Medication 100 MILLIGRAM(S): at 21:29

## 2024-10-01 NOTE — ED ADULT NURSE NOTE - OBJECTIVE STATEMENT
pt sent from, wound care of admission and IV abx for wound to right great toe. pt denies pain in that area. states hes had a headache that hes had a ct scan for recently and was supposed to f/u with outpatient neuro on friday. RLE is swollen with minor redness. pedal pulses present. pt states hes had chills recently

## 2024-10-01 NOTE — ED ADULT NURSE NOTE - NSFALLRISKINTERV_ED_ALL_ED

## 2024-10-01 NOTE — CONSULT NOTE ADULT - SUBJECTIVE AND OBJECTIVE BOX
HPI:  73y year old Male seen at [facility] ED for infected right 1st metatarsal head medial wound down to skin, subcuatneous tissue, fat, bone. Patient has been following up at the [facility], sent in by Dr. Rodriguez today to the hospital. Denies any fever, chills, nausea, vomiting, chest pain, shortness of breath, or calf pain at this time.      PAST MEDICAL & SURGICAL HISTORY:  Prostate cancer      Type II diabetes mellitus      Chronic obstructive pulmonary disease (COPD)      CHF (congestive heart failure)      Renal insufficiency      S/P foot surgery          Allergies    tetracycline (Unknown)  IODINE (Unknown)  vancomycin (Other)    Intolerances        MEDICATIONS  (STANDING):    MEDICATIONS  (PRN):      Social History:      FAMILY HISTORY:      Vital Signs Last 24 Hrs  T(C): 36 (01 Oct 2024 11:50), Max: 36 (01 Oct 2024 11:50)  T(F): 96.8 (01 Oct 2024 11:50), Max: 96.8 (01 Oct 2024 11:50)  HR: 78 (01 Oct 2024 11:50) (78 - 78)  BP: 104/54 (01 Oct 2024 11:50) (104/54 - 104/54)  BP(mean): --  RR: 18 (01 Oct 2024 11:50) (18 - 18)  SpO2: 94% (01 Oct 2024 11:50) (94% - 94%)    Parameters below as of 01 Oct 2024 11:50  Patient On (Oxygen Delivery Method): room air        PHYSICAL EXAM:  Vascular: DP & PT faintly palpable bilaterally, Capillary refill 3 seconds  Neurological: Light touch sensation diminished bilaterally  Musculoskeletal: 4/5 strength in all quadrants bilaterally, AJ & STJ ROM intact  Dermatological: Right 1st metatarsal head medial wound down to skin, subcutaneous tissue, fat, bone, periwound erythema noted, no fluctuance, no malodor, no proximal streaking at this time                          9.8    6.68  )-----------( 163      ( 01 Oct 2024 13:05 )             31.5              101.4

## 2024-10-01 NOTE — H&P ADULT - TIME BILLING
55minutes spent on this visit, 50% visit time spent in care co-ordination with other attendings and counselling patient ,writing admission orders ( see complete and current orders and order section) ,requesting necessary consults ,informing family about status & plan of care .I have discussed care plan with [facility] wellness/admitting /nursing   department ,outpatient PCP , hospital consultants , ER physician & med staff .

## 2024-10-01 NOTE — ED PROVIDER NOTE - CLINICAL SUMMARY MEDICAL DECISION MAKING FREE TEXT BOX
Patient is a 73-year-old white male presenting to the emergency department at [facility] for evaluation of nonhealing wound right foot.  Patient does have a history of CHF COPD prostate cancer renal failure diabetes and chronic neck pain and has had a nonhealing wound right great toe.  No fever no chills no nausea vomiting or diarrhea.  Case requires thorough evaluation performed in an independent manner with labs cultures x-rays IV fluids and antibiotics.

## 2024-10-01 NOTE — H&P ADULT - NSICDXPASTMEDICALHX_GEN_ALL_CORE_FT
PAST MEDICAL HISTORY:  CHF (congestive heart failure)     Chronic obstructive pulmonary disease (COPD)     Prostate cancer     Renal insufficiency     Type II diabetes mellitus      PAST MEDICAL HISTORY:  CHF (congestive heart failure)     Chronic obstructive pulmonary disease (COPD)     Constipation     H/O migraine     Insomnia     Prostate cancer     Renal insufficiency     Type II diabetes mellitus

## 2024-10-01 NOTE — H&P ADULT - SKIN COMMENTS
: Right 1st metatarsal head medial wound down to skin, subcutaneous tissue, fat, bone, periwound erythema noted, no fluctuance, no malodor, no proximal streaking at this timeExtremity: LE edema R > L, R LE erythema, R MTP ulcer on medial aspect, R dorsal ulcer, palpable b/l DP/PT

## 2024-10-01 NOTE — HISTORY OF PRESENT ILLNESS
[FreeTextEntry1] : Infected ulcer dorsal medial right foot , the foot is swollen and the dorsal foot is blistering , the patient has been complaining of chills and there is erythema of the lower leg

## 2024-10-01 NOTE — PATIENT PROFILE ADULT - FALL HARM RISK - HARM RISK INTERVENTIONS
Assistance with ambulation/Assistance OOB with selected safe patient handling equipment/Communicate Risk of Fall with Harm to all staff/Discuss with provider need for PT consult/Monitor gait and stability/Provide patient with walking aids - walker, cane, crutches/Reinforce activity limits and safety measures with patient and family/Tailored Fall Risk Interventions/Toileting schedule using arm’s reach rule for commode and bathroom/Visual Cue: Yellow wristband and red socks/Bed in lowest position, wheels locked, appropriate side rails in place/Call bell, personal items and telephone in reach/Instruct patient to call for assistance before getting out of bed or chair/Non-slip footwear when patient is out of bed/[place] to call system/Physically safe environment - no spills, clutter or unnecessary equipment/Purposeful Proactive Rounding/Room/bathroom lighting operational, light cord in reach

## 2024-10-01 NOTE — PATIENT PROFILE ADULT - NSPROHMDIABETMGMTSTRAT_GEN_A_NUR
"[facility] Consult Follow-Up Note     HPI:  Jessica Bay is a  67 yo female with medical history significant for chronic kidney disease, renal cell carcinoma (s/p right nephrectomy), HTN, DM who presents to the hospital on 2/12 with concern for fevers, headaches, and altered mental status. She has history of meningitis in the past. Patient had an LP performed in ED with elevated opening pressure of 40. She received Rocephin and Vancomycin on admission. She was admitted to [facility] for treatment of meningitis. She remains on Rocephin.      Regarding her CKD, she is followed in [place] by Dr. Raymond Wallace at [facility]. She is baseline CKD IV with sCr ~2.5. Nephrology consulted for CKD IV.      She is hard of hearing. You have to communicate with a white board. She has no complaints today. She is glad her renal function is stable.     Subjective/Interval History:  No acute events overnight    Objective     Medications:   allopurinoL  100 mg Oral QHS    amLODIPine  5 mg Oral Daily    benzonatate  200 mg Oral TID    carvediloL  6.25 mg Oral BID    cefTRIAXone (ROCEPHIN) IVPB  2 g Intravenous Q12H    enoxaparin  30 mg Subcutaneous Daily    insulin detemir U-100  25 Units Subcutaneous QHS    mupirocin   Nasal BID    pantoprazole  40 mg Oral Daily    QUEtiapine  50 mg Oral BID    ticagrelor  90 mg Oral BID    tiZANidine  4 mg Oral QHS    vancomycin (VANCOCIN) IVPB  2,000 mg Intravenous Q72H       Physical Exam:   BP (!) 149/73   Pulse 75   Temp 98.4 °F (36.9 °C)   Resp 20   Ht 5' 4" (1.626 m)   Wt 112.7 kg (248 lb 7.3 oz)   SpO2 95%   BMI 42.65 kg/m²   Constitutional: sitting up in chair, in NAD  Eyes: EOMI, white sclera  ENMT: moist mucus membranes, nares patent  Cardiovascular: normal rate, S1/S2 noted, no edema  Respiratory: symmetrical chest expansion, CTA-B  Gastrointestinal: +BS, soft, NT/ND  Musculoskeletal: normal, no joint erythema/effusions  Skin: no rash, no purpura, warm " extremities  Neurological: Alert and Oriented x 4, afocal    I/Os:   I/O last 3 completed shifts:  In: 1080 [P.O.:480; IV Piggyback:600]  Out: 1550 [Urine:1550]    Labs, micro, imaging reviewed.           Assessment and Plan:     Patient Active Problem List   Diagnosis    Hypertension    Body mass index (BMI) 40.0-44.9, adult    Chronic fatigue syndrome    Dyslipidemia    Fibromyalgia    Mitral valve disorder    Acquired absence of kidney    Neuropathy of both feet    Restless legs syndrome    CKD (chronic kidney disease), stage IV    Type 2 diabetes mellitus with diabetic chronic kidney disease    CAD (coronary artery disease)    Bacterial meningitis    UTI (urinary tract infection)     Jessica Bay is a  69 yo female with medical history significant for chronic kidney disease, renal cell carcinoma (s/p right nephrectomy), HTN, DM who presents to the hospital on 2/12 with concern for fevers, headaches, and altered mental status concerning for meningitis.      - Renal function stable. At baseline  - Will continue to monitor   - Please avoid nephrotoxic agents/NSAIDs  - Renally dose all medications   - Please obtain daily BMP, Mg, and Phos levels  - Please monitor strict UOP  - Daily weights    Thank you for this consult. [facility] will continue to follow from [place] while hospitalized.. Please call with any questions.     Luba Ren, DO [facility]   02/22/2023    blood glucose testing

## 2024-10-01 NOTE — H&P ADULT - PROBLEM SELECTOR PLAN 1
Tentatively planning for right 1st metatarsal head resection pending vascular evaluation. Continue local wound care and offloading at this time .IV abx as per ID -started on daptomycin

## 2024-10-01 NOTE — PATIENT PROFILE ADULT - FALL HARM RISK - FACTORS
Frequent toileting needed/Impaired gait/Impaired vision/Other medical problems/Poor balance/Weakness/Other

## 2024-10-01 NOTE — ASSESSMENT
[Verbal] : Verbal [Written] : Written [Patient] : Patient [Verbalizes knowledge/Understanding] : Verbalizes knowledge/understanding [Dressing changes] : dressing changes [Foot Care] : foot care [Skin Care] : skin care [Pressure relief] : pressure relief [Signs and symptoms of infection] : sign and symptoms of infection [Nutrition] : nutrition [Off-loading] : off-loading [Patient responsibility to plan of care] : patient responsibility to plan of care [Glycemic Control] : glycemic control [Wheelchair] : Wheelchair [Fair - mild discomfort, physical impairment, low acceptance] : Fair - mild discomfort, physical impairment, low acceptance [Hospitalization] : hospitalization [Other: ____] : [unfilled] [Emergency Room] : Emergency Room [] : No [FreeTextEntry1] : sister Lida with patient [FreeTextEntry4] : Patient went to ER last week for h/a left temporal area. DC and to follow with neurology on Friday. Patient Right great toe warm and red and has chills. Admit to ER today via wc  Culture Right great toe taken today Patient has history MRSA   [FreeTextEntry2] : Infection prevention / control  Localized wound care Promote optimal skin integrity  Maintain acceptable level of pain with use of pharmacological and nonpharmacological interventions Offloading / Pressure relief  Daily foot care / monitoring Nutrition to promote wound healing Diabetic diet

## 2024-10-01 NOTE — CONSULT NOTE ADULT - SUBJECTIVE AND OBJECTIVE BOX
Patient is a 73y old  Male who presents with a chief complaint of     HPI:  74yo M PMhx DM2 w/ PAD - chronic Rt foot wound, HTN, COPD, CKD, HFpEF, arrythmia, Prostate CA s/p resection, Depression/Anxiety presents to ED c/o left sided HA x 1 week. States he has been taking tylenol prn for headache as well as his daily oxycodone. States both help headache but headache has persisted x 7 days. Reports deep pain as well as pain to touch when he touches left side of forehead and temple. No hx migraines. Denies visual changes, nausea, vomiting, dizziness, CP, SOB, neck pain/stiffness, fever/chills, AC use, numbness, tingling, weakness, slurred speech, facial droop (01 Oct 2024 14:44)      Asked to see patient for ID Consult    PAST MEDICAL & SURGICAL HISTORY:  Prostate cancer      Type II diabetes mellitus      Chronic obstructive pulmonary disease (COPD)      CHF (congestive heart failure)      Renal insufficiency      S/P foot surgery          Allergies    tetracycline (Unknown)  IODINE (Unknown)  vancomycin (Other)    Intolerances        REVIEW OF SYSTEMS:  All systems below were reviewed and are negative [  ]  HEENT:  ID:  Pulmonary:  Cardiac:  GI:  Renal:  Musculoskeletal:  All other systems above were reviewed and are negative   [  ]    HOME MEDICATIONS:    MEDICATIONS  (STANDING):  clindamycin IVPB 900 milliGRAM(s) IV Intermittent every 8 hours  clonazePAM Oral Disintegrating Tablet 0.75 milliGRAM(s) Oral two times a day  dextrose 5%. 1000 milliLiter(s) (50 mL/Hr) IV Continuous <Continuous>  dextrose 5%. 1000 milliLiter(s) (100 mL/Hr) IV Continuous <Continuous>  dextrose 50% Injectable 12.5 Gram(s) IV Push once  dextrose 50% Injectable 25 Gram(s) IV Push once  dextrose 50% Injectable 25 Gram(s) IV Push once  ferrous    sulfate 325 milliGRAM(s) Oral daily  glucagon  Injectable 1 milliGRAM(s) IntraMuscular once  influenza  Vaccine (HIGH DOSE) 0.5 milliLiter(s) IntraMuscular once  insulin lispro (ADMELOG) corrective regimen sliding scale   SubCutaneous three times a day before meals  lactobacillus acidophilus 1 Tablet(s) Oral every 12 hours  metoprolol succinate ER 25 milliGRAM(s) Oral daily  oxyCODONE    IR 5 milliGRAM(s) Oral once  pantoprazole    Tablet 40 milliGRAM(s) Oral before breakfast  pregabalin 150 milliGRAM(s) Oral two times a day  rosuvastatin 40 milliGRAM(s) Oral at bedtime  senna 2 Tablet(s) Oral at bedtime  sertraline 100 milliGRAM(s) Oral daily  sodium chloride 0.9%. 1000 milliLiter(s) (50 mL/Hr) IV Continuous <Continuous>    MEDICATIONS  (PRN):  acetaminophen     Tablet .. 650 milliGRAM(s) Oral every 6 hours PRN Temp greater or equal to 38C (100.4F), Mild Pain (1 - 3)  aluminum hydroxide/magnesium hydroxide/simethicone Suspension 30 milliLiter(s) Oral every 4 hours PRN Dyspepsia  dextrose Oral Gel 15 Gram(s) Oral once PRN Blood Glucose LESS THAN 70 milliGRAM(s)/deciliter  melatonin 3 milliGRAM(s) Oral at bedtime PRN Insomnia  ondansetron Injectable 4 milliGRAM(s) IV Push every 8 hours PRN Nausea and/or Vomiting  sodium chloride 0.65% Nasal 1 Spray(s) Both Nostrils two times a day PRN Congestion  traMADol 50 milliGRAM(s) Oral three times a day PRN Moderate Pain (4 - 6)      Vital Signs Last 24 Hrs  T(C): 36.8 (01 Oct 2024 18:45), Max: 36.8 (01 Oct 2024 15:38)  T(F): 98.2 (01 Oct 2024 18:45), Max: 98.3 (01 Oct 2024 15:38)  HR: 77 (01 Oct 2024 18:45) (77 - 80)  BP: 128/72 (01 Oct 2024 18:45) (104/54 - 128/72)  BP(mean): --  RR: 18 (01 Oct 2024 18:45) (18 - 18)  SpO2: 97% (01 Oct 2024 18:45) (92% - 97%)    Parameters below as of 01 Oct 2024 18:45  Patient On (Oxygen Delivery Method): room air        PHYSICAL EXAM:  HEENT:  Neck:  Lungs:  Heart:  Abdomen:  Genital/ Rectal:  Extremities:  Neurologic:  Vascular:    I&O's Summary      LABORATORY:                          9.8    6.68  )-----------( 163      ( 01 Oct 2024 13:05 )             31.5       ESR:                   09-24 @ 14:10  65    C-Reactive Protein:     09-24 @ 14:10  28    Procalcitonin:           09-24 @ 14:10   --      10-01    139  |  102  |  23  ----------------------------<  108[H]  3.7   |  28  |  1.30    Ca    9.3      01 Oct 2024 13:05    TPro  7.3  /  Alb  2.9[L]  /  TBili  0.3  /  DBili  x   /  AST  15  /  ALT  23  /  AlkPhos  64  10-01          LABORATORY:    CBC Full  -  ( 01 Oct 2024 13:05 )  WBC Count : 6.68 K/uL  RBC Count : 3.38 M/uL  Hemoglobin : 9.8 g/dL  Hematocrit : 31.5 %  Platelet Count - Automated : 163 K/uL  Mean Cell Volume : 93.2 fl  Mean Cell Hemoglobin : 29.0 pg  Mean Cell Hemoglobin Concentration : 31.1 gm/dL  Auto Neutrophil # : 4.21 K/uL  Auto Lymphocyte # : 1.34 K/uL  Auto Monocyte # : 1.07 K/uL  Auto Eosinophil # : 0.07 K/uL  Auto Basophil # : 0.00 K/uL  Auto Neutrophil % : 63.0 %  Auto Lymphocyte % : 20.0 %  Auto Monocyte % : 16.0 %  Auto Eosinophil % : 1.0 %  Auto Basophil % : 0.0 %          10-01    139  |  102  |  23  ----------------------------<  108[H]  3.7   |  28  |  1.30    Ca    9.3      01 Oct 2024 13:05    TPro  7.3  /  Alb  2.9[L]  /  TBili  0.3  /  DBili  x   /  AST  15  /  ALT  23  /  AlkPhos  64  10-01                                                   Patient is a 73y old  Male who presents with a chief complaint of     HPI:  74yo M PMhx DM2 w/ PAD - chronic Rt foot wound, HTN, COPD, CKD, HFpEF, arrythmia, Prostate CA s/p resection, Depression/Anxiety presents to ED c/o left sided HA x 1 week. States he has been taking tylenol prn for headache as well as his daily oxycodone. States both help headache but headache has persisted x 7 days. Reports deep pain as well as pain to touch when he touches left side of forehead and temple. No hx migraines. Denies visual changes, nausea, vomiting, dizziness, CP, SOB, neck pain/stiffness, fever/chills, AC use, numbness, tingling, weakness, slurred speech, facial droop (01 Oct 2024 14:44)      Asked to see patient for ID Consult    PAST MEDICAL & SURGICAL HISTORY:  Prostate cancer      Type II diabetes mellitus      Chronic obstructive pulmonary disease (COPD)      CHF (congestive heart failure)      Renal insufficiency      S/P foot surgery          Allergies    tetracycline (Unknown)  IODINE (Unknown)  vancomycin (Other)    Intolerances        REVIEW OF SYSTEMS:  All systems below were reviewed and are negative [  ]  HEENT:  ID:  Pulmonary:  Cardiac:  GI:  Renal:  Musculoskeletal:  All other systems above were reviewed and are negative   [  ]    HOME MEDICATIONS:    MEDICATIONS  (STANDING):  clindamycin IVPB 900 milliGRAM(s) IV Intermittent every 8 hours  clonazePAM Oral Disintegrating Tablet 0.75 milliGRAM(s) Oral two times a day  dextrose 5%. 1000 milliLiter(s) (50 mL/Hr) IV Continuous <Continuous>  dextrose 5%. 1000 milliLiter(s) (100 mL/Hr) IV Continuous <Continuous>  dextrose 50% Injectable 12.5 Gram(s) IV Push once  dextrose 50% Injectable 25 Gram(s) IV Push once  dextrose 50% Injectable 25 Gram(s) IV Push once  ferrous    sulfate 325 milliGRAM(s) Oral daily  glucagon  Injectable 1 milliGRAM(s) IntraMuscular once  influenza  Vaccine (HIGH DOSE) 0.5 milliLiter(s) IntraMuscular once  insulin lispro (ADMELOG) corrective regimen sliding scale   SubCutaneous three times a day before meals  lactobacillus acidophilus 1 Tablet(s) Oral every 12 hours  metoprolol succinate ER 25 milliGRAM(s) Oral daily  oxyCODONE    IR 5 milliGRAM(s) Oral once  pantoprazole    Tablet 40 milliGRAM(s) Oral before breakfast  pregabalin 150 milliGRAM(s) Oral two times a day  rosuvastatin 40 milliGRAM(s) Oral at bedtime  senna 2 Tablet(s) Oral at bedtime  sertraline 100 milliGRAM(s) Oral daily  sodium chloride 0.9%. 1000 milliLiter(s) (50 mL/Hr) IV Continuous <Continuous>    MEDICATIONS  (PRN):  acetaminophen     Tablet .. 650 milliGRAM(s) Oral every 6 hours PRN Temp greater or equal to 38C (100.4F), Mild Pain (1 - 3)  aluminum hydroxide/magnesium hydroxide/simethicone Suspension 30 milliLiter(s) Oral every 4 hours PRN Dyspepsia  dextrose Oral Gel 15 Gram(s) Oral once PRN Blood Glucose LESS THAN 70 milliGRAM(s)/deciliter  melatonin 3 milliGRAM(s) Oral at bedtime PRN Insomnia  ondansetron Injectable 4 milliGRAM(s) IV Push every 8 hours PRN Nausea and/or Vomiting  sodium chloride 0.65% Nasal 1 Spray(s) Both Nostrils two times a day PRN Congestion  traMADol 50 milliGRAM(s) Oral three times a day PRN Moderate Pain (4 - 6)      Vital Signs Last 24 Hrs  T(C): 36.8 (01 Oct 2024 18:45), Max: 36.8 (01 Oct 2024 15:38)  T(F): 98.2 (01 Oct 2024 18:45), Max: 98.3 (01 Oct 2024 15:38)  HR: 77 (01 Oct 2024 18:45) (77 - 80)  BP: 128/72 (01 Oct 2024 18:45) (104/54 - 128/72)  BP(mean): --  RR: 18 (01 Oct 2024 18:45) (18 - 18)  SpO2: 97% (01 Oct 2024 18:45) (92% - 97%)    Parameters below as of 01 Oct 2024 18:45  Patient On (Oxygen Delivery Method): room air        PHYSICAL EXAM:  HEENT:  Neck:  Lungs:  Heart:  Abdomen:  Genital/ Rectal:  Extremities:  Neurologic:  Vascular:    I&O's Summary      LABORATORY:                          9.8    6.68  )-----------( 163      ( 01 Oct 2024 13:05 )             31.5       ESR:                   09-24 @ 14:10  65    C-Reactive Protein:     09-24 @ 14:10  28    Procalcitonin:           09-24 @ 14:10   --      10-01    139  |  102  |  23  ----------------------------<  108[H]  3.7   |  28  |  1.30    Ca    9.3      01 Oct 2024 13:05    TPro  7.3  /  Alb  2.9[L]  /  TBili  0.3  /  DBili  x   /  AST  15  /  ALT  23  /  AlkPhos  64  10-01          LABORATORY:    CBC Full  -  ( 01 Oct 2024 13:05 )  WBC Count : 6.68 K/uL  RBC Count : 3.38 M/uL  Hemoglobin : 9.8 g/dL  Hematocrit : 31.5 %  Platelet Count - Automated : 163 K/uL  Mean Cell Volume : 93.2 fl  Mean Cell Hemoglobin : 29.0 pg  Mean Cell Hemoglobin Concentration : 31.1 gm/dL  Auto Neutrophil # : 4.21 K/uL  Auto Lymphocyte # : 1.34 K/uL  Auto Monocyte # : 1.07 K/uL  Auto Eosinophil # : 0.07 K/uL  Auto Basophil # : 0.00 K/uL  Auto Neutrophil % : 63.0 %  Auto Lymphocyte % : 20.0 %  Auto Monocyte % : 16.0 %  Auto Eosinophil % : 1.0 %  Auto Basophil % : 0.0 %          10-01    139  |  102  |  23  ----------------------------<  108[H]  3.7   |  28  |  1.30    Ca    9.3      01 Oct 2024 13:05    TPro  7.3  /  Alb  2.9[L]  /  TBili  0.3  /  DBili  x   /  AST  15  /  ALT  23  /  AlkPhos  64  10-01        Assessment and plan:    1, R foot with infected ulcer likely with osteomyelitis.  2, CKD    . Discontinue IV Clindamycin.  . Add IV Dapto 500 mg daily  . Podiatry evaluation    Thank you                                            Patient is a 73y old  Male who presents with a chief complaint of       The patient is a 74yo M PMhx DM2 w/ PAD - chronic Rt foot wound, HTN, COPD, CKD, HFpEF, arrythmia, Prostate CA s/p resection, Depression/Anxiety who was sent to the ED from the wound care center for worsened ulcer of his R foot. He had MRI of the R foot done on 9/20/24 which was suggestive of osteomyelitis of the 1st metatarsal head. He reported he had an ulcer on the R bunion for several months. In the EE he was afebrile. He was admitted and placed on IV Clindamycin.       PAST MEDICAL & SURGICAL HISTORY:  Prostate cancer      Type II diabetes mellitus      Chronic obstructive pulmonary disease (COPD)      CHF (congestive heart failure)      Renal insufficiency      S/P foot surgery          Allergies    tetracycline (Unknown)  IODINE (Unknown)  vancomycin (Other)    Intolerances        REVIEW OF SYSTEMS:  No cough   No SOB.    HOME MEDICATIONS:    MEDICATIONS  (STANDING):  clindamycin IVPB 900 milliGRAM(s) IV Intermittent every 8 hours  clonazePAM Oral Disintegrating Tablet 0.75 milliGRAM(s) Oral two times a day  dextrose 5%. 1000 milliLiter(s) (50 mL/Hr) IV Continuous <Continuous>  dextrose 5%. 1000 milliLiter(s) (100 mL/Hr) IV Continuous <Continuous>  dextrose 50% Injectable 12.5 Gram(s) IV Push once  dextrose 50% Injectable 25 Gram(s) IV Push once  dextrose 50% Injectable 25 Gram(s) IV Push once  ferrous    sulfate 325 milliGRAM(s) Oral daily  glucagon  Injectable 1 milliGRAM(s) IntraMuscular once  influenza  Vaccine (HIGH DOSE) 0.5 milliLiter(s) IntraMuscular once  insulin lispro (ADMELOG) corrective regimen sliding scale   SubCutaneous three times a day before meals  lactobacillus acidophilus 1 Tablet(s) Oral every 12 hours  metoprolol succinate ER 25 milliGRAM(s) Oral daily  oxyCODONE    IR 5 milliGRAM(s) Oral once  pantoprazole    Tablet 40 milliGRAM(s) Oral before breakfast  pregabalin 150 milliGRAM(s) Oral two times a day  rosuvastatin 40 milliGRAM(s) Oral at bedtime  senna 2 Tablet(s) Oral at bedtime  sertraline 100 milliGRAM(s) Oral daily  sodium chloride 0.9%. 1000 milliLiter(s) (50 mL/Hr) IV Continuous <Continuous>    MEDICATIONS  (PRN):  acetaminophen     Tablet .. 650 milliGRAM(s) Oral every 6 hours PRN Temp greater or equal to 38C (100.4F), Mild Pain (1 - 3)  aluminum hydroxide/magnesium hydroxide/simethicone Suspension 30 milliLiter(s) Oral every 4 hours PRN Dyspepsia  dextrose Oral Gel 15 Gram(s) Oral once PRN Blood Glucose LESS THAN 70 milliGRAM(s)/deciliter  melatonin 3 milliGRAM(s) Oral at bedtime PRN Insomnia  ondansetron Injectable 4 milliGRAM(s) IV Push every 8 hours PRN Nausea and/or Vomiting  sodium chloride 0.65% Nasal 1 Spray(s) Both Nostrils two times a day PRN Congestion  traMADol 50 milliGRAM(s) Oral three times a day PRN Moderate Pain (4 - 6)      Vital Signs Last 24 Hrs  T(C): 36.8 (01 Oct 2024 18:45), Max: 36.8 (01 Oct 2024 15:38)  T(F): 98.2 (01 Oct 2024 18:45), Max: 98.3 (01 Oct 2024 15:38)  HR: 77 (01 Oct 2024 18:45) (77 - 80)  BP: 128/72 (01 Oct 2024 18:45) (104/54 - 128/72)  BP(mean): --  RR: 18 (01 Oct 2024 18:45) (18 - 18)  SpO2: 97% (01 Oct 2024 18:45) (92% - 97%)    Parameters below as of 01 Oct 2024 18:45  Patient On (Oxygen Delivery Method): room air      PHYSICAL EXAM:  HEENT: NC/AT.  Neck: Soft, no masses.  Lungs: Coarse BS bilaterally. No wheezes.  Heart: RRR, no murmurs.   Abdomen: Soft, no tenderness.   Genital/ Rectal: No doan catheter.  Extremities: Large ulcer on R 1st metatarsal head. Tender with palpations  Neurologic: Confused.   Skin: Diffuse rash.   I&O's Summary      LABORATORY:                          9.8    6.68  )-----------( 163      ( 01 Oct 2024 13:05 )             31.5       ESR:                   09-24 @ 14:10  65    C-Reactive Protein:     09-24 @ 14:10  28    Procalcitonin:           09-24 @ 14:10   --      10-01    139  |  102  |  23  ----------------------------<  108[H]  3.7   |  28  |  1.30    Ca    9.3      01 Oct 2024 13:05    TPro  7.3  /  Alb  2.9[L]  /  TBili  0.3  /  DBili  x   /  AST  15  /  ALT  23  /  AlkPhos  64  10-01          LABORATORY:    CBC Full  -  ( 01 Oct 2024 13:05 )  WBC Count : 6.68 K/uL  RBC Count : 3.38 M/uL  Hemoglobin : 9.8 g/dL  Hematocrit : 31.5 %  Platelet Count - Automated : 163 K/uL  Mean Cell Volume : 93.2 fl  Mean Cell Hemoglobin : 29.0 pg  Mean Cell Hemoglobin Concentration : 31.1 gm/dL  Auto Neutrophil # : 4.21 K/uL  Auto Lymphocyte # : 1.34 K/uL  Auto Monocyte # : 1.07 K/uL  Auto Eosinophil # : 0.07 K/uL  Auto Basophil # : 0.00 K/uL  Auto Neutrophil % : 63.0 %  Auto Lymphocyte % : 20.0 %  Auto Monocyte % : 16.0 %  Auto Eosinophil % : 1.0 %  Auto Basophil % : 0.0 %          10-01    139  |  102  |  23  ----------------------------<  108[H]  3.7   |  28  |  1.30    Ca    9.3      01 Oct 2024 13:05    TPro  7.3  /  Alb  2.9[L]  /  TBili  0.3  /  DBili  x   /  AST  15  /  ALT  23  /  AlkPhos  64  10-01        Assessment and plan:    1. R foot with infected ulcer likely with osteomyelitis.  2. CKD  3. Chronic body rash.    . Discontinue IV Clindamycin.  . Add IV Dapto 500 mg daily  . Podiatry evaluation   . Wound care daily.     Thank you

## 2024-10-01 NOTE — ED ADULT NURSE NOTE - NURSING SKIN WOUND APPEAR #1
----- Message from Adriana Rowe sent at 10/2/2017  2:01 PM CDT -----  Contact: Pt  Pt would like to reschedule her 10/11 appts    Contact number  [phone]  Esteban    Contacted [facility].  She stated that she has a friend coming in from out of town and would like to reschedule her appointment.  Informed her that the appointment has been rescheduled to 11/1/17.  She verbalized her understanding.   clean/dry

## 2024-10-01 NOTE — ED ADULT NURSE NOTE - TEMPLATE
Verified pt with two identifiers name and . Allergies and medications reviewed. TDAP administered IM to left deltoid while using aseptic technique. No discomfort noted. Pt tolerated well. Advised pt to wait 15 minutes in the lobby to monitor for side effects. Pt verbalized understanding.      Wounds

## 2024-10-01 NOTE — ED PROVIDER NOTE - OBJECTIVE STATEMENT
73-year-old male history of CHF, COPD, prostate cancer, renal insufficiency, diabetes, chronic neck pain, presents to ED for admission for right foot wound.  Seen by Dr. Sandoval shortly prior to current evaluation.  No reported fevers but occasionally feels chills.  Denies chest pain, worsening shortness of breath, vomiting, diarrhea not currently on antibiotics.

## 2024-10-01 NOTE — PLAN
[FreeTextEntry1] : Patient was sent to the ER for admission , IV antibiotics and consults with vascular , cardiology , medicine and neurology . If patient is cleared for surgical intervention to remove the right 1st metatarsal head Spent 30 minutes for patient care and medical decision making..  patient high risk for limb loss and life threatening sepsis

## 2024-10-01 NOTE — PHARMACOTHERAPY INTERVENTION NOTE - COMMENTS
This report was requested by: Luz Monique | Reference #: [id number]    Prescription Information  PDI	Current Rx	Drug Type	Rx Written	Rx Dispensed	Drug	Quantity	Days Supply	Prescriber Name	Prescriber SHRADDHA #	Payment Method	Dispenser  A	Y	B	09/26/2024	09/26/2024	clonazepam 0.25 mg odt	180	30	Haroon Walter	[id number]	Medicare	[facility]  A	Y	O	09/24/2024	09/24/2024	oxycodone hcl (ir) 5 mg tablet	60	30	Jose Humphrey MD	[id number]	Medicare	[facility]  A	N	B	09/17/2024	09/17/2024	clonazepam 1 mg tablet	7	7	Garry Robledo	[id number]	Medicare	[facility]  A	Y		08/09/2024	09/10/2024	pregabalin 150 mg capsule	60	30	Jose Humphrey MD	[id number]	Medicare	[facility] A	N	O	08/19/2024	08/19/2024	oxycodone hcl (ir) 5 mg tablet	60	30	Jose Humphrey MD	[id number]	Medicare	[facility] A	N	B	08/17/2024	08/17/2024	clonazepam 0.25 mg odt	180	30	Haroon Walter	[id number]	Medicare	[facility]  A	N		08/09/2024	08/09/2024	pregabalin 150 mg capsule	60	30	Jose Humphrey MD	[id number]	Medicare	[facility] A	N	O	07/19/2024	07/19/2024	oxycodone hcl (ir) 5 mg tablet	60	30	Jose Humphrey MD	[id number]	Medicare	[facility]  A	N	B	07/17/2024	07/17/2024	clonazepam 0.25 mg odt	180	30	Haroon Walter	[id number]	Medicare	[facility]  A	N		01/22/2024	07/09/2024	pregabalin 150 mg capsule	60	30	Jose Humphrey MD	[id number]	Medicare	[facility]  A	N	O	06/17/2024	06/17/2024	oxycodone hcl (ir) 5 mg tablet	60	30	Jose Humphrey MD	[id number]	Medicare	[facility]  A	N	B	06/15/2024	06/15/2024	clonazepam 0.25 mg odt	180	30	Haroon Walter	[id number]	Medicare	[facility]  A	N		01/22/2024	06/11/2024	pregabalin 150 mg capsule	60	30	Jose Humphrey MD	[id number]	Medicare	[facility]  A	N	O	05/21/2024	05/21/2024	oxycodone hcl (ir) 5 mg tablet	60	30	GutiérrezKris aggarwalta	[id number]	Medicare	[facility]	N	B	05/14/2024	05/14/2024	clonazepam 0.25 mg odt	180	30	Haroon Walter	[id number]	Medicare	[facility]  A	N		01/22/2024	05/13/2024	pregabalin 150 mg capsule	60	30	Jose Humphrey MD	[id number]	Medicare	[facility]  A	N	O	04/22/2024	04/22/2024	oxycodone hcl (ir) 5 mg tablet	60	30	Jose Humphrey MD	[id number]	Medicare	[facility]  A	N	B	04/13/2024	04/14/2024	clonazepam 0.25 mg odt	180	30	Haroon Walter	[id number]	Medicare	[facility]  A	N		01/22/2024	04/13/2024	pregabalin 150 mg capsule	60	30	Jose Humphrey MD	[id number]	Medicare	[facility]  A	N	O	03/22/2024	03/22/2024	oxycodone hcl (ir) 5 mg tablet	60	30	Jose Humphrey MD	[id number]	Medicare	[facility] #[id number]  A	N	B	03/18/2024	03/18/2024	clonazepam 0.25 mg odt	180	30	Haroon Walter	[id number]	Medicare	[facility] #[id number]  A	N		01/22/2024	03/13/2024	pregabalin 150 mg capsule	60	30	Jose Humphrey MD	[id number]	Medicare	[facility] #[id number]  A	N	O	02/21/2024	02/21/2024	oxycodone hcl (ir) 5 mg tablet	60	30	Jose Humphrey MD	[id number]	Medicare	[facility] #[id number]  A	N	B	02/15/2024	02/15/2024	clonazepam 0.25 mg odt	180	30	Haroon Walter	[id number]	Medicare	[facility] #[id number]  A	N		01/22/2024	01/23/2024	pregabalin 150 mg capsule	60	30	Jose Humphrey MD	[id number]	Medicare	[facility] #[id number]  A	N	B	01/18/2024	01/18/2024	clonazepam 0.25 mg odt	180	30	Mary Ann Mckinley	[id number]	Medicare	[facility] #[id number]  A	N	O	01/08/2024	01/08/2024	oxycodone hcl (ir) 5 mg tablet	60	30	Jose Humphrey MD	[id number]	Medicare	[facility] #[id number]  A	N	B	01/03/2024	01/03/2024	clonazepam 1 mg tablet	60	30	Haroon Walter	[id number]	Medicare	[facility] #[id number]  A	N		08/21/2023	12/27/2023	pregabalin 150 mg capsule	60	30	Jose Humphrey MD	[id number]	Medicare	[facility] #[id number]

## 2024-10-01 NOTE — CONSULT NOTE ADULT - SUBJECTIVE AND OBJECTIVE BOX
VASCULAR SURGERY PA CONSULT NOTE:    HPI:  72yo M PMhx DM2 w/ PAD - chronic Rt foot wound, HTN, COPD, CKD, HFpEF, arrythmia, Prostate CA s/p resection, Depression/Anxiety presents to ED sent by wound care for worsening R MTP wound. Patient reports having a nonhealing R foot wound, s/p multiple debridements and grafts at [facility], for past 1.5 years. Denies seeing a vascular surgeon in the past.  Denies pain the wound, but reports having neuropathy. States following wound care and reports worsening of wound. Reporting having some chills in the past week. Denies fever, chills, SOB or any other complaints.      PAST MEDICAL & SURGICAL HISTORY:  Prostate cancer    Type II diabetes mellitus    Chronic obstructive pulmonary disease (COPD)    CHF (congestive heart failure)    Renal insufficiency    S/P foot surgery      REVIEW OF SYSTEMS:  General/Constitutional: No acute distress  Respiratory: Denies cough/hemoptysis, denies wheezing/SOB/dyspnea  Cardiac: Denies chest pain, palpitations  Abdomen: Denies nausea or vomiting, denies abdominal pain  Extremities: See HPI      MEDICATIONS:  Home Medications:  acetaminophen 325 mg oral tablet: 2 tab(s) orally every 8 hours as needed (01 Oct 2024 15:23)  Albuterol (Eqv-ProAir HFA) 90 mcg/inh inhalation aerosol: 2 puff(s) inhaled every 4 hours as needed (01 Oct 2024 15:32)  albuterol 0.63 mg/3 mL (0.021%) inhalation solution: 3 milliliter(s) by nebulizer every 6 hours as needed for SOB (01 Oct 2024 15:27)  azelaic acid 15% topical gel: Apply topically to affected area 2 times a day as needed (01 Oct 2024 15:27)  ferrous sulfate 325 mg (65 mg elemental iron) oral tablet: 1 tab(s) orally once a day (01 Oct 2024 15:15)  fluticasone 50 mcg/inh nasal spray: 1 spray(s) in each nostril 2 times a day as needed (01 Oct 2024 15:29)  furosemide 40 mg oral tablet: 1 tab(s) orally once a day (01 Oct 2024 15:16)  Lantus Solostar Pen 100 units/mL subcutaneous solution: 14 unit(s) subcutaneous once a day (at bedtime) (01 Oct 2024 15:17)  loratadine 10 mg oral tablet: 1 tab(s) orally once a day (in the morning) (01 Oct 2024 15:17)  metFORMIN 500 mg oral tablet: 2 tab(s) orally 2 times a day (01 Oct 2024 15:18)  metoprolol succinate 25 mg oral tablet, extended release: 1 tab(s) orally once a day (01 Oct 2024 15:19)  Opzelura 1.5% topical cream: Apply topically to affected area 2 times a day as needed (01 Oct 2024 15:29)  pregabalin 150 mg oral capsule: 1 cap(s) orally 2 times a day (01 Oct 2024 15:20)  rosuvastatin 40 mg oral tablet: 1 tab(s) orally once a day (01 Oct 2024 15:21)  Saline Mist 0.65% nasal spray: 1 spray(s) intranasally 2 times a day (01 Oct 2024 15:29)  Spiriva 18 mcg inhalation capsule: 1 cap(s) inhaled once a day (01 Oct 2024 15:23)    MEDICATIONS  (STANDING):  clindamycin IVPB 900 milliGRAM(s) IV Intermittent every 8 hours  dextrose 5%. 1000 milliLiter(s) (100 mL/Hr) IV Continuous <Continuous>  dextrose 5%. 1000 milliLiter(s) (50 mL/Hr) IV Continuous <Continuous>  dextrose 50% Injectable 12.5 Gram(s) IV Push once  dextrose 50% Injectable 25 Gram(s) IV Push once  dextrose 50% Injectable 25 Gram(s) IV Push once  glucagon  Injectable 1 milliGRAM(s) IntraMuscular once  insulin lispro (ADMELOG) corrective regimen sliding scale   SubCutaneous three times a day before meals  lactobacillus acidophilus 1 Tablet(s) Oral every 12 hours  pantoprazole    Tablet 40 milliGRAM(s) Oral before breakfast  senna 2 Tablet(s) Oral at bedtime  sodium chloride 0.9%. 1000 milliLiter(s) (50 mL/Hr) IV Continuous <Continuous>    MEDICATIONS  (PRN):  acetaminophen     Tablet .. 650 milliGRAM(s) Oral every 6 hours PRN Temp greater or equal to 38C (100.4F), Mild Pain (1 - 3)  aluminum hydroxide/magnesium hydroxide/simethicone Suspension 30 milliLiter(s) Oral every 4 hours PRN Dyspepsia  dextrose Oral Gel 15 Gram(s) Oral once PRN Blood Glucose LESS THAN 70 milliGRAM(s)/deciliter  melatonin 3 milliGRAM(s) Oral at bedtime PRN Insomnia  ondansetron Injectable 4 milliGRAM(s) IV Push every 8 hours PRN Nausea and/or Vomiting  traMADol 50 milliGRAM(s) Oral three times a day PRN Moderate Pain (4 - 6)      ALLERGIES:  tetracycline (Unknown)  IODINE (Unknown)  vancomycin (Other)      SOCIAL HISTORY:  Reports smoking for 20 years, quit 30 years ago  Reports drinking a glass of wine daily for the past few years      VITAL SIGNS:  Vital Signs Last 24 Hrs  T(C): 36 (01 Oct 2024 11:50), Max: 36 (01 Oct 2024 11:50)  T(F): 96.8 (01 Oct 2024 11:50), Max: 96.8 (01 Oct 2024 11:50)  HR: 78 (01 Oct 2024 11:50) (78 - 78)  BP: 104/54 (01 Oct 2024 11:50) (104/54 - 104/54)  RR: 18 (01 Oct 2024 11:50) (18 - 18)  SpO2: 94% (01 Oct 2024 11:50) (94% - 94%)    Parameters below as of 01 Oct 2024 11:50  Patient On (Oxygen Delivery Method): room air      PHYSICAL EXAM:  General: No acute distress, appears comfortable  HEENT: Normal cephalic/atraumatic, anicteric, conjunctiva-non injected and moist, vision grossly intact, hearing grossly intact, no nasal discharge  Chest: Nonlabored breathing  Abdomen: Soft, nontender  Extremity: LE edema R > L, R LE erythema, R MTP ulcer on medial aspect, R dorsal ulcer   Neuro: answering questions appropriately      LABS:                        9.8    6.68  )-----------( 163      ( 01 Oct 2024 13:05 )             31.5     10-01    139  |  102  |  23  ----------------------------<  108[H]  3.7   |  28  |  1.30    Ca    9.3      01 Oct 2024 13:05    TPro  7.3  /  Alb  2.9[L]  /  TBili  0.3  /  DBili  x   /  AST  15  /  ALT  23  /  AlkPhos  64  10-01    PT/INR - ( 01 Oct 2024 13:05 )   PT: 13.4 sec;   INR: 1.14 ratio         PTT - ( 01 Oct 2024 13:05 )  PTT:33.7 sec  Urinalysis Basic - ( 01 Oct 2024 13:05 )    Color: x / Appearance: x / SG: x / pH: x  Gluc: 108 mg/dL / Ketone: x  / Bili: x / Urobili: x   Blood: x / Protein: x / Nitrite: x   Leuk Esterase: x / RBC: x / WBC x   Sq Epi: x / Non Sq Epi: x / Bacteria: x      LIVER FUNCTIONS - ( 01 Oct 2024 13:05 )  Alb: 2.9 g/dL / Pro: 7.3 g/dL / ALK PHOS: 64 U/L / ALT: 23 U/L / AST: 15 U/L / GGT: x               RADIOLOGY & ADDITIONAL STUDIES:    ASSESSMENT:    PLAN: VASCULAR SURGERY PA CONSULT NOTE:    HPI:  74yo M PMhx DM2 w/ PAD - chronic Rt foot wound, HTN, COPD, CKD, HFpEF, arrythmia, Prostate CA s/p resection, Depression/Anxiety presents to ED sent by wound care for worsening R MTP wound. Patient reports having a nonhealing R foot wound, s/p multiple debridements and grafts at [facility], for past 1.5 years. Denies seeing a vascular surgeon in the past.  Denies pain the wound, but reports having neuropathy. States following wound care and reports worsening of wound. Reporting having some chills in the past week. Denies fever, chills, SOB or any other complaints.      PAST MEDICAL & SURGICAL HISTORY:  Prostate cancer    Type II diabetes mellitus    Chronic obstructive pulmonary disease (COPD)    CHF (congestive heart failure)    Renal insufficiency    S/P foot surgery      REVIEW OF SYSTEMS:  General/Constitutional: No acute distress  Respiratory: Denies cough/hemoptysis, denies wheezing/SOB/dyspnea  Cardiac: Denies chest pain, palpitations  Abdomen: Denies nausea or vomiting, denies abdominal pain  Extremities: See HPI      MEDICATIONS:  Home Medications:  acetaminophen 325 mg oral tablet: 2 tab(s) orally every 8 hours as needed (01 Oct 2024 15:23)  Albuterol (Eqv-ProAir HFA) 90 mcg/inh inhalation aerosol: 2 puff(s) inhaled every 4 hours as needed (01 Oct 2024 15:32)  albuterol 0.63 mg/3 mL (0.021%) inhalation solution: 3 milliliter(s) by nebulizer every 6 hours as needed for SOB (01 Oct 2024 15:27)  azelaic acid 15% topical gel: Apply topically to affected area 2 times a day as needed (01 Oct 2024 15:27)  ferrous sulfate 325 mg (65 mg elemental iron) oral tablet: 1 tab(s) orally once a day (01 Oct 2024 15:15)  fluticasone 50 mcg/inh nasal spray: 1 spray(s) in each nostril 2 times a day as needed (01 Oct 2024 15:29)  furosemide 40 mg oral tablet: 1 tab(s) orally once a day (01 Oct 2024 15:16)  Lantus Solostar Pen 100 units/mL subcutaneous solution: 14 unit(s) subcutaneous once a day (at bedtime) (01 Oct 2024 15:17)  loratadine 10 mg oral tablet: 1 tab(s) orally once a day (in the morning) (01 Oct 2024 15:17)  metFORMIN 500 mg oral tablet: 2 tab(s) orally 2 times a day (01 Oct 2024 15:18)  metoprolol succinate 25 mg oral tablet, extended release: 1 tab(s) orally once a day (01 Oct 2024 15:19)  Opzelura 1.5% topical cream: Apply topically to affected area 2 times a day as needed (01 Oct 2024 15:29)  pregabalin 150 mg oral capsule: 1 cap(s) orally 2 times a day (01 Oct 2024 15:20)  rosuvastatin 40 mg oral tablet: 1 tab(s) orally once a day (01 Oct 2024 15:21)  Saline Mist 0.65% nasal spray: 1 spray(s) intranasally 2 times a day (01 Oct 2024 15:29)  Spiriva 18 mcg inhalation capsule: 1 cap(s) inhaled once a day (01 Oct 2024 15:23)    MEDICATIONS  (STANDING):  clindamycin IVPB 900 milliGRAM(s) IV Intermittent every 8 hours  dextrose 5%. 1000 milliLiter(s) (100 mL/Hr) IV Continuous <Continuous>  dextrose 5%. 1000 milliLiter(s) (50 mL/Hr) IV Continuous <Continuous>  dextrose 50% Injectable 12.5 Gram(s) IV Push once  dextrose 50% Injectable 25 Gram(s) IV Push once  dextrose 50% Injectable 25 Gram(s) IV Push once  glucagon  Injectable 1 milliGRAM(s) IntraMuscular once  insulin lispro (ADMELOG) corrective regimen sliding scale   SubCutaneous three times a day before meals  lactobacillus acidophilus 1 Tablet(s) Oral every 12 hours  pantoprazole    Tablet 40 milliGRAM(s) Oral before breakfast  senna 2 Tablet(s) Oral at bedtime  sodium chloride 0.9%. 1000 milliLiter(s) (50 mL/Hr) IV Continuous <Continuous>    MEDICATIONS  (PRN):  acetaminophen     Tablet .. 650 milliGRAM(s) Oral every 6 hours PRN Temp greater or equal to 38C (100.4F), Mild Pain (1 - 3)  aluminum hydroxide/magnesium hydroxide/simethicone Suspension 30 milliLiter(s) Oral every 4 hours PRN Dyspepsia  dextrose Oral Gel 15 Gram(s) Oral once PRN Blood Glucose LESS THAN 70 milliGRAM(s)/deciliter  melatonin 3 milliGRAM(s) Oral at bedtime PRN Insomnia  ondansetron Injectable 4 milliGRAM(s) IV Push every 8 hours PRN Nausea and/or Vomiting  traMADol 50 milliGRAM(s) Oral three times a day PRN Moderate Pain (4 - 6)      ALLERGIES:  tetracycline (Unknown)  IODINE (Unknown)  vancomycin (Other)      SOCIAL HISTORY:  Reports smoking for 20 years, quit 30 years ago  Reports drinking a glass of wine daily for the past few years      VITAL SIGNS:  Vital Signs Last 24 Hrs  T(C): 36 (01 Oct 2024 11:50), Max: 36 (01 Oct 2024 11:50)  T(F): 96.8 (01 Oct 2024 11:50), Max: 96.8 (01 Oct 2024 11:50)  HR: 78 (01 Oct 2024 11:50) (78 - 78)  BP: 104/54 (01 Oct 2024 11:50) (104/54 - 104/54)  RR: 18 (01 Oct 2024 11:50) (18 - 18)  SpO2: 94% (01 Oct 2024 11:50) (94% - 94%)    Parameters below as of 01 Oct 2024 11:50  Patient On (Oxygen Delivery Method): room air      PHYSICAL EXAM:  General: No acute distress, appears comfortable  HEENT: Normal cephalic/atraumatic, anicteric, conjunctiva-non injected and moist, vision grossly intact, hearing grossly intact, no nasal discharge  Chest: Nonlabored breathing  Abdomen: Soft, nontender  Extremity: LE edema R > L, R LE erythema, R MTP ulcer on medial aspect, R dorsal ulcer, palpable b/l DP/PT  Neuro: answering questions appropriately      LABS:                        9.8    6.68  )-----------( 163      ( 01 Oct 2024 13:05 )             31.5     10-01    139  |  102  |  23  ----------------------------<  108[H]  3.7   |  28  |  1.30    Ca    9.3      01 Oct 2024 13:05    TPro  7.3  /  Alb  2.9[L]  /  TBili  0.3  /  DBili  x   /  AST  15  /  ALT  23  /  AlkPhos  64  10-01    PT/INR - ( 01 Oct 2024 13:05 )   PT: 13.4 sec;   INR: 1.14 ratio         PTT - ( 01 Oct 2024 13:05 )  PTT:33.7 sec  Urinalysis Basic - ( 01 Oct 2024 13:05 )    Color: x / Appearance: x / SG: x / pH: x  Gluc: 108 mg/dL / Ketone: x  / Bili: x / Urobili: x   Blood: x / Protein: x / Nitrite: x   Leuk Esterase: x / RBC: x / WBC x   Sq Epi: x / Non Sq Epi: x / Bacteria: x      LIVER FUNCTIONS - ( 01 Oct 2024 13:05 )  Alb: 2.9 g/dL / Pro: 7.3 g/dL / ALK PHOS: 64 U/L / ALT: 23 U/L / AST: 15 U/L / GGT: x               RADIOLOGY & ADDITIONAL STUDIES:    ASSESSMENT:    PLAN:

## 2024-10-01 NOTE — ED PROVIDER NOTE - DIFFERENTIAL DIAGNOSIS
Differential Diagnosis Differential diagnosis includes cellulitis deep-soft tissue infection rule out osteomyelitis

## 2024-10-01 NOTE — H&P ADULT - ADDITIONAL PE
l: Right 1st metatarsal head medial wound down to skin, subcutaneous tissue, fat, bone, periwound erythema noted, no fluctuance, no malodor, no proximal streaking at this timeExtremity: LE edema R > L, R LE erythema, R MTP ulcer on medial aspect, R dorsal ulcer, palpable b/l DP/PT  Neuro: answering questions appropriately

## 2024-10-01 NOTE — ED ADULT NURSE NOTE - NSICDXPASTMEDICALHX_GEN_ALL_CORE_FT
Patient is a 71 y/o man with hypertension, bradycardia who presents with R foot pain x 1 week.    *R foot swelling, pain: abscess drained by podiatry. Likely has surrounding soft tissue infection. R foot x-ray with possible R 5th toe distal phalanx osteomyelitis  - MRI foot noncontrast as recommended by podiatry  - need for operative management as per podiatry  - broad spectrum abx - cefepime, vancomycin, renally adjusted dosing  - f/u abscess culture  - tylenol PRN pain  - bowel regimen    *HTN: BP elevated in ED and on floor  - at home on lisinopril 5 mg, Lasix 20 mg, and Toprol XL 50 mg  - resume Toprol XL  - Cr 1.64 - patient denies knowledge of chronic kidney disease  - home lisinopril, Lasix until baseline renal function verified  - patient had been prescribed hydralazine in December of 2017 per pharmacy - [phone]  - will start hydralazine 10 mg q8h for BP control while verifying baseline kidney function  - monitor BP closely    *Elevated Cr: possible LIZBETH, possible stage 3 CKD  - attempt to obtain baseline Cr from PCP  - appears dry on exam, give  CC over 3 hours  - repeat Cr this evening  - renally dose all medications    *H/o bradycardia: HR currently normal. Per patient he had HR in 30's in the past, refused PPM as "I don't want surgery at my age"  - given elevated BP and now normal HR, resume home Toprol XL 50 mg daily  - EKG  - monitor HR closely    *Pre-diabetes: patient aware blood glucose elevated, recommend lifestyle change    *FEN/GI: low Na diet    *Ppx: SC lovenox    *Dispo: pending w/u as above and medical stability  - OOBTC PAST MEDICAL HISTORY:  CHF (congestive heart failure)     Chronic obstructive pulmonary disease (COPD)     Prostate cancer     Renal insufficiency     Type II diabetes mellitus

## 2024-10-01 NOTE — PHYSICAL EXAM
[4 x 4] : 4 x 4  [0] : left 0 [1+] : left 1+ [Ankle Swelling (On Exam)] : present [Ankle Swelling Bilaterally] : bilaterally  [Ankle Swelling On The Left] : moderate [Varicose Veins Of Lower Extremities] : bilaterally [] : bilaterally [Purpura] : no purpura  [Petechiae] : no petechiae [Skin Ulcer] : ulcer [Skin Induration] : no induration [Alert] : alert [Oriented to Person] : oriented to person [Oriented to Place] : oriented to place [Calm] : calm [de-identified] : calm , accompanied by sister [de-identified] : HTN, CHF [de-identified] : Bilateral bunion deformities  [de-identified] : DFU 3 medial 1st metatarsal head , probes to bone , + OM on MRI [de-identified] : IDDM with neuropathy  [FreeTextEntry1] : Right Hallux [FreeTextEntry2] : 2.0 [FreeTextEntry3] : 2.2 [FreeTextEntry4] : 0.1 [de-identified] : Serosanguinous [de-identified] : Edema and warmth [de-identified] : <25% [de-identified] : denis  [de-identified] : Cleansed with 0.9% Normal Saline  Kerlix dsd today  [FreeTextEntry7] : dorsum [FreeTextEntry8] : 3.0  [FreeTextEntry9] : 3.0 [de-identified] : 0.1 [de-identified] : warmth [de-identified] : dsstephan [de-identified] : dsd today [TWNoteComboBox4] : Moderate [TWNoteComboBox5] : No [TWNoteComboBox6] : Pressure [de-identified] : No [de-identified] : Erythema [de-identified] : None [de-identified] : None [de-identified] : >75% [de-identified] : Yes [de-identified] : Daily [de-identified] : Primary Dressing [TWNoteComboBox9] : Right [de-identified] : Small [de-identified] : Diabetic [de-identified] : Erythema [de-identified] : 100% [TWNoteComboBox8] : Mechanical [de-identified] : Daily [de-identified] : Primary Dressing

## 2024-10-01 NOTE — REVIEW OF SYSTEMS
[Fever] : no fever [Chills] : no chills [Eye Pain] : no eye pain [Loss Of Hearing] : no hearing loss [Shortness Of Breath] : no shortness of breath [Abdominal Pain] : no abdominal pain [Vomiting] : no vomiting [Joint Stiffness] : joint stiffness [Skin Wound] : skin wound [Difficulty Walking] : difficulty walking [Anxiety] : no anxiety [Easy Bleeding] : no tendency for easy bleeding [Negative] : Genitourinary [FreeTextEntry5] : HTN, CHF [FreeTextEntry9] : Severe bilateral bunion deformities + OM right 1st metatarsal head , chronic [de-identified] : DFU 3 medial right 1st metatarsal head , skin , subcut and fat , swollen and blistering  [de-identified] : IDDM with neuropathy  [de-identified] : ASHVIN

## 2024-10-01 NOTE — ED PROVIDER NOTE - ATTENDING APP SHARED VISIT CONTRIBUTION OF CARE
Examination reveals a well-developed well-nourished older white male in no acute distress with clear lungs normal heart sounds benign nontender abdomen and no right foot with dressing on right great toe but surrounding warmth and erythema.  Dressing not removed as was just evaluated by wound care as well as PA.  I agree with plan and management outlined by PA.

## 2024-10-01 NOTE — H&P ADULT - HISTORY OF PRESENT ILLNESS
74yo M PMhx DM2 w/ PAD - chronic Rt foot wound, HTN, COPD, CKD, HFpEF, arrythmia, Prostate CA s/p resection, Depression/Anxiety presents to ED c/o left sided HA x 1 week. States he has been taking tylenol prn for headache as well as his daily oxycodone. States both help headache but headache has persisted x 7 days. Reports deep pain as well as pain to touch when he touches left side of forehead and temple. No hx migraines. Denies visual changes, nausea, vomiting, dizziness, CP, SOB, neck pain/stiffness, fever/chills, AC use, numbness, tingling, weakness, slurred speech, facial droop 74yo M PMhx DM2 w/ PAD - chronic Rt foot wound, HTN, COPD, CKD, HFpEF, arrythmia, Prostate CA s/p resection, Depression/Anxiety presents to ED sent by wound care for worsening R MTP wound. Patient reports having a nonhealing R foot wound, s/p multiple debridements and grafts at [place], for past 1.5 years. Denies seeing a vascular surgeon in the past.  Denies pain the wound, but reports having neuropathy. States following wound care and reports worsening of wound. Reporting having some chills in the past week. Denies fever, chills, SOB or any other complaints.Patient examined and evaluated at this time. Antibiotics as per infectious disease recommendations. Recommend vascular surgery evaluation. Tentatively planning for right 1st metatarsal head resection pending vascular evaluation. Continue local wound care and offloading at this time.

## 2024-10-01 NOTE — PATIENT PROFILE ADULT - NSTRANSFERBELONGINGSDISPO_GEN_A_NUR
pt has cell phone and , clothing 1 pair eye glasses and shoes with him at time of admit to [facility]/with patient

## 2024-10-01 NOTE — ED PROVIDER NOTE - SKIN, MLM
bandage in place to right foot (not removed since just replaced by podiatrist prior to current evaluation) c/d/i

## 2024-10-01 NOTE — CONSULT NOTE ADULT - SUBJECTIVE AND OBJECTIVE BOX
CHIEF COMPLAINT/ REASON FOR VISIT  .. Patient was seen to address the  issue listed under PROBLEM LIST which is located toward bottom of this note     WAYNE SERRANO    PLV 1EAS 113 D1    Allergies    tetracycline (Unknown)  IODINE (Unknown)  vancomycin (Other)    Intolerances        PAST MEDICAL & SURGICAL HISTORY:  Prostate cancer      Type II diabetes mellitus      Chronic obstructive pulmonary disease (COPD)      CHF (congestive heart failure)      Renal insufficiency      S/P foot surgery          FAMILY HISTORY:      Home Medications:  acetaminophen 325 mg oral tablet: 2 tab(s) orally every 8 hours as needed (01 Oct 2024 15:23)  Albuterol (Eqv-ProAir HFA) 90 mcg/inh inhalation aerosol: 2 puff(s) inhaled every 4 hours as needed (01 Oct 2024 15:32)  albuterol 0.63 mg/3 mL (0.021%) inhalation solution: 3 milliliter(s) by nebulizer every 6 hours as needed for SOB (01 Oct 2024 15:27)  azelaic acid 15% topical gel: Apply topically to affected area 2 times a day as needed (01 Oct 2024 15:27)  ferrous sulfate 325 mg (65 mg elemental iron) oral tablet: 1 tab(s) orally once a day (01 Oct 2024 15:15)  fluticasone 50 mcg/inh nasal spray: 1 spray(s) in each nostril 2 times a day as needed (01 Oct 2024 15:29)  furosemide 40 mg oral tablet: 1 tab(s) orally once a day (01 Oct 2024 15:16)  Lantus Solostar Pen 100 units/mL subcutaneous solution: 14 unit(s) subcutaneous once a day (at bedtime) (01 Oct 2024 15:17)  loratadine 10 mg oral tablet: 1 tab(s) orally once a day (in the morning) (01 Oct 2024 15:17)  metFORMIN 500 mg oral tablet: 2 tab(s) orally 2 times a day (01 Oct 2024 15:18)  metoprolol succinate 25 mg oral tablet, extended release: 1 tab(s) orally once a day (01 Oct 2024 15:19)  Opzelura 1.5% topical cream: Apply topically to affected area 2 times a day as needed (01 Oct 2024 15:29)  pregabalin 150 mg oral capsule: 1 cap(s) orally 2 times a day (01 Oct 2024 15:20)  rosuvastatin 40 mg oral tablet: 1 tab(s) orally once a day (01 Oct 2024 15:21)  Saline Mist 0.65% nasal spray: 1 spray(s) intranasally 2 times a day (01 Oct 2024 15:29)  Spiriva 18 mcg inhalation capsule: 1 cap(s) inhaled once a day (01 Oct 2024 15:23)      MEDICATIONS  (STANDING):  ceFAZolin   IVPB 1000 milliGRAM(s) IV Intermittent every 8 hours  clonazePAM Oral Disintegrating Tablet 0.75 milliGRAM(s) Oral two times a day  DAPTOmycin IVPB 500 milliGRAM(s) IV Intermittent once  DAPTOmycin IVPB      dextrose 5%. 1000 milliLiter(s) (50 mL/Hr) IV Continuous <Continuous>  dextrose 5%. 1000 milliLiter(s) (100 mL/Hr) IV Continuous <Continuous>  dextrose 50% Injectable 25 Gram(s) IV Push once  dextrose 50% Injectable 12.5 Gram(s) IV Push once  dextrose 50% Injectable 25 Gram(s) IV Push once  enoxaparin Injectable 40 milliGRAM(s) SubCutaneous every 24 hours  ferrous    sulfate 325 milliGRAM(s) Oral daily  fluticasone propionate/ salmeterol 250-50 MICROgram(s) Diskus 1 Dose(s) Inhalation two times a day  glucagon  Injectable 1 milliGRAM(s) IntraMuscular once  influenza  Vaccine (HIGH DOSE) 0.5 milliLiter(s) IntraMuscular once  insulin lispro (ADMELOG) corrective regimen sliding scale   SubCutaneous three times a day before meals  lactobacillus acidophilus 1 Tablet(s) Oral every 12 hours  metoprolol succinate ER 25 milliGRAM(s) Oral daily  montelukast 10 milliGRAM(s) Oral daily  pantoprazole    Tablet 40 milliGRAM(s) Oral before breakfast  pregabalin 150 milliGRAM(s) Oral two times a day  rosuvastatin 40 milliGRAM(s) Oral at bedtime  senna 2 Tablet(s) Oral at bedtime  sertraline 100 milliGRAM(s) Oral daily  sodium chloride 0.9%. 1000 milliLiter(s) (50 mL/Hr) IV Continuous <Continuous>    MEDICATIONS  (PRN):  acetaminophen     Tablet .. 650 milliGRAM(s) Oral every 6 hours PRN Temp greater or equal to 38C (100.4F), Mild Pain (1 - 3)  albuterol    90 MICROgram(s) HFA Inhaler 2 Puff(s) Inhalation every 6 hours PRN Shortness of Breath and/or Wheezing  aluminum hydroxide/magnesium hydroxide/simethicone Suspension 30 milliLiter(s) Oral every 4 hours PRN Dyspepsia  dextrose Oral Gel 15 Gram(s) Oral once PRN Blood Glucose LESS THAN 70 milliGRAM(s)/deciliter  melatonin 3 milliGRAM(s) Oral at bedtime PRN Insomnia  ondansetron Injectable 4 milliGRAM(s) IV Push every 8 hours PRN Nausea and/or Vomiting  sodium chloride 0.65% Nasal 1 Spray(s) Both Nostrils two times a day PRN Congestion  traMADol 50 milliGRAM(s) Oral three times a day PRN Moderate Pain (4 - 6)      Diet, Consistent Carbohydrate w/Evening Snack:   DASH/TLC Sodium & Cholesterol Restricted  Easy to Chew (EASYTOCHEW) (10-01-24 @ 14:50) [Active]          Vital Signs Last 24 Hrs  T(C): 36.9 (01 Oct 2024 21:10), Max: 36.9 (01 Oct 2024 21:10)  T(F): 98.5 (01 Oct 2024 21:10), Max: 98.5 (01 Oct 2024 21:10)  HR: 75 (01 Oct 2024 21:10) (75 - 80)  BP: 138/81 (01 Oct 2024 21:10) (104/54 - 138/81)  BP(mean): --  RR: 18 (01 Oct 2024 21:10) (18 - 18)  SpO2: 93% (01 Oct 2024 21:10) (92% - 97%)    Parameters below as of 01 Oct 2024 21:10  Patient On (Oxygen Delivery Method): room air          10-01-24 @ 07:01  -  10-01-24 @ 21:31  --------------------------------------------------------  IN: 170 mL / OUT: 200 mL / NET: -30 mL              LABS:                        9.8    6.68  )-----------( 163      ( 01 Oct 2024 13:05 )             31.5     10-01    139  |  102  |  23  ----------------------------<  108[H]  3.7   |  28  |  1.30    Ca    9.3      01 Oct 2024 13:05    TPro  7.3  /  Alb  2.9[L]  /  TBili  0.3  /  DBili  x   /  AST  15  /  ALT  23  /  AlkPhos  64  10-01    PT/INR - ( 01 Oct 2024 13:05 )   PT: 13.4 sec;   INR: 1.14 ratio         PTT - ( 01 Oct 2024 13:05 )  PTT:33.7 sec  Urinalysis Basic - ( 01 Oct 2024 13:05 )    Color: x / Appearance: x / SG: x / pH: x  Gluc: 108 mg/dL / Ketone: x  / Bili: x / Urobili: x   Blood: x / Protein: x / Nitrite: x   Leuk Esterase: x / RBC: x / WBC x   Sq Epi: x / Non Sq Epi: x / Bacteria: x            WBC:  WBC Count: 6.68 K/uL (10-01 @ 13:05)      MICROBIOLOGY:  RECENT CULTURES:              PT/INR - ( 01 Oct 2024 13:05 )   PT: 13.4 sec;   INR: 1.14 ratio         PTT - ( 01 Oct 2024 13:05 )  PTT:33.7 sec    Sodium:  Sodium: 139 mmol/L (10-01 @ 13:05)      1.30 mg/dL 10-01 @ 13:05      Hemoglobin:  Hemoglobin: 9.8 g/dL (10-01 @ 13:05)      Platelets: Platelet Count - Automated: 163 K/uL (10-01 @ 13:05)      LIVER FUNCTIONS - ( 01 Oct 2024 13:05 )  Alb: 2.9 g/dL / Pro: 7.3 g/dL / ALK PHOS: 64 U/L / ALT: 23 U/L / AST: 15 U/L / GGT: x             Urinalysis Basic - ( 01 Oct 2024 13:05 )    Color: x / Appearance: x / SG: x / pH: x  Gluc: 108 mg/dL / Ketone: x  / Bili: x / Urobili: x   Blood: x / Protein: x / Nitrite: x   Leuk Esterase: x / RBC: x / WBC x   Sq Epi: x / Non Sq Epi: x / Bacteria: x        RADIOLOGY & ADDITIONAL STUDIES:      MICROBIOLOGY:  RECENT CULTURES:

## 2024-10-01 NOTE — H&P ADULT - NSHPLABSRESULTS_GEN_ALL_CORE
< from: Xray Chest 1 View- PORTABLE-Urgent (10.01.24 @ 13:20) >      IMPRESSION: LEFT lower zone lung linear subsegmental atelectasis   otherwise No radiographic evidence of active chest disease..    < end of copied text >    < from: CT Head No Cont (09.24.24 @ 16:08) >    There is mild diffuse parenchymal volume loss.    There are areas of low attenuation in the periventricular white matter   likely related to mild chronic microvascular ischemic changes.    There is no acute intracranial hemorrhage, parenchymal mass, mass effect   or midline shift. There is no acute territorial infarct. There is no   hydrocephalus. Partial empty sella noted.    The cranium is intact. Mild right frontal soft tissue swelling noted. The   visualized paranasal sinusesare well-aerated.    < end of copied text >    < from: MR Foot w/wo IV Cont, Right (09.20.24 @ 12:27) >    PERIPHERAL SOFT TISSUES: Soft tissue ulceration along the medial aspect   of the first MTP joint with associated punctate subcutaneous emphysema.   There is no retained fluid collection or rim-enhancing fluid collection   to suggest abscess. Fibrotic tissue is seen at the deep margin of the   wound.  BONE MARROW: Hallux valgus. Hyperintense foci at the dorsal aspect of the   head of the first metatarsal with minimal loss of T1 hyperintense signal   (3:26). There is mild enhancement on postcontrast imaging.  Additional focal regions of hyperintense signal on fluid sensitive   sequences at the tarsal and proximal metatarsal bones adjacent to the TMT   joints favored to be secondary to Charcot arthropathy.    JOINTS: Chondral loss with Charcot arthropathy atthe tarsometatarsal   joints. No effusion.    TENDONS AND MUSCLES: Tendons are intact. Diffuse atrophy with fatty   replacement of the intrinsic muscles of the foot.    WEBSPACES: No Aguilar's neuroma or intermetatarsal bursitis.    IMPRESSION:  1.  Soft tissue ulcer along the medial aspect of the first MTP joint with   an adjacent region of medullary edema in the head of the first metatarsal   which has high probability for progression to acute osteomyelitis if left   untreated.  2.  Charcot arthropathy of the midfoot.    < end of copied text >

## 2024-10-01 NOTE — PATIENT PROFILE ADULT - VISION (WITH CORRECTIVE LENSES IF THE PATIENT USUALLY WEARS THEM):
double vision both eyes for couple of years per pt/Partially impaired: cannot see medication labels or newsprint, but can see obstacles in path, and the surrounding layout; can count fingers at arm's length

## 2024-10-01 NOTE — H&P ADULT - ASSESSMENT
74yo M PMhx DM2 w/ PAD - chronic Rt foot wound, HTN, COPD, CKD, HFpEF, arrythmia, Prostate CA s/p resection, Depression/Anxiety presents to ED sent by wound care for worsening R MTP wound. Patient reports having a nonhealing R foot wound, s/p multiple debridements and grafts at [place], for past 1.5 years. Denies seeing a vascular surgeon in the past.  Denies pain the wound, but reports having neuropathy. States following wound care and reports worsening of wound. Reporting having some chills in the past week. Denies fever, chills, SOB or any other complaints.Patient examined and evaluated at this time. Antibiotics as per infectious disease recommendations. Recommend vascular surgery evaluation. Tentatively planning for right 1st metatarsal head resection pending vascular evaluation. Continue local wound care and offloading at this time.

## 2024-10-02 ENCOUNTER — TRANSCRIPTION ENCOUNTER (OUTPATIENT)
Age: 74
End: 2024-10-02

## 2024-10-02 ENCOUNTER — APPOINTMENT (OUTPATIENT)
Dept: WOUND CARE | Facility: HOSPITAL | Age: 74
End: 2024-10-02

## 2024-10-02 DIAGNOSIS — Z98.49 CATARACT EXTRACTION STATUS, UNSPECIFIED EYE: ICD-10-CM

## 2024-10-02 DIAGNOSIS — E11.69 TYPE 2 DIABETES MELLITUS WITH OTHER SPECIFIED COMPLICATION: ICD-10-CM

## 2024-10-02 DIAGNOSIS — I50.9 HEART FAILURE, UNSPECIFIED: ICD-10-CM

## 2024-10-02 DIAGNOSIS — M86.671 OTHER CHRONIC OSTEOMYELITIS, RIGHT ANKLE AND FOOT: ICD-10-CM

## 2024-10-02 DIAGNOSIS — D64.9 ANEMIA, UNSPECIFIED: ICD-10-CM

## 2024-10-02 DIAGNOSIS — E11.40 TYPE 2 DIABETES MELLITUS WITH DIABETIC NEUROPATHY, UNSPECIFIED: ICD-10-CM

## 2024-10-02 DIAGNOSIS — Z85.46 PERSONAL HISTORY OF MALIGNANT NEOPLASM OF PROSTATE: ICD-10-CM

## 2024-10-02 DIAGNOSIS — E11.22 TYPE 2 DIABETES MELLITUS WITH DIABETIC CHRONIC KIDNEY DISEASE: ICD-10-CM

## 2024-10-02 DIAGNOSIS — J45.909 UNSPECIFIED ASTHMA, UNCOMPLICATED: ICD-10-CM

## 2024-10-02 DIAGNOSIS — Z79.84 LONG TERM (CURRENT) USE OF ORAL HYPOGLYCEMIC DRUGS: ICD-10-CM

## 2024-10-02 DIAGNOSIS — Z79.899 OTHER LONG TERM (CURRENT) DRUG THERAPY: ICD-10-CM

## 2024-10-02 DIAGNOSIS — Z79.51 LONG TERM (CURRENT) USE OF INHALED STEROIDS: ICD-10-CM

## 2024-10-02 DIAGNOSIS — E11.59 TYPE 2 DIABETES MELLITUS WITH OTHER CIRCULATORY COMPLICATIONS: ICD-10-CM

## 2024-10-02 LAB
A1C WITH ESTIMATED AVERAGE GLUCOSE RESULT: 7.3 % — HIGH (ref 4–5.6)
ALBUMIN SERPL ELPH-MCNC: 2.7 G/DL — LOW (ref 3.3–5)
ALP SERPL-CCNC: 58 U/L — SIGNIFICANT CHANGE UP (ref 40–120)
ALT FLD-CCNC: 19 U/L — SIGNIFICANT CHANGE UP (ref 12–78)
ANION GAP SERPL CALC-SCNC: 7 MMOL/L — SIGNIFICANT CHANGE UP (ref 5–17)
AST SERPL-CCNC: 16 U/L — SIGNIFICANT CHANGE UP (ref 15–37)
BASOPHILS # BLD AUTO: 0.05 K/UL — SIGNIFICANT CHANGE UP (ref 0–0.2)
BASOPHILS NFR BLD AUTO: 0.8 % — SIGNIFICANT CHANGE UP (ref 0–2)
BILIRUB SERPL-MCNC: 0.3 MG/DL — SIGNIFICANT CHANGE UP (ref 0.2–1.2)
BUN SERPL-MCNC: 24 MG/DL — HIGH (ref 7–23)
CALCIUM SERPL-MCNC: 9.3 MG/DL — SIGNIFICANT CHANGE UP (ref 8.5–10.1)
CHLORIDE SERPL-SCNC: 106 MMOL/L — SIGNIFICANT CHANGE UP (ref 96–108)
CO2 SERPL-SCNC: 27 MMOL/L — SIGNIFICANT CHANGE UP (ref 22–31)
CREAT SERPL-MCNC: 1.2 MG/DL — SIGNIFICANT CHANGE UP (ref 0.5–1.3)
EGFR: 64 ML/MIN/1.73M2 — SIGNIFICANT CHANGE UP
EOSINOPHIL # BLD AUTO: 0.2 K/UL — SIGNIFICANT CHANGE UP (ref 0–0.5)
EOSINOPHIL NFR BLD AUTO: 3.3 % — SIGNIFICANT CHANGE UP (ref 0–6)
ESTIMATED AVERAGE GLUCOSE: 163 MG/DL — HIGH (ref 68–114)
GLUCOSE SERPL-MCNC: 116 MG/DL — HIGH (ref 70–99)
HCT VFR BLD CALC: 29.5 % — LOW (ref 39–50)
HGB BLD-MCNC: 9.1 G/DL — LOW (ref 13–17)
IMM GRANULOCYTES NFR BLD AUTO: 3 % — HIGH (ref 0–0.9)
INR BLD: 1.25 RATIO — HIGH (ref 0.85–1.16)
LYMPHOCYTES # BLD AUTO: 1.69 K/UL — SIGNIFICANT CHANGE UP (ref 1–3.3)
LYMPHOCYTES # BLD AUTO: 28.1 % — SIGNIFICANT CHANGE UP (ref 13–44)
MCHC RBC-ENTMCNC: 28.7 PG — SIGNIFICANT CHANGE UP (ref 27–34)
MCHC RBC-ENTMCNC: 30.8 GM/DL — LOW (ref 32–36)
MCV RBC AUTO: 93.1 FL — SIGNIFICANT CHANGE UP (ref 80–100)
MONOCYTES # BLD AUTO: 1.18 K/UL — HIGH (ref 0–0.9)
MONOCYTES NFR BLD AUTO: 19.6 % — HIGH (ref 2–14)
NEUTROPHILS # BLD AUTO: 2.71 K/UL — SIGNIFICANT CHANGE UP (ref 1.8–7.4)
NEUTROPHILS NFR BLD AUTO: 45.2 % — SIGNIFICANT CHANGE UP (ref 43–77)
NRBC # BLD: 0 /100 WBCS — SIGNIFICANT CHANGE UP (ref 0–0)
PLATELET # BLD AUTO: 151 K/UL — SIGNIFICANT CHANGE UP (ref 150–400)
POTASSIUM SERPL-MCNC: 3.5 MMOL/L — SIGNIFICANT CHANGE UP (ref 3.5–5.3)
POTASSIUM SERPL-SCNC: 3.5 MMOL/L — SIGNIFICANT CHANGE UP (ref 3.5–5.3)
PROT SERPL-MCNC: 6.5 G/DL — SIGNIFICANT CHANGE UP (ref 6–8.3)
PROTHROM AB SERPL-ACNC: 14.6 SEC — HIGH (ref 9.9–13.4)
RBC # BLD: 3.17 M/UL — LOW (ref 4.2–5.8)
RBC # FLD: 14.3 % — SIGNIFICANT CHANGE UP (ref 10.3–14.5)
SODIUM SERPL-SCNC: 140 MMOL/L — SIGNIFICANT CHANGE UP (ref 135–145)
WBC # BLD: 5.78 K/UL — SIGNIFICANT CHANGE UP (ref 3.8–10.5)
WBC # FLD AUTO: 5.78 K/UL — SIGNIFICANT CHANGE UP (ref 3.8–10.5)

## 2024-10-02 PROCEDURE — 99232 SBSQ HOSP IP/OBS MODERATE 35: CPT | Mod: 57

## 2024-10-02 PROCEDURE — 99222 1ST HOSP IP/OBS MODERATE 55: CPT

## 2024-10-02 PROCEDURE — 99223 1ST HOSP IP/OBS HIGH 75: CPT

## 2024-10-02 RX ORDER — CEFEPIME 2 G/1
2000 INJECTION, POWDER, FOR SOLUTION INTRAVENOUS EVERY 12 HOURS
Refills: 0 | Status: DISCONTINUED | OUTPATIENT
Start: 2024-10-02 | End: 2024-10-03

## 2024-10-02 RX ADMIN — Medication 650 MILLIGRAM(S): at 09:05

## 2024-10-02 RX ADMIN — Medication 0.75 MILLIGRAM(S): at 09:45

## 2024-10-02 RX ADMIN — TRAMADOL HYDROCHLORIDE 50 MILLIGRAM(S): 50 TABLET, COATED ORAL at 07:27

## 2024-10-02 RX ADMIN — Medication 325 MILLIGRAM(S): at 11:50

## 2024-10-02 RX ADMIN — SERTRALINE HYDROCHLORIDE 100 MILLIGRAM(S): 100 TABLET, FILM COATED ORAL at 11:50

## 2024-10-02 RX ADMIN — Medication 2: at 17:07

## 2024-10-02 RX ADMIN — Medication 650 MILLIGRAM(S): at 16:29

## 2024-10-02 RX ADMIN — Medication 1 TABLET(S): at 05:37

## 2024-10-02 RX ADMIN — TRAMADOL HYDROCHLORIDE 50 MILLIGRAM(S): 50 TABLET, COATED ORAL at 14:32

## 2024-10-02 RX ADMIN — Medication 650 MILLIGRAM(S): at 08:05

## 2024-10-02 RX ADMIN — TRAMADOL HYDROCHLORIDE 50 MILLIGRAM(S): 50 TABLET, COATED ORAL at 21:32

## 2024-10-02 RX ADMIN — Medication 2: at 11:54

## 2024-10-02 RX ADMIN — PREGABALIN 150 MILLIGRAM(S): 25 CAPSULE ORAL at 17:08

## 2024-10-02 RX ADMIN — ENOXAPARIN SODIUM 40 MILLIGRAM(S): 150 INJECTION SUBCUTANEOUS at 22:44

## 2024-10-02 RX ADMIN — Medication 1 DOSE(S): at 06:42

## 2024-10-02 RX ADMIN — Medication 650 MILLIGRAM(S): at 21:33

## 2024-10-02 RX ADMIN — Medication 3 MILLIGRAM(S): at 22:46

## 2024-10-02 RX ADMIN — Medication 650 MILLIGRAM(S): at 22:03

## 2024-10-02 RX ADMIN — Medication 1 DOSE(S): at 21:34

## 2024-10-02 RX ADMIN — Medication 0.75 MILLIGRAM(S): at 18:05

## 2024-10-02 RX ADMIN — Medication 25 MILLIGRAM(S): at 05:37

## 2024-10-02 RX ADMIN — Medication 1: at 08:03

## 2024-10-02 RX ADMIN — MONTELUKAST SODIUM 10 MILLIGRAM(S): 10 TABLET, FILM COATED ORAL at 11:50

## 2024-10-02 RX ADMIN — PANTOPRAZOLE SODIUM 40 MILLIGRAM(S): 40 TABLET, DELAYED RELEASE ORAL at 06:36

## 2024-10-02 RX ADMIN — Medication 2 TABLET(S): at 21:33

## 2024-10-02 RX ADMIN — TRAMADOL HYDROCHLORIDE 50 MILLIGRAM(S): 50 TABLET, COATED ORAL at 15:32

## 2024-10-02 RX ADMIN — ROSUVASTATIN CALCIUM 40 MILLIGRAM(S): 20 TABLET, COATED ORAL at 21:33

## 2024-10-02 RX ADMIN — INSULIN GLARGINE 7 UNIT(S): 300 INJECTION, SOLUTION SUBCUTANEOUS at 21:33

## 2024-10-02 RX ADMIN — DAPTOMYCIN 120 MILLIGRAM(S): 500 INJECTION, POWDER, LYOPHILIZED, FOR SOLUTION INTRAVENOUS at 21:32

## 2024-10-02 RX ADMIN — PREGABALIN 150 MILLIGRAM(S): 25 CAPSULE ORAL at 05:37

## 2024-10-02 RX ADMIN — TRAMADOL HYDROCHLORIDE 50 MILLIGRAM(S): 50 TABLET, COATED ORAL at 22:03

## 2024-10-02 RX ADMIN — Medication 650 MILLIGRAM(S): at 15:29

## 2024-10-02 RX ADMIN — TRAMADOL HYDROCHLORIDE 50 MILLIGRAM(S): 50 TABLET, COATED ORAL at 06:39

## 2024-10-02 RX ADMIN — Medication 1 TABLET(S): at 17:08

## 2024-10-02 NOTE — DIETITIAN INITIAL EVALUATION ADULT - ORAL INTAKE PTA/DIET HISTORY
Pt reports lives at [facility]. Appetite generally good, slight wt gain pt states due to fluid retention.  (was 210# January) NKFA, no problems chewing or swallowing.

## 2024-10-02 NOTE — PROGRESS NOTE ADULT - SUBJECTIVE AND OBJECTIVE BOX
PROGRESS NOTE  Patient is a 73y old  Male who presents with a chief complaint of right foot wound (02 Oct 2024 10:27)    Chart and available morning labs /imaging are reviewed electronically , urgent issues addressed . More information  is being added upon completion of rounds , when more information is collected and management discussed with consultants , medical staff and social service/case management on the floor   OVERNIGHT  No new issues reported by medical staff . All above noted Patient is resting in a chair  comfortably . .No distress noted   Requested to upgrade diet Plan d/w sister on a phone   HPI:  72yo M PMhx DM2 w/ PAD - chronic Rt foot wound, HTN, COPD, CKD, HFpEF, arrythmia, Prostate CA s/p resection, Depression/Anxiety presents to ED sent by wound care for worsening R MTP wound. Patient reports having a nonhealing R foot wound, s/p multiple debridements and grafts at [place], for past 1.5 years. Denies seeing a vascular surgeon in the past.  Denies pain the wound, but reports having neuropathy. States following wound care and reports worsening of wound. Reporting having some chills in the past week. Denies fever, chills, SOB or any other complaints.Patient examined and evaluated at this time. Antibiotics as per infectious disease recommendations. Recommend vascular surgery evaluation. Tentatively planning for right 1st metatarsal head resection pending vascular evaluation. Continue local wound care and offloading at this time. (01 Oct 2024 14:44)    PAST MEDICAL & SURGICAL HISTORY:  Prostate cancer      Type II diabetes mellitus      Chronic obstructive pulmonary disease (COPD)      CHF (congestive heart failure)      Renal insufficiency      S/P foot surgery          MEDICATIONS  (STANDING):  clonazePAM Oral Disintegrating Tablet 0.75 milliGRAM(s) Oral two times a day  DAPTOmycin IVPB      DAPTOmycin IVPB 500 milliGRAM(s) IV Intermittent every 24 hours  dextrose 5%. 1000 milliLiter(s) (50 mL/Hr) IV Continuous <Continuous>  dextrose 5%. 1000 milliLiter(s) (100 mL/Hr) IV Continuous <Continuous>  dextrose 50% Injectable 25 Gram(s) IV Push once  dextrose 50% Injectable 12.5 Gram(s) IV Push once  dextrose 50% Injectable 25 Gram(s) IV Push once  enoxaparin Injectable 40 milliGRAM(s) SubCutaneous every 24 hours  ferrous    sulfate 325 milliGRAM(s) Oral daily  fluticasone propionate/ salmeterol 250-50 MICROgram(s) Diskus 1 Dose(s) Inhalation two times a day  glucagon  Injectable 1 milliGRAM(s) IntraMuscular once  influenza  Vaccine (HIGH DOSE) 0.5 milliLiter(s) IntraMuscular once  insulin glargine Injectable (LANTUS) 7 Unit(s) SubCutaneous at bedtime  insulin lispro (ADMELOG) corrective regimen sliding scale   SubCutaneous three times a day before meals  lactobacillus acidophilus 1 Tablet(s) Oral every 12 hours  metoprolol succinate ER 25 milliGRAM(s) Oral daily  montelukast 10 milliGRAM(s) Oral daily  pantoprazole    Tablet 40 milliGRAM(s) Oral before breakfast  pregabalin 150 milliGRAM(s) Oral two times a day  rosuvastatin 40 milliGRAM(s) Oral at bedtime  senna 2 Tablet(s) Oral at bedtime  sertraline 100 milliGRAM(s) Oral daily    MEDICATIONS  (PRN):  acetaminophen     Tablet .. 650 milliGRAM(s) Oral every 6 hours PRN Temp greater or equal to 38C (100.4F), Mild Pain (1 - 3)  albuterol    90 MICROgram(s) HFA Inhaler 2 Puff(s) Inhalation every 6 hours PRN Shortness of Breath and/or Wheezing  aluminum hydroxide/magnesium hydroxide/simethicone Suspension 30 milliLiter(s) Oral every 4 hours PRN Dyspepsia  dextrose Oral Gel 15 Gram(s) Oral once PRN Blood Glucose LESS THAN 70 milliGRAM(s)/deciliter  melatonin 3 milliGRAM(s) Oral at bedtime PRN Insomnia  ondansetron Injectable 4 milliGRAM(s) IV Push every 8 hours PRN Nausea and/or Vomiting  sodium chloride 0.65% Nasal 1 Spray(s) Both Nostrils two times a day PRN Congestion  traMADol 50 milliGRAM(s) Oral three times a day PRN Moderate Pain (4 - 6)      OBJECTIVE    T(C): 36.7 (10-02-24 @ 12:24), Max: 37 (10-02-24 @ 04:58)  HR: 76 (10-02-24 @ 12:24) (74 - 80)  BP: 106/68 (10-02-24 @ 12:24) (106/68 - 138/81)  RR: 18 (10-02-24 @ 12:24) (18 - 19)  SpO2: 92% (10-02-24 @ 12:24) (91% - 97%)  Wt(kg): --  I&O's Summary    01 Oct 2024 07:01  -  02 Oct 2024 07:00  --------------------------------------------------------  IN: 860 mL / OUT: 950 mL / NET: -90 mL          REVIEW OF SYSTEMS:  CONSTITUTIONAL: No fever, weight loss, or fatigue  EYES: No eye pain, visual disturbances, or discharge  ENMT:   No sinus or throat pain  NECK: No pain or stiffness  RESPIRATORY: No cough, wheezing, chills or hemoptysis; No shortness of breath  CARDIOVASCULAR: No chest pain, palpitations, dizziness, or leg swelling  GASTROINTESTINAL: No abdominal pain. No nausea, vomiting; No diarrhea or constipation. No melena or hematochezia.  GENITOURINARY: No dysuria, frequency, hematuria, or incontinence  NEUROLOGICAL: No headaches, memory loss, loss of strength, numbness, or tremors  SKIN: No itching, burning, rashes, or lesions   MUSCULOSKELETAL: No joint pain or swelling; No muscle, back, or extremity pain    PHYSICAL EXAM:  Appearance: NAD. VS past 24 hrs -as above   HEENT:   Moist oral mucosa. Conjunctiva clear b/l.   Neck : supple  Respiratory: Lungs CTAB.  Gastrointestinal:  Soft, nontender. No rebound. No rigidity. BS present	  Cardiovascular: RRR ,S1S2 present  Neurologic: Non-focal. Moving all extremities.  Extremities: r foot ulcer  Skin: No rashes, No ecchymoses, No cyanosis.	  wounds ,skin lesions-See skin assesment flow sheet   LABS:                        9.1    5.78  )-----------( 151      ( 02 Oct 2024 06:07 )             29.5     10-02    140  |  106  |  24[H]  ----------------------------<  116[H]  3.5   |  27  |  1.20    Ca    9.3      02 Oct 2024 06:07    TPro  6.5  /  Alb  2.7[L]  /  TBili  0.3  /  DBili  x   /  AST  16  /  ALT  19  /  AlkPhos  58  10-02    CAPILLARY BLOOD GLUCOSE      POCT Blood Glucose.: 227 mg/dL (02 Oct 2024 11:49)  POCT Blood Glucose.: 152 mg/dL (02 Oct 2024 07:49)  POCT Blood Glucose.: 201 mg/dL (01 Oct 2024 21:49)  POCT Blood Glucose.: 131 mg/dL (01 Oct 2024 17:47)    PT/INR - ( 02 Oct 2024 06:07 )   PT: 14.6 sec;   INR: 1.25 ratio         PTT - ( 01 Oct 2024 13:05 )  PTT:33.7 sec  Urinalysis Basic - ( 02 Oct 2024 06:07 )    Color: x / Appearance: x / SG: x / pH: x  Gluc: 116 mg/dL / Ketone: x  / Bili: x / Urobili: x   Blood: x / Protein: x / Nitrite: x   Leuk Esterase: x / RBC: x / WBC x   Sq Epi: x / Non Sq Epi: x / Bacteria: x        RADIOLOGY & ADDITIONAL TESTS:   reviewed elctronically  ASSESSMENT/PLAN: 	    25 minutes aggregate time was spent on this visit, 50% visit time spent in care co-ordination with other attendings and counselling patient .I have discussed care plan with patient / HCP/family member ,who expressed understanding of problems treatment and their effect and side effects, alternatives in details. I have asked if they have any questions and concerns and appropriately addressed them to best of my ability. ACP-Advance care planning was discussed , pallitaive care issues ,CMO ,GOC ,MOLST  form ,advance directives were reviewed .All questions were answered to the best of my knowledge - 25 m

## 2024-10-02 NOTE — PROGRESS NOTE ADULT - SUBJECTIVE AND OBJECTIVE BOX
CHIEF COMPLAINT/ REASON FOR VISIT  .. Patient was seen to address the  issue listed under PROBLEM LIST which is located toward bottom of this note     WAYNE SERRANO    PLV 1EAS 113 D1    Allergies    tetracycline (Unknown)  IODINE (Unknown)  vancomycin (Other)    Intolerances        PAST MEDICAL & SURGICAL HISTORY:  Prostate cancer      Type II diabetes mellitus      Chronic obstructive pulmonary disease (COPD)      CHF (congestive heart failure)      Renal insufficiency      S/P foot surgery          FAMILY HISTORY:      Home Medications:  acetaminophen 325 mg oral tablet: 2 tab(s) orally every 8 hours as needed (01 Oct 2024 15:23)  Albuterol (Eqv-ProAir HFA) 90 mcg/inh inhalation aerosol: 2 puff(s) inhaled every 4 hours as needed (01 Oct 2024 15:32)  albuterol 0.63 mg/3 mL (0.021%) inhalation solution: 3 milliliter(s) by nebulizer every 6 hours as needed for SOB (01 Oct 2024 15:27)  azelaic acid 15% topical gel: Apply topically to affected area 2 times a day as needed (01 Oct 2024 15:27)  ferrous sulfate 325 mg (65 mg elemental iron) oral tablet: 1 tab(s) orally once a day (01 Oct 2024 15:15)  fluticasone 50 mcg/inh nasal spray: 1 spray(s) in each nostril 2 times a day as needed (01 Oct 2024 15:29)  furosemide 40 mg oral tablet: 1 tab(s) orally once a day (01 Oct 2024 15:16)  Lantus Solostar Pen 100 units/mL subcutaneous solution: 14 unit(s) subcutaneous once a day (at bedtime) (01 Oct 2024 15:17)  loratadine 10 mg oral tablet: 1 tab(s) orally once a day (in the morning) (01 Oct 2024 15:17)  metFORMIN 500 mg oral tablet: 2 tab(s) orally 2 times a day (01 Oct 2024 15:18)  metoprolol succinate 25 mg oral tablet, extended release: 1 tab(s) orally once a day (01 Oct 2024 15:19)  Opzelura 1.5% topical cream: Apply topically to affected area 2 times a day as needed (01 Oct 2024 15:29)  pregabalin 150 mg oral capsule: 1 cap(s) orally 2 times a day (01 Oct 2024 15:20)  rosuvastatin 40 mg oral tablet: 1 tab(s) orally once a day (01 Oct 2024 15:21)  Saline Mist 0.65% nasal spray: 1 spray(s) intranasally 2 times a day (01 Oct 2024 15:29)  Spiriva 18 mcg inhalation capsule: 1 cap(s) inhaled once a day (01 Oct 2024 15:23)      MEDICATIONS  (STANDING):  clonazePAM Oral Disintegrating Tablet 0.75 milliGRAM(s) Oral two times a day  DAPTOmycin IVPB      DAPTOmycin IVPB 500 milliGRAM(s) IV Intermittent every 24 hours  dextrose 5%. 1000 milliLiter(s) (50 mL/Hr) IV Continuous <Continuous>  dextrose 5%. 1000 milliLiter(s) (100 mL/Hr) IV Continuous <Continuous>  dextrose 50% Injectable 12.5 Gram(s) IV Push once  dextrose 50% Injectable 25 Gram(s) IV Push once  dextrose 50% Injectable 25 Gram(s) IV Push once  enoxaparin Injectable 40 milliGRAM(s) SubCutaneous every 24 hours  ferrous    sulfate 325 milliGRAM(s) Oral daily  fluticasone propionate/ salmeterol 250-50 MICROgram(s) Diskus 1 Dose(s) Inhalation two times a day  glucagon  Injectable 1 milliGRAM(s) IntraMuscular once  influenza  Vaccine (HIGH DOSE) 0.5 milliLiter(s) IntraMuscular once  insulin glargine Injectable (LANTUS) 7 Unit(s) SubCutaneous at bedtime  insulin lispro (ADMELOG) corrective regimen sliding scale   SubCutaneous three times a day before meals  lactobacillus acidophilus 1 Tablet(s) Oral every 12 hours  metoprolol succinate ER 25 milliGRAM(s) Oral daily  montelukast 10 milliGRAM(s) Oral daily  pantoprazole    Tablet 40 milliGRAM(s) Oral before breakfast  pregabalin 150 milliGRAM(s) Oral two times a day  rosuvastatin 40 milliGRAM(s) Oral at bedtime  senna 2 Tablet(s) Oral at bedtime  sertraline 100 milliGRAM(s) Oral daily    MEDICATIONS  (PRN):  acetaminophen     Tablet .. 650 milliGRAM(s) Oral every 6 hours PRN Temp greater or equal to 38C (100.4F), Mild Pain (1 - 3)  albuterol    90 MICROgram(s) HFA Inhaler 2 Puff(s) Inhalation every 6 hours PRN Shortness of Breath and/or Wheezing  aluminum hydroxide/magnesium hydroxide/simethicone Suspension 30 milliLiter(s) Oral every 4 hours PRN Dyspepsia  dextrose Oral Gel 15 Gram(s) Oral once PRN Blood Glucose LESS THAN 70 milliGRAM(s)/deciliter  melatonin 3 milliGRAM(s) Oral at bedtime PRN Insomnia  ondansetron Injectable 4 milliGRAM(s) IV Push every 8 hours PRN Nausea and/or Vomiting  sodium chloride 0.65% Nasal 1 Spray(s) Both Nostrils two times a day PRN Congestion  traMADol 50 milliGRAM(s) Oral three times a day PRN Moderate Pain (4 - 6)      Diet, Consistent Carbohydrate w/Evening Snack:   DASH/TLC Sodium & Cholesterol Restricted  Easy to Chew (EASYTOCHEW) (10-01-24 @ 14:50) [Active]          Vital Signs Last 24 Hrs  T(C): 37 (02 Oct 2024 04:58), Max: 37 (02 Oct 2024 04:58)  T(F): 98.6 (02 Oct 2024 04:58), Max: 98.6 (02 Oct 2024 04:58)  HR: 74 (02 Oct 2024 04:58) (74 - 80)  BP: 109/67 (02 Oct 2024 04:58) (104/54 - 138/81)  BP(mean): --  RR: 19 (02 Oct 2024 04:58) (18 - 19)  SpO2: 91% (02 Oct 2024 04:58) (91% - 97%)    Parameters below as of 02 Oct 2024 04:58  Patient On (Oxygen Delivery Method): room air          10-01-24 @ 07:01  -  10-02-24 @ 07:00  --------------------------------------------------------  IN: 860 mL / OUT: 950 mL / NET: -90 mL              LABS:                        9.1    5.78  )-----------( 151      ( 02 Oct 2024 06:07 )             29.5     10-02    140  |  106  |  24[H]  ----------------------------<  116[H]  3.5   |  27  |  1.20    Ca    9.3      02 Oct 2024 06:07    TPro  6.5  /  Alb  2.7[L]  /  TBili  0.3  /  DBili  x   /  AST  16  /  ALT  19  /  AlkPhos  58  10-02    PT/INR - ( 02 Oct 2024 06:07 )   PT: 14.6 sec;   INR: 1.25 ratio         PTT - ( 01 Oct 2024 13:05 )  PTT:33.7 sec  Urinalysis Basic - ( 02 Oct 2024 06:07 )    Color: x / Appearance: x / SG: x / pH: x  Gluc: 116 mg/dL / Ketone: x  / Bili: x / Urobili: x   Blood: x / Protein: x / Nitrite: x   Leuk Esterase: x / RBC: x / WBC x   Sq Epi: x / Non Sq Epi: x / Bacteria: x            WBC:  WBC Count: 5.78 K/uL (10-02 @ 06:07)  WBC Count: 6.68 K/uL (10-01 @ 13:05)      MICROBIOLOGY:  RECENT CULTURES:              PT/INR - ( 02 Oct 2024 06:07 )   PT: 14.6 sec;   INR: 1.25 ratio         PTT - ( 01 Oct 2024 13:05 )  PTT:33.7 sec    Sodium:  Sodium: 140 mmol/L (10-02 @ 06:07)  Sodium: 139 mmol/L (10-01 @ 13:05)      1.20 mg/dL 10-02 @ 06:07  1.30 mg/dL 10-01 @ 13:05      Hemoglobin:  Hemoglobin: 9.1 g/dL (10-02 @ 06:07)  Hemoglobin: 9.8 g/dL (10-01 @ 13:05)      Platelets: Platelet Count - Automated: 151 K/uL (10-02 @ 06:07)  Platelet Count - Automated: 163 K/uL (10-01 @ 13:05)      LIVER FUNCTIONS - ( 02 Oct 2024 06:07 )  Alb: 2.7 g/dL / Pro: 6.5 g/dL / ALK PHOS: 58 U/L / ALT: 19 U/L / AST: 16 U/L / GGT: x             Urinalysis Basic - ( 02 Oct 2024 06:07 )    Color: x / Appearance: x / SG: x / pH: x  Gluc: 116 mg/dL / Ketone: x  / Bili: x / Urobili: x   Blood: x / Protein: x / Nitrite: x   Leuk Esterase: x / RBC: x / WBC x   Sq Epi: x / Non Sq Epi: x / Bacteria: x        RADIOLOGY & ADDITIONAL STUDIES:      MICROBIOLOGY:  RECENT CULTURES:

## 2024-10-02 NOTE — DIETITIAN INITIAL EVALUATION ADULT - PROBLEM SELECTOR PLAN 8
Retinoid Dermatitis Normal Treatment: I recommended more frequent application of Cetaphil or CeraVe to the areas of dermatitis. bowel regimen

## 2024-10-02 NOTE — DIETITIAN INITIAL EVALUATION ADULT - ORAL NUTRITION SUPPLEMENTS
Group Topic: BH Education    Date: 11/30/2023  Start Time: 1300  End Time: 1330  Facilitators: Marilyn Swanson RN    Focus: Body Language  Number in attendance: 3    Method: Group  Attendance: Present  Participation: Active  Patient Response: Appropriate feedback and Hyper-verbal  Mood: Normal  Affect: Type: Euthymic (normal mood)   Range: Full (normal)   Congruency: Congruent   Stability: Stable  Behavior/Socialization: Appropriate to group  Thought Process: Tracking  Task Performance: Follows directions  Patient Evaluation: Independent - full participation       daily MVI, VitC 500mg bid  Wilfred 8oz bid

## 2024-10-02 NOTE — CONSULT NOTE ADULT - SUBJECTIVE AND OBJECTIVE BOX
CARDIOLOGY CONSULT NOTE    Patient is a 73y Male with a known history of : infected rt big toe  Wound of right foot [S91.301A]    Cellulitis of right foot [L03.115]    Prostate cancer [C61]    Type II diabetes mellitus [E11.9]    Chronic obstructive pulmonary disease (COPD) [J44.9]    CHF (congestive heart failure) [I50.9]    Renal insufficiency [N28.9]    Prophylactic measure [Z29.9]    MDD (major depressive disorder) [F32.9]    Neuropathy [G62.9]    Constipation [K59.00]      HPI:  74yo M PMhx DM2 w/ PAD - chronic Rt foot wound, HTN, COPD, CKD, HFpEF, arrythmia, Prostate CA s/p resection, Depression/Anxiety presents to ED sent by wound care for worsening R MTP wound. Patient reports having a nonhealing R foot wound, s/p multiple debridements and grafts at [place], for past 1.5 years. Denies seeing a vascular surgeon in the past.  Denies pain the wound, but reports having neuropathy. States following wound care and reports worsening of wound. Reporting having some chills in the past week. Denies fever, chills, SOB or any other complaints.Patient examined and evaluated at this time. Antibiotics as per infectious disease recommendations. Recommend vascular surgery evaluation. Tentatively planning for right 1st metatarsal head resection pending vascular evaluation. Continue local wound care and offloading at this time. (01 Oct 2024 14:44)      REVIEW OF SYSTEMS:    CONSTITUTIONAL: No fever, weight loss, or fatigue  EYES: No eye pain, visual disturbances, or discharge  ENMT:  No difficulty hearing, tinnitus, vertigo; No sinus or throat pain  NECK: No pain or stiffness  BREASTS: No pain, masses, or nipple discharge  RESPIRATORY: No cough, wheezing, chills or hemoptysis; No shortness of breath  CARDIOVASCULAR: No chest pain, palpitations, dizziness, or leg swelling  GASTROINTESTINAL: No abdominal or epigastric pain. No nausea, vomiting, or hematemesis; No diarrhea or constipation. No melena or hematochezia.  GENITOURINARY: No dysuria, frequency, hematuria, or incontinence  NEUROLOGICAL: No headaches, memory loss, loss of strength, numbness, or tremors  SKIN: No itching, burning, rashes, or lesions   LYMPH NODES: No enlarged glands  ENDOCRINE: No heat or cold intolerance; No hair loss  MUSCULOSKELETAL: No joint pain or swelling; No muscle, back, or extremity pain  PSYCHIATRIC: No depression, anxiety, mood swings, or difficulty sleeping  HEME/LYMPH: No easy bruising, or bleeding gums  ALLERGY AND IMMUNOLOGIC: No hives or eczema    MEDICATIONS  (STANDING):  clonazePAM Oral Disintegrating Tablet 0.75 milliGRAM(s) Oral two times a day  DAPTOmycin IVPB 500 milliGRAM(s) IV Intermittent every 24 hours  DAPTOmycin IVPB      dextrose 5%. 1000 milliLiter(s) (50 mL/Hr) IV Continuous <Continuous>  dextrose 5%. 1000 milliLiter(s) (100 mL/Hr) IV Continuous <Continuous>  dextrose 50% Injectable 25 Gram(s) IV Push once  dextrose 50% Injectable 12.5 Gram(s) IV Push once  dextrose 50% Injectable 25 Gram(s) IV Push once  enoxaparin Injectable 40 milliGRAM(s) SubCutaneous every 24 hours  ferrous    sulfate 325 milliGRAM(s) Oral daily  fluticasone propionate/ salmeterol 250-50 MICROgram(s) Diskus 1 Dose(s) Inhalation two times a day  glucagon  Injectable 1 milliGRAM(s) IntraMuscular once  influenza  Vaccine (HIGH DOSE) 0.5 milliLiter(s) IntraMuscular once  insulin glargine Injectable (LANTUS) 7 Unit(s) SubCutaneous at bedtime  insulin lispro (ADMELOG) corrective regimen sliding scale   SubCutaneous three times a day before meals  lactobacillus acidophilus 1 Tablet(s) Oral every 12 hours  metoprolol succinate ER 25 milliGRAM(s) Oral daily  montelukast 10 milliGRAM(s) Oral daily  pantoprazole    Tablet 40 milliGRAM(s) Oral before breakfast  pregabalin 150 milliGRAM(s) Oral two times a day  rosuvastatin 40 milliGRAM(s) Oral at bedtime  senna 2 Tablet(s) Oral at bedtime  sertraline 100 milliGRAM(s) Oral daily  sodium chloride 0.9%. 1000 milliLiter(s) (50 mL/Hr) IV Continuous <Continuous>    MEDICATIONS  (PRN):  acetaminophen     Tablet .. 650 milliGRAM(s) Oral every 6 hours PRN Temp greater or equal to 38C (100.4F), Mild Pain (1 - 3)  albuterol    90 MICROgram(s) HFA Inhaler 2 Puff(s) Inhalation every 6 hours PRN Shortness of Breath and/or Wheezing  aluminum hydroxide/magnesium hydroxide/simethicone Suspension 30 milliLiter(s) Oral every 4 hours PRN Dyspepsia  dextrose Oral Gel 15 Gram(s) Oral once PRN Blood Glucose LESS THAN 70 milliGRAM(s)/deciliter  melatonin 3 milliGRAM(s) Oral at bedtime PRN Insomnia  ondansetron Injectable 4 milliGRAM(s) IV Push every 8 hours PRN Nausea and/or Vomiting  sodium chloride 0.65% Nasal 1 Spray(s) Both Nostrils two times a day PRN Congestion  traMADol 50 milliGRAM(s) Oral three times a day PRN Moderate Pain (4 - 6)      ALLERGIES: tetracycline (Unknown)  IODINE (Unknown)  vancomycin (Other)      Social History:     FAMILY HISTORY:      PAST MEDICAL & SURGICAL HISTORY:  Prostate cancer      Type II diabetes mellitus      Chronic obstructive pulmonary disease (COPD)      CHF (congestive heart failure)      Renal insufficiency      S/P foot surgery            PHYSICAL EXAMINATION:  -----------------------------  T(C): 37 (10-02-24 @ 04:58), Max: 37 (10-02-24 @ 04:58)  HR: 74 (10-02-24 @ 04:58) (74 - 80)  BP: 109/67 (10-02-24 @ 04:58) (104/54 - 138/81)  RR: 19 (10-02-24 @ 04:58) (18 - 19)  SpO2: 91% (10-02-24 @ 04:58) (91% - 97%)  Wt(kg): --    10-01 @ 07:01  -  10-02 @ 07:00  --------------------------------------------------------  IN:    IV PiggyBack: 50 mL    Oral Fluid: 360 mL    sodium chloride 0.9%: 450 mL  Total IN: 860 mL    OUT:    Voided (mL): 950 mL  Total OUT: 950 mL    Total NET: -90 mL        Height (cm): 177.8 (10-01 @ 11:50)  Weight (kg): 99.8 (10-01 @ 11:50)  BMI (kg/m2): 31.6 (10-01 @ 11:50)  BSA (m2): 2.17 (10-01 @ 11:50)    Constitutional: well developed, normal appearance, well groomed, well nourished, no deformities and no acute distress.   Eyes: the conjunctiva exhibited no abnormalities and the eyelids demonstrated no xanthelasmas.   HEENT: normal oral mucosa, no oral pallor and no oral cyanosis.   Neck: normal jugular venous A waves present, normal jugular venous V waves present and no jugular venous mahmood A waves.   Pulmonary: no respiratory distress, normal respiratory rhythm and effort, no accessory muscle use and lungs were clear to auscultation bilaterally.   Cardiovascular: heart rate and rhythm were normal, normal S1 and S2 and no murmur, gallop, rub, heave or thrill are present.   Abdomen: soft, non-tender, no hepato-splenomegaly and no abdominal mass palpated.   Musculoskeletal: the gait could not be assessed..   Extremities: no clubbing of the fingernails, no localized cyanosis, no petechial hemorrhages and no ischemic changes.   Skin: normal skin color and pigmentation, no rash, no venous stasis, no skin lesions, no skin ulcer and no xanthoma was observed.   Psychiatric: oriented to person, place, and time, the affect was normal, the mood was normal and not feeling anxious.     LABS:   --------  10-01    139  |  102  |  23  ----------------------------<  108[H]  3.7   |  28  |  1.30    Ca    9.3      01 Oct 2024 13:05    TPro  7.3  /  Alb  2.9[L]  /  TBili  0.3  /  DBili  x   /  AST  15  /  ALT  23  /  AlkPhos  64  10-01                         9.1    5.78  )-----------( 151      ( 02 Oct 2024 06:07 )             29.5     PT/INR - ( 01 Oct 2024 13:05 )   PT: 13.4 sec;   INR: 1.14 ratio         PTT - ( 01 Oct 2024 13:05 )  PTT:33.7 sec            RADIOLOGY:  -----------------        ECG:     ECHO

## 2024-10-02 NOTE — DIETITIAN INITIAL EVALUATION ADULT - NSICDXPASTMEDICALHX_GEN_ALL_CORE_FT
PAST MEDICAL HISTORY:  CHF (congestive heart failure)     Chronic obstructive pulmonary disease (COPD)     Constipation     H/O migraine     Insomnia     Prostate cancer     Renal insufficiency     Type II diabetes mellitus

## 2024-10-02 NOTE — DIETITIAN INITIAL EVALUATION ADULT - OTHER INFO
Patient called requesting results of Urine culture, GC/Chlamyda, and Herpes tests Patient was informed that these results are still pending but was given the result of wet mount and urine dip. Patient was instructed that when the results are in her family practice will call her.  
"74yo M PMhx DM2 w/ PAD - chronic Rt foot wound, HTN, COPD, CKD, HFpEF, arrythmia, Prostate CA s/p resection, Depression/Anxiety presents to ED sent by wound care for worsening R MTP wound. "

## 2024-10-02 NOTE — DIETITIAN INITIAL EVALUATION ADULT - NS FNS DIET ORDER
Diet, Consistent Carbohydrate w/Evening Snack:   DASH/TLC {Sodium & Cholesterol Restricted} (10-02-24 @ 11:57)

## 2024-10-02 NOTE — PROGRESS NOTE ADULT - SUBJECTIVE AND OBJECTIVE BOX
Interval History:    CENTRAL LINE:   [  ] YES       [  ] NO  MORRISSEY:                 [  ] YES       [  ] NO         REVIEW OF SYSTEMS:  All Systems below were reviewed and are negative [  ]  HEENT:  ID:  Pulmonary:  Cardiac:  GI:  Renal:  Musculoskeletal:  All other systems above were reviewed and are negative   [  ]      MEDICATIONS  (STANDING):  clonazePAM Oral Disintegrating Tablet 0.75 milliGRAM(s) Oral two times a day  DAPTOmycin IVPB 500 milliGRAM(s) IV Intermittent every 24 hours  DAPTOmycin IVPB      dextrose 5%. 1000 milliLiter(s) (50 mL/Hr) IV Continuous <Continuous>  dextrose 5%. 1000 milliLiter(s) (100 mL/Hr) IV Continuous <Continuous>  dextrose 50% Injectable 25 Gram(s) IV Push once  dextrose 50% Injectable 12.5 Gram(s) IV Push once  dextrose 50% Injectable 25 Gram(s) IV Push once  enoxaparin Injectable 40 milliGRAM(s) SubCutaneous every 24 hours  ferrous    sulfate 325 milliGRAM(s) Oral daily  fluticasone propionate/ salmeterol 250-50 MICROgram(s) Diskus 1 Dose(s) Inhalation two times a day  glucagon  Injectable 1 milliGRAM(s) IntraMuscular once  influenza  Vaccine (HIGH DOSE) 0.5 milliLiter(s) IntraMuscular once  insulin glargine Injectable (LANTUS) 7 Unit(s) SubCutaneous at bedtime  insulin lispro (ADMELOG) corrective regimen sliding scale   SubCutaneous three times a day before meals  lactobacillus acidophilus 1 Tablet(s) Oral every 12 hours  metoprolol succinate ER 25 milliGRAM(s) Oral daily  montelukast 10 milliGRAM(s) Oral daily  pantoprazole    Tablet 40 milliGRAM(s) Oral before breakfast  pregabalin 150 milliGRAM(s) Oral two times a day  rosuvastatin 40 milliGRAM(s) Oral at bedtime  senna 2 Tablet(s) Oral at bedtime  sertraline 100 milliGRAM(s) Oral daily    MEDICATIONS  (PRN):  acetaminophen     Tablet .. 650 milliGRAM(s) Oral every 6 hours PRN Temp greater or equal to 38C (100.4F), Mild Pain (1 - 3)  albuterol    90 MICROgram(s) HFA Inhaler 2 Puff(s) Inhalation every 6 hours PRN Shortness of Breath and/or Wheezing  aluminum hydroxide/magnesium hydroxide/simethicone Suspension 30 milliLiter(s) Oral every 4 hours PRN Dyspepsia  dextrose Oral Gel 15 Gram(s) Oral once PRN Blood Glucose LESS THAN 70 milliGRAM(s)/deciliter  melatonin 3 milliGRAM(s) Oral at bedtime PRN Insomnia  ondansetron Injectable 4 milliGRAM(s) IV Push every 8 hours PRN Nausea and/or Vomiting  sodium chloride 0.65% Nasal 1 Spray(s) Both Nostrils two times a day PRN Congestion  traMADol 50 milliGRAM(s) Oral three times a day PRN Moderate Pain (4 - 6)      Vital Signs Last 24 Hrs  T(C): 36.7 (02 Oct 2024 12:24), Max: 37 (02 Oct 2024 04:58)  T(F): 98.1 (02 Oct 2024 12:24), Max: 98.6 (02 Oct 2024 04:58)  HR: 76 (02 Oct 2024 12:24) (74 - 76)  BP: 106/68 (02 Oct 2024 12:24) (106/68 - 138/81)  BP(mean): --  RR: 18 (02 Oct 2024 12:24) (18 - 19)  SpO2: 92% (02 Oct 2024 12:24) (91% - 93%)    Parameters below as of 02 Oct 2024 12:24  Patient On (Oxygen Delivery Method): room air        I&O's Summary    01 Oct 2024 07:01  -  02 Oct 2024 07:00  --------------------------------------------------------  IN: 860 mL / OUT: 950 mL / NET: -90 mL        PHYSICAL EXAM:  HEENT: NC/AT; PERRLA  Neck: Soft; no tenderness  Lungs: CTA bilaterally; no wheezing.   Heart:  Abdomen:  Genital/ Rectal:  Extremities:  Neurologic:  Vascular:      LABORATORY:    CBC Full  -  ( 02 Oct 2024 06:07 )  WBC Count : 5.78 K/uL  RBC Count : 3.17 M/uL  Hemoglobin : 9.1 g/dL  Hematocrit : 29.5 %  Platelet Count - Automated : 151 K/uL  Mean Cell Volume : 93.1 fl  Mean Cell Hemoglobin : 28.7 pg  Mean Cell Hemoglobin Concentration : 30.8 gm/dL  Auto Neutrophil # : 2.71 K/uL  Auto Lymphocyte # : 1.69 K/uL  Auto Monocyte # : 1.18 K/uL  Auto Eosinophil # : 0.20 K/uL  Auto Basophil # : 0.05 K/uL  Auto Neutrophil % : 45.2 %  Auto Lymphocyte % : 28.1 %  Auto Monocyte % : 19.6 %  Auto Eosinophil % : 3.3 %  Auto Basophil % : 0.8 %      ESR:                   09-24 @ 14:10  65    C-Reactive Protein:     09-24 @ 14:10  28    Procalcitonin:           09-24 @ 14:10   --      10-02    140  |  106  |  24[H]  ----------------------------<  116[H]  3.5   |  27  |  1.20    Ca    9.3      02 Oct 2024 06:07    TPro  6.5  /  Alb  2.7[L]  /  TBili  0.3  /  DBili  x   /  AST  16  /  ALT  19  /  AlkPhos  58  10-02          Assessment and Plan:          David Coreas MD   [phone].    No fevers  Comfortable     MEDICATIONS  (STANDING):  clonazePAM Oral Disintegrating Tablet 0.75 milliGRAM(s) Oral two times a day  DAPTOmycin IVPB 500 milliGRAM(s) IV Intermittent every 24 hours  DAPTOmycin IVPB      dextrose 5%. 1000 milliLiter(s) (50 mL/Hr) IV Continuous <Continuous>  dextrose 5%. 1000 milliLiter(s) (100 mL/Hr) IV Continuous <Continuous>  dextrose 50% Injectable 25 Gram(s) IV Push once  dextrose 50% Injectable 12.5 Gram(s) IV Push once  dextrose 50% Injectable 25 Gram(s) IV Push once  enoxaparin Injectable 40 milliGRAM(s) SubCutaneous every 24 hours  ferrous    sulfate 325 milliGRAM(s) Oral daily  fluticasone propionate/ salmeterol 250-50 MICROgram(s) Diskus 1 Dose(s) Inhalation two times a day  glucagon  Injectable 1 milliGRAM(s) IntraMuscular once  influenza  Vaccine (HIGH DOSE) 0.5 milliLiter(s) IntraMuscular once  insulin glargine Injectable (LANTUS) 7 Unit(s) SubCutaneous at bedtime  insulin lispro (ADMELOG) corrective regimen sliding scale   SubCutaneous three times a day before meals  lactobacillus acidophilus 1 Tablet(s) Oral every 12 hours  metoprolol succinate ER 25 milliGRAM(s) Oral daily  montelukast 10 milliGRAM(s) Oral daily  pantoprazole    Tablet 40 milliGRAM(s) Oral before breakfast  pregabalin 150 milliGRAM(s) Oral two times a day  rosuvastatin 40 milliGRAM(s) Oral at bedtime  senna 2 Tablet(s) Oral at bedtime  sertraline 100 milliGRAM(s) Oral daily    MEDICATIONS  (PRN):  acetaminophen     Tablet .. 650 milliGRAM(s) Oral every 6 hours PRN Temp greater or equal to 38C (100.4F), Mild Pain (1 - 3)  albuterol    90 MICROgram(s) HFA Inhaler 2 Puff(s) Inhalation every 6 hours PRN Shortness of Breath and/or Wheezing  aluminum hydroxide/magnesium hydroxide/simethicone Suspension 30 milliLiter(s) Oral every 4 hours PRN Dyspepsia  dextrose Oral Gel 15 Gram(s) Oral once PRN Blood Glucose LESS THAN 70 milliGRAM(s)/deciliter  melatonin 3 milliGRAM(s) Oral at bedtime PRN Insomnia  ondansetron Injectable 4 milliGRAM(s) IV Push every 8 hours PRN Nausea and/or Vomiting  sodium chloride 0.65% Nasal 1 Spray(s) Both Nostrils two times a day PRN Congestion  traMADol 50 milliGRAM(s) Oral three times a day PRN Moderate Pain (4 - 6)      Vital Signs Last 24 Hrs  T(C): 36.7 (02 Oct 2024 12:24), Max: 37 (02 Oct 2024 04:58)  T(F): 98.1 (02 Oct 2024 12:24), Max: 98.6 (02 Oct 2024 04:58)  HR: 76 (02 Oct 2024 12:24) (74 - 76)  BP: 106/68 (02 Oct 2024 12:24) (106/68 - 138/81)  BP(mean): --  RR: 18 (02 Oct 2024 12:24) (18 - 19)  SpO2: 92% (02 Oct 2024 12:24) (91% - 93%)    Parameters below as of 02 Oct 2024 12:24  Patient On (Oxygen Delivery Method): room air        I&O's Summary    01 Oct 2024 07:01  -  02 Oct 2024 07:00  --------------------------------------------------------  IN: 860 mL / OUT: 950 mL / NET: -90 mL        PHYSICAL EXAM:  HEENT: NC/AT; PERRLA  Neck: Soft; no tenderness  Lungs: CTA bilaterally; no wheezing.   Heart:  Abdomen:  Genital/ Rectal:  Extremities:  Neurologic:  Vascular:      LABORATORY:    CBC Full  -  ( 02 Oct 2024 06:07 )  WBC Count : 5.78 K/uL  RBC Count : 3.17 M/uL  Hemoglobin : 9.1 g/dL  Hematocrit : 29.5 %  Platelet Count - Automated : 151 K/uL  Mean Cell Volume : 93.1 fl  Mean Cell Hemoglobin : 28.7 pg  Mean Cell Hemoglobin Concentration : 30.8 gm/dL  Auto Neutrophil # : 2.71 K/uL  Auto Lymphocyte # : 1.69 K/uL  Auto Monocyte # : 1.18 K/uL  Auto Eosinophil # : 0.20 K/uL  Auto Basophil # : 0.05 K/uL  Auto Neutrophil % : 45.2 %  Auto Lymphocyte % : 28.1 %  Auto Monocyte % : 19.6 %  Auto Eosinophil % : 3.3 %  Auto Basophil % : 0.8 %      ESR:                   09-24 @ 14:10  65    C-Reactive Protein:     09-24 @ 14:10  28    Procalcitonin:           09-24 @ 14:10   --      10-02    140  |  106  |  24[H]  ----------------------------<  116[H]  3.5   |  27  |  1.20    Ca    9.3      02 Oct 2024 06:07    TPro  6.5  /  Alb  2.7[L]  /  TBili  0.3  /  DBili  x   /  AST  16  /  ALT  19  /  AlkPhos  58  10-02          Assessment and Plan:    1. R foot with infected ulcer likely with osteomyelitis.  2. CKD  3. Chronic body rash.    . Discontinue IV Clindamycin.  . Add IV Dapto 500 mg daily. Add IV Cefepime 2 gm q12h   . Podiatry evaluation   . Wound care daily.         David Coreas MD   [phone].    No fevers  Comfortable     MEDICATIONS  (STANDING):  clonazePAM Oral Disintegrating Tablet 0.75 milliGRAM(s) Oral two times a day  DAPTOmycin IVPB 500 milliGRAM(s) IV Intermittent every 24 hours  DAPTOmycin IVPB      dextrose 5%. 1000 milliLiter(s) (50 mL/Hr) IV Continuous <Continuous>  dextrose 5%. 1000 milliLiter(s) (100 mL/Hr) IV Continuous <Continuous>  dextrose 50% Injectable 25 Gram(s) IV Push once  dextrose 50% Injectable 12.5 Gram(s) IV Push once  dextrose 50% Injectable 25 Gram(s) IV Push once  enoxaparin Injectable 40 milliGRAM(s) SubCutaneous every 24 hours  ferrous    sulfate 325 milliGRAM(s) Oral daily  fluticasone propionate/ salmeterol 250-50 MICROgram(s) Diskus 1 Dose(s) Inhalation two times a day  glucagon  Injectable 1 milliGRAM(s) IntraMuscular once  influenza  Vaccine (HIGH DOSE) 0.5 milliLiter(s) IntraMuscular once  insulin glargine Injectable (LANTUS) 7 Unit(s) SubCutaneous at bedtime  insulin lispro (ADMELOG) corrective regimen sliding scale   SubCutaneous three times a day before meals  lactobacillus acidophilus 1 Tablet(s) Oral every 12 hours  metoprolol succinate ER 25 milliGRAM(s) Oral daily  montelukast 10 milliGRAM(s) Oral daily  pantoprazole    Tablet 40 milliGRAM(s) Oral before breakfast  pregabalin 150 milliGRAM(s) Oral two times a day  rosuvastatin 40 milliGRAM(s) Oral at bedtime  senna 2 Tablet(s) Oral at bedtime  sertraline 100 milliGRAM(s) Oral daily    MEDICATIONS  (PRN):  acetaminophen     Tablet .. 650 milliGRAM(s) Oral every 6 hours PRN Temp greater or equal to 38C (100.4F), Mild Pain (1 - 3)  albuterol    90 MICROgram(s) HFA Inhaler 2 Puff(s) Inhalation every 6 hours PRN Shortness of Breath and/or Wheezing  aluminum hydroxide/magnesium hydroxide/simethicone Suspension 30 milliLiter(s) Oral every 4 hours PRN Dyspepsia  dextrose Oral Gel 15 Gram(s) Oral once PRN Blood Glucose LESS THAN 70 milliGRAM(s)/deciliter  melatonin 3 milliGRAM(s) Oral at bedtime PRN Insomnia  ondansetron Injectable 4 milliGRAM(s) IV Push every 8 hours PRN Nausea and/or Vomiting  sodium chloride 0.65% Nasal 1 Spray(s) Both Nostrils two times a day PRN Congestion  traMADol 50 milliGRAM(s) Oral three times a day PRN Moderate Pain (4 - 6)      Vital Signs Last 24 Hrs  T(C): 36.7 (02 Oct 2024 12:24), Max: 37 (02 Oct 2024 04:58)  T(F): 98.1 (02 Oct 2024 12:24), Max: 98.6 (02 Oct 2024 04:58)  HR: 76 (02 Oct 2024 12:24) (74 - 76)  BP: 106/68 (02 Oct 2024 12:24) (106/68 - 138/81)  BP(mean): --  RR: 18 (02 Oct 2024 12:24) (18 - 19)  SpO2: 92% (02 Oct 2024 12:24) (91% - 93%)    Parameters below as of 02 Oct 2024 12:24  Patient On (Oxygen Delivery Method): room air        I&O's Summary    01 Oct 2024 07:01  -  02 Oct 2024 07:00  --------------------------------------------------------  IN: 860 mL / OUT: 950 mL / NET: -90 mL        PHYSICAL EXAM:  HEENT: NC/AT; PERRLA  Neck: Soft; no tenderness  Lungs: CTA bilaterally; no wheezing.   Heart: RRR, no murmurs.   Abdomen: Soft, no tenderness.   Genital/ Rectal: No doan catheter  Extremities: R foot with large ulcer and moderate swelling and erythema.  Neurologic: Awake.       LABORATORY:    CBC Full  -  ( 02 Oct 2024 06:07 )  WBC Count : 5.78 K/uL  RBC Count : 3.17 M/uL  Hemoglobin : 9.1 g/dL  Hematocrit : 29.5 %  Platelet Count - Automated : 151 K/uL  Mean Cell Volume : 93.1 fl  Mean Cell Hemoglobin : 28.7 pg  Mean Cell Hemoglobin Concentration : 30.8 gm/dL  Auto Neutrophil # : 2.71 K/uL  Auto Lymphocyte # : 1.69 K/uL  Auto Monocyte # : 1.18 K/uL  Auto Eosinophil # : 0.20 K/uL  Auto Basophil # : 0.05 K/uL  Auto Neutrophil % : 45.2 %  Auto Lymphocyte % : 28.1 %  Auto Monocyte % : 19.6 %  Auto Eosinophil % : 3.3 %  Auto Basophil % : 0.8 %      ESR:                   09-24 @ 14:10  65    C-Reactive Protein:     09-24 @ 14:10  28    Procalcitonin:           09-24 @ 14:10   --      10-02    140  |  106  |  24[H]  ----------------------------<  116[H]  3.5   |  27  |  1.20    Ca    9.3      02 Oct 2024 06:07    TPro  6.5  /  Alb  2.7[L]  /  TBili  0.3  /  DBili  x   /  AST  16  /  ALT  19  /  AlkPhos  58  10-02          Assessment and Plan:    1. R foot with infected ulcer likely with osteomyelitis.  2. CKD  3. Chronic body rash.      . Continue IV Dapto 500 mg daily. Add IV Cefepime 2 gm q12h   . Waiting for Podiatry follow up. When cleared by podiatry for discharge, get Picc line to complete 6 weeks of IV Daptomycin and Cefepime at the rehab.  . Discharge planning to rehab.   . Wound care daily.         David Coreas MD   [phone].

## 2024-10-02 NOTE — DIETITIAN INITIAL EVALUATION ADULT - PERTINENT MEDS FT
MEDICATIONS  (STANDING):  clonazePAM Oral Disintegrating Tablet 0.75 milliGRAM(s) Oral two times a day  DAPTOmycin IVPB      DAPTOmycin IVPB 500 milliGRAM(s) IV Intermittent every 24 hours  dextrose 5%. 1000 milliLiter(s) (50 mL/Hr) IV Continuous <Continuous>  dextrose 5%. 1000 milliLiter(s) (100 mL/Hr) IV Continuous <Continuous>  dextrose 50% Injectable 12.5 Gram(s) IV Push once  dextrose 50% Injectable 25 Gram(s) IV Push once  dextrose 50% Injectable 25 Gram(s) IV Push once  enoxaparin Injectable 40 milliGRAM(s) SubCutaneous every 24 hours  ferrous    sulfate 325 milliGRAM(s) Oral daily  fluticasone propionate/ salmeterol 250-50 MICROgram(s) Diskus 1 Dose(s) Inhalation two times a day  glucagon  Injectable 1 milliGRAM(s) IntraMuscular once  influenza  Vaccine (HIGH DOSE) 0.5 milliLiter(s) IntraMuscular once  insulin glargine Injectable (LANTUS) 7 Unit(s) SubCutaneous at bedtime  insulin lispro (ADMELOG) corrective regimen sliding scale   SubCutaneous three times a day before meals  lactobacillus acidophilus 1 Tablet(s) Oral every 12 hours  metoprolol succinate ER 25 milliGRAM(s) Oral daily  montelukast 10 milliGRAM(s) Oral daily  pantoprazole    Tablet 40 milliGRAM(s) Oral before breakfast  pregabalin 150 milliGRAM(s) Oral two times a day  rosuvastatin 40 milliGRAM(s) Oral at bedtime  senna 2 Tablet(s) Oral at bedtime  sertraline 100 milliGRAM(s) Oral daily    MEDICATIONS  (PRN):  acetaminophen     Tablet .. 650 milliGRAM(s) Oral every 6 hours PRN Temp greater or equal to 38C (100.4F), Mild Pain (1 - 3)  albuterol    90 MICROgram(s) HFA Inhaler 2 Puff(s) Inhalation every 6 hours PRN Shortness of Breath and/or Wheezing  aluminum hydroxide/magnesium hydroxide/simethicone Suspension 30 milliLiter(s) Oral every 4 hours PRN Dyspepsia  dextrose Oral Gel 15 Gram(s) Oral once PRN Blood Glucose LESS THAN 70 milliGRAM(s)/deciliter  melatonin 3 milliGRAM(s) Oral at bedtime PRN Insomnia  ondansetron Injectable 4 milliGRAM(s) IV Push every 8 hours PRN Nausea and/or Vomiting  sodium chloride 0.65% Nasal 1 Spray(s) Both Nostrils two times a day PRN Congestion  traMADol 50 milliGRAM(s) Oral three times a day PRN Moderate Pain (4 - 6)

## 2024-10-02 NOTE — PROGRESS NOTE ADULT - SUBJECTIVE AND OBJECTIVE BOX
SUBJECTIVE:  73y year old Male seen at [facility] 1EAS 113 D1 for right 1st metatarsal head medial wound down to skin, subcuatneous tissue, fat, bone. Complains of headache.    Allergies    tetracycline (Unknown)  IODINE (Unknown)  vancomycin (Other)    Intolerances        MEDICATIONS  (STANDING):  clonazePAM Oral Disintegrating Tablet 0.75 milliGRAM(s) Oral two times a day  DAPTOmycin IVPB      DAPTOmycin IVPB 500 milliGRAM(s) IV Intermittent every 24 hours  dextrose 5%. 1000 milliLiter(s) (100 mL/Hr) IV Continuous <Continuous>  dextrose 5%. 1000 milliLiter(s) (50 mL/Hr) IV Continuous <Continuous>  dextrose 50% Injectable 12.5 Gram(s) IV Push once  dextrose 50% Injectable 25 Gram(s) IV Push once  dextrose 50% Injectable 25 Gram(s) IV Push once  enoxaparin Injectable 40 milliGRAM(s) SubCutaneous every 24 hours  ferrous    sulfate 325 milliGRAM(s) Oral daily  fluticasone propionate/ salmeterol 250-50 MICROgram(s) Diskus 1 Dose(s) Inhalation two times a day  glucagon  Injectable 1 milliGRAM(s) IntraMuscular once  influenza  Vaccine (HIGH DOSE) 0.5 milliLiter(s) IntraMuscular once  insulin glargine Injectable (LANTUS) 7 Unit(s) SubCutaneous at bedtime  insulin lispro (ADMELOG) corrective regimen sliding scale   SubCutaneous three times a day before meals  lactobacillus acidophilus 1 Tablet(s) Oral every 12 hours  metoprolol succinate ER 25 milliGRAM(s) Oral daily  montelukast 10 milliGRAM(s) Oral daily  pantoprazole    Tablet 40 milliGRAM(s) Oral before breakfast  pregabalin 150 milliGRAM(s) Oral two times a day  rosuvastatin 40 milliGRAM(s) Oral at bedtime  senna 2 Tablet(s) Oral at bedtime  sertraline 100 milliGRAM(s) Oral daily    MEDICATIONS  (PRN):  acetaminophen     Tablet .. 650 milliGRAM(s) Oral every 6 hours PRN Temp greater or equal to 38C (100.4F), Mild Pain (1 - 3)  albuterol    90 MICROgram(s) HFA Inhaler 2 Puff(s) Inhalation every 6 hours PRN Shortness of Breath and/or Wheezing  aluminum hydroxide/magnesium hydroxide/simethicone Suspension 30 milliLiter(s) Oral every 4 hours PRN Dyspepsia  dextrose Oral Gel 15 Gram(s) Oral once PRN Blood Glucose LESS THAN 70 milliGRAM(s)/deciliter  melatonin 3 milliGRAM(s) Oral at bedtime PRN Insomnia  ondansetron Injectable 4 milliGRAM(s) IV Push every 8 hours PRN Nausea and/or Vomiting  sodium chloride 0.65% Nasal 1 Spray(s) Both Nostrils two times a day PRN Congestion  traMADol 50 milliGRAM(s) Oral three times a day PRN Moderate Pain (4 - 6)      Vital Signs Last 24 Hrs  T(C): 36.7 (02 Oct 2024 12:24), Max: 37 (02 Oct 2024 04:58)  T(F): 98.1 (02 Oct 2024 12:24), Max: 98.6 (02 Oct 2024 04:58)  HR: 76 (02 Oct 2024 12:24) (74 - 76)  BP: 106/68 (02 Oct 2024 12:24) (106/68 - 138/81)  BP(mean): --  RR: 18 (02 Oct 2024 12:24) (18 - 19)  SpO2: 92% (02 Oct 2024 12:24) (91% - 93%)    Parameters below as of 02 Oct 2024 12:24  Patient On (Oxygen Delivery Method): room air        PHYSICAL EXAM:  Vascular: DP & PT faintly palpable bilaterally, Capillary refill 3 seconds  Neurological: Light touch sensation diminished bilaterally  Musculoskeletal: 4/5 strength in all quadrants bilaterally, AJ & STJ ROM intact  Dermatological: Right 1st metatarsal head medial wound down to skin, subcutaneous tissue, fat, bone, periwound erythema noted, no fluctuance, no malodor, no proximal streaking at this time                          9.1    5.78  )-----------( 151      ( 02 Oct 2024 06:07 )             29.5       10-02    140  |  106  |  24[H]  ----------------------------<  116[H]  3.5   |  27  |  1.20    Ca    9.3      02 Oct 2024 06:07    TPro  6.5  /  Alb  2.7[L]  /  TBili  0.3  /  DBili  x   /  AST  16  /  ALT  19  /  AlkPhos  58  10-02      PT/INR - ( 02 Oct 2024 06:07 )   PT: 14.6 sec;   INR: 1.25 ratio         PTT - ( 01 Oct 2024 13:05 )  PTT:33.7 sec      Culture - Blood (collected 01 Oct 2024 13:05)  Source: .Blood BLOOD  Preliminary Report (02 Oct 2024 19:01):    No growth at 24 hours    Culture - Blood (collected 01 Oct 2024 13:05)  Source: .Blood BLOOD  Preliminary Report (02 Oct 2024 19:01):    No growth at 24 hours    Culture - Wound Aerobic (collected 01 Oct 2024 11:00)  Source: Skin/Wound  Preliminary Report (02 Oct 2024 19:11):    Commensal jameel consistent with body site        Imaging: MRI shows marrow edema of the left 1st metatarsal head

## 2024-10-02 NOTE — DIETITIAN INITIAL EVALUATION ADULT - CALCULATED TO (CAL/KG)
Spoke with pt son and MD message below given. Pt son verb understanding. Med list updated to reflect change in dosage of insulin. [id number]

## 2024-10-02 NOTE — DIETITIAN INITIAL EVALUATION ADULT - REASON FOR ADMISSION
Family medicine note  Chief Complaint   Patient presents with   • Hip Pain     Bilateral hip pain, She will get pain in the joints in hip and then it will radiate to the back and will have a burning sensation..She has been having pain for about 3 months. She does do some stretching and exercises. She says doesn't help much. She does take pain patch, cream or aleeve.        History  Emma Reyes is a 51 year old female who presents for concerns of bilateral hip pain.  Seems lateral, burning sensation in the low back. +FMHx of OA in her mom.   Tried Aleve/ibuprofen. Persistent. She has tried some stretches and icing and hasn't really had much improvement. She would like further evaluation.    I have reviewed the past medical, family and social history sections including the medications and allergies listed in the above medical record as well as the nursing notes.    Patient Active Problem List   Diagnosis   • Mixed hyperlipidemia   • Other emphysema (CMS/HCC)   • Diverticulosis of large intestine without hemorrhage   • Diverticulitis of large intestine   • Abdominal pain, RUQ (right upper quadrant)   • Diarrhea   • Irregular Z line of esophagus   • Hiatal hernia   • Colon polyp       Current Outpatient Medications   Medication Sig   • sertraline (ZOLOFT) 100 MG tablet Take 1 tablet by mouth daily.   • Multiple Vitamins-Minerals (CENTRUM WOMEN PO)    • rosuvastatin (CRESTOR) 20 MG tablet Take 1 tablet by mouth daily.   • dicyclomine (BENTYL) 10 MG capsule Take 1 capsule by mouth 3 times daily.   • Krill Oil 350 MG Cap      No current facility-administered medications for this visit.          Review of systems  Constitutional: Patient denies any fatigue, weight loss, weight gain, fevers.  HENT:  Denies sinus problems, frequent colds, hearing loss, earache, eye pain or vision problems.  Respiratory:  Patient denies any shortness of breath, coughing, wheezing, hemoptysis.  Cardiovascular:  Denies chest pain,  palpitations, or history of murmur.  Gastrointestinal:  Patient denies indigestion, loss of appetite, change in bowel habits, dark stool, blood in stool.  Genitourinary: No frequent urination, no blood in urine, no UTIs (urinary tract infections), no nocturia.  Extremities: No lower extremity edema, varicose veins, history of blood clots.  Musculoskeletal: Bilateral hip pain as mentioned, some mild low back pain.   Skin: No rashes, hives, acne, new moles.  Neurologic: No significant headaches, no convulsions, seizures, weakness, stroke, numbness, problems with concentration.  Psychological: No problems sleeping, suicidal thoughts, anxiety, depression.  Hematology: No history of anemia, bruising easily, no history of blood transfusions.      Physical Exam  Vital Signs:    Vitals:    04/19/19 1100   BP: 136/82   Pulse: 79   SpO2: 96%   Weight: 115.7 kg   Height: 5' 3\" (1.6 m)     Constitutional: Well dressed, well nourished. In no acute distress. Appears stated age.  Integument:  Warm. Dry. No erythema. No rash.  Extremities: Patient has bilateral hips with slightly decreased internal rotation, she has no significant discomfort with internal rotation. She has no difficulty with external rotation and range of motion is well-maintained bilaterally. She has full flexion and extension of bilateral hips. Jennifer's test is mildly positive bilaterally seems to be worse in the left than the right today. She demonstrates tenderness with palpation of the greater trochanteric area. No significant swelling or bruising is noted. Bilateral knees with no significant effusion and full range of motion.  X-rays the hips today demonstrate no significant degenerative changes  Assessment   This is a 51 year old female with lateral hip pain bilateral-IT band syndrome and :  Patient Active Problem List   Diagnosis   • Mixed hyperlipidemia   • Other emphysema (CMS/HCC)   • Diverticulosis of large intestine without hemorrhage   • Diverticulitis  of large intestine   • Abdominal pain, RUQ (right upper quadrant)   • Diarrhea   • Irregular Z line of esophagus   • Hiatal hernia   • Colon polyp     Orders Placed This Encounter   • XR Hip 2 View BILATERAL and Pelvis   • Pneumococcal Polysaccharide Adult Vacc, IM/SQ (PNEUMOVAX 23)       PLAN:  (M25.011,  M25.552) Bilateral hip pain  (primary encounter diagnosis)  Comment: Recommend at this point IT band stretches, icing, consider injection if not improving.  Plan: XR HIP 2 VIEW BILATERAL AND PELVIS          (Z23) Need for vaccination  Comment: Pneumonia shot given  Plan: PNEUMOCOCCAL POLYSACCHARIDE ADULT VACC, IM/SQ         (PNEUMOVAX 23)            No follow-ups on file.     Unspecified open wound, unspecified foot, initial encounter

## 2024-10-02 NOTE — PROGRESS NOTE ADULT - PROBLEM SELECTOR PLAN 1
Given the patient's severity of symptoms, will plan for surgical intervention at this time. Discussed risks, benefits, and potential complications with patient and sister previously regarding surgical intervention including sepsis, loss of limb, and loss of life. All questions answered to satisfaction at this time. Will plan for right 1st metatarsal head resection on 10/3/24 in the operating room.

## 2024-10-02 NOTE — PROGRESS NOTE ADULT - PROBLEM SELECTOR PLAN 3
AWARE FORM WILL BE FAXED TO [facility] TODAY Accuchecks monitoring and insulin corrective regimen  sliding scale coverage with short acting inslulin, add longacting insulin as needed ,no concentrated sweets diet, serial labs ,HbA1C,education

## 2024-10-02 NOTE — CASE MANAGEMENT PROGRESS NOTE - NSCMPROGRESSNOTE_GEN_ALL_CORE
Patient discussed during interdisciplinary round. Patient admitted from [facility] for right foot infection. Patient is for possible right 1st metatarsal head resection, on Dapto. Patient has been going the [facility] wound care clinic. Case  Manager will continue to follow.

## 2024-10-02 NOTE — PROGRESS NOTE ADULT - SUBJECTIVE AND OBJECTIVE BOX
neuro cons dict  seen for left sided ha  scalp and left TA tenderness  high CRP/ESR- suggestive of temporal arteritis  Consider vascular surgery eval  analgesic  brain mri wwo

## 2024-10-02 NOTE — DIETITIAN INITIAL EVALUATION ADULT - PERTINENT LABORATORY DATA
10-02    140  |  106  |  24[H]  ----------------------------<  116[H]  3.5   |  27  |  1.20    Ca    9.3      02 Oct 2024 06:07    TPro  6.5  /  Alb  2.7[L]  /  TBili  0.3  /  DBili  x   /  AST  16  /  ALT  19  /  AlkPhos  58  10-02  POCT Blood Glucose.: 227 mg/dL (10-02-24 @ 11:49)  A1C with Estimated Average Glucose Result: 7.3 % (10-02-24 @ 06:07)  A1C with Estimated Average Glucose Result: 7.7 % (01-10-24 @ 05:30)

## 2024-10-03 DIAGNOSIS — Z87.898 PERSONAL HISTORY OF OTHER SPECIFIED CONDITIONS: ICD-10-CM

## 2024-10-03 PROCEDURE — 28111 PART REMOVAL OF METATARSAL: CPT | Mod: RT

## 2024-10-03 PROCEDURE — 73630 X-RAY EXAM OF FOOT: CPT | Mod: 26,RT

## 2024-10-03 PROCEDURE — 99233 SBSQ HOSP IP/OBS HIGH 50: CPT

## 2024-10-03 PROCEDURE — 88304 TISSUE EXAM BY PATHOLOGIST: CPT | Mod: 26

## 2024-10-03 PROCEDURE — 88311 DECALCIFY TISSUE: CPT | Mod: 26

## 2024-10-03 DEVICE — SURGICEL NU-KNIT 6 X 9": Type: IMPLANTABLE DEVICE | Site: RIGHT | Status: FUNCTIONAL

## 2024-10-03 RX ORDER — SERTRALINE HYDROCHLORIDE 100 MG/1
100 TABLET, FILM COATED ORAL DAILY
Refills: 0 | Status: DISCONTINUED | OUTPATIENT
Start: 2024-10-03 | End: 2024-10-05

## 2024-10-03 RX ORDER — MAG HYDROX/ALUMINUM HYD/SIMETH 200-200-20
30 SUSPENSION, ORAL (FINAL DOSE FORM) ORAL EVERY 4 HOURS
Refills: 0 | Status: DISCONTINUED | OUTPATIENT
Start: 2024-10-03 | End: 2024-10-05

## 2024-10-03 RX ORDER — ONDANSETRON HCL/PF 4 MG/2 ML
4 VIAL (ML) INJECTION ONCE
Refills: 0 | Status: DISCONTINUED | OUTPATIENT
Start: 2024-10-03 | End: 2024-10-03

## 2024-10-03 RX ORDER — SODIUM CHLORIDE IRRIG SOLUTION 0.9 %
1000 SOLUTION, IRRIGATION IRRIGATION
Refills: 0 | Status: DISCONTINUED | OUTPATIENT
Start: 2024-10-03 | End: 2024-10-05

## 2024-10-03 RX ORDER — LACTOBACILLUS ACIDOPHILUS 25MM CELL
1 CAPSULE ORAL EVERY 12 HOURS
Refills: 0 | Status: DISCONTINUED | OUTPATIENT
Start: 2024-10-03 | End: 2024-10-05

## 2024-10-03 RX ORDER — ALCOHOL ANTISEPTIC PADS
25 PADS, MEDICATED (EA) TOPICAL ONCE
Refills: 0 | Status: DISCONTINUED | OUTPATIENT
Start: 2024-10-03 | End: 2024-10-05

## 2024-10-03 RX ORDER — INSULIN LISPRO 100/ML
VIAL (ML) SUBCUTANEOUS
Refills: 0 | Status: DISCONTINUED | OUTPATIENT
Start: 2024-10-03 | End: 2024-10-05

## 2024-10-03 RX ORDER — GLUCAGON INJECTION, SOLUTION 0.5 MG/.1ML
1 INJECTION, SOLUTION SUBCUTANEOUS ONCE
Refills: 0 | Status: DISCONTINUED | OUTPATIENT
Start: 2024-10-03 | End: 2024-10-05

## 2024-10-03 RX ORDER — ACETAMINOPHEN 325 MG
650 TABLET ORAL EVERY 6 HOURS
Refills: 0 | Status: DISCONTINUED | OUTPATIENT
Start: 2024-10-03 | End: 2024-10-05

## 2024-10-03 RX ORDER — ROSUVASTATIN CALCIUM 20 MG/1
40 TABLET, COATED ORAL AT BEDTIME
Refills: 0 | Status: DISCONTINUED | OUTPATIENT
Start: 2024-10-03 | End: 2024-10-05

## 2024-10-03 RX ORDER — INSULIN LISPRO 100/ML
VIAL (ML) SUBCUTANEOUS EVERY 6 HOURS
Refills: 0 | Status: DISCONTINUED | OUTPATIENT
Start: 2024-10-03 | End: 2024-10-03

## 2024-10-03 RX ORDER — SODIUM CHLORIDE IRRIG SOLUTION 0.9 %
1000 SOLUTION, IRRIGATION IRRIGATION
Refills: 0 | Status: DISCONTINUED | OUTPATIENT
Start: 2024-10-03 | End: 2024-10-03

## 2024-10-03 RX ORDER — MONTELUKAST SODIUM 10 MG/1
10 TABLET, FILM COATED ORAL DAILY
Refills: 0 | Status: DISCONTINUED | OUTPATIENT
Start: 2024-10-03 | End: 2024-10-05

## 2024-10-03 RX ORDER — PREGABALIN 25 MG/1
150 CAPSULE ORAL
Refills: 0 | Status: DISCONTINUED | OUTPATIENT
Start: 2024-10-03 | End: 2024-10-05

## 2024-10-03 RX ORDER — FERROUS SULFATE 325(65) MG
325 TABLET ORAL DAILY
Refills: 0 | Status: DISCONTINUED | OUTPATIENT
Start: 2024-10-03 | End: 2024-10-05

## 2024-10-03 RX ORDER — ENOXAPARIN SODIUM 150 MG/ML
40 INJECTION SUBCUTANEOUS EVERY 24 HOURS
Refills: 0 | Status: DISCONTINUED | OUTPATIENT
Start: 2024-10-03 | End: 2024-10-05

## 2024-10-03 RX ORDER — SODIUM CHLORIDE 0.65 %
1 AEROSOL, SPRAY (ML) NASAL
Refills: 0 | Status: DISCONTINUED | OUTPATIENT
Start: 2024-10-03 | End: 2024-10-05

## 2024-10-03 RX ORDER — CLONAZEPAM 1 MG
0.75 TABLET ORAL
Refills: 0 | Status: DISCONTINUED | OUTPATIENT
Start: 2024-10-03 | End: 2024-10-05

## 2024-10-03 RX ORDER — METOPROLOL TARTRATE 50 MG
25 TABLET ORAL DAILY
Refills: 0 | Status: DISCONTINUED | OUTPATIENT
Start: 2024-10-03 | End: 2024-10-05

## 2024-10-03 RX ORDER — CETIRIZINE HYDROCHLORIDE 10 MG/1
10 TABLET ORAL ONCE
Refills: 0 | Status: COMPLETED | OUTPATIENT
Start: 2024-10-03 | End: 2024-10-03

## 2024-10-03 RX ORDER — SENNOSIDES 8.6 MG
2 TABLET ORAL AT BEDTIME
Refills: 0 | Status: DISCONTINUED | OUTPATIENT
Start: 2024-10-03 | End: 2024-10-05

## 2024-10-03 RX ORDER — ALCOHOL ANTISEPTIC PADS
15 PADS, MEDICATED (EA) TOPICAL ONCE
Refills: 0 | Status: DISCONTINUED | OUTPATIENT
Start: 2024-10-03 | End: 2024-10-05

## 2024-10-03 RX ORDER — INSULIN GLARGINE 300 U/ML
7 INJECTION, SOLUTION SUBCUTANEOUS AT BEDTIME
Refills: 0 | Status: DISCONTINUED | OUTPATIENT
Start: 2024-10-03 | End: 2024-10-05

## 2024-10-03 RX ORDER — DAPTOMYCIN 500 MG/10ML
600 INJECTION, POWDER, LYOPHILIZED, FOR SOLUTION INTRAVENOUS EVERY 24 HOURS
Refills: 0 | Status: DISCONTINUED | OUTPATIENT
Start: 2024-10-03 | End: 2024-10-03

## 2024-10-03 RX ORDER — DAPTOMYCIN 500 MG/10ML
500 INJECTION, POWDER, LYOPHILIZED, FOR SOLUTION INTRAVENOUS EVERY 24 HOURS
Refills: 0 | Status: DISCONTINUED | OUTPATIENT
Start: 2024-10-03 | End: 2024-10-05

## 2024-10-03 RX ORDER — BENZONATATE 150 MG/1
100 CAPSULE ORAL THREE TIMES A DAY
Refills: 0 | Status: DISCONTINUED | OUTPATIENT
Start: 2024-10-03 | End: 2024-10-03

## 2024-10-03 RX ORDER — FLUTICASONE PROPION/SALMETEROL 100-50 MCG
1 BLISTER, WITH INHALATION DEVICE INHALATION
Refills: 0 | Status: DISCONTINUED | OUTPATIENT
Start: 2024-10-03 | End: 2024-10-05

## 2024-10-03 RX ORDER — ONDANSETRON HCL/PF 4 MG/2 ML
4 VIAL (ML) INJECTION EVERY 8 HOURS
Refills: 0 | Status: DISCONTINUED | OUTPATIENT
Start: 2024-10-03 | End: 2024-10-05

## 2024-10-03 RX ORDER — HYDROMORPHONE HYDROCHLORIDE 1 MG/ML
0.5 INJECTION, SOLUTION INTRAMUSCULAR; INTRAVENOUS; SUBCUTANEOUS
Refills: 0 | Status: DISCONTINUED | OUTPATIENT
Start: 2024-10-03 | End: 2024-10-03

## 2024-10-03 RX ORDER — PANTOPRAZOLE SODIUM 40 MG/1
40 TABLET, DELAYED RELEASE ORAL
Refills: 0 | Status: DISCONTINUED | OUTPATIENT
Start: 2024-10-03 | End: 2024-10-05

## 2024-10-03 RX ORDER — 5-HYDROXYTRYPTOPHAN (5-HTP) 100 MG
3 TABLET,DISINTEGRATING ORAL AT BEDTIME
Refills: 0 | Status: DISCONTINUED | OUTPATIENT
Start: 2024-10-03 | End: 2024-10-05

## 2024-10-03 RX ORDER — BENZONATATE 150 MG/1
100 CAPSULE ORAL THREE TIMES A DAY
Refills: 0 | Status: DISCONTINUED | OUTPATIENT
Start: 2024-10-03 | End: 2024-10-05

## 2024-10-03 RX ORDER — CEFEPIME 2 G/1
2000 INJECTION, POWDER, FOR SOLUTION INTRAVENOUS EVERY 12 HOURS
Refills: 0 | Status: DISCONTINUED | OUTPATIENT
Start: 2024-10-03 | End: 2024-10-05

## 2024-10-03 RX ORDER — ALCOHOL ANTISEPTIC PADS
12.5 PADS, MEDICATED (EA) TOPICAL ONCE
Refills: 0 | Status: DISCONTINUED | OUTPATIENT
Start: 2024-10-03 | End: 2024-10-05

## 2024-10-03 RX ORDER — TRAMADOL HYDROCHLORIDE 50 MG/1
50 TABLET, COATED ORAL THREE TIMES A DAY
Refills: 0 | Status: DISCONTINUED | OUTPATIENT
Start: 2024-10-03 | End: 2024-10-05

## 2024-10-03 RX ORDER — ALBUTEROL 90 MCG
2 AEROSOL (GRAM) INHALATION EVERY 6 HOURS
Refills: 0 | Status: DISCONTINUED | OUTPATIENT
Start: 2024-10-03 | End: 2024-10-05

## 2024-10-03 RX ADMIN — Medication 650 MILLIGRAM(S): at 14:15

## 2024-10-03 RX ADMIN — PREGABALIN 150 MILLIGRAM(S): 25 CAPSULE ORAL at 05:45

## 2024-10-03 RX ADMIN — CEFEPIME 100 MILLIGRAM(S): 2 INJECTION, POWDER, FOR SOLUTION INTRAVENOUS at 17:29

## 2024-10-03 RX ADMIN — ENOXAPARIN SODIUM 40 MILLIGRAM(S): 150 INJECTION SUBCUTANEOUS at 21:51

## 2024-10-03 RX ADMIN — Medication 2 TABLET(S): at 21:54

## 2024-10-03 RX ADMIN — Medication 0.75 MILLIGRAM(S): at 17:29

## 2024-10-03 RX ADMIN — Medication 325 MILLIGRAM(S): at 13:09

## 2024-10-03 RX ADMIN — Medication 650 MILLIGRAM(S): at 23:00

## 2024-10-03 RX ADMIN — INSULIN GLARGINE 7 UNIT(S): 300 INJECTION, SOLUTION SUBCUTANEOUS at 21:52

## 2024-10-03 RX ADMIN — HYDROMORPHONE HYDROCHLORIDE 0.5 MILLIGRAM(S): 1 INJECTION, SOLUTION INTRAMUSCULAR; INTRAVENOUS; SUBCUTANEOUS at 11:37

## 2024-10-03 RX ADMIN — TRAMADOL HYDROCHLORIDE 50 MILLIGRAM(S): 50 TABLET, COATED ORAL at 14:15

## 2024-10-03 RX ADMIN — Medication 650 MILLIGRAM(S): at 06:15

## 2024-10-03 RX ADMIN — Medication 75 MILLILITER(S): at 11:20

## 2024-10-03 RX ADMIN — Medication 0.75 MILLIGRAM(S): at 05:46

## 2024-10-03 RX ADMIN — Medication 2: at 17:27

## 2024-10-03 RX ADMIN — HYDROMORPHONE HYDROCHLORIDE 0.5 MILLIGRAM(S): 1 INJECTION, SOLUTION INTRAMUSCULAR; INTRAVENOUS; SUBCUTANEOUS at 11:51

## 2024-10-03 RX ADMIN — Medication 1: at 05:53

## 2024-10-03 RX ADMIN — BENZONATATE 100 MILLIGRAM(S): 150 CAPSULE ORAL at 00:54

## 2024-10-03 RX ADMIN — TRAMADOL HYDROCHLORIDE 50 MILLIGRAM(S): 50 TABLET, COATED ORAL at 05:45

## 2024-10-03 RX ADMIN — CETIRIZINE HYDROCHLORIDE 10 MILLIGRAM(S): 10 TABLET ORAL at 01:19

## 2024-10-03 RX ADMIN — TRAMADOL HYDROCHLORIDE 50 MILLIGRAM(S): 50 TABLET, COATED ORAL at 23:00

## 2024-10-03 RX ADMIN — Medication 650 MILLIGRAM(S): at 05:46

## 2024-10-03 RX ADMIN — PANTOPRAZOLE SODIUM 40 MILLIGRAM(S): 40 TABLET, DELAYED RELEASE ORAL at 05:46

## 2024-10-03 RX ADMIN — MONTELUKAST SODIUM 10 MILLIGRAM(S): 10 TABLET, FILM COATED ORAL at 13:10

## 2024-10-03 RX ADMIN — PREGABALIN 150 MILLIGRAM(S): 25 CAPSULE ORAL at 17:29

## 2024-10-03 RX ADMIN — Medication 25 MILLIGRAM(S): at 05:46

## 2024-10-03 RX ADMIN — DAPTOMYCIN 120 MILLIGRAM(S): 500 INJECTION, POWDER, LYOPHILIZED, FOR SOLUTION INTRAVENOUS at 21:47

## 2024-10-03 RX ADMIN — CEFEPIME 100 MILLIGRAM(S): 2 INJECTION, POWDER, FOR SOLUTION INTRAVENOUS at 05:47

## 2024-10-03 RX ADMIN — HYDROMORPHONE HYDROCHLORIDE 0.5 MILLIGRAM(S): 1 INJECTION, SOLUTION INTRAMUSCULAR; INTRAVENOUS; SUBCUTANEOUS at 11:20

## 2024-10-03 RX ADMIN — ROSUVASTATIN CALCIUM 40 MILLIGRAM(S): 20 TABLET, COATED ORAL at 21:54

## 2024-10-03 RX ADMIN — SERTRALINE HYDROCHLORIDE 100 MILLIGRAM(S): 100 TABLET, FILM COATED ORAL at 13:09

## 2024-10-03 RX ADMIN — Medication 1 TABLET(S): at 05:45

## 2024-10-03 RX ADMIN — Medication 650 MILLIGRAM(S): at 22:04

## 2024-10-03 RX ADMIN — TRAMADOL HYDROCHLORIDE 50 MILLIGRAM(S): 50 TABLET, COATED ORAL at 13:15

## 2024-10-03 RX ADMIN — Medication 1 TABLET(S): at 17:28

## 2024-10-03 RX ADMIN — TRAMADOL HYDROCHLORIDE 50 MILLIGRAM(S): 50 TABLET, COATED ORAL at 06:15

## 2024-10-03 RX ADMIN — Medication 17 GRAM(S): at 21:57

## 2024-10-03 RX ADMIN — Medication 1 DOSE(S): at 19:06

## 2024-10-03 RX ADMIN — Medication 1 DOSE(S): at 05:47

## 2024-10-03 RX ADMIN — Medication 650 MILLIGRAM(S): at 13:15

## 2024-10-03 RX ADMIN — TRAMADOL HYDROCHLORIDE 50 MILLIGRAM(S): 50 TABLET, COATED ORAL at 22:02

## 2024-10-03 NOTE — PROGRESS NOTE ADULT - PROBLEM SELECTOR PLAN 1
Tentatively planning for right 1st metatarsal head resection pending vascular evaluation. Continue local wound care and offloading at this time .IV abx as per ID -started on daptomycin 10/03- for right 1st metatarsal head resection  Continue local wound care and offloading at this time .IV abx as per ID -started on daptomycin

## 2024-10-03 NOTE — PROGRESS NOTE ADULT - PROBLEM SELECTOR PLAN 3
Accuchecks monitoring and insulin corrective regimen  sliding scale coverage with short acting inslulin, add longacting insulin as needed ,no concentrated sweets diet, serial labs ,HbA1C,education seen BY NEUROLOGY for left sided ha , recurrent with hx of hospital admission for HA   scalp and left TA tenderness  high CRP/ESR- suggestive of temporal arteritis  Called  vascular surgery eval for biopsy   analgesic  brain mri wwo ,case d/w Dr Mccartney and surg team

## 2024-10-03 NOTE — CONSULT NOTE ADULT - SUBJECTIVE AND OBJECTIVE BOX
Vascular Attending:  Dr Gloria      HPI:  74yo M PMhx DM2 w/ PAD - chronic Rt foot wound, HTN, COPD, CKD, HFpEF, arrythmia, Prostate CA s/p resection, Depression/Anxiety presents to ED sent by wound care for worsening R MTP wound. Patient reports having a nonhealing R foot wound, s/p multiple debridements and grafts at [place], for past 1.5 years. Denies seeing a vascular surgeon in the past.  Denies pain the wound, but reports having neuropathy. States following wound care and reports worsening of wound. Reporting having some chills in the past week. Denies fever, chills, SOB or any other complaints.Patient examined and evaluated at this time. Antibiotics as per infectious disease recommendations. Recommend vascular surgery evaluation. Tentatively planning for right 1st metatarsal head resection pending vascular evaluation. Continue local wound care and offloading at this time. (01 Oct 2024 14:44)    Vascular surgery consulted for headache and temp pain R/O temp arteritis; pt reports recently presented to ED for headache and this has been ongoing, intermittent x 3 weeks.    PAST MEDICAL & SURGICAL HISTORY:  Prostate cancer  Type II diabetes mellitus  Chronic obstructive pulmonary disease (COPD)  CHF (congestive heart failure)  Renal insufficiency  H/O migraine  Insomnia  Constipation  S/P foot surgery      REVIEW OF SYSTEMS  General: fatigue  Skin/Breast: diffuse, non pruritic rash to back, trunk and arms	  Ophthalmologic: h/o double vision, no eye pain or vision loss	  ENMT:	TMJ  Respiratory and Thorax: denies	  Cardiovascular:	denies  Gastrointestinal:	denies  Genitourinary:	denies  Musculoskeletal:	difficulty ambulating  Neurological:	left brow/temp pain with headache intermittent, no N/V, photophobia  Psychiatric: denies    Hematology/Lymphatics:	    Endocrine:	    Allergic/Immunologic:	    MEDICATIONS  (STANDING):  cefepime   IVPB 2000 milliGRAM(s) IV Intermittent every 12 hours  clonazePAM Oral Disintegrating Tablet 0.75 milliGRAM(s) Oral two times a day  DAPTOmycin IVPB 500 milliGRAM(s) IV Intermittent every 24 hours  dextrose 5%. 1000 milliLiter(s) (50 mL/Hr) IV Continuous <Continuous>  dextrose 5%. 1000 milliLiter(s) (100 mL/Hr) IV Continuous <Continuous>  dextrose 50% Injectable 12.5 Gram(s) IV Push once  dextrose 50% Injectable 25 Gram(s) IV Push once  dextrose 50% Injectable 25 Gram(s) IV Push once  enoxaparin Injectable 40 milliGRAM(s) SubCutaneous every 24 hours  ferrous    sulfate 325 milliGRAM(s) Oral daily  fluticasone propionate/ salmeterol 250-50 MICROgram(s) Diskus 1 Dose(s) Inhalation two times a day  glucagon  Injectable 1 milliGRAM(s) IntraMuscular once  influenza  Vaccine (HIGH DOSE) 0.5 milliLiter(s) IntraMuscular once  insulin glargine Injectable (LANTUS) 7 Unit(s) SubCutaneous at bedtime  insulin lispro (ADMELOG) corrective regimen sliding scale   SubCutaneous three times a day before meals  lactobacillus acidophilus 1 Tablet(s) Oral every 12 hours  metoprolol succinate ER 25 milliGRAM(s) Oral daily  montelukast 10 milliGRAM(s) Oral daily  pantoprazole    Tablet 40 milliGRAM(s) Oral before breakfast  pregabalin 150 milliGRAM(s) Oral two times a day  rosuvastatin 40 milliGRAM(s) Oral at bedtime  senna 2 Tablet(s) Oral at bedtime  sertraline 100 milliGRAM(s) Oral daily    MEDICATIONS  (PRN):  acetaminophen     Tablet .. 650 milliGRAM(s) Oral every 6 hours PRN Temp greater or equal to 38C (100.4F), Mild Pain (1 - 3)  albuterol    90 MICROgram(s) HFA Inhaler 2 Puff(s) Inhalation every 6 hours PRN Shortness of Breath and/or Wheezing  aluminum hydroxide/magnesium hydroxide/simethicone Suspension 30 milliLiter(s) Oral every 4 hours PRN Dyspepsia  benzonatate 100 milliGRAM(s) Oral three times a day PRN Cough  dextrose Oral Gel 15 Gram(s) Oral once PRN Blood Glucose LESS THAN 70 milliGRAM(s)/deciliter  melatonin 3 milliGRAM(s) Oral at bedtime PRN Insomnia  ondansetron Injectable 4 milliGRAM(s) IV Push every 8 hours PRN Nausea and/or Vomiting  sodium chloride 0.65% Nasal 1 Spray(s) Both Nostrils two times a day PRN Congestion  traMADol 50 milliGRAM(s) Oral three times a day PRN Moderate Pain (4 - 6)      Allergies  tetracycline (Unknown)  IODINE (Unknown)  vancomycin (Other)    SOCIAL HISTORY: Lives at [facility]isted Living, Reports smoking for 20 years, quit 30 years ago  Reports drinking a glass of wine daily for the past few years        Vital Signs Last 24 Hrs  T(C): 36.7 (03 Oct 2024 12:41), Max: 36.7 (03 Oct 2024 05:17)  T(F): 98 (03 Oct 2024 12:41), Max: 98.1 (03 Oct 2024 05:17)  HR: 73 (03 Oct 2024 12:41) (72 - 82)  BP: 135/76 (03 Oct 2024 12:41) (109/72 - 147/75)  BP(mean): --  RR: 18 (03 Oct 2024 12:41) (13 - 20)  SpO2: 94% (03 Oct 2024 12:41) (91% - 98%)    Parameters below as of 03 Oct 2024 12:41  Patient On (Oxygen Delivery Method): room air        PHYSICAL EXAM:  Constitutional: Elderly M in NAD  Eyes: PERRL; L temp artery not visualized  ENMT:   tender TA region, + TMJ click  Neck: No JVD  Respiratory: CTA  Cardiovascular: normal S1, S2  Gastrointestinal: soft, ND, NT  Extremities: R foot dressing CDI  Neurological: A&O X 3  Skin: diffuse erythematous patchy rash torso, back, upper extremities      LABS:                        9.1    5.78  )-----------( 151      ( 02 Oct 2024 06:07 )             29.5     10-02    140  |  106  |  24[H]  ----------------------------<  116[H]  3.5   |  27  |  1.20    Ca    9.3      02 Oct 2024 06:07    TPro  6.5  /  Alb  2.7[L]  /  TBili  0.3  /  DBili  x   /  AST  16  /  ALT  19  /  AlkPhos  58  10-02    PT/INR - ( 02 Oct 2024 06:07 )   PT: 14.6 sec;   INR: 1.25 ratio           Urinalysis Basic - ( 02 Oct 2024 06:07 )    Color: x / Appearance: x / SG: x / pH: x  Gluc: 116 mg/dL / Ketone: x  / Bili: x / Urobili: x   Blood: x / Protein: x / Nitrite: x   Leuk Esterase: x / RBC: x / WBC x   Sq Epi: x / Non Sq Epi: x / Bacteria: x    Sedimentation Rate, Erythrocyte: 65 mm/hr (09.24.24 @ 14:10)    A1C with Estimated Average Glucose Result: 7.3: Method: Immunoassay       Reference Range                4.0-5.6%       High risk (prediabetic)        5.7-6.4%       Diabetic, diagnostic             >=6.5%       ADA diabetic treatment goal       <7.0%  The Hemoglobin A1c testing is NGSP-certified.Reference ranges are based  upon the 2010 recommendations of  the American Diabetes Association.  Interpretation may vary for children  and adolescents. % (10.02.24 @ 06:07)      RADIOLOGY & ADDITIONAL STUDIES

## 2024-10-03 NOTE — PROGRESS NOTE ADULT - PROBLEM SELECTOR PLAN 4
continue home medications , pulm eval Accuchecks monitoring and insulin corrective regimen  sliding scale coverage with short acting inslulin, add longacting insulin as needed ,no concentrated sweets diet, serial labs ,HbA1C,education

## 2024-10-03 NOTE — PATIENT CHOICE NOTE. - NSPTCHOICENOTES_GEN_ALL_CORE
Patient is opened with [facility] home care services. As per Calli from [facility], She will want resumption of care with the same agency.

## 2024-10-03 NOTE — PROGRESS NOTE ADULT - SUBJECTIVE AND OBJECTIVE BOX
PROGRESS NOTE  Patient is a 73y old  Male who presents with a chief complaint of right foot wound (03 Oct 2024 13:55)    Chart and available morning labs /imaging are reviewed electronically , urgent issues addressed . More information  is being added upon completion of rounds , when more information is collected and management discussed with consultants , medical staff and social service/case management on the floor   OVERNIGHT  No new issues reported by medical staff . All above noted   For OR today , d/w podiatry team  HPI:  74yo M PMhx DM2 w/ PAD - chronic Rt foot wound, HTN, COPD, CKD, HFpEF, arrythmia, Prostate CA s/p resection, Depression/Anxiety presents to ED sent by wound care for worsening R MTP wound. Patient reports having a nonhealing R foot wound, s/p multiple debridements and grafts at [place], for past 1.5 years. Denies seeing a vascular surgeon in the past.  Denies pain the wound, but reports having neuropathy. States following wound care and reports worsening of wound. Reporting having some chills in the past week. Denies fever, chills, SOB or any other complaints.Patient examined and evaluated at this time. Antibiotics as per infectious disease recommendations. Recommend vascular surgery evaluation. Tentatively planning for right 1st metatarsal head resection pending vascular evaluation. Continue local wound care and offloading at this time. (01 Oct 2024 14:44)    PAST MEDICAL & SURGICAL HISTORY:  Prostate cancer      Type II diabetes mellitus      Chronic obstructive pulmonary disease (COPD)      CHF (congestive heart failure)      Renal insufficiency      H/O migraine      Insomnia      Constipation      S/P foot surgery          MEDICATIONS  (STANDING):  cefepime   IVPB 2000 milliGRAM(s) IV Intermittent every 12 hours  clonazePAM Oral Disintegrating Tablet 0.75 milliGRAM(s) Oral two times a day  DAPTOmycin IVPB 500 milliGRAM(s) IV Intermittent every 24 hours  dextrose 5%. 1000 milliLiter(s) (50 mL/Hr) IV Continuous <Continuous>  dextrose 5%. 1000 milliLiter(s) (100 mL/Hr) IV Continuous <Continuous>  dextrose 50% Injectable 25 Gram(s) IV Push once  dextrose 50% Injectable 25 Gram(s) IV Push once  dextrose 50% Injectable 12.5 Gram(s) IV Push once  enoxaparin Injectable 40 milliGRAM(s) SubCutaneous every 24 hours  ferrous    sulfate 325 milliGRAM(s) Oral daily  fluticasone propionate/ salmeterol 250-50 MICROgram(s) Diskus 1 Dose(s) Inhalation two times a day  glucagon  Injectable 1 milliGRAM(s) IntraMuscular once  influenza  Vaccine (HIGH DOSE) 0.5 milliLiter(s) IntraMuscular once  insulin glargine Injectable (LANTUS) 7 Unit(s) SubCutaneous at bedtime  insulin lispro (ADMELOG) corrective regimen sliding scale   SubCutaneous three times a day before meals  lactobacillus acidophilus 1 Tablet(s) Oral every 12 hours  metoprolol succinate ER 25 milliGRAM(s) Oral daily  montelukast 10 milliGRAM(s) Oral daily  pantoprazole    Tablet 40 milliGRAM(s) Oral before breakfast  polyethylene glycol 3350 17 Gram(s) Oral daily  pregabalin 150 milliGRAM(s) Oral two times a day  rosuvastatin 40 milliGRAM(s) Oral at bedtime  senna 2 Tablet(s) Oral at bedtime  sertraline 100 milliGRAM(s) Oral daily    MEDICATIONS  (PRN):  acetaminophen     Tablet .. 650 milliGRAM(s) Oral every 6 hours PRN Temp greater or equal to 38C (100.4F), Mild Pain (1 - 3)  albuterol    90 MICROgram(s) HFA Inhaler 2 Puff(s) Inhalation every 6 hours PRN Shortness of Breath and/or Wheezing  aluminum hydroxide/magnesium hydroxide/simethicone Suspension 30 milliLiter(s) Oral every 4 hours PRN Dyspepsia  benzonatate 100 milliGRAM(s) Oral three times a day PRN Cough  dextrose Oral Gel 15 Gram(s) Oral once PRN Blood Glucose LESS THAN 70 milliGRAM(s)/deciliter  melatonin 3 milliGRAM(s) Oral at bedtime PRN Insomnia  ondansetron Injectable 4 milliGRAM(s) IV Push every 8 hours PRN Nausea and/or Vomiting  sodium chloride 0.65% Nasal 1 Spray(s) Both Nostrils two times a day PRN Congestion  traMADol 50 milliGRAM(s) Oral three times a day PRN Moderate Pain (4 - 6)      OBJECTIVE    T(C): 36.7 (10-03-24 @ 12:41), Max: 36.7 (10-03-24 @ 05:17)  HR: 73 (10-03-24 @ 12:41) (72 - 82)  BP: 135/76 (10-03-24 @ 12:41) (109/72 - 147/75)  RR: 18 (10-03-24 @ 12:41) (13 - 20)  SpO2: 94% (10-03-24 @ 12:41) (91% - 98%)  Wt(kg): --  I&O's Summary    02 Oct 2024 07:01  -  03 Oct 2024 07:00  --------------------------------------------------------  IN: 100 mL / OUT: 0 mL / NET: 100 mL    03 Oct 2024 07:01  -  03 Oct 2024 18:32  --------------------------------------------------------  IN: 0 mL / OUT: 400 mL / NET: -400 mL    Chart and available morning labs /imaging are reviewed electronically , urgent issues addressed . More information  is being added upon completion of rounds , when more information is collected and management discussed with consultants , medical staff and social service/case management on the floor    FOR OR today   LABS:                        9.1    5.78  )-----------( 151      ( 02 Oct 2024 06:07 )             29.5     10-02    140  |  106  |  24[H]  ----------------------------<  116[H]  3.5   |  27  |  1.20    Ca    9.3      02 Oct 2024 06:07    TPro  6.5  /  Alb  2.7[L]  /  TBili  0.3  /  DBili  x   /  AST  16  /  ALT  19  /  AlkPhos  58  10-02    CAPILLARY BLOOD GLUCOSE      POCT Blood Glucose.: 203 mg/dL (03 Oct 2024 16:55)  POCT Blood Glucose.: 137 mg/dL (03 Oct 2024 11:09)  POCT Blood Glucose.: 198 mg/dL (03 Oct 2024 05:45)  POCT Blood Glucose.: 209 mg/dL (02 Oct 2024 21:15)    PT/INR - ( 02 Oct 2024 06:07 )   PT: 14.6 sec;   INR: 1.25 ratio           Urinalysis Basic - ( 02 Oct 2024 06:07 )    Color: x / Appearance: x / SG: x / pH: x  Gluc: 116 mg/dL / Ketone: x  / Bili: x / Urobili: x   Blood: x / Protein: x / Nitrite: x   Leuk Esterase: x / RBC: x / WBC x   Sq Epi: x / Non Sq Epi: x / Bacteria: x        Culture - Blood (collected 01 Oct 2024 13:05)  Source: .Blood BLOOD  Preliminary Report (02 Oct 2024 19:01):    No growth at 24 hours    Culture - Blood (collected 01 Oct 2024 13:05)  Source: .Blood BLOOD  Preliminary Report (02 Oct 2024 19:01):    No growth at 24 hours    Culture - Wound Aerobic (collected 01 Oct 2024 11:00)  Source: Skin/Wound  Preliminary Report (03 Oct 2024 18:09):    Few Staphylococcus aureus    Commensal jameel consistent with body site      RADIOLOGY & ADDITIONAL TESTS:   reviewed elctronically  ASSESSMENT/PLAN: 	    25 minutes aggregate time was spent on this visit, 50% visit time spent in care co-ordination with other attendings and counselling patient .I have discussed care plan with patient / HCP/family member ,who expressed understanding of problems treatment and their effect and side effects, alternatives in details. I have asked if they have any questions and concerns and appropriately addressed them to best of my ability.

## 2024-10-03 NOTE — PATIENT CHOICE NOTE. - NSPTCHOICESTATE_GEN_ALL_CORE

## 2024-10-03 NOTE — CARE COORDINATION ASSESSMENT. - OTHER PERTINENT DISCHARGE PLANNING INFORMATION:
met with patient at bedside to introduce self. Patient has cognitive deficit,  spoke to Calli from [facility].    Patient is from [facility] living facility, admitted for Wound Care, on Cefepime and Dapto.  also called patient's sister Lida Decker, message left with  information. Calli verbalized understanding.  will remain available.

## 2024-10-03 NOTE — CARE COORDINATION ASSESSMENT. - HOME EQUIPMENT YN
Use Therapeutic Ranged Or Therapeutic Target: please select Range or Target Yes Myalgia Treatment: I explained this is common when taking isotretinoin. If this worsens they will contact us. They may try OTC ibuprofen. Headache Monitoring: I recommended monitoring the headaches for now. There is no evidence of increased intracranial pressure. They were instructed to call if the headaches are worsening. Detail Level: Zone Are Labs Available For Review?: Yes Kilograms Preamble Statement (Weight Entered In Details Tab): Reported Weight in kilograms: Pounds Preamble Statement (Weight Entered In Details Tab): Reported Weight in pounds: Cheilitis Normal Treatment: I recommended application of Vaseline or Aquaphor numerous times a day (as often as every hour) and before going to bed. Counseling Text: I reviewed the side effect in detail. Patient should get monthly blood tests, not donate blood, not drive at night if vision affected, and not share medication. Male Completion Statement: After discussing his treatment course we decided to discontinue isotretinoin therapy at this time. He shouldn't donate blood for one month after the last dose. He should call with any new symptoms of depression. Hypercholesterolemia Monitoring: I explained this is common when taking isotretinoin. We will monitor closely. Dosing Month 2 (Required For Cumulative Dosing): 40mg BID Xerosis Normal Treatment: I recommended application of Cetaphil or CeraVe numerous times a day and before going to bed to all dry areas. Upper Range (In Mg/Kg): 150 Nosebleeds Normal Treatment: I explained this is common when taking isotretinoin. I recommended saline mist in each nostril multiple times a day. If this worsens they will contact us. Completed Therapy?: No Hypertriglyceridemia Monitoring: I explained this is common when taking isotretinoin. If this worsens they will contact us. Target Cumulative Dosage (In Mg/Kg): 135 Xerosis Aggressive Treatment: I recommended application of Cetaphil or CeraVe numerous times a day going to bed to all dry areas. I also prescribed a topical steroid for twice daily use. Months Of Therapy Completed: 5 Cheilitis Normal Treatment: I recommended application of Vaseline or Dr. Pedraza's numerous times a day (as often as every hour) and before going to bed. Can use triamcinolone BID to the lips. Female Pregnancy Counseling Text: Female patients should also be on two forms of birth control while taking this medication and for one month after their last dose. Retinoid Dermatitis Aggressive Treatment: I recommended more frequent application of Cetaphil or CeraVe to the areas of dermatitis. I also prescribed a topical steroid for twice daily use until the dermatitis resolves. Next Month's Dosage: Continue Current Dosage Xerosis Normal Treatment: I recommended application of Cetaphil or CeraVe numerous times a day going to bed to all dry areas. Cheilitis Aggressive Treatment: I recommended application of Vaseline or Aquaphor numerous times a day (as often as every hour) and before going to bed. I also prescribed a topical steroid for twice daily use. Patient Weight (Optional But Required For Cumulative Dose-Numbers And Decimals Only): 200 Lower Range (In Mg/Kg): 120 Weight Units: pounds Female Completion Statement: After discussing her treatment course we decided to discontinue isotretinoin therapy at this time. I explained that she would need to continue her birth control methods for at least one month after the last dosage. She should also get a pregnancy test one month after the last dose. She shouldn't donate blood for one month after the last dose. She should call with any new symptoms of depression. Retinoid Dermatitis Normal Treatment: I recommended more frequent application of Cetaphil or CeraVe to the areas of dermatitis. What Is The Patient's Gender: Female Xerosis Aggressive Treatment: I recommended application of Cetaphil or CeraVe numerous times a day and before going to bed to all dry areas. I also prescribed a topical steroid for twice daily use. Retinoid Dermatitis Normal Treatment: I recommended more frequent application of Cetaphil or CeraVe to the areas of dermatitis. Continue triamcinolone to affected areas BID x 3 wks.

## 2024-10-03 NOTE — PROGRESS NOTE ADULT - SUBJECTIVE AND OBJECTIVE BOX
CHIEF COMPLAINT/ REASON FOR VISIT  .. Patient was seen to address the  issue listed under PROBLEM LIST which is located toward bottom of this note     WAYNE SERRANO    PLV 1EAS 113 D1    Allergies    tetracycline (Unknown)  IODINE (Unknown)  vancomycin (Other)    Intolerances        PAST MEDICAL & SURGICAL HISTORY:  Prostate cancer      Type II diabetes mellitus      Chronic obstructive pulmonary disease (COPD)      CHF (congestive heart failure)      Renal insufficiency      H/O migraine      Insomnia      Constipation      S/P foot surgery          FAMILY HISTORY:      Home Medications:  acetaminophen 325 mg oral tablet: 2 tab(s) orally every 8 hours as needed (01 Oct 2024 15:23)  Albuterol (Eqv-ProAir HFA) 90 mcg/inh inhalation aerosol: 2 puff(s) inhaled every 4 hours as needed (01 Oct 2024 15:32)  albuterol 0.63 mg/3 mL (0.021%) inhalation solution: 3 milliliter(s) by nebulizer every 6 hours as needed for SOB (01 Oct 2024 15:27)  azelaic acid 15% topical gel: Apply topically to affected area 2 times a day as needed (01 Oct 2024 15:27)  ferrous sulfate 325 mg (65 mg elemental iron) oral tablet: 1 tab(s) orally once a day (01 Oct 2024 15:15)  fluticasone 50 mcg/inh nasal spray: 1 spray(s) in each nostril 2 times a day as needed (01 Oct 2024 15:29)  furosemide 40 mg oral tablet: 1 tab(s) orally once a day (01 Oct 2024 15:16)  Lantus Solostar Pen 100 units/mL subcutaneous solution: 14 unit(s) subcutaneous once a day (at bedtime) (01 Oct 2024 15:17)  loratadine 10 mg oral tablet: 1 tab(s) orally once a day (in the morning) (01 Oct 2024 15:17)  metFORMIN 500 mg oral tablet: 2 tab(s) orally 2 times a day (01 Oct 2024 15:18)  metoprolol succinate 25 mg oral tablet, extended release: 1 tab(s) orally once a day (01 Oct 2024 15:19)  Opzelura 1.5% topical cream: Apply topically to affected area 2 times a day as needed (01 Oct 2024 15:29)  pregabalin 150 mg oral capsule: 1 cap(s) orally 2 times a day (01 Oct 2024 15:20)  rosuvastatin 40 mg oral tablet: 1 tab(s) orally once a day (01 Oct 2024 15:21)  Saline Mist 0.65% nasal spray: 1 spray(s) intranasally 2 times a day (01 Oct 2024 15:29)  Spiriva 18 mcg inhalation capsule: 1 cap(s) inhaled once a day (01 Oct 2024 15:23)      MEDICATIONS  (STANDING):  cefepime   IVPB 2000 milliGRAM(s) IV Intermittent every 12 hours  clonazePAM Oral Disintegrating Tablet 0.75 milliGRAM(s) Oral two times a day  DAPTOmycin IVPB      DAPTOmycin IVPB 500 milliGRAM(s) IV Intermittent every 24 hours  dextrose 5%. 1000 milliLiter(s) (50 mL/Hr) IV Continuous <Continuous>  dextrose 5%. 1000 milliLiter(s) (100 mL/Hr) IV Continuous <Continuous>  dextrose 50% Injectable 25 Gram(s) IV Push once  dextrose 50% Injectable 12.5 Gram(s) IV Push once  dextrose 50% Injectable 25 Gram(s) IV Push once  enoxaparin Injectable 40 milliGRAM(s) SubCutaneous every 24 hours  ferrous    sulfate 325 milliGRAM(s) Oral daily  fluticasone propionate/ salmeterol 250-50 MICROgram(s) Diskus 1 Dose(s) Inhalation two times a day  glucagon  Injectable 1 milliGRAM(s) IntraMuscular once  influenza  Vaccine (HIGH DOSE) 0.5 milliLiter(s) IntraMuscular once  insulin glargine Injectable (LANTUS) 7 Unit(s) SubCutaneous at bedtime  insulin lispro (ADMELOG) corrective regimen sliding scale   SubCutaneous every 6 hours  lactobacillus acidophilus 1 Tablet(s) Oral every 12 hours  metoprolol succinate ER 25 milliGRAM(s) Oral daily  montelukast 10 milliGRAM(s) Oral daily  pantoprazole    Tablet 40 milliGRAM(s) Oral before breakfast  pregabalin 150 milliGRAM(s) Oral two times a day  rosuvastatin 40 milliGRAM(s) Oral at bedtime  senna 2 Tablet(s) Oral at bedtime  sertraline 100 milliGRAM(s) Oral daily    MEDICATIONS  (PRN):  acetaminophen     Tablet .. 650 milliGRAM(s) Oral every 6 hours PRN Temp greater or equal to 38C (100.4F), Mild Pain (1 - 3)  albuterol    90 MICROgram(s) HFA Inhaler 2 Puff(s) Inhalation every 6 hours PRN Shortness of Breath and/or Wheezing  aluminum hydroxide/magnesium hydroxide/simethicone Suspension 30 milliLiter(s) Oral every 4 hours PRN Dyspepsia  benzonatate 100 milliGRAM(s) Oral three times a day PRN Cough  dextrose Oral Gel 15 Gram(s) Oral once PRN Blood Glucose LESS THAN 70 milliGRAM(s)/deciliter  melatonin 3 milliGRAM(s) Oral at bedtime PRN Insomnia  ondansetron Injectable 4 milliGRAM(s) IV Push every 8 hours PRN Nausea and/or Vomiting  sodium chloride 0.65% Nasal 1 Spray(s) Both Nostrils two times a day PRN Congestion  traMADol 50 milliGRAM(s) Oral three times a day PRN Moderate Pain (4 - 6)      Diet, NPO:   NPO for Procedure/Test     NPO Start Date: 03-Oct-2024,   NPO Start Time: 04:38  Except Medications (10-03-24 @ 04:36) [Active]  Diet, Consistent Carbohydrate w/Evening Snack:   DASH/TLC Sodium & Cholesterol Restricted (10-02-24 @ 11:57) [Active]          Vital Signs Last 24 Hrs  T(C): 36.7 (03 Oct 2024 05:17), Max: 36.7 (02 Oct 2024 12:24)  T(F): 98.1 (03 Oct 2024 05:17), Max: 98.1 (02 Oct 2024 12:24)  HR: 82 (03 Oct 2024 05:17) (76 - 82)  BP: 145/72 (03 Oct 2024 05:17) (106/68 - 145/72)  BP(mean): --  RR: 18 (03 Oct 2024 05:17) (18 - 18)  SpO2: 91% (03 Oct 2024 05:17) (91% - 92%)    Parameters below as of 03 Oct 2024 05:17  Patient On (Oxygen Delivery Method): room air          10-02-24 @ 07:01  -  10-03-24 @ 07:00  --------------------------------------------------------  IN: 100 mL / OUT: 0 mL / NET: 100 mL              LABS:                        9.1    5.78  )-----------( 151      ( 02 Oct 2024 06:07 )             29.5     10-02    140  |  106  |  24[H]  ----------------------------<  116[H]  3.5   |  27  |  1.20    Ca    9.3      02 Oct 2024 06:07    TPro  6.5  /  Alb  2.7[L]  /  TBili  0.3  /  DBili  x   /  AST  16  /  ALT  19  /  AlkPhos  58  10-02    PT/INR - ( 02 Oct 2024 06:07 )   PT: 14.6 sec;   INR: 1.25 ratio         PTT - ( 01 Oct 2024 13:05 )  PTT:33.7 sec  Urinalysis Basic - ( 02 Oct 2024 06:07 )    Color: x / Appearance: x / SG: x / pH: x  Gluc: 116 mg/dL / Ketone: x  / Bili: x / Urobili: x   Blood: x / Protein: x / Nitrite: x   Leuk Esterase: x / RBC: x / WBC x   Sq Epi: x / Non Sq Epi: x / Bacteria: x            WBC:  WBC Count: 5.78 K/uL (10-02 @ 06:07)  WBC Count: 6.68 K/uL (10-01 @ 13:05)      MICROBIOLOGY:  RECENT CULTURES:  10-01 .Blood BLOOD XXXX XXXX   No growth at 24 hours    10-01 Skin/Wound XXXX XXXX   Commensal jameel consistent with body site                PT/INR - ( 02 Oct 2024 06:07 )   PT: 14.6 sec;   INR: 1.25 ratio         PTT - ( 01 Oct 2024 13:05 )  PTT:33.7 sec    Sodium:  Sodium: 140 mmol/L (10-02 @ 06:07)  Sodium: 139 mmol/L (10-01 @ 13:05)      1.20 mg/dL 10-02 @ 06:07  1.30 mg/dL 10-01 @ 13:05      Hemoglobin:  Hemoglobin: 9.1 g/dL (10-02 @ 06:07)  Hemoglobin: 9.8 g/dL (10-01 @ 13:05)      Platelets: Platelet Count - Automated: 151 K/uL (10-02 @ 06:07)  Platelet Count - Automated: 163 K/uL (10-01 @ 13:05)      LIVER FUNCTIONS - ( 02 Oct 2024 06:07 )  Alb: 2.7 g/dL / Pro: 6.5 g/dL / ALK PHOS: 58 U/L / ALT: 19 U/L / AST: 16 U/L / GGT: x             Urinalysis Basic - ( 02 Oct 2024 06:07 )    Color: x / Appearance: x / SG: x / pH: x  Gluc: 116 mg/dL / Ketone: x  / Bili: x / Urobili: x   Blood: x / Protein: x / Nitrite: x   Leuk Esterase: x / RBC: x / WBC x   Sq Epi: x / Non Sq Epi: x / Bacteria: x        RADIOLOGY & ADDITIONAL STUDIES:      MICROBIOLOGY:  RECENT CULTURES:  10-01 .Blood BLOOD XXXX XXXX   No growth at 24 hours    10-01 Skin/Wound XXXX XXXX   Commensal jameel consistent with body site

## 2024-10-03 NOTE — CARE COORDINATION ASSESSMENT. - NSCAREPROVIDERS_GEN_ALL_CORE_FT
CARE PROVIDERS:  Accepting Physician: Ariela Diaz  Administration: Troy De Los Santos  Admitting: Ariela Diaz  Attending: Ariela Diaz  Case Management: Lenora Aldana  Consultant: Bettye Whiting  Consultant: Munir Sandoval  Consultant: Benjamín Costa  Consultant: Jason Joseph  Consultant: John Paul Castellanos  Consultant: David Coreas  Consultant: Dilcia Mccartney  Consultant: Khan, Fahrina  Consultant: Kylie Sweet  Covering Team: Dilcia Mccartney  Covering Team: Radha Nguyen  ED ACP: Flor Crabtree ED Attending: Jatin Fernandez ED Nurse: Cindy Yu  Food & Nutrition Services: Judith Crain  Nurse: Jorden Mcmillan  Nurse: Angi Arzate  Nurse: Lali Olson  Nurse: Silvia Og  Nurse: Nathalie Ronquillo  Nurse: Cassie Bond  Nurse: Page Asencio  Ordered: Doctor, Unknown  Ordered: ADM, User  Override: Tracey Galaviz  Override: Chasity Bass  PCA/Nursing Assistant: Lurdes Somers  PCA/Nursing Assistant: Ana María Lagos  Physical Therapy: Monica Macedo  Primary Team: Behrens, Stuart  Primary Team: Antionette Dueñas  Registered Dietitian: Princess Montejo  Respiratory Therapy: Tano Oliveros  Respiratory Therapy: Rejisalima Macey  Respiratory Therapy: Estuardo Kumari  : Suha Summers

## 2024-10-03 NOTE — CARE COORDINATION ASSESSMENT. - NSPASTMEDSURGHISTORY_GEN_ALL_CORE_FT
PAST MEDICAL & SURGICAL HISTORY:  Renal insufficiency      CHF (congestive heart failure)      Chronic obstructive pulmonary disease (COPD)      Type II diabetes mellitus      Prostate cancer      S/P foot surgery      Constipation      Insomnia      H/O migraine

## 2024-10-03 NOTE — CARE COORDINATION ASSESSMENT. - FACILITY OF RESIDENCE YN
The [facility] living [facility] [phone]. Utilizes [facility] pharmacy/Yes

## 2024-10-03 NOTE — PROGRESS NOTE ADULT - SUBJECTIVE AND OBJECTIVE BOX
Patient is a 73y Male with a known history of :  Wound of right foot [S91.301A]    Cellulitis of right foot [L03.115]    Prostate cancer [C61]    Type II diabetes mellitus [E11.9]    Chronic obstructive pulmonary disease (COPD) [J44.9]    CHF (congestive heart failure) [I50.9]    Renal insufficiency [N28.9]    Prophylactic measure [Z29.9]    MDD (major depressive disorder) [F32.9]    Neuropathy [G62.9]    Constipation [K59.00]      HPI:  74yo M PMhx DM2 w/ PAD - chronic Rt foot wound, HTN, COPD, CKD, HFpEF, arrythmia, Prostate CA s/p resection, Depression/Anxiety presents to ED sent by wound care for worsening R MTP wound. Patient reports having a nonhealing R foot wound, s/p multiple debridements and grafts at [place], for past 1.5 years. Denies seeing a vascular surgeon in the past.  Denies pain the wound, but reports having neuropathy. States following wound care and reports worsening of wound. Reporting having some chills in the past week. Denies fever, chills, SOB or any other complaints.Patient examined and evaluated at this time. Antibiotics as per infectious disease recommendations. Recommend vascular surgery evaluation. Tentatively planning for right 1st metatarsal head resection pending vascular evaluation. Continue local wound care and offloading at this time. (01 Oct 2024 14:44)      REVIEW OF SYSTEMS:    CONSTITUTIONAL: No fever, weight loss, or fatigue  EYES: No eye pain, visual disturbances, or discharge  ENMT:  No difficulty hearing, tinnitus, vertigo; No sinus or throat pain  NECK: No pain or stiffness  BREASTS: No pain, masses, or nipple discharge  RESPIRATORY: No cough, wheezing, chills or hemoptysis; No shortness of breath  CARDIOVASCULAR: No chest pain, palpitations, dizziness, or leg swelling  GASTROINTESTINAL: No abdominal or epigastric pain. No nausea, vomiting, or hematemesis; No diarrhea or constipation. No melena or hematochezia.  GENITOURINARY: No dysuria, frequency, hematuria, or incontinence  NEUROLOGICAL: No headaches, memory loss, loss of strength, numbness, or tremors  SKIN: No itching, burning, rashes, or lesions   LYMPH NODES: No enlarged glands  ENDOCRINE: No heat or cold intolerance; No hair loss  MUSCULOSKELETAL: No joint pain or swelling; No muscle, back, or extremity pain  PSYCHIATRIC: No depression, anxiety, mood swings, or difficulty sleeping  HEME/LYMPH: No easy bruising, or bleeding gums  ALLERGY AND IMMUNOLOGIC: No hives or eczema    MEDICATIONS  (STANDING):  cefepime   IVPB 2000 milliGRAM(s) IV Intermittent every 12 hours  clonazePAM Oral Disintegrating Tablet 0.75 milliGRAM(s) Oral two times a day  DAPTOmycin IVPB      DAPTOmycin IVPB 500 milliGRAM(s) IV Intermittent every 24 hours  dextrose 5%. 1000 milliLiter(s) (50 mL/Hr) IV Continuous <Continuous>  dextrose 5%. 1000 milliLiter(s) (100 mL/Hr) IV Continuous <Continuous>  dextrose 50% Injectable 12.5 Gram(s) IV Push once  dextrose 50% Injectable 25 Gram(s) IV Push once  dextrose 50% Injectable 25 Gram(s) IV Push once  enoxaparin Injectable 40 milliGRAM(s) SubCutaneous every 24 hours  ferrous    sulfate 325 milliGRAM(s) Oral daily  fluticasone propionate/ salmeterol 250-50 MICROgram(s) Diskus 1 Dose(s) Inhalation two times a day  glucagon  Injectable 1 milliGRAM(s) IntraMuscular once  influenza  Vaccine (HIGH DOSE) 0.5 milliLiter(s) IntraMuscular once  insulin glargine Injectable (LANTUS) 7 Unit(s) SubCutaneous at bedtime  insulin lispro (ADMELOG) corrective regimen sliding scale   SubCutaneous every 6 hours  lactobacillus acidophilus 1 Tablet(s) Oral every 12 hours  metoprolol succinate ER 25 milliGRAM(s) Oral daily  montelukast 10 milliGRAM(s) Oral daily  pantoprazole    Tablet 40 milliGRAM(s) Oral before breakfast  pregabalin 150 milliGRAM(s) Oral two times a day  rosuvastatin 40 milliGRAM(s) Oral at bedtime  senna 2 Tablet(s) Oral at bedtime  sertraline 100 milliGRAM(s) Oral daily    MEDICATIONS  (PRN):  acetaminophen     Tablet .. 650 milliGRAM(s) Oral every 6 hours PRN Temp greater or equal to 38C (100.4F), Mild Pain (1 - 3)  albuterol    90 MICROgram(s) HFA Inhaler 2 Puff(s) Inhalation every 6 hours PRN Shortness of Breath and/or Wheezing  aluminum hydroxide/magnesium hydroxide/simethicone Suspension 30 milliLiter(s) Oral every 4 hours PRN Dyspepsia  benzonatate 100 milliGRAM(s) Oral three times a day PRN Cough  dextrose Oral Gel 15 Gram(s) Oral once PRN Blood Glucose LESS THAN 70 milliGRAM(s)/deciliter  melatonin 3 milliGRAM(s) Oral at bedtime PRN Insomnia  ondansetron Injectable 4 milliGRAM(s) IV Push every 8 hours PRN Nausea and/or Vomiting  sodium chloride 0.65% Nasal 1 Spray(s) Both Nostrils two times a day PRN Congestion  traMADol 50 milliGRAM(s) Oral three times a day PRN Moderate Pain (4 - 6)      ALLERGIES: tetracycline (Unknown)  IODINE (Unknown)  vancomycin (Other)      FAMILY HISTORY:      PHYSICAL EXAMINATION:  -----------------------------  T(C): 36.7 (10-03-24 @ 05:17), Max: 36.7 (10-02-24 @ 12:24)  HR: 82 (10-03-24 @ 05:17) (76 - 82)  BP: 145/72 (10-03-24 @ 05:17) (106/68 - 145/72)  RR: 18 (10-03-24 @ 05:17) (18 - 18)  SpO2: 91% (10-03-24 @ 05:17) (91% - 92%)  Wt(kg): --    10-02 @ 07:01  -  10-03 @ 07:00  --------------------------------------------------------  IN:    IV PiggyBack: 50 mL    IV PiggyBack: 50 mL  Total IN: 100 mL    OUT:  Total OUT: 0 mL    Total NET: 100 mL            VITALS  T(C): 36.7 (10-03-24 @ 05:17), Max: 36.7 (10-02-24 @ 12:24)  HR: 82 (10-03-24 @ 05:17) (76 - 82)  BP: 145/72 (10-03-24 @ 05:17) (106/68 - 145/72)  RR: 18 (10-03-24 @ 05:17) (18 - 18)  SpO2: 91% (10-03-24 @ 05:17) (91% - 92%)    Constitutional: well developed, normal appearance, well groomed, well nourished, no deformities and no acute distress.   Eyes: the conjunctiva exhibited no abnormalities and the eyelids demonstrated no xanthelasmas.   HEENT: normal oral mucosa, no oral pallor and no oral cyanosis.   Neck: normal jugular venous A waves present, normal jugular venous V waves present and no jugular venous mahmood A waves.   Pulmonary: no respiratory distress, normal respiratory rhythm and effort, no accessory muscle use and lungs were clear to auscultation bilaterally.   Cardiovascular: heart rate and rhythm were normal, normal S1 and S2 and no murmur, gallop, rub, heave or thrill are present.   Abdomen: soft, non-tender, no hepato-splenomegaly and no abdominal mass palpated.   Musculoskeletal: the gait could not be assessed..   Extremities: no clubbing of the fingernails, no localized cyanosis, no petechial hemorrhages and no ischemic changes.   Skin: normal skin color and pigmentation, no rash, no venous stasis, no skin lesions, no skin ulcer and no xanthoma was observed.   Psychiatric: oriented to person, place, and time, the affect was normal, the mood was normal and not feeling anxious.     LABS:   --------  10-02    140  |  106  |  24[H]  ----------------------------<  116[H]  3.5   |  27  |  1.20    Ca    9.3      02 Oct 2024 06:07    TPro  6.5  /  Alb  2.7[L]  /  TBili  0.3  /  DBili  x   /  AST  16  /  ALT  19  /  AlkPhos  58  10-02                         9.1    5.78  )-----------( 151      ( 02 Oct 2024 06:07 )             29.5     PT/INR - ( 02 Oct 2024 06:07 )   PT: 14.6 sec;   INR: 1.25 ratio         PTT - ( 01 Oct 2024 13:05 )  PTT:33.7 sec        Culture Results:   No growth at 24 hours (10-01 @ 13:05)  Culture Results:   No growth at 24 hours (10-01 @ 13:05)  Culture Results:   Commensal jameel consistent with body site (10-01 @ 11:00)      RADIOLOGY:  -----------------    ECG:     ECHO:

## 2024-10-04 ENCOUNTER — APPOINTMENT (OUTPATIENT)
Dept: PAIN MANAGEMENT | Facility: CLINIC | Age: 74
End: 2024-10-04

## 2024-10-04 LAB
-  CLINDAMYCIN: SIGNIFICANT CHANGE UP
-  DAPTOMYCIN: SIGNIFICANT CHANGE UP
-  ERYTHROMYCIN: SIGNIFICANT CHANGE UP
-  GENTAMICIN: SIGNIFICANT CHANGE UP
-  LINEZOLID: SIGNIFICANT CHANGE UP
-  OXACILLIN: SIGNIFICANT CHANGE UP
-  PENICILLIN: SIGNIFICANT CHANGE UP
-  RIFAMPIN: SIGNIFICANT CHANGE UP
-  TETRACYCLINE: SIGNIFICANT CHANGE UP
-  TRIMETHOPRIM/SULFAMETHOXAZOLE: SIGNIFICANT CHANGE UP
-  VANCOMYCIN: SIGNIFICANT CHANGE UP
ANION GAP SERPL CALC-SCNC: 6 MMOL/L — SIGNIFICANT CHANGE UP (ref 5–17)
BUN SERPL-MCNC: 20 MG/DL — SIGNIFICANT CHANGE UP (ref 7–23)
CALCIUM SERPL-MCNC: 9.1 MG/DL — SIGNIFICANT CHANGE UP (ref 8.5–10.1)
CHLORIDE SERPL-SCNC: 105 MMOL/L — SIGNIFICANT CHANGE UP (ref 96–108)
CO2 SERPL-SCNC: 27 MMOL/L — SIGNIFICANT CHANGE UP (ref 22–31)
CREAT SERPL-MCNC: 1.1 MG/DL — SIGNIFICANT CHANGE UP (ref 0.5–1.3)
CULTURE RESULTS: ABNORMAL
EGFR: 71 ML/MIN/1.73M2 — SIGNIFICANT CHANGE UP
GLUCOSE SERPL-MCNC: 153 MG/DL — HIGH (ref 70–99)
GRAM STN FLD: ABNORMAL
GRAM STN FLD: SIGNIFICANT CHANGE UP
HCT VFR BLD CALC: 27.2 % — LOW (ref 39–50)
HGB BLD-MCNC: 9.1 G/DL — LOW (ref 13–17)
MCHC RBC-ENTMCNC: 30.2 PG — SIGNIFICANT CHANGE UP (ref 27–34)
MCHC RBC-ENTMCNC: 33.5 GM/DL — SIGNIFICANT CHANGE UP (ref 32–36)
MCV RBC AUTO: 90.4 FL — SIGNIFICANT CHANGE UP (ref 80–100)
METHOD TYPE: SIGNIFICANT CHANGE UP
NRBC # BLD: 0 /100 WBCS — SIGNIFICANT CHANGE UP (ref 0–0)
ORGANISM # SPEC MICROSCOPIC CNT: ABNORMAL
ORGANISM # SPEC MICROSCOPIC CNT: SIGNIFICANT CHANGE UP
PLATELET # BLD AUTO: 167 K/UL — SIGNIFICANT CHANGE UP (ref 150–400)
POTASSIUM SERPL-MCNC: 4 MMOL/L — SIGNIFICANT CHANGE UP (ref 3.5–5.3)
POTASSIUM SERPL-SCNC: 4 MMOL/L — SIGNIFICANT CHANGE UP (ref 3.5–5.3)
RBC # BLD: 3.01 M/UL — LOW (ref 4.2–5.8)
RBC # FLD: 14.2 % — SIGNIFICANT CHANGE UP (ref 10.3–14.5)
SODIUM SERPL-SCNC: 138 MMOL/L — SIGNIFICANT CHANGE UP (ref 135–145)
SPECIMEN SOURCE: SIGNIFICANT CHANGE UP
SPECIMEN SOURCE: SIGNIFICANT CHANGE UP
WBC # BLD: 6.65 K/UL — SIGNIFICANT CHANGE UP (ref 3.8–10.5)
WBC # FLD AUTO: 6.65 K/UL — SIGNIFICANT CHANGE UP (ref 3.8–10.5)

## 2024-10-04 PROCEDURE — 99233 SBSQ HOSP IP/OBS HIGH 50: CPT

## 2024-10-04 RX ORDER — MORPHINE SULFATE 30 MG/1
1 TABLET, FILM COATED, EXTENDED RELEASE ORAL EVERY 4 HOURS
Refills: 0 | Status: DISCONTINUED | OUTPATIENT
Start: 2024-10-04 | End: 2024-10-05

## 2024-10-04 RX ORDER — MORPHINE SULFATE 30 MG/1
2 TABLET, FILM COATED, EXTENDED RELEASE ORAL ONCE
Refills: 0 | Status: DISCONTINUED | OUTPATIENT
Start: 2024-10-04 | End: 2024-10-04

## 2024-10-04 RX ORDER — BISACODYL 5 MG/1
10 TABLET, COATED ORAL DAILY
Refills: 0 | Status: DISCONTINUED | OUTPATIENT
Start: 2024-10-04 | End: 2024-10-05

## 2024-10-04 RX ORDER — SODIUM CHLORIDE IRRIG SOLUTION 0.9 %
1000 SOLUTION, IRRIGATION IRRIGATION
Refills: 0 | Status: DISCONTINUED | OUTPATIENT
Start: 2024-10-04 | End: 2024-10-05

## 2024-10-04 RX ADMIN — Medication 25 MILLIGRAM(S): at 06:21

## 2024-10-04 RX ADMIN — MONTELUKAST SODIUM 10 MILLIGRAM(S): 10 TABLET, FILM COATED ORAL at 11:54

## 2024-10-04 RX ADMIN — Medication 1 TABLET(S): at 06:22

## 2024-10-04 RX ADMIN — BISACODYL 10 MILLIGRAM(S): 5 TABLET, COATED ORAL at 10:39

## 2024-10-04 RX ADMIN — INSULIN GLARGINE 7 UNIT(S): 300 INJECTION, SOLUTION SUBCUTANEOUS at 21:37

## 2024-10-04 RX ADMIN — CEFEPIME 100 MILLIGRAM(S): 2 INJECTION, POWDER, FOR SOLUTION INTRAVENOUS at 06:19

## 2024-10-04 RX ADMIN — PANTOPRAZOLE SODIUM 40 MILLIGRAM(S): 40 TABLET, DELAYED RELEASE ORAL at 06:21

## 2024-10-04 RX ADMIN — TRAMADOL HYDROCHLORIDE 50 MILLIGRAM(S): 50 TABLET, COATED ORAL at 18:49

## 2024-10-04 RX ADMIN — Medication 2 TABLET(S): at 21:31

## 2024-10-04 RX ADMIN — Medication 1: at 12:16

## 2024-10-04 RX ADMIN — BENZONATATE 100 MILLIGRAM(S): 150 CAPSULE ORAL at 21:31

## 2024-10-04 RX ADMIN — TRAMADOL HYDROCHLORIDE 50 MILLIGRAM(S): 50 TABLET, COATED ORAL at 13:12

## 2024-10-04 RX ADMIN — TRAMADOL HYDROCHLORIDE 50 MILLIGRAM(S): 50 TABLET, COATED ORAL at 06:50

## 2024-10-04 RX ADMIN — Medication 650 MILLIGRAM(S): at 22:00

## 2024-10-04 RX ADMIN — TRAMADOL HYDROCHLORIDE 50 MILLIGRAM(S): 50 TABLET, COATED ORAL at 12:00

## 2024-10-04 RX ADMIN — TRAMADOL HYDROCHLORIDE 50 MILLIGRAM(S): 50 TABLET, COATED ORAL at 23:42

## 2024-10-04 RX ADMIN — SERTRALINE HYDROCHLORIDE 100 MILLIGRAM(S): 100 TABLET, FILM COATED ORAL at 11:54

## 2024-10-04 RX ADMIN — Medication 1 DOSE(S): at 19:02

## 2024-10-04 RX ADMIN — Medication 650 MILLIGRAM(S): at 06:50

## 2024-10-04 RX ADMIN — PREGABALIN 150 MILLIGRAM(S): 25 CAPSULE ORAL at 17:21

## 2024-10-04 RX ADMIN — Medication 3 MILLIGRAM(S): at 23:44

## 2024-10-04 RX ADMIN — Medication 325 MILLIGRAM(S): at 11:54

## 2024-10-04 RX ADMIN — Medication 1: at 08:06

## 2024-10-04 RX ADMIN — MORPHINE SULFATE 2 MILLIGRAM(S): 30 TABLET, FILM COATED, EXTENDED RELEASE ORAL at 02:39

## 2024-10-04 RX ADMIN — Medication 1 DOSE(S): at 06:24

## 2024-10-04 RX ADMIN — Medication 1 TABLET(S): at 17:21

## 2024-10-04 RX ADMIN — ROSUVASTATIN CALCIUM 40 MILLIGRAM(S): 20 TABLET, COATED ORAL at 21:34

## 2024-10-04 RX ADMIN — MORPHINE SULFATE 2 MILLIGRAM(S): 30 TABLET, FILM COATED, EXTENDED RELEASE ORAL at 03:00

## 2024-10-04 RX ADMIN — Medication 3 MILLIGRAM(S): at 00:23

## 2024-10-04 RX ADMIN — PREGABALIN 150 MILLIGRAM(S): 25 CAPSULE ORAL at 06:21

## 2024-10-04 RX ADMIN — Medication 650 MILLIGRAM(S): at 21:34

## 2024-10-04 RX ADMIN — Medication 0.75 MILLIGRAM(S): at 06:20

## 2024-10-04 RX ADMIN — Medication 650 MILLIGRAM(S): at 06:37

## 2024-10-04 RX ADMIN — Medication 0.75 MILLIGRAM(S): at 17:20

## 2024-10-04 RX ADMIN — Medication 1: at 17:27

## 2024-10-04 RX ADMIN — TRAMADOL HYDROCHLORIDE 50 MILLIGRAM(S): 50 TABLET, COATED ORAL at 06:37

## 2024-10-04 RX ADMIN — CEFEPIME 100 MILLIGRAM(S): 2 INJECTION, POWDER, FOR SOLUTION INTRAVENOUS at 18:40

## 2024-10-04 RX ADMIN — DAPTOMYCIN 120 MILLIGRAM(S): 500 INJECTION, POWDER, LYOPHILIZED, FOR SOLUTION INTRAVENOUS at 21:35

## 2024-10-04 NOTE — PROGRESS NOTE ADULT - SUBJECTIVE AND OBJECTIVE BOX
Patient is a 73y old  Male who presents with a chief complaint of right foot wound (04 Oct 2024 07:56)    Pt continues with intermittent L sided headache; no change in vision. Denies fevers, chills, SOB, CP, photophobia, N/V    MEDICATIONS  (STANDING):  cefepime   IVPB 2000 milliGRAM(s) IV Intermittent every 12 hours  clonazePAM Oral Disintegrating Tablet 0.75 milliGRAM(s) Oral two times a day  DAPTOmycin IVPB 500 milliGRAM(s) IV Intermittent every 24 hours  dextrose 5%. 1000 milliLiter(s) (50 mL/Hr) IV Continuous <Continuous>  dextrose 5%. 1000 milliLiter(s) (100 mL/Hr) IV Continuous <Continuous>  dextrose 50% Injectable 12.5 Gram(s) IV Push once  dextrose 50% Injectable 25 Gram(s) IV Push once  dextrose 50% Injectable 25 Gram(s) IV Push once  enoxaparin Injectable 40 milliGRAM(s) SubCutaneous every 24 hours  ferrous    sulfate 325 milliGRAM(s) Oral daily  fluticasone propionate/ salmeterol 250-50 MICROgram(s) Diskus 1 Dose(s) Inhalation two times a day  glucagon  Injectable 1 milliGRAM(s) IntraMuscular once  influenza  Vaccine (HIGH DOSE) 0.5 milliLiter(s) IntraMuscular once  insulin glargine Injectable (LANTUS) 7 Unit(s) SubCutaneous at bedtime  insulin lispro (ADMELOG) corrective regimen sliding scale   SubCutaneous three times a day before meals  lactobacillus acidophilus 1 Tablet(s) Oral every 12 hours  metoprolol succinate ER 25 milliGRAM(s) Oral daily  montelukast 10 milliGRAM(s) Oral daily  pantoprazole    Tablet 40 milliGRAM(s) Oral before breakfast  polyethylene glycol 3350 17 Gram(s) Oral daily  pregabalin 150 milliGRAM(s) Oral two times a day  rosuvastatin 40 milliGRAM(s) Oral at bedtime  senna 2 Tablet(s) Oral at bedtime  sertraline 100 milliGRAM(s) Oral daily    MEDICATIONS  (PRN):  acetaminophen     Tablet .. 650 milliGRAM(s) Oral every 6 hours PRN Temp greater or equal to 38C (100.4F), Mild Pain (1 - 3)  albuterol    90 MICROgram(s) HFA Inhaler 2 Puff(s) Inhalation every 6 hours PRN Shortness of Breath and/or Wheezing  aluminum hydroxide/magnesium hydroxide/simethicone Suspension 30 milliLiter(s) Oral every 4 hours PRN Dyspepsia  benzonatate 100 milliGRAM(s) Oral three times a day PRN Cough  dextrose Oral Gel 15 Gram(s) Oral once PRN Blood Glucose LESS THAN 70 milliGRAM(s)/deciliter  melatonin 3 milliGRAM(s) Oral at bedtime PRN Insomnia  ondansetron Injectable 4 milliGRAM(s) IV Push every 8 hours PRN Nausea and/or Vomiting  sodium chloride 0.65% Nasal 1 Spray(s) Both Nostrils two times a day PRN Congestion  traMADol 50 milliGRAM(s) Oral three times a day PRN Moderate Pain (4 - 6)      Allergies  tetracycline (Unknown)  IODINE (Unknown)  vancomycin (Other)      Vital Signs Last 24 Hrs  T(C): 37.1 (04 Oct 2024 05:25), Max: 37.1 (03 Oct 2024 21:06)  T(F): 98.7 (04 Oct 2024 05:25), Max: 98.8 (03 Oct 2024 21:06)  HR: 82 (04 Oct 2024 05:25) (72 - 82)  BP: 123/69 (04 Oct 2024 05:25) (109/72 - 147/75)  BP(mean): --  RR: 18 (04 Oct 2024 05:25) (13 - 20)  SpO2: 91% (04 Oct 2024 05:25) (91% - 98%)    Parameters below as of 04 Oct 2024 05:25  Patient On (Oxygen Delivery Method): nasal cannula      I&O's Detail    03 Oct 2024 07:01  -  04 Oct 2024 07:00  --------------------------------------------------------  IN:  Total IN: 0 mL    OUT:    Voided (mL): 400 mL  Total OUT: 400 mL    Total NET: -400 mL    Physical Exam:  Constitutional: Elderly M in NAD  Respiratory: CTA  Cardiovascular: normal S1, S2  Gastrointestinal: soft, ND, NT  Extremities: R foot dressing CDI  Neurological: A&O X 3, non visualized TA, temporal pain with palp, + TMJ click  Skin: diffuse erythematous patchy rash torso, back, upper extremities      LABS:                        9.1    6.65  )-----------( 167      ( 04 Oct 2024 06:06 )             27.2     10-04    138  |  105  |  20  ----------------------------<  153[H]  4.0   |  27  |  1.10    Ca    9.1      04 Oct 2024 06:06    CAPILLARY BLOOD GLUCOSE  POCT Blood Glucose.: 164 mg/dL (04 Oct 2024 07:49)  POCT Blood Glucose.: 189 mg/dL (03 Oct 2024 21:25)  POCT Blood Glucose.: 203 mg/dL (03 Oct 2024 16:55)  POCT Blood Glucose.: 137 mg/dL (03 Oct 2024 11:09)    Sedimentation Rate, Erythrocyte: 65 mm/hr (09.24.24 @ 14:10)        Radiology and Additional Studies:

## 2024-10-04 NOTE — PROGRESS NOTE ADULT - SUBJECTIVE AND OBJECTIVE BOX
PROGRESS NOTE  Patient is a 73y old  Male who presents with a chief complaint of right foot wound (04 Oct 2024 10:09)    Chart and available morning labs /imaging are reviewed electronically , urgent issues addressed . More information  is being added upon completion of rounds , when more information is collected and management discussed with consultants , medical staff and social service/case management on the floor   OVERNIGHT  No new issues reported by medical staff . All above noted Patient is resting in a bed comfortably .Confused ,poor mentation .No distress noted   S/P FOOT SX 10/03 ,temp biopsy planned 10/05  HPI:  72yo M PMhx DM2 w/ PAD - chronic Rt foot wound, HTN, COPD, CKD, HFpEF, arrythmia, Prostate CA s/p resection, Depression/Anxiety presents to ED sent by wound care for worsening R MTP wound. Patient reports having a nonhealing R foot wound, s/p multiple debridements and grafts at [facility], for past 1.5 years. Denies seeing a vascular surgeon in the past.  Denies pain the wound, but reports having neuropathy. States following wound care and reports worsening of wound. Reporting having some chills in the past week. Denies fever, chills, SOB or any other complaints.Patient examined and evaluated at this time. Antibiotics as per infectious disease recommendations. Recommend vascular surgery evaluation. Tentatively planning for right 1st metatarsal head resection pending vascular evaluation. Continue local wound care and offloading at this time. (01 Oct 2024 14:44)    PAST MEDICAL & SURGICAL HISTORY:  Prostate cancer      Type II diabetes mellitus      Chronic obstructive pulmonary disease (COPD)      CHF (congestive heart failure)      Renal insufficiency      H/O migraine      Insomnia      Constipation      S/P foot surgery          MEDICATIONS  (STANDING):  cefepime   IVPB 2000 milliGRAM(s) IV Intermittent every 12 hours  clonazePAM Oral Disintegrating Tablet 0.75 milliGRAM(s) Oral two times a day  DAPTOmycin IVPB 500 milliGRAM(s) IV Intermittent every 24 hours  dextrose 5%. 1000 milliLiter(s) (50 mL/Hr) IV Continuous <Continuous>  dextrose 5%. 1000 milliLiter(s) (100 mL/Hr) IV Continuous <Continuous>  dextrose 50% Injectable 12.5 Gram(s) IV Push once  dextrose 50% Injectable 25 Gram(s) IV Push once  dextrose 50% Injectable 25 Gram(s) IV Push once  enoxaparin Injectable 40 milliGRAM(s) SubCutaneous every 24 hours  ferrous    sulfate 325 milliGRAM(s) Oral daily  fluticasone propionate/ salmeterol 250-50 MICROgram(s) Diskus 1 Dose(s) Inhalation two times a day  glucagon  Injectable 1 milliGRAM(s) IntraMuscular once  influenza  Vaccine (HIGH DOSE) 0.5 milliLiter(s) IntraMuscular once  insulin glargine Injectable (LANTUS) 7 Unit(s) SubCutaneous at bedtime  insulin lispro (ADMELOG) corrective regimen sliding scale   SubCutaneous three times a day before meals  lactobacillus acidophilus 1 Tablet(s) Oral every 12 hours  metoprolol succinate ER 25 milliGRAM(s) Oral daily  montelukast 10 milliGRAM(s) Oral daily  pantoprazole    Tablet 40 milliGRAM(s) Oral before breakfast  polyethylene glycol 3350 17 Gram(s) Oral daily  pregabalin 150 milliGRAM(s) Oral two times a day  rosuvastatin 40 milliGRAM(s) Oral at bedtime  senna 2 Tablet(s) Oral at bedtime  sertraline 100 milliGRAM(s) Oral daily    MEDICATIONS  (PRN):  acetaminophen     Tablet .. 650 milliGRAM(s) Oral every 6 hours PRN Temp greater or equal to 38C (100.4F), Mild Pain (1 - 3)  albuterol    90 MICROgram(s) HFA Inhaler 2 Puff(s) Inhalation every 6 hours PRN Shortness of Breath and/or Wheezing  aluminum hydroxide/magnesium hydroxide/simethicone Suspension 30 milliLiter(s) Oral every 4 hours PRN Dyspepsia  benzonatate 100 milliGRAM(s) Oral three times a day PRN Cough  bisacodyl Suppository 10 milliGRAM(s) Rectal daily PRN Constipation  dextrose Oral Gel 15 Gram(s) Oral once PRN Blood Glucose LESS THAN 70 milliGRAM(s)/deciliter  melatonin 3 milliGRAM(s) Oral at bedtime PRN Insomnia  ondansetron Injectable 4 milliGRAM(s) IV Push every 8 hours PRN Nausea and/or Vomiting  sodium chloride 0.65% Nasal 1 Spray(s) Both Nostrils two times a day PRN Congestion  traMADol 50 milliGRAM(s) Oral three times a day PRN Moderate Pain (4 - 6)      OBJECTIVE    T(C): 36.7 (10-04-24 @ 12:18), Max: 37.1 (10-03-24 @ 21:06)  HR: 88 (10-04-24 @ 12:18) (76 - 88)  BP: 113/66 (10-04-24 @ 12:18) (113/66 - 123/71)  RR: 18 (10-04-24 @ 12:18) (18 - 18)  SpO2: 90% (10-04-24 @ 12:18) (90% - 96%)  Wt(kg): --  I&O's Summary    03 Oct 2024 07:01  -  04 Oct 2024 07:00  --------------------------------------------------------  IN: 0 mL / OUT: 400 mL / NET: -400 mL    04 Oct 2024 07:01  -  04 Oct 2024 15:10  --------------------------------------------------------  IN: 0 mL / OUT: 600 mL / NET: -600 mL          REVIEW OF SYSTEMS:  POC d/w son at bedside   Patient ashkan complains   PHYSICAL EXAM:  Appearance: NAD. VS past 24 hrs -as above   HEENT:   Moist oral mucosa. Conjunctiva clear b/l.   Neck : supple  Respiratory: Lungs CTAB.  Gastrointestinal:  Soft, nontender. No rebound. No rigidity. BS present	  Cardiovascular: RRR ,S1S2 present  Neurologic: Non-focal. Moving all extremities.  Extremities: No edema. No erythema. No calf tenderness.  Skin: No rashes, No ecchymoses, No cyanosis.	  wounds ,skin lesions-See skin assesment flow sheet   LABS:                        9.1    6.65  )-----------( 167      ( 04 Oct 2024 06:06 )             27.2     10-04    138  |  105  |  20  ----------------------------<  153[H]  4.0   |  27  |  1.10    Ca    9.1      04 Oct 2024 06:06      CAPILLARY BLOOD GLUCOSE      POCT Blood Glucose.: 163 mg/dL (04 Oct 2024 12:04)  POCT Blood Glucose.: 164 mg/dL (04 Oct 2024 07:49)  POCT Blood Glucose.: 189 mg/dL (03 Oct 2024 21:25)  POCT Blood Glucose.: 203 mg/dL (03 Oct 2024 16:55)      Urinalysis Basic - ( 04 Oct 2024 06:06 )    Color: x / Appearance: x / SG: x / pH: x  Gluc: 153 mg/dL / Ketone: x  / Bili: x / Urobili: x   Blood: x / Protein: x / Nitrite: x   Leuk Esterase: x / RBC: x / WBC x   Sq Epi: x / Non Sq Epi: x / Bacteria: x        Culture - Tissue with Gram Stain (collected 03 Oct 2024 10:45)  Source: Tissue  Gram Stain (04 Oct 2024 00:34):    No polymorphonuclear cells seen per low power field    No organisms seen per oil power field    Culture - Blood (collected 01 Oct 2024 13:05)  Source: .Blood BLOOD  Preliminary Report (03 Oct 2024 19:00):    No growth at 48 Hours    Culture - Blood (collected 01 Oct 2024 13:05)  Source: .Blood BLOOD  Preliminary Report (03 Oct 2024 19:00):    No growth at 48 Hours    Culture - Wound Aerobic (collected 01 Oct 2024 11:00)  Source: Skin/Wound  Preliminary Report (03 Oct 2024 18:09):    Few Staphylococcus aureus    Commensal jameel consistent with body site      RADIOLOGY & ADDITIONAL TESTS:   reviewed elctronically  ASSESSMENT/PLAN: 	    25 minutes aggregate time was spent on this visit, 50% visit time spent in care co-ordination with other attendings and counselling patient .I have discussed care plan with patient / HCP/family member ,who expressed understanding of problems treatment and their effect and side effects, alternatives in details. I have asked if they have any questions and concerns and appropriately addressed them to best of my ability.

## 2024-10-04 NOTE — PROGRESS NOTE ADULT - SUBJECTIVE AND OBJECTIVE BOX
SUBJECTIVE:  73y year old Male seen at [facility] 1EAS 113 D1 s/p right 1st metatarsal head resection (DOS: 10/3/24). Patient relates no overnight events and states that they are doing well at this time. Denies any fever, chills, nausea, vomiting, chest pain, shortness of breath, or calf pain at this time.    Allergies    tetracycline (Unknown)  IODINE (Unknown)  vancomycin (Other)    Intolerances        MEDICATIONS  (STANDING):  cefepime   IVPB 2000 milliGRAM(s) IV Intermittent every 12 hours  clonazePAM Oral Disintegrating Tablet 0.75 milliGRAM(s) Oral two times a day  DAPTOmycin IVPB 500 milliGRAM(s) IV Intermittent every 24 hours  dextrose 5% + sodium chloride 0.45%. 1000 milliLiter(s) (50 mL/Hr) IV Continuous <Continuous>  dextrose 5%. 1000 milliLiter(s) (50 mL/Hr) IV Continuous <Continuous>  dextrose 5%. 1000 milliLiter(s) (100 mL/Hr) IV Continuous <Continuous>  dextrose 50% Injectable 12.5 Gram(s) IV Push once  dextrose 50% Injectable 25 Gram(s) IV Push once  dextrose 50% Injectable 25 Gram(s) IV Push once  enoxaparin Injectable 40 milliGRAM(s) SubCutaneous every 24 hours  ferrous    sulfate 325 milliGRAM(s) Oral daily  fluticasone propionate/ salmeterol 250-50 MICROgram(s) Diskus 1 Dose(s) Inhalation two times a day  glucagon  Injectable 1 milliGRAM(s) IntraMuscular once  influenza  Vaccine (HIGH DOSE) 0.5 milliLiter(s) IntraMuscular once  insulin glargine Injectable (LANTUS) 7 Unit(s) SubCutaneous at bedtime  insulin lispro (ADMELOG) corrective regimen sliding scale   SubCutaneous three times a day before meals  lactobacillus acidophilus 1 Tablet(s) Oral every 12 hours  metoprolol succinate ER 25 milliGRAM(s) Oral daily  montelukast 10 milliGRAM(s) Oral daily  pantoprazole    Tablet 40 milliGRAM(s) Oral before breakfast  polyethylene glycol 3350 17 Gram(s) Oral daily  pregabalin 150 milliGRAM(s) Oral two times a day  rosuvastatin 40 milliGRAM(s) Oral at bedtime  senna 2 Tablet(s) Oral at bedtime  sertraline 100 milliGRAM(s) Oral daily    MEDICATIONS  (PRN):  acetaminophen     Tablet .. 650 milliGRAM(s) Oral every 6 hours PRN Temp greater or equal to 38C (100.4F), Mild Pain (1 - 3)  albuterol    90 MICROgram(s) HFA Inhaler 2 Puff(s) Inhalation every 6 hours PRN Shortness of Breath and/or Wheezing  aluminum hydroxide/magnesium hydroxide/simethicone Suspension 30 milliLiter(s) Oral every 4 hours PRN Dyspepsia  benzonatate 100 milliGRAM(s) Oral three times a day PRN Cough  bisacodyl Suppository 10 milliGRAM(s) Rectal daily PRN Constipation  dextrose Oral Gel 15 Gram(s) Oral once PRN Blood Glucose LESS THAN 70 milliGRAM(s)/deciliter  melatonin 3 milliGRAM(s) Oral at bedtime PRN Insomnia  morphine  - Injectable 1 milliGRAM(s) IV Push every 4 hours PRN Severe Pain (7 - 10)  ondansetron Injectable 4 milliGRAM(s) IV Push every 8 hours PRN Nausea and/or Vomiting  sodium chloride 0.65% Nasal 1 Spray(s) Both Nostrils two times a day PRN Congestion  traMADol 50 milliGRAM(s) Oral three times a day PRN Moderate Pain (4 - 6)      Vital Signs Last 24 Hrs  T(C): 36.7 (04 Oct 2024 12:18), Max: 37.1 (03 Oct 2024 21:06)  T(F): 98.1 (04 Oct 2024 12:18), Max: 98.8 (03 Oct 2024 21:06)  HR: 88 (04 Oct 2024 12:18) (76 - 88)  BP: 113/66 (04 Oct 2024 12:18) (113/66 - 123/71)  BP(mean): --  RR: 18 (04 Oct 2024 12:18) (18 - 18)  SpO2: 90% (04 Oct 2024 12:18) (90% - 96%)    Parameters below as of 04 Oct 2024 12:18  Patient On (Oxygen Delivery Method): room air        PHYSICAL EXAM:  Vascular: DP & PT faintly palpable bilaterally, Capillary refill 3 seconds  Neurological: Light touch sensation diminished bilaterally  Musculoskeletal: 4/5 strength in all quadrants bilaterally, AJ & STJ ROM intact  Dermatological: Incision site of the right foot noted with intact sutures, no dehiscence, mild natasha-incision erythema, no proximal streaking, no fluctuance, no malodor, no signs of infection at this time. Right 1st metatarsal head medial wound down to skin, subcutaneous tissue, fat, bone, no periwound erythema noted, no fluctuance, no malodor, no proximal streaking at this time                          9.1    6.65  )-----------( 167      ( 04 Oct 2024 06:06 )             27.2       10-04    138  |  105  |  20  ----------------------------<  153[H]  4.0   |  27  |  1.10    Ca    9.1      04 Oct 2024 06:06              Culture - Tissue with Gram Stain (collected 03 Oct 2024 10:45)  Source: Tissue  Gram Stain (04 Oct 2024 17:51):    No polymorphonuclear cells seen per low power field    No organisms seen per oil power field  Preliminary Report (04 Oct 2024 17:51):    Rare Staphylococcus aureus        Imaging: s/p right 1st metatarsal head resection

## 2024-10-04 NOTE — PROGRESS NOTE ADULT - SUBJECTIVE AND OBJECTIVE BOX
CHIEF COMPLAINT/ REASON FOR VISIT  .. Patient was seen to address the  issue listed under PROBLEM LIST which is located toward bottom of this note     WAYNE SERRANO    [facility] 1EAS 113 D1    Allergies    tetracycline (Unknown)  IODINE (Unknown)  vancomycin (Other)    Intolerances        PAST MEDICAL & SURGICAL HISTORY:  Prostate cancer      Type II diabetes mellitus      Chronic obstructive pulmonary disease (COPD)      CHF (congestive heart failure)      Renal insufficiency      H/O migraine      Insomnia      Constipation      S/P foot surgery          FAMILY HISTORY:      Home Medications:  acetaminophen 325 mg oral tablet: 2 tab(s) orally every 8 hours as needed (01 Oct 2024 15:23)  Albuterol (Eqv-ProAir HFA) 90 mcg/inh inhalation aerosol: 2 puff(s) inhaled every 4 hours as needed (01 Oct 2024 15:32)  albuterol 0.63 mg/3 mL (0.021%) inhalation solution: 3 milliliter(s) by nebulizer every 6 hours as needed for SOB (01 Oct 2024 15:27)  azelaic acid 15% topical gel: Apply topically to affected area 2 times a day as needed (01 Oct 2024 15:27)  ferrous sulfate 325 mg (65 mg elemental iron) oral tablet: 1 tab(s) orally once a day (01 Oct 2024 15:15)  fluticasone 50 mcg/inh nasal spray: 1 spray(s) in each nostril 2 times a day as needed (01 Oct 2024 15:29)  furosemide 40 mg oral tablet: 1 tab(s) orally once a day (01 Oct 2024 15:16)  Lantus Solostar Pen 100 units/mL subcutaneous solution: 14 unit(s) subcutaneous once a day (at bedtime) (01 Oct 2024 15:17)  loratadine 10 mg oral tablet: 1 tab(s) orally once a day (in the morning) (01 Oct 2024 15:17)  metFORMIN 500 mg oral tablet: 2 tab(s) orally 2 times a day (01 Oct 2024 15:18)  metoprolol succinate 25 mg oral tablet, extended release: 1 tab(s) orally once a day (01 Oct 2024 15:19)  Opzelura 1.5% topical cream: Apply topically to affected area 2 times a day as needed (01 Oct 2024 15:29)  pregabalin 150 mg oral capsule: 1 cap(s) orally 2 times a day (01 Oct 2024 15:20)  rosuvastatin 40 mg oral tablet: 1 tab(s) orally once a day (01 Oct 2024 15:21)  Saline Mist 0.65% nasal spray: 1 spray(s) intranasally 2 times a day (01 Oct 2024 15:29)  Spiriva 18 mcg inhalation capsule: 1 cap(s) inhaled once a day (01 Oct 2024 15:23)      MEDICATIONS  (STANDING):  cefepime   IVPB 2000 milliGRAM(s) IV Intermittent every 12 hours  clonazePAM Oral Disintegrating Tablet 0.75 milliGRAM(s) Oral two times a day  DAPTOmycin IVPB 500 milliGRAM(s) IV Intermittent every 24 hours  dextrose 5%. 1000 milliLiter(s) (50 mL/Hr) IV Continuous <Continuous>  dextrose 5%. 1000 milliLiter(s) (100 mL/Hr) IV Continuous <Continuous>  dextrose 50% Injectable 12.5 Gram(s) IV Push once  dextrose 50% Injectable 25 Gram(s) IV Push once  dextrose 50% Injectable 25 Gram(s) IV Push once  enoxaparin Injectable 40 milliGRAM(s) SubCutaneous every 24 hours  ferrous    sulfate 325 milliGRAM(s) Oral daily  fluticasone propionate/ salmeterol 250-50 MICROgram(s) Diskus 1 Dose(s) Inhalation two times a day  glucagon  Injectable 1 milliGRAM(s) IntraMuscular once  influenza  Vaccine (HIGH DOSE) 0.5 milliLiter(s) IntraMuscular once  insulin glargine Injectable (LANTUS) 7 Unit(s) SubCutaneous at bedtime  insulin lispro (ADMELOG) corrective regimen sliding scale   SubCutaneous three times a day before meals  lactobacillus acidophilus 1 Tablet(s) Oral every 12 hours  metoprolol succinate ER 25 milliGRAM(s) Oral daily  montelukast 10 milliGRAM(s) Oral daily  pantoprazole    Tablet 40 milliGRAM(s) Oral before breakfast  polyethylene glycol 3350 17 Gram(s) Oral daily  pregabalin 150 milliGRAM(s) Oral two times a day  rosuvastatin 40 milliGRAM(s) Oral at bedtime  senna 2 Tablet(s) Oral at bedtime  sertraline 100 milliGRAM(s) Oral daily    MEDICATIONS  (PRN):  acetaminophen     Tablet .. 650 milliGRAM(s) Oral every 6 hours PRN Temp greater or equal to 38C (100.4F), Mild Pain (1 - 3)  albuterol    90 MICROgram(s) HFA Inhaler 2 Puff(s) Inhalation every 6 hours PRN Shortness of Breath and/or Wheezing  aluminum hydroxide/magnesium hydroxide/simethicone Suspension 30 milliLiter(s) Oral every 4 hours PRN Dyspepsia  benzonatate 100 milliGRAM(s) Oral three times a day PRN Cough  bisacodyl Suppository 10 milliGRAM(s) Rectal daily PRN Constipation  dextrose Oral Gel 15 Gram(s) Oral once PRN Blood Glucose LESS THAN 70 milliGRAM(s)/deciliter  melatonin 3 milliGRAM(s) Oral at bedtime PRN Insomnia  ondansetron Injectable 4 milliGRAM(s) IV Push every 8 hours PRN Nausea and/or Vomiting  sodium chloride 0.65% Nasal 1 Spray(s) Both Nostrils two times a day PRN Congestion  traMADol 50 milliGRAM(s) Oral three times a day PRN Moderate Pain (4 - 6)      Diet, Consistent Carbohydrate w/Evening Snack:   DASH/TLC Sodium & Cholesterol Restricted (10-03-24 @ 11:17) [Active]          Vital Signs Last 24 Hrs  T(C): 37.1 (04 Oct 2024 05:25), Max: 37.1 (03 Oct 2024 21:06)  T(F): 98.7 (04 Oct 2024 05:25), Max: 98.8 (03 Oct 2024 21:06)  HR: 82 (04 Oct 2024 05:25) (72 - 82)  BP: 123/69 (04 Oct 2024 05:25) (109/72 - 147/75)  BP(mean): --  RR: 18 (04 Oct 2024 05:25) (13 - 20)  SpO2: 91% (04 Oct 2024 05:25) (91% - 98%)    Parameters below as of 04 Oct 2024 05:25  Patient On (Oxygen Delivery Method): nasal cannula          10-03-24 @ 07:01  -  10-04-24 @ 07:00  --------------------------------------------------------  IN: 0 mL / OUT: 400 mL / NET: -400 mL              LABS:                        9.1    6.65  )-----------( 167      ( 04 Oct 2024 06:06 )             27.2     10-04    138  |  105  |  20  ----------------------------<  153[H]  4.0   |  27  |  1.10    Ca    9.1      04 Oct 2024 06:06        Urinalysis Basic - ( 04 Oct 2024 06:06 )    Color: x / Appearance: x / SG: x / pH: x  Gluc: 153 mg/dL / Ketone: x  / Bili: x / Urobili: x   Blood: x / Protein: x / Nitrite: x   Leuk Esterase: x / RBC: x / WBC x   Sq Epi: x / Non Sq Epi: x / Bacteria: x            WBC:  WBC Count: 6.65 K/uL (10-04 @ 06:06)  WBC Count: 5.78 K/uL (10-02 @ 06:07)  WBC Count: 6.68 K/uL (10-01 @ 13:05)      MICROBIOLOGY:  RECENT CULTURES:  10-03 Tissue XXXX   No polymorphonuclear cells seen per low power field  No organisms seen per oil power field XXXX    10-01 .Blood BLOOD XXXX XXXX   No growth at 48 Hours    10-01 Skin/Wound XXXX XXXX   Few Staphylococcus aureus  Commensal jameel consistent with body site                    Sodium:  Sodium: 138 mmol/L (10-04 @ 06:06)  Sodium: 140 mmol/L (10-02 @ 06:07)  Sodium: 139 mmol/L (10-01 @ 13:05)      1.10 mg/dL 10-04 @ 06:06  1.20 mg/dL 10-02 @ 06:07  1.30 mg/dL 10-01 @ 13:05      Hemoglobin:  Hemoglobin: 9.1 g/dL (10-04 @ 06:06)  Hemoglobin: 9.1 g/dL (10-02 @ 06:07)  Hemoglobin: 9.8 g/dL (10-01 @ 13:05)      Platelets: Platelet Count - Automated: 167 K/uL (10-04 @ 06:06)  Platelet Count - Automated: 151 K/uL (10-02 @ 06:07)  Platelet Count - Automated: 163 K/uL (10-01 @ 13:05)          Urinalysis Basic - ( 04 Oct 2024 06:06 )    Color: x / Appearance: x / SG: x / pH: x  Gluc: 153 mg/dL / Ketone: x  / Bili: x / Urobili: x   Blood: x / Protein: x / Nitrite: x   Leuk Esterase: x / RBC: x / WBC x   Sq Epi: x / Non Sq Epi: x / Bacteria: x        RADIOLOGY & ADDITIONAL STUDIES:      MICROBIOLOGY:  RECENT CULTURES:  10-03 Tissue XXXX   No polymorphonuclear cells seen per low power field  No organisms seen per oil power field XXXX    10-01 .Blood BLOOD XXXX XXXX   No growth at 48 Hours    10-01 Skin/Wound XXXX XXXX   Few Staphylococcus aureus  Commensal jameel consistent with body site

## 2024-10-04 NOTE — PROGRESS NOTE ADULT - PROBLEM SELECTOR PLAN 1
10/03- for right 1st metatarsal head resection  Continue local wound care and offloading at this time .IV abx as per ID -started on daptomycin

## 2024-10-04 NOTE — CASE MANAGEMENT PROGRESS NOTE - NSCMPROGRESSNOTE_GEN_ALL_CORE
Patient discussed during interdisciplinary round. Patient admitted from [facility] for R foot wound. POD #1 Resection of head of first metatarsal bone. Right foot bone pathology and culture result are pending. Patient remains on Daptomycin and Cefepime IV.  will continue to follow.

## 2024-10-04 NOTE — PROGRESS NOTE ADULT - PROBLEM SELECTOR PLAN 1
Patient examined and evaluated.  Surgical site dressed with aquacel, dry, sterile dressing, and ACE bandage.  Patient is to be partial weight bearing to the heel of the surgical lower extremity as tolerated in a surgical shoe.  Surgical pathology and cultures pending at this time.  Antibiotics as per ID recommendations.

## 2024-10-04 NOTE — PROGRESS NOTE ADULT - SUBJECTIVE AND OBJECTIVE BOX
Patient is a 73y Male with a known history of :  Wound of right foot [S91.301A]    Cellulitis of right foot [L03.115]    Prostate cancer [C61]    Type II diabetes mellitus [E11.9]    Chronic obstructive pulmonary disease (COPD) [J44.9]    CHF (congestive heart failure) [I50.9]    Renal insufficiency [N28.9]    Prophylactic measure [Z29.9]    MDD (major depressive disorder) [F32.9]    Neuropathy [G62.9]    Constipation [K59.00]    History of headache [Z87.898]      HPI:  74yo M PMhx DM2 w/ PAD - chronic Rt foot wound, HTN, COPD, CKD, HFpEF, arrythmia, Prostate CA s/p resection, Depression/Anxiety presents to ED sent by wound care for worsening R MTP wound. Patient reports having a nonhealing R foot wound, s/p multiple debridements and grafts at [place], for past 1.5 years. Denies seeing a vascular surgeon in the past.  Denies pain the wound, but reports having neuropathy. States following wound care and reports worsening of wound. Reporting having some chills in the past week. Denies fever, chills, SOB or any other complaints.Patient examined and evaluated at this time. Antibiotics as per infectious disease recommendations. Recommend vascular surgery evaluation. Tentatively planning for right 1st metatarsal head resection pending vascular evaluation. Continue local wound care and offloading at this time. (01 Oct 2024 14:44)      REVIEW OF SYSTEMS:    CONSTITUTIONAL: No fever, weight loss, or fatigue  EYES: No eye pain, visual disturbances, or discharge  ENMT:  No difficulty hearing, tinnitus, vertigo; No sinus or throat pain  NECK: No pain or stiffness  BREASTS: No pain, masses, or nipple discharge  RESPIRATORY: No cough, wheezing, chills or hemoptysis; No shortness of breath  CARDIOVASCULAR: No chest pain, palpitations, dizziness, or leg swelling  GASTROINTESTINAL: No abdominal or epigastric pain. No nausea, vomiting, or hematemesis; No diarrhea or constipation. No melena or hematochezia.  GENITOURINARY: No dysuria, frequency, hematuria, or incontinence  NEUROLOGICAL: No headaches, memory loss, loss of strength, numbness, or tremors  SKIN: No itching, burning, rashes, or lesions   LYMPH NODES: No enlarged glands  ENDOCRINE: No heat or cold intolerance; No hair loss  MUSCULOSKELETAL: No joint pain or swelling; No muscle, back, or extremity pain  PSYCHIATRIC: No depression, anxiety, mood swings, or difficulty sleeping  HEME/LYMPH: No easy bruising, or bleeding gums  ALLERGY AND IMMUNOLOGIC: No hives or eczema    MEDICATIONS  (STANDING):  cefepime   IVPB 2000 milliGRAM(s) IV Intermittent every 12 hours  clonazePAM Oral Disintegrating Tablet 0.75 milliGRAM(s) Oral two times a day  DAPTOmycin IVPB 500 milliGRAM(s) IV Intermittent every 24 hours  dextrose 5%. 1000 milliLiter(s) (50 mL/Hr) IV Continuous <Continuous>  dextrose 5%. 1000 milliLiter(s) (100 mL/Hr) IV Continuous <Continuous>  dextrose 50% Injectable 25 Gram(s) IV Push once  dextrose 50% Injectable 25 Gram(s) IV Push once  dextrose 50% Injectable 12.5 Gram(s) IV Push once  enoxaparin Injectable 40 milliGRAM(s) SubCutaneous every 24 hours  ferrous    sulfate 325 milliGRAM(s) Oral daily  fluticasone propionate/ salmeterol 250-50 MICROgram(s) Diskus 1 Dose(s) Inhalation two times a day  glucagon  Injectable 1 milliGRAM(s) IntraMuscular once  influenza  Vaccine (HIGH DOSE) 0.5 milliLiter(s) IntraMuscular once  insulin glargine Injectable (LANTUS) 7 Unit(s) SubCutaneous at bedtime  insulin lispro (ADMELOG) corrective regimen sliding scale   SubCutaneous three times a day before meals  lactobacillus acidophilus 1 Tablet(s) Oral every 12 hours  metoprolol succinate ER 25 milliGRAM(s) Oral daily  montelukast 10 milliGRAM(s) Oral daily  pantoprazole    Tablet 40 milliGRAM(s) Oral before breakfast  polyethylene glycol 3350 17 Gram(s) Oral daily  pregabalin 150 milliGRAM(s) Oral two times a day  rosuvastatin 40 milliGRAM(s) Oral at bedtime  senna 2 Tablet(s) Oral at bedtime  sertraline 100 milliGRAM(s) Oral daily    MEDICATIONS  (PRN):  acetaminophen     Tablet .. 650 milliGRAM(s) Oral every 6 hours PRN Temp greater or equal to 38C (100.4F), Mild Pain (1 - 3)  albuterol    90 MICROgram(s) HFA Inhaler 2 Puff(s) Inhalation every 6 hours PRN Shortness of Breath and/or Wheezing  aluminum hydroxide/magnesium hydroxide/simethicone Suspension 30 milliLiter(s) Oral every 4 hours PRN Dyspepsia  benzonatate 100 milliGRAM(s) Oral three times a day PRN Cough  dextrose Oral Gel 15 Gram(s) Oral once PRN Blood Glucose LESS THAN 70 milliGRAM(s)/deciliter  melatonin 3 milliGRAM(s) Oral at bedtime PRN Insomnia  ondansetron Injectable 4 milliGRAM(s) IV Push every 8 hours PRN Nausea and/or Vomiting  sodium chloride 0.65% Nasal 1 Spray(s) Both Nostrils two times a day PRN Congestion  traMADol 50 milliGRAM(s) Oral three times a day PRN Moderate Pain (4 - 6)      ALLERGIES: tetracycline (Unknown)  IODINE (Unknown)  vancomycin (Other)      FAMILY HISTORY:      PHYSICAL EXAMINATION:  -----------------------------  T(C): 37.1 (10-04-24 @ 05:25), Max: 37.1 (10-03-24 @ 21:06)  HR: 82 (10-04-24 @ 05:25) (72 - 82)  BP: 123/69 (10-04-24 @ 05:25) (109/72 - 147/75)  RR: 18 (10-04-24 @ 05:25) (13 - 20)  SpO2: 91% (10-04-24 @ 05:25) (91% - 98%)  Wt(kg): --    10-03 @ 07:01  -  10-04 @ 07:00  --------------------------------------------------------  IN:  Total IN: 0 mL    OUT:    Voided (mL): 400 mL  Total OUT: 400 mL    Total NET: -400 mL        Height (cm): 177.8 (10-03 @ 09:15)  Weight (kg): 99.8 (10-03 @ 09:15)  BMI (kg/m2): 31.6 (10-03 @ 09:15)  BSA (m2): 2.17 (10-03 @ 09:15)    VITALS  T(C): 37.1 (10-04-24 @ 05:25), Max: 37.1 (10-03-24 @ 21:06)  HR: 82 (10-04-24 @ 05:25) (72 - 82)  BP: 123/69 (10-04-24 @ 05:25) (109/72 - 147/75)  RR: 18 (10-04-24 @ 05:25) (13 - 20)  SpO2: 91% (10-04-24 @ 05:25) (91% - 98%)    Constitutional: well developed, normal appearance, well groomed, well nourished, no deformities and no acute distress.   Eyes: the conjunctiva exhibited no abnormalities and the eyelids demonstrated no xanthelasmas.   HEENT: normal oral mucosa, no oral pallor and no oral cyanosis.   Neck: normal jugular venous A waves present, normal jugular venous V waves present and no jugular venous mahmood A waves.   Pulmonary: no respiratory distress, normal respiratory rhythm and effort, no accessory muscle use and lungs were clear to auscultation bilaterally.   Cardiovascular: heart rate and rhythm were normal, normal S1 and S2 and no murmur, gallop, rub, heave or thrill are present.   Abdomen: soft, non-tender, no hepato-splenomegaly and no abdominal mass palpated.   Musculoskeletal: the gait could not be assessed..   Extremities: no clubbing of the fingernails, no localized cyanosis, no petechial hemorrhages and no ischemic changes.   Skin: normal skin color and pigmentation, no rash, no venous stasis, no skin lesions, no skin ulcer and no xanthoma was observed.   Psychiatric: oriented to person, place, and time, the affect was normal, the mood was normal and not feeling anxious.     LABS:   --------  10-04    138  |  105  |  20  ----------------------------<  153[H]  4.0   |  27  |  1.10    Ca    9.1      04 Oct 2024 06:06                           9.1    6.65  )-----------( 167      ( 04 Oct 2024 06:06 )             27.2                 RADIOLOGY:  -----------------    ECG:     ECHO:

## 2024-10-04 NOTE — PROGRESS NOTE ADULT - SUBJECTIVE AND OBJECTIVE BOX
Neurology Follow up note    WAYNE [id number]    HPI:  72yo M PMhx DM2 w/ PAD - chronic Rt foot wound, HTN, COPD, CKD, HFpEF, arrythmia, Prostate CA s/p resection, Depression/Anxiety presents to ED sent by wound care for worsening R MTP wound. Patient reports having a nonhealing R foot wound, s/p multiple debridements and grafts at [place], for past 1.5 years. Denies seeing a vascular surgeon in the past.  Denies pain the wound, but reports having neuropathy. States following wound care and reports worsening of wound. Reporting having some chills in the past week. Denies fever, chills, SOB or any other complaints.Patient examined and evaluated at this time. Antibiotics as per infectious disease recommendations. Recommend vascular surgery evaluation. Tentatively planning for right 1st metatarsal head resection pending vascular evaluation. Continue local wound care and offloading at this time. (01 Oct 2024 14:44)      Interval History -left sided HA    Patient is seen, chart was reviewed and case was discussed with the treatment team.  Pt is not in any distress.   Lying on bed comfortably.   No events reported overnight.       Vital Signs Last 24 Hrs  T(C): 36.7 (04 Oct 2024 12:18), Max: 37.1 (03 Oct 2024 21:06)  T(F): 98.1 (04 Oct 2024 12:18), Max: 98.8 (03 Oct 2024 21:06)  HR: 88 (04 Oct 2024 12:18) (76 - 88)  BP: 113/66 (04 Oct 2024 12:18) (113/66 - 123/71)  BP(mean): --  RR: 18 (04 Oct 2024 12:18) (18 - 18)  SpO2: 90% (04 Oct 2024 12:18) (90% - 96%)    Parameters below as of 04 Oct 2024 12:18  Patient On (Oxygen Delivery Method): room air            REVIEW OF SYSTEMS:    Constitutional: No fever, weight loss or fatigue  Eyes: No eye pain, visual disturbances, or discharge  ENT:  No difficulty hearing, tinnitus, vertigo; No sinus or throat pain  Neck: No pain or stiffness  Respiratory: No cough, wheezing, chills or hemoptysis  Cardiovascular: No chest pain, palpitations, shortness of breath, dizziness or leg swelling  Gastrointestinal: No abdominal or epigastric pain. No nausea, vomiting or hematemesis  Genitourinary: No dysuria, frequency, hematuria or incontinence  Neurological: No memory loss, loss of strength, numbness or tremors  Psychiatric: No depression, anxiety, mood swings or difficulty sleeping  Musculoskeletal: No joint pain or swelling; No muscle, back or extremity pain  Skin: No itching, burning, rashes or lesions   Lymph Nodes: No enlarged glands  Endocrine: No heat or cold intolerance; No hair loss    Allergy and Immunologic: No hives or eczema    On Neurological Examination:    Mental Status - Pt is alert, awake, oriented X3.. Follows commands well and able to answer questions appropriately.Mood and affect  normal    Speech -  Normal.    Cranial Nerves - Pupils 3 mm equal and reactive to light, extraocular eye movements intact. Pt has no visual field deficit.  Pt has no facial asymmetry. Facial sensation is intact.Tongue - is in midline.    Muscle tone - is normal        Motor Exam - 55 OF ue  'le 4/5   No drift. No shaking or tremors.    Sensory Exam - . Pt withdraws all extremities equally on stimulation. No asymmetry seen. No complaints of tingling, numbness.        coordination:    Finger to nose: normal      Deep tendon Reflexes - 2 plus all over.     .    Neck Supple -  Yes.     MEDICATIONS    acetaminophen     Tablet .. 650 milliGRAM(s) Oral every 6 hours PRN  albuterol    90 MICROgram(s) HFA Inhaler 2 Puff(s) Inhalation every 6 hours PRN  aluminum hydroxide/magnesium hydroxide/simethicone Suspension 30 milliLiter(s) Oral every 4 hours PRN  benzonatate 100 milliGRAM(s) Oral three times a day PRN  bisacodyl Suppository 10 milliGRAM(s) Rectal daily PRN  cefepime   IVPB 2000 milliGRAM(s) IV Intermittent every 12 hours  clonazePAM Oral Disintegrating Tablet 0.75 milliGRAM(s) Oral two times a day  DAPTOmycin IVPB 500 milliGRAM(s) IV Intermittent every 24 hours  dextrose 5% + sodium chloride 0.45%. 1000 milliLiter(s) IV Continuous <Continuous>  dextrose 5%. 1000 milliLiter(s) IV Continuous <Continuous>  dextrose 5%. 1000 milliLiter(s) IV Continuous <Continuous>  dextrose 50% Injectable 12.5 Gram(s) IV Push once  dextrose 50% Injectable 25 Gram(s) IV Push once  dextrose 50% Injectable 25 Gram(s) IV Push once  dextrose Oral Gel 15 Gram(s) Oral once PRN  enoxaparin Injectable 40 milliGRAM(s) SubCutaneous every 24 hours  ferrous    sulfate 325 milliGRAM(s) Oral daily  fluticasone propionate/ salmeterol 250-50 MICROgram(s) Diskus 1 Dose(s) Inhalation two times a day  glucagon  Injectable 1 milliGRAM(s) IntraMuscular once  influenza  Vaccine (HIGH DOSE) 0.5 milliLiter(s) IntraMuscular once  insulin glargine Injectable (LANTUS) 7 Unit(s) SubCutaneous at bedtime  insulin lispro (ADMELOG) corrective regimen sliding scale   SubCutaneous three times a day before meals  lactobacillus acidophilus 1 Tablet(s) Oral every 12 hours  melatonin 3 milliGRAM(s) Oral at bedtime PRN  metoprolol succinate ER 25 milliGRAM(s) Oral daily  montelukast 10 milliGRAM(s) Oral daily  morphine  - Injectable 1 milliGRAM(s) IV Push every 4 hours PRN  ondansetron Injectable 4 milliGRAM(s) IV Push every 8 hours PRN  pantoprazole    Tablet 40 milliGRAM(s) Oral before breakfast  polyethylene glycol 3350 17 Gram(s) Oral daily  pregabalin 150 milliGRAM(s) Oral two times a day  rosuvastatin 40 milliGRAM(s) Oral at bedtime  senna 2 Tablet(s) Oral at bedtime  sertraline 100 milliGRAM(s) Oral daily  sodium chloride 0.65% Nasal 1 Spray(s) Both Nostrils two times a day PRN  traMADol 50 milliGRAM(s) Oral three times a day PRN      Allergies    tetracycline (Unknown)  IODINE (Unknown)  vancomycin (Other)    Intolerances        LABS:  CBC Full  -  ( 04 Oct 2024 06:06 )  WBC Count : 6.65 K/uL  RBC Count : 3.01 M/uL  Hemoglobin : 9.1 g/dL  Hematocrit : 27.2 %  Platelet Count - Automated : 167 K/uL  Mean Cell Volume : 90.4 fl  Mean Cell Hemoglobin : 30.2 pg  Mean Cell Hemoglobin Concentration : 33.5 gm/dL  Auto Neutrophil # : x  Auto Lymphocyte # : x  Auto Monocyte # : x  Auto Eosinophil # : x  Auto Basophil # : x  Auto Neutrophil % : x  Auto Lymphocyte % : x  Auto Monocyte % : x  Auto Eosinophil % : x  Auto Basophil % : x    Urinalysis Basic - ( 04 Oct 2024 06:06 )    Color: x / Appearance: x / SG: x / pH: x  Gluc: 153 mg/dL / Ketone: x  / Bili: x / Urobili: x   Blood: x / Protein: x / Nitrite: x   Leuk Esterase: x / RBC: x / WBC x   Sq Epi: x / Non Sq Epi: x / Bacteria: x      10-04    138  |  105  |  20  ----------------------------<  153[H]  4.0   |  27  |  1.10    Ca    9.1      04 Oct 2024 06:06      Hemoglobin A1C:     Vitamin B12     RADIOLOGY    ASSESSMENT AND PLAN:      seen for HA  R/O TA    FOR TA BIOPSY  BRAIN MRI IS PENDING  ANALGESIC  Physical therapy evaluation.  OOB to chair/ambulation with assistance only.  Advanced care planning was discussed with family.  Pain is accessed and addressed.  Plan of care was discussed with family. Questions answered.  Would continue to follow.

## 2024-10-04 NOTE — PROGRESS NOTE ADULT - PROBLEM SELECTOR PLAN 3
seen BY NEUROLOGY for left sided ha , recurrent with hx of hospital admission for HA   scalp and left TA tenderness  high CRP/ESR- suggestive of temporal arteritis  Called  vascular surgery eval for biopsy   analgesic  brain mri wwo ,case d/w Dr Mccartney and surg team

## 2024-10-04 NOTE — PROGRESS NOTE ADULT - SUBJECTIVE AND OBJECTIVE BOX
Interval History:    CENTRAL LINE:   [  ] YES       [  ] NO  MORRISSEY:                 [  ] YES       [  ] NO         REVIEW OF SYSTEMS:  All Systems below were reviewed and are negative [  ]  HEENT:  ID:  Pulmonary:  Cardiac:  GI:  Renal:  Musculoskeletal:  All other systems above were reviewed and are negative   [  ]      MEDICATIONS  (STANDING):  cefepime   IVPB 2000 milliGRAM(s) IV Intermittent every 12 hours  clonazePAM Oral Disintegrating Tablet 0.75 milliGRAM(s) Oral two times a day  DAPTOmycin IVPB 500 milliGRAM(s) IV Intermittent every 24 hours  dextrose 5% + sodium chloride 0.45%. 1000 milliLiter(s) (50 mL/Hr) IV Continuous <Continuous>  dextrose 5%. 1000 milliLiter(s) (50 mL/Hr) IV Continuous <Continuous>  dextrose 5%. 1000 milliLiter(s) (100 mL/Hr) IV Continuous <Continuous>  dextrose 50% Injectable 12.5 Gram(s) IV Push once  dextrose 50% Injectable 25 Gram(s) IV Push once  dextrose 50% Injectable 25 Gram(s) IV Push once  enoxaparin Injectable 40 milliGRAM(s) SubCutaneous every 24 hours  ferrous    sulfate 325 milliGRAM(s) Oral daily  fluticasone propionate/ salmeterol 250-50 MICROgram(s) Diskus 1 Dose(s) Inhalation two times a day  glucagon  Injectable 1 milliGRAM(s) IntraMuscular once  influenza  Vaccine (HIGH DOSE) 0.5 milliLiter(s) IntraMuscular once  insulin glargine Injectable (LANTUS) 7 Unit(s) SubCutaneous at bedtime  insulin lispro (ADMELOG) corrective regimen sliding scale   SubCutaneous three times a day before meals  lactobacillus acidophilus 1 Tablet(s) Oral every 12 hours  metoprolol succinate ER 25 milliGRAM(s) Oral daily  montelukast 10 milliGRAM(s) Oral daily  pantoprazole    Tablet 40 milliGRAM(s) Oral before breakfast  polyethylene glycol 3350 17 Gram(s) Oral daily  pregabalin 150 milliGRAM(s) Oral two times a day  rosuvastatin 40 milliGRAM(s) Oral at bedtime  senna 2 Tablet(s) Oral at bedtime  sertraline 100 milliGRAM(s) Oral daily    MEDICATIONS  (PRN):  acetaminophen     Tablet .. 650 milliGRAM(s) Oral every 6 hours PRN Temp greater or equal to 38C (100.4F), Mild Pain (1 - 3)  albuterol    90 MICROgram(s) HFA Inhaler 2 Puff(s) Inhalation every 6 hours PRN Shortness of Breath and/or Wheezing  aluminum hydroxide/magnesium hydroxide/simethicone Suspension 30 milliLiter(s) Oral every 4 hours PRN Dyspepsia  benzonatate 100 milliGRAM(s) Oral three times a day PRN Cough  bisacodyl Suppository 10 milliGRAM(s) Rectal daily PRN Constipation  dextrose Oral Gel 15 Gram(s) Oral once PRN Blood Glucose LESS THAN 70 milliGRAM(s)/deciliter  melatonin 3 milliGRAM(s) Oral at bedtime PRN Insomnia  ondansetron Injectable 4 milliGRAM(s) IV Push every 8 hours PRN Nausea and/or Vomiting  sodium chloride 0.65% Nasal 1 Spray(s) Both Nostrils two times a day PRN Congestion  traMADol 50 milliGRAM(s) Oral three times a day PRN Moderate Pain (4 - 6)      Vital Signs Last 24 Hrs  T(C): 36.7 (04 Oct 2024 12:18), Max: 37.1 (03 Oct 2024 21:06)  T(F): 98.1 (04 Oct 2024 12:18), Max: 98.8 (03 Oct 2024 21:06)  HR: 88 (04 Oct 2024 12:18) (76 - 88)  BP: 113/66 (04 Oct 2024 12:18) (113/66 - 123/71)  BP(mean): --  RR: 18 (04 Oct 2024 12:18) (18 - 18)  SpO2: 90% (04 Oct 2024 12:18) (90% - 96%)    Parameters below as of 04 Oct 2024 12:18  Patient On (Oxygen Delivery Method): room air        I&O's Summary    03 Oct 2024 07:01  -  04 Oct 2024 07:00  --------------------------------------------------------  IN: 0 mL / OUT: 400 mL / NET: -400 mL    04 Oct 2024 07:01  -  04 Oct 2024 17:10  --------------------------------------------------------  IN: 0 mL / OUT: 600 mL / NET: -600 mL        PHYSICAL EXAM:  HEENT: NC/AT; PERRLA  Neck: Soft; no tenderness  Lungs: CTA bilaterally; no wheezing.   Heart:  Abdomen:  Genital/ Rectal:  Extremities:  Neurologic:  Vascular:      LABORATORY:    CBC Full  -  ( 04 Oct 2024 06:06 )  WBC Count : 6.65 K/uL  RBC Count : 3.01 M/uL  Hemoglobin : 9.1 g/dL  Hematocrit : 27.2 %  Platelet Count - Automated : 167 K/uL  Mean Cell Volume : 90.4 fl  Mean Cell Hemoglobin : 30.2 pg  Mean Cell Hemoglobin Concentration : 33.5 gm/dL  Auto Neutrophil # : x  Auto Lymphocyte # : x  Auto Monocyte # : x  Auto Eosinophil # : x  Auto Basophil # : x  Auto Neutrophil % : x  Auto Lymphocyte % : x  Auto Monocyte % : x  Auto Eosinophil % : x  Auto Basophil % : x      ESR:                   09-24 @ 14:10  65    C-Reactive Protein:     09-24 @ 14:10  28    Procalcitonin:           09-24 @ 14:10   --      10-04    138  |  105  |  20  ----------------------------<  153[H]  4.0   |  27  |  1.10    Ca    9.1      04 Oct 2024 06:06            Assessment and Plan:          David Coreas MD   [phone].  No fevers  Comfortable.        MEDICATIONS  (STANDING):  cefepime   IVPB 2000 milliGRAM(s) IV Intermittent every 12 hours  clonazePAM Oral Disintegrating Tablet 0.75 milliGRAM(s) Oral two times a day  DAPTOmycin IVPB 500 milliGRAM(s) IV Intermittent every 24 hours  dextrose 5% + sodium chloride 0.45%. 1000 milliLiter(s) (50 mL/Hr) IV Continuous <Continuous>  dextrose 5%. 1000 milliLiter(s) (50 mL/Hr) IV Continuous <Continuous>  dextrose 5%. 1000 milliLiter(s) (100 mL/Hr) IV Continuous <Continuous>  dextrose 50% Injectable 12.5 Gram(s) IV Push once  dextrose 50% Injectable 25 Gram(s) IV Push once  dextrose 50% Injectable 25 Gram(s) IV Push once  enoxaparin Injectable 40 milliGRAM(s) SubCutaneous every 24 hours  ferrous    sulfate 325 milliGRAM(s) Oral daily  fluticasone propionate/ salmeterol 250-50 MICROgram(s) Diskus 1 Dose(s) Inhalation two times a day  glucagon  Injectable 1 milliGRAM(s) IntraMuscular once  influenza  Vaccine (HIGH DOSE) 0.5 milliLiter(s) IntraMuscular once  insulin glargine Injectable (LANTUS) 7 Unit(s) SubCutaneous at bedtime  insulin lispro (ADMELOG) corrective regimen sliding scale   SubCutaneous three times a day before meals  lactobacillus acidophilus 1 Tablet(s) Oral every 12 hours  metoprolol succinate ER 25 milliGRAM(s) Oral daily  montelukast 10 milliGRAM(s) Oral daily  pantoprazole    Tablet 40 milliGRAM(s) Oral before breakfast  polyethylene glycol 3350 17 Gram(s) Oral daily  pregabalin 150 milliGRAM(s) Oral two times a day  rosuvastatin 40 milliGRAM(s) Oral at bedtime  senna 2 Tablet(s) Oral at bedtime  sertraline 100 milliGRAM(s) Oral daily    MEDICATIONS  (PRN):  acetaminophen     Tablet .. 650 milliGRAM(s) Oral every 6 hours PRN Temp greater or equal to 38C (100.4F), Mild Pain (1 - 3)  albuterol    90 MICROgram(s) HFA Inhaler 2 Puff(s) Inhalation every 6 hours PRN Shortness of Breath and/or Wheezing  aluminum hydroxide/magnesium hydroxide/simethicone Suspension 30 milliLiter(s) Oral every 4 hours PRN Dyspepsia  benzonatate 100 milliGRAM(s) Oral three times a day PRN Cough  bisacodyl Suppository 10 milliGRAM(s) Rectal daily PRN Constipation  dextrose Oral Gel 15 Gram(s) Oral once PRN Blood Glucose LESS THAN 70 milliGRAM(s)/deciliter  melatonin 3 milliGRAM(s) Oral at bedtime PRN Insomnia  ondansetron Injectable 4 milliGRAM(s) IV Push every 8 hours PRN Nausea and/or Vomiting  sodium chloride 0.65% Nasal 1 Spray(s) Both Nostrils two times a day PRN Congestion  traMADol 50 milliGRAM(s) Oral three times a day PRN Moderate Pain (4 - 6)      Vital Signs Last 24 Hrs  T(C): 36.7 (04 Oct 2024 12:18), Max: 37.1 (03 Oct 2024 21:06)  T(F): 98.1 (04 Oct 2024 12:18), Max: 98.8 (03 Oct 2024 21:06)  HR: 88 (04 Oct 2024 12:18) (76 - 88)  BP: 113/66 (04 Oct 2024 12:18) (113/66 - 123/71)  BP(mean): --  RR: 18 (04 Oct 2024 12:18) (18 - 18)  SpO2: 90% (04 Oct 2024 12:18) (90% - 96%)    Parameters below as of 04 Oct 2024 12:18  Patient On (Oxygen Delivery Method): room air        I&O's Summary    03 Oct 2024 07:01  -  04 Oct 2024 07:00  --------------------------------------------------------  IN: 0 mL / OUT: 400 mL / NET: -400 mL    04 Oct 2024 07:01  -  04 Oct 2024 17:10  --------------------------------------------------------  IN: 0 mL / OUT: 600 mL / NET: -600 mL        PHYSICAL EXAM:  HEENT: NC/AT; PERRLA  Neck: Soft; no tenderness  Lungs: CTA bilaterally; no wheezing.   Heart:  Abdomen:  Genital/ Rectal:  Extremities:  Neurologic:  Vascular:      LABORATORY:    CBC Full  -  ( 04 Oct 2024 06:06 )  WBC Count : 6.65 K/uL  RBC Count : 3.01 M/uL  Hemoglobin : 9.1 g/dL  Hematocrit : 27.2 %  Platelet Count - Automated : 167 K/uL  Mean Cell Volume : 90.4 fl  Mean Cell Hemoglobin : 30.2 pg  Mean Cell Hemoglobin Concentration : 33.5 gm/dL  Auto Neutrophil # : x  Auto Lymphocyte # : x  Auto Monocyte # : x  Auto Eosinophil # : x  Auto Basophil # : x  Auto Neutrophil % : x  Auto Lymphocyte % : x  Auto Monocyte % : x  Auto Eosinophil % : x  Auto Basophil % : x      ESR:                   09-24 @ 14:10  65    C-Reactive Protein:     09-24 @ 14:10  28    Procalcitonin:           09-24 @ 14:10   --      10-04    138  |  105  |  20  ----------------------------<  153[H]  4.0   |  27  |  1.10    Ca    9.1      04 Oct 2024 06:06      Assessment and Plan:    1. R foot with infected ulcer likely with osteomyelitis.  2. CKD  3. Chronic body rash.      . Continue IV Dapto 500 mg daily and  IV Cefepime 2 gm q12h   . Waiting for Podiatry follow up. When cleared by podiatry for discharge, get Picc line to complete 6 weeks of IV Daptomycin and Cefepime at the rehab.  . Discharge planning to rehab.   . Wound care daily.       David Coreas MD   [phone].  No fevers  Comfortable.      MEDICATIONS  (STANDING):  cefepime   IVPB 2000 milliGRAM(s) IV Intermittent every 12 hours  clonazePAM Oral Disintegrating Tablet 0.75 milliGRAM(s) Oral two times a day  DAPTOmycin IVPB 500 milliGRAM(s) IV Intermittent every 24 hours  dextrose 5% + sodium chloride 0.45%. 1000 milliLiter(s) (50 mL/Hr) IV Continuous <Continuous>  dextrose 5%. 1000 milliLiter(s) (50 mL/Hr) IV Continuous <Continuous>  dextrose 5%. 1000 milliLiter(s) (100 mL/Hr) IV Continuous <Continuous>  dextrose 50% Injectable 12.5 Gram(s) IV Push once  dextrose 50% Injectable 25 Gram(s) IV Push once  dextrose 50% Injectable 25 Gram(s) IV Push once  enoxaparin Injectable 40 milliGRAM(s) SubCutaneous every 24 hours  ferrous    sulfate 325 milliGRAM(s) Oral daily  fluticasone propionate/ salmeterol 250-50 MICROgram(s) Diskus 1 Dose(s) Inhalation two times a day  glucagon  Injectable 1 milliGRAM(s) IntraMuscular once  influenza  Vaccine (HIGH DOSE) 0.5 milliLiter(s) IntraMuscular once  insulin glargine Injectable (LANTUS) 7 Unit(s) SubCutaneous at bedtime  insulin lispro (ADMELOG) corrective regimen sliding scale   SubCutaneous three times a day before meals  lactobacillus acidophilus 1 Tablet(s) Oral every 12 hours  metoprolol succinate ER 25 milliGRAM(s) Oral daily  montelukast 10 milliGRAM(s) Oral daily  pantoprazole    Tablet 40 milliGRAM(s) Oral before breakfast  polyethylene glycol 3350 17 Gram(s) Oral daily  pregabalin 150 milliGRAM(s) Oral two times a day  rosuvastatin 40 milliGRAM(s) Oral at bedtime  senna 2 Tablet(s) Oral at bedtime  sertraline 100 milliGRAM(s) Oral daily    MEDICATIONS  (PRN):  acetaminophen     Tablet .. 650 milliGRAM(s) Oral every 6 hours PRN Temp greater or equal to 38C (100.4F), Mild Pain (1 - 3)  albuterol    90 MICROgram(s) HFA Inhaler 2 Puff(s) Inhalation every 6 hours PRN Shortness of Breath and/or Wheezing  aluminum hydroxide/magnesium hydroxide/simethicone Suspension 30 milliLiter(s) Oral every 4 hours PRN Dyspepsia  benzonatate 100 milliGRAM(s) Oral three times a day PRN Cough  bisacodyl Suppository 10 milliGRAM(s) Rectal daily PRN Constipation  dextrose Oral Gel 15 Gram(s) Oral once PRN Blood Glucose LESS THAN 70 milliGRAM(s)/deciliter  melatonin 3 milliGRAM(s) Oral at bedtime PRN Insomnia  ondansetron Injectable 4 milliGRAM(s) IV Push every 8 hours PRN Nausea and/or Vomiting  sodium chloride 0.65% Nasal 1 Spray(s) Both Nostrils two times a day PRN Congestion  traMADol 50 milliGRAM(s) Oral three times a day PRN Moderate Pain (4 - 6)      Vital Signs Last 24 Hrs  T(C): 36.7 (04 Oct 2024 12:18), Max: 37.1 (03 Oct 2024 21:06)  T(F): 98.1 (04 Oct 2024 12:18), Max: 98.8 (03 Oct 2024 21:06)  HR: 88 (04 Oct 2024 12:18) (76 - 88)  BP: 113/66 (04 Oct 2024 12:18) (113/66 - 123/71)  BP(mean): --  RR: 18 (04 Oct 2024 12:18) (18 - 18)  SpO2: 90% (04 Oct 2024 12:18) (90% - 96%)    Parameters below as of 04 Oct 2024 12:18  Patient On (Oxygen Delivery Method): room air        I&O's Summary    03 Oct 2024 07:01  -  04 Oct 2024 07:00  --------------------------------------------------------  IN: 0 mL / OUT: 400 mL / NET: -400 mL    04 Oct 2024 07:01  -  04 Oct 2024 17:10  --------------------------------------------------------  IN: 0 mL / OUT: 600 mL / NET: -600 mL        PHYSICAL EXAM:  HEENT: NC/AT; PERRLA  Neck: Soft; no tenderness  Lungs: Coarse BS bilaterally; no wheezing.   Heart: RRR, no murmurs.   Abdomen: Soft, no tenderness. No masses.   Genital/ Rectal: No doan catheter.  Extremities: R foot with clean dressing.   Neurologic: Awake.      LABORATORY:    CBC Full  -  ( 04 Oct 2024 06:06 )  WBC Count : 6.65 K/uL  RBC Count : 3.01 M/uL  Hemoglobin : 9.1 g/dL  Hematocrit : 27.2 %  Platelet Count - Automated : 167 K/uL  Mean Cell Volume : 90.4 fl  Mean Cell Hemoglobin : 30.2 pg  Mean Cell Hemoglobin Concentration : 33.5 gm/dL  Auto Neutrophil # : x  Auto Lymphocyte # : x  Auto Monocyte # : x  Auto Eosinophil # : x  Auto Basophil # : x  Auto Neutrophil % : x  Auto Lymphocyte % : x  Auto Monocyte % : x  Auto Eosinophil % : x  Auto Basophil % : x      ESR:                   09-24 @ 14:10  65    C-Reactive Protein:     09-24 @ 14:10  28    Procalcitonin:           09-24 @ 14:10   --      10-04    138  |  105  |  20  ----------------------------<  153[H]  4.0   |  27  |  1.10    Ca    9.1      04 Oct 2024 06:06      Assessment and Plan:    1. R foot ulcer with osteomyelitis, s/p resection of the R 1st metatarsal head.   2. CKD  3. Chronic body rash.    . He had resection of the R 1st metatarsal head. Waiting for report of bone culture and pathologies.   . Continue IV Dapto 500 mg daily and  IV Cefepime 2 gm q12h for now.   . If bone culture and pathology showed osteomyelitis,  get Picc line to complete 6 weeks of IV Daptomycin and Cefepime at the rehab.  . Wound care daily.   . Waiting for biopsy of left temporal artery.    David Coreas MD   [phone].

## 2024-10-05 LAB
-  CLINDAMYCIN: SIGNIFICANT CHANGE UP
-  DAPTOMYCIN: SIGNIFICANT CHANGE UP
-  ERYTHROMYCIN: SIGNIFICANT CHANGE UP
-  GENTAMICIN: SIGNIFICANT CHANGE UP
-  LINEZOLID: SIGNIFICANT CHANGE UP
-  OXACILLIN: SIGNIFICANT CHANGE UP
-  PENICILLIN: SIGNIFICANT CHANGE UP
-  RIFAMPIN: SIGNIFICANT CHANGE UP
-  TETRACYCLINE: SIGNIFICANT CHANGE UP
-  TRIMETHOPRIM/SULFAMETHOXAZOLE: SIGNIFICANT CHANGE UP
-  VANCOMYCIN: SIGNIFICANT CHANGE UP
ANION GAP SERPL CALC-SCNC: 6 MMOL/L — SIGNIFICANT CHANGE UP (ref 5–17)
BUN SERPL-MCNC: 26 MG/DL — HIGH (ref 7–23)
CALCIUM SERPL-MCNC: 9.3 MG/DL — SIGNIFICANT CHANGE UP (ref 8.5–10.1)
CHLORIDE SERPL-SCNC: 106 MMOL/L — SIGNIFICANT CHANGE UP (ref 96–108)
CO2 SERPL-SCNC: 25 MMOL/L — SIGNIFICANT CHANGE UP (ref 22–31)
CREAT SERPL-MCNC: 1.1 MG/DL — SIGNIFICANT CHANGE UP (ref 0.5–1.3)
EGFR: 71 ML/MIN/1.73M2 — SIGNIFICANT CHANGE UP
GLUCOSE SERPL-MCNC: 294 MG/DL — HIGH (ref 70–99)
HCT VFR BLD CALC: 28.1 % — LOW (ref 39–50)
HGB BLD-MCNC: 8.7 G/DL — LOW (ref 13–17)
INR BLD: 1.21 RATIO — HIGH (ref 0.85–1.16)
MAGNESIUM SERPL-MCNC: 2.3 MG/DL — SIGNIFICANT CHANGE UP (ref 1.6–2.6)
MCHC RBC-ENTMCNC: 28.6 PG — SIGNIFICANT CHANGE UP (ref 27–34)
MCHC RBC-ENTMCNC: 31 GM/DL — LOW (ref 32–36)
MCV RBC AUTO: 92.4 FL — SIGNIFICANT CHANGE UP (ref 80–100)
METHOD TYPE: SIGNIFICANT CHANGE UP
NRBC # BLD: 0 /100 WBCS — SIGNIFICANT CHANGE UP (ref 0–0)
PHOSPHATE SERPL-MCNC: 3.7 MG/DL — SIGNIFICANT CHANGE UP (ref 2.5–4.5)
PLATELET # BLD AUTO: 168 K/UL — SIGNIFICANT CHANGE UP (ref 150–400)
POTASSIUM SERPL-MCNC: 3.9 MMOL/L — SIGNIFICANT CHANGE UP (ref 3.5–5.3)
POTASSIUM SERPL-SCNC: 3.9 MMOL/L — SIGNIFICANT CHANGE UP (ref 3.5–5.3)
PROTHROM AB SERPL-ACNC: 14.3 SEC — HIGH (ref 9.9–13.4)
RBC # BLD: 3.04 M/UL — LOW (ref 4.2–5.8)
RBC # FLD: 14.6 % — HIGH (ref 10.3–14.5)
SODIUM SERPL-SCNC: 137 MMOL/L — SIGNIFICANT CHANGE UP (ref 135–145)
WBC # BLD: 5.72 K/UL — SIGNIFICANT CHANGE UP (ref 3.8–10.5)
WBC # FLD AUTO: 5.72 K/UL — SIGNIFICANT CHANGE UP (ref 3.8–10.5)

## 2024-10-05 PROCEDURE — 88304 TISSUE EXAM BY PATHOLOGIST: CPT | Mod: 26

## 2024-10-05 PROCEDURE — 99233 SBSQ HOSP IP/OBS HIGH 50: CPT

## 2024-10-05 PROCEDURE — 37609 LIGATION/BX TEMPORAL ARTERY: CPT | Mod: LT

## 2024-10-05 DEVICE — SURGICEL FIBRILLAR 2 X 4": Type: IMPLANTABLE DEVICE | Site: LEFT | Status: FUNCTIONAL

## 2024-10-05 DEVICE — CLIP APPLIER ETHICON LIGACLIP 9 3/8" SMALL: Type: IMPLANTABLE DEVICE | Site: LEFT | Status: FUNCTIONAL

## 2024-10-05 RX ORDER — SODIUM CHLORIDE IRRIG SOLUTION 0.9 %
1000 SOLUTION, IRRIGATION IRRIGATION
Refills: 0 | Status: DISCONTINUED | OUTPATIENT
Start: 2024-10-05 | End: 2024-10-11

## 2024-10-05 RX ORDER — SERTRALINE HYDROCHLORIDE 100 MG/1
100 TABLET, FILM COATED ORAL DAILY
Refills: 0 | Status: DISCONTINUED | OUTPATIENT
Start: 2024-10-05 | End: 2024-10-11

## 2024-10-05 RX ORDER — ROSUVASTATIN CALCIUM 20 MG/1
40 TABLET, COATED ORAL AT BEDTIME
Refills: 0 | Status: DISCONTINUED | OUTPATIENT
Start: 2024-10-05 | End: 2024-10-11

## 2024-10-05 RX ORDER — MONTELUKAST SODIUM 10 MG/1
10 TABLET, FILM COATED ORAL DAILY
Refills: 0 | Status: DISCONTINUED | OUTPATIENT
Start: 2024-10-05 | End: 2024-10-11

## 2024-10-05 RX ORDER — BISACODYL 5 MG/1
10 TABLET, COATED ORAL DAILY
Refills: 0 | Status: DISCONTINUED | OUTPATIENT
Start: 2024-10-05 | End: 2024-10-11

## 2024-10-05 RX ORDER — PREGABALIN 25 MG/1
150 CAPSULE ORAL
Refills: 0 | Status: DISCONTINUED | OUTPATIENT
Start: 2024-10-05 | End: 2024-10-11

## 2024-10-05 RX ORDER — METOPROLOL TARTRATE 50 MG
25 TABLET ORAL DAILY
Refills: 0 | Status: DISCONTINUED | OUTPATIENT
Start: 2024-10-06 | End: 2024-10-11

## 2024-10-05 RX ORDER — FERROUS SULFATE 325(65) MG
325 TABLET ORAL DAILY
Refills: 0 | Status: DISCONTINUED | OUTPATIENT
Start: 2024-10-06 | End: 2024-10-11

## 2024-10-05 RX ORDER — ALCOHOL ANTISEPTIC PADS
12.5 PADS, MEDICATED (EA) TOPICAL ONCE
Refills: 0 | Status: DISCONTINUED | OUTPATIENT
Start: 2024-10-05 | End: 2024-10-11

## 2024-10-05 RX ORDER — CLONAZEPAM 1 MG
0.75 TABLET ORAL
Refills: 0 | Status: DISCONTINUED | OUTPATIENT
Start: 2024-10-05 | End: 2024-10-11

## 2024-10-05 RX ORDER — ONDANSETRON HCL/PF 4 MG/2 ML
4 VIAL (ML) INJECTION ONCE
Refills: 0 | Status: DISCONTINUED | OUTPATIENT
Start: 2024-10-05 | End: 2024-10-05

## 2024-10-05 RX ORDER — ALBUTEROL 90 MCG
2 AEROSOL (GRAM) INHALATION EVERY 6 HOURS
Refills: 0 | Status: DISCONTINUED | OUTPATIENT
Start: 2024-10-05 | End: 2024-10-11

## 2024-10-05 RX ORDER — GLUCAGON INJECTION, SOLUTION 0.5 MG/.1ML
1 INJECTION, SOLUTION SUBCUTANEOUS ONCE
Refills: 0 | Status: DISCONTINUED | OUTPATIENT
Start: 2024-10-05 | End: 2024-10-11

## 2024-10-05 RX ORDER — SODIUM CHLORIDE 0.65 %
1 AEROSOL, SPRAY (ML) NASAL
Refills: 0 | Status: DISCONTINUED | OUTPATIENT
Start: 2024-10-05 | End: 2024-10-11

## 2024-10-05 RX ORDER — ALCOHOL ANTISEPTIC PADS
25 PADS, MEDICATED (EA) TOPICAL ONCE
Refills: 0 | Status: DISCONTINUED | OUTPATIENT
Start: 2024-10-05 | End: 2024-10-11

## 2024-10-05 RX ORDER — PANTOPRAZOLE SODIUM 40 MG/1
40 TABLET, DELAYED RELEASE ORAL
Refills: 0 | Status: DISCONTINUED | OUTPATIENT
Start: 2024-10-06 | End: 2024-10-11

## 2024-10-05 RX ORDER — CEFEPIME 2 G/1
2000 INJECTION, POWDER, FOR SOLUTION INTRAVENOUS EVERY 12 HOURS
Refills: 0 | Status: DISCONTINUED | OUTPATIENT
Start: 2024-10-05 | End: 2024-10-07

## 2024-10-05 RX ORDER — LACTOBACILLUS ACIDOPHILUS 25MM CELL
1 CAPSULE ORAL EVERY 12 HOURS
Refills: 0 | Status: DISCONTINUED | OUTPATIENT
Start: 2024-10-05 | End: 2024-10-11

## 2024-10-05 RX ORDER — ALCOHOL ANTISEPTIC PADS
15 PADS, MEDICATED (EA) TOPICAL ONCE
Refills: 0 | Status: DISCONTINUED | OUTPATIENT
Start: 2024-10-05 | End: 2024-10-11

## 2024-10-05 RX ORDER — DAPTOMYCIN 500 MG/10ML
600 INJECTION, POWDER, LYOPHILIZED, FOR SOLUTION INTRAVENOUS EVERY 24 HOURS
Refills: 0 | Status: DISCONTINUED | OUTPATIENT
Start: 2024-10-05 | End: 2024-10-05

## 2024-10-05 RX ORDER — ONDANSETRON HCL/PF 4 MG/2 ML
4 VIAL (ML) INJECTION EVERY 8 HOURS
Refills: 0 | Status: DISCONTINUED | OUTPATIENT
Start: 2024-10-05 | End: 2024-10-11

## 2024-10-05 RX ORDER — INSULIN LISPRO 100/ML
VIAL (ML) SUBCUTANEOUS
Refills: 0 | Status: DISCONTINUED | OUTPATIENT
Start: 2024-10-05 | End: 2024-10-11

## 2024-10-05 RX ORDER — MAG HYDROX/ALUMINUM HYD/SIMETH 200-200-20
30 SUSPENSION, ORAL (FINAL DOSE FORM) ORAL EVERY 4 HOURS
Refills: 0 | Status: DISCONTINUED | OUTPATIENT
Start: 2024-10-05 | End: 2024-10-11

## 2024-10-05 RX ORDER — TRAMADOL HYDROCHLORIDE 50 MG/1
50 TABLET, COATED ORAL THREE TIMES A DAY
Refills: 0 | Status: DISCONTINUED | OUTPATIENT
Start: 2024-10-05 | End: 2024-10-11

## 2024-10-05 RX ORDER — BENZONATATE 150 MG/1
100 CAPSULE ORAL THREE TIMES A DAY
Refills: 0 | Status: DISCONTINUED | OUTPATIENT
Start: 2024-10-05 | End: 2024-10-11

## 2024-10-05 RX ORDER — ENOXAPARIN SODIUM 150 MG/ML
40 INJECTION SUBCUTANEOUS EVERY 24 HOURS
Refills: 0 | Status: DISCONTINUED | OUTPATIENT
Start: 2024-10-06 | End: 2024-10-11

## 2024-10-05 RX ORDER — SENNOSIDES 8.6 MG
2 TABLET ORAL AT BEDTIME
Refills: 0 | Status: DISCONTINUED | OUTPATIENT
Start: 2024-10-05 | End: 2024-10-11

## 2024-10-05 RX ORDER — SODIUM CHLORIDE IRRIG SOLUTION 0.9 %
1000 SOLUTION, IRRIGATION IRRIGATION
Refills: 0 | Status: DISCONTINUED | OUTPATIENT
Start: 2024-10-05 | End: 2024-10-05

## 2024-10-05 RX ORDER — INSULIN GLARGINE 300 U/ML
7 INJECTION, SOLUTION SUBCUTANEOUS AT BEDTIME
Refills: 0 | Status: DISCONTINUED | OUTPATIENT
Start: 2024-10-05 | End: 2024-10-08

## 2024-10-05 RX ORDER — MORPHINE SULFATE 30 MG/1
1 TABLET, FILM COATED, EXTENDED RELEASE ORAL EVERY 4 HOURS
Refills: 0 | Status: DISCONTINUED | OUTPATIENT
Start: 2024-10-05 | End: 2024-10-06

## 2024-10-05 RX ORDER — HYDROMORPHONE HYDROCHLORIDE 1 MG/ML
0.5 INJECTION, SOLUTION INTRAMUSCULAR; INTRAVENOUS; SUBCUTANEOUS
Refills: 0 | Status: DISCONTINUED | OUTPATIENT
Start: 2024-10-05 | End: 2024-10-05

## 2024-10-05 RX ORDER — DAPTOMYCIN 500 MG/10ML
500 INJECTION, POWDER, LYOPHILIZED, FOR SOLUTION INTRAVENOUS EVERY 24 HOURS
Refills: 0 | Status: DISCONTINUED | OUTPATIENT
Start: 2024-10-05 | End: 2024-10-11

## 2024-10-05 RX ORDER — FLUTICASONE PROPION/SALMETEROL 100-50 MCG
1 BLISTER, WITH INHALATION DEVICE INHALATION
Refills: 0 | Status: DISCONTINUED | OUTPATIENT
Start: 2024-10-05 | End: 2024-10-11

## 2024-10-05 RX ORDER — ACETAMINOPHEN 325 MG
650 TABLET ORAL EVERY 6 HOURS
Refills: 0 | Status: DISCONTINUED | OUTPATIENT
Start: 2024-10-05 | End: 2024-10-11

## 2024-10-05 RX ORDER — INSULIN LISPRO 100/ML
VIAL (ML) SUBCUTANEOUS EVERY 6 HOURS
Refills: 0 | Status: DISCONTINUED | OUTPATIENT
Start: 2024-10-05 | End: 2024-10-05

## 2024-10-05 RX ORDER — 5-HYDROXYTRYPTOPHAN (5-HTP) 100 MG
3 TABLET,DISINTEGRATING ORAL AT BEDTIME
Refills: 0 | Status: DISCONTINUED | OUTPATIENT
Start: 2024-10-05 | End: 2024-10-11

## 2024-10-05 RX ADMIN — CEFEPIME 100 MILLIGRAM(S): 2 INJECTION, POWDER, FOR SOLUTION INTRAVENOUS at 05:22

## 2024-10-05 RX ADMIN — MORPHINE SULFATE 1 MILLIGRAM(S): 30 TABLET, FILM COATED, EXTENDED RELEASE ORAL at 23:19

## 2024-10-05 RX ADMIN — MORPHINE SULFATE 1 MILLIGRAM(S): 30 TABLET, FILM COATED, EXTENDED RELEASE ORAL at 17:17

## 2024-10-05 RX ADMIN — ROSUVASTATIN CALCIUM 40 MILLIGRAM(S): 20 TABLET, COATED ORAL at 22:50

## 2024-10-05 RX ADMIN — TRAMADOL HYDROCHLORIDE 50 MILLIGRAM(S): 50 TABLET, COATED ORAL at 13:43

## 2024-10-05 RX ADMIN — Medication 0.75 MILLIGRAM(S): at 05:21

## 2024-10-05 RX ADMIN — Medication 650 MILLIGRAM(S): at 20:00

## 2024-10-05 RX ADMIN — Medication 650 MILLIGRAM(S): at 13:43

## 2024-10-05 RX ADMIN — Medication 1: at 12:46

## 2024-10-05 RX ADMIN — TRAMADOL HYDROCHLORIDE 50 MILLIGRAM(S): 50 TABLET, COATED ORAL at 20:00

## 2024-10-05 RX ADMIN — INSULIN GLARGINE 7 UNIT(S): 300 INJECTION, SOLUTION SUBCUTANEOUS at 22:55

## 2024-10-05 RX ADMIN — Medication 1 TABLET(S): at 05:22

## 2024-10-05 RX ADMIN — MORPHINE SULFATE 1 MILLIGRAM(S): 30 TABLET, FILM COATED, EXTENDED RELEASE ORAL at 10:59

## 2024-10-05 RX ADMIN — PREGABALIN 150 MILLIGRAM(S): 25 CAPSULE ORAL at 17:18

## 2024-10-05 RX ADMIN — Medication 3 MILLIGRAM(S): at 22:50

## 2024-10-05 RX ADMIN — DAPTOMYCIN 120 MILLIGRAM(S): 500 INJECTION, POWDER, LYOPHILIZED, FOR SOLUTION INTRAVENOUS at 20:27

## 2024-10-05 RX ADMIN — MONTELUKAST SODIUM 10 MILLIGRAM(S): 10 TABLET, FILM COATED ORAL at 12:43

## 2024-10-05 RX ADMIN — MORPHINE SULFATE 1 MILLIGRAM(S): 30 TABLET, FILM COATED, EXTENDED RELEASE ORAL at 22:49

## 2024-10-05 RX ADMIN — TRAMADOL HYDROCHLORIDE 50 MILLIGRAM(S): 50 TABLET, COATED ORAL at 00:15

## 2024-10-05 RX ADMIN — PREGABALIN 150 MILLIGRAM(S): 25 CAPSULE ORAL at 05:22

## 2024-10-05 RX ADMIN — TRAMADOL HYDROCHLORIDE 50 MILLIGRAM(S): 50 TABLET, COATED ORAL at 12:42

## 2024-10-05 RX ADMIN — PANTOPRAZOLE SODIUM 40 MILLIGRAM(S): 40 TABLET, DELAYED RELEASE ORAL at 05:23

## 2024-10-05 RX ADMIN — Medication 4: at 06:37

## 2024-10-05 RX ADMIN — Medication 1 DOSE(S): at 05:24

## 2024-10-05 RX ADMIN — Medication 17 GRAM(S): at 12:43

## 2024-10-05 RX ADMIN — Medication 650 MILLIGRAM(S): at 12:43

## 2024-10-05 RX ADMIN — MORPHINE SULFATE 1 MILLIGRAM(S): 30 TABLET, FILM COATED, EXTENDED RELEASE ORAL at 08:59

## 2024-10-05 RX ADMIN — SERTRALINE HYDROCHLORIDE 100 MILLIGRAM(S): 100 TABLET, FILM COATED ORAL at 12:43

## 2024-10-05 RX ADMIN — Medication 1 DOSE(S): at 19:13

## 2024-10-05 RX ADMIN — Medication 1 TABLET(S): at 20:28

## 2024-10-05 RX ADMIN — TRAMADOL HYDROCHLORIDE 50 MILLIGRAM(S): 50 TABLET, COATED ORAL at 19:12

## 2024-10-05 RX ADMIN — CEFEPIME 100 MILLIGRAM(S): 2 INJECTION, POWDER, FOR SOLUTION INTRAVENOUS at 17:18

## 2024-10-05 RX ADMIN — MORPHINE SULFATE 1 MILLIGRAM(S): 30 TABLET, FILM COATED, EXTENDED RELEASE ORAL at 16:17

## 2024-10-05 RX ADMIN — Medication 25 MILLIGRAM(S): at 05:22

## 2024-10-05 RX ADMIN — Medication 0.75 MILLIGRAM(S): at 17:31

## 2024-10-05 RX ADMIN — Medication 650 MILLIGRAM(S): at 19:12

## 2024-10-05 RX ADMIN — Medication 2: at 17:17

## 2024-10-05 RX ADMIN — Medication 2 TABLET(S): at 22:50

## 2024-10-05 NOTE — PROGRESS NOTE ADULT - SUBJECTIVE AND OBJECTIVE BOX
Post Operative Note  Patient: WAYNE SERRANO 74y (1950) Male   MRN: [id number]  Location: [address]1  Visit: 10-01-24 Inpatient  Date: 10-06-24 @ 05:15    Procedure: S/P temporal artery biopsy    Subjective:   Patient seen and examined at bedside.  No events post-operatively.  Patient with no new complaints at this time.    Objective:  Vitals: T(F): 98.6 (10-06-24 @ 05:00), Max: 98.6 (10-06-24 @ 05:00)  HR: 90 (10-06-24 @ 05:00)  BP: 133/73 (10-06-24 @ 05:00) (117/62 - 140/73)  RR: 18 (10-06-24 @ 05:00)  SpO2: 92% (10-06-24 @ 05:00)  Vent Settings:     In:   10-04-24 @ 07:01  -  10-05-24 @ 07:00  --------------------------------------------------------  IN: 0 mL    10-05-24 @ 07:01  -  10-06-24 @ 05:15  --------------------------------------------------------  IN: 50 mL      IV Fluids: dextrose 5%. 1000 milliLiter(s) (100 mL/Hr) IV Continuous <Continuous>  dextrose 5%. 1000 milliLiter(s) (50 mL/Hr) IV Continuous <Continuous>  ferrous    sulfate 325 milliGRAM(s) Oral daily      Out:   10-04-24 @ 07:01  -  10-05-24 @ 07:00  --------------------------------------------------------  OUT: 600 mL    10-05-24 @ 07:01  -  10-06-24 @ 05:15  --------------------------------------------------------  OUT: 0 mL      EBL:     Voided Urine:   10-04-24 @ 07:01  -  10-05-24 @ 07:00  --------------------------------------------------------  OUT: 600 mL    10-05-24 @ 07:01  -  10-06-24 @ 05:15  --------------------------------------------------------  OUT: 0 mL        PHYSICAL EXAM:  GENERAL: No acute distress, well-developed  HEAD:  L temple with well approximated incision with glue. Minimal swelling, ecchymosis. Mildly tender to touch. Cranial nerves II-XII intact.  NEUROLOGY: A&O x 3, no focal deficits    Medications: [Standing]  acetaminophen     Tablet .. 650 milliGRAM(s) Oral every 6 hours PRN  albuterol    90 MICROgram(s) HFA Inhaler 2 Puff(s) Inhalation every 6 hours PRN  aluminum hydroxide/magnesium hydroxide/simethicone Suspension 30 milliLiter(s) Oral every 4 hours PRN  artificial  tears Solution 1 Drop(s) Both EYES three times a day PRN  benzonatate 100 milliGRAM(s) Oral three times a day PRN  bisacodyl Suppository 10 milliGRAM(s) Rectal daily PRN  cefepime   IVPB 2000 milliGRAM(s) IV Intermittent every 12 hours  clonazePAM Oral Disintegrating Tablet 0.75 milliGRAM(s) Oral two times a day  DAPTOmycin IVPB 500 milliGRAM(s) IV Intermittent every 24 hours  dextrose 5%. 1000 milliLiter(s) IV Continuous <Continuous>  dextrose 5%. 1000 milliLiter(s) IV Continuous <Continuous>  dextrose 50% Injectable 25 Gram(s) IV Push once  dextrose 50% Injectable 25 Gram(s) IV Push once  dextrose 50% Injectable 12.5 Gram(s) IV Push once  dextrose Oral Gel 15 Gram(s) Oral once PRN  enoxaparin Injectable 40 milliGRAM(s) SubCutaneous every 24 hours  ferrous    sulfate 325 milliGRAM(s) Oral daily  fluticasone propionate/ salmeterol 250-50 MICROgram(s) Diskus 1 Dose(s) Inhalation two times a day  glucagon  Injectable 1 milliGRAM(s) IntraMuscular once  influenza  Vaccine (HIGH DOSE) 0.5 milliLiter(s) IntraMuscular once  insulin glargine Injectable (LANTUS) 7 Unit(s) SubCutaneous at bedtime  insulin lispro (ADMELOG) corrective regimen sliding scale   SubCutaneous three times a day before meals  lactobacillus acidophilus 1 Tablet(s) Oral every 12 hours  melatonin 3 milliGRAM(s) Oral at bedtime PRN  metoprolol succinate ER 25 milliGRAM(s) Oral daily  montelukast 10 milliGRAM(s) Oral daily  morphine  - Injectable 1 milliGRAM(s) IV Push every 4 hours PRN  ondansetron Injectable 4 milliGRAM(s) IV Push every 8 hours PRN  pantoprazole    Tablet 40 milliGRAM(s) Oral before breakfast  polyethylene glycol 3350 17 Gram(s) Oral daily  pregabalin 150 milliGRAM(s) Oral two times a day  rosuvastatin 40 milliGRAM(s) Oral at bedtime  senna 2 Tablet(s) Oral at bedtime  sertraline 100 milliGRAM(s) Oral daily  sodium chloride 0.65% Nasal 1 Spray(s) Both Nostrils two times a day PRN  traMADol 50 milliGRAM(s) Oral three times a day PRN    Medications: [PRN]  acetaminophen     Tablet .. 650 milliGRAM(s) Oral every 6 hours PRN  albuterol    90 MICROgram(s) HFA Inhaler 2 Puff(s) Inhalation every 6 hours PRN  aluminum hydroxide/magnesium hydroxide/simethicone Suspension 30 milliLiter(s) Oral every 4 hours PRN  artificial  tears Solution 1 Drop(s) Both EYES three times a day PRN  benzonatate 100 milliGRAM(s) Oral three times a day PRN  bisacodyl Suppository 10 milliGRAM(s) Rectal daily PRN  cefepime   IVPB 2000 milliGRAM(s) IV Intermittent every 12 hours  clonazePAM Oral Disintegrating Tablet 0.75 milliGRAM(s) Oral two times a day  DAPTOmycin IVPB 500 milliGRAM(s) IV Intermittent every 24 hours  dextrose 5%. 1000 milliLiter(s) IV Continuous <Continuous>  dextrose 5%. 1000 milliLiter(s) IV Continuous <Continuous>  dextrose 50% Injectable 25 Gram(s) IV Push once  dextrose 50% Injectable 25 Gram(s) IV Push once  dextrose 50% Injectable 12.5 Gram(s) IV Push once  dextrose Oral Gel 15 Gram(s) Oral once PRN  enoxaparin Injectable 40 milliGRAM(s) SubCutaneous every 24 hours  ferrous    sulfate 325 milliGRAM(s) Oral daily  fluticasone propionate/ salmeterol 250-50 MICROgram(s) Diskus 1 Dose(s) Inhalation two times a day  glucagon  Injectable 1 milliGRAM(s) IntraMuscular once  influenza  Vaccine (HIGH DOSE) 0.5 milliLiter(s) IntraMuscular once  insulin glargine Injectable (LANTUS) 7 Unit(s) SubCutaneous at bedtime  insulin lispro (ADMELOG) corrective regimen sliding scale   SubCutaneous three times a day before meals  lactobacillus acidophilus 1 Tablet(s) Oral every 12 hours  melatonin 3 milliGRAM(s) Oral at bedtime PRN  metoprolol succinate ER 25 milliGRAM(s) Oral daily  montelukast 10 milliGRAM(s) Oral daily  morphine  - Injectable 1 milliGRAM(s) IV Push every 4 hours PRN  ondansetron Injectable 4 milliGRAM(s) IV Push every 8 hours PRN  pantoprazole    Tablet 40 milliGRAM(s) Oral before breakfast  polyethylene glycol 3350 17 Gram(s) Oral daily  pregabalin 150 milliGRAM(s) Oral two times a day  rosuvastatin 40 milliGRAM(s) Oral at bedtime  senna 2 Tablet(s) Oral at bedtime  sertraline 100 milliGRAM(s) Oral daily  sodium chloride 0.65% Nasal 1 Spray(s) Both Nostrils two times a day PRN  traMADol 50 milliGRAM(s) Oral three times a day PRN    Labs:                        8.7    5.72  )-----------( 168      ( 05 Oct 2024 06:15 )             28.1     10-05    137  |  106  |  26[H]  ----------------------------<  294[H]  3.9   |  25  |  1.10    Ca    9.3      05 Oct 2024 06:15  Phos  3.7     10-05  Mg     2.3     10-05      PT/INR - ( 05 Oct 2024 06:15 )   PT: 14.3 sec;   INR: 1.21 ratio               Imaging:  No post-op imaging studies    Assessment:  74yMale patient S/P temporal artery biopsy doing well    Plan:  - pain control  - OOB  - REG diet  - serial facial exams    Surgical Team Contact Information  Spectralink: Ext: [phone] or [phone]    Date/Time: 10-06-24 @ 05:15

## 2024-10-05 NOTE — PROGRESS NOTE ADULT - SUBJECTIVE AND OBJECTIVE BOX
PROGRESS NOTE  Patient is a 74y old  Male who presents with a chief complaint of right foot wound (05 Oct 2024 07:24)    Chart and available morning labs /imaging are reviewed electronically , urgent issues addressed . More information  is being added upon completion of rounds , when more information is collected and management discussed with consultants , medical staff and social service/case management on the floor   OVERNIGHT    No new issues reported by medical staff . All above noted Patient is resting in a bed comfortably .C/of headache during rounds , morphine given .No distress noted   HPI:  72yo M PMhx DM2 w/ PAD - chronic Rt foot wound, HTN, COPD, CKD, HFpEF, arrythmia, Prostate CA s/p resection, Depression/Anxiety presents to ED sent by wound care for worsening R MTP wound. Patient reports having a nonhealing R foot wound, s/p multiple debridements and grafts at [place], for past 1.5 years. Denies seeing a vascular surgeon in the past.  Denies pain the wound, but reports having neuropathy. States following wound care and reports worsening of wound. Reporting having some chills in the past week. Denies fever, chills, SOB or any other complaints.Patient examined and evaluated at this time. Antibiotics as per infectious disease recommendations. Recommend vascular surgery evaluation. Tentatively planning for right 1st metatarsal head resection pending vascular evaluation. Continue local wound care and offloading at this time. (01 Oct 2024 14:44)    PAST MEDICAL & SURGICAL HISTORY:  Prostate cancer      Type II diabetes mellitus      Chronic obstructive pulmonary disease (COPD)      CHF (congestive heart failure)      Renal insufficiency      H/O migraine      Insomnia      Constipation      S/P foot surgery          MEDICATIONS  (STANDING):  cefepime   IVPB 2000 milliGRAM(s) IV Intermittent every 12 hours  clonazePAM Oral Disintegrating Tablet 0.75 milliGRAM(s) Oral two times a day  DAPTOmycin IVPB 500 milliGRAM(s) IV Intermittent every 24 hours  dextrose 5% + sodium chloride 0.45%. 1000 milliLiter(s) (50 mL/Hr) IV Continuous <Continuous>  dextrose 5%. 1000 milliLiter(s) (50 mL/Hr) IV Continuous <Continuous>  dextrose 5%. 1000 milliLiter(s) (100 mL/Hr) IV Continuous <Continuous>  dextrose 50% Injectable 12.5 Gram(s) IV Push once  dextrose 50% Injectable 25 Gram(s) IV Push once  dextrose 50% Injectable 25 Gram(s) IV Push once  enoxaparin Injectable 40 milliGRAM(s) SubCutaneous every 24 hours  ferrous    sulfate 325 milliGRAM(s) Oral daily  fluticasone propionate/ salmeterol 250-50 MICROgram(s) Diskus 1 Dose(s) Inhalation two times a day  glucagon  Injectable 1 milliGRAM(s) IntraMuscular once  influenza  Vaccine (HIGH DOSE) 0.5 milliLiter(s) IntraMuscular once  insulin glargine Injectable (LANTUS) 7 Unit(s) SubCutaneous at bedtime  insulin lispro (ADMELOG) corrective regimen sliding scale   SubCutaneous every 6 hours  lactobacillus acidophilus 1 Tablet(s) Oral every 12 hours  metoprolol succinate ER 25 milliGRAM(s) Oral daily  montelukast 10 milliGRAM(s) Oral daily  pantoprazole    Tablet 40 milliGRAM(s) Oral before breakfast  polyethylene glycol 3350 17 Gram(s) Oral daily  pregabalin 150 milliGRAM(s) Oral two times a day  rosuvastatin 40 milliGRAM(s) Oral at bedtime  senna 2 Tablet(s) Oral at bedtime  sertraline 100 milliGRAM(s) Oral daily    MEDICATIONS  (PRN):  acetaminophen     Tablet .. 650 milliGRAM(s) Oral every 6 hours PRN Temp greater or equal to 38C (100.4F), Mild Pain (1 - 3)  albuterol    90 MICROgram(s) HFA Inhaler 2 Puff(s) Inhalation every 6 hours PRN Shortness of Breath and/or Wheezing  aluminum hydroxide/magnesium hydroxide/simethicone Suspension 30 milliLiter(s) Oral every 4 hours PRN Dyspepsia  benzonatate 100 milliGRAM(s) Oral three times a day PRN Cough  bisacodyl Suppository 10 milliGRAM(s) Rectal daily PRN Constipation  dextrose Oral Gel 15 Gram(s) Oral once PRN Blood Glucose LESS THAN 70 milliGRAM(s)/deciliter  melatonin 3 milliGRAM(s) Oral at bedtime PRN Insomnia  morphine  - Injectable 1 milliGRAM(s) IV Push every 4 hours PRN Severe Pain (7 - 10)  ondansetron Injectable 4 milliGRAM(s) IV Push every 8 hours PRN Nausea and/or Vomiting  sodium chloride 0.65% Nasal 1 Spray(s) Both Nostrils two times a day PRN Congestion  traMADol 50 milliGRAM(s) Oral three times a day PRN Moderate Pain (4 - 6)      OBJECTIVE    T(C): 36.8 (10-05-24 @ 04:54), Max: 36.8 (10-04-24 @ 20:01)  HR: 76 (10-05-24 @ 04:54) (76 - 88)  BP: 113/67 (10-05-24 @ 04:54) (113/66 - 127/67)  RR: 18 (10-05-24 @ 04:54) (18 - 18)  SpO2: 92% (10-05-24 @ 04:54) (90% - 92%)  Wt(kg): --  I&O's Summary    04 Oct 2024 07:01  -  05 Oct 2024 07:00  --------------------------------------------------------  IN: 0 mL / OUT: 600 mL / NET: -600 mL          REVIEW OF SYSTEMS:  CONSTITUTIONAL: No fever, weight loss, or fatigue  EYES: No eye pain, visual disturbances, or discharge  ENMT:   No sinus or throat pain  NECK: No pain or stiffness  RESPIRATORY: No cough, wheezing, chills or hemoptysis; No shortness of breath  CARDIOVASCULAR: No chest pain, palpitations, dizziness, or leg swelling  GASTROINTESTINAL: No abdominal pain. No nausea, vomiting; No diarrhea or constipation. No melena or hematochezia.  GENITOURINARY: No dysuria, frequency, hematuria, or incontinence  NEUROLOGICAL: No headaches, memory loss, loss of strength, numbness, or tremors  SKIN: No itching, burning, rashes, or lesions   MUSCULOSKELETAL: No joint pain or swelling; No muscle, back, or extremity pain    PHYSICAL EXAM:  Appearance: NAD. VS past 24 hrs -as above   HEENT:   Moist oral mucosa. Conjunctiva clear b/l.   Neck : supple  Respiratory: Lungs CTAB.  Gastrointestinal:  Soft, nontender. No rebound. No rigidity. BS present	  Cardiovascular: RRR ,S1S2 present  Neurologic: Non-focal. Moving all extremities.  Extremities: No edema. No erythema. No calf tenderness.  Skin: No rashes, No ecchymoses, No cyanosis.	  wounds ,skin lesions-See skin assesment flow sheet   LABS:                        8.7    5.72  )-----------( 168      ( 05 Oct 2024 06:15 )             28.1     10-05    137  |  106  |  26[H]  ----------------------------<  294[H]  3.9   |  25  |  1.10    Ca    9.3      05 Oct 2024 06:15  Phos  3.7     10-05  Mg     2.3     10-05      CAPILLARY BLOOD GLUCOSE      POCT Blood Glucose.: 301 mg/dL (05 Oct 2024 06:34)  POCT Blood Glucose.: 246 mg/dL (04 Oct 2024 20:54)  POCT Blood Glucose.: 162 mg/dL (04 Oct 2024 17:01)  POCT Blood Glucose.: 163 mg/dL (04 Oct 2024 12:04)    PT/INR - ( 05 Oct 2024 06:15 )   PT: 14.3 sec;   INR: 1.21 ratio           Urinalysis Basic - ( 05 Oct 2024 06:15 )    Color: x / Appearance: x / SG: x / pH: x  Gluc: 294 mg/dL / Ketone: x  / Bili: x / Urobili: x   Blood: x / Protein: x / Nitrite: x   Leuk Esterase: x / RBC: x / WBC x   Sq Epi: x / Non Sq Epi: x / Bacteria: x        Culture - Tissue with Gram Stain (collected 03 Oct 2024 10:45)  Source: Tissue  Gram Stain (04 Oct 2024 17:51):    No polymorphonuclear cells seen per low power field    No organisms seen per oil power field  Preliminary Report (04 Oct 2024 17:51):    Rare Staphylococcus aureus    Culture - Blood (collected 01 Oct 2024 13:05)  Source: .Blood BLOOD  Preliminary Report (04 Oct 2024 19:01):    No growth at 72 Hours    Culture - Blood (collected 01 Oct 2024 13:05)  Source: .Blood BLOOD  Preliminary Report (04 Oct 2024 19:01):    No growth at 72 Hours    Culture - Wound Aerobic (collected 01 Oct 2024 11:00)  Source: Skin/Wound  Final Report (04 Oct 2024 22:25):    Few Methicillin Resistant Staphylococcus aureus    Commensal jameel consistent with body site  Organism: Methicillin resistant Staphylococcus aureus (04 Oct 2024 22:25)  Organism: Methicillin resistant Staphylococcus aureus (04 Oct 2024 22:25)      RADIOLOGY & ADDITIONAL TESTS:   reviewed elctronically  ASSESSMENT/PLAN: 	    25 minutes aggregate time was spent on this visit, 50% visit time spent in care co-ordination with other attendings and counselling patient .I have discussed care plan with patient / HCP/family member ,who expressed understanding of problems treatment and their effect and side effects, alternatives in details. I have asked if they have any questions and concerns and appropriately addressed them to best of my ability.

## 2024-10-05 NOTE — PROGRESS NOTE ADULT - SUBJECTIVE AND OBJECTIVE BOX
Neurology Follow up note    WAYNE [id number]    HPI:  74yo M PMhx DM2 w/ PAD - chronic Rt foot wound, HTN, COPD, CKD, HFpEF, arrythmia, Prostate CA s/p resection, Depression/Anxiety presents to ED sent by wound care for worsening R MTP wound. Patient reports having a nonhealing R foot wound, s/p multiple debridements and grafts at [place], for past 1.5 years. Denies seeing a vascular surgeon in the past.  Denies pain the wound, but reports having neuropathy. States following wound care and reports worsening of wound. Reporting having some chills in the past week. Denies fever, chills, SOB or any other complaints.Patient examined and evaluated at this time. Antibiotics as per infectious disease recommendations. Recommend vascular surgery evaluation. Tentatively planning for right 1st metatarsal head resection pending vascular evaluation. Continue local wound care and offloading at this time. (01 Oct 2024 14:44)      Interval History -left sided HA    Patient is seen, chart was reviewed and case was discussed with the treatment team.  Pt is not in any distress.   Lying on bed comfortably.   No events reported overnight.       Vital Signs Last 24 Hrs  T(C): 36.8 (05 Oct 2024 16:40), Max: 36.8 (04 Oct 2024 20:01)  T(F): 98.2 (05 Oct 2024 16:40), Max: 98.2 (04 Oct 2024 20:01)  HR: 75 (05 Oct 2024 13:26) (74 - 87)  BP: 139/72 (05 Oct 2024 13:26) (113/67 - 140/73)  BP(mean): --  RR: 18 (05 Oct 2024 13:26) (16 - 23)  SpO2: 92% (05 Oct 2024 13:26) (91% - 96%)    Parameters below as of 05 Oct 2024 13:26  Patient On (Oxygen Delivery Method): room air    r            REVIEW OF SYSTEMS:    Constitutional: No fever, weight loss or fatigue  Eyes: No eye pain, visual disturbances, or discharge  ENT:  No difficulty hearing, tinnitus, vertigo; No sinus or throat pain  Neck: No pain or stiffness  Respiratory: No cough, wheezing, chills or hemoptysis  Cardiovascular: No chest pain, palpitations, shortness of breath, dizziness or leg swelling  Gastrointestinal: No abdominal or epigastric pain. No nausea, vomiting or hematemesis  Genitourinary: No dysuria, frequency, hematuria or incontinence  Neurological: No memory loss, loss of strength, numbness or tremors  Psychiatric: No depression, anxiety, mood swings or difficulty sleeping  Musculoskeletal: No joint pain or swelling; No muscle, back or extremity pain  Skin: No itching, burning, rashes or lesions   Lymph Nodes: No enlarged glands  Endocrine: No heat or cold intolerance; No hair loss    Allergy and Immunologic: No hives or eczema    On Neurological Examination:    Mental Status - Pt is alert, awake, oriented X3.. Follows commands well and able to answer questions appropriately.Mood and affect  normal    Speech -  Normal.    Cranial Nerves - Pupils 3 mm equal and reactive to light, extraocular eye movements intact. Pt has no visual field deficit.  Pt has no facial asymmetry. Facial sensation is intact.Tongue - is in midline.    Muscle tone - is normal        Motor Exam - 55 OF ue  'le 4/5   No drift. No shaking or tremors.    Sensory Exam - . Pt withdraws all extremities equally on stimulation. No asymmetry seen. No complaints of tingling, numbness.        coordination:    Finger to nose: normal      Deep tendon Reflexes - 2 plus all over.     .    Neck Supple -  Yes.     MEDICATIONS    acetaminophen     Tablet .. 650 milliGRAM(s) Oral every 6 hours PRN  albuterol    90 MICROgram(s) HFA Inhaler 2 Puff(s) Inhalation every 6 hours PRN  aluminum hydroxide/magnesium hydroxide/simethicone Suspension 30 milliLiter(s) Oral every 4 hours PRN  benzonatate 100 milliGRAM(s) Oral three times a day PRN  bisacodyl Suppository 10 milliGRAM(s) Rectal daily PRN  cefepime   IVPB 2000 milliGRAM(s) IV Intermittent every 12 hours  clonazePAM Oral Disintegrating Tablet 0.75 milliGRAM(s) Oral two times a day  DAPTOmycin IVPB 500 milliGRAM(s) IV Intermittent every 24 hours  dextrose 5% + sodium chloride 0.45%. 1000 milliLiter(s) IV Continuous <Continuous>  dextrose 5%. 1000 milliLiter(s) IV Continuous <Continuous>  dextrose 5%. 1000 milliLiter(s) IV Continuous <Continuous>  dextrose 50% Injectable 12.5 Gram(s) IV Push once  dextrose 50% Injectable 25 Gram(s) IV Push once  dextrose 50% Injectable 25 Gram(s) IV Push once  dextrose Oral Gel 15 Gram(s) Oral once PRN  enoxaparin Injectable 40 milliGRAM(s) SubCutaneous every 24 hours  ferrous    sulfate 325 milliGRAM(s) Oral daily  fluticasone propionate/ salmeterol 250-50 MICROgram(s) Diskus 1 Dose(s) Inhalation two times a day  glucagon  Injectable 1 milliGRAM(s) IntraMuscular once  influenza  Vaccine (HIGH DOSE) 0.5 milliLiter(s) IntraMuscular once  insulin glargine Injectable (LANTUS) 7 Unit(s) SubCutaneous at bedtime  insulin lispro (ADMELOG) corrective regimen sliding scale   SubCutaneous three times a day before meals  lactobacillus acidophilus 1 Tablet(s) Oral every 12 hours  melatonin 3 milliGRAM(s) Oral at bedtime PRN  metoprolol succinate ER 25 milliGRAM(s) Oral daily  montelukast 10 milliGRAM(s) Oral daily  morphine  - Injectable 1 milliGRAM(s) IV Push every 4 hours PRN  ondansetron Injectable 4 milliGRAM(s) IV Push every 8 hours PRN  pantoprazole    Tablet 40 milliGRAM(s) Oral before breakfast  polyethylene glycol 3350 17 Gram(s) Oral daily  pregabalin 150 milliGRAM(s) Oral two times a day  rosuvastatin 40 milliGRAM(s) Oral at bedtime  senna 2 Tablet(s) Oral at bedtime  sertraline 100 milliGRAM(s) Oral daily  sodium chloride 0.65% Nasal 1 Spray(s) Both Nostrils two times a day PRN  traMADol 50 milliGRAM(s) Oral three times a day PRN      Allergies    tetracycline (Unknown)  IODINE (Unknown)  vancomycin (Other)    Intolerances                                8.7    5.72  )-----------( 168      ( 05 Oct 2024 06:15 )             28.1       Hemoglobin A1C:     Vitamin B12     RADIOLOGY    ASSESSMENT AND PLAN:      seen for HA  R/O TA    sp  TA BIOPSY  BRAIN MRI IS PENDING  ANALGESIC  Physical therapy evaluation.  OOB to chair/ambulation with assistance only.  Advanced care planning was discussed with family.  Pain is accessed and addressed.  Plan of care was discussed with family. Questions answered.  Would continue to follow.

## 2024-10-05 NOTE — PROGRESS NOTE ADULT - SUBJECTIVE AND OBJECTIVE BOX
Patient is a 74y Male with a known history of :  Wound of right foot [S91.301A]    Cellulitis of right foot [L03.115]    Prostate cancer [C61]    Type II diabetes mellitus [E11.9]    Chronic obstructive pulmonary disease (COPD) [J44.9]    CHF (congestive heart failure) [I50.9]    Renal insufficiency [N28.9]    Prophylactic measure [Z29.9]    MDD (major depressive disorder) [F32.9]    Neuropathy [G62.9]    Constipation [K59.00]    History of headache [Z87.898]      HPI:  74yo M PMhx DM2 w/ PAD - chronic Rt foot wound, HTN, COPD, CKD, HFpEF, arrythmia, Prostate CA s/p resection, Depression/Anxiety presents to ED sent by wound care for worsening R MTP wound. Patient reports having a nonhealing R foot wound, s/p multiple debridements and grafts at [place], for past 1.5 years. Denies seeing a vascular surgeon in the past.  Denies pain the wound, but reports having neuropathy. States following wound care and reports worsening of wound. Reporting having some chills in the past week. Denies fever, chills, SOB or any other complaints.Patient examined and evaluated at this time. Antibiotics as per infectious disease recommendations. Recommend vascular surgery evaluation. Tentatively planning for right 1st metatarsal head resection pending vascular evaluation. Continue local wound care and offloading at this time. (01 Oct 2024 14:44)      REVIEW OF SYSTEMS:    CONSTITUTIONAL: No fever, weight loss, or fatigue  EYES: No eye pain, visual disturbances, or discharge  ENMT:  No difficulty hearing, tinnitus, vertigo; No sinus or throat pain  NECK: No pain or stiffness  BREASTS: No pain, masses, or nipple discharge  RESPIRATORY: No cough, wheezing, chills or hemoptysis; No shortness of breath  CARDIOVASCULAR: No chest pain, palpitations, dizziness, or leg swelling  GASTROINTESTINAL: No abdominal or epigastric pain. No nausea, vomiting, or hematemesis; No diarrhea or constipation. No melena or hematochezia.  GENITOURINARY: No dysuria, frequency, hematuria, or incontinence  NEUROLOGICAL: No headaches, memory loss, loss of strength, numbness, or tremors  SKIN: No itching, burning, rashes, or lesions   LYMPH NODES: No enlarged glands  ENDOCRINE: No heat or cold intolerance; No hair loss  MUSCULOSKELETAL: No joint pain or swelling; No muscle, back, or extremity pain  PSYCHIATRIC: No depression, anxiety, mood swings, or difficulty sleeping  HEME/LYMPH: No easy bruising, or bleeding gums  ALLERGY AND IMMUNOLOGIC: No hives or eczema    MEDICATIONS  (STANDING):  cefepime   IVPB 2000 milliGRAM(s) IV Intermittent every 12 hours  clonazePAM Oral Disintegrating Tablet 0.75 milliGRAM(s) Oral two times a day  DAPTOmycin IVPB 500 milliGRAM(s) IV Intermittent every 24 hours  dextrose 5% + sodium chloride 0.45%. 1000 milliLiter(s) (50 mL/Hr) IV Continuous <Continuous>  dextrose 5%. 1000 milliLiter(s) (50 mL/Hr) IV Continuous <Continuous>  dextrose 5%. 1000 milliLiter(s) (100 mL/Hr) IV Continuous <Continuous>  dextrose 50% Injectable 12.5 Gram(s) IV Push once  dextrose 50% Injectable 25 Gram(s) IV Push once  dextrose 50% Injectable 25 Gram(s) IV Push once  enoxaparin Injectable 40 milliGRAM(s) SubCutaneous every 24 hours  ferrous    sulfate 325 milliGRAM(s) Oral daily  fluticasone propionate/ salmeterol 250-50 MICROgram(s) Diskus 1 Dose(s) Inhalation two times a day  glucagon  Injectable 1 milliGRAM(s) IntraMuscular once  influenza  Vaccine (HIGH DOSE) 0.5 milliLiter(s) IntraMuscular once  insulin glargine Injectable (LANTUS) 7 Unit(s) SubCutaneous at bedtime  insulin lispro (ADMELOG) corrective regimen sliding scale   SubCutaneous every 6 hours  lactobacillus acidophilus 1 Tablet(s) Oral every 12 hours  metoprolol succinate ER 25 milliGRAM(s) Oral daily  montelukast 10 milliGRAM(s) Oral daily  pantoprazole    Tablet 40 milliGRAM(s) Oral before breakfast  polyethylene glycol 3350 17 Gram(s) Oral daily  pregabalin 150 milliGRAM(s) Oral two times a day  rosuvastatin 40 milliGRAM(s) Oral at bedtime  senna 2 Tablet(s) Oral at bedtime  sertraline 100 milliGRAM(s) Oral daily    MEDICATIONS  (PRN):  acetaminophen     Tablet .. 650 milliGRAM(s) Oral every 6 hours PRN Temp greater or equal to 38C (100.4F), Mild Pain (1 - 3)  albuterol    90 MICROgram(s) HFA Inhaler 2 Puff(s) Inhalation every 6 hours PRN Shortness of Breath and/or Wheezing  aluminum hydroxide/magnesium hydroxide/simethicone Suspension 30 milliLiter(s) Oral every 4 hours PRN Dyspepsia  benzonatate 100 milliGRAM(s) Oral three times a day PRN Cough  bisacodyl Suppository 10 milliGRAM(s) Rectal daily PRN Constipation  dextrose Oral Gel 15 Gram(s) Oral once PRN Blood Glucose LESS THAN 70 milliGRAM(s)/deciliter  melatonin 3 milliGRAM(s) Oral at bedtime PRN Insomnia  morphine  - Injectable 1 milliGRAM(s) IV Push every 4 hours PRN Severe Pain (7 - 10)  ondansetron Injectable 4 milliGRAM(s) IV Push every 8 hours PRN Nausea and/or Vomiting  sodium chloride 0.65% Nasal 1 Spray(s) Both Nostrils two times a day PRN Congestion  traMADol 50 milliGRAM(s) Oral three times a day PRN Moderate Pain (4 - 6)      ALLERGIES: tetracycline (Unknown)  IODINE (Unknown)  vancomycin (Other)      FAMILY HISTORY:      PHYSICAL EXAMINATION:  -----------------------------  T(C): 36.8 (10-05-24 @ 04:54), Max: 36.8 (10-04-24 @ 20:01)  HR: 76 (10-05-24 @ 04:54) (76 - 88)  BP: 113/67 (10-05-24 @ 04:54) (113/66 - 127/67)  RR: 18 (10-05-24 @ 04:54) (18 - 18)  SpO2: 92% (10-05-24 @ 04:54) (90% - 92%)  Wt(kg): --    10-04 @ 07:01  -  10-05 @ 07:00  --------------------------------------------------------  IN:  Total IN: 0 mL    OUT:    Voided (mL): 600 mL  Total OUT: 600 mL    Total NET: -600 mL            VITALS  T(C): 36.8 (10-05-24 @ 04:54), Max: 36.8 (10-04-24 @ 20:01)  HR: 76 (10-05-24 @ 04:54) (76 - 88)  BP: 113/67 (10-05-24 @ 04:54) (113/66 - 127/67)  RR: 18 (10-05-24 @ 04:54) (18 - 18)  SpO2: 92% (10-05-24 @ 04:54) (90% - 92%)    Constitutional: well developed, normal appearance, well groomed, well nourished, no deformities and no acute distress.   Eyes: the conjunctiva exhibited no abnormalities and the eyelids demonstrated no xanthelasmas.   HEENT: normal oral mucosa, no oral pallor and no oral cyanosis.   Neck: normal jugular venous A waves present, normal jugular venous V waves present and no jugular venous mahmood A waves.   Pulmonary: no respiratory distress, normal respiratory rhythm and effort, no accessory muscle use and lungs were clear to auscultation bilaterally.   Cardiovascular: heart rate and rhythm were normal, normal S1 and S2 and no murmur, gallop, rub, heave or thrill are present.   Abdomen: soft, non-tender, no hepato-splenomegaly and no abdominal mass palpated.   Musculoskeletal: the gait could not be assessed..   Extremities: no clubbing of the fingernails, no localized cyanosis, no petechial hemorrhages and no ischemic changes.   Skin: normal skin color and pigmentation, no rash, no venous stasis, no skin lesions, no skin ulcer and no xanthoma was observed.   Psychiatric: oriented to person, place, and time, the affect was normal, the mood was normal and not feeling anxious.     LABS:   --------  10-05    137  |  106  |  26[H]  ----------------------------<  294[H]  3.9   |  25  |  1.10    Ca    9.3      05 Oct 2024 06:15  Phos  3.7     10-05  Mg     2.3     10-05                           8.7    5.72  )-----------( 168      ( 05 Oct 2024 06:15 )             28.1     PT/INR - ( 05 Oct 2024 06:15 )   PT: 14.3 sec;   INR: 1.21 ratio                 Culture Results:   Rare Staphylococcus aureus *!* (10-03 @ 10:45)      RADIOLOGY:  -----------------    ECG:     ECHO:

## 2024-10-05 NOTE — PROGRESS NOTE ADULT - NSPROGADDITIONALINFOA_GEN_ALL_CORE
Medically optimized for temporal biopsy .Discussed with the patient and son at bedside
Medically optimized for temporal biopsy .Discussed with the patient and son at bedside 10/04
Medically optimized for 1st metatarsal head resection and cleared by cardiologist .Discussed with family on a phone .
Medically optimized for 1st metatarsal head resection and cleared by cardiologist .Discussed with family on a phone .

## 2024-10-05 NOTE — PROGRESS NOTE ADULT - SUBJECTIVE AND OBJECTIVE BOX
Interval History:    CENTRAL LINE:   [  ] YES       [  ] NO  MORRISSEY:                 [  ] YES       [  ] NO         REVIEW OF SYSTEMS:  All Systems below were reviewed and are negative [  ]  HEENT:  ID:  Pulmonary:  Cardiac:  GI:  Renal:  Musculoskeletal:  All other systems above were reviewed and are negative   [  ]      MEDICATIONS  (STANDING):  cefepime   IVPB 2000 milliGRAM(s) IV Intermittent every 12 hours  clonazePAM Oral Disintegrating Tablet 0.75 milliGRAM(s) Oral two times a day  DAPTOmycin IVPB 500 milliGRAM(s) IV Intermittent every 24 hours  dextrose 5%. 1000 milliLiter(s) (100 mL/Hr) IV Continuous <Continuous>  dextrose 5%. 1000 milliLiter(s) (50 mL/Hr) IV Continuous <Continuous>  dextrose 50% Injectable 25 Gram(s) IV Push once  dextrose 50% Injectable 25 Gram(s) IV Push once  dextrose 50% Injectable 12.5 Gram(s) IV Push once  fluticasone propionate/ salmeterol 250-50 MICROgram(s) Diskus 1 Dose(s) Inhalation two times a day  glucagon  Injectable 1 milliGRAM(s) IntraMuscular once  influenza  Vaccine (HIGH DOSE) 0.5 milliLiter(s) IntraMuscular once  insulin glargine Injectable (LANTUS) 7 Unit(s) SubCutaneous at bedtime  insulin lispro (ADMELOG) corrective regimen sliding scale   SubCutaneous three times a day before meals  lactobacillus acidophilus 1 Tablet(s) Oral every 12 hours  montelukast 10 milliGRAM(s) Oral daily  polyethylene glycol 3350 17 Gram(s) Oral daily  pregabalin 150 milliGRAM(s) Oral two times a day  rosuvastatin 40 milliGRAM(s) Oral at bedtime  senna 2 Tablet(s) Oral at bedtime  sertraline 100 milliGRAM(s) Oral daily    MEDICATIONS  (PRN):  acetaminophen     Tablet .. 650 milliGRAM(s) Oral every 6 hours PRN Temp greater or equal to 38C (100.4F), Mild Pain (1 - 3)  albuterol    90 MICROgram(s) HFA Inhaler 2 Puff(s) Inhalation every 6 hours PRN Shortness of Breath and/or Wheezing  aluminum hydroxide/magnesium hydroxide/simethicone Suspension 30 milliLiter(s) Oral every 4 hours PRN Dyspepsia  benzonatate 100 milliGRAM(s) Oral three times a day PRN Cough  bisacodyl Suppository 10 milliGRAM(s) Rectal daily PRN Constipation  dextrose Oral Gel 15 Gram(s) Oral once PRN Blood Glucose LESS THAN 70 milliGRAM(s)/deciliter  melatonin 3 milliGRAM(s) Oral at bedtime PRN Insomnia  morphine  - Injectable 1 milliGRAM(s) IV Push every 4 hours PRN Severe Pain (7 - 10)  ondansetron Injectable 4 milliGRAM(s) IV Push every 8 hours PRN Nausea and/or Vomiting  sodium chloride 0.65% Nasal 1 Spray(s) Both Nostrils two times a day PRN Congestion  traMADol 50 milliGRAM(s) Oral three times a day PRN Moderate Pain (4 - 6)      Vital Signs Last 24 Hrs  T(C): 36.8 (05 Oct 2024 16:40), Max: 36.8 (04 Oct 2024 20:01)  T(F): 98.2 (05 Oct 2024 16:40), Max: 98.2 (04 Oct 2024 20:01)  HR: 75 (05 Oct 2024 13:26) (74 - 87)  BP: 139/72 (05 Oct 2024 13:26) (113/67 - 140/73)  BP(mean): --  RR: 18 (05 Oct 2024 13:26) (16 - 23)  SpO2: 92% (05 Oct 2024 13:26) (91% - 96%)    Parameters below as of 05 Oct 2024 13:26  Patient On (Oxygen Delivery Method): room air        I&O's Summary    04 Oct 2024 07:01  -  05 Oct 2024 07:00  --------------------------------------------------------  IN: 0 mL / OUT: 600 mL / NET: -600 mL        PHYSICAL EXAM:  HEENT: NC/AT; PERRLA  Neck: Soft; no tenderness  Lungs: CTA bilaterally; no wheezing.   Heart:  Abdomen:  Genital/ Rectal:  Extremities:  Neurologic:  Vascular:      LABORATORY:    CBC Full  -  ( 05 Oct 2024 06:15 )  WBC Count : 5.72 K/uL  RBC Count : 3.04 M/uL  Hemoglobin : 8.7 g/dL  Hematocrit : 28.1 %  Platelet Count - Automated : 168 K/uL  Mean Cell Volume : 92.4 fl  Mean Cell Hemoglobin : 28.6 pg  Mean Cell Hemoglobin Concentration : 31.0 gm/dL  Auto Neutrophil # : x  Auto Lymphocyte # : x  Auto Monocyte # : x  Auto Eosinophil # : x  Auto Basophil # : x  Auto Neutrophil % : x  Auto Lymphocyte % : x  Auto Monocyte % : x  Auto Eosinophil % : x  Auto Basophil % : x      ESR:                   09-24 @ 14:10  65    C-Reactive Protein:     09-24 @ 14:10  28    Procalcitonin:           09-24 @ 14:10   --      10-05    137  |  106  |  26[H]  ----------------------------<  294[H]  3.9   |  25  |  1.10    Ca    9.3      05 Oct 2024 06:15  Phos  3.7     10-05  Mg     2.3     10-05            Assessment and Plan:          David Coreas MD   [phone].    No fevers  Comfortable        MEDICATIONS  (STANDING):  cefepime   IVPB 2000 milliGRAM(s) IV Intermittent every 12 hours  clonazePAM Oral Disintegrating Tablet 0.75 milliGRAM(s) Oral two times a day  DAPTOmycin IVPB 500 milliGRAM(s) IV Intermittent every 24 hours  dextrose 5%. 1000 milliLiter(s) (100 mL/Hr) IV Continuous <Continuous>  dextrose 5%. 1000 milliLiter(s) (50 mL/Hr) IV Continuous <Continuous>  dextrose 50% Injectable 25 Gram(s) IV Push once  dextrose 50% Injectable 25 Gram(s) IV Push once  dextrose 50% Injectable 12.5 Gram(s) IV Push once  fluticasone propionate/ salmeterol 250-50 MICROgram(s) Diskus 1 Dose(s) Inhalation two times a day  glucagon  Injectable 1 milliGRAM(s) IntraMuscular once  influenza  Vaccine (HIGH DOSE) 0.5 milliLiter(s) IntraMuscular once  insulin glargine Injectable (LANTUS) 7 Unit(s) SubCutaneous at bedtime  insulin lispro (ADMELOG) corrective regimen sliding scale   SubCutaneous three times a day before meals  lactobacillus acidophilus 1 Tablet(s) Oral every 12 hours  montelukast 10 milliGRAM(s) Oral daily  polyethylene glycol 3350 17 Gram(s) Oral daily  pregabalin 150 milliGRAM(s) Oral two times a day  rosuvastatin 40 milliGRAM(s) Oral at bedtime  senna 2 Tablet(s) Oral at bedtime  sertraline 100 milliGRAM(s) Oral daily    MEDICATIONS  (PRN):  acetaminophen     Tablet .. 650 milliGRAM(s) Oral every 6 hours PRN Temp greater or equal to 38C (100.4F), Mild Pain (1 - 3)  albuterol    90 MICROgram(s) HFA Inhaler 2 Puff(s) Inhalation every 6 hours PRN Shortness of Breath and/or Wheezing  aluminum hydroxide/magnesium hydroxide/simethicone Suspension 30 milliLiter(s) Oral every 4 hours PRN Dyspepsia  benzonatate 100 milliGRAM(s) Oral three times a day PRN Cough  bisacodyl Suppository 10 milliGRAM(s) Rectal daily PRN Constipation  dextrose Oral Gel 15 Gram(s) Oral once PRN Blood Glucose LESS THAN 70 milliGRAM(s)/deciliter  melatonin 3 milliGRAM(s) Oral at bedtime PRN Insomnia  morphine  - Injectable 1 milliGRAM(s) IV Push every 4 hours PRN Severe Pain (7 - 10)  ondansetron Injectable 4 milliGRAM(s) IV Push every 8 hours PRN Nausea and/or Vomiting  sodium chloride 0.65% Nasal 1 Spray(s) Both Nostrils two times a day PRN Congestion  traMADol 50 milliGRAM(s) Oral three times a day PRN Moderate Pain (4 - 6)      Vital Signs Last 24 Hrs  T(C): 36.8 (05 Oct 2024 16:40), Max: 36.8 (04 Oct 2024 20:01)  T(F): 98.2 (05 Oct 2024 16:40), Max: 98.2 (04 Oct 2024 20:01)  HR: 75 (05 Oct 2024 13:26) (74 - 87)  BP: 139/72 (05 Oct 2024 13:26) (113/67 - 140/73)  BP(mean): --  RR: 18 (05 Oct 2024 13:26) (16 - 23)  SpO2: 92% (05 Oct 2024 13:26) (91% - 96%)    Parameters below as of 05 Oct 2024 13:26  Patient On (Oxygen Delivery Method): room air        I&O's Summary    04 Oct 2024 07:01  -  05 Oct 2024 07:00  --------------------------------------------------------  IN: 0 mL / OUT: 600 mL / NET: -600 mL        PHYSICAL EXAM:  HEENT: NC/AT; PERRLA  Neck: Soft; no tenderness  Lungs: CTA bilaterally; no wheezing.   Heart:  Abdomen:  Genital/ Rectal:  Extremities:  Neurologic:  Vascular:      LABORATORY:    CBC Full  -  ( 05 Oct 2024 06:15 )  WBC Count : 5.72 K/uL  RBC Count : 3.04 M/uL  Hemoglobin : 8.7 g/dL  Hematocrit : 28.1 %  Platelet Count - Automated : 168 K/uL  Mean Cell Volume : 92.4 fl  Mean Cell Hemoglobin : 28.6 pg  Mean Cell Hemoglobin Concentration : 31.0 gm/dL  Auto Neutrophil # : x  Auto Lymphocyte # : x  Auto Monocyte # : x  Auto Eosinophil # : x  Auto Basophil # : x  Auto Neutrophil % : x  Auto Lymphocyte % : x  Auto Monocyte % : x  Auto Eosinophil % : x  Auto Basophil % : x      ESR:                   09-24 @ 14:10  65    C-Reactive Protein:     09-24 @ 14:10  28    Procalcitonin:           09-24 @ 14:10   --      10-05    137  |  106  |  26[H]  ----------------------------<  294[H]  3.9   |  25  |  1.10    Ca    9.3      05 Oct 2024 06:15  Phos  3.7     10-05  Mg     2.3     10-05      Assessment and Plan:    1. R foot ulcer with osteomyelitis, s/p resection of the R 1st metatarsal head.   2. CKD  3. Chronic body rash.    . He had resection of the R 1st metatarsal head. Waiting for report of bone culture and pathologies.   . Continue IV Dapto 500 mg daily and  IV Cefepime 2 gm q12h for now.   . If bone culture and pathology showed osteomyelitis,  get Picc line to complete 6 weeks of IV Daptomycin and Cefepime at the rehab.  . Wound care daily.   . Waiting for biopsy of left temporal artery.        David Coreas MD   [phone].    No fevers  Comfortable  He had biopsy of the left temporal artery today      MEDICATIONS  (STANDING):  cefepime   IVPB 2000 milliGRAM(s) IV Intermittent every 12 hours  clonazePAM Oral Disintegrating Tablet 0.75 milliGRAM(s) Oral two times a day  DAPTOmycin IVPB 500 milliGRAM(s) IV Intermittent every 24 hours  dextrose 5%. 1000 milliLiter(s) (100 mL/Hr) IV Continuous <Continuous>  dextrose 5%. 1000 milliLiter(s) (50 mL/Hr) IV Continuous <Continuous>  dextrose 50% Injectable 25 Gram(s) IV Push once  dextrose 50% Injectable 25 Gram(s) IV Push once  dextrose 50% Injectable 12.5 Gram(s) IV Push once  fluticasone propionate/ salmeterol 250-50 MICROgram(s) Diskus 1 Dose(s) Inhalation two times a day  glucagon  Injectable 1 milliGRAM(s) IntraMuscular once  influenza  Vaccine (HIGH DOSE) 0.5 milliLiter(s) IntraMuscular once  insulin glargine Injectable (LANTUS) 7 Unit(s) SubCutaneous at bedtime  insulin lispro (ADMELOG) corrective regimen sliding scale   SubCutaneous three times a day before meals  lactobacillus acidophilus 1 Tablet(s) Oral every 12 hours  montelukast 10 milliGRAM(s) Oral daily  polyethylene glycol 3350 17 Gram(s) Oral daily  pregabalin 150 milliGRAM(s) Oral two times a day  rosuvastatin 40 milliGRAM(s) Oral at bedtime  senna 2 Tablet(s) Oral at bedtime  sertraline 100 milliGRAM(s) Oral daily    MEDICATIONS  (PRN):  acetaminophen     Tablet .. 650 milliGRAM(s) Oral every 6 hours PRN Temp greater or equal to 38C (100.4F), Mild Pain (1 - 3)  albuterol    90 MICROgram(s) HFA Inhaler 2 Puff(s) Inhalation every 6 hours PRN Shortness of Breath and/or Wheezing  aluminum hydroxide/magnesium hydroxide/simethicone Suspension 30 milliLiter(s) Oral every 4 hours PRN Dyspepsia  benzonatate 100 milliGRAM(s) Oral three times a day PRN Cough  bisacodyl Suppository 10 milliGRAM(s) Rectal daily PRN Constipation  dextrose Oral Gel 15 Gram(s) Oral once PRN Blood Glucose LESS THAN 70 milliGRAM(s)/deciliter  melatonin 3 milliGRAM(s) Oral at bedtime PRN Insomnia  morphine  - Injectable 1 milliGRAM(s) IV Push every 4 hours PRN Severe Pain (7 - 10)  ondansetron Injectable 4 milliGRAM(s) IV Push every 8 hours PRN Nausea and/or Vomiting  sodium chloride 0.65% Nasal 1 Spray(s) Both Nostrils two times a day PRN Congestion  traMADol 50 milliGRAM(s) Oral three times a day PRN Moderate Pain (4 - 6)      Vital Signs Last 24 Hrs  T(C): 36.8 (05 Oct 2024 16:40), Max: 36.8 (04 Oct 2024 20:01)  T(F): 98.2 (05 Oct 2024 16:40), Max: 98.2 (04 Oct 2024 20:01)  HR: 75 (05 Oct 2024 13:26) (74 - 87)  BP: 139/72 (05 Oct 2024 13:26) (113/67 - 140/73)  BP(mean): --  RR: 18 (05 Oct 2024 13:26) (16 - 23)  SpO2: 92% (05 Oct 2024 13:26) (91% - 96%)    Parameters below as of 05 Oct 2024 13:26  Patient On (Oxygen Delivery Method): room air        I&O's Summary    04 Oct 2024 07:01  -  05 Oct 2024 07:00  --------------------------------------------------------  IN: 0 mL / OUT: 600 mL / NET: -600 mL        PHYSICAL EXAM:  HEENT: NC/AT; PERRLA  Neck: Soft; no tenderness  Lungs: CTA bilaterally; no wheezing.   Heart: RRR, no murmurs.   Abdomen: Soft, no tenderness.   Genital/ Rectal: No doan catheter.  Extremities: R foot with clean dressing.   Neurologic: Awake.      LABORATORY:    CBC Full  -  ( 05 Oct 2024 06:15 )  WBC Count : 5.72 K/uL  RBC Count : 3.04 M/uL  Hemoglobin : 8.7 g/dL  Hematocrit : 28.1 %  Platelet Count - Automated : 168 K/uL  Mean Cell Volume : 92.4 fl  Mean Cell Hemoglobin : 28.6 pg  Mean Cell Hemoglobin Concentration : 31.0 gm/dL  Auto Neutrophil # : x  Auto Lymphocyte # : x  Auto Monocyte # : x  Auto Eosinophil # : x  Auto Basophil # : x  Auto Neutrophil % : x  Auto Lymphocyte % : x  Auto Monocyte % : x  Auto Eosinophil % : x  Auto Basophil % : x      ESR:                   09-24 @ 14:10  65    C-Reactive Protein:     09-24 @ 14:10  28    Procalcitonin:           09-24 @ 14:10   --      10-05    137  |  106  |  26[H]  ----------------------------<  294[H]  3.9   |  25  |  1.10    Ca    9.3      05 Oct 2024 06:15  Phos  3.7     10-05  Mg     2.3     10-05      Assessment and Plan:    1. R foot ulcer with osteomyelitis, s/p resection of the R 1st metatarsal head.   2. CKD  3. Chronic body rash.    . He had resection of the R 1st metatarsal head. Waiting for report of pathologies of the bone biopsies. Bone culture is growing Staph aureus (MRSA).   . Continue IV Dapto 500 mg daily and  IV Cefepime 2 gm q12h for now.   . If bone culture and pathology showed osteomyelitis,  get Picc line to complete 6 weeks of IV Daptomycin and Cefepime at the rehab.  . Wound care daily.   . Waiting for biopsy of left temporal artery.        David Coreas MD   [phone].

## 2024-10-05 NOTE — PROGRESS NOTE ADULT - SUBJECTIVE AND OBJECTIVE BOX
CHIEF COMPLAINT/ REASON FOR VISIT  .. Patient was seen to address the  issue listed under PROBLEM LIST which is located toward bottom of this note     WAYNE SERRANO    PLV 1EAS 113 D1    Allergies    tetracycline (Unknown)  IODINE (Unknown)  vancomycin (Other)    Intolerances        PAST MEDICAL & SURGICAL HISTORY:  Prostate cancer      Type II diabetes mellitus      Chronic obstructive pulmonary disease (COPD)      CHF (congestive heart failure)      Renal insufficiency      H/O migraine      Insomnia      Constipation      S/P foot surgery          FAMILY HISTORY:      Home Medications:  acetaminophen 325 mg oral tablet: 2 tab(s) orally every 8 hours as needed (01 Oct 2024 15:23)  Albuterol (Eqv-ProAir HFA) 90 mcg/inh inhalation aerosol: 2 puff(s) inhaled every 4 hours as needed (01 Oct 2024 15:32)  albuterol 0.63 mg/3 mL (0.021%) inhalation solution: 3 milliliter(s) by nebulizer every 6 hours as needed for SOB (01 Oct 2024 15:27)  azelaic acid 15% topical gel: Apply topically to affected area 2 times a day as needed (01 Oct 2024 15:27)  ferrous sulfate 325 mg (65 mg elemental iron) oral tablet: 1 tab(s) orally once a day (01 Oct 2024 15:15)  fluticasone 50 mcg/inh nasal spray: 1 spray(s) in each nostril 2 times a day as needed (01 Oct 2024 15:29)  furosemide 40 mg oral tablet: 1 tab(s) orally once a day (01 Oct 2024 15:16)  Lantus Solostar Pen 100 units/mL subcutaneous solution: 14 unit(s) subcutaneous once a day (at bedtime) (01 Oct 2024 15:17)  loratadine 10 mg oral tablet: 1 tab(s) orally once a day (in the morning) (01 Oct 2024 15:17)  metFORMIN 500 mg oral tablet: 2 tab(s) orally 2 times a day (01 Oct 2024 15:18)  metoprolol succinate 25 mg oral tablet, extended release: 1 tab(s) orally once a day (01 Oct 2024 15:19)  Opzelura 1.5% topical cream: Apply topically to affected area 2 times a day as needed (01 Oct 2024 15:29)  pregabalin 150 mg oral capsule: 1 cap(s) orally 2 times a day (01 Oct 2024 15:20)  rosuvastatin 40 mg oral tablet: 1 tab(s) orally once a day (01 Oct 2024 15:21)  Saline Mist 0.65% nasal spray: 1 spray(s) intranasally 2 times a day (01 Oct 2024 15:29)  Spiriva 18 mcg inhalation capsule: 1 cap(s) inhaled once a day (01 Oct 2024 15:23)      MEDICATIONS  (STANDING):  cefepime   IVPB 2000 milliGRAM(s) IV Intermittent every 12 hours  clonazePAM Oral Disintegrating Tablet 0.75 milliGRAM(s) Oral two times a day  DAPTOmycin IVPB 500 milliGRAM(s) IV Intermittent every 24 hours  dextrose 5% + sodium chloride 0.45%. 1000 milliLiter(s) (50 mL/Hr) IV Continuous <Continuous>  dextrose 5%. 1000 milliLiter(s) (50 mL/Hr) IV Continuous <Continuous>  dextrose 5%. 1000 milliLiter(s) (100 mL/Hr) IV Continuous <Continuous>  dextrose 50% Injectable 12.5 Gram(s) IV Push once  dextrose 50% Injectable 25 Gram(s) IV Push once  dextrose 50% Injectable 25 Gram(s) IV Push once  enoxaparin Injectable 40 milliGRAM(s) SubCutaneous every 24 hours  ferrous    sulfate 325 milliGRAM(s) Oral daily  fluticasone propionate/ salmeterol 250-50 MICROgram(s) Diskus 1 Dose(s) Inhalation two times a day  glucagon  Injectable 1 milliGRAM(s) IntraMuscular once  influenza  Vaccine (HIGH DOSE) 0.5 milliLiter(s) IntraMuscular once  insulin glargine Injectable (LANTUS) 7 Unit(s) SubCutaneous at bedtime  insulin lispro (ADMELOG) corrective regimen sliding scale   SubCutaneous every 6 hours  lactobacillus acidophilus 1 Tablet(s) Oral every 12 hours  metoprolol succinate ER 25 milliGRAM(s) Oral daily  montelukast 10 milliGRAM(s) Oral daily  pantoprazole    Tablet 40 milliGRAM(s) Oral before breakfast  polyethylene glycol 3350 17 Gram(s) Oral daily  pregabalin 150 milliGRAM(s) Oral two times a day  rosuvastatin 40 milliGRAM(s) Oral at bedtime  senna 2 Tablet(s) Oral at bedtime  sertraline 100 milliGRAM(s) Oral daily    MEDICATIONS  (PRN):  acetaminophen     Tablet .. 650 milliGRAM(s) Oral every 6 hours PRN Temp greater or equal to 38C (100.4F), Mild Pain (1 - 3)  albuterol    90 MICROgram(s) HFA Inhaler 2 Puff(s) Inhalation every 6 hours PRN Shortness of Breath and/or Wheezing  aluminum hydroxide/magnesium hydroxide/simethicone Suspension 30 milliLiter(s) Oral every 4 hours PRN Dyspepsia  benzonatate 100 milliGRAM(s) Oral three times a day PRN Cough  bisacodyl Suppository 10 milliGRAM(s) Rectal daily PRN Constipation  dextrose Oral Gel 15 Gram(s) Oral once PRN Blood Glucose LESS THAN 70 milliGRAM(s)/deciliter  melatonin 3 milliGRAM(s) Oral at bedtime PRN Insomnia  morphine  - Injectable 1 milliGRAM(s) IV Push every 4 hours PRN Severe Pain (7 - 10)  ondansetron Injectable 4 milliGRAM(s) IV Push every 8 hours PRN Nausea and/or Vomiting  sodium chloride 0.65% Nasal 1 Spray(s) Both Nostrils two times a day PRN Congestion  traMADol 50 milliGRAM(s) Oral three times a day PRN Moderate Pain (4 - 6)      Diet, NPO after Midnight:      NPO Start Date: 04-Oct-2024,   NPO Start Time: 23:59  Except Medications (10-04-24 @ 12:20) [Active]  Diet, Consistent Carbohydrate w/Evening Snack:   DASH/TLC Sodium & Cholesterol Restricted (10-03-24 @ 11:17) [Active]          Vital Signs Last 24 Hrs  T(C): 36.8 (05 Oct 2024 04:54), Max: 36.8 (04 Oct 2024 20:01)  T(F): 98.2 (05 Oct 2024 04:54), Max: 98.2 (04 Oct 2024 20:01)  HR: 76 (05 Oct 2024 04:54) (76 - 88)  BP: 113/67 (05 Oct 2024 04:54) (113/66 - 127/67)  BP(mean): --  RR: 18 (05 Oct 2024 04:54) (18 - 18)  SpO2: 92% (05 Oct 2024 04:54) (90% - 92%)    Parameters below as of 05 Oct 2024 04:54  Patient On (Oxygen Delivery Method): nasal cannula          10-04-24 @ 07:01  -  10-05-24 @ 07:00  --------------------------------------------------------  IN: 0 mL / OUT: 600 mL / NET: -600 mL              LABS:                        8.7    5.72  )-----------( 168      ( 05 Oct 2024 06:15 )             28.1     10-05    137  |  106  |  26[H]  ----------------------------<  294[H]  3.9   |  25  |  1.10    Ca    9.3      05 Oct 2024 06:15  Phos  3.7     10-05  Mg     2.3     10-05      PT/INR - ( 05 Oct 2024 06:15 )   PT: 14.3 sec;   INR: 1.21 ratio           Urinalysis Basic - ( 05 Oct 2024 06:15 )    Color: x / Appearance: x / SG: x / pH: x  Gluc: 294 mg/dL / Ketone: x  / Bili: x / Urobili: x   Blood: x / Protein: x / Nitrite: x   Leuk Esterase: x / RBC: x / WBC x   Sq Epi: x / Non Sq Epi: x / Bacteria: x            WBC:  WBC Count: 5.72 K/uL (10-05 @ 06:15)  WBC Count: 6.65 K/uL (10-04 @ 06:06)  WBC Count: 5.78 K/uL (10-02 @ 06:07)  WBC Count: 6.68 K/uL (10-01 @ 13:05)      MICROBIOLOGY:  RECENT CULTURES:  10-03 Tissue XXXX   No polymorphonuclear cells seen per low power field  No organisms seen per oil power field   Rare Staphylococcus aureus    10-01 .Blood BLOOD XXXX XXXX   No growth at 72 Hours    10-01 Skin/Wound Methicillin resistant Staphylococcus aureus XXXX   Few Methicillin Resistant Staphylococcus aureus  Commensal jameel consistent with body site                PT/INR - ( 05 Oct 2024 06:15 )   PT: 14.3 sec;   INR: 1.21 ratio             Sodium:  Sodium: 137 mmol/L (10-05 @ 06:15)  Sodium: 138 mmol/L (10-04 @ 06:06)  Sodium: 140 mmol/L (10-02 @ 06:07)  Sodium: 139 mmol/L (10-01 @ 13:05)      1.10 mg/dL 10-05 @ 06:15  1.10 mg/dL 10-04 @ 06:06  1.20 mg/dL 10-02 @ 06:07  1.30 mg/dL 10-01 @ 13:05      Hemoglobin:  Hemoglobin: 8.7 g/dL (10-05 @ 06:15)  Hemoglobin: 9.1 g/dL (10-04 @ 06:06)  Hemoglobin: 9.1 g/dL (10-02 @ 06:07)  Hemoglobin: 9.8 g/dL (10-01 @ 13:05)      Platelets: Platelet Count - Automated: 168 K/uL (10-05 @ 06:15)  Platelet Count - Automated: 167 K/uL (10-04 @ 06:06)  Platelet Count - Automated: 151 K/uL (10-02 @ 06:07)  Platelet Count - Automated: 163 K/uL (10-01 @ 13:05)          Urinalysis Basic - ( 05 Oct 2024 06:15 )    Color: x / Appearance: x / SG: x / pH: x  Gluc: 294 mg/dL / Ketone: x  / Bili: x / Urobili: x   Blood: x / Protein: x / Nitrite: x   Leuk Esterase: x / RBC: x / WBC x   Sq Epi: x / Non Sq Epi: x / Bacteria: x        RADIOLOGY & ADDITIONAL STUDIES:      MICROBIOLOGY:  RECENT CULTURES:  10-03 Tissue XXXX   No polymorphonuclear cells seen per low power field  No organisms seen per oil power field   Rare Staphylococcus aureus    10-01 .Blood BLOOD XXXX XXXX   No growth at 72 Hours    10-01 Skin/Wound Methicillin resistant Staphylococcus aureus XXXX   Few Methicillin Resistant Staphylococcus aureus  Commensal jameel consistent with body site

## 2024-10-06 LAB
CULTURE RESULTS: SIGNIFICANT CHANGE UP
CULTURE RESULTS: SIGNIFICANT CHANGE UP
SPECIMEN SOURCE: SIGNIFICANT CHANGE UP
SPECIMEN SOURCE: SIGNIFICANT CHANGE UP

## 2024-10-06 PROCEDURE — 99233 SBSQ HOSP IP/OBS HIGH 50: CPT

## 2024-10-06 RX ORDER — IPRATROPIUM BROMIDE AND ALBUTEROL SULFATE .5; 3 MG/3ML; MG/3ML
3 SOLUTION RESPIRATORY (INHALATION) EVERY 4 HOURS
Refills: 0 | Status: DISCONTINUED | OUTPATIENT
Start: 2024-10-06 | End: 2024-10-11

## 2024-10-06 RX ORDER — ANTI-ITCH CREAM 1 G/100G
1 OINTMENT TOPICAL THREE TIMES A DAY
Refills: 0 | Status: DISCONTINUED | OUTPATIENT
Start: 2024-10-06 | End: 2024-10-11

## 2024-10-06 RX ORDER — POLYVINYL ALCOHOL 1 %
1 DROPS OPHTHALMIC (EYE) THREE TIMES A DAY
Refills: 0 | Status: DISCONTINUED | OUTPATIENT
Start: 2024-10-06 | End: 2024-10-11

## 2024-10-06 RX ORDER — OXYCODONE HYDROCHLORIDE 30 MG/1
10 TABLET, FILM COATED, EXTENDED RELEASE ORAL EVERY 12 HOURS
Refills: 0 | Status: DISCONTINUED | OUTPATIENT
Start: 2024-10-06 | End: 2024-10-11

## 2024-10-06 RX ADMIN — OXYCODONE HYDROCHLORIDE 10 MILLIGRAM(S): 30 TABLET, FILM COATED, EXTENDED RELEASE ORAL at 12:23

## 2024-10-06 RX ADMIN — Medication 650 MILLIGRAM(S): at 02:10

## 2024-10-06 RX ADMIN — BENZONATATE 100 MILLIGRAM(S): 150 CAPSULE ORAL at 01:10

## 2024-10-06 RX ADMIN — ROSUVASTATIN CALCIUM 40 MILLIGRAM(S): 20 TABLET, COATED ORAL at 22:07

## 2024-10-06 RX ADMIN — Medication 650 MILLIGRAM(S): at 10:13

## 2024-10-06 RX ADMIN — PREGABALIN 150 MILLIGRAM(S): 25 CAPSULE ORAL at 05:37

## 2024-10-06 RX ADMIN — Medication 2: at 17:23

## 2024-10-06 RX ADMIN — MORPHINE SULFATE 1 MILLIGRAM(S): 30 TABLET, FILM COATED, EXTENDED RELEASE ORAL at 07:49

## 2024-10-06 RX ADMIN — INSULIN GLARGINE 7 UNIT(S): 300 INJECTION, SOLUTION SUBCUTANEOUS at 22:07

## 2024-10-06 RX ADMIN — MORPHINE SULFATE 1 MILLIGRAM(S): 30 TABLET, FILM COATED, EXTENDED RELEASE ORAL at 08:49

## 2024-10-06 RX ADMIN — IPRATROPIUM BROMIDE AND ALBUTEROL SULFATE 3 MILLILITER(S): .5; 3 SOLUTION RESPIRATORY (INHALATION) at 19:36

## 2024-10-06 RX ADMIN — Medication 650 MILLIGRAM(S): at 09:13

## 2024-10-06 RX ADMIN — TRAMADOL HYDROCHLORIDE 50 MILLIGRAM(S): 50 TABLET, COATED ORAL at 19:30

## 2024-10-06 RX ADMIN — TRAMADOL HYDROCHLORIDE 50 MILLIGRAM(S): 50 TABLET, COATED ORAL at 10:13

## 2024-10-06 RX ADMIN — Medication 1 TABLET(S): at 07:52

## 2024-10-06 RX ADMIN — MONTELUKAST SODIUM 10 MILLIGRAM(S): 10 TABLET, FILM COATED ORAL at 11:24

## 2024-10-06 RX ADMIN — CEFEPIME 100 MILLIGRAM(S): 2 INJECTION, POWDER, FOR SOLUTION INTRAVENOUS at 05:38

## 2024-10-06 RX ADMIN — TRAMADOL HYDROCHLORIDE 50 MILLIGRAM(S): 50 TABLET, COATED ORAL at 09:13

## 2024-10-06 RX ADMIN — OXYCODONE HYDROCHLORIDE 10 MILLIGRAM(S): 30 TABLET, FILM COATED, EXTENDED RELEASE ORAL at 23:06

## 2024-10-06 RX ADMIN — PREGABALIN 150 MILLIGRAM(S): 25 CAPSULE ORAL at 17:22

## 2024-10-06 RX ADMIN — ENOXAPARIN SODIUM 40 MILLIGRAM(S): 150 INJECTION SUBCUTANEOUS at 05:38

## 2024-10-06 RX ADMIN — Medication 3: at 07:49

## 2024-10-06 RX ADMIN — TRAMADOL HYDROCHLORIDE 50 MILLIGRAM(S): 50 TABLET, COATED ORAL at 18:30

## 2024-10-06 RX ADMIN — BENZONATATE 100 MILLIGRAM(S): 150 CAPSULE ORAL at 19:32

## 2024-10-06 RX ADMIN — OXYCODONE HYDROCHLORIDE 10 MILLIGRAM(S): 30 TABLET, FILM COATED, EXTENDED RELEASE ORAL at 22:06

## 2024-10-06 RX ADMIN — TRAMADOL HYDROCHLORIDE 50 MILLIGRAM(S): 50 TABLET, COATED ORAL at 01:10

## 2024-10-06 RX ADMIN — Medication 17 GRAM(S): at 11:24

## 2024-10-06 RX ADMIN — DAPTOMYCIN 120 MILLIGRAM(S): 500 INJECTION, POWDER, LYOPHILIZED, FOR SOLUTION INTRAVENOUS at 20:22

## 2024-10-06 RX ADMIN — Medication 2: at 12:44

## 2024-10-06 RX ADMIN — BENZONATATE 100 MILLIGRAM(S): 150 CAPSULE ORAL at 11:39

## 2024-10-06 RX ADMIN — Medication 3 MILLIGRAM(S): at 22:06

## 2024-10-06 RX ADMIN — SERTRALINE HYDROCHLORIDE 100 MILLIGRAM(S): 100 TABLET, FILM COATED ORAL at 11:23

## 2024-10-06 RX ADMIN — Medication 0.75 MILLIGRAM(S): at 17:23

## 2024-10-06 RX ADMIN — Medication 1 DOSE(S): at 09:14

## 2024-10-06 RX ADMIN — Medication 325 MILLIGRAM(S): at 11:23

## 2024-10-06 RX ADMIN — Medication 2 TABLET(S): at 22:06

## 2024-10-06 RX ADMIN — OXYCODONE HYDROCHLORIDE 10 MILLIGRAM(S): 30 TABLET, FILM COATED, EXTENDED RELEASE ORAL at 11:23

## 2024-10-06 RX ADMIN — Medication 650 MILLIGRAM(S): at 18:29

## 2024-10-06 RX ADMIN — ANTI-ITCH CREAM 1 APPLICATION(S): 1 OINTMENT TOPICAL at 22:08

## 2024-10-06 RX ADMIN — Medication 650 MILLIGRAM(S): at 01:10

## 2024-10-06 RX ADMIN — Medication 1 TABLET(S): at 20:22

## 2024-10-06 RX ADMIN — IPRATROPIUM BROMIDE AND ALBUTEROL SULFATE 3 MILLILITER(S): .5; 3 SOLUTION RESPIRATORY (INHALATION) at 15:36

## 2024-10-06 RX ADMIN — Medication 1 DROP(S): at 01:10

## 2024-10-06 RX ADMIN — Medication 650 MILLIGRAM(S): at 19:30

## 2024-10-06 RX ADMIN — Medication 2 PUFF(S): at 11:24

## 2024-10-06 RX ADMIN — CEFEPIME 100 MILLIGRAM(S): 2 INJECTION, POWDER, FOR SOLUTION INTRAVENOUS at 17:22

## 2024-10-06 RX ADMIN — Medication 0.75 MILLIGRAM(S): at 05:37

## 2024-10-06 RX ADMIN — Medication 1 DOSE(S): at 18:59

## 2024-10-06 RX ADMIN — PANTOPRAZOLE SODIUM 40 MILLIGRAM(S): 40 TABLET, DELAYED RELEASE ORAL at 05:43

## 2024-10-06 RX ADMIN — TRAMADOL HYDROCHLORIDE 50 MILLIGRAM(S): 50 TABLET, COATED ORAL at 02:10

## 2024-10-06 NOTE — PROGRESS NOTE ADULT - SUBJECTIVE AND OBJECTIVE BOX
CHIEF COMPLAINT/ REASON FOR VISIT  .. Patient was seen to address the  issue listed under PROBLEM LIST which is located toward bottom of this note     WAYNE SERRANO    PLV 1EAS 113 D1    Allergies    tetracycline (Unknown)  IODINE (Unknown)  vancomycin (Other)    Intolerances        PAST MEDICAL & SURGICAL HISTORY:  Prostate cancer      Type II diabetes mellitus      Chronic obstructive pulmonary disease (COPD)      CHF (congestive heart failure)      Renal insufficiency      H/O migraine      Insomnia      Constipation      S/P foot surgery          FAMILY HISTORY:      Home Medications:  acetaminophen 325 mg oral tablet: 2 tab(s) orally every 8 hours as needed (01 Oct 2024 15:23)  Albuterol (Eqv-ProAir HFA) 90 mcg/inh inhalation aerosol: 2 puff(s) inhaled every 4 hours as needed (01 Oct 2024 15:32)  albuterol 0.63 mg/3 mL (0.021%) inhalation solution: 3 milliliter(s) by nebulizer every 6 hours as needed for SOB (01 Oct 2024 15:27)  azelaic acid 15% topical gel: Apply topically to affected area 2 times a day as needed (01 Oct 2024 15:27)  ferrous sulfate 325 mg (65 mg elemental iron) oral tablet: 1 tab(s) orally once a day (01 Oct 2024 15:15)  fluticasone 50 mcg/inh nasal spray: 1 spray(s) in each nostril 2 times a day as needed (01 Oct 2024 15:29)  furosemide 40 mg oral tablet: 1 tab(s) orally once a day (01 Oct 2024 15:16)  Lantus Solostar Pen 100 units/mL subcutaneous solution: 14 unit(s) subcutaneous once a day (at bedtime) (01 Oct 2024 15:17)  loratadine 10 mg oral tablet: 1 tab(s) orally once a day (in the morning) (01 Oct 2024 15:17)  metFORMIN 500 mg oral tablet: 2 tab(s) orally 2 times a day (01 Oct 2024 15:18)  metoprolol succinate 25 mg oral tablet, extended release: 1 tab(s) orally once a day (01 Oct 2024 15:19)  Opzelura 1.5% topical cream: Apply topically to affected area 2 times a day as needed (01 Oct 2024 15:29)  pregabalin 150 mg oral capsule: 1 cap(s) orally 2 times a day (01 Oct 2024 15:20)  rosuvastatin 40 mg oral tablet: 1 tab(s) orally once a day (01 Oct 2024 15:21)  Saline Mist 0.65% nasal spray: 1 spray(s) intranasally 2 times a day (01 Oct 2024 15:29)  Spiriva 18 mcg inhalation capsule: 1 cap(s) inhaled once a day (01 Oct 2024 15:23)      MEDICATIONS  (STANDING):  cefepime   IVPB 2000 milliGRAM(s) IV Intermittent every 12 hours  clonazePAM Oral Disintegrating Tablet 0.75 milliGRAM(s) Oral two times a day  DAPTOmycin IVPB 500 milliGRAM(s) IV Intermittent every 24 hours  dextrose 5%. 1000 milliLiter(s) (100 mL/Hr) IV Continuous <Continuous>  dextrose 5%. 1000 milliLiter(s) (50 mL/Hr) IV Continuous <Continuous>  dextrose 50% Injectable 12.5 Gram(s) IV Push once  dextrose 50% Injectable 25 Gram(s) IV Push once  dextrose 50% Injectable 25 Gram(s) IV Push once  enoxaparin Injectable 40 milliGRAM(s) SubCutaneous every 24 hours  ferrous    sulfate 325 milliGRAM(s) Oral daily  fluticasone propionate/ salmeterol 250-50 MICROgram(s) Diskus 1 Dose(s) Inhalation two times a day  glucagon  Injectable 1 milliGRAM(s) IntraMuscular once  influenza  Vaccine (HIGH DOSE) 0.5 milliLiter(s) IntraMuscular once  insulin glargine Injectable (LANTUS) 7 Unit(s) SubCutaneous at bedtime  insulin lispro (ADMELOG) corrective regimen sliding scale   SubCutaneous three times a day before meals  lactobacillus acidophilus 1 Tablet(s) Oral every 12 hours  metoprolol succinate ER 25 milliGRAM(s) Oral daily  montelukast 10 milliGRAM(s) Oral daily  pantoprazole    Tablet 40 milliGRAM(s) Oral before breakfast  polyethylene glycol 3350 17 Gram(s) Oral daily  pregabalin 150 milliGRAM(s) Oral two times a day  rosuvastatin 40 milliGRAM(s) Oral at bedtime  senna 2 Tablet(s) Oral at bedtime  sertraline 100 milliGRAM(s) Oral daily    MEDICATIONS  (PRN):  acetaminophen     Tablet .. 650 milliGRAM(s) Oral every 6 hours PRN Temp greater or equal to 38C (100.4F), Mild Pain (1 - 3)  albuterol    90 MICROgram(s) HFA Inhaler 2 Puff(s) Inhalation every 6 hours PRN Shortness of Breath and/or Wheezing  aluminum hydroxide/magnesium hydroxide/simethicone Suspension 30 milliLiter(s) Oral every 4 hours PRN Dyspepsia  artificial  tears Solution 1 Drop(s) Both EYES three times a day PRN eye irritation  benzonatate 100 milliGRAM(s) Oral three times a day PRN Cough  bisacodyl Suppository 10 milliGRAM(s) Rectal daily PRN Constipation  dextrose Oral Gel 15 Gram(s) Oral once PRN Blood Glucose LESS THAN 70 milliGRAM(s)/deciliter  melatonin 3 milliGRAM(s) Oral at bedtime PRN Insomnia  morphine  - Injectable 1 milliGRAM(s) IV Push every 4 hours PRN Severe Pain (7 - 10)  ondansetron Injectable 4 milliGRAM(s) IV Push every 8 hours PRN Nausea and/or Vomiting  sodium chloride 0.65% Nasal 1 Spray(s) Both Nostrils two times a day PRN Congestion  traMADol 50 milliGRAM(s) Oral three times a day PRN Moderate Pain (4 - 6)      Diet, Consistent Carbohydrate w/Evening Snack:   DASH/TLC Sodium & Cholesterol Restricted (10-05-24 @ 11:25) [Active]          Vital Signs Last 24 Hrs  T(C): 37 (06 Oct 2024 05:00), Max: 37 (06 Oct 2024 05:00)  T(F): 98.6 (06 Oct 2024 05:00), Max: 98.6 (06 Oct 2024 05:00)  HR: 90 (06 Oct 2024 05:00) (74 - 90)  BP: 133/73 (06 Oct 2024 05:00) (117/62 - 139/72)  BP(mean): --  RR: 18 (06 Oct 2024 05:00) (18 - 23)  SpO2: 92% (06 Oct 2024 05:00) (92% - 96%)    Parameters below as of 06 Oct 2024 05:00  Patient On (Oxygen Delivery Method): room air          10-05-24 @ 07:01  -  10-06-24 @ 07:00  --------------------------------------------------------  IN: 100 mL / OUT: 400 mL / NET: -300 mL              LABS:                        8.7    5.72  )-----------( 168      ( 05 Oct 2024 06:15 )             28.1     10-05    137  |  106  |  26[H]  ----------------------------<  294[H]  3.9   |  25  |  1.10    Ca    9.3      05 Oct 2024 06:15  Phos  3.7     10-05  Mg     2.3     10-05      PT/INR - ( 05 Oct 2024 06:15 )   PT: 14.3 sec;   INR: 1.21 ratio           Urinalysis Basic - ( 05 Oct 2024 06:15 )    Color: x / Appearance: x / SG: x / pH: x  Gluc: 294 mg/dL / Ketone: x  / Bili: x / Urobili: x   Blood: x / Protein: x / Nitrite: x   Leuk Esterase: x / RBC: x / WBC x   Sq Epi: x / Non Sq Epi: x / Bacteria: x            WBC:  WBC Count: 5.72 K/uL (10-05 @ 06:15)  WBC Count: 6.65 K/uL (10-04 @ 06:06)      MICROBIOLOGY:  RECENT CULTURES:  10-03 Tissue Methicillin resistant Staphylococcus aureus   No polymorphonuclear cells seen per low power field  No organisms seen per oil power field   Rare Methicillin Resistant Staphylococcus aureus    10-01 .Blood BLOOD XXXX XXXX   No growth at 4 days    10-01 Skin/Wound Methicillin resistant Staphylococcus aureus XXXX   Few Methicillin Resistant Staphylococcus aureus  Commensal jameel consistent with body site                PT/INR - ( 05 Oct 2024 06:15 )   PT: 14.3 sec;   INR: 1.21 ratio             Sodium:  Sodium: 137 mmol/L (10-05 @ 06:15)  Sodium: 138 mmol/L (10-04 @ 06:06)      1.10 mg/dL 10-05 @ 06:15  1.10 mg/dL 10-04 @ 06:06      Hemoglobin:  Hemoglobin: 8.7 g/dL (10-05 @ 06:15)  Hemoglobin: 9.1 g/dL (10-04 @ 06:06)      Platelets: Platelet Count - Automated: 168 K/uL (10-05 @ 06:15)  Platelet Count - Automated: 167 K/uL (10-04 @ 06:06)          Urinalysis Basic - ( 05 Oct 2024 06:15 )    Color: x / Appearance: x / SG: x / pH: x  Gluc: 294 mg/dL / Ketone: x  / Bili: x / Urobili: x   Blood: x / Protein: x / Nitrite: x   Leuk Esterase: x / RBC: x / WBC x   Sq Epi: x / Non Sq Epi: x / Bacteria: x        RADIOLOGY & ADDITIONAL STUDIES:      MICROBIOLOGY:  RECENT CULTURES:  10-03 Tissue Methicillin resistant Staphylococcus aureus   No polymorphonuclear cells seen per low power field  No organisms seen per oil power field   Rare Methicillin Resistant Staphylococcus aureus    10-01 .Blood BLOOD XXXX XXXX   No growth at 4 days    10-01 Skin/Wound Methicillin resistant Staphylococcus aureus XXXX   Few Methicillin Resistant Staphylococcus aureus  Commensal jameel consistent with body site

## 2024-10-06 NOTE — PROGRESS NOTE ADULT - SUBJECTIVE AND OBJECTIVE BOX
SUBJECTIVE:  Patient seen and examined at bedside.  No overnight events.     Vital Signs Last 24 Hrs  T(C): 37 (06 Oct 2024 05:00), Max: 37 (06 Oct 2024 05:00)  T(F): 98.6 (06 Oct 2024 05:00), Max: 98.6 (06 Oct 2024 05:00)  HR: 90 (06 Oct 2024 05:00) (74 - 90)  BP: 133/73 (06 Oct 2024 05:00) (117/62 - 140/73)  BP(mean): --  RR: 18 (06 Oct 2024 05:00) (16 - 23)  SpO2: 92% (06 Oct 2024 05:00) (92% - 96%)    Parameters below as of 06 Oct 2024 05:00  Patient On (Oxygen Delivery Method): room air        PHYSICAL EXAM:  GENERAL: No acute distress, well-developed  HEAD:  L temple with well approximated incision with glue. Minimal swelling, ecchymosis. Mildly tender to touch. Cranial nerves II-XII intact.  NEUROLOGY: responding appropriately, no focal deficits    I&O's Summary    04 Oct 2024 07:01  -  05 Oct 2024 07:00  --------------------------------------------------------  IN: 0 mL / OUT: 600 mL / NET: -600 mL    05 Oct 2024 07:01  -  06 Oct 2024 06:03  --------------------------------------------------------  IN: 50 mL / OUT: 0 mL / NET: 50 mL      I&O's Detail    04 Oct 2024 07:01  -  05 Oct 2024 07:00  --------------------------------------------------------  IN:  Total IN: 0 mL    OUT:    Voided (mL): 600 mL  Total OUT: 600 mL    Total NET: -600 mL      05 Oct 2024 07:01  -  06 Oct 2024 06:03  --------------------------------------------------------  IN:    IV PiggyBack: 50 mL  Total IN: 50 mL    OUT:  Total OUT: 0 mL    Total NET: 50 mL        MEDICATIONS  (STANDING):  cefepime   IVPB 2000 milliGRAM(s) IV Intermittent every 12 hours  clonazePAM Oral Disintegrating Tablet 0.75 milliGRAM(s) Oral two times a day  DAPTOmycin IVPB 500 milliGRAM(s) IV Intermittent every 24 hours  dextrose 5%. 1000 milliLiter(s) (100 mL/Hr) IV Continuous <Continuous>  dextrose 5%. 1000 milliLiter(s) (50 mL/Hr) IV Continuous <Continuous>  dextrose 50% Injectable 25 Gram(s) IV Push once  dextrose 50% Injectable 25 Gram(s) IV Push once  dextrose 50% Injectable 12.5 Gram(s) IV Push once  enoxaparin Injectable 40 milliGRAM(s) SubCutaneous every 24 hours  ferrous    sulfate 325 milliGRAM(s) Oral daily  fluticasone propionate/ salmeterol 250-50 MICROgram(s) Diskus 1 Dose(s) Inhalation two times a day  glucagon  Injectable 1 milliGRAM(s) IntraMuscular once  influenza  Vaccine (HIGH DOSE) 0.5 milliLiter(s) IntraMuscular once  insulin glargine Injectable (LANTUS) 7 Unit(s) SubCutaneous at bedtime  insulin lispro (ADMELOG) corrective regimen sliding scale   SubCutaneous three times a day before meals  lactobacillus acidophilus 1 Tablet(s) Oral every 12 hours  metoprolol succinate ER 25 milliGRAM(s) Oral daily  montelukast 10 milliGRAM(s) Oral daily  pantoprazole    Tablet 40 milliGRAM(s) Oral before breakfast  polyethylene glycol 3350 17 Gram(s) Oral daily  pregabalin 150 milliGRAM(s) Oral two times a day  rosuvastatin 40 milliGRAM(s) Oral at bedtime  senna 2 Tablet(s) Oral at bedtime  sertraline 100 milliGRAM(s) Oral daily    MEDICATIONS  (PRN):  acetaminophen     Tablet .. 650 milliGRAM(s) Oral every 6 hours PRN Temp greater or equal to 38C (100.4F), Mild Pain (1 - 3)  albuterol    90 MICROgram(s) HFA Inhaler 2 Puff(s) Inhalation every 6 hours PRN Shortness of Breath and/or Wheezing  aluminum hydroxide/magnesium hydroxide/simethicone Suspension 30 milliLiter(s) Oral every 4 hours PRN Dyspepsia  artificial  tears Solution 1 Drop(s) Both EYES three times a day PRN eye irritation  benzonatate 100 milliGRAM(s) Oral three times a day PRN Cough  bisacodyl Suppository 10 milliGRAM(s) Rectal daily PRN Constipation  dextrose Oral Gel 15 Gram(s) Oral once PRN Blood Glucose LESS THAN 70 milliGRAM(s)/deciliter  melatonin 3 milliGRAM(s) Oral at bedtime PRN Insomnia  morphine  - Injectable 1 milliGRAM(s) IV Push every 4 hours PRN Severe Pain (7 - 10)  ondansetron Injectable 4 milliGRAM(s) IV Push every 8 hours PRN Nausea and/or Vomiting  sodium chloride 0.65% Nasal 1 Spray(s) Both Nostrils two times a day PRN Congestion  traMADol 50 milliGRAM(s) Oral three times a day PRN Moderate Pain (4 - 6)    LABS:                        8.7    5.72  )-----------( 168      ( 05 Oct 2024 06:15 )             28.1     10-05    137  |  106  |  26[H]  ----------------------------<  294[H]  3.9   |  25  |  1.10    Ca    9.3      05 Oct 2024 06:15  Phos  3.7     10-05  Mg     2.3     10-05      PT/INR - ( 05 Oct 2024 06:15 )   PT: 14.3 sec;   INR: 1.21 ratio           Urinalysis Basic - ( 05 Oct 2024 06:15 )    Color: x / Appearance: x / SG: x / pH: x  Gluc: 294 mg/dL / Ketone: x  / Bili: x / Urobili: x   Blood: x / Protein: x / Nitrite: x   Leuk Esterase: x / RBC: x / WBC x   Sq Epi: x / Non Sq Epi: x / Bacteria: x      RADIOLOGY & ADDITIONAL STUDIES:    ASSESSMENT    Assessment:  74yMale patient S/P temporal artery biopsy doing well    Plan:  - pain control  - OOB  - REG diet  - f/u BX results  - signing off, reconsult PRN    Surgical Team Contact Information  Spectralink: Ext: [phone] or [phone]

## 2024-10-06 NOTE — PROVIDER CONTACT NOTE (CRITICAL VALUE NOTIFICATION) - SITUATION
Wound Culture 10/1 Final positive resulted 10/4 Few methicillin resistant Staph Aureus and Commensal jameel Consistent with body site
Tissue Culture 10/3 Preliminary #2 Rare MRSA

## 2024-10-06 NOTE — PROGRESS NOTE ADULT - SUBJECTIVE AND OBJECTIVE BOX
Neurology Follow up note    WAYNE [id number]    HPI:  72yo M PMhx DM2 w/ PAD - chronic Rt foot wound, HTN, COPD, CKD, HFpEF, arrythmia, Prostate CA s/p resection, Depression/Anxiety presents to ED sent by wound care for worsening R MTP wound. Patient reports having a nonhealing R foot wound, s/p multiple debridements and grafts at [facility], for past 1.5 years. Denies seeing a vascular surgeon in the past.  Denies pain the wound, but reports having neuropathy. States following wound care and reports worsening of wound. Reporting having some chills in the past week. Denies fever, chills, SOB or any other complaints.Patient examined and evaluated at this time. Antibiotics as per infectious disease recommendations. Recommend vascular surgery evaluation. Tentatively planning for right 1st metatarsal head resection pending vascular evaluation. Continue local wound care and offloading at this time. (01 Oct 2024 14:44)      Interval History -left sided HA persists    Patient is seen, chart was reviewed and case was discussed with the treatment team.  Pt is not in any distress.   Lying on bed comfortably.   No events reported overnight.       Vital Signs Last 24 Hrs  T(C): 36.6 (06 Oct 2024 13:04), Max: 37 (06 Oct 2024 05:00)  T(F): 97.8 (06 Oct 2024 13:04), Max: 98.6 (06 Oct 2024 05:00)  HR: 62 (06 Oct 2024 13:04) (62 - 90)  BP: 138/78 (06 Oct 2024 13:04) (117/62 - 138/78)  BP(mean): --  RR: 18 (06 Oct 2024 13:04) (18 - 18)  SpO2: 92% (06 Oct 2024 13:04) (92% - 92%)    Parameters below as of 06 Oct 2024 13:04  Patient On (Oxygen Delivery Method): room air      r            REVIEW OF SYSTEMS:    Constitutional: No fever, weight loss or fatigue  Eyes: No eye pain, visual disturbances, or discharge  ENT:  No difficulty hearing, tinnitus, vertigo; No sinus or throat pain  Neck: No pain or stiffness  Respiratory: No cough, wheezing, chills or hemoptysis  Cardiovascular: No chest pain, palpitations, shortness of breath, dizziness or leg swelling  Gastrointestinal: No abdominal or epigastric pain. No nausea, vomiting or hematemesis  Genitourinary: No dysuria, frequency, hematuria or incontinence  Neurological: No memory loss, loss of strength, numbness or tremors  Psychiatric: No depression, anxiety, mood swings or difficulty sleeping  Musculoskeletal: No joint pain or swelling; No muscle, back or extremity pain  Skin: No itching, burning, rashes or lesions   Lymph Nodes: No enlarged glands  Endocrine: No heat or cold intolerance; No hair loss    Allergy and Immunologic: No hives or eczema    On Neurological Examination:    Mental Status - Pt is alert, awake, oriented X3.. Follows commands well and able to answer questions appropriately.Mood and affect  normal    Speech -  Normal.    Cranial Nerves - Pupils 3 mm equal and reactive to light, extraocular eye movements intact. Pt has no visual field deficit.  Pt has no facial asymmetry. Facial sensation is intact.Tongue - is in midline.    Muscle tone - is normal        Motor Exam - 55 OF ue  'le 4/5   No drift. No shaking or tremors.    Sensory Exam - . Pt withdraws all extremities equally on stimulation. No asymmetry seen. No complaints of tingling, numbness.        coordination:    Finger to nose: normal      Deep tendon Reflexes - 2 plus all over.     .    Neck Supple -  Yes.     MEDICATIONS    acetaminophen     Tablet .. 650 milliGRAM(s) Oral every 6 hours PRN  albuterol    90 MICROgram(s) HFA Inhaler 2 Puff(s) Inhalation every 6 hours PRN  aluminum hydroxide/magnesium hydroxide/simethicone Suspension 30 milliLiter(s) Oral every 4 hours PRN  benzonatate 100 milliGRAM(s) Oral three times a day PRN  bisacodyl Suppository 10 milliGRAM(s) Rectal daily PRN  cefepime   IVPB 2000 milliGRAM(s) IV Intermittent every 12 hours  clonazePAM Oral Disintegrating Tablet 0.75 milliGRAM(s) Oral two times a day  DAPTOmycin IVPB 500 milliGRAM(s) IV Intermittent every 24 hours  dextrose 5% + sodium chloride 0.45%. 1000 milliLiter(s) IV Continuous <Continuous>  dextrose 5%. 1000 milliLiter(s) IV Continuous <Continuous>  dextrose 5%. 1000 milliLiter(s) IV Continuous <Continuous>  dextrose 50% Injectable 12.5 Gram(s) IV Push once  dextrose 50% Injectable 25 Gram(s) IV Push once  dextrose 50% Injectable 25 Gram(s) IV Push once  dextrose Oral Gel 15 Gram(s) Oral once PRN  enoxaparin Injectable 40 milliGRAM(s) SubCutaneous every 24 hours  ferrous    sulfate 325 milliGRAM(s) Oral daily  fluticasone propionate/ salmeterol 250-50 MICROgram(s) Diskus 1 Dose(s) Inhalation two times a day  glucagon  Injectable 1 milliGRAM(s) IntraMuscular once  influenza  Vaccine (HIGH DOSE) 0.5 milliLiter(s) IntraMuscular once  insulin glargine Injectable (LANTUS) 7 Unit(s) SubCutaneous at bedtime  insulin lispro (ADMELOG) corrective regimen sliding scale   SubCutaneous three times a day before meals  lactobacillus acidophilus 1 Tablet(s) Oral every 12 hours  melatonin 3 milliGRAM(s) Oral at bedtime PRN  metoprolol succinate ER 25 milliGRAM(s) Oral daily  montelukast 10 milliGRAM(s) Oral daily  morphine  - Injectable 1 milliGRAM(s) IV Push every 4 hours PRN  ondansetron Injectable 4 milliGRAM(s) IV Push every 8 hours PRN  pantoprazole    Tablet 40 milliGRAM(s) Oral before breakfast  polyethylene glycol 3350 17 Gram(s) Oral daily  pregabalin 150 milliGRAM(s) Oral two times a day  rosuvastatin 40 milliGRAM(s) Oral at bedtime  senna 2 Tablet(s) Oral at bedtime  sertraline 100 milliGRAM(s) Oral daily  sodium chloride 0.65% Nasal 1 Spray(s) Both Nostrils two times a day PRN  traMADol 50 milliGRAM(s) Oral three times a day PRN      Allergies    tetracycline (Unknown)  IODINE (Unknown)  vancomycin (Other)    Intolerances          10-05    137  |  106  |  26[H]  ----------------------------<  294[H]  3.9   |  25  |  1.10    Ca    9.3      05 Oct 2024 06:15  Phos  3.7     10-05  Mg     2.3     10-05      Hemoglobin A1C:     Vitamin B12     RADIOLOGY    ASSESSMENT AND PLAN:      seen for HA  R/O TA    trial steroid if ok podiatry and ID  sp  TA BIOPSY  BRAIN MRI IS PENDING  ANALGESIC  Physical therapy evaluation.  OOB to chair/ambulation with assistance only.  Advanced care planning was discussed with family.  Pain is accessed and addressed.  Plan of care was discussed with family. Questions answered.  Would continue to follow.

## 2024-10-06 NOTE — PROGRESS NOTE ADULT - SUBJECTIVE AND OBJECTIVE BOX
Patient is a 74y Male with a known history of :  Wound of right foot [S91.301A]    Cellulitis of right foot [L03.115]    Prostate cancer [C61]    Type II diabetes mellitus [E11.9]    Chronic obstructive pulmonary disease (COPD) [J44.9]    CHF (congestive heart failure) [I50.9]    Renal insufficiency [N28.9]    Prophylactic measure [Z29.9]    MDD (major depressive disorder) [F32.9]    Neuropathy [G62.9]    Constipation [K59.00]    History of headache [Z87.898]      HPI:  74yo M PMhx DM2 w/ PAD - chronic Rt foot wound, HTN, COPD, CKD, HFpEF, arrythmia, Prostate CA s/p resection, Depression/Anxiety presents to ED sent by wound care for worsening R MTP wound. Patient reports having a nonhealing R foot wound, s/p multiple debridements and grafts at [place], for past 1.5 years. Denies seeing a vascular surgeon in the past.  Denies pain the wound, but reports having neuropathy. States following wound care and reports worsening of wound. Reporting having some chills in the past week. Denies fever, chills, SOB or any other complaints.Patient examined and evaluated at this time. Antibiotics as per infectious disease recommendations. Recommend vascular surgery evaluation. Tentatively planning for right 1st metatarsal head resection pending vascular evaluation. Continue local wound care and offloading at this time. (01 Oct 2024 14:44)      REVIEW OF SYSTEMS:    CONSTITUTIONAL: No fever, weight loss, or fatigue  EYES: No eye pain, visual disturbances, or discharge  ENMT:  No difficulty hearing, tinnitus, vertigo; No sinus or throat pain  NECK: No pain or stiffness  BREASTS: No pain, masses, or nipple discharge  RESPIRATORY: No cough, wheezing, chills or hemoptysis; No shortness of breath  CARDIOVASCULAR: No chest pain, palpitations, dizziness, or leg swelling  GASTROINTESTINAL: No abdominal or epigastric pain. No nausea, vomiting, or hematemesis; No diarrhea or constipation. No melena or hematochezia.  GENITOURINARY: No dysuria, frequency, hematuria, or incontinence  NEUROLOGICAL: No headaches, memory loss, loss of strength, numbness, or tremors  SKIN: No itching, burning, rashes, or lesions   LYMPH NODES: No enlarged glands  ENDOCRINE: No heat or cold intolerance; No hair loss  MUSCULOSKELETAL: No joint pain or swelling; No muscle, back, or extremity pain  PSYCHIATRIC: No depression, anxiety, mood swings, or difficulty sleeping  HEME/LYMPH: No easy bruising, or bleeding gums  ALLERGY AND IMMUNOLOGIC: No hives or eczema    MEDICATIONS  (STANDING):  cefepime   IVPB 2000 milliGRAM(s) IV Intermittent every 12 hours  clonazePAM Oral Disintegrating Tablet 0.75 milliGRAM(s) Oral two times a day  DAPTOmycin IVPB 500 milliGRAM(s) IV Intermittent every 24 hours  dextrose 5%. 1000 milliLiter(s) (100 mL/Hr) IV Continuous <Continuous>  dextrose 5%. 1000 milliLiter(s) (50 mL/Hr) IV Continuous <Continuous>  dextrose 50% Injectable 25 Gram(s) IV Push once  dextrose 50% Injectable 25 Gram(s) IV Push once  dextrose 50% Injectable 12.5 Gram(s) IV Push once  enoxaparin Injectable 40 milliGRAM(s) SubCutaneous every 24 hours  ferrous    sulfate 325 milliGRAM(s) Oral daily  fluticasone propionate/ salmeterol 250-50 MICROgram(s) Diskus 1 Dose(s) Inhalation two times a day  glucagon  Injectable 1 milliGRAM(s) IntraMuscular once  influenza  Vaccine (HIGH DOSE) 0.5 milliLiter(s) IntraMuscular once  insulin glargine Injectable (LANTUS) 7 Unit(s) SubCutaneous at bedtime  insulin lispro (ADMELOG) corrective regimen sliding scale   SubCutaneous three times a day before meals  lactobacillus acidophilus 1 Tablet(s) Oral every 12 hours  metoprolol succinate ER 25 milliGRAM(s) Oral daily  montelukast 10 milliGRAM(s) Oral daily  pantoprazole    Tablet 40 milliGRAM(s) Oral before breakfast  polyethylene glycol 3350 17 Gram(s) Oral daily  pregabalin 150 milliGRAM(s) Oral two times a day  rosuvastatin 40 milliGRAM(s) Oral at bedtime  senna 2 Tablet(s) Oral at bedtime  sertraline 100 milliGRAM(s) Oral daily    MEDICATIONS  (PRN):  acetaminophen     Tablet .. 650 milliGRAM(s) Oral every 6 hours PRN Temp greater or equal to 38C (100.4F), Mild Pain (1 - 3)  albuterol    90 MICROgram(s) HFA Inhaler 2 Puff(s) Inhalation every 6 hours PRN Shortness of Breath and/or Wheezing  aluminum hydroxide/magnesium hydroxide/simethicone Suspension 30 milliLiter(s) Oral every 4 hours PRN Dyspepsia  artificial  tears Solution 1 Drop(s) Both EYES three times a day PRN eye irritation  benzonatate 100 milliGRAM(s) Oral three times a day PRN Cough  bisacodyl Suppository 10 milliGRAM(s) Rectal daily PRN Constipation  dextrose Oral Gel 15 Gram(s) Oral once PRN Blood Glucose LESS THAN 70 milliGRAM(s)/deciliter  melatonin 3 milliGRAM(s) Oral at bedtime PRN Insomnia  morphine  - Injectable 1 milliGRAM(s) IV Push every 4 hours PRN Severe Pain (7 - 10)  ondansetron Injectable 4 milliGRAM(s) IV Push every 8 hours PRN Nausea and/or Vomiting  sodium chloride 0.65% Nasal 1 Spray(s) Both Nostrils two times a day PRN Congestion  traMADol 50 milliGRAM(s) Oral three times a day PRN Moderate Pain (4 - 6)      ALLERGIES: tetracycline (Unknown)  IODINE (Unknown)  vancomycin (Other)      FAMILY HISTORY:      PHYSICAL EXAMINATION:  -----------------------------  T(C): 37 (10-06-24 @ 05:00), Max: 37 (10-06-24 @ 05:00)  HR: 90 (10-06-24 @ 05:00) (74 - 90)  BP: 133/73 (10-06-24 @ 05:00) (117/62 - 139/72)  RR: 18 (10-06-24 @ 05:00) (18 - 23)  SpO2: 92% (10-06-24 @ 05:00) (92% - 96%)  Wt(kg): --    10-05 @ 07:01  -  10-06 @ 07:00  --------------------------------------------------------  IN:    IV PiggyBack: 50 mL    IV PiggyBack: 50 mL  Total IN: 100 mL    OUT:    Voided (mL): 400 mL  Total OUT: 400 mL    Total NET: -300 mL            VITALS  T(C): 37 (10-06-24 @ 05:00), Max: 37 (10-06-24 @ 05:00)  HR: 90 (10-06-24 @ 05:00) (74 - 90)  BP: 133/73 (10-06-24 @ 05:00) (117/62 - 139/72)  RR: 18 (10-06-24 @ 05:00) (18 - 23)  SpO2: 92% (10-06-24 @ 05:00) (92% - 96%)    Constitutional: well developed, normal appearance, well groomed, well nourished, no deformities and no acute distress.   Eyes: the conjunctiva exhibited no abnormalities and the eyelids demonstrated no xanthelasmas.   HEENT: normal oral mucosa, no oral pallor and no oral cyanosis.   Neck: normal jugular venous A waves present, normal jugular venous V waves present and no jugular venous mahmood A waves.   Pulmonary: no respiratory distress, normal respiratory rhythm and effort, no accessory muscle use and lungs were clear to auscultation bilaterally.   Cardiovascular: heart rate and rhythm were normal, normal S1 and S2 and no murmur, gallop, rub, heave or thrill are present.   Abdomen: soft, non-tender, no hepato-splenomegaly and no abdominal mass palpated.   Musculoskeletal: the gait could not be assessed..   Extremities: no clubbing of the fingernails, no localized cyanosis, no petechial hemorrhages and no ischemic changes.   Skin: normal skin color and pigmentation, no rash, no venous stasis, no skin lesions, no skin ulcer and no xanthoma was observed.   Psychiatric: oriented to person, place, and time, the affect was normal, the mood was normal and not feeling anxious.     LABS:   --------  10-05    137  |  106  |  26[H]  ----------------------------<  294[H]  3.9   |  25  |  1.10    Ca    9.3      05 Oct 2024 06:15  Phos  3.7     10-05  Mg     2.3     10-05                           8.7    5.72  )-----------( 168      ( 05 Oct 2024 06:15 )             28.1     PT/INR - ( 05 Oct 2024 06:15 )   PT: 14.3 sec;   INR: 1.21 ratio                     RADIOLOGY:  -----------------    ECG:     ECHO:

## 2024-10-07 ENCOUNTER — TRANSCRIPTION ENCOUNTER (OUTPATIENT)
Age: 74
End: 2024-10-07

## 2024-10-07 LAB
ANION GAP SERPL CALC-SCNC: 6 MMOL/L — SIGNIFICANT CHANGE UP (ref 5–17)
BUN SERPL-MCNC: 17 MG/DL — SIGNIFICANT CHANGE UP (ref 7–23)
CALCIUM SERPL-MCNC: 9.8 MG/DL — SIGNIFICANT CHANGE UP (ref 8.5–10.1)
CHLORIDE SERPL-SCNC: 105 MMOL/L — SIGNIFICANT CHANGE UP (ref 96–108)
CO2 SERPL-SCNC: 25 MMOL/L — SIGNIFICANT CHANGE UP (ref 22–31)
CREAT SERPL-MCNC: 1.2 MG/DL — SIGNIFICANT CHANGE UP (ref 0.5–1.3)
EGFR: 64 ML/MIN/1.73M2 — SIGNIFICANT CHANGE UP
GLUCOSE SERPL-MCNC: 170 MG/DL — HIGH (ref 70–99)
HCT VFR BLD CALC: 29.8 % — LOW (ref 39–50)
HGB BLD-MCNC: 9.6 G/DL — LOW (ref 13–17)
MCHC RBC-ENTMCNC: 29.9 PG — SIGNIFICANT CHANGE UP (ref 27–34)
MCHC RBC-ENTMCNC: 32.2 GM/DL — SIGNIFICANT CHANGE UP (ref 32–36)
MCV RBC AUTO: 92.8 FL — SIGNIFICANT CHANGE UP (ref 80–100)
NRBC # BLD: 0 /100 WBCS — SIGNIFICANT CHANGE UP (ref 0–0)
PLATELET # BLD AUTO: 203 K/UL — SIGNIFICANT CHANGE UP (ref 150–400)
POTASSIUM SERPL-MCNC: 4.3 MMOL/L — SIGNIFICANT CHANGE UP (ref 3.5–5.3)
POTASSIUM SERPL-SCNC: 4.3 MMOL/L — SIGNIFICANT CHANGE UP (ref 3.5–5.3)
RBC # BLD: 3.21 M/UL — LOW (ref 4.2–5.8)
RBC # FLD: 14.5 % — SIGNIFICANT CHANGE UP (ref 10.3–14.5)
SODIUM SERPL-SCNC: 136 MMOL/L — SIGNIFICANT CHANGE UP (ref 135–145)
WBC # BLD: 7.44 K/UL — SIGNIFICANT CHANGE UP (ref 3.8–10.5)
WBC # FLD AUTO: 7.44 K/UL — SIGNIFICANT CHANGE UP (ref 3.8–10.5)

## 2024-10-07 PROCEDURE — 70553 MRI BRAIN STEM W/O & W/DYE: CPT | Mod: 26

## 2024-10-07 PROCEDURE — 99233 SBSQ HOSP IP/OBS HIGH 50: CPT

## 2024-10-07 RX ADMIN — CEFEPIME 100 MILLIGRAM(S): 2 INJECTION, POWDER, FOR SOLUTION INTRAVENOUS at 05:36

## 2024-10-07 RX ADMIN — Medication 650 MILLIGRAM(S): at 01:00

## 2024-10-07 RX ADMIN — Medication 0.75 MILLIGRAM(S): at 05:35

## 2024-10-07 RX ADMIN — Medication 17 GRAM(S): at 13:12

## 2024-10-07 RX ADMIN — DAPTOMYCIN 120 MILLIGRAM(S): 500 INJECTION, POWDER, LYOPHILIZED, FOR SOLUTION INTRAVENOUS at 22:29

## 2024-10-07 RX ADMIN — PREGABALIN 150 MILLIGRAM(S): 25 CAPSULE ORAL at 17:45

## 2024-10-07 RX ADMIN — Medication 25 MILLIGRAM(S): at 05:35

## 2024-10-07 RX ADMIN — Medication 1: at 07:55

## 2024-10-07 RX ADMIN — ANTI-ITCH CREAM 1 APPLICATION(S): 1 OINTMENT TOPICAL at 22:29

## 2024-10-07 RX ADMIN — Medication 2 TABLET(S): at 22:27

## 2024-10-07 RX ADMIN — Medication 1 DOSE(S): at 22:29

## 2024-10-07 RX ADMIN — Medication 1 TABLET(S): at 07:55

## 2024-10-07 RX ADMIN — Medication 2: at 17:44

## 2024-10-07 RX ADMIN — TRAMADOL HYDROCHLORIDE 50 MILLIGRAM(S): 50 TABLET, COATED ORAL at 07:55

## 2024-10-07 RX ADMIN — Medication 0.75 MILLIGRAM(S): at 17:46

## 2024-10-07 RX ADMIN — OXYCODONE HYDROCHLORIDE 10 MILLIGRAM(S): 30 TABLET, FILM COATED, EXTENDED RELEASE ORAL at 11:00

## 2024-10-07 RX ADMIN — Medication 1 TABLET(S): at 22:27

## 2024-10-07 RX ADMIN — Medication 2: at 13:12

## 2024-10-07 RX ADMIN — CEFEPIME 100 MILLIGRAM(S): 2 INJECTION, POWDER, FOR SOLUTION INTRAVENOUS at 17:45

## 2024-10-07 RX ADMIN — BENZONATATE 100 MILLIGRAM(S): 150 CAPSULE ORAL at 17:55

## 2024-10-07 RX ADMIN — INSULIN GLARGINE 7 UNIT(S): 300 INJECTION, SOLUTION SUBCUTANEOUS at 22:28

## 2024-10-07 RX ADMIN — PANTOPRAZOLE SODIUM 40 MILLIGRAM(S): 40 TABLET, DELAYED RELEASE ORAL at 05:35

## 2024-10-07 RX ADMIN — OXYCODONE HYDROCHLORIDE 10 MILLIGRAM(S): 30 TABLET, FILM COATED, EXTENDED RELEASE ORAL at 10:13

## 2024-10-07 RX ADMIN — Medication 1 DOSE(S): at 05:43

## 2024-10-07 RX ADMIN — TRAMADOL HYDROCHLORIDE 50 MILLIGRAM(S): 50 TABLET, COATED ORAL at 08:15

## 2024-10-07 RX ADMIN — Medication 325 MILLIGRAM(S): at 13:12

## 2024-10-07 RX ADMIN — SERTRALINE HYDROCHLORIDE 100 MILLIGRAM(S): 100 TABLET, FILM COATED ORAL at 13:12

## 2024-10-07 RX ADMIN — PREGABALIN 150 MILLIGRAM(S): 25 CAPSULE ORAL at 05:35

## 2024-10-07 RX ADMIN — OXYCODONE HYDROCHLORIDE 10 MILLIGRAM(S): 30 TABLET, FILM COATED, EXTENDED RELEASE ORAL at 22:27

## 2024-10-07 RX ADMIN — ROSUVASTATIN CALCIUM 40 MILLIGRAM(S): 20 TABLET, COATED ORAL at 22:27

## 2024-10-07 RX ADMIN — MONTELUKAST SODIUM 10 MILLIGRAM(S): 10 TABLET, FILM COATED ORAL at 13:12

## 2024-10-07 RX ADMIN — ANTI-ITCH CREAM 1 APPLICATION(S): 1 OINTMENT TOPICAL at 13:13

## 2024-10-07 RX ADMIN — OXYCODONE HYDROCHLORIDE 10 MILLIGRAM(S): 30 TABLET, FILM COATED, EXTENDED RELEASE ORAL at 23:00

## 2024-10-07 RX ADMIN — ANTI-ITCH CREAM 1 APPLICATION(S): 1 OINTMENT TOPICAL at 05:34

## 2024-10-07 NOTE — PROGRESS NOTE ADULT - SUBJECTIVE AND OBJECTIVE BOX
PROGRESS NOTE  Patient is a 74y old  Male who presents with a chief complaint of right foot wound (07 Oct 2024 10:10)    Chart and available morning labs /imaging are reviewed electronically , urgent issues addressed . More information  is being added upon completion of rounds , when more information is collected and management discussed with consultants , medical staff and social service/case management on the floor   OVERNIGHT  No new issues reported by medical staff . All above noted Patient is resting in a bed comfortably .HA is improving  .No distress noted   awaiting for pathology report Seen by PT - may return to [facility]   HPI:  74yo M PMhx DM2 w/ PAD - chronic Rt foot wound, HTN, COPD, CKD, HFpEF, arrythmia, Prostate CA s/p resection, Depression/Anxiety presents to ED sent by wound care for worsening R MTP wound. Patient reports having a nonhealing R foot wound, s/p multiple debridements and grafts at [place], for past 1.5 years. Denies seeing a vascular surgeon in the past.  Denies pain the wound, but reports having neuropathy. States following wound care and reports worsening of wound. Reporting having some chills in the past week. Denies fever, chills, SOB or any other complaints.Patient examined and evaluated at this time. Antibiotics as per infectious disease recommendations. Recommend vascular surgery evaluation. Tentatively planning for right 1st metatarsal head resection pending vascular evaluation. Continue local wound care and offloading at this time. (01 Oct 2024 14:44)    PAST MEDICAL & SURGICAL HISTORY:  Prostate cancer      Type II diabetes mellitus      Chronic obstructive pulmonary disease (COPD)      CHF (congestive heart failure)      Renal insufficiency      H/O migraine      Insomnia      Constipation      S/P foot surgery          MEDICATIONS  (STANDING):  cefepime   IVPB 2000 milliGRAM(s) IV Intermittent every 12 hours  clonazePAM Oral Disintegrating Tablet 0.75 milliGRAM(s) Oral two times a day  DAPTOmycin IVPB 500 milliGRAM(s) IV Intermittent every 24 hours  dextrose 5%. 1000 milliLiter(s) (100 mL/Hr) IV Continuous <Continuous>  dextrose 5%. 1000 milliLiter(s) (50 mL/Hr) IV Continuous <Continuous>  dextrose 50% Injectable 25 Gram(s) IV Push once  dextrose 50% Injectable 25 Gram(s) IV Push once  dextrose 50% Injectable 12.5 Gram(s) IV Push once  enoxaparin Injectable 40 milliGRAM(s) SubCutaneous every 24 hours  ferrous    sulfate 325 milliGRAM(s) Oral daily  fluticasone propionate/ salmeterol 250-50 MICROgram(s) Diskus 1 Dose(s) Inhalation two times a day  glucagon  Injectable 1 milliGRAM(s) IntraMuscular once  hydrocortisone 2.5% Ointment 1 Application(s) Topical three times a day  influenza  Vaccine (HIGH DOSE) 0.5 milliLiter(s) IntraMuscular once  insulin glargine Injectable (LANTUS) 7 Unit(s) SubCutaneous at bedtime  insulin lispro (ADMELOG) corrective regimen sliding scale   SubCutaneous three times a day before meals  lactobacillus acidophilus 1 Tablet(s) Oral every 12 hours  metoprolol succinate ER 25 milliGRAM(s) Oral daily  montelukast 10 milliGRAM(s) Oral daily  oxyCODONE  ER Tablet 10 milliGRAM(s) Oral every 12 hours  pantoprazole    Tablet 40 milliGRAM(s) Oral before breakfast  polyethylene glycol 3350 17 Gram(s) Oral daily  pregabalin 150 milliGRAM(s) Oral two times a day  rosuvastatin 40 milliGRAM(s) Oral at bedtime  senna 2 Tablet(s) Oral at bedtime  sertraline 100 milliGRAM(s) Oral daily    MEDICATIONS  (PRN):  acetaminophen     Tablet .. 650 milliGRAM(s) Oral every 6 hours PRN Temp greater or equal to 38C (100.4F), Mild Pain (1 - 3)  albuterol    90 MICROgram(s) HFA Inhaler 2 Puff(s) Inhalation every 6 hours PRN Shortness of Breath and/or Wheezing  albuterol/ipratropium for Nebulization 3 milliLiter(s) Nebulizer every 4 hours PRN Shortness of Breath and/or Wheezing  aluminum hydroxide/magnesium hydroxide/simethicone Suspension 30 milliLiter(s) Oral every 4 hours PRN Dyspepsia  artificial  tears Solution 1 Drop(s) Both EYES three times a day PRN eye irritation  benzonatate 100 milliGRAM(s) Oral three times a day PRN Cough  bisacodyl Suppository 10 milliGRAM(s) Rectal daily PRN Constipation  dextrose Oral Gel 15 Gram(s) Oral once PRN Blood Glucose LESS THAN 70 milliGRAM(s)/deciliter  melatonin 3 milliGRAM(s) Oral at bedtime PRN Insomnia  ondansetron Injectable 4 milliGRAM(s) IV Push every 8 hours PRN Nausea and/or Vomiting  sodium chloride 0.65% Nasal 1 Spray(s) Both Nostrils two times a day PRN Congestion  traMADol 50 milliGRAM(s) Oral three times a day PRN Moderate Pain (4 - 6)      OBJECTIVE    T(C): 36.9 (10-07-24 @ 04:51), Max: 37.1 (10-06-24 @ 20:42)  HR: 78 (10-07-24 @ 04:51) (62 - 87)  BP: 142/75 (10-07-24 @ 04:51) (130/66 - 142/75)  RR: 18 (10-07-24 @ 04:51) (18 - 18)  SpO2: 92% (10-07-24 @ 04:51) (92% - 96%)  Wt(kg): --  I&O's Summary    06 Oct 2024 07:01  -  07 Oct 2024 07:00  --------------------------------------------------------  IN: 0 mL / OUT: 1600 mL / NET: -1600 mL          REVIEW OF SYSTEMS:  CONSTITUTIONAL: No fever, weight loss, or fatigue  EYES: No eye pain, visual disturbances, or discharge  ENMT:   No sinus or throat pain  NECK: No pain or stiffness  RESPIRATORY: No cough, wheezing, chills or hemoptysis; No shortness of breath  CARDIOVASCULAR: No chest pain, palpitations, dizziness, or leg swelling  GASTROINTESTINAL: No abdominal pain. No nausea, vomiting; No diarrhea or constipation. No melena or hematochezia.  GENITOURINARY: No dysuria, frequency, hematuria, or incontinence  NEUROLOGICAL: No headaches, memory loss, loss of strength, numbness, or tremors  SKIN: No itching, burning, rashes, or lesions   MUSCULOSKELETAL: No joint pain or swelling; No muscle, back, or extremity pain    PHYSICAL EXAM:  Appearance: NAD. VS past 24 hrs -as above   HEENT:   Moist oral mucosa. Conjunctiva clear b/l.   Neck : supple  Respiratory: Lungs CTAB.  Gastrointestinal:  Soft, nontender. No rebound. No rigidity. BS present	  Cardiovascular: RRR ,S1S2 present  Neurologic: Non-focal. Moving all extremities.  Extremities: No edema. No erythema. No calf tenderness.  Skin: No rashes, No ecchymoses, No cyanosis.	  wounds ,skin lesions-See skin assesment flow sheet   LABS:                        9.6    7.44  )-----------( 203      ( 07 Oct 2024 06:15 )             29.8     10-07    136  |  105  |  17  ----------------------------<  170[H]  4.3   |  25  |  1.20    Ca    9.8      07 Oct 2024 06:15      CAPILLARY BLOOD GLUCOSE      POCT Blood Glucose.: 217 mg/dL (07 Oct 2024 12:28)  POCT Blood Glucose.: 179 mg/dL (07 Oct 2024 07:49)  POCT Blood Glucose.: 187 mg/dL (06 Oct 2024 21:20)  POCT Blood Glucose.: 231 mg/dL (06 Oct 2024 16:36)      Urinalysis Basic - ( 07 Oct 2024 06:15 )    Color: x / Appearance: x / SG: x / pH: x  Gluc: 170 mg/dL / Ketone: x  / Bili: x / Urobili: x   Blood: x / Protein: x / Nitrite: x   Leuk Esterase: x / RBC: x / WBC x   Sq Epi: x / Non Sq Epi: x / Bacteria: x        Culture - Tissue with Gram Stain (collected 03 Oct 2024 10:45)  Source: Tissue  Gram Stain (04 Oct 2024 17:51):    No polymorphonuclear cells seen per low power field    No organisms seen per oil power field  Preliminary Report (05 Oct 2024 16:36):    Rare Methicillin Resistant Staphylococcus aureus  Organism: Methicillin resistant Staphylococcus aureus (05 Oct 2024 16:36)  Organism: Methicillin resistant Staphylococcus aureus (05 Oct 2024 16:36)    Culture - Blood (collected 01 Oct 2024 13:05)  Source: .Blood BLOOD  Final Report (06 Oct 2024 19:00):    No growth at 5 days    Culture - Blood (collected 01 Oct 2024 13:05)  Source: .Blood BLOOD  Final Report (06 Oct 2024 19:00):    No growth at 5 days    Culture - Wound Aerobic (collected 01 Oct 2024 11:00)  Source: Skin/Wound  Final Report (04 Oct 2024 22:25):    Few Methicillin Resistant Staphylococcus aureus    Commensal jameel consistent with body site  Organism: Methicillin resistant Staphylococcus aureus (04 Oct 2024 22:25)  Organism: Methicillin resistant Staphylococcus aureus (04 Oct 2024 22:25)      RADIOLOGY & ADDITIONAL TESTS:   reviewed elctronically  ASSESSMENT/PLAN: 	  25 minutes aggregate time was spent on this visit, 50% visit time spent in care co-ordination with other attendings and counselling patient .I have discussed care plan with patient / HCP/family member ,who expressed understanding of problems treatment and their effect and side effects, alternatives in details. I have asked if they have any questions and concerns and appropriately addressed them to best of my ability.

## 2024-10-07 NOTE — DISCHARGE NOTE PROVIDER - NSDCMRMEDTOKEN_GEN_ALL_CORE_FT
acetaminophen 325 mg oral tablet: 2 tab(s) orally every 8 hours as needed  Albuterol (Eqv-ProAir HFA) 90 mcg/inh inhalation aerosol: 2 puff(s) inhaled every 4 hours as needed  albuterol 0.63 mg/3 mL (0.021%) inhalation solution: 3 milliliter(s) by nebulizer every 6 hours as needed for SOB  azelaic acid 15% topical gel: Apply topically to affected area 2 times a day as needed  clonazePAM 0.25 mg oral tablet, disintegrating: 3 tab(s) orally 2 times a day MDD: 1.5mg  ferrous sulfate 325 mg (65 mg elemental iron) oral tablet: 1 tab(s) orally once a day  fluticasone 50 mcg/inh nasal spray: 1 spray(s) in each nostril 2 times a day as needed  furosemide 40 mg oral tablet: 1 tab(s) orally once a day  Lantus Solostar Pen 100 units/mL subcutaneous solution: 14 unit(s) subcutaneous once a day (at bedtime)  loratadine 10 mg oral tablet: 1 tab(s) orally once a day (in the morning)  melatonin 3 mg oral tablet: 1 tab(s) orally once a day (at bedtime) as needed for Insomnia  metFORMIN 500 mg oral tablet: 2 tab(s) orally 2 times a day  metoprolol succinate 25 mg oral tablet, extended release: 1 tab(s) orally once a day  montelukast 10 mg oral tablet: 1 tab(s) orally once a day  Opzelura 1.5% topical cream: Apply topically to affected area 2 times a day as needed  oxyCODONE 5 mg oral tablet: 1 tab(s) orally every 12 hours as needed for Severe Pain (7 - 10) MDD: 10mg  pregabalin 150 mg oral capsule: 1 cap(s) orally 2 times a day  rosuvastatin 40 mg oral tablet: 1 tab(s) orally once a day  saccharomyces boulardii lyo 250 mg oral capsule: 1 cap(s) orally once a day  Saline Mist 0.65% nasal spray: 1 spray(s) intranasally 2 times a day  senna leaf extract oral tablet: 2 tab(s) orally once a day (at bedtime)  sertraline 100 mg oral tablet: 1 tab(s) orally once a day MDD: 100mg  Spiriva 18 mcg inhalation capsule: 1 cap(s) inhaled once a day  Symbicort 160 mcg-4.5 mcg/inh inhalation aerosol: 2 puff(s) inhaled 2 times a day   acetaminophen 325 mg oral tablet: 2 tab(s) orally every 6 hours As needed Temp greater or equal to 38C (100.4F), Mild Pain (1 - 3)  ascorbic acid 500 mg oral tablet: 1 tab(s) orally 2 times a day  azelaic acid 15% topical gel: Apply topically to affected area 2 times a day as needed  benzonatate 100 mg oral capsule: 1 cap(s) orally 3 times a day As needed Cough  bisacodyl 10 mg rectal suppository: 1 suppository(ies) rectal once a day As needed Constipation  clonazePAM 0.25 mg oral tablet, disintegrating: 3 tab(s) orally 2 times a day  DAPTOmycin 500 mg intravenous injection: 500 milligram(s) intravenous every 24 hours last day is 11/16/2024  enoxaparin 40 mg/0.4 mL injectable solution: 40 milligram(s) subcutaneous once a day  ferrous sulfate 325 mg (65 mg elemental iron) oral tablet: 1 tab(s) orally once a day  fluticasone 50 mcg/inh nasal spray: 1 spray(s) in each nostril 2 times a day as needed  furosemide 40 mg oral tablet: 1 tab(s) orally once a day  guaiFENesin 100 mg/5 mL oral liquid: 10 milliliter(s) orally every 6 hours As needed Cough  insulin glargine 100 units/mL subcutaneous solution: 10 unit(s) subcutaneous once a day (at bedtime)  ipratropium-albuterol 0.5 mg-2.5 mg/3 mL inhalation solution: 3 milliliter(s) inhaled every 4 hours As needed Shortness of Breath and/or Wheezing  loratadine 10 mg oral tablet: 1 tab(s) orally once a day (in the morning)  melatonin 3 mg oral tablet: 1 tab(s) orally once a day (at bedtime) As needed Insomnia  metFORMIN 500 mg oral tablet: 2 tab(s) orally 2 times a day  metoprolol succinate 25 mg oral tablet, extended release: 1 tab(s) orally once a day  montelukast 10 mg oral tablet: 1 tab(s) orally once a day  Multiple Vitamins with Minerals oral tablet: 1 tab(s) orally once a day  ocular lubricant ophthalmic solution: 1 drop(s) to each affected eye 3 times a day As needed eye irritation  Opzelura 1.5% topical cream: Apply topically to affected area 2 times a day as needed  oxyCODONE 10 mg oral tablet, extended release: 1 tab(s) orally every 12 hours  pantoprazole 40 mg oral delayed release tablet: 1 tab(s) orally once a day (before a meal)  polyethylene glycol 3350 oral powder for reconstitution: 17 gram(s) orally once a day  predniSONE 50 mg oral tablet: 1 tab(s) orally once a day Tapering schedule as per neurology Dr. Mccartney and PCP  pregabalin 150 mg oral capsule: 1 cap(s) orally 2 times a day  rosuvastatin 40 mg oral tablet: 1 tab(s) orally once a day  saccharomyces boulardii lyo 250 mg oral capsule: 1 cap(s) orally once a day  Saline Mist 0.65% nasal spray: 1 spray(s) intranasally 2 times a day  senna leaf extract oral tablet: 2 tab(s) orally once a day (at bedtime)  sertraline 100 mg oral tablet: 1 tab(s) orally once a day  Spiriva 18 mcg inhalation capsule: 1 cap(s) inhaled once a day  Symbicort 160 mcg-4.5 mcg/inh inhalation aerosol: 2 puff(s) inhaled 2 times a day  traMADol 50 mg oral tablet: 1 tab(s) orally 3 times a day As needed Moderate Pain (4 - 6)

## 2024-10-07 NOTE — DISCHARGE NOTE PROVIDER - CARE PROVIDERS DIRECT ADDRESSES
,[email or web address],DirectAddress_Unknown,DirectAddress_Unknown,[email or web address]

## 2024-10-07 NOTE — PROGRESS NOTE ADULT - SUBJECTIVE AND OBJECTIVE BOX
CHIEF COMPLAINT/ REASON FOR VISIT  .. Patient was seen to address the  issue listed under PROBLEM LIST which is located toward bottom of this note     WAYNE SERRANO    PLV 1EAS 113 D1    Allergies    tetracycline (Unknown)  IODINE (Unknown)  vancomycin (Other)    Intolerances        PAST MEDICAL & SURGICAL HISTORY:  Prostate cancer      Type II diabetes mellitus      Chronic obstructive pulmonary disease (COPD)      CHF (congestive heart failure)      Renal insufficiency      H/O migraine      Insomnia      Constipation      S/P foot surgery          FAMILY HISTORY:      Home Medications:  acetaminophen 325 mg oral tablet: 2 tab(s) orally every 8 hours as needed (01 Oct 2024 15:23)  Albuterol (Eqv-ProAir HFA) 90 mcg/inh inhalation aerosol: 2 puff(s) inhaled every 4 hours as needed (01 Oct 2024 15:32)  albuterol 0.63 mg/3 mL (0.021%) inhalation solution: 3 milliliter(s) by nebulizer every 6 hours as needed for SOB (01 Oct 2024 15:27)  azelaic acid 15% topical gel: Apply topically to affected area 2 times a day as needed (01 Oct 2024 15:27)  ferrous sulfate 325 mg (65 mg elemental iron) oral tablet: 1 tab(s) orally once a day (01 Oct 2024 15:15)  fluticasone 50 mcg/inh nasal spray: 1 spray(s) in each nostril 2 times a day as needed (01 Oct 2024 15:29)  furosemide 40 mg oral tablet: 1 tab(s) orally once a day (01 Oct 2024 15:16)  Lantus Solostar Pen 100 units/mL subcutaneous solution: 14 unit(s) subcutaneous once a day (at bedtime) (01 Oct 2024 15:17)  loratadine 10 mg oral tablet: 1 tab(s) orally once a day (in the morning) (01 Oct 2024 15:17)  metFORMIN 500 mg oral tablet: 2 tab(s) orally 2 times a day (01 Oct 2024 15:18)  metoprolol succinate 25 mg oral tablet, extended release: 1 tab(s) orally once a day (01 Oct 2024 15:19)  Opzelura 1.5% topical cream: Apply topically to affected area 2 times a day as needed (01 Oct 2024 15:29)  pregabalin 150 mg oral capsule: 1 cap(s) orally 2 times a day (01 Oct 2024 15:20)  rosuvastatin 40 mg oral tablet: 1 tab(s) orally once a day (01 Oct 2024 15:21)  Saline Mist 0.65% nasal spray: 1 spray(s) intranasally 2 times a day (01 Oct 2024 15:29)  Spiriva 18 mcg inhalation capsule: 1 cap(s) inhaled once a day (01 Oct 2024 15:23)      MEDICATIONS  (STANDING):  cefepime   IVPB 2000 milliGRAM(s) IV Intermittent every 12 hours  clonazePAM Oral Disintegrating Tablet 0.75 milliGRAM(s) Oral two times a day  DAPTOmycin IVPB 500 milliGRAM(s) IV Intermittent every 24 hours  dextrose 5%. 1000 milliLiter(s) (100 mL/Hr) IV Continuous <Continuous>  dextrose 5%. 1000 milliLiter(s) (50 mL/Hr) IV Continuous <Continuous>  dextrose 50% Injectable 12.5 Gram(s) IV Push once  dextrose 50% Injectable 25 Gram(s) IV Push once  dextrose 50% Injectable 25 Gram(s) IV Push once  enoxaparin Injectable 40 milliGRAM(s) SubCutaneous every 24 hours  ferrous    sulfate 325 milliGRAM(s) Oral daily  fluticasone propionate/ salmeterol 250-50 MICROgram(s) Diskus 1 Dose(s) Inhalation two times a day  glucagon  Injectable 1 milliGRAM(s) IntraMuscular once  hydrocortisone 2.5% Ointment 1 Application(s) Topical three times a day  influenza  Vaccine (HIGH DOSE) 0.5 milliLiter(s) IntraMuscular once  insulin glargine Injectable (LANTUS) 7 Unit(s) SubCutaneous at bedtime  insulin lispro (ADMELOG) corrective regimen sliding scale   SubCutaneous three times a day before meals  lactobacillus acidophilus 1 Tablet(s) Oral every 12 hours  metoprolol succinate ER 25 milliGRAM(s) Oral daily  montelukast 10 milliGRAM(s) Oral daily  oxyCODONE  ER Tablet 10 milliGRAM(s) Oral every 12 hours  pantoprazole    Tablet 40 milliGRAM(s) Oral before breakfast  polyethylene glycol 3350 17 Gram(s) Oral daily  pregabalin 150 milliGRAM(s) Oral two times a day  rosuvastatin 40 milliGRAM(s) Oral at bedtime  senna 2 Tablet(s) Oral at bedtime  sertraline 100 milliGRAM(s) Oral daily    MEDICATIONS  (PRN):  acetaminophen     Tablet .. 650 milliGRAM(s) Oral every 6 hours PRN Temp greater or equal to 38C (100.4F), Mild Pain (1 - 3)  albuterol    90 MICROgram(s) HFA Inhaler 2 Puff(s) Inhalation every 6 hours PRN Shortness of Breath and/or Wheezing  albuterol/ipratropium for Nebulization 3 milliLiter(s) Nebulizer every 4 hours PRN Shortness of Breath and/or Wheezing  aluminum hydroxide/magnesium hydroxide/simethicone Suspension 30 milliLiter(s) Oral every 4 hours PRN Dyspepsia  artificial  tears Solution 1 Drop(s) Both EYES three times a day PRN eye irritation  benzonatate 100 milliGRAM(s) Oral three times a day PRN Cough  bisacodyl Suppository 10 milliGRAM(s) Rectal daily PRN Constipation  dextrose Oral Gel 15 Gram(s) Oral once PRN Blood Glucose LESS THAN 70 milliGRAM(s)/deciliter  melatonin 3 milliGRAM(s) Oral at bedtime PRN Insomnia  ondansetron Injectable 4 milliGRAM(s) IV Push every 8 hours PRN Nausea and/or Vomiting  sodium chloride 0.65% Nasal 1 Spray(s) Both Nostrils two times a day PRN Congestion  traMADol 50 milliGRAM(s) Oral three times a day PRN Moderate Pain (4 - 6)      Diet, Consistent Carbohydrate w/Evening Snack:   DASH/TLC Sodium & Cholesterol Restricted (10-05-24 @ 11:25) [Active]          Vital Signs Last 24 Hrs  T(C): 36.9 (07 Oct 2024 04:51), Max: 37.1 (06 Oct 2024 20:42)  T(F): 98.5 (07 Oct 2024 04:51), Max: 98.8 (06 Oct 2024 20:42)  HR: 78 (07 Oct 2024 04:51) (62 - 87)  BP: 142/75 (07 Oct 2024 04:51) (130/66 - 142/75)  BP(mean): --  RR: 18 (07 Oct 2024 04:51) (18 - 18)  SpO2: 92% (07 Oct 2024 04:51) (92% - 96%)    Parameters below as of 07 Oct 2024 04:51  Patient On (Oxygen Delivery Method): room air          10-06-24 @ 07:01  -  10-07-24 @ 07:00  --------------------------------------------------------  IN: 0 mL / OUT: 1600 mL / NET: -1600 mL              LABS:                        9.6    7.44  )-----------( 203      ( 07 Oct 2024 06:15 )             29.8     10-07    136  |  105  |  17  ----------------------------<  170[H]  4.3   |  25  |  1.20    Ca    9.8      07 Oct 2024 06:15        Urinalysis Basic - ( 07 Oct 2024 06:15 )    Color: x / Appearance: x / SG: x / pH: x  Gluc: 170 mg/dL / Ketone: x  / Bili: x / Urobili: x   Blood: x / Protein: x / Nitrite: x   Leuk Esterase: x / RBC: x / WBC x   Sq Epi: x / Non Sq Epi: x / Bacteria: x            WBC:  WBC Count: 7.44 K/uL (10-07 @ 06:15)  WBC Count: 5.72 K/uL (10-05 @ 06:15)  WBC Count: 6.65 K/uL (10-04 @ 06:06)      MICROBIOLOGY:  RECENT CULTURES:  10-03 Tissue Methicillin resistant Staphylococcus aureus   No polymorphonuclear cells seen per low power field  No organisms seen per oil power field   Rare Methicillin Resistant Staphylococcus aureus    10-01 .Blood BLOOD XXXX XXXX   No growth at 5 days    10-01 Skin/Wound Methicillin resistant Staphylococcus aureus XXXX   Few Methicillin Resistant Staphylococcus aureus  Commensal jameel consistent with body site                    Sodium:  Sodium: 136 mmol/L (10-07 @ 06:15)  Sodium: 137 mmol/L (10-05 @ 06:15)  Sodium: 138 mmol/L (10-04 @ 06:06)      1.20 mg/dL 10-07 @ 06:15  1.10 mg/dL 10-05 @ 06:15  1.10 mg/dL 10-04 @ 06:06      Hemoglobin:  Hemoglobin: 9.6 g/dL (10-07 @ 06:15)  Hemoglobin: 8.7 g/dL (10-05 @ 06:15)  Hemoglobin: 9.1 g/dL (10-04 @ 06:06)      Platelets: Platelet Count - Automated: 203 K/uL (10-07 @ 06:15)  Platelet Count - Automated: 168 K/uL (10-05 @ 06:15)  Platelet Count - Automated: 167 K/uL (10-04 @ 06:06)          Urinalysis Basic - ( 07 Oct 2024 06:15 )    Color: x / Appearance: x / SG: x / pH: x  Gluc: 170 mg/dL / Ketone: x  / Bili: x / Urobili: x   Blood: x / Protein: x / Nitrite: x   Leuk Esterase: x / RBC: x / WBC x   Sq Epi: x / Non Sq Epi: x / Bacteria: x        RADIOLOGY & ADDITIONAL STUDIES:      MICROBIOLOGY:  RECENT CULTURES:  10-03 Tissue Methicillin resistant Staphylococcus aureus   No polymorphonuclear cells seen per low power field  No organisms seen per oil power field   Rare Methicillin Resistant Staphylococcus aureus    10-01 .Blood BLOOD XXXX XXXX   No growth at 5 days    10-01 Skin/Wound Methicillin resistant Staphylococcus aureus XXXX   Few Methicillin Resistant Staphylococcus aureus  Commensal jameel consistent with body site

## 2024-10-07 NOTE — PROGRESS NOTE ADULT - PROBLEM SELECTOR PLAN 8
Per unit RN, CANDIDO Melgar Midline is now working well, pump alarm keeps going off. At bedside, loosened tape applied over dressing, flushed line with very good blood return. Unit RN notified. PIV inserted to LAC for CT.   pain management , PT/OT

## 2024-10-07 NOTE — PROGRESS NOTE ADULT - PROBLEM SELECTOR PLAN 3
seen BY NEUROLOGY for left sided ha , recurrent with hx of hospital admission for HA   scalp and left TA tenderness  high CRP/ESR- suggestive of temporal arteritis  s/p TA  biopsy 10/05/24  analgesics, neurology f/up  As per Dr Mccartney may benefit trial steroid if ok podiatry and ID  sp  TA BIOPSY  BRAIN MRI   ANALGESICS  Physical therapy evaluation.  OOB to chair/ambulation with assistance only.  Advanced care planning was discussed with family.

## 2024-10-07 NOTE — PROGRESS NOTE ADULT - SUBJECTIVE AND OBJECTIVE BOX
Patient is a 74y Male with a known history of :  Wound of right foot [S91.301A]    Cellulitis of right foot [L03.115]    Prostate cancer [C61]    Type II diabetes mellitus [E11.9]    Chronic obstructive pulmonary disease (COPD) [J44.9]    CHF (congestive heart failure) [I50.9]    Renal insufficiency [N28.9]    Prophylactic measure [Z29.9]    MDD (major depressive disorder) [F32.9]    Neuropathy [G62.9]    Constipation [K59.00]    History of headache [Z87.898]      HPI:  72yo M PMhx DM2 w/ PAD - chronic Rt foot wound, HTN, COPD, CKD, HFpEF, arrythmia, Prostate CA s/p resection, Depression/Anxiety presents to ED sent by wound care for worsening R MTP wound. Patient reports having a nonhealing R foot wound, s/p multiple debridements and grafts at [facility], for past 1.5 years. Denies seeing a vascular surgeon in the past.  Denies pain the wound, but reports having neuropathy. States following wound care and reports worsening of wound. Reporting having some chills in the past week. Denies fever, chills, SOB or any other complaints.Patient examined and evaluated at this time. Antibiotics as per infectious disease recommendations. Recommend vascular surgery evaluation. Tentatively planning for right 1st metatarsal head resection pending vascular evaluation. Continue local wound care and offloading at this time. (01 Oct 2024 14:44)      REVIEW OF SYSTEMS:    CONSTITUTIONAL: No fever, weight loss, or fatigue  EYES: No eye pain, visual disturbances, or discharge  ENMT:  No difficulty hearing, tinnitus, vertigo; No sinus or throat pain  NECK: No pain or stiffness  BREASTS: No pain, masses, or nipple discharge  RESPIRATORY: No cough, wheezing, chills or hemoptysis; No shortness of breath  CARDIOVASCULAR: No chest pain, palpitations, dizziness, or leg swelling  GASTROINTESTINAL: No abdominal or epigastric pain. No nausea, vomiting, or hematemesis; No diarrhea or constipation. No melena or hematochezia.  GENITOURINARY: No dysuria, frequency, hematuria, or incontinence  NEUROLOGICAL: No headaches, memory loss, loss of strength, numbness, or tremors  SKIN: No itching, burning, rashes, or lesions   LYMPH NODES: No enlarged glands  ENDOCRINE: No heat or cold intolerance; No hair loss  MUSCULOSKELETAL: No joint pain or swelling; No muscle, back, or extremity pain  PSYCHIATRIC: No depression, anxiety, mood swings, or difficulty sleeping  HEME/LYMPH: No easy bruising, or bleeding gums  ALLERGY AND IMMUNOLOGIC: No hives or eczema    MEDICATIONS  (STANDING):  cefepime   IVPB 2000 milliGRAM(s) IV Intermittent every 12 hours  clonazePAM Oral Disintegrating Tablet 0.75 milliGRAM(s) Oral two times a day  DAPTOmycin IVPB 500 milliGRAM(s) IV Intermittent every 24 hours  dextrose 5%. 1000 milliLiter(s) (100 mL/Hr) IV Continuous <Continuous>  dextrose 5%. 1000 milliLiter(s) (50 mL/Hr) IV Continuous <Continuous>  dextrose 50% Injectable 25 Gram(s) IV Push once  dextrose 50% Injectable 25 Gram(s) IV Push once  dextrose 50% Injectable 12.5 Gram(s) IV Push once  enoxaparin Injectable 40 milliGRAM(s) SubCutaneous every 24 hours  ferrous    sulfate 325 milliGRAM(s) Oral daily  fluticasone propionate/ salmeterol 250-50 MICROgram(s) Diskus 1 Dose(s) Inhalation two times a day  glucagon  Injectable 1 milliGRAM(s) IntraMuscular once  hydrocortisone 2.5% Ointment 1 Application(s) Topical three times a day  influenza  Vaccine (HIGH DOSE) 0.5 milliLiter(s) IntraMuscular once  insulin glargine Injectable (LANTUS) 7 Unit(s) SubCutaneous at bedtime  insulin lispro (ADMELOG) corrective regimen sliding scale   SubCutaneous three times a day before meals  lactobacillus acidophilus 1 Tablet(s) Oral every 12 hours  metoprolol succinate ER 25 milliGRAM(s) Oral daily  montelukast 10 milliGRAM(s) Oral daily  oxyCODONE  ER Tablet 10 milliGRAM(s) Oral every 12 hours  pantoprazole    Tablet 40 milliGRAM(s) Oral before breakfast  polyethylene glycol 3350 17 Gram(s) Oral daily  pregabalin 150 milliGRAM(s) Oral two times a day  rosuvastatin 40 milliGRAM(s) Oral at bedtime  senna 2 Tablet(s) Oral at bedtime  sertraline 100 milliGRAM(s) Oral daily    MEDICATIONS  (PRN):  acetaminophen     Tablet .. 650 milliGRAM(s) Oral every 6 hours PRN Temp greater or equal to 38C (100.4F), Mild Pain (1 - 3)  albuterol    90 MICROgram(s) HFA Inhaler 2 Puff(s) Inhalation every 6 hours PRN Shortness of Breath and/or Wheezing  albuterol/ipratropium for Nebulization 3 milliLiter(s) Nebulizer every 4 hours PRN Shortness of Breath and/or Wheezing  aluminum hydroxide/magnesium hydroxide/simethicone Suspension 30 milliLiter(s) Oral every 4 hours PRN Dyspepsia  artificial  tears Solution 1 Drop(s) Both EYES three times a day PRN eye irritation  benzonatate 100 milliGRAM(s) Oral three times a day PRN Cough  bisacodyl Suppository 10 milliGRAM(s) Rectal daily PRN Constipation  dextrose Oral Gel 15 Gram(s) Oral once PRN Blood Glucose LESS THAN 70 milliGRAM(s)/deciliter  melatonin 3 milliGRAM(s) Oral at bedtime PRN Insomnia  ondansetron Injectable 4 milliGRAM(s) IV Push every 8 hours PRN Nausea and/or Vomiting  sodium chloride 0.65% Nasal 1 Spray(s) Both Nostrils two times a day PRN Congestion  traMADol 50 milliGRAM(s) Oral three times a day PRN Moderate Pain (4 - 6)      ALLERGIES: tetracycline (Unknown)  IODINE (Unknown)  vancomycin (Other)      FAMILY HISTORY:      PHYSICAL EXAMINATION:  -----------------------------  T(C): 36.9 (10-07-24 @ 04:51), Max: 37.1 (10-06-24 @ 20:42)  HR: 78 (10-07-24 @ 04:51) (62 - 87)  BP: 142/75 (10-07-24 @ 04:51) (130/66 - 142/75)  RR: 18 (10-07-24 @ 04:51) (18 - 18)  SpO2: 92% (10-07-24 @ 04:51) (92% - 96%)  Wt(kg): --    10-06 @ 07:01  -  10-07 @ 07:00  --------------------------------------------------------  IN:  Total IN: 0 mL    OUT:    Voided (mL): 1600 mL  Total OUT: 1600 mL    Total NET: -1600 mL            VITALS  T(C): 36.9 (10-07-24 @ 04:51), Max: 37.1 (10-06-24 @ 20:42)  HR: 78 (10-07-24 @ 04:51) (62 - 87)  BP: 142/75 (10-07-24 @ 04:51) (130/66 - 142/75)  RR: 18 (10-07-24 @ 04:51) (18 - 18)  SpO2: 92% (10-07-24 @ 04:51) (92% - 96%)    Constitutional: well developed, normal appearance, well groomed, well nourished, no deformities and no acute distress.   Eyes: the conjunctiva exhibited no abnormalities and the eyelids demonstrated no xanthelasmas.   HEENT: normal oral mucosa, no oral pallor and no oral cyanosis.   Neck: normal jugular venous A waves present, normal jugular venous V waves present and no jugular venous mahmood A waves.   Pulmonary: no respiratory distress, normal respiratory rhythm and effort, no accessory muscle use and lungs were clear to auscultation bilaterally.   Cardiovascular: heart rate and rhythm were normal, normal S1 and S2 and no murmur, gallop, rub, heave or thrill are present.   Abdomen: soft, non-tender, no hepato-splenomegaly and no abdominal mass palpated.   Musculoskeletal: the gait could not be assessed..   Extremities: no clubbing of the fingernails, no localized cyanosis, no petechial hemorrhages and no ischemic changes.   Skin: normal skin color and pigmentation, no rash, no venous stasis, no skin lesions, no skin ulcer and no xanthoma was observed.   Psychiatric: oriented to person, place, and time, the affect was normal, the mood was normal and not feeling anxious.     LABS:   --------  10-07    136  |  105  |  17  ----------------------------<  170[H]  4.3   |  25  |  1.20    Ca    9.8      07 Oct 2024 06:15                           9.6    7.44  )-----------( 203      ( 07 Oct 2024 06:15 )             29.8                 RADIOLOGY:  -----------------    ECG:     ECHO:

## 2024-10-07 NOTE — PROGRESS NOTE ADULT - PROBLEM SELECTOR PLAN 1
10/03- for right 1st metatarsal head resection  Continue local wound care and offloading at this time .IV abx as per ID -started on daptomycin , cefepime added Culture - Wound Aerobic (collected 01 Oct 2024 11:00)  Source: Skin/Wound  Final Report (04 Oct 2024 22:25):    Few Methicillin Resistant Staphylococcus aureus    Commensal jameel consistent with body site  Organism: Methicillin resistant Staphylococcus aureus (04 Oct 2024 22:25)  Organism: Methicillin resistant Staphylococcus aureus (04 Oct 2024 22:25)

## 2024-10-07 NOTE — PHARMACOTHERAPY INTERVENTION NOTE - COMMENTS
74 year old male on Cefepime 2000mg q12 + daptomycin 500mg q24 for right foot ulcer with OM s/p resection. Tissue culture from 10/03 and skin/wound culture form 10/01 both growing MRSA. Recommended to Dr. Coreas discontinuing cefepime and continuing with daptomycin monotherapy for MRSA coverage. Recommendation accepted and cefepime discontinued.

## 2024-10-07 NOTE — PHYSICAL THERAPY INITIAL EVALUATION ADULT - TRANSFER TRAINING, PT EVAL
Patient will improve sit <-> stand with CGA 3-5 sessions in order for patient to safely get in and out of chair

## 2024-10-07 NOTE — DISCHARGE NOTE PROVIDER - NSDCCPCAREPLAN_GEN_ALL_CORE_FT
PRINCIPAL DISCHARGE DIAGNOSIS  Diagnosis: Wound of foot  Assessment and Plan of Treatment:       SECONDARY DISCHARGE DIAGNOSES  Diagnosis: Cellulitis of right foot  Assessment and Plan of Treatment:     Diagnosis: Chronic obstructive pulmonary disease (COPD)  Assessment and Plan of Treatment:     Diagnosis: Type II diabetes mellitus  Assessment and Plan of Treatment:     Diagnosis: CHF (congestive heart failure)  Assessment and Plan of Treatment:     Diagnosis: Renal insufficiency  Assessment and Plan of Treatment:     Diagnosis: Neuropathy  Assessment and Plan of Treatment:     Diagnosis: Constipation  Assessment and Plan of Treatment:     Diagnosis: Prostate cancer  Assessment and Plan of Treatment:     Diagnosis: MDD (major depressive disorder)  Assessment and Plan of Treatment:      PRINCIPAL DISCHARGE DIAGNOSIS  Diagnosis: Wound of foot  Assessment and Plan of Treatment: R FOOT with chronic osteomyeltis and MRSA wound infection poa      SECONDARY DISCHARGE DIAGNOSES  Diagnosis: Cellulitis of right foot  Assessment and Plan of Treatment:     Diagnosis: Type II diabetes mellitus  Assessment and Plan of Treatment:     Diagnosis: Prostate cancer  Assessment and Plan of Treatment:     Diagnosis: CHF (congestive heart failure)  Assessment and Plan of Treatment:     Diagnosis: TA (temporal arteritis)  Assessment and Plan of Treatment: s/p biopsy    Diagnosis: Giant cell arteritis  Assessment and Plan of Treatment:     Diagnosis: Renal insufficiency  Assessment and Plan of Treatment:     Diagnosis: MDD (major depressive disorder)  Assessment and Plan of Treatment:     Diagnosis: Chronic obstructive pulmonary disease (COPD)  Assessment and Plan of Treatment:     Diagnosis: Neuropathy  Assessment and Plan of Treatment:     Diagnosis: Constipation  Assessment and Plan of Treatment:     Diagnosis: Rash, skin  Assessment and Plan of Treatment: chronic

## 2024-10-07 NOTE — DISCHARGE NOTE PROVIDER - NSDCCAREPROVSEEN_GEN_ALL_CORE_FT
Joe, Ariela Coreas, David Sandoval, Munir Whiting, Bettye Mccartney, Dilcia HURT Joe, Ariela Coreas, David Sandoval, Munir Whiting, Bettye Mccartney, Dilcia Joseph, Jason Coreas, David Sandoval, Munir Diaz, Ariela Costa, Benjamín Gloria, Elena Mccartney, Rah Perkins

## 2024-10-07 NOTE — PHYSICAL THERAPY INITIAL EVALUATION ADULT - GAIT TRAINING, PT EVAL
Patient will ambulate >50 feet within 3-5 sessions to allow patient to ambulate household distances.

## 2024-10-07 NOTE — DISCHARGE NOTE PROVIDER - PROVIDER TOKENS
PROVIDER:[TOKEN:[[id number]],FOLLOWUP:[1 week]] PROVIDER:[TOKEN:[[id number]],FOLLOWUP:[1 week]],PROVIDER:[TOKEN:[[id number]],FOLLOWUP:[1 week]],PROVIDER:[TOKEN:[[id number]],FOLLOWUP:[2 weeks]],PROVIDER:[TOKEN:[[id number]],FOLLOWUP:[1 week]]

## 2024-10-07 NOTE — CASE MANAGEMENT PROGRESS NOTE - NSCMPROGRESSNOTE_GEN_ALL_CORE
Patient discussed during interdisciplinary round. Pas does not have any PT needs. Surgical path is pending. Patient remains on Cefepime and Dapto.  will continue to follow.

## 2024-10-07 NOTE — PHYSICAL THERAPY INITIAL EVALUATION ADULT - PERTINENT HX OF CURRENT PROBLEM, REHAB EVAL
72yo M PMhx DM2 w/ PAD - chronic Rt foot wound, HTN, COPD, CKD, HFpEF, arrythmia, Prostate CA s/p resection, Depression/Anxiety presents to ED sent by wound care for worsening R MTP wound. Patient reports having a nonhealing R foot wound, s/p multiple debridements and grafts at [facility], for past 1.5 years. Denies seeing a vascular surgeon in the past.  Denies pain the wound, but reports having neuropathy. States following wound care and reports worsening of wound. Reporting having some chills in the past week. Denies fever, chills, SOB or any other complaints. Patient examined and evaluated at this time. Antibiotics as per infectious disease recommendations. Recommend vascular surgery evaluation. Tentatively planning for right 1st metatarsal head resection pending vascular evaluation. Continue local wound care and offloading at this time.

## 2024-10-07 NOTE — DISCHARGE NOTE PROVIDER - NSDCCPTREATMENT_GEN_ALL_CORE_FT
PRINCIPAL PROCEDURE  Procedure: Resection of head of first metatarsal bone  Findings and Treatment:       SECONDARY PROCEDURE  Procedure: FL guided insertion of PICC  Findings and Treatment:

## 2024-10-07 NOTE — PROGRESS NOTE ADULT - PROBLEM SELECTOR PLAN 9
Patient transferred off unit via bed to dialysis unit.    Telemetry monitor tech notified.  Patient off unit.   bowel regimen

## 2024-10-07 NOTE — PROGRESS NOTE ADULT - SUBJECTIVE AND OBJECTIVE BOX
Interval History:    CENTRAL LINE:   [  ] YES       [  ] NO  MORRISSEY:                 [  ] YES       [  ] NO         REVIEW OF SYSTEMS:  All Systems below were reviewed and are negative [  ]  HEENT:  ID:  Pulmonary:  Cardiac:  GI:  Renal:  Musculoskeletal:  All other systems above were reviewed and are negative   [  ]      MEDICATIONS  (STANDING):  clonazePAM Oral Disintegrating Tablet 0.75 milliGRAM(s) Oral two times a day  DAPTOmycin IVPB 500 milliGRAM(s) IV Intermittent every 24 hours  dextrose 5%. 1000 milliLiter(s) (100 mL/Hr) IV Continuous <Continuous>  dextrose 5%. 1000 milliLiter(s) (50 mL/Hr) IV Continuous <Continuous>  dextrose 50% Injectable 25 Gram(s) IV Push once  dextrose 50% Injectable 25 Gram(s) IV Push once  dextrose 50% Injectable 12.5 Gram(s) IV Push once  enoxaparin Injectable 40 milliGRAM(s) SubCutaneous every 24 hours  ferrous    sulfate 325 milliGRAM(s) Oral daily  fluticasone propionate/ salmeterol 250-50 MICROgram(s) Diskus 1 Dose(s) Inhalation two times a day  glucagon  Injectable 1 milliGRAM(s) IntraMuscular once  hydrocortisone 2.5% Ointment 1 Application(s) Topical three times a day  influenza  Vaccine (HIGH DOSE) 0.5 milliLiter(s) IntraMuscular once  insulin glargine Injectable (LANTUS) 7 Unit(s) SubCutaneous at bedtime  insulin lispro (ADMELOG) corrective regimen sliding scale   SubCutaneous three times a day before meals  lactobacillus acidophilus 1 Tablet(s) Oral every 12 hours  metoprolol succinate ER 25 milliGRAM(s) Oral daily  montelukast 10 milliGRAM(s) Oral daily  oxyCODONE  ER Tablet 10 milliGRAM(s) Oral every 12 hours  pantoprazole    Tablet 40 milliGRAM(s) Oral before breakfast  polyethylene glycol 3350 17 Gram(s) Oral daily  pregabalin 150 milliGRAM(s) Oral two times a day  rosuvastatin 40 milliGRAM(s) Oral at bedtime  senna 2 Tablet(s) Oral at bedtime  sertraline 100 milliGRAM(s) Oral daily    MEDICATIONS  (PRN):  acetaminophen     Tablet .. 650 milliGRAM(s) Oral every 6 hours PRN Temp greater or equal to 38C (100.4F), Mild Pain (1 - 3)  albuterol    90 MICROgram(s) HFA Inhaler 2 Puff(s) Inhalation every 6 hours PRN Shortness of Breath and/or Wheezing  albuterol/ipratropium for Nebulization 3 milliLiter(s) Nebulizer every 4 hours PRN Shortness of Breath and/or Wheezing  aluminum hydroxide/magnesium hydroxide/simethicone Suspension 30 milliLiter(s) Oral every 4 hours PRN Dyspepsia  artificial  tears Solution 1 Drop(s) Both EYES three times a day PRN eye irritation  benzonatate 100 milliGRAM(s) Oral three times a day PRN Cough  bisacodyl Suppository 10 milliGRAM(s) Rectal daily PRN Constipation  dextrose Oral Gel 15 Gram(s) Oral once PRN Blood Glucose LESS THAN 70 milliGRAM(s)/deciliter  melatonin 3 milliGRAM(s) Oral at bedtime PRN Insomnia  ondansetron Injectable 4 milliGRAM(s) IV Push every 8 hours PRN Nausea and/or Vomiting  sodium chloride 0.65% Nasal 1 Spray(s) Both Nostrils two times a day PRN Congestion  traMADol 50 milliGRAM(s) Oral three times a day PRN Moderate Pain (4 - 6)      Vital Signs Last 24 Hrs  T(C): 36.8 (07 Oct 2024 13:36), Max: 37.1 (06 Oct 2024 20:42)  T(F): 98.2 (07 Oct 2024 13:36), Max: 98.8 (06 Oct 2024 20:42)  HR: 78 (07 Oct 2024 04:51) (78 - 87)  BP: 101/61 (07 Oct 2024 13:36) (101/61 - 142/75)  BP(mean): --  RR: 18 (07 Oct 2024 04:51) (18 - 18)  SpO2: 93% (07 Oct 2024 13:36) (92% - 96%)    Parameters below as of 07 Oct 2024 13:36  Patient On (Oxygen Delivery Method): room air        I&O's Summary    06 Oct 2024 07:01  -  07 Oct 2024 07:00  --------------------------------------------------------  IN: 0 mL / OUT: 1600 mL / NET: -1600 mL        PHYSICAL EXAM:  HEENT: NC/AT; PERRLA  Neck: Soft; no tenderness  Lungs: CTA bilaterally; no wheezing.   Heart:  Abdomen:  Genital/ Rectal:  Extremities:  Neurologic:  Vascular:      LABORATORY:    CBC Full  -  ( 07 Oct 2024 06:15 )  WBC Count : 7.44 K/uL  RBC Count : 3.21 M/uL  Hemoglobin : 9.6 g/dL  Hematocrit : 29.8 %  Platelet Count - Automated : 203 K/uL  Mean Cell Volume : 92.8 fl  Mean Cell Hemoglobin : 29.9 pg  Mean Cell Hemoglobin Concentration : 32.2 gm/dL  Auto Neutrophil # : x  Auto Lymphocyte # : x  Auto Monocyte # : x  Auto Eosinophil # : x  Auto Basophil # : x  Auto Neutrophil % : x  Auto Lymphocyte % : x  Auto Monocyte % : x  Auto Eosinophil % : x  Auto Basophil % : x      ESR:                   09-24 @ 14:10  65    C-Reactive Protein:     09-24 @ 14:10  28    Procalcitonin:           09-24 @ 14:10   --      10-07    136  |  105  |  17  ----------------------------<  170[H]  4.3   |  25  |  1.20    Ca    9.8      07 Oct 2024 06:15            Assessment and Plan:          David Coreas MD   [phone].    He is away for MRI of brain  No fevers      MEDICATIONS  (STANDING):  clonazePAM Oral Disintegrating Tablet 0.75 milliGRAM(s) Oral two times a day  DAPTOmycin IVPB 500 milliGRAM(s) IV Intermittent every 24 hours  dextrose 5%. 1000 milliLiter(s) (100 mL/Hr) IV Continuous <Continuous>  dextrose 5%. 1000 milliLiter(s) (50 mL/Hr) IV Continuous <Continuous>  dextrose 50% Injectable 25 Gram(s) IV Push once  dextrose 50% Injectable 25 Gram(s) IV Push once  dextrose 50% Injectable 12.5 Gram(s) IV Push once  enoxaparin Injectable 40 milliGRAM(s) SubCutaneous every 24 hours  ferrous    sulfate 325 milliGRAM(s) Oral daily  fluticasone propionate/ salmeterol 250-50 MICROgram(s) Diskus 1 Dose(s) Inhalation two times a day  glucagon  Injectable 1 milliGRAM(s) IntraMuscular once  hydrocortisone 2.5% Ointment 1 Application(s) Topical three times a day  influenza  Vaccine (HIGH DOSE) 0.5 milliLiter(s) IntraMuscular once  insulin glargine Injectable (LANTUS) 7 Unit(s) SubCutaneous at bedtime  insulin lispro (ADMELOG) corrective regimen sliding scale   SubCutaneous three times a day before meals  lactobacillus acidophilus 1 Tablet(s) Oral every 12 hours  metoprolol succinate ER 25 milliGRAM(s) Oral daily  montelukast 10 milliGRAM(s) Oral daily  oxyCODONE  ER Tablet 10 milliGRAM(s) Oral every 12 hours  pantoprazole    Tablet 40 milliGRAM(s) Oral before breakfast  polyethylene glycol 3350 17 Gram(s) Oral daily  pregabalin 150 milliGRAM(s) Oral two times a day  rosuvastatin 40 milliGRAM(s) Oral at bedtime  senna 2 Tablet(s) Oral at bedtime  sertraline 100 milliGRAM(s) Oral daily    MEDICATIONS  (PRN):  acetaminophen     Tablet .. 650 milliGRAM(s) Oral every 6 hours PRN Temp greater or equal to 38C (100.4F), Mild Pain (1 - 3)  albuterol    90 MICROgram(s) HFA Inhaler 2 Puff(s) Inhalation every 6 hours PRN Shortness of Breath and/or Wheezing  albuterol/ipratropium for Nebulization 3 milliLiter(s) Nebulizer every 4 hours PRN Shortness of Breath and/or Wheezing  aluminum hydroxide/magnesium hydroxide/simethicone Suspension 30 milliLiter(s) Oral every 4 hours PRN Dyspepsia  artificial  tears Solution 1 Drop(s) Both EYES three times a day PRN eye irritation  benzonatate 100 milliGRAM(s) Oral three times a day PRN Cough  bisacodyl Suppository 10 milliGRAM(s) Rectal daily PRN Constipation  dextrose Oral Gel 15 Gram(s) Oral once PRN Blood Glucose LESS THAN 70 milliGRAM(s)/deciliter  melatonin 3 milliGRAM(s) Oral at bedtime PRN Insomnia  ondansetron Injectable 4 milliGRAM(s) IV Push every 8 hours PRN Nausea and/or Vomiting  sodium chloride 0.65% Nasal 1 Spray(s) Both Nostrils two times a day PRN Congestion  traMADol 50 milliGRAM(s) Oral three times a day PRN Moderate Pain (4 - 6)      Vital Signs Last 24 Hrs  T(C): 36.8 (07 Oct 2024 13:36), Max: 37.1 (06 Oct 2024 20:42)  T(F): 98.2 (07 Oct 2024 13:36), Max: 98.8 (06 Oct 2024 20:42)  HR: 78 (07 Oct 2024 04:51) (78 - 87)  BP: 101/61 (07 Oct 2024 13:36) (101/61 - 142/75)  BP(mean): --  RR: 18 (07 Oct 2024 04:51) (18 - 18)  SpO2: 93% (07 Oct 2024 13:36) (92% - 96%)    Parameters below as of 07 Oct 2024 13:36  Patient On (Oxygen Delivery Method): room air        I&O's Summary    06 Oct 2024 07:01  -  07 Oct 2024 07:00  --------------------------------------------------------  IN: 0 mL / OUT: 1600 mL / NET: -1600 mL        PHYSICAL EXAM:  HEENT: NC/AT; PERRLA  Neck: Soft; no tenderness  Lungs: CTA bilaterally; no wheezing.   Heart:  Abdomen:  Genital/ Rectal:  Extremities:  Neurologic:  Vascular:      LABORATORY:    CBC Full  -  ( 07 Oct 2024 06:15 )  WBC Count : 7.44 K/uL  RBC Count : 3.21 M/uL  Hemoglobin : 9.6 g/dL  Hematocrit : 29.8 %  Platelet Count - Automated : 203 K/uL  Mean Cell Volume : 92.8 fl  Mean Cell Hemoglobin : 29.9 pg  Mean Cell Hemoglobin Concentration : 32.2 gm/dL  Auto Neutrophil # : x  Auto Lymphocyte # : x  Auto Monocyte # : x  Auto Eosinophil # : x  Auto Basophil # : x  Auto Neutrophil % : x  Auto Lymphocyte % : x  Auto Monocyte % : x  Auto Eosinophil % : x  Auto Basophil % : x      ESR:                   09-24 @ 14:10  65    C-Reactive Protein:     09-24 @ 14:10  28    Procalcitonin:           09-24 @ 14:10   --      10-07    136  |  105  |  17  ----------------------------<  170[H]  4.3   |  25  |  1.20    Ca    9.8      07 Oct 2024 06:15      Assessment and Plan:    1. R foot ulcer with osteomyelitis, s/p resection of the R 1st metatarsal head.   2. CKD  3. Chronic body rash.    . He had resection of the R 1st metatarsal head. Waiting for report of pathologies of the bone biopsies. Bone culture is growing Staph aureus (MRSA).   . Continue IV Dapto 500 mg daily for 6 weeks. Discontinue IV Cefepime.    . Get Picc line to complete 6 weeks of IV Daptomycin at the rehab.  . Wound care daily.   . Waiting for biopsy of left temporal artery.        David Coreas MD   [phone].    He is away for MRI of brain  No fevers      MEDICATIONS  (STANDING):  clonazePAM Oral Disintegrating Tablet 0.75 milliGRAM(s) Oral two times a day  DAPTOmycin IVPB 500 milliGRAM(s) IV Intermittent every 24 hours  dextrose 5%. 1000 milliLiter(s) (100 mL/Hr) IV Continuous <Continuous>  dextrose 5%. 1000 milliLiter(s) (50 mL/Hr) IV Continuous <Continuous>  dextrose 50% Injectable 25 Gram(s) IV Push once  dextrose 50% Injectable 25 Gram(s) IV Push once  dextrose 50% Injectable 12.5 Gram(s) IV Push once  enoxaparin Injectable 40 milliGRAM(s) SubCutaneous every 24 hours  ferrous    sulfate 325 milliGRAM(s) Oral daily  fluticasone propionate/ salmeterol 250-50 MICROgram(s) Diskus 1 Dose(s) Inhalation two times a day  glucagon  Injectable 1 milliGRAM(s) IntraMuscular once  hydrocortisone 2.5% Ointment 1 Application(s) Topical three times a day  influenza  Vaccine (HIGH DOSE) 0.5 milliLiter(s) IntraMuscular once  insulin glargine Injectable (LANTUS) 7 Unit(s) SubCutaneous at bedtime  insulin lispro (ADMELOG) corrective regimen sliding scale   SubCutaneous three times a day before meals  lactobacillus acidophilus 1 Tablet(s) Oral every 12 hours  metoprolol succinate ER 25 milliGRAM(s) Oral daily  montelukast 10 milliGRAM(s) Oral daily  oxyCODONE  ER Tablet 10 milliGRAM(s) Oral every 12 hours  pantoprazole    Tablet 40 milliGRAM(s) Oral before breakfast  polyethylene glycol 3350 17 Gram(s) Oral daily  pregabalin 150 milliGRAM(s) Oral two times a day  rosuvastatin 40 milliGRAM(s) Oral at bedtime  senna 2 Tablet(s) Oral at bedtime  sertraline 100 milliGRAM(s) Oral daily    MEDICATIONS  (PRN):  acetaminophen     Tablet .. 650 milliGRAM(s) Oral every 6 hours PRN Temp greater or equal to 38C (100.4F), Mild Pain (1 - 3)  albuterol    90 MICROgram(s) HFA Inhaler 2 Puff(s) Inhalation every 6 hours PRN Shortness of Breath and/or Wheezing  albuterol/ipratropium for Nebulization 3 milliLiter(s) Nebulizer every 4 hours PRN Shortness of Breath and/or Wheezing  aluminum hydroxide/magnesium hydroxide/simethicone Suspension 30 milliLiter(s) Oral every 4 hours PRN Dyspepsia  artificial  tears Solution 1 Drop(s) Both EYES three times a day PRN eye irritation  benzonatate 100 milliGRAM(s) Oral three times a day PRN Cough  bisacodyl Suppository 10 milliGRAM(s) Rectal daily PRN Constipation  dextrose Oral Gel 15 Gram(s) Oral once PRN Blood Glucose LESS THAN 70 milliGRAM(s)/deciliter  melatonin 3 milliGRAM(s) Oral at bedtime PRN Insomnia  ondansetron Injectable 4 milliGRAM(s) IV Push every 8 hours PRN Nausea and/or Vomiting  sodium chloride 0.65% Nasal 1 Spray(s) Both Nostrils two times a day PRN Congestion  traMADol 50 milliGRAM(s) Oral three times a day PRN Moderate Pain (4 - 6)      Vital Signs Last 24 Hrs  T(C): 36.8 (07 Oct 2024 13:36), Max: 37.1 (06 Oct 2024 20:42)  T(F): 98.2 (07 Oct 2024 13:36), Max: 98.8 (06 Oct 2024 20:42)  HR: 78 (07 Oct 2024 04:51) (78 - 87)  BP: 101/61 (07 Oct 2024 13:36) (101/61 - 142/75)  BP(mean): --  RR: 18 (07 Oct 2024 04:51) (18 - 18)  SpO2: 93% (07 Oct 2024 13:36) (92% - 96%)    Parameters below as of 07 Oct 2024 13:36  Patient On (Oxygen Delivery Method): room air        I&O's Summary    06 Oct 2024 07:01  -  07 Oct 2024 07:00  --------------------------------------------------------  IN: 0 mL / OUT: 1600 mL / NET: -1600 mL    LABORATORY:    CBC Full  -  ( 07 Oct 2024 06:15 )  WBC Count : 7.44 K/uL  RBC Count : 3.21 M/uL  Hemoglobin : 9.6 g/dL  Hematocrit : 29.8 %  Platelet Count - Automated : 203 K/uL  Mean Cell Volume : 92.8 fl  Mean Cell Hemoglobin : 29.9 pg  Mean Cell Hemoglobin Concentration : 32.2 gm/dL  Auto Neutrophil # : x  Auto Lymphocyte # : x  Auto Monocyte # : x  Auto Eosinophil # : x  Auto Basophil # : x  Auto Neutrophil % : x  Auto Lymphocyte % : x  Auto Monocyte % : x  Auto Eosinophil % : x  Auto Basophil % : x      ESR:                   09-24 @ 14:10  65    C-Reactive Protein:     09-24 @ 14:10  28    Procalcitonin:           09-24 @ 14:10   --      10-07    136  |  105  |  17  ----------------------------<  170[H]  4.3   |  25  |  1.20    Ca    9.8      07 Oct 2024 06:15      Assessment and Plan:    1. R foot ulcer with osteomyelitis, s/p resection of the R 1st metatarsal head.   2. CKD  3. Chronic body rash.    . He had resection of the R 1st metatarsal head. Waiting for report of pathologies of the bone biopsies. Bone culture is growing Staph aureus (MRSA).   . Continue IV Dapto 500 mg daily for 6 weeks (to October 16, 2024).  Discontinue IV Cefepime.    . Get Picc line to complete 6 weeks of IV Daptomycin at the rehab. Monitor ESR, CRP, CPK, CBC and CMP weekly.   . Wound care daily as per podiatry.   . Discharge planning to rehab.         David Coreas MD   [phone].    He is away for MRI of brain  No fevers      MEDICATIONS  (STANDING):  clonazePAM Oral Disintegrating Tablet 0.75 milliGRAM(s) Oral two times a day  DAPTOmycin IVPB 500 milliGRAM(s) IV Intermittent every 24 hours  dextrose 5%. 1000 milliLiter(s) (100 mL/Hr) IV Continuous <Continuous>  dextrose 5%. 1000 milliLiter(s) (50 mL/Hr) IV Continuous <Continuous>  dextrose 50% Injectable 25 Gram(s) IV Push once  dextrose 50% Injectable 25 Gram(s) IV Push once  dextrose 50% Injectable 12.5 Gram(s) IV Push once  enoxaparin Injectable 40 milliGRAM(s) SubCutaneous every 24 hours  ferrous    sulfate 325 milliGRAM(s) Oral daily  fluticasone propionate/ salmeterol 250-50 MICROgram(s) Diskus 1 Dose(s) Inhalation two times a day  glucagon  Injectable 1 milliGRAM(s) IntraMuscular once  hydrocortisone 2.5% Ointment 1 Application(s) Topical three times a day  influenza  Vaccine (HIGH DOSE) 0.5 milliLiter(s) IntraMuscular once  insulin glargine Injectable (LANTUS) 7 Unit(s) SubCutaneous at bedtime  insulin lispro (ADMELOG) corrective regimen sliding scale   SubCutaneous three times a day before meals  lactobacillus acidophilus 1 Tablet(s) Oral every 12 hours  metoprolol succinate ER 25 milliGRAM(s) Oral daily  montelukast 10 milliGRAM(s) Oral daily  oxyCODONE  ER Tablet 10 milliGRAM(s) Oral every 12 hours  pantoprazole    Tablet 40 milliGRAM(s) Oral before breakfast  polyethylene glycol 3350 17 Gram(s) Oral daily  pregabalin 150 milliGRAM(s) Oral two times a day  rosuvastatin 40 milliGRAM(s) Oral at bedtime  senna 2 Tablet(s) Oral at bedtime  sertraline 100 milliGRAM(s) Oral daily    MEDICATIONS  (PRN):  acetaminophen     Tablet .. 650 milliGRAM(s) Oral every 6 hours PRN Temp greater or equal to 38C (100.4F), Mild Pain (1 - 3)  albuterol    90 MICROgram(s) HFA Inhaler 2 Puff(s) Inhalation every 6 hours PRN Shortness of Breath and/or Wheezing  albuterol/ipratropium for Nebulization 3 milliLiter(s) Nebulizer every 4 hours PRN Shortness of Breath and/or Wheezing  aluminum hydroxide/magnesium hydroxide/simethicone Suspension 30 milliLiter(s) Oral every 4 hours PRN Dyspepsia  artificial  tears Solution 1 Drop(s) Both EYES three times a day PRN eye irritation  benzonatate 100 milliGRAM(s) Oral three times a day PRN Cough  bisacodyl Suppository 10 milliGRAM(s) Rectal daily PRN Constipation  dextrose Oral Gel 15 Gram(s) Oral once PRN Blood Glucose LESS THAN 70 milliGRAM(s)/deciliter  melatonin 3 milliGRAM(s) Oral at bedtime PRN Insomnia  ondansetron Injectable 4 milliGRAM(s) IV Push every 8 hours PRN Nausea and/or Vomiting  sodium chloride 0.65% Nasal 1 Spray(s) Both Nostrils two times a day PRN Congestion  traMADol 50 milliGRAM(s) Oral three times a day PRN Moderate Pain (4 - 6)      Vital Signs Last 24 Hrs  T(C): 36.8 (07 Oct 2024 13:36), Max: 37.1 (06 Oct 2024 20:42)  T(F): 98.2 (07 Oct 2024 13:36), Max: 98.8 (06 Oct 2024 20:42)  HR: 78 (07 Oct 2024 04:51) (78 - 87)  BP: 101/61 (07 Oct 2024 13:36) (101/61 - 142/75)  BP(mean): --  RR: 18 (07 Oct 2024 04:51) (18 - 18)  SpO2: 93% (07 Oct 2024 13:36) (92% - 96%)    Parameters below as of 07 Oct 2024 13:36  Patient On (Oxygen Delivery Method): room air        I&O's Summary    06 Oct 2024 07:01  -  07 Oct 2024 07:00  --------------------------------------------------------  IN: 0 mL / OUT: 1600 mL / NET: -1600 mL    LABORATORY:    CBC Full  -  ( 07 Oct 2024 06:15 )  WBC Count : 7.44 K/uL  RBC Count : 3.21 M/uL  Hemoglobin : 9.6 g/dL  Hematocrit : 29.8 %  Platelet Count - Automated : 203 K/uL  Mean Cell Volume : 92.8 fl  Mean Cell Hemoglobin : 29.9 pg  Mean Cell Hemoglobin Concentration : 32.2 gm/dL  Auto Neutrophil # : x  Auto Lymphocyte # : x  Auto Monocyte # : x  Auto Eosinophil # : x  Auto Basophil # : x  Auto Neutrophil % : x  Auto Lymphocyte % : x  Auto Monocyte % : x  Auto Eosinophil % : x  Auto Basophil % : x      ESR:                   09-24 @ 14:10  65    C-Reactive Protein:     09-24 @ 14:10  28    Procalcitonin:           09-24 @ 14:10   --      10-07    136  |  105  |  17  ----------------------------<  170[H]  4.3   |  25  |  1.20    Ca    9.8      07 Oct 2024 06:15      Assessment and Plan:    1. R foot ulcer with osteomyelitis, s/p resection of the R 1st metatarsal head.   2. CKD  3. Chronic body rash.    . He had resection of the R 1st metatarsal head. Waiting for report of pathologies of the bone biopsies. Bone culture is growing Staph aureus (MRSA).   . Continue IV Dapto 500 mg daily for 6 weeks (to November 16, 2024).    . Get Picc line to complete 6 weeks of IV Daptomycin at the rehab. Monitor ESR, CRP, CPK, CBC and CMP weekly.   . Wound care daily as per podiatry.   . Discharge planning to rehab.         David Coreas MD   [phone].

## 2024-10-07 NOTE — PROGRESS NOTE ADULT - SUBJECTIVE AND OBJECTIVE BOX
Neurology Follow up note    WAYNE [id number]    HPI:  74yo M PMhx DM2 w/ PAD - chronic Rt foot wound, HTN, COPD, CKD, HFpEF, arrythmia, Prostate CA s/p resection, Depression/Anxiety presents to ED sent by wound care for worsening R MTP wound. Patient reports having a nonhealing R foot wound, s/p multiple debridements and grafts at [facility], for past 1.5 years. Denies seeing a vascular surgeon in the past.  Denies pain the wound, but reports having neuropathy. States following wound care and reports worsening of wound. Reporting having some chills in the past week. Denies fever, chills, SOB or any other complaints.Patient examined and evaluated at this time. Antibiotics as per infectious disease recommendations. Recommend vascular surgery evaluation. Tentatively planning for right 1st metatarsal head resection pending vascular evaluation. Continue local wound care and offloading at this time. (01 Oct 2024 14:44)      Interval History -no new events    Patient is seen, chart was reviewed and case was discussed with the treatment team.  Pt is not in any distress.   Lying on bed comfortably.   .       Vital Signs Last 24 Hrs  T(C): 36.8 (07 Oct 2024 13:36), Max: 37.1 (06 Oct 2024 20:42)  T(F): 98.2 (07 Oct 2024 13:36), Max: 98.8 (06 Oct 2024 20:42)  HR: 78 (07 Oct 2024 04:51) (78 - 87)  BP: 101/61 (07 Oct 2024 13:36) (101/61 - 142/75)  BP(mean): --  RR: 18 (07 Oct 2024 04:51) (18 - 18)  SpO2: 93% (07 Oct 2024 13:36) (92% - 96%)    Parameters below as of 07 Oct 2024 13:36  Patient On (Oxygen Delivery Method): room air          r            REVIEW OF SYSTEMS:    Constitutional: No fever, weight loss or fatigue  Eyes: No eye pain, visual disturbances, or discharge  ENT:  No difficulty hearing, tinnitus, vertigo; No sinus or throat pain  Neck: No pain or stiffness  Respiratory: No cough, wheezing, chills or hemoptysis  Cardiovascular: No chest pain, palpitations, shortness of breath, dizziness or leg swelling  Gastrointestinal: No abdominal or epigastric pain. No nausea, vomiting or hematemesis  Genitourinary: No dysuria, frequency, hematuria or incontinence  Neurological: No memory loss, loss of strength, numbness or tremors  Psychiatric: No depression, anxiety, mood swings or difficulty sleeping  Musculoskeletal: No joint pain or swelling; No muscle, back or extremity pain  Skin: No itching, burning, rashes or lesions   Lymph Nodes: No enlarged glands  Endocrine: No heat or cold intolerance; No hair loss    Allergy and Immunologic: No hives or eczema    On Neurological Examination:    Mental Status - Pt is alert, awake, oriented X3.. Follows commands well and able to answer questions appropriately.Mood and affect  normal    Speech -  Normal.    Cranial Nerves - Pupils 3 mm equal and reactive to light, extraocular eye movements intact. Pt has no visual field deficit.  Pt has no facial asymmetry. Facial sensation is intact.Tongue - is in midline.    Muscle tone - is normal        Motor Exam - 55 OF ue  'le 4/5   No drift. No shaking or tremors.    Sensory Exam - . Pt withdraws all extremities equally on stimulation. No asymmetry seen. No complaints of tingling, numbness.        coordination:    Finger to nose: normal      Deep tendon Reflexes - 2 plus all over.     .    Neck Supple -  Yes.     MEDICATIONS    acetaminophen     Tablet .. 650 milliGRAM(s) Oral every 6 hours PRN  albuterol    90 MICROgram(s) HFA Inhaler 2 Puff(s) Inhalation every 6 hours PRN  aluminum hydroxide/magnesium hydroxide/simethicone Suspension 30 milliLiter(s) Oral every 4 hours PRN  benzonatate 100 milliGRAM(s) Oral three times a day PRN  bisacodyl Suppository 10 milliGRAM(s) Rectal daily PRN  cefepime   IVPB 2000 milliGRAM(s) IV Intermittent every 12 hours  clonazePAM Oral Disintegrating Tablet 0.75 milliGRAM(s) Oral two times a day  DAPTOmycin IVPB 500 milliGRAM(s) IV Intermittent every 24 hours  dextrose 5% + sodium chloride 0.45%. 1000 milliLiter(s) IV Continuous <Continuous>  dextrose 5%. 1000 milliLiter(s) IV Continuous <Continuous>  dextrose 5%. 1000 milliLiter(s) IV Continuous <Continuous>  dextrose 50% Injectable 12.5 Gram(s) IV Push once  dextrose 50% Injectable 25 Gram(s) IV Push once  dextrose 50% Injectable 25 Gram(s) IV Push once  dextrose Oral Gel 15 Gram(s) Oral once PRN  enoxaparin Injectable 40 milliGRAM(s) SubCutaneous every 24 hours  ferrous    sulfate 325 milliGRAM(s) Oral daily  fluticasone propionate/ salmeterol 250-50 MICROgram(s) Diskus 1 Dose(s) Inhalation two times a day  glucagon  Injectable 1 milliGRAM(s) IntraMuscular once  influenza  Vaccine (HIGH DOSE) 0.5 milliLiter(s) IntraMuscular once  insulin glargine Injectable (LANTUS) 7 Unit(s) SubCutaneous at bedtime  insulin lispro (ADMELOG) corrective regimen sliding scale   SubCutaneous three times a day before meals  lactobacillus acidophilus 1 Tablet(s) Oral every 12 hours  melatonin 3 milliGRAM(s) Oral at bedtime PRN  metoprolol succinate ER 25 milliGRAM(s) Oral daily  montelukast 10 milliGRAM(s) Oral daily  morphine  - Injectable 1 milliGRAM(s) IV Push every 4 hours PRN  ondansetron Injectable 4 milliGRAM(s) IV Push every 8 hours PRN  pantoprazole    Tablet 40 milliGRAM(s) Oral before breakfast  polyethylene glycol 3350 17 Gram(s) Oral daily  pregabalin 150 milliGRAM(s) Oral two times a day  rosuvastatin 40 milliGRAM(s) Oral at bedtime  senna 2 Tablet(s) Oral at bedtime  sertraline 100 milliGRAM(s) Oral daily  sodium chloride 0.65% Nasal 1 Spray(s) Both Nostrils two times a day PRN  traMADol 50 milliGRAM(s) Oral three times a day PRN      Allergies    tetracycline (Unknown)  IODINE (Unknown)  vancomycin (Other)    Intolerances          10-07    136  |  105  |  17  ----------------------------<  170[H]  4.3   |  25  |  1.20    Ca    9.8      07 Oct 2024 06:15        Hemoglobin A1C:     Vitamin B12     RADIOLOGY    ASSESSMENT AND PLAN:      seen for HA  R/O TA    trial steroid if ok podiatry and ID  sp  TA BIOPSY  BRAIN MRI IS PENDING  ANALGESIC  Physical therapy evaluation.  OOB to chair/ambulation with assistance only.  Pain is accessed and addressed.  Plan of care was discussed with family. Questions answered.  Would continue to follow.

## 2024-10-07 NOTE — DISCHARGE NOTE PROVIDER - CARE PROVIDER_API CALL
Angelito Humphrey  Cardiac Electrophysiology  [address] [place] [address], [address]  [place] [address]  Phone: [phone]  Fax: [phone]  Follow Up Time: 1 week   Angelito Humphrey  Cardiac Electrophysiology  [address] [facility], [address]  [place] [address]  Phone: [phone]  Fax: [phone]  Follow Up Time: 1 week    Munir SandovalErik  Podiatric Medicine  [address] [place] [address]  Phone: [phone]  Fax: [phone]  Follow Up Time: 1 week    David Coreas  Infectious Disease  [address] [place] [address]  Phone: [phone]  Fax: [phone]  Follow Up Time: 2 weeks    Dilcia Mccartney  Neurology  [address] [place] [address]  Phone: [phone]  Fax: [phone]  Follow Up Time: 1 week

## 2024-10-08 DIAGNOSIS — E11.65 TYPE 2 DIABETES MELLITUS WITH HYPERGLYCEMIA: ICD-10-CM

## 2024-10-08 LAB
ANION GAP SERPL CALC-SCNC: 11 MMOL/L — SIGNIFICANT CHANGE UP (ref 5–17)
BUN SERPL-MCNC: 20 MG/DL — SIGNIFICANT CHANGE UP (ref 7–23)
CALCIUM SERPL-MCNC: 9 MG/DL — SIGNIFICANT CHANGE UP (ref 8.5–10.1)
CHLORIDE SERPL-SCNC: 106 MMOL/L — SIGNIFICANT CHANGE UP (ref 96–108)
CK SERPL-CCNC: 72 U/L — SIGNIFICANT CHANGE UP (ref 26–308)
CO2 SERPL-SCNC: 18 MMOL/L — LOW (ref 22–31)
CREAT SERPL-MCNC: 1 MG/DL — SIGNIFICANT CHANGE UP (ref 0.5–1.3)
CULTURE RESULTS: ABNORMAL
EGFR: 79 ML/MIN/1.73M2 — SIGNIFICANT CHANGE UP
GLUCOSE SERPL-MCNC: 164 MG/DL — HIGH (ref 70–99)
HCT VFR BLD CALC: 28.8 % — LOW (ref 39–50)
HGB BLD-MCNC: 8.8 G/DL — LOW (ref 13–17)
MCHC RBC-ENTMCNC: 28.5 PG — SIGNIFICANT CHANGE UP (ref 27–34)
MCHC RBC-ENTMCNC: 30.6 GM/DL — LOW (ref 32–36)
MCV RBC AUTO: 93.2 FL — SIGNIFICANT CHANGE UP (ref 80–100)
NRBC # BLD: 0 /100 WBCS — SIGNIFICANT CHANGE UP (ref 0–0)
ORGANISM # SPEC MICROSCOPIC CNT: ABNORMAL
ORGANISM # SPEC MICROSCOPIC CNT: SIGNIFICANT CHANGE UP
PLATELET # BLD AUTO: 197 K/UL — SIGNIFICANT CHANGE UP (ref 150–400)
POTASSIUM SERPL-MCNC: 4.3 MMOL/L — SIGNIFICANT CHANGE UP (ref 3.5–5.3)
POTASSIUM SERPL-SCNC: 4.3 MMOL/L — SIGNIFICANT CHANGE UP (ref 3.5–5.3)
RBC # BLD: 3.09 M/UL — LOW (ref 4.2–5.8)
RBC # FLD: 14.8 % — HIGH (ref 10.3–14.5)
SODIUM SERPL-SCNC: 135 MMOL/L — SIGNIFICANT CHANGE UP (ref 135–145)
SPECIMEN SOURCE: SIGNIFICANT CHANGE UP
WBC # BLD: 6.05 K/UL — SIGNIFICANT CHANGE UP (ref 3.8–10.5)
WBC # FLD AUTO: 6.05 K/UL — SIGNIFICANT CHANGE UP (ref 3.8–10.5)

## 2024-10-08 PROCEDURE — 76937 US GUIDE VASCULAR ACCESS: CPT | Mod: 26,59

## 2024-10-08 PROCEDURE — 99221 1ST HOSP IP/OBS SF/LOW 40: CPT

## 2024-10-08 PROCEDURE — 36573 INSJ PICC RS&I 5 YR+: CPT

## 2024-10-08 PROCEDURE — 77001 FLUOROGUIDE FOR VEIN DEVICE: CPT | Mod: 26,59

## 2024-10-08 PROCEDURE — 99233 SBSQ HOSP IP/OBS HIGH 50: CPT

## 2024-10-08 PROCEDURE — 36000 PLACE NEEDLE IN VEIN: CPT | Mod: 59

## 2024-10-08 RX ORDER — CHLORHEXIDINE GLUCONATE ORAL RINSE 1.2 MG/ML
1 SOLUTION DENTAL
Refills: 0 | Status: DISCONTINUED | OUTPATIENT
Start: 2024-10-08 | End: 2024-10-11

## 2024-10-08 RX ORDER — SODIUM CHLORIDE 0.9 % (FLUSH) 0.9 %
10 SYRINGE (ML) INJECTION
Refills: 0 | Status: DISCONTINUED | OUTPATIENT
Start: 2024-10-08 | End: 2024-10-11

## 2024-10-08 RX ORDER — PREDNISONE 5 MG/1
50 TABLET ORAL DAILY
Refills: 0 | Status: DISCONTINUED | OUTPATIENT
Start: 2024-10-08 | End: 2024-10-11

## 2024-10-08 RX ORDER — INSULIN GLARGINE 300 U/ML
10 INJECTION, SOLUTION SUBCUTANEOUS AT BEDTIME
Refills: 0 | Status: DISCONTINUED | OUTPATIENT
Start: 2024-10-08 | End: 2024-10-11

## 2024-10-08 RX ORDER — GUAIFENESIN 100 MG/5ML
200 SOLUTION ORAL EVERY 6 HOURS
Refills: 0 | Status: DISCONTINUED | OUTPATIENT
Start: 2024-10-08 | End: 2024-10-11

## 2024-10-08 RX ORDER — PEDIATRIC MULTIVIT 61/D3/VIT K 1500-800
1 CAPSULE ORAL DAILY
Refills: 0 | Status: CANCELLED | OUTPATIENT
Start: 2024-10-08 | End: 2024-10-11

## 2024-10-08 RX ADMIN — BENZONATATE 100 MILLIGRAM(S): 150 CAPSULE ORAL at 18:18

## 2024-10-08 RX ADMIN — OXYCODONE HYDROCHLORIDE 10 MILLIGRAM(S): 30 TABLET, FILM COATED, EXTENDED RELEASE ORAL at 22:44

## 2024-10-08 RX ADMIN — Medication 1: at 18:10

## 2024-10-08 RX ADMIN — INSULIN GLARGINE 10 UNIT(S): 300 INJECTION, SOLUTION SUBCUTANEOUS at 22:43

## 2024-10-08 RX ADMIN — PREGABALIN 150 MILLIGRAM(S): 25 CAPSULE ORAL at 05:40

## 2024-10-08 RX ADMIN — Medication 1 TABLET(S): at 08:55

## 2024-10-08 RX ADMIN — MONTELUKAST SODIUM 10 MILLIGRAM(S): 10 TABLET, FILM COATED ORAL at 11:40

## 2024-10-08 RX ADMIN — PANTOPRAZOLE SODIUM 40 MILLIGRAM(S): 40 TABLET, DELAYED RELEASE ORAL at 05:20

## 2024-10-08 RX ADMIN — TRAMADOL HYDROCHLORIDE 50 MILLIGRAM(S): 50 TABLET, COATED ORAL at 17:30

## 2024-10-08 RX ADMIN — Medication 325 MILLIGRAM(S): at 11:40

## 2024-10-08 RX ADMIN — GUAIFENESIN 200 MILLIGRAM(S): 100 SOLUTION ORAL at 18:18

## 2024-10-08 RX ADMIN — OXYCODONE HYDROCHLORIDE 10 MILLIGRAM(S): 30 TABLET, FILM COATED, EXTENDED RELEASE ORAL at 11:40

## 2024-10-08 RX ADMIN — ANTI-ITCH CREAM 1 APPLICATION(S): 1 OINTMENT TOPICAL at 18:11

## 2024-10-08 RX ADMIN — ANTI-ITCH CREAM 1 APPLICATION(S): 1 OINTMENT TOPICAL at 23:57

## 2024-10-08 RX ADMIN — Medication 3 MILLIGRAM(S): at 00:25

## 2024-10-08 RX ADMIN — IPRATROPIUM BROMIDE AND ALBUTEROL SULFATE 3 MILLILITER(S): .5; 3 SOLUTION RESPIRATORY (INHALATION) at 18:29

## 2024-10-08 RX ADMIN — Medication 0.75 MILLIGRAM(S): at 05:15

## 2024-10-08 RX ADMIN — OXYCODONE HYDROCHLORIDE 10 MILLIGRAM(S): 30 TABLET, FILM COATED, EXTENDED RELEASE ORAL at 23:30

## 2024-10-08 RX ADMIN — Medication 1: at 08:23

## 2024-10-08 RX ADMIN — TRAMADOL HYDROCHLORIDE 50 MILLIGRAM(S): 50 TABLET, COATED ORAL at 16:48

## 2024-10-08 RX ADMIN — PREGABALIN 150 MILLIGRAM(S): 25 CAPSULE ORAL at 18:19

## 2024-10-08 RX ADMIN — SERTRALINE HYDROCHLORIDE 100 MILLIGRAM(S): 100 TABLET, FILM COATED ORAL at 11:40

## 2024-10-08 RX ADMIN — DAPTOMYCIN 120 MILLIGRAM(S): 500 INJECTION, POWDER, LYOPHILIZED, FOR SOLUTION INTRAVENOUS at 22:47

## 2024-10-08 RX ADMIN — Medication 1 DOSE(S): at 05:20

## 2024-10-08 RX ADMIN — ANTI-ITCH CREAM 1 APPLICATION(S): 1 OINTMENT TOPICAL at 05:16

## 2024-10-08 RX ADMIN — Medication 1 TABLET(S): at 22:45

## 2024-10-08 RX ADMIN — Medication 17 GRAM(S): at 18:13

## 2024-10-08 RX ADMIN — ENOXAPARIN SODIUM 40 MILLIGRAM(S): 150 INJECTION SUBCUTANEOUS at 05:13

## 2024-10-08 RX ADMIN — Medication 650 MILLIGRAM(S): at 18:19

## 2024-10-08 RX ADMIN — Medication 2 TABLET(S): at 22:44

## 2024-10-08 RX ADMIN — GUAIFENESIN 200 MILLIGRAM(S): 100 SOLUTION ORAL at 05:12

## 2024-10-08 RX ADMIN — Medication 650 MILLIGRAM(S): at 19:00

## 2024-10-08 RX ADMIN — Medication 650 MILLIGRAM(S): at 00:27

## 2024-10-08 RX ADMIN — Medication 0.75 MILLIGRAM(S): at 18:18

## 2024-10-08 RX ADMIN — OXYCODONE HYDROCHLORIDE 10 MILLIGRAM(S): 30 TABLET, FILM COATED, EXTENDED RELEASE ORAL at 12:10

## 2024-10-08 RX ADMIN — Medication 1 DOSE(S): at 22:44

## 2024-10-08 RX ADMIN — BENZONATATE 100 MILLIGRAM(S): 150 CAPSULE ORAL at 00:27

## 2024-10-08 RX ADMIN — Medication 4: at 12:36

## 2024-10-08 RX ADMIN — ROSUVASTATIN CALCIUM 40 MILLIGRAM(S): 20 TABLET, COATED ORAL at 22:46

## 2024-10-08 RX ADMIN — Medication 25 MILLIGRAM(S): at 05:16

## 2024-10-08 NOTE — CONSULT NOTE ADULT - PROBLEM SELECTOR RECOMMENDATION 9
Patient examined and evaluated at this time. Antibiotics as per infectious disease recommendations. Recommend vascular surgery evaluation. Tentatively planning for right 1st metatarsal head resection pending vascular evaluation. Continue local wound care and offloading at this time.
Type 2 A1c 7.3% adm wound of foot  increase Lantus 10 units @ HS  PAT ACHS  CC diet and accucheck ACHS  FU appt: return to residential  DSC recommendations: return to residential regimen Lantus 18 units @ HS and metformin 1000mg BID and glucose monitoring, lifestyle and diet modifications  diabetes education provided as documented above  Diabetes support info and cell # [phone] given   Goal 100-180 mg/dL; 140-180 mg/dL in critical care areas

## 2024-10-08 NOTE — PHARMACOTHERAPY INTERVENTION NOTE - COMMENTS
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 Recommended ordering creatine kinase level for routine monitoring.

## 2024-10-08 NOTE — PROCEDURE NOTE - ADDITIONAL PROCEDURE DETAILS
47cm bard single lumen power picc inserted into patent right brachial vein under sterile conditions and image guidance.  Tip is in the SVC.  PICC can be accessed.

## 2024-10-08 NOTE — CONSULT NOTE ADULT - CONSULT REQUESTED DATE/TIME
01-Oct-2024
01-Oct-2024 14:37
01-Oct-2024
01-Oct-2024 13:33
08-Oct-2024 09:48
03-Oct-2024 13:56
02-Oct-2024 08:18

## 2024-10-08 NOTE — PROGRESS NOTE ADULT - SUBJECTIVE AND OBJECTIVE BOX
Neurology Follow up note    WAYNE [id number]    HPI:  74yo M PMhx DM2 w/ PAD - chronic Rt foot wound, HTN, COPD, CKD, HFpEF, arrythmia, Prostate CA s/p resection, Depression/Anxiety presents to ED sent by wound care for worsening R MTP wound. Patient reports having a nonhealing R foot wound, s/p multiple debridements and grafts at [facility], for past 1.5 years. Denies seeing a vascular surgeon in the past.  Denies pain the wound, but reports having neuropathy. States following wound care and reports worsening of wound. Reporting having some chills in the past week. Denies fever, chills, SOB or any other complaints.Patient examined and evaluated at this time. Antibiotics as per infectious disease recommendations. Recommend vascular surgery evaluation. Tentatively planning for right 1st metatarsal head resection pending vascular evaluation. Continue local wound care and offloading at this time. (01 Oct 2024 14:44)      Interval History -no new complaints    Patient is seen, chart was reviewed and case was discussed with the treatment team.  Pt is not in any distress.   Lying on bed comfortably.   .       Vital Signs Last 24 Hrs  T(C): 36.8 (08 Oct 2024 11:55), Max: 37.2 (07 Oct 2024 20:59)  T(F): 98.3 (08 Oct 2024 11:55), Max: 99 (07 Oct 2024 20:59)  HR: 88 (08 Oct 2024 11:55) (84 - 88)  BP: 129/71 (08 Oct 2024 11:55) (115/63 - 147/66)  BP(mean): --  RR: 18 (08 Oct 2024 11:55) (18 - 18)  SpO2: 93% (08 Oct 2024 11:55) (93% - 96%)    Parameters below as of 08 Oct 2024 11:55  Patient On (Oxygen Delivery Method): room air              r            REVIEW OF SYSTEMS:    Constitutional: No fever, weight loss or fatigue  Eyes: No eye pain, visual disturbances, or discharge  ENT:  No difficulty hearing, tinnitus, vertigo; No sinus or throat pain  Neck: No pain or stiffness  Respiratory: No cough, wheezing, chills or hemoptysis  Cardiovascular: No chest pain, palpitations, shortness of breath, dizziness or leg swelling  Gastrointestinal: No abdominal or epigastric pain. No nausea, vomiting or hematemesis  Genitourinary: No dysuria, frequency, hematuria or incontinence  Neurological: No memory loss, loss of strength, numbness or tremors  Psychiatric: No depression, anxiety, mood swings or difficulty sleeping  Musculoskeletal: No joint pain or swelling; No muscle, back or extremity pain  Skin: No itching, burning, rashes or lesions   Lymph Nodes: No enlarged glands  Endocrine: No heat or cold intolerance; No hair loss    Allergy and Immunologic: No hives or eczema    On Neurological Examination:    Mental Status - Pt is alert, awake, oriented X3.. Follows commands well and able to answer questions appropriately.Mood and affect  normal    Speech -  Normal.    Cranial Nerves - Pupils 3 mm equal and reactive to light, extraocular eye movements intact. Pt has no visual field deficit.  Pt has no facial asymmetry. Facial sensation is intact.Tongue - is in midline.    Muscle tone - is normal        Motor Exam - 55 OF ue  'le 4/5   No drift. No shaking or tremors.    Sensory Exam - . Pt withdraws all extremities equally on stimulation. No asymmetry seen. No complaints of tingling, numbness.        coordination:    Finger to nose: normal      Deep tendon Reflexes - 2 plus all over.     .    Neck Supple -  Yes.     MEDICATIONS    acetaminophen     Tablet .. 650 milliGRAM(s) Oral every 6 hours PRN  albuterol    90 MICROgram(s) HFA Inhaler 2 Puff(s) Inhalation every 6 hours PRN  aluminum hydroxide/magnesium hydroxide/simethicone Suspension 30 milliLiter(s) Oral every 4 hours PRN  benzonatate 100 milliGRAM(s) Oral three times a day PRN  bisacodyl Suppository 10 milliGRAM(s) Rectal daily PRN  cefepime   IVPB 2000 milliGRAM(s) IV Intermittent every 12 hours  clonazePAM Oral Disintegrating Tablet 0.75 milliGRAM(s) Oral two times a day  DAPTOmycin IVPB 500 milliGRAM(s) IV Intermittent every 24 hours  dextrose 5% + sodium chloride 0.45%. 1000 milliLiter(s) IV Continuous <Continuous>  dextrose 5%. 1000 milliLiter(s) IV Continuous <Continuous>  dextrose 5%. 1000 milliLiter(s) IV Continuous <Continuous>  dextrose 50% Injectable 12.5 Gram(s) IV Push once  dextrose 50% Injectable 25 Gram(s) IV Push once  dextrose 50% Injectable 25 Gram(s) IV Push once  dextrose Oral Gel 15 Gram(s) Oral once PRN  enoxaparin Injectable 40 milliGRAM(s) SubCutaneous every 24 hours  ferrous    sulfate 325 milliGRAM(s) Oral daily  fluticasone propionate/ salmeterol 250-50 MICROgram(s) Diskus 1 Dose(s) Inhalation two times a day  glucagon  Injectable 1 milliGRAM(s) IntraMuscular once  influenza  Vaccine (HIGH DOSE) 0.5 milliLiter(s) IntraMuscular once  insulin glargine Injectable (LANTUS) 7 Unit(s) SubCutaneous at bedtime  insulin lispro (ADMELOG) corrective regimen sliding scale   SubCutaneous three times a day before meals  lactobacillus acidophilus 1 Tablet(s) Oral every 12 hours  melatonin 3 milliGRAM(s) Oral at bedtime PRN  metoprolol succinate ER 25 milliGRAM(s) Oral daily  montelukast 10 milliGRAM(s) Oral daily  morphine  - Injectable 1 milliGRAM(s) IV Push every 4 hours PRN  ondansetron Injectable 4 milliGRAM(s) IV Push every 8 hours PRN  pantoprazole    Tablet 40 milliGRAM(s) Oral before breakfast  polyethylene glycol 3350 17 Gram(s) Oral daily  pregabalin 150 milliGRAM(s) Oral two times a day  rosuvastatin 40 milliGRAM(s) Oral at bedtime  senna 2 Tablet(s) Oral at bedtime  sertraline 100 milliGRAM(s) Oral daily  sodium chloride 0.65% Nasal 1 Spray(s) Both Nostrils two times a day PRN  traMADol 50 milliGRAM(s) Oral three times a day PRN      Allergies    tetracycline (Unknown)  IODINE (Unknown)  vancomycin (Other)    Intolerances        10-08    135  |  106  |  20  ----------------------------<  164[H]  4.3   |  18[L]  |  1.00    Ca    9.0      08 Oct 2024 07:15          Hemoglobin A1C:     Vitamin B12     RADIOLOGY    ASSESSMENT AND PLAN:      seen for HA  related to temporal arterits    started on steroid  ANALGESIC  Physical therapy evaluation.  OOB to chair/ambulation with assistance only.  Pain is accessed and addressed.  Plan of care was discussed with family. Questions answered.  Would continue to follow.

## 2024-10-08 NOTE — CONSULT NOTE ADULT - SUBJECTIVE AND OBJECTIVE BOX
Patient is a 74y old  Male who presents with a chief complaint of right foot wound (08 Oct 2024 08:13)    Type: 2 DM DX 20 years known complications, CKD neuropathy, resides @ [facility] no Endocrine PCP Dr Milagro Eaton, A1c 7.3%, Rx home Lantus 18 units @ HS, metformin 1000mg BID, all meds and f/s done by staff. meals provided by [facility], pt denies eating out, reviewed CC diet, importance of glycemic control for wound healing. verbal education provided, questions answered  diabetes education provided- A1c measure and BG targets  fasting, <180 2 hours postmeal. medication MOA and considerations/side effects, inhospital BGM frequency and insulin administration, s/s of hyperglycemia/hypoglycemia and management, glycemic control and preventing complications, consistent carb diet, balanced plate method, consistent meal planning. sick day management, provider f/u  Hx ASCVD, CKD, HF    HPI:  72yo M PMhx DM2 w/ PAD - chronic Rt foot wound, HTN, COPD, CKD, HFpEF, arrythmia, Prostate CA s/p resection, Depression/Anxiety presents to ED sent by wound care for worsening R MTP wound. Patient reports having a nonhealing R foot wound, s/p multiple debridements and grafts at [facility], for past 1.5 years. Denies seeing a vascular surgeon in the past.  Denies pain the wound, but reports having neuropathy. States following wound care and reports worsening of wound. Reporting having some chills in the past week. Denies fever, chills, SOB or any other complaints.Patient examined and evaluated at this time. Antibiotics as per infectious disease recommendations. Recommend vascular surgery evaluation. Tentatively planning for right 1st metatarsal head resection pending vascular evaluation. Continue local wound care and offloading at this time. (01 Oct 2024 14:44)      PAST MEDICAL & SURGICAL HISTORY:  Prostate cancer      Type II diabetes mellitus      Chronic obstructive pulmonary disease (COPD)      CHF (congestive heart failure)      Renal insufficiency      H/O migraine      Insomnia      Constipation      S/P foot surgery      Allergies    tetracycline (Unknown)  IODINE (Unknown)  vancomycin (Other)    Intolerances        MEDICATIONS  (STANDING):  clonazePAM Oral Disintegrating Tablet 0.75 milliGRAM(s) Oral two times a day  DAPTOmycin IVPB 500 milliGRAM(s) IV Intermittent every 24 hours  dextrose 5%. 1000 milliLiter(s) (100 mL/Hr) IV Continuous <Continuous>  dextrose 5%. 1000 milliLiter(s) (50 mL/Hr) IV Continuous <Continuous>  dextrose 50% Injectable 25 Gram(s) IV Push once  dextrose 50% Injectable 25 Gram(s) IV Push once  dextrose 50% Injectable 12.5 Gram(s) IV Push once  enoxaparin Injectable 40 milliGRAM(s) SubCutaneous every 24 hours  ferrous    sulfate 325 milliGRAM(s) Oral daily  fluticasone propionate/ salmeterol 250-50 MICROgram(s) Diskus 1 Dose(s) Inhalation two times a day  glucagon  Injectable 1 milliGRAM(s) IntraMuscular once  hydrocortisone 2.5% Ointment 1 Application(s) Topical three times a day  influenza  Vaccine (HIGH DOSE) 0.5 milliLiter(s) IntraMuscular once  insulin glargine Injectable (LANTUS) 10 Unit(s) SubCutaneous at bedtime  insulin lispro (ADMELOG) corrective regimen sliding scale   SubCutaneous three times a day before meals  lactobacillus acidophilus 1 Tablet(s) Oral every 12 hours  metoprolol succinate ER 25 milliGRAM(s) Oral daily  montelukast 10 milliGRAM(s) Oral daily  oxyCODONE  ER Tablet 10 milliGRAM(s) Oral every 12 hours  pantoprazole    Tablet 40 milliGRAM(s) Oral before breakfast  polyethylene glycol 3350 17 Gram(s) Oral daily  pregabalin 150 milliGRAM(s) Oral two times a day  rosuvastatin 40 milliGRAM(s) Oral at bedtime  senna 2 Tablet(s) Oral at bedtime  sertraline 100 milliGRAM(s) Oral daily

## 2024-10-08 NOTE — PROGRESS NOTE ADULT - SUBJECTIVE AND OBJECTIVE BOX
PROGRESS NOTE  Patient is a 74y old  Male who presents with a chief complaint of right foot wound (08 Oct 2024 10:17)    Chart and available morning labs /imaging are reviewed electronically , urgent issues addressed . More information  is being added upon completion of rounds , when more information is collected and management discussed with consultants , medical staff and social service/case management on the floor   OVERNIGHT      HPI:  74yo M PMhx DM2 w/ PAD - chronic Rt foot wound, HTN, COPD, CKD, HFpEF, arrythmia, Prostate CA s/p resection, Depression/Anxiety presents to ED sent by wound care for worsening R MTP wound. Patient reports having a nonhealing R foot wound, s/p multiple debridements and grafts at [facility], for past 1.5 years. Denies seeing a vascular surgeon in the past.  Denies pain the wound, but reports having neuropathy. States following wound care and reports worsening of wound. Reporting having some chills in the past week. Denies fever, chills, SOB or any other complaints.Patient examined and evaluated at this time. Antibiotics as per infectious disease recommendations. Recommend vascular surgery evaluation. Tentatively planning for right 1st metatarsal head resection pending vascular evaluation. Continue local wound care and offloading at this time. (01 Oct 2024 14:44)    PAST MEDICAL & SURGICAL HISTORY:  Prostate cancer      Type II diabetes mellitus      Chronic obstructive pulmonary disease (COPD)      CHF (congestive heart failure)      Renal insufficiency      H/O migraine      Insomnia      Constipation      S/P foot surgery          MEDICATIONS  (STANDING):  chlorhexidine 2% Cloths 1 Application(s) Topical <User Schedule>  clonazePAM Oral Disintegrating Tablet 0.75 milliGRAM(s) Oral two times a day  DAPTOmycin IVPB 500 milliGRAM(s) IV Intermittent every 24 hours  dextrose 5%. 1000 milliLiter(s) (100 mL/Hr) IV Continuous <Continuous>  dextrose 5%. 1000 milliLiter(s) (50 mL/Hr) IV Continuous <Continuous>  dextrose 50% Injectable 25 Gram(s) IV Push once  dextrose 50% Injectable 25 Gram(s) IV Push once  dextrose 50% Injectable 12.5 Gram(s) IV Push once  enoxaparin Injectable 40 milliGRAM(s) SubCutaneous every 24 hours  ferrous    sulfate 325 milliGRAM(s) Oral daily  fluticasone propionate/ salmeterol 250-50 MICROgram(s) Diskus 1 Dose(s) Inhalation two times a day  glucagon  Injectable 1 milliGRAM(s) IntraMuscular once  hydrocortisone 2.5% Ointment 1 Application(s) Topical three times a day  influenza  Vaccine (HIGH DOSE) 0.5 milliLiter(s) IntraMuscular once  insulin glargine Injectable (LANTUS) 10 Unit(s) SubCutaneous at bedtime  insulin lispro (ADMELOG) corrective regimen sliding scale   SubCutaneous three times a day before meals  lactobacillus acidophilus 1 Tablet(s) Oral every 12 hours  metoprolol succinate ER 25 milliGRAM(s) Oral daily  montelukast 10 milliGRAM(s) Oral daily  oxyCODONE  ER Tablet 10 milliGRAM(s) Oral every 12 hours  pantoprazole    Tablet 40 milliGRAM(s) Oral before breakfast  polyethylene glycol 3350 17 Gram(s) Oral daily  pregabalin 150 milliGRAM(s) Oral two times a day  rosuvastatin 40 milliGRAM(s) Oral at bedtime  senna 2 Tablet(s) Oral at bedtime  sertraline 100 milliGRAM(s) Oral daily    MEDICATIONS  (PRN):  acetaminophen     Tablet .. 650 milliGRAM(s) Oral every 6 hours PRN Temp greater or equal to 38C (100.4F), Mild Pain (1 - 3)  albuterol    90 MICROgram(s) HFA Inhaler 2 Puff(s) Inhalation every 6 hours PRN Shortness of Breath and/or Wheezing  albuterol/ipratropium for Nebulization 3 milliLiter(s) Nebulizer every 4 hours PRN Shortness of Breath and/or Wheezing  aluminum hydroxide/magnesium hydroxide/simethicone Suspension 30 milliLiter(s) Oral every 4 hours PRN Dyspepsia  artificial  tears Solution 1 Drop(s) Both EYES three times a day PRN eye irritation  benzonatate 100 milliGRAM(s) Oral three times a day PRN Cough  bisacodyl Suppository 10 milliGRAM(s) Rectal daily PRN Constipation  dextrose Oral Gel 15 Gram(s) Oral once PRN Blood Glucose LESS THAN 70 milliGRAM(s)/deciliter  guaiFENesin Oral Liquid (Sugar-Free) 200 milliGRAM(s) Oral every 6 hours PRN Cough  melatonin 3 milliGRAM(s) Oral at bedtime PRN Insomnia  ondansetron Injectable 4 milliGRAM(s) IV Push every 8 hours PRN Nausea and/or Vomiting  sodium chloride 0.65% Nasal 1 Spray(s) Both Nostrils two times a day PRN Congestion  sodium chloride 0.9% lock flush 10 milliLiter(s) IV Push every 1 hour PRN Pre/post blood products, medications, blood draw, and to maintain line patency  traMADol 50 milliGRAM(s) Oral three times a day PRN Moderate Pain (4 - 6)      OBJECTIVE    T(C): 36.8 (10-08-24 @ 11:55), Max: 37.2 (10-07-24 @ 20:59)  HR: 88 (10-08-24 @ 11:55) (84 - 88)  BP: 129/71 (10-08-24 @ 11:55) (101/61 - 147/66)  RR: 18 (10-08-24 @ 11:55) (18 - 18)  SpO2: 93% (10-08-24 @ 11:55) (93% - 96%)  Wt(kg): --  I&O's Summary    07 Oct 2024 07:01  -  08 Oct 2024 07:00  --------------------------------------------------------  IN: 0 mL / OUT: 900 mL / NET: -900 mL          REVIEW OF SYSTEMS:  CONSTITUTIONAL: No fever, weight loss, or fatigue  EYES: No eye pain, visual disturbances, or discharge  ENMT:   No sinus or throat pain  NECK: No pain or stiffness  RESPIRATORY: No cough, wheezing, chills or hemoptysis; No shortness of breath  CARDIOVASCULAR: No chest pain, palpitations, dizziness, or leg swelling  GASTROINTESTINAL: No abdominal pain. No nausea, vomiting; No diarrhea or constipation. No melena or hematochezia.  GENITOURINARY: No dysuria, frequency, hematuria, or incontinence  NEUROLOGICAL: No headaches, memory loss, loss of strength, numbness, or tremors  SKIN: No itching, burning, rashes, or lesions   MUSCULOSKELETAL: No joint pain or swelling; No muscle, back, or extremity pain    PHYSICAL EXAM:  Appearance: NAD. VS past 24 hrs -as above   HEENT:   Moist oral mucosa. Conjunctiva clear b/l.   Neck : supple  Respiratory: Lungs CTAB.  Gastrointestinal:  Soft, nontender. No rebound. No rigidity. BS present	  Cardiovascular: RRR ,S1S2 present  Neurologic: Non-focal. Moving all extremities.  Extremities: No edema. No erythema. No calf tenderness.  Skin: No rashes, No ecchymoses, No cyanosis.	  wounds ,skin lesions-See skin assesment flow sheet   LABS:                        9.6    6.26  )-----------( 213      ( 08 Oct 2024 09:30 )             31.0     10-08    135  |  106  |  20  ----------------------------<  164[H]  4.3   |  18[L]  |  1.00    Ca    9.0      08 Oct 2024 07:15      CAPILLARY BLOOD GLUCOSE      POCT Blood Glucose.: 302 mg/dL (08 Oct 2024 12:16)  POCT Blood Glucose.: 194 mg/dL (08 Oct 2024 08:12)  POCT Blood Glucose.: 180 mg/dL (07 Oct 2024 21:34)  POCT Blood Glucose.: 215 mg/dL (07 Oct 2024 17:11)      Urinalysis Basic - ( 08 Oct 2024 07:15 )    Color: x / Appearance: x / SG: x / pH: x  Gluc: 164 mg/dL / Ketone: x  / Bili: x / Urobili: x   Blood: x / Protein: x / Nitrite: x   Leuk Esterase: x / RBC: x / WBC x   Sq Epi: x / Non Sq Epi: x / Bacteria: x        Culture - Tissue with Gram Stain (collected 03 Oct 2024 10:45)  Source: Tissue  Gram Stain (04 Oct 2024 17:51):    No polymorphonuclear cells seen per low power field    No organisms seen per oil power field  Preliminary Report (05 Oct 2024 16:36):    Rare Methicillin Resistant Staphylococcus aureus  Organism: Methicillin resistant Staphylococcus aureus (05 Oct 2024 16:36)  Organism: Methicillin resistant Staphylococcus aureus (05 Oct 2024 16:36)    Culture - Blood (collected 01 Oct 2024 13:05)  Source: .Blood BLOOD  Final Report (06 Oct 2024 19:00):    No growth at 5 days    Culture - Blood (collected 01 Oct 2024 13:05)  Source: .Blood BLOOD  Final Report (06 Oct 2024 19:00):    No growth at 5 days    Culture - Wound Aerobic (collected 01 Oct 2024 11:00)  Source: Skin/Wound  Final Report (04 Oct 2024 22:25):    Few Methicillin Resistant Staphylococcus aureus    Commensal jameel consistent with body site  Organism: Methicillin resistant Staphylococcus aureus (04 Oct 2024 22:25)  Organism: Methicillin resistant Staphylococcus aureus (04 Oct 2024 22:25)      RADIOLOGY & ADDITIONAL TESTS:   reviewed elctronically  ASSESSMENT/PLAN: 	     PROGRESS NOTE  Patient is a 74y old  Male who presents with a chief complaint of right foot wound (08 Oct 2024 10:17)    Chart and available morning labs /imaging are reviewed electronically , urgent issues addressed . More information  is being added upon completion of rounds , when more information is collected and management discussed with consultants , medical staff and social service/case management on the floor   OVERNIGHT  No new issues reported by medical staff . All above noted Patient is resting in a bed comfortably .MRI REPORT reviewed .No distress noted     HPI:  72yo M PMhx DM2 w/ PAD - chronic Rt foot wound, HTN, COPD, CKD, HFpEF, arrythmia, Prostate CA s/p resection, Depression/Anxiety presents to ED sent by wound care for worsening R MTP wound. Patient reports having a nonhealing R foot wound, s/p multiple debridements and grafts at [facility], for past 1.5 years. Denies seeing a vascular surgeon in the past.  Denies pain the wound, but reports having neuropathy. States following wound care and reports worsening of wound. Reporting having some chills in the past week. Denies fever, chills, SOB or any other complaints.Patient examined and evaluated at this time. Antibiotics as per infectious disease recommendations. Recommend vascular surgery evaluation. Tentatively planning for right 1st metatarsal head resection pending vascular evaluation. Continue local wound care and offloading at this time. (01 Oct 2024 14:44)    PAST MEDICAL & SURGICAL HISTORY:  Prostate cancer      Type II diabetes mellitus      Chronic obstructive pulmonary disease (COPD)      CHF (congestive heart failure)      Renal insufficiency      H/O migraine      Insomnia      Constipation      S/P foot surgery          MEDICATIONS  (STANDING):  chlorhexidine 2% Cloths 1 Application(s) Topical <User Schedule>  clonazePAM Oral Disintegrating Tablet 0.75 milliGRAM(s) Oral two times a day  DAPTOmycin IVPB 500 milliGRAM(s) IV Intermittent every 24 hours  dextrose 5%. 1000 milliLiter(s) (100 mL/Hr) IV Continuous <Continuous>  dextrose 5%. 1000 milliLiter(s) (50 mL/Hr) IV Continuous <Continuous>  dextrose 50% Injectable 25 Gram(s) IV Push once  dextrose 50% Injectable 25 Gram(s) IV Push once  dextrose 50% Injectable 12.5 Gram(s) IV Push once  enoxaparin Injectable 40 milliGRAM(s) SubCutaneous every 24 hours  ferrous    sulfate 325 milliGRAM(s) Oral daily  fluticasone propionate/ salmeterol 250-50 MICROgram(s) Diskus 1 Dose(s) Inhalation two times a day  glucagon  Injectable 1 milliGRAM(s) IntraMuscular once  hydrocortisone 2.5% Ointment 1 Application(s) Topical three times a day  influenza  Vaccine (HIGH DOSE) 0.5 milliLiter(s) IntraMuscular once  insulin glargine Injectable (LANTUS) 10 Unit(s) SubCutaneous at bedtime  insulin lispro (ADMELOG) corrective regimen sliding scale   SubCutaneous three times a day before meals  lactobacillus acidophilus 1 Tablet(s) Oral every 12 hours  metoprolol succinate ER 25 milliGRAM(s) Oral daily  montelukast 10 milliGRAM(s) Oral daily  oxyCODONE  ER Tablet 10 milliGRAM(s) Oral every 12 hours  pantoprazole    Tablet 40 milliGRAM(s) Oral before breakfast  polyethylene glycol 3350 17 Gram(s) Oral daily  pregabalin 150 milliGRAM(s) Oral two times a day  rosuvastatin 40 milliGRAM(s) Oral at bedtime  senna 2 Tablet(s) Oral at bedtime  sertraline 100 milliGRAM(s) Oral daily    MEDICATIONS  (PRN):  acetaminophen     Tablet .. 650 milliGRAM(s) Oral every 6 hours PRN Temp greater or equal to 38C (100.4F), Mild Pain (1 - 3)  albuterol    90 MICROgram(s) HFA Inhaler 2 Puff(s) Inhalation every 6 hours PRN Shortness of Breath and/or Wheezing  albuterol/ipratropium for Nebulization 3 milliLiter(s) Nebulizer every 4 hours PRN Shortness of Breath and/or Wheezing  aluminum hydroxide/magnesium hydroxide/simethicone Suspension 30 milliLiter(s) Oral every 4 hours PRN Dyspepsia  artificial  tears Solution 1 Drop(s) Both EYES three times a day PRN eye irritation  benzonatate 100 milliGRAM(s) Oral three times a day PRN Cough  bisacodyl Suppository 10 milliGRAM(s) Rectal daily PRN Constipation  dextrose Oral Gel 15 Gram(s) Oral once PRN Blood Glucose LESS THAN 70 milliGRAM(s)/deciliter  guaiFENesin Oral Liquid (Sugar-Free) 200 milliGRAM(s) Oral every 6 hours PRN Cough  melatonin 3 milliGRAM(s) Oral at bedtime PRN Insomnia  ondansetron Injectable 4 milliGRAM(s) IV Push every 8 hours PRN Nausea and/or Vomiting  sodium chloride 0.65% Nasal 1 Spray(s) Both Nostrils two times a day PRN Congestion  sodium chloride 0.9% lock flush 10 milliLiter(s) IV Push every 1 hour PRN Pre/post blood products, medications, blood draw, and to maintain line patency  traMADol 50 milliGRAM(s) Oral three times a day PRN Moderate Pain (4 - 6)      OBJECTIVE    T(C): 36.8 (10-08-24 @ 11:55), Max: 37.2 (10-07-24 @ 20:59)  HR: 88 (10-08-24 @ 11:55) (84 - 88)  BP: 129/71 (10-08-24 @ 11:55) (101/61 - 147/66)  RR: 18 (10-08-24 @ 11:55) (18 - 18)  SpO2: 93% (10-08-24 @ 11:55) (93% - 96%)  Wt(kg): --  I&O's Summary    07 Oct 2024 07:01  -  08 Oct 2024 07:00  --------------------------------------------------------  IN: 0 mL / OUT: 900 mL / NET: -900 mL          REVIEW OF SYSTEMS:  CONSTITUTIONAL: No fever, weight loss, or fatigue  EYES: No eye pain, visual disturbances, or discharge  ENMT:   No sinus or throat pain  NECK: No pain or stiffness  RESPIRATORY: No cough, wheezing, chills or hemoptysis; No shortness of breath  CARDIOVASCULAR: No chest pain, palpitations, dizziness, or leg swelling  GASTROINTESTINAL: No abdominal pain. No nausea, vomiting; No diarrhea or constipation. No melena or hematochezia.  GENITOURINARY: No dysuria, frequency, hematuria, or incontinence  NEUROLOGICAL: No headaches, memory loss, loss of strength, numbness, or tremors  SKIN: No itching, burning, rashes, or lesions   MUSCULOSKELETAL: No joint pain or swelling; No muscle, back, or extremity pain    PHYSICAL EXAM:  Appearance: NAD. VS past 24 hrs -as above   HEENT:   Moist oral mucosa. Conjunctiva clear b/l.   Neck : supple  Respiratory: Lungs CTAB.  Gastrointestinal:  Soft, nontender. No rebound. No rigidity. BS present	  Cardiovascular: RRR ,S1S2 present  Neurologic: Non-focal. Moving all extremities.  Extremities: No edema. No erythema. No calf tenderness.  Skin: No rashes, No ecchymoses, No cyanosis.	  wounds ,skin lesions-See skin assesment flow sheet   LABS:                        9.6    6.26  )-----------( 213      ( 08 Oct 2024 09:30 )             31.0     10-08    135  |  106  |  20  ----------------------------<  164[H]  4.3   |  18[L]  |  1.00    Ca    9.0      08 Oct 2024 07:15      CAPILLARY BLOOD GLUCOSE      POCT Blood Glucose.: 302 mg/dL (08 Oct 2024 12:16)  POCT Blood Glucose.: 194 mg/dL (08 Oct 2024 08:12)  POCT Blood Glucose.: 180 mg/dL (07 Oct 2024 21:34)  POCT Blood Glucose.: 215 mg/dL (07 Oct 2024 17:11)      Urinalysis Basic - ( 08 Oct 2024 07:15 )    Color: x / Appearance: x / SG: x / pH: x  Gluc: 164 mg/dL / Ketone: x  / Bili: x / Urobili: x   Blood: x / Protein: x / Nitrite: x   Leuk Esterase: x / RBC: x / WBC x   Sq Epi: x / Non Sq Epi: x / Bacteria: x        Culture - Tissue with Gram Stain (collected 03 Oct 2024 10:45)  Source: Tissue  Gram Stain (04 Oct 2024 17:51):    No polymorphonuclear cells seen per low power field    No organisms seen per oil power field  Preliminary Report (05 Oct 2024 16:36):    Rare Methicillin Resistant Staphylococcus aureus  Organism: Methicillin resistant Staphylococcus aureus (05 Oct 2024 16:36)  Organism: Methicillin resistant Staphylococcus aureus (05 Oct 2024 16:36)    Culture - Blood (collected 01 Oct 2024 13:05)  Source: .Blood BLOOD  Final Report (06 Oct 2024 19:00):    No growth at 5 days    Culture - Blood (collected 01 Oct 2024 13:05)  Source: .Blood BLOOD  Final Report (06 Oct 2024 19:00):    No growth at 5 days    Culture - Wound Aerobic (collected 01 Oct 2024 11:00)  Source: Skin/Wound  Final Report (04 Oct 2024 22:25):    Few Methicillin Resistant Staphylococcus aureus    Commensal jameel consistent with body site  Organism: Methicillin resistant Staphylococcus aureus (04 Oct 2024 22:25)  Organism: Methicillin resistant Staphylococcus aureus (04 Oct 2024 22:25)      RADIOLOGY & ADDITIONAL TESTS: Surgical Pathology Report (10.03.24 @ 10:40)   Surgical Pathology Report:   ACCESSION No: [id number]   Patient: WAYNE SERRANO   Accession: [id number]   Collected Date/Time: 10/3/2024 10:40 EDT   Received Date/Time: 10/3/2024 10:57 EDT   Surgical Pathology Report - Auth (Verified)   Specimen(s) Submitted   1 Right first metatarsal bone pathology   2 Right first metatarsal proximal margin pathology   Final Diagnosis   1. Right first metatarsal bone   - Bone with foci of mild chronic osteomyelitis   2. Right first metatarsal proximal margin   - Bone with hemorrhagic marrow space, no definitive evidence of   osteomyelitis    reviewed electronically  ASSESSMENT/PLAN: 	    25 minutes aggregate time was spent on this visit, 50% visit time spent in care co-ordination with other attendings and counselling patient .I have discussed care plan with patient / HCP/family member ,who expressed understanding of problems treatment and their effect and side effects, alternatives in details. I have asked if they have any questions and concerns and appropriately addressed them to best of my ability. Spoke to Podiatry and ID -clearance for steroids administartion obtained as per Dr Mccartney recommendation /plan of tx

## 2024-10-08 NOTE — CHART NOTE - NSCHARTNOTEFT_GEN_A_CORE
Assessment: 75 y/o male adm with open wound on right foot. s/p OR 10/3 resection of head of 1st metatarsal.    Pt visited at bedside this afternoon. Pt eating lunch, tolerating meal well, no complaints. Last BM 10/5.     Factors impacting intake: [x ] none [ ] nausea  [ ] vomiting [ ] diarrhea [ ] constipation  [ ]chewing problems [ ] swallowing issues  [ ] other:     Diet Presciption: Diet, Consistent Carbohydrate w/Evening Snack:   DASH/TLC {Sodium & Cholesterol Restricted} (10-05-24 @ 11:25)    Intake: %    Current Weight: adm 220# 10/8 210.9#  1+ edema to right/left ankles  edema has decreased from 3+ on admission most likely contributing to wt decrease    % Weight Change    Pertinent Medications: MEDICATIONS  (STANDING):  chlorhexidine 2% Cloths 1 Application(s) Topical <User Schedule>  clonazePAM Oral Disintegrating Tablet 0.75 milliGRAM(s) Oral two times a day  DAPTOmycin IVPB 500 milliGRAM(s) IV Intermittent every 24 hours  dextrose 5%. 1000 milliLiter(s) (100 mL/Hr) IV Continuous <Continuous>  dextrose 5%. 1000 milliLiter(s) (50 mL/Hr) IV Continuous <Continuous>  dextrose 50% Injectable 25 Gram(s) IV Push once  dextrose 50% Injectable 25 Gram(s) IV Push once  dextrose 50% Injectable 12.5 Gram(s) IV Push once  enoxaparin Injectable 40 milliGRAM(s) SubCutaneous every 24 hours  ferrous    sulfate 325 milliGRAM(s) Oral daily  fluticasone propionate/ salmeterol 250-50 MICROgram(s) Diskus 1 Dose(s) Inhalation two times a day  glucagon  Injectable 1 milliGRAM(s) IntraMuscular once  hydrocortisone 2.5% Ointment 1 Application(s) Topical three times a day  influenza  Vaccine (HIGH DOSE) 0.5 milliLiter(s) IntraMuscular once  insulin glargine Injectable (LANTUS) 10 Unit(s) SubCutaneous at bedtime  insulin lispro (ADMELOG) corrective regimen sliding scale   SubCutaneous three times a day before meals  lactobacillus acidophilus 1 Tablet(s) Oral every 12 hours  metoprolol succinate ER 25 milliGRAM(s) Oral daily  montelukast 10 milliGRAM(s) Oral daily  oxyCODONE  ER Tablet 10 milliGRAM(s) Oral every 12 hours  pantoprazole    Tablet 40 milliGRAM(s) Oral before breakfast  polyethylene glycol 3350 17 Gram(s) Oral daily  pregabalin 150 milliGRAM(s) Oral two times a day  rosuvastatin 40 milliGRAM(s) Oral at bedtime  senna 2 Tablet(s) Oral at bedtime  sertraline 100 milliGRAM(s) Oral daily    MEDICATIONS  (PRN):  acetaminophen     Tablet .. 650 milliGRAM(s) Oral every 6 hours PRN Temp greater or equal to 38C (100.4F), Mild Pain (1 - 3)  albuterol    90 MICROgram(s) HFA Inhaler 2 Puff(s) Inhalation every 6 hours PRN Shortness of Breath and/or Wheezing  albuterol/ipratropium for Nebulization 3 milliLiter(s) Nebulizer every 4 hours PRN Shortness of Breath and/or Wheezing  aluminum hydroxide/magnesium hydroxide/simethicone Suspension 30 milliLiter(s) Oral every 4 hours PRN Dyspepsia  artificial  tears Solution 1 Drop(s) Both EYES three times a day PRN eye irritation  benzonatate 100 milliGRAM(s) Oral three times a day PRN Cough  bisacodyl Suppository 10 milliGRAM(s) Rectal daily PRN Constipation  dextrose Oral Gel 15 Gram(s) Oral once PRN Blood Glucose LESS THAN 70 milliGRAM(s)/deciliter  guaiFENesin Oral Liquid (Sugar-Free) 200 milliGRAM(s) Oral every 6 hours PRN Cough  melatonin 3 milliGRAM(s) Oral at bedtime PRN Insomnia  ondansetron Injectable 4 milliGRAM(s) IV Push every 8 hours PRN Nausea and/or Vomiting  sodium chloride 0.65% Nasal 1 Spray(s) Both Nostrils two times a day PRN Congestion  sodium chloride 0.9% lock flush 10 milliLiter(s) IV Push every 1 hour PRN Pre/post blood products, medications, blood draw, and to maintain line patency  traMADol 50 milliGRAM(s) Oral three times a day PRN Moderate Pain (4 - 6)    Pertinent Labs: 10-08 Na135 mmol/L Glu 164 mg/dL[H] K+ 4.3 mmol/L Cr  1.00 mg/dL BUN 20 mg/dL 10-05 Phos 3.7 mg/dL 10-02 Alb 2.7 g/dL[L]     CAPILLARY BLOOD GLUCOSE      POCT Blood Glucose.: 302 mg/dL (08 Oct 2024 12:16)  POCT Blood Glucose.: 194 mg/dL (08 Oct 2024 08:12)  POCT Blood Glucose.: 180 mg/dL (07 Oct 2024 21:34)  POCT Blood Glucose.: 215 mg/dL (07 Oct 2024 17:11)    Skin: no pressure ulcers     Estimated Needs:   [x  ] no change since previous assessment  [ ] recalculated:     Previous Nutrition Diagnosis:   [ ] Inadequate Energy Intake [ ]Inadequate Oral Intake [ ] Excessive Energy Intake   [ ] Underweight [ x] Increased Nutrient Needs [ ] Overweight/Obesity   [ ] Altered GI Function [ ] Unintended Weight Loss [ ] Food & Nutrition Related Knowledge Deficit [ ] Malnutrition     Nutrition Diagnosis is [ ] ongoing  [ ] resolved [ ] not applicable     New Nutrition Diagnosis: [ ] not applicable       Interventions:   Recommend  [ ] Change Diet To:  [x ] Nutrition Supplement: consider ordering MVI daily, vit C 500 mg BID, Wilfred BID to help promote wound healing.   [ ] Nutrition Support  [ ] Other:     Monitoring and Evaluation:   [x ] PO intake [ x ] Tolerance to diet prescription [ x ] weights [ x ] labs[ x ] follow up per protocol  [ ] other:
72yo M PMhx DM2 w/ PAD - chronic Rt foot wound, HTN, COPD, CKD, HFpEF, arrythmia, Prostate CA s/p resection, Depression/Anxiety presents to ED sent by wound care for worsening R MTP wound. Vascular surgery now consulted for left temp tenderness and headache x 3 weeks; no visual loss but with chronic double vision, elevated ESR. Patient is followed by vascular, plan is for temporal artery bx Sat 10/5 0800 with Dr. Gloria. NPO after midnight. Discussed with Dr. Diaz.

## 2024-10-08 NOTE — PROGRESS NOTE ADULT - PROBLEM SELECTOR PLAN 3
seen BY NEUROLOGY for left sided ha , recurrent with hx of hospital admission for HA   scalp and left TA tenderness  high CRP/ESR- suggestive of temporal arteritis  s/p TA  biopsy 10/05/24  analgesics, neurology f/up  As per Dr Mccartney may benefit trial steroid if ok podiatry and ID  sp  TA BIOPSY  BRAIN MRI   ANALGESICS  Physical therapy evaluation.  OOB to chair/ambulation with assistance only.  Advanced care planning was discussed with family. seen BY NEUROLOGY for left sided ha , recurrent with hx of hospital admission for HA   scalp and left TA tenderness  high CRP/ESR- suggestive of temporal arteritis  s/p TA  biopsy 10/05/24 -pathology -giant cell arteritis   analgesics, neurology f/up  As per Dr Mccartney may benefit trial steroid if ok podiatry and ID  sp  TA BIOPSY  BRAIN MRI   ANALGESICS  Physical therapy evaluation.  OOB to chair/ambulation with assistance only.  Advanced care planning was discussed with family.

## 2024-10-08 NOTE — PROGRESS NOTE ADULT - SUBJECTIVE AND OBJECTIVE BOX
CHIEF COMPLAINT/ REASON FOR VISIT  .. Patient was seen to address the  issue listed under PROBLEM LIST which is located toward bottom of this note     WAYNE SERRANO    PLV 1EAS 113 D1    Allergies    tetracycline (Unknown)  IODINE (Unknown)  vancomycin (Other)    Intolerances        PAST MEDICAL & SURGICAL HISTORY:  Prostate cancer      Type II diabetes mellitus      Chronic obstructive pulmonary disease (COPD)      CHF (congestive heart failure)      Renal insufficiency      H/O migraine      Insomnia      Constipation      S/P foot surgery          FAMILY HISTORY:      Home Medications:  acetaminophen 325 mg oral tablet: 2 tab(s) orally every 8 hours as needed (01 Oct 2024 15:23)  Albuterol (Eqv-ProAir HFA) 90 mcg/inh inhalation aerosol: 2 puff(s) inhaled every 4 hours as needed (01 Oct 2024 15:32)  albuterol 0.63 mg/3 mL (0.021%) inhalation solution: 3 milliliter(s) by nebulizer every 6 hours as needed for SOB (01 Oct 2024 15:27)  azelaic acid 15% topical gel: Apply topically to affected area 2 times a day as needed (01 Oct 2024 15:27)  ferrous sulfate 325 mg (65 mg elemental iron) oral tablet: 1 tab(s) orally once a day (01 Oct 2024 15:15)  fluticasone 50 mcg/inh nasal spray: 1 spray(s) in each nostril 2 times a day as needed (01 Oct 2024 15:29)  furosemide 40 mg oral tablet: 1 tab(s) orally once a day (01 Oct 2024 15:16)  Lantus Solostar Pen 100 units/mL subcutaneous solution: 14 unit(s) subcutaneous once a day (at bedtime) (01 Oct 2024 15:17)  loratadine 10 mg oral tablet: 1 tab(s) orally once a day (in the morning) (01 Oct 2024 15:17)  metFORMIN 500 mg oral tablet: 2 tab(s) orally 2 times a day (01 Oct 2024 15:18)  metoprolol succinate 25 mg oral tablet, extended release: 1 tab(s) orally once a day (01 Oct 2024 15:19)  Opzelura 1.5% topical cream: Apply topically to affected area 2 times a day as needed (01 Oct 2024 15:29)  pregabalin 150 mg oral capsule: 1 cap(s) orally 2 times a day (01 Oct 2024 15:20)  rosuvastatin 40 mg oral tablet: 1 tab(s) orally once a day (01 Oct 2024 15:21)  Saline Mist 0.65% nasal spray: 1 spray(s) intranasally 2 times a day (01 Oct 2024 15:29)  Spiriva 18 mcg inhalation capsule: 1 cap(s) inhaled once a day (01 Oct 2024 15:23)      MEDICATIONS  (STANDING):  clonazePAM Oral Disintegrating Tablet 0.75 milliGRAM(s) Oral two times a day  DAPTOmycin IVPB 500 milliGRAM(s) IV Intermittent every 24 hours  dextrose 5%. 1000 milliLiter(s) (100 mL/Hr) IV Continuous <Continuous>  dextrose 5%. 1000 milliLiter(s) (50 mL/Hr) IV Continuous <Continuous>  dextrose 50% Injectable 12.5 Gram(s) IV Push once  dextrose 50% Injectable 25 Gram(s) IV Push once  dextrose 50% Injectable 25 Gram(s) IV Push once  enoxaparin Injectable 40 milliGRAM(s) SubCutaneous every 24 hours  ferrous    sulfate 325 milliGRAM(s) Oral daily  fluticasone propionate/ salmeterol 250-50 MICROgram(s) Diskus 1 Dose(s) Inhalation two times a day  glucagon  Injectable 1 milliGRAM(s) IntraMuscular once  hydrocortisone 2.5% Ointment 1 Application(s) Topical three times a day  influenza  Vaccine (HIGH DOSE) 0.5 milliLiter(s) IntraMuscular once  insulin glargine Injectable (LANTUS) 10 Unit(s) SubCutaneous at bedtime  insulin lispro (ADMELOG) corrective regimen sliding scale   SubCutaneous three times a day before meals  lactobacillus acidophilus 1 Tablet(s) Oral every 12 hours  metoprolol succinate ER 25 milliGRAM(s) Oral daily  montelukast 10 milliGRAM(s) Oral daily  oxyCODONE  ER Tablet 10 milliGRAM(s) Oral every 12 hours  pantoprazole    Tablet 40 milliGRAM(s) Oral before breakfast  polyethylene glycol 3350 17 Gram(s) Oral daily  pregabalin 150 milliGRAM(s) Oral two times a day  rosuvastatin 40 milliGRAM(s) Oral at bedtime  senna 2 Tablet(s) Oral at bedtime  sertraline 100 milliGRAM(s) Oral daily    MEDICATIONS  (PRN):  acetaminophen     Tablet .. 650 milliGRAM(s) Oral every 6 hours PRN Temp greater or equal to 38C (100.4F), Mild Pain (1 - 3)  albuterol    90 MICROgram(s) HFA Inhaler 2 Puff(s) Inhalation every 6 hours PRN Shortness of Breath and/or Wheezing  albuterol/ipratropium for Nebulization 3 milliLiter(s) Nebulizer every 4 hours PRN Shortness of Breath and/or Wheezing  aluminum hydroxide/magnesium hydroxide/simethicone Suspension 30 milliLiter(s) Oral every 4 hours PRN Dyspepsia  artificial  tears Solution 1 Drop(s) Both EYES three times a day PRN eye irritation  benzonatate 100 milliGRAM(s) Oral three times a day PRN Cough  bisacodyl Suppository 10 milliGRAM(s) Rectal daily PRN Constipation  dextrose Oral Gel 15 Gram(s) Oral once PRN Blood Glucose LESS THAN 70 milliGRAM(s)/deciliter  guaiFENesin Oral Liquid (Sugar-Free) 200 milliGRAM(s) Oral every 6 hours PRN Cough  melatonin 3 milliGRAM(s) Oral at bedtime PRN Insomnia  ondansetron Injectable 4 milliGRAM(s) IV Push every 8 hours PRN Nausea and/or Vomiting  sodium chloride 0.65% Nasal 1 Spray(s) Both Nostrils two times a day PRN Congestion  traMADol 50 milliGRAM(s) Oral three times a day PRN Moderate Pain (4 - 6)      Diet, Consistent Carbohydrate w/Evening Snack:   DASH/TLC Sodium & Cholesterol Restricted (10-05-24 @ 11:25) [Active]          Vital Signs Last 24 Hrs  T(C): 37.1 (08 Oct 2024 05:12), Max: 37.2 (07 Oct 2024 20:59)  T(F): 98.8 (08 Oct 2024 05:12), Max: 99 (07 Oct 2024 20:59)  HR: 86 (08 Oct 2024 05:12) (84 - 86)  BP: 115/63 (08 Oct 2024 05:12) (101/61 - 147/66)  BP(mean): --  RR: 18 (08 Oct 2024 05:12) (18 - 18)  SpO2: 96% (08 Oct 2024 05:12) (93% - 96%)    Parameters below as of 08 Oct 2024 05:12  Patient On (Oxygen Delivery Method): room air          10-07-24 @ 07:01  -  10-08-24 @ 07:00  --------------------------------------------------------  IN: 0 mL / OUT: 900 mL / NET: -900 mL              LABS:                        9.6    6.26  )-----------( 213      ( 08 Oct 2024 09:30 )             31.0     10-08    135  |  106  |  20  ----------------------------<  164[H]  4.3   |  18[L]  |  1.00    Ca    9.0      08 Oct 2024 07:15        Urinalysis Basic - ( 08 Oct 2024 07:15 )    Color: x / Appearance: x / SG: x / pH: x  Gluc: 164 mg/dL / Ketone: x  / Bili: x / Urobili: x   Blood: x / Protein: x / Nitrite: x   Leuk Esterase: x / RBC: x / WBC x   Sq Epi: x / Non Sq Epi: x / Bacteria: x            WBC:  WBC Count: 6.26 K/uL (10-08 @ 09:30)  WBC Count: See Note (10-08 @ 07:15)  WBC Count: 7.44 K/uL (10-07 @ 06:15)  WBC Count: 5.72 K/uL (10-05 @ 06:15)      MICROBIOLOGY:  RECENT CULTURES:  10-03 Tissue Methicillin resistant Staphylococcus aureus   No polymorphonuclear cells seen per low power field  No organisms seen per oil power field   Rare Methicillin Resistant Staphylococcus aureus    10-01 .Blood BLOOD XXXX XXXX   No growth at 5 days    10-01 Skin/Wound Methicillin resistant Staphylococcus aureus XXXX   Few Methicillin Resistant Staphylococcus aureus  Commensal jameel consistent with body site                    Sodium:  Sodium: 135 mmol/L (10-08 @ 07:15)  Sodium: 136 mmol/L (10-07 @ 06:15)  Sodium: 137 mmol/L (10-05 @ 06:15)      1.00 mg/dL 10-08 @ 07:15  1.20 mg/dL 10-07 @ 06:15  1.10 mg/dL 10-05 @ 06:15      Hemoglobin:  Hemoglobin: 9.6 g/dL (10-08 @ 09:30)  Hemoglobin: See note (10-08 @ 07:15)  Hemoglobin: 9.6 g/dL (10-07 @ 06:15)  Hemoglobin: 8.7 g/dL (10-05 @ 06:15)      Platelets: Platelet Count - Automated: 213 K/uL (10-08 @ 09:30)  Platelet Count - Automated: Clotted (10-08 @ 07:15)  Platelet Count - Automated: 203 K/uL (10-07 @ 06:15)  Platelet Count - Automated: 168 K/uL (10-05 @ 06:15)          Urinalysis Basic - ( 08 Oct 2024 07:15 )    Color: x / Appearance: x / SG: x / pH: x  Gluc: 164 mg/dL / Ketone: x  / Bili: x / Urobili: x   Blood: x / Protein: x / Nitrite: x   Leuk Esterase: x / RBC: x / WBC x   Sq Epi: x / Non Sq Epi: x / Bacteria: x        RADIOLOGY & ADDITIONAL STUDIES:      MICROBIOLOGY:  RECENT CULTURES:  10-03 Tissue Methicillin resistant Staphylococcus aureus   No polymorphonuclear cells seen per low power field  No organisms seen per oil power field   Rare Methicillin Resistant Staphylococcus aureus    10-01 .Blood BLOOD XXXX XXXX   No growth at 5 days    10-01 Skin/Wound Methicillin resistant Staphylococcus aureus XXXX   Few Methicillin Resistant Staphylococcus aureus  Commensal jameel consistent with body site

## 2024-10-08 NOTE — CASE MANAGEMENT PROGRESS NOTE - NSCMPROGRESSNOTE_GEN_ALL_CORE
Patient discussed during interdisciplinary round. Bone culture is growing Staph aureus (MRSA). Patient needs. IV Dapto 500 mg daily for 6 weeks (to October 16, 2024). Patient is from [facility] living, he will be transition to Subacute rehab for long term IV antibiotics. Social Wortker following.   Patient discussed during interdisciplinary round. Bone culture is growing Staph aureus (MRSA). Patient needs. IV Daptomycin 500 mg daily for 6 weeks (to October 16, 2024). Patient is from [facility], he will be transition to Subacute rehab for long term IV antibiotics.  following.

## 2024-10-08 NOTE — SOCIAL WORK PROGRESS NOTE - NSSWPROGRESSNOTE_GEN_ALL_CORE
Per medical team, pt will be dc on IV IBX. Plan for HUMA,  list given awaiting choices. Will need to get autht. SAKSHI requested.

## 2024-10-08 NOTE — PROGRESS NOTE ADULT - PROBLEM SELECTOR PLAN 1
10/03- for right 1st metatarsal head resection  Continue local wound care and offloading at this time .IV abx as per ID -started on daptomycin , cefepime added Culture - Wound Aerobic (collected 01 Oct 2024 11:00)  Source: Skin/Wound  Final Report (04 Oct 2024 22:25):    Few Methicillin Resistant Staphylococcus aureus    Commensal jameel consistent with body site  Organism: Methicillin resistant Staphylococcus aureus (04 Oct 2024 22:25)  Organism: Methicillin resistant Staphylococcus aureus (04 Oct 2024 22:25) 10/03- for right 1st metatarsal head resection  Continue local wound care and offloading at this time .IV abx as per ID -started on daptomycin , cefepime added Culture - Wound Aerobic (collected 01 Oct 2024 11:00)  Source: Skin/Wound  Final Report (04 Oct 2024 22:25):    Few Methicillin Resistant Staphylococcus aureus    Commensal jameel consistent with body site  Organism: Methicillin resistant Staphylococcus aureus (04 Oct 2024 22:25)  Organism: Methicillin resistant Staphylococcus aureus (04 Oct 2024 22:25)Pathology -Right first metatarsal bone   - Bone with foci of mild chronic osteomyelitis

## 2024-10-08 NOTE — CONSULT NOTE ADULT - CONSULT REASON
R metatarsal wound
Temporal arteritis
Evaluation for R foot infection
74y A1C with Estimated Average Glucose Result: 7.3 % (10-02-24 @ 06:07)   diabetes mellitus uncontrolled type 2
copd
cardiac evaluation
Right foot infected wound

## 2024-10-08 NOTE — CONSULT NOTE ADULT - REASON FOR ADMISSION
right foot wound

## 2024-10-08 NOTE — PROGRESS NOTE ADULT - SUBJECTIVE AND OBJECTIVE BOX
Patient is a 74y Male with a known history of :  Wound of right foot [S91.301A]    Cellulitis of right foot [L03.115]    Prostate cancer [C61]    Type II diabetes mellitus [E11.9]    Chronic obstructive pulmonary disease (COPD) [J44.9]    CHF (congestive heart failure) [I50.9]    Renal insufficiency [N28.9]    Prophylactic measure [Z29.9]    MDD (major depressive disorder) [F32.9]    Neuropathy [G62.9]    Constipation [K59.00]    History of headache [Z87.898]      HPI:  72yo M PMhx DM2 w/ PAD - chronic Rt foot wound, HTN, COPD, CKD, HFpEF, arrythmia, Prostate CA s/p resection, Depression/Anxiety presents to ED sent by wound care for worsening R MTP wound. Patient reports having a nonhealing R foot wound, s/p multiple debridements and grafts at [facility], for past 1.5 years. Denies seeing a vascular surgeon in the past.  Denies pain the wound, but reports having neuropathy. States following wound care and reports worsening of wound. Reporting having some chills in the past week. Denies fever, chills, SOB or any other complaints.Patient examined and evaluated at this time. Antibiotics as per infectious disease recommendations. Recommend vascular surgery evaluation. Tentatively planning for right 1st metatarsal head resection pending vascular evaluation. Continue local wound care and offloading at this time. (01 Oct 2024 14:44)      REVIEW OF SYSTEMS:    CONSTITUTIONAL: No fever, weight loss, or fatigue  EYES: No eye pain, visual disturbances, or discharge  ENMT:  No difficulty hearing, tinnitus, vertigo; No sinus or throat pain  NECK: No pain or stiffness  BREASTS: No pain, masses, or nipple discharge  RESPIRATORY: No cough, wheezing, chills or hemoptysis; No shortness of breath  CARDIOVASCULAR: No chest pain, palpitations, dizziness, or leg swelling  GASTROINTESTINAL: No abdominal or epigastric pain. No nausea, vomiting, or hematemesis; No diarrhea or constipation. No melena or hematochezia.  GENITOURINARY: No dysuria, frequency, hematuria, or incontinence  NEUROLOGICAL: No headaches, memory loss, loss of strength, numbness, or tremors  SKIN: No itching, burning, rashes, or lesions   LYMPH NODES: No enlarged glands  ENDOCRINE: No heat or cold intolerance; No hair loss  MUSCULOSKELETAL: No joint pain or swelling; No muscle, back, or extremity pain  PSYCHIATRIC: No depression, anxiety, mood swings, or difficulty sleeping  HEME/LYMPH: No easy bruising, or bleeding gums  ALLERGY AND IMMUNOLOGIC: No hives or eczema    MEDICATIONS  (STANDING):  clonazePAM Oral Disintegrating Tablet 0.75 milliGRAM(s) Oral two times a day  DAPTOmycin IVPB 500 milliGRAM(s) IV Intermittent every 24 hours  dextrose 5%. 1000 milliLiter(s) (100 mL/Hr) IV Continuous <Continuous>  dextrose 5%. 1000 milliLiter(s) (50 mL/Hr) IV Continuous <Continuous>  dextrose 50% Injectable 25 Gram(s) IV Push once  dextrose 50% Injectable 25 Gram(s) IV Push once  dextrose 50% Injectable 12.5 Gram(s) IV Push once  enoxaparin Injectable 40 milliGRAM(s) SubCutaneous every 24 hours  ferrous    sulfate 325 milliGRAM(s) Oral daily  fluticasone propionate/ salmeterol 250-50 MICROgram(s) Diskus 1 Dose(s) Inhalation two times a day  glucagon  Injectable 1 milliGRAM(s) IntraMuscular once  hydrocortisone 2.5% Ointment 1 Application(s) Topical three times a day  influenza  Vaccine (HIGH DOSE) 0.5 milliLiter(s) IntraMuscular once  insulin glargine Injectable (LANTUS) 7 Unit(s) SubCutaneous at bedtime  insulin lispro (ADMELOG) corrective regimen sliding scale   SubCutaneous three times a day before meals  lactobacillus acidophilus 1 Tablet(s) Oral every 12 hours  metoprolol succinate ER 25 milliGRAM(s) Oral daily  montelukast 10 milliGRAM(s) Oral daily  oxyCODONE  ER Tablet 10 milliGRAM(s) Oral every 12 hours  pantoprazole    Tablet 40 milliGRAM(s) Oral before breakfast  polyethylene glycol 3350 17 Gram(s) Oral daily  pregabalin 150 milliGRAM(s) Oral two times a day  rosuvastatin 40 milliGRAM(s) Oral at bedtime  senna 2 Tablet(s) Oral at bedtime  sertraline 100 milliGRAM(s) Oral daily    MEDICATIONS  (PRN):  acetaminophen     Tablet .. 650 milliGRAM(s) Oral every 6 hours PRN Temp greater or equal to 38C (100.4F), Mild Pain (1 - 3)  albuterol    90 MICROgram(s) HFA Inhaler 2 Puff(s) Inhalation every 6 hours PRN Shortness of Breath and/or Wheezing  albuterol/ipratropium for Nebulization 3 milliLiter(s) Nebulizer every 4 hours PRN Shortness of Breath and/or Wheezing  aluminum hydroxide/magnesium hydroxide/simethicone Suspension 30 milliLiter(s) Oral every 4 hours PRN Dyspepsia  artificial  tears Solution 1 Drop(s) Both EYES three times a day PRN eye irritation  benzonatate 100 milliGRAM(s) Oral three times a day PRN Cough  bisacodyl Suppository 10 milliGRAM(s) Rectal daily PRN Constipation  dextrose Oral Gel 15 Gram(s) Oral once PRN Blood Glucose LESS THAN 70 milliGRAM(s)/deciliter  guaiFENesin Oral Liquid (Sugar-Free) 200 milliGRAM(s) Oral every 6 hours PRN Cough  melatonin 3 milliGRAM(s) Oral at bedtime PRN Insomnia  ondansetron Injectable 4 milliGRAM(s) IV Push every 8 hours PRN Nausea and/or Vomiting  sodium chloride 0.65% Nasal 1 Spray(s) Both Nostrils two times a day PRN Congestion  traMADol 50 milliGRAM(s) Oral three times a day PRN Moderate Pain (4 - 6)      ALLERGIES: tetracycline (Unknown)  IODINE (Unknown)  vancomycin (Other)      FAMILY HISTORY:      PHYSICAL EXAMINATION:  -----------------------------  T(C): 37.1 (10-08-24 @ 05:12), Max: 37.2 (10-07-24 @ 20:59)  HR: 86 (10-08-24 @ 05:12) (84 - 86)  BP: 115/63 (10-08-24 @ 05:12) (101/61 - 147/66)  RR: 18 (10-08-24 @ 05:12) (18 - 18)  SpO2: 96% (10-08-24 @ 05:12) (93% - 96%)  Wt(kg): --    10-07 @ 07:01  -  10-08 @ 07:00  --------------------------------------------------------  IN:  Total IN: 0 mL    OUT:    Voided (mL): 900 mL  Total OUT: 900 mL    Total NET: -900 mL            VITALS  T(C): 37.1 (10-08-24 @ 05:12), Max: 37.2 (10-07-24 @ 20:59)  HR: 86 (10-08-24 @ 05:12) (84 - 86)  BP: 115/63 (10-08-24 @ 05:12) (101/61 - 147/66)  RR: 18 (10-08-24 @ 05:12) (18 - 18)  SpO2: 96% (10-08-24 @ 05:12) (93% - 96%)    Constitutional: well developed, normal appearance, well groomed, well nourished, no deformities and no acute distress.   Eyes: the conjunctiva exhibited no abnormalities and the eyelids demonstrated no xanthelasmas.   HEENT: normal oral mucosa, no oral pallor and no oral cyanosis.   Neck: normal jugular venous A waves present, normal jugular venous V waves present and no jugular venous mahmood A waves.   Pulmonary: no respiratory distress, normal respiratory rhythm and effort, no accessory muscle use and lungs were clear to auscultation bilaterally.   Cardiovascular: heart rate and rhythm were normal, normal S1 and S2 and no murmur, gallop, rub, heave or thrill are present.   Abdomen: soft, non-tender, no hepato-splenomegaly and no abdominal mass palpated.   Musculoskeletal: the gait could not be assessed..   Extremities: no clubbing of the fingernails, no localized cyanosis, no petechial hemorrhages and no ischemic changes.   Skin: normal skin color and pigmentation, no rash, no venous stasis, no skin lesions, no skin ulcer and no xanthoma was observed.   Psychiatric: oriented to person, place, and time, the affect was normal, the mood was normal and not feeling anxious.     LABS:   --------  10-07    136  |  105  |  17  ----------------------------<  170[H]  4.3   |  25  |  1.20    Ca    9.8      07 Oct 2024 06:15                           9.6    7.44  )-----------( 203      ( 07 Oct 2024 06:15 )             29.8                 RADIOLOGY:  -----------------    ECG:     ECHO:

## 2024-10-08 NOTE — CONSULT NOTE ADULT - ASSESSMENT
72yo M PMhx DM2 w/ PAD - chronic Rt foot wound, HTN, COPD, CKD, HFpEF, arrythmia, Prostate CA s/p resection, Depression/Anxiety presents to ED sent by wound care for worsening R MTP wound.   Vascular surgery now consulted for left temp tenderness and headache x 3 weeks; no visual loss but with chronic double vision  Elevated ESR  Neurology recommending temp artery biopsy  Will arrange for left temp artery biopsy with Dr Gloria this weekend  Discussed with Dr Gloria
Right 1st metatarsal head medial wound down to skin, subcutaneous tissue, fat, bone
72yo M PMhx DM2 w/ PAD - chronic Rt foot wound, HTN, COPD, CKD, HFpEF, arrythmia, Prostate CA s/p resection, Depression/Anxiety presents to ED sent by wound care for worsening R MTP wound. VSSAF. Labs w/ nml WBC. MICHAEL from 9/20 shows b/l normal waveforms. MRI from 9/20 showing soft tissue ulcer along the medial aspect of the first MTP joint with an adjacent region of medullary edema in the head of the first metatarsal which has high probability for progression to acute osteomyelitis if left untreated. Charcot arthropathy of the midfoot.    - No vascular contraindications for podiatry intervention  - Discussed with Dr. Gloria 
   Initial evaluation/Pulmonary Critical Care consultation requested by DR CHURCH   on 10/1/2024 from Dr Benjamín Costa    Patient examined chart reviewed    HOSPITAL ADMISSION   PATIENT CAME  FROM (if information available)      [id number]  BEST PRACTICE ISSUES.  . HOB ELEVATN.    .... Yes  . DIET.   .... 10/1/2024 cons carb   . IV FLUIDS.  ....   . DVT PPLX.    .... 10/1/2024 lovenox 40  . STRESS ULCER PPLX.   ....   . INFECTION PPLX.   ....     XXXXXXXXXXXXXXXXXXXXXXXXX  GENERAL DATA .   GOC.    ..    ICU STAY.    .. no   COVID.   ..   ALLGY.   ..     tetracyclin vancomycin iodine   WT.   ..  10/1/2024 99   BMI.  .. 10/1/2024 31   ISOLATION.        [id number]  VITALS/GAS EXCHANGE/DRIPS  ABGS.   .  VS/ PO/IO/ VENT/ DRIPS.   10/1/2024 afeb 77 120/70   10/1/2024 ra 97%       CHIEF COMPLAINT.   . 10/1/2024 73 m from wound center for infection r great toe having chills     OVERALL PRESENTATION.  . 10/1/2024 73-year-old male history of CHF, COPD, prostate cancer, renal insufficiency, diabetes, chronic neck pain, presents to ED for admission for right foot wound.  Seen by Dr. Sandoval shortly prior to current evaluation.  No reported fevers but occasionally feels chills.  Denies chest pain, worsening shortness of breath, vomiting, diarrhea not currently on antibiotics.  . 10/1/2024 Pulm consulted for copd sepsis     PMH.  . Epistaxis 1/2024   . Chronic obstructive pulmonary disease (COPD)   . CHF (congestive heart failure) HFPEF   . Prostate cancer sp resection  . Renal insufficiency   . Type II diabetes mellitus.  . HO foot surgery     PAST HOSPITAL STAYS .   .  . 1/8-1/18/2024 [facility] epistaxis     HOME MEDS.  . SYMBICORT 160  . MONTELUKAST   . ALBUTEROL   . ATORVASTAT 80   . METOPROLOL 50.2 SERTRALINE 100   . CLONAZEPAM .75 X 2   . OXYCODONE   . LANTUS 10     XXXX[id number]  PROBLEM ASSESSMENT RECOMMENDATIONS.  RESP.   INFECTION.  .... w 10/1/2024 w 6.6   .... cxr 10/1/2024 l lower lobe subsegmental atelectasis   .... 10/1/2024 dapto 500/d x 42 d   .... 10/1/2024 cefazolin 1.3 Dr Amaro   CARDIAC.  .... la 10/1 - 10/1/2024 la 2.1 - 1.9   .... Trop   .... ekg 10/1/2024 sr 1 degr av block qtc 458   .... 10/1/2024 metoprolol 25   .... 10/1/2024 rosuvastatin 40   .... pbnp   .... tte 1/12/2024   ........ ef 70%   ........ tapse 2.5   ........ pasp 39 borderline ph   ........ ivc rap 3   HEMAT.  .... Hb 10/1/2024 Hb 9.8   .... Plt 10/1/2024 163   .... inr 10/1/2024 inr 114   GI.  .... LFTS 10/1/2024   ........ ap 64  ........ ast 15  ....... alt 23   .... 10/1/2024 senna   RENAL.  .... Na 10/1/2024 Na 139   .... K 10/1/2024 k 3.7   .... CO2 10/1/2024 co2 28   .... AG 10/1/2024 ag 9  .... Alb 10/1/2024 alb 2.9   .... Cr 10/1/2024 cr 1.3   ENDO.  NEURO.  .... 10/1/2024 clonazepam .75 x 2   .... 10/1/2024 tramadol     DISCUSSIONS.    XXXXXXXXXXXXXXXXXXXXXXXXXXXXXXXXXX.  CURRENT PROBLEMS.  . FOOT R SSTI (Skin soft tissue infection)   .... 10/1/2024 dapto 500/d x 42 d   .... 10/1/2024 cefazolin 1.3 Dr Amaro  . COPD   . HFPEF   . ANEMIA 10/1/2024 Hb 9.1   . DM2     TIME SPENT.  . Over 55  minutes aggregate care time spent on encounter; activities included   direct patient care, counseling and/or coordinating care reviewing notes, lab data/ imaging , discussion with multidisciplinary team/ patient  /family and explaining in detail risks, benefits, alternatives  of the recommendations     MAGGIE BLACK 73 m 10/1/2024 1951 DR RASHEED GALLEGOS .  
pt with infected rt foor - rt big toe  cellulitis  dm2  copd  chf- compensated  pvd  neuropathy  no angina-ekg - old anteroseptal  mi  cardiac status stable low risk for resection rt big  toe 
Physical Exam:   Vital Signs Last 24 Hrs  T(C): 37.1 (08 Oct 2024 05:12), Max: 37.2 (07 Oct 2024 20:59)  T(F): 98.8 (08 Oct 2024 05:12), Max: 99 (07 Oct 2024 20:59)  HR: 86 (08 Oct 2024 05:12) (84 - 86)  BP: 115/63 (08 Oct 2024 05:12) (101/61 - 147/66)  BP(mean): --  RR: 18 (08 Oct 2024 05:12) (18 - 18)  SpO2: 96% (08 Oct 2024 05:12) (93% - 96%)    Parameters below as of 08 Oct 2024 05:12  Patient On (Oxygen Delivery Method): room air        General: NAD, denies Fever, chills  CVS: S1S2 no M/R/G  Resp: CTA in all fields  : no freq, no urgency, no dysuria  Extremeties: no edema, + pulses         CAPILLARY BLOOD GLUCOSE      POCT Blood Glucose.: 194 mg/dL (08 Oct 2024 08:12)  POCT Blood Glucose.: 180 mg/dL (07 Oct 2024 21:34)  POCT Blood Glucose.: 215 mg/dL (07 Oct 2024 17:11)  POCT Blood Glucose.: 217 mg/dL (07 Oct 2024 12:28)      Cholesterol, Serum: 113 mg/dL (05.19.21 @ 08:36)     HDL Cholesterol, Serum: 22 mg/dL (05.19.21 @ 08:36)     LDL Cholesterol Calculated: 66 mg/dL (05.19.21 @ 08:36)     DIET: CC  >50%

## 2024-10-09 ENCOUNTER — RESULT REVIEW (OUTPATIENT)
Age: 74
End: 2024-10-09

## 2024-10-09 LAB
ANION GAP SERPL CALC-SCNC: 19 MMOL/L — HIGH (ref 5–17)
BUN SERPL-MCNC: 24 MG/DL — HIGH (ref 7–23)
CALCIUM SERPL-MCNC: 9.8 MG/DL — SIGNIFICANT CHANGE UP (ref 8.5–10.1)
CHLORIDE SERPL-SCNC: 101 MMOL/L — SIGNIFICANT CHANGE UP (ref 96–108)
CO2 SERPL-SCNC: 16 MMOL/L — LOW (ref 22–31)
CREAT SERPL-MCNC: 1.4 MG/DL — HIGH (ref 0.5–1.3)
EGFR: 53 ML/MIN/1.73M2 — LOW
GLUCOSE SERPL-MCNC: 323 MG/DL — HIGH (ref 70–99)
HCT VFR BLD CALC: 32.8 % — LOW (ref 39–50)
HGB BLD-MCNC: 10.5 G/DL — LOW (ref 13–17)
INR BLD: 1.25 RATIO — HIGH (ref 0.85–1.16)
MCHC RBC-ENTMCNC: 28.7 PG — SIGNIFICANT CHANGE UP (ref 27–34)
MCHC RBC-ENTMCNC: 32 GM/DL — SIGNIFICANT CHANGE UP (ref 32–36)
MCV RBC AUTO: 89.6 FL — SIGNIFICANT CHANGE UP (ref 80–100)
NRBC # BLD: 0 /100 WBCS — SIGNIFICANT CHANGE UP (ref 0–0)
PLATELET # BLD AUTO: 267 K/UL — SIGNIFICANT CHANGE UP (ref 150–400)
POTASSIUM SERPL-MCNC: 4.4 MMOL/L — SIGNIFICANT CHANGE UP (ref 3.5–5.3)
POTASSIUM SERPL-SCNC: 4.4 MMOL/L — SIGNIFICANT CHANGE UP (ref 3.5–5.3)
PROTHROM AB SERPL-ACNC: 14.6 SEC — HIGH (ref 9.9–13.4)
RBC # BLD: 3.66 M/UL — LOW (ref 4.2–5.8)
RBC # FLD: 14.6 % — HIGH (ref 10.3–14.5)
SODIUM SERPL-SCNC: 136 MMOL/L — SIGNIFICANT CHANGE UP (ref 135–145)
WBC # BLD: 8.01 K/UL — SIGNIFICANT CHANGE UP (ref 3.8–10.5)
WBC # FLD AUTO: 8.01 K/UL — SIGNIFICANT CHANGE UP (ref 3.8–10.5)

## 2024-10-09 PROCEDURE — 99233 SBSQ HOSP IP/OBS HIGH 50: CPT

## 2024-10-09 PROCEDURE — 93306 TTE W/DOPPLER COMPLETE: CPT | Mod: 26

## 2024-10-09 RX ORDER — ACETAMINOPHEN 325 MG
2 TABLET ORAL
Refills: 0 | DISCHARGE

## 2024-10-09 RX ORDER — PREGABALIN 25 MG/1
1 CAPSULE ORAL
Qty: 0 | Refills: 0 | DISCHARGE
Start: 2024-10-09

## 2024-10-09 RX ORDER — FERROUS SULFATE 325(65) MG
1 TABLET ORAL
Refills: 0 | DISCHARGE

## 2024-10-09 RX ORDER — ALBUTEROL 90 MCG
2 AEROSOL (GRAM) INHALATION
Refills: 0 | DISCHARGE

## 2024-10-09 RX ORDER — GUAIFENESIN 100 MG/5ML
10 SOLUTION ORAL
Qty: 0 | Refills: 0 | DISCHARGE
Start: 2024-10-09

## 2024-10-09 RX ORDER — CLONAZEPAM 1 MG
3 TABLET ORAL
Qty: 0 | Refills: 0 | DISCHARGE
Start: 2024-10-09

## 2024-10-09 RX ORDER — DIPHENHYDRAMINE HCL 12.5MG/5ML
25 LIQUID (ML) ORAL EVERY 6 HOURS
Refills: 0 | Status: DISCONTINUED | OUTPATIENT
Start: 2024-10-09 | End: 2024-10-11

## 2024-10-09 RX ORDER — 5-HYDROXYTRYPTOPHAN (5-HTP) 100 MG
1 TABLET,DISINTEGRATING ORAL
Qty: 0 | Refills: 0 | DISCHARGE
Start: 2024-10-09

## 2024-10-09 RX ORDER — DAPTOMYCIN 500 MG/10ML
500 INJECTION, POWDER, LYOPHILIZED, FOR SOLUTION INTRAVENOUS
Qty: 0 | Refills: 0 | DISCHARGE
Start: 2024-10-09

## 2024-10-09 RX ORDER — SENNOSIDES 8.6 MG
2 TABLET ORAL
Qty: 0 | Refills: 0 | DISCHARGE
Start: 2024-10-09

## 2024-10-09 RX ORDER — PREGABALIN 25 MG/1
1 CAPSULE ORAL
Refills: 0 | DISCHARGE

## 2024-10-09 RX ORDER — PEDIATRIC MULTIVIT 61/D3/VIT K 1500-800
1 CAPSULE ORAL
Qty: 0 | Refills: 0 | DISCHARGE
Start: 2024-10-09

## 2024-10-09 RX ORDER — METOPROLOL TARTRATE 50 MG
1 TABLET ORAL
Refills: 0 | DISCHARGE

## 2024-10-09 RX ORDER — ACETAMINOPHEN 325 MG
2 TABLET ORAL
Qty: 0 | Refills: 0 | DISCHARGE
Start: 2024-10-09

## 2024-10-09 RX ORDER — IPRATROPIUM BROMIDE AND ALBUTEROL SULFATE .5; 3 MG/3ML; MG/3ML
3 SOLUTION RESPIRATORY (INHALATION)
Qty: 0 | Refills: 0 | DISCHARGE
Start: 2024-10-09

## 2024-10-09 RX ORDER — TRAMADOL HYDROCHLORIDE 50 MG/1
1 TABLET, COATED ORAL
Qty: 0 | Refills: 0 | DISCHARGE
Start: 2024-10-09

## 2024-10-09 RX ORDER — POLYVINYL ALCOHOL 1 %
1 DROPS OPHTHALMIC (EYE)
Qty: 0 | Refills: 0 | DISCHARGE
Start: 2024-10-09

## 2024-10-09 RX ORDER — BISACODYL 5 MG/1
1 TABLET, COATED ORAL
Qty: 0 | Refills: 0 | DISCHARGE
Start: 2024-10-09

## 2024-10-09 RX ORDER — PANTOPRAZOLE SODIUM 40 MG/1
1 TABLET, DELAYED RELEASE ORAL
Qty: 0 | Refills: 0 | DISCHARGE
Start: 2024-10-09

## 2024-10-09 RX ORDER — ALBUTEROL 90 MCG
3 AEROSOL (GRAM) INHALATION
Refills: 0 | DISCHARGE

## 2024-10-09 RX ORDER — BENZONATATE 150 MG/1
1 CAPSULE ORAL
Qty: 0 | Refills: 0 | DISCHARGE
Start: 2024-10-09

## 2024-10-09 RX ORDER — ENOXAPARIN SODIUM 150 MG/ML
40 INJECTION SUBCUTANEOUS
Qty: 0 | Refills: 0 | DISCHARGE
Start: 2024-10-09

## 2024-10-09 RX ORDER — SERTRALINE HYDROCHLORIDE 100 MG/1
1 TABLET, FILM COATED ORAL
Qty: 0 | Refills: 0 | DISCHARGE
Start: 2024-10-09

## 2024-10-09 RX ORDER — OXYCODONE HYDROCHLORIDE 30 MG/1
1 TABLET, FILM COATED, EXTENDED RELEASE ORAL
Qty: 0 | Refills: 0 | DISCHARGE
Start: 2024-10-09

## 2024-10-09 RX ORDER — METOPROLOL TARTRATE 50 MG
1 TABLET ORAL
Qty: 0 | Refills: 0 | DISCHARGE
Start: 2024-10-09

## 2024-10-09 RX ORDER — FERROUS SULFATE 325(65) MG
1 TABLET ORAL
Qty: 0 | Refills: 0 | DISCHARGE
Start: 2024-10-09

## 2024-10-09 RX ORDER — MONTELUKAST SODIUM 10 MG/1
1 TABLET, FILM COATED ORAL
Qty: 0 | Refills: 0 | DISCHARGE
Start: 2024-10-09

## 2024-10-09 RX ORDER — INSULIN GLARGINE 300 U/ML
14 INJECTION, SOLUTION SUBCUTANEOUS
Refills: 0 | DISCHARGE

## 2024-10-09 RX ORDER — INSULIN GLARGINE 300 U/ML
10 INJECTION, SOLUTION SUBCUTANEOUS
Qty: 0 | Refills: 0 | DISCHARGE
Start: 2024-10-09

## 2024-10-09 RX ORDER — PREDNISONE 5 MG/1
1 TABLET ORAL
Qty: 0 | Refills: 0 | DISCHARGE
Start: 2024-10-09

## 2024-10-09 RX ADMIN — Medication 2 TABLET(S): at 22:30

## 2024-10-09 RX ADMIN — Medication 25 MILLIGRAM(S): at 05:49

## 2024-10-09 RX ADMIN — Medication 3: at 16:40

## 2024-10-09 RX ADMIN — BENZONATATE 100 MILLIGRAM(S): 150 CAPSULE ORAL at 00:13

## 2024-10-09 RX ADMIN — Medication 4: at 12:14

## 2024-10-09 RX ADMIN — MONTELUKAST SODIUM 10 MILLIGRAM(S): 10 TABLET, FILM COATED ORAL at 11:46

## 2024-10-09 RX ADMIN — OXYCODONE HYDROCHLORIDE 10 MILLIGRAM(S): 30 TABLET, FILM COATED, EXTENDED RELEASE ORAL at 23:59

## 2024-10-09 RX ADMIN — DAPTOMYCIN 120 MILLIGRAM(S): 500 INJECTION, POWDER, LYOPHILIZED, FOR SOLUTION INTRAVENOUS at 21:26

## 2024-10-09 RX ADMIN — Medication 650 MILLIGRAM(S): at 23:30

## 2024-10-09 RX ADMIN — ANTI-ITCH CREAM 1 APPLICATION(S): 1 OINTMENT TOPICAL at 14:34

## 2024-10-09 RX ADMIN — Medication 25 MILLIGRAM(S): at 11:48

## 2024-10-09 RX ADMIN — GUAIFENESIN 200 MILLIGRAM(S): 100 SOLUTION ORAL at 00:13

## 2024-10-09 RX ADMIN — Medication 650 MILLIGRAM(S): at 22:30

## 2024-10-09 RX ADMIN — Medication 1 DOSE(S): at 18:05

## 2024-10-09 RX ADMIN — OXYCODONE HYDROCHLORIDE 10 MILLIGRAM(S): 30 TABLET, FILM COATED, EXTENDED RELEASE ORAL at 22:59

## 2024-10-09 RX ADMIN — PREGABALIN 150 MILLIGRAM(S): 25 CAPSULE ORAL at 18:03

## 2024-10-09 RX ADMIN — GUAIFENESIN 200 MILLIGRAM(S): 100 SOLUTION ORAL at 21:26

## 2024-10-09 RX ADMIN — ENOXAPARIN SODIUM 40 MILLIGRAM(S): 150 INJECTION SUBCUTANEOUS at 05:38

## 2024-10-09 RX ADMIN — BENZONATATE 100 MILLIGRAM(S): 150 CAPSULE ORAL at 21:26

## 2024-10-09 RX ADMIN — SERTRALINE HYDROCHLORIDE 100 MILLIGRAM(S): 100 TABLET, FILM COATED ORAL at 11:46

## 2024-10-09 RX ADMIN — Medication 1 TABLET(S): at 08:38

## 2024-10-09 RX ADMIN — Medication 3 MILLIGRAM(S): at 00:13

## 2024-10-09 RX ADMIN — OXYCODONE HYDROCHLORIDE 10 MILLIGRAM(S): 30 TABLET, FILM COATED, EXTENDED RELEASE ORAL at 11:47

## 2024-10-09 RX ADMIN — Medication 1 TABLET(S): at 21:27

## 2024-10-09 RX ADMIN — PREDNISONE 50 MILLIGRAM(S): 5 TABLET ORAL at 05:49

## 2024-10-09 RX ADMIN — ANTI-ITCH CREAM 1 APPLICATION(S): 1 OINTMENT TOPICAL at 05:50

## 2024-10-09 RX ADMIN — PREGABALIN 150 MILLIGRAM(S): 25 CAPSULE ORAL at 05:49

## 2024-10-09 RX ADMIN — Medication 1 DOSE(S): at 05:50

## 2024-10-09 RX ADMIN — Medication 0.75 MILLIGRAM(S): at 18:11

## 2024-10-09 RX ADMIN — Medication 0.75 MILLIGRAM(S): at 05:48

## 2024-10-09 RX ADMIN — Medication 1: at 08:22

## 2024-10-09 RX ADMIN — Medication 1 GRAM(S): at 18:03

## 2024-10-09 RX ADMIN — ANTI-ITCH CREAM 1 APPLICATION(S): 1 OINTMENT TOPICAL at 21:27

## 2024-10-09 RX ADMIN — ROSUVASTATIN CALCIUM 40 MILLIGRAM(S): 20 TABLET, COATED ORAL at 21:26

## 2024-10-09 RX ADMIN — Medication 650 MILLIGRAM(S): at 00:13

## 2024-10-09 RX ADMIN — CHLORHEXIDINE GLUCONATE ORAL RINSE 1 APPLICATION(S): 1.2 SOLUTION DENTAL at 05:50

## 2024-10-09 RX ADMIN — Medication 17 GRAM(S): at 11:46

## 2024-10-09 RX ADMIN — INSULIN GLARGINE 10 UNIT(S): 300 INJECTION, SOLUTION SUBCUTANEOUS at 22:30

## 2024-10-09 RX ADMIN — Medication 650 MILLIGRAM(S): at 01:30

## 2024-10-09 RX ADMIN — Medication 325 MILLIGRAM(S): at 11:46

## 2024-10-09 RX ADMIN — PANTOPRAZOLE SODIUM 40 MILLIGRAM(S): 40 TABLET, DELAYED RELEASE ORAL at 05:50

## 2024-10-09 RX ADMIN — OXYCODONE HYDROCHLORIDE 10 MILLIGRAM(S): 30 TABLET, FILM COATED, EXTENDED RELEASE ORAL at 12:47

## 2024-10-09 NOTE — SOCIAL WORK PROGRESS NOTE - NSSWPROGRESSNOTE_GEN_ALL_CORE
Pt accepted to [facility], auth requested [id number] from [employer] ref#[id number]. SW will continue to follow to ensure safe transition.

## 2024-10-09 NOTE — PROGRESS NOTE ADULT - SUBJECTIVE AND OBJECTIVE BOX
Neurology Follow up note    WAYNE [id number]    HPI:  74yo M PMhx DM2 w/ PAD - chronic Rt foot wound, HTN, COPD, CKD, HFpEF, arrythmia, Prostate CA s/p resection, Depression/Anxiety presents to ED sent by wound care for worsening R MTP wound. Patient reports having a nonhealing R foot wound, s/p multiple debridements and grafts at [facility], for past 1.5 years. Denies seeing a vascular surgeon in the past.  Denies pain the wound, but reports having neuropathy. States following wound care and reports worsening of wound. Reporting having some chills in the past week. Denies fever, chills, SOB or any other complaints.Patient examined and evaluated at this time. Antibiotics as per infectious disease recommendations. Recommend vascular surgery evaluation. Tentatively planning for right 1st metatarsal head resection pending vascular evaluation. Continue local wound care and offloading at this time. (01 Oct 2024 14:44)      Interval History -ha better    Patient is seen, chart was reviewed and case was discussed with the treatment team.  Pt is not in any distress.   Lying on bed comfortably.   .     Vital Signs Last 24 Hrs  T(C): 36.9 (09 Oct 2024 12:37), Max: 37.2 (08 Oct 2024 20:18)  T(F): 98.4 (09 Oct 2024 12:37), Max: 98.9 (08 Oct 2024 20:18)  HR: 74 (09 Oct 2024 12:37) (74 - 85)  BP: 121/54 (09 Oct 2024 12:37) (121/54 - 128/69)  BP(mean): --  RR: 18 (09 Oct 2024 12:37) (18 - 18)  SpO2: 92% (09 Oct 2024 12:37) (92% - 95%)    Parameters below as of 09 Oct 2024 12:37  Patient On (Oxygen Delivery Method): room air                  r            REVIEW OF SYSTEMS:    Constitutional: No fever, weight loss or fatigue  Eyes: No eye pain, visual disturbances, or discharge  ENT:  No difficulty hearing, tinnitus, vertigo; No sinus or throat pain  Neck: No pain or stiffness  Respiratory: No cough, wheezing, chills or hemoptysis  Cardiovascular: No chest pain, palpitations, shortness of breath, dizziness or leg swelling  Gastrointestinal: No abdominal or epigastric pain. No nausea, vomiting or hematemesis  Genitourinary: No dysuria, frequency, hematuria or incontinence  Neurological: No memory loss, loss of strength, numbness or tremors  Psychiatric: No depression, anxiety, mood swings or difficulty sleeping  Musculoskeletal: No joint pain or swelling; No muscle, back or extremity pain  Skin: No itching, burning, rashes or lesions   Lymph Nodes: No enlarged glands  Endocrine: No heat or cold intolerance; No hair loss    Allergy and Immunologic: No hives or eczema    On Neurological Examination:    Mental Status - Pt is alert, awake, oriented X3.. Follows commands well and able to answer questions appropriately.Mood and affect  normal    Speech -  Normal.    Cranial Nerves - Pupils 3 mm equal and reactive to light, extraocular eye movements intact. Pt has no visual field deficit.  Pt has no facial asymmetry. Facial sensation is intact.Tongue - is in midline.    Muscle tone - is normal        Motor Exam - 55 OF ue  'le 4/5   No drift. No shaking or tremors.    Sensory Exam - . Pt withdraws all extremities equally on stimulation. No asymmetry seen. No complaints of tingling, numbness.        coordination:    Finger to nose: normal      Deep tendon Reflexes - 2 plus all over.     .    Neck Supple -  Yes.     MEDICATIONS    acetaminophen     Tablet .. 650 milliGRAM(s) Oral every 6 hours PRN  albuterol    90 MICROgram(s) HFA Inhaler 2 Puff(s) Inhalation every 6 hours PRN  aluminum hydroxide/magnesium hydroxide/simethicone Suspension 30 milliLiter(s) Oral every 4 hours PRN  benzonatate 100 milliGRAM(s) Oral three times a day PRN  bisacodyl Suppository 10 milliGRAM(s) Rectal daily PRN  cefepime   IVPB 2000 milliGRAM(s) IV Intermittent every 12 hours  clonazePAM Oral Disintegrating Tablet 0.75 milliGRAM(s) Oral two times a day  DAPTOmycin IVPB 500 milliGRAM(s) IV Intermittent every 24 hours  dextrose 5% + sodium chloride 0.45%. 1000 milliLiter(s) IV Continuous <Continuous>  dextrose 5%. 1000 milliLiter(s) IV Continuous <Continuous>  dextrose 5%. 1000 milliLiter(s) IV Continuous <Continuous>  dextrose 50% Injectable 12.5 Gram(s) IV Push once  dextrose 50% Injectable 25 Gram(s) IV Push once  dextrose 50% Injectable 25 Gram(s) IV Push once  dextrose Oral Gel 15 Gram(s) Oral once PRN  enoxaparin Injectable 40 milliGRAM(s) SubCutaneous every 24 hours  ferrous    sulfate 325 milliGRAM(s) Oral daily  fluticasone propionate/ salmeterol 250-50 MICROgram(s) Diskus 1 Dose(s) Inhalation two times a day  glucagon  Injectable 1 milliGRAM(s) IntraMuscular once  influenza  Vaccine (HIGH DOSE) 0.5 milliLiter(s) IntraMuscular once  insulin glargine Injectable (LANTUS) 7 Unit(s) SubCutaneous at bedtime  insulin lispro (ADMELOG) corrective regimen sliding scale   SubCutaneous three times a day before meals  lactobacillus acidophilus 1 Tablet(s) Oral every 12 hours  melatonin 3 milliGRAM(s) Oral at bedtime PRN  metoprolol succinate ER 25 milliGRAM(s) Oral daily  montelukast 10 milliGRAM(s) Oral daily  morphine  - Injectable 1 milliGRAM(s) IV Push every 4 hours PRN  ondansetron Injectable 4 milliGRAM(s) IV Push every 8 hours PRN  pantoprazole    Tablet 40 milliGRAM(s) Oral before breakfast  polyethylene glycol 3350 17 Gram(s) Oral daily  pregabalin 150 milliGRAM(s) Oral two times a day  rosuvastatin 40 milliGRAM(s) Oral at bedtime  senna 2 Tablet(s) Oral at bedtime  sertraline 100 milliGRAM(s) Oral daily  sodium chloride 0.65% Nasal 1 Spray(s) Both Nostrils two times a day PRN  traMADol 50 milliGRAM(s) Oral three times a day PRN      Allergies    tetracycline (Unknown)  IODINE (Unknown)  vancomycin (Other)    Intolerances      10-09    136  |  101  |  24[H]  ----------------------------<  323[H]  4.4   |  16[L]  |  1.40[H]    Ca    9.8      09 Oct 2024 11:11              Hemoglobin A1C:     Vitamin B12     RADIOLOGY    ASSESSMENT AND PLAN:      seen for HA  related to temporal arterits    HA improving with  steroid  close monitoring blood sugar  ANALGESIC  Physical therapy evaluation.  OOB to chair/ambulation with assistance only.  Pain is accessed and addressed.  Plan of care was discussed with family. Questions answered.  Would continue to follow.

## 2024-10-09 NOTE — PROGRESS NOTE ADULT - SUBJECTIVE AND OBJECTIVE BOX
CHIEF COMPLAINT/ REASON FOR VISIT  .. Patient was seen to address the  issue listed under PROBLEM LIST which is located toward bottom of this note     WAYNE SERRANO    PLV 1EAS 113 D1    Allergies    tetracycline (Unknown)  IODINE (Unknown)  vancomycin (Other)    Intolerances        PAST MEDICAL & SURGICAL HISTORY:  Prostate cancer      Type II diabetes mellitus      Chronic obstructive pulmonary disease (COPD)      CHF (congestive heart failure)      Renal insufficiency      H/O migraine      Insomnia      Constipation      S/P foot surgery          FAMILY HISTORY:      Home Medications:  acetaminophen 325 mg oral tablet: 2 tab(s) orally every 8 hours as needed (01 Oct 2024 15:23)  Albuterol (Eqv-ProAir HFA) 90 mcg/inh inhalation aerosol: 2 puff(s) inhaled every 4 hours as needed (01 Oct 2024 15:32)  albuterol 0.63 mg/3 mL (0.021%) inhalation solution: 3 milliliter(s) by nebulizer every 6 hours as needed for SOB (01 Oct 2024 15:27)  azelaic acid 15% topical gel: Apply topically to affected area 2 times a day as needed (01 Oct 2024 15:27)  ferrous sulfate 325 mg (65 mg elemental iron) oral tablet: 1 tab(s) orally once a day (01 Oct 2024 15:15)  fluticasone 50 mcg/inh nasal spray: 1 spray(s) in each nostril 2 times a day as needed (01 Oct 2024 15:29)  furosemide 40 mg oral tablet: 1 tab(s) orally once a day (01 Oct 2024 15:16)  Lantus Solostar Pen 100 units/mL subcutaneous solution: 14 unit(s) subcutaneous once a day (at bedtime) (01 Oct 2024 15:17)  loratadine 10 mg oral tablet: 1 tab(s) orally once a day (in the morning) (01 Oct 2024 15:17)  metFORMIN 500 mg oral tablet: 2 tab(s) orally 2 times a day (01 Oct 2024 15:18)  metoprolol succinate 25 mg oral tablet, extended release: 1 tab(s) orally once a day (01 Oct 2024 15:19)  Opzelura 1.5% topical cream: Apply topically to affected area 2 times a day as needed (01 Oct 2024 15:29)  pregabalin 150 mg oral capsule: 1 cap(s) orally 2 times a day (01 Oct 2024 15:20)  rosuvastatin 40 mg oral tablet: 1 tab(s) orally once a day (01 Oct 2024 15:21)  Saline Mist 0.65% nasal spray: 1 spray(s) intranasally 2 times a day (01 Oct 2024 15:29)  Spiriva 18 mcg inhalation capsule: 1 cap(s) inhaled once a day (01 Oct 2024 15:23)      MEDICATIONS  (STANDING):  chlorhexidine 2% Cloths 1 Application(s) Topical <User Schedule>  clonazePAM Oral Disintegrating Tablet 0.75 milliGRAM(s) Oral two times a day  DAPTOmycin IVPB 500 milliGRAM(s) IV Intermittent every 24 hours  dextrose 5%. 1000 milliLiter(s) (50 mL/Hr) IV Continuous <Continuous>  dextrose 5%. 1000 milliLiter(s) (100 mL/Hr) IV Continuous <Continuous>  dextrose 50% Injectable 12.5 Gram(s) IV Push once  dextrose 50% Injectable 25 Gram(s) IV Push once  dextrose 50% Injectable 25 Gram(s) IV Push once  enoxaparin Injectable 40 milliGRAM(s) SubCutaneous every 24 hours  ferrous    sulfate 325 milliGRAM(s) Oral daily  fluticasone propionate/ salmeterol 250-50 MICROgram(s) Diskus 1 Dose(s) Inhalation two times a day  glucagon  Injectable 1 milliGRAM(s) IntraMuscular once  hydrocortisone 2.5% Ointment 1 Application(s) Topical three times a day  influenza  Vaccine (HIGH DOSE) 0.5 milliLiter(s) IntraMuscular once  insulin glargine Injectable (LANTUS) 10 Unit(s) SubCutaneous at bedtime  insulin lispro (ADMELOG) corrective regimen sliding scale   SubCutaneous three times a day before meals  lactobacillus acidophilus 1 Tablet(s) Oral every 12 hours  metoprolol succinate ER 25 milliGRAM(s) Oral daily  montelukast 10 milliGRAM(s) Oral daily  oxyCODONE  ER Tablet 10 milliGRAM(s) Oral every 12 hours  pantoprazole    Tablet 40 milliGRAM(s) Oral before breakfast  polyethylene glycol 3350 17 Gram(s) Oral daily  predniSONE   Tablet 50 milliGRAM(s) Oral daily  pregabalin 150 milliGRAM(s) Oral two times a day  rosuvastatin 40 milliGRAM(s) Oral at bedtime  senna 2 Tablet(s) Oral at bedtime  sertraline 100 milliGRAM(s) Oral daily    MEDICATIONS  (PRN):  acetaminophen     Tablet .. 650 milliGRAM(s) Oral every 6 hours PRN Temp greater or equal to 38C (100.4F), Mild Pain (1 - 3)  albuterol    90 MICROgram(s) HFA Inhaler 2 Puff(s) Inhalation every 6 hours PRN Shortness of Breath and/or Wheezing  albuterol/ipratropium for Nebulization 3 milliLiter(s) Nebulizer every 4 hours PRN Shortness of Breath and/or Wheezing  aluminum hydroxide/magnesium hydroxide/simethicone Suspension 30 milliLiter(s) Oral every 4 hours PRN Dyspepsia  artificial  tears Solution 1 Drop(s) Both EYES three times a day PRN eye irritation  benzonatate 100 milliGRAM(s) Oral three times a day PRN Cough  bisacodyl Suppository 10 milliGRAM(s) Rectal daily PRN Constipation  dextrose Oral Gel 15 Gram(s) Oral once PRN Blood Glucose LESS THAN 70 milliGRAM(s)/deciliter  guaiFENesin Oral Liquid (Sugar-Free) 200 milliGRAM(s) Oral every 6 hours PRN Cough  melatonin 3 milliGRAM(s) Oral at bedtime PRN Insomnia  ondansetron Injectable 4 milliGRAM(s) IV Push every 8 hours PRN Nausea and/or Vomiting  sodium chloride 0.65% Nasal 1 Spray(s) Both Nostrils two times a day PRN Congestion  sodium chloride 0.9% lock flush 10 milliLiter(s) IV Push every 1 hour PRN Pre/post blood products, medications, blood draw, and to maintain line patency  traMADol 50 milliGRAM(s) Oral three times a day PRN Moderate Pain (4 - 6)      Diet, Consistent Carbohydrate/No Snacks:   DASH/TLC Sodium & Cholesterol Restricted  Wilfred(7 Gm Arginine/7 Gm Glut/1.2 Gm HMB     Qty per Day:  2 (10-08-24 @ 13:46) [Pending Verification By Attending]  Diet, Consistent Carbohydrate w/Evening Snack:   DASH/TLC Sodium & Cholesterol Restricted (10-05-24 @ 11:25) [Active]          Vital Signs Last 24 Hrs  T(C): 36.5 (09 Oct 2024 05:15), Max: 37.2 (08 Oct 2024 20:18)  T(F): 97.7 (09 Oct 2024 05:15), Max: 98.9 (08 Oct 2024 20:18)  HR: 78 (09 Oct 2024 05:15) (78 - 88)  BP: 122/51 (09 Oct 2024 05:15) (122/51 - 129/71)  BP(mean): --  RR: 18 (09 Oct 2024 05:15) (18 - 18)  SpO2: 93% (09 Oct 2024 05:15) (93% - 95%)    Parameters below as of 09 Oct 2024 05:15  Patient On (Oxygen Delivery Method): room air                  LABS:                        9.6    6.26  )-----------( 213      ( 08 Oct 2024 09:30 )             31.0     10-08    135  |  106  |  20  ----------------------------<  164[H]  4.3   |  18[L]  |  1.00    Ca    9.0      08 Oct 2024 07:15        Urinalysis Basic - ( 08 Oct 2024 07:15 )    Color: x / Appearance: x / SG: x / pH: x  Gluc: 164 mg/dL / Ketone: x  / Bili: x / Urobili: x   Blood: x / Protein: x / Nitrite: x   Leuk Esterase: x / RBC: x / WBC x   Sq Epi: x / Non Sq Epi: x / Bacteria: x            WBC:  WBC Count: 6.26 K/uL (10-08 @ 09:30)  WBC Count: See Note (10-08 @ 07:15)  WBC Count: 7.44 K/uL (10-07 @ 06:15)      MICROBIOLOGY:  RECENT CULTURES:  10-03 Tissue Methicillin resistant Staphylococcus aureus   No polymorphonuclear cells seen per low power field  No organisms seen per oil power field   Rare Methicillin Resistant Staphylococcus aureus                    Sodium:  Sodium: 135 mmol/L (10-08 @ 07:15)  Sodium: 136 mmol/L (10-07 @ 06:15)      1.00 mg/dL 10-08 @ 07:15  1.20 mg/dL 10-07 @ 06:15      Hemoglobin:  Hemoglobin: 9.6 g/dL (10-08 @ 09:30)  Hemoglobin: See note (10-08 @ 07:15)  Hemoglobin: 9.6 g/dL (10-07 @ 06:15)      Platelets: Platelet Count - Automated: 213 K/uL (10-08 @ 09:30)  Platelet Count - Automated: Clotted (10-08 @ 07:15)  Platelet Count - Automated: 203 K/uL (10-07 @ 06:15)          Urinalysis Basic - ( 08 Oct 2024 07:15 )    Color: x / Appearance: x / SG: x / pH: x  Gluc: 164 mg/dL / Ketone: x  / Bili: x / Urobili: x   Blood: x / Protein: x / Nitrite: x   Leuk Esterase: x / RBC: x / WBC x   Sq Epi: x / Non Sq Epi: x / Bacteria: x        RADIOLOGY & ADDITIONAL STUDIES:      MICROBIOLOGY:  RECENT CULTURES:  10-03 Tissue Methicillin resistant Staphylococcus aureus   No polymorphonuclear cells seen per low power field  No organisms seen per oil power field   Rare Methicillin Resistant Staphylococcus aureus

## 2024-10-09 NOTE — CASE MANAGEMENT PROGRESS NOTE - NSCMPROGRESSNOTE_GEN_ALL_CORE
Patient discussed during interdisciplinary round. Patient is for transition to [facility]acute rehab for long term IV ABx. Insurance Auth is pending.

## 2024-10-09 NOTE — PROVIDER CONTACT NOTE (CRITICAL VALUE NOTIFICATION) - TEST AND RESULT REPORTED:
Tissue cx + MRSA
Tissue Culture 10/3 Preliminary #2 Rare MRSA
Wound Culture 10/1 Final positive resulted 10/4 Few methicillin resistant Staph Aureus and Commensal jameel Consistent with body site

## 2024-10-09 NOTE — PROGRESS NOTE ADULT - PROBLEM SELECTOR PLAN 1
10/03- for right 1st metatarsal head resection  Continue local wound care and offloading at this time .IV abx as per ID -started on daptomycin , cefepime added Culture - Wound Aerobic (collected 01 Oct 2024 11:00)  Source: Skin/Wound  Final Report (04 Oct 2024 22:25):    Few Methicillin Resistant Staphylococcus aureus    Commensal jameel consistent with body site  Organism: Methicillin resistant Staphylococcus aureus (04 Oct 2024 22:25)  Organism: Methicillin resistant Staphylococcus aureus (04 Oct 2024 22:25)Pathology -Right first metatarsal bone   - Bone with foci of mild chronic osteomyelitis

## 2024-10-09 NOTE — PROGRESS NOTE ADULT - PROBLEM SELECTOR PLAN 3
seen BY NEUROLOGY for left sided ha , recurrent with hx of hospital admission for HA   scalp and left TA tenderness  high CRP/ESR- suggestive of temporal arteritis  s/p TA  biopsy 10/05/24 -pathology -giant cell arteritis   analgesics, neurology f/up  As per Dr Mccartney may benefit trial steroid if ok podiatry and ID  sp  TA BIOPSY  BRAIN MRI   ANALGESICS  Physical therapy evaluation.  OOB to chair/ambulation with assistance only.  Advanced care planning was discussed with family.

## 2024-10-09 NOTE — PROVIDER CONTACT NOTE (CRITICAL VALUE NOTIFICATION) - ACTION/TREATMENT ORDERED:
Dr. Coreas notified. please place patient on contact isolation
Dr. Coreas notified continue daptomycin
Md aware. No new orders

## 2024-10-09 NOTE — PROGRESS NOTE ADULT - SUBJECTIVE AND OBJECTIVE BOX
Patient is a 74y Male with a known history of :  Wound of right foot [S91.301A]    Cellulitis of right foot [L03.115]    Prostate cancer [C61]    Type II diabetes mellitus [E11.9]    Chronic obstructive pulmonary disease (COPD) [J44.9]    CHF (congestive heart failure) [I50.9]    Renal insufficiency [N28.9]    Prophylactic measure [Z29.9]    MDD (major depressive disorder) [F32.9]    Neuropathy [G62.9]    Constipation [K59.00]    History of headache [Z87.898]    Type 2 diabetes mellitus with hyperglycemia [E11.65]      HPI:  72yo M PMhx DM2 w/ PAD - chronic Rt foot wound, HTN, COPD, CKD, HFpEF, arrythmia, Prostate CA s/p resection, Depression/Anxiety presents to ED sent by wound care for worsening R MTP wound. Patient reports having a nonhealing R foot wound, s/p multiple debridements and grafts at [facility], for past 1.5 years. Denies seeing a vascular surgeon in the past.  Denies pain the wound, but reports having neuropathy. States following wound care and reports worsening of wound. Reporting having some chills in the past week. Denies fever, chills, SOB or any other complaints.Patient examined and evaluated at this time. Antibiotics as per infectious disease recommendations. Recommend vascular surgery evaluation. Tentatively planning for right 1st metatarsal head resection pending vascular evaluation. Continue local wound care and offloading at this time. (01 Oct 2024 14:44)      REVIEW OF SYSTEMS:    CONSTITUTIONAL: No fever, weight loss, or fatigue  EYES: No eye pain, visual disturbances, or discharge  ENMT:  No difficulty hearing, tinnitus, vertigo; No sinus or throat pain  NECK: No pain or stiffness  BREASTS: No pain, masses, or nipple discharge  RESPIRATORY: No cough, wheezing, chills or hemoptysis; No shortness of breath  CARDIOVASCULAR: No chest pain, palpitations, dizziness, or leg swelling  GASTROINTESTINAL: No abdominal or epigastric pain. No nausea, vomiting, or hematemesis; No diarrhea or constipation. No melena or hematochezia.  GENITOURINARY: No dysuria, frequency, hematuria, or incontinence  NEUROLOGICAL: No headaches, memory loss, loss of strength, numbness, or tremors  SKIN: No itching, burning, rashes, or lesions   LYMPH NODES: No enlarged glands  ENDOCRINE: No heat or cold intolerance; No hair loss  MUSCULOSKELETAL: No joint pain or swelling; No muscle, back, or extremity pain  PSYCHIATRIC: No depression, anxiety, mood swings, or difficulty sleeping  HEME/LYMPH: No easy bruising, or bleeding gums  ALLERGY AND IMMUNOLOGIC: No hives or eczema    MEDICATIONS  (STANDING):  chlorhexidine 2% Cloths 1 Application(s) Topical <User Schedule>  clonazePAM Oral Disintegrating Tablet 0.75 milliGRAM(s) Oral two times a day  DAPTOmycin IVPB 500 milliGRAM(s) IV Intermittent every 24 hours  dextrose 5%. 1000 milliLiter(s) (100 mL/Hr) IV Continuous <Continuous>  dextrose 5%. 1000 milliLiter(s) (50 mL/Hr) IV Continuous <Continuous>  dextrose 50% Injectable 25 Gram(s) IV Push once  dextrose 50% Injectable 25 Gram(s) IV Push once  dextrose 50% Injectable 12.5 Gram(s) IV Push once  enoxaparin Injectable 40 milliGRAM(s) SubCutaneous every 24 hours  ferrous    sulfate 325 milliGRAM(s) Oral daily  fluticasone propionate/ salmeterol 250-50 MICROgram(s) Diskus 1 Dose(s) Inhalation two times a day  glucagon  Injectable 1 milliGRAM(s) IntraMuscular once  hydrocortisone 2.5% Ointment 1 Application(s) Topical three times a day  influenza  Vaccine (HIGH DOSE) 0.5 milliLiter(s) IntraMuscular once  insulin glargine Injectable (LANTUS) 10 Unit(s) SubCutaneous at bedtime  insulin lispro (ADMELOG) corrective regimen sliding scale   SubCutaneous three times a day before meals  lactobacillus acidophilus 1 Tablet(s) Oral every 12 hours  metoprolol succinate ER 25 milliGRAM(s) Oral daily  montelukast 10 milliGRAM(s) Oral daily  oxyCODONE  ER Tablet 10 milliGRAM(s) Oral every 12 hours  pantoprazole    Tablet 40 milliGRAM(s) Oral before breakfast  polyethylene glycol 3350 17 Gram(s) Oral daily  predniSONE   Tablet 50 milliGRAM(s) Oral daily  pregabalin 150 milliGRAM(s) Oral two times a day  rosuvastatin 40 milliGRAM(s) Oral at bedtime  senna 2 Tablet(s) Oral at bedtime  sertraline 100 milliGRAM(s) Oral daily    MEDICATIONS  (PRN):  acetaminophen     Tablet .. 650 milliGRAM(s) Oral every 6 hours PRN Temp greater or equal to 38C (100.4F), Mild Pain (1 - 3)  albuterol    90 MICROgram(s) HFA Inhaler 2 Puff(s) Inhalation every 6 hours PRN Shortness of Breath and/or Wheezing  albuterol/ipratropium for Nebulization 3 milliLiter(s) Nebulizer every 4 hours PRN Shortness of Breath and/or Wheezing  aluminum hydroxide/magnesium hydroxide/simethicone Suspension 30 milliLiter(s) Oral every 4 hours PRN Dyspepsia  artificial  tears Solution 1 Drop(s) Both EYES three times a day PRN eye irritation  benzonatate 100 milliGRAM(s) Oral three times a day PRN Cough  bisacodyl Suppository 10 milliGRAM(s) Rectal daily PRN Constipation  dextrose Oral Gel 15 Gram(s) Oral once PRN Blood Glucose LESS THAN 70 milliGRAM(s)/deciliter  guaiFENesin Oral Liquid (Sugar-Free) 200 milliGRAM(s) Oral every 6 hours PRN Cough  melatonin 3 milliGRAM(s) Oral at bedtime PRN Insomnia  ondansetron Injectable 4 milliGRAM(s) IV Push every 8 hours PRN Nausea and/or Vomiting  sodium chloride 0.65% Nasal 1 Spray(s) Both Nostrils two times a day PRN Congestion  sodium chloride 0.9% lock flush 10 milliLiter(s) IV Push every 1 hour PRN Pre/post blood products, medications, blood draw, and to maintain line patency  traMADol 50 milliGRAM(s) Oral three times a day PRN Moderate Pain (4 - 6)      ALLERGIES: tetracycline (Unknown)  IODINE (Unknown)  vancomycin (Other)      FAMILY HISTORY:      PHYSICAL EXAMINATION:  -----------------------------  T(C): 36.5 (10-09-24 @ 05:15), Max: 37.2 (10-08-24 @ 20:18)  HR: 78 (10-09-24 @ 05:15) (78 - 88)  BP: 122/51 (10-09-24 @ 05:15) (122/51 - 129/71)  RR: 18 (10-09-24 @ 05:15) (18 - 18)  SpO2: 93% (10-09-24 @ 05:15) (93% - 95%)  Wt(kg): --        VITALS  T(C): 36.5 (10-09-24 @ 05:15), Max: 37.2 (10-08-24 @ 20:18)  HR: 78 (10-09-24 @ 05:15) (78 - 88)  BP: 122/51 (10-09-24 @ 05:15) (122/51 - 129/71)  RR: 18 (10-09-24 @ 05:15) (18 - 18)  SpO2: 93% (10-09-24 @ 05:15) (93% - 95%)    Constitutional: well developed, normal appearance, well groomed, well nourished, no deformities and no acute distress.   Eyes: the conjunctiva exhibited no abnormalities and the eyelids demonstrated no xanthelasmas.   HEENT: normal oral mucosa, no oral pallor and no oral cyanosis.   Neck: normal jugular venous A waves present, normal jugular venous V waves present and no jugular venous mahmood A waves.   Pulmonary: no respiratory distress, normal respiratory rhythm and effort, no accessory muscle use and lungs were clear to auscultation bilaterally.   Cardiovascular: heart rate and rhythm were normal, normal S1 and S2 and no murmur, gallop, rub, heave or thrill are present.   Abdomen: soft, non-tender, no hepato-splenomegaly and no abdominal mass palpated.   Musculoskeletal: the gait could not be assessed..   Extremities: no clubbing of the fingernails, no localized cyanosis, no petechial hemorrhages and no ischemic changes.   Skin: normal skin color and pigmentation, no rash, no venous stasis, no skin lesions, no skin ulcer and no xanthoma was observed.   Psychiatric: oriented to person, place, and time, the affect was normal, the mood was normal and not feeling anxious.     LABS:   --------  10-08    135  |  106  |  20  ----------------------------<  164[H]  4.3   |  18[L]  |  1.00    Ca    9.0      08 Oct 2024 07:15                           9.6    6.26  )-----------( 213      ( 08 Oct 2024 09:30 )             31.0                 RADIOLOGY:  -----------------    ECG:     ECHO:

## 2024-10-09 NOTE — PROGRESS NOTE ADULT - SUBJECTIVE AND OBJECTIVE BOX
PROGRESS NOTE  Patient is a 74y old  Male who presents with a chief complaint of right foot wound (09 Oct 2024 09:15)    Chart and available morning labs /imaging are reviewed electronically , urgent issues addressed . More information  is being added upon completion of rounds , when more information is collected and management discussed with consultants , medical staff and social service/case management on the floor   OVERNIGHT    No new issues reported by medical staff . All above noted Patient is resting in a bed comfortably .Confused ,poor mentation .No distress noted   HPI:  72yo M PMhx DM2 w/ PAD - chronic Rt foot wound, HTN, COPD, CKD, HFpEF, arrythmia, Prostate CA s/p resection, Depression/Anxiety presents to ED sent by wound care for worsening R MTP wound. Patient reports having a nonhealing R foot wound, s/p multiple debridements and grafts at [facility], for past 1.5 years. Denies seeing a vascular surgeon in the past.  Denies pain the wound, but reports having neuropathy. States following wound care and reports worsening of wound. Reporting having some chills in the past week. Denies fever, chills, SOB or any other complaints.Patient examined and evaluated at this time. Antibiotics as per infectious disease recommendations. Recommend vascular surgery evaluation. Tentatively planning for right 1st metatarsal head resection pending vascular evaluation. Continue local wound care and offloading at this time. (01 Oct 2024 14:44)    PAST MEDICAL & SURGICAL HISTORY:  Prostate cancer      Type II diabetes mellitus      Chronic obstructive pulmonary disease (COPD)      CHF (congestive heart failure)      Renal insufficiency      H/O migraine      Insomnia      Constipation      S/P foot surgery          MEDICATIONS  (STANDING):  chlorhexidine 2% Cloths 1 Application(s) Topical <User Schedule>  clonazePAM Oral Disintegrating Tablet 0.75 milliGRAM(s) Oral two times a day  DAPTOmycin IVPB 500 milliGRAM(s) IV Intermittent every 24 hours  dextrose 5%. 1000 milliLiter(s) (100 mL/Hr) IV Continuous <Continuous>  dextrose 5%. 1000 milliLiter(s) (50 mL/Hr) IV Continuous <Continuous>  dextrose 50% Injectable 12.5 Gram(s) IV Push once  dextrose 50% Injectable 25 Gram(s) IV Push once  dextrose 50% Injectable 25 Gram(s) IV Push once  enoxaparin Injectable 40 milliGRAM(s) SubCutaneous every 24 hours  ferrous    sulfate 325 milliGRAM(s) Oral daily  fluticasone propionate/ salmeterol 250-50 MICROgram(s) Diskus 1 Dose(s) Inhalation two times a day  glucagon  Injectable 1 milliGRAM(s) IntraMuscular once  hydrocortisone 2.5% Ointment 1 Application(s) Topical three times a day  influenza  Vaccine (HIGH DOSE) 0.5 milliLiter(s) IntraMuscular once  insulin glargine Injectable (LANTUS) 10 Unit(s) SubCutaneous at bedtime  insulin lispro (ADMELOG) corrective regimen sliding scale   SubCutaneous three times a day before meals  lactobacillus acidophilus 1 Tablet(s) Oral every 12 hours  metoprolol succinate ER 25 milliGRAM(s) Oral daily  montelukast 10 milliGRAM(s) Oral daily  oxyCODONE  ER Tablet 10 milliGRAM(s) Oral every 12 hours  pantoprazole    Tablet 40 milliGRAM(s) Oral before breakfast  polyethylene glycol 3350 17 Gram(s) Oral daily  predniSONE   Tablet 50 milliGRAM(s) Oral daily  pregabalin 150 milliGRAM(s) Oral two times a day  rosuvastatin 40 milliGRAM(s) Oral at bedtime  senna 2 Tablet(s) Oral at bedtime  sertraline 100 milliGRAM(s) Oral daily  sucralfate 1 Gram(s) Oral two times a day    MEDICATIONS  (PRN):  acetaminophen     Tablet .. 650 milliGRAM(s) Oral every 6 hours PRN Temp greater or equal to 38C (100.4F), Mild Pain (1 - 3)  albuterol    90 MICROgram(s) HFA Inhaler 2 Puff(s) Inhalation every 6 hours PRN Shortness of Breath and/or Wheezing  albuterol/ipratropium for Nebulization 3 milliLiter(s) Nebulizer every 4 hours PRN Shortness of Breath and/or Wheezing  aluminum hydroxide/magnesium hydroxide/simethicone Suspension 30 milliLiter(s) Oral every 4 hours PRN Dyspepsia  artificial  tears Solution 1 Drop(s) Both EYES three times a day PRN eye irritation  benzonatate 100 milliGRAM(s) Oral three times a day PRN Cough  bisacodyl Suppository 10 milliGRAM(s) Rectal daily PRN Constipation  dextrose Oral Gel 15 Gram(s) Oral once PRN Blood Glucose LESS THAN 70 milliGRAM(s)/deciliter  diphenhydrAMINE 25 milliGRAM(s) Oral every 6 hours PRN Rash and/or Itching  guaiFENesin Oral Liquid (Sugar-Free) 200 milliGRAM(s) Oral every 6 hours PRN Cough  melatonin 3 milliGRAM(s) Oral at bedtime PRN Insomnia  ondansetron Injectable 4 milliGRAM(s) IV Push every 8 hours PRN Nausea and/or Vomiting  sodium chloride 0.65% Nasal 1 Spray(s) Both Nostrils two times a day PRN Congestion  sodium chloride 0.9% lock flush 10 milliLiter(s) IV Push every 1 hour PRN Pre/post blood products, medications, blood draw, and to maintain line patency  traMADol 50 milliGRAM(s) Oral three times a day PRN Moderate Pain (4 - 6)      OBJECTIVE    T(C): 36.9 (10-09-24 @ 12:37), Max: 37.2 (10-08-24 @ 20:18)  HR: 74 (10-09-24 @ 12:37) (74 - 85)  BP: 121/54 (10-09-24 @ 12:37) (121/54 - 128/69)  RR: 18 (10-09-24 @ 12:37) (18 - 18)  SpO2: 92% (10-09-24 @ 12:37) (92% - 95%)  Wt(kg): --  I&O's Summary        REVIEW OF SYSTEMS:  CONSTITUTIONAL: No fever, weight loss, or fatigue  EYES: No eye pain, visual disturbances, or discharge  ENMT:   No sinus or throat pain  NECK: No pain or stiffness  RESPIRATORY: No cough, wheezing, chills or hemoptysis; No shortness of breath  CARDIOVASCULAR: No chest pain, palpitations, dizziness, or leg swelling  GASTROINTESTINAL: No abdominal pain. No nausea, vomiting; No diarrhea or constipation. No melena or hematochezia.  GENITOURINARY: No dysuria, frequency, hematuria, or incontinence  NEUROLOGICAL: No headaches, memory loss, loss of strength, numbness, or tremors  SKIN: No itching, burning, rashes, or lesions   MUSCULOSKELETAL: No joint pain or swelling; No muscle, back, or extremity pain    PHYSICAL EXAM:  Appearance: NAD. VS past 24 hrs -as above   HEENT:   Moist oral mucosa. Conjunctiva clear b/l.   Neck : supple  Respiratory: Lungs CTAB.  Gastrointestinal:  Soft, nontender. No rebound. No rigidity. BS present	  Cardiovascular: RRR ,S1S2 present  Neurologic: Non-focal. Moving all extremities.  Extremities: No edema. No erythema. No calf tenderness.  Skin: No rashes, No ecchymoses, No cyanosis.	  wounds ,skin lesions-See skin assesment flow sheet   LABS:                        10.5   8.01  )-----------( 267      ( 09 Oct 2024 11:11 )             32.8     10-09    136  |  101  |  24[H]  ----------------------------<  323[H]  4.4   |  16[L]  |  1.40[H]    Ca    9.8      09 Oct 2024 11:11      CAPILLARY BLOOD GLUCOSE      POCT Blood Glucose.: 332 mg/dL (09 Oct 2024 11:51)  POCT Blood Glucose.: 197 mg/dL (09 Oct 2024 08:05)  POCT Blood Glucose.: 207 mg/dL (08 Oct 2024 21:59)  POCT Blood Glucose.: 176 mg/dL (08 Oct 2024 17:18)    PT/INR - ( 09 Oct 2024 11:11 )   PT: 14.6 sec;   INR: 1.25 ratio           Urinalysis Basic - ( 09 Oct 2024 11:11 )    Color: x / Appearance: x / SG: x / pH: x  Gluc: 323 mg/dL / Ketone: x  / Bili: x / Urobili: x   Blood: x / Protein: x / Nitrite: x   Leuk Esterase: x / RBC: x / WBC x   Sq Epi: x / Non Sq Epi: x / Bacteria: x        Culture - Tissue with Gram Stain (collected 03 Oct 2024 10:45)  Source: Tissue  Gram Stain (04 Oct 2024 17:51):    No polymorphonuclear cells seen per low power field    No organisms seen per oil power field  Final Report (08 Oct 2024 21:44):    Rare Methicillin Resistant Staphylococcus aureus  Organism: Methicillin resistant Staphylococcus aureus (08 Oct 2024 21:44)  Organism: Methicillin resistant Staphylococcus aureus (08 Oct 2024 21:44)    Culture - Blood (collected 01 Oct 2024 13:05)  Source: .Blood BLOOD  Final Report (06 Oct 2024 19:00):    No growth at 5 days    Culture - Blood (collected 01 Oct 2024 13:05)  Source: .Blood BLOOD  Final Report (06 Oct 2024 19:00):    No growth at 5 days    Culture - Wound Aerobic (collected 01 Oct 2024 11:00)  Source: Skin/Wound  Final Report (04 Oct 2024 22:25):    Few Methicillin Resistant Staphylococcus aureus    Commensal jameel consistent with body site  Organism: Methicillin resistant Staphylococcus aureus (04 Oct 2024 22:25)  Organism: Methicillin resistant Staphylococcus aureus (04 Oct 2024 22:25)      RADIOLOGY & ADDITIONAL TESTS:   reviewed elctronically  ASSESSMENT/PLAN: 	    Patient was seen and examined on a day of discharge . Plan of care , discharge medications and recommendations discussed with consultants and clearance for discharge obtained .Social service , case management  and medical staff are aware of plan. Family is notified. Discharge summary  is  prepared electronically-see separate document prepared by me .75minutes spent on this visit, 50% visit time spent in care co-ordination with other attendings and counselling patient  I have discussed care plan with patient and HCP,expressed understanding of problems treatment and their effect and side effects, alternatives in detail,I have asked if they have any questions and concerns and appropriately addressed them to best of my ability Spoke to the sister on a  phone in length , agrees with POC

## 2024-10-10 ENCOUNTER — TRANSCRIPTION ENCOUNTER (OUTPATIENT)
Age: 74
End: 2024-10-10

## 2024-10-10 LAB
ANION GAP SERPL CALC-SCNC: 12 MMOL/L — SIGNIFICANT CHANGE UP (ref 5–17)
BUN SERPL-MCNC: 27 MG/DL — HIGH (ref 7–23)
CALCIUM SERPL-MCNC: 9.2 MG/DL — SIGNIFICANT CHANGE UP (ref 8.5–10.1)
CHLORIDE SERPL-SCNC: 103 MMOL/L — SIGNIFICANT CHANGE UP (ref 96–108)
CO2 SERPL-SCNC: 23 MMOL/L — SIGNIFICANT CHANGE UP (ref 22–31)
CREAT SERPL-MCNC: 1.1 MG/DL — SIGNIFICANT CHANGE UP (ref 0.5–1.3)
EGFR: 71 ML/MIN/1.73M2 — SIGNIFICANT CHANGE UP
GLUCOSE SERPL-MCNC: 188 MG/DL — HIGH (ref 70–99)
HCT VFR BLD CALC: 28.5 % — LOW (ref 39–50)
HGB BLD-MCNC: 9.3 G/DL — LOW (ref 13–17)
MCHC RBC-ENTMCNC: 29.3 PG — SIGNIFICANT CHANGE UP (ref 27–34)
MCHC RBC-ENTMCNC: 32.6 GM/DL — SIGNIFICANT CHANGE UP (ref 32–36)
MCV RBC AUTO: 89.9 FL — SIGNIFICANT CHANGE UP (ref 80–100)
NRBC # BLD: 0 /100 WBCS — SIGNIFICANT CHANGE UP (ref 0–0)
PLATELET # BLD AUTO: 228 K/UL — SIGNIFICANT CHANGE UP (ref 150–400)
POTASSIUM SERPL-MCNC: 4 MMOL/L — SIGNIFICANT CHANGE UP (ref 3.5–5.3)
POTASSIUM SERPL-SCNC: 4 MMOL/L — SIGNIFICANT CHANGE UP (ref 3.5–5.3)
RBC # BLD: 3.17 M/UL — LOW (ref 4.2–5.8)
RBC # FLD: 14.5 % — SIGNIFICANT CHANGE UP (ref 10.3–14.5)
SODIUM SERPL-SCNC: 138 MMOL/L — SIGNIFICANT CHANGE UP (ref 135–145)
WBC # BLD: 6.7 K/UL — SIGNIFICANT CHANGE UP (ref 3.8–10.5)
WBC # FLD AUTO: 6.7 K/UL — SIGNIFICANT CHANGE UP (ref 3.8–10.5)

## 2024-10-10 PROCEDURE — 93971 EXTREMITY STUDY: CPT | Mod: 26,RT

## 2024-10-10 PROCEDURE — 99285 EMERGENCY DEPT VISIT HI MDM: CPT | Mod: 25

## 2024-10-10 PROCEDURE — A9579: CPT

## 2024-10-10 PROCEDURE — 76937 US GUIDE VASCULAR ACCESS: CPT

## 2024-10-10 PROCEDURE — 94640 AIRWAY INHALATION TREATMENT: CPT

## 2024-10-10 PROCEDURE — 86900 BLOOD TYPING SEROLOGIC ABO: CPT

## 2024-10-10 PROCEDURE — 97116 GAIT TRAINING THERAPY: CPT

## 2024-10-10 PROCEDURE — 86901 BLOOD TYPING SEROLOGIC RH(D): CPT

## 2024-10-10 PROCEDURE — 82550 ASSAY OF CK (CPK): CPT

## 2024-10-10 PROCEDURE — 82962 GLUCOSE BLOOD TEST: CPT

## 2024-10-10 PROCEDURE — 36415 COLL VENOUS BLD VENIPUNCTURE: CPT

## 2024-10-10 PROCEDURE — 83605 ASSAY OF LACTIC ACID: CPT

## 2024-10-10 PROCEDURE — 85610 PROTHROMBIN TIME: CPT

## 2024-10-10 PROCEDURE — 87070 CULTURE OTHR SPECIMN AEROBIC: CPT

## 2024-10-10 PROCEDURE — 87077 CULTURE AEROBIC IDENTIFY: CPT

## 2024-10-10 PROCEDURE — 87075 CULTR BACTERIA EXCEPT BLOOD: CPT

## 2024-10-10 PROCEDURE — 88304 TISSUE EXAM BY PATHOLOGIST: CPT

## 2024-10-10 PROCEDURE — C1751: CPT

## 2024-10-10 PROCEDURE — 88311 DECALCIFY TISSUE: CPT

## 2024-10-10 PROCEDURE — 84100 ASSAY OF PHOSPHORUS: CPT

## 2024-10-10 PROCEDURE — 36573 INSJ PICC RS&I 5 YR+: CPT

## 2024-10-10 PROCEDURE — 86850 RBC ANTIBODY SCREEN: CPT

## 2024-10-10 PROCEDURE — 70553 MRI BRAIN STEM W/O & W/DYE: CPT | Mod: MC

## 2024-10-10 PROCEDURE — 80048 BASIC METABOLIC PNL TOTAL CA: CPT

## 2024-10-10 PROCEDURE — 83735 ASSAY OF MAGNESIUM: CPT

## 2024-10-10 PROCEDURE — 85027 COMPLETE CBC AUTOMATED: CPT

## 2024-10-10 PROCEDURE — 73630 X-RAY EXAM OF FOOT: CPT

## 2024-10-10 PROCEDURE — 85025 COMPLETE CBC W/AUTO DIFF WBC: CPT

## 2024-10-10 PROCEDURE — 97162 PT EVAL MOD COMPLEX 30 MIN: CPT

## 2024-10-10 PROCEDURE — 85730 THROMBOPLASTIN TIME PARTIAL: CPT

## 2024-10-10 PROCEDURE — 80053 COMPREHEN METABOLIC PANEL: CPT

## 2024-10-10 PROCEDURE — 99233 SBSQ HOSP IP/OBS HIGH 50: CPT

## 2024-10-10 PROCEDURE — 71045 X-RAY EXAM CHEST 1 VIEW: CPT

## 2024-10-10 PROCEDURE — 93971 EXTREMITY STUDY: CPT

## 2024-10-10 PROCEDURE — C1889: CPT

## 2024-10-10 PROCEDURE — 87186 SC STD MICRODIL/AGAR DIL: CPT

## 2024-10-10 PROCEDURE — 93005 ELECTROCARDIOGRAM TRACING: CPT

## 2024-10-10 PROCEDURE — 87040 BLOOD CULTURE FOR BACTERIA: CPT

## 2024-10-10 PROCEDURE — 93306 TTE W/DOPPLER COMPLETE: CPT

## 2024-10-10 PROCEDURE — 97530 THERAPEUTIC ACTIVITIES: CPT

## 2024-10-10 PROCEDURE — 83036 HEMOGLOBIN GLYCOSYLATED A1C: CPT

## 2024-10-10 RX ADMIN — PREGABALIN 150 MILLIGRAM(S): 25 CAPSULE ORAL at 17:30

## 2024-10-10 RX ADMIN — BENZONATATE 100 MILLIGRAM(S): 150 CAPSULE ORAL at 12:09

## 2024-10-10 RX ADMIN — Medication 5: at 12:05

## 2024-10-10 RX ADMIN — Medication 1 GRAM(S): at 05:45

## 2024-10-10 RX ADMIN — SERTRALINE HYDROCHLORIDE 100 MILLIGRAM(S): 100 TABLET, FILM COATED ORAL at 12:04

## 2024-10-10 RX ADMIN — PANTOPRAZOLE SODIUM 40 MILLIGRAM(S): 40 TABLET, DELAYED RELEASE ORAL at 07:46

## 2024-10-10 RX ADMIN — GUAIFENESIN 200 MILLIGRAM(S): 100 SOLUTION ORAL at 15:43

## 2024-10-10 RX ADMIN — GUAIFENESIN 200 MILLIGRAM(S): 100 SOLUTION ORAL at 07:53

## 2024-10-10 RX ADMIN — Medication 325 MILLIGRAM(S): at 12:04

## 2024-10-10 RX ADMIN — Medication 2 PUFF(S): at 09:30

## 2024-10-10 RX ADMIN — OXYCODONE HYDROCHLORIDE 10 MILLIGRAM(S): 30 TABLET, FILM COATED, EXTENDED RELEASE ORAL at 12:04

## 2024-10-10 RX ADMIN — INSULIN GLARGINE 10 UNIT(S): 300 INJECTION, SOLUTION SUBCUTANEOUS at 21:33

## 2024-10-10 RX ADMIN — BENZONATATE 100 MILLIGRAM(S): 150 CAPSULE ORAL at 07:53

## 2024-10-10 RX ADMIN — OXYCODONE HYDROCHLORIDE 10 MILLIGRAM(S): 30 TABLET, FILM COATED, EXTENDED RELEASE ORAL at 22:16

## 2024-10-10 RX ADMIN — PREGABALIN 150 MILLIGRAM(S): 25 CAPSULE ORAL at 05:45

## 2024-10-10 RX ADMIN — ANTI-ITCH CREAM 1 APPLICATION(S): 1 OINTMENT TOPICAL at 15:44

## 2024-10-10 RX ADMIN — Medication 1 GRAM(S): at 17:30

## 2024-10-10 RX ADMIN — Medication 1 TABLET(S): at 21:32

## 2024-10-10 RX ADMIN — ROSUVASTATIN CALCIUM 40 MILLIGRAM(S): 20 TABLET, COATED ORAL at 21:32

## 2024-10-10 RX ADMIN — ENOXAPARIN SODIUM 40 MILLIGRAM(S): 150 INJECTION SUBCUTANEOUS at 05:46

## 2024-10-10 RX ADMIN — Medication 0.75 MILLIGRAM(S): at 05:46

## 2024-10-10 RX ADMIN — ANTI-ITCH CREAM 1 APPLICATION(S): 1 OINTMENT TOPICAL at 05:47

## 2024-10-10 RX ADMIN — IPRATROPIUM BROMIDE AND ALBUTEROL SULFATE 3 MILLILITER(S): .5; 3 SOLUTION RESPIRATORY (INHALATION) at 11:18

## 2024-10-10 RX ADMIN — PREDNISONE 50 MILLIGRAM(S): 5 TABLET ORAL at 05:45

## 2024-10-10 RX ADMIN — Medication 3: at 17:29

## 2024-10-10 RX ADMIN — DAPTOMYCIN 120 MILLIGRAM(S): 500 INJECTION, POWDER, LYOPHILIZED, FOR SOLUTION INTRAVENOUS at 20:08

## 2024-10-10 RX ADMIN — Medication 2: at 07:48

## 2024-10-10 RX ADMIN — MONTELUKAST SODIUM 10 MILLIGRAM(S): 10 TABLET, FILM COATED ORAL at 12:04

## 2024-10-10 RX ADMIN — Medication 17 GRAM(S): at 12:05

## 2024-10-10 RX ADMIN — Medication 1 TABLET(S): at 07:46

## 2024-10-10 RX ADMIN — BENZONATATE 100 MILLIGRAM(S): 150 CAPSULE ORAL at 12:04

## 2024-10-10 RX ADMIN — OXYCODONE HYDROCHLORIDE 10 MILLIGRAM(S): 30 TABLET, FILM COATED, EXTENDED RELEASE ORAL at 13:04

## 2024-10-10 RX ADMIN — Medication 25 MILLIGRAM(S): at 05:46

## 2024-10-10 RX ADMIN — CHLORHEXIDINE GLUCONATE ORAL RINSE 1 APPLICATION(S): 1.2 SOLUTION DENTAL at 05:46

## 2024-10-10 RX ADMIN — Medication 2 TABLET(S): at 21:32

## 2024-10-10 RX ADMIN — Medication 0.75 MILLIGRAM(S): at 17:30

## 2024-10-10 RX ADMIN — Medication 1 DOSE(S): at 07:47

## 2024-10-10 RX ADMIN — TRAMADOL HYDROCHLORIDE 50 MILLIGRAM(S): 50 TABLET, COATED ORAL at 18:28

## 2024-10-10 NOTE — PROGRESS NOTE ADULT - PROBLEM SELECTOR PROBLEM 2
Cellulitis of right foot

## 2024-10-10 NOTE — DISCHARGE NOTE NURSING/CASE MANAGEMENT/SOCIAL WORK - PATIENT PORTAL LINK FT
You can access the FollowMyHealth Patient Portal offered by [facility] by registering at the following website: [email or web address]. By joining [facility]’s FollowMyHealth portal, you will also be able to view your health information using other applications (apps) compatible with our system.

## 2024-10-10 NOTE — PROGRESS NOTE ADULT - SUBJECTIVE AND OBJECTIVE BOX
CHIEF COMPLAINT/ REASON FOR VISIT  .. Patient was seen to address the  issue listed under PROBLEM LIST which is located toward bottom of this note     WAYNE SERRANO    PLV 1EAS 113 D1    Allergies    tetracycline (Unknown)  IODINE (Unknown)  vancomycin (Other)    Intolerances        PAST MEDICAL & SURGICAL HISTORY:  Prostate cancer      Type II diabetes mellitus      Chronic obstructive pulmonary disease (COPD)      CHF (congestive heart failure)      Renal insufficiency      H/O migraine      Insomnia      Constipation      S/P foot surgery          FAMILY HISTORY:      Home Medications:  acetaminophen 325 mg oral tablet: 2 tab(s) orally every 6 hours As needed Temp greater or equal to 38C (100.4F), Mild Pain (1 - 3) (09 Oct 2024 13:26)  ascorbic acid 500 mg oral tablet: 1 tab(s) orally 2 times a day (09 Oct 2024 13:27)  azelaic acid 15% topical gel: Apply topically to affected area 2 times a day as needed (01 Oct 2024 15:27)  benzonatate 100 mg oral capsule: 1 cap(s) orally 3 times a day As needed Cough (09 Oct 2024 13:26)  bisacodyl 10 mg rectal suppository: 1 suppository(ies) rectal once a day As needed Constipation (09 Oct 2024 13:27)  clonazePAM 0.25 mg oral tablet, disintegrating: 3 tab(s) orally 2 times a day (09 Oct 2024 13:26)  DAPTOmycin 500 mg intravenous injection: 500 milligram(s) intravenous every 24 hours last day is 11/16/2024 (09 Oct 2024 13:27)  enoxaparin 40 mg/0.4 mL injectable solution: 40 milligram(s) subcutaneous once a day (09 Oct 2024 13:26)  ferrous sulfate 325 mg (65 mg elemental iron) oral tablet: 1 tab(s) orally once a day (09 Oct 2024 13:27)  fluticasone 50 mcg/inh nasal spray: 1 spray(s) in each nostril 2 times a day as needed (01 Oct 2024 15:29)  furosemide 40 mg oral tablet: 1 tab(s) orally once a day (01 Oct 2024 15:16)  guaiFENesin 100 mg/5 mL oral liquid: 10 milliliter(s) orally every 6 hours As needed Cough (09 Oct 2024 13:26)  insulin glargine 100 units/mL subcutaneous solution: 10 unit(s) subcutaneous once a day (at bedtime) (09 Oct 2024 13:26)  ipratropium-albuterol 0.5 mg-2.5 mg/3 mL inhalation solution: 3 milliliter(s) inhaled every 4 hours As needed Shortness of Breath and/or Wheezing (09 Oct 2024 13:26)  loratadine 10 mg oral tablet: 1 tab(s) orally once a day (in the morning) (01 Oct 2024 15:17)  melatonin 3 mg oral tablet: 1 tab(s) orally once a day (at bedtime) As needed Insomnia (09 Oct 2024 13:26)  metFORMIN 500 mg oral tablet: 2 tab(s) orally 2 times a day (01 Oct 2024 15:18)  metoprolol succinate 25 mg oral tablet, extended release: 1 tab(s) orally once a day (09 Oct 2024 13:26)  montelukast 10 mg oral tablet: 1 tab(s) orally once a day (09 Oct 2024 13:27)  Multiple Vitamins with Minerals oral tablet: 1 tab(s) orally once a day (09 Oct 2024 13:27)  ocular lubricant ophthalmic solution: 1 drop(s) to each affected eye 3 times a day As needed eye irritation (09 Oct 2024 13:27)  Opzelura 1.5% topical cream: Apply topically to affected area 2 times a day as needed (01 Oct 2024 15:29)  oxyCODONE 10 mg oral tablet, extended release: 1 tab(s) orally every 12 hours (09 Oct 2024 13:26)  pantoprazole 40 mg oral delayed release tablet: 1 tab(s) orally once a day (before a meal) (09 Oct 2024 13:27)  polyethylene glycol 3350 oral powder for reconstitution: 17 gram(s) orally once a day (09 Oct 2024 13:27)  predniSONE 50 mg oral tablet: 1 tab(s) orally once a day Tapering schedule as per neurology Dr. Mccartney and PCP (09 Oct 2024 13:26)  pregabalin 150 mg oral capsule: 1 cap(s) orally 2 times a day (09 Oct 2024 13:26)  rosuvastatin 40 mg oral tablet: 1 tab(s) orally once a day (01 Oct 2024 15:21)  Saline Mist 0.65% nasal spray: 1 spray(s) intranasally 2 times a day (01 Oct 2024 15:29)  senna leaf extract oral tablet: 2 tab(s) orally once a day (at bedtime) (09 Oct 2024 13:27)  sertraline 100 mg oral tablet: 1 tab(s) orally once a day (09 Oct 2024 13:26)  Spiriva 18 mcg inhalation capsule: 1 cap(s) inhaled once a day (01 Oct 2024 15:23)  traMADol 50 mg oral tablet: 1 tab(s) orally 3 times a day As needed Moderate Pain (4 - 6) (09 Oct 2024 13:26)      MEDICATIONS  (STANDING):  chlorhexidine 2% Cloths 1 Application(s) Topical <User Schedule>  clonazePAM Oral Disintegrating Tablet 0.75 milliGRAM(s) Oral two times a day  DAPTOmycin IVPB 500 milliGRAM(s) IV Intermittent every 24 hours  dextrose 5%. 1000 milliLiter(s) (50 mL/Hr) IV Continuous <Continuous>  dextrose 5%. 1000 milliLiter(s) (100 mL/Hr) IV Continuous <Continuous>  dextrose 50% Injectable 25 Gram(s) IV Push once  dextrose 50% Injectable 25 Gram(s) IV Push once  dextrose 50% Injectable 12.5 Gram(s) IV Push once  enoxaparin Injectable 40 milliGRAM(s) SubCutaneous every 24 hours  ferrous    sulfate 325 milliGRAM(s) Oral daily  fluticasone propionate/ salmeterol 250-50 MICROgram(s) Diskus 1 Dose(s) Inhalation two times a day  glucagon  Injectable 1 milliGRAM(s) IntraMuscular once  hydrocortisone 2.5% Ointment 1 Application(s) Topical three times a day  influenza  Vaccine (HIGH DOSE) 0.5 milliLiter(s) IntraMuscular once  insulin glargine Injectable (LANTUS) 10 Unit(s) SubCutaneous at bedtime  insulin lispro (ADMELOG) corrective regimen sliding scale   SubCutaneous three times a day before meals  lactobacillus acidophilus 1 Tablet(s) Oral every 12 hours  metoprolol succinate ER 25 milliGRAM(s) Oral daily  montelukast 10 milliGRAM(s) Oral daily  oxyCODONE  ER Tablet 10 milliGRAM(s) Oral every 12 hours  pantoprazole    Tablet 40 milliGRAM(s) Oral before breakfast  polyethylene glycol 3350 17 Gram(s) Oral daily  predniSONE   Tablet 50 milliGRAM(s) Oral daily  pregabalin 150 milliGRAM(s) Oral two times a day  rosuvastatin 40 milliGRAM(s) Oral at bedtime  senna 2 Tablet(s) Oral at bedtime  sertraline 100 milliGRAM(s) Oral daily  sucralfate 1 Gram(s) Oral two times a day    MEDICATIONS  (PRN):  acetaminophen     Tablet .. 650 milliGRAM(s) Oral every 6 hours PRN Temp greater or equal to 38C (100.4F), Mild Pain (1 - 3)  albuterol    90 MICROgram(s) HFA Inhaler 2 Puff(s) Inhalation every 6 hours PRN Shortness of Breath and/or Wheezing  albuterol/ipratropium for Nebulization 3 milliLiter(s) Nebulizer every 4 hours PRN Shortness of Breath and/or Wheezing  aluminum hydroxide/magnesium hydroxide/simethicone Suspension 30 milliLiter(s) Oral every 4 hours PRN Dyspepsia  artificial  tears Solution 1 Drop(s) Both EYES three times a day PRN eye irritation  benzonatate 100 milliGRAM(s) Oral three times a day PRN Cough  bisacodyl Suppository 10 milliGRAM(s) Rectal daily PRN Constipation  dextrose Oral Gel 15 Gram(s) Oral once PRN Blood Glucose LESS THAN 70 milliGRAM(s)/deciliter  diphenhydrAMINE 25 milliGRAM(s) Oral every 6 hours PRN Rash and/or Itching  guaiFENesin Oral Liquid (Sugar-Free) 200 milliGRAM(s) Oral every 6 hours PRN Cough  melatonin 3 milliGRAM(s) Oral at bedtime PRN Insomnia  ondansetron Injectable 4 milliGRAM(s) IV Push every 8 hours PRN Nausea and/or Vomiting  sodium chloride 0.65% Nasal 1 Spray(s) Both Nostrils two times a day PRN Congestion  sodium chloride 0.9% lock flush 10 milliLiter(s) IV Push every 1 hour PRN Pre/post blood products, medications, blood draw, and to maintain line patency  traMADol 50 milliGRAM(s) Oral three times a day PRN Moderate Pain (4 - 6)      Diet, Consistent Carbohydrate/No Snacks:   DASH/TLC Sodium & Cholesterol Restricted  Wilfred(7 Gm Arginine/7 Gm Glut/1.2 Gm HMB     Qty per Day:  2 (10-08-24 @ 13:46) [Pending Verification By Attending]  Diet, Consistent Carbohydrate w/Evening Snack:   DASH/TLC Sodium & Cholesterol Restricted (10-05-24 @ 11:25) [Active]          Vital Signs Last 24 Hrs  T(C): 36.8 (10 Oct 2024 05:45), Max: 36.9 (09 Oct 2024 12:37)  T(F): 98.3 (10 Oct 2024 05:45), Max: 98.4 (09 Oct 2024 12:37)  HR: 69 (10 Oct 2024 05:45) (69 - 76)  BP: 114/64 (10 Oct 2024 05:45) (114/64 - 121/54)  BP(mean): --  RR: 18 (10 Oct 2024 05:45) (18 - 18)  SpO2: 91% (10 Oct 2024 05:45) (91% - 92%)    Parameters below as of 10 Oct 2024 05:45  Patient On (Oxygen Delivery Method): room air                  LABS:                        10.5   8.01  )-----------( 267      ( 09 Oct 2024 11:11 )             32.8     10-09    136  |  101  |  24[H]  ----------------------------<  323[H]  4.4   |  16[L]  |  1.40[H]    Ca    9.8      09 Oct 2024 11:11      PT/INR - ( 09 Oct 2024 11:11 )   PT: 14.6 sec;   INR: 1.25 ratio           Urinalysis Basic - ( 09 Oct 2024 11:11 )    Color: x / Appearance: x / SG: x / pH: x  Gluc: 323 mg/dL / Ketone: x  / Bili: x / Urobili: x   Blood: x / Protein: x / Nitrite: x   Leuk Esterase: x / RBC: x / WBC x   Sq Epi: x / Non Sq Epi: x / Bacteria: x            WBC:  WBC Count: 8.01 K/uL (10-09 @ 11:11)  WBC Count: 6.26 K/uL (10-08 @ 09:30)  WBC Count: See Note (10-08 @ 07:15)  WBC Count: 7.44 K/uL (10-07 @ 06:15)      MICROBIOLOGY:  RECENT CULTURES:  10-03 Tissue Methicillin resistant Staphylococcus aureus   No polymorphonuclear cells seen per low power field  No organisms seen per oil power field   Rare Methicillin Resistant Staphylococcus aureus                PT/INR - ( 09 Oct 2024 11:11 )   PT: 14.6 sec;   INR: 1.25 ratio             Sodium:  Sodium: 136 mmol/L (10-09 @ 11:11)  Sodium: 135 mmol/L (10-08 @ 07:15)  Sodium: 136 mmol/L (10-07 @ 06:15)      1.40 mg/dL 10-09 @ 11:11  1.00 mg/dL 10-08 @ 07:15  1.20 mg/dL 10-07 @ 06:15      Hemoglobin:  Hemoglobin: 10.5 g/dL (10-09 @ 11:11)  Hemoglobin: 9.6 g/dL (10-08 @ 09:30)  Hemoglobin: See note (10-08 @ 07:15)  Hemoglobin: 9.6 g/dL (10-07 @ 06:15)      Platelets: Platelet Count - Automated: 267 K/uL (10-09 @ 11:11)  Platelet Count - Automated: 213 K/uL (10-08 @ 09:30)  Platelet Count - Automated: Clotted (10-08 @ 07:15)  Platelet Count - Automated: 203 K/uL (10-07 @ 06:15)          Urinalysis Basic - ( 09 Oct 2024 11:11 )    Color: x / Appearance: x / SG: x / pH: x  Gluc: 323 mg/dL / Ketone: x  / Bili: x / Urobili: x   Blood: x / Protein: x / Nitrite: x   Leuk Esterase: x / RBC: x / WBC x   Sq Epi: x / Non Sq Epi: x / Bacteria: x        RADIOLOGY & ADDITIONAL STUDIES:      MICROBIOLOGY:  RECENT CULTURES:  10-03 Tissue Methicillin resistant Staphylococcus aureus   No polymorphonuclear cells seen per low power field  No organisms seen per oil power field   Rare Methicillin Resistant Staphylococcus aureus

## 2024-10-10 NOTE — PROGRESS NOTE ADULT - SUBJECTIVE AND OBJECTIVE BOX
Interval History:    CENTRAL LINE:   [  ] YES       [  ] NO  MORRISSEY:                 [  ] YES       [  ] NO         REVIEW OF SYSTEMS:  All Systems below were reviewed and are negative [  ]  HEENT:  ID:  Pulmonary:  Cardiac:  GI:  Renal:  Musculoskeletal:  All other systems above were reviewed and are negative   [  ]      MEDICATIONS  (STANDING):  chlorhexidine 2% Cloths 1 Application(s) Topical <User Schedule>  clonazePAM Oral Disintegrating Tablet 0.75 milliGRAM(s) Oral two times a day  DAPTOmycin IVPB 500 milliGRAM(s) IV Intermittent every 24 hours  dextrose 5%. 1000 milliLiter(s) (100 mL/Hr) IV Continuous <Continuous>  dextrose 5%. 1000 milliLiter(s) (50 mL/Hr) IV Continuous <Continuous>  dextrose 50% Injectable 12.5 Gram(s) IV Push once  dextrose 50% Injectable 25 Gram(s) IV Push once  dextrose 50% Injectable 25 Gram(s) IV Push once  enoxaparin Injectable 40 milliGRAM(s) SubCutaneous every 24 hours  ferrous    sulfate 325 milliGRAM(s) Oral daily  fluticasone propionate/ salmeterol 250-50 MICROgram(s) Diskus 1 Dose(s) Inhalation two times a day  glucagon  Injectable 1 milliGRAM(s) IntraMuscular once  hydrocortisone 2.5% Ointment 1 Application(s) Topical three times a day  influenza  Vaccine (HIGH DOSE) 0.5 milliLiter(s) IntraMuscular once  insulin glargine Injectable (LANTUS) 10 Unit(s) SubCutaneous at bedtime  insulin lispro (ADMELOG) corrective regimen sliding scale   SubCutaneous three times a day before meals  lactobacillus acidophilus 1 Tablet(s) Oral every 12 hours  metoprolol succinate ER 25 milliGRAM(s) Oral daily  montelukast 10 milliGRAM(s) Oral daily  oxyCODONE  ER Tablet 10 milliGRAM(s) Oral every 12 hours  pantoprazole    Tablet 40 milliGRAM(s) Oral before breakfast  polyethylene glycol 3350 17 Gram(s) Oral daily  predniSONE   Tablet 50 milliGRAM(s) Oral daily  pregabalin 150 milliGRAM(s) Oral two times a day  rosuvastatin 40 milliGRAM(s) Oral at bedtime  senna 2 Tablet(s) Oral at bedtime  sertraline 100 milliGRAM(s) Oral daily  sucralfate 1 Gram(s) Oral two times a day    MEDICATIONS  (PRN):  acetaminophen     Tablet .. 650 milliGRAM(s) Oral every 6 hours PRN Temp greater or equal to 38C (100.4F), Mild Pain (1 - 3)  albuterol    90 MICROgram(s) HFA Inhaler 2 Puff(s) Inhalation every 6 hours PRN Shortness of Breath and/or Wheezing  albuterol/ipratropium for Nebulization 3 milliLiter(s) Nebulizer every 4 hours PRN Shortness of Breath and/or Wheezing  aluminum hydroxide/magnesium hydroxide/simethicone Suspension 30 milliLiter(s) Oral every 4 hours PRN Dyspepsia  artificial  tears Solution 1 Drop(s) Both EYES three times a day PRN eye irritation  benzonatate 100 milliGRAM(s) Oral three times a day PRN Cough  bisacodyl Suppository 10 milliGRAM(s) Rectal daily PRN Constipation  dextrose Oral Gel 15 Gram(s) Oral once PRN Blood Glucose LESS THAN 70 milliGRAM(s)/deciliter  diphenhydrAMINE 25 milliGRAM(s) Oral every 6 hours PRN Rash and/or Itching  guaiFENesin Oral Liquid (Sugar-Free) 200 milliGRAM(s) Oral every 6 hours PRN Cough  melatonin 3 milliGRAM(s) Oral at bedtime PRN Insomnia  ondansetron Injectable 4 milliGRAM(s) IV Push every 8 hours PRN Nausea and/or Vomiting  sodium chloride 0.65% Nasal 1 Spray(s) Both Nostrils two times a day PRN Congestion  sodium chloride 0.9% lock flush 10 milliLiter(s) IV Push every 1 hour PRN Pre/post blood products, medications, blood draw, and to maintain line patency  traMADol 50 milliGRAM(s) Oral three times a day PRN Moderate Pain (4 - 6)      Vital Signs Last 24 Hrs  T(C): 36.7 (10 Oct 2024 13:46), Max: 36.8 (10 Oct 2024 05:45)  T(F): 98 (10 Oct 2024 13:46), Max: 98.3 (10 Oct 2024 05:45)  HR: 93 (10 Oct 2024 13:46) (69 - 93)  BP: 134/71 (10 Oct 2024 13:46) (114/64 - 134/71)  BP(mean): --  RR: 18 (10 Oct 2024 13:46) (18 - 18)  SpO2: 92% (10 Oct 2024 13:46) (91% - 92%)    Parameters below as of 10 Oct 2024 13:46  Patient On (Oxygen Delivery Method): room air        I&O's Summary      PHYSICAL EXAM:  HEENT: NC/AT; PERRLA  Neck: Soft; no tenderness  Lungs: CTA bilaterally; no wheezing.   Heart:  Abdomen:  Genital/ Rectal:  Extremities:  Neurologic:  Vascular:      LABORATORY:    CBC Full  -  ( 10 Oct 2024 07:38 )  WBC Count : 6.70 K/uL  RBC Count : 3.17 M/uL  Hemoglobin : 9.3 g/dL  Hematocrit : 28.5 %  Platelet Count - Automated : 228 K/uL  Mean Cell Volume : 89.9 fl  Mean Cell Hemoglobin : 29.3 pg  Mean Cell Hemoglobin Concentration : 32.6 gm/dL  Auto Neutrophil # : x  Auto Lymphocyte # : x  Auto Monocyte # : x  Auto Eosinophil # : x  Auto Basophil # : x  Auto Neutrophil % : x  Auto Lymphocyte % : x  Auto Monocyte % : x  Auto Eosinophil % : x  Auto Basophil % : x      ESR:                   09-24 @ 14:10  65    C-Reactive Protein:     09-24 @ 14:10  28    Procalcitonin:           09-24 @ 14:10   --      10-10    138  |  103  |  27[H]  ----------------------------<  188[H]  4.0   |  23  |  1.10    Ca    9.2      10 Oct 2024 07:38            Assessment and Plan:          David Coreas MD   [phone].    He is awake  Comfortable.      MEDICATIONS  (STANDING):  chlorhexidine 2% Cloths 1 Application(s) Topical <User Schedule>  clonazePAM Oral Disintegrating Tablet 0.75 milliGRAM(s) Oral two times a day  DAPTOmycin IVPB 500 milliGRAM(s) IV Intermittent every 24 hours  dextrose 5%. 1000 milliLiter(s) (100 mL/Hr) IV Continuous <Continuous>  dextrose 5%. 1000 milliLiter(s) (50 mL/Hr) IV Continuous <Continuous>  dextrose 50% Injectable 12.5 Gram(s) IV Push once  dextrose 50% Injectable 25 Gram(s) IV Push once  dextrose 50% Injectable 25 Gram(s) IV Push once  enoxaparin Injectable 40 milliGRAM(s) SubCutaneous every 24 hours  ferrous    sulfate 325 milliGRAM(s) Oral daily  fluticasone propionate/ salmeterol 250-50 MICROgram(s) Diskus 1 Dose(s) Inhalation two times a day  glucagon  Injectable 1 milliGRAM(s) IntraMuscular once  hydrocortisone 2.5% Ointment 1 Application(s) Topical three times a day  influenza  Vaccine (HIGH DOSE) 0.5 milliLiter(s) IntraMuscular once  insulin glargine Injectable (LANTUS) 10 Unit(s) SubCutaneous at bedtime  insulin lispro (ADMELOG) corrective regimen sliding scale   SubCutaneous three times a day before meals  lactobacillus acidophilus 1 Tablet(s) Oral every 12 hours  metoprolol succinate ER 25 milliGRAM(s) Oral daily  montelukast 10 milliGRAM(s) Oral daily  oxyCODONE  ER Tablet 10 milliGRAM(s) Oral every 12 hours  pantoprazole    Tablet 40 milliGRAM(s) Oral before breakfast  polyethylene glycol 3350 17 Gram(s) Oral daily  predniSONE   Tablet 50 milliGRAM(s) Oral daily  pregabalin 150 milliGRAM(s) Oral two times a day  rosuvastatin 40 milliGRAM(s) Oral at bedtime  senna 2 Tablet(s) Oral at bedtime  sertraline 100 milliGRAM(s) Oral daily  sucralfate 1 Gram(s) Oral two times a day    MEDICATIONS  (PRN):  acetaminophen     Tablet .. 650 milliGRAM(s) Oral every 6 hours PRN Temp greater or equal to 38C (100.4F), Mild Pain (1 - 3)  albuterol    90 MICROgram(s) HFA Inhaler 2 Puff(s) Inhalation every 6 hours PRN Shortness of Breath and/or Wheezing  albuterol/ipratropium for Nebulization 3 milliLiter(s) Nebulizer every 4 hours PRN Shortness of Breath and/or Wheezing  aluminum hydroxide/magnesium hydroxide/simethicone Suspension 30 milliLiter(s) Oral every 4 hours PRN Dyspepsia  artificial  tears Solution 1 Drop(s) Both EYES three times a day PRN eye irritation  benzonatate 100 milliGRAM(s) Oral three times a day PRN Cough  bisacodyl Suppository 10 milliGRAM(s) Rectal daily PRN Constipation  dextrose Oral Gel 15 Gram(s) Oral once PRN Blood Glucose LESS THAN 70 milliGRAM(s)/deciliter  diphenhydrAMINE 25 milliGRAM(s) Oral every 6 hours PRN Rash and/or Itching  guaiFENesin Oral Liquid (Sugar-Free) 200 milliGRAM(s) Oral every 6 hours PRN Cough  melatonin 3 milliGRAM(s) Oral at bedtime PRN Insomnia  ondansetron Injectable 4 milliGRAM(s) IV Push every 8 hours PRN Nausea and/or Vomiting  sodium chloride 0.65% Nasal 1 Spray(s) Both Nostrils two times a day PRN Congestion  sodium chloride 0.9% lock flush 10 milliLiter(s) IV Push every 1 hour PRN Pre/post blood products, medications, blood draw, and to maintain line patency  traMADol 50 milliGRAM(s) Oral three times a day PRN Moderate Pain (4 - 6)      Vital Signs Last 24 Hrs  T(C): 36.7 (10 Oct 2024 13:46), Max: 36.8 (10 Oct 2024 05:45)  T(F): 98 (10 Oct 2024 13:46), Max: 98.3 (10 Oct 2024 05:45)  HR: 93 (10 Oct 2024 13:46) (69 - 93)  BP: 134/71 (10 Oct 2024 13:46) (114/64 - 134/71)  BP(mean): --  RR: 18 (10 Oct 2024 13:46) (18 - 18)  SpO2: 92% (10 Oct 2024 13:46) (91% - 92%)    Parameters below as of 10 Oct 2024 13:46  Patient On (Oxygen Delivery Method): room air        I&O's Summary      PHYSICAL EXAM:  HEENT: NC/AT; PERRLA  Neck: Soft; no tenderness  Lungs: CTA bilaterally; no wheezing.   Heart: RRR, no murmurs.   Abdomen: Soft, no tenderness.   Genital/ Rectal: No doan catheter  Extremities: R foot with clean dressing.  Neurologic: Awake.       LABORATORY:    CBC Full  -  ( 10 Oct 2024 07:38 )  WBC Count : 6.70 K/uL  RBC Count : 3.17 M/uL  Hemoglobin : 9.3 g/dL  Hematocrit : 28.5 %  Platelet Count - Automated : 228 K/uL  Mean Cell Volume : 89.9 fl  Mean Cell Hemoglobin : 29.3 pg  Mean Cell Hemoglobin Concentration : 32.6 gm/dL  Auto Neutrophil # : x  Auto Lymphocyte # : x  Auto Monocyte # : x  Auto Eosinophil # : x  Auto Basophil # : x  Auto Neutrophil % : x  Auto Lymphocyte % : x  Auto Monocyte % : x  Auto Eosinophil % : x  Auto Basophil % : x      ESR:                   09-24 @ 14:10  65    C-Reactive Protein:     09-24 @ 14:10  28    Procalcitonin:           09-24 @ 14:10   --      10-10    138  |  103  |  27[H]  ----------------------------<  188[H]  4.0   |  23  |  1.10    Ca    9.2      10 Oct 2024 07:38    Assessment and Plan:    1. R foot ulcer with osteomyelitis, s/p resection of the R 1st metatarsal head.   2. CKD  3. Chronic body rash.    . He had resection of the R 1st metatarsal head.  Bone culture is growing Staph aureus (MRSA).   . Continue IV Dapto 500 mg daily for 6 weeks (to November 16, 2024) to complete treatment for osteomyelitis of the R foot.   . Get Picc line to complete 6 weeks of IV Daptomycin at the rehab. Monitor ESR, CRP, CPK, CBC and CMP weekly.   . Wound care daily as per podiatry.   . Discharge planning to rehab.             David Coreas MD   [phone].

## 2024-10-10 NOTE — PROGRESS NOTE ADULT - PROBLEM SELECTOR PROBLEM 1
Wound of right foot

## 2024-10-10 NOTE — SOCIAL WORK PROGRESS NOTE - NSSWPROGRESSNOTE_GEN_ALL_CORE
auth number received for joanie sally; ([id number]), SOC 10/10/24, clinical due on day 10/13/24, reviewer DAVINA Malone - [employer] phone ([phone]), fax ([phone]). 10/10/2024 auth number received for [facility]; ([id number]), SOC 10/10/24, clinical due on day 10/13/24, reviewer DAVINA Malone - [employer] phone ([phone]), fax ([phone]). 10/10/2024. [facility] called for 6pm . Pt and family aware and in agreement. All paperwork to accompany patient. Plan remains for pt to go to [facility] / for 6pm  via [facility]. No [facility] services indicated at this time.

## 2024-10-10 NOTE — PROGRESS NOTE ADULT - SUBJECTIVE AND OBJECTIVE BOX
PROGRESS NOTE  Patient is a 74y old  Male who presents with a chief complaint of right foot wound (10 Oct 2024 08:46)    Chart and available morning labs /imaging are reviewed electronically , urgent issues addressed . More information  is being added upon completion of rounds , when more information is collected and management discussed with consultants , medical staff and social service/case management on the floor   OVERNIGHT  No new issues reported by medical staff . All above noted Patient is resting in a bed comfortably .Confused ,poor mentation .No distress noted     HPI:  72yo M PMhx DM2 w/ PAD - chronic Rt foot wound, HTN, COPD, CKD, HFpEF, arrythmia, Prostate CA s/p resection, Depression/Anxiety presents to ED sent by wound care for worsening R MTP wound. Patient reports having a nonhealing R foot wound, s/p multiple debridements and grafts at [place], for past 1.5 years. Denies seeing a vascular surgeon in the past.  Denies pain the wound, but reports having neuropathy. States following wound care and reports worsening of wound. Reporting having some chills in the past week. Denies fever, chills, SOB or any other complaints.Patient examined and evaluated at this time. Antibiotics as per infectious disease recommendations. Recommend vascular surgery evaluation. Tentatively planning for right 1st metatarsal head resection pending vascular evaluation. Continue local wound care and offloading at this time. (01 Oct 2024 14:44)    PAST MEDICAL & SURGICAL HISTORY:  Prostate cancer      Type II diabetes mellitus      Chronic obstructive pulmonary disease (COPD)      CHF (congestive heart failure)      Renal insufficiency      H/O migraine      Insomnia      Constipation      S/P foot surgery          MEDICATIONS  (STANDING):  chlorhexidine 2% Cloths 1 Application(s) Topical <User Schedule>  clonazePAM Oral Disintegrating Tablet 0.75 milliGRAM(s) Oral two times a day  DAPTOmycin IVPB 500 milliGRAM(s) IV Intermittent every 24 hours  dextrose 5%. 1000 milliLiter(s) (100 mL/Hr) IV Continuous <Continuous>  dextrose 5%. 1000 milliLiter(s) (50 mL/Hr) IV Continuous <Continuous>  dextrose 50% Injectable 25 Gram(s) IV Push once  dextrose 50% Injectable 25 Gram(s) IV Push once  dextrose 50% Injectable 12.5 Gram(s) IV Push once  enoxaparin Injectable 40 milliGRAM(s) SubCutaneous every 24 hours  ferrous    sulfate 325 milliGRAM(s) Oral daily  fluticasone propionate/ salmeterol 250-50 MICROgram(s) Diskus 1 Dose(s) Inhalation two times a day  glucagon  Injectable 1 milliGRAM(s) IntraMuscular once  hydrocortisone 2.5% Ointment 1 Application(s) Topical three times a day  influenza  Vaccine (HIGH DOSE) 0.5 milliLiter(s) IntraMuscular once  insulin glargine Injectable (LANTUS) 10 Unit(s) SubCutaneous at bedtime  insulin lispro (ADMELOG) corrective regimen sliding scale   SubCutaneous three times a day before meals  lactobacillus acidophilus 1 Tablet(s) Oral every 12 hours  metoprolol succinate ER 25 milliGRAM(s) Oral daily  montelukast 10 milliGRAM(s) Oral daily  oxyCODONE  ER Tablet 10 milliGRAM(s) Oral every 12 hours  pantoprazole    Tablet 40 milliGRAM(s) Oral before breakfast  polyethylene glycol 3350 17 Gram(s) Oral daily  predniSONE   Tablet 50 milliGRAM(s) Oral daily  pregabalin 150 milliGRAM(s) Oral two times a day  rosuvastatin 40 milliGRAM(s) Oral at bedtime  senna 2 Tablet(s) Oral at bedtime  sertraline 100 milliGRAM(s) Oral daily  sucralfate 1 Gram(s) Oral two times a day    MEDICATIONS  (PRN):  acetaminophen     Tablet .. 650 milliGRAM(s) Oral every 6 hours PRN Temp greater or equal to 38C (100.4F), Mild Pain (1 - 3)  albuterol    90 MICROgram(s) HFA Inhaler 2 Puff(s) Inhalation every 6 hours PRN Shortness of Breath and/or Wheezing  albuterol/ipratropium for Nebulization 3 milliLiter(s) Nebulizer every 4 hours PRN Shortness of Breath and/or Wheezing  aluminum hydroxide/magnesium hydroxide/simethicone Suspension 30 milliLiter(s) Oral every 4 hours PRN Dyspepsia  artificial  tears Solution 1 Drop(s) Both EYES three times a day PRN eye irritation  benzonatate 100 milliGRAM(s) Oral three times a day PRN Cough  bisacodyl Suppository 10 milliGRAM(s) Rectal daily PRN Constipation  dextrose Oral Gel 15 Gram(s) Oral once PRN Blood Glucose LESS THAN 70 milliGRAM(s)/deciliter  diphenhydrAMINE 25 milliGRAM(s) Oral every 6 hours PRN Rash and/or Itching  guaiFENesin Oral Liquid (Sugar-Free) 200 milliGRAM(s) Oral every 6 hours PRN Cough  melatonin 3 milliGRAM(s) Oral at bedtime PRN Insomnia  ondansetron Injectable 4 milliGRAM(s) IV Push every 8 hours PRN Nausea and/or Vomiting  sodium chloride 0.65% Nasal 1 Spray(s) Both Nostrils two times a day PRN Congestion  sodium chloride 0.9% lock flush 10 milliLiter(s) IV Push every 1 hour PRN Pre/post blood products, medications, blood draw, and to maintain line patency  traMADol 50 milliGRAM(s) Oral three times a day PRN Moderate Pain (4 - 6)      OBJECTIVE    T(C): 36.7 (10-10-24 @ 13:46), Max: 36.8 (10-10-24 @ 05:45)  HR: 93 (10-10-24 @ 13:46) (69 - 93)  BP: 134/71 (10-10-24 @ 13:46) (114/64 - 134/71)  RR: 18 (10-10-24 @ 13:46) (18 - 18)  SpO2: 92% (10-10-24 @ 13:46) (91% - 92%)  Wt(kg): --  I&O's Summary        REVIEW OF SYSTEMS:  denies complains     PHYSICAL EXAM:  Appearance: NAD. VS past 24 hrs -as above   HEENT:   Moist oral mucosa. Conjunctiva clear b/l.   Neck : supple  Respiratory: Lungs CTAB.  Gastrointestinal:  Soft, nontender. No rebound. No rigidity. BS present	  Cardiovascular: RRR ,S1S2 present  Neurologic: Non-focal. Moving all extremities.  Extremities: No edema. No erythema. No calf tenderness.  Skin: No rashes, No ecchymoses, No cyanosis.	  wounds ,skin lesions-See skin assesment flow sheet   LABS:                        9.3    6.70  )-----------( 228      ( 10 Oct 2024 07:38 )             28.5     10-10    138  |  103  |  27[H]  ----------------------------<  188[H]  4.0   |  23  |  1.10    Ca    9.2      10 Oct 2024 07:38      CAPILLARY BLOOD GLUCOSE      POCT Blood Glucose.: 366 mg/dL (10 Oct 2024 11:51)  POCT Blood Glucose.: 204 mg/dL (10 Oct 2024 07:45)  POCT Blood Glucose.: 250 mg/dL (09 Oct 2024 22:02)  POCT Blood Glucose.: 289 mg/dL (09 Oct 2024 16:30)    PT/INR - ( 09 Oct 2024 11:11 )   PT: 14.6 sec;   INR: 1.25 ratio           Urinalysis Basic - ( 10 Oct 2024 07:38 )    Color: x / Appearance: x / SG: x / pH: x  Gluc: 188 mg/dL / Ketone: x  / Bili: x / Urobili: x   Blood: x / Protein: x / Nitrite: x   Leuk Esterase: x / RBC: x / WBC x   Sq Epi: x / Non Sq Epi: x / Bacteria: x        Culture - Tissue with Gram Stain (collected 03 Oct 2024 10:45)  Source: Tissue  Gram Stain (04 Oct 2024 17:51):    No polymorphonuclear cells seen per low power field    No organisms seen per oil power field  Final Report (08 Oct 2024 21:44):    Rare Methicillin Resistant Staphylococcus aureus  Organism: Methicillin resistant Staphylococcus aureus (08 Oct 2024 21:44)  Organism: Methicillin resistant Staphylococcus aureus (08 Oct 2024 21:44)    Culture - Blood (collected 01 Oct 2024 13:05)  Source: .Blood BLOOD  Final Report (06 Oct 2024 19:00):    No growth at 5 days    Culture - Blood (collected 01 Oct 2024 13:05)  Source: .Blood BLOOD  Final Report (06 Oct 2024 19:00):    No growth at 5 days    Culture - Wound Aerobic (collected 01 Oct 2024 11:00)  Source: Skin/Wound  Final Report (04 Oct 2024 22:25):    Few Methicillin Resistant Staphylococcus aureus    Commensal jameel consistent with body site  Organism: Methicillin resistant Staphylococcus aureus (04 Oct 2024 22:25)  Organism: Methicillin resistant Staphylococcus aureus (04 Oct 2024 22:25)      RADIOLOGY & ADDITIONAL TESTS:   reviewed elctronically  ASSESSMENT/PLAN: 	  25 minutes aggregate time was spent on this visit, 50% visit time spent in care co-ordination with other attendings and counselling patient .I have discussed care plan with patient / HCP/family member ,who expressed understanding of problems treatment and their effect and side effects, alternatives in details. I have asked if they have any questions and concerns and appropriately addressed them to best of my ability.

## 2024-10-10 NOTE — DISCHARGE NOTE NURSING/CASE MANAGEMENT/SOCIAL WORK - NSPROEXTENSIONSOFSELF_GEN_A_NUR
wheelchair PAST MEDICAL HISTORY:  Arrhythmia s/p AICD/PPM    Benign prostatic hyperplasia     Coronary artery disease s/p stents    Hyperlipidemia     Hypertension      none

## 2024-10-10 NOTE — PROGRESS NOTE ADULT - SUBJECTIVE AND OBJECTIVE BOX
Neurology Follow up note    WAYNE [id number]    HPI:  74yo M PMhx DM2 w/ PAD - chronic Rt foot wound, HTN, COPD, CKD, HFpEF, arrythmia, Prostate CA s/p resection, Depression/Anxiety presents to ED sent by wound care for worsening R MTP wound. Patient reports having a nonhealing R foot wound, s/p multiple debridements and grafts at [place], for past 1.5 years. Denies seeing a vascular surgeon in the past.  Denies pain the wound, but reports having neuropathy. States following wound care and reports worsening of wound. Reporting having some chills in the past week. Denies fever, chills, SOB or any other complaints.Patient examined and evaluated at this time. Antibiotics as per infectious disease recommendations. Recommend vascular surgery evaluation. Tentatively planning for right 1st metatarsal head resection pending vascular evaluation. Continue local wound care and offloading at this time. (01 Oct 2024 14:44)      Interval History -no HA     Patient is seen, chart was reviewed and case was discussed with the treatment team.  Pt is not in any distress.   Lying on bed comfortably.   .     Vital Signs Last 24 Hrs  T(C): 36.7 (10 Oct 2024 13:46), Max: 36.8 (10 Oct 2024 05:45)  T(F): 98 (10 Oct 2024 13:46), Max: 98.3 (10 Oct 2024 05:45)  HR: 93 (10 Oct 2024 13:46) (69 - 93)  BP: 134/71 (10 Oct 2024 13:46) (114/64 - 134/71)  BP(mean): --  RR: 18 (10 Oct 2024 13:46) (18 - 18)  SpO2: 92% (10 Oct 2024 13:46) (91% - 92%)    Parameters below as of 10 Oct 2024 13:46  Patient On (Oxygen Delivery Method): room air                      r            REVIEW OF SYSTEMS:    Constitutional: No fever, weight loss or fatigue  Eyes: No eye pain, visual disturbances, or discharge  ENT:  No difficulty hearing, tinnitus, vertigo; No sinus or throat pain  Neck: No pain or stiffness  Respiratory: No cough, wheezing, chills or hemoptysis  Cardiovascular: No chest pain, palpitations, shortness of breath, dizziness or leg swelling  Gastrointestinal: No abdominal or epigastric pain. No nausea, vomiting or hematemesis  Genitourinary: No dysuria, frequency, hematuria or incontinence  Neurological: No memory loss, loss of strength, numbness or tremors  Psychiatric: No depression, anxiety, mood swings or difficulty sleeping  Musculoskeletal: No joint pain or swelling; No muscle, back or extremity pain  Skin: No itching, burning, rashes or lesions   Lymph Nodes: No enlarged glands  Endocrine: No heat or cold intolerance; No hair loss    Allergy and Immunologic: No hives or eczema    On Neurological Examination:    Mental Status - Pt is alert, awake, oriented X3.. Follows commands well and able to answer questions appropriately.Mood and affect  normal    Speech -  Normal.    Cranial Nerves - Pupils 3 mm equal and reactive to light, extraocular eye movements intact. Pt has no visual field deficit.  Pt has no facial asymmetry. Facial sensation is intact.Tongue - is in midline.    Muscle tone - is normal        Motor Exam - 5/5 OF UE  'LE  4/5   No drift. No shaking or tremors.    Sensory Exam - . Pt withdraws all extremities equally on stimulation. No asymmetry seen. No complaints of tingling, numbness.        coordination:    Finger to nose: normal      Deep tendon Reflexes - 2 plus all over.     .    Neck Supple -  Yes.     MEDICATIONS    acetaminophen     Tablet .. 650 milliGRAM(s) Oral every 6 hours PRN  albuterol    90 MICROgram(s) HFA Inhaler 2 Puff(s) Inhalation every 6 hours PRN  aluminum hydroxide/magnesium hydroxide/simethicone Suspension 30 milliLiter(s) Oral every 4 hours PRN  benzonatate 100 milliGRAM(s) Oral three times a day PRN  bisacodyl Suppository 10 milliGRAM(s) Rectal daily PRN  cefepime   IVPB 2000 milliGRAM(s) IV Intermittent every 12 hours  clonazePAM Oral Disintegrating Tablet 0.75 milliGRAM(s) Oral two times a day  DAPTOmycin IVPB 500 milliGRAM(s) IV Intermittent every 24 hours  dextrose 5% + sodium chloride 0.45%. 1000 milliLiter(s) IV Continuous <Continuous>  dextrose 5%. 1000 milliLiter(s) IV Continuous <Continuous>  dextrose 5%. 1000 milliLiter(s) IV Continuous <Continuous>  dextrose 50% Injectable 12.5 Gram(s) IV Push once  dextrose 50% Injectable 25 Gram(s) IV Push once  dextrose 50% Injectable 25 Gram(s) IV Push once  dextrose Oral Gel 15 Gram(s) Oral once PRN  enoxaparin Injectable 40 milliGRAM(s) SubCutaneous every 24 hours  ferrous    sulfate 325 milliGRAM(s) Oral daily  fluticasone propionate/ salmeterol 250-50 MICROgram(s) Diskus 1 Dose(s) Inhalation two times a day  glucagon  Injectable 1 milliGRAM(s) IntraMuscular once  influenza  Vaccine (HIGH DOSE) 0.5 milliLiter(s) IntraMuscular once  insulin glargine Injectable (LANTUS) 7 Unit(s) SubCutaneous at bedtime  insulin lispro (ADMELOG) corrective regimen sliding scale   SubCutaneous three times a day before meals  lactobacillus acidophilus 1 Tablet(s) Oral every 12 hours  melatonin 3 milliGRAM(s) Oral at bedtime PRN  metoprolol succinate ER 25 milliGRAM(s) Oral daily  montelukast 10 milliGRAM(s) Oral daily  morphine  - Injectable 1 milliGRAM(s) IV Push every 4 hours PRN  ondansetron Injectable 4 milliGRAM(s) IV Push every 8 hours PRN  pantoprazole    Tablet 40 milliGRAM(s) Oral before breakfast  polyethylene glycol 3350 17 Gram(s) Oral daily  pregabalin 150 milliGRAM(s) Oral two times a day  rosuvastatin 40 milliGRAM(s) Oral at bedtime  senna 2 Tablet(s) Oral at bedtime  sertraline 100 milliGRAM(s) Oral daily  sodium chloride 0.65% Nasal 1 Spray(s) Both Nostrils two times a day PRN  traMADol 50 milliGRAM(s) Oral three times a day PRN      Allergies    tetracycline (Unknown)  IODINE (Unknown)  vancomycin (Other)    Intolerances                          9.3    6.70  )-----------( 228      ( 10 Oct 2024 07:38 )             28.5             Hemoglobin A1C:     Vitamin B12     RADIOLOGY    ASSESSMENT AND PLAN:      seen for HA  related to temporal arterits    HA improving with  steroid  close monitoring blood sugar  ANALGESIC  Physical therapy evaluation.  OOB to chair/ambulation with assistance only.  Pain is accessed and addressed.  Plan of care was discussed with family. Questions answered.  Would continue to follow.

## 2024-10-10 NOTE — PROGRESS NOTE ADULT - PROBLEM SELECTOR PROBLEM 4
Anesthesia Post Evaluation    Patient: Halie Mccormick    Jony Score: No data recorded       Type II diabetes mellitus

## 2024-10-10 NOTE — SOCIAL WORK PROGRESS NOTE - NSSWPROGRESSNOTE_GEN_ALL_CORE
sw able to speak riley NAPIER, from provider services at [facility] [employer]/ [phone], Call ref# given [id number] in re to documentation submitted for auth to joanie zavala. Per Christo, he could not locate any documentation sent and stated that that there was another fax # to be submitted to which he could not provide me. Per Christo, an RN assigned to case w  REF NUMBER [id number]/ would be calling me w an additional fax # and any additional documentation needed. Whitney working to attempt to obtain auth so pt may be dc'd to [facility].

## 2024-10-10 NOTE — SOCIAL WORK PROGRESS NOTE - NSSWPROGRESSNOTE_GEN_ALL_CORE
Call received from [facility] requested for review. [facility] and clinicals faxed to ([phone]) for review. Awaiting response.

## 2024-10-10 NOTE — PROGRESS NOTE ADULT - SUBJECTIVE AND OBJECTIVE BOX
Patient is a 74y Male with a known history of :  Wound of right foot [S91.301A]    Cellulitis of right foot [L03.115]    Prostate cancer [C61]    Type II diabetes mellitus [E11.9]    Chronic obstructive pulmonary disease (COPD) [J44.9]    CHF (congestive heart failure) [I50.9]    Renal insufficiency [N28.9]    Prophylactic measure [Z29.9]    MDD (major depressive disorder) [F32.9]    Neuropathy [G62.9]    Constipation [K59.00]    History of headache [Z87.898]    Type 2 diabetes mellitus with hyperglycemia [E11.65]      HPI:  74yo M PMhx DM2 w/ PAD - chronic Rt foot wound, HTN, COPD, CKD, HFpEF, arrythmia, Prostate CA s/p resection, Depression/Anxiety presents to ED sent by wound care for worsening R MTP wound. Patient reports having a nonhealing R foot wound, s/p multiple debridements and grafts at [facility], for past 1.5 years. Denies seeing a vascular surgeon in the past.  Denies pain the wound, but reports having neuropathy. States following wound care and reports worsening of wound. Reporting having some chills in the past week. Denies fever, chills, SOB or any other complaints.Patient examined and evaluated at this time. Antibiotics as per infectious disease recommendations. Recommend vascular surgery evaluation. Tentatively planning for right 1st metatarsal head resection pending vascular evaluation. Continue local wound care and offloading at this time. (01 Oct 2024 14:44)      REVIEW OF SYSTEMS:    CONSTITUTIONAL: No fever, weight loss, or fatigue  EYES: No eye pain, visual disturbances, or discharge  ENMT:  No difficulty hearing, tinnitus, vertigo; No sinus or throat pain  NECK: No pain or stiffness  BREASTS: No pain, masses, or nipple discharge  RESPIRATORY: No cough, wheezing, chills or hemoptysis; No shortness of breath  CARDIOVASCULAR: No chest pain, palpitations, dizziness, or leg swelling  GASTROINTESTINAL: No abdominal or epigastric pain. No nausea, vomiting, or hematemesis; No diarrhea or constipation. No melena or hematochezia.  GENITOURINARY: No dysuria, frequency, hematuria, or incontinence  NEUROLOGICAL: No headaches, memory loss, loss of strength, numbness, or tremors  SKIN: No itching, burning, rashes, or lesions   LYMPH NODES: No enlarged glands  ENDOCRINE: No heat or cold intolerance; No hair loss  MUSCULOSKELETAL: No joint pain or swelling; No muscle, back, or extremity pain  PSYCHIATRIC: No depression, anxiety, mood swings, or difficulty sleeping  HEME/LYMPH: No easy bruising, or bleeding gums  ALLERGY AND IMMUNOLOGIC: No hives or eczema    MEDICATIONS  (STANDING):  chlorhexidine 2% Cloths 1 Application(s) Topical <User Schedule>  clonazePAM Oral Disintegrating Tablet 0.75 milliGRAM(s) Oral two times a day  DAPTOmycin IVPB 500 milliGRAM(s) IV Intermittent every 24 hours  dextrose 5%. 1000 milliLiter(s) (100 mL/Hr) IV Continuous <Continuous>  dextrose 5%. 1000 milliLiter(s) (50 mL/Hr) IV Continuous <Continuous>  dextrose 50% Injectable 12.5 Gram(s) IV Push once  dextrose 50% Injectable 25 Gram(s) IV Push once  dextrose 50% Injectable 25 Gram(s) IV Push once  enoxaparin Injectable 40 milliGRAM(s) SubCutaneous every 24 hours  ferrous    sulfate 325 milliGRAM(s) Oral daily  fluticasone propionate/ salmeterol 250-50 MICROgram(s) Diskus 1 Dose(s) Inhalation two times a day  glucagon  Injectable 1 milliGRAM(s) IntraMuscular once  hydrocortisone 2.5% Ointment 1 Application(s) Topical three times a day  influenza  Vaccine (HIGH DOSE) 0.5 milliLiter(s) IntraMuscular once  insulin glargine Injectable (LANTUS) 10 Unit(s) SubCutaneous at bedtime  insulin lispro (ADMELOG) corrective regimen sliding scale   SubCutaneous three times a day before meals  lactobacillus acidophilus 1 Tablet(s) Oral every 12 hours  metoprolol succinate ER 25 milliGRAM(s) Oral daily  montelukast 10 milliGRAM(s) Oral daily  oxyCODONE  ER Tablet 10 milliGRAM(s) Oral every 12 hours  pantoprazole    Tablet 40 milliGRAM(s) Oral before breakfast  polyethylene glycol 3350 17 Gram(s) Oral daily  predniSONE   Tablet 50 milliGRAM(s) Oral daily  pregabalin 150 milliGRAM(s) Oral two times a day  rosuvastatin 40 milliGRAM(s) Oral at bedtime  senna 2 Tablet(s) Oral at bedtime  sertraline 100 milliGRAM(s) Oral daily  sucralfate 1 Gram(s) Oral two times a day    MEDICATIONS  (PRN):  acetaminophen     Tablet .. 650 milliGRAM(s) Oral every 6 hours PRN Temp greater or equal to 38C (100.4F), Mild Pain (1 - 3)  albuterol    90 MICROgram(s) HFA Inhaler 2 Puff(s) Inhalation every 6 hours PRN Shortness of Breath and/or Wheezing  albuterol/ipratropium for Nebulization 3 milliLiter(s) Nebulizer every 4 hours PRN Shortness of Breath and/or Wheezing  aluminum hydroxide/magnesium hydroxide/simethicone Suspension 30 milliLiter(s) Oral every 4 hours PRN Dyspepsia  artificial  tears Solution 1 Drop(s) Both EYES three times a day PRN eye irritation  benzonatate 100 milliGRAM(s) Oral three times a day PRN Cough  bisacodyl Suppository 10 milliGRAM(s) Rectal daily PRN Constipation  dextrose Oral Gel 15 Gram(s) Oral once PRN Blood Glucose LESS THAN 70 milliGRAM(s)/deciliter  diphenhydrAMINE 25 milliGRAM(s) Oral every 6 hours PRN Rash and/or Itching  guaiFENesin Oral Liquid (Sugar-Free) 200 milliGRAM(s) Oral every 6 hours PRN Cough  melatonin 3 milliGRAM(s) Oral at bedtime PRN Insomnia  ondansetron Injectable 4 milliGRAM(s) IV Push every 8 hours PRN Nausea and/or Vomiting  sodium chloride 0.65% Nasal 1 Spray(s) Both Nostrils two times a day PRN Congestion  sodium chloride 0.9% lock flush 10 milliLiter(s) IV Push every 1 hour PRN Pre/post blood products, medications, blood draw, and to maintain line patency  traMADol 50 milliGRAM(s) Oral three times a day PRN Moderate Pain (4 - 6)      ALLERGIES: tetracycline (Unknown)  IODINE (Unknown)  vancomycin (Other)      FAMILY HISTORY:      PHYSICAL EXAMINATION:  -----------------------------  T(C): 36.8 (10-10-24 @ 05:45), Max: 36.9 (10-09-24 @ 12:37)  HR: 69 (10-10-24 @ 05:45) (69 - 76)  BP: 114/64 (10-10-24 @ 05:45) (114/64 - 121/54)  RR: 18 (10-10-24 @ 05:45) (18 - 18)  SpO2: 91% (10-10-24 @ 05:45) (91% - 92%)  Wt(kg): --        VITALS  T(C): 36.8 (10-10-24 @ 05:45), Max: 36.9 (10-09-24 @ 12:37)  HR: 69 (10-10-24 @ 05:45) (69 - 76)  BP: 114/64 (10-10-24 @ 05:45) (114/64 - 121/54)  RR: 18 (10-10-24 @ 05:45) (18 - 18)  SpO2: 91% (10-10-24 @ 05:45) (91% - 92%)    Constitutional: well developed, normal appearance, well groomed, well nourished, no deformities and no acute distress.   Eyes: the conjunctiva exhibited no abnormalities and the eyelids demonstrated no xanthelasmas.   HEENT: normal oral mucosa, no oral pallor and no oral cyanosis.   Neck: normal jugular venous A waves present, normal jugular venous V waves present and no jugular venous mahmood A waves.   Pulmonary: no respiratory distress, normal respiratory rhythm and effort, no accessory muscle use and lungs were clear to auscultation bilaterally.   Cardiovascular: heart rate and rhythm were normal, normal S1 and S2 and no murmur, gallop, rub, heave or thrill are present.   Abdomen: soft, non-tender, no hepato-splenomegaly and no abdominal mass palpated.   Musculoskeletal: the gait could not be assessed..   Extremities: no clubbing of the fingernails, no localized cyanosis, no petechial hemorrhages and no ischemic changes.   Skin: normal skin color and pigmentation, no rash, no venous stasis, no skin lesions, no skin ulcer and no xanthoma was observed.   Psychiatric: oriented to person, place, and time, the affect was normal, the mood was normal and not feeling anxious.     LABS:   --------  10-10    138  |  103  |  27[H]  ----------------------------<  188[H]  4.0   |  23  |  1.10    Ca    9.2      10 Oct 2024 07:38                           9.3    6.70  )-----------( 228      ( 10 Oct 2024 07:38 )             28.5     PT/INR - ( 09 Oct 2024 11:11 )   PT: 14.6 sec;   INR: 1.25 ratio                     RADIOLOGY:  -----------------    ECG:     ECHO:

## 2024-10-11 VITALS
HEART RATE: 82 BPM | RESPIRATION RATE: 18 BRPM | DIASTOLIC BLOOD PRESSURE: 70 MMHG | TEMPERATURE: 98 F | OXYGEN SATURATION: 97 % | SYSTOLIC BLOOD PRESSURE: 160 MMHG

## 2024-10-11 NOTE — PROGRESS NOTE ADULT - PROVIDER SPECIALTY LIST ADULT
Hospitalist
Infectious Disease
Infectious Disease
Neurology
Pulmonology
Vascular Surgery
Infectious Disease
Infectious Disease
Neurology
Neurology
Podiatry
Pulmonology
Cardiology
Infectious Disease
Internal Medicine
Neurology
Neurology
Pulmonology
Vascular Surgery
Vascular Surgery
Cardiology
Cardiology
Cardiology-Oncology
Cardiology
Podiatry
Hospitalist

## 2024-10-11 NOTE — PROGRESS NOTE ADULT - ASSESSMENT
Right 1st metatarsal head medial wound down to skin, subcutaneous tissue, fat, bone
pt with infected rt foor - rt big toe  cellulitis  dm2  copd  chf- compensated  pvd  neuropathy  no angina-ekg - old anteroseptal  mi  cardiac status stable low risk for resection rt big  toe head 10/3  pt tolerated resection of rt 1st metatarsal head 10/3  to have echo  for lv function 10/5
pt with infected rt foor - rt big toe  cellulitis  dm2  copd  chf- compensated  pvd  neuropathy  no angina-ekg - old anteroseptal  mi  cardiac status stable low risk for resection rt big  toe head 10/3  pt tolerated resection of rt 1st metatarsal head 10/3  to have echo  for lv function 10/5  pt tolerated bx of left  temporal  artery 10/5
pt with infected rt foor - rt big toe  cellulitis  dm2  copd  chf- compensated  pvd  neuropathy  no angina-ekg - old anteroseptal  mi  cardiac status stable low risk for resection rt big  toe head 10/3  pt tolerated resection of rt 1st metatarsal head 10/3  to have echo  for lv function 10/5  pt tolerated bx of left  temporal  artery 10/5
   REASON FOR VISIT  .. Management of problems listed below        REVIEW OF SYMPTOMS   Able to give ROS  Yes     RELIABILITY +/-   CONSTITUTIONAL Weakness Yes    ENDOCRINE  No heat or cold intolerance    ALLERGY No hives  Sore throat No stridor  RESP Shortness of breath YES   NEURO New weakness No   CARDIAC   Palpitations No         PHYSICAL EXAM    HEENT Unremarkable  atraumatic   RESP Fair air entry  Harsh breath sound   CARDIAC S1 S2 No S3     NO JVD    ABDOMEN No hepatosplenomegaly   PEDAL EDEMA present No calf tenderness  NO rash       XXXXXXXXXXXXXXXXXXXXXXXXXXXXX  BEST PRACTICE ISSUES.  . HOB ELEVATN.    .... Yes  . DIET.   .... 10/1/2024 cons carb   . IV FLUIDS.  .... 10/4/2024 d5 1/2 ns 50   . DVT PPLX.    .... 10/1/2024 lovenox 40  . STRESS ULCER PPLX.   ....   . INFECTION PPLX.   ....     XXXXXXXXXXXXXXXXXXXXXXXXX  GENERAL DATA .   GOC.    ..    ICU STAY.    .. no   COVID.   ..   ALLGY.   ..     tetracyclin vancomycin iodine   WT.   ..  10/1/2024 99   BMI.  .. 10/1/2024 31     XXXXXXXXXXXXXXXXXXXXXXX  VITALS/GAS EXCHANGE/DRIPS  ABGS.   .  VS/ PO/IO/ VENT/ DRIPS.   10/11/2024 afeb 78 130/60   10/11/2024 ra 93%     CHIEF COMPLAINT.   . 10/1/2024 73 m from wound center for infection r great toe having chills     OVERALL PRESENTATION.  . 10/1/2024 73-year-old male history of CHF, COPD, prostate cancer, renal insufficiency, diabetes, chronic neck pain, presents to ED for admission for right foot wound.  Seen by Dr. Sandoval shortly prior to current evaluation.  No reported fevers but occasionally feels chills.  Denies chest pain, worsening shortness of breath, vomiting, diarrhea not currently on antibiotics.  . 10/1/2024 Pulm consulted for copd sepsis     PMH.  . Epistaxis 1/2024   . Chronic obstructive pulmonary disease (COPD)   . CHF (congestive heart failure) HFPEF   . Prostate cancer sp resection  . Renal insufficiency   . Type II diabetes mellitus.  . HO foot surgery     PAST HOSPITAL STAYS .   .  . 1/8-1/18/2024 [facility] epistaxis     HOME MEDS.  . SYMBICORT 160  . MONTELUKAST   . ALBUTEROL   . ATORVASTAT 80   . METOPROLOL 50.2 SERTRALINE 100   . CLONAZEPAM .75 X 2   . OXYCODONE   . LANTUS 10     XXXXXXXXXXXXXXXXXXXXXXXXXXXXXXXXXXX  PROBLEM ASSESSMENT RECOMMENDATIONS.  RESP.   . COPD   .... former smoker   .... 10/3/2024 albuteroil p   .... 10/3/2024 advair 250   .... 10/6/2024 duoneb p   .... 10/3/2024 montelukast    INFECTION.  .... w 10/1-10/4/2024 w 6.6 - 6.6   .... cxr 10/1/2024 l lower lobe subsegmental atelectasis   .... 10/1/2024 dapto 500/d x 42 d   .... 10/3 cefepime 2.2   .... 10/1/2024 cefazolin 1.3 Dr Amaro dced    CARDIAC.  .... la 10/1 - 10/1/2024 la 2.1 - 1.9   .... Trop   .... ekg 10/1/2024 sr 1 degr av block qtc 458   .... 10/1/2024 metoprolol 25   .... 10/1/2024 rosuvastatin 40   .... pbnp   .... tte 1/12/2024   ........ ef 70%   ........ tapse 2.5   ........ pasp 39 borderline ph   ........ ivc rap 3     HEMAT.  .... Hb 10/1-10/2-10/8-10/10/2024 Hb 9.8 - 9.1 7.6 - 9.3   .... Plt 10/1/2024 163   .... inr 10/1/2024 inr 114     GI.  .... LFTS 10/1/2024   ........ ap 64  ........ ast 15  ....... alt 23   .... 10/1/2024 senna     RENAL.  .... Na 10/1/2024 Na 139   .... K 10/1/2024 k 3.7   .... CO2 10/1/2024 co2 28   .... AG 10/1/2024 ag 9  .... Alb 10/1/2024 alb 2.9   .... Cr 10/1-10/2-10/4-10/9/2024 cr 1.3 - 1.2 - 1.1 - 1.4    ENDO.  NEURO.  .... 10/1/2024 clonazepam .75 x 2   .... 10/1/2024 tramadol       . RO TEMPORAL ARTERITIS   .... 9/25 esr 65   .... 10/5/2024 sp temporal artery biopsy     . CHRONIC r FOOT WOUND   .... 10/2/2024 worsening r mtp wound   .... 9/20 jocelin bl normal waveforms   .... 9/20 mr impending om 1st metatarsal   .... 10/3/2024 resection head 1st metatarsal r foot     DISCUSSIONS.    XXXXXXXXXXXXXXXXXXXXXXXXXXXXXXXXXX.  PROCEDURES/DEVICES.  . 10/8/2024 picc rue brachial vein   . 10/3/2024 resection head 1st metatarsal r foot     CC.   . 10/1/2024 r great toe infection     PROBLEMS .  . FOOT R SSTI (Skin soft tissue infection)   .... 10/1/2024 dapto 500/d x 42 d   .... 10/3-10/7 cefepime 2.2   .... 10/1/2024 cefazolin 1.3 Dr Amaro dced  . RO VTE   .... Venous duplx rue 10/10/2024 (-)   . COPD   . HFPEF   . ANEMIA 10/1/2024 Hb 9.1   . DM2   . RO TEMPORAL ARTERITIS   . CHRONIC r FOOT WOUND   .... 10/2/2024 worsening r mtp wound   .... 9/20 jocelin bl normal waveforms   .... 9/20 mr impending om 1st metatarsal   .... 10/3/2024 resection head 1st metatarsal r foot      TIME SPENT.  . Over 36 minutes aggregate care time spent on encounter; activities included   direct patient care, counseling and/or coordinating care reviewing notes, lab data/ imaging , discussion with multidisciplinary team/ patient  /family and explaining in detail risks, benefits, alternatives  of the recommendations     MAGGIE BLACK 73 m 10/1/2024 1951 DR RASHEED GALLEGOS .  
   REASON FOR VISIT  .. Management of problems listed below        REVIEW OF SYMPTOMS   Able to give ROS  Yes     RELIABILITY +/-   CONSTITUTIONAL Weakness Yes    ENDOCRINE  No heat or cold intolerance    ALLERGY No hives  Sore throat No stridor  RESP Shortness of breath YES   NEURO New weakness No   CARDIAC   Palpitations No         PHYSICAL EXAM    HEENT Unremarkable  atraumatic   RESP Fair air entry  Harsh breath sound   CARDIAC S1 S2 No S3     NO JVD    ABDOMEN No hepatosplenomegaly   PEDAL EDEMA present No calf tenderness  NO rash       XXXXXXXXXXXXXXXXXXXXXXXXXXXXX  BEST PRACTICE ISSUES.  . HOB ELEVATN.    .... Yes  . DIET.   .... 10/1/2024 cons carb   . IV FLUIDS.  .... 10/4/2024 d5 1/2 ns 50   . DVT PPLX.    .... 10/1/2024 lovenox 40  . STRESS ULCER PPLX.   ....   . INFECTION PPLX.   ....     XXXXXXXXXXXXXXXXXXXXXXXXX  GENERAL DATA .   GOC.    ..    ICU STAY.    .. no   COVID.   ..   ALLGY.   ..     tetracyclin vancomycin iodine   WT.   ..  10/1/2024 99   BMI.  .. 10/1/2024 31     XXXXXXXXXXXXXXXXXXXXXXX  VITALS/GAS EXCHANGE/DRIPS  ABGS.   .  VS/ PO/IO/ VENT/ DRIPS.   10/4/2024 afeb 80 110/60   10/4/2024 ra 90%     CHIEF COMPLAINT.   . 10/1/2024 73 m from wound center for infection r great toe having chills     OVERALL PRESENTATION.  . 10/1/2024 73-year-old male history of CHF, COPD, prostate cancer, renal insufficiency, diabetes, chronic neck pain, presents to ED for admission for right foot wound.  Seen by Dr. Sandoval shortly prior to current evaluation.  No reported fevers but occasionally feels chills.  Denies chest pain, worsening shortness of breath, vomiting, diarrhea not currently on antibiotics.  . 10/1/2024 Pulm consulted for copd sepsis     PMH.  . Epistaxis 1/2024   . Chronic obstructive pulmonary disease (COPD)   . CHF (congestive heart failure) HFPEF   . Prostate cancer sp resection  . Renal insufficiency   . Type II diabetes mellitus.  . HO foot surgery     PAST HOSPITAL STAYS .   .  . 1/8-1/18/2024 [facility] epistaxis     HOME MEDS.  . SYMBICORT 160  . MONTELUKAST   . ALBUTEROL   . ATORVASTAT 80   . METOPROLOL 50.2 SERTRALINE 100   . CLONAZEPAM .75 X 2   . OXYCODONE   . LANTUS 10     XXXXXXXXXXXXXXXXXXXXXXXXXXXXXXXXXXX  PROBLEM ASSESSMENT RECOMMENDATIONS.  RESP.   . COPD   .... 10/3/2024 albuteroil p   .... 10/3/2024 advair 250   .... 10/3/2024 montelukast    INFECTION.  .... w 10/1-10/4/2024 w 6.6 - 6.6   .... cxr 10/1/2024 l lower lobe subsegmental atelectasis   .... 10/1/2024 dapto 500/d x 42 d   .... 10/3 cefepime 2.2   .... 10/1/2024 cefazolin 1.3 Dr Amaro dced    CARDIAC.  .... la 10/1 - 10/1/2024 la 2.1 - 1.9   .... Trop   .... ekg 10/1/2024 sr 1 degr av block qtc 458   .... 10/1/2024 metoprolol 25   .... 10/1/2024 rosuvastatin 40   .... pbnp   .... tte 1/12/2024   ........ ef 70%   ........ tapse 2.5   ........ pasp 39 borderline ph   ........ ivc rap 3     HEMAT.  .... Hb 10/1-10/2/2024 Hb 9.8 - 9.1   .... Plt 10/1/2024 163   .... inr 10/1/2024 inr 114     GI.  .... LFTS 10/1/2024   ........ ap 64  ........ ast 15  ....... alt 23   .... 10/1/2024 senna     RENAL.  .... Na 10/1/2024 Na 139   .... K 10/1/2024 k 3.7   .... CO2 10/1/2024 co2 28   .... AG 10/1/2024 ag 9  .... Alb 10/1/2024 alb 2.9   .... Cr 10/1-10/2-10/4/2024 cr 1.3 - 1.2 - 1.1     ENDO.  NEURO.  .... 10/1/2024 clonazepam .75 x 2   .... 10/1/2024 tramadol       . RO TEMPORAL ARTERITIS   .... 9/25 esr 65   .... 10/4/2024 For temporal artery biopsy     . CHRONIC r FOOT WOUND   .... 10/2/2024 worsening r mtp wound   .... 9/20 jocelin bl normal waveforms   .... 9/20 mr impending om 1st metatarsal   .... 10/3/2024 resection head 1st metatarsal r foot     DISCUSSIONS.    XXXXXXXXXXXXXXXXXXXXXXXXXXXXXXXXXX.  PROCEDURES/DEVICES.  . 10/3/2024 resection head 1st metatarsal r foot     CC.   . 10/1/2024 r great toe infection     PROBLEMS .  . FOOT R SSTI (Skin soft tissue infection)   .... 10/1/2024 dapto 500/d x 42 d   .... 10/3 cefepime 2.2   .... 10/1/2024 cefazolin 1.3 Dr Amaro dcestephan  . COPD   . HFPEF   . ANEMIA 10/1/2024 Hb 9.1   . DM2   . RO TEMPORAL ARTERITIS   . CHRONIC r FOOT WOUND   .... 10/2/2024 worsening r mtp wound   .... 9/20 jocelin bl normal waveforms   .... 9/20 mr impending om 1st metatarsal   .... 10/3/2024 resection head 1st metatarsal r foot       TIME SPENT.  . Over 36 minutes aggregate care time spent on encounter; activities included   direct patient care, counseling and/or coordinating care reviewing notes, lab data/ imaging , discussion with multidisciplinary team/ patient  /family and explaining in detail risks, benefits, alternatives  of the recommendations     MAGGIE BLACK 73 m 10/1/2024 1951 DR RASHEED GALLEGOS  
   REASON FOR VISIT  .. Management of problems listed below        REVIEW OF SYMPTOMS   Able to give ROS  Yes     RELIABILITY +/-   CONSTITUTIONAL Weakness Yes    ENDOCRINE  No heat or cold intolerance    ALLERGY No hives  Sore throat No stridor  RESP Shortness of breath YES   NEURO New weakness No   CARDIAC   Palpitations No         PHYSICAL EXAM    HEENT Unremarkable  atraumatic   RESP Fair air entry  Harsh breath sound   CARDIAC S1 S2 No S3     NO JVD    ABDOMEN No hepatosplenomegaly   PEDAL EDEMA present No calf tenderness  NO rash       XXXXXXXXXXXXXXXXXXXXXXXXXXXXX  BEST PRACTICE ISSUES.  . HOB ELEVATN.    .... Yes  . DIET.   .... 10/1/2024 cons carb   . IV FLUIDS.  .... 10/4/2024 d5 1/2 ns 50   . DVT PPLX.    .... 10/1/2024 lovenox 40  . STRESS ULCER PPLX.   ....   . INFECTION PPLX.   ....     XXXXXXXXXXXXXXXXXXXXXXXXX  GENERAL DATA .   GOC.    ..    ICU STAY.    .. no   COVID.   ..   ALLGY.   ..     tetracyclin vancomycin iodine   WT.   ..  10/1/2024 99   BMI.  .. 10/1/2024 31     XXXXXXXXXXXXXXXXXXXXXXX  VITALS/GAS EXCHANGE/DRIPS  ABGS.   .  VS/ PO/IO/ VENT/ DRIPS.   10/7/2024 99f 84 140/60   10/7/2024 ra 95%    CHIEF COMPLAINT.   . 10/1/2024 73 m from wound center for infection r great toe having chills     OVERALL PRESENTATION.  . 10/1/2024 73-year-old male history of CHF, COPD, prostate cancer, renal insufficiency, diabetes, chronic neck pain, presents to ED for admission for right foot wound.  Seen by Dr. Sandoval shortly prior to current evaluation.  No reported fevers but occasionally feels chills.  Denies chest pain, worsening shortness of breath, vomiting, diarrhea not currently on antibiotics.  . 10/1/2024 Pulm consulted for copd sepsis     PMH.  . Epistaxis 1/2024   . Chronic obstructive pulmonary disease (COPD)   . CHF (congestive heart failure) HFPEF   . Prostate cancer sp resection  . Renal insufficiency   . Type II diabetes mellitus.  . HO foot surgery     PAST HOSPITAL STAYS .   .  . 1/8-1/18/2024 [facility] epistaxis     HOME MEDS.  . SYMBICORT 160  . MONTELUKAST   . ALBUTEROL   . ATORVASTAT 80   . METOPROLOL 50.2 SERTRALINE 100   . CLONAZEPAM .75 X 2   . OXYCODONE   . LANTUS 10     XXXXXXXXXXXXXXXXXXXXXXXXXXXXXXXXXXX  PROBLEM ASSESSMENT RECOMMENDATIONS.  RESP.   . COPD   .... former smoker   .... 10/3/2024 albuteroil p   .... 10/3/2024 advair 250   .... 10/6/2024 duoneb p   .... 10/3/2024 montelukast    INFECTION.  .... w 10/1-10/4/2024 w 6.6 - 6.6   .... cxr 10/1/2024 l lower lobe subsegmental atelectasis   .... 10/1/2024 dapto 500/d x 42 d   .... 10/3 cefepime 2.2   .... 10/1/2024 cefazolin 1.3 Dr Amaro dced    CARDIAC.  .... la 10/1 - 10/1/2024 la 2.1 - 1.9   .... Trop   .... ekg 10/1/2024 sr 1 degr av block qtc 458   .... 10/1/2024 metoprolol 25   .... 10/1/2024 rosuvastatin 40   .... pbnp   .... tte 1/12/2024   ........ ef 70%   ........ tapse 2.5   ........ pasp 39 borderline ph   ........ ivc rap 3     HEMAT.  .... Hb 10/1-10/2/2024 Hb 9.8 - 9.1   .... Plt 10/1/2024 163   .... inr 10/1/2024 inr 114     GI.  .... LFTS 10/1/2024   ........ ap 64  ........ ast 15  ....... alt 23   .... 10/1/2024 senna     RENAL.  .... Na 10/1/2024 Na 139   .... K 10/1/2024 k 3.7   .... CO2 10/1/2024 co2 28   .... AG 10/1/2024 ag 9  .... Alb 10/1/2024 alb 2.9   .... Cr 10/1-10/2-10/4/2024 cr 1.3 - 1.2 - 1.1     ENDO.  NEURO.  .... 10/1/2024 clonazepam .75 x 2   .... 10/1/2024 tramadol       . RO TEMPORAL ARTERITIS   .... 9/25 esr 65   .... 10/5/2024 sp temporal artery biopsy     . CHRONIC r FOOT WOUND   .... 10/2/2024 worsening r mtp wound   .... 9/20 jocelin bl normal waveforms   .... 9/20 mr impending om 1st metatarsal   .... 10/3/2024 resection head 1st metatarsal r foot     DISCUSSIONS.    XXXXXXXXXXXXXXXXXXXXXXXXXXXXXXXXXX.  PROCEDURES/DEVICES.  . 10/3/2024 resection head 1st metatarsal r foot     CC.   . 10/1/2024 r great toe infection     PROBLEMS .  . FOOT R SSTI (Skin soft tissue infection)   .... 10/1/2024 dapto 500/d x 42 d   .... 10/3-10/7 cefepime 2.2   .... 10/1/2024 cefazolin 1.3 Dr Amaro dced  . COPD   . HFPEF   . ANEMIA 10/1/2024 Hb 9.1   . DM2   . RO TEMPORAL ARTERITIS   . CHRONIC r FOOT WOUND   .... 10/2/2024 worsening r mtp wound   .... 9/20 jocelin bl normal waveforms   .... 9/20 mr impending om 1st metatarsal   .... 10/3/2024 resection head 1st metatarsal r foot       TIME SPENT.  . Over 36 minutes aggregate care time spent on encounter; activities included   direct patient care, counseling and/or coordinating care reviewing notes, lab data/ imaging , discussion with multidisciplinary team/ patient  /family and explaining in detail risks, benefits, alternatives  of the recommendations     MAGGIE BLACK 73 m 10/1/2024 1950 DR RASHEED GALLEGOS .  
   REASON FOR VISIT  .. Management of problems listed below        REVIEW OF SYMPTOMS   Able to give ROS  Yes     RELIABILITY +/-   CONSTITUTIONAL Weakness Yes    ENDOCRINE  No heat or cold intolerance    ALLERGY No hives  Sore throat No stridor  RESP Shortness of breath YES   NEURO New weakness No   CARDIAC   Palpitations No         PHYSICAL EXAM    HEENT Unremarkable  atraumatic   RESP Fair air entry  Harsh breath sound   CARDIAC S1 S2 No S3     NO JVD    ABDOMEN No hepatosplenomegaly   PEDAL EDEMA present No calf tenderness  NO rash       XXXXXXXXXXXXXXXXXXXXXXXXXXXXX  BEST PRACTICE ISSUES.  . HOB ELEVATN.    .... Yes  . DIET.   .... 10/1/2024 cons carb   . IV FLUIDS.  .... 10/4/2024 d5 1/2 ns 50   . DVT PPLX.    .... 10/1/2024 lovenox 40  . STRESS ULCER PPLX.   ....   . INFECTION PPLX.   ....     XXXXXXXXXXXXXXXXXXXXXXXXX  GENERAL DATA .   GOC.    ..    ICU STAY.    .. no   COVID.   ..   ALLGY.   ..     tetracyclin vancomycin iodine   WT.   ..  10/1/2024 99   BMI.  .. 10/1/2024 31     XXXXXXXXXXXXXXXXXXXXXXX  VITALS/GAS EXCHANGE/DRIPS  ABGS.   .  VS/ PO/IO/ VENT/ DRIPS.   10/8/2024 afeb 80 120/70   10/8/2024 ra 93%    CHIEF COMPLAINT.   . 10/1/2024 73 m from wound center for infection r great toe having chills     OVERALL PRESENTATION.  . 10/1/2024 73-year-old male history of CHF, COPD, prostate cancer, renal insufficiency, diabetes, chronic neck pain, presents to ED for admission for right foot wound.  Seen by Dr. Sandoval shortly prior to current evaluation.  No reported fevers but occasionally feels chills.  Denies chest pain, worsening shortness of breath, vomiting, diarrhea not currently on antibiotics.  . 10/1/2024 Pulm consulted for copd sepsis     PMH.  . Epistaxis 1/2024   . Chronic obstructive pulmonary disease (COPD)   . CHF (congestive heart failure) HFPEF   . Prostate cancer sp resection  . Renal insufficiency   . Type II diabetes mellitus.  . HO foot surgery     PAST HOSPITAL STAYS .   .  . 1/8-1/18/2024 [facility] epistaxis     HOME MEDS.  . SYMBICORT 160  . MONTELUKAST   . ALBUTEROL   . ATORVASTAT 80   . METOPROLOL 50.2 SERTRALINE 100   . CLONAZEPAM .75 X 2   . OXYCODONE   . LANTUS 10     XXXXXXXXXXXXXXXXXXXXXXXXXXXXXXXXXXX  PROBLEM ASSESSMENT RECOMMENDATIONS.  RESP.   . COPD   .... former smoker   .... 10/3/2024 albuteroil p   .... 10/3/2024 advair 250   .... 10/6/2024 duoneb p   .... 10/3/2024 montelukast    INFECTION.  .... w 10/1-10/4/2024 w 6.6 - 6.6   .... cxr 10/1/2024 l lower lobe subsegmental atelectasis   .... 10/1/2024 dapto 500/d x 42 d   .... 10/3 cefepime 2.2   .... 10/1/2024 cefazolin 1.3 Dr Amaro dced    CARDIAC.  .... la 10/1 - 10/1/2024 la 2.1 - 1.9   .... Trop   .... ekg 10/1/2024 sr 1 degr av block qtc 458   .... 10/1/2024 metoprolol 25   .... 10/1/2024 rosuvastatin 40   .... pbnp   .... tte 1/12/2024   ........ ef 70%   ........ tapse 2.5   ........ pasp 39 borderline ph   ........ ivc rap 3     HEMAT.  .... Hb 10/1-10/2-10/8/2024 Hb 9.8 - 9.1 7.6   .... Plt 10/1/2024 163   .... inr 10/1/2024 inr 114     GI.  .... LFTS 10/1/2024   ........ ap 64  ........ ast 15  ....... alt 23   .... 10/1/2024 senna     RENAL.  .... Na 10/1/2024 Na 139   .... K 10/1/2024 k 3.7   .... CO2 10/1/2024 co2 28   .... AG 10/1/2024 ag 9  .... Alb 10/1/2024 alb 2.9   .... Cr 10/1-10/2-10/4/2024 cr 1.3 - 1.2 - 1.1     ENDO.  NEURO.  .... 10/1/2024 clonazepam .75 x 2   .... 10/1/2024 tramadol       . RO TEMPORAL ARTERITIS   .... 9/25 esr 65   .... 10/5/2024 sp temporal artery biopsy     . CHRONIC r FOOT WOUND   .... 10/2/2024 worsening r mtp wound   .... 9/20 jocelin bl normal waveforms   .... 9/20 mr impending om 1st metatarsal   .... 10/3/2024 resection head 1st metatarsal r foot     DISCUSSIONS.    XXXXXXXXXXXXXXXXXXXXXXXXXXXXXXXXXX.  PROCEDURES/DEVICES.  . 10/8/2024 picc rue brachial vein   . 10/3/2024 resection head 1st metatarsal r foot     CC.   . 10/1/2024 r great toe infection     PROBLEMS .  . FOOT R SSTI (Skin soft tissue infection)   .... 10/1/2024 dapto 500/d x 42 d   .... 10/3-10/7 cefepime 2.2   .... 10/1/2024 cefazolin 1.3 Dr Amaro dced  . COPD   . HFPEF   . ANEMIA 10/1/2024 Hb 9.1   . DM2   . RO TEMPORAL ARTERITIS   . CHRONIC r FOOT WOUND   .... 10/2/2024 worsening r mtp wound   .... 9/20 jocelin bl normal waveforms   .... 9/20 mr impending om 1st metatarsal   .... 10/3/2024 resection head 1st metatarsal r foot      TIME SPENT.  . Over 36 minutes aggregate care time spent on encounter; activities included   direct patient care, counseling and/or coordinating care reviewing notes, lab data/ imaging , discussion with multidisciplinary team/ patient  /family and explaining in detail risks, benefits, alternatives  of the recommendations     MAGGIE BLACK 73 m 10/1/2024 1950 DR RASHEED GALLEGOS .  
   REASON FOR VISIT  .. Management of problems listed below        REVIEW OF SYMPTOMS   Able to give ROS  Yes     RELIABILITY +/-   CONSTITUTIONAL Weakness Yes    ENDOCRINE  No heat or cold intolerance    ALLERGY No hives  Sore throat No stridor  RESP Shortness of breath YES   NEURO New weakness No   CARDIAC   Palpitations No         PHYSICAL EXAM    HEENT Unremarkable  atraumatic   RESP Fair air entry  Harsh breath sound   CARDIAC S1 S2 No S3     NO JVD    ABDOMEN No hepatosplenomegaly   PEDAL EDEMA present No calf tenderness  NO rash       XXXXXXXXXXXXXXXXXXXXXXXXXXXXX  BEST PRACTICE ISSUES.  . HOB ELEVATN.    .... Yes  . DIET.   .... 10/1/2024 cons carb   . IV FLUIDS.  .... 10/4/2024 d5 1/2 ns 50   . DVT PPLX.    .... 10/1/2024 lovenox 40  . STRESS ULCER PPLX.   ....   . INFECTION PPLX.   ....     XXXXXXXXXXXXXXXXXXXXXXXXX  GENERAL DATA .   GOC.    ..    ICU STAY.    .. no   COVID.   ..   ALLGY.   ..     tetracyclin vancomycin iodine   WT.   ..  10/1/2024 99   BMI.  .. 10/1/2024 31   ISOLATION.        XXXXXXXXXXXXXXXXXXXXXXX  VITALS/GAS EXCHANGE/DRIPS  ABGS.   .  VS/ PO/IO/ VENT/ DRIPS.   10/10/2024 afeb 76 120/60   10/10/2024 ra 92%     CHIEF COMPLAINT.   . 10/1/2024 73 m from wound center for infection r great toe having chills     OVERALL PRESENTATION.  . 10/1/2024 73-year-old male history of CHF, COPD, prostate cancer, renal insufficiency, diabetes, chronic neck pain, presents to ED for admission for right foot wound.  Seen by Dr. Sandoval shortly prior to current evaluation.  No reported fevers but occasionally feels chills.  Denies chest pain, worsening shortness of breath, vomiting, diarrhea not currently on antibiotics.  . 10/1/2024 Pulm consulted for copd sepsis     PMH.  . Epistaxis 1/2024   . Chronic obstructive pulmonary disease (COPD)   . CHF (congestive heart failure) HFPEF   . Prostate cancer sp resection  . Renal insufficiency   . Type II diabetes mellitus.  . HO foot surgery     PAST HOSPITAL STAYS .   .  . 1/8-1/18/2024 [facility] epistaxis     HOME MEDS.  . SYMBICORT 160  . MONTELUKAST   . ALBUTEROL   . ATORVASTAT 80   . METOPROLOL 50.2 SERTRALINE 100   . CLONAZEPAM .75 X 2   . OXYCODONE   . LANTUS 10     XXXXXXXXXXXXXXXXXXXXXXXXXXXXXXXXXXX  PROBLEM ASSESSMENT RECOMMENDATIONS.  RESP.   . COPD   .... former smoker   .... 10/3/2024 albuteroil p   .... 10/3/2024 advair 250   .... 10/6/2024 duoneb p   .... 10/3/2024 montelukast    INFECTION.  .... w 10/1-10/4/2024 w 6.6 - 6.6   .... cxr 10/1/2024 l lower lobe subsegmental atelectasis   .... 10/1/2024 dapto 500/d x 42 d   .... 10/3 cefepime 2.2   .... 10/1/2024 cefazolin 1.3 Dr Amaro dced    CARDIAC.  .... la 10/1 - 10/1/2024 la 2.1 - 1.9   .... Trop   .... ekg 10/1/2024 sr 1 degr av block qtc 458   .... 10/1/2024 metoprolol 25   .... 10/1/2024 rosuvastatin 40   .... pbnp   .... tte 1/12/2024   ........ ef 70%   ........ tapse 2.5   ........ pasp 39 borderline ph   ........ ivc rap 3     HEMAT.  .... Hb 10/1-10/2-10/8-10/10/2024 Hb 9.8 - 9.1 7.6 - 9.3   .... Plt 10/1/2024 163   .... inr 10/1/2024 inr 114     GI.  .... LFTS 10/1/2024   ........ ap 64  ........ ast 15  ....... alt 23   .... 10/1/2024 senna     RENAL.  .... Na 10/1/2024 Na 139   .... K 10/1/2024 k 3.7   .... CO2 10/1/2024 co2 28   .... AG 10/1/2024 ag 9  .... Alb 10/1/2024 alb 2.9   .... Cr 10/1-10/2-10/4-10/9/2024 cr 1.3 - 1.2 - 1.1 - 1.4    ENDO.  NEURO.  .... 10/1/2024 clonazepam .75 x 2   .... 10/1/2024 tramadol       . RO TEMPORAL ARTERITIS   .... 9/25 esr 65   .... 10/5/2024 sp temporal artery biopsy     . CHRONIC r FOOT WOUND   .... 10/2/2024 worsening r mtp wound   .... 9/20 jocelin bl normal waveforms   .... 9/20 mr impending om 1st metatarsal   .... 10/3/2024 resection head 1st metatarsal r foot     DISCUSSIONS.    [id number].  PROCEDURES/DEVICES.  . 10/8/2024 picc rue brachial vein   . 10/3/2024 resection head 1st metatarsal r foot     CC.   . 10/1/2024 r great toe infection     PROBLEMS .  . FOOT R SSTI (Skin soft tissue infection)   .... 10/1/2024 dapto 500/d x 42 d   .... 10/3-10/7 cefepime 2.2   .... 10/1/2024 cefazolin 1.3 Dr Amaro dced  . RO VTE   .... Venous duplx rue 10/10/2024 (-)   . COPD   . HFPEF   . ANEMIA 10/1/2024 Hb 9.1   . DM2   . RO TEMPORAL ARTERITIS   . CHRONIC r FOOT WOUND   .... 10/2/2024 worsening r mtp wound   .... 9/20 jocelin bl normal waveforms   .... 9/20 mr impending om 1st metatarsal   .... 10/3/2024 resection head 1st metatarsal r foot      TIME SPENT.  . Over 36 minutes aggregate care time spent on encounter; activities included   direct patient care, counseling and/or coordinating care reviewing notes, lab data/ imaging , discussion with multidisciplinary team/ patient  /family and explaining in detail risks, benefits, alternatives  of the recommendations     MAGGIE BLACK 73 m 10/1/2024 1951 DR RASHEED GALLEGOS   
   REASON FOR VISIT  .. Management of problems listed below        REVIEW OF SYMPTOMS   Able to give ROS  Yes     RELIABILITY +/-   CONSTITUTIONAL Weakness Yes    ENDOCRINE  No heat or cold intolerance    ALLERGY No hives  Sore throat No stridor  RESP Shortness of breath YES   NEURO New weakness No   CARDIAC   Palpitations No         PHYSICAL EXAM    HEENT Unremarkable  atraumatic   RESP Fair air entry  Harsh breath sound   CARDIAC S1 S2 No S3     NO JVD    ABDOMEN No hepatosplenomegaly   PEDAL EDEMA present No calf tenderness  NO rash       XXXXXXXXXXXXXXXXXXXXXXXXXXXXX  BEST PRACTICE ISSUES.  . HOB ELEVATN.    .... Yes  . DIET.   .... 10/1/2024 cons carb   . IV FLUIDS.  .... 10/4/2024 d5 1/2 ns 50   . DVT PPLX.    .... 10/1/2024 lovenox 40  . STRESS ULCER PPLX.   ....   . INFECTION PPLX.   ....     XXXXXXXXXXXXXXXXXXXXXXXXX  GENERAL DATA .   GOC.    ..    ICU STAY.    .. no   COVID.   ..   ALLGY.   ..     tetracyclin vancomycin iodine   WT.   ..  10/1/2024 99   BMI.  .. 10/1/2024 31   ISOLATION.        XXXXXXXXXXXXXXXXXXXXXXX  VITALS/GAS EXCHANGE/DRIPS  ABGS.   .  VS/ PO/IO/ VENT/ DRIPS.   10/9/2024 afeb 70 120/50   10/9/2024 ra 92%    CHIEF COMPLAINT.   . 10/1/2024 73 m from wound center for infection r great toe having chills     OVERALL PRESENTATION.  . 10/1/2024 73-year-old male history of CHF, COPD, prostate cancer, renal insufficiency, diabetes, chronic neck pain, presents to ED for admission for right foot wound.  Seen by Dr. Sandoval shortly prior to current evaluation.  No reported fevers but occasionally feels chills.  Denies chest pain, worsening shortness of breath, vomiting, diarrhea not currently on antibiotics.  . 10/1/2024 Pulm consulted for copd sepsis     PMH.  . Epistaxis 1/2024   . Chronic obstructive pulmonary disease (COPD)   . CHF (congestive heart failure) HFPEF   . Prostate cancer sp resection  . Renal insufficiency   . Type II diabetes mellitus.  . HO foot surgery     PAST HOSPITAL STAYS .   .  . 1/8-1/18/2024 [facility] epistaxis     HOME MEDS.  . SYMBICORT 160  . MONTELUKAST   . ALBUTEROL   . ATORVASTAT 80   . METOPROLOL 50.2 SERTRALINE 100   . CLONAZEPAM .75 X 2   . OXYCODONE   . LANTUS 10     XXXXXXXXXXXXXXXXXXXXXXXXXXXXXXXXXXX  PROBLEM ASSESSMENT RECOMMENDATIONS.  RESP.   . COPD   .... former smoker   .... 10/3/2024 albuteroil p   .... 10/3/2024 advair 250   .... 10/6/2024 duoneb p   .... 10/3/2024 montelukast    INFECTION.  .... w 10/1-10/4/2024 w 6.6 - 6.6   .... cxr 10/1/2024 l lower lobe subsegmental atelectasis   .... 10/1/2024 dapto 500/d x 42 d   .... 10/3 cefepime 2.2   .... 10/1/2024 cefazolin 1.3 Dr Amaro dced    CARDIAC.  .... la 10/1 - 10/1/2024 la 2.1 - 1.9   .... Trop   .... ekg 10/1/2024 sr 1 degr av block qtc 458   .... 10/1/2024 metoprolol 25   .... 10/1/2024 rosuvastatin 40   .... pbnp   .... tte 1/12/2024   ........ ef 70%   ........ tapse 2.5   ........ pasp 39 borderline ph   ........ ivc rap 3     HEMAT.  .... Hb 10/1-10/2-10/8/2024 Hb 9.8 - 9.1 7.6   .... Plt 10/1/2024 163   .... inr 10/1/2024 inr 114     GI.  .... LFTS 10/1/2024   ........ ap 64  ........ ast 15  ....... alt 23   .... 10/1/2024 senna     RENAL.  .... Na 10/1/2024 Na 139   .... K 10/1/2024 k 3.7   .... CO2 10/1/2024 co2 28   .... AG 10/1/2024 ag 9  .... Alb 10/1/2024 alb 2.9   .... Cr 10/1-10/2-10/4-10/9/2024 cr 1.3 - 1.2 - 1.1 - 1.4    ENDO.  NEURO.  .... 10/1/2024 clonazepam .75 x 2   .... 10/1/2024 tramadol       . RO TEMPORAL ARTERITIS   .... 9/25 esr 65   .... 10/5/2024 sp temporal artery biopsy     . CHRONIC r FOOT WOUND   .... 10/2/2024 worsening r mtp wound   .... 9/20 jocelin bl normal waveforms   .... 9/20 mr impending om 1st metatarsal   .... 10/3/2024 resection head 1st metatarsal r foot     DISCUSSIONS.    XXXXXXXXXXXXXXXXXXXXXXXXXXXXXXXXXX.  PROCEDURES/DEVICES.  . 10/8/2024 picc rue brachial vein   . 10/3/2024 resection head 1st metatarsal r foot     CC.   . 10/1/2024 r great toe infection     PROBLEMS .  . FOOT R SSTI (Skin soft tissue infection)   .... 10/1/2024 dapto 500/d x 42 d   .... 10/3-10/7 cefepime 2.2   .... 10/1/2024 cefazolin 1.3 Dr Amaro dced  . COPD   . HFPEF   . ANEMIA 10/1/2024 Hb 9.1   . DM2   . RO TEMPORAL ARTERITIS   . CHRONIC r FOOT WOUND   .... 10/2/2024 worsening r mtp wound   .... 9/20 jocelin bl normal waveforms   .... 9/20 mr impending om 1st metatarsal   .... 10/3/2024 resection head 1st metatarsal r foot      TIME SPENT.  . Over 36 minutes aggregate care time spent on encounter; activities included   direct patient care, counseling and/or coordinating care reviewing notes, lab data/ imaging , discussion with multidisciplinary team/ patient  /family and explaining in detail risks, benefits, alternatives  of the recommendations     MAGGIE BLACK 73 m 10/1/2024 1950 DR RASHEED GALLEGOS .  
   REASON FOR VISIT  .. Management of problems listed below        REVIEW OF SYMPTOMS   Able to give ROS  Yes     RELIABILITY +/-   CONSTITUTIONAL Weakness Yes    ENDOCRINE  No heat or cold intolerance    ALLERGY No hives  Sore throat No stridor  RESP Shortness of breath YES   NEURO New weakness No   CARDIAC   Palpitations No         PHYSICAL EXAM    HEENT Unremarkable  atraumatic   RESP Fair air entry  Harsh breath sound   CARDIAC S1 S2 No S3     NO JVD    ABDOMEN No hepatosplenomegaly   PEDAL EDEMA present No calf tenderness  NO rash       XXXXXXXXXXXXXXXXXXXXXXXXXXXXX  BEST PRACTICE ISSUES.  . HOB ELEVATN.    .... Yes  . DIET.   .... 10/1/2024 cons carb   . IV FLUIDS.  .... 10/4/2024 d5 1/2 ns 50   . DVT PPLX.    .... 10/1/2024 lovenox 40  . STRESS ULCER PPLX.   ....   . INFECTION PPLX.   ....     XXXXXXXXXXXXXXXXXXXXXXXXX  GENERAL DATA .   GOC.    ..    ICU STAY.    .. no   COVID.   ..   ALLGY.   ..     tetracyclin vancomycin iodine   WT.   ..  10/1/2024 99   BMI.  .. 10/1/2024 31   ISOLATION.     XXXXXXXXXXXXXXXXXXXXXXX  VITALS/GAS EXCHANGE/DRIPS  ABGS.   .  VS/ PO/IO/ VENT/ DRIPS.   10/6/2024 afeb 90 130/70     CHIEF COMPLAINT.   . 10/1/2024 73 m from wound center for infection r great toe having chills     OVERALL PRESENTATION.  . 10/1/2024 73-year-old male history of CHF, COPD, prostate cancer, renal insufficiency, diabetes, chronic neck pain, presents to ED for admission for right foot wound.  Seen by Dr. Sandoval shortly prior to current evaluation.  No reported fevers but occasionally feels chills.  Denies chest pain, worsening shortness of breath, vomiting, diarrhea not currently on antibiotics.  . 10/1/2024 Pulm consulted for copd sepsis     PMH.  . Epistaxis 1/2024   . Chronic obstructive pulmonary disease (COPD)   . CHF (congestive heart failure) HFPEF   . Prostate cancer sp resection  . Renal insufficiency   . Type II diabetes mellitus.  . HO foot surgery     PAST HOSPITAL STAYS .   .  . 1/8-1/18/2024 [facility] epistaxis     HOME MEDS.  . SYMBICORT 160  . MONTELUKAST   . ALBUTEROL   . ATORVASTAT 80   . METOPROLOL 50.2 SERTRALINE 100   . CLONAZEPAM .75 X 2   . OXYCODONE   . LANTUS 10     XXXXXXXXXXXXXXXXXXXXXXXXXXXXXXXXXXX  PROBLEM ASSESSMENT RECOMMENDATIONS.  RESP.   . COPD   .... former smoker   .... 10/3/2024 albuteroil p   .... 10/3/2024 advair 250   .... 10/6/2024 duoneb p   .... 10/3/2024 montelukast    INFECTION.  .... w 10/1-10/4/2024 w 6.6 - 6.6   .... cxr 10/1/2024 l lower lobe subsegmental atelectasis   .... 10/1/2024 dapto 500/d x 42 d   .... 10/3 cefepime 2.2   .... 10/1/2024 cefazolin 1.3 Dr Amaro dced    CARDIAC.  .... la 10/1 - 10/1/2024 la 2.1 - 1.9   .... Trop   .... ekg 10/1/2024 sr 1 degr av block qtc 458   .... 10/1/2024 metoprolol 25   .... 10/1/2024 rosuvastatin 40   .... pbnp   .... tte 1/12/2024   ........ ef 70%   ........ tapse 2.5   ........ pasp 39 borderline ph   ........ ivc rap 3     HEMAT.  .... Hb 10/1-10/2/2024 Hb 9.8 - 9.1   .... Plt 10/1/2024 163   .... inr 10/1/2024 inr 114     GI.  .... LFTS 10/1/2024   ........ ap 64  ........ ast 15  ....... alt 23   .... 10/1/2024 senna     RENAL.  .... Na 10/1/2024 Na 139   .... K 10/1/2024 k 3.7   .... CO2 10/1/2024 co2 28   .... AG 10/1/2024 ag 9  .... Alb 10/1/2024 alb 2.9   .... Cr 10/1-10/2-10/4/2024 cr 1.3 - 1.2 - 1.1     ENDO.  NEURO.  .... 10/1/2024 clonazepam .75 x 2   .... 10/1/2024 tramadol       . RO TEMPORAL ARTERITIS   .... 9/25 esr 65   .... 10/5/2024 sp temporal artery biopsy     . CHRONIC r FOOT WOUND   .... 10/2/2024 worsening r mtp wound   .... 9/20 jocelin bl normal waveforms   .... 9/20 mr impending om 1st metatarsal   .... 10/3/2024 resection head 1st metatarsal r foot     DISCUSSIONS.    XXXXXXXXXXXXXXXXXXXXXXXXXXXXXXXXXX.  PROCEDURES/DEVICES.  . 10/3/2024 resection head 1st metatarsal r foot     CC.   . 10/1/2024 r great toe infection     PROBLEMS .  . FOOT R SSTI (Skin soft tissue infection)   .... 10/1/2024 dapto 500/d x 42 d   .... 10/3 cefepime 2.2   .... 10/1/2024 cefazolin 1.3 Dr Amaro dced  . COPD   . HFPEF   . ANEMIA 10/1/2024 Hb 9.1   . DM2   . RO TEMPORAL ARTERITIS   . CHRONIC r FOOT WOUND   .... 10/2/2024 worsening r mtp wound   .... 9/20 jocelin bl normal waveforms   .... 9/20 mr impending om 1st metatarsal   .... 10/3/2024 resection head 1st metatarsal r foot       TIME SPENT.  . Over 36 minutes aggregate care time spent on encounter; activities included   direct patient care, counseling and/or coordinating care reviewing notes, lab data/ imaging , discussion with multidisciplinary team/ patient  /family and explaining in detail risks, benefits, alternatives  of the recommendations     MAGGIE BLACK 73 m 10/1/2024 1950 DR RASHEED GALLEGOS .  
   REASON FOR VISIT  .. Management of problems listed below      ROS  . Patient unable to give ROS     PHYSICAL EXAM    HEENT Unremarkable  atraumatic   RESP Fair air entry  Harsh breath sound   CARDIAC S1 S2 No S3     NO JVD    ABDOMEN No hepatosplenomegaly   PEDAL EDEMA present No calf tenderness  NO rash       XXXXXXXXXXXXXXXXXXXXXXXXXXXXX  BEST PRACTICE ISSUES.  . HOB ELEVATN.    .... Yes  . DIET.   .... 10/1/2024 cons carb   . IV FLUIDS.  ....   . DVT PPLX.    .... 10/1/2024 lovenox 40  . STRESS ULCER PPLX.   ....   . INFECTION PPLX.   ....     XXXXXXXXXXXXXXXXXXXXXXXXX  GENERAL DATA .   GOC.    ..    ICU STAY.    .. no   COVID.   ..   ALLGY.   ..     tetracyclin vancomycin iodine   WT.   ..  10/1/2024 99   BMI.  .. 10/1/2024 31   ISOLATION.        XXXXXXXXXXXXXXXXXXXXXXX  VITALS/GAS EXCHANGE/DRIPS  ABGS.   .  VS/ PO/IO/ VENT/ DRIPS.   10/2/2024 afeb 74 100/60   10/2/2024 ra 91%     CHIEF COMPLAINT.   . 10/1/2024 73 m from wound center for infection r great toe having chills     OVERALL PRESENTATION.  . 10/1/2024 73-year-old male history of CHF, COPD, prostate cancer, renal insufficiency, diabetes, chronic neck pain, presents to ED for admission for right foot wound.  Seen by Dr. Sandoval shortly prior to current evaluation.  No reported fevers but occasionally feels chills.  Denies chest pain, worsening shortness of breath, vomiting, diarrhea not currently on antibiotics.  . 10/1/2024 Pulm consulted for copd sepsis     PMH.  . Epistaxis 1/2024   . Chronic obstructive pulmonary disease (COPD)   . CHF (congestive heart failure) HFPEF   . Prostate cancer sp resection  . Renal insufficiency   . Type II diabetes mellitus.  . HO foot surgery     PAST HOSPITAL STAYS .   .  . 1/8-1/18/2024 [facility] epistaxis     HOME MEDS.  . SYMBICORT 160  . MONTELUKAST   . ALBUTEROL   . ATORVASTAT 80   . METOPROLOL 50.2 SERTRALINE 100   . CLONAZEPAM .75 X 2   . OXYCODONE   . LANTUS 10     XXXXXXXXXXXXXXXXXXXXXXXXXXXXXXXXXXX  PROBLEM ASSESSMENT RECOMMENDATIONS.  RESP.   INFECTION.  .... w 10/1/2024 w 6.6   .... cxr 10/1/2024 l lower lobe subsegmental atelectasis   .... 10/1/2024 dapto 500/d x 42 d   .... 10/1/2024 cefazolin 1.3 Dr Amaro     CARDIAC.  .... la 10/1 - 10/1/2024 la 2.1 - 1.9   .... Trop   .... ekg 10/1/2024 sr 1 degr av block qtc 458   .... 10/1/2024 metoprolol 25   .... 10/1/2024 rosuvastatin 40   .... pbnp   .... tte 1/12/2024   ........ ef 70%   ........ tapse 2.5   ........ pasp 39 borderline ph   ........ ivc rap 3     HEMAT.  .... Hb 10/1-10/2/2024 Hb 9.8 - 9.1   .... Plt 10/1/2024 163   .... inr 10/1/2024 inr 114     GI.  .... LFTS 10/1/2024   ........ ap 64  ........ ast 15  ....... alt 23   .... 10/1/2024 senna     RENAL.  .... Na 10/1/2024 Na 139   .... K 10/1/2024 k 3.7   .... CO2 10/1/2024 co2 28   .... AG 10/1/2024 ag 9  .... Alb 10/1/2024 alb 2.9   .... Cr 10/1-10/2/2024 cr 1.3 - 1.2     ENDO.  NEURO.  .... 10/1/2024 clonazepam .75 x 2   .... 10/1/2024 tramadol     . CHRONIC r FOOT WOUND   .... 10/2/2024 worsening r mtp wound   .... 9/20 jocelin bl normal waveforms   .... 9/20 mr impending om 1st metatarsal     DISCUSSIONS.    XXXXXXXXXXXXXXXXXXXXXXXXXXXXXXXXXX.  CURRENT PROBLEMS.  . FOOT R SSTI (Skin soft tissue infection)   .... 10/1/2024 dapto 500/d x 42 d   .... 10/1/2024 cefazolin 1.3 Dr Amaro  . COPD   . HFPEF   . ANEMIA 10/1/2024 Hb 9.1   . DM2   . CHRONIC r FOOT WOUND   .... 10/2/2024 worsening r mtp wound   .... 9/20 jocelin bl normal waveforms   .... 9/20 mr impending om 1st metatarsal       TIME SPENT.  . Over 36 minutes aggregate care time spent on encounter; activities included   direct patient care, counseling and/or coordinating care reviewing notes, lab data/ imaging , discussion with multidisciplinary team/ patient  /family and explaining in detail risks, benefits, alternatives  of the recommendations     MAGGIE BLACK 73 m 10/1/2024 1951 DR RASHEED GALLEGOS   
   REASON FOR VISIT  .. Management of problems listed below      ROS  . Patient unable to give ROS     PHYSICAL EXAM    HEENT Unremarkable  atraumatic   RESP Fair air entry  Harsh breath sound   CARDIAC S1 S2 No S3     NO JVD    ABDOMEN No hepatosplenomegaly   PEDAL EDEMA present No calf tenderness  NO rash       XXXXXXXXXXXXXXXXXXXXXXXXXXXXX  BEST PRACTICE ISSUES.  . HOB ELEVATN.    .... Yes  . DIET.   .... 10/1/2024 cons carb   . IV FLUIDS.  ....   . DVT PPLX.    .... 10/1/2024 lovenox 40  . STRESS ULCER PPLX.   ....   . INFECTION PPLX.   ....     XXXXXXXXXXXXXXXXXXXXXXXXX  GENERAL DATA .   GOC.    ..    ICU STAY.    .. no   COVID.   ..   ALLGY.   ..     tetracyclin vancomycin iodine   WT.   ..  10/1/2024 99   BMI.  .. 10/1/2024 31   ISOLATION.        XXXXXXXXXXXXXXXXXXXXXXX  VITALS/GAS EXCHANGE/DRIPS  ABGS.   .  VS/ PO/IO/ VENT/ DRIPS.   10/3/2024 afeb 73 140/70   10/3/2024 ra 94%     CHIEF COMPLAINT.   . 10/1/2024 73 m from wound center for infection r great toe having chills     OVERALL PRESENTATION.  . 10/1/2024 73-year-old male history of CHF, COPD, prostate cancer, renal insufficiency, diabetes, chronic neck pain, presents to ED for admission for right foot wound.  Seen by Dr. Sandoval shortly prior to current evaluation.  No reported fevers but occasionally feels chills.  Denies chest pain, worsening shortness of breath, vomiting, diarrhea not currently on antibiotics.  . 10/1/2024 Pulm consulted for copd sepsis     PMH.  . Epistaxis 1/2024   . Chronic obstructive pulmonary disease (COPD)   . CHF (congestive heart failure) HFPEF   . Prostate cancer sp resection  . Renal insufficiency   . Type II diabetes mellitus.  . HO foot surgery     PAST HOSPITAL STAYS .   .  . 1/8-1/18/2024 [facility] epistaxis     HOME MEDS.  . SYMBICORT 160  . MONTELUKAST   . ALBUTEROL   . ATORVASTAT 80   . METOPROLOL 50.2 SERTRALINE 100   . CLONAZEPAM .75 X 2   . OXYCODONE   . LANTUS 10     XXXXXXXXXXXXXXXXXXXXXXXXXXXXXXXXXXX  PROBLEM ASSESSMENT RECOMMENDATIONS.  RESP.   . COPD   .... 10/3/2024 albuteroil p   .... 10/3/2024 advair 250   .... 10/3/2024 montelukast    INFECTION.  .... w 10/1/2024 w 6.6   .... cxr 10/1/2024 l lower lobe subsegmental atelectasis   .... 10/1/2024 dapto 500/d x 42 d   .... 10/1/2024 cefazolin 1.3 Dr Amaro     CARDIAC.  .... la 10/1 - 10/1/2024 la 2.1 - 1.9   .... Trop   .... ekg 10/1/2024 sr 1 degr av block qtc 458   .... 10/1/2024 metoprolol 25   .... 10/1/2024 rosuvastatin 40   .... pbnp   .... tte 1/12/2024   ........ ef 70%   ........ tapse 2.5   ........ pasp 39 borderline ph   ........ ivc rap 3     HEMAT.  .... Hb 10/1-10/2/2024 Hb 9.8 - 9.1   .... Plt 10/1/2024 163   .... inr 10/1/2024 inr 114     GI.  .... LFTS 10/1/2024   ........ ap 64  ........ ast 15  ....... alt 23   .... 10/1/2024 senna     RENAL.  .... Na 10/1/2024 Na 139   .... K 10/1/2024 k 3.7   .... CO2 10/1/2024 co2 28   .... AG 10/1/2024 ag 9  .... Alb 10/1/2024 alb 2.9   .... Cr 10/1-10/2/2024 cr 1.3 - 1.2     ENDO.  NEURO.  .... 10/1/2024 clonazepam .75 x 2   .... 10/1/2024 tramadol     . CHRONIC r FOOT WOUND   .... 10/2/2024 worsening r mtp wound   .... 9/20 jocelin bl normal waveforms   .... 9/20 mr impending om 1st metatarsal   .... 10/3/2024 resection head 1st metatarsal r foot     DISCUSSIONS.    XXXXXXXXXXXXXXXXXXXXXXXXXXXXXXXXXX.  PROCEDURES/DEVICES.  . 10/3/2024 resection head 1st metatarsal r foot     CC.   . 10/1/2024 r great toe infection     PROBLEMS .  . FOOT R SSTI (Skin soft tissue infection)   .... 10/1/2024 dapto 500/d x 42 d   .... 10/1/2024 cefazolin 1.3 Dr Amaro  . COPD   . HFPEF   . ANEMIA 10/1/2024 Hb 9.1   . DM2   . CHRONIC r FOOT WOUND   .... 10/2/2024 worsening r mtp wound   .... 9/20 jocelin bl normal waveforms   .... 9/20 mr impending om 1st metatarsal   .... 10/3/2024 resection head 1st metatarsal r foot       TIME SPENT.  . Over 36 minutes aggregate care time spent on encounter; activities included   direct patient care, counseling and/or coordinating care reviewing notes, lab data/ imaging , discussion with multidisciplinary team/ patient  /family and explaining in detail risks, benefits, alternatives  of the recommendations     MAGGIE BLACK 73 m 10/1/2024 1950 DR RASHEED GALLEGOS .  
   REASON FOR VISIT  .. Management of problems listed below      ROS  . Patient unable to give ROS     PHYSICAL EXAM    HEENT Unremarkable  atraumatic   RESP Fair air entry  Harsh breath sound   CARDIAC S1 S2 No S3     NO JVD    ABDOMEN No hepatosplenomegaly   PEDAL EDEMA present No calf tenderness  NO rash       XXXXXXXXXXXXXXXXXXXXXXXXXXXXX  BEST PRACTICE ISSUES.  . HOB ELEVATN.    .... Yes  . DIET.   .... 10/1/2024 cons carb   . IV FLUIDS.  .... 10/4/2024 d5 1/2 ns 50   . DVT PPLX.    .... 10/1/2024 lovenox 40  . STRESS ULCER PPLX.   ....   . INFECTION PPLX.   ....     XXXXXXXXXXXXXXXXXXXXXXXXX  GENERAL DATA .   GOC.    ..    ICU STAY.    .. no   COVID.   ..   ALLGY.   ..     tetracyclin vancomycin iodine   WT.   ..  10/1/2024 99   BMI.  .. 10/1/2024 31     XXXXXXXXXXXXXXXXXXXXXXX  VITALS/GAS EXCHANGE/DRIPS  ABGS.   .  VS/ PO/IO/ VENT/ DRIPS.   10/5/2024 afeb 75 130/70   10/5/2024 ra 92%    CHIEF COMPLAINT.   . 10/1/2024 73 m from wound center for infection r great toe having chills     OVERALL PRESENTATION.  . 10/1/2024 73-year-old male history of CHF, COPD, prostate cancer, renal insufficiency, diabetes, chronic neck pain, presents to ED for admission for right foot wound.  Seen by Dr. Sandoval shortly prior to current evaluation.  No reported fevers but occasionally feels chills.  Denies chest pain, worsening shortness of breath, vomiting, diarrhea not currently on antibiotics.  . 10/1/2024 Pulm consulted for copd sepsis     PMH.  . Epistaxis 1/2024   . Chronic obstructive pulmonary disease (COPD)   . CHF (congestive heart failure) HFPEF   . Prostate cancer sp resection  . Renal insufficiency   . Type II diabetes mellitus.  . HO foot surgery     PAST HOSPITAL STAYS .   .  . 1/8-1/18/2024 [facility] epistaxis     HOME MEDS.  . SYMBICORT 160  . MONTELUKAST   . ALBUTEROL   . ATORVASTAT 80   . METOPROLOL 50.2 SERTRALINE 100   . CLONAZEPAM .75 X 2   . OXYCODONE   . LANTUS 10     XXXXXXXXXXXXXXXXXXXXXXXXXXXXXXXXXXX  PROBLEM ASSESSMENT RECOMMENDATIONS.  RESP.   . COPD   .... 10/3/2024 albuteroil p   .... 10/3/2024 advair 250   .... 10/3/2024 montelukast    INFECTION.  .... w 10/1-10/4/2024 w 6.6 - 6.6   .... cxr 10/1/2024 l lower lobe subsegmental atelectasis   .... 10/1/2024 dapto 500/d x 42 d   .... 10/3 cefepime 2.2   .... 10/1/2024 cefazolin 1.3 Dr Amaro dced    CARDIAC.  .... la 10/1 - 10/1/2024 la 2.1 - 1.9   .... Trop   .... ekg 10/1/2024 sr 1 degr av block qtc 458   .... 10/1/2024 metoprolol 25   .... 10/1/2024 rosuvastatin 40   .... pbnp   .... tte 1/12/2024   ........ ef 70%   ........ tapse 2.5   ........ pasp 39 borderline ph   ........ ivc rap 3     HEMAT.  .... Hb 10/1-10/2/2024 Hb 9.8 - 9.1   .... Plt 10/1/2024 163   .... inr 10/1/2024 inr 114     GI.  .... LFTS 10/1/2024   ........ ap 64  ........ ast 15  ....... alt 23   .... 10/1/2024 senna     RENAL.  .... Na 10/1/2024 Na 139   .... K 10/1/2024 k 3.7   .... CO2 10/1/2024 co2 28   .... AG 10/1/2024 ag 9  .... Alb 10/1/2024 alb 2.9   .... Cr 10/1-10/2-10/4/2024 cr 1.3 - 1.2 - 1.1     ENDO.  NEURO.  .... 10/1/2024 clonazepam .75 x 2   .... 10/1/2024 tramadol       . RO TEMPORAL ARTERITIS   .... 9/25 esr 65   .... 10/4/2024 For temporal artery biopsy     . CHRONIC r FOOT WOUND   .... 10/2/2024 worsening r mtp wound   .... 9/20 jocelin bl normal waveforms   .... 9/20 mr impending om 1st metatarsal   .... 10/3/2024 resection head 1st metatarsal r foot     DISCUSSIONS.    XXXXXXXXXXXXXXXXXXXXXXXXXXXXXXXXXX.  PROCEDURES/DEVICES.  . 10/3/2024 resection head 1st metatarsal r foot     CC.   . 10/1/2024 r great toe infection     PROBLEMS .  . FOOT R SSTI (Skin soft tissue infection)   .... 10/1/2024 dapto 500/d x 42 d   .... 10/3 cefepime 2.2   .... 10/1/2024 cefazolin 1.3 Dr Amaro dced  . COPD   . HFPEF   . ANEMIA 10/1/2024 Hb 9.1   . DM2   . RO TEMPORAL ARTERITIS   . CHRONIC r FOOT WOUND   .... 10/2/2024 worsening r mtp wound   .... 9/20 jocelin bl normal waveforms   .... 9/20 mr impending om 1st metatarsal   .... 10/3/2024 resection head 1st metatarsal r foot       TIME SPENT.  . Over 36 minutes aggregate care time spent on encounter; activities included   direct patient care, counseling and/or coordinating care reviewing notes, lab data/ imaging , discussion with multidisciplinary team/ patient  /family and explaining in detail risks, benefits, alternatives  of the recommendations     MAGGIE BLACK 73 m 10/1/2024 1951 DR RASHEED GALLEGOS  
74yo M PMhx DM2 w/ PAD - chronic Rt foot wound, HTN, COPD, CKD, HFpEF, arrythmia, Prostate CA s/p resection, Depression/Anxiety presents to ED sent by wound care for worsening R MTP wound.   Vascular surgery now consulted for left temp tenderness and headache x 3 weeks; no visual loss but with chronic double vision  Elevated ESR  Neurology recommending temp artery biopsy  Will arrange for left temp artery biopsy with Dr Gloria tomorrow  NPO P MN
Right 1st metatarsal head medial wound down to skin, subcutaneous tissue, fat, bone  s/p right 1st metatarsal head resection (DOS: 10/3/24)
pt with infected rt foor - rt big toe  cellulitis  dm2  copd  chf- compensated  pvd  neuropathy  no angina-ekg - old anteroseptal  mi  cardiac status stable low risk for resection rt big  toe head 10/3  pt tolerated resection of rt 1st metatarsal head 10/3
pt with infected rt foor - rt big toe  cellulitis  dm2  copd  chf- compensated  pvd  neuropathy  no angina-ekg - old anteroseptal  mi  cardiac status stable low risk for resection rt big  toe head 10/3  pt tolerated resection of rt 1st metatarsal head 10/3  to have echo  for lv function 10/5  pt tolerated bx of temporal  artery 10/5
pt with infected rt foor - rt big toe  cellulitis  dm2  copd  chf- compensated  pvd  neuropathy  no angina-ekg - old anteroseptal  mi  cardiac status stable low risk for resection rt big  toe head 10/3
pt with infected rt foor - rt big toe  cellulitis  dm2  copd  chf- compensated  pvd  neuropathy  no angina-ekg - old anteroseptal  mi  cardiac status stable low risk for resection rt big  toe head 10/3  pt tolerated resection of rt 1st metatarsal head 10/3  to have echo  for lv function 10/5  pt tolerated bx of left  temporal  artery 10/5
pt with infected rt foor - rt big toe  cellulitis  dm2  copd  chf- compensated  pvd  neuropathy  no angina-ekg - old anteroseptal  mi  cardiac status stable low risk for resection rt big  toe head 10/3  pt tolerated resection of rt 1st metatarsal head 10/3  to have echo  for lv function 10/5  pt tolerated bx of left  temporal  artery 10/5
72yo M PMhx DM2 w/ PAD - chronic Rt foot wound, HTN, COPD, CKD, HFpEF, arrythmia, Prostate CA s/p resection, Depression/Anxiety presents to ED sent by wound care for worsening R MTP wound. Patient reports having a nonhealing R foot wound, s/p multiple debridements and grafts at [place], for past 1.5 years. Denies seeing a vascular surgeon in the past.  Denies pain the wound, but reports having neuropathy. States following wound care and reports worsening of wound. Reporting having some chills in the past week. Denies fever, chills, SOB or any other complaints.Patient examined and evaluated at this time. Antibiotics as per infectious disease recommendations. Recommend vascular surgery evaluation. Tentatively planning for right 1st metatarsal head resection pending vascular evaluation. Continue local wound care and offloading at this time.
74yo M PMhx DM2 w/ PAD - chronic Rt foot wound, HTN, COPD, CKD, HFpEF, arrythmia, Prostate CA s/p resection, Depression/Anxiety presents to ED sent by wound care for worsening R MTP wound. Patient reports having a nonhealing R foot wound, s/p multiple debridements and grafts at [place], for past 1.5 years. Denies seeing a vascular surgeon in the past.  Denies pain the wound, but reports having neuropathy. States following wound care and reports worsening of wound. Reporting having some chills in the past week. Denies fever, chills, SOB or any other complaints.Patient examined and evaluated at this time. Antibiotics as per infectious disease recommendations. Recommend vascular surgery evaluation. Tentatively planning for right 1st metatarsal head resection pending vascular evaluation. Continue local wound care and offloading at this time.
74yo M PMhx DM2 w/ PAD - chronic Rt foot wound, HTN, COPD, CKD, HFpEF, arrythmia, Prostate CA s/p resection, Depression/Anxiety presents to ED sent by wound care for worsening R MTP wound. Patient reports having a nonhealing R foot wound, s/p multiple debridements and grafts at [place], for past 1.5 years. Denies seeing a vascular surgeon in the past.  Denies pain the wound, but reports having neuropathy. States following wound care and reports worsening of wound. Reporting having some chills in the past week. Denies fever, chills, SOB or any other complaints.Patient examined and evaluated at this time. Antibiotics as per infectious disease recommendations. Recommend vascular surgery evaluation. Tentatively planning for right 1st metatarsal head resection pending vascular evaluation. Continue local wound care and offloading at this time.
74yo M PMhx DM2 w/ PAD - chronic Rt foot wound, HTN, COPD, CKD, HFpEF, arrythmia, Prostate CA s/p resection, Depression/Anxiety presents to ED sent by wound care for worsening R MTP wound. Patient reports having a nonhealing R foot wound, s/p multiple debridements and grafts at [place], for past 1.5 years. Denies seeing a vascular surgeon in the past.  Denies pain the wound, but reports having neuropathy. States following wound care and reports worsening of wound. Reporting having some chills in the past week. Denies fever, chills, SOB or any other complaints.Patient examined and evaluated at this time. Antibiotics as per infectious disease recommendations. Recommend vascular surgery evaluation. Tentatively planning for right 1st metatarsal head resection pending vascular evaluation. Continue local wound care and offloading at this time.
72yo M PMhx DM2 w/ PAD - chronic Rt foot wound, HTN, COPD, CKD, HFpEF, arrythmia, Prostate CA s/p resection, Depression/Anxiety presents to ED sent by wound care for worsening R MTP wound. Patient reports having a nonhealing R foot wound, s/p multiple debridements and grafts at [place], for past 1.5 years. Denies seeing a vascular surgeon in the past.  Denies pain the wound, but reports having neuropathy. States following wound care and reports worsening of wound. Reporting having some chills in the past week. Denies fever, chills, SOB or any other complaints.Patient examined and evaluated at this time. Antibiotics as per infectious disease recommendations. Recommend vascular surgery evaluation. Tentatively planning for right 1st metatarsal head resection pending vascular evaluation. Continue local wound care and offloading at this time.
74yo M PMhx DM2 w/ PAD - chronic Rt foot wound, HTN, COPD, CKD, HFpEF, arrythmia, Prostate CA s/p resection, Depression/Anxiety presents to ED sent by wound care for worsening R MTP wound. Patient reports having a nonhealing R foot wound, s/p multiple debridements and grafts at [place], for past 1.5 years. Denies seeing a vascular surgeon in the past.  Denies pain the wound, but reports having neuropathy. States following wound care and reports worsening of wound. Reporting having some chills in the past week. Denies fever, chills, SOB or any other complaints.Patient examined and evaluated at this time. Antibiotics as per infectious disease recommendations. Recommend vascular surgery evaluation. Tentatively planning for right 1st metatarsal head resection pending vascular evaluation. Continue local wound care and offloading at this time.
72yo M PMhx DM2 w/ PAD - chronic Rt foot wound, HTN, COPD, CKD, HFpEF, arrythmia, Prostate CA s/p resection, Depression/Anxiety presents to ED sent by wound care for worsening R MTP wound. Patient reports having a nonhealing R foot wound, s/p multiple debridements and grafts at [place], for past 1.5 years. Denies seeing a vascular surgeon in the past.  Denies pain the wound, but reports having neuropathy. States following wound care and reports worsening of wound. Reporting having some chills in the past week. Denies fever, chills, SOB or any other complaints.Patient examined and evaluated at this time. Antibiotics as per infectious disease recommendations. Recommend vascular surgery evaluation. Tentatively planning for right 1st metatarsal head resection pending vascular evaluation. Continue local wound care and offloading at this time.
74yo M PMhx DM2 w/ PAD - chronic Rt foot wound, HTN, COPD, CKD, HFpEF, arrythmia, Prostate CA s/p resection, Depression/Anxiety presents to ED sent by wound care for worsening R MTP wound. Patient reports having a nonhealing R foot wound, s/p multiple debridements and grafts at [place], for past 1.5 years. Denies seeing a vascular surgeon in the past.  Denies pain the wound, but reports having neuropathy. States following wound care and reports worsening of wound. Reporting having some chills in the past week. Denies fever, chills, SOB or any other complaints.Patient examined and evaluated at this time. Antibiotics as per infectious disease recommendations. Recommend vascular surgery evaluation. Tentatively planning for right 1st metatarsal head resection pending vascular evaluation. Continue local wound care and offloading at this time.

## 2024-10-11 NOTE — PROGRESS NOTE ADULT - REASON FOR ADMISSION
right foot wound

## 2024-10-11 NOTE — PROGRESS NOTE ADULT - SUBJECTIVE AND OBJECTIVE BOX
CHIEF COMPLAINT/ REASON FOR VISIT  .. Patient was seen to address the  issue listed under PROBLEM LIST which is located toward bottom of this note     WAYNE SERRANO    PLV 1EAS 113 D1    Allergies    tetracycline (Unknown)  IODINE (Unknown)  vancomycin (Other)    Intolerances        PAST MEDICAL & SURGICAL HISTORY:  Prostate cancer      Type II diabetes mellitus      Chronic obstructive pulmonary disease (COPD)      CHF (congestive heart failure)      Renal insufficiency      H/O migraine      Insomnia      Constipation      S/P foot surgery          FAMILY HISTORY:      Home Medications:  acetaminophen 325 mg oral tablet: 2 tab(s) orally every 6 hours As needed Temp greater or equal to 38C (100.4F), Mild Pain (1 - 3) (09 Oct 2024 13:26)  ascorbic acid 500 mg oral tablet: 1 tab(s) orally 2 times a day (09 Oct 2024 13:27)  azelaic acid 15% topical gel: Apply topically to affected area 2 times a day as needed (01 Oct 2024 15:27)  benzonatate 100 mg oral capsule: 1 cap(s) orally 3 times a day As needed Cough (09 Oct 2024 13:26)  bisacodyl 10 mg rectal suppository: 1 suppository(ies) rectal once a day As needed Constipation (09 Oct 2024 13:27)  clonazePAM 0.25 mg oral tablet, disintegrating: 3 tab(s) orally 2 times a day (09 Oct 2024 13:26)  DAPTOmycin 500 mg intravenous injection: 500 milligram(s) intravenous every 24 hours last day is 11/16/2024 (09 Oct 2024 13:27)  enoxaparin 40 mg/0.4 mL injectable solution: 40 milligram(s) subcutaneous once a day (09 Oct 2024 13:26)  ferrous sulfate 325 mg (65 mg elemental iron) oral tablet: 1 tab(s) orally once a day (09 Oct 2024 13:27)  fluticasone 50 mcg/inh nasal spray: 1 spray(s) in each nostril 2 times a day as needed (01 Oct 2024 15:29)  furosemide 40 mg oral tablet: 1 tab(s) orally once a day (01 Oct 2024 15:16)  guaiFENesin 100 mg/5 mL oral liquid: 10 milliliter(s) orally every 6 hours As needed Cough (09 Oct 2024 13:26)  insulin glargine 100 units/mL subcutaneous solution: 10 unit(s) subcutaneous once a day (at bedtime) (09 Oct 2024 13:26)  ipratropium-albuterol 0.5 mg-2.5 mg/3 mL inhalation solution: 3 milliliter(s) inhaled every 4 hours As needed Shortness of Breath and/or Wheezing (09 Oct 2024 13:26)  loratadine 10 mg oral tablet: 1 tab(s) orally once a day (in the morning) (01 Oct 2024 15:17)  melatonin 3 mg oral tablet: 1 tab(s) orally once a day (at bedtime) As needed Insomnia (09 Oct 2024 13:26)  metFORMIN 500 mg oral tablet: 2 tab(s) orally 2 times a day (01 Oct 2024 15:18)  metoprolol succinate 25 mg oral tablet, extended release: 1 tab(s) orally once a day (09 Oct 2024 13:26)  montelukast 10 mg oral tablet: 1 tab(s) orally once a day (09 Oct 2024 13:27)  Multiple Vitamins with Minerals oral tablet: 1 tab(s) orally once a day (09 Oct 2024 13:27)  ocular lubricant ophthalmic solution: 1 drop(s) to each affected eye 3 times a day As needed eye irritation (09 Oct 2024 13:27)  Opzelura 1.5% topical cream: Apply topically to affected area 2 times a day as needed (01 Oct 2024 15:29)  oxyCODONE 10 mg oral tablet, extended release: 1 tab(s) orally every 12 hours (09 Oct 2024 13:26)  pantoprazole 40 mg oral delayed release tablet: 1 tab(s) orally once a day (before a meal) (09 Oct 2024 13:27)  polyethylene glycol 3350 oral powder for reconstitution: 17 gram(s) orally once a day (09 Oct 2024 13:27)  predniSONE 50 mg oral tablet: 1 tab(s) orally once a day Tapering schedule as per neurology Dr. Mccartney and PCP (09 Oct 2024 13:26)  pregabalin 150 mg oral capsule: 1 cap(s) orally 2 times a day (09 Oct 2024 13:26)  rosuvastatin 40 mg oral tablet: 1 tab(s) orally once a day (01 Oct 2024 15:21)  Saline Mist 0.65% nasal spray: 1 spray(s) intranasally 2 times a day (01 Oct 2024 15:29)  senna leaf extract oral tablet: 2 tab(s) orally once a day (at bedtime) (09 Oct 2024 13:27)  sertraline 100 mg oral tablet: 1 tab(s) orally once a day (09 Oct 2024 13:26)  Spiriva 18 mcg inhalation capsule: 1 cap(s) inhaled once a day (01 Oct 2024 15:23)  traMADol 50 mg oral tablet: 1 tab(s) orally 3 times a day As needed Moderate Pain (4 - 6) (09 Oct 2024 13:26)      MEDICATIONS  (STANDING):  chlorhexidine 2% Cloths 1 Application(s) Topical <User Schedule>  clonazePAM Oral Disintegrating Tablet 0.75 milliGRAM(s) Oral two times a day  DAPTOmycin IVPB 500 milliGRAM(s) IV Intermittent every 24 hours  dextrose 5%. 1000 milliLiter(s) (100 mL/Hr) IV Continuous <Continuous>  dextrose 5%. 1000 milliLiter(s) (50 mL/Hr) IV Continuous <Continuous>  dextrose 50% Injectable 25 Gram(s) IV Push once  dextrose 50% Injectable 25 Gram(s) IV Push once  dextrose 50% Injectable 12.5 Gram(s) IV Push once  enoxaparin Injectable 40 milliGRAM(s) SubCutaneous every 24 hours  ferrous    sulfate 325 milliGRAM(s) Oral daily  fluticasone propionate/ salmeterol 250-50 MICROgram(s) Diskus 1 Dose(s) Inhalation two times a day  glucagon  Injectable 1 milliGRAM(s) IntraMuscular once  hydrocortisone 2.5% Ointment 1 Application(s) Topical three times a day  influenza  Vaccine (HIGH DOSE) 0.5 milliLiter(s) IntraMuscular once  insulin glargine Injectable (LANTUS) 10 Unit(s) SubCutaneous at bedtime  insulin lispro (ADMELOG) corrective regimen sliding scale   SubCutaneous three times a day before meals  lactobacillus acidophilus 1 Tablet(s) Oral every 12 hours  metoprolol succinate ER 25 milliGRAM(s) Oral daily  montelukast 10 milliGRAM(s) Oral daily  oxyCODONE  ER Tablet 10 milliGRAM(s) Oral every 12 hours  pantoprazole    Tablet 40 milliGRAM(s) Oral before breakfast  polyethylene glycol 3350 17 Gram(s) Oral daily  predniSONE   Tablet 50 milliGRAM(s) Oral daily  pregabalin 150 milliGRAM(s) Oral two times a day  rosuvastatin 40 milliGRAM(s) Oral at bedtime  senna 2 Tablet(s) Oral at bedtime  sertraline 100 milliGRAM(s) Oral daily  sucralfate 1 Gram(s) Oral two times a day    MEDICATIONS  (PRN):  acetaminophen     Tablet .. 650 milliGRAM(s) Oral every 6 hours PRN Temp greater or equal to 38C (100.4F), Mild Pain (1 - 3)  albuterol    90 MICROgram(s) HFA Inhaler 2 Puff(s) Inhalation every 6 hours PRN Shortness of Breath and/or Wheezing  albuterol/ipratropium for Nebulization 3 milliLiter(s) Nebulizer every 4 hours PRN Shortness of Breath and/or Wheezing  aluminum hydroxide/magnesium hydroxide/simethicone Suspension 30 milliLiter(s) Oral every 4 hours PRN Dyspepsia  artificial  tears Solution 1 Drop(s) Both EYES three times a day PRN eye irritation  benzonatate 100 milliGRAM(s) Oral three times a day PRN Cough  bisacodyl Suppository 10 milliGRAM(s) Rectal daily PRN Constipation  dextrose Oral Gel 15 Gram(s) Oral once PRN Blood Glucose LESS THAN 70 milliGRAM(s)/deciliter  diphenhydrAMINE 25 milliGRAM(s) Oral every 6 hours PRN Rash and/or Itching  guaiFENesin Oral Liquid (Sugar-Free) 200 milliGRAM(s) Oral every 6 hours PRN Cough  melatonin 3 milliGRAM(s) Oral at bedtime PRN Insomnia  ondansetron Injectable 4 milliGRAM(s) IV Push every 8 hours PRN Nausea and/or Vomiting  sodium chloride 0.65% Nasal 1 Spray(s) Both Nostrils two times a day PRN Congestion  sodium chloride 0.9% lock flush 10 milliLiter(s) IV Push every 1 hour PRN Pre/post blood products, medications, blood draw, and to maintain line patency  traMADol 50 milliGRAM(s) Oral three times a day PRN Moderate Pain (4 - 6)      Diet, Consistent Carbohydrate/No Snacks:   DASH/TLC Sodium & Cholesterol Restricted  Wilfred(7 Gm Arginine/7 Gm Glut/1.2 Gm HMB     Qty per Day:  2 (10-08-24 @ 13:46) [Pending Verification By Attending]  Diet, Consistent Carbohydrate w/Evening Snack:   DASH/TLC Sodium & Cholesterol Restricted (10-05-24 @ 11:25) [Active]          Vital Signs Last 24 Hrs  T(C): 36.8 (11 Oct 2024 00:19), Max: 36.8 (10 Oct 2024 21:09)  T(F): 98.3 (11 Oct 2024 00:19), Max: 98.3 (10 Oct 2024 21:09)  HR: 82 (11 Oct 2024 00:19) (70 - 93)  BP: 160/70 (11 Oct 2024 00:19) (134/71 - 160/70)  BP(mean): --  RR: 18 (11 Oct 2024 00:19) (18 - 18)  SpO2: 97% (11 Oct 2024 00:19) (92% - 97%)    Parameters below as of 11 Oct 2024 00:19  Patient On (Oxygen Delivery Method): room air                  LABS:                        9.3    6.70  )-----------( 228      ( 10 Oct 2024 07:38 )             28.5     10-10    138  |  103  |  27[H]  ----------------------------<  188[H]  4.0   |  23  |  1.10    Ca    9.2      10 Oct 2024 07:38      PT/INR - ( 09 Oct 2024 11:11 )   PT: 14.6 sec;   INR: 1.25 ratio           Urinalysis Basic - ( 10 Oct 2024 07:38 )    Color: x / Appearance: x / SG: x / pH: x  Gluc: 188 mg/dL / Ketone: x  / Bili: x / Urobili: x   Blood: x / Protein: x / Nitrite: x   Leuk Esterase: x / RBC: x / WBC x   Sq Epi: x / Non Sq Epi: x / Bacteria: x            WBC:  WBC Count: 6.70 K/uL (10-10 @ 07:38)  WBC Count: 8.01 K/uL (10-09 @ 11:11)  WBC Count: 6.26 K/uL (10-08 @ 09:30)  WBC Count: See Note (10-08 @ 07:15)      MICROBIOLOGY:  RECENT CULTURES:              PT/INR - ( 09 Oct 2024 11:11 )   PT: 14.6 sec;   INR: 1.25 ratio             Sodium:  Sodium: 138 mmol/L (10-10 @ 07:38)  Sodium: 136 mmol/L (10-09 @ 11:11)  Sodium: 135 mmol/L (10-08 @ 07:15)      1.10 mg/dL 10-10 @ 07:38  1.40 mg/dL 10-09 @ 11:11  1.00 mg/dL 10-08 @ 07:15      Hemoglobin:  Hemoglobin: 9.3 g/dL (10-10 @ 07:38)  Hemoglobin: 10.5 g/dL (10-09 @ 11:11)  Hemoglobin: 9.6 g/dL (10-08 @ 09:30)  Hemoglobin: See note (10-08 @ 07:15)      Platelets: Platelet Count - Automated: 228 K/uL (10-10 @ 07:38)  Platelet Count - Automated: 267 K/uL (10-09 @ 11:11)  Platelet Count - Automated: 213 K/uL (10-08 @ 09:30)  Platelet Count - Automated: Clotted (10-08 @ 07:15)          Urinalysis Basic - ( 10 Oct 2024 07:38 )    Color: x / Appearance: x / SG: x / pH: x  Gluc: 188 mg/dL / Ketone: x  / Bili: x / Urobili: x   Blood: x / Protein: x / Nitrite: x   Leuk Esterase: x / RBC: x / WBC x   Sq Epi: x / Non Sq Epi: x / Bacteria: x        RADIOLOGY & ADDITIONAL STUDIES:      MICROBIOLOGY:  RECENT CULTURES:

## 2024-10-18 ENCOUNTER — OUTPATIENT (OUTPATIENT)
Dept: OUTPATIENT SERVICES | Facility: HOSPITAL | Age: 74
LOS: 1 days | Discharge: HOME | End: 2024-10-18
Payer: COMMERCIAL

## 2024-10-18 ENCOUNTER — APPOINTMENT (OUTPATIENT)
Dept: WOUND CARE | Facility: HOSPITAL | Age: 74
End: 2024-10-18

## 2024-10-18 VITALS
HEART RATE: 70 BPM | WEIGHT: 215 LBS | RESPIRATION RATE: 18 BRPM | DIASTOLIC BLOOD PRESSURE: 57 MMHG | HEIGHT: 70 IN | OXYGEN SATURATION: 95 % | BODY MASS INDEX: 30.78 KG/M2 | SYSTOLIC BLOOD PRESSURE: 94 MMHG | TEMPERATURE: 98 F

## 2024-10-18 DIAGNOSIS — E11.621 TYPE 2 DIABETES MELLITUS WITH FOOT ULCER: ICD-10-CM

## 2024-10-18 DIAGNOSIS — Z98.890 OTHER SPECIFIED POSTPROCEDURAL STATES: Chronic | ICD-10-CM

## 2024-10-18 PROCEDURE — 99024 POSTOP FOLLOW-UP VISIT: CPT

## 2024-10-18 PROCEDURE — G0463: CPT

## 2024-10-19 PROBLEM — G47.00 INSOMNIA, UNSPECIFIED: Chronic | Status: ACTIVE | Noted: 2024-10-02

## 2024-10-30 DIAGNOSIS — Z79.84 LONG TERM (CURRENT) USE OF ORAL HYPOGLYCEMIC DRUGS: ICD-10-CM

## 2024-10-30 DIAGNOSIS — Z79.899 OTHER LONG TERM (CURRENT) DRUG THERAPY: ICD-10-CM

## 2024-10-30 DIAGNOSIS — I50.9 HEART FAILURE, UNSPECIFIED: ICD-10-CM
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
## 2024-10-30 DIAGNOSIS — Z98.49 CATARACT EXTRACTION STATUS, UNSPECIFIED EYE: ICD-10-CM

## 2024-10-30 DIAGNOSIS — T81.89XD OTHER COMPLICATIONS OF PROCEDURES, NOT ELSEWHERE CLASSIFIED, SUBSEQUENT ENCOUNTER: ICD-10-CM
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
## 2024-10-30 DIAGNOSIS — E11.40 TYPE 2 DIABETES MELLITUS WITH DIABETIC NEUROPATHY, UNSPECIFIED: ICD-10-CM

## 2024-10-30 DIAGNOSIS — Z79.51 LONG TERM (CURRENT) USE OF INHALED STEROIDS: ICD-10-CM

## 2024-10-30 DIAGNOSIS — E11.59 TYPE 2 DIABETES MELLITUS WITH OTHER CIRCULATORY COMPLICATIONS: ICD-10-CM

## 2024-10-30 DIAGNOSIS — D64.9 ANEMIA, UNSPECIFIED: ICD-10-CM

## 2024-10-30 DIAGNOSIS — E11.22 TYPE 2 DIABETES MELLITUS WITH DIABETIC CHRONIC KIDNEY DISEASE: ICD-10-CM

## 2024-10-30 DIAGNOSIS — J45.909 UNSPECIFIED ASTHMA, UNCOMPLICATED: ICD-10-CM

## 2024-10-30 DIAGNOSIS — Z85.46 PERSONAL HISTORY OF MALIGNANT NEOPLASM OF PROSTATE: ICD-10-CM

## 2024-10-30 DIAGNOSIS — N18.9 CHRONIC KIDNEY DISEASE, UNSPECIFIED: ICD-10-CM

## 2024-10-31 PROBLEM — Z86.69 PERSONAL HISTORY OF OTHER DISEASES OF THE NERVOUS SYSTEM AND SENSE ORGANS: Chronic | Status: ACTIVE | Noted: 2024-10-02

## 2024-10-31 PROBLEM — K59.00 CONSTIPATION, UNSPECIFIED: Chronic | Status: ACTIVE | Noted: 2024-10-02

## 2024-11-04 ENCOUNTER — APPOINTMENT (OUTPATIENT)
Dept: WOUND CARE | Facility: HOSPITAL | Age: 74
End: 2024-11-04
Payer: COMMERCIAL

## 2024-11-04 ENCOUNTER — OUTPATIENT (OUTPATIENT)
Dept: OUTPATIENT SERVICES | Facility: HOSPITAL | Age: 74
LOS: 1 days | Discharge: HOME | End: 2024-11-04
Payer: COMMERCIAL

## 2024-11-04 ENCOUNTER — NON-APPOINTMENT (OUTPATIENT)
Age: 74
End: 2024-11-04

## 2024-11-04 VITALS
SYSTOLIC BLOOD PRESSURE: 99 MMHG | HEART RATE: 80 BPM | OXYGEN SATURATION: 94 % | DIASTOLIC BLOOD PRESSURE: 63 MMHG | WEIGHT: 215 LBS | BODY MASS INDEX: 30.78 KG/M2 | TEMPERATURE: 98.2 F | HEIGHT: 70 IN | RESPIRATION RATE: 18 BRPM

## 2024-11-04 DIAGNOSIS — T81.89XD OTHER COMPLICATIONS OF PROCEDURES, NOT ELSEWHERE CLASSIFIED, SUBSEQUENT ENCOUNTER: ICD-10-CM

## 2024-11-04 DIAGNOSIS — Z98.890 OTHER SPECIFIED POSTPROCEDURAL STATES: Chronic | ICD-10-CM

## 2024-11-04 DIAGNOSIS — E11.40 TYPE 2 DIABETES MELLITUS WITH DIABETIC NEUROPATHY, UNSPECIFIED: ICD-10-CM

## 2024-11-04 PROCEDURE — 99024 POSTOP FOLLOW-UP VISIT: CPT

## 2024-11-04 PROCEDURE — 99213 OFFICE O/P EST LOW 20 MIN: CPT | Mod: 24

## 2024-11-04 PROCEDURE — G0463: CPT

## 2024-11-04 RX ORDER — PREDNISONE 20 MG/1
20 TABLET ORAL
Refills: 0 | Status: ACTIVE | COMMUNITY

## 2024-11-05 DIAGNOSIS — Z85.46 PERSONAL HISTORY OF MALIGNANT NEOPLASM OF PROSTATE: ICD-10-CM

## 2024-11-05 DIAGNOSIS — E11.22 TYPE 2 DIABETES MELLITUS WITH DIABETIC CHRONIC KIDNEY DISEASE: ICD-10-CM

## 2024-11-05 DIAGNOSIS — T81.89XD OTHER COMPLICATIONS OF PROCEDURES, NOT ELSEWHERE CLASSIFIED, SUBSEQUENT ENCOUNTER: ICD-10-CM

## 2024-11-05 DIAGNOSIS — E11.59 TYPE 2 DIABETES MELLITUS WITH OTHER CIRCULATORY COMPLICATIONS: ICD-10-CM

## 2024-11-05 DIAGNOSIS — Z98.49 CATARACT EXTRACTION STATUS, UNSPECIFIED EYE: ICD-10-CM

## 2024-11-05 DIAGNOSIS — Z79.51 LONG TERM (CURRENT) USE OF INHALED STEROIDS: ICD-10-CM

## 2024-11-05 DIAGNOSIS — Z79.84 LONG TERM (CURRENT) USE OF ORAL HYPOGLYCEMIC DRUGS: ICD-10-CM

## 2024-11-05 DIAGNOSIS — D64.9 ANEMIA, UNSPECIFIED: ICD-10-CM
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
## 2024-11-05 DIAGNOSIS — Z79.899 OTHER LONG TERM (CURRENT) DRUG THERAPY: ICD-10-CM

## 2024-11-05 DIAGNOSIS — I50.9 HEART FAILURE, UNSPECIFIED: ICD-10-CM

## 2024-11-05 DIAGNOSIS — E11.40 TYPE 2 DIABETES MELLITUS WITH DIABETIC NEUROPATHY, UNSPECIFIED: ICD-10-CM
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
## 2024-11-05 DIAGNOSIS — J45.909 UNSPECIFIED ASTHMA, UNCOMPLICATED: ICD-10-CM

## 2024-11-21 ENCOUNTER — OUTPATIENT (OUTPATIENT)
Dept: OUTPATIENT SERVICES | Facility: HOSPITAL | Age: 74
LOS: 1 days | Discharge: HOME | End: 2024-11-21
Payer: COMMERCIAL

## 2024-11-21 ENCOUNTER — APPOINTMENT (OUTPATIENT)
Dept: WOUND CARE | Facility: HOSPITAL | Age: 74
End: 2024-11-21
Payer: COMMERCIAL

## 2024-11-21 VITALS
RESPIRATION RATE: 18 BRPM | HEART RATE: 69 BPM | WEIGHT: 215 LBS | HEIGHT: 70 IN | DIASTOLIC BLOOD PRESSURE: 67 MMHG | OXYGEN SATURATION: 96 % | SYSTOLIC BLOOD PRESSURE: 107 MMHG | BODY MASS INDEX: 30.78 KG/M2 | TEMPERATURE: 98.1 F

## 2024-11-21 DIAGNOSIS — T81.89XD OTHER COMPLICATIONS OF PROCEDURES, NOT ELSEWHERE CLASSIFIED, SUBSEQUENT ENCOUNTER: ICD-10-CM

## 2024-11-21 DIAGNOSIS — E11.40 TYPE 2 DIABETES MELLITUS WITH DIABETIC NEUROPATHY, UNSPECIFIED: ICD-10-CM

## 2024-11-21 DIAGNOSIS — Z98.890 OTHER SPECIFIED POSTPROCEDURAL STATES: Chronic | ICD-10-CM

## 2024-11-21 PROCEDURE — 99024 POSTOP FOLLOW-UP VISIT: CPT

## 2024-11-21 PROCEDURE — G0463: CPT

## 2024-11-25 DIAGNOSIS — Z79.51 LONG TERM (CURRENT) USE OF INHALED STEROIDS: ICD-10-CM

## 2024-11-25 DIAGNOSIS — Z79.899 OTHER LONG TERM (CURRENT) DRUG THERAPY: ICD-10-CM
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
## 2024-11-25 DIAGNOSIS — Z79.84 LONG TERM (CURRENT) USE OF ORAL HYPOGLYCEMIC DRUGS: ICD-10-CM

## 2024-11-25 DIAGNOSIS — D64.9 ANEMIA, UNSPECIFIED: ICD-10-CM

## 2024-11-25 DIAGNOSIS — I50.9 HEART FAILURE, UNSPECIFIED: ICD-10-CM

## 2024-11-25 DIAGNOSIS — Z85.46 PERSONAL HISTORY OF MALIGNANT NEOPLASM OF PROSTATE: ICD-10-CM

## 2024-11-25 DIAGNOSIS — E11.40 TYPE 2 DIABETES MELLITUS WITH DIABETIC NEUROPATHY, UNSPECIFIED: ICD-10-CM

## 2024-11-25 DIAGNOSIS — Z98.49 CATARACT EXTRACTION STATUS, UNSPECIFIED EYE: ICD-10-CM

## 2024-11-25 DIAGNOSIS — E11.22 TYPE 2 DIABETES MELLITUS WITH DIABETIC CHRONIC KIDNEY DISEASE: ICD-10-CM

## 2024-11-25 DIAGNOSIS — E11.59 TYPE 2 DIABETES MELLITUS WITH OTHER CIRCULATORY COMPLICATIONS: ICD-10-CM

## 2024-11-25 DIAGNOSIS — T81.89XD OTHER COMPLICATIONS OF PROCEDURES, NOT ELSEWHERE CLASSIFIED, SUBSEQUENT ENCOUNTER: ICD-10-CM
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
## 2024-11-25 DIAGNOSIS — J45.909 UNSPECIFIED ASTHMA, UNCOMPLICATED: ICD-10-CM

## 2024-12-09 RX ORDER — LEVOFLOXACIN 250 MG
1 TABLET ORAL
Refills: 0 | DISCHARGE
Start: 2024-12-09

## 2024-12-23 RX ORDER — PREDNISONE 5 MG
1 TABLET ORAL
Refills: 0 | DISCHARGE
Start: 2024-12-23

## 2024-12-23 RX ORDER — GUAIFENESIN 100 MG/5ML
1 SYRUP ORAL
Refills: 0 | DISCHARGE
Start: 2024-12-23

## 2024-12-28 ENCOUNTER — INPATIENT (INPATIENT)
Facility: HOSPITAL | Age: 74
LOS: 2 days | Discharge: HOME HEALTH | DRG: 872 | End: 2024-12-31
Attending: STUDENT IN AN ORGANIZED HEALTH CARE EDUCATION/TRAINING PROGRAM | Admitting: STUDENT IN AN ORGANIZED HEALTH CARE EDUCATION/TRAINING PROGRAM
Payer: COMMERCIAL

## 2024-12-28 VITALS
WEIGHT: 181 LBS | DIASTOLIC BLOOD PRESSURE: 70 MMHG | TEMPERATURE: 98 F | SYSTOLIC BLOOD PRESSURE: 103 MMHG | HEIGHT: 67 IN | RESPIRATION RATE: 18 BRPM | HEART RATE: 116 BPM | OXYGEN SATURATION: 94 %

## 2024-12-28 DIAGNOSIS — Z98.890 OTHER SPECIFIED POSTPROCEDURAL STATES: Chronic | ICD-10-CM

## 2024-12-28 DIAGNOSIS — A41.9 SEPSIS, UNSPECIFIED ORGANISM: ICD-10-CM

## 2024-12-28 LAB
ALBUMIN SERPL ELPH-MCNC: 2.8 G/DL — LOW (ref 3.3–5)
ALP SERPL-CCNC: 75 U/L — SIGNIFICANT CHANGE UP (ref 40–120)
ALT FLD-CCNC: 22 U/L — SIGNIFICANT CHANGE UP (ref 12–78)
ANION GAP SERPL CALC-SCNC: 7 MMOL/L — SIGNIFICANT CHANGE UP (ref 5–17)
ANISOCYTOSIS BLD QL: SIGNIFICANT CHANGE UP
APPEARANCE UR: CLEAR — SIGNIFICANT CHANGE UP
APTT BLD: 32.3 SEC — SIGNIFICANT CHANGE UP (ref 24.5–35.6)
AST SERPL-CCNC: 19 U/L — SIGNIFICANT CHANGE UP (ref 15–37)
BACTERIA # UR AUTO: NEGATIVE /HPF — SIGNIFICANT CHANGE UP
BASOPHILS # BLD AUTO: 0 K/UL — SIGNIFICANT CHANGE UP (ref 0–0.2)
BASOPHILS NFR BLD AUTO: 0 % — SIGNIFICANT CHANGE UP (ref 0–2)
BILIRUB SERPL-MCNC: 0.8 MG/DL — SIGNIFICANT CHANGE UP (ref 0.2–1.2)
BILIRUB UR-MCNC: NEGATIVE — SIGNIFICANT CHANGE UP
BUN SERPL-MCNC: 23 MG/DL — SIGNIFICANT CHANGE UP (ref 7–23)
CALCIUM SERPL-MCNC: 8.7 MG/DL — SIGNIFICANT CHANGE UP (ref 8.5–10.1)
CAST: 0 /LPF — SIGNIFICANT CHANGE UP (ref 0–4)
CHLORIDE SERPL-SCNC: 102 MMOL/L — SIGNIFICANT CHANGE UP (ref 96–108)
CO2 SERPL-SCNC: 28 MMOL/L — SIGNIFICANT CHANGE UP (ref 22–31)
COLOR SPEC: YELLOW — SIGNIFICANT CHANGE UP
CREAT SERPL-MCNC: 1.38 MG/DL — HIGH (ref 0.5–1.3)
DIFF PNL FLD: NEGATIVE — SIGNIFICANT CHANGE UP
EGFR: 54 ML/MIN/1.73M2 — LOW
EOSINOPHIL # BLD AUTO: 0.21 K/UL — SIGNIFICANT CHANGE UP (ref 0–0.5)
EOSINOPHIL NFR BLD AUTO: 2 % — SIGNIFICANT CHANGE UP (ref 0–6)
FLUAV AG NPH QL: SIGNIFICANT CHANGE UP
FLUBV AG NPH QL: SIGNIFICANT CHANGE UP
GLUCOSE BLDC GLUCOMTR-MCNC: 224 MG/DL — HIGH (ref 70–99)
GLUCOSE SERPL-MCNC: 202 MG/DL — HIGH (ref 70–99)
GLUCOSE UR QL: NEGATIVE MG/DL — SIGNIFICANT CHANGE UP
HCT VFR BLD CALC: 33.6 % — LOW (ref 39–50)
HGB BLD-MCNC: 10.2 G/DL — LOW (ref 13–17)
INR BLD: 1.15 RATIO — SIGNIFICANT CHANGE UP (ref 0.85–1.16)
KETONES UR-MCNC: NEGATIVE MG/DL — SIGNIFICANT CHANGE UP
LACTATE SERPL-SCNC: 2 MMOL/L — SIGNIFICANT CHANGE UP (ref 0.7–2)
LEUKOCYTE ESTERASE UR-ACNC: NEGATIVE — SIGNIFICANT CHANGE UP
LYMPHOCYTES # BLD AUTO: 1.15 K/UL — SIGNIFICANT CHANGE UP (ref 1–3.3)
LYMPHOCYTES # BLD AUTO: 11 % — LOW (ref 13–44)
MAGNESIUM SERPL-MCNC: 1.9 MG/DL — SIGNIFICANT CHANGE UP (ref 1.6–2.6)
MANUAL SMEAR VERIFICATION: SIGNIFICANT CHANGE UP
MCHC RBC-ENTMCNC: 25 PG — LOW (ref 27–34)
MCHC RBC-ENTMCNC: 30.4 G/DL — LOW (ref 32–36)
MCV RBC AUTO: 82.4 FL — SIGNIFICANT CHANGE UP (ref 80–100)
MICROCYTES BLD QL: SLIGHT — SIGNIFICANT CHANGE UP
MONOCYTES # BLD AUTO: 0.42 K/UL — SIGNIFICANT CHANGE UP (ref 0–0.9)
MONOCYTES NFR BLD AUTO: 4 % — SIGNIFICANT CHANGE UP (ref 2–14)
MYELOCYTES NFR BLD: 1 % — HIGH (ref 0–0)
NEUTROPHILS # BLD AUTO: 8.6 K/UL — HIGH (ref 1.8–7.4)
NEUTROPHILS NFR BLD AUTO: 81 % — HIGH (ref 43–77)
NEUTS BAND # BLD: 1 % — SIGNIFICANT CHANGE UP (ref 0–8)
NITRITE UR-MCNC: NEGATIVE — SIGNIFICANT CHANGE UP
NRBC # BLD: 0 /100 WBCS — SIGNIFICANT CHANGE UP (ref 0–0)
NRBC # BLD: SIGNIFICANT CHANGE UP /100 WBCS (ref 0–0)
NT-PROBNP SERPL-SCNC: 719 PG/ML — HIGH (ref 0–125)
PH UR: 6.5 — SIGNIFICANT CHANGE UP (ref 5–8)
PHOSPHATE SERPL-MCNC: 2.1 MG/DL — LOW (ref 2.5–4.5)
PLAT MORPH BLD: NORMAL — SIGNIFICANT CHANGE UP
PLATELET # BLD AUTO: 122 K/UL — LOW (ref 150–400)
POIKILOCYTOSIS BLD QL AUTO: SIGNIFICANT CHANGE UP
POLYCHROMASIA BLD QL SMEAR: SLIGHT — SIGNIFICANT CHANGE UP
POTASSIUM SERPL-MCNC: 3.3 MMOL/L — LOW (ref 3.5–5.3)
POTASSIUM SERPL-SCNC: 3.3 MMOL/L — LOW (ref 3.5–5.3)
PROT SERPL-MCNC: 7.3 GM/DL — SIGNIFICANT CHANGE UP (ref 6–8.3)
PROT UR-MCNC: 30 MG/DL
PROTHROM AB SERPL-ACNC: 13.5 SEC — HIGH (ref 9.9–13.4)
RBC # BLD: 4.08 M/UL — LOW (ref 4.2–5.8)
RBC # FLD: 21.6 % — HIGH (ref 10.3–14.5)
RBC BLD AUTO: ABNORMAL
RBC CASTS # UR COMP ASSIST: 0 /HPF — SIGNIFICANT CHANGE UP (ref 0–4)
RSV RNA NPH QL NAA+NON-PROBE: SIGNIFICANT CHANGE UP
SARS-COV-2 RNA SPEC QL NAA+PROBE: SIGNIFICANT CHANGE UP
SODIUM SERPL-SCNC: 137 MMOL/L — SIGNIFICANT CHANGE UP (ref 135–145)
SP GR SPEC: 1.01 — SIGNIFICANT CHANGE UP (ref 1–1.03)
SQUAMOUS # UR AUTO: 4 /HPF — SIGNIFICANT CHANGE UP (ref 0–5)
STOMATOCYTES BLD QL SMEAR: SIGNIFICANT CHANGE UP
TARGETS BLD QL SMEAR: SLIGHT — SIGNIFICANT CHANGE UP
TROPONIN I, HIGH SENSITIVITY RESULT: 25.88 NG/L — SIGNIFICANT CHANGE UP
UROBILINOGEN FLD QL: 0.2 MG/DL — SIGNIFICANT CHANGE UP (ref 0.2–1)
WBC # BLD: 10.49 K/UL — SIGNIFICANT CHANGE UP (ref 3.8–10.5)
WBC # FLD AUTO: 10.49 K/UL — SIGNIFICANT CHANGE UP (ref 3.8–10.5)
WBC UR QL: 1 /HPF — SIGNIFICANT CHANGE UP (ref 0–5)

## 2024-12-28 PROCEDURE — 72125 CT NECK SPINE W/O DYE: CPT | Mod: 26,MC

## 2024-12-28 PROCEDURE — 85025 COMPLETE CBC W/AUTO DIFF WBC: CPT

## 2024-12-28 PROCEDURE — 82962 GLUCOSE BLOOD TEST: CPT

## 2024-12-28 PROCEDURE — 87641 MR-STAPH DNA AMP PROBE: CPT

## 2024-12-28 PROCEDURE — 93970 EXTREMITY STUDY: CPT | Mod: 26

## 2024-12-28 PROCEDURE — 72170 X-RAY EXAM OF PELVIS: CPT | Mod: 26

## 2024-12-28 PROCEDURE — 93010 ELECTROCARDIOGRAM REPORT: CPT

## 2024-12-28 PROCEDURE — 83036 HEMOGLOBIN GLYCOSYLATED A1C: CPT

## 2024-12-28 PROCEDURE — 97162 PT EVAL MOD COMPLEX 30 MIN: CPT | Mod: GP

## 2024-12-28 PROCEDURE — 73590 X-RAY EXAM OF LOWER LEG: CPT | Mod: RT

## 2024-12-28 PROCEDURE — 80048 BASIC METABOLIC PNL TOTAL CA: CPT

## 2024-12-28 PROCEDURE — 36415 COLL VENOUS BLD VENIPUNCTURE: CPT

## 2024-12-28 PROCEDURE — 71250 CT THORAX DX C-: CPT | Mod: 26,MC

## 2024-12-28 PROCEDURE — 74176 CT ABD & PELVIS W/O CONTRAST: CPT | Mod: 26,MC

## 2024-12-28 PROCEDURE — 99223 1ST HOSP IP/OBS HIGH 75: CPT

## 2024-12-28 PROCEDURE — 99285 EMERGENCY DEPT VISIT HI MDM: CPT

## 2024-12-28 PROCEDURE — 80061 LIPID PANEL: CPT

## 2024-12-28 PROCEDURE — 93970 EXTREMITY STUDY: CPT

## 2024-12-28 PROCEDURE — 73590 X-RAY EXAM OF LOWER LEG: CPT | Mod: 26,RT

## 2024-12-28 PROCEDURE — 97530 THERAPEUTIC ACTIVITIES: CPT | Mod: GP

## 2024-12-28 PROCEDURE — 70450 CT HEAD/BRAIN W/O DYE: CPT | Mod: 26,MC

## 2024-12-28 PROCEDURE — 97116 GAIT TRAINING THERAPY: CPT | Mod: GP

## 2024-12-28 PROCEDURE — 71045 X-RAY EXAM CHEST 1 VIEW: CPT | Mod: 26

## 2024-12-28 PROCEDURE — 80053 COMPREHEN METABOLIC PANEL: CPT

## 2024-12-28 PROCEDURE — 73630 X-RAY EXAM OF FOOT: CPT | Mod: RT

## 2024-12-28 PROCEDURE — 73630 X-RAY EXAM OF FOOT: CPT | Mod: 26,RT

## 2024-12-28 PROCEDURE — 94640 AIRWAY INHALATION TREATMENT: CPT

## 2024-12-28 PROCEDURE — 87640 STAPH A DNA AMP PROBE: CPT

## 2024-12-28 RX ORDER — PANTOPRAZOLE 40 MG/1
40 TABLET, DELAYED RELEASE ORAL
Refills: 0 | Status: DISCONTINUED | OUTPATIENT
Start: 2024-12-28 | End: 2024-12-31

## 2024-12-28 RX ORDER — INFLUENZA A VIRUS A/WISCONSIN/588/2019 (H1N1) RECOMBINANT HEMAGGLUTININ ANTIGEN, INFLUENZA A VIRUS A/DARWIN/6/2021 (H3N2) RECOMBINANT HEMAGGLUTININ ANTIGEN, INFLUENZA B VIRUS B/AUSTRIA/1359417/2021 RECOMBINANT HEMAGGLUTININ ANTIGEN, AND INFLUENZA B VIRUS B/PHUKET/3073/2013 RECOMBINANT HEMAGGLUTININ ANTIGEN 45; 45; 45; 45 UG/.5ML; UG/.5ML; UG/.5ML; UG/.5ML
0.5 INJECTION INTRAMUSCULAR ONCE
Refills: 0 | Status: DISCONTINUED | OUTPATIENT
Start: 2024-12-28 | End: 2024-12-31

## 2024-12-28 RX ORDER — GINKGO BILOBA 40 MG
3 CAPSULE ORAL AT BEDTIME
Refills: 0 | Status: DISCONTINUED | OUTPATIENT
Start: 2024-12-28 | End: 2024-12-31

## 2024-12-28 RX ORDER — ROSUVASTATIN 40 MG/1
40 TABLET, FILM COATED ORAL AT BEDTIME
Refills: 0 | Status: DISCONTINUED | OUTPATIENT
Start: 2024-12-28 | End: 2024-12-31

## 2024-12-28 RX ORDER — ALBUTEROL SULFATE 90 UG/1
3 INHALANT RESPIRATORY (INHALATION)
Refills: 0 | DISCHARGE

## 2024-12-28 RX ORDER — IRON/LYS/VIT B COMP/FOLIC ACID 800-1MG/15
1 LIQUID (ML) ORAL DAILY
Refills: 0 | Status: DISCONTINUED | OUTPATIENT
Start: 2024-12-28 | End: 2024-12-31

## 2024-12-28 RX ORDER — SODIUM CHLORIDE 9 MG/ML
1000 INJECTION, SOLUTION INTRAVENOUS
Refills: 0 | Status: DISCONTINUED | OUTPATIENT
Start: 2024-12-28 | End: 2024-12-31

## 2024-12-28 RX ORDER — SODIUM CHLORIDE 9 MG/ML
2000 INJECTION, SOLUTION INTRAMUSCULAR; INTRAVENOUS; SUBCUTANEOUS ONCE
Refills: 0 | Status: COMPLETED | OUTPATIENT
Start: 2024-12-28 | End: 2024-12-28

## 2024-12-28 RX ORDER — INSULIN LISPRO 100/ML
10 VIAL (ML) SUBCUTANEOUS
Refills: 0 | DISCHARGE

## 2024-12-28 RX ORDER — DEXTROSE MONOHYDRATE 25 G/50ML
15 INJECTION, SOLUTION INTRAVENOUS ONCE
Refills: 0 | Status: DISCONTINUED | OUTPATIENT
Start: 2024-12-28 | End: 2024-12-31

## 2024-12-28 RX ORDER — SERTRALINE HYDROCHLORIDE 25 MG/1
100 TABLET ORAL DAILY
Refills: 0 | Status: DISCONTINUED | OUTPATIENT
Start: 2024-12-28 | End: 2024-12-31

## 2024-12-28 RX ORDER — DEXTROSE MONOHYDRATE 25 G/50ML
25 INJECTION, SOLUTION INTRAVENOUS ONCE
Refills: 0 | Status: DISCONTINUED | OUTPATIENT
Start: 2024-12-28 | End: 2024-12-31

## 2024-12-28 RX ORDER — INSULIN LISPRO 100/ML
VIAL (ML) SUBCUTANEOUS
Refills: 0 | Status: DISCONTINUED | OUTPATIENT
Start: 2024-12-28 | End: 2024-12-31

## 2024-12-28 RX ORDER — FERROUS SULFATE 325(65) MG
325 TABLET ORAL DAILY
Refills: 0 | Status: DISCONTINUED | OUTPATIENT
Start: 2024-12-28 | End: 2024-12-31

## 2024-12-28 RX ORDER — FUROSEMIDE 20 MG
40 TABLET ORAL DAILY
Refills: 0 | Status: DISCONTINUED | OUTPATIENT
Start: 2024-12-29 | End: 2024-12-31

## 2024-12-28 RX ORDER — ACETAMINOPHEN 80 MG/.8ML
1000 SOLUTION/ DROPS ORAL ONCE
Refills: 0 | Status: COMPLETED | OUTPATIENT
Start: 2024-12-28 | End: 2024-12-28

## 2024-12-28 RX ORDER — ACETAMINOPHEN 80 MG/.8ML
650 SOLUTION/ DROPS ORAL EVERY 6 HOURS
Refills: 0 | Status: DISCONTINUED | OUTPATIENT
Start: 2024-12-28 | End: 2024-12-31

## 2024-12-28 RX ORDER — MONTELUKAST SODIUM 10 MG/1
10 TABLET, FILM COATED ORAL DAILY
Refills: 0 | Status: DISCONTINUED | OUTPATIENT
Start: 2024-12-28 | End: 2024-12-31

## 2024-12-28 RX ORDER — HEPARIN SODIUM 1000 [USP'U]/ML
5000 INJECTION, SOLUTION INTRAVENOUS; SUBCUTANEOUS EVERY 12 HOURS
Refills: 0 | Status: DISCONTINUED | OUTPATIENT
Start: 2024-12-28 | End: 2024-12-31

## 2024-12-28 RX ORDER — CLONAZEPAM 2 MG
0.25 TABLET ORAL
Refills: 0 | Status: DISCONTINUED | OUTPATIENT
Start: 2024-12-28 | End: 2024-12-30

## 2024-12-28 RX ORDER — CLOSTRIDIUM TETANI TOXOID ANTIGEN (FORMALDEHYDE INACTIVATED), CORYNEBACTERIUM DIPHTHERIAE TOXOID ANTIGEN (FORMALDEHYDE INACTIVATED), BORDETELLA PERTUSSIS TOXOID ANTIGEN (GLUTARALDEHYDE INACTIVATED), BORDETELLA PERTUSSIS FILAMENTOUS HEMAGGLUTININ ANTIGEN (FORMALDEHYDE INACTIVATED), BORDETELLA PERTUSSIS PERTACTIN ANTIGEN, AND BORDETELLA PERTUSSIS FIMBRIAE 2/3 ANTIGEN 5; 2; 2.5; 5; 3; 5 [LF]/.5ML; [LF]/.5ML; UG/.5ML; UG/.5ML; UG/.5ML; UG/.5ML
0.5 INJECTION, SUSPENSION INTRAMUSCULAR ONCE
Refills: 0 | Status: COMPLETED | OUTPATIENT
Start: 2024-12-28 | End: 2024-12-28

## 2024-12-28 RX ORDER — OXYCODONE HCL 15 MG
10 TABLET ORAL EVERY 12 HOURS
Refills: 0 | Status: DISCONTINUED | OUTPATIENT
Start: 2024-12-28 | End: 2024-12-31

## 2024-12-28 RX ORDER — PIPERACILLIN AND TAZOBACTAM 3; .375 G/15ML; G/15ML
3.38 INJECTION, POWDER, LYOPHILIZED, FOR SOLUTION INTRAVENOUS EVERY 8 HOURS
Refills: 0 | Status: DISCONTINUED | OUTPATIENT
Start: 2024-12-28 | End: 2024-12-29

## 2024-12-28 RX ORDER — FLUTICASONE PROPIONATE AND SALMETEROL 50; 500 UG/1; UG/1
1 POWDER ORAL; RESPIRATORY (INHALATION)
Refills: 0 | Status: DISCONTINUED | OUTPATIENT
Start: 2024-12-28 | End: 2024-12-31

## 2024-12-28 RX ORDER — INSULIN LISPRO 100/ML
VIAL (ML) SUBCUTANEOUS AT BEDTIME
Refills: 0 | Status: DISCONTINUED | OUTPATIENT
Start: 2024-12-28 | End: 2024-12-31

## 2024-12-28 RX ORDER — DEXTROSE MONOHYDRATE 25 G/50ML
12.5 INJECTION, SOLUTION INTRAVENOUS ONCE
Refills: 0 | Status: DISCONTINUED | OUTPATIENT
Start: 2024-12-28 | End: 2024-12-31

## 2024-12-28 RX ORDER — INSULIN GLARGINE-YFGN 100 [IU]/ML
20 INJECTION, SOLUTION SUBCUTANEOUS AT BEDTIME
Refills: 0 | Status: DISCONTINUED | OUTPATIENT
Start: 2024-12-28 | End: 2024-12-31

## 2024-12-28 RX ORDER — DAPTOMYCIN 500 MG/10ML
500 INJECTION, POWDER, LYOPHILIZED, FOR SOLUTION INTRAVENOUS ONCE
Refills: 0 | Status: COMPLETED | OUTPATIENT
Start: 2024-12-28 | End: 2024-12-28

## 2024-12-28 RX ORDER — PIPERACILLIN AND TAZOBACTAM 3; .375 G/15ML; G/15ML
3.38 INJECTION, POWDER, LYOPHILIZED, FOR SOLUTION INTRAVENOUS ONCE
Refills: 0 | Status: COMPLETED | OUTPATIENT
Start: 2024-12-28 | End: 2024-12-28

## 2024-12-28 RX ORDER — SACCHAROMYCES BOULARDII 50 MG
250 CAPSULE ORAL DAILY
Refills: 0 | Status: DISCONTINUED | OUTPATIENT
Start: 2024-12-28 | End: 2024-12-31

## 2024-12-28 RX ORDER — GLUCAGON INJECTION, SOLUTION 0.5 MG/.1ML
1 INJECTION, SOLUTION SUBCUTANEOUS ONCE
Refills: 0 | Status: DISCONTINUED | OUTPATIENT
Start: 2024-12-28 | End: 2024-12-31

## 2024-12-28 RX ORDER — SODIUM CHLORIDE 9 MG/ML
500 INJECTION, SOLUTION INTRAMUSCULAR; INTRAVENOUS; SUBCUTANEOUS ONCE
Refills: 0 | Status: COMPLETED | OUTPATIENT
Start: 2024-12-28 | End: 2024-12-28

## 2024-12-28 RX ORDER — LACTOBACILLUS ACIDOPHILUS/PECT 75 MM-100
1 CAPSULE ORAL
Refills: 0 | DISCHARGE

## 2024-12-28 RX ORDER — METOPROLOL TARTRATE 50 MG
25 TABLET ORAL DAILY
Refills: 0 | Status: DISCONTINUED | OUTPATIENT
Start: 2024-12-28 | End: 2024-12-31

## 2024-12-28 RX ORDER — POTASSIUM CHLORIDE 600 MG/1
40 TABLET, FILM COATED, EXTENDED RELEASE ORAL EVERY 4 HOURS
Refills: 0 | Status: COMPLETED | OUTPATIENT
Start: 2024-12-28 | End: 2024-12-29

## 2024-12-28 RX ORDER — POTASSIUM CHLORIDE 600 MG/1
10 TABLET, FILM COATED, EXTENDED RELEASE ORAL
Refills: 0 | Status: COMPLETED | OUTPATIENT
Start: 2024-12-28 | End: 2024-12-28

## 2024-12-28 RX ORDER — IPRATROPIUM BROMIDE AND ALBUTEROL SULFATE .5; 2.5 MG/3ML; MG/3ML
3 SOLUTION RESPIRATORY (INHALATION) EVERY 6 HOURS
Refills: 0 | Status: DISCONTINUED | OUTPATIENT
Start: 2024-12-28 | End: 2024-12-31

## 2024-12-28 RX ORDER — SODIUM CHLORIDE 0.65 %
2 AEROSOL, SPRAY (ML) NASAL
Refills: 0 | DISCHARGE

## 2024-12-28 RX ORDER — PREGABALIN 100 MG/1
150 CAPSULE ORAL
Refills: 0 | Status: DISCONTINUED | OUTPATIENT
Start: 2024-12-28 | End: 2024-12-31

## 2024-12-28 RX ADMIN — Medication 0.25 MILLIGRAM(S): at 21:23

## 2024-12-28 RX ADMIN — ROSUVASTATIN 40 MILLIGRAM(S): 40 TABLET, FILM COATED ORAL at 21:30

## 2024-12-28 RX ADMIN — PREGABALIN 150 MILLIGRAM(S): 100 CAPSULE ORAL at 21:30

## 2024-12-28 RX ADMIN — SODIUM CHLORIDE 2000 MILLILITER(S): 9 INJECTION, SOLUTION INTRAMUSCULAR; INTRAVENOUS; SUBCUTANEOUS at 12:20

## 2024-12-28 RX ADMIN — Medication 10 MILLIGRAM(S): at 22:22

## 2024-12-28 RX ADMIN — PIPERACILLIN AND TAZOBACTAM 200 GRAM(S): 3; .375 INJECTION, POWDER, LYOPHILIZED, FOR SOLUTION INTRAVENOUS at 11:31

## 2024-12-28 RX ADMIN — SODIUM CHLORIDE 1000 MILLILITER(S): 9 INJECTION, SOLUTION INTRAMUSCULAR; INTRAVENOUS; SUBCUTANEOUS at 12:42

## 2024-12-28 RX ADMIN — SODIUM CHLORIDE 500 MILLILITER(S): 9 INJECTION, SOLUTION INTRAMUSCULAR; INTRAVENOUS; SUBCUTANEOUS at 13:15

## 2024-12-28 RX ADMIN — INSULIN GLARGINE-YFGN 20 UNIT(S): 100 INJECTION, SOLUTION SUBCUTANEOUS at 21:21

## 2024-12-28 RX ADMIN — PIPERACILLIN AND TAZOBACTAM 25 GRAM(S): 3; .375 INJECTION, POWDER, LYOPHILIZED, FOR SOLUTION INTRAVENOUS at 21:22

## 2024-12-28 RX ADMIN — CLOSTRIDIUM TETANI TOXOID ANTIGEN (FORMALDEHYDE INACTIVATED), CORYNEBACTERIUM DIPHTHERIAE TOXOID ANTIGEN (FORMALDEHYDE INACTIVATED), BORDETELLA PERTUSSIS TOXOID ANTIGEN (GLUTARALDEHYDE INACTIVATED), BORDETELLA PERTUSSIS FILAMENTOUS HEMAGGLUTININ ANTIGEN (FORMALDEHYDE INACTIVATED), BORDETELLA PERTUSSIS PERTACTIN ANTIGEN, AND BORDETELLA PERTUSSIS FIMBRIAE 2/3 ANTIGEN 0.5 MILLILITER(S): 5; 2; 2.5; 5; 3; 5 INJECTION, SUSPENSION INTRAMUSCULAR at 10:22

## 2024-12-28 RX ADMIN — PIPERACILLIN AND TAZOBACTAM 3.38 GRAM(S): 3; .375 INJECTION, POWDER, LYOPHILIZED, FOR SOLUTION INTRAVENOUS at 12:01

## 2024-12-28 RX ADMIN — ACETAMINOPHEN 1000 MILLIGRAM(S): 80 SOLUTION/ DROPS ORAL at 10:45

## 2024-12-28 RX ADMIN — SODIUM CHLORIDE 2000 MILLILITER(S): 9 INJECTION, SOLUTION INTRAMUSCULAR; INTRAVENOUS; SUBCUTANEOUS at 11:19

## 2024-12-28 RX ADMIN — POTASSIUM CHLORIDE 40 MILLIEQUIVALENT(S): 600 TABLET, FILM COATED, EXTENDED RELEASE ORAL at 21:30

## 2024-12-28 RX ADMIN — FLUTICASONE PROPIONATE AND SALMETEROL 1 DOSE(S): 50; 500 POWDER ORAL; RESPIRATORY (INHALATION) at 20:02

## 2024-12-28 RX ADMIN — ACETAMINOPHEN 400 MILLIGRAM(S): 80 SOLUTION/ DROPS ORAL at 10:29

## 2024-12-28 RX ADMIN — Medication 10 MILLIGRAM(S): at 21:22

## 2024-12-28 RX ADMIN — POTASSIUM CHLORIDE 100 MILLIEQUIVALENT(S): 600 TABLET, FILM COATED, EXTENDED RELEASE ORAL at 13:36

## 2024-12-28 RX ADMIN — DAPTOMYCIN 120 MILLIGRAM(S): 500 INJECTION, POWDER, LYOPHILIZED, FOR SOLUTION INTRAVENOUS at 18:27

## 2024-12-28 RX ADMIN — IPRATROPIUM BROMIDE AND ALBUTEROL SULFATE 3 MILLILITER(S): .5; 2.5 SOLUTION RESPIRATORY (INHALATION) at 21:13

## 2024-12-28 RX ADMIN — HEPARIN SODIUM 5000 UNIT(S): 1000 INJECTION, SOLUTION INTRAVENOUS; SUBCUTANEOUS at 21:21

## 2024-12-28 NOTE — ED ADULT TRIAGE NOTE - CHIEF COMPLAINT QUOTE
patient brought in by EMS from [facility] living s/p fall.  patient reports falling around 3 AM this in bathroom, states he lost his balance.  + head strike.  - LOC.  - anticoagulation use.  EMS report norovirus is going around facility, patient endorsing recent diarrhea and some nausea.  neuro alert initaited.

## 2024-12-28 NOTE — CONSULT NOTE ADULT - ASSESSMENT
A: 74-year-old Male seen for the followin. Cellulitis to Right Lower Extremity      P:   Chart reviewed and Patient evaluated;  Discussed diagnosis and treatment with patient. Discussed importance of daily foot examinations, proper shoe gear, importance of tight glycemic control.   X-rays ordered --> will follow up  WBAT  to Right Foot in dispensed surgical shoe  Continue antibiotics as per ID  All additional care per Med appreciated  Patient demonstrated verbal understanding of all interventions and tolerated interventions well without any complications.   Podiatry will follow while in house      Case D/W attending       A: -- seen for the following:       P:   Chart reviewed and Patient evaluated;  Discussed diagnosis and treatment with patient. Discussed importance of daily foot examinations, proper shoe gear, importance of tight glycemic control.   X-rays reviewed : on wet read showing --  Wound flush with normal saline  Wound cx taken  Applied --- with dry sterile dressing  WBAT  to ------------  Offload bilateral heels while bedbound  Continue antibiotics as per ID  All additional care per Med appreciated  Patient demonstrated verbal understanding of all interventions and tolerated interventions well without any complications.   Podiatry will follow while in house      Case D/W attending  ---       A: 74-year-old Male seen for the followin. Cellulitis to Right Lower Extremity      P:   Chart reviewed and Patient evaluated;  Discussed diagnosis and treatment with patient. Discussed importance of daily foot examinations, proper shoe gear, importance of tight glycemic control.   X-rays ordered --> will follow up  WBAT  to Right Foot in dispensed surgical shoe  Continue antibiotics as per ID  All additional care per Med appreciated  Patient demonstrated verbal understanding of all interventions and tolerated interventions well without any complications.   Podiatry will follow while in house      Case D/W attending

## 2024-12-28 NOTE — ED PROVIDER NOTE - PHYSICAL EXAMINATION
GEN: Patient awake alert NAD.   HEENT: normocephalic, atraumatic, EOMI, no scleral icterus, moist MM  CARDIAC: tachycardic, S1, S2, no murmur.   PULM: CTA B/L no wheeze, rhonchi, rales.   ABD: soft NT, ND, no rebound no guarding, no CVA tenderness.   MSK: Moving all extremities,   NEURO: A&Ox3, no focal neurological deficits  SKIN: warm, dry, no rash.   4+ pitting edema bilateral lower extremities

## 2024-12-28 NOTE — ED ADULT NURSE REASSESSMENT NOTE - NS ED NURSE REASSESS COMMENT FT1
Pt hypotensive, alert and oriented to baseline. Dr Ortiz at bedside performing sono of patients heart and abdomen. IVF hung as ordered. 2nd 20G PIV placed in left arm.

## 2024-12-28 NOTE — CONSULT NOTE ADULT - SUBJECTIVE AND OBJECTIVE BOX
Date of Consult: 12/28/24    HPI:  74-yo wheelchair bound M w/ PMHx  of DM2, PAD, hypertension, COPD, CKD,  HFpEF, Hx of   R foot ulcer with osteomyelitis, s/p resection of the R 1st metatarsal head 10/24 here with mechanical fall in the bathroom with head strike but no LOC.  Patient also has been feeling sick for the last several days including diarrhea and cough.  Patient states that there has been  Viral syndrome circulating the SNF. Reports R leg is more swollen, heavy with erythema , causes problem with ambulation. R foot ulcer with osteomyelitis, s/p resection of the R 1st metatarsal head. Was sent to SNF with PICC line on Dapto for 6 weeks (to November 16, 2024)  Podiatry was consulted for RLE erythema. Patient does not recall when the redness started and indicates it was noted upon admission. Patient admits to shortness of breath, fever and nausea      PMH: Prostate cancer    Type II diabetes mellitus    Chronic obstructive pulmonary disease (COPD)    CHF (congestive heart failure)    Renal insufficiency    H/O migraine    Insomnia    Constipation      PSH: S/P foot surgery        Allergies: tetracycline (Unknown)  IODINE (Unknown)  vancomycin (Other)      Labs:                          10.2   10.49 )-----------( 122      ( 28 Dec 2024 10:31 )             33.6     WBC Trend  10.49 Date (12-28 @ 10:31)      Chem  12-28    137  |  102  |  23  ----------------------------<  202[H]  3.3[L]   |  28  |  1.38[H]    Ca    8.7      28 Dec 2024 10:31  Phos  2.1     12-28  Mg     1.9     12-28    TPro  7.3  /  Alb  2.8[L]  /  TBili  0.8  /  DBili  x   /  AST  19  /  ALT  22  /  AlkPhos  75  12-28          T(F): 99.4 (12-28-24 @ 15:51), Max: 101.9 (12-28-24 @ 10:08)  HR: 87 (12-28-24 @ 18:17) (85 - 117)  BP: 125/74 (12-28-24 @ 18:17) (78/46 - 145/76)  RR: 18 (12-28-24 @ 18:17) (12 - 22)  SpO2: 97% (12-28-24 @ 18:17) (94% - 100%)  Wt(kg): --    REVIEW OF SYSTEMS:    CONSTITUTIONAL: No weakness, fevers or chills  EYES: No visual changes  RESPIRATORY: No cough, wheezing; No shortness of breath  CARDIOVASCULAR: No chest pain or palpitations  GASTROINTESTINAL: No abdominal or epigastric pain. No nausea, vomiting; No diarrhea or constipation.   GENITOURINARY: No dysuria, frequency or hematuria  NEUROLOGICAL: No numbness or weakness  SKIN: See physical examination.  All other review of systems is negative unless indicated above    Physical Exam:   Constitutional: NAD, alert;  Lower Extremity Focus  Derm:  Skin warm, dry and supple bilateral.     Right LE: No open lesion to the RLE, +2 Pitting edema, + diffuse erythema up to below knee   Vascular: Dorsalis Pedis and Posterior Tibial pulses 2/4.    Neuro: Protective sensation diminished to the level of the digits bilateral.  MSK: Muscle strength 5/5 all major muscle groups bilateral.       No

## 2024-12-28 NOTE — ED ADULT NURSE NOTE - OBJECTIVE STATEMENT
Pt BIBA s/p post mechanical fall in the bathroom with head strike but no LOC.  Patient also has been feeling sick for the last several days including diarrhea and cough.  Patient states that there are a lot of people sick where he lives..  Patient denies shortness of breath, chest pain, abdominal pain, vomiting, fever.. Pt undressed, placed on cardiac monitor, EKG and rectal temp performed. MD aware of temp

## 2024-12-28 NOTE — PATIENT PROFILE ADULT - FALL HARM RISK - HARM RISK INTERVENTIONS
Assistance with ambulation/Assistance OOB with selected safe patient handling equipment/Communicate Risk of Fall with Harm to all staff/Discuss with provider need for PT consult/Monitor gait and stability/Provide patient with walking aids - walker, cane, crutches/Reinforce activity limits and safety measures with patient and family/Tailored Fall Risk Interventions/Toileting schedule using arm’s reach rule for commode and bathroom/Visual Cue: Yellow wristband and red socks/Bed in lowest position, wheels locked, appropriate side rails in place/Call bell, personal items and telephone in reach/Instruct patient to call for assistance before getting out of bed or chair/Non-slip footwear when patient is out of bed/[place] to call system/Physically safe environment - no spills, clutter or unnecessary equipment/Purposeful Proactive Rounding/Room/bathroom lighting operational, light cord in reach

## 2024-12-28 NOTE — ED PROVIDER NOTE - OBJECTIVE STATEMENT
74-year-old male past medical history of DM2, PAD, hypertension, COPD, CKD, HFpEF presents ED status post mechanical fall in the bathroom with head strike but no LOC.  Patient also has been feeling sick for the last several days including diarrhea and cough.  Patient states that there has been  Viral syndrome circulating the SNF.  Patient denies shortness of breath, chest pain, abdominal pain, vomiting, fever.

## 2024-12-28 NOTE — H&P ADULT - ASSESSMENT
74-yo wheelchair bound M w/ PMHx  of DM2, PAD, hypertension, COPD, CKD,  HFpEF, Hx of   R foot ulcer with osteomyelitis, s/p resection of the R 1st metatarsal head 10/24 here with mechanical fall in the bathroom with head strike but no LOC.  Patient also has been feeling sick for the last several days including diarrhea and cough.  Patient states that there has been  Viral syndrome circulating the SNF. Reports R leg is more swollen, heavy with erythema , causes problem with ambulation. R foot ulcer with osteomyelitis, s/p resection of the R 1st metatarsal head.       # Sepsis 2/2 R leg possible cellulitis  # s/p fall +head strike   # hx of R foot ulcer with osteomyelitis, s/p resection of the R 1st metatarsal head 10/24  # hx of neuropathy   - low bp , tachy , tachypnea, fever  101.9 in ER  - trop negative  - UA negative   - RVP panel negative   - CT HEAD: No acute abnormality. Chronic changes as above.  - CT CERVICAL SPINE: No acute abnormality. Chronic changes as above.  - CT chest A/P: Atelectasis at the lung bases with underlying infiltrates not excluded.  Mild bronchial wall thickening both lower lobes. No acute abnormality in the abdomen or pelvis.   - s/p 1x zosyn 2300 NS in ER  - Continue IV Dapto and IV Zosyn q8hrs  - Tylenol 650 mg PO q6h PRN for mild pain or fever  - follow Doppler lower extremity   - f/u Bcx and Ucx, MRSA PCR   - Follow pelvis X Ray   - Monitor WBC and fever curve  - Hx of resection of the R 1st metatarsal head.  Bone culture is growing Staph aureus (MRSA) patient was sent on  IV Dapto for 6 weeks (till November 16, 2024) follow wound care podiatry at [facility]   - ID consulted, reccs gaby     # REANNA on CKD  - Cr 1.3 ( baseline 1.1)  - S/P 2300 cc ns in ER   - cmp in am     # Hypokalemia  - k 3.3 replete PRN     # Anemia  - H/H: 10.2/33.6 ( baseline 9)   - platelet count 122  -  cbc in am    # Type II diabetes mellitus.   ·- Accu checks monitoring and insulin corrective regimen  sliding scale coverage with short acting insulin add long acting insulin as needed ,no concentrated sweets diet, serial labs ,HbA1C,education.    # hx of CHF  TTE 2024: Left ventricular systolic function is normal with an ejection fraction of 61 % There is moderate (grade 2) left ventricular diastolic dysfunction.    # hx of COPD  continue inhaler     # prostate cancer   stable     # DVT pro   - heparin sq

## 2024-12-28 NOTE — ED ADULT NURSE NOTE - NSFALLOOBATTEMPT_ED_ALL_ED
Problem: Adult Inpatient Plan of Care  Goal: Plan of Care Review  Outcome: Ongoing, Progressing  Flowsheets  Taken 11/17/2020 1714 by Carmen Chang RN  Progress: no change  Outcome Summary: POD 1- incisions are CDI.  unable to void since bruno removed.  Right before dinner patient attempted to void again with no success.  She is going to eat then I will bladder scan her again.  MD is aware.  She has sat in the chair twice today.  She is up to the bedside commode.  At baseline the patient gets SOA with ambulation and has chronic issues with her right hip.  This inhibits her activity levels.  She is using the IS.  Pain seems well controlled with PRN norco.  Pain is worse with movement.  Requiring 1-2 liters of o2 still.  Patient plans on DC home with daughter when able.  Taken 11/16/2020 1417 by Ericka Rosado, RN  Plan of Care Reviewed With: patient   Goal Outcome Evaluation:  Plan of Care Reviewed With: patient  Progress: no change      No

## 2024-12-28 NOTE — ED PROVIDER NOTE - PROGRESS NOTE DETAILS
Alerted by RN, patient becoming hypotensive.  Patient is slowly not given fluids due to heart failure history and being normotensive.  POCUS showing hyperdynamic LV, flat IVC.  2 L IV fluids ordered, antibiotics for concern for septic shock. Patient responded well to fluids, now normotensive, unclear etiology of patient's fever and hypotension as CT is negative.  Will admit to medicine for fever of unknown origin.

## 2024-12-28 NOTE — ED ADULT NURSE NOTE - NSFALLRISKINTERV_ED_ALL_ED
Assistance OOB with selected safe patient handling equipment if applicable/Assistance with ambulation/Communicate fall risk and risk factors to all staff, patient, and family/Monitor gait and stability/Provide visual cue: yellow wristband, yellow gown, etc/Reinforce activity limits and safety measures with patient and family/Call bell, personal items and telephone in reach/Instruct patient to call for assistance before getting out of bed/chair/stretcher/Non-slip footwear applied when patient is off stretcher/[place] to call system/Physically safe environment - no spills, clutter or unnecessary equipment/Purposeful Proactive Rounding/Room/bathroom lighting operational, light cord in reach
show

## 2024-12-28 NOTE — ED ADULT NURSE REASSESSMENT NOTE - NS ED NURSE REASSESS COMMENT FT1
Pt  refusing KCL IV, c/o burning during infusing. Dr. Ortiz aware with orders to be given po. Pt in no distress at this time.

## 2024-12-28 NOTE — ED ADULT NURSE NOTE - CHIEF COMPLAINT QUOTE
patient brought in by EMS from [facility] living s/p fall.  patient reports falling around 3 AM this in bathroom, states he lost his balance.  + head strike.  - LOC.  - anticoagulation use.  EMS report norovirus is going around facility, patient endorsing recent diarrhea and some nausea.  neuro alert initaited.

## 2024-12-28 NOTE — ED ADULT NURSE REASSESSMENT NOTE - NS ED NURSE REASSESS COMMENT FT1
Pt c/o of discomfort with 22G PIV in hand. PIV was not used to infuse medication. PIV removed per patient request.

## 2024-12-28 NOTE — H&P ADULT - NSHPPHYSICALEXAM_GEN_ALL_CORE
Vital Signs Last 24 Hrs  T(C): 37.4 (28 Dec 2024 15:51), Max: 38.8 (28 Dec 2024 10:08)  T(F): 99.4 (28 Dec 2024 15:51), Max: 101.9 (28 Dec 2024 10:08)  HR: 91 (28 Dec 2024 14:45) (88 - 117)  BP: 114/58 (28 Dec 2024 14:45) (78/46 - 119/68)  BP(mean): 77 (28 Dec 2024 14:45) (64 - 78)  RR: 19 (28 Dec 2024 14:45) (12 - 22)  SpO2: 100% (28 Dec 2024 14:45) (94% - 100%)    Parameters below as of 28 Dec 2024 14:45  Patient On (Oxygen Delivery Method): room air    GENERAL:  no acute distress  EYES: sclera clear, EOM intact, PERRLA, no exudates  ENMT: normocephalic, atraumatic, moist mucous membranes  NECK: supple, soft  LUNGS: good air entry bilaterally, clear to auscultation, symmetric breath sounds, no wheezing or rhonchi appreciated  HEART: soft S1/S2, regular rate and rhythm, no murmurs noted, no lower extremity edema  GASTROINTESTINAL: abdomen is soft, mid tender, + distended, normoactive bowel sounds  INTEGUMENT:  + R leg erythema on the lower shin , near close wound on R big toe   MUSCULOSKELETAL: no cyanosis, clubbing, edema, calves nontender to palpation b/l  NEUROLOGIC: awake, alert, oriented x3, good muscle tone in 4 extremities, no obvious sensory deficits

## 2024-12-28 NOTE — H&P ADULT - REASON FOR ADMISSION
Fever , hypotension sepsis 2/2 R leg developing cellulitis ( hx of R foot ulcer with osteomyelitis, s/p resection of the R 1st metatarsal head 10/24)  , loose stool ( viral enteritis ) ?

## 2024-12-28 NOTE — H&P ADULT - HISTORY OF PRESENT ILLNESS
ED Attending Note      Patient : Jeffrey Fernandez Age: 62 year old Sex: male   MRN: [id number] Encounter Date: 9/2/2022    I participated in the following activities of this patients care: The medical history, the physical exam, medical decision making.  I personally performed: Supervision of the patient's care, the medical history, the physical exam, the medical decision making.  The case was discussed with: The resident  Evaluation and management service: I agree with the evaluation and management decisions made in this patient's care.  Results interpretation: I agree with the study interpretation in this patient's care, I agree with the documentation of the study interpretation.      History     Chief Complaint   Patient presents with   • Hypertension   • Eye Problem     Pt with hx of htn.  Pt hasnt taken htn meds in 6 months. Pt co of blurry vision since 9am yesterday. Pt also endorses intermittent headache x one week. Pt sent from opthamologist for CT head. Pt denies arm numbness or tingling          62-year-old male with a history of HTN, HLD presents with diplopia since yesterday.  Patient complains of diplopia and eyestrain, only when he looks towards the right side.  Patient with a mild headache with the symptoms though this is since resolved.  He denies any loss of vision, just the diplopia when he looks in his right visual field.  He has a history of strabismus and PRK, saw his ophthalmologist today who sent him here for imaging.  Patient states that he has not been compliant with his losartan in 5-6 months.  Plan per ophthalmologist note is evaluated here with imaging and optimize medical care, with follow-up in 1 month.          ALLERGIES:  No Known Allergies    Discharge Medication List as of 9/2/2022 10:29 PM      Prior to Admission Medications    Details   atorvastatin (LIPITOR) 20 MG tablet TAKE ONE TABLET BY MOUTH ONCE DAILYEprescribe, Disp-90 tablet, R-0      sertraline (ZOLOFT) 100 MG tablet Take 2  tablets by mouth daily.Eprescribe, Disp-180 tablet, R-0      clonazePAM (KlonoPIN) 0.5 MG tablet Delegates - In compliance with state law, the Prescription Drug Monitoring Program must be reviewed prior to signing any order for a controlled substance. PDMP Reviewed by Delegate - No Unexpected ActivityTake 1 tablet by mouth daily as needed for Anxie ty. Do not start before 2021.Eprescribe, Disp-30 tablet, R-0      propranolol (INDERAL) 20 MG tablet Take 1 tablet by mouth daily as needed (anxiety / public speaking).Eprescribe, Disp-30 tablet, R-2      pantoprazole (PROTONIX) 40 MG tablet Take 1 tablet by mouth daily.Eprescribe, Disp-30 tablet, R-3      Multiple Vitamin (MULTI VITAMIN) Tab Historical Med             Discharge Medication List as of 2022 10:29 PM          Past History       Past Medical History:   Diagnosis Date   • Anxiety     Following with psychiatry, Dr. Jackson-Meds: Sertraline 200 mg, clonazepam 0.5 mg as needed, propranolol 20 mg PRN ( not daily)   • Essential (primary) hypertension     Meds: Losartan 50 mg   • High cholesterol     Meds: Atorvastatin 20 mg   • Varicose veins of both lower extremities        Past Surgical History:   Procedure Laterality Date   • Appendectomy     • Prostate surgery      TURP   • Tonsillectomy         Family History   Problem Relation Age of Onset   • Heart disease Father    • Hyperlipidemia Father    • Cancer, Lung Father        Social History     Tobacco Use   • Smoking status: Former Smoker     Packs/day: 0.25     Years: 2.00     Pack years: 0.50     Types: Cigarettes     Quit date: 2000     Years since quittin.6   • Smokeless tobacco: Never Used   Vaping Use   • Vaping Use: never used   Substance Use Topics   • Alcohol use: Not Currently   • Drug use: Never       Review of Systems   Constitutional: Negative for activity change, appetite change, chills, diaphoresis, fatigue and fever.   HENT: Negative for congestion, ear pain, facial   74-yo M w/ PMHx  of DM2, PAD, hypertension, COPD, CKD,  HFpEF presents ED status post mechanical fall in the bathroom with head strike but no LOC.  Patient also has been feeling sick for the last several days including diarrhea and cough.  Patient states that there has been  Viral syndrome circulating the SNF.  Patient denies shortness of breath, chest pain, abdominal pain, vomiting, fever.    IN  ED   VITALS: /70  RR 20 temp 98 F --> 101.9 on RA   LABS: H/H: 10.2/33.6 ( baseline 9) platelet count 122  K 3.3  Cr 1.3 ( baseline 1.1)  Phosphate 2.1  trop negative  UA negative   RVP panel negative   CT HEAD: No acute abnormality. Chronic changes as above.  CT CERVICAL SPINE: No acute abnormality. Chronic changes as above.    CT chest A/P: Atelectasis at the lung bases with underlying infiltrates not excluded.  Mild bronchial wall thickening both lower lobes. No acute abnormality in the abdomen or pelvis.    Imaging findings:    Bilateral pars interarticularis defects at L5 with grade 2 spondylolisthesis L5-S1. Degenerative changes thoracolumbar spine. Old left clavicle fracture. There are old left rib fractures.    TTE 2024: Left ventricular systolic function is normal with an ejection fraction of 61 % There is moderate (grade 2 left ventricular diastolic dysfunction.    s/p 1x zosyn 2300 NS in ER swelling, hearing loss, nosebleeds, rhinorrhea, sinus pressure, sinus pain, sneezing, sore throat, trouble swallowing and voice change.    Eyes: Positive for visual disturbance. Negative for photophobia, pain, discharge, redness and itching.   Respiratory: Negative for cough, chest tightness, shortness of breath, wheezing and stridor.    Cardiovascular: Negative for chest pain, palpitations and leg swelling.   Gastrointestinal: Negative for abdominal distention, abdominal pain, blood in stool, constipation, diarrhea, nausea, rectal pain and vomiting.   Genitourinary: Negative for dysuria, flank pain, frequency, hematuria and urgency.   Musculoskeletal: Negative for back pain, joint swelling, neck pain and neck stiffness.   Skin: Negative for color change, pallor and rash.   Neurological: Negative for dizziness, seizures, facial asymmetry, speech difficulty, light-headedness, numbness and headaches.   Psychiatric/Behavioral: Negative for suicidal ideas.       Physical Exam     ED Triage Vitals   ED Triage Vitals Group      Temp 09/02/22 1634 98.4 °F (36.9 °C)      Heart Rate 09/02/22 1634 77      Resp 09/02/22 1634 18      BP 09/02/22 1634 (!) 188/79      SpO2 09/02/22 1634 98 %      EtCO2 mmHg --       Height --       Weight 09/02/22 1628 295 lb 13.7 oz (134.2 kg)      Weight Scale Used 09/02/22 1628 Standing scale      BMI (Calculated) --       IBW/kg (Calculated) --        Physical Exam  Vitals and nursing note reviewed.   Constitutional:       Appearance: Normal appearance.   HENT:      Head: Normocephalic and atraumatic.      Mouth/Throat:      Mouth: Mucous membranes are moist.      Neck: Normal range of motion and neck supple.   Eyes:      General:         Right eye: No discharge.         Left eye: No discharge.      Conjunctiva/sclera: Conjunctivae normal.      Pupils: Pupils are equal, round, and reactive to light.      Comments: OD: Limited lateral movement, otherwise intact.   Cardiovascular:      Rate and  Rhythm: Normal rate and regular rhythm.      Pulses: Normal pulses.      Heart sounds: Normal heart sounds. No murmur heard.    No friction rub. No gallop.   Pulmonary:      Effort: Pulmonary effort is normal. No respiratory distress.      Breath sounds: Normal breath sounds. No stridor. No rales.   Chest:      Chest wall: No tenderness.   Abdominal:      General: Abdomen is flat. Bowel sounds are normal. There is no distension.      Palpations: Abdomen is soft. There is no mass.      Tenderness: There is no abdominal tenderness. There is no right CVA tenderness, left CVA tenderness, guarding or rebound.   Musculoskeletal:         General: No swelling, tenderness or deformity. Normal range of motion.   Skin:     General: Skin is warm and dry.      Capillary Refill: Capillary refill takes less than 2 seconds.      Coloration: Skin is not pale.      Findings: No rash.   Neurological:      General: No focal deficit present.      Mental Status: He is alert and oriented to person, place, and time.      Cranial Nerves: No cranial nerve deficit.      Sensory: No sensory deficit.      Motor: No weakness.      Gait: Gait normal.   Psychiatric:         Mood and Affect: Mood normal.         Behavior: Behavior normal.         Thought Content: Thought content normal.         Procedures    Lab Results     Labs Reviewed   COMPREHENSIVE METABOLIC PANEL - Abnormal; Notable for the following components:       Result Value    BUN 22 (*)     All other components within normal limits   TROPONIN I, HIGH SENSITIVITY - Normal   NT PROBNP - Normal   CBC WITH DIFFERENTIAL    Narrative:     The following orders were created for panel order CBC with Automated Differential.  Procedure                               Abnormality         Status                     ---------                               -----------         ------                     CBC with Automated Dif...[[id number]]                      Final result                 Please view  results for these tests on the individual orders.   CBC WITH AUTOMATED DIFFERENTIAL (PERFORMABLE ONLY)    Narrative:     This is an appended report.  These results have been appended to a previously verified report.   RAINBOW DRAW    Narrative:     The following orders were created for panel order [place] Draw Light Blue Top Collected? 1 Label; Red Top Collected? 1 Label; Light Green Top Collected? No Labels; Lavender Top Collected? No Labels; Gold Top Collected? No Labels; Pink Top Collected? No Labels; Grey Top Collected? No Labels.  Procedure                               Abnormality         Status                     ---------                               -----------         ------                     Light Blue Top[[id number]]                                 In process                 Red Top[[id number]]                                        In process                   Please view results for these tests on the individual orders.   LIGHT BLUE TOP   RED TOP      Medications   metoCLOPramide (REGLAN) injection 10 mg (10 mg Intravenous Not Given 9/2/22 2106)   diphenhydrAMINE (BENADRYL) injection 25 mg (25 mg Intravenous Not Given 9/2/22 2105)   acetaminophen (TYLENOL) tablet 650 mg (650 mg Oral Not Given 9/2/22 2105)     EKG Results     EKG Interpretation  Rate: 76  Rhythm: normal sinus rhythm   Abnormality: RBBB, LAFB.    EKG tracing interpreted by ED physician    Radiology Results     CT HEAD WO CONTRAST   Final Result      No CT evidence of acute intracranial abnormality.         Dictated by: Alfonso Mandel M.D.   Dictated on: 9/2/2022 6:09 PM       INORMA D.O., have reviewed the images and report and concur with   these findings interpreted by Alfonso Mandel M.D..      Electronically Signed by: NORMA POTTER D.O.    Signed on: 9/2/2022 6:18 PM          XR CHEST PA AND LATERAL 2 VIEWS   Final Result   FINDINGS AND IMPRESSION:      Normal cardiomediastinal silhouette.  No confluent airspace opacity,  large   pleural effusion, or detectable pneumothorax.  Increased density projected   over the hilum probably calcified hilar lymph nodes.      Electronically Signed by: YOBANY CORTEZ M.D.    Signed on: 9/2/2022 5:51 PM              [facility]  Number of Diagnoses or Management Options  Diplopia  Hypertension, unspecified type  Non-compliant behavior  Right abducens nerve palsy  Diagnosis management comments: Patient with an isolated LR VI nerve palsy, head CT is unremarkable.  Visual acuity is intact per ophthalmologist note.  Patient has not been compliant with his antihypertensives, we sent a prescription for his losartan to his pharmacy.  He was instructed to follow-up with his PCP and ophthalmologist urgently.  He was discharged home in stable condition.  He was counseled on the risks of uncontrolled hypertension which will lead to multisystem disease and/or death      ED Course        Clinical Impression     ED Diagnoses     Diagnosis Comment Associated Orders       Final diagnoses    Right abducens nerve palsy -- --    Diplopia -- --    Hypertension, unspecified type -- --    Non-compliant behavior -- --           Disposition        Discharge 9/2/2022 10:20 PM  Jeffrey Fernandez discharge to home/self care.          KARIN Velásquez MD   9/2/2022 10:58 PM                      QUYEN Velásquez MD  09/02/22 2584     74-yo wheelchair bound M w/ PMHx  of DM2, PAD, hypertension, COPD, CKD,  HFpEF, Hx of   R foot ulcer with osteomyelitis, s/p resection of the R 1st metatarsal head 10/24 here with mechanical fall in the bathroom with head strike but no LOC.  Patient also has been feeling sick for the last several days including diarrhea and cough.  Patient states that there has been  Viral syndrome circulating the SNF. Reports R leg is more swollen, heavy with erythema , causes problem with ambulation. R foot ulcer with osteomyelitis, s/p resection of the R 1st metatarsal head. Was sent to [facility] with PICC line on Dapto for 6 weeks (to November 16, 2024)  -  Bone culture is growing Staph aureus (MRSA).   C/o loose stools.  Abdominal discomfort. No blood in stool or urine. No fever or chill  Patient febrile and with Low BP in ER     IN  ED   VITALS: /70  RR 20 temp 98 F --> 101.9 on RA   LABS: H/H: 10.2/33.6 ( baseline 9) platelet count 122  K 3.3  Cr 1.3 ( baseline 1.1)  Phosphate 2.1  trop negative  UA negative   RVP panel negative   CT HEAD: No acute abnormality. Chronic changes as above.  CT CERVICAL SPINE: No acute abnormality. Chronic changes as above.    CT chest A/P: Atelectasis at the lung bases with underlying infiltrates not excluded.  Mild bronchial wall thickening both lower lobes. No acute abnormality in the abdomen or pelvis.    Imaging findings:    Bilateral pars interarticularis defects at L5 with grade 2 spondylolisthesis L5-S1. Degenerative changes thoracolumbar spine. Old left clavicle fracture. There are old left rib fractures.    TTE 2024: Left ventricular systolic function is normal with an ejection fraction of 61 % There is moderate (grade 2 left ventricular diastolic dysfunction.    s/p 1x zosyn 2300 NS in ER

## 2024-12-28 NOTE — ED PROVIDER NOTE - CLINICAL SUMMARY MEDICAL DECISION MAKING FREE TEXT BOX
74-year-old male past medical history of DM2, PAD, hypertension, COPD, CKD, HFpEF presents ED status post mechanical fall in the bathroom with head strike but no LOC.  Patient also has been feeling sick for the last several days including diarrhea and cough.  patient arrives with abrasion to the forehead, tachycardic but normotensive and afebrile.  Received 200 cc IV fluid with EMS.  Rule out ICH with CT head, update tetanus, no other signs of trauma.  Differential includes not limited to infection secondary to viral syndrome versus bacterial source, fluid overload.  Plan for labs, urine, chest x-ray, EKG, CT head.  Reassess

## 2024-12-28 NOTE — PHARMACOTHERAPY INTERVENTION NOTE - COMMENTS
Medication history complete. Medications and allergies reviewed with list provided and confirmed with .

## 2024-12-28 NOTE — PATIENT PROFILE ADULT - NSPRESCRALCFREQ_GEN_A_NUR
HISTORY OF PRESENT ILLNESS  Angela Mckeon is a 33 y.o. female. HPI    Today's Pt main concerns were provided on virtual visit and Video telemed format,  Present on VV for the concern of the current medical conditions,  pt is w/ comorbid history and unaware if the pt has been exposed to covid-19 individual,   Pt Have been staying at home for couple of wks,  pt has no fever no cough no dyspnea, no ha, not dizzy, nl smell nl taste, no N/V/D, no body ache. Sore throat started 30 days ago, tried otc not getting better     Current Outpatient Medications   Medication Sig Dispense Refill    liraglutide, weight loss, (SAXENDA) 3 mg/0.5 mL (18 mg/3 mL) pen 0.5 mL by SubCUTAneous route daily. 1 Adjustable Dose Pre-filled Pen Syringe 6    sertraline (ZOLOFT) 50 mg tablet Take 1 Tab by mouth daily. (Patient not taking: Reported on 6/2/2020) 30 Tab 3    Insulin Needles, Disposable, (BD ULTRA-FINE SHORT PEN NEEDLE) 31 gauge x 5/16\" ndle USE TO INJECT SAXENDA ONCE DAILY (Patient not taking: Reported on 3/18/2020) 100 Package 8    copper (PARAGARD T 380A) 380 square mm IUD by IntraUTERine route.  valACYclovir (VALTREX) 500 mg tablet       nystatin (MYCOSTATIN) topical cream Apply  to affected area two (2) times a day. (Patient not taking: Reported on 3/18/2020) 30 g 3    norethindrone-ethinyl estradiol-iron (JUNEL FE 1.5/30, 28,) 1.5 mg-30 mcg (21)/75 mg (7) tab Take 1 Tab by mouth daily.  Blood-Glucose Meter (TRUERESULT BLOOD GLUCOSE SYSTM) monitoring kit Test blood sugar before breakfast, lunch dinner and before bedtime (Patient not taking: Reported on 3/18/2020) 1 Kit 11    glucose blood VI test strips (TRUETEST TEST STRIPS) strip Test blood sugar before breakfast, lunch, dinner and before bedtime. (Patient not taking: Reported on 3/18/2020) 100 Strip 11    Lancets misc Test blood sugar before breakfast, lunch, dinner and bedtime.  (Patient not taking: Reported on 3/18/2020) 1 Package 11     No Known Allergies  Past Medical History:   Diagnosis Date    BMI 38.0-38.9,adult 4/3/2018    BMI 40.0-44.9, adult ([facility] [address] 2/19/2018    Chronic back pain greater than 3 months duration 7/29/2015    TASIA (generalized anxiety disorder) 1/19/2017    High-risk sexual behavior 2/23/2017    HX OTHER MEDICAL     chlamydia age 26, treated & negative now    Hypoglycemia 3/30/2016    Ill-defined condition     Insulinoma 4/6/2016    Morbid obesity ([facility] [address]     Obesity (BMI 35.0-39.9 without comorbidity) 9/12/2019    Syncopal seizure ([facility] [address] 11/19/2015    Thyromegaly 3/30/2016    Urinary frequency 11/19/2015     Past Surgical History:   Procedure Laterality Date    HX CHOLECYSTECTOMY  10/6/2014    lap.  tracee    HX DILATION AND CURETTAGE  6/2011    HX OTHER SURGICAL      D&C with SAB 6/2011    HX OTHER SURGICAL      wisdom extraction 15 years    REVISION CERVIX W PREG, [address]  2013     Family History   Problem Relation Age of Onset    Hypertension Mother     Diabetes Mother     High Cholesterol Father     Other Father         high cholesterole, herniated disc    Diabetes Paternal Aunt     Hypertension Maternal Grandmother     COPD Maternal Grandmother     Emphysema Maternal Grandmother     Diabetes Paternal Grandmother      Social History     Tobacco Use    Smoking status: Never Smoker    Smokeless tobacco: Never Used   Substance Use Topics    Alcohol use: No      Lab Results   Component Value Date/Time    WBC 5.5 09/04/2018 11:00 AM    HGB 13.5 09/04/2018 11:00 AM    Hemoglobin (POC) 12.9 06/06/2015 06:39 PM    HCT 41.3 09/04/2018 11:00 AM    Hematocrit (POC) 38 06/06/2015 06:39 PM    PLATELET 041 49/74/5331 11:00 AM    MCV 85 09/04/2018 11:00 AM    Hgb, External 12.4 10/14/2013    Hct, External 36.7 10/14/2013    Platelet cnt., External 304 10/14/2013     Lab Results   Component Value Date/Time    TSH 1.410 09/21/2018 10:57 AM    T4, Free 1.04 04/06/2016 09:31 AM         Review of Systems   Constitutional: Negative for chills, fever and malaise/fatigue. HENT: Positive for congestion, sinus pain and sore throat. Negative for ear pain and nosebleeds. Eyes: Negative for blurred vision, pain, discharge and redness. Respiratory: Negative for cough, sputum production and shortness of breath. Cardiovascular: Negative for chest pain and leg swelling. Gastrointestinal: Negative for constipation, diarrhea, nausea and vomiting. Genitourinary: Negative for frequency. Musculoskeletal: Negative for joint pain. Skin: Negative for itching and rash. Neurological: Negative for headaches. Psychiatric/Behavioral: Negative for depression. The patient is not nervous/anxious. Physical Exam  Constitutional:       Appearance: She is obese. She is not ill-appearing or toxic-appearing. HENT:      Head: Normocephalic and atraumatic. Mouth/Throat:      Mouth: Mucous membranes are moist.   Neurological:      Mental Status: She is alert and oriented to person, place, and time. Psychiatric:         Mood and Affect: Mood normal.         Behavior: Behavior normal.         Thought Content: Thought content normal.         Judgment: Judgment normal.         ASSESSMENT and PLAN  Diagnoses and all orders for this visit:    1. Acute pharyngitis, unspecified etiology    2. Advice given about COVID-19 virus infection    Other orders  -     azithromycin (ZITHROMAX) 250 mg tablet; Take 2 tablets today, then take 1 tablet daily  -     promethazine-phenylephrine (PROMETHAZINE VC) 6.25-5 mg/5 mL syrup; Take 5 mL by mouth three (3) times daily as needed for Cough for up to 7 days.  Indications: stuffy nose            Concern abdout COVID-19 addressed and detailed, pt was told that the best way to prevent illness is by protection, to Wear a facemask , having social distance, and to get tested if possible, Pursuant to the emergency declaration under the [address] [place] [address] [facility] authority and the Coronavirus Preparedness and Response Supplemental Appropriations Act, this Virtual Visit was conducted, with patient's consent, to reduce the patient's risk of exposure to COVID-19 and provide continuity of care for an established patient. Pt was also told if develop dyspnea needs to call 911 or go to er, [facility], pt agreed with todays recommendations, Services were provided through a Video synchronous discussion virtually to substitute for in-person appointment. 2-3 times a week

## 2024-12-29 LAB
A1C WITH ESTIMATED AVERAGE GLUCOSE RESULT: 10.6 % — HIGH (ref 4–5.6)
ALBUMIN SERPL ELPH-MCNC: 2.3 G/DL — LOW (ref 3.3–5)
ALP SERPL-CCNC: 57 U/L — SIGNIFICANT CHANGE UP (ref 40–120)
ALT FLD-CCNC: 15 U/L — SIGNIFICANT CHANGE UP (ref 12–78)
ANION GAP SERPL CALC-SCNC: 4 MMOL/L — LOW (ref 5–17)
AST SERPL-CCNC: 17 U/L — SIGNIFICANT CHANGE UP (ref 15–37)
BASOPHILS # BLD AUTO: 0 K/UL — SIGNIFICANT CHANGE UP (ref 0–0.2)
BASOPHILS NFR BLD AUTO: 0 % — SIGNIFICANT CHANGE UP (ref 0–2)
BILIRUB SERPL-MCNC: 0.5 MG/DL — SIGNIFICANT CHANGE UP (ref 0.2–1.2)
BUN SERPL-MCNC: 17 MG/DL — SIGNIFICANT CHANGE UP (ref 7–23)
CALCIUM SERPL-MCNC: 8.6 MG/DL — SIGNIFICANT CHANGE UP (ref 8.5–10.1)
CHLORIDE SERPL-SCNC: 109 MMOL/L — HIGH (ref 96–108)
CHOLEST SERPL-MCNC: 75 MG/DL — SIGNIFICANT CHANGE UP
CO2 SERPL-SCNC: 25 MMOL/L — SIGNIFICANT CHANGE UP (ref 22–31)
CREAT SERPL-MCNC: 1.09 MG/DL — SIGNIFICANT CHANGE UP (ref 0.5–1.3)
EGFR: 72 ML/MIN/1.73M2 — SIGNIFICANT CHANGE UP
EOSINOPHIL # BLD AUTO: 0.12 K/UL — SIGNIFICANT CHANGE UP (ref 0–0.5)
EOSINOPHIL NFR BLD AUTO: 2 % — SIGNIFICANT CHANGE UP (ref 0–6)
ESTIMATED AVERAGE GLUCOSE: 258 MG/DL — HIGH (ref 68–114)
GLUCOSE SERPL-MCNC: 99 MG/DL — SIGNIFICANT CHANGE UP (ref 70–99)
HCT VFR BLD CALC: 28.5 % — LOW (ref 39–50)
HDLC SERPL-MCNC: 25 MG/DL — LOW
HGB BLD-MCNC: 8.6 G/DL — LOW (ref 13–17)
LIPID PNL WITH DIRECT LDL SERPL: 32 MG/DL — SIGNIFICANT CHANGE UP
LYMPHOCYTES # BLD AUTO: 2.33 K/UL — SIGNIFICANT CHANGE UP (ref 1–3.3)
LYMPHOCYTES # BLD AUTO: 38 % — SIGNIFICANT CHANGE UP (ref 13–44)
MANUAL SMEAR VERIFICATION: SIGNIFICANT CHANGE UP
MCHC RBC-ENTMCNC: 24.8 PG — LOW (ref 27–34)
MCHC RBC-ENTMCNC: 30.2 G/DL — LOW (ref 32–36)
MCV RBC AUTO: 82.1 FL — SIGNIFICANT CHANGE UP (ref 80–100)
MONOCYTES # BLD AUTO: 0.86 K/UL — SIGNIFICANT CHANGE UP (ref 0–0.9)
MONOCYTES NFR BLD AUTO: 14 % — SIGNIFICANT CHANGE UP (ref 2–14)
NEUTROPHILS # BLD AUTO: 2.82 K/UL — SIGNIFICANT CHANGE UP (ref 1.8–7.4)
NEUTROPHILS NFR BLD AUTO: 46 % — SIGNIFICANT CHANGE UP (ref 43–77)
NON HDL CHOLESTEROL: 50 MG/DL — SIGNIFICANT CHANGE UP
NRBC # BLD: 0 /100 WBCS — SIGNIFICANT CHANGE UP (ref 0–0)
NRBC # BLD: SIGNIFICANT CHANGE UP /100 WBCS (ref 0–0)
PLAT MORPH BLD: NORMAL — SIGNIFICANT CHANGE UP
PLATELET # BLD AUTO: 107 K/UL — LOW (ref 150–400)
POTASSIUM SERPL-MCNC: 3.6 MMOL/L — SIGNIFICANT CHANGE UP (ref 3.5–5.3)
POTASSIUM SERPL-SCNC: 3.6 MMOL/L — SIGNIFICANT CHANGE UP (ref 3.5–5.3)
PROT SERPL-MCNC: 6.1 GM/DL — SIGNIFICANT CHANGE UP (ref 6–8.3)
RBC # BLD: 3.47 M/UL — LOW (ref 4.2–5.8)
RBC # FLD: 22 % — HIGH (ref 10.3–14.5)
RBC BLD AUTO: SIGNIFICANT CHANGE UP
SODIUM SERPL-SCNC: 138 MMOL/L — SIGNIFICANT CHANGE UP (ref 135–145)
TRIGL SERPL-MCNC: 94 MG/DL — SIGNIFICANT CHANGE UP
WBC # BLD: 6.14 K/UL — SIGNIFICANT CHANGE UP (ref 3.8–10.5)
WBC # FLD AUTO: 6.14 K/UL — SIGNIFICANT CHANGE UP (ref 3.8–10.5)

## 2024-12-29 PROCEDURE — 99233 SBSQ HOSP IP/OBS HIGH 50: CPT

## 2024-12-29 RX ORDER — CEFTRIAXONE SODIUM 1 G/1
2000 INJECTION, POWDER, FOR SOLUTION INTRAMUSCULAR; INTRAVENOUS EVERY 24 HOURS
Refills: 0 | Status: DISCONTINUED | OUTPATIENT
Start: 2024-12-29 | End: 2024-12-31

## 2024-12-29 RX ORDER — DAPTOMYCIN 500 MG/10ML
500 INJECTION, POWDER, LYOPHILIZED, FOR SOLUTION INTRAVENOUS EVERY 24 HOURS
Refills: 0 | Status: DISCONTINUED | OUTPATIENT
Start: 2024-12-29 | End: 2024-12-31

## 2024-12-29 RX ORDER — CLONAZEPAM 2 MG
0.5 TABLET ORAL ONCE
Refills: 0 | Status: DISCONTINUED | OUTPATIENT
Start: 2024-12-29 | End: 2024-12-29

## 2024-12-29 RX ORDER — ALBUTEROL SULFATE 90 UG/1
2 INHALANT RESPIRATORY (INHALATION) EVERY 4 HOURS
Refills: 0 | Status: DISCONTINUED | OUTPATIENT
Start: 2024-12-29 | End: 2024-12-31

## 2024-12-29 RX ADMIN — INSULIN GLARGINE-YFGN 20 UNIT(S): 100 INJECTION, SOLUTION SUBCUTANEOUS at 21:39

## 2024-12-29 RX ADMIN — PIPERACILLIN AND TAZOBACTAM 25 GRAM(S): 3; .375 INJECTION, POWDER, LYOPHILIZED, FOR SOLUTION INTRAVENOUS at 05:24

## 2024-12-29 RX ADMIN — ROSUVASTATIN 40 MILLIGRAM(S): 40 TABLET, FILM COATED ORAL at 21:39

## 2024-12-29 RX ADMIN — ACETAMINOPHEN 650 MILLIGRAM(S): 80 SOLUTION/ DROPS ORAL at 05:28

## 2024-12-29 RX ADMIN — IPRATROPIUM BROMIDE AND ALBUTEROL SULFATE 3 MILLILITER(S): .5; 2.5 SOLUTION RESPIRATORY (INHALATION) at 21:15

## 2024-12-29 RX ADMIN — Medication 40 MILLIGRAM(S): at 09:22

## 2024-12-29 RX ADMIN — Medication 10 MILLIGRAM(S): at 22:20

## 2024-12-29 RX ADMIN — PREGABALIN 150 MILLIGRAM(S): 100 CAPSULE ORAL at 09:21

## 2024-12-29 RX ADMIN — Medication 3 MILLIGRAM(S): at 23:32

## 2024-12-29 RX ADMIN — Medication 10 MILLIGRAM(S): at 21:39

## 2024-12-29 RX ADMIN — PANTOPRAZOLE 40 MILLIGRAM(S): 40 TABLET, DELAYED RELEASE ORAL at 05:25

## 2024-12-29 RX ADMIN — Medication 0.25 MILLIGRAM(S): at 09:20

## 2024-12-29 RX ADMIN — Medication 0.5 MILLIGRAM(S): at 23:32

## 2024-12-29 RX ADMIN — PREGABALIN 150 MILLIGRAM(S): 100 CAPSULE ORAL at 21:39

## 2024-12-29 RX ADMIN — Medication 25 MILLIGRAM(S): at 09:21

## 2024-12-29 RX ADMIN — Medication 325 MILLIGRAM(S): at 09:20

## 2024-12-29 RX ADMIN — PIPERACILLIN AND TAZOBACTAM 25 GRAM(S): 3; .375 INJECTION, POWDER, LYOPHILIZED, FOR SOLUTION INTRAVENOUS at 13:45

## 2024-12-29 RX ADMIN — FLUTICASONE PROPIONATE AND SALMETEROL 1 DOSE(S): 50; 500 POWDER ORAL; RESPIRATORY (INHALATION) at 09:36

## 2024-12-29 RX ADMIN — IPRATROPIUM BROMIDE AND ALBUTEROL SULFATE 3 MILLILITER(S): .5; 2.5 SOLUTION RESPIRATORY (INHALATION) at 15:28

## 2024-12-29 RX ADMIN — FLUTICASONE PROPIONATE AND SALMETEROL 1 DOSE(S): 50; 500 POWDER ORAL; RESPIRATORY (INHALATION) at 21:15

## 2024-12-29 RX ADMIN — HEPARIN SODIUM 5000 UNIT(S): 1000 INJECTION, SOLUTION INTRAVENOUS; SUBCUTANEOUS at 09:20

## 2024-12-29 RX ADMIN — Medication 2: at 18:01

## 2024-12-29 RX ADMIN — ACETAMINOPHEN 650 MILLIGRAM(S): 80 SOLUTION/ DROPS ORAL at 18:38

## 2024-12-29 RX ADMIN — HEPARIN SODIUM 5000 UNIT(S): 1000 INJECTION, SOLUTION INTRAVENOUS; SUBCUTANEOUS at 21:39

## 2024-12-29 RX ADMIN — Medication 250 MILLIGRAM(S): at 09:21

## 2024-12-29 RX ADMIN — POTASSIUM CHLORIDE 40 MILLIEQUIVALENT(S): 600 TABLET, FILM COATED, EXTENDED RELEASE ORAL at 01:20

## 2024-12-29 RX ADMIN — CEFTRIAXONE SODIUM 2000 MILLIGRAM(S): 1 INJECTION, POWDER, FOR SOLUTION INTRAMUSCULAR; INTRAVENOUS at 18:33

## 2024-12-29 RX ADMIN — Medication 10 MILLIGRAM(S): at 09:20

## 2024-12-29 RX ADMIN — DAPTOMYCIN 120 MILLIGRAM(S): 500 INJECTION, POWDER, LYOPHILIZED, FOR SOLUTION INTRAVENOUS at 12:44

## 2024-12-29 RX ADMIN — SERTRALINE HYDROCHLORIDE 100 MILLIGRAM(S): 25 TABLET ORAL at 09:21

## 2024-12-29 RX ADMIN — IPRATROPIUM BROMIDE AND ALBUTEROL SULFATE 3 MILLILITER(S): .5; 2.5 SOLUTION RESPIRATORY (INHALATION) at 09:34

## 2024-12-29 RX ADMIN — MONTELUKAST SODIUM 10 MILLIGRAM(S): 10 TABLET, FILM COATED ORAL at 09:22

## 2024-12-29 RX ADMIN — IPRATROPIUM BROMIDE AND ALBUTEROL SULFATE 3 MILLILITER(S): .5; 2.5 SOLUTION RESPIRATORY (INHALATION) at 02:22

## 2024-12-29 RX ADMIN — Medication 1 TABLET(S): at 09:21

## 2024-12-29 RX ADMIN — Medication 0.25 MILLIGRAM(S): at 21:39

## 2024-12-29 NOTE — CONSULT NOTE ADULT - ASSESSMENT
75 y/o wheelchair bound Male with h/o DM type 2, PAD, hypertension, COPD, CKD,  HFpEF, prior R foot ulcer with osteomyelitis, with MRSA s/p resection of the R 1st metatarsal head 10/24 s/p PICC line with daptomycin for 6 weeks to Nov 16, 2024 was admitted on 12/28 for increased weakness s/p mechanical fall in the bathroom with head strike but no LOC. Patient also has been feeling sick for the last several days including diarrhea and cough. Patient states that there has been viral syndrome circulating the SNF. Reports R leg is more swollen, heavy with erythema, causes problem with ambulation. In ER he was noted febrile with hypotension. He received zosyn.     #RLE cellulitis  #Possible sepsis with hypotension, tachycardia and fever  #ARF on CRF stage 3  #Allergy to vancomycin and tetracycline  -recent R foot ulcer with osteomyelitis, s/p resection of the R 1st metatarsal head 10/24  - RVP panel negative   -obtain BC x 2  -agree with daptomycin 500 mg IV qd and add ceftriaxone 2 gm IV qd   -reason for abx use and side effects reviewed with patient; monitor BMP   -if loose stools - obtain stool studies  -podiatry evaluation appreciated   -old chart reviewed to assess prior cultures  -monitor temps  -f/u CBC  -supportive care  2. Other issues:   -care per medicine    Clinical team may change from intravenous to oral antibiotics when the following criteria are met:   1. Patient is clinically improving/stable       a)	Improved signs and symptoms of infection from initial presentation       b)	Afebrile for 24 hours       c)	Leukocytosis trending towards normal range   2. Patient is tolerating oral intake   3. Initial/repeat blood cultures are negative     When above criteria met may change iv antibiotics to an oral agent  Cannot advise changing to oral antibiotic therapy until culture sensitivity is available.   75 y/o wheelchair bound Male with h/o DM type 2, PAD, hypertension, COPD, CKD,  HFpEF, prior R foot ulcer with osteomyelitis, with MRSA s/p resection of the R 1st metatarsal head 10/24 s/p PICC line with daptomycin for 6 weeks to Nov 16, 2024 was admitted on 12/28 for increased weakness s/p mechanical fall in the bathroom with head strike but no LOC. Patient also has been feeling sick for the last several days including diarrhea and cough. Patient states that there has been viral syndrome circulating the SNF. Reports R leg is more swollen, heavy with erythema, causes problem with ambulation. In ER he was noted febrile with hypotension. He received zosyn.     #RLE cellulitis  #Possible sepsis with hypotension, tachycardia and fever  #ARF on CRF stage 3  #Allergy to vancomycin and tetracycline  -recent R foot ulcer with osteomyelitis, s/p resection of the R 1st metatarsal head 10/24  -no foot cellulitis   - RVP panel negative   -obtain BC x 2  -agree with daptomycin 500 mg IV qd and add ceftriaxone 2 gm IV qd   -reason for abx use and side effects reviewed with patient; monitor BMP   -if loose stools - obtain stool studies  -podiatry evaluation appreciated   -old chart reviewed to assess prior cultures  -monitor temps  -f/u CBC  -supportive care  2. Other issues:   -care per medicine    Clinical team may change from intravenous to oral antibiotics when the following criteria are met:   1. Patient is clinically improving/stable       a)	Improved signs and symptoms of infection from initial presentation       b)	Afebrile for 24 hours       c)	Leukocytosis trending towards normal range   2. Patient is tolerating oral intake   3. Initial/repeat blood cultures are negative     When above criteria met may change iv antibiotics to an oral agent  Cannot advise changing to oral antibiotic therapy until culture sensitivity is available.

## 2024-12-29 NOTE — CONSULT NOTE ADULT - SUBJECTIVE AND OBJECTIVE BOX
Patient is a 74y old  Male who presents with a chief complaint of sepsis 2/2 R leg developing cellulitis ( hx of R foot ulcer with osteomyelitis, s/p resection of the R 1st metatarsal head 10/24), loose stool     HPI:  75 y/o wheelchair bound Male with h/o DM type 2, PAD, hypertension, COPD, CKD,  HFpEF, prior R foot ulcer with osteomyelitis, with MRSA s/p resection of the R 1st metatarsal head 10/24 s/p PICC line with daptomycin for 6 weeks to Nov 16, 2024 was admitted on 12/28 for increased weakness s/p mechanical fall in the bathroom with head strike but no LOC. Patient also has been feeling sick for the last several days including diarrhea and cough. Patient states that there has been viral syndrome circulating the SNF. Reports R leg is more swollen, heavy with erythema, causes problem with ambulation. In ER he was noted febrile with hypotension. He received zosyn.     PMH: as above  PSH: as above  Meds: per reconciliation sheet, noted below  MEDICATIONS  (STANDING):  albuterol/ipratropium for Nebulization 3 milliLiter(s) Nebulizer every 6 hours  clonazePAM Oral Disintegrating Tablet 0.25 milliGRAM(s) Oral two times a day  DAPTOmycin IVPB 500 milliGRAM(s) IV Intermittent every 24 hours  dextrose 5%. 1000 milliLiter(s) (50 mL/Hr) IV Continuous <Continuous>  dextrose 5%. 1000 milliLiter(s) (100 mL/Hr) IV Continuous <Continuous>  dextrose 50% Injectable 25 Gram(s) IV Push once  dextrose 50% Injectable 12.5 Gram(s) IV Push once  dextrose 50% Injectable 25 Gram(s) IV Push once  ferrous    sulfate 325 milliGRAM(s) Oral daily  fluticasone propionate/ salmeterol 250-50 MICROgram(s) Diskus 1 Dose(s) Inhalation two times a day  furosemide    Tablet 40 milliGRAM(s) Oral daily  glucagon  Injectable 1 milliGRAM(s) IntraMuscular once  heparin   Injectable 5000 Unit(s) SubCutaneous every 12 hours  influenza  Vaccine (HIGH DOSE) 0.5 milliLiter(s) IntraMuscular once  insulin glargine Injectable (LANTUS) 20 Unit(s) SubCutaneous at bedtime  insulin lispro (ADMELOG) corrective regimen sliding scale   SubCutaneous three times a day before meals  insulin lispro (ADMELOG) corrective regimen sliding scale   SubCutaneous at bedtime  metoprolol succinate ER 25 milliGRAM(s) Oral daily  montelukast 10 milliGRAM(s) Oral daily  multivitamin/minerals 1 Tablet(s) Oral daily  oxyCODONE  ER Tablet 10 milliGRAM(s) Oral every 12 hours  pantoprazole    Tablet 40 milliGRAM(s) Oral before breakfast  piperacillin/tazobactam IVPB.. 3.375 Gram(s) IV Intermittent every 8 hours  pregabalin 150 milliGRAM(s) Oral two times a day  rosuvastatin 40 milliGRAM(s) Oral at bedtime  saccharomyces boulardii 250 milliGRAM(s) Oral daily  sertraline 100 milliGRAM(s) Oral daily    MEDICATIONS  (PRN):  acetaminophen     Tablet .. 650 milliGRAM(s) Oral every 6 hours PRN Temp greater or equal to 38C (100.4F), Mild Pain (1 - 3)  albuterol    90 MICROgram(s) HFA Inhaler 2 Puff(s) Inhalation every 4 hours PRN Shortness of Breath and/or Wheezing  dextrose Oral Gel 15 Gram(s) Oral once PRN Blood Glucose LESS THAN 70 milliGRAM(s)/deciliter  melatonin 3 milliGRAM(s) Oral at bedtime PRN Insomnia    Allergies    tetracycline (Unknown)  IODINE (Unknown)  vancomycin (Other)    Intolerances      Social: no smoking, no alcohol, no illegal drugs; no recent travel, no exposure to TB  FAMILY HISTORY:    no history of premature cardiovascular disease in first degree relatives    ROS: the patient denies fever, no chills, no HA, no seizures, no dizziness, no sore throat, no nasal congestion, no blurry vision, no CP, no palpitations, no SOB, no cough, no abdominal pain, no diarrhea, no N/V, no dysuria, has right leg pain, no claudication, no rash, no joint aches, no rectal pain or bleeding, no night sweats  All other systems reviewed and are negative    Vital Signs Last 24 Hrs  T(C): 37.1 (29 Dec 2024 16:04), Max: 37.4 (28 Dec 2024 18:55)  T(F): 98.8 (29 Dec 2024 16:04), Max: 99.3 (28 Dec 2024 18:55)  HR: 87 (29 Dec 2024 16:04) (74 - 98)  BP: 135/75 (29 Dec 2024 16:04) (120/66 - 145/76)  BP(mean): 90 (28 Dec 2024 17:00) (90 - 90)  RR: 18 (29 Dec 2024 16:04) (18 - 20)  SpO2: 94% (29 Dec 2024 16:04) (93% - 97%)    Parameters below as of 29 Dec 2024 16:04  Patient On (Oxygen Delivery Method): room air    PE:    Constitutional:  No acute distress  HEENT: NC/AT, EOMI, PERRLA, conjunctivae clear; ears and nose atraumatic; pharynx benign  Neck: supple; thyroid not palpable  Back: no tenderness  Respiratory: respiratory effort normal; clear to auscultation  Cardiovascular: S1S2 regular, no murmurs  Abdomen: soft, not tender, not distended, positive BS; no liver or spleen organomegaly  Genitourinary: no suprapubic tenderness  Lymphatic: no LN palpable  Musculoskeletal: no muscle tenderness, no joint swelling or tenderness  Extremities: 2+ pedal edema  No open lesion to the RLE, diffuse erythema on lower right leg up to below knee   Neurological/ Psychiatric: AxOx3, judgement and insight normal; moving all extremities  Skin: no rashes; no palpable lesions    Labs: all available labs reviewed                        8.6    6.14  )-----------( 107      ( 29 Dec 2024 06:43 )             28.5     12-29    138  |  109[H]  |  17  ----------------------------<  99  3.6   |  25  |  1.09    Ca    8.6      29 Dec 2024 06:43  Phos  2.1     12-28  Mg     1.9     12-28    TPro  6.1  /  Alb  2.3[L]  /  TBili  0.5  /  DBili  x   /  AST  17  /  ALT  15  /  AlkPhos  57  12-29     LIVER FUNCTIONS - ( 29 Dec 2024 06:43 )  Alb: 2.3 g/dL / Pro: 6.1 gm/dL / ALK PHOS: 57 U/L / ALT: 15 U/L / AST: 17 U/L / GGT: x           Urinalysis with Rflx Culture (collected 28 Dec 2024 13:12)    Radiology: all available radiological tests reviewed    < from: US Duplex Venous Lower Ext Complete, Bilateral (12.28.24 @ 18:01) >  No evidence of deep venous thrombosis in either lower extremity.  < end of copied text >    < from: CT Abdomen and Pelvis No Cont (12.28.24 @ 12:30) >  Atelectasis at the lung bases with underlying infiltrates not excluded.   Mild bronchial wall thickening both lower lobes.  < end of copied text >    Advanced directives addressed: full resuscitation Patient is a 74y old  Male who presents with a chief complaint of sepsis 2/2 R leg developing cellulitis ( hx of R foot ulcer with osteomyelitis, s/p resection of the R 1st metatarsal head 10/24), loose stool     HPI:  75 y/o wheelchair bound Male with h/o DM type 2, PAD, hypertension, COPD, CKD,  HFpEF, prior R foot ulcer with osteomyelitis, with MRSA s/p resection of the R 1st metatarsal head 10/24 s/p PICC line with daptomycin for 6 weeks to Nov 16, 2024 was admitted on 12/28 for increased weakness s/p mechanical fall in the bathroom with head strike but no LOC. Patient also has been feeling sick for the last several days including diarrhea and cough. Patient states that there has been viral syndrome circulating the SNF. Reports R leg is more swollen, heavy with erythema, causes problem with ambulation. In ER he was noted febrile with hypotension. He received zosyn.     PMH: as above  PSH: as above  Meds: per reconciliation sheet, noted below  MEDICATIONS  (STANDING):  albuterol/ipratropium for Nebulization 3 milliLiter(s) Nebulizer every 6 hours  clonazePAM Oral Disintegrating Tablet 0.25 milliGRAM(s) Oral two times a day  DAPTOmycin IVPB 500 milliGRAM(s) IV Intermittent every 24 hours  dextrose 5%. 1000 milliLiter(s) (50 mL/Hr) IV Continuous <Continuous>  dextrose 5%. 1000 milliLiter(s) (100 mL/Hr) IV Continuous <Continuous>  dextrose 50% Injectable 25 Gram(s) IV Push once  dextrose 50% Injectable 12.5 Gram(s) IV Push once  dextrose 50% Injectable 25 Gram(s) IV Push once  ferrous    sulfate 325 milliGRAM(s) Oral daily  fluticasone propionate/ salmeterol 250-50 MICROgram(s) Diskus 1 Dose(s) Inhalation two times a day  furosemide    Tablet 40 milliGRAM(s) Oral daily  glucagon  Injectable 1 milliGRAM(s) IntraMuscular once  heparin   Injectable 5000 Unit(s) SubCutaneous every 12 hours  influenza  Vaccine (HIGH DOSE) 0.5 milliLiter(s) IntraMuscular once  insulin glargine Injectable (LANTUS) 20 Unit(s) SubCutaneous at bedtime  insulin lispro (ADMELOG) corrective regimen sliding scale   SubCutaneous three times a day before meals  insulin lispro (ADMELOG) corrective regimen sliding scale   SubCutaneous at bedtime  metoprolol succinate ER 25 milliGRAM(s) Oral daily  montelukast 10 milliGRAM(s) Oral daily  multivitamin/minerals 1 Tablet(s) Oral daily  oxyCODONE  ER Tablet 10 milliGRAM(s) Oral every 12 hours  pantoprazole    Tablet 40 milliGRAM(s) Oral before breakfast  piperacillin/tazobactam IVPB.. 3.375 Gram(s) IV Intermittent every 8 hours  pregabalin 150 milliGRAM(s) Oral two times a day  rosuvastatin 40 milliGRAM(s) Oral at bedtime  saccharomyces boulardii 250 milliGRAM(s) Oral daily  sertraline 100 milliGRAM(s) Oral daily    MEDICATIONS  (PRN):  acetaminophen     Tablet .. 650 milliGRAM(s) Oral every 6 hours PRN Temp greater or equal to 38C (100.4F), Mild Pain (1 - 3)  albuterol    90 MICROgram(s) HFA Inhaler 2 Puff(s) Inhalation every 4 hours PRN Shortness of Breath and/or Wheezing  dextrose Oral Gel 15 Gram(s) Oral once PRN Blood Glucose LESS THAN 70 milliGRAM(s)/deciliter  melatonin 3 milliGRAM(s) Oral at bedtime PRN Insomnia    Allergies    tetracycline (Unknown)  IODINE (Unknown)  vancomycin (Other)    Intolerances      Social: no smoking, no alcohol, no illegal drugs; no recent travel, no exposure to TB  FAMILY HISTORY:    no history of premature cardiovascular disease in first degree relatives    ROS: the patient denies fever, no chills, no HA, no seizures, no dizziness, no sore throat, no nasal congestion, no blurry vision, no CP, no palpitations, no SOB, no cough, no abdominal pain, no diarrhea, no N/V, no dysuria, has right leg pain, no claudication, no rash, no joint aches, no rectal pain or bleeding, no night sweats  All other systems reviewed and are negative    Vital Signs Last 24 Hrs  T(C): 37.1 (29 Dec 2024 16:04), Max: 37.4 (28 Dec 2024 18:55)  T(F): 98.8 (29 Dec 2024 16:04), Max: 99.3 (28 Dec 2024 18:55)  HR: 87 (29 Dec 2024 16:04) (74 - 98)  BP: 135/75 (29 Dec 2024 16:04) (120/66 - 145/76)  BP(mean): 90 (28 Dec 2024 17:00) (90 - 90)  RR: 18 (29 Dec 2024 16:04) (18 - 20)  SpO2: 94% (29 Dec 2024 16:04) (93% - 97%)    Parameters below as of 29 Dec 2024 16:04  Patient On (Oxygen Delivery Method): room air    PE:    Constitutional:  No acute distress  HEENT: NC/AT, EOMI, PERRLA, conjunctivae clear; ears and nose atraumatic; pharynx benign  Neck: supple; thyroid not palpable  Back: no tenderness  Respiratory: respiratory effort normal; clear to auscultation  Cardiovascular: S1S2 regular, no murmurs  Abdomen: soft, not tender, not distended, positive BS; no liver or spleen organomegaly  Genitourinary: no suprapubic tenderness  Lymphatic: no LN palpable  Musculoskeletal: no muscle tenderness, no joint swelling or tenderness  Extremities: 2+ pedal edema  No open lesion to the RLE, Right shin and calf diffuse erythema, edema, warmth and tenderness  Neurological/ Psychiatric: AxOx3, judgement and insight normal; moving all extremities  Skin: no rashes; no palpable lesions    Labs: all available labs reviewed                        8.6    6.14  )-----------( 107      ( 29 Dec 2024 06:43 )             28.5     12-29    138  |  109[H]  |  17  ----------------------------<  99  3.6   |  25  |  1.09    Ca    8.6      29 Dec 2024 06:43  Phos  2.1     12-28  Mg     1.9     12-28    TPro  6.1  /  Alb  2.3[L]  /  TBili  0.5  /  DBili  x   /  AST  17  /  ALT  15  /  AlkPhos  57  12-29     LIVER FUNCTIONS - ( 29 Dec 2024 06:43 )  Alb: 2.3 g/dL / Pro: 6.1 gm/dL / ALK PHOS: 57 U/L / ALT: 15 U/L / AST: 17 U/L / GGT: x           Urinalysis with Rflx Culture (collected 28 Dec 2024 13:12)    Radiology: all available radiological tests reviewed    < from: US Duplex Venous Lower Ext Complete, Bilateral (12.28.24 @ 18:01) >  No evidence of deep venous thrombosis in either lower extremity.  < end of copied text >    < from: CT Abdomen and Pelvis No Cont (12.28.24 @ 12:30) >  Atelectasis at the lung bases with underlying infiltrates not excluded.   Mild bronchial wall thickening both lower lobes.  < end of copied text >    Advanced directives addressed: full resuscitation

## 2024-12-29 NOTE — CONSULT NOTE ADULT - REASON FOR ADMISSION
sepsis 2/2 R leg developing cellulitis ( hx of R foot ulcer with osteomyelitis, s/p resection of the R 1st metatarsal head 10/24)  , loose stool ( viral enteritis ) ?
sepsis 2/2 R leg developing cellulitis ( hx of R foot ulcer with osteomyelitis, s/p resection of the R 1st metatarsal head 10/24)  , loose stool ( viral enteritis ) ?

## 2024-12-29 NOTE — PROGRESS NOTE ADULT - ASSESSMENT
74-yo wheelchair bound M w/ PMHx  of DM2, PAD, hypertension, COPD, CKD,  HFpEF, Hx of   R foot ulcer with osteomyelitis, s/p resection of the R 1st metatarsal head 10/24 here with mechanical fall in the bathroom with head strike but no LOC.  Patient also has been feeling sick for the last several days including diarrhea and cough.  Patient states that there has been  Viral syndrome circulating the SNF. Reports R leg is more swollen, heavy with erythema , causes problem with ambulation. R foot ulcer with osteomyelitis, s/p resection of the R 1st metatarsal head.     # Sepsis 2/2 R leg cellulitis  # s/p fall +head strike   # hx of R foot ulcer with osteomyelitis, s/p resection of the R 1st metatarsal head 10/24  # hx of neuropathy   - low bp , tachy, tachypnea, fever  101.9 in ER  - trop negative  - UA negative   - RVP panel negative   - CT HEAD: No acute abnormality. Chronic changes as above.  - CT CERVICAL SPINE: No acute abnormality. Chronic changes as above.  - CT chest A/P: Atelectasis at the lung bases with underlying infiltrates not excluded.  Mild bronchial wall thickening both lower lobes. No acute abnormality in the abdomen or pelvis.   - s/p 1x zosyn 2300 NS in ER  - Continue IV Dapto and IV Zosyn q8hrs  - Tylenol 650 mg PO q6h PRN for mild pain or fever  - follow Doppler lower extremity   - f/u Bcx and Ucx, MRSA PCR   - Follow pelvis X Ray   - Monitor WBC and fever curve  - Hx of resection of the R 1st metatarsal head.  Bone culture + MRSA patient was sent on  IV Dapto for 6 weeks (completed November 16, 2024) follow wound care podiatry at [facility]  - ID consulted    # REANNA on CKD  - Cr 1.3 > 1.09 today   - baseline 1.1  - S/P 2300 cc ns in ER     # Hypokalemia - improved   - k 3.3 replete PRN     # Anemia  - H/H: 10.2/33.6 ( baseline 9)   - platelet count 122    # Type II diabetes mellitus.   ·- Accu checks monitoring and insulin corrective regimen  sliding scale coverage with short acting insulin add long acting insulin as needed ,no concentrated sweets diet, serial labs ,HbA1C,education.    # hx of CHF  TTE 2024: Left ventricular systolic function is normal with an ejection fraction of 61 % There is moderate (grade 2) left ventricular diastolic dysfunction.    # hx of COPD  continue inhaler     # prostate cancer   stable     # DVT pro   - heparin sq     74-yo wheelchair bound M w/ PMHx  of DM2, PAD, hypertension, COPD, CKD,  HFpEF, Hx of   R foot ulcer with osteomyelitis, s/p resection of the R 1st metatarsal head 10/24 here with mechanical fall in the bathroom with head strike but no LOC.  Patient also has been feeling sick for the last several days including diarrhea and cough.  Patient states that there has been  Viral syndrome circulating the SNF. Reports R leg is more swollen, heavy with erythema , causes problem with ambulation. R foot ulcer with osteomyelitis, s/p resection of the R 1st metatarsal head.     # Sepsis 2/2 R leg cellulitis  # s/p fall +head strike   # hx of R foot ulcer with osteomyelitis, s/p resection of the R 1st metatarsal head 10/24  # hx of neuropathy   - low bp , tachy, tachypnea, fever  101.9 in ER  - trop negative  - UA negative   - RVP panel negative   - CT HEAD: No acute abnormality. Chronic changes as above.  - CT CERVICAL SPINE: No acute abnormality. Chronic changes as above.  - CT chest A/P: Atelectasis at the lung bases with underlying infiltrates not excluded.  Mild bronchial wall thickening both lower lobes. No acute abnormality in the abdomen or pelvis.   - s/p 1x zosyn 2300 NS in ER  - Continue IV Dapto and IV Zosyn q8hrs  - Tylenol 650 mg PO q6h PRN for mild pain or fever  - follow Doppler lower extremity   - f/u Bcx and Ucx, MRSA PCR   - Follow pelvis X Ray   - Monitor WBC and fever curve  - Hx of resection of the R 1st metatarsal head.  Bone culture + MRSA patient was sent on  IV Dapto for 6 weeks (completed November 16, 2024) follow wound care podiatry at [facility]  - ID consulted    # REANNA on CKD  - Cr 1.3 > 1.09 today   - baseline 1.1  - S/P 2300 cc ns in ER     # Hypokalemia - improved   - k 3.3 replete PRN     # Anemia  - H/H: 10.2/33.6 ( baseline 9)   - platelet count 122 > 107 today     # Type II diabetes mellitus.   - Accu checks monitoring and insulin corrective regimen  sliding scale coverage with short acting insulin add long acting insulin as needed ,no concentrated sweets diet, serial labs ,HbA1C,education.    # hx of CHF  TTE 2024: Left ventricular systolic function is normal with an ejection fraction of 61 % There is moderate (grade 2) left ventricular diastolic dysfunction.    # hx of COPD  continue inhaler     # prostate cancer   stable     # DVT pro   - heparin sq      medically active

## 2024-12-29 NOTE — PROGRESS NOTE ADULT - ASSESSMENT
A: 74-year-old Male seen for the followin. Cellulitis to Right Lower Extremity      P:   Chart reviewed and Patient evaluated;  Discussed diagnosis and treatment with patient. Discussed importance of daily foot examinations, proper shoe gear, importance of tight glycemic control.   X-rays ordered: on wet reading shows no soft tissue emphysema, noted age indeterminate osteolysis around navicular, prio metatarsal head resection and knee joint implant.  Applied mild compression  WBAT  to Right Foot in dispensed surgical shoe  Continue antibiotics as per ID  All additional care per Med appreciated  Patient demonstrated verbal understanding of all interventions and tolerated interventions well without any complications.   Podiatry will follow while in house      Case D/W attending Dr. Pandya         A: 74-year-old Male seen for the followin. Cellulitis to Right Lower Extremity  2. S/P Right 1st Metatarsal head Resection (Dos 03-Oct-2024), Healed      P:   Chart reviewed and Patient evaluated;  Discussed diagnosis and treatment with patient. Discussed importance of daily foot examinations, proper shoe gear, importance of tight glycemic control.   X-rays ordered: on wet reading shows no soft tissue emphysema, noted age indeterminate osteolysis around navicular, prio metatarsal head resection and knee joint implant.  Applied mild compression  WBAT  to Right Foot in dispensed surgical shoe  Continue antibiotics as per ID  All additional care per Med appreciated  Patient demonstrated verbal understanding of all interventions and tolerated interventions well without any complications.   Podiatry will follow while in house      Case D/W attending Dr. Pandya

## 2024-12-30 LAB
ANION GAP SERPL CALC-SCNC: 6 MMOL/L — SIGNIFICANT CHANGE UP (ref 5–17)
BASOPHILS # BLD AUTO: 0.02 K/UL — SIGNIFICANT CHANGE UP (ref 0–0.2)
BASOPHILS NFR BLD AUTO: 0.3 % — SIGNIFICANT CHANGE UP (ref 0–2)
BUN SERPL-MCNC: 14 MG/DL — SIGNIFICANT CHANGE UP (ref 7–23)
CALCIUM SERPL-MCNC: 8.7 MG/DL — SIGNIFICANT CHANGE UP (ref 8.5–10.1)
CHLORIDE SERPL-SCNC: 107 MMOL/L — SIGNIFICANT CHANGE UP (ref 96–108)
CO2 SERPL-SCNC: 26 MMOL/L — SIGNIFICANT CHANGE UP (ref 22–31)
CREAT SERPL-MCNC: 1.18 MG/DL — SIGNIFICANT CHANGE UP (ref 0.5–1.3)
EGFR: 65 ML/MIN/1.73M2 — SIGNIFICANT CHANGE UP
EOSINOPHIL # BLD AUTO: 0.16 K/UL — SIGNIFICANT CHANGE UP (ref 0–0.5)
EOSINOPHIL NFR BLD AUTO: 2.5 % — SIGNIFICANT CHANGE UP (ref 0–6)
GLUCOSE SERPL-MCNC: 192 MG/DL — HIGH (ref 70–99)
HCT VFR BLD CALC: 28.7 % — LOW (ref 39–50)
HGB BLD-MCNC: 8.7 G/DL — LOW (ref 13–17)
IMM GRANULOCYTES NFR BLD AUTO: 2.7 % — HIGH (ref 0–0.9)
LYMPHOCYTES # BLD AUTO: 2.4 K/UL — SIGNIFICANT CHANGE UP (ref 1–3.3)
LYMPHOCYTES # BLD AUTO: 38.1 % — SIGNIFICANT CHANGE UP (ref 13–44)
MCHC RBC-ENTMCNC: 24.9 PG — LOW (ref 27–34)
MCHC RBC-ENTMCNC: 30.3 G/DL — LOW (ref 32–36)
MCV RBC AUTO: 82 FL — SIGNIFICANT CHANGE UP (ref 80–100)
MONOCYTES # BLD AUTO: 0.89 K/UL — SIGNIFICANT CHANGE UP (ref 0–0.9)
MONOCYTES NFR BLD AUTO: 14.1 % — HIGH (ref 2–14)
NEUTROPHILS # BLD AUTO: 2.66 K/UL — SIGNIFICANT CHANGE UP (ref 1.8–7.4)
NEUTROPHILS NFR BLD AUTO: 42.3 % — LOW (ref 43–77)
PLATELET # BLD AUTO: 108 K/UL — LOW (ref 150–400)
POTASSIUM SERPL-MCNC: 3.5 MMOL/L — SIGNIFICANT CHANGE UP (ref 3.5–5.3)
POTASSIUM SERPL-SCNC: 3.5 MMOL/L — SIGNIFICANT CHANGE UP (ref 3.5–5.3)
RBC # BLD: 3.5 M/UL — LOW (ref 4.2–5.8)
RBC # FLD: 22 % — HIGH (ref 10.3–14.5)
SODIUM SERPL-SCNC: 139 MMOL/L — SIGNIFICANT CHANGE UP (ref 135–145)
WBC # BLD: 6.3 K/UL — SIGNIFICANT CHANGE UP (ref 3.8–10.5)
WBC # FLD AUTO: 6.3 K/UL — SIGNIFICANT CHANGE UP (ref 3.8–10.5)

## 2024-12-30 PROCEDURE — 99232 SBSQ HOSP IP/OBS MODERATE 35: CPT

## 2024-12-30 RX ORDER — CLONAZEPAM 2 MG
0.75 TABLET ORAL
Refills: 0 | Status: DISCONTINUED | OUTPATIENT
Start: 2024-12-30 | End: 2024-12-31

## 2024-12-30 RX ADMIN — HEPARIN SODIUM 5000 UNIT(S): 1000 INJECTION, SOLUTION INTRAVENOUS; SUBCUTANEOUS at 09:27

## 2024-12-30 RX ADMIN — Medication 10 MILLIGRAM(S): at 21:15

## 2024-12-30 RX ADMIN — IPRATROPIUM BROMIDE AND ALBUTEROL SULFATE 3 MILLILITER(S): .5; 2.5 SOLUTION RESPIRATORY (INHALATION) at 07:49

## 2024-12-30 RX ADMIN — Medication 250 MILLIGRAM(S): at 09:26

## 2024-12-30 RX ADMIN — DAPTOMYCIN 120 MILLIGRAM(S): 500 INJECTION, POWDER, LYOPHILIZED, FOR SOLUTION INTRAVENOUS at 12:56

## 2024-12-30 RX ADMIN — FLUTICASONE PROPIONATE AND SALMETEROL 1 DOSE(S): 50; 500 POWDER ORAL; RESPIRATORY (INHALATION) at 07:49

## 2024-12-30 RX ADMIN — CEFTRIAXONE SODIUM 2000 MILLIGRAM(S): 1 INJECTION, POWDER, FOR SOLUTION INTRAMUSCULAR; INTRAVENOUS at 17:31

## 2024-12-30 RX ADMIN — Medication 4: at 07:50

## 2024-12-30 RX ADMIN — Medication 0.25 MILLIGRAM(S): at 09:27

## 2024-12-30 RX ADMIN — Medication 10 MILLIGRAM(S): at 09:30

## 2024-12-30 RX ADMIN — Medication 10 MILLIGRAM(S): at 09:25

## 2024-12-30 RX ADMIN — FLUTICASONE PROPIONATE AND SALMETEROL 1 DOSE(S): 50; 500 POWDER ORAL; RESPIRATORY (INHALATION) at 20:16

## 2024-12-30 RX ADMIN — Medication 4: at 12:56

## 2024-12-30 RX ADMIN — Medication 325 MILLIGRAM(S): at 09:25

## 2024-12-30 RX ADMIN — Medication 2: at 17:30

## 2024-12-30 RX ADMIN — Medication 3 MILLIGRAM(S): at 21:15

## 2024-12-30 RX ADMIN — SERTRALINE HYDROCHLORIDE 100 MILLIGRAM(S): 25 TABLET ORAL at 09:27

## 2024-12-30 RX ADMIN — PREGABALIN 150 MILLIGRAM(S): 100 CAPSULE ORAL at 21:14

## 2024-12-30 RX ADMIN — Medication 10 MILLIGRAM(S): at 21:45

## 2024-12-30 RX ADMIN — Medication 0.75 MILLIGRAM(S): at 22:17

## 2024-12-30 RX ADMIN — PANTOPRAZOLE 40 MILLIGRAM(S): 40 TABLET, DELAYED RELEASE ORAL at 06:04

## 2024-12-30 RX ADMIN — IPRATROPIUM BROMIDE AND ALBUTEROL SULFATE 3 MILLILITER(S): .5; 2.5 SOLUTION RESPIRATORY (INHALATION) at 20:16

## 2024-12-30 RX ADMIN — ROSUVASTATIN 40 MILLIGRAM(S): 40 TABLET, FILM COATED ORAL at 21:14

## 2024-12-30 RX ADMIN — IPRATROPIUM BROMIDE AND ALBUTEROL SULFATE 3 MILLILITER(S): .5; 2.5 SOLUTION RESPIRATORY (INHALATION) at 13:52

## 2024-12-30 RX ADMIN — HEPARIN SODIUM 5000 UNIT(S): 1000 INJECTION, SOLUTION INTRAVENOUS; SUBCUTANEOUS at 21:15

## 2024-12-30 RX ADMIN — Medication 1 TABLET(S): at 09:26

## 2024-12-30 RX ADMIN — PREGABALIN 150 MILLIGRAM(S): 100 CAPSULE ORAL at 09:25

## 2024-12-30 RX ADMIN — MONTELUKAST SODIUM 10 MILLIGRAM(S): 10 TABLET, FILM COATED ORAL at 09:26

## 2024-12-30 RX ADMIN — Medication 25 MILLIGRAM(S): at 09:25

## 2024-12-30 RX ADMIN — Medication 40 MILLIGRAM(S): at 09:25

## 2024-12-30 RX ADMIN — INSULIN GLARGINE-YFGN 20 UNIT(S): 100 INJECTION, SOLUTION SUBCUTANEOUS at 21:15

## 2024-12-30 NOTE — PROGRESS NOTE ADULT - ASSESSMENT
74-yo wheelchair bound M w/ PMHx  of DM2, PAD, hypertension, COPD, CKD,  HFpEF, Hx of   R foot ulcer with osteomyelitis, s/p resection of the R 1st metatarsal head 10/24 here with mechanical fall in the bathroom with head strike but no LOC.  Patient also has been feeling sick for the last several days including diarrhea and cough.  Patient states that there has been  Viral syndrome circulating the SNF. Reports R leg is more swollen, heavy with erythema , causes problem with ambulation. R foot ulcer with osteomyelitis, s/p resection of the R 1st metatarsal head.     # Sepsis 2/2 R leg cellulitis  # s/p fall +head strike   # hx of R foot ulcer with osteomyelitis, s/p resection of the R 1st metatarsal head 10/24  # hx of neuropathy   - low bp , tachy, tachypnea, fever  101.9 in ER  - trop negative  - UA negative   - RVP panel negative   - CT HEAD: No acute abnormality. Chronic changes as above.  - CT CERVICAL SPINE: No acute abnormality. Chronic changes as above.  - CT chest A/P: Atelectasis at the lung bases with underlying infiltrates not excluded.  Mild bronchial wall thickening both lower lobes. No acute abnormality in the abdomen or pelvis.   - s/p 1x zosyn 2300 NS in ER  - Continue IV Dapto and IV Zosyn q8hrs  - Tylenol 650 mg PO q6h PRN for mild pain or fever  - follow Doppler lower extremity   - Bcx and Ucx, MRSA PCR neg  - Monitor WBC and fever curve  - Hx of resection of the R 1st metatarsal head.  Bone culture + MRSA patient was sent on  IV Dapto for 6 weeks (completed November 16, 2024) follow wound care podiatry at [facility]  - ID consult appreciated     # REANNA on CKD  - Cr 1.3 > 1.09 > 1.18 today   - baseline 1.1  - S/P 2300 cc ns in ER     # Hypokalemia - improved   - k 3.3 replete PRN     # Anemia  - H/H: 10.2/33.6 ( baseline 9)   - platelet count 122 > 107 > 108 today     # Type II diabetes mellitus.   - Accu checks monitoring and insulin corrective regimen  sliding scale coverage with short acting insulin add long acting insulin as needed ,no concentrated sweets diet, serial labs ,HbA1C,education.    # hx of CHF  TTE 2024: Left ventricular systolic function is normal with an ejection fraction of 61 % There is moderate (grade 2) left ventricular diastolic dysfunction.    # hx of COPD  continue inhaler     # prostate cancer   stable     # DVT pro   - heparin sq      DISPO: d/c planning for Des for tomorrow

## 2024-12-30 NOTE — PHYSICAL THERAPY INITIAL EVALUATION ADULT - ADDITIONAL COMMENTS
Patient reports he lives at [facility]. Patient states he was dependent w/ ADLs at the [facility] and mostly used a wheelchair for locomotion and can use a walker if needed. Patient reports he lives at [facility]. Patient states he required assistance for transfers at [facility] but was capable of ambulating short distances furniture surfing. Dependent for w/c mobility to/from dining room. Patient able to ambulate with assistance long distances with RW support.

## 2024-12-30 NOTE — PHYSICAL THERAPY INITIAL EVALUATION ADULT - PERTINENT HX OF CURRENT PROBLEM, REHAB EVAL
vHPI: 74-yo wheelchair bound M w/ PMHx  of DM2, PAD, hypertension, COPD, CKD,  HFpEF, Hx of R foot ulcer with osteomyelitis, s/p resection of the R 1st metatarsal head 10/24 here with mechanical fall in the bathroom with head strike but no LOC.  Patient also has been feeling sick for the last several days including diarrhea and cough.  Patient states that there has been  Viral syndrome circulating the SNF. Reports R leg is more swollen, heavy with erythema , causes problem with ambulation. HPI: 74-yo wheelchair bound M w/ PMHx  of DM2, PAD, hypertension, COPD, CKD,  HFpEF, Hx of R foot ulcer with osteomyelitis, s/p resection of the R 1st metatarsal head 10/24 here with mechanical fall in the bathroom with head strike but no LOC.  Patient also has been feeling sick for the last several days including diarrhea and cough.  Patient states that there has been  Viral syndrome circulating the SNF. Reports R leg is more swollen, heavy with erythema , causes problem with ambulation.

## 2024-12-30 NOTE — PROGRESS NOTE ADULT - TIME BILLING
I spent a total of 45 minutes on the date of this encounter coordinating the patient's care. This includes reviewing prior documentation, results and imaging in addition to completing a full history and physical examination on the patient. Further tests, medications, and procedures have been ordered as indicated. Laboratory results and the plan of care were communicated to the patient and/or their family member. Supporting documentation was completed and added to the patient's chart.
I spent a total of 55 minutes on the date of this encounter coordinating the patient's care. This includes reviewing prior documentation, results and imaging in addition to completing a full history and physical examination on the patient. Further tests, medications, and procedures have been ordered as indicated. Laboratory results and the plan of care were communicated to the patient and/or their family member. Supporting documentation was completed and added to the patient's chart.

## 2024-12-30 NOTE — SBIRT NOTE ADULT - NSSBIRTALCPOSREINDET_GEN_A_CORE
Pt endorses drinking 1 glass of wine w/ dinner daily. Sw discussed healthy ETOH use and guideline and pt verbalized understanding. Pt denies concerns and need for resources.

## 2024-12-30 NOTE — PHYSICAL THERAPY INITIAL EVALUATION ADULT - GENERAL OBSERVATIONS, REHAB EVAL
The Pt was received on 5S in bed supine, calm, cooperative, and willing to participate with PT, +[place] cath. Pt responded well to functional mobility trng and ambulation training. Required MIN A x 1 for mobility and ambulation 100ft with RW support. Assisted into bedside chair and adjusted for comfort, +alarm. The Pt was in NAD at end of tx.  Call bell, tray, and phone in place and within reach. NSG made aware.

## 2024-12-30 NOTE — PROGRESS NOTE ADULT - ASSESSMENT
A: 74-year-old Male seen for the following:   -Cellulitis RLE  -S/P Right 1st Metatarsal head Resection (Dos 03-Oct-2024), Healed  -DM2  -PAD    P:   Chart reviewed and Patient evaluated;  Discussed diagnosis and treatment with patient  X-rays reviewed, on wet reading shows no soft tissue emphysema, prior metatarsal head resection, no acute bony pathology  Pt noted with mild diffuse erythema to the RLE upon admission which was noted to be improved upon today's evaluation. No open lesions noted to the RLE, surgical site looks healed.   Afebrile, no leukocytosis   Recc continue with compression dressings   WBAT to the RLE  Antibiotics as per ID  No acute podiatric surgical intervention warranted at this time. Pt to continue with compressive dressings and to follow up with Dr Jerry Pandya as needed after discharge. Podiatry to sign off. Please reconsult as needed.   All additional care per Med appreciated  Patient demonstrated verbal understanding of all interventions and tolerated interventions well without any complications.         Case seen with attending Dr. Pandya

## 2024-12-30 NOTE — PHYSICAL THERAPY INITIAL EVALUATION ADULT - MANUAL MUSCLE TESTING RESULTS, REHAB EVAL
BUE/BLE grossly assessed to be 3+/5./grossly assessed due to BUE/BLE grossly assessed to be 4-/5./grossly assessed due to

## 2024-12-31 ENCOUNTER — TRANSCRIPTION ENCOUNTER (OUTPATIENT)
Age: 74
End: 2024-12-31

## 2024-12-31 VITALS
HEART RATE: 108 BPM | SYSTOLIC BLOOD PRESSURE: 113 MMHG | DIASTOLIC BLOOD PRESSURE: 70 MMHG | TEMPERATURE: 98 F | RESPIRATION RATE: 18 BRPM | OXYGEN SATURATION: 95 %

## 2024-12-31 LAB
MRSA PCR RESULT.: DETECTED
S AUREUS DNA NOSE QL NAA+PROBE: DETECTED

## 2024-12-31 PROCEDURE — 99239 HOSP IP/OBS DSCHRG MGMT >30: CPT

## 2024-12-31 RX ORDER — CEFUROXIME AXETIL 250 MG
1 TABLET ORAL
Qty: 14 | Refills: 0
Start: 2024-12-31 | End: 2025-01-06

## 2024-12-31 RX ADMIN — IPRATROPIUM BROMIDE AND ALBUTEROL SULFATE 3 MILLILITER(S): .5; 2.5 SOLUTION RESPIRATORY (INHALATION) at 14:49

## 2024-12-31 RX ADMIN — IPRATROPIUM BROMIDE AND ALBUTEROL SULFATE 3 MILLILITER(S): .5; 2.5 SOLUTION RESPIRATORY (INHALATION) at 08:54

## 2024-12-31 RX ADMIN — Medication 325 MILLIGRAM(S): at 10:04

## 2024-12-31 RX ADMIN — Medication 250 MILLIGRAM(S): at 10:05

## 2024-12-31 RX ADMIN — Medication 0.75 MILLIGRAM(S): at 10:03

## 2024-12-31 RX ADMIN — Medication 40 MILLIGRAM(S): at 10:03

## 2024-12-31 RX ADMIN — Medication 4: at 08:53

## 2024-12-31 RX ADMIN — SERTRALINE HYDROCHLORIDE 100 MILLIGRAM(S): 25 TABLET ORAL at 10:05

## 2024-12-31 RX ADMIN — HEPARIN SODIUM 5000 UNIT(S): 1000 INJECTION, SOLUTION INTRAVENOUS; SUBCUTANEOUS at 10:04

## 2024-12-31 RX ADMIN — PREGABALIN 150 MILLIGRAM(S): 100 CAPSULE ORAL at 10:03

## 2024-12-31 RX ADMIN — PANTOPRAZOLE 40 MILLIGRAM(S): 40 TABLET, DELAYED RELEASE ORAL at 05:42

## 2024-12-31 RX ADMIN — DAPTOMYCIN 120 MILLIGRAM(S): 500 INJECTION, POWDER, LYOPHILIZED, FOR SOLUTION INTRAVENOUS at 12:27

## 2024-12-31 RX ADMIN — MONTELUKAST SODIUM 10 MILLIGRAM(S): 10 TABLET, FILM COATED ORAL at 10:04

## 2024-12-31 RX ADMIN — Medication 25 MILLIGRAM(S): at 10:03

## 2024-12-31 RX ADMIN — Medication 4: at 12:26

## 2024-12-31 RX ADMIN — IPRATROPIUM BROMIDE AND ALBUTEROL SULFATE 3 MILLILITER(S): .5; 2.5 SOLUTION RESPIRATORY (INHALATION) at 01:22

## 2024-12-31 RX ADMIN — Medication 10 MILLIGRAM(S): at 10:04

## 2024-12-31 RX ADMIN — FLUTICASONE PROPIONATE AND SALMETEROL 1 DOSE(S): 50; 500 POWDER ORAL; RESPIRATORY (INHALATION) at 08:54

## 2024-12-31 RX ADMIN — Medication 1 TABLET(S): at 10:04

## 2024-12-31 NOTE — PROGRESS NOTE ADULT - REASON FOR ADMISSION
sepsis 2/2 R leg developing cellulitis ( hx of R foot ulcer with osteomyelitis, s/p resection of the R 1st metatarsal head 10/24)  , loose stool ( viral enteritis ) ?
sepsis 2/2 R leg developing cellulitis ( hx of R foot ulcer with osteomyelitis, s/p resection of the R 1st metatarsal head 10/24), loose stool ( viral enteritis ) ?
sepsis 2/2 R leg developing cellulitis ( hx of R foot ulcer with osteomyelitis, s/p resection of the R 1st metatarsal head 10/24)  , loose stool ( viral enteritis ) ?

## 2024-12-31 NOTE — DISCHARGE NOTE NURSING/CASE MANAGEMENT/SOCIAL WORK - NSDCVIVACCINE_GEN_ALL_CORE_FT
Tdap; 28-Dec-2024 10:22; Dania Lewis (RN); [employer]; [id number] (Exp. Date: 31-May-2026); IntraMuscular; Deltoid Left.; 0.5 milliLiter(s); VIS (VIS Published: 09-May-2013, VIS Presented: 28-Dec-2024);

## 2024-12-31 NOTE — DISCHARGE NOTE PROVIDER - NSDCMRMEDTOKEN_GEN_ALL_CORE_FT
Acidophilus Probiotic Blend oral capsule: 1 cap(s) orally 2 times a day  albuterol 0.63 mg/3 mL (0.021%) inhalation solution: 3 milliliter(s) by nebulizer 3 times a day as needed for  ascorbic acid 500 mg oral tablet: 1 tab(s) orally 2 times a day  cefuroxime 500 mg oral tablet: 1 tab(s) orally 2 times a day  clonazePAM 0.25 mg oral tablet, disintegrating: 3 tab(s) orally 2 times a day  ferrous sulfate 325 mg (65 mg elemental iron) oral tablet: 1 tab(s) orally once a day  furosemide 40 mg oral tablet: 1 tab(s) orally once a day  guaiFENesin 600 mg oral tablet, extended release: 1 tab(s) orally 2 times a day for 14 days ***course not complete***  HumaLOG 100 units/mL injectable solution: 10 unit(s) injectable 3 times a day (with meals) ***sliding scale***  insulin glargine 100 units/mL subcutaneous solution: 24 unit(s) subcutaneous once a day (at bedtime)  loratadine 10 mg oral tablet: 1 tab(s) orally once a day (in the morning)  melatonin 3 mg oral tablet: 1 tab(s) orally once a day (at bedtime) As needed Insomnia  metoprolol succinate 25 mg oral tablet, extended release: 1 tab(s) orally once a day  montelukast 10 mg oral tablet: 1 tab(s) orally once a day  Multiple Vitamins with Minerals oral tablet: 1 tab(s) orally once a day  oxyCODONE 10 mg oral tablet, extended release: 1 tab(s) orally every 12 hours  pantoprazole 40 mg oral delayed release tablet: 1 tab(s) orally once a day (before a meal)  polyethylene glycol 3350 oral powder for reconstitution: 17 gram(s) orally once a day  pregabalin 150 mg oral capsule: 1 cap(s) orally 2 times a day  rosuvastatin 40 mg oral tablet: 1 tab(s) orally once a day  Saline Mist 0.65% nasal spray: 2 spray(s) intranasally 2 times a day  senna leaf extract oral tablet: 2 tab(s) orally once a day (at bedtime)  sertraline 100 mg oral tablet: 1 tab(s) orally once a day  Spiriva 18 mcg inhalation capsule: 1 cap(s) inhaled once a day  Symbicort 160 mcg-4.5 mcg/inh inhalation aerosol: 2 puff(s) inhaled 2 times a day

## 2024-12-31 NOTE — PROGRESS NOTE ADULT - ASSESSMENT
75 y/o wheelchair bound Male with h/o DM type 2, PAD, hypertension, COPD, CKD,  HFpEF, prior R foot ulcer with osteomyelitis, with MRSA s/p resection of the R 1st metatarsal head 10/24 s/p PICC line with daptomycin for 6 weeks to Nov 16, 2024 was admitted on 12/28 for increased weakness s/p mechanical fall in the bathroom with head strike but no LOC. Patient also has been feeling sick for the last several days including diarrhea and cough. Patient states that there has been viral syndrome circulating the SNF. Reports R leg is more swollen, heavy with erythema, causes problem with ambulation. In ER he was noted febrile with hypotension. He received zosyn.     #RLE cellulitis  #Possible sepsis with hypotension, tachycardia and fever  #ARF on CRF stage 3  #Allergy to vancomycin and tetracycline  -recent R foot ulcer with osteomyelitis, s/p resection of the R 1st metatarsal head 10/24  -no foot cellulitis   - RVP panel negative   -BC x 2 noted  -on daptomycin 500 mg IV qd and ceftriaxone 2 gm IV qd # 3  -tolerating abx well so far; no side effects noted  -renal function is improving  -podiatry evaluation appreciated   -change abx to PO ceftin 500 mg Q12h for 7 more days  -monitor temps  -f/u CBC  -supportive care  2. Other issues:   -care per medicine    Clinical team may change from intravenous to oral antibiotics when the following criteria are met:   1. Patient is clinically improving/stable       a)	Improved signs and symptoms of infection from initial presentation       b)	Afebrile for 24 hours       c)	Leukocytosis trending towards normal range   2. Patient is tolerating oral intake   3. Initial/repeat blood cultures are negative     When above criteria met may change iv antibiotics to an oral ceftin as above

## 2024-12-31 NOTE — DISCHARGE NOTE PROVIDER - CARE PROVIDER_API CALL
Jerry Pandya  Podiatric Medicine and Surgery  [address] [place] [address]  Phone: [phone]  Fax: [phone]  Follow Up Time:     Angelito Humphrey  Cardiac Electrophysiology  [address], [address]  [place] [address]  Phone: [phone]  Fax: [phone]  Follow Up Time:

## 2024-12-31 NOTE — PROGRESS NOTE ADULT - SUBJECTIVE AND OBJECTIVE BOX
12/29/24: Patient was seen by podiatry team. Pt was resting bedside. No acute distress. No new complaints          PMH: Prostate cancer    Type II diabetes mellitus    Chronic obstructive pulmonary disease (COPD)    CHF (congestive heart failure)    Renal insufficiency    H/O migraine    Insomnia    Constipation      PSH: S/P foot surgery        Allergies: tetracycline (Unknown)  IODINE (Unknown)  vancomycin (Other)      Labs:                        8.6    6.14  )-----------( 107      ( 29 Dec 2024 06:43 )             28.5              12-29    138  |  109[H]  |  17  ----------------------------<  99  3.6   |  25  |  1.09    Ca    8.6      29 Dec 2024 06:43  Phos  2.1     12-28  Mg     1.9     12-28    TPro  6.1  /  Alb  2.3[L]  /  TBili  0.5  /  DBili  x   /  AST  17  /  ALT  15  /  AlkPhos  57  12-29    Vital Signs Last 24 Hrs  T(C): 36.8 (29 Dec 2024 07:33), Max: 37.4 (28 Dec 2024 15:51)  T(F): 98.2 (29 Dec 2024 07:33), Max: 99.4 (28 Dec 2024 15:51)  HR: 88 (29 Dec 2024 09:35) (74 - 109)  BP: 121/54 (29 Dec 2024 07:33) (100/50 - 145/76)  BP(mean): 90 (28 Dec 2024 17:00) (65 - 90)  RR: 18 (29 Dec 2024 07:33) (14 - 22)  SpO2: 94% (29 Dec 2024 07:33) (93% - 100%)    Parameters below as of 29 Dec 2024 07:33  Patient On (Oxygen Delivery Method): room air          REVIEW OF SYSTEMS:    CONSTITUTIONAL: No weakness, fevers or chills  EYES: No visual changes  RESPIRATORY: No cough, wheezing; No shortness of breath  CARDIOVASCULAR: No chest pain or palpitations  GASTROINTESTINAL: No abdominal or epigastric pain. No nausea, vomiting; No diarrhea or constipation.   GENITOURINARY: No dysuria, frequency or hematuria  NEUROLOGICAL: No numbness or weakness  SKIN: See physical examination.  All other review of systems is negative unless indicated above    Physical Exam:   Constitutional: NAD, alert;  Lower Extremity Focus  Derm:  Skin warm, dry and supple bilateral.     Right LE: No open lesion to the RLE, +2 Pitting edema, + diffuse erythema up to below knee.  Vascular: Dorsalis Pedis and Posterior Tibial pulses 2/4.    Neuro: Protective sensation diminished to the level of the digits bilateral.  MSK: Muscle strength 5/5 all major muscle groups bilateral.      
HPI: 74-yo wheelchair bound M w/ PMHx  of DM2, PAD, hypertension, COPD, CKD,  HFpEF, Hx of R foot ulcer with osteomyelitis, s/p resection of the R 1st metatarsal head 10/24 here with mechanical fall in the bathroom with head strike but no LOC.  Patient also has been feeling sick for the last several days including diarrhea and cough.  Patient states that there has been  Viral syndrome circulating the SNF. Reports R leg is more swollen, heavy with erythema , causes problem with ambulation. R foot ulcer with osteomyelitis, s/p resection of the R 1st metatarsal head. Was sent to [facility] with PICC line on Dapto for 6 weeks (to November 16, 2024)  -  Bone culture + Staph aureus (MRSA). C/o loose stools.  Abdominal discomfort. No blood in stool or urine. No fever or chill. Patient febrile and with Low BP in ER     Subjective: pt seen this AM, sitting up in chair at bedside, feels well, no complaints to offer at this time, Cellulitis much improved     REVIEW OF SYSTEMS:    CONSTITUTIONAL: No weakness, fevers or chills  EYES/ENT: No visual changes;  No vertigo or throat pain   NECK: No pain or stiffness  RESPIRATORY: No cough, wheezing, hemoptysis; No shortness of breath  CARDIOVASCULAR: No chest pain or palpitations  GASTROINTESTINAL: No abdominal or epigastric pain. No nausea, vomiting, or hematemesis; No diarrhea or constipation. No melena or hematochezia.  GENITOURINARY: No dysuria, frequency or hematuria  NEUROLOGICAL: No numbness or weakness  SKIN: No itching, rashes    Vital Signs Last 24 Hrs  T(C): 37.2 (30 Dec 2024 15:57), Max: 37.4 (29 Dec 2024 21:33)  T(F): 99 (30 Dec 2024 15:57), Max: 99.3 (29 Dec 2024 21:33)  HR: 95 (30 Dec 2024 15:57) (87 - 105)  BP: 120/62 (30 Dec 2024 15:57) (110/67 - 137/76)  RR: 18 (30 Dec 2024 15:57) (18 - 19)  SpO2: 93% (30 Dec 2024 15:57) (92% - 98%)    Parameters below as of 30 Dec 2024 15:57  Patient On (Oxygen Delivery Method): room air        PHYSICAL EXAM:  GENERAL: NAD, lying in bed comfortably  HEAD:  Atraumatic, Normocephalic  EYES: conjunctiva and sclera clear  ENT: Moist mucous membranes  NECK: Supple, No JVD  CHEST/LUNG: Clear to auscultation bilaterally; No rales, rhonchi, wheezing. Unlabored respirations  HEART: Regular rate and rhythm; No murmurs  ABDOMEN: Bowel sounds present; Soft, Nontender, Nondistended.   EXTREMITIES:  2+ Peripheral Pulses, brisk capillary refill. No clubbing, cyanosis, or edema  NERVOUS SYSTEM:  Alert & Oriented X3, speech clear. No deficits   MSK: FROM all 4 extremities, full and equal strength  SKIN: RLE: compression bandages in place    MEDICATIONS  (STANDING):  albuterol/ipratropium for Nebulization 3 milliLiter(s) Nebulizer every 6 hours  cefTRIAXone Injectable. 2000 milliGRAM(s) IV Push every 24 hours  clonazePAM Oral Disintegrating Tablet 0.25 milliGRAM(s) Oral two times a day  DAPTOmycin IVPB 500 milliGRAM(s) IV Intermittent every 24 hours  dextrose 5%. 1000 milliLiter(s) (50 mL/Hr) IV Continuous <Continuous>  dextrose 5%. 1000 milliLiter(s) (100 mL/Hr) IV Continuous <Continuous>  dextrose 50% Injectable 25 Gram(s) IV Push once  dextrose 50% Injectable 12.5 Gram(s) IV Push once  dextrose 50% Injectable 25 Gram(s) IV Push once  ferrous    sulfate 325 milliGRAM(s) Oral daily  fluticasone propionate/ salmeterol 250-50 MICROgram(s) Diskus 1 Dose(s) Inhalation two times a day  furosemide    Tablet 40 milliGRAM(s) Oral daily  glucagon  Injectable 1 milliGRAM(s) IntraMuscular once  heparin   Injectable 5000 Unit(s) SubCutaneous every 12 hours  influenza  Vaccine (HIGH DOSE) 0.5 milliLiter(s) IntraMuscular once  insulin glargine Injectable (LANTUS) 20 Unit(s) SubCutaneous at bedtime  insulin lispro (ADMELOG) corrective regimen sliding scale   SubCutaneous three times a day before meals  insulin lispro (ADMELOG) corrective regimen sliding scale   SubCutaneous at bedtime  metoprolol succinate ER 25 milliGRAM(s) Oral daily  montelukast 10 milliGRAM(s) Oral daily  multivitamin/minerals 1 Tablet(s) Oral daily  oxyCODONE  ER Tablet 10 milliGRAM(s) Oral every 12 hours  pantoprazole    Tablet 40 milliGRAM(s) Oral before breakfast  pregabalin 150 milliGRAM(s) Oral two times a day  rosuvastatin 40 milliGRAM(s) Oral at bedtime  saccharomyces boulardii 250 milliGRAM(s) Oral daily  sertraline 100 milliGRAM(s) Oral daily    MEDICATIONS  (PRN):  acetaminophen     Tablet .. 650 milliGRAM(s) Oral every 6 hours PRN Temp greater or equal to 38C (100.4F), Mild Pain (1 - 3)  albuterol    90 MICROgram(s) HFA Inhaler 2 Puff(s) Inhalation every 4 hours PRN Shortness of Breath and/or Wheezing  dextrose Oral Gel 15 Gram(s) Oral once PRN Blood Glucose LESS THAN 70 milliGRAM(s)/deciliter  melatonin 3 milliGRAM(s) Oral at bedtime PRN Insomnia      LABS:                          8.7    6.30  )-----------( 108      ( 30 Dec 2024 06:04 )             28.7   12-30    139  |  107  |  14  ----------------------------<  192[H]  3.5   |  26  |  1.18    Ca    8.7      30 Dec 2024 06:04    TPro  6.1  /  Alb  2.3[L]  /  TBili  0.5  /  DBili  x   /  AST  17  /  ALT  15  /  AlkPhos  57  12-29    RADIOLOGY     < from: Xray Pelvis AP only (12.28.24 @ 09:59) >    INTERPRETATION:  Clinical history: 74-year-old male, fall.    Single view of the pelvis is correlated with a concurrent CT.    FINDINGS: Mild to moderate degenerative change with no fracture or   dislocation.    IMPRESSION:    No acute findings in this study or concurrent CT            RADIOLOGY:    
HPI: 74-yo wheelchair bound M w/ PMHx  of DM2, PAD, hypertension, COPD, CKD,  HFpEF, Hx of R foot ulcer with osteomyelitis, s/p resection of the R 1st metatarsal head 10/24 here with mechanical fall in the bathroom with head strike but no LOC.  Patient also has been feeling sick for the last several days including diarrhea and cough.  Patient states that there has been  Viral syndrome circulating the SNF. Reports R leg is more swollen, heavy with erythema , causes problem with ambulation. R foot ulcer with osteomyelitis, s/p resection of the R 1st metatarsal head. Was sent to [facility] with PICC line on Dapto for 6 weeks (to November 16, 2024)  -  Bone culture + Staph aureus (MRSA). C/o loose stools.  Abdominal discomfort. No blood in stool or urine. No fever or chill. Patient febrile and with Low BP in ER     Subjective: pt seen this AM, feels better, no further diarrhea    REVIEW OF SYSTEMS:    CONSTITUTIONAL: No weakness, fevers or chills  EYES/ENT: No visual changes;  No vertigo or throat pain   NECK: No pain or stiffness  RESPIRATORY: No cough, wheezing, hemoptysis; No shortness of breath  CARDIOVASCULAR: No chest pain or palpitations  GASTROINTESTINAL: No abdominal or epigastric pain. No nausea, vomiting, or hematemesis; No diarrhea or constipation. No melena or hematochezia.  GENITOURINARY: No dysuria, frequency or hematuria  NEUROLOGICAL: No numbness or weakness  SKIN: No itching, rashes        MEDICATIONS  (STANDING):  albuterol/ipratropium for Nebulization 3 milliLiter(s) Nebulizer every 6 hours  clonazePAM Oral Disintegrating Tablet 0.25 milliGRAM(s) Oral two times a day  DAPTOmycin IVPB 500 milliGRAM(s) IV Intermittent every 24 hours  dextrose 5%. 1000 milliLiter(s) (50 mL/Hr) IV Continuous <Continuous>  dextrose 5%. 1000 milliLiter(s) (100 mL/Hr) IV Continuous <Continuous>  dextrose 50% Injectable 25 Gram(s) IV Push once  dextrose 50% Injectable 12.5 Gram(s) IV Push once  dextrose 50% Injectable 25 Gram(s) IV Push once  ferrous    sulfate 325 milliGRAM(s) Oral daily  fluticasone propionate/ salmeterol 250-50 MICROgram(s) Diskus 1 Dose(s) Inhalation two times a day  furosemide    Tablet 40 milliGRAM(s) Oral daily  glucagon  Injectable 1 milliGRAM(s) IntraMuscular once  heparin   Injectable 5000 Unit(s) SubCutaneous every 12 hours  influenza  Vaccine (HIGH DOSE) 0.5 milliLiter(s) IntraMuscular once  insulin glargine Injectable (LANTUS) 20 Unit(s) SubCutaneous at bedtime  insulin lispro (ADMELOG) corrective regimen sliding scale   SubCutaneous three times a day before meals  insulin lispro (ADMELOG) corrective regimen sliding scale   SubCutaneous at bedtime  metoprolol succinate ER 25 milliGRAM(s) Oral daily  montelukast 10 milliGRAM(s) Oral daily  multivitamin/minerals 1 Tablet(s) Oral daily  oxyCODONE  ER Tablet 10 milliGRAM(s) Oral every 12 hours  pantoprazole    Tablet 40 milliGRAM(s) Oral before breakfast  piperacillin/tazobactam IVPB.. 3.375 Gram(s) IV Intermittent every 8 hours  pregabalin 150 milliGRAM(s) Oral two times a day  rosuvastatin 40 milliGRAM(s) Oral at bedtime  saccharomyces boulardii 250 milliGRAM(s) Oral daily  sertraline 100 milliGRAM(s) Oral daily    MEDICATIONS  (PRN):  acetaminophen     Tablet .. 650 milliGRAM(s) Oral every 6 hours PRN Temp greater or equal to 38C (100.4F), Mild Pain (1 - 3)  albuterol    90 MICROgram(s) HFA Inhaler 2 Puff(s) Inhalation every 4 hours PRN Shortness of Breath and/or Wheezing  dextrose Oral Gel 15 Gram(s) Oral once PRN Blood Glucose LESS THAN 70 milliGRAM(s)/deciliter  melatonin 3 milliGRAM(s) Oral at bedtime PRN Insomnia      Vital Signs Last 24 Hrs  T(C): 36.8 (29 Dec 2024 07:33), Max: 37.4 (28 Dec 2024 15:51)  T(F): 98.2 (29 Dec 2024 07:33), Max: 99.4 (28 Dec 2024 15:51)  HR: 88 (29 Dec 2024 09:35) (74 - 98)  BP: 121/54 (29 Dec 2024 07:33) (107/64 - 145/76)  BP(mean): 90 (28 Dec 2024 17:00) (71 - 90)  RR: 18 (29 Dec 2024 07:33) (14 - 20)  SpO2: 94% (29 Dec 2024 07:33) (93% - 100%)    Parameters below as of 29 Dec 2024 07:33  Patient On (Oxygen Delivery Method): room air    PHYSICAL EXAM:  GENERAL: NAD, lying in bed comfortably  HEAD:  Atraumatic, Normocephalic  EYES: conjunctiva and sclera clear  ENT: Moist mucous membranes  NECK: Supple, No JVD  CHEST/LUNG: Clear to auscultation bilaterally; No rales, rhonchi, wheezing. Unlabored respirations  HEART: Regular rate and rhythm; No murmurs  ABDOMEN: Bowel sounds present; Soft, Nontender, Nondistended.   EXTREMITIES:  2+ Peripheral Pulses, brisk capillary refill. No clubbing, cyanosis, or edema  NERVOUS SYSTEM:  Alert & Oriented X3, speech clear. No deficits   MSK: FROM all 4 extremities, full and equal strength  SKIN: RLE: edematous with erythema to distal RLE, warm to touch, well within demarcated lines done on admission        LABS:                          8.6    6.14  )-----------( 107      ( 29 Dec 2024 06:43 )             28.5     29 Dec 2024 06:43    138    |  109    |  17     ----------------------------<  99     3.6     |  25     |  1.09     Ca    8.6        29 Dec 2024 06:43  Phos  2.1       28 Dec 2024 10:31  Mg     1.9       28 Dec 2024 10:31    TPro  6.1    /  Alb  2.3    /  TBili  0.5    /  DBili  x      /  AST  17     /  ALT  15     /  AlkPhos  57     29 Dec 2024 06:43    LIVER FUNCTIONS - ( 29 Dec 2024 06:43 )  Alb: 2.3 g/dL / Pro: 6.1 gm/dL / ALK PHOS: 57 U/L / ALT: 15 U/L / AST: 17 U/L / GGT: x           PT/INR - ( 28 Dec 2024 10:31 )   PT: 13.5 sec;   INR: 1.15 ratio         PTT - ( 28 Dec 2024 10:31 )  PTT:32.3 sec  CAPILLARY BLOOD GLUCOSE      POCT Blood Glucose.: 141 mg/dL (29 Dec 2024 12:00)  POCT Blood Glucose.: 97 mg/dL (29 Dec 2024 08:14)  POCT Blood Glucose.: 224 mg/dL (28 Dec 2024 20:42)        Urinalysis Basic - ( 29 Dec 2024 06:43 )    Color: x / Appearance: x / SG: x / pH: x  Gluc: 99 mg/dL / Ketone: x  / Bili: x / Urobili: x   Blood: x / Protein: x / Nitrite: x   Leuk Esterase: x / RBC: x / WBC x   Sq Epi: x / Non Sq Epi: x / Bacteria: x        RADIOLOGY:    
12/30/24: Patient was seen by podiatry team today AM with attending present. Resting bedside, NAD. no acute pedal complaints.    PMH: Prostate cancer    Type II diabetes mellitus    Chronic obstructive pulmonary disease (COPD)    CHF (congestive heart failure)    Renal insufficiency    H/O migraine    Insomnia    Constipation      PSH: S/P foot surgery        Allergies: tetracycline (Unknown)  IODINE (Unknown)  vancomycin (Other)      Labs:                        8.7    6.30  )-----------( 108      ( 30 Dec 2024 06:04 )             28.7   12-30    139  |  107  |  14  ----------------------------<  192[H]  3.5   |  26  |  1.18    Ca    8.7      30 Dec 2024 06:04  Phos  2.1     12-28  Mg     1.9     12-28    TPro  6.1  /  Alb  2.3[L]  /  TBili  0.5  /  DBili  x   /  AST  17  /  ALT  15  /  AlkPhos  57  12-29    Vital Signs Last 24 Hrs  T(C): 36.9 (30 Dec 2024 08:00), Max: 37.4 (29 Dec 2024 21:33)  T(F): 98.4 (30 Dec 2024 08:00), Max: 99.3 (29 Dec 2024 21:33)  HR: 90 (30 Dec 2024 08:00) (87 - 90)  BP: 129/73 (30 Dec 2024 08:00) (129/73 - 137/76)  BP(mean): --  RR: 19 (30 Dec 2024 08:00) (18 - 19)  SpO2: 98% (30 Dec 2024 08:00) (92% - 98%)    Parameters below as of 30 Dec 2024 08:00  Patient On (Oxygen Delivery Method): room air    REVIEW OF SYSTEMS:    CONSTITUTIONAL: No weakness, fevers or chills  EYES: No visual changes  RESPIRATORY: No cough, wheezing; No shortness of breath  CARDIOVASCULAR: No chest pain or palpitations  GASTROINTESTINAL: No abdominal or epigastric pain. No nausea, vomiting; No diarrhea or constipation.   GENITOURINARY: No dysuria, frequency or hematuria  NEUROLOGICAL: No numbness or weakness  SKIN: See physical examination.  All other review of systems is negative unless indicated above    Physical Exam:   Constitutional: NAD, alert;  Lower Extremity Focus  Derm:  Skin warm, dry and supple bilateral.    Right LE: No open lesion to the RLE, +2 Pitting edema, + diffuse erythema up to below knee.  Vascular: Dorsalis Pedis and Posterior Tibial pulses 2/4.    Neuro: Protective sensation diminished to the level of the digits bilateral.  MSK: Muscle strength 5/5 all major muscle groups bilateral.    < from: US Duplex Venous Lower Ext Complete, Bilateral (12.28.24 @ 18:01) >  RIGHT:  Normal compressibility of the RIGHT common femoral, femoral and popliteal   veins.  Doppler examination shows normal spontaneous and phasic flow.  No RIGHT calf vein thrombosis is detected.    LEFT:  Normal compressibility of the LEFT common femoral, femoral and popliteal   veins.  Doppler examination shows normal spontaneous and phasic flow.  No LEFT calf vein thrombosis is detected.    IMPRESSION:  No evidence of deep venous thrombosis in either lower extremity.    --- End of Report ---    < end of copied text >    
Date of service: 12-30-24 @ 14:06    Lying in bed in NAD  Right lower leg redness  Less swollen  Has low grade fever    ROS: denies dizziness, no HA, no SOB or cough, no abdominal pain, no diarrhea or constipation; no dysuria, no legs pain, no rashes    MEDICATIONS  (STANDING):  albuterol/ipratropium for Nebulization 3 milliLiter(s) Nebulizer every 6 hours  cefTRIAXone Injectable. 2000 milliGRAM(s) IV Push every 24 hours  clonazePAM Oral Disintegrating Tablet 0.25 milliGRAM(s) Oral two times a day  DAPTOmycin IVPB 500 milliGRAM(s) IV Intermittent every 24 hours  dextrose 5%. 1000 milliLiter(s) (50 mL/Hr) IV Continuous <Continuous>  dextrose 5%. 1000 milliLiter(s) (100 mL/Hr) IV Continuous <Continuous>  dextrose 50% Injectable 25 Gram(s) IV Push once  dextrose 50% Injectable 12.5 Gram(s) IV Push once  dextrose 50% Injectable 25 Gram(s) IV Push once  ferrous    sulfate 325 milliGRAM(s) Oral daily  fluticasone propionate/ salmeterol 250-50 MICROgram(s) Diskus 1 Dose(s) Inhalation two times a day  furosemide    Tablet 40 milliGRAM(s) Oral daily  glucagon  Injectable 1 milliGRAM(s) IntraMuscular once  heparin   Injectable 5000 Unit(s) SubCutaneous every 12 hours  influenza  Vaccine (HIGH DOSE) 0.5 milliLiter(s) IntraMuscular once  insulin glargine Injectable (LANTUS) 20 Unit(s) SubCutaneous at bedtime  insulin lispro (ADMELOG) corrective regimen sliding scale   SubCutaneous three times a day before meals  insulin lispro (ADMELOG) corrective regimen sliding scale   SubCutaneous at bedtime  metoprolol succinate ER 25 milliGRAM(s) Oral daily  montelukast 10 milliGRAM(s) Oral daily  multivitamin/minerals 1 Tablet(s) Oral daily  oxyCODONE  ER Tablet 10 milliGRAM(s) Oral every 12 hours  pantoprazole    Tablet 40 milliGRAM(s) Oral before breakfast  pregabalin 150 milliGRAM(s) Oral two times a day  rosuvastatin 40 milliGRAM(s) Oral at bedtime  saccharomyces boulardii 250 milliGRAM(s) Oral daily  sertraline 100 milliGRAM(s) Oral daily    Vital Signs Last 24 Hrs  T(C): 36.9 (30 Dec 2024 08:00), Max: 37.4 (29 Dec 2024 21:33)  T(F): 98.4 (30 Dec 2024 08:00), Max: 99.3 (29 Dec 2024 21:33)  HR: 100 (30 Dec 2024 09:15) (87 - 105)  BP: 110/67 (30 Dec 2024 09:15) (110/67 - 137/76)  BP(mean): --  RR: 18 (30 Dec 2024 09:15) (18 - 19)  SpO2: 93% (30 Dec 2024 09:15) (92% - 98%)    Parameters below as of 30 Dec 2024 09:15  Patient On (Oxygen Delivery Method): room air     Physical exam:    Constitutional:  No acute distress  HEENT: NC/AT, EOMI, PERRLA, conjunctivae clear; ears and nose atraumatic; pharynx benign  Neck: supple; thyroid not palpable  Back: no tenderness  Respiratory: respiratory effort normal; clear to auscultation  Cardiovascular: S1S2 regular, no murmurs  Abdomen: soft, not tender, not distended, positive BS; no liver or spleen organomegaly  Genitourinary: no suprapubic tenderness  Lymphatic: no LN palpable  Musculoskeletal: no muscle tenderness, no joint swelling or tenderness  Extremities: 2+ pedal edema  No open lesion to the RLE, Right shin and calf diffuse erythema, edema, warmth and tenderness - improving  Neurological/ Psychiatric: AxOx3, judgement and insight normal; moving all extremities  Skin: no rashes; no palpable lesions    Labs: reviewed                        8.7    6.30  )-----------( 108      ( 30 Dec 2024 06:04 )             28.7     12-30    139  |  107  |  14  ----------------------------<  192[H]  3.5   |  26  |  1.18    Ca    8.7      30 Dec 2024 06:04    TPro  6.1  /  Alb  2.3[L]  /  TBili  0.5  /  DBili  x   /  AST  17  /  ALT  15  /  AlkPhos  57  12-29                        8.6    6.14  )-----------( 107      ( 29 Dec 2024 06:43 )             28.5     12-29    138  |  109[H]  |  17  ----------------------------<  99  3.6   |  25  |  1.09    Ca    8.6      29 Dec 2024 06:43  Phos  2.1     12-28  Mg     1.9     12-28    TPro  6.1  /  Alb  2.3[L]  /  TBili  0.5  /  DBili  x   /  AST  17  /  ALT  15  /  AlkPhos  57  12-29     LIVER FUNCTIONS - ( 29 Dec 2024 06:43 )  Alb: 2.3 g/dL / Pro: 6.1 gm/dL / ALK PHOS: 57 U/L / ALT: 15 U/L / AST: 17 U/L / GGT: x           Urinalysis with Rflx Culture (collected 28 Dec 2024 13:12)    Culture - Blood (collected 28 Dec 2024 10:33)  Source: .Blood BLOOD  Preliminary Report (29 Dec 2024 18:01):    No growth at 24 hours    Culture - Blood (collected 28 Dec 2024 10:31)  Source: .Blood BLOOD  Preliminary Report (29 Dec 2024 18:01):    No growth at 24 hours    Radiology: all available radiological tests reviewed    < from: US Duplex Venous Lower Ext Complete, Bilateral (12.28.24 @ 18:01) >  No evidence of deep venous thrombosis in either lower extremity.  < end of copied text >    < from: CT Abdomen and Pelvis No Cont (12.28.24 @ 12:30) >  Atelectasis at the lung bases with underlying infiltrates not excluded.   Mild bronchial wall thickening both lower lobes.  < end of copied text >    Advanced directives addressed: full resuscitation
Date of service: 12-31-24 @ 13:29    Lying in bed in NAD  Weak looking  Right lower leg swelling is down  Mild erythema    ROS: no fever or chills; denies dizziness, no HA, no SOB or cough, no abdominal pain, no diarrhea or constipation; no dysuria, no legs pain, no rashes    MEDICATIONS  (STANDING):  albuterol/ipratropium for Nebulization 3 milliLiter(s) Nebulizer every 6 hours  cefTRIAXone Injectable. 2000 milliGRAM(s) IV Push every 24 hours  clonazePAM Oral Disintegrating Tablet 0.75 milliGRAM(s) Oral two times a day  DAPTOmycin IVPB 500 milliGRAM(s) IV Intermittent every 24 hours  dextrose 5%. 1000 milliLiter(s) (50 mL/Hr) IV Continuous <Continuous>  dextrose 5%. 1000 milliLiter(s) (100 mL/Hr) IV Continuous <Continuous>  dextrose 50% Injectable 25 Gram(s) IV Push once  dextrose 50% Injectable 12.5 Gram(s) IV Push once  dextrose 50% Injectable 25 Gram(s) IV Push once  ferrous    sulfate 325 milliGRAM(s) Oral daily  fluticasone propionate/ salmeterol 250-50 MICROgram(s) Diskus 1 Dose(s) Inhalation two times a day  furosemide    Tablet 40 milliGRAM(s) Oral daily  glucagon  Injectable 1 milliGRAM(s) IntraMuscular once  heparin   Injectable 5000 Unit(s) SubCutaneous every 12 hours  influenza  Vaccine (HIGH DOSE) 0.5 milliLiter(s) IntraMuscular once  insulin glargine Injectable (LANTUS) 20 Unit(s) SubCutaneous at bedtime  insulin lispro (ADMELOG) corrective regimen sliding scale   SubCutaneous three times a day before meals  insulin lispro (ADMELOG) corrective regimen sliding scale   SubCutaneous at bedtime  metoprolol succinate ER 25 milliGRAM(s) Oral daily  montelukast 10 milliGRAM(s) Oral daily  multivitamin/minerals 1 Tablet(s) Oral daily  oxyCODONE  ER Tablet 10 milliGRAM(s) Oral every 12 hours  pantoprazole    Tablet 40 milliGRAM(s) Oral before breakfast  pregabalin 150 milliGRAM(s) Oral two times a day  rosuvastatin 40 milliGRAM(s) Oral at bedtime  saccharomyces boulardii 250 milliGRAM(s) Oral daily  sertraline 100 milliGRAM(s) Oral daily    Vital Signs Last 24 Hrs  T(C): 36.7 (31 Dec 2024 07:19), Max: 37.2 (30 Dec 2024 15:57)  T(F): 98.1 (31 Dec 2024 07:19), Max: 99 (30 Dec 2024 15:57)  HR: 85 (31 Dec 2024 08:57) (84 - 101)  BP: 120/76 (31 Dec 2024 07:19) (112/69 - 120/76)  BP(mean): --  RR: 17 (31 Dec 2024 07:19) (16 - 18)  SpO2: 95% (31 Dec 2024 08:57) (93% - 96%)    Parameters below as of 31 Dec 2024 08:57  Patient On (Oxygen Delivery Method): room air     Physical exam:    Constitutional:  No acute distress  HEENT: NC/AT, EOMI, PERRLA, conjunctivae clear; ears and nose atraumatic; pharynx benign  Neck: supple; thyroid not palpable  Back: no tenderness  Respiratory: respiratory effort normal; clear to auscultation  Cardiovascular: S1S2 regular, no murmurs  Abdomen: soft, not tender, not distended, positive BS; no liver or spleen organomegaly  Genitourinary: no suprapubic tenderness  Lymphatic: no LN palpable  Musculoskeletal: no muscle tenderness, no joint swelling or tenderness  Extremities: 2+ pedal edema  No open lesion to the RLE, Right shin and calf diffuse erythema, edema, warmth and tenderness - improving  Neurological/ Psychiatric: AxOx3, judgement and insight normal; moving all extremities  Skin: no rashes; no palpable lesions    Labs: reviewed                        8.7    6.30  )-----------( 108      ( 30 Dec 2024 06:04 )             28.7     12-30    139  |  107  |  14  ----------------------------<  192[H]  3.5   |  26  |  1.18    Ca    8.7      30 Dec 2024 06:04    TPro  6.1  /  Alb  2.3[L]  /  TBili  0.5  /  DBili  x   /  AST  17  /  ALT  15  /  AlkPhos  57  12-29                        8.6    6.14  )-----------( 107      ( 29 Dec 2024 06:43 )             28.5     12-29    138  |  109[H]  |  17  ----------------------------<  99  3.6   |  25  |  1.09    Ca    8.6      29 Dec 2024 06:43  Phos  2.1     12-28  Mg     1.9     12-28    TPro  6.1  /  Alb  2.3[L]  /  TBili  0.5  /  DBili  x   /  AST  17  /  ALT  15  /  AlkPhos  57  12-29     LIVER FUNCTIONS - ( 29 Dec 2024 06:43 )  Alb: 2.3 g/dL / Pro: 6.1 gm/dL / ALK PHOS: 57 U/L / ALT: 15 U/L / AST: 17 U/L / GGT: x           Urinalysis with Rflx Culture (collected 28 Dec 2024 13:12)    Culture - Blood (collected 28 Dec 2024 10:33)  Source: .Blood BLOOD  Preliminary Report (29 Dec 2024 18:01):    No growth at 24 hours    Culture - Blood (collected 28 Dec 2024 10:31)  Source: .Blood BLOOD  Preliminary Report (29 Dec 2024 18:01):    No growth at 24 hours    Radiology: all available radiological tests reviewed    < from: US Duplex Venous Lower Ext Complete, Bilateral (12.28.24 @ 18:01) >  No evidence of deep venous thrombosis in either lower extremity.  < end of copied text >    < from: CT Abdomen and Pelvis No Cont (12.28.24 @ 12:30) >  Atelectasis at the lung bases with underlying infiltrates not excluded.   Mild bronchial wall thickening both lower lobes.  < end of copied text >    Advanced directives addressed: full resuscitation

## 2024-12-31 NOTE — DISCHARGE NOTE PROVIDER - REASON FOR ADMISSION
sepsis 2/2 R leg developing cellulitis ( hx of R foot ulcer with osteomyelitis, s/p resection of the R 1st metatarsal head 10/24)  , loose stool ( viral enteritis ) ?

## 2024-12-31 NOTE — DISCHARGE NOTE NURSING/CASE MANAGEMENT/SOCIAL WORK - NSDCPEFALRISK_GEN_ALL_CORE
For information on Fall & Injury Prevention, visit: [email or web address] OR  [email or web address] OR  https://www.cdc.gov/steadi/patient.html

## 2024-12-31 NOTE — DISCHARGE NOTE PROVIDER - NSDCCPCAREPLAN_GEN_ALL_CORE_FT
PRINCIPAL DISCHARGE DIAGNOSIS  Diagnosis: Sepsis  Assessment and Plan of Treatment: Admitted for sepsis due to Right leg cellulitis, now resolved, continue with ceftin for 7 more days.      SECONDARY DISCHARGE DIAGNOSES  Diagnosis: History of osteomyelitis  Assessment and Plan of Treatment: Continue to follow up with podiatrist    Diagnosis: Cellulitis of right leg  Assessment and Plan of Treatment: continue with ceftin for 7 more days.

## 2024-12-31 NOTE — DISCHARGE NOTE PROVIDER - HOSPITAL COURSE
74-yo wheelchair bound M w/ PMHx  of DM2, PAD, hypertension, COPD, CKD,  HFpEF, Hx of R foot ulcer with osteomyelitis, s/p resection of the R 1st metatarsal head 10/24 here with mechanical fall in the bathroom with head strike but no LOC.  Patient also has been feeling sick for the last several days including diarrhea and cough.  Patient states that there has been  Viral syndrome circulating the SNF. Reports R leg is more swollen, heavy with erythema , causes problem with ambulation. R foot ulcer with osteomyelitis, s/p resection of the R 1st metatarsal head. Was sent to [facility] with PICC line on Dapto for 6 weeks (to November 16, 2024)-  Bone culture + Staph aureus (MRSA). C/o loose stools.  Abdominal discomfort. No blood in stool or urine. No fever or chill. Patient febrile and with Low BP in ER     Subjective: pt seen and examined today, doing well, no overnight issues reported     Vital Signs Last 24 Hrs  T(C): 36.7 (31 Dec 2024 07:19), Max: 37.2 (30 Dec 2024 15:57)  T(F): 98.1 (31 Dec 2024 07:19), Max: 99 (30 Dec 2024 15:57)  HR: 85 (31 Dec 2024 08:57) (84 - 101)  BP: 120/76 (31 Dec 2024 07:19) (112/69 - 120/76)  RR: 17 (31 Dec 2024 07:19) (16 - 18)  SpO2: 95% (31 Dec 2024 08:57) (93% - 96%)    Parameters below as of 31 Dec 2024 08:57  Patient On (Oxygen Delivery Method): room air    PHYSICAL EXAM:  GENERAL: NAD, lying in bed comfortably  HEAD:  Atraumatic, Normocephalic  EYES: conjunctiva and sclera clear  ENT: Moist mucous membranes  NECK: Supple, No JVD  CHEST/LUNG: Clear to auscultation bilaterally; No rales, rhonchi, wheezing. Unlabored respirations  HEART: Regular rate and rhythm; No murmurs  ABDOMEN: Bowel sounds present; Soft, Nontender, Nondistended.   EXTREMITIES:  2+ Peripheral Pulses, brisk capillary refill. No clubbing, cyanosis, or edema  NERVOUS SYSTEM:  Alert & Oriented X3, speech clear. No deficits   MSK: FROM all 4 extremities, full and equal strength  SKIN: RLE: compression bandages in place    # Sepsis 2/2 R leg cellulitis  # s/p fall +head strike   # hx of R foot ulcer with osteomyelitis, s/p resection of the R 1st metatarsal head 10/24  # hx of neuropathy   - low bp , tachy, tachypnea, fever  101.9 in ER  - trop negative  - UA negative   - RVP panel negative   - CT HEAD: No acute abnormality. Chronic changes as above.  - CT CERVICAL SPINE: No acute abnormality. Chronic changes as above.  - CT chest A/P: Atelectasis at the lung bases with underlying infiltrates not excluded.  Mild bronchial wall thickening both lower lobes. No acute abnormality in the abdomen or pelvis.   - s/p 1x zosyn 2300 NS in ER  - Continue IV Dapto and IV Zosyn q8hrs  - Tylenol 650 mg PO q6h PRN for mild pain or fever  - follow Doppler lower extremity   - Bcx and Ucx, MRSA PCR neg  - Monitor WBC and fever curve  - Hx of resection of the R 1st metatarsal head.  Bone culture + MRSA patient was sent on  IV Dapto for 6 weeks (completed November 16, 2024) follow wound [facility] podiatry at [facility]  - ID consult appreciated, change to PO ceftin  for d/c 7 days     # REANNA on CKD  - Cr 1.3 > 1.09 > 1.18 today   - baseline 1.1  - S/P 2300 cc ns in ER     # Hypokalemia - improved   - k 3.3 replete PRN     # Anemia  - H/H: 10.2/33.6 ( baseline 9)   - platelet count 122 > 107 > 108 today     # Type II diabetes mellitus.   - Accu checks monitoring and insulin corrective regimen  sliding scale coverage with short acting insulin add long acting insulin as needed ,no concentrated sweets diet, serial labs ,HbA1C,education.    # hx of CHF  TTE 2024: Left ventricular systolic function is normal with an ejection fraction of 61 % There is moderate (grade 2) left ventricular diastolic dysfunction.    # hx of COPD  continue inhaler     # prostate cancer   stable

## 2024-12-31 NOTE — DISCHARGE NOTE NURSING/CASE MANAGEMENT/SOCIAL WORK - PATIENT PORTAL LINK FT
You can access the FollowMyHealth Patient Portal offered by [facility] by registering at the following website: [email or web address]. By joining [employer]’s FollowMyHealth portal, you will also be able to view your health information using other applications (apps) compatible with our system.

## 2024-12-31 NOTE — DISCHARGE NOTE PROVIDER - CARE PROVIDERS DIRECT ADDRESSES
[email or web address]

## 2024-12-31 NOTE — DISCHARGE NOTE NURSING/CASE MANAGEMENT/SOCIAL WORK - FINANCIAL ASSISTANCE
[facility] provides services at a reduced cost to those who are determined to be eligible through [facility]’s financial assistance program. Information regarding [facility]’s financial assistance program can be found by going to [email or web address] or by calling [phone].

## 2025-01-09 DIAGNOSIS — E11.40 TYPE 2 DIABETES MELLITUS WITH DIABETIC NEUROPATHY, UNSPECIFIED: ICD-10-CM

## 2025-01-09 DIAGNOSIS — I50.32 CHRONIC DIASTOLIC (CONGESTIVE) HEART FAILURE: ICD-10-CM

## 2025-01-09 DIAGNOSIS — Z79.899 OTHER LONG TERM (CURRENT) DRUG THERAPY: ICD-10-CM

## 2025-01-09 DIAGNOSIS — E11.22 TYPE 2 DIABETES MELLITUS WITH DIABETIC CHRONIC KIDNEY DISEASE: ICD-10-CM

## 2025-01-09 DIAGNOSIS — Z79.4 LONG TERM (CURRENT) USE OF INSULIN: ICD-10-CM

## 2025-01-09 DIAGNOSIS — L03.115 CELLULITIS OF RIGHT LOWER LIMB: ICD-10-CM

## 2025-01-09 DIAGNOSIS — Z79.84 LONG TERM (CURRENT) USE OF ORAL HYPOGLYCEMIC DRUGS: ICD-10-CM

## 2025-01-09 DIAGNOSIS — Z88.1 ALLERGY STATUS TO OTHER ANTIBIOTIC AGENTS: ICD-10-CM

## 2025-01-09 DIAGNOSIS — G47.00 INSOMNIA, UNSPECIFIED: ICD-10-CM

## 2025-01-09 DIAGNOSIS — J44.9 CHRONIC OBSTRUCTIVE PULMONARY DISEASE, UNSPECIFIED: ICD-10-CM

## 2025-01-09 DIAGNOSIS — N17.9 ACUTE KIDNEY FAILURE, UNSPECIFIED: ICD-10-CM

## 2025-01-09 DIAGNOSIS — E87.6 HYPOKALEMIA: ICD-10-CM

## 2025-01-09 DIAGNOSIS — A41.9 SEPSIS, UNSPECIFIED ORGANISM: ICD-10-CM

## 2025-01-09 DIAGNOSIS — B95.62 METHICILLIN RESISTANT STAPHYLOCOCCUS AUREUS INFECTION AS THE CAUSE OF DISEASES CLASSIFIED ELSEWHERE: ICD-10-CM

## 2025-01-09 DIAGNOSIS — N18.9 CHRONIC KIDNEY DISEASE, UNSPECIFIED: ICD-10-CM

## 2025-01-28 NOTE — H&P ADULT - NSTOBACCOSCREENHP_GEN_A_CS
RN called an left message for patient about appointment to see Dr. Yu on February 7, 2025.  Asked patient to please call back if she is able to get labs drawn prior to her appointment.  
No

## 2025-05-20 RX ORDER — PREDNISONE 20 MG/1
1 TABLET ORAL
Refills: 0 | DISCHARGE
Start: 2025-05-20 | End: 2025-06-21

## 2025-05-21 ENCOUNTER — INPATIENT (INPATIENT)
Facility: HOSPITAL | Age: 75
LOS: 5 days | Discharge: NURSING, REHAB OR LONG-TERM CARE | DRG: 291 | End: 2025-05-27
Attending: INTERNAL MEDICINE | Admitting: INTERNAL MEDICINE
Payer: COMMERCIAL

## 2025-05-21 VITALS
DIASTOLIC BLOOD PRESSURE: 67 MMHG | HEIGHT: 70 IN | WEIGHT: 242.95 LBS | SYSTOLIC BLOOD PRESSURE: 107 MMHG | TEMPERATURE: 98 F | RESPIRATION RATE: 18 BRPM | HEART RATE: 87 BPM | OXYGEN SATURATION: 92 %

## 2025-05-21 DIAGNOSIS — Z98.890 OTHER SPECIFIED POSTPROCEDURAL STATES: Chronic | ICD-10-CM

## 2025-05-21 LAB
ALBUMIN SERPL ELPH-MCNC: 2.8 G/DL — LOW (ref 3.3–5)
ALP SERPL-CCNC: 71 U/L — SIGNIFICANT CHANGE UP (ref 40–120)
ALT FLD-CCNC: 33 U/L — SIGNIFICANT CHANGE UP (ref 12–78)
ANION GAP SERPL CALC-SCNC: 12 MMOL/L — SIGNIFICANT CHANGE UP (ref 5–17)
APTT BLD: 28.6 SEC — SIGNIFICANT CHANGE UP (ref 26.1–36.8)
AST SERPL-CCNC: 21 U/L — SIGNIFICANT CHANGE UP (ref 15–37)
BASOPHILS # BLD AUTO: 0 K/UL — SIGNIFICANT CHANGE UP (ref 0–0.2)
BASOPHILS NFR BLD AUTO: 0 % — SIGNIFICANT CHANGE UP (ref 0–2)
BILIRUB SERPL-MCNC: 0.4 MG/DL — SIGNIFICANT CHANGE UP (ref 0.2–1.2)
BUN SERPL-MCNC: 33 MG/DL — HIGH (ref 7–23)
CALCIUM SERPL-MCNC: 9.1 MG/DL — SIGNIFICANT CHANGE UP (ref 8.5–10.1)
CHLORIDE SERPL-SCNC: 100 MMOL/L — SIGNIFICANT CHANGE UP (ref 96–108)
CO2 SERPL-SCNC: 24 MMOL/L — SIGNIFICANT CHANGE UP (ref 22–31)
CREAT SERPL-MCNC: 1.6 MG/DL — HIGH (ref 0.5–1.3)
EGFR: 45 ML/MIN/1.73M2 — LOW
EGFR: 45 ML/MIN/1.73M2 — LOW
EOSINOPHIL # BLD AUTO: 0.08 K/UL — SIGNIFICANT CHANGE UP (ref 0–0.5)
EOSINOPHIL NFR BLD AUTO: 1 % — SIGNIFICANT CHANGE UP (ref 0–6)
GLUCOSE SERPL-MCNC: 274 MG/DL — HIGH (ref 70–99)
HCT VFR BLD CALC: 36.7 % — LOW (ref 39–50)
HGB BLD-MCNC: 11.8 G/DL — LOW (ref 13–17)
INR BLD: 1.08 RATIO — SIGNIFICANT CHANGE UP (ref 0.85–1.16)
LYMPHOCYTES # BLD AUTO: 1.79 K/UL — SIGNIFICANT CHANGE UP (ref 1–3.3)
LYMPHOCYTES # BLD AUTO: 22 % — SIGNIFICANT CHANGE UP (ref 13–44)
MCHC RBC-ENTMCNC: 27.6 PG — SIGNIFICANT CHANGE UP (ref 27–34)
MCHC RBC-ENTMCNC: 32.2 G/DL — SIGNIFICANT CHANGE UP (ref 32–36)
MCV RBC AUTO: 85.7 FL — SIGNIFICANT CHANGE UP (ref 80–100)
MONOCYTES # BLD AUTO: 0.41 K/UL — SIGNIFICANT CHANGE UP (ref 0–0.9)
MONOCYTES NFR BLD AUTO: 5 % — SIGNIFICANT CHANGE UP (ref 2–14)
NEUTROPHILS # BLD AUTO: 5.71 K/UL — SIGNIFICANT CHANGE UP (ref 1.8–7.4)
NEUTROPHILS NFR BLD AUTO: 70 % — SIGNIFICANT CHANGE UP (ref 43–77)
NRBC BLD AUTO-RTO: SIGNIFICANT CHANGE UP /100 WBCS (ref 0–0)
NT-PROBNP SERPL-SCNC: 634 PG/ML — HIGH (ref 0–125)
PLATELET # BLD AUTO: 166 K/UL — SIGNIFICANT CHANGE UP (ref 150–400)
POTASSIUM SERPL-MCNC: 4.3 MMOL/L — SIGNIFICANT CHANGE UP (ref 3.5–5.3)
POTASSIUM SERPL-SCNC: 4.3 MMOL/L — SIGNIFICANT CHANGE UP (ref 3.5–5.3)
PROT SERPL-MCNC: 7.2 G/DL — SIGNIFICANT CHANGE UP (ref 6–8.3)
PROTHROM AB SERPL-ACNC: 12.6 SEC — SIGNIFICANT CHANGE UP (ref 9.9–13.4)
RBC # BLD: 4.28 M/UL — SIGNIFICANT CHANGE UP (ref 4.2–5.8)
RBC # FLD: 20 % — HIGH (ref 10.3–14.5)
SODIUM SERPL-SCNC: 136 MMOL/L — SIGNIFICANT CHANGE UP (ref 135–145)
TROPONIN I, HIGH SENSITIVITY RESULT: 21.2 NG/L — SIGNIFICANT CHANGE UP
WBC # BLD: 8.15 K/UL — SIGNIFICANT CHANGE UP (ref 3.8–10.5)
WBC # FLD AUTO: 8.15 K/UL — SIGNIFICANT CHANGE UP (ref 3.8–10.5)

## 2025-05-21 PROCEDURE — 71250 CT THORAX DX C-: CPT | Mod: 26

## 2025-05-21 PROCEDURE — 93010 ELECTROCARDIOGRAM REPORT: CPT | Mod: 76

## 2025-05-21 PROCEDURE — 71045 X-RAY EXAM CHEST 1 VIEW: CPT | Mod: 26

## 2025-05-21 PROCEDURE — 99285 EMERGENCY DEPT VISIT HI MDM: CPT

## 2025-05-21 NOTE — ED ADULT TRIAGE NOTE - CHIEF COMPLAINT QUOTE
BIBEMS from [facility] for weight gain of approx 49.8lbs over the past 3 months with associated symptoms of SOB with exertion, crackles and bases and BLE edema; MD increased lasix to TID but pt only taking BID.

## 2025-05-21 NOTE — ED ADULT NURSE NOTE - CHIEF COMPLAINT QUOTE
BIBEMS from [facility] for weight gain of approx 49.8lbs over the past 3 months with associated symptoms of SOB with exertion, crackles and bases and BLE edema; MD increased lasix to TID but pt only taking BID.

## 2025-05-21 NOTE — ED ADULT NURSE NOTE - NSFALLHARMRISKINTERV_ED_ALL_ED
Assistance OOB with selected safe patient handling equipment if applicable/Communicate risk of Fall with Harm to all staff, patient, and family/Monitor gait and stability/Provide patient with walking aids/Provide visual cue: red socks, yellow wristband, yellow gown, etc/Reinforce activity limits and safety measures with patient and family/Bed in lowest position, wheels locked, appropriate side rails in place/Call bell, personal items and telephone in reach/Instruct patient to call for assistance before getting out of bed/chair/stretcher/Non-slip footwear applied when patient is off stretcher/[place] to call system/Physically safe environment - no spills, clutter or unnecessary equipment/Purposeful Proactive Rounding/Room/bathroom lighting operational, light cord in reach

## 2025-05-21 NOTE — ED ADULT NURSE NOTE - OBJECTIVE STATEMENT
Brought in by ambulance from [place] c/o SOB on exertion and sometimes when lying down worsening for the several days and weight gain approximately 50lbs for the past 3 months with accompanying edema on both legs and abdomen. Patient also endorses that MD increased his lasix to TID but only taking BID states he can't tolerate it due to urinary incontinence. Denies fever/ chills/ chest pain. Brought in by ambulance from [place] c/o SOB on exertion and sometimes when lying down worsening for the past several days and weight gain approximately 50lbs for the past 3 months with accompanying edema on both legs and abdomen. Patient also endorses that MD increased his lasix to TID but only taking BID states he can't tolerate it due to urinary incontinence. Denies fever/ chills/ chest pain.

## 2025-05-21 NOTE — ED PROVIDER NOTE - WR ORDER NAME 1
Subjective   Patient ID: Peyton Eng Xray Chest 1 View- PORTABLE-Urgent

## 2025-05-21 NOTE — ED PROVIDER NOTE - PROGRESS NOTE DETAILS
endorsed to dr dasha montano for remote tele admission; will treat with abx for possible pna and diurese. pt updated and is amenable for admission

## 2025-05-21 NOTE — ED ADULT NURSE NOTE - NSFALLRISKINTERV_ED_ALL_ED

## 2025-05-21 NOTE — ED PROVIDER NOTE - CLINICAL SUMMARY MEDICAL DECISION MAKING FREE TEXT BOX
74M PMH DM2, PAD, hypertension, COPD, CKD,  HFpEF, Hx of R foot ulcer with osteomyelitis BIBEMS from [place] p/w exertional dyspnea and weight gain of about 30lbs over past 1 month. Is supposed to be on lasix 20mg TID but only takes it BID. PCP Dr. Humphrey     Gen: NAD, non-toxic appearing, awake alert   HEENT: normal conjunctiva, oral mucosa moist  Lung: slight cracklse at bases B/L,  speaking in full sentences  CV: RRR  Abd: soft, NT, ND, no guarding, no rigidity, no rebound tenderness   MSK: no visible deformities  Skin: Warm, well perfused, B/L  leg swelling    DDx includes but not limited to: chf vs acs vs pna vs lyte derangements  Plan: cardiac labs, cxr, reassess symptoms    See Progress Notes for further updates on ED Course 74M PMH DM2, PAD, hypertension, COPD, CKD,  HFpEF, Hx of R foot ulcer with osteomyelitis BIBEMS from [place] p/w exertional dyspnea and weight gain of about 30lbs over past 1 month. Is supposed to be on lasix 20mg TID but only takes it BID. PCP Dr. Humphrey     Gen: NAD, non-toxic appearing, awake alert   HEENT: normal conjunctiva, oral mucosa moist  Lung: slight cracklse at bases B/L,  speaking in full sentences  CV: RRR  Abd: soft, NT, ND, no guarding, no rigidity, no rebound tenderness   MSK:   Skin: Warm, well perfused, B/L  leg swelling, ulcer to lateral R ankle with surrounding erythema    DDx includes but not limited to: chf vs acs vs pna vs lyte derangements  Plan: cardiac labs, cxr, reassess symptoms    See Progress Notes for further updates on ED Course

## 2025-05-22 DIAGNOSIS — Z87.2 PERSONAL HISTORY OF DISEASES OF THE SKIN AND SUBCUTANEOUS TISSUE: ICD-10-CM

## 2025-05-22 DIAGNOSIS — J44.1 CHRONIC OBSTRUCTIVE PULMONARY DISEASE WITH (ACUTE) EXACERBATION: ICD-10-CM

## 2025-05-22 DIAGNOSIS — Z29.9 ENCOUNTER FOR PROPHYLACTIC MEASURES, UNSPECIFIED: ICD-10-CM

## 2025-05-22 DIAGNOSIS — J18.9 PNEUMONIA, UNSPECIFIED ORGANISM: ICD-10-CM

## 2025-05-22 DIAGNOSIS — E11.9 TYPE 2 DIABETES MELLITUS WITHOUT COMPLICATIONS: ICD-10-CM

## 2025-05-22 DIAGNOSIS — N17.9 ACUTE KIDNEY FAILURE, UNSPECIFIED: ICD-10-CM

## 2025-05-22 DIAGNOSIS — S91.309A UNSPECIFIED OPEN WOUND, UNSPECIFIED FOOT, INITIAL ENCOUNTER: ICD-10-CM

## 2025-05-22 DIAGNOSIS — M70.20 OLECRANON BURSITIS, UNSPECIFIED ELBOW: ICD-10-CM

## 2025-05-22 DIAGNOSIS — I50.9 HEART FAILURE, UNSPECIFIED: ICD-10-CM

## 2025-05-22 DIAGNOSIS — I10 ESSENTIAL (PRIMARY) HYPERTENSION: ICD-10-CM

## 2025-05-22 LAB
A1C WITH ESTIMATED AVERAGE GLUCOSE RESULT: 10 % — HIGH (ref 4–5.6)
ALBUMIN SERPL ELPH-MCNC: 2.9 G/DL — LOW (ref 3.3–5)
ALP SERPL-CCNC: 72 U/L — SIGNIFICANT CHANGE UP (ref 40–120)
ALT FLD-CCNC: 33 U/L — SIGNIFICANT CHANGE UP (ref 12–78)
ANION GAP SERPL CALC-SCNC: 12 MMOL/L — SIGNIFICANT CHANGE UP (ref 5–17)
APPEARANCE UR: CLEAR — SIGNIFICANT CHANGE UP
AST SERPL-CCNC: 17 U/L — SIGNIFICANT CHANGE UP (ref 15–37)
BASOPHILS # BLD AUTO: 0.04 K/UL — SIGNIFICANT CHANGE UP (ref 0–0.2)
BASOPHILS NFR BLD AUTO: 0.4 % — SIGNIFICANT CHANGE UP (ref 0–2)
BILIRUB SERPL-MCNC: 0.4 MG/DL — SIGNIFICANT CHANGE UP (ref 0.2–1.2)
BILIRUB UR-MCNC: NEGATIVE — SIGNIFICANT CHANGE UP
BUN SERPL-MCNC: 35 MG/DL — HIGH (ref 7–23)
CALCIUM SERPL-MCNC: 9.1 MG/DL — SIGNIFICANT CHANGE UP (ref 8.5–10.1)
CHLORIDE SERPL-SCNC: 97 MMOL/L — SIGNIFICANT CHANGE UP (ref 96–108)
CHOLEST SERPL-MCNC: 109 MG/DL — SIGNIFICANT CHANGE UP
CO2 SERPL-SCNC: 28 MMOL/L — SIGNIFICANT CHANGE UP (ref 22–31)
COLOR SPEC: YELLOW — SIGNIFICANT CHANGE UP
CREAT SERPL-MCNC: 1.4 MG/DL — HIGH (ref 0.5–1.3)
DIFF PNL FLD: NEGATIVE — SIGNIFICANT CHANGE UP
EGFR: 53 ML/MIN/1.73M2 — LOW
EGFR: 53 ML/MIN/1.73M2 — LOW
EOSINOPHIL # BLD AUTO: 0.02 K/UL — SIGNIFICANT CHANGE UP (ref 0–0.5)
EOSINOPHIL NFR BLD AUTO: 0.2 % — SIGNIFICANT CHANGE UP (ref 0–6)
ERYTHROCYTE [SEDIMENTATION RATE] IN BLOOD: 65 MM/HR — HIGH (ref 0–15)
ESTIMATED AVERAGE GLUCOSE: 240 MG/DL — HIGH (ref 68–114)
FERRITIN SERPL-MCNC: 215 NG/ML — SIGNIFICANT CHANGE UP (ref 30–400)
FOLATE SERPL-MCNC: 17.2 NG/ML — SIGNIFICANT CHANGE UP
GLUCOSE BLDC GLUCOMTR-MCNC: 251 MG/DL — HIGH (ref 70–99)
GLUCOSE BLDC GLUCOMTR-MCNC: 271 MG/DL — HIGH (ref 70–99)
GLUCOSE BLDC GLUCOMTR-MCNC: 287 MG/DL — HIGH (ref 70–99)
GLUCOSE BLDC GLUCOMTR-MCNC: 300 MG/DL — HIGH (ref 70–99)
GLUCOSE SERPL-MCNC: 222 MG/DL — HIGH (ref 70–99)
GLUCOSE UR QL: >=1000 MG/DL
HCT VFR BLD CALC: 38.2 % — LOW (ref 39–50)
HDLC SERPL-MCNC: 43 MG/DL — SIGNIFICANT CHANGE UP
HGB BLD-MCNC: 12.4 G/DL — LOW (ref 13–17)
IMM GRANULOCYTES NFR BLD AUTO: 3.7 % — HIGH (ref 0–0.9)
IRON SATN MFR SERPL: 21 % — SIGNIFICANT CHANGE UP (ref 16–55)
IRON SATN MFR SERPL: 54 UG/DL — SIGNIFICANT CHANGE UP (ref 45–165)
KETONES UR QL: NEGATIVE MG/DL — SIGNIFICANT CHANGE UP
LDLC SERPL-MCNC: 46 MG/DL — SIGNIFICANT CHANGE UP
LEGIONELLA AG UR QL: NEGATIVE — SIGNIFICANT CHANGE UP
LEUKOCYTE ESTERASE UR-ACNC: NEGATIVE — SIGNIFICANT CHANGE UP
LIPID PNL WITH DIRECT LDL SERPL: 46 MG/DL — SIGNIFICANT CHANGE UP
LYMPHOCYTES # BLD AUTO: 2.82 K/UL — SIGNIFICANT CHANGE UP (ref 1–3.3)
LYMPHOCYTES # BLD AUTO: 27.2 % — SIGNIFICANT CHANGE UP (ref 13–44)
MCHC RBC-ENTMCNC: 27.3 PG — SIGNIFICANT CHANGE UP (ref 27–34)
MCHC RBC-ENTMCNC: 32.5 G/DL — SIGNIFICANT CHANGE UP (ref 32–36)
MCV RBC AUTO: 84.1 FL — SIGNIFICANT CHANGE UP (ref 80–100)
MONOCYTES # BLD AUTO: 0.99 K/UL — HIGH (ref 0–0.9)
MONOCYTES NFR BLD AUTO: 9.5 % — SIGNIFICANT CHANGE UP (ref 2–14)
NEUTROPHILS # BLD AUTO: 6.13 K/UL — SIGNIFICANT CHANGE UP (ref 1.8–7.4)
NEUTROPHILS NFR BLD AUTO: 59 % — SIGNIFICANT CHANGE UP (ref 43–77)
NITRITE UR-MCNC: NEGATIVE — SIGNIFICANT CHANGE UP
NONHDLC SERPL-MCNC: 66 MG/DL — SIGNIFICANT CHANGE UP
NRBC BLD AUTO-RTO: 0 /100 WBCS — SIGNIFICANT CHANGE UP (ref 0–0)
PH UR: 5.5 — SIGNIFICANT CHANGE UP (ref 5–8)
PLATELET # BLD AUTO: 158 K/UL — SIGNIFICANT CHANGE UP (ref 150–400)
POTASSIUM SERPL-MCNC: 3.3 MMOL/L — LOW (ref 3.5–5.3)
POTASSIUM SERPL-SCNC: 3.3 MMOL/L — LOW (ref 3.5–5.3)
PROT SERPL-MCNC: 7.5 G/DL — SIGNIFICANT CHANGE UP (ref 6–8.3)
PROT UR-MCNC: NEGATIVE MG/DL — SIGNIFICANT CHANGE UP
RAPID RVP RESULT: SIGNIFICANT CHANGE UP
RBC # BLD: 4.54 M/UL — SIGNIFICANT CHANGE UP (ref 4.2–5.8)
RBC # FLD: 20 % — HIGH (ref 10.3–14.5)
S PNEUM AG UR QL: NEGATIVE — SIGNIFICANT CHANGE UP
SARS-COV-2 RNA SPEC QL NAA+PROBE: SIGNIFICANT CHANGE UP
SODIUM SERPL-SCNC: 137 MMOL/L — SIGNIFICANT CHANGE UP (ref 135–145)
SP GR SPEC: 1.02 — SIGNIFICANT CHANGE UP (ref 1–1.03)
TIBC SERPL-MCNC: 263 UG/DL — SIGNIFICANT CHANGE UP (ref 220–430)
TRIGL SERPL-MCNC: 111 MG/DL — SIGNIFICANT CHANGE UP
TROPONIN I, HIGH SENSITIVITY RESULT: 23.5 NG/L — SIGNIFICANT CHANGE UP
TSH SERPL-MCNC: 1.1 UIU/ML — SIGNIFICANT CHANGE UP (ref 0.36–3.74)
UIBC SERPL-MCNC: 209 UG/DL — SIGNIFICANT CHANGE UP (ref 110–370)
UROBILINOGEN FLD QL: 0.2 MG/DL — SIGNIFICANT CHANGE UP (ref 0.2–1)
VIT B12 SERPL-MCNC: 477 PG/ML — SIGNIFICANT CHANGE UP (ref 232–1245)
WBC # BLD: 10.38 K/UL — SIGNIFICANT CHANGE UP (ref 3.8–10.5)
WBC # FLD AUTO: 10.38 K/UL — SIGNIFICANT CHANGE UP (ref 3.8–10.5)

## 2025-05-22 PROCEDURE — 99223 1ST HOSP IP/OBS HIGH 75: CPT

## 2025-05-22 PROCEDURE — 73070 X-RAY EXAM OF ELBOW: CPT | Mod: 26,RT

## 2025-05-22 PROCEDURE — 99222 1ST HOSP IP/OBS MODERATE 55: CPT

## 2025-05-22 RX ORDER — SENNA 187 MG
2 TABLET ORAL AT BEDTIME
Refills: 0 | Status: DISCONTINUED | OUTPATIENT
Start: 2025-05-22 | End: 2025-05-27

## 2025-05-22 RX ORDER — HEPARIN SODIUM 1000 [USP'U]/ML
5000 INJECTION INTRAVENOUS; SUBCUTANEOUS EVERY 8 HOURS
Refills: 0 | Status: DISCONTINUED | OUTPATIENT
Start: 2025-05-22 | End: 2025-05-27

## 2025-05-22 RX ORDER — SODIUM CHLORIDE 9 G/1000ML
1000 INJECTION, SOLUTION INTRAVENOUS
Refills: 0 | Status: DISCONTINUED | OUTPATIENT
Start: 2025-05-22 | End: 2025-05-27

## 2025-05-22 RX ORDER — DEXTROSE 50 % IN WATER 50 %
15 SYRINGE (ML) INTRAVENOUS ONCE
Refills: 0 | Status: DISCONTINUED | OUTPATIENT
Start: 2025-05-22 | End: 2025-05-27

## 2025-05-22 RX ORDER — MELATONIN 5 MG
3 TABLET ORAL AT BEDTIME
Refills: 0 | Status: DISCONTINUED | OUTPATIENT
Start: 2025-05-22 | End: 2025-05-27

## 2025-05-22 RX ORDER — INSULIN LISPRO 100 U/ML
INJECTION, SOLUTION INTRAVENOUS; SUBCUTANEOUS
Refills: 0 | Status: DISCONTINUED | OUTPATIENT
Start: 2025-05-22 | End: 2025-05-22

## 2025-05-22 RX ORDER — FUROSEMIDE 10 MG/ML
40 INJECTION INTRAMUSCULAR; INTRAVENOUS
Refills: 0 | Status: DISCONTINUED | OUTPATIENT
Start: 2025-05-22 | End: 2025-05-24

## 2025-05-22 RX ORDER — DEXTROSE 50 % IN WATER 50 %
12.5 SYRINGE (ML) INTRAVENOUS ONCE
Refills: 0 | Status: DISCONTINUED | OUTPATIENT
Start: 2025-05-22 | End: 2025-05-27

## 2025-05-22 RX ORDER — OXYCODONE HYDROCHLORIDE 30 MG/1
2.5 TABLET ORAL EVERY 4 HOURS
Refills: 0 | Status: DISCONTINUED | OUTPATIENT
Start: 2025-05-22 | End: 2025-05-27

## 2025-05-22 RX ORDER — CLONAZEPAM 0.5 MG/1
0.75 TABLET ORAL
Refills: 0 | Status: DISCONTINUED | OUTPATIENT
Start: 2025-05-22 | End: 2025-05-26

## 2025-05-22 RX ORDER — PREDNISONE 20 MG/1
10 TABLET ORAL DAILY
Refills: 0 | Status: DISCONTINUED | OUTPATIENT
Start: 2025-05-22 | End: 2025-05-27

## 2025-05-22 RX ORDER — OXYCODONE HYDROCHLORIDE 30 MG/1
5 TABLET ORAL EVERY 6 HOURS
Refills: 0 | Status: DISCONTINUED | OUTPATIENT
Start: 2025-05-22 | End: 2025-05-27

## 2025-05-22 RX ORDER — IPRATROPIUM BROMIDE AND ALBUTEROL SULFATE .5; 2.5 MG/3ML; MG/3ML
3 SOLUTION RESPIRATORY (INHALATION) EVERY 6 HOURS
Refills: 0 | Status: DISCONTINUED | OUTPATIENT
Start: 2025-05-22 | End: 2025-05-27

## 2025-05-22 RX ORDER — HEPARIN SODIUM 1000 [USP'U]/ML
5000 INJECTION INTRAVENOUS; SUBCUTANEOUS EVERY 8 HOURS
Refills: 0 | Status: DISCONTINUED | OUTPATIENT
Start: 2025-05-22 | End: 2025-05-22

## 2025-05-22 RX ORDER — FERROUS SULFATE 137(45) MG
325 TABLET, EXTENDED RELEASE ORAL DAILY
Refills: 0 | Status: DISCONTINUED | OUTPATIENT
Start: 2025-05-22 | End: 2025-05-27

## 2025-05-22 RX ORDER — DAPTOMYCIN 500 MG/10ML
450 INJECTION, POWDER, LYOPHILIZED, FOR SOLUTION INTRAVENOUS EVERY 24 HOURS
Refills: 0 | Status: DISCONTINUED | OUTPATIENT
Start: 2025-05-22 | End: 2025-05-26

## 2025-05-22 RX ORDER — CEFTRIAXONE 500 MG/1
1000 INJECTION, POWDER, FOR SOLUTION INTRAMUSCULAR; INTRAVENOUS EVERY 24 HOURS
Refills: 0 | Status: COMPLETED | OUTPATIENT
Start: 2025-05-22 | End: 2025-05-26

## 2025-05-22 RX ORDER — METOPROLOL SUCCINATE 50 MG/1
25 TABLET, EXTENDED RELEASE ORAL DAILY
Refills: 0 | Status: DISCONTINUED | OUTPATIENT
Start: 2025-05-22 | End: 2025-05-24

## 2025-05-22 RX ORDER — AZITHROMYCIN 250 MG
CAPSULE ORAL
Refills: 0 | Status: DISCONTINUED | OUTPATIENT
Start: 2025-05-22 | End: 2025-05-22

## 2025-05-22 RX ORDER — INSULIN GLARGINE-YFGN 100 [IU]/ML
16 INJECTION, SOLUTION SUBCUTANEOUS AT BEDTIME
Refills: 0 | Status: DISCONTINUED | OUTPATIENT
Start: 2025-05-22 | End: 2025-05-22

## 2025-05-22 RX ORDER — MONTELUKAST SODIUM 10 MG/1
10 TABLET ORAL DAILY
Refills: 0 | Status: DISCONTINUED | OUTPATIENT
Start: 2025-05-22 | End: 2025-05-27

## 2025-05-22 RX ORDER — GLUCAGON 3 MG/1
1 POWDER NASAL ONCE
Refills: 0 | Status: DISCONTINUED | OUTPATIENT
Start: 2025-05-22 | End: 2025-05-27

## 2025-05-22 RX ORDER — TIOTROPIUM BROMIDE INHALATION SPRAY 3.12 UG/1
2 SPRAY, METERED RESPIRATORY (INHALATION) DAILY
Refills: 0 | Status: DISCONTINUED | OUTPATIENT
Start: 2025-05-22 | End: 2025-05-27

## 2025-05-22 RX ORDER — ROSUVASTATIN CALCIUM 20 MG/1
40 TABLET, FILM COATED ORAL AT BEDTIME
Refills: 0 | Status: DISCONTINUED | OUTPATIENT
Start: 2025-05-22 | End: 2025-05-27

## 2025-05-22 RX ORDER — SERTRALINE 100 MG/1
100 TABLET, FILM COATED ORAL DAILY
Refills: 0 | Status: DISCONTINUED | OUTPATIENT
Start: 2025-05-22 | End: 2025-05-27

## 2025-05-22 RX ORDER — ACETAMINOPHEN 500 MG/5ML
650 LIQUID (ML) ORAL EVERY 6 HOURS
Refills: 0 | Status: DISCONTINUED | OUTPATIENT
Start: 2025-05-22 | End: 2025-05-27

## 2025-05-22 RX ORDER — PREGABALIN 50 MG/1
150 CAPSULE ORAL
Refills: 0 | Status: DISCONTINUED | OUTPATIENT
Start: 2025-05-22 | End: 2025-05-27

## 2025-05-22 RX ORDER — INSULIN LISPRO 100 U/ML
7 INJECTION, SOLUTION INTRAVENOUS; SUBCUTANEOUS
Refills: 0 | Status: DISCONTINUED | OUTPATIENT
Start: 2025-05-22 | End: 2025-05-22

## 2025-05-22 RX ORDER — POLYETHYLENE GLYCOL 3350 17 G/17G
17 POWDER, FOR SOLUTION ORAL DAILY
Refills: 0 | Status: DISCONTINUED | OUTPATIENT
Start: 2025-05-22 | End: 2025-05-25

## 2025-05-22 RX ORDER — BENZONATATE 100 MG
100 CAPSULE ORAL THREE TIMES A DAY
Refills: 0 | Status: DISCONTINUED | OUTPATIENT
Start: 2025-05-22 | End: 2025-05-27

## 2025-05-22 RX ORDER — INSULIN LISPRO 100 U/ML
10 INJECTION, SOLUTION INTRAVENOUS; SUBCUTANEOUS
Refills: 0 | Status: DISCONTINUED | OUTPATIENT
Start: 2025-05-22 | End: 2025-05-27

## 2025-05-22 RX ORDER — DEXTROSE 50 % IN WATER 50 %
25 SYRINGE (ML) INTRAVENOUS ONCE
Refills: 0 | Status: DISCONTINUED | OUTPATIENT
Start: 2025-05-22 | End: 2025-05-27

## 2025-05-22 RX ORDER — INSULIN LISPRO 100 U/ML
INJECTION, SOLUTION INTRAVENOUS; SUBCUTANEOUS AT BEDTIME
Refills: 0 | Status: DISCONTINUED | OUTPATIENT
Start: 2025-05-22 | End: 2025-05-22

## 2025-05-22 RX ORDER — INSULIN LISPRO 100 U/ML
INJECTION, SOLUTION INTRAVENOUS; SUBCUTANEOUS AT BEDTIME
Refills: 0 | Status: DISCONTINUED | OUTPATIENT
Start: 2025-05-22 | End: 2025-05-23

## 2025-05-22 RX ORDER — AZITHROMYCIN 250 MG
500 CAPSULE ORAL ONCE
Refills: 0 | Status: COMPLETED | OUTPATIENT
Start: 2025-05-22 | End: 2025-05-22

## 2025-05-22 RX ORDER — INSULIN LISPRO 100 U/ML
INJECTION, SOLUTION INTRAVENOUS; SUBCUTANEOUS
Refills: 0 | Status: DISCONTINUED | OUTPATIENT
Start: 2025-05-22 | End: 2025-05-23

## 2025-05-22 RX ORDER — INSULIN GLARGINE-YFGN 100 [IU]/ML
24 INJECTION, SOLUTION SUBCUTANEOUS AT BEDTIME
Refills: 0 | Status: DISCONTINUED | OUTPATIENT
Start: 2025-05-22 | End: 2025-05-26

## 2025-05-22 RX ADMIN — HEPARIN SODIUM 5000 UNIT(S): 1000 INJECTION INTRAVENOUS; SUBCUTANEOUS at 06:08

## 2025-05-22 RX ADMIN — Medication 3 MILLIGRAM(S): at 21:20

## 2025-05-22 RX ADMIN — Medication 1 DOSE(S): at 07:30

## 2025-05-22 RX ADMIN — OXYCODONE HYDROCHLORIDE 5 MILLIGRAM(S): 30 TABLET ORAL at 17:50

## 2025-05-22 RX ADMIN — SERTRALINE 100 MILLIGRAM(S): 100 TABLET, FILM COATED ORAL at 11:03

## 2025-05-22 RX ADMIN — Medication 250 MILLIGRAM(S): at 02:45

## 2025-05-22 RX ADMIN — HEPARIN SODIUM 5000 UNIT(S): 1000 INJECTION INTRAVENOUS; SUBCUTANEOUS at 21:17

## 2025-05-22 RX ADMIN — INSULIN GLARGINE-YFGN 24 UNIT(S): 100 INJECTION, SOLUTION SUBCUTANEOUS at 21:13

## 2025-05-22 RX ADMIN — FUROSEMIDE 40 MILLIGRAM(S): 10 INJECTION INTRAMUSCULAR; INTRAVENOUS at 01:59

## 2025-05-22 RX ADMIN — INSULIN LISPRO 3: 100 INJECTION, SOLUTION INTRAVENOUS; SUBCUTANEOUS at 12:02

## 2025-05-22 RX ADMIN — Medication 650 MILLIGRAM(S): at 13:45

## 2025-05-22 RX ADMIN — INSULIN LISPRO 10 UNIT(S): 100 INJECTION, SOLUTION INTRAVENOUS; SUBCUTANEOUS at 17:42

## 2025-05-22 RX ADMIN — PREDNISONE 10 MILLIGRAM(S): 20 TABLET ORAL at 17:12

## 2025-05-22 RX ADMIN — Medication 10 MILLIGRAM(S): at 11:02

## 2025-05-22 RX ADMIN — FUROSEMIDE 40 MILLIGRAM(S): 10 INJECTION INTRAMUSCULAR; INTRAVENOUS at 06:09

## 2025-05-22 RX ADMIN — INSULIN LISPRO 7 UNIT(S): 100 INJECTION, SOLUTION INTRAVENOUS; SUBCUTANEOUS at 12:01

## 2025-05-22 RX ADMIN — PREGABALIN 150 MILLIGRAM(S): 50 CAPSULE ORAL at 17:12

## 2025-05-22 RX ADMIN — OXYCODONE HYDROCHLORIDE 5 MILLIGRAM(S): 30 TABLET ORAL at 17:12

## 2025-05-22 RX ADMIN — Medication 2 TABLET(S): at 21:18

## 2025-05-22 RX ADMIN — IPRATROPIUM BROMIDE AND ALBUTEROL SULFATE 3 MILLILITER(S): .5; 2.5 SOLUTION RESPIRATORY (INHALATION) at 16:16

## 2025-05-22 RX ADMIN — OXYCODONE HYDROCHLORIDE 5 MILLIGRAM(S): 30 TABLET ORAL at 07:37

## 2025-05-22 RX ADMIN — ROSUVASTATIN CALCIUM 40 MILLIGRAM(S): 20 TABLET, FILM COATED ORAL at 21:18

## 2025-05-22 RX ADMIN — INSULIN LISPRO 1: 100 INJECTION, SOLUTION INTRAVENOUS; SUBCUTANEOUS at 21:20

## 2025-05-22 RX ADMIN — Medication 650 MILLIGRAM(S): at 12:57

## 2025-05-22 RX ADMIN — INSULIN LISPRO 3: 100 INJECTION, SOLUTION INTRAVENOUS; SUBCUTANEOUS at 17:42

## 2025-05-22 RX ADMIN — Medication 325 MILLIGRAM(S): at 11:02

## 2025-05-22 RX ADMIN — MONTELUKAST SODIUM 10 MILLIGRAM(S): 10 TABLET ORAL at 11:02

## 2025-05-22 RX ADMIN — CLONAZEPAM 0.75 MILLIGRAM(S): 0.5 TABLET ORAL at 21:17

## 2025-05-22 RX ADMIN — METOPROLOL SUCCINATE 25 MILLIGRAM(S): 50 TABLET, EXTENDED RELEASE ORAL at 06:09

## 2025-05-22 RX ADMIN — HEPARIN SODIUM 5000 UNIT(S): 1000 INJECTION INTRAVENOUS; SUBCUTANEOUS at 13:01

## 2025-05-22 RX ADMIN — PREGABALIN 150 MILLIGRAM(S): 50 CAPSULE ORAL at 06:07

## 2025-05-22 RX ADMIN — FUROSEMIDE 40 MILLIGRAM(S): 10 INJECTION INTRAMUSCULAR; INTRAVENOUS at 13:01

## 2025-05-22 RX ADMIN — CLONAZEPAM 0.75 MILLIGRAM(S): 0.5 TABLET ORAL at 10:49

## 2025-05-22 RX ADMIN — Medication 40 MILLIGRAM(S): at 06:08

## 2025-05-22 RX ADMIN — CEFTRIAXONE 100 MILLIGRAM(S): 500 INJECTION, POWDER, FOR SOLUTION INTRAMUSCULAR; INTRAVENOUS at 01:59

## 2025-05-22 RX ADMIN — DAPTOMYCIN 118 MILLIGRAM(S): 500 INJECTION, POWDER, LYOPHILIZED, FOR SOLUTION INTRAVENOUS at 17:13

## 2025-05-22 RX ADMIN — OXYCODONE HYDROCHLORIDE 5 MILLIGRAM(S): 30 TABLET ORAL at 08:45

## 2025-05-22 RX ADMIN — Medication 1 APPLICATION(S): at 16:43

## 2025-05-22 RX ADMIN — TIOTROPIUM BROMIDE INHALATION SPRAY 2 PUFF(S): 3.12 SPRAY, METERED RESPIRATORY (INHALATION) at 07:30

## 2025-05-22 RX ADMIN — INSULIN LISPRO 3: 100 INJECTION, SOLUTION INTRAVENOUS; SUBCUTANEOUS at 08:38

## 2025-05-22 RX ADMIN — INSULIN LISPRO 7 UNIT(S): 100 INJECTION, SOLUTION INTRAVENOUS; SUBCUTANEOUS at 08:37

## 2025-05-22 NOTE — H&P ADULT - HISTORY OF PRESENT ILLNESS
74yoM PMH DM2, PAD, hypertension, COPD, CKD,  HFpEF, Hx of R foot ulcer with osteomyelitis [facility] and weight gain of about 30lbs over past 1 month.     Is supposed to be on lasix 20mg TID but only takes it BID.       IN THE ED:  Temp  98.2 F , HR 87 ,  /67  ,RR 18 , SpO2 92% RA   S/P x  EKG:  Labs significant for: WBAC 8.15, H/H 11.8/36.7, BUN/Cr 33/1.6, Trop neg x2, proBNP 634  Imaging: CT chest: Mild tree-in-bud nodularity in the left lower lobe, which may be infectious/inflammatory.       74yoM PMH DM2, PAD, hypertension, COPD, CKD,  HFpEF, Hx of R foot ulcer with osteomyelitis BIBEMS from [place] p/w exertional dyspnea and weight gain of about 30lbs over past 1 month. Patient states that for the last one week he has a productive cough along with SOB, BERGER, orthopnea. Patient reports 25-30lbs weight gain over the past month. Patient states that he is supposed to be taking 20mg lasix TID but only takes it BID (recently increased 1 month ago). Patient denies sick contacts. Denies fevers, chills, body aches, chest pain, palpitations, abdominal pain, n/v. Denies recent travel.     IN THE ED:  Temp  98.2 F , HR 87 ,  /67  ,RR 18 , SpO2 92% RA   S/P x  EKG:  Labs significant for: WBAC 8.15, H/H 11.8/36.7, BUN/Cr 33/1.6, Trop neg x2, proBNP 634  Imaging: CT chest: Mild tree-in-bud nodularity in the left lower lobe, which may be infectious/inflammatory.       74yoM PMH DM2, PAD, hypertension, COPD, CKD,  HFpEF, Hx of Rt  foot ulcer with osteomyelitis, rt lower EXT wound ,Tri geminal Neuralgia,  BIBEMS from [place] p/w exertional dyspnea and weight gain of about 30lbs over past 1 month. Patient states that for the last one week he has a productive cough along with SOB, BERGER, orthopnea. Patient reports 25-30lbs weight gain over the past month. Patient states that he is supposed to be taking 20mg lasix TID but only takes it BID (recently increased 1 month ago). Patient denies sick contacts. Denies fevers, chills, body aches, chest pain, palpitations, abdominal pain, n/v. Denies recent travel.     IN THE ED:  Temp  98.2 F , HR 87 ,  /67  ,RR 18 , SpO2 92% RA   S/P x  EKG:  Labs significant for: WBAC 8.15, H/H 11.8/36.7, BUN/Cr 33/1.6, Trop neg x2, proBNP 634  Imaging: CT chest: Mild tree-in-bud nodularity in the left lower lobe, which may be infectious/inflammatory.

## 2025-05-22 NOTE — CARE COORDINATION ASSESSMENT. - OTHER PERTINENT REFERRAL INFORMATION
CM met with patient  at bedside to explain role and transitions of care. Patient lives alone at [facility].  Patient gave permission to speak to Sister Lida [phone] or son Kirk [phone].  As per patient, no home care prior to admission.  He uses mostly a wheelchair, but he have a walker and a cane. Patient will need transportation to go back to [facility].  CM explained  home care expectation, process, insurance provision and home safety with good understanding. CM to make referral. Patient  verbalized understanding of plans after discharge and is in agreement.  Resource folder left at bedside.  All questions answered to the best of my abilities.  CM remains available throughout the hospital stay.

## 2025-05-22 NOTE — H&P ADULT - CARDIOVASCULAR
normal/regular rate and rhythm/S1 S2 present/no gallops/no rub/no murmur details… normal/regular rate and rhythm/S1 S2 present/no gallops/no rub/no murmur/pedal edema normal/regular rate and rhythm/S1 S2 present/no gallops/no rub/no murmur/no JVD/pedal edema/vascular

## 2025-05-22 NOTE — CONSULT NOTE ADULT - ATTENDING COMMENTS
75yo man with PMH of HTN, DM2, PAD, COPD, CKD, HFpEF, R foot ulcer with osteomyelitis admitted for CHF vs PNA. Patient also with hx of MRSA wound cultures in RLE with draining some purulent discharge but not cellulitic. C  Can continue ceftriaxone to cover for pneumonia and will add daptomycin for few days to cover for MRSA infection.   Will need contact isolation.
74yoM PMH HTN, HLD, DM2, PAD, COPD, CKD,  HFpEF, hx of R foot ulcer with osteomyelitis      - continue home statin  - Appears volume overloaded on exam  - Per patient home lasix recently increased to 20mg TID, however, only takes BID  - c/w IV lasix 40mg BID  - monitor for worsening renal function  - continue home BB  - Strict I/Os, daily weights.  - Monitor and replete lytes, keep K>4, Mg>2.

## 2025-05-22 NOTE — H&P ADULT - PROBLEM SELECTOR PLAN 5
Chronic  - Continue home pregabalin Chronic  - Continue home pregabalin and oxycodone PRN     #Anxiety and depression  - Continue home clonazepam .75mg BID PRN (primary team to obtain ISTOP) and sertraline Chronic  - Continue home pregabalin and oxycodone PRN (primary team to obtain ISTOP)    #Anxiety and depression  - Continue home clonazepam .75mg BID PRN (primary team to obtain ISTOP) and sertraline Chronic  - Continue home pregabalin and oxycodone PRN (primary team to obtain ISTOP)  Rt lower lateral leg open wound with drainage     #Anxiety and depression  - Continue home clonazepam .75mg BID PRN (primary team to obtain ISTOP) and sertraline

## 2025-05-22 NOTE — PROGRESS NOTE ADULT - PROBLEM SELECTOR PLAN 8
Chronic  -  /67 on admission  - Continue home Toprol XL  w/ hold parameters  Lasix 40 mg 2x day    #HLD  - Continue home statin

## 2025-05-22 NOTE — PROGRESS NOTE ADULT - PROBLEM SELECTOR PLAN 3
Patient with would on let foot   - Hx MRSA  - Placed on contact precautions  - Started on Dapto by ID

## 2025-05-22 NOTE — CONSULT NOTE ADULT - ASSESSMENT
74yoM PMH DM2, PAD, hypertension, COPD, CKD,  HFpEF, Hx of R foot ulcer with osteomyelitis BIBEMS from [place] p/w exertional dyspnea and weight gain of about 30lbs over past 1 month.    copd  chf  dyspnea  tree in bud  htn  ckd  gerd  op  oa    [facility] rx regimen  lasix  I and O  cardio eval and follow up  bronchodilator rx regimen and cough rx regimen and short course of systemic steroids  oral hygiene  skin care  ct chest reviewed  pt follows with Pulm in [place] - Dr Smith  I helena  monitor VS and HD and Sat  goal sat > 88 pct  BP control and HD monitoring  trend labs  replete lytes  anxiolysis  DVT p

## 2025-05-22 NOTE — CONSULT NOTE ADULT - SUBJECTIVE AND OBJECTIVE BOX
Patient is a 74y old  Male who presents with a chief complaint of CHF exacerbation, PNA (22 May 2025 13:50)    Type: DX year known complications Endocrine Last seen Rx home Hx DKA/HHS, Glucometer checks, needs, weight, diet, exercise  diabetes education provided  Hx ASCVD, CKD, HF    HPI:  74yoM PMH DM2, PAD, hypertension, COPD, CKD,  HFpEF, Hx of R foot ulcer with osteomyelitis BIBEMS from [place] p/w exertional dyspnea and weight gain of about 30lbs over past 1 month. Patient states that for the last one week he has a productive cough along with SOB, BERGER, orthopnea. Patient reports 25-30lbs weight gain over the past month. Patient states that he is supposed to be taking 20mg lasix TID but only takes it BID (recently increased 1 month ago). Patient denies sick contacts. Denies fevers, chills, body aches, chest pain, palpitations, abdominal pain, n/v. Denies recent travel.     IN THE ED:  Temp  98.2 F , HR 87 ,  /67  ,RR 18 , SpO2 92% RA   S/P x  EKG:  Labs significant for: WBAC 8.15, H/H 11.8/36.7, BUN/Cr 33/1.6, Trop neg x2, proBNP 634  Imaging: CT chest: Mild tree-in-bud nodularity in the left lower lobe, which may be infectious/inflammatory.       (22 May 2025 00:02)      PAST MEDICAL & SURGICAL HISTORY:  Prostate cancer      Type II diabetes mellitus      Chronic obstructive pulmonary disease (COPD)      CHF (congestive heart failure)      Renal insufficiency      H/O migraine      Insomnia      Constipation      S/P foot surgery          Allergies    IODINE (Unknown)  tetracycline (Unknown)  vancomycin (Other)    Intolerances        MEDICATIONS  (STANDING):  cefTRIAXone   IVPB 1000 milliGRAM(s) IV Intermittent every 24 hours  cetirizine 10 milliGRAM(s) Oral daily  chlorhexidine 2% Cloths 1 Application(s) Topical <User Schedule>  DAPTOmycin IVPB 450 milliGRAM(s) IV Intermittent every 24 hours  dextrose 5%. 1000 milliLiter(s) (50 mL/Hr) IV Continuous <Continuous>  dextrose 5%. 1000 milliLiter(s) (100 mL/Hr) IV Continuous <Continuous>  dextrose 50% Injectable 25 Gram(s) IV Push once  dextrose 50% Injectable 12.5 Gram(s) IV Push once  dextrose 50% Injectable 25 Gram(s) IV Push once  ferrous    sulfate 325 milliGRAM(s) Oral daily  fluticasone propionate/ salmeterol 250-50 MICROgram(s) Diskus 1 Dose(s) Inhalation two times a day  furosemide   Injectable 40 milliGRAM(s) IV Push two times a day  glucagon  Injectable 1 milliGRAM(s) IntraMuscular once  heparin   Injectable 5000 Unit(s) SubCutaneous every 8 hours  insulin glargine Injectable (LANTUS) 16 Unit(s) SubCutaneous at bedtime  insulin lispro (ADMELOG) corrective regimen sliding scale   SubCutaneous three times a day before meals  insulin lispro (ADMELOG) corrective regimen sliding scale   SubCutaneous at bedtime  insulin lispro Injectable (ADMELOG) 7 Unit(s) SubCutaneous three times a day before meals  metoprolol succinate ER 25 milliGRAM(s) Oral daily  montelukast 10 milliGRAM(s) Oral daily  pantoprazole    Tablet 40 milliGRAM(s) Oral before breakfast  polyethylene glycol 3350 17 Gram(s) Oral daily  predniSONE   Tablet 10 milliGRAM(s) Oral daily  pregabalin 150 milliGRAM(s) Oral two times a day  rosuvastatin 40 milliGRAM(s) Oral at bedtime  senna 2 Tablet(s) Oral at bedtime  sertraline 100 milliGRAM(s) Oral daily  tiotropium 2.5 MICROgram(s) Inhaler 2 Puff(s) Inhalation daily       Patient is a 74y old  Male who presents with a chief complaint of CHF exacerbation, PNA (22 May 2025 13:50)    Type:2 DX >10 years. a1c 10%  known complications: neuropathy. No Endocrine/ PCP- [facility] at [facility]- patient would like to see endocrinologist.  all meds/meter completed by facility- all meals prepared by facility. Glargine 24 untis HS; Humalog 10 units AC x3.     HPI:  74yoM PMH DM2, PAD, hypertension, COPD, CKD,  HFpEF, Hx of R foot ulcer with osteomyelitis BIBEMS from [place] p/w exertional dyspnea and weight gain of about 30lbs over past 1 month. Patient states that for the last one week he has a productive cough along with SOB, BERGER, orthopnea. Patient reports 25-30lbs weight gain over the past month. Patient states that he is supposed to be taking 20mg lasix TID but only takes it BID (recently increased 1 month ago). Patient denies sick contacts. Denies fevers, chills, body aches, chest pain, palpitations, abdominal pain, n/v. Denies recent travel.     IN THE ED:  Temp  98.2 F , HR 87 ,  /67  ,RR 18 , SpO2 92% RA   S/P x  EKG:  Labs significant for: WBAC 8.15, H/H 11.8/36.7, BUN/Cr 33/1.6, Trop neg x2, proBNP 634  Imaging: CT chest: Mild tree-in-bud nodularity in the left lower lobe, which may be infectious/inflammatory.       (22 May 2025 00:02)      PAST MEDICAL & SURGICAL HISTORY:  Prostate cancer      Type II diabetes mellitus      Chronic obstructive pulmonary disease (COPD)      CHF (congestive heart failure)      Renal insufficiency      H/O migraine      Insomnia      Constipation      S/P foot surgery          Allergies    IODINE (Unknown)  tetracycline (Unknown)  vancomycin (Other)    Intolerances        MEDICATIONS  (STANDING):  cefTRIAXone   IVPB 1000 milliGRAM(s) IV Intermittent every 24 hours  cetirizine 10 milliGRAM(s) Oral daily  chlorhexidine 2% Cloths 1 Application(s) Topical <User Schedule>  DAPTOmycin IVPB 450 milliGRAM(s) IV Intermittent every 24 hours  dextrose 5%. 1000 milliLiter(s) (50 mL/Hr) IV Continuous <Continuous>  dextrose 5%. 1000 milliLiter(s) (100 mL/Hr) IV Continuous <Continuous>  dextrose 50% Injectable 25 Gram(s) IV Push once  dextrose 50% Injectable 12.5 Gram(s) IV Push once  dextrose 50% Injectable 25 Gram(s) IV Push once  ferrous    sulfate 325 milliGRAM(s) Oral daily  fluticasone propionate/ salmeterol 250-50 MICROgram(s) Diskus 1 Dose(s) Inhalation two times a day  furosemide   Injectable 40 milliGRAM(s) IV Push two times a day  glucagon  Injectable 1 milliGRAM(s) IntraMuscular once  heparin   Injectable 5000 Unit(s) SubCutaneous every 8 hours  insulin glargine Injectable (LANTUS) 16 Unit(s) SubCutaneous at bedtime  insulin lispro (ADMELOG) corrective regimen sliding scale   SubCutaneous three times a day before meals  insulin lispro (ADMELOG) corrective regimen sliding scale   SubCutaneous at bedtime  insulin lispro Injectable (ADMELOG) 7 Unit(s) SubCutaneous three times a day before meals  metoprolol succinate ER 25 milliGRAM(s) Oral daily  montelukast 10 milliGRAM(s) Oral daily  pantoprazole    Tablet 40 milliGRAM(s) Oral before breakfast  polyethylene glycol 3350 17 Gram(s) Oral daily  predniSONE   Tablet 10 milliGRAM(s) Oral daily  pregabalin 150 milliGRAM(s) Oral two times a day  rosuvastatin 40 milliGRAM(s) Oral at bedtime  senna 2 Tablet(s) Oral at bedtime  sertraline 100 milliGRAM(s) Oral daily  tiotropium 2.5 MICROgram(s) Inhaler 2 Puff(s) Inhalation daily

## 2025-05-22 NOTE — CARE COORDINATION ASSESSMENT. - NSCAREPROVIDERS_GEN_ALL_CORE_FT
CARE PROVIDERS:  Accepting Physician: Mart Tripp  Access Services: Saida Ruiz  Administration: David Jay  Administration: sEtephanie Stearns  Admitting: Mart Tripp  Attending: Mart Tripp  Case Management: Bettye Vila  Consultant: Frieda Denton  Consultant: Federico English  Consultant: Chencho Baltazar  Consultant: Juan Marie  Consultant: Weil, Patricia  Consultant: Sammy Chisholm  Consultant: Ahmet Armstrong  Consultant: Alcon Leonardo  Consultant: Radha Nguyen  Covering Team: Kimberly He  ED Attending: Faustino Giraldo  ED Nurse: Naz Holguin  Infection Control: Griffin De Los Santos  Nurse: Vijay Ugalde  Nurse: Bindu Pérez  Nurse: Helder Alexander  Nurse: Megan Smith  Nurse: Sarah Kumari  Nurse: Randee Velásquez  Nurse: Reyes Prieto  Override: Radha Naranjo  PCA/Nursing Assistant: Elo Medina  Primary Team: Zheng Lehman  Primary Team: Mae Petersen  Research: Chasidy Zhou  Respiratory Therapy: Jackson Ahumada  Team: [facility], Team

## 2025-05-22 NOTE — H&P ADULT - PROBLEM SELECTOR PLAN 3
REANNA likely 2/2 cardiorenal syndrome   - Baseline creatinine 1.1  - BUN/Cr 33/1.6  - Lasix as above ^   - Monitor BMP  - Avoid nephrotoxic medications as able REANNA -pre renal   - Baseline creatinine 1.1  - BUN/Cr 33/1.6  - Lasix as above 40 mg q 12 hrs   - Monitor BMP  - Avoid nephrotoxic medications as able

## 2025-05-22 NOTE — PROGRESS NOTE ADULT - SUBJECTIVE AND OBJECTIVE BOX
Patient is a 74y old  Male who presents with a chief complaint of CHF exacerbation, PNA (22 May 2025 16:16)    HPI:  74yoM PMH DM2, PAD, hypertension, COPD, CKD,  HFpEF, Hx of Rt  foot ulcer with osteomyelitis, rt lower EXT wound ,Tri geminal Neuralgia,  BIBEMS from [facility] p/w exertional dyspnea and weight gain of about 30lbs over past 1 month. Patient states that for the last one week he has a productive cough along with SOB, BERGER, orthopnea. Patient reports 25-30lbs weight gain over the past month. Patient states that he is supposed to be taking 20mg lasix TID but only takes it BID (recently increased 1 month ago). Patient denies sick contacts. Denies fevers, chills, body aches, chest pain, palpitations, abdominal pain, n/v. Denies recent travel.     IN THE ED:  Temp  98.2 F , HR 87 ,  /67  ,RR 18 , SpO2 92% RA   S/P x  EKG:  Labs significant for: WBAC 8.15, H/H 11.8/36.7, BUN/Cr 33/1.6, Trop neg x2, proBNP 634  Imaging: CT chest: Mild tree-in-bud nodularity in the left lower lobe, which may be infectious/inflammatory.       (22 May 2025 00:02)    INTERVAL HPI:  5/22 Patient seen and examined at bedside. Denies complaints states berathing is improved. States he takes prednisone 10 mg at home for trigeminal neuralgia     OVERNIGHT EVENTS:    Home Medications:  albuterol 0.63 mg/3 mL (0.021%) inhalation solution: 3 milliliter(s) by nebulizer 3 times a day as needed for (22 May 2025 01:14)  clonazePAM 0.25 mg oral tablet, disintegrating: 3 tab(s) orally 2 times a day (22 May 2025 01:14)  ferrous sulfate 325 mg (65 mg elemental iron) oral tablet: 1 tab(s) orally once a day (22 May 2025 01:14)  furosemide 40 mg oral tablet: 1.5 tab(s) orally once a day (22 May 2025 01:14)  HumaLOG 100 units/mL injectable solution: 10 unit(s) injectable 3 times a day (with meals) ***sliding scale*** (22 May 2025 01:14)  insulin glargine 100 units/mL subcutaneous solution: 24 unit(s) subcutaneous once a day (at bedtime) (22 May 2025 01:14)  loratadine 10 mg oral tablet: 1 tab(s) orally once a day (in the morning) (22 May 2025 01:14)  melatonin 3 mg oral tablet: 1 tab(s) orally once a day (at bedtime) As needed Insomnia (22 May 2025 01:14)  metoprolol succinate 25 mg oral tablet, extended release: 1 tab(s) orally once a day (22 May 2025 01:14)  montelukast 10 mg oral tablet: 1 tab(s) orally once a day (22 May 2025 01:14)  Multiple Vitamins with Minerals oral tablet: 1 tab(s) orally once a day (22 May 2025 01:14)  oxyCODONE 10 mg oral tablet, extended release: 1 tab(s) orally every 12 hours (22 May 2025 01:14)  pantoprazole 40 mg oral delayed release tablet: 1 tab(s) orally once a day (before a meal) (22 May 2025 01:14)  polyethylene glycol 3350 oral powder for reconstitution: 17 gram(s) orally once a day (22 May 2025 01:14)  pregabalin 150 mg oral capsule: 1 cap(s) orally 2 times a day (22 May 2025 01:14)  rosuvastatin 40 mg oral tablet: 1 tab(s) orally once a day (22 May 2025 01:14)  sertraline 100 mg oral tablet: 1 tab(s) orally once a day (22 May 2025 01:14)  Spiriva 18 mcg inhalation capsule: 1 cap(s) inhaled once a day (22 May 2025 01:14)      MEDICATIONS  (STANDING):  cefTRIAXone   IVPB 1000 milliGRAM(s) IV Intermittent every 24 hours  cetirizine 10 milliGRAM(s) Oral daily  chlorhexidine 2% Cloths 1 Application(s) Topical <User Schedule>  DAPTOmycin IVPB 450 milliGRAM(s) IV Intermittent every 24 hours  dextrose 5%. 1000 milliLiter(s) (50 mL/Hr) IV Continuous <Continuous>  dextrose 5%. 1000 milliLiter(s) (100 mL/Hr) IV Continuous <Continuous>  dextrose 50% Injectable 25 Gram(s) IV Push once  dextrose 50% Injectable 12.5 Gram(s) IV Push once  dextrose 50% Injectable 25 Gram(s) IV Push once  ferrous    sulfate 325 milliGRAM(s) Oral daily  fluticasone propionate/ salmeterol 250-50 MICROgram(s) Diskus 1 Dose(s) Inhalation two times a day  furosemide   Injectable 40 milliGRAM(s) IV Push two times a day  glucagon  Injectable 1 milliGRAM(s) IntraMuscular once  heparin   Injectable 5000 Unit(s) SubCutaneous every 8 hours  insulin glargine Injectable (LANTUS) 24 Unit(s) SubCutaneous at bedtime  insulin lispro (ADMELOG) corrective regimen sliding scale   SubCutaneous three times a day before meals  insulin lispro (ADMELOG) corrective regimen sliding scale   SubCutaneous at bedtime  insulin lispro Injectable (ADMELOG) 10 Unit(s) SubCutaneous three times a day before meals  metoprolol succinate ER 25 milliGRAM(s) Oral daily  montelukast 10 milliGRAM(s) Oral daily  pantoprazole    Tablet 40 milliGRAM(s) Oral before breakfast  polyethylene glycol 3350 17 Gram(s) Oral daily  predniSONE   Tablet 10 milliGRAM(s) Oral daily  pregabalin 150 milliGRAM(s) Oral two times a day  rosuvastatin 40 milliGRAM(s) Oral at bedtime  senna 2 Tablet(s) Oral at bedtime  sertraline 100 milliGRAM(s) Oral daily  tiotropium 2.5 MICROgram(s) Inhaler 2 Puff(s) Inhalation daily    MEDICATIONS  (PRN):  acetaminophen     Tablet .. 650 milliGRAM(s) Oral every 6 hours PRN Temp greater or equal to 38C (100.4F), Mild Pain (1 - 3)  albuterol/ipratropium for Nebulization 3 milliLiter(s) Nebulizer every 6 hours PRN Shortness of Breath and/or Wheezing  benzonatate 100 milliGRAM(s) Oral three times a day PRN Cough  clonazePAM Oral Disintegrating Tablet 0.75 milliGRAM(s) Oral two times a day PRN anxiety  dextrose Oral Gel 15 Gram(s) Oral once PRN Blood Glucose LESS THAN 70 milliGRAM(s)/deciliter  melatonin 3 milliGRAM(s) Oral at bedtime PRN Insomnia  oxyCODONE    IR 2.5 milliGRAM(s) Oral every 4 hours PRN Moderate Pain (4 - 6)  oxyCODONE    IR 5 milliGRAM(s) Oral every 6 hours PRN Severe Pain (7 - 10)      Allergies    IODINE (Unknown)  tetracycline (Unknown)  vancomycin (Other)    Intolerances        Social History:  No drug use. Drinks 1 glass of wine with dinner. Former smoker quit in 1993; 20 pack year hx.  lives at [facility] (22 May 2025 00:02)      REVIEW OF SYSTEMS:  CONSTITUTIONAL: No fever, No chills, No fatigue, No myalgia, No Body ache, No Weakness  EYES: No eye pain,  No visual disturbances, No discharge, NO Redness  ENMT:  No ear pain, No nose bleed, No vertigo; No sinus pain, NO throat pain, No Congestion  NECK: No pain, No stiffness  RESPIRATORY: No cough, NO wheezing, No  hemoptysis, NO  shortness of breath  CARDIOVASCULAR: No chest pain, palpitations  GASTROINTESTINAL: No abdominal pain, NO epigastric pain. No nausea, No vomiting; No diarrhea, No constipation. [  ] BM  GENITOURINARY: No dysuria, No frequency, No urgency, No hematuria, NO incontinence  NEUROLOGICAL: No headaches, No dizziness, No numbness, No tingling, No tremors, No weakness  EXT: No Swelling, No Pain, No Edema  SKIN:  [ x ] Bursitis on right elbow    MUSCULOSKELETAL: No joint pain ,No Jt swelling; No muscle pain, No back pain, No extremity pain  PSYCHIATRIC: No depression,  No anxiety,  No mood swings ,No difficulty sleeping at night  PAIN SCALE: [  ] None  [  ] Other-  ROS Unable to obtain due to - [  ] Dementia  [  ] Lethargy [  ] Drowsy [  ] Sedated [  ] non verbal  REST OF REVIEW Of SYSTEM - [  ] Normal     Vital Signs Last 24 Hrs  T(C): 36.7 (22 May 2025 11:26), Max: 36.8 (21 May 2025 20:10)  T(F): 98 (22 May 2025 11:26), Max: 98.3 (22 May 2025 01:57)  HR: 82 (22 May 2025 16:30) (72 - 87)  BP: 108/65 (22 May 2025 11:26) (107/67 - 144/81)  BP(mean): --  RR: 18 (22 May 2025 11:26) (16 - 18)  SpO2: 93% (22 May 2025 16:30) (92% - 95%)    Parameters below as of 22 May 2025 16:30  Patient On (Oxygen Delivery Method): room air      Finger Stick        05-21 @ 07:01 - 05-22 @ 07:00  --------------------------------------------------------  IN: 0 mL / OUT: 1500 mL / NET: -1500 mL    05-22 @ 07:01 - 05-22 @ 16:48  --------------------------------------------------------  IN: 0 mL / OUT: 2000 mL / NET: -2000 mL        PHYSICAL EXAM:  GENERAL:  [x ] NAD , [  ] well appearing, [  ] Agitated, [  ] Drowsy,  [  ] Lethargy, [  ] confused   HEAD:  [  ] Normal, [  ] Other  EYES:  [  ] EOMI, [  ] PERRLA, [  ] conjunctiva and sclera clear normal, [  ] Other,  [  ] Pallor,[  ] Discharge  ENMT:  [x ] Normal, [  ] Moist mucous membranes, [  ] Good dentition, [  ] No Thrush  NECK:  [ x ] Supple, [  ] No JVD, [  ] Normal thyroid, [  ] Lymphadenopathy [  ] Other  CHEST/LUNG:  [x  ] Left sided crackles [  ] Breath Sounds equal B/L / Decrease, [  ] poor effort  [  ] No rales, [  ] No rhonchi  [  ]  No wheezing,   HEART:  [ x ] Regular rate and rhythm, [  ] tachycardia, [  ] Bradycardia,  [  ] irregular  [  ] No murmurs, No rubs, No gallops, [  ] PPM in place (Mfr:  )  ABDOMEN:  [x ] Soft, [x  ] Nontender, [ x ] Nondistended, [  ] No mass, [  ] Bowel sounds present, [  ] obese  NERVOUS SYSTEM:  [  ] Alert & Oriented X3, [  ] Nonfocal  [  ] Confusion  [  ] Encephalopathic [  ] Sedated [  ] Unable to assess, [  ] Dementia [  ] Other-  EXTREMITIES: [  ] 2+ Peripheral Pulses, No clubbing, No cyanosis,  [  ] edema B/L lower EXT. [  ] PVD stasis skin changes B/L Lower EXT, [  ] wound  LYMPH: No lymphadenopathy noted  SKIN:  [  ] No rashes or lesions, [  ] Pressure Ulcers, [  ] ecchymosis, [  ] Skin Tears, [  ] Other    DIET: Diet, DASH/TLC:   Sodium & Cholesterol Restricted  Consistent Carbohydrate Evening Snack  1200mL Fluid Restriction ([id number]) (05-22-25 @ 13:50)      LABS:                        12.4   10.38 )-----------( 158      ( 22 May 2025 08:05 )             38.2     22 May 2025 08:05    137    |  97     |  35     ----------------------------<  222    3.3     |  28     |  1.40     Ca    9.1        22 May 2025 08:05    TPro  7.5    /  Alb  2.9    /  TBili  0.4    /  DBili  x      /  AST  17     /  ALT  33     /  AlkPhos  72     22 May 2025 08:05    PT/INR - ( 21 May 2025 21:10 )   PT: 12.6 sec;   INR: 1.08 ratio         PTT - ( 21 May 2025 21:10 )  PTT:28.6 sec  Urinalysis Basic - ( 22 May 2025 08:05 )    Color: x / Appearance: x / SG: x / pH: x  Gluc: 222 mg/dL / Ketone: x  / Bili: x / Urobili: x   Blood: x / Protein: x / Nitrite: x   Leuk Esterase: x / RBC: x / WBC x   Sq Epi: x / Non Sq Epi: x / Bacteria: x                        Urinalysis with Rflx Culture (collected 22 May 2025 03:50)         Anemia Panel:  Iron Total: 54 ug/dL (05-22-25 @ 08:05)  Iron - Total Binding Capacity.: 263 ug/dL (05-22-25 @ 08:05)  Ferritin: 215 ng/mL (05-22-25 @ 08:05)  Vitamin B12, Serum: 477 pg/mL (05-22-25 @ 08:05)  Folate, Serum: 17.2 ng/mL (05-22-25 @ 08:05)      Thyroid Panel:  Thyroid Stimulating Hormone, Serum: 1.10 uIU/mL (05-22-25 @ 08:05)                RADIOLOGY & ADDITIONAL TESTS:      HEALTH ISSUES - PROBLEM Dx:  CHF exacerbation    Pneumonia    HTN (hypertension)    T2DM (type 2 diabetes mellitus)    Need for prophylactic measure    REANNA (acute kidney injury)    History of COPD    History of foot ulcer            Consultant(s) Notes Reviewed:  [  ] YES     Care Discussed with [X] Consultants  [  ] Patient  [  ] Family [  ] HCP [  ]   [  ] Social Service  [  ] RN, [  ] Physical Therapy,[  ] Palliative care team  DVT PPX: [  ] Lovenox, [  ] S C Heparin, [  ] Coumadin, [  ] Xarelto, [  ] Eliquis, [  ] Pradaxa, [  ] IV Heparin drip, [  ] SCD [  ] Contraindication 2 to GI Bleed,[  ] Ambulation [  ] Contraindicated 2 to  bleed [  ] Contraindicated 2 to Brain Bleed  Advanced directive: [  ] None, [  ] DNR/DNI Patient is a 74y old  Male who presents with a chief complaint of CHF exacerbation, PNA (22 May 2025 16:16)    HPI:  74yoM PMH DM2, PAD, hypertension, COPD, CKD,  HFpEF, Hx of Rt  foot ulcer with osteomyelitis, rt lower EXT wound ,Tri geminal Neuralgia,  BIBEMS from [facility] p/w exertional dyspnea and weight gain of about 30lbs over past 1 month. Patient states that for the last one week he has a productive cough along with SOB, BERGER, orthopnea. Patient reports 25-30lbs weight gain over the past month. Patient states that he is supposed to be taking 20mg lasix TID but only takes it BID (recently increased 1 month ago). Patient denies sick contacts. Denies fevers, chills, body aches, chest pain, palpitations, abdominal pain, n/v. Denies recent travel.     IN THE ED:  Temp  98.2 F , HR 87 ,  /67  ,RR 18 , SpO2 92% RA   S/P x  EKG:  Labs significant for: WBAC 8.15, H/H 11.8/36.7, BUN/Cr 33/1.6, Trop neg x2, proBNP 634  Imaging: CT chest: Mild tree-in-bud nodularity in the left lower lobe, which may be infectious/inflammatory.       (22 May 2025 00:02)    INTERVAL HPI:  5/22 Patient seen and examined at bedside. Denies complaints states berathing is improved. States he takes prednisone 10 mg at home for temporal arteritis as per Neurology    OVERNIGHT EVENTS:    Home Medications:  albuterol 0.63 mg/3 mL (0.021%) inhalation solution: 3 milliliter(s) by nebulizer 3 times a day as needed for (22 May 2025 01:14)  clonazePAM 0.25 mg oral tablet, disintegrating: 3 tab(s) orally 2 times a day (22 May 2025 01:14)  ferrous sulfate 325 mg (65 mg elemental iron) oral tablet: 1 tab(s) orally once a day (22 May 2025 01:14)  furosemide 40 mg oral tablet: 1.5 tab(s) orally once a day (22 May 2025 01:14)  HumaLOG 100 units/mL injectable solution: 10 unit(s) injectable 3 times a day (with meals) ***sliding scale*** (22 May 2025 01:14)  insulin glargine 100 units/mL subcutaneous solution: 24 unit(s) subcutaneous once a day (at bedtime) (22 May 2025 01:14)  loratadine 10 mg oral tablet: 1 tab(s) orally once a day (in the morning) (22 May 2025 01:14)  melatonin 3 mg oral tablet: 1 tab(s) orally once a day (at bedtime) As needed Insomnia (22 May 2025 01:14)  metoprolol succinate 25 mg oral tablet, extended release: 1 tab(s) orally once a day (22 May 2025 01:14)  montelukast 10 mg oral tablet: 1 tab(s) orally once a day (22 May 2025 01:14)  Multiple Vitamins with Minerals oral tablet: 1 tab(s) orally once a day (22 May 2025 01:14)  oxyCODONE 10 mg oral tablet, extended release: 1 tab(s) orally every 12 hours (22 May 2025 01:14)  pantoprazole 40 mg oral delayed release tablet: 1 tab(s) orally once a day (before a meal) (22 May 2025 01:14)  polyethylene glycol 3350 oral powder for reconstitution: 17 gram(s) orally once a day (22 May 2025 01:14)  pregabalin 150 mg oral capsule: 1 cap(s) orally 2 times a day (22 May 2025 01:14)  rosuvastatin 40 mg oral tablet: 1 tab(s) orally once a day (22 May 2025 01:14)  sertraline 100 mg oral tablet: 1 tab(s) orally once a day (22 May 2025 01:14)  Spiriva 18 mcg inhalation capsule: 1 cap(s) inhaled once a day (22 May 2025 01:14)      MEDICATIONS  (STANDING):  cefTRIAXone   IVPB 1000 milliGRAM(s) IV Intermittent every 24 hours  cetirizine 10 milliGRAM(s) Oral daily  chlorhexidine 2% Cloths 1 Application(s) Topical <User Schedule>  DAPTOmycin IVPB 450 milliGRAM(s) IV Intermittent every 24 hours  dextrose 5%. 1000 milliLiter(s) (50 mL/Hr) IV Continuous <Continuous>  dextrose 5%. 1000 milliLiter(s) (100 mL/Hr) IV Continuous <Continuous>  dextrose 50% Injectable 25 Gram(s) IV Push once  dextrose 50% Injectable 12.5 Gram(s) IV Push once  dextrose 50% Injectable 25 Gram(s) IV Push once  ferrous    sulfate 325 milliGRAM(s) Oral daily  fluticasone propionate/ salmeterol 250-50 MICROgram(s) Diskus 1 Dose(s) Inhalation two times a day  furosemide   Injectable 40 milliGRAM(s) IV Push two times a day  glucagon  Injectable 1 milliGRAM(s) IntraMuscular once  heparin   Injectable 5000 Unit(s) SubCutaneous every 8 hours  insulin glargine Injectable (LANTUS) 24 Unit(s) SubCutaneous at bedtime  insulin lispro (ADMELOG) corrective regimen sliding scale   SubCutaneous three times a day before meals  insulin lispro (ADMELOG) corrective regimen sliding scale   SubCutaneous at bedtime  insulin lispro Injectable (ADMELOG) 10 Unit(s) SubCutaneous three times a day before meals  metoprolol succinate ER 25 milliGRAM(s) Oral daily  montelukast 10 milliGRAM(s) Oral daily  pantoprazole    Tablet 40 milliGRAM(s) Oral before breakfast  polyethylene glycol 3350 17 Gram(s) Oral daily  predniSONE   Tablet 10 milliGRAM(s) Oral daily  pregabalin 150 milliGRAM(s) Oral two times a day  rosuvastatin 40 milliGRAM(s) Oral at bedtime  senna 2 Tablet(s) Oral at bedtime  sertraline 100 milliGRAM(s) Oral daily  tiotropium 2.5 MICROgram(s) Inhaler 2 Puff(s) Inhalation daily    MEDICATIONS  (PRN):  acetaminophen     Tablet .. 650 milliGRAM(s) Oral every 6 hours PRN Temp greater or equal to 38C (100.4F), Mild Pain (1 - 3)  albuterol/ipratropium for Nebulization 3 milliLiter(s) Nebulizer every 6 hours PRN Shortness of Breath and/or Wheezing  benzonatate 100 milliGRAM(s) Oral three times a day PRN Cough  clonazePAM Oral Disintegrating Tablet 0.75 milliGRAM(s) Oral two times a day PRN anxiety  dextrose Oral Gel 15 Gram(s) Oral once PRN Blood Glucose LESS THAN 70 milliGRAM(s)/deciliter  melatonin 3 milliGRAM(s) Oral at bedtime PRN Insomnia  oxyCODONE    IR 2.5 milliGRAM(s) Oral every 4 hours PRN Moderate Pain (4 - 6)  oxyCODONE    IR 5 milliGRAM(s) Oral every 6 hours PRN Severe Pain (7 - 10)      Allergies    IODINE (Unknown)  tetracycline (Unknown)  vancomycin (Other)    Intolerances        Social History:  No drug use. Drinks 1 glass of wine with dinner. Former smoker quit in 1993; 20 pack year hx.  l[facility] at [facility] (22 May 2025 00:02)      REVIEW OF SYSTEMS:  CONSTITUTIONAL: No fever, No chills, No fatigue, No myalgia, No Body ache, No Weakness  EYES: No eye pain,  No visual disturbances, No discharge, NO Redness  ENMT:  No ear pain, No nose bleed, No vertigo; No sinus pain, NO throat pain, No Congestion  NECK: No pain, No stiffness  RESPIRATORY: No cough, NO wheezing, No  hemoptysis, NO  shortness of breath  CARDIOVASCULAR: No chest pain, palpitations  GASTROINTESTINAL: No abdominal pain, NO epigastric pain. No nausea, No vomiting; No diarrhea, No constipation. [  ] BM  GENITOURINARY: No dysuria, No frequency, No urgency, No hematuria, NO incontinence  NEUROLOGICAL: No headaches, No dizziness, No numbness, No tingling, No tremors, No weakness  EXT: No Swelling, No Pain, No Edema  SKIN:  [ x ] Bursitis on right elbow    MUSCULOSKELETAL: No joint pain ,No Jt swelling; No muscle pain, No back pain, No extremity pain  PSYCHIATRIC: No depression,  No anxiety,  No mood swings ,No difficulty sleeping at night  PAIN SCALE: [  ] None  [  ] Other-  ROS Unable to obtain due to - [  ] Dementia  [  ] Lethargy [  ] Drowsy [  ] Sedated [  ] non verbal  REST OF REVIEW Of SYSTEM - [  ] Normal     Vital Signs Last 24 Hrs  T(C): 36.7 (22 May 2025 11:26), Max: 36.8 (21 May 2025 20:10)  T(F): 98 (22 May 2025 11:26), Max: 98.3 (22 May 2025 01:57)  HR: 82 (22 May 2025 16:30) (72 - 87)  BP: 108/65 (22 May 2025 11:26) (107/67 - 144/81)  BP(mean): --  RR: 18 (22 May 2025 11:26) (16 - 18)  SpO2: 93% (22 May 2025 16:30) (92% - 95%)    Parameters below as of 22 May 2025 16:30  Patient On (Oxygen Delivery Method): room air      Finger Stick        05-21 @ 07:01  -  05-22 @ 07:00  --------------------------------------------------------  IN: 0 mL / OUT: 1500 mL / NET: -1500 mL    05-22 @ 07:01  -  05-22 @ 16:48  --------------------------------------------------------  IN: 0 mL / OUT: 2000 mL / NET: -2000 mL        PHYSICAL EXAM:  GENERAL:  [x ] NAD , [  ] well appearing, [  ] Agitated, [  ] Drowsy,  [  ] Lethargy, [  ] confused   HEAD:  [  ] Normal, [  ] Other  EYES:  [  ] EOMI, [  ] PERRLA, [  ] conjunctiva and sclera clear normal, [  ] Other,  [  ] Pallor,[  ] Discharge  ENMT:  [x ] Normal, [  ] Moist mucous membranes, [  ] Good dentition, [  ] No Thrush  NECK:  [ x ] Supple, [  ] No JVD, [  ] Normal thyroid, [  ] Lymphadenopathy [  ] Other  CHEST/LUNG:  [x  ] Left sided crackles [  ] Breath Sounds equal B/L / Decrease, [  ] poor effort  [  ] No rales, [  ] No rhonchi  [  ]  No wheezing,   HEART:  [ x ] Regular rate and rhythm, [  ] tachycardia, [  ] Bradycardia,  [  ] irregular  [  ] No murmurs, No rubs, No gallops, [  ] PPM in place (Mfr:  )  ABDOMEN:  [x ] Soft, [x  ] Nontender, [ x ] Nondistended, [  ] No mass, [  ] Bowel sounds present, [  ] obese  NERVOUS SYSTEM:  [  ] Alert & Oriented X3, [  ] Nonfocal  [  ] Confusion  [  ] Encephalopathic [  ] Sedated [  ] Unable to assess, [  ] Dementia [  ] Other-  EXTREMITIES: [  ] 2+ Peripheral Pulses, No clubbing, No cyanosis,  [  ] edema B/L lower EXT. [  ] PVD stasis skin changes B/L Lower EXT, [  ] wound  LYMPH: No lymphadenopathy noted  SKIN:  [  ] No rashes or lesions, [  ] Pressure Ulcers, [  ] ecchymosis, [  ] Skin Tears, [  ] Other    DIET: Diet, DASH/TLC:   Sodium & Cholesterol Restricted  Consistent Carbohydrate Evening Snack  1200mL Fluid Restriction ([id number]) (05-22-25 @ 13:50)      LABS:                        12.4   10.38 )-----------( 158      ( 22 May 2025 08:05 )             38.2     22 May 2025 08:05    137    |  97     |  35     ----------------------------<  222    3.3     |  28     |  1.40     Ca    9.1        22 May 2025 08:05    TPro  7.5    /  Alb  2.9    /  TBili  0.4    /  DBili  x      /  AST  17     /  ALT  33     /  AlkPhos  72     22 May 2025 08:05    PT/INR - ( 21 May 2025 21:10 )   PT: 12.6 sec;   INR: 1.08 ratio         PTT - ( 21 May 2025 21:10 )  PTT:28.6 sec  Urinalysis Basic - ( 22 May 2025 08:05 )    Color: x / Appearance: x / SG: x / pH: x  Gluc: 222 mg/dL / Ketone: x  / Bili: x / Urobili: x   Blood: x / Protein: x / Nitrite: x   Leuk Esterase: x / RBC: x / WBC x   Sq Epi: x / Non Sq Epi: x / Bacteria: x                        Urinalysis with Rflx Culture (collected 22 May 2025 03:50)         Anemia Panel:  Iron Total: 54 ug/dL (05-22-25 @ 08:05)  Iron - Total Binding Capacity.: 263 ug/dL (05-22-25 @ 08:05)  Ferritin: 215 ng/mL (05-22-25 @ 08:05)  Vitamin B12, Serum: 477 pg/mL (05-22-25 @ 08:05)  Folate, Serum: 17.2 ng/mL (05-22-25 @ 08:05)      Thyroid Panel:  Thyroid Stimulating Hormone, Serum: 1.10 uIU/mL (05-22-25 @ 08:05)                RADIOLOGY & ADDITIONAL TESTS:      HEALTH ISSUES - PROBLEM Dx:  CHF exacerbation    Pneumonia    HTN (hypertension)    T2DM (type 2 diabetes mellitus)    Need for prophylactic measure    REANNA (acute kidney injury)    History of COPD    History of foot ulcer            Consultant(s) Notes Reviewed:  [  ] YES     Care Discussed with [X] Consultants  [  ] Patient  [  ] Family [  ] HCP [  ]   [  ] Social Service  [  ] RN, [  ] Physical Therapy,[  ] Palliative care team  DVT PPX: [  ] Lovenox, [  ] S C Heparin, [  ] Coumadin, [  ] Xarelto, [  ] Eliquis, [  ] Pradaxa, [  ] IV Heparin drip, [  ] SCD [  ] Contraindication 2 to GI Bleed,[  ] Ambulation [  ] Contraindicated 2 to  bleed [  ] Contraindicated 2 to Brain Bleed  Advanced directive: [  ] None, [  ] DNR/DNI

## 2025-05-22 NOTE — CONSULT NOTE ADULT - SUBJECTIVE AND OBJECTIVE BOX
[facility]    Amador Suarez, Aj, Lesli, Mykel, Nancy, aSe       [phone] (office)    Reason for Consult:    Interval HPI: Patient seen and examined at bedside. No acute events overnight.     HPI:  74yoM PMH HTN, HLD, DM2, PAD, COPD, CKD,  HFpEF, hx of R foot ulcer with osteomyelitis BIBEMS from [facility] p/w exertional dyspnea and weight gain of about 30lbs over past 1 month. Patient states that for the last one week he has a productive cough along with SOB, BERGER, orthopnea. Patient reports 25-30lbs weight gain over the past month. Patient states that he is supposed to be taking 20mg lasix TID but only takes it BID (recently increased 1 month ago). Patient denies sick contacts. Denies fevers, chills, body aches, chest pain, palpitations, abdominal pain, n/v. Denies recent travel.     IN THE ED:  Temp  98.2 F , HR 87 ,  /67  ,RR 18 , SpO2 92% RA   S/P x  EKG:  Labs significant for: WBAC 8.15, H/H 11.8/36.7, BUN/Cr 33/1.6, Trop neg x2, proBNP 634  Imaging: CT chest: Mild tree-in-bud nodularity in the left lower lobe, which may be infectious/inflammatory.       (22 May 2025 00:02)      PAST MEDICAL & SURGICAL HISTORY:  Prostate cancer      Type II diabetes mellitus      Chronic obstructive pulmonary disease (COPD)      CHF (congestive heart failure)      Renal insufficiency      H/O migraine      Insomnia      Constipation      S/P foot surgery          SOCIAL HISTORY: No active tobacco, alcohol or illicit drug use    FAMILY HISTORY:      Home Medications:  albuterol 0.63 mg/3 mL (0.021%) inhalation solution: 3 milliliter(s) by nebulizer 3 times a day as needed for (22 May 2025 01:14)  clonazePAM 0.25 mg oral tablet, disintegrating: 3 tab(s) orally 2 times a day (22 May 2025 01:14)  ferrous sulfate 325 mg (65 mg elemental iron) oral tablet: 1 tab(s) orally once a day (22 May 2025 01:14)  furosemide 40 mg oral tablet: 1.5 tab(s) orally once a day (22 May 2025 01:14)  HumaLOG 100 units/mL injectable solution: 10 unit(s) injectable 3 times a day (with meals) ***sliding scale*** (22 May 2025 01:14)  insulin glargine 100 units/mL subcutaneous solution: 24 unit(s) subcutaneous once a day (at bedtime) (22 May 2025 01:14)  loratadine 10 mg oral tablet: 1 tab(s) orally once a day (in the morning) (22 May 2025 01:14)  melatonin 3 mg oral tablet: 1 tab(s) orally once a day (at bedtime) As needed Insomnia (22 May 2025 01:14)  metoprolol succinate 25 mg oral tablet, extended release: 1 tab(s) orally once a day (22 May 2025 01:14)  montelukast 10 mg oral tablet: 1 tab(s) orally once a day (22 May 2025 01:14)  Multiple Vitamins with Minerals oral tablet: 1 tab(s) orally once a day (22 May 2025 01:14)  oxyCODONE 10 mg oral tablet, extended release: 1 tab(s) orally every 12 hours (22 May 2025 01:14)  pantoprazole 40 mg oral delayed release tablet: 1 tab(s) orally once a day (before a meal) (22 May 2025 01:14)  polyethylene glycol 3350 oral powder for reconstitution: 17 gram(s) orally once a day (22 May 2025 01:14)  pregabalin 150 mg oral capsule: 1 cap(s) orally 2 times a day (22 May 2025 01:14)  rosuvastatin 40 mg oral tablet: 1 tab(s) orally once a day (22 May 2025 01:14)  sertraline 100 mg oral tablet: 1 tab(s) orally once a day (22 May 2025 01:14)  Spiriva 18 mcg inhalation capsule: 1 cap(s) inhaled once a day (22 May 2025 01:14)      MEDICATIONS  (STANDING):  azithromycin  IVPB      cefTRIAXone   IVPB 1000 milliGRAM(s) IV Intermittent every 24 hours  cetirizine 10 milliGRAM(s) Oral daily  dextrose 5%. 1000 milliLiter(s) (50 mL/Hr) IV Continuous <Continuous>  dextrose 5%. 1000 milliLiter(s) (100 mL/Hr) IV Continuous <Continuous>  dextrose 50% Injectable 25 Gram(s) IV Push once  dextrose 50% Injectable 12.5 Gram(s) IV Push once  dextrose 50% Injectable 25 Gram(s) IV Push once  ferrous    sulfate 325 milliGRAM(s) Oral daily  fluticasone propionate/ salmeterol 250-50 MICROgram(s) Diskus 1 Dose(s) Inhalation two times a day  furosemide   Injectable 40 milliGRAM(s) IV Push two times a day  glucagon  Injectable 1 milliGRAM(s) IntraMuscular once  heparin   Injectable 5000 Unit(s) SubCutaneous every 8 hours  insulin glargine Injectable (LANTUS) 16 Unit(s) SubCutaneous at bedtime  insulin lispro (ADMELOG) corrective regimen sliding scale   SubCutaneous three times a day before meals  insulin lispro (ADMELOG) corrective regimen sliding scale   SubCutaneous at bedtime  insulin lispro Injectable (ADMELOG) 7 Unit(s) SubCutaneous three times a day before meals  metoprolol succinate ER 25 milliGRAM(s) Oral daily  montelukast 10 milliGRAM(s) Oral daily  pantoprazole    Tablet 40 milliGRAM(s) Oral before breakfast  polyethylene glycol 3350 17 Gram(s) Oral daily  pregabalin 150 milliGRAM(s) Oral two times a day  rosuvastatin 40 milliGRAM(s) Oral at bedtime  senna 2 Tablet(s) Oral at bedtime  sertraline 100 milliGRAM(s) Oral daily  tiotropium 2.5 MICROgram(s) Inhaler 2 Puff(s) Inhalation daily    MEDICATIONS  (PRN):  acetaminophen     Tablet .. 650 milliGRAM(s) Oral every 6 hours PRN Temp greater or equal to 38C (100.4F), Mild Pain (1 - 3)  albuterol/ipratropium for Nebulization 3 milliLiter(s) Nebulizer every 6 hours PRN Shortness of Breath and/or Wheezing  benzonatate 100 milliGRAM(s) Oral three times a day PRN Cough  clonazePAM Oral Disintegrating Tablet 0.75 milliGRAM(s) Oral two times a day PRN anxiety  dextrose Oral Gel 15 Gram(s) Oral once PRN Blood Glucose LESS THAN 70 milliGRAM(s)/deciliter  melatonin 3 milliGRAM(s) Oral at bedtime PRN Insomnia  oxyCODONE    IR 2.5 milliGRAM(s) Oral every 4 hours PRN Moderate Pain (4 - 6)  oxyCODONE    IR 5 milliGRAM(s) Oral every 6 hours PRN Severe Pain (7 - 10)      Allergies    IODINE (Unknown)  tetracycline (Unknown)  vancomycin (Other)    Intolerances        REVIEW OF SYSTEMS: Negative except as per HPI.    VITAL SIGNS:   Vital Signs Last 24 Hrs  T(C): 36.8 (22 May 2025 06:15), Max: 36.8 (21 May 2025 20:10)  T(F): 98.2 (22 May 2025 06:15), Max: 98.3 (22 May 2025 01:57)  HR: 72 (22 May 2025 06:15) (72 - 87)  BP: 144/81 (22 May 2025 06:15) (107/67 - 144/81)  BP(mean): --  RR: 18 (22 May 2025 06:15) (16 - 18)  SpO2: 93% (22 May 2025 06:15) (92% - 95%)    Parameters below as of 22 May 2025 06:15  Patient On (Oxygen Delivery Method): room air        I&O's Summary    21 May 2025 07:01  -  22 May 2025 07:00  --------------------------------------------------------  IN: 0 mL / OUT: 1500 mL / NET: -1500 mL        PHYSICAL EXAM:  Constitutional: NAD, resting comfortably  HEENT NC/AT, moist mucous membranes  Pulmonary: Non-labored, breath sounds are clear bilaterally, no wheezing, rales or rhonchi  Cardiovascular: +S1, S2, RRR, no murmur  Gastrointestinal: Soft, nontender, distended, normoactive bowel sounds  Extremities: 2+ b/l LE pitting edema   Neurological: Alert, strength and sensitivity are grossly intact, mildly confused  Skin: weeping and open would of RLE/foot  Psych: Mood & affect appropriate    LABS: All Labs Reviewed:                        11.8   8.15  )-----------( 166      ( 21 May 2025 21:10 )             36.7     21 May 2025 21:10    136    |  100    |  33     ----------------------------<  274    4.3     |  24     |  1.60     Ca    9.1        21 May 2025 21:10    TPro  7.2    /  Alb  2.8    /  TBili  0.4    /  DBili  x      /  AST  21     /  ALT  33     /  AlkPhos  71     21 May 2025 21:10    PT/INR - ( 21 May 2025 21:10 )   PT: 12.6 sec;   INR: 1.08 ratio         PTT - ( 21 May 2025 21:10 )  PTT:28.6 sec      Blood Culture: Pending        EKG:  Sinus rhythm with 1st degree AV block   Left axis deviation Low voltage QRS   Septal infarct (cited on or before 26-AUG-2023) Abnormal ECG  Vent. rate 75 BPM   OR interval 210 ms   QRS duration 94 ms   QT/QTc 398/444 ms   P-R-T axes -1 -66 39    RADIOLOGY:   CT Chest No Cont (05.21.25 @ 22:41)     ACC: [id number] EXAM:  CT CHEST   ORDERED BY: ROSAS KEN     PROCEDURE DATE:  05/21/2025          INTERPRETATION:  CLINICAL INFORMATION: Shortness of breath with cough and   leg swelling    COMPARISON: CT chest 12/28/2024    CONTRAST/COMPLICATIONS:  IV Contrast: NONE  Oral Contrast: NONE  .    PROCEDURE:  CT of the Chest was performed.  Sagittal and coronal reformats were performed.    FINDINGS:    LUNGS AND LARGE AIRWAYS: Biapical pleural-parenchymal scarring. There is   debris within the trachea and left mainstem bronchus. Mild bronchial wall   thickening. Mild tree-in-bud nodularity in the left lower lobe. Chronic   interstitial changes at lung bases with bronchiectasis.  PLEURA: No pleural effusion.  VESSELS: Aortic calcifications. Coronary artery calcifications.  HEART: Heart size is normal. No pericardial effusion.  MEDIASTINUM AND ANGIE: No lymphadenopathy.  CHEST WALL AND LOWER NECK: Within normal limits.  VISUALIZED UPPER ABDOMEN: Within normal limits.  BONES: Degenerative changes. Chronic left-sided rib fractures.    IMPRESSION:  Mild tree-in-bud nodularity in the left lower lobe, which may be   infectious/inflammatory.    CXR:  Xray Chest 1 View- PORTABLE-Urgent (Xray Chest 1 View- PORTABLE-Urgent .) (05.21.25 @ 22:06)    ACC: [id number] EXAM:  XR CHEST PORTABLE URGENT 1V   ORDERED BY: ROSAS KEN     PROCEDURE DATE:  05/21/2025          INTERPRETATION:  Portable AP chest radiograph    COMPARISON: 12/28/2024 chest x-ray.    CLINICAL INFORMATION: Chest Pain.    FINDINGS:  CATHETERS AND TUBES: None    PULMONARY: Ill-defined LEFT hemidiaphragm contour. LEFT lingula opacity   obscuring segments of the LEFT lateral cardiac border.  No airspace consolidations or radiographic evidence of pulmonary nodules..  No pleuraleffusion or pneumothorax.    HEART/VASCULAR: The heart size and mediastinum configuration are within   the limits of normal.    BONES: The visualized osseous thorax is intact.    IMPRESSION:  Suspect LEFT lingular segment opacity/infiltrate/atelectasis.. Please   review same day chest CT report.    --- End of Report ---

## 2025-05-22 NOTE — PROGRESS NOTE ADULT - PROBLEM SELECTOR PLAN 4
Plan:  ACE wrap for compression  Elevation  NSAIDs  Likely traumatic olecranon bursitits  - Ortho cosnulted  - Pain control  - IV antibiotics per ID recommendations  - WBAT RUE  - FU ESR/CRP, ordered  - No acute ortho intervention required at this time per orth  - Provider to RN to move IV to left arm and remove wrist bands from right arm   Follow-up with Dr. Hill in the office within 1 week; call for appointment.

## 2025-05-22 NOTE — PROGRESS NOTE ADULT - PROBLEM SELECTOR PLAN 9
Chronic, on home insulin- Takes 10 units premeal and  Lantus SC 24 units bedtime    A1c 10   - Continue hhome regiment   -Low ISS  -Regular finger sticks  -Consistent carb diet  -Hypoglycemic protocol.

## 2025-05-22 NOTE — PROGRESS NOTE ADULT - PROBLEM SELECTOR PLAN 2
Goal Outcome Evaluation:  Pt is ready for discharge, reviewed AVS with patient and his sister.  Answered all questions to the best of my ability, denies any other questions or concerns.     Patient afebrile, no leukocytosis present    - CT chest: Mild tree-in-bud nodularity in the left lower lobe, which may be infectious/inflammatory  - Will start Rocephin and Azithro   - Follow up blood and urine cultures, legionella urine antigen, strep pneumo urine antigen  - ID (Dr. Denton) consulted, will appreciate recs  - Pulm (Dr. Marie) consulted, will appreciate recs Patient afebrile, no leukocytosis present    - CT chest: Mild tree-in-bud nodularity in the left lower lobe, which may be infectious/inflammatory  - Continue Rocehin  - Follow up blood and urine cultures, legionella urine antigen, strep pneumo urine antigen  - ID (Dr. Denton) consulted, will appreciate recs  - Pulm (Dr. Marie) consulted, will appreciate recs

## 2025-05-22 NOTE — H&P ADULT - PROBLEM SELECTOR PLAN 1
Patient presents w/ acute on chronic CHF exacerbation likely 2/2 non compliance  - CT chest: Mild tree-in-bud nodularity in the left lower lobe, which may be infectious/inflammatory  - Pro , Trop neg x2   - Will start lasix 40mg IV BID  - TTE 10/24: EF 61%, grade 2 LV diastolic dysfunction, trace TR   - Will obtain repeat TTE, f/u results   - Will obtain A1C, lipid panel, TSH f/u results   - Strict I & O's and daily weights  - Cardiology ([facility]) consulted, will appreciate recs Patient presents w/ acute on chronic Diastolic  CHF exacerbation likely 2/2 non compliance with meds   - CT chest: Mild tree-in-bud nodularity in the left lower lobe, which may be infectious/inflammatory  - Pro , Trop neg x2   - Will start Lasix 40mg IV BID  - TTE 10/24: EF 61%, grade 2 LV diastolic dysfunction, trace TR   - Will obtain repeat TTE, f/u results   - Will obtain A1C, lipid panel, TSH f/u results   - Strict I & O's and daily weights  - Cardiology ([facility]) consulted, will appreciate recs

## 2025-05-22 NOTE — CONSULT NOTE ADULT - SUBJECTIVE AND OBJECTIVE BOX
[facility]  INFECTIOUS DISEASES   [address] [place] [address]  Tel: [phone]     Fax: [phone]  ========================================================  MD Panda Catherine Michelle, MD Shah, Kaushal, MD Sunjit, Jaspal, MD Sehrish Shahid, MD   ========================================================    MRN-[id number]  WAYNE SERRANO     CC: Patient is a 74y old  Male who presents with a chief complaint of CHF exacerbation, PNA (22 May 2025 10:14)    HPI:  74yoM PMH DM2, PAD, hypertension, COPD, CKD,  HFpEF, Hx of R foot ulcer with osteomyelitis [facility] and weight gain of about 30lbs over past 1 month. Patient states that for the last one week he has a productive cough along with SOB, BERGER, orthopnea. Patient reports 25-30lbs weight gain over the past month. Patient states that he is supposed to be taking 20mg lasix TID but only takes it BID (recently increased 1 month ago). Patient denies sick contacts. Denies fevers, chills, body aches, chest pain, palpitations, abdominal pain, n/v. Denies recent travel.     IN THE ED:  Temp  98.2 F , HR 87 ,  /67  ,RR 18 , SpO2 92% RA   S/P x  EKG:  Labs significant for: WBAC 8.15, H/H 11.8/36.7, BUN/Cr 33/1.6, Trop neg x2, proBNP 634  Imaging: CT chest: Mild tree-in-bud nodularity in the left lower lobe, which may be infectious/inflammatory.       (22 May 2025 00:02)      PAST MEDICAL & SURGICAL HISTORY:  Prostate cancer      Type II diabetes mellitus      Chronic obstructive pulmonary disease (COPD)      CHF (congestive heart failure)      Renal insufficiency      H/O migraine      Insomnia      Constipation      S/P foot surgery          Social Hx: No current smoking, EtOH or drugs     FAMILY HISTORY:  Noncontributory     Allergies    IODINE (Unknown)  tetracycline (Unknown)  vancomycin (Other)    Intolerances        MEDICATIONS  (STANDING):  azithromycin  IVPB      cefTRIAXone   IVPB 1000 milliGRAM(s) IV Intermittent every 24 hours  cetirizine 10 milliGRAM(s) Oral daily  dextrose 5%. 1000 milliLiter(s) (50 mL/Hr) IV Continuous <Continuous>  dextrose 5%. 1000 milliLiter(s) (100 mL/Hr) IV Continuous <Continuous>  dextrose 50% Injectable 25 Gram(s) IV Push once  dextrose 50% Injectable 12.5 Gram(s) IV Push once  dextrose 50% Injectable 25 Gram(s) IV Push once  ferrous    sulfate 325 milliGRAM(s) Oral daily  fluticasone propionate/ salmeterol 250-50 MICROgram(s) Diskus 1 Dose(s) Inhalation two times a day  furosemide   Injectable 40 milliGRAM(s) IV Push two times a day  glucagon  Injectable 1 milliGRAM(s) IntraMuscular once  heparin   Injectable 5000 Unit(s) SubCutaneous every 8 hours  insulin glargine Injectable (LANTUS) 16 Unit(s) SubCutaneous at bedtime  insulin lispro (ADMELOG) corrective regimen sliding scale   SubCutaneous three times a day before meals  insulin lispro (ADMELOG) corrective regimen sliding scale   SubCutaneous at bedtime  insulin lispro Injectable (ADMELOG) 7 Unit(s) SubCutaneous three times a day before meals  metoprolol succinate ER 25 milliGRAM(s) Oral daily  montelukast 10 milliGRAM(s) Oral daily  pantoprazole    Tablet 40 milliGRAM(s) Oral before breakfast  polyethylene glycol 3350 17 Gram(s) Oral daily  pregabalin 150 milliGRAM(s) Oral two times a day  rosuvastatin 40 milliGRAM(s) Oral at bedtime  senna 2 Tablet(s) Oral at bedtime  sertraline 100 milliGRAM(s) Oral daily  tiotropium 2.5 MICROgram(s) Inhaler 2 Puff(s) Inhalation daily    MEDICATIONS  (PRN):  acetaminophen     Tablet .. 650 milliGRAM(s) Oral every 6 hours PRN Temp greater or equal to 38C (100.4F), Mild Pain (1 - 3)  albuterol/ipratropium for Nebulization 3 milliLiter(s) Nebulizer every 6 hours PRN Shortness of Breath and/or Wheezing  benzonatate 100 milliGRAM(s) Oral three times a day PRN Cough  clonazePAM Oral Disintegrating Tablet 0.75 milliGRAM(s) Oral two times a day PRN anxiety  dextrose Oral Gel 15 Gram(s) Oral once PRN Blood Glucose LESS THAN 70 milliGRAM(s)/deciliter  melatonin 3 milliGRAM(s) Oral at bedtime PRN Insomnia  oxyCODONE    IR 2.5 milliGRAM(s) Oral every 4 hours PRN Moderate Pain (4 - 6)  oxyCODONE    IR 5 milliGRAM(s) Oral every 6 hours PRN Severe Pain (7 - 10)       REVIEW OF SYSTEMS:  CONSTITUTIONAL:  No Fever or chills  HEENT:  No diplopia or blurred vision.  No sore throat or runny nose.  CARDIOVASCULAR:  No chest pain or palpitations   RESPIRATORY:  +cough, shortness of breath, and orthopnea.  GASTROINTESTINAL:  No nausea, vomiting or diarrhea.  GENITOURINARY:  No dysuria, frequency or urgency. No Blood in urine  MUSCULOSKELETAL:  +R elbow swelling and redness   SKIN: +wound on lateral R ankle     Physical Exam:  Vital Signs Last 24 Hrs  T(C): 36.8 (22 May 2025 06:15), Max: 36.8 (21 May 2025 20:10)  T(F): 98.2 (22 May 2025 06:15), Max: 98.3 (22 May 2025 01:57)  HR: 72 (22 May 2025 06:15) (72 - 87)  BP: 144/81 (22 May 2025 06:15) (107/67 - 144/81)  BP(mean): --  RR: 18 (22 May 2025 06:15) (16 - 18)  SpO2: 93% (22 May 2025 06:15) (92% - 95%)    Parameters below as of 22 May 2025 06:15  Patient On (Oxygen Delivery Method): room air      Height (cm): 177.8 (05-21 @ 20:10)  Weight (kg): 110.2 (05-21 @ 20:10)  BMI (kg/m2): 34.9 (05-21 @ 20:10)  BSA (m2): 2.27 (05-21 @ 20:10)  GEN: NAD  HEENT: normocephalic and atraumatic. EOMI. PERRL.    NECK: Supple.  No lymphadenopathy   LUNGS: +crackles L lung   HEART: Regular rate and rhythm without murmur.  ABDOMEN: +distended. Soft, nontender. Positive bowel sounds.    EXTREMITIES: +R elbow bursitis and erythema with non open scab, +b/l LE pitting edema R>L  NEUROLOGIC: grossly intact.  PSYCHIATRIC: Appropriate affect .  SKIN: +R elbow closed wound, +RLE open ankle wound with mild, non pustular drainage and surrounding erythema     Labs:  05-22    137  |  97  |  35[H]  ----------------------------<  222[H]  3.3[L]   |  28  |  1.40[H]    Ca    9.1      22 May 2025 08:05    TPro  7.5  /  Alb  2.9[L]  /  TBili  0.4  /  DBili  x   /  AST  17  /  ALT  33  /  AlkPhos  72  05-22                          12.4   10.38 )-----------( 158      ( 22 May 2025 08:05 )             38.2     PT/INR - ( 21 May 2025 21:10 )   PT: 12.6 sec;   INR: 1.08 ratio         PTT - ( 21 May 2025 21:10 )  PTT:28.6 sec  Urinalysis Basic - ( 22 May 2025 08:05 )    Color: x / Appearance: x / SG: x / pH: x  Gluc: 222 mg/dL / Ketone: x  / Bili: x / Urobili: x   Blood: x / Protein: x / Nitrite: x   Leuk Esterase: x / RBC: x / WBC x   Sq Epi: x / Non Sq Epi: x / Bacteria: x      LIVER FUNCTIONS - ( 22 May 2025 08:05 )  Alb: 2.9 g/dL / Pro: 7.5 g/dL / ALK PHOS: 72 U/L / ALT: 33 U/L / AST: 17 U/L / GGT: x                       SARS-CoV-2: NotDetec (05-22-25 @ 00:15)      RECENT CULTURES:  05-22 @ 00:15          NotDetec        All imaging and other data have been reviewed.      Assessment and Plan:   Patient is a 74yoM PMH DM2, PAD, hypertension, COPD, CKD,  HFpEF, Hx of R foot ulcer with osteomyelitis admitted for CHF vs PNA. CT scan not convincing for PNA, however given age and comorbidities will continue abx to cover for PNA. Patient also with hx of MRSA wound cultures. Currently with open wound and erythema on RLE ankle   - Continue IV Rocephin for PNA coverage  - D/c Azithromycin, as Legionella is unlikely --> f/u urine legionella   - Given hx of MRSA and open wound on R ankle, would start IV Daptomycin and place on contact isolation precautions  - Rec ortho consult for R elbow bursitis, as patient may need the collection drained    - F/u blood cx, MRSA PCR     Thank you for courtesy of this consult.     Will follow.  Discussed with the primary team.     Frieda Denton MD  Division of Infectious Diseases   Please call ID service at [phone] with any question.    75 minutes spent on total encounter assessing patient, examination, chart review, counseling and coordinating care by the attending physician/nurse/care manager.   [facility]  INFECTIOUS DISEASES   [address] [place] [address]  Tel: [phone]     Fax: [phone]  ========================================================  MD Panda Catherine Michelle, MD Shah, Kaushal, MD Sunjit, Jaspal, MD Sehrish Shahid, MD   ========================================================    MRN-[id number]  WAYNE SERRANO     CC: Patient is a 74y old  Male who presents with a chief complaint of CHF exacerbation, PNA (22 May 2025 10:14)    HPI:  74yoM PMH DM2, PAD, hypertension, COPD, CKD,  HFpEF, Hx of R foot ulcer with osteomyelitis [facility] and weight gain of about 30lbs over past 1 month. Patient states that for the last one week he has a productive cough along with SOB, BERGER, orthopnea. Patient reports 25-30lbs weight gain over the past month. Patient states that he is supposed to be taking 20mg lasix TID but only takes it BID (recently increased 1 month ago). Patient denies sick contacts. Denies fevers, chills, body aches, chest pain, palpitations, abdominal pain, n/v. Denies recent travel.     IN THE ED:  Temp  98.2 F , HR 87 ,  /67  ,RR 18 , SpO2 92% RA   S/P x  EKG:  Labs significant for: WBAC 8.15, H/H 11.8/36.7, BUN/Cr 33/1.6, Trop neg x2, proBNP 634  Imaging: CT chest: Mild tree-in-bud nodularity in the left lower lobe, which may be infectious/inflammatory.       (22 May 2025 00:02)      PAST MEDICAL & SURGICAL HISTORY:  Prostate cancer      Type II diabetes mellitus      Chronic obstructive pulmonary disease (COPD)      CHF (congestive heart failure)      Renal insufficiency      H/O migraine      Insomnia      Constipation      S/P foot surgery          Social Hx: No current smoking, EtOH or drugs     FAMILY HISTORY:  Noncontributory     Allergies    IODINE (Unknown)  tetracycline (Unknown)  vancomycin (Other)    Intolerances        MEDICATIONS  (STANDING):  azithromycin  IVPB      cefTRIAXone   IVPB 1000 milliGRAM(s) IV Intermittent every 24 hours  cetirizine 10 milliGRAM(s) Oral daily  dextrose 5%. 1000 milliLiter(s) (50 mL/Hr) IV Continuous <Continuous>  dextrose 5%. 1000 milliLiter(s) (100 mL/Hr) IV Continuous <Continuous>  dextrose 50% Injectable 25 Gram(s) IV Push once  dextrose 50% Injectable 12.5 Gram(s) IV Push once  dextrose 50% Injectable 25 Gram(s) IV Push once  ferrous    sulfate 325 milliGRAM(s) Oral daily  fluticasone propionate/ salmeterol 250-50 MICROgram(s) Diskus 1 Dose(s) Inhalation two times a day  furosemide   Injectable 40 milliGRAM(s) IV Push two times a day  glucagon  Injectable 1 milliGRAM(s) IntraMuscular once  heparin   Injectable 5000 Unit(s) SubCutaneous every 8 hours  insulin glargine Injectable (LANTUS) 16 Unit(s) SubCutaneous at bedtime  insulin lispro (ADMELOG) corrective regimen sliding scale   SubCutaneous three times a day before meals  insulin lispro (ADMELOG) corrective regimen sliding scale   SubCutaneous at bedtime  insulin lispro Injectable (ADMELOG) 7 Unit(s) SubCutaneous three times a day before meals  metoprolol succinate ER 25 milliGRAM(s) Oral daily  montelukast 10 milliGRAM(s) Oral daily  pantoprazole    Tablet 40 milliGRAM(s) Oral before breakfast  polyethylene glycol 3350 17 Gram(s) Oral daily  pregabalin 150 milliGRAM(s) Oral two times a day  rosuvastatin 40 milliGRAM(s) Oral at bedtime  senna 2 Tablet(s) Oral at bedtime  sertraline 100 milliGRAM(s) Oral daily  tiotropium 2.5 MICROgram(s) Inhaler 2 Puff(s) Inhalation daily    MEDICATIONS  (PRN):  acetaminophen     Tablet .. 650 milliGRAM(s) Oral every 6 hours PRN Temp greater or equal to 38C (100.4F), Mild Pain (1 - 3)  albuterol/ipratropium for Nebulization 3 milliLiter(s) Nebulizer every 6 hours PRN Shortness of Breath and/or Wheezing  benzonatate 100 milliGRAM(s) Oral three times a day PRN Cough  clonazePAM Oral Disintegrating Tablet 0.75 milliGRAM(s) Oral two times a day PRN anxiety  dextrose Oral Gel 15 Gram(s) Oral once PRN Blood Glucose LESS THAN 70 milliGRAM(s)/deciliter  melatonin 3 milliGRAM(s) Oral at bedtime PRN Insomnia  oxyCODONE    IR 2.5 milliGRAM(s) Oral every 4 hours PRN Moderate Pain (4 - 6)  oxyCODONE    IR 5 milliGRAM(s) Oral every 6 hours PRN Severe Pain (7 - 10)       REVIEW OF SYSTEMS:  CONSTITUTIONAL:  No Fever or chills  HEENT:  No diplopia or blurred vision.  No sore throat or runny nose.  CARDIOVASCULAR:  No chest pain or palpitations   RESPIRATORY:  +cough, shortness of breath, and orthopnea.  GASTROINTESTINAL:  No nausea, vomiting or diarrhea.  GENITOURINARY:  No dysuria, frequency or urgency. No Blood in urine  MUSCULOSKELETAL:  +R elbow swelling and redness   SKIN: +wound on lateral R ankle     Physical Exam:  Vital Signs Last 24 Hrs  T(C): 36.8 (22 May 2025 06:15), Max: 36.8 (21 May 2025 20:10)  T(F): 98.2 (22 May 2025 06:15), Max: 98.3 (22 May 2025 01:57)  HR: 72 (22 May 2025 06:15) (72 - 87)  BP: 144/81 (22 May 2025 06:15) (107/67 - 144/81)  BP(mean): --  RR: 18 (22 May 2025 06:15) (16 - 18)  SpO2: 93% (22 May 2025 06:15) (92% - 95%)    Parameters below as of 22 May 2025 06:15  Patient On (Oxygen Delivery Method): room air      Height (cm): 177.8 (05-21 @ 20:10)  Weight (kg): 110.2 (05-21 @ 20:10)  BMI (kg/m2): 34.9 (05-21 @ 20:10)  BSA (m2): 2.27 (05-21 @ 20:10)  GEN: NAD  HEENT: normocephalic and atraumatic. EOMI. PERRL.    NECK: Supple.  No lymphadenopathy   LUNGS: +crackles L lung   HEART: Regular rate and rhythm without murmur.  ABDOMEN: +distended. Soft, nontender. Positive bowel sounds.    EXTREMITIES: +R elbow bursitis and erythema with non open scab, +b/l LE pitting edema R>L  NEUROLOGIC: grossly intact.  PSYCHIATRIC: Appropriate affect .  SKIN: +R elbow closed wound, +RLE open ankle wound with mild, non pustular drainage and surrounding erythema     Labs:  05-22    137  |  97  |  35[H]  ----------------------------<  222[H]  3.3[L]   |  28  |  1.40[H]    Ca    9.1      22 May 2025 08:05    TPro  7.5  /  Alb  2.9[L]  /  TBili  0.4  /  DBili  x   /  AST  17  /  ALT  33  /  AlkPhos  72  05-22                          12.4   10.38 )-----------( 158      ( 22 May 2025 08:05 )             38.2     PT/INR - ( 21 May 2025 21:10 )   PT: 12.6 sec;   INR: 1.08 ratio         PTT - ( 21 May 2025 21:10 )  PTT:28.6 sec  Urinalysis Basic - ( 22 May 2025 08:05 )    Color: x / Appearance: x / SG: x / pH: x  Gluc: 222 mg/dL / Ketone: x  / Bili: x / Urobili: x   Blood: x / Protein: x / Nitrite: x   Leuk Esterase: x / RBC: x / WBC x   Sq Epi: x / Non Sq Epi: x / Bacteria: x      LIVER FUNCTIONS - ( 22 May 2025 08:05 )  Alb: 2.9 g/dL / Pro: 7.5 g/dL / ALK PHOS: 72 U/L / ALT: 33 U/L / AST: 17 U/L / GGT: x                       SARS-CoV-2: NotDetec (05-22-25 @ 00:15)      RECENT CULTURES:  05-22 @ 00:15          NotDetec        All imaging and other data have been reviewed.      Assessment and Plan:   Patient is a 74yoM PMH DM2, PAD, hypertension, COPD, CKD,  HFpEF, Hx of R foot ulcer with osteomyelitis admitted for CHF vs PNA. CT scan not convincing for PNA, however given age and comorbidities will continue abx to cover for PNA. Patient also with hx of MRSA wound cultures. Currently with open wound and erythema on RLE ankle   - Continue IV Rocephin for PNA coverage  - D/c Azithromycin, as Legionella is unlikely --> f/u urine legionella   - Given hx of MRSA and open wound on R ankle, would start IV Daptomycin and place on contact isolation precautions  - Rec ortho consult for R elbow bursitis, as patient may need the collection drained    - Continue local wound care for R ankle and R elbow wounds   - F/u blood cx, MRSA PCR     Thank you for courtesy of this consult.     Will follow.  Discussed with the primary team.     Frieda Denton MD  Division of Infectious Diseases   Please call ID service at [phone] with any question.    75 minutes spent on total encounter assessing patient, examination, chart review, counseling and coordinating care by the attending physician/nurse/care manager.   [facility]  INFECTIOUS DISEASES   [address] [place] [address]  Tel: [phone]     Fax: [phone]  ========================================================  MD Panda Cahterine Michelle, MD Shah, Kaushal, MD Sunjit, Jaspal, MD Sehrish Shahid, MD   ========================================================    MRN-[id number]  WAYNE SERRANO     CC: Patient is a 74y old  Male who presents with a chief complaint of CHF exacerbation, PNA (22 May 2025 10:14)    HPI:  74yoM PMH DM2, PAD, hypertension, COPD, CKD,  HFpEF, Hx of R foot ulcer with osteomyelitis [facility] and weight gain of about 30lbs over past 1 month. Patient states that for the last one week he has a productive cough along with SOB, BERGER, orthopnea. Patient reports 25-30lbs weight gain over the past month. Patient states that he is supposed to be taking 20mg lasix TID but only takes it BID (recently increased 1 month ago). Patient denies sick contacts. Denies fevers, chills, body aches, chest pain, palpitations, abdominal pain, n/v. Denies recent travel.     IN THE ED:  Temp  98.2 F , HR 87 ,  /67  ,RR 18 , SpO2 92% RA   S/P x  EKG:  Labs significant for: WBAC 8.15, H/H 11.8/36.7, BUN/Cr 33/1.6, Trop neg x2, proBNP 634  Imaging: CT chest: Mild tree-in-bud nodularity in the left lower lobe, which may be infectious/inflammatory.       (22 May 2025 00:02)      PAST MEDICAL & SURGICAL HISTORY:  Prostate cancer  Type II diabetes mellitus  Chronic obstructive pulmonary disease (COPD)  CHF (congestive heart failure)  Renal insufficiency  H/O migraine  Insomnia  Constipation  S/P foot surgery      Social Hx: No current smoking, EtOH or drugs     FAMILY HISTORY:  Noncontributory     Allergies    IODINE (Unknown)  tetracycline (Unknown)  vancomycin (Other)    MEDICATIONS  (STANDING):  azithromycin  IVPB      cefTRIAXone   IVPB 1000 milliGRAM(s) IV Intermittent every 24 hours  cetirizine 10 milliGRAM(s) Oral daily  dextrose 5%. 1000 milliLiter(s) (50 mL/Hr) IV Continuous <Continuous>  dextrose 5%. 1000 milliLiter(s) (100 mL/Hr) IV Continuous <Continuous>  dextrose 50% Injectable 25 Gram(s) IV Push once  dextrose 50% Injectable 12.5 Gram(s) IV Push once  dextrose 50% Injectable 25 Gram(s) IV Push once  ferrous    sulfate 325 milliGRAM(s) Oral daily  fluticasone propionate/ salmeterol 250-50 MICROgram(s) Diskus 1 Dose(s) Inhalation two times a day  furosemide   Injectable 40 milliGRAM(s) IV Push two times a day  glucagon  Injectable 1 milliGRAM(s) IntraMuscular once  heparin   Injectable 5000 Unit(s) SubCutaneous every 8 hours  insulin glargine Injectable (LANTUS) 16 Unit(s) SubCutaneous at bedtime  insulin lispro (ADMELOG) corrective regimen sliding scale   SubCutaneous three times a day before meals  insulin lispro (ADMELOG) corrective regimen sliding scale   SubCutaneous at bedtime  insulin lispro Injectable (ADMELOG) 7 Unit(s) SubCutaneous three times a day before meals  metoprolol succinate ER 25 milliGRAM(s) Oral daily  montelukast 10 milliGRAM(s) Oral daily  pantoprazole    Tablet 40 milliGRAM(s) Oral before breakfast  polyethylene glycol 3350 17 Gram(s) Oral daily  pregabalin 150 milliGRAM(s) Oral two times a day  rosuvastatin 40 milliGRAM(s) Oral at bedtime  senna 2 Tablet(s) Oral at bedtime  sertraline 100 milliGRAM(s) Oral daily  tiotropium 2.5 MICROgram(s) Inhaler 2 Puff(s) Inhalation daily    MEDICATIONS  (PRN):  acetaminophen     Tablet .. 650 milliGRAM(s) Oral every 6 hours PRN Temp greater or equal to 38C (100.4F), Mild Pain (1 - 3)  albuterol/ipratropium for Nebulization 3 milliLiter(s) Nebulizer every 6 hours PRN Shortness of Breath and/or Wheezing  benzonatate 100 milliGRAM(s) Oral three times a day PRN Cough  clonazePAM Oral Disintegrating Tablet 0.75 milliGRAM(s) Oral two times a day PRN anxiety  dextrose Oral Gel 15 Gram(s) Oral once PRN Blood Glucose LESS THAN 70 milliGRAM(s)/deciliter  melatonin 3 milliGRAM(s) Oral at bedtime PRN Insomnia  oxyCODONE    IR 2.5 milliGRAM(s) Oral every 4 hours PRN Moderate Pain (4 - 6)  oxyCODONE    IR 5 milliGRAM(s) Oral every 6 hours PRN Severe Pain (7 - 10)       REVIEW OF SYSTEMS:  CONSTITUTIONAL:  No Fever or chills  HEENT:  No diplopia or blurred vision.  No sore throat or runny nose.  CARDIOVASCULAR:  No chest pain or palpitations   RESPIRATORY:  +cough, shortness of breath, and orthopnea.  GASTROINTESTINAL:  No nausea, vomiting or diarrhea.  GENITOURINARY:  No dysuria, frequency or urgency. No Blood in urine  MUSCULOSKELETAL:  +R elbow swelling and redness   SKIN: +wound on lateral R ankle     Physical Exam:  Vital Signs Last 24 Hrs  T(C): 36.8 (22 May 2025 06:15), Max: 36.8 (21 May 2025 20:10)  T(F): 98.2 (22 May 2025 06:15), Max: 98.3 (22 May 2025 01:57)  HR: 72 (22 May 2025 06:15) (72 - 87)  BP: 144/81 (22 May 2025 06:15) (107/67 - 144/81)  BP(mean): --  RR: 18 (22 May 2025 06:15) (16 - 18)  SpO2: 93% (22 May 2025 06:15) (92% - 95%)  Parameters below as of 22 May 2025 06:15  Patient On (Oxygen Delivery Method): room air  Height (cm): 177.8 (05-21 @ 20:10)  Weight (kg): 110.2 (05-21 @ 20:10)  BMI (kg/m2): 34.9 (05-21 @ 20:10)  BSA (m2): 2.27 (05-21 @ 20:10)  GEN: NAD  HEENT: normocephalic and atraumatic. EOMI. PERRL.    NECK: Supple.  No lymphadenopathy   LUNGS: +crackles L lung   HEART: Regular rate and rhythm without murmur.  ABDOMEN: +distended. Soft, nontender. Positive bowel sounds.    EXTREMITIES: +R elbow bursitis and erythema with non open scab, +b/l LE pitting edema R>L  NEUROLOGIC: grossly intact.  PSYCHIATRIC: Appropriate affect .  SKIN: +R elbow closed wound, +RLE open ankle wound with mild, non pustular drainage and surrounding erythema     Labs:  05-22    137  |  97  |  35[H]  ----------------------------<  222[H]  3.3[L]   |  28  |  1.40[H]    Ca    9.1      22 May 2025 08:05    TPro  7.5  /  Alb  2.9[L]  /  TBili  0.4  /  DBili  x   /  AST  17  /  ALT  33  /  AlkPhos  72  05-22                        12.4   10.38 )-----------( 158      ( 22 May 2025 08:05 )             38.2     PT/INR - ( 21 May 2025 21:10 )   PT: 12.6 sec;   INR: 1.08 ratio    PTT - ( 21 May 2025 21:10 )  PTT:28.6 sec  Urinalysis Basic - ( 22 May 2025 08:05 )    Color: x / Appearance: x / SG: x / pH: x  Gluc: 222 mg/dL / Ketone: x  / Bili: x / Urobili: x   Blood: x / Protein: x / Nitrite: x   Leuk Esterase: x / RBC: x / WBC x   Sq Epi: x / Non Sq Epi: x / Bacteria: x    LIVER FUNCTIONS - ( 22 May 2025 08:05 )  Alb: 2.9 g/dL / Pro: 7.5 g/dL / ALK PHOS: 72 U/L / ALT: 33 U/L / AST: 17 U/L / GGT: x             SARS-CoV-2: NotDetec (05-22-25 @ 00:15)    RECENT CULTURES:  05-22 @ 00:15      NotDetec      All imaging and other data have been reviewed.  < from: CT Chest No Cont (05.21.25 @ 22:41) >  IMPRESSION:  Mild tree-in-bud nodularity in the left lower lobe, which may be   infectious/inflammatory.    Assessment and Plan:   Patient is a 74yoM PMH DM2, PAD, hypertension, COPD, CKD,  HFpEF, Hx of R foot ulcer with osteomyelitis admitted for CHF vs PNA. CT scan not convincing for PNA, however given age and comorbidities will continue abx to cover for PNA. Patient also with hx of MRSA wound cultures. Currently with open wound and erythema on RLE ankle   - Continue IV Rocephin for PNA coverage  - D/c Azithromycin, as Legionella is unlikely --> f/u urine legionella   - Given hx of MRSA and open wound on R ankle, would start IV Daptomycin and place on contact isolation precautions  - Rec ortho consult for R elbow bursitis, as patient may need the collection drained    - Continue local wound care for R ankle and R elbow wounds   - F/u blood cx, MRSA PCR     Thank you for courtesy of this consult.     Will follow.  Discussed with the primary team.     Frieda Denton MD  Division of Infectious Diseases   Please call ID service at [phone] with any question.    75 minutes spent on total encounter assessing patient, examination, chart review, counseling and coordinating care by the attending physician/nurse/care manager.

## 2025-05-22 NOTE — H&P ADULT - RESPIRATORY
normal/clear to auscultation bilaterally/no wheezes/no rales/no rhonchi normal/no wheezes/no rales/no rhonchi/no respiratory distress/diminished breath sounds, L/diminished breath sounds, R normal/no wheezes/no rales/no rhonchi/no respiratory distress/no use of accessory muscles/diminished breath sounds, L/diminished breath sounds, R

## 2025-05-22 NOTE — PROGRESS NOTE ADULT - ASSESSMENT
74yoM PMH DM2, PAD, hypertension, COPD, CKD,  HFpEF, Hx of R foot ulcer with osteomyelitis [facility] exertional dyspnea and weight gain of about 30lbs over past 1 month. Admitted for Ac on chronic Diastolic  CHF exacerbation, possible pneumonia.

## 2025-05-22 NOTE — PATIENT CHOICE NOTE. - NSPTCHOICESTATE_GEN_ALL_CORE
I have met with the patient and/or caregiver to discuss discharge goals and treatment plan. Patient and/or caregiver also provided with instructions on accessing the CMS Compare websites for additional information related to Post Acute Provider quality and resource use measures to assist them in evaluation of the providers and in selecting their post-acute provider of choice. Patient and caregiver were informed of the facilities that are owned and/or operated by [facility]. I have discussed with the patient the availability of in-network facilities and providers. Patient and caregiver provided with a list of post-acute providers whose services are appropriate to the discharge plans and patient needs.     For patient requiring durable medical equipment, patient and/or caregiver were informed that they have the right to request who provides the required equipment.
I have met with the patient and/or caregiver to discuss discharge goals and treatment plan. Patient and/or caregiver also provided with instructions on accessing the CMS Compare websites for additional information related to Post Acute Provider quality and resource use measures to assist them in evaluation of the providers and in selecting their post-acute provider of choice. Patient and caregiver were informed of the facilities that are owned and/or operated by [facility]. I have discussed with the patient the availability of in-network facilities and providers. Patient and caregiver provided with a list of post-acute providers whose services are appropriate to the discharge plans and patient needs.     For patient requiring durable medical equipment, patient and/or caregiver were informed that they have the right to request who provides the required equipment.

## 2025-05-22 NOTE — CONSULT NOTE ADULT - ASSESSMENT
74yoM PMH HTN, HLD, DM2, PAD, COPD, CKD,  HFpEF, hx of R foot ulcer with osteomyelitis    - Patient is not complaining of any cardiac symptoms at this time.  - No clear evidence of acute ischemia, trops negative x 2.  - Monitor closely for the development of anginal symptoms or clinical signs of ischemia.   - No acute changes on EKG compared to previous.  - continue home statin    - Appear volume overloaded on exam  - Previous TTE shows EF 61% with mod grade 2 diastolic dysfunction and trace MR/TR.  - Per patient home lasix recently increased to 20mg TID, however, only takes BID  - c/w IV lasix 40mg BID  - monitor for worsening renal function  - continue home BB  - Strict I/Os, daily weights.    - BP well controlled, monitor routine hemodynamics.    - Monitor and replete lytes, keep K>4, Mg>2.      - Other cardiovascular workup will depend on clinical course.  - All other workup per primary team.  - Will continue to follow.      ***CHARTING IN PROGRESS*       74yoM PMH HTN, HLD, DM2, PAD, COPD, CKD,  HFpEF, hx of R foot ulcer with osteomyelitis    - Patient is not complaining of any cardiac symptoms at this time.  - No clear evidence of acute ischemia, trops negative x 2.  - Monitor closely for the development of anginal symptoms or clinical signs of ischemia.   - No acute changes on EKG compared to previous.  - continue home statin    - Appear volume overloaded on exam  - Previous TTE shows EF 61% with mod grade 2 diastolic dysfunction and trace MR/TR.  - Per patient home lasix recently increased to 20mg TID, however, only takes BID  - c/w IV lasix 40mg BID  - monitor for worsening renal function  - continue home BB  - Strict I/Os, daily weights.    - BP well controlled, monitor routine hemodynamics.    - Monitor and replete lytes, keep K>4, Mg>2.    PNA on imaging ?  - infectious work-up per primary team    - Other cardiovascular workup will depend on clinical course.  - All other workup per primary team.  - Will continue to follow.

## 2025-05-22 NOTE — H&P ADULT - PROBLEM SELECTOR PLAN 2
Patient presents with shortness of breath ; questionable pna?  - Patient afebrile, no leukocytosis present    - CT chest: Mild tree-in-bud nodularity in the left lower lobe, which may be infectious/inflammatory  - Will start rocephin and azithro   - F/u RVP panel   - Follow up blood and urine cultures, legionella urine antigen, strep pneumo urine antigen  - ID (Dr. Denton) consulted, will appreciate recs Patient presents with shortness of breath ; questionable pna?  - Patient afebrile, no leukocytosis present    - CT chest: Mild tree-in-bud nodularity in the left lower lobe, which may be infectious/inflammatory  - Will start rocephin and azithro   - F/u RVP panel   - Follow up blood and urine cultures, legionella urine antigen, strep pneumo urine antigen  - ID (Dr. Denton) consulted, will appreciate recs  - Pulm (Dr. Marie) consulted, will appreciate recs ; questionable pna? RVP- NEG   - Patient afebrile, no leukocytosis present    - CT chest: Mild tree-in-bud nodularity in the left lower lobe, which may be infectious/inflammatory  - Will start Rocephin and Azithro   - F/u RVP panel   - Follow up blood and urine cultures, legionella urine antigen, strep pneumo urine antigen  - ID (Dr. Denton) consulted, will appreciate recs  - Pulm (Dr. Marie) consulted, will appreciate recs

## 2025-05-22 NOTE — CARE COORDINATION ASSESSMENT. - NSADDITIONAL INFORMATION_FT
[facility] [phone].  Need short form for no changes or 3122 form if any mayor changes and discharge documentation are to be faxed to [phone]. All medications are to be e-scribed to [facility] or fax to [phone]. Preferred [facility] is [facility].

## 2025-05-22 NOTE — H&P ADULT - NSHPSOCIALHISTORY_GEN_ALL_CORE
No tobacco, alc, drug use. No drug use. Drinks 1 glass of wine with dinner. Former smoker quit in 1993; 20 pack year hx. No drug use. Drinks 1 glass of wine with dinner. Former smoker quit in 1993; 20 pack year hx.  lives at [facility]

## 2025-05-22 NOTE — H&P ADULT - GENITOURINARY MALE
normal external genitalia/no discharge/no mass/no tenderness/no ulcer/normal/no penile lesion/no palpable testicular mass/no scrotal mass

## 2025-05-22 NOTE — H&P ADULT - ENMT
Prescription for Cipro drops sent to pharmacy, 4 drops to the right ear twice daily for 7 days. We will call with any changes once culture results are back. mouth negative

## 2025-05-22 NOTE — H&P ADULT - PROBLEM SELECTOR PLAN 4
Chronic  - Continue home spiriva and symbicort   - Duonebs PRN Chronic- not on home O2  - Continue home spiriva, symbicort, montelukast   - Saleem PRN    #Gerd  - Continue home protonix Chronic- not on home O2  - Continue home spiriva, symbicort, montelukast, loratadine mickie  - Saleem PRN    #Gerd  - Continue home protonix Chronic- not on home O2  - Continue home Spiriva, Symbicort, montelukast, loratadine interchange  - Duo nebs PRN    #Gerd  - Continue home Protonix

## 2025-05-22 NOTE — CONSULT NOTE ADULT - ASSESSMENT
Physical Exam:   Vital Signs Last 24 Hrs  T(C): 36.7 (22 May 2025 11:26), Max: 36.8 (21 May 2025 20:10)  T(F): 98 (22 May 2025 11:26), Max: 98.3 (22 May 2025 01:57)  HR: 83 (22 May 2025 11:26) (72 - 87)  BP: 108/65 (22 May 2025 11:26) (107/67 - 144/81)  BP(mean): --  RR: 18 (22 May 2025 11:26) (16 - 18)  SpO2: 93% (22 May 2025 11:26) (92% - 95%)    Parameters below as of 22 May 2025 11:26  Patient On (Oxygen Delivery Method): room air             CAPILLARY BLOOD GLUCOSE      POCT Blood Glucose.: 251 mg/dL (22 May 2025 11:57)  POCT Blood Glucose.: 271 mg/dL (22 May 2025 08:19)      Cholesterol, Serum: 113 mg/dL (05.19.21 @ 08:36)     HDL Cholesterol, Serum: 22 mg/dL (05.19.21 @ 08:36)     LDL Cholesterol Calculated: 66 mg/dL (05.19.21 @ 08:36)     DIET: CC  >50%

## 2025-05-22 NOTE — PATIENT PROFILE ADULT - FALL HARM RISK - HARM RISK INTERVENTIONS
Assistance OOB with selected safe patient handling equipment/Communicate Risk of Fall with Harm to all staff/Discuss with provider need for PT consult/Monitor gait and stability/Provide patient with walking aids - walker, cane, crutches/Reinforce activity limits and safety measures with patient and family/Tailored Fall Risk Interventions/Use of alarms - bed, chair and/or voice tab/Visual Cue: Yellow wristband and red socks/Other:/Bed in lowest position, wheels locked, appropriate side rails in place/Call bell, personal items and telephone in reach/Instruct patient to call for assistance before getting out of bed or chair/Non-slip footwear when patient is out of bed/[place] to call system/Physically safe environment - no spills, clutter or unnecessary equipment/Purposeful Proactive Rounding/Room/bathroom lighting operational, light cord in reach

## 2025-05-22 NOTE — PROGRESS NOTE ADULT - PROBLEM SELECTOR PLAN 1
Patient presents w/ acute on chronic Diastolic  CHF exacerbation likely 2/2 non compliance with meds   - CT chest: Mild tree-in-bud nodularity in the left lower lobe, which may be infectious/inflammatory  - Pro , Trop neg x2   - Continue Lasix 40mg IV BID  - TTE 10/24: EF 61%, grade 2 LV diastolic dysfunction, trace TR   - Will obtain repeat TTE, f/u results   - Strict I & O's and daily weights  - Cardiology ([facility]) consulted, will appreciate recs

## 2025-05-22 NOTE — H&P ADULT - PROBLEM SELECTOR PLAN 6
Chronic  -  /67 on admission  - Continue home toprol w/ hold parameters    #HLD  - Continue home statin Chronic  -  /67 on admission  - Continue home Toprol XL  w/ hold parameters  Lasix 40 mg 2x day    #HLD  - Continue home statin

## 2025-05-22 NOTE — H&P ADULT - NEUROLOGICAL
normal/cranial nerves II-XII intact/sensation intact details… AAOx 2-3/normal/cranial nerves II-XII intact/sensation intact/responds to verbal commands/no spontaneous movement

## 2025-05-22 NOTE — CONSULT NOTE ADULT - SUBJECTIVE AND OBJECTIVE BOX
Date/Time Patient Seen:  		  Referring MD:   Data Reviewed	       Patient is a 74y old  Male who presents with a chief complaint of CHF exacerbation, PNA (22 May 2025 08:47)      Subjective/HPI   Outpatient Providers	PCP: Dr. Milagro Teradwell: Dr. Smith  Cardio: JUANA Rowland     Language:  · Patient/Family of Limited English Proficiency	No       Patient Identity:  · Birth Sex	Male  · Patient's Preferred Pronoun	Him/He     History of Present Illness:  Reason for Admission: CHF exacerbation, PNA  History of Present Illness:   74yoM PMH DM2, PAD, hypertension, COPD, CKD,  HFpEF, Hx of R foot ulcer with osteomyelitis [facility]EMS [facility] p/w exertional dyspnea and weight gain of about 30lbs over past 1 month. Patient states that for the last one week he has a productive cough along with SOB, GERONIMO, orthopnea. Patient reports 25-30lbs weight gain over the past month. Patient states that he is supposed to be taking 20mg lasix TID but only takes it BID (recently increased 1 month ago). Patient denies sick contacts. Denies fevers, chills, body aches, chest pain, palpitations, abdominal pain, n/v. Denies recent travel.     IN THE ED:  Temp  98.2 F , HR 87 ,  /67  ,RR 18 , SpO2 92% RA   S/P x  EKG:  Labs significant for: WBAC 8.15, H/H 11.8/36.7, BUN/Cr 33/1.6, Trop neg x2, proBNP 634  Imaging: CT chest: Mild tree-in-bud nodularity in the left lower lobe, which may be infectious/inflammatory.             Review of Systems:  · Negative General Symptoms	no fever; no chills  · General Symptoms	weight gain  · Negative Respiratory and Thorax Symptoms	no wheezing; no cough  · Respiratory and Thorax Symptoms	dyspnea  cough  · Negative Cardiovascular Symptoms	no chest pain; no palpitations  · Cardiovascular Symptoms	dyspnea on exertion; peripheral edema  · Negative Gastrointestinal Symptoms	no nausea; no vomiting; no diarrhea; no constipation; no abdominal pain  · Negative Musculoskeletal Symptoms	no arthralgia; no muscle weakness  · Negative Neurological Symptoms	no weakness      Allergies and Intolerances:        Allergies:  	vancomycin: Drug, Other, patients's family member states red man syndrome  	tetracycline: Drug, Unknown  	IODINE [freetext allergy; no alerts]: Miscellaneous, Unknown, IODINE    Home Medications:   * Patient Currently Takes Medications as of 31-Dec-2024 12:18 documented in Structured Notes  · 	cefuroxime 500 mg oral tablet: 1 tab(s) orally 2 times a day  · 	guaiFENesin 600 mg oral tablet, extended release: 1 tab(s) orally 2 times a day for 14 days ***course not complete***  · 	polyethylene glycol 3350 oral powder for reconstitution: 17 gram(s) orally once a day  · 	senna leaf extract oral tablet: 2 tab(s) orally once a day (at bedtime)  · 	montelukast 10 mg oral tablet: 1 tab(s) orally once a day  · 	Multiple Vitamins with Minerals oral tablet: 1 tab(s) orally once a day  · 	ascorbic acid 500 mg oral tablet: 1 tab(s) orally 2 times a day  · 	pantoprazole 40 mg oral delayed release tablet: 1 tab(s) orally once a day (before a meal)  · 	ferrous sulfate 325 mg (65 mg elemental iron) oral tablet: 1 tab(s) orally once a day  · 	metoprolol succinate 25 mg oral tablet, extended release: 1 tab(s) orally once a day  · 	melatonin 3 mg oral tablet: 1 tab(s) orally once a day (at bedtime) As needed Insomnia  · 	clonazePAM 0.25 mg oral tablet, disintegrating: 3 tab(s) orally 2 times a day  · 	insulin glargine 100 units/mL subcutaneous solution: 24 unit(s) subcutaneous once a day (at bedtime)  · 	sertraline 100 mg oral tablet: 1 tab(s) orally once a day  · 	pregabalin 150 mg oral capsule: 1 cap(s) orally 2 times a day  · 	oxyCODONE 10 mg oral tablet, extended release: 1 tab(s) orally every 12 hours  · 	Symbicort 160 mcg-4.5 mcg/inh inhalation aerosol: 2 puff(s) inhaled 2 times a day  · 	rosuvastatin 40 mg oral tablet: 1 tab(s) orally once a day  · 	Spiriva 18 mcg inhalation capsule: 1 cap(s) inhaled once a day  · 	HumaLOG 100 units/mL injectable solution: 10 unit(s) injectable 3 times a day (with meals) ***sliding scale***  · 	Saline Mist 0.65% nasal spray: 2 spray(s) intranasally 2 times a day  · 	Acidophilus Probiotic Blend oral capsule: 1 cap(s) orally 2 times a day  · 	albuterol 0.63 mg/3 mL (0.021%) inhalation solution: 3 milliliter(s) by nebulizer 3 times a day as needed for  · 	loratadine 10 mg oral tablet: 1 tab(s) orally once a day (in the morning)  · 	furosemide 40 mg oral tablet: 1 tab(s) orally once a day    Patient History:    Past Medical, Past Surgical, and Family History:  PAST MEDICAL HISTORY:  CHF (congestive heart failure)     Chronic obstructive pulmonary disease (COPD)     Constipation     H/O migraine     Insomnia     Prostate cancer     Renal insufficiency     Type II diabetes mellitus.     PAST SURGICAL HISTORY:  S/P foot surgery.     Social History:  · Substance use	No  · Social History (marital status, living situation, occupation, and sexual history)	No drug use. Drinks 1 glass of wine with dinner. Former smoker quit in 1993; 20 pack year hx.     Tobacco Screening:  · Core Measure Site	Yes  · Has the patient used tobacco in the past 30 days?	No    Risk Assessment:    Present on Admission:  Deep Venous Thrombosis	no  Pulmonary Embolus	no     HIV Screening:  · In accordance with [place] State law, we offer every patient who comes to our ED an HIV test. Would you like to be tested today?	Opt out     Hepatitis C Test Questions:  · In accordance with [place] Law, we offer every patient a Hepatitis C test. Would you like to be tested today?	Opt out    PAST MEDICAL & SURGICAL HISTORY:  Prostate cancer    Type II diabetes mellitus    Chronic obstructive pulmonary disease (COPD)    CHF (congestive heart failure)    Renal insufficiency    H/O migraine    Insomnia    Constipation    S/P foot surgery          Medication list         MEDICATIONS  (STANDING):  azithromycin  IVPB      cefTRIAXone   IVPB 1000 milliGRAM(s) IV Intermittent every 24 hours  cetirizine 10 milliGRAM(s) Oral daily  dextrose 5%. 1000 milliLiter(s) (50 mL/Hr) IV Continuous <Continuous>  dextrose 5%. 1000 milliLiter(s) (100 mL/Hr) IV Continuous <Continuous>  dextrose 50% Injectable 25 Gram(s) IV Push once  dextrose 50% Injectable 12.5 Gram(s) IV Push once  dextrose 50% Injectable 25 Gram(s) IV Push once  ferrous    sulfate 325 milliGRAM(s) Oral daily  fluticasone propionate/ salmeterol 250-50 MICROgram(s) Diskus 1 Dose(s) Inhalation two times a day  furosemide   Injectable 40 milliGRAM(s) IV Push two times a day  glucagon  Injectable 1 milliGRAM(s) IntraMuscular once  heparin   Injectable 5000 Unit(s) SubCutaneous every 8 hours  insulin glargine Injectable (LANTUS) 16 Unit(s) SubCutaneous at bedtime  insulin lispro (ADMELOG) corrective regimen sliding scale   SubCutaneous three times a day before meals  insulin lispro (ADMELOG) corrective regimen sliding scale   SubCutaneous at bedtime  insulin lispro Injectable (ADMELOG) 7 Unit(s) SubCutaneous three times a day before meals  metoprolol succinate ER 25 milliGRAM(s) Oral daily  montelukast 10 milliGRAM(s) Oral daily  pantoprazole    Tablet 40 milliGRAM(s) Oral before breakfast  polyethylene glycol 3350 17 Gram(s) Oral daily  pregabalin 150 milliGRAM(s) Oral two times a day  rosuvastatin 40 milliGRAM(s) Oral at bedtime  senna 2 Tablet(s) Oral at bedtime  sertraline 100 milliGRAM(s) Oral daily  tiotropium 2.5 MICROgram(s) Inhaler 2 Puff(s) Inhalation daily    MEDICATIONS  (PRN):  acetaminophen     Tablet .. 650 milliGRAM(s) Oral every 6 hours PRN Temp greater or equal to 38C (100.4F), Mild Pain (1 - 3)  albuterol/ipratropium for Nebulization 3 milliLiter(s) Nebulizer every 6 hours PRN Shortness of Breath and/or Wheezing  benzonatate 100 milliGRAM(s) Oral three times a day PRN Cough  clonazePAM Oral Disintegrating Tablet 0.75 milliGRAM(s) Oral two times a day PRN anxiety  dextrose Oral Gel 15 Gram(s) Oral once PRN Blood Glucose LESS THAN 70 milliGRAM(s)/deciliter  melatonin 3 milliGRAM(s) Oral at bedtime PRN Insomnia  oxyCODONE    IR 2.5 milliGRAM(s) Oral every 4 hours PRN Moderate Pain (4 - 6)  oxyCODONE    IR 5 milliGRAM(s) Oral every 6 hours PRN Severe Pain (7 - 10)         Vitals log        ICU Vital Signs Last 24 Hrs  T(C): 36.8 (22 May 2025 06:15), Max: 36.8 (21 May 2025 20:10)  T(F): 98.2 (22 May 2025 06:15), Max: 98.3 (22 May 2025 01:57)  HR: 72 (22 May 2025 06:15) (72 - 87)  BP: 144/81 (22 May 2025 06:15) (107/67 - 144/81)  BP(mean): --  ABP: --  ABP(mean): --  RR: 18 (22 May 2025 06:15) (16 - 18)  SpO2: 93% (22 May 2025 06:15) (92% - 95%)    O2 Parameters below as of 22 May 2025 06:15  Patient On (Oxygen Delivery Method): room air                 Input and Output:  I&O's Detail    21 May 2025 07:01  -  22 May 2025 07:00  --------------------------------------------------------  IN:  Total IN: 0 mL    OUT:    Voided (mL): 1500 mL  Total OUT: 1500 mL    Total NET: -1500 mL          Lab Data                        12.4   10.38 )-----------( 158      ( 22 May 2025 08:05 )             38.2     05-22    137  |  97  |  35[H]  ----------------------------<  222[H]  3.3[L]   |  28  |  1.40[H]    Ca    9.1      22 May 2025 08:05    TPro  7.5  /  Alb  2.9[L]  /  TBili  0.4  /  DBili  x   /  AST  17  /  ALT  33  /  AlkPhos  72  05-22            Review of Systems	  wt gain  sob  geronimo  weakness  cough    Objective     Physical Examination    heart s1s2  lung dc bs  head nc  head at      Pertinent Lab findings & Imaging      Jl:  NO   Adequate UO     I&O's Detail    21 May 2025 07:01  -  22 May 2025 07:00  --------------------------------------------------------  IN:  Total IN: 0 mL    OUT:    Voided (mL): 1500 mL  Total OUT: 1500 mL    Total NET: -1500 mL               Discussed with:     Cultures:	        Radiology    ACC: [id number] EXAM:  CT CHEST   ORDERED BY: ROSAS KNE     PROCEDURE DATE:  05/21/2025          INTERPRETATION:  CLINICAL INFORMATION: Shortness of breath with cough and   leg swelling    COMPARISON: CT chest 12/28/2024    CONTRAST/COMPLICATIONS:  IV Contrast: NONE  Oral Contrast: NONE  .    PROCEDURE:  CT of the Chest was performed.  Sagittal and coronal reformats were performed.    FINDINGS:    LUNGS AND LARGE AIRWAYS: Biapical pleural-parenchymal scarring. There is   debris within the trachea and left mainstem bronchus. Mild bronchial wall   thickening. Mild tree-in-bud nodularity in the left lower lobe. Chronic   interstitial changes at lung bases with bronchiectasis.  PLEURA: No pleural effusion.  VESSELS: Aortic calcifications. Coronary artery calcifications.  HEART: Heart size is normal. No pericardial effusion.  MEDIASTINUM AND ANGIE: No lymphadenopathy.  CHEST WALL AND LOWER NECK: Within normal limits.  VISUALIZED UPPER ABDOMEN: Within normal limits.  BONES: Degenerative changes. Chronic left-sided rib fractures.    IMPRESSION:  Mild tree-in-bud nodularity in the left lower lobe, which may be   infectious/inflammatory.        --- End of Report ---            LINA WATTS MD; Attending Radiologist  This document has been electronically signed. May 21 2025 11:36PM

## 2025-05-22 NOTE — H&P ADULT - PROBLEM SELECTOR PLAN 7
Chronic, on home insulin.   - Will obtain A1C, f/u results   -Basal:   -Low ISS  -Regular finger sticks  -Consistent carb diet  -Hypoglycemic protocol. Chronic, on home insulin- Takes 10 units premeal and 24 units bedtime   - Will obtain A1C, f/u results   -Basal: 7 units premeal and 16 units lantus bedtime   -Low ISS  -Regular finger sticks  -Consistent carb diet  -Hypoglycemic protocol. Chronic, on home insulin- Takes 10 units premeal and  Lantus SC 24 units bedtime   - Will obtain A1C, f/u results   -Basal: 7 units premeal 3x day and 16 units lanatus SC  bedtime   -Low ISS  -Regular finger sticks  -Consistent carb diet  -Hypoglycemic protocol.

## 2025-05-22 NOTE — CASE MANAGEMENT PROGRESS NOTE - NSCMPROGRESSNOTE_GEN_ALL_CORE
CM spoke to [facility] [phone].  Need short form for no changes or 3122 form if any mayor changes and discharge documentation are to be faxed to [phone]. All medications are to be e-scribed to [facility] pharmacy or fax to [phone]. Preferred [facility] is [facility].

## 2025-05-22 NOTE — H&P ADULT - GASTROINTESTINAL
details… normal/soft/nontender/nondistended/normal active bowel sounds obese/normal/soft/nontender/nondistended/normal active bowel sounds/no guarding/no rigidity/no organomegaly/no palpable darryn/no masses palpable

## 2025-05-22 NOTE — CONSULT NOTE ADULT - SUBJECTIVE AND OBJECTIVE BOX
Orthopedics Consult Note:    This is a 74y Male admitted for CHF versus PNA workup. Presents with bursal collection at right elbow, ID recommended orthopedics consult. Patient sits in a wheelchair often and states that he rubs up against the elbow stand often. Patient also looks like he had an IV infiltrate on this arm too, and currently still has IV in that arm. Patient complains of no pain at elbow. Pt denies any other specific injury. Pt denies any numbness, tingling or parethesias. Pt denies any fever or chills.    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    Type 2 diabetes mellitus with diabetic neuropathy, unspecified-E11.40    Type 2 diabetes mellitus with foot ulcer    Type 2 diabetes mellitus with other circulatory complications-E11.59    Type 2 diabetes mellitus with other skin complications-E11.628    Unspecified asthma, uncomplicated-J45.909    No pertinent family history in first degree relatives    Handoff    MEWS Score    Prostate cancer    Type II diabetes mellitus    Chronic obstructive pulmonary disease (COPD)    CHF (congestive heart failure)    Renal insufficiency    H/O migraine    Insomnia    Constipation    Pneumonia    CHF exacerbation    Pneumonia    HTN (hypertension)    T2DM (type 2 diabetes mellitus)    Need for prophylactic measure    REANNA (acute kidney injury)    COPD exacerbation    History of COPD    History of foot ulcer    S/P foot surgery    WEIGHT GAIN    90+    CHF exacerbation    SysAdmin_VisitLink        Allergies:  IODINE (Unknown)  tetracycline (Unknown)  vancomycin (Other)      Vital Signs:  T(C): 36.7 (05-22-25 @ 11:26), Max: 36.8 (05-21-25 @ 20:10)  HR: 83 (05-22-25 @ 11:26) (72 - 87)  BP: 108/65 (05-22-25 @ 11:26) (107/67 - 144/81)  RR: 18 (05-22-25 @ 11:26) (16 - 18)  SpO2: 93% (05-22-25 @ 11:26) (92% - 95%)    Labs:      Imaging:  X-rays pending    PE right elbow:  +moderate localized swelling over olecranon with +fluctuance, +erythema,  skin intact, +mild warmth, with small circular 1-2cm ulcer, No TTP. Full ROM Moving all fingers, sensation intact. Radial pulse 2+.  R Foot ulcers noted    Assessment:  74y Male with right elbow olecranon bursitis, likely traumatic olecranon bursitis.    Plan:  ACE wrap for compression  Elevation  NSAIDs  Pain control  IV antibitoics per ID recommendations  WBAT RUE  FU XR R Elbow, ordered  FU ESR/CRP, ordered  No acute ortho intervention required at this time  Follow-up with Dr. Hill in the office within 1 week; call for appointment.   Case discussed with Dr. Hill who agrees with plan. Orthopedics Consult Note:    This is a 74y Male admitted for CHF versus PNA workup. Presents with bursal collection at right elbow, ID recommended orthopedics consult. Patient sits in a wheelchair often and states that he rubs up against the elbow stand often. Patient also looks like he had an IV infiltrate on this arm too, and currently still has IV in that arm. Patient complains of no pain at elbow. Pt denies any other specific injury. Pt denies any numbness, tingling or parethesias. Pt denies any fever or chills.    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    Type 2 diabetes mellitus with diabetic neuropathy, unspecified-E11.40    Type 2 diabetes mellitus with foot ulcer    Type 2 diabetes mellitus with other circulatory complications-E11.59    Type 2 diabetes mellitus with other skin complications-E11.628    Unspecified asthma, uncomplicated-J45.909    No pertinent family history in first degree relatives    Handoff    MEWS Score    Prostate cancer    Type II diabetes mellitus    Chronic obstructive pulmonary disease (COPD)    CHF (congestive heart failure)    Renal insufficiency    H/O migraine    Insomnia    Constipation    Pneumonia    CHF exacerbation    Pneumonia    HTN (hypertension)    T2DM (type 2 diabetes mellitus)    Need for prophylactic measure    REANNA (acute kidney injury)    COPD exacerbation    History of COPD    History of foot ulcer    S/P foot surgery    WEIGHT GAIN    90+    CHF exacerbation    SysAdmin_VisitLink        Allergies:  IODINE (Unknown)  tetracycline (Unknown)  vancomycin (Other)      Vital Signs:  T(C): 36.7 (05-22-25 @ 11:26), Max: 36.8 (05-21-25 @ 20:10)  HR: 83 (05-22-25 @ 11:26) (72 - 87)  BP: 108/65 (05-22-25 @ 11:26) (107/67 - 144/81)  RR: 18 (05-22-25 @ 11:26) (16 - 18)  SpO2: 93% (05-22-25 @ 11:26) (92% - 95%)    Labs:      Imaging:  X-rays pending    PE right elbow:  +moderate localized swelling over olecranon with +fluctuance, +erythema,  skin intact, +mild warmth, with small circular 1-2cm ulcer, No TTP. Full ROM Moving all fingers, sensation intact. Radial pulse 2+.  R Foot ulcers noted    Assessment:  74y Male with right elbow olecranon bursitis, likely traumatic olecranon bursitis.    Plan:  ACE wrap for compression  Elevation  NSAIDs  Pain control  IV antibiotics per ID recommendations  WBAT RUE  FU ESR/CRP, ordered  No acute ortho intervention required at this time  Recommend removal of IV from right arm and removal of all wrist bands on the right arm  Follow-up with Dr. Hill in the office within 1 week; call for appointment.   Case discussed with Dr. Hill who agrees with plan.

## 2025-05-22 NOTE — H&P ADULT - ATTENDING COMMENTS
74yoM PMH DM2, PAD, hypertension, COPD, CKD,  HFpEF, Hx of R foot ulcer with [facility]elitis [facility] dysp[facility] and weight gain of about 30lbs over past 1 month. Admitted for Ac on chronic Diastolic CHF exacerbation, possible pneumonia.   Pt seen, examined, Case & care plan d/w pt ,residents at [facility], [facility].  Consults:  ID-Dr Denton-IV Abx   Pulmonary-Dr Marie   Cardio-[facility]  AM labs   PO diet  DVT ppx   Palliative care Eval. for [facility] 74yoM PMH DM2, PAD, hypertension, COPD, CKD,  HFpEF, Hx [facility] R [facility] and weight gain of about 30lbs over past 1 month. Admitted for Ac on chronic Diastolic CHF exacerbation, possible pneumonia.   Pt seen, examined, Case & care plan d/w pt ,residents at [facility].  Consults:  ID-Dr Denton-IV Abx   Pulmonary-Dr Marie   Cardio-[facility]  Orthopedic- For Rt Elbow Bursitis   AM labs   PO diet  DVT ppx   Palliative care Eval. for [facility]

## 2025-05-22 NOTE — H&P ADULT - ASSESSMENT
74yoM PMH DM2, PAD, hypertension, COPD, CKD,  HFpEF, Hx of R foot ulcer with osteomyelitis BIBEMS from [place] p/w exertional dyspnea and weight gain of about 30lbs over past 1 month. Admitted for CHF exacerbation, pneumonia.      Is supposed to be on lasix 20mg TID but only takes it BID.       IN THE ED:  Temp  98.2 F , HR 87 ,  /67  ,RR 18 , SpO2 92% RA   S/P x  EKG:  Labs significant for: WBAC 8.15, H/H 11.8/36.7, BUN/Cr 33/1.6, Trop neg x2, proBNP 634  Imaging: CT chest: Mild tree-in-bud nodularity in the left lower lobe, which may be infectious/inflammatory. 74yoM PMH DM2, PAD, hypertension, COPD, CKD,  HFpEF, Hx of R foot ulcer with osteomyelitis B[place]EMS [place] p/w exertional dyspnea and weight gain of about 30lbs over past 1 month. Admitted for CHF exacerbation, pneumonia.      74yoM PMH DM2, PAD, hypertension, COPD, CKD,  HFpEF, Hx of R foot ulcer with [facility]omyelitis [facility] exertional dyspnea and weight gain of about 30lbs over past 1 month. Admitted for Ac on chronic Diastolic  CHF exacerbation, possible pneumonia.

## 2025-05-23 DIAGNOSIS — Z87.39 PERSONAL HISTORY OF OTHER DISEASES OF THE MUSCULOSKELETAL SYSTEM AND CONNECTIVE TISSUE: ICD-10-CM

## 2025-05-23 LAB
CRP SERPL-MCNC: 48 MG/L — HIGH
GLUCOSE BLDC GLUCOMTR-MCNC: 247 MG/DL — HIGH (ref 70–99)
GLUCOSE BLDC GLUCOMTR-MCNC: 264 MG/DL — HIGH (ref 70–99)
GLUCOSE BLDC GLUCOMTR-MCNC: 276 MG/DL — HIGH (ref 70–99)
GLUCOSE BLDC GLUCOMTR-MCNC: 332 MG/DL — HIGH (ref 70–99)
HCT VFR BLD CALC: 38 % — LOW (ref 39–50)
HGB BLD-MCNC: 12.5 G/DL — LOW (ref 13–17)
MCHC RBC-ENTMCNC: 27.6 PG — SIGNIFICANT CHANGE UP (ref 27–34)
MCHC RBC-ENTMCNC: 32.9 G/DL — SIGNIFICANT CHANGE UP (ref 32–36)
MCV RBC AUTO: 83.9 FL — SIGNIFICANT CHANGE UP (ref 80–100)
NRBC BLD AUTO-RTO: 0 /100 WBCS — SIGNIFICANT CHANGE UP (ref 0–0)
PLATELET # BLD AUTO: 173 K/UL — SIGNIFICANT CHANGE UP (ref 150–400)
RBC # BLD: 4.53 M/UL — SIGNIFICANT CHANGE UP (ref 4.2–5.8)
RBC # FLD: 19.7 % — HIGH (ref 10.3–14.5)
WBC # BLD: 9.04 K/UL — SIGNIFICANT CHANGE UP (ref 3.8–10.5)
WBC # FLD AUTO: 9.04 K/UL — SIGNIFICANT CHANGE UP (ref 3.8–10.5)

## 2025-05-23 PROCEDURE — 99233 SBSQ HOSP IP/OBS HIGH 50: CPT

## 2025-05-23 PROCEDURE — 99232 SBSQ HOSP IP/OBS MODERATE 35: CPT

## 2025-05-23 RX ORDER — INSULIN LISPRO 100 U/ML
INJECTION, SOLUTION INTRAVENOUS; SUBCUTANEOUS
Refills: 0 | Status: DISCONTINUED | OUTPATIENT
Start: 2025-05-23 | End: 2025-05-27

## 2025-05-23 RX ORDER — INSULIN LISPRO 100 U/ML
INJECTION, SOLUTION INTRAVENOUS; SUBCUTANEOUS AT BEDTIME
Refills: 0 | Status: DISCONTINUED | OUTPATIENT
Start: 2025-05-23 | End: 2025-05-27

## 2025-05-23 RX ADMIN — PREGABALIN 150 MILLIGRAM(S): 50 CAPSULE ORAL at 17:53

## 2025-05-23 RX ADMIN — OXYCODONE HYDROCHLORIDE 5 MILLIGRAM(S): 30 TABLET ORAL at 00:50

## 2025-05-23 RX ADMIN — Medication 40 MILLIGRAM(S): at 06:11

## 2025-05-23 RX ADMIN — Medication 10 MILLIGRAM(S): at 12:02

## 2025-05-23 RX ADMIN — OXYCODONE HYDROCHLORIDE 5 MILLIGRAM(S): 30 TABLET ORAL at 21:59

## 2025-05-23 RX ADMIN — Medication 100 MILLIGRAM(S): at 00:54

## 2025-05-23 RX ADMIN — MONTELUKAST SODIUM 10 MILLIGRAM(S): 10 TABLET ORAL at 12:00

## 2025-05-23 RX ADMIN — HEPARIN SODIUM 5000 UNIT(S): 1000 INJECTION INTRAVENOUS; SUBCUTANEOUS at 06:11

## 2025-05-23 RX ADMIN — FUROSEMIDE 40 MILLIGRAM(S): 10 INJECTION INTRAMUSCULAR; INTRAVENOUS at 06:10

## 2025-05-23 RX ADMIN — Medication 325 MILLIGRAM(S): at 12:00

## 2025-05-23 RX ADMIN — CLONAZEPAM 0.75 MILLIGRAM(S): 0.5 TABLET ORAL at 18:49

## 2025-05-23 RX ADMIN — INSULIN LISPRO 6: 100 INJECTION, SOLUTION INTRAVENOUS; SUBCUTANEOUS at 17:54

## 2025-05-23 RX ADMIN — DAPTOMYCIN 118 MILLIGRAM(S): 500 INJECTION, POWDER, LYOPHILIZED, FOR SOLUTION INTRAVENOUS at 17:53

## 2025-05-23 RX ADMIN — SERTRALINE 100 MILLIGRAM(S): 100 TABLET, FILM COATED ORAL at 12:00

## 2025-05-23 RX ADMIN — INSULIN LISPRO 10 UNIT(S): 100 INJECTION, SOLUTION INTRAVENOUS; SUBCUTANEOUS at 12:34

## 2025-05-23 RX ADMIN — CEFTRIAXONE 100 MILLIGRAM(S): 500 INJECTION, POWDER, FOR SOLUTION INTRAMUSCULAR; INTRAVENOUS at 00:50

## 2025-05-23 RX ADMIN — ROSUVASTATIN CALCIUM 40 MILLIGRAM(S): 20 TABLET, FILM COATED ORAL at 21:58

## 2025-05-23 RX ADMIN — FUROSEMIDE 40 MILLIGRAM(S): 10 INJECTION INTRAMUSCULAR; INTRAVENOUS at 14:53

## 2025-05-23 RX ADMIN — HEPARIN SODIUM 5000 UNIT(S): 1000 INJECTION INTRAVENOUS; SUBCUTANEOUS at 21:59

## 2025-05-23 RX ADMIN — INSULIN LISPRO 10 UNIT(S): 100 INJECTION, SOLUTION INTRAVENOUS; SUBCUTANEOUS at 17:53

## 2025-05-23 RX ADMIN — HEPARIN SODIUM 5000 UNIT(S): 1000 INJECTION INTRAVENOUS; SUBCUTANEOUS at 14:54

## 2025-05-23 RX ADMIN — Medication 3 MILLIGRAM(S): at 22:00

## 2025-05-23 RX ADMIN — INSULIN LISPRO 2: 100 INJECTION, SOLUTION INTRAVENOUS; SUBCUTANEOUS at 08:45

## 2025-05-23 RX ADMIN — Medication 1 DOSE(S): at 18:35

## 2025-05-23 RX ADMIN — Medication 1 DOSE(S): at 06:17

## 2025-05-23 RX ADMIN — CLONAZEPAM 0.75 MILLIGRAM(S): 0.5 TABLET ORAL at 06:25

## 2025-05-23 RX ADMIN — OXYCODONE HYDROCHLORIDE 5 MILLIGRAM(S): 30 TABLET ORAL at 15:51

## 2025-05-23 RX ADMIN — Medication 1 DOSE(S): at 01:17

## 2025-05-23 RX ADMIN — PREDNISONE 10 MILLIGRAM(S): 20 TABLET ORAL at 06:11

## 2025-05-23 RX ADMIN — INSULIN LISPRO 10 UNIT(S): 100 INJECTION, SOLUTION INTRAVENOUS; SUBCUTANEOUS at 08:45

## 2025-05-23 RX ADMIN — METOPROLOL SUCCINATE 25 MILLIGRAM(S): 50 TABLET, EXTENDED RELEASE ORAL at 06:11

## 2025-05-23 RX ADMIN — TIOTROPIUM BROMIDE INHALATION SPRAY 2 PUFF(S): 3.12 SPRAY, METERED RESPIRATORY (INHALATION) at 08:47

## 2025-05-23 RX ADMIN — OXYCODONE HYDROCHLORIDE 5 MILLIGRAM(S): 30 TABLET ORAL at 16:15

## 2025-05-23 RX ADMIN — INSULIN GLARGINE-YFGN 24 UNIT(S): 100 INJECTION, SOLUTION SUBCUTANEOUS at 22:00

## 2025-05-23 RX ADMIN — INSULIN LISPRO 4: 100 INJECTION, SOLUTION INTRAVENOUS; SUBCUTANEOUS at 12:34

## 2025-05-23 RX ADMIN — OXYCODONE HYDROCHLORIDE 5 MILLIGRAM(S): 30 TABLET ORAL at 06:25

## 2025-05-23 RX ADMIN — IPRATROPIUM BROMIDE AND ALBUTEROL SULFATE 3 MILLILITER(S): .5; 2.5 SOLUTION RESPIRATORY (INHALATION) at 00:59

## 2025-05-23 RX ADMIN — Medication 1 APPLICATION(S): at 06:12

## 2025-05-23 RX ADMIN — PREGABALIN 150 MILLIGRAM(S): 50 CAPSULE ORAL at 06:11

## 2025-05-23 RX ADMIN — INSULIN LISPRO 2: 100 INJECTION, SOLUTION INTRAVENOUS; SUBCUTANEOUS at 21:59

## 2025-05-23 NOTE — PROGRESS NOTE ADULT - SUBJECTIVE AND OBJECTIVE BOX
[facility] -- Erick Hogue Pannella, Patel, Savella, Goodger, Cohen: Office # [phone]    Follow Up:  HF, BERGER    Subjective/Observations: No events overnight resting comfortably in bed.  No complaints of chest pain, dyspnea, or palpitations reported. No signs of orthopnea or PND. Tolerating room air     REVIEW OF SYSTEMS: All other review of systems are negative unless indicated above    PAST MEDICAL & SURGICAL HISTORY:  Prostate cancer      Type II diabetes mellitus      Chronic obstructive pulmonary disease (COPD)      CHF (congestive heart failure)      Renal insufficiency      H/O migraine      Insomnia      Constipation      S/P foot surgery          MEDICATIONS  (STANDING):  cefTRIAXone   IVPB 1000 milliGRAM(s) IV Intermittent every 24 hours  cetirizine 10 milliGRAM(s) Oral daily  chlorhexidine 2% Cloths 1 Application(s) Topical <User Schedule>  DAPTOmycin IVPB 450 milliGRAM(s) IV Intermittent every 24 hours  dextrose 5%. 1000 milliLiter(s) (50 mL/Hr) IV Continuous <Continuous>  dextrose 5%. 1000 milliLiter(s) (100 mL/Hr) IV Continuous <Continuous>  dextrose 50% Injectable 25 Gram(s) IV Push once  dextrose 50% Injectable 12.5 Gram(s) IV Push once  dextrose 50% Injectable 25 Gram(s) IV Push once  ferrous    sulfate 325 milliGRAM(s) Oral daily  fluticasone propionate/ salmeterol 250-50 MICROgram(s) Diskus 1 Dose(s) Inhalation two times a day  furosemide   Injectable 40 milliGRAM(s) IV Push two times a day  glucagon  Injectable 1 milliGRAM(s) IntraMuscular once  heparin   Injectable 5000 Unit(s) SubCutaneous every 8 hours  insulin glargine Injectable (LANTUS) 24 Unit(s) SubCutaneous at bedtime  insulin lispro (ADMELOG) corrective regimen sliding scale   SubCutaneous three times a day before meals  insulin lispro (ADMELOG) corrective regimen sliding scale   SubCutaneous at bedtime  insulin lispro Injectable (ADMELOG) 10 Unit(s) SubCutaneous three times a day before meals  metoprolol succinate ER 25 milliGRAM(s) Oral daily  montelukast 10 milliGRAM(s) Oral daily  pantoprazole    Tablet 40 milliGRAM(s) Oral before breakfast  polyethylene glycol 3350 17 Gram(s) Oral daily  predniSONE   Tablet 10 milliGRAM(s) Oral daily  pregabalin 150 milliGRAM(s) Oral two times a day  rosuvastatin 40 milliGRAM(s) Oral at bedtime  senna 2 Tablet(s) Oral at bedtime  sertraline 100 milliGRAM(s) Oral daily  tiotropium 2.5 MICROgram(s) Inhaler 2 Puff(s) Inhalation daily    MEDICATIONS  (PRN):  acetaminophen     Tablet .. 650 milliGRAM(s) Oral every 6 hours PRN Temp greater or equal to 38C (100.4F), Mild Pain (1 - 3)  albuterol/ipratropium for Nebulization 3 milliLiter(s) Nebulizer every 6 hours PRN Shortness of Breath and/or Wheezing  benzonatate 100 milliGRAM(s) Oral three times a day PRN Cough  clonazePAM Oral Disintegrating Tablet 0.75 milliGRAM(s) Oral two times a day PRN anxiety  dextrose Oral Gel 15 Gram(s) Oral once PRN Blood Glucose LESS THAN 70 milliGRAM(s)/deciliter  melatonin 3 milliGRAM(s) Oral at bedtime PRN Insomnia  oxyCODONE    IR 2.5 milliGRAM(s) Oral every 4 hours PRN Moderate Pain (4 - 6)  oxyCODONE    IR 5 milliGRAM(s) Oral every 6 hours PRN Severe Pain (7 - 10)    Allergies    IODINE (Unknown)  tetracycline (Unknown)  vancomycin (Other)    Intolerances      Vital Signs Last 24 Hrs  T(C): 36.6 (23 May 2025 04:47), Max: 36.7 (22 May 2025 11:26)  T(F): 97.9 (23 May 2025 04:47), Max: 98 (22 May 2025 11:26)  HR: 77 (23 May 2025 04:47) (77 - 87)  BP: 112/64 (23 May 2025 04:47) (108/65 - 118/66)  BP(mean): --  RR: 18 (23 May 2025 04:47) (17 - 18)  SpO2: 96% (23 May 2025 04:47) (93% - 98%)    Parameters below as of 23 May 2025 04:47  Patient On (Oxygen Delivery Method): room air      I&O's Summary    22 May 2025 07:01  -  23 May 2025 07:00  --------------------------------------------------------  IN: 711 mL / OUT: 3800 mL / NET: -3089 mL          TELE: SR w/ PVC  PHYSICAL EXAM:  Constitutional: NAD, awake and alert  HEENT: Moist Mucous Membranes, Anicteric  Pulmonary: Non-labored, breath sounds with Bilaterally crackles and diminished at bases  No  wheezes, or rhonchi   Cardiovascular: Regular, S1 and S2, No murmurs, No rubs, gallops or clicks  Gastrointestinal:  soft, nontender, nondistended   Lymph: + peripheral edema. No lymphadenopathy.   Skin: No visible rashes or ulcers.  Psych:  Mood & affect appropriate      LABS: All Labs Reviewed:                        12.5   9.04  )-----------( 173      ( 23 May 2025 07:30 )             38.0                         12.4   10.38 )-----------( 158      ( 22 May 2025 08:05 )             38.2                         11.8   8.15  )-----------( 166      ( 21 May 2025 21:10 )             36.7     22 May 2025 08:05    137    |  97     |  35     ----------------------------<  222    3.3     |  28     |  1.40   21 May 2025 21:10    136    |  100    |  33     ----------------------------<  274    4.3     |  24     |  1.60     Ca    9.1        22 May 2025 08:05  Ca    9.1        21 May 2025 21:10    TPro  7.5    /  Alb  2.9    /  TBili  0.4    /  DBili  x      /  AST  17     /  ALT  33     /  AlkPhos  72     22 May 2025 08:05  TPro  7.2    /  Alb  2.8    /  TBili  0.4    /  DBili  x      /  AST  21     /  ALT  33     /  AlkPhos  71     21 May 2025 21:10   LIVER FUNCTIONS - ( 22 May 2025 08:05 )  Alb: 2.9 g/dL / Pro: 7.5 g/dL / ALK PHOS: 72 U/L / ALT: 33 U/L / AST: 17 U/L / GGT: x           PT/INR - ( 21 May 2025 21:10 )   PT: 12.6 sec;   INR: 1.08 ratio         PTT - ( 21 May 2025 21:10 )  PTT:28.6 secTroponin I, High Sensitivity Result: 23.5 ng/L (05-21-25 @ 23:30)  Troponin I, High Sensitivity Result: 21.2 ng/L (05-21-25 @ 21:10)  Cholesterol: 109 mg/dL (05-22-25 @ 08:05)  HDL Cholesterol: 43 mg/dL (05-22-25 @ 08:05)  Triglycerides, Serum: 111 mg/dL (05-22-25 @ 08:05)    12 Lead ECG:   Ventricular Rate 75 BPM    Atrial Rate 75 BPM    P-R Interval 210 ms    QRS Duration 94 ms    Q-T Interval 398 ms    QTC Calculation(Bazett) 444 ms    P Axis -1 degrees    R Axis -66 degrees    T Axis 39 degrees    Diagnosis Line Sinus rhythm with 1st degree AV block  Left axis deviation  Low voltage QRS  Septal infarct (cited on or before 26-AUG-2023)    Confirmed by NICO ODONNELL ([id number]) on 5/22/2025 5:14:20 PM (05-21-25 @ 23:33)      TRANSTHORACIC ECHOCARDIOGRAM REPORT  ________________________________________________________________________________                                      _______       Pt. Name:       WAYNE SERRANO Study Date:    10/9/2024  MRN:            [id number]     YOB: 1950  Accession #:    [id number]     Age:           74 years  Account#:       [id number]    Gender:        M  Heart Rate:                   Height:        70.08 in (178.00 cm)  Rhythm:                       Weight:        220.46 lb (100.00 kg)  Blood Pressure: 122/51 mmHg   BSA/BMI:       2.18 m² / 31.56 kg/m²  ________________________________________________________________________________________  Referring Physician:    [id number] Ariela Diaz  Interpreting Physician: Jason Joseph MD  Primary Sonographer:    Jenny Trent [facility]    CPT:               ECHO TTE WO CON COMP W DOPP - 30682.m  Indication(s):     Chronic ischemic heart disease, unspecified - I25.9  Procedure:         Transthoracic echocardiogram with 2-D, M-mode and complete                     spectral and color flow Doppler.  Ordering Location: [facility]  Admission Status:  Inpatient  Study Information: Image quality for this study is technically difficult.    _______________________________________________________________________________________     CONCLUSIONS:      1. Technically difficult image quality.   2. Left ventricular systolic function is normal with an ejection fraction of 61 % by Valencia's method of disks.   3. There is moderate (grade 2) left ventricular diastolic dysfunction.   4. Trace mitral regurgitation.   5. Trace tricuspid regurgitation.    ________________________________________________________________________________________  FINDINGS:     Left Ventricle:  Left ventricular systolic function is normal with a calculated ejection fraction of 61 % by the Valencia's biplane method of disks. Mild left ventricular hypertrophy. There is moderate (grade 2) left ventricular diastolic dysfunction.     Right Ventricle:  The right ventricle is not well visualized.     Left Atrium:  The left atrium is normal in size.     Right Atrium:  The right atrium is normal in size.     Aortic Valve:  The aortic valve is tricuspid with normal leaflet excursion. There is no aortic valve stenosis. There is no evidence of aortic regurgitation.     Mitral Valve:  Structurally normal mitral valve with normal leaflet excursion. There is no mitral valve stenosis. There is trace mitral regurgitation.     Tricuspid Valve:  Structurally normaltricuspid valve with normal leaflet excursion. There is trace tricuspid regurgitation. Estimated pulmonary artery systolic pressure is 26 mmHg.     Pulmonic Valve:  The pulmonic valve was not well visualized.     Aorta:  The aortic root at the sinuses of Valsalva is normal in size.     Pericardium:  No pericardial effusion seen.     Systemic Veins:  The inferior vena cava is dilated measuring 2.79 cm in diameter, (dilated >2.1cm) with abnormal inspiratory collapse (abnormal <50%) consistent with elevated right atrial pressure (~15, range 10-20mmHg).  ____________________________________________________________________  QUANTITATIVE DATA:  Left Ventricle Measurements: (Indexed to BSA)     IVSd (2D):   1.3 cm  LVPWd (2D):  1.2 cm  LVIDd (2D):  5.0 cm  LVIDs (2D):  3.3 cm  LV Mass:     243 g  111.5 g/m²  LV Vol d, MOD A2C: 78.9 ml 36.24 ml/m²  LV Vol d, MOD A4C: 94.4 ml 43.37 ml/m²  LV Vol d, MOD BP:  86.7 ml 39.80 ml/m²  LV Vol s, MOD A2C: 31.6 ml 14.51 ml/m²  LV Vol s, MOD A4C: 34.8 ml 16.01 ml/m²  LV Vol s, MOD BP:  34.2 ml 15.70 ml/m²  LVOT SV MOD BP:    52.5 ml  LV EF% MOD BP:     61 %     MV E Vmax:    0.83 m/s  MV A Vmax:    1.09 m/s  MV E/A:       0.76  e' lateral:   9.00 cm/s  e' medial:    5.20 cm/s  E/e' lateral: 9.24  E/e' medial:  15.99  E/e' Average: 11.71  MV DT:        351 msec    Aorta Measurements: (Normal range) (Indexed to BSA)     Ao Root d 3.48 cm (3.1 - 3.7 cm) 1.60 cm/m²            Left Atrium Measurements: (Indexed to BSA)  LA Diam 2D: 3.81 cm         Mitral Valve Measurements:     MV E Vmax: 0.8 m/s         MR Peak Gradient: 10.2 mmHg  MV A Vmax: 1.1 m/s  MV E/A:    0.8       Tricuspid Valve Measurements:     TR Vmax:          1.6 m/s  TR Peak Gradient: 10.8 mmHg  RA Pressure:      15 mmHg  PASP:    26 mmHg    ________________________________________________________________________________________  Electronically signed on 10/10/2024 at 9:09:53 AM by Jason Joseph MD         *** Final ***

## 2025-05-23 NOTE — PROGRESS NOTE ADULT - PROBLEM SELECTOR PLAN 8
Chronic  -  /67 on admission  - Continue home Toprol XL  w/ hold parameters  Lasix 40 mg 2x day    #HLD  - Continue home statin On Prednisone 10 mg daily   Continue Home Meds

## 2025-05-23 NOTE — PROGRESS NOTE ADULT - SUBJECTIVE AND OBJECTIVE BOX
[facility]  INFECTIOUS DISEASES   [address] [place] [address]  Tel: [phone]     Fax: [phone]  ========================================================  MD Panda Catherine Michelle, MD Shah, Kaushal, MD Sunjit, Jaspal, MD Sehrish Shahid, MD   ========================================================    [id number]  WAYNE SERRANO     CC: Patient is a 74y old  Male who presents with a chief complaint of CHF exacerbation, PNA (22 May 2025 10:14)    Follow up: RLE wound about the same, no new complaint, comfortable, no cough or SOB. No fever.     PAST MEDICAL & SURGICAL HISTORY:  Prostate cancer  Type II diabetes mellitus  Chronic obstructive pulmonary disease (COPD)  CHF (congestive heart failure)  Renal insufficiency  H/O migraine  Insomnia  Constipation  S/P foot surgery    Social Hx: No current smoking, EtOH or drugs     FAMILY HISTORY:  Noncontributory     Allergies  IODINE (Unknown)  tetracycline (Unknown)  vancomycin (Other)    MEDICATIONS  (STANDING):  azithromycin  IVPB      cefTRIAXone   IVPB 1000 milliGRAM(s) IV Intermittent every 24 hours  cetirizine 10 milliGRAM(s) Oral daily  dextrose 5%. 1000 milliLiter(s) (50 mL/Hr) IV Continuous <Continuous>  dextrose 5%. 1000 milliLiter(s) (100 mL/Hr) IV Continuous <Continuous>  dextrose 50% Injectable 25 Gram(s) IV Push once  dextrose 50% Injectable 12.5 Gram(s) IV Push once  dextrose 50% Injectable 25 Gram(s) IV Push once  ferrous    sulfate 325 milliGRAM(s) Oral daily  fluticasone propionate/ salmeterol 250-50 MICROgram(s) Diskus 1 Dose(s) Inhalation two times a day  furosemide   Injectable 40 milliGRAM(s) IV Push two times a day  glucagon  Injectable 1 milliGRAM(s) IntraMuscular once  heparin   Injectable 5000 Unit(s) SubCutaneous every 8 hours  insulin glargine Injectable (LANTUS) 16 Unit(s) SubCutaneous at bedtime  insulin lispro (ADMELOG) corrective regimen sliding scale   SubCutaneous three times a day before meals  insulin lispro (ADMELOG) corrective regimen sliding scale   SubCutaneous at bedtime  insulin lispro Injectable (ADMELOG) 7 Unit(s) SubCutaneous three times a day before meals  metoprolol succinate ER 25 milliGRAM(s) Oral daily  montelukast 10 milliGRAM(s) Oral daily  pantoprazole    Tablet 40 milliGRAM(s) Oral before breakfast  polyethylene glycol 3350 17 Gram(s) Oral daily  pregabalin 150 milliGRAM(s) Oral two times a day  rosuvastatin 40 milliGRAM(s) Oral at bedtime  senna 2 Tablet(s) Oral at bedtime  sertraline 100 milliGRAM(s) Oral daily  tiotropium 2.5 MICROgram(s) Inhaler 2 Puff(s) Inhalation daily     REVIEW OF SYSTEMS:  CONSTITUTIONAL:  No Fever or chills  HEENT:  No diplopia or blurred vision.  No sore throat or runny nose.  CARDIOVASCULAR:  No chest pain or palpitations   RESPIRATORY:  +cough, shortness of breath better  GASTROINTESTINAL:  No nausea, vomiting or diarrhea.  GENITOURINARY:  No dysuria, frequency or urgency. No Blood in urine  MUSCULOSKELETAL:  +R elbow swelling and redness   SKIN: +wound on lateral R ankle     Physical Exam:  Vital Signs Last 24 Hrs  T(C): 36.6 (23 May 2025 04:47), Max: 36.7 (22 May 2025 20:45)  T(F): 97.9 (23 May 2025 04:47), Max: 98 (22 May 2025 20:45)  HR: 77 (23 May 2025 04:47) (77 - 87)  BP: 112/64 (23 May 2025 04:47) (112/64 - 118/66)  BP(mean): --  RR: 18 (23 May 2025 04:47) (17 - 18)  SpO2: 96% (23 May 2025 04:47) (93% - 98%)  Parameters below as of 23 May 2025 04:47  Patient On (Oxygen Delivery Method): room air  GEN: NAD  HEENT: normocephalic and atraumatic. EOMI. PERRL.    NECK: Supple.  No lymphadenopathy   LUNGS: +crackles L lung   HEART: Regular rate and rhythm without murmur.  ABDOMEN: +distended. Soft, nontender. Positive bowel sounds.    EXTREMITIES: +R elbow bursitis and erythema with non open scab, +b/l LE pitting edema R>L  NEUROLOGIC: grossly intact.  PSYCHIATRIC: Appropriate affect .  SKIN: +R elbow closed wound, +RLE open ankle wound with mild, non pustular drainage and surrounding erythema     Labs:                        12.5   9.04  )-----------( 173      ( 23 May 2025 07:30 )             38.0     05-22    137  |  97  |  35[H]  ----------------------------<  222[H]  3.3[L]   |  28  |  1.40[H]    Ca    9.1      22 May 2025 08:05    TPro  7.5  /  Alb  2.9[L]  /  TBili  0.4  /  DBili  x   /  AST  17  /  ALT  33  /  AlkPhos  72  05-22    Urinalysis with Rflx Culture (collected 05-22-25 @ 03:50)    Culture - Blood (collected 05-22-25 @ 01:15)  Source: Blood Blood-Peripheral  Preliminary Report (05-23-25 @ 06:01):    No growth at 24 hours    Culture - Blood (collected 05-22-25 @ 01:15)  Source: Blood Blood-Peripheral  Preliminary Report (05-23-25 @ 06:01):    No growth at 24 hours    Culture - Tissue with Gram Stain (collected 10-03-24 @ 10:45)  Source: Tissue  Gram Stain (10-04-24 @ 17:51):    No polymorphonuclear cells seen per low power field    No organisms seen per oil power field  Final Report (10-08-24 @ 21:44):    Rare Methicillin Resistant Staphylococcus aureus  Organism: Methicillin resistant Staphylococcus aureus (10-08-24 @ 21:44)  Organism: Methicillin resistant Staphylococcus aureus (10-08-24 @ 21:44)    Sensitivities:      -  Clindamycin: R >4      -  Oxacillin: R >2      -  Gentamicin: S <=1 Should not be used as monotherapy      -  Daptomycin: S <=0.25      -  Linezolid: S 1      -  Vancomycin: S 1      -  Tetracycline: S <=1      Method Type: MANOJ      -  Penicillin: R 8      -  Rifampin: S <=1 Should not be used as monotherapy      -  Erythromycin: R >4      -  Trimethoprim/Sulfamethoxazole: S <=0.5/9.5    Culture - Wound Aerobic (collected 10-01-24 @ 11:00)  Source: Skin/Wound  Final Report (10-04-24 @ 22:25):    Few Methicillin Resistant Staphylococcus aureus    Commensal jameel consistent with body site  Organism: Methicillin resistant Staphylococcus aureus (10-04-24 @ 22:25)  Organism: Methicillin resistant Staphylococcus aureus (10-04-24 @ 22:25)    Sensitivities:      -  Clindamycin: R >4      -  Oxacillin: R >2      -  Gentamicin: S <=1 Should not be used as monotherapy      -  Daptomycin: S 0.5      -  Linezolid: S 2      -  Vancomycin: S 1      -  Tetracycline: S <=1      Method Type: MANOJ      -  Penicillin: R >8      -  Rifampin: S <=1 Should not be used as monotherapy      -  Erythromycin: R >4      -  Trimethoprim/Sulfamethoxazole: S <=0.5/9.5    WBC Count: 9.04 K/uL (05-23-25 @ 07:30)  WBC Count: 10.38 K/uL (05-22-25 @ 08:05)  WBC Count: 8.15 K/uL (05-21-25 @ 21:10)    Creatinine: 1.40 mg/dL (05-22-25 @ 08:05)  Creatinine: 1.60 mg/dL (05-21-25 @ 21:10)    C-Reactive Protein: 48 mg/L (05-22-25 @ 14:25)    Ferritin: 215 ng/mL (05-22-25 @ 08:05)    Sedimentation Rate, Erythrocyte: 65 mm/hr (05-22-25 @ 14:25)    SARS-CoV-2: NotDetec (05-22-25 @ 00:15)    All imaging and other data have been reviewed.  < from: CT Chest No Cont (05.21.25 @ 22:41) >  IMPRESSION:  Mild tree-in-bud nodularity in the left lower lobe, which may be   infectious/inflammatory.    Assessment and Plan:   Patient is a 74yoM PMH DM2, PAD, hypertension, COPD, CKD,  HFpEF, Hx of R foot ulcer with osteomyelitis admitted for CHF vs PNA. CT scan not convincing for PNA, however given age and comorbidities will continue abx to cover for PNA. Patient also with hx of MRSA wound cultures. Currently with open wound and erythema on RLE ankle.    Cough and SOB improved, no new complaint, no fever.   R elbow swelling and bursitis seen by ortho, no intervention at this time. RLE wound stable with MRSA in the past, so in contact isolation and started dapto for few doses.     # Pneumonia  # R elbow swelling   # RLE Wound with MRSA in the past     - Contact isolation   - Blood cultures NGTD will follow   - Negative urine legionella and strep   - Continue Ceftriaxone 1gm daily   - Continue daptomycin 450mg daily  - Continue local wound care for R ankle and R elbow     Will follow.  Dr. Mosqueda is covering this weekend.     Frieda Denton MD  Division of Infectious Diseases   Please call ID service at [phone] with any question.    50 minutes spent on total encounter assessing patient, examination, chart review, counseling and coordinating care by the attending physician/nurse/care manager.   [facility]  INFECTIOUS DISEASES   [address] [place] [address]  Tel: [phone]     Fax: [phone]  ========================================================  MD Panda Catherine Michelle, MD Shah, Kaushal, MD Sunjit, Jaspal, MD Sehrish Shahid, MD   ========================================================    [id number]  WAYNE SERRANO     CC: Patient is a 74y old  Male who presents with a chief complaint of CHF exacerbation, PNA (22 May 2025 10:14)    Follow up: RLE wound about the same, no new complaint, comfortable, no cough or SOB. No fever.     PAST MEDICAL & SURGICAL HISTORY:  Prostate cancer  Type II diabetes mellitus  Chronic obstructive pulmonary disease (COPD)  CHF (congestive heart failure)  Renal insufficiency  H/O migraine  Insomnia  Constipation  S/P foot surgery    Social Hx: No current smoking, EtOH or drugs     FAMILY HISTORY:  Noncontributory     Allergies  IODINE (Unknown)  tetracycline (Unknown)  vancomycin (Other)    MEDICATIONS  (STANDING):  azithromycin  IVPB      cefTRIAXone   IVPB 1000 milliGRAM(s) IV Intermittent every 24 hours  cetirizine 10 milliGRAM(s) Oral daily  dextrose 5%. 1000 milliLiter(s) (50 mL/Hr) IV Continuous <Continuous>  dextrose 5%. 1000 milliLiter(s) (100 mL/Hr) IV Continuous <Continuous>  dextrose 50% Injectable 25 Gram(s) IV Push once  dextrose 50% Injectable 12.5 Gram(s) IV Push once  dextrose 50% Injectable 25 Gram(s) IV Push once  ferrous    sulfate 325 milliGRAM(s) Oral daily  fluticasone propionate/ salmeterol 250-50 MICROgram(s) Diskus 1 Dose(s) Inhalation two times a day  furosemide   Injectable 40 milliGRAM(s) IV Push two times a day  glucagon  Injectable 1 milliGRAM(s) IntraMuscular once  heparin   Injectable 5000 Unit(s) SubCutaneous every 8 hours  insulin glargine Injectable (LANTUS) 16 Unit(s) SubCutaneous at bedtime  insulin lispro (ADMELOG) corrective regimen sliding scale   SubCutaneous three times a day before meals  insulin lispro (ADMELOG) corrective regimen sliding scale   SubCutaneous at bedtime  insulin lispro Injectable (ADMELOG) 7 Unit(s) SubCutaneous three times a day before meals  metoprolol succinate ER 25 milliGRAM(s) Oral daily  montelukast 10 milliGRAM(s) Oral daily  pantoprazole    Tablet 40 milliGRAM(s) Oral before breakfast  polyethylene glycol 3350 17 Gram(s) Oral daily  pregabalin 150 milliGRAM(s) Oral two times a day  rosuvastatin 40 milliGRAM(s) Oral at bedtime  senna 2 Tablet(s) Oral at bedtime  sertraline 100 milliGRAM(s) Oral daily  tiotropium 2.5 MICROgram(s) Inhaler 2 Puff(s) Inhalation daily     REVIEW OF SYSTEMS:  CONSTITUTIONAL:  No Fever or chills  HEENT:  No diplopia or blurred vision.  No sore throat or runny nose.  CARDIOVASCULAR:  No chest pain or palpitations   RESPIRATORY:  +cough, shortness of breath better  GASTROINTESTINAL:  No nausea, vomiting or diarrhea.  GENITOURINARY:  No dysuria, frequency or urgency. No Blood in urine  MUSCULOSKELETAL:  +R elbow swelling and redness   SKIN: +wound on lateral R ankle     Physical Exam:  Vital Signs Last 24 Hrs  T(C): 36.6 (23 May 2025 04:47), Max: 36.7 (22 May 2025 20:45)  T(F): 97.9 (23 May 2025 04:47), Max: 98 (22 May 2025 20:45)  HR: 77 (23 May 2025 04:47) (77 - 87)  BP: 112/64 (23 May 2025 04:47) (112/64 - 118/66)  BP(mean): --  RR: 18 (23 May 2025 04:47) (17 - 18)  SpO2: 96% (23 May 2025 04:47) (93% - 98%)  Parameters below as of 23 May 2025 04:47  Patient On (Oxygen Delivery Method): room air  GEN: NAD  HEENT: normocephalic and atraumatic. EOMI. PERRL.    NECK: Supple.  No lymphadenopathy   LUNGS: +crackles L lung   HEART: Regular rate and rhythm without murmur.  ABDOMEN: +distended. Soft, nontender. Positive bowel sounds.    EXTREMITIES: +R elbow bursitis and erythema with non open scab, +b/l LE pitting edema R>L  NEUROLOGIC: grossly intact.  PSYCHIATRIC: Appropriate affect .  SKIN: +R elbow closed wound, +RLE open ankle wound with mild, non pustular drainage and surrounding erythema     Labs:                        12.5   9.04  )-----------( 173      ( 23 May 2025 07:30 )             38.0     05-22    137  |  97  |  35[H]  ----------------------------<  222[H]  3.3[L]   |  28  |  1.40[H]    Ca    9.1      22 May 2025 08:05    TPro  7.5  /  Alb  2.9[L]  /  TBili  0.4  /  DBili  x   /  AST  17  /  ALT  33  /  AlkPhos  72  05-22    Urinalysis with Rflx Culture (collected 05-22-25 @ 03:50)    Culture - Blood (collected 05-22-25 @ 01:15)  Source: Blood Blood-Peripheral  Preliminary Report (05-23-25 @ 06:01):    No growth at 24 hours    Culture - Blood (collected 05-22-25 @ 01:15)  Source: Blood Blood-Peripheral  Preliminary Report (05-23-25 @ 06:01):    No growth at 24 hours    Culture - Tissue with Gram Stain (collected 10-03-24 @ 10:45)  Source: Tissue  Gram Stain (10-04-24 @ 17:51):    No polymorphonuclear cells seen per low power field    No organisms seen per oil power field  Final Report (10-08-24 @ 21:44):    Rare Methicillin Resistant Staphylococcus aureus  Organism: Methicillin resistant Staphylococcus aureus (10-08-24 @ 21:44)  Organism: Methicillin resistant Staphylococcus aureus (10-08-24 @ 21:44)    Sensitivities:      -  Clindamycin: R >4      -  Oxacillin: R >2      -  Gentamicin: S <=1 Should not be used as monotherapy      -  Daptomycin: S <=0.25      -  Linezolid: S 1      -  Vancomycin: S 1      -  Tetracycline: S <=1      Method Type: MANOJ      -  Penicillin: R 8      -  Rifampin: S <=1 Should not be used as monotherapy      -  Erythromycin: R >4      -  Trimethoprim/Sulfamethoxazole: S <=0.5/9.5    Culture - Wound Aerobic (collected 10-01-24 @ 11:00)  Source: Skin/Wound  Final Report (10-04-24 @ 22:25):    Few Methicillin Resistant Staphylococcus aureus    Commensal jameel consistent with body site  Organism: Methicillin resistant Staphylococcus aureus (10-04-24 @ 22:25)  Organism: Methicillin resistant Staphylococcus aureus (10-04-24 @ 22:25)    Sensitivities:      -  Clindamycin: R >4      -  Oxacillin: R >2      -  Gentamicin: S <=1 Should not be used as monotherapy      -  Daptomycin: S 0.5      -  Linezolid: S 2      -  Vancomycin: S 1      -  Tetracycline: S <=1      Method Type: MANOJ      -  Penicillin: R >8      -  Rifampin: S <=1 Should not be used as monotherapy      -  Erythromycin: R >4      -  Trimethoprim/Sulfamethoxazole: S <=0.5/9.5    WBC Count: 9.04 K/uL (05-23-25 @ 07:30)  WBC Count: 10.38 K/uL (05-22-25 @ 08:05)  WBC Count: 8.15 K/uL (05-21-25 @ 21:10)    Creatinine: 1.40 mg/dL (05-22-25 @ 08:05)  Creatinine: 1.60 mg/dL (05-21-25 @ 21:10)    C-Reactive Protein: 48 mg/L (05-22-25 @ 14:25)    Ferritin: 215 ng/mL (05-22-25 @ 08:05)    Sedimentation Rate, Erythrocyte: 65 mm/hr (05-22-25 @ 14:25)    SARS-CoV-2: NotDetec (05-22-25 @ 00:15)    All imaging and other data have been reviewed.  < from: CT Chest No Cont (05.21.25 @ 22:41) >  IMPRESSION:  Mild tree-in-bud nodularity in the left lower lobe, which may be   infectious/inflammatory.    Assessment and Plan:   Patient is a 74yoM PMH DM2, PAD, hypertension, COPD, CKD,  HFpEF, Hx of R foot ulcer with osteomyelitis admitted for CHF vs PNA. CT scan not convincing for PNA, however given age and comorbidities will continue abx to cover for PNA. Patient also with hx of MRSA wound cultures. Currently with open wound and erythema on RLE ankle.    Cough and SOB improved, no new complaint, no fever.   R elbow swelling and bursitis seen by ortho, no intervention at this time. RLE wound stable with MRSA in the past, so in contact isolation and started dapto until 5/26( will switch to doxycycline orally)    # Pneumonia  # R elbow swelling   # RLE Wound with MRSA in the past     - Contact isolation   - Blood cultures NGTD will follow   - Negative urine legionella and strep   - Continue Ceftriaxone 1gm daily   - Continue daptomycin 450mg daily  - Continue local wound care for R ankle and R elbow     Will follow.  Dr. Mosqueda is covering this weekend.     Frieda Denton MD  Division of Infectious Diseases   Please call ID service at [phone] with any question.    50 minutes spent on total encounter assessing patient, examination, chart review, counseling and coordinating care by the attending physician/nurse/care manager.

## 2025-05-23 NOTE — PROGRESS NOTE ADULT - PROBLEM SELECTOR PLAN 3
Patient with would on let foot   - Hx MRSA  - Placed on contact precautions  - Started on Dapto by ID Patient with would on let foot   - Hx MRSA wound -Contact isolation  - Placed on contact precautions  - Started on Dapto by ID

## 2025-05-23 NOTE — PROGRESS NOTE ADULT - PROBLEM SELECTOR PLAN 10
Heparin 5000units Q8 for chemical DVT ppx Chronic, on home insulin- Takes 10 units pre meal and  Lantus SC 24 units bedtime    A1c 10   - Continue home regiment   -Low ISS  -Regular finger sticks  -Consistent carb diet  -Hypoglycemic protocol.

## 2025-05-23 NOTE — PROGRESS NOTE ADULT - ASSESSMENT
74yoM PMH DM2, PAD, hypertension, COPD, CKD,  HFpEF, Hx of R foot ulcer with osteomyelitis [facility] and weight gain of about 30lbs over past 1 month. Admitted for Ac on chronic Diastolic  CHF exacerbation, possible pneumonia.

## 2025-05-23 NOTE — PROGRESS NOTE ADULT - SUBJECTIVE AND OBJECTIVE BOX
Date/Time Patient Seen:  		  Referring MD:   Data Reviewed	       Patient is a 74y old  Male who presents with a chief complaint of CHF exacerbation, PNA (22 May 2025 16:48)      Subjective/HPI     PAST MEDICAL & SURGICAL HISTORY:  Prostate cancer    Type II diabetes mellitus    Chronic obstructive pulmonary disease (COPD)    CHF (congestive heart failure)    Renal insufficiency    H/O migraine    Insomnia    Constipation    S/P foot surgery          Medication list         MEDICATIONS  (STANDING):  cefTRIAXone   IVPB 1000 milliGRAM(s) IV Intermittent every 24 hours  cetirizine 10 milliGRAM(s) Oral daily  chlorhexidine 2% Cloths 1 Application(s) Topical <User Schedule>  DAPTOmycin IVPB 450 milliGRAM(s) IV Intermittent every 24 hours  dextrose 5%. 1000 milliLiter(s) (50 mL/Hr) IV Continuous <Continuous>  dextrose 5%. 1000 milliLiter(s) (100 mL/Hr) IV Continuous <Continuous>  dextrose 50% Injectable 25 Gram(s) IV Push once  dextrose 50% Injectable 12.5 Gram(s) IV Push once  dextrose 50% Injectable 25 Gram(s) IV Push once  ferrous    sulfate 325 milliGRAM(s) Oral daily  fluticasone propionate/ salmeterol 250-50 MICROgram(s) Diskus 1 Dose(s) Inhalation two times a day  furosemide   Injectable 40 milliGRAM(s) IV Push two times a day  glucagon  Injectable 1 milliGRAM(s) IntraMuscular once  heparin   Injectable 5000 Unit(s) SubCutaneous every 8 hours  insulin glargine Injectable (LANTUS) 24 Unit(s) SubCutaneous at bedtime  insulin lispro (ADMELOG) corrective regimen sliding scale   SubCutaneous three times a day before meals  insulin lispro (ADMELOG) corrective regimen sliding scale   SubCutaneous at bedtime  insulin lispro Injectable (ADMELOG) 10 Unit(s) SubCutaneous three times a day before meals  metoprolol succinate ER 25 milliGRAM(s) Oral daily  montelukast 10 milliGRAM(s) Oral daily  pantoprazole    Tablet 40 milliGRAM(s) Oral before breakfast  polyethylene glycol 3350 17 Gram(s) Oral daily  predniSONE   Tablet 10 milliGRAM(s) Oral daily  pregabalin 150 milliGRAM(s) Oral two times a day  rosuvastatin 40 milliGRAM(s) Oral at bedtime  senna 2 Tablet(s) Oral at bedtime  sertraline 100 milliGRAM(s) Oral daily  tiotropium 2.5 MICROgram(s) Inhaler 2 Puff(s) Inhalation daily    MEDICATIONS  (PRN):  acetaminophen     Tablet .. 650 milliGRAM(s) Oral every 6 hours PRN Temp greater or equal to 38C (100.4F), Mild Pain (1 - 3)  albuterol/ipratropium for Nebulization 3 milliLiter(s) Nebulizer every 6 hours PRN Shortness of Breath and/or Wheezing  benzonatate 100 milliGRAM(s) Oral three times a day PRN Cough  clonazePAM Oral Disintegrating Tablet 0.75 milliGRAM(s) Oral two times a day PRN anxiety  dextrose Oral Gel 15 Gram(s) Oral once PRN Blood Glucose LESS THAN 70 milliGRAM(s)/deciliter  melatonin 3 milliGRAM(s) Oral at bedtime PRN Insomnia  oxyCODONE    IR 2.5 milliGRAM(s) Oral every 4 hours PRN Moderate Pain (4 - 6)  oxyCODONE    IR 5 milliGRAM(s) Oral every 6 hours PRN Severe Pain (7 - 10)         Vitals log        ICU Vital Signs Last 24 Hrs  T(C): 36.7 (22 May 2025 20:45), Max: 36.8 (22 May 2025 04:51)  T(F): 98 (22 May 2025 20:45), Max: 98.3 (22 May 2025 04:51)  HR: 84 (23 May 2025 01:01) (72 - 87)  BP: 118/66 (22 May 2025 20:45) (108/57 - 144/81)  BP(mean): --  ABP: --  ABP(mean): --  RR: 17 (22 May 2025 20:45) (17 - 18)  SpO2: 94% (23 May 2025 01:01) (93% - 98%)    O2 Parameters below as of 23 May 2025 01:01  Patient On (Oxygen Delivery Method): room air                 Input and Output:  I&O's Detail    21 May 2025 07:01  -  22 May 2025 07:00  --------------------------------------------------------  IN:  Total IN: 0 mL    OUT:    Voided (mL): 1500 mL  Total OUT: 1500 mL    Total NET: -1500 mL      22 May 2025 07:01  -  23 May 2025 04:24  --------------------------------------------------------  IN:    IV PiggyBack: 711 mL  Total IN: 711 mL    OUT:    Indwelling Catheter - Urethral (mL): 2000 mL    Voided (mL): 1800 mL  Total OUT: 3800 mL    Total NET: -3089 mL          Lab Data                        12.4   10.38 )-----------( 158      ( 22 May 2025 08:05 )             38.2     05-22    137  |  97  |  35[H]  ----------------------------<  222[H]  3.3[L]   |  28  |  1.40[H]    Ca    9.1      22 May 2025 08:05    TPro  7.5  /  Alb  2.9[L]  /  TBili  0.4  /  DBili  x   /  AST  17  /  ALT  33  /  AlkPhos  72  05-22            Review of Systems	      Objective     Physical Examination    heart s1s2  lung dc bs  head nc  o2 support      Pertinent Lab findings & Imaging      Jl:  NO   Adequate UO     I&O's Detail    21 May 2025 07:01  -  22 May 2025 07:00  --------------------------------------------------------  IN:  Total IN: 0 mL    OUT:    Voided (mL): 1500 mL  Total OUT: 1500 mL    Total NET: -1500 mL      22 May 2025 07:01  -  23 May 2025 04:24  --------------------------------------------------------  IN:    IV PiggyBack: 711 mL  Total IN: 711 mL    OUT:    Indwelling Catheter - Urethral (mL): 2000 mL    Voided (mL): 1800 mL  Total OUT: 3800 mL    Total NET: -3089 mL               Discussed with:     Cultures:	        Radiology

## 2025-05-23 NOTE — CASE MANAGEMENT PROGRESS NOTE - NSCMPROGRESSNOTE_GEN_ALL_CORE
Remains acute on IV Rocephin/Daptomycin/Lasix. Pt from [facility]. As per attending, pt is not cleared for weekend discharge.

## 2025-05-23 NOTE — PHARMACOTHERAPY INTERVENTION NOTE - COMMENTS
73 yo male with OM ordered for daptomycin 450 mg q 24. Recommended obtaining baseline CK level while ordered for medication. Discussed with Dr Denton and level ordered.

## 2025-05-23 NOTE — PROGRESS NOTE ADULT - PROBLEM SELECTOR PLAN 1
Patient presents w/ acute on chronic Diastolic  CHF exacerbation likely 2/2 non compliance with meds   - CT chest: Mild tree-in-bud nodularity in the left lower lobe, which may be infectious/inflammatory  - Pro , Trop neg x2   - Continue Lasix 40mg IV BID  - TTE 10/24: EF 61%, grade 2 LV diastolic dysfunction, trace TR   - F/u TTE  - Strict I & O's and daily weights  - Cardiology ([place]  [facility]) consulted, will appreciate recs Patient presents w/ acute on chronic Diastolic  CHF exacerbation likely 2/2 non compliance with meds   - CT chest: Mild tree-in-bud nodularity in the left lower lobe, which may be infectious/inflammatory  - Pro , Trop neg x2   - Continue Lasix 40mg IV BID  - TTE 10/24: EF 61%, grade 2 LV diastolic dysfunction, trace TR   - F/u TTE  - Strict I & O's and daily weights  - Cardiology ([facility]) consulted, will appreciate recs  Continue IV Lasxi 40 mg 2x day

## 2025-05-23 NOTE — PROGRESS NOTE ADULT - PROBLEM SELECTOR PLAN 4
Plan:  ACE wrap for compression  Elevation  NSAIDs  Likely traumatic olecranon bursitits  - Ortho consulted  - Pain control  - IV antibiotics per ID recommendations  - WBAT RUE  - FU ESR/CRP, ordered  - No acute ortho intervention required at this time per ortho   - Follow-up with Dr. Hill in the office within 1 week; call for appointment. Plan:  ACE wrap for compression  Elevation, NSAIDs  Likely traumatic olecranon bursitis  - Ortho consulted-Dr Hill   - Pain control  - IV antibiotics per ID recommendations  - WBAT RUE  - FU ESR/CRP, ordered  - No acute ortho intervention required at this time per ortho   - Follow-up with Dr. Hill in the office within 1 week; call for appointment.

## 2025-05-23 NOTE — PROGRESS NOTE ADULT - ASSESSMENT
Pt resting comfortably, no s/s of distress noted       Bing Long  [birth year] 0[birth year] 74yoM PMH DM2, PAD, hypertension, COPD, CKD,  HFpEF, Hx of R foot ulcer with osteomyelitis [facility] and weight gain of about 30lbs over past 1 month.    copd  chf  dyspnea  tree in bud  htn  ckd  gerd  op  oa    esr and crp noted  elbow imaging reviewed  on lasix  on nebs prn  on RA  VS noted    cvs rx regimen  lasix  I and O  cardio eval and follow up  bronchodilator rx regimen and cough rx regimen and short course of systemic steroids  oral hygiene  skin care  ct chest reviewed  pt follows with Pulm in [place] - Dr Smith  I helena  monitor VS and HD and Sat  goal sat > 88 pct  BP control and HD monitoring  trend labs  replete lytes  anxiolysis  DVT p   Render In Strict Bullet Format?: No Discontinue Regimen: Triamcinolone cream, fluocinolone cream Initiate Treatment: Loprox cream, cleocin t liquid, selsum blue shampoo Detail Level: Generalized

## 2025-05-23 NOTE — CHART NOTE - NSCHARTNOTEFT_GEN_A_CORE
PC team attempted to reach patient's HCPs by phone. Messages left for Omkar Littlejohn ([phone]) and Jaren Littlejohn ([phone]) requesting calls back. PC team to follow up.

## 2025-05-23 NOTE — PROGRESS NOTE ADULT - ASSESSMENT
74yoM PMH HTN, HLD, DM2, PAD, COPD, CKD,  HFpEF, hx of R foot ulcer with osteomyelitis    - Patient is not complaining of any cardiac symptoms at this time.  - No clear evidence of acute ischemia, trops negative x 2.  - Monitor closely for the development of anginal symptoms or clinical signs of ischemia.   - No acute changes on EKG compared to previous.  - continue home statin    - Appear volume overloaded on exam  - Previous TTE shows EF 61% with mod grade 2 diastolic dysfunction and trace MR/TR.  - Per patient home lasix recently increased to 20mg TID, however, only takes BID  - c/w IV lasix 40mg BID  - monitor for worsening renal function  - continue home BB  - Strict I/Os, daily weights.    - BP well controlled, monitor routine hemodynamics.    - Monitor and replete lytes, keep K>4, Mg>2.    PNA on imaging ?  - infectious work-up per primary team    - Other cardiovascular workup will depend on clinical course.  - All other workup per primary team.  - Will continue to follow.    Pastor Romero DNP, AGACNP-BC  Cardiology   Call Teams

## 2025-05-23 NOTE — PROGRESS NOTE ADULT - PROBLEM SELECTOR PLAN 2
Patient afebrile, no leukocytosis present    - CT chest: Mild tree-in-bud nodularity in the left lower lobe, which may be infectious/inflammatory  - Continue Rocephin  - Legionella and strep negative  - F/u cx NGTD  - ID (Dr. Denton) consulted, will appreciate recs  - Pulm (Dr. Marie) consulted, will appreciate recs

## 2025-05-23 NOTE — PROGRESS NOTE ADULT - SUBJECTIVE AND OBJECTIVE BOX
Patient is a 74y old  Male who presents with a chief complaint of CHF exacerbation, PNA (23 May 2025 07:04)    HPI:  74yoM PMH DM2, PAD, hypertension, COPD, CKD,  HFpEF, Hx of Rt  foot ulcer with osteomyelitis, rt lower EXT wound ,Tri geminal Neuralgia,  BIBEMS from [place] p/w exertional dyspnea and weight gain of about 30lbs over past 1 month. Patient states that for the last one week he has a productive cough along with SOB, BERGER, orthopnea. Patient reports 25-30lbs weight gain over the past month. Patient states that he is supposed to be taking 20mg lasix TID but only takes it BID (recently increased 1 month ago). Patient denies sick contacts. Denies fevers, chills, body aches, chest pain, palpitations, abdominal pain, n/v. Denies recent travel.     IN THE ED:  Temp  98.2 F , HR 87 ,  /67  ,RR 18 , SpO2 92% RA   S/P x  EKG:  Labs significant for: WBAC 8.15, H/H 11.8/36.7, BUN/Cr 33/1.6, Trop neg x2, proBNP 634  Imaging: CT chest: Mild tree-in-bud nodularity in the left lower lobe, which may be infectious/inflammatory.       (22 May 2025 00:02)    INTERVAL HPI:  5/22 Patient seen and examined at bedside. Denies complaints states breathing is improved. States he takes prednisone 10 mg at home for trigeminal neuralgia   5/23: Patient sleeping prior to exam- drowsy, denies any complaints today     OVERNIGHT EVENTS:    Home Medications:  albuterol 0.63 mg/3 mL (0.021%) inhalation solution: 3 milliliter(s) by nebulizer 3 times a day as needed for (22 May 2025 01:14)  clonazePAM 0.25 mg oral tablet, disintegrating: 3 tab(s) orally 2 times a day (22 May 2025 01:14)  ferrous sulfate 325 mg (65 mg elemental iron) oral tablet: 1 tab(s) orally once a day (22 May 2025 01:14)  furosemide 40 mg oral tablet: 1.5 tab(s) orally once a day (22 May 2025 01:14)  HumaLOG 100 units/mL injectable solution: 10 unit(s) injectable 3 times a day (with meals) ***sliding scale*** (22 May 2025 01:14)  insulin glargine 100 units/mL subcutaneous solution: 24 unit(s) subcutaneous once a day (at bedtime) (22 May 2025 01:14)  loratadine 10 mg oral tablet: 1 tab(s) orally once a day (in the morning) (22 May 2025 01:14)  melatonin 3 mg oral tablet: 1 tab(s) orally once a day (at bedtime) As needed Insomnia (22 May 2025 01:14)  metoprolol succinate 25 mg oral tablet, extended release: 1 tab(s) orally once a day (22 May 2025 01:14)  montelukast 10 mg oral tablet: 1 tab(s) orally once a day (22 May 2025 01:14)  Multiple Vitamins with Minerals oral tablet: 1 tab(s) orally once a day (22 May 2025 01:14)  oxyCODONE 10 mg oral tablet, extended release: 1 tab(s) orally every 12 hours (22 May 2025 01:14)  pantoprazole 40 mg oral delayed release tablet: 1 tab(s) orally once a day (before a meal) (22 May 2025 01:14)  polyethylene glycol 3350 oral powder for reconstitution: 17 gram(s) orally once a day (22 May 2025 01:14)  pregabalin 150 mg oral capsule: 1 cap(s) orally 2 times a day (22 May 2025 01:14)  rosuvastatin 40 mg oral tablet: 1 tab(s) orally once a day (22 May 2025 01:14)  sertraline 100 mg oral tablet: 1 tab(s) orally once a day (22 May 2025 01:14)  Spiriva 18 mcg inhalation capsule: 1 cap(s) inhaled once a day (22 May 2025 01:14)      MEDICATIONS  (STANDING):  cefTRIAXone   IVPB 1000 milliGRAM(s) IV Intermittent every 24 hours  cetirizine 10 milliGRAM(s) Oral daily  chlorhexidine 2% Cloths 1 Application(s) Topical <User Schedule>  DAPTOmycin IVPB 450 milliGRAM(s) IV Intermittent every 24 hours  dextrose 5%. 1000 milliLiter(s) (50 mL/Hr) IV Continuous <Continuous>  dextrose 5%. 1000 milliLiter(s) (100 mL/Hr) IV Continuous <Continuous>  dextrose 50% Injectable 25 Gram(s) IV Push once  dextrose 50% Injectable 12.5 Gram(s) IV Push once  dextrose 50% Injectable 25 Gram(s) IV Push once  ferrous    sulfate 325 milliGRAM(s) Oral daily  fluticasone propionate/ salmeterol 250-50 MICROgram(s) Diskus 1 Dose(s) Inhalation two times a day  furosemide   Injectable 40 milliGRAM(s) IV Push two times a day  glucagon  Injectable 1 milliGRAM(s) IntraMuscular once  heparin   Injectable 5000 Unit(s) SubCutaneous every 8 hours  insulin glargine Injectable (LANTUS) 24 Unit(s) SubCutaneous at bedtime  insulin lispro (ADMELOG) corrective regimen sliding scale   SubCutaneous three times a day before meals  insulin lispro (ADMELOG) corrective regimen sliding scale   SubCutaneous at bedtime  insulin lispro Injectable (ADMELOG) 10 Unit(s) SubCutaneous three times a day before meals  metoprolol succinate ER 25 milliGRAM(s) Oral daily  montelukast 10 milliGRAM(s) Oral daily  pantoprazole    Tablet 40 milliGRAM(s) Oral before breakfast  polyethylene glycol 3350 17 Gram(s) Oral daily  predniSONE   Tablet 10 milliGRAM(s) Oral daily  pregabalin 150 milliGRAM(s) Oral two times a day  rosuvastatin 40 milliGRAM(s) Oral at bedtime  senna 2 Tablet(s) Oral at bedtime  sertraline 100 milliGRAM(s) Oral daily  tiotropium 2.5 MICROgram(s) Inhaler 2 Puff(s) Inhalation daily    MEDICATIONS  (PRN):  acetaminophen     Tablet .. 650 milliGRAM(s) Oral every 6 hours PRN Temp greater or equal to 38C (100.4F), Mild Pain (1 - 3)  albuterol/ipratropium for Nebulization 3 milliLiter(s) Nebulizer every 6 hours PRN Shortness of Breath and/or Wheezing  benzonatate 100 milliGRAM(s) Oral three times a day PRN Cough  clonazePAM Oral Disintegrating Tablet 0.75 milliGRAM(s) Oral two times a day PRN anxiety  dextrose Oral Gel 15 Gram(s) Oral once PRN Blood Glucose LESS THAN 70 milliGRAM(s)/deciliter  melatonin 3 milliGRAM(s) Oral at bedtime PRN Insomnia  oxyCODONE    IR 2.5 milliGRAM(s) Oral every 4 hours PRN Moderate Pain (4 - 6)  oxyCODONE    IR 5 milliGRAM(s) Oral every 6 hours PRN Severe Pain (7 - 10)      Allergies    IODINE (Unknown)  tetracycline (Unknown)  vancomycin (Other)    Intolerances        Social History:  No drug use. Drinks 1 glass of wine with dinner. Former smoker quit in 1993; 20 pack year hx.  lives at [facility] (22 May 2025 00:02)      REVIEW OF SYSTEMS:  CONSTITUTIONAL: No fever, No chills, No fatigue, No myalgia, No Body ache, No Weakness  EYES: No eye pain,  No visual disturbances, No discharge, NO Redness  ENMT:  No ear pain, No nose bleed, No vertigo; No sinus pain, NO throat pain, No Congestion  NECK: No pain, No stiffness  RESPIRATORY: No cough, NO wheezing, No  hemoptysis, NO  shortness of breath  CARDIOVASCULAR: No chest pain, palpitations  GASTROINTESTINAL: No abdominal pain, NO epigastric pain. No nausea, No vomiting; No diarrhea, No constipation. [  ] BM  GENITOURINARY: No dysuria, No frequency, No urgency, No hematuria, NO incontinence  NEUROLOGICAL: No headaches, No dizziness, No numbness, No tingling, No tremors, No weakness  EXT: No Swelling, No Pain, No Edema  SKIN:  [ x ] No itching, burning, rashes, or lesions   MUSCULOSKELETAL: No joint pain ,No Jt swelling; No muscle pain, No back pain, No extremity pain  PSYCHIATRIC: No depression,  No anxiety,  No mood swings ,No difficulty sleeping at night  PAIN SCALE: [  ] None  [  ] Other-  ROS Unable to obtain due to - [  ] Dementia  [  ] Lethargy [  ] Drowsy [  ] Sedated [  ] non verbal  REST OF REVIEW Of SYSTEM - [  ] Normal     Vital Signs Last 24 Hrs  T(C): 36.6 (23 May 2025 04:47), Max: 36.7 (22 May 2025 11:26)  T(F): 97.9 (23 May 2025 04:47), Max: 98 (22 May 2025 11:26)  HR: 77 (23 May 2025 04:47) (77 - 87)  BP: 112/64 (23 May 2025 04:47) (108/65 - 118/66)  BP(mean): --  RR: 18 (23 May 2025 04:47) (17 - 18)  SpO2: 96% (23 May 2025 04:47) (93% - 98%)    Parameters below as of 23 May 2025 04:47  Patient On (Oxygen Delivery Method): room air      Finger Stick        05-22 @ 07:01  -  05-23 @ 07:00  --------------------------------------------------------  IN: 711 mL / OUT: 3800 mL / NET: -3089 mL        PHYSICAL EXAM:  REVIEW OF SYSTEMS:  CONSTITUTIONAL: No fever, No chills, No fatigue, No myalgia, No Body ache, No Weakness  EYES: No eye pain,  No visual disturbances, No discharge, NO Redness  ENMT:  No ear pain, No nose bleed, No vertigo; No sinus pain, NO throat pain, No Congestion  NECK: No pain, No stiffness  RESPIRATORY: No cough, NO wheezing, No  hemoptysis, NO  shortness of breath  CARDIOVASCULAR: No chest pain, palpitations  GASTROINTESTINAL: No abdominal pain, NO epigastric pain. No nausea, No vomiting; No diarrhea, No constipation. [  ] BM  GENITOURINARY: No dysuria, No frequency, No urgency, No hematuria, NO incontinence  NEUROLOGICAL: No headaches, No dizziness, No numbness, No tingling, No tremors, No weakness  EXT: No Swelling, No Pain, No Edema  SKIN:  [ x ] Bursitis on right elbow  [x] right foot wound  MUSCULOSKELETAL: No joint pain ,No Jt swelling; No muscle pain, No back pain, No extremity pain  PSYCHIATRIC: No depression,  No anxiety,  No mood swings ,No difficulty sleeping at night  PAIN SCALE: [  ] None  [  ] Other-  ROS Unable to obtain due to - [  ] Dementia  [  ] Lethargy [  ] Drowsy [  ] Sedated [  ] non verbal    DIET: Diet, DASH/TLC:   Sodium & Cholesterol Restricted  Consistent Carbohydrate Evening Snack  1200mL Fluid Restriction ([id number]) (05-22-25 @ 13:50)      LABS:                        12.5   9.04  )-----------( 173      ( 23 May 2025 07:30 )             38.0       Ca    9.1        22 May 2025 08:05      PT/INR - ( 21 May 2025 21:10 )   PT: 12.6 sec;   INR: 1.08 ratio         PTT - ( 21 May 2025 21:10 )  PTT:28.6 sec  Urinalysis Basic - ( 22 May 2025 08:05 )    Color: x / Appearance: x / SG: x / pH: x  Gluc: 222 mg/dL / Ketone: x  / Bili: x / Urobili: x   Blood: x / Protein: x / Nitrite: x   Leuk Esterase: x / RBC: x / WBC x   Sq Epi: x / Non Sq Epi: x / Bacteria: x        Culture Results:   No growth at 24 hours (05-22 @ 01:15)  Culture Results:   No growth at 24 hours (05-22 @ 01:15)      culture blood  -- Blood Blood-Peripheral 05-22 @ 01:15    culture urine  --  05-22 @ 01:15              Urinalysis with Rflx Culture (collected 22 May 2025 03:50)    Culture - Blood (collected 22 May 2025 01:15)  Source: Blood Blood-Peripheral  Preliminary Report (23 May 2025 06:01):    No growth at 24 hours    Culture - Blood (collected 22 May 2025 01:15)  Source: Blood Blood-Peripheral  Preliminary Report (23 May 2025 06:01):    No growth at 24 hours         Anemia Panel:  Iron Total: 54 ug/dL (05-22-25 @ 08:05)  Iron - Total Binding Capacity.: 263 ug/dL (05-22-25 @ 08:05)  Ferritin: 215 ng/mL (05-22-25 @ 08:05)  Vitamin B12, Serum: 477 pg/mL (05-22-25 @ 08:05)  Folate, Serum: 17.2 ng/mL (05-22-25 @ 08:05)      Thyroid Panel:  Thyroid Stimulating Hormone, Serum: 1.10 uIU/mL (05-22-25 @ 08:05)                RADIOLOGY & ADDITIONAL TESTS:      HEALTH ISSUES - PROBLEM Dx:  CHF exacerbation    Pneumonia    Wound, open, foot    Olecranon bursitis    REANNA (acute kidney injury)    History of COPD    History of foot ulcer    HTN (hypertension)    T2DM (type 2 diabetes mellitus)    Need for prophylactic measure            Consultant(s) Notes Reviewed:  [  ] YES     Care Discussed with [X] Consultants  [  ] Patient  [  ] Family [  ] HCP [  ]   [  ] Social Service  [  ] RN, [  ] Physical Therapy,[  ] Palliative care team  DVT PPX: [  ] Lovenox, [  ] S C Heparin, [  ] Coumadin, [  ] Xarelto, [  ] Eliquis, [  ] Pradaxa, [  ] IV Heparin drip, [  ] SCD [  ] Contraindication 2 to GI Bleed,[  ] Ambulation [  ] Contraindicated 2 to  bleed [  ] Contraindicated 2 to Brain Bleed  Advanced directive: [  ] None, [  ] DNR/DNI Patient is a 74y old  Male who presents with a chief complaint of CHF exacerbation, PNA (23 May 2025 07:04)    HPI:  74yoM PMH DM2, PAD, hypertension, COPD, CKD,  HFpEF, Hx of Rt  foot ulcer with osteomyelitis, rt lower EXT wound ,Tri geminal Neuralgia,  BIBEMS from [place] p/w exertional dyspnea and weight gain of about 30lbs over past 1 month. Patient states that for the last one week he has a productive cough along with SOB, BERGER, orthopnea. Patient reports 25-30lbs weight gain over the past month. Patient states that he is supposed to be taking 20mg lasix TID but only takes it BID (recently increased 1 month ago). Patient denies sick contacts. Denies fevers, chills, body aches, chest pain, palpitations, abdominal pain, n/v. Denies recent travel.     IN THE ED:  Temp  98.2 F , HR 87 ,  /67  ,RR 18 , SpO2 92% RA   S/P x  EKG:  Labs significant for: WBAC 8.15, H/H 11.8/36.7, BUN/Cr 33/1.6, Trop neg x2, proBNP 634  Imaging: CT chest: Mild tree-in-bud nodularity in the left lower lobe, which may be infectious/inflammatory.       (22 May 2025 00:02)    INTERVAL HPI:  5/22 Patient seen and examined at bedside. Denies complaints states breathing is improved. States he takes prednisone 10 mg at home for temporal arteritis  5/23: Patient sleeping prior to exam- drowsy, denies any complaints today. On IV Lasix , IV Abx  , continue     OVERNIGHT EVENTS: None    Home Medications:  albuterol 0.63 mg/3 mL (0.021%) inhalation solution: 3 milliliter(s) by nebulizer 3 times a day as needed for (22 May 2025 01:14)  clonazePAM 0.25 mg oral tablet, disintegrating: 3 tab(s) orally 2 times a day (22 May 2025 01:14)  ferrous sulfate 325 mg (65 mg elemental iron) oral tablet: 1 tab(s) orally once a day (22 May 2025 01:14)  furosemide 40 mg oral tablet: 1.5 tab(s) orally once a day (22 May 2025 01:14)  HumaLOG 100 units/mL injectable solution: 10 unit(s) injectable 3 times a day (with meals) ***sliding scale*** (22 May 2025 01:14)  insulin glargine 100 units/mL subcutaneous solution: 24 unit(s) subcutaneous once a day (at bedtime) (22 May 2025 01:14)  loratadine 10 mg oral tablet: 1 tab(s) orally once a day (in the morning) (22 May 2025 01:14)  melatonin 3 mg oral tablet: 1 tab(s) orally once a day (at bedtime) As needed Insomnia (22 May 2025 01:14)  metoprolol succinate 25 mg oral tablet, extended release: 1 tab(s) orally once a day (22 May 2025 01:14)  montelukast 10 mg oral tablet: 1 tab(s) orally once a day (22 May 2025 01:14)  Multiple Vitamins with Minerals oral tablet: 1 tab(s) orally once a day (22 May 2025 01:14)  oxyCODONE 10 mg oral tablet, extended release: 1 tab(s) orally every 12 hours (22 May 2025 01:14)  pantoprazole 40 mg oral delayed release tablet: 1 tab(s) orally once a day (before a meal) (22 May 2025 01:14)  polyethylene glycol 3350 oral powder for reconstitution: 17 gram(s) orally once a day (22 May 2025 01:14)  pregabalin 150 mg oral capsule: 1 cap(s) orally 2 times a day (22 May 2025 01:14)  rosuvastatin 40 mg oral tablet: 1 tab(s) orally once a day (22 May 2025 01:14)  sertraline 100 mg oral tablet: 1 tab(s) orally once a day (22 May 2025 01:14)  Spiriva 18 mcg inhalation capsule: 1 cap(s) inhaled once a day (22 May 2025 01:14)      MEDICATIONS  (STANDING):  cefTRIAXone   IVPB 1000 milliGRAM(s) IV Intermittent every 24 hours  cetirizine 10 milliGRAM(s) Oral daily  chlorhexidine 2% Cloths 1 Application(s) Topical <User Schedule>  DAPTOmycin IVPB 450 milliGRAM(s) IV Intermittent every 24 hours  dextrose 5%. 1000 milliLiter(s) (50 mL/Hr) IV Continuous <Continuous>  dextrose 5%. 1000 milliLiter(s) (100 mL/Hr) IV Continuous <Continuous>  dextrose 50% Injectable 25 Gram(s) IV Push once  dextrose 50% Injectable 12.5 Gram(s) IV Push once  dextrose 50% Injectable 25 Gram(s) IV Push once  ferrous    sulfate 325 milliGRAM(s) Oral daily  fluticasone propionate/ salmeterol 250-50 MICROgram(s) Diskus 1 Dose(s) Inhalation two times a day  furosemide   Injectable 40 milliGRAM(s) IV Push two times a day  glucagon  Injectable 1 milliGRAM(s) IntraMuscular once  heparin   Injectable 5000 Unit(s) SubCutaneous every 8 hours  insulin glargine Injectable (LANTUS) 24 Unit(s) SubCutaneous at bedtime  insulin lispro (ADMELOG) corrective regimen sliding scale   SubCutaneous three times a day before meals  insulin lispro (ADMELOG) corrective regimen sliding scale   SubCutaneous at bedtime  insulin lispro Injectable (ADMELOG) 10 Unit(s) SubCutaneous three times a day before meals  metoprolol succinate ER 25 milliGRAM(s) Oral daily  montelukast 10 milliGRAM(s) Oral daily  pantoprazole    Tablet 40 milliGRAM(s) Oral before breakfast  polyethylene glycol 3350 17 Gram(s) Oral daily  predniSONE   Tablet 10 milliGRAM(s) Oral daily  pregabalin 150 milliGRAM(s) Oral two times a day  rosuvastatin 40 milliGRAM(s) Oral at bedtime  senna 2 Tablet(s) Oral at bedtime  sertraline 100 milliGRAM(s) Oral daily  tiotropium 2.5 MICROgram(s) Inhaler 2 Puff(s) Inhalation daily    MEDICATIONS  (PRN):  acetaminophen     Tablet .. 650 milliGRAM(s) Oral every 6 hours PRN Temp greater or equal to 38C (100.4F), Mild Pain (1 - 3)  albuterol/ipratropium for Nebulization 3 milliLiter(s) Nebulizer every 6 hours PRN Shortness of Breath and/or Wheezing  benzonatate 100 milliGRAM(s) Oral three times a day PRN Cough  clonazePAM Oral Disintegrating Tablet 0.75 milliGRAM(s) Oral two times a day PRN anxiety  dextrose Oral Gel 15 Gram(s) Oral once PRN Blood Glucose LESS THAN 70 milliGRAM(s)/deciliter  melatonin 3 milliGRAM(s) Oral at bedtime PRN Insomnia  oxyCODONE    IR 2.5 milliGRAM(s) Oral every 4 hours PRN Moderate Pain (4 - 6)  oxyCODONE    IR 5 milliGRAM(s) Oral every 6 hours PRN Severe Pain (7 - 10)      Allergies    IODINE (Unknown)  tetracycline (Unknown)  vancomycin (Other)    Intolerances        Social History:  No drug use. Drinks 1 glass of wine with dinner. Former smoker quit in 1993; 20 pack year hx.  lives at [facility] (22 May 2025 00:02)      REVIEW OF SYSTEMS:  CONSTITUTIONAL: No fever, No chills, No fatigue, No myalgia, No Body ache, No Weakness  EYES: No eye pain,  No visual disturbances, No discharge, NO Redness  ENMT:  No ear pain, No nose bleed, No vertigo; No sinus pain, NO throat pain, No Congestion  NECK: No pain, No stiffness  RESPIRATORY: No cough, NO wheezing, No  hemoptysis, NO  shortness of breath  CARDIOVASCULAR: No chest pain, palpitations  GASTROINTESTINAL: No abdominal pain, NO epigastric pain. No nausea, No vomiting; No diarrhea, No constipation. [  ] BM  GENITOURINARY: No dysuria, No frequency, No urgency, No hematuria, NO incontinence  NEUROLOGICAL: No headaches, No dizziness, No numbness, No tingling, No tremors, No weakness  EXT: No Swelling, No Pain, No Edema  SKIN:  [ x ] No itching, burning, rashes, or lesions   MUSCULOSKELETAL: No joint pain ,No Jt swelling; No muscle pain, No back pain, No extremity pain  PSYCHIATRIC: No depression,  No anxiety,  No mood swings ,No difficulty sleeping at night  PAIN SCALE: [  ] None  [  ] Other-  ROS Unable to obtain due to - [  ] Dementia  [  ] Lethargy [  ] Drowsy [  ] Sedated [  ] non verbal  REST OF REVIEW Of SYSTEM - [  ] Normal     Vital Signs Last 24 Hrs  T(C): 36.6 (23 May 2025 04:47), Max: 36.7 (22 May 2025 11:26)  T(F): 97.9 (23 May 2025 04:47), Max: 98 (22 May 2025 11:26)  HR: 77 (23 May 2025 04:47) (77 - 87)  BP: 112/64 (23 May 2025 04:47) (108/65 - 118/66)  BP(mean): --  RR: 18 (23 May 2025 04:47) (17 - 18)  SpO2: 96% (23 May 2025 04:47) (93% - 98%)    Parameters below as of 23 May 2025 04:47  Patient On (Oxygen Delivery Method): room air      Finger Stick        05-22 @ 07:01  -  05-23 @ 07:00  --------------------------------------------------------  IN: 711 mL / OUT: 3800 mL / NET: -3089 mL        PHYSICAL EXAM:  REVIEW OF SYSTEMS:  CONSTITUTIONAL: No fever, No chills, No fatigue, No myalgia, No Body ache, No Weakness  EYES: No eye pain,  No visual disturbances, No discharge, NO Redness  ENMT:  No ear pain, No nose bleed, No vertigo; No sinus pain, NO throat pain, No Congestion  NECK: No pain, No stiffness  RESPIRATORY: No cough, NO wheezing, No  hemoptysis, NO  shortness of breath  CARDIOVASCULAR: No chest pain, palpitations  GASTROINTESTINAL: No abdominal pain, NO epigastric pain. No nausea, No vomiting; No diarrhea, No constipation. [  ] BM  GENITOURINARY: No dysuria, No frequency, No urgency, No hematuria, NO incontinence  NEUROLOGICAL: No headaches, No dizziness, No numbness, No tingling, No tremors, No weakness  EXT: No Swelling, No Pain, No Edema  SKIN:  [ x ] Bursitis on right elbow  [x] right foot wound  MUSCULOSKELETAL: No joint pain ,No Jt swelling; No muscle pain, No back pain, No extremity pain  PSYCHIATRIC: No depression,  No anxiety,  No mood swings ,No difficulty sleeping at night  PAIN SCALE: [  ] None  [  ] Other-  ROS Unable to obtain due to - [  ] Dementia  [  ] Lethargy [  ] Drowsy [  ] Sedated [  ] non verbal    DIET: Diet, DASH/TLC:   Sodium & Cholesterol Restricted  Consistent Carbohydrate Evening Snack  1200mL Fluid Restriction ([id number]) (05-22-25 @ 13:50)      LABS:                        12.5   9.04  )-----------( 173      ( 23 May 2025 07:30 )             38.0       Ca    9.1        22 May 2025 08:05      PT/INR - ( 21 May 2025 21:10 )   PT: 12.6 sec;   INR: 1.08 ratio         PTT - ( 21 May 2025 21:10 )  PTT:28.6 sec  Urinalysis Basic - ( 22 May 2025 08:05 )    Color: x / Appearance: x / SG: x / pH: x  Gluc: 222 mg/dL / Ketone: x  / Bili: x / Urobili: x   Blood: x / Protein: x / Nitrite: x   Leuk Esterase: x / RBC: x / WBC x   Sq Epi: x / Non Sq Epi: x / Bacteria: x        Culture Results:   No growth at 24 hours (05-22 @ 01:15)  Culture Results:   No growth at 24 hours (05-22 @ 01:15)      culture blood  -- Blood Blood-Peripheral 05-22 @ 01:15    culture urine  --  05-22 @ 01:15              Urinalysis with Rflx Culture (collected 22 May 2025 03:50)    Culture - Blood (collected 22 May 2025 01:15)  Source: Blood Blood-Peripheral  Preliminary Report (23 May 2025 06:01):    No growth at 24 hours    Culture - Blood (collected 22 May 2025 01:15)  Source: Blood Blood-Peripheral  Preliminary Report (23 May 2025 06:01):    No growth at 24 hours         Anemia Panel:  Iron Total: 54 ug/dL (05-22-25 @ 08:05)  Iron - Total Binding Capacity.: 263 ug/dL (05-22-25 @ 08:05)  Ferritin: 215 ng/mL (05-22-25 @ 08:05)  Vitamin B12, Serum: 477 pg/mL (05-22-25 @ 08:05)  Folate, Serum: 17.2 ng/mL (05-22-25 @ 08:05)      Thyroid Panel:  Thyroid Stimulating Hormone, Serum: 1.10 uIU/mL (05-22-25 @ 08:05)                RADIOLOGY & ADDITIONAL TESTS:      HEALTH ISSUES - PROBLEM Dx:  CHF exacerbation    Pneumonia    Wound, open, foot    Olecranon bursitis    REANNA (acute kidney injury)    History of COPD    History of foot ulcer    HTN (hypertension)    T2DM (type 2 diabetes mellitus)    Need for prophylactic measure            Consultant(s) Notes Reviewed:  [  ] YES     Care Discussed with [X] Consultants  [  ] Patient  [  ] Family [  ] HCP [  ]   [  ] Social Service  [  ] RN, [  ] Physical Therapy,[  ] Palliative care team  DVT PPX: [  ] Lovenox, [  ] S C Heparin, [  ] Coumadin, [  ] Xarelto, [  ] Eliquis, [  ] Pradaxa, [  ] IV Heparin drip, [  ] SCD [  ] Contraindication 2 to GI Bleed,[  ] Ambulation [  ] Contraindicated 2 to  bleed [  ] Contraindicated 2 to Brain Bleed  Advanced directive: [  ] None, [  ] DNR/DNI

## 2025-05-23 NOTE — CONSULT NOTE ADULT - SUBJECTIVE AND OBJECTIVE BOX
Patient is a 74y old  Male who presents with a chief complaint of CHF exacerbation, PNA (23 May 2025 04:24)      Reason For Consult: dm2 uncontrolled    HPI:  74yoM PMH DM2, PAD, hypertension, COPD, CKD,  HFpEF, Hx of Rt  foot ulcer with osteomyelitis, rt lower EXT wound ,Tri geminal Neuralgia,  [facility]IBEMS from [facility] p/w exertional dyspnea and weight gain of about 30lbs over past 1 month. Patient states that for the last one week he has a productive cough along with SOB, BERGER, orthopnea. Patient reports 25-30lbs weight gain over the past month. Patient states that he is supposed to be taking 20mg lasix TID but only takes it BID (recently increased 1 month ago). Patient denies sick contacts. Denies fevers, chills, body aches, chest pain, palpitations, abdominal pain, n/v. Denies recent travel.     IN THE ED:  Temp  98.2 F , HR 87 ,  /67  ,RR 18 , SpO2 92% RA   S/P x  EKG:  Labs significant for: WBAC 8.15, H/H 11.8/36.7, BUN/Cr 33/1.6, Trop neg x2, proBNP 634  Imaging: CT chest: Mild tree-in-bud nodularity in the left lower lobe, which may be infectious/inflammatory.       (22 May 2025 00:02)      PAST MEDICAL & SURGICAL HISTORY:  Prostate cancer      Type II diabetes mellitus      Chronic obstructive pulmonary disease (COPD)      CHF (congestive heart failure)      Renal insufficiency      H/O migraine      Insomnia      Constipation      S/P foot surgery          FAMILY HISTORY:        Social History:    MEDICATIONS  (STANDING):  cefTRIAXone   IVPB 1000 milliGRAM(s) IV Intermittent every 24 hours  cetirizine 10 milliGRAM(s) Oral daily  chlorhexidine 2% Cloths 1 Application(s) Topical <User Schedule>  DAPTOmycin IVPB 450 milliGRAM(s) IV Intermittent every 24 hours  dextrose 5%. 1000 milliLiter(s) (50 mL/Hr) IV Continuous <Continuous>  dextrose 5%. 1000 milliLiter(s) (100 mL/Hr) IV Continuous <Continuous>  dextrose 50% Injectable 25 Gram(s) IV Push once  dextrose 50% Injectable 12.5 Gram(s) IV Push once  dextrose 50% Injectable 25 Gram(s) IV Push once  ferrous    sulfate 325 milliGRAM(s) Oral daily  fluticasone propionate/ salmeterol 250-50 MICROgram(s) Diskus 1 Dose(s) Inhalation two times a day  furosemide   Injectable 40 milliGRAM(s) IV Push two times a day  glucagon  Injectable 1 milliGRAM(s) IntraMuscular once  heparin   Injectable 5000 Unit(s) SubCutaneous every 8 hours  insulin glargine Injectable (LANTUS) 24 Unit(s) SubCutaneous at bedtime  insulin lispro (ADMELOG) corrective regimen sliding scale   SubCutaneous three times a day before meals  insulin lispro (ADMELOG) corrective regimen sliding scale   SubCutaneous at bedtime  insulin lispro Injectable (ADMELOG) 10 Unit(s) SubCutaneous three times a day before meals  metoprolol succinate ER 25 milliGRAM(s) Oral daily  montelukast 10 milliGRAM(s) Oral daily  pantoprazole    Tablet 40 milliGRAM(s) Oral before breakfast  polyethylene glycol 3350 17 Gram(s) Oral daily  predniSONE   Tablet 10 milliGRAM(s) Oral daily  pregabalin 150 milliGRAM(s) Oral two times a day  rosuvastatin 40 milliGRAM(s) Oral at bedtime  senna 2 Tablet(s) Oral at bedtime  sertraline 100 milliGRAM(s) Oral daily  tiotropium 2.5 MICROgram(s) Inhaler 2 Puff(s) Inhalation daily    MEDICATIONS  (PRN):  acetaminophen     Tablet .. 650 milliGRAM(s) Oral every 6 hours PRN Temp greater or equal to 38C (100.4F), Mild Pain (1 - 3)  albuterol/ipratropium for Nebulization 3 milliLiter(s) Nebulizer every 6 hours PRN Shortness of Breath and/or Wheezing  benzonatate 100 milliGRAM(s) Oral three times a day PRN Cough  clonazePAM Oral Disintegrating Tablet 0.75 milliGRAM(s) Oral two times a day PRN anxiety  dextrose Oral Gel 15 Gram(s) Oral once PRN Blood Glucose LESS THAN 70 milliGRAM(s)/deciliter  melatonin 3 milliGRAM(s) Oral at bedtime PRN Insomnia  oxyCODONE    IR 2.5 milliGRAM(s) Oral every 4 hours PRN Moderate Pain (4 - 6)  oxyCODONE    IR 5 milliGRAM(s) Oral every 6 hours PRN Severe Pain (7 - 10)        T(C): 36.6 (05-23-25 @ 04:47), Max: 36.7 (05-22-25 @ 11:26)  HR: 77 (05-23-25 @ 04:47) (77 - 87)  BP: 112/64 (05-23-25 @ 04:47) (108/65 - 118/66)  RR: 18 (05-23-25 @ 04:47) (17 - 18)  SpO2: 96% (05-23-25 @ 04:47) (93% - 98%)  Wt(kg): --    PHYSICAL EXAM:  CHEST/LUNG: Clear to percussion bilaterally; No rales, rhonchi, wheezing, or rubs  HEART: Regular rate and rhythm; No murmurs, rubs, or gallops  ABDOMEN: Soft, Nontender, Nondistended; Bowel sounds present  EXTREMITIES:  2+ Peripheral Pulses, No clubbing, cyanosis, or edema  SKIN: No rashes or lesions    CAPILLARY BLOOD GLUCOSE      POCT Blood Glucose.: 300 mg/dL (22 May 2025 21:07)  POCT Blood Glucose.: 287 mg/dL (22 May 2025 17:35)  POCT Blood Glucose.: 251 mg/dL (22 May 2025 11:57)  POCT Blood Glucose.: 271 mg/dL (22 May 2025 08:19)                            12.4   10.38 )-----------( 158      ( 22 May 2025 08:05 )             38.2       CMP:  05-22 @ 08:05  SGPT 33  Albumin 2.9   Alk Phos 72   Anion Gap 12   SGOT 17   Total Bili 0.4   BUN 35   Calcium Total 9.1   CO2 28   Chloride 97   Creatinine 1.40   eGFR if AA --   eGFR if non AA --   Glucose 222   Potassium 3.3   Protein 7.5   Sodium 137      Thyroid Function Tests:  05-22 @ 08:05 TSH 1.10 FreeT4 -- T3 -- Anti TPO -- Anti Thyroglobulin Ab -- TSI --      Diabetes Tests:       Radiology:

## 2025-05-23 NOTE — PROGRESS NOTE ADULT - ATTENDING COMMENTS
74yoM PMH DM2, PAD, hypertension, COPD, CKD,  HFpEF, Hx of R foot ulcer with osteomyelitis BIBEMS from [place] p/w exertional dyspnea and weight gain of about 30lbs over past 1 month. Admitted for Ac on chronic Diastolic CHF exacerbation, possible pneumonia.   Pt seen, examined, Case & care plan d/w pt ,residents at [facility].  Consults:  ID-Dr Denton-IV Abx -H/O wound MRSA -IV Dapto IVPB , contact isolation  Pulmonary-Dr Marie -IV Lasix  Cardio-[facility] Continue IV Lasix 40 mg IVP q 12 hrs   Orthopedic- For Rt Elbow Bursitis  eval -IV antibiotics per ID recommendations  WBAT RUE  FU ESR/CRP, ordered  No acute ortho intervention required at this time  Recommend removal of IV from right arm and removal of all wrist bands on the right arm  Follow-up with Dr. Hill in the office within 1 week; call for appointment.   AM labs   PO diet  DVT ppx   Palliative care Eval. for GOC.   Total care time is 60 minutes.
74yoM PMH DM2, PAD, hypertension, COPD, CKD,  HFpEF, Hx of R foot ulcer with osteomyelitis BIBEMS from [place] p/w exertional dyspnea and weight gain of about 30lbs over past 1 month. Admitted for Ac on chronic Diastolic CHF exacerbation, possible pneumonia.   Pt seen, examined, Case & care plan d/w pt ,residents at detail.  Consults:  ID-Dr Denton-YAIMA Abx -H/O wound MRSA -IV Dapto IVPB , contact isolation, D/W Son at detail.  Pulmonary-Dr Marie -IV Lasix  Cardio-[facility] Continue IV Lasix 40 mg IVP q 12 hrs   Orthopedic- IV Abx, Keep elevated   Diabetic NP saw pt-HbA1c is 10,  Restart Home Meds   Endo- DR C Perlman saw pt  AM labs   PO diet  DVT ppx   Palliative care Eval. for GOC.   Total care time is 60 minutes.

## 2025-05-23 NOTE — PROGRESS NOTE ADULT - PROBLEM SELECTOR PLAN 9
Chronic, on home insulin- Takes 10 units premeal and  Lantus SC 24 units bedtime    A1c 10   - Continue hhome regiment   -Low ISS  -Regular finger sticks  -Consistent carb diet  -Hypoglycemic protocol. Chronic  -  /67 on admission  - Continue home Toprol XL  w/ hold parameters  Lasix 40 mg 2x day    #HLD  - Continue home statin

## 2025-05-24 DIAGNOSIS — E87.6 HYPOKALEMIA: ICD-10-CM

## 2025-05-24 DIAGNOSIS — N18.9 CHRONIC KIDNEY DISEASE, UNSPECIFIED: ICD-10-CM

## 2025-05-24 DIAGNOSIS — M70.31 OTHER BURSITIS OF ELBOW, RIGHT ELBOW: ICD-10-CM

## 2025-05-24 DIAGNOSIS — M86.9 OSTEOMYELITIS, UNSPECIFIED: ICD-10-CM

## 2025-05-24 DIAGNOSIS — I73.9 PERIPHERAL VASCULAR DISEASE, UNSPECIFIED: ICD-10-CM

## 2025-05-24 LAB
ALBUMIN SERPL ELPH-MCNC: 2.9 G/DL — LOW (ref 3.3–5)
ALP SERPL-CCNC: 80 U/L — SIGNIFICANT CHANGE UP (ref 40–120)
ALT FLD-CCNC: 32 U/L — SIGNIFICANT CHANGE UP (ref 12–78)
ANION GAP SERPL CALC-SCNC: 8 MMOL/L — SIGNIFICANT CHANGE UP (ref 5–17)
AST SERPL-CCNC: 20 U/L — SIGNIFICANT CHANGE UP (ref 15–37)
BILIRUB SERPL-MCNC: 0.4 MG/DL — SIGNIFICANT CHANGE UP (ref 0.2–1.2)
BUN SERPL-MCNC: 39 MG/DL — HIGH (ref 7–23)
CALCIUM SERPL-MCNC: 9.1 MG/DL — SIGNIFICANT CHANGE UP (ref 8.5–10.1)
CHLORIDE SERPL-SCNC: 97 MMOL/L — SIGNIFICANT CHANGE UP (ref 96–108)
CK SERPL-CCNC: 144 U/L — SIGNIFICANT CHANGE UP (ref 26–308)
CO2 SERPL-SCNC: 32 MMOL/L — HIGH (ref 22–31)
CREAT SERPL-MCNC: 1.5 MG/DL — HIGH (ref 0.5–1.3)
EGFR: 49 ML/MIN/1.73M2 — LOW
EGFR: 49 ML/MIN/1.73M2 — LOW
GLUCOSE BLDC GLUCOMTR-MCNC: 146 MG/DL — HIGH (ref 70–99)
GLUCOSE BLDC GLUCOMTR-MCNC: 181 MG/DL — HIGH (ref 70–99)
GLUCOSE BLDC GLUCOMTR-MCNC: 197 MG/DL — HIGH (ref 70–99)
GLUCOSE BLDC GLUCOMTR-MCNC: 314 MG/DL — HIGH (ref 70–99)
GLUCOSE BLDC GLUCOMTR-MCNC: 358 MG/DL — HIGH (ref 70–99)
GLUCOSE SERPL-MCNC: 266 MG/DL — HIGH (ref 70–99)
HCT VFR BLD CALC: 38.9 % — LOW (ref 39–50)
HGB BLD-MCNC: 12.6 G/DL — LOW (ref 13–17)
MCHC RBC-ENTMCNC: 27.5 PG — SIGNIFICANT CHANGE UP (ref 27–34)
MCHC RBC-ENTMCNC: 32.4 G/DL — SIGNIFICANT CHANGE UP (ref 32–36)
MCV RBC AUTO: 84.7 FL — SIGNIFICANT CHANGE UP (ref 80–100)
MRSA PCR RESULT.: DETECTED
NRBC BLD AUTO-RTO: 0 /100 WBCS — SIGNIFICANT CHANGE UP (ref 0–0)
PLATELET # BLD AUTO: 175 K/UL — SIGNIFICANT CHANGE UP (ref 150–400)
POTASSIUM SERPL-MCNC: 3.2 MMOL/L — LOW (ref 3.5–5.3)
POTASSIUM SERPL-SCNC: 3.2 MMOL/L — LOW (ref 3.5–5.3)
PROT SERPL-MCNC: 7.3 G/DL — SIGNIFICANT CHANGE UP (ref 6–8.3)
RBC # BLD: 4.59 M/UL — SIGNIFICANT CHANGE UP (ref 4.2–5.8)
RBC # FLD: 19.6 % — HIGH (ref 10.3–14.5)
S AUREUS DNA NOSE QL NAA+PROBE: DETECTED
SODIUM SERPL-SCNC: 137 MMOL/L — SIGNIFICANT CHANGE UP (ref 135–145)
WBC # BLD: 10.51 K/UL — HIGH (ref 3.8–10.5)
WBC # FLD AUTO: 10.51 K/UL — HIGH (ref 3.8–10.5)

## 2025-05-24 PROCEDURE — 99232 SBSQ HOSP IP/OBS MODERATE 35: CPT

## 2025-05-24 RX ORDER — FUROSEMIDE 10 MG/ML
40 INJECTION INTRAMUSCULAR; INTRAVENOUS EVERY 12 HOURS
Refills: 0 | Status: DISCONTINUED | OUTPATIENT
Start: 2025-05-24 | End: 2025-05-27

## 2025-05-24 RX ORDER — LACTOBACILLUS ACIDOPHILUS/PECT 75 MM-100
1 CAPSULE ORAL EVERY 12 HOURS
Refills: 0 | Status: DISCONTINUED | OUTPATIENT
Start: 2025-05-24 | End: 2025-05-27

## 2025-05-24 RX ORDER — ONDANSETRON HCL/PF 4 MG/2 ML
4 VIAL (ML) INJECTION EVERY 8 HOURS
Refills: 0 | Status: DISCONTINUED | OUTPATIENT
Start: 2025-05-24 | End: 2025-05-27

## 2025-05-24 RX ORDER — MAGNESIUM, ALUMINUM HYDROXIDE 200-200 MG
30 TABLET,CHEWABLE ORAL EVERY 4 HOURS
Refills: 0 | Status: DISCONTINUED | OUTPATIENT
Start: 2025-05-24 | End: 2025-05-27

## 2025-05-24 RX ORDER — MUPIROCIN CALCIUM 20 MG/G
1 CREAM TOPICAL
Refills: 0 | Status: DISCONTINUED | OUTPATIENT
Start: 2025-05-24 | End: 2025-05-27

## 2025-05-24 RX ADMIN — DAPTOMYCIN 118 MILLIGRAM(S): 500 INJECTION, POWDER, LYOPHILIZED, FOR SOLUTION INTRAVENOUS at 17:23

## 2025-05-24 RX ADMIN — TIOTROPIUM BROMIDE INHALATION SPRAY 2 PUFF(S): 3.12 SPRAY, METERED RESPIRATORY (INHALATION) at 05:51

## 2025-05-24 RX ADMIN — HEPARIN SODIUM 5000 UNIT(S): 1000 INJECTION INTRAVENOUS; SUBCUTANEOUS at 05:52

## 2025-05-24 RX ADMIN — Medication 1 TABLET(S): at 17:24

## 2025-05-24 RX ADMIN — INSULIN LISPRO 2: 100 INJECTION, SOLUTION INTRAVENOUS; SUBCUTANEOUS at 17:29

## 2025-05-24 RX ADMIN — HEPARIN SODIUM 5000 UNIT(S): 1000 INJECTION INTRAVENOUS; SUBCUTANEOUS at 14:35

## 2025-05-24 RX ADMIN — CEFTRIAXONE 100 MILLIGRAM(S): 500 INJECTION, POWDER, FOR SOLUTION INTRAMUSCULAR; INTRAVENOUS at 00:13

## 2025-05-24 RX ADMIN — SERTRALINE 100 MILLIGRAM(S): 100 TABLET, FILM COATED ORAL at 11:27

## 2025-05-24 RX ADMIN — PREGABALIN 150 MILLIGRAM(S): 50 CAPSULE ORAL at 05:52

## 2025-05-24 RX ADMIN — MONTELUKAST SODIUM 10 MILLIGRAM(S): 10 TABLET ORAL at 11:28

## 2025-05-24 RX ADMIN — Medication 325 MILLIGRAM(S): at 11:27

## 2025-05-24 RX ADMIN — INSULIN LISPRO 10 UNIT(S): 100 INJECTION, SOLUTION INTRAVENOUS; SUBCUTANEOUS at 08:51

## 2025-05-24 RX ADMIN — Medication 10 MILLIGRAM(S): at 11:27

## 2025-05-24 RX ADMIN — FUROSEMIDE 40 MILLIGRAM(S): 10 INJECTION INTRAMUSCULAR; INTRAVENOUS at 05:52

## 2025-05-24 RX ADMIN — HEPARIN SODIUM 5000 UNIT(S): 1000 INJECTION INTRAVENOUS; SUBCUTANEOUS at 23:00

## 2025-05-24 RX ADMIN — OXYCODONE HYDROCHLORIDE 5 MILLIGRAM(S): 30 TABLET ORAL at 05:51

## 2025-05-24 RX ADMIN — OXYCODONE HYDROCHLORIDE 5 MILLIGRAM(S): 30 TABLET ORAL at 06:51

## 2025-05-24 RX ADMIN — INSULIN LISPRO 10: 100 INJECTION, SOLUTION INTRAVENOUS; SUBCUTANEOUS at 12:58

## 2025-05-24 RX ADMIN — MUPIROCIN CALCIUM 1 APPLICATION(S): 20 CREAM TOPICAL at 17:30

## 2025-05-24 RX ADMIN — CLONAZEPAM 0.75 MILLIGRAM(S): 0.5 TABLET ORAL at 19:51

## 2025-05-24 RX ADMIN — Medication 40 MILLIEQUIVALENT(S): at 14:34

## 2025-05-24 RX ADMIN — Medication 1 DOSE(S): at 17:35

## 2025-05-24 RX ADMIN — Medication 40 MILLIGRAM(S): at 05:51

## 2025-05-24 RX ADMIN — Medication 1 APPLICATION(S): at 05:52

## 2025-05-24 RX ADMIN — OXYCODONE HYDROCHLORIDE 5 MILLIGRAM(S): 30 TABLET ORAL at 19:50

## 2025-05-24 RX ADMIN — Medication 1 DOSE(S): at 05:50

## 2025-05-24 RX ADMIN — INSULIN LISPRO 10 UNIT(S): 100 INJECTION, SOLUTION INTRAVENOUS; SUBCUTANEOUS at 12:57

## 2025-05-24 RX ADMIN — OXYCODONE HYDROCHLORIDE 5 MILLIGRAM(S): 30 TABLET ORAL at 20:50

## 2025-05-24 RX ADMIN — PREGABALIN 150 MILLIGRAM(S): 50 CAPSULE ORAL at 17:24

## 2025-05-24 RX ADMIN — INSULIN LISPRO 10 UNIT(S): 100 INJECTION, SOLUTION INTRAVENOUS; SUBCUTANEOUS at 17:29

## 2025-05-24 RX ADMIN — INSULIN GLARGINE-YFGN 24 UNIT(S): 100 INJECTION, SOLUTION SUBCUTANEOUS at 23:32

## 2025-05-24 RX ADMIN — INSULIN LISPRO 8: 100 INJECTION, SOLUTION INTRAVENOUS; SUBCUTANEOUS at 08:51

## 2025-05-24 RX ADMIN — ROSUVASTATIN CALCIUM 40 MILLIGRAM(S): 20 TABLET, FILM COATED ORAL at 23:00

## 2025-05-24 RX ADMIN — FUROSEMIDE 40 MILLIGRAM(S): 10 INJECTION INTRAMUSCULAR; INTRAVENOUS at 17:34

## 2025-05-24 RX ADMIN — PREDNISONE 10 MILLIGRAM(S): 20 TABLET ORAL at 05:51

## 2025-05-24 RX ADMIN — Medication 40 MILLIEQUIVALENT(S): at 17:23

## 2025-05-24 RX ADMIN — CLONAZEPAM 0.75 MILLIGRAM(S): 0.5 TABLET ORAL at 05:51

## 2025-05-24 RX ADMIN — Medication 2 TABLET(S): at 23:00

## 2025-05-24 RX ADMIN — METOPROLOL SUCCINATE 25 MILLIGRAM(S): 50 TABLET, EXTENDED RELEASE ORAL at 05:52

## 2025-05-24 NOTE — PROGRESS NOTE ADULT - PROBLEM SELECTOR PLAN 1
Patient presents w/ acute on chronic Diastolic  CHF exacerbation likely 2/2 non compliance with meds   - CT chest: Mild tree-in-bud nodularity in the left lower lobe, which may be infectious/inflammatory  - Pro , Trop neg x2   - Will start Lasix 40mg IV BID  - TTE 10/24: EF 61%, grade 2 LV diastolic dysfunction, trace TR   - Will obtain repeat TTE, f/u results   - Will obtain A1C, lipid panel, TSH f/u results   - Strict I & O's and daily weights  - Cardiology ([place]  group) consulted, will appreciate recs

## 2025-05-24 NOTE — PHYSICAL THERAPY INITIAL EVALUATION ADULT - PERTINENT HX OF CURRENT PROBLEM, REHAB EVAL
73 yo M adm 5/21 PMH DM2, PAD, hypertension, COPD, CKD,  HFpEF, Hx of R foot ulcer with osteomyelitis [facility] and weight gain of about 30lbs over past 1 month. Admitted for Ac on chronic Diastolic  CHF exacerbation, possible pneumonia.

## 2025-05-24 NOTE — DIETITIAN INITIAL EVALUATION ADULT - PROBLEM SELECTOR PLAN 5
Chronic  - Continue home pregabalin and oxycodone PRN (primary team to obtain ISTOP)  Rt lower lateral leg open wound with drainage     #Anxiety and depression  - Continue home clonazepam .75mg BID PRN (primary team to obtain ISTOP) and sertraline

## 2025-05-24 NOTE — PHYSICAL THERAPY INITIAL EVALUATION ADULT - WEIGHT-BEARING RESTRICTIONS: STAND/SIT, REHAB EVAL
[facility]  Outpatient Occupational Therapy  CANCEL/NO SHOW NOTE    Date: 3/17/2022  Patient Name: Efrain Pratt        MRN: [id number]    [facility] #: [id number]  : 2013  (10 y.o.)  Gender: male     No Show: 2  Canceled Appointment: 4    REASON FOR MISSED TREATMENT:    []Cancelled due to illness. []Therapist cancelled appointment  []Cancelled due to other appointment   [x]No show / No call. Pt called with next scheduled appointment.   []Cancelled due to transportation conflict  []Cancelled due to weather  []Frequency of order changed  []Patient on hold due to:   []OTHER:      Electronically signed by:   ARIE Mustafa            Date:3/17/2022 full weight-bearing

## 2025-05-24 NOTE — DIETITIAN INITIAL EVALUATION ADULT - PROBLEM SELECTOR PLAN 4
Chronic- not on home O2  - Continue home Spiriva, Symbicort, montelukast, loratadine interchange  - Duo nebs PRN    #Gerd  - Continue home Protonix

## 2025-05-24 NOTE — PROGRESS NOTE ADULT - PROBLEM SELECTOR PLAN 2
; questionable pna? RVP- NEG   - Patient afebrile, no leukocytosis present    - CT chest: Mild tree-in-bud nodularity in the left lower lobe, which may be infectious/inflammatory  - Will start Rocephin and Azithro   - F/u RVP panel   - Follow up blood and urine cultures, legionella urine antigen, strep pneumo urine antigen  - ID (Dr. Denton) consulted, will appreciate recs  - Pulm (Dr. Marie) consulted, will appreciate recs

## 2025-05-24 NOTE — PROGRESS NOTE ADULT - ASSESSMENT
74yoM PMH DM2, PAD, hypertension, COPD, CKD,  HFpEF, Hx of R foot ulcer with osteomyelitis BIBEMS from [place] p/w exertional dyspnea and weight gain of about 30lbs over past 1 month.    copd  chf  dyspnea  tree in bud  htn  ckd  gerd  op  oa    on lasix  on nebs prn  on Prednisone  on RA  VS noted    cvs rx regimen  lasix  I and O  cardio eval and follow up  bronchodilator rx regimen and cough rx regimen and short course of systemic steroids  oral hygiene  skin care  ct chest reviewed  pt follows with Pulm in [place] - Dr Smith  I helena  monitor VS and HD and Sat  goal sat > 88 pct  BP control and HD monitoring  trend labs  replete lytes  anxiolysis  DVT p

## 2025-05-24 NOTE — PROGRESS NOTE ADULT - SUBJECTIVE AND OBJECTIVE BOX
Neurology Follow up note    WAYNE [id number]    HPI:  74yoM PMH DM2, PAD, hypertension, COPD, CKD,  HFpEF, Hx of Rt  foot ulcer with osteomyelitis, rt lower EXT wound ,Tri geminal Neuralgia,  [facility] and weight gain of about 30lbs over past 1 month. Patient states that for the last one week he has a productive cough along with SOB, BERGER, orthopnea. Patient reports 25-30lbs weight gain over the past month. Patient states that he is supposed to be taking 20mg lasix TID but only takes it BID (recently increased 1 month ago). Patient denies sick contacts. Denies fevers, chills, body aches, chest pain, palpitations, abdominal pain, n/v. Denies recent travel.     IN THE ED:  Temp  98.2 F , HR 87 ,  /67  ,RR 18 , SpO2 92% RA   S/P x  EKG:  Labs significant for: WBAC 8.15, H/H 11.8/36.7, BUN/Cr 33/1.6, Trop neg x2, proBNP 634  Imaging: CT chest: Mild tree-in-bud nodularity in the left lower lobe, which may be infectious/inflammatory.       (22 May 2025 00:02)      Interval History -no HA    Patient is seen, chart was reviewed and case was discussed with the treatment team.  Pt is not in any distress.   Lying on bed comfortably.   No events reported overnight.       Vital Signs Last 24 Hrs  T(C): 36.7 (24 May 2025 11:28), Max: 37.2 (23 May 2025 21:32)  T(F): 98.1 (24 May 2025 11:28), Max: 99 (23 May 2025 21:32)  HR: 75 (24 May 2025 11:28) (75 - 97)  BP: 97/56 (24 May 2025 11:28) (94/62 - 119/76)  BP(mean): --  RR: 18 (24 May 2025 11:28) (18 - 18)  SpO2: 92% (24 May 2025 11:28) (92% - 93%)    Parameters below as of 24 May 2025 11:28  Patient On (Oxygen Delivery Method): room air            REVIEW OF SYSTEMS:    Constitutional: No fever, weight loss  Eyes: No eye pain, visual disturbances, or discharge  ENT:  No difficulty hearing, tinnitus, vertigo; No sinus or throat pain  Neck: No pain or stiffness  Respiratory: No cough, wheezing, chills or hemoptysis  Cardiovascular: No chest pain, palpitations,   Gastrointestinal: No abdominal or epigastric pain. No nausea, vomiting or hematemesis; No diarrhea or constipation.   Genitourinary: No dysuria, frequency, hematuria or incontinence  Neurological: No headaches,  or tremors  Psychiatric: No depression, anxiety, mood swings or difficulty sleeping  Musculoskeletal: No joint pain or swelling; No muscle, back or extremity pain  Skin: No itching, burning, rashes or lesions   Lymph Nodes: No enlarged glands  Endocrine: No heat or cold intolerance;   Allergy and Immunologic: No hives or eczema    On Neurological Examination:    Mental Status - Pt is alert, awake, oriented X3. Follows commands well and able to answer questions appropriately.Mood and affect  normal    Speech -  Normal.    Cranial Nerves - Pupils 3 mm equal and reactive to light, extraocular eye movements intact. Pt has no visual field deficit.  Pt has no  facial asymmetry. Facial sensation is intact.Tongue - is in midline.    Muscle tone - is normal     Motor Exam - 4/5 all over, No drift. No shaking or tremors.    Sensory Exam - Pin prick, temperature, joint position and vibration are IMPAIRED  on either side. Pt withdraws all extremities equally on stimulation. No asymmetry seen.     .        coordination:    Finger to nose: normal    .    Deep tendon Reflexes - 2 plus all over.        .    Neck Supple -  Yes.     MEDICATIONS    acetaminophen     Tablet .. 650 milliGRAM(s) Oral every 6 hours PRN  albuterol/ipratropium for Nebulization 3 milliLiter(s) Nebulizer every 6 hours PRN  aluminum hydroxide/magnesium hydroxide/simethicone Suspension 30 milliLiter(s) Oral every 4 hours PRN  benzonatate 100 milliGRAM(s) Oral three times a day PRN  cefTRIAXone   IVPB 1000 milliGRAM(s) IV Intermittent every 24 hours  cetirizine 10 milliGRAM(s) Oral daily  chlorhexidine 2% Cloths 1 Application(s) Topical <User Schedule>  clonazePAM Oral Disintegrating Tablet 0.75 milliGRAM(s) Oral two times a day PRN  DAPTOmycin IVPB 450 milliGRAM(s) IV Intermittent every 24 hours  dextrose 5%. 1000 milliLiter(s) IV Continuous <Continuous>  dextrose 5%. 1000 milliLiter(s) IV Continuous <Continuous>  dextrose 50% Injectable 25 Gram(s) IV Push once  dextrose 50% Injectable 12.5 Gram(s) IV Push once  dextrose 50% Injectable 25 Gram(s) IV Push once  dextrose Oral Gel 15 Gram(s) Oral once PRN  ferrous    sulfate 325 milliGRAM(s) Oral daily  fluticasone propionate/ salmeterol 250-50 MICROgram(s) Diskus 1 Dose(s) Inhalation two times a day  furosemide    Tablet 40 milliGRAM(s) Oral every 12 hours  glucagon  Injectable 1 milliGRAM(s) IntraMuscular once  heparin   Injectable 5000 Unit(s) SubCutaneous every 8 hours  insulin glargine Injectable (LANTUS) 24 Unit(s) SubCutaneous at bedtime  insulin lispro (ADMELOG) corrective regimen sliding scale   SubCutaneous three times a day before meals  insulin lispro (ADMELOG) corrective regimen sliding scale   SubCutaneous at bedtime  insulin lispro Injectable (ADMELOG) 10 Unit(s) SubCutaneous three times a day before meals  lactobacillus acidophilus 1 Tablet(s) Oral every 12 hours  melatonin 3 milliGRAM(s) Oral at bedtime PRN  montelukast 10 milliGRAM(s) Oral daily  mupirocin 2% Nasal 1 Application(s) Both Nostrils two times a day  ondansetron Injectable 4 milliGRAM(s) IV Push every 8 hours PRN  oxyCODONE    IR 2.5 milliGRAM(s) Oral every 4 hours PRN  oxyCODONE    IR 5 milliGRAM(s) Oral every 6 hours PRN  pantoprazole    Tablet 40 milliGRAM(s) Oral before breakfast  polyethylene glycol 3350 17 Gram(s) Oral daily  potassium chloride    Tablet ER 40 milliEquivalent(s) Oral every 4 hours  predniSONE   Tablet 10 milliGRAM(s) Oral daily  pregabalin 150 milliGRAM(s) Oral two times a day  rosuvastatin 40 milliGRAM(s) Oral at bedtime  senna 2 Tablet(s) Oral at bedtime  sertraline 100 milliGRAM(s) Oral daily  tiotropium 2.5 MICROgram(s) Inhaler 2 Puff(s) Inhalation daily      Allergies    IODINE (Unknown)  tetracycline (Unknown)  vancomycin (Other)    Intolerances        LABS:  CBC Full  -  ( 24 May 2025 07:10 )  WBC Count : 10.51 K/uL  RBC Count : 4.59 M/uL  Hemoglobin : 12.6 g/dL  Hematocrit : 38.9 %  Platelet Count - Automated : 175 K/uL  Mean Cell Volume : 84.7 fl  Mean Cell Hemoglobin : 27.5 pg  Mean Cell Hemoglobin Concentration : 32.4 g/dL  Auto Neutrophil # : x  Auto Lymphocyte # : x  Auto Monocyte # : x  Auto Eosinophil # : x  Auto Basophil # : x  Auto Neutrophil % : x  Auto Lymphocyte % : x  Auto Monocyte % : x  Auto Eosinophil % : x  Auto Basophil % : x    Urinalysis Basic - ( 24 May 2025 07:10 )    Color: x / Appearance: x / SG: x / pH: x  Gluc: 266 mg/dL / Ketone: x  / Bili: x / Urobili: x   Blood: x / Protein: x / Nitrite: x   Leuk Esterase: x / RBC: x / WBC x   Sq Epi: x / Non Sq Epi: x / Bacteria: x      05-24    137  |  97  |  39[H]  ----------------------------<  266[H]  3.2[L]   |  32[H]  |  1.50[H]    Ca    9.1      24 May 2025 07:10    TPro  7.3  /  Alb  2.9[L]  /  TBili  0.4  /  DBili  x   /  AST  20  /  ALT  32  /  AlkPhos  80  05-24    Hemoglobin A1C:     Vitamin B12     RADIOLOGY    ASSESSMENT AND PLAN:      left sided HA-  RELATED TO TEMPORAL ARTERITIS  PN 0N LYRICA    CONTINUE   PREDNISONE   Physical therapy evaluation.  OOB to chair/ambulation with assistance only.  Advanced care planning was discussed with family.  Pain is accessed and addressed  Plan of care was discussed with family. Questions answered.  Would continue to follow.

## 2025-05-24 NOTE — PROGRESS NOTE ADULT - ASSESSMENT
74yoM PMH HTN, HLD, DM2, PAD, COPD, CKD,  HFpEF, hx of R foot ulcer with osteomyelitis    HFpEF, HTN  - Pro-BNP mildly elevated but CT chest not consistent with CHF, more infectious/inflammatory  - Now with slightly higher creatine, would switch IV Lasix to PO 40 mg q12H  - Monitor renal function  - Previous TTE shows EF 61% with mod grade 2 diastolic dysfunction and trace MR/TR.    - No known Hx of CAD  - Patient is not complaining of any cardiac symptoms at this time.  - No clear evidence of acute ischemia, trops negative x 2.  - No acute changes on EKG compared to previous.  - Continue home statin fro Hx of HLD.  Will benefit from antiplatelet for Hx of PAD    - BP labile with readings at 90's  - Hold home BB    - Monitor and replete lytes, keep K>4, Mg>2.  - Will continue to follow.    María Lux DNP, NP-C, AGACNP-C  Cardiology   Call TEAMS

## 2025-05-24 NOTE — DIETITIAN INITIAL EVALUATION ADULT - PROBLEM/PLAN-7
Patient discharging from [facility] home accompanied by family. AVS reviewed including discharge instructions, medications, and follow up appointments. PIVs removed. Telemetry and VPA removed from room.    DISPLAY PLAN FREE TEXT

## 2025-05-24 NOTE — DIETITIAN INITIAL EVALUATION ADULT - PROBLEM/PLAN-3
1408 REC'D TO PACU IN STABLE COND. PT SLEEPING, WAKES TO VOICE. CONNECTED TO BP CUFF, EKG LEADS & SPO2 MONITORS. VS STABLE. PT IS RESTING WELL, NO DISTRESS NOTED @ THIS TIME. WILL CONT TO MONITOR.      1440 HOSE KEEPING CLEANING ROOM NOW.      1515 TRANSFERRED PT TO ROOM 465 IN STABLE COND. ASSISTED PT TO HOSPITAL BED. BEDSIDE REP[ORT GIVEN TO JUNIOR RN. NO DISTRESS NOTED @ THIS TIME. VS STABLE  97.5  97% 93  148/71   DISPLAY PLAN FREE TEXT

## 2025-05-24 NOTE — PROGRESS NOTE ADULT - SUBJECTIVE AND OBJECTIVE BOX
[facility] -- Erick Hogue, Aj Odonnell Savella, , Nancy Quevedo  Office # [phone]    Follow Up: BERGER    Subjective/Observations: Seen sleeping soundly, non-orthopneic on RA.  Not in any form of distress    REVIEW OF SYSTEMS: All other review of systems is negative unless indicated above  PAST MEDICAL & SURGICAL HISTORY:  Prostate cancer      Type II diabetes mellitus      Chronic obstructive pulmonary disease (COPD)      CHF (congestive heart failure)      Renal insufficiency      H/O migraine      Insomnia      Constipation      S/P foot surgery        MEDICATIONS  (STANDING):  cefTRIAXone   IVPB 1000 milliGRAM(s) IV Intermittent every 24 hours  cetirizine 10 milliGRAM(s) Oral daily  chlorhexidine 2% Cloths 1 Application(s) Topical <User Schedule>  DAPTOmycin IVPB 450 milliGRAM(s) IV Intermittent every 24 hours  dextrose 5%. 1000 milliLiter(s) (50 mL/Hr) IV Continuous <Continuous>  dextrose 5%. 1000 milliLiter(s) (100 mL/Hr) IV Continuous <Continuous>  dextrose 50% Injectable 25 Gram(s) IV Push once  dextrose 50% Injectable 12.5 Gram(s) IV Push once  dextrose 50% Injectable 25 Gram(s) IV Push once  ferrous    sulfate 325 milliGRAM(s) Oral daily  fluticasone propionate/ salmeterol 250-50 MICROgram(s) Diskus 1 Dose(s) Inhalation two times a day  furosemide   Injectable 40 milliGRAM(s) IV Push two times a day  glucagon  Injectable 1 milliGRAM(s) IntraMuscular once  heparin   Injectable 5000 Unit(s) SubCutaneous every 8 hours  insulin glargine Injectable (LANTUS) 24 Unit(s) SubCutaneous at bedtime  insulin lispro (ADMELOG) corrective regimen sliding scale   SubCutaneous three times a day before meals  insulin lispro (ADMELOG) corrective regimen sliding scale   SubCutaneous at bedtime  insulin lispro Injectable (ADMELOG) 10 Unit(s) SubCutaneous three times a day before meals  lactobacillus acidophilus 1 Tablet(s) Oral every 12 hours  metoprolol succinate ER 25 milliGRAM(s) Oral daily  montelukast 10 milliGRAM(s) Oral daily  pantoprazole    Tablet 40 milliGRAM(s) Oral before breakfast  polyethylene glycol 3350 17 Gram(s) Oral daily  potassium chloride    Tablet ER 40 milliEquivalent(s) Oral every 4 hours  predniSONE   Tablet 10 milliGRAM(s) Oral daily  pregabalin 150 milliGRAM(s) Oral two times a day  rosuvastatin 40 milliGRAM(s) Oral at bedtime  senna 2 Tablet(s) Oral at bedtime  sertraline 100 milliGRAM(s) Oral daily  tiotropium 2.5 MICROgram(s) Inhaler 2 Puff(s) Inhalation daily    MEDICATIONS  (PRN):  acetaminophen     Tablet .. 650 milliGRAM(s) Oral every 6 hours PRN Temp greater or equal to 38C (100.4F), Mild Pain (1 - 3)  albuterol/ipratropium for Nebulization 3 milliLiter(s) Nebulizer every 6 hours PRN Shortness of Breath and/or Wheezing  benzonatate 100 milliGRAM(s) Oral three times a day PRN Cough  clonazePAM Oral Disintegrating Tablet 0.75 milliGRAM(s) Oral two times a day PRN anxiety  dextrose Oral Gel 15 Gram(s) Oral once PRN Blood Glucose LESS THAN 70 milliGRAM(s)/deciliter  melatonin 3 milliGRAM(s) Oral at bedtime PRN Insomnia  oxyCODONE    IR 2.5 milliGRAM(s) Oral every 4 hours PRN Moderate Pain (4 - 6)  oxyCODONE    IR 5 milliGRAM(s) Oral every 6 hours PRN Severe Pain (7 - 10)    Allergies    IODINE (Unknown)  tetracycline (Unknown)  vancomycin (Other)    Intolerances    Vital Signs Last 24 Hrs  T(C): 36.7 (24 May 2025 11:28), Max: 37.2 (23 May 2025 21:32)  T(F): 98.1 (24 May 2025 11:28), Max: 99 (23 May 2025 21:32)  HR: 75 (24 May 2025 11:28) (75 - 97)  BP: 97/56 (24 May 2025 11:28) (94/62 - 120/67)  BP(mean): --  RR: 18 (24 May 2025 11:28) (18 - 18)  SpO2: 92% (24 May 2025 11:28) (92% - 93%)    Parameters below as of 24 May 2025 11:28  Patient On (Oxygen Delivery Method): room air    I&O's Summary    23 May 2025 07:01  -  24 May 2025 07:00  --------------------------------------------------------  IN: 0 mL / OUT: 300 mL / NET: -300 mL      PHYSICAL EXAM:  TELE: Not on tele  Constitutional: NAD, asleep but arousable  HEENT: Moist Mucous Membranes, Anicteric  Pulmonary: Non-labored, breath sounds are clear but diminished bilaterally, No wheezing, rales or rhonchi  Cardiovascular: Regular, S1 and S2, No murmurs, rubs, gallops or clicks  Gastrointestinal: Bowel Sounds present, soft, nontender.   Lymph: No peripheral edema. No lymphadenopathy.  Skin: No visible rashes. +RUE and RLE ulcers with dressings dry and intact  Psych:  Mood & affect appropriate  LABS: All Labs Reviewed:                        12.6   10.51 )-----------( 175      ( 24 May 2025 07:10 )             38.9                         12.5   9.04  )-----------( 173      ( 23 May 2025 07:30 )             38.0                         12.4   10.38 )-----------( 158      ( 22 May 2025 08:05 )             38.2     24 May 2025 07:10    137    |  97     |  39     ----------------------------<  266    3.2     |  32     |  1.50   22 May 2025 08:05    137    |  97     |  35     ----------------------------<  222    3.3     |  28     |  1.40   21 May 2025 21:10    136    |  100    |  33     ----------------------------<  274    4.3     |  24     |  1.60     Ca    9.1        24 May 2025 07:10  Ca    9.1        22 May 2025 08:05  Ca    9.1        21 May 2025 21:10    TPro  7.3    /  Alb  2.9    /  TBili  0.4    /  DBili  x      /  AST  20     /  ALT  32     /  AlkPhos  80     24 May 2025 07:10  TPro  7.5    /  Alb  2.9    /  TBili  0.4    /  DBili  x      /  AST  17     /  ALT  33     /  AlkPhos  72     22 May 2025 08:05  TPro  7.2    /  Alb  2.8    /  TBili  0.4    /  DBili  x      /  AST  21     /  ALT  33     /  AlkPhos  71     21 May 2025 21:10          12 Lead ECG:   Ventricular Rate 75 BPM    Atrial Rate 75 BPM    P-R Interval 210 ms    QRS Duration 94 ms    Q-T Interval 398 ms    QTC Calculation(Bazett) 444 ms    P Axis -1 degrees    R Axis -66 degrees    T Axis 39 degrees    Diagnosis Line Sinus rhythm with 1st degree AV block  Left axis deviation  Low voltage QRS  Septal infarct (cited on or before 26-AUG-2023)    Confirmed by NICO ODONNELL ([id number]) on 5/22/2025 5:14:20 PM (05-21-25 @ 23:33)      TRANSTHORACIC ECHOCARDIOGRAM REPORT  ________________________________________________________________________________                                      _______       Pt. Name:       WAYNE SERRANO Study Date:    10/9/2024  MRN:            [id number]     YOB: 1950  Accession #:    [id number]     Age:           74 years  Account#:       [id number]    Gender:        M  Heart Rate:                   Height:        70.08 in (178.00 cm)  Rhythm:                       Weight:        220.46 lb (100.00 kg)  Blood Pressure: 122/51 mmHg   BSA/BMI:       2.18 m² / 31.56 kg/m²  ________________________________________________________________________________________  Referring Physician:    [id number] Ariela Diaz  Interpreting Physician: Jason Joseph MD  Primary Sonographer:    Jenny Trent [facility]    CPT:               ECHO TTE WO CON COMP W DOPP - 71745.m  Indication(s):     Chronic ischemic heart disease, unspecified - I25.9  Procedure:         Transthoracic echocardiogram with 2-D, M-mode and complete                     spectral and color flow Doppler.  Ordering Location: [facility]  Admission Status:  Inpatient  Study Information: Image quality for this study is technically difficult.  ______________________________________________________________________________________     CONCLUSIONS:      1. Technically difficult image quality.   2. Left ventricular systolic function is normal with an ejection fraction of 61 % by Valencia's method of disks.   3. There is moderate (grade 2) left ventricular diastolic dysfunction.   4. Trace mitral regurgitation.   5. Trace tricuspid regurgitation.  _______________________________________________________________________________________  FINDINGS:     Left Ventricle:  Left ventricular systolic function is normal with a calculated ejection fraction of 61 % by the Valencia's biplane method of disks. Mild left ventricular hypertrophy. There is moderate (grade 2) left ventricular diastolic dysfunction.     Right Ventricle:  The right ventricle is not well visualized.     Left Atrium:  The left atrium is normal in size.     Right Atrium:  The right atrium is normal in size.     Aortic Valve:  The aortic valve is tricuspid with normal leaflet excursion. There is no aortic valve stenosis. There is no evidence of aortic regurgitation.     Mitral Valve:  Structurally normal mitral valve with normal leaflet excursion. There is no mitral valve stenosis. There is trace mitral regurgitation.     Tricuspid Valve:  Structurally normaltricuspid valve with normal leaflet excursion. There is trace tricuspid regurgitation. Estimated pulmonary artery systolic pressure is 26 mmHg.     Pulmonic Valve:  The pulmonic valve was not well visualized.     Aorta:  The aortic root at the sinuses of Valsalva is normal in size.     Pericardium:  No pericardial effusion seen.     Systemic Veins:  The inferior vena cava is dilated measuring 2.79 cm in diameter, (dilated >2.1cm) with abnormal inspiratory collapse (abnormal <50%) consistent with elevated right atrial pressure (~15, range 10-20mmHg).  ____________________________________________________________________  QUANTITATIVE DATA:  Left Ventricle Measurements: (Indexed to BSA)     IVSd (2D):   1.3 cm  LVPWd (2D):  1.2 cm  LVIDd (2D):  5.0 cm  LVIDs (2D):  3.3 cm  LV Mass:     243 g  111.5 g/m²  LV Vol d, MOD A2C: 78.9 ml 36.24 ml/m²  LV Vol d, MOD A4C: 94.4 ml 43.37 ml/m²  LV Vol d, MOD BP:  86.7 ml 39.80 ml/m²  LV Vol s, MOD A2C: 31.6 ml 14.51 ml/m²  LV Vol s, MOD A4C: 34.8 ml 16.01 ml/m²  LV Vol s, MOD BP:  34.2 ml 15.70 ml/m²  LVOT SV MOD BP:    52.5 ml  LV EF% MOD BP:     61 %     MV E Vmax:    0.83 m/s  MV A Vmax:    1.09 m/s  MV E/A:       0.76  e' lateral:   9.00 cm/s  e' medial:    5.20 cm/s  E/e' lateral: 9.24  E/e' medial:  15.99  E/e' Average: 11.71  MV DT:        351 msec    Aorta Measurements: (Normal range) (Indexed to BSA)     Ao Root d 3.48 cm (3.1 - 3.7 cm) 1.60 cm/m²    Left Atrium Measurements: (Indexed to BSA)  LA Diam 2D: 3.81 cm    Mitral Valve Measurements:     MV E Vmax: 0.8 m/s         MR Peak Gradient: 10.2 mmHg  MV A Vmax: 1.1 m/s  MV E/A:    0.8       Tricuspid Valve Measurements:     TR Vmax:          1.6 m/s  TR Peak Gradient: 10.8 mmHg  RA Pressure:      15 mmHg  PASP:    26 mmHg    ________________________________________________________________________________________  Electronically signed on 10/10/2024 at 9:09:53 AM by Jason Joseph MD     *** Final ***

## 2025-05-24 NOTE — DIETITIAN INITIAL EVALUATION ADULT - PROBLEM SELECTOR PLAN 1
Patient presents w/ acute on chronic Diastolic  CHF exacerbation likely 2/2 non compliance with meds   - CT chest: Mild tree-in-bud nodularity in the left lower lobe, which may be infectious/inflammatory  - Pro , Trop neg x2   - Will start Lasix 40mg IV BID  - TTE 10/24: EF 61%, grade 2 LV diastolic dysfunction, trace TR   - Will obtain repeat TTE, f/u results   - Will obtain A1C, lipid panel, TSH f/u results   - Strict I & O's and daily weights  - Cardiology ([facility]) consulted, will appreciate recs

## 2025-05-24 NOTE — PHYSICAL THERAPY INITIAL EVALUATION ADULT - ADDITIONAL COMMENTS
Pt is a resident at [facility] and transferred to wheelchair w/ 1 person assist. Pt stating he ambulates short distances w/ RW and assist and required assist for transfers and ADLs. Pt is mostly in wheelchair.

## 2025-05-24 NOTE — DIETITIAN INITIAL EVALUATION ADULT - OTHER INFO
Reason for Admission: CHF exacerbation, PNA  History of Present Illness:   74yoM PMH DM2, PAD, hypertension, COPD, CKD,  HFpEF, Hx of Rt  foot ulcer with osteomyelitis, rt lower EXT wound ,Tri geminal Neuralgia,  BIBEMS [place] [facility] p/w exertional dyspnea and weight gain of about 30lbs over past 1 month. Patient states that for the last one week he has a productive cough along with SOB  patient reports UBW ~ 2 months ago 210-220# now weight 242# + 3 edema left and right knee noted  B12 and folate WNL   POCT 314, 264 noted on prednisone

## 2025-05-24 NOTE — DIETITIAN INITIAL EVALUATION ADULT - PERTINENT LABORATORY DATA
05-24    137  |  97  |  39[H]  ----------------------------<  266[H]  3.2[L]   |  32[H]  |  1.50[H]    Ca    9.1      24 May 2025 07:10    TPro  7.3  /  Alb  2.9[L]  /  TBili  0.4  /  DBili  x   /  AST  20  /  ALT  32  /  AlkPhos  80  05-24  POCT Blood Glucose.: 314 mg/dL (05-24-25 @ 08:22)  A1C with Estimated Average Glucose Result: 10.0 % (05-22-25 @ 09:11)  A1C with Estimated Average Glucose Result: 10.6 % (12-29-24 @ 06:43)  A1C with Estimated Average Glucose Result: 7.3 % (10-02-24 @ 06:07)

## 2025-05-24 NOTE — PROGRESS NOTE ADULT - SUBJECTIVE AND OBJECTIVE BOX
PROGRESS NOTE  Patient is a 74y old  Male who presents with a chief complaint of CHF exacerbation, PNA (24 May 2025 13:14)    Chart and available morning labs /imaging are reviewed electronically , urgent issues addressed . More information  is being added upon completion of rounds , when more information is collected and management discussed with consultants , medical staff and social service/case management on the floor   OVERNIGHT  No new issues reported by medical staff . All above noted Patient is resting in a bed comfortably Denies complains  .No distress noted   Spoke to the sister on a phone Potassium is replaced   HPI:  74yoM PMH DM2, PAD, hypertension, COPD, CKD,  HFpEF, Hx of Rt  foot ulcer with osteomyelitis, rt lower EXT wound ,Tri geminal Neuralgia,  BIBEMS from [place] p/w exertional dyspnea and weight gain of about 30lbs over past 1 month. Patient states that for the last one week he has a productive cough along with SOB, BERGER, orthopnea. Patient reports 25-30lbs weight gain over the past month. Patient states that he is supposed to be taking 20mg lasix TID but only takes it BID (recently increased 1 month ago). Patient denies sick contacts. Denies fevers, chills, body aches, chest pain, palpitations, abdominal pain, n/v. Denies recent travel.     IN THE ED:  Temp  98.2 F , HR 87 ,  /67  ,RR 18 , SpO2 92% RA   S/P x  EKG:  Labs significant for: WBAC 8.15, H/H 11.8/36.7, BUN/Cr 33/1.6, Trop neg x2, proBNP 634  Imaging: CT chest: Mild tree-in-bud nodularity in the left lower lobe, which may be infectious/inflammatory.       (22 May 2025 00:02)    PAST MEDICAL & SURGICAL HISTORY:  Prostate cancer      Type II diabetes mellitus      Chronic obstructive pulmonary disease (COPD)      CHF (congestive heart failure)      Renal insufficiency      H/O migraine      Insomnia      Constipation      S/P foot surgery          MEDICATIONS  (STANDING):  cefTRIAXone   IVPB 1000 milliGRAM(s) IV Intermittent every 24 hours  cetirizine 10 milliGRAM(s) Oral daily  chlorhexidine 2% Cloths 1 Application(s) Topical <User Schedule>  DAPTOmycin IVPB 450 milliGRAM(s) IV Intermittent every 24 hours  dextrose 5%. 1000 milliLiter(s) (50 mL/Hr) IV Continuous <Continuous>  dextrose 5%. 1000 milliLiter(s) (100 mL/Hr) IV Continuous <Continuous>  dextrose 50% Injectable 25 Gram(s) IV Push once  dextrose 50% Injectable 12.5 Gram(s) IV Push once  dextrose 50% Injectable 25 Gram(s) IV Push once  ferrous    sulfate 325 milliGRAM(s) Oral daily  fluticasone propionate/ salmeterol 250-50 MICROgram(s) Diskus 1 Dose(s) Inhalation two times a day  furosemide    Tablet 40 milliGRAM(s) Oral every 12 hours  glucagon  Injectable 1 milliGRAM(s) IntraMuscular once  heparin   Injectable 5000 Unit(s) SubCutaneous every 8 hours  insulin glargine Injectable (LANTUS) 24 Unit(s) SubCutaneous at bedtime  insulin lispro (ADMELOG) corrective regimen sliding scale   SubCutaneous three times a day before meals  insulin lispro (ADMELOG) corrective regimen sliding scale   SubCutaneous at bedtime  insulin lispro Injectable (ADMELOG) 10 Unit(s) SubCutaneous three times a day before meals  lactobacillus acidophilus 1 Tablet(s) Oral every 12 hours  montelukast 10 milliGRAM(s) Oral daily  mupirocin 2% Nasal 1 Application(s) Both Nostrils two times a day  pantoprazole    Tablet 40 milliGRAM(s) Oral before breakfast  polyethylene glycol 3350 17 Gram(s) Oral daily  potassium chloride    Tablet ER 40 milliEquivalent(s) Oral every 4 hours  predniSONE   Tablet 10 milliGRAM(s) Oral daily  pregabalin 150 milliGRAM(s) Oral two times a day  rosuvastatin 40 milliGRAM(s) Oral at bedtime  senna 2 Tablet(s) Oral at bedtime  sertraline 100 milliGRAM(s) Oral daily  tiotropium 2.5 MICROgram(s) Inhaler 2 Puff(s) Inhalation daily    MEDICATIONS  (PRN):  acetaminophen     Tablet .. 650 milliGRAM(s) Oral every 6 hours PRN Temp greater or equal to 38C (100.4F), Mild Pain (1 - 3)  albuterol/ipratropium for Nebulization 3 milliLiter(s) Nebulizer every 6 hours PRN Shortness of Breath and/or Wheezing  aluminum hydroxide/magnesium hydroxide/simethicone Suspension 30 milliLiter(s) Oral every 4 hours PRN Dyspepsia  benzonatate 100 milliGRAM(s) Oral three times a day PRN Cough  clonazePAM Oral Disintegrating Tablet 0.75 milliGRAM(s) Oral two times a day PRN anxiety  dextrose Oral Gel 15 Gram(s) Oral once PRN Blood Glucose LESS THAN 70 milliGRAM(s)/deciliter  melatonin 3 milliGRAM(s) Oral at bedtime PRN Insomnia  ondansetron Injectable 4 milliGRAM(s) IV Push every 8 hours PRN Nausea and/or Vomiting  oxyCODONE    IR 2.5 milliGRAM(s) Oral every 4 hours PRN Moderate Pain (4 - 6)  oxyCODONE    IR 5 milliGRAM(s) Oral every 6 hours PRN Severe Pain (7 - 10)      OBJECTIVE    T(C): 36.7 (05-24-25 @ 11:28), Max: 37.2 (05-23-25 @ 21:32)  HR: 75 (05-24-25 @ 11:28) (75 - 97)  BP: 97/56 (05-24-25 @ 11:28) (94/62 - 119/76)  RR: 18 (05-24-25 @ 11:28) (18 - 18)  SpO2: 92% (05-24-25 @ 11:28) (92% - 93%)  Wt(kg): --  I&O's Summary    23 May 2025 07:01  -  24 May 2025 07:00  --------------------------------------------------------  IN: 0 mL / OUT: 300 mL / NET: -300 mL          REVIEW OF SYSTEMS:  CONSTITUTIONAL: No fever, weight loss, or fatigue  EYES: No eye pain, visual disturbances, or discharge  ENMT:   No sinus or throat pain  NECK: No pain or stiffness  RESPIRATORY: No cough, wheezing, chills or hemoptysis; No shortness of breath  CARDIOVASCULAR: No chest pain, palpitations, dizziness, or leg swelling  GASTROINTESTINAL: No abdominal pain. No nausea, vomiting; No diarrhea or constipation. No melena or hematochezia.  GENITOURINARY: No dysuria, frequency, hematuria, or incontinence  NEUROLOGICAL: No headaches, memory loss, loss of strength, numbness, or tremors  SKIN: No itching, burning, rashes, or lesions   MUSCULOSKELETAL: No joint pain or swelling; No muscle, back, or extremity pain    PHYSICAL EXAM:  Appearance: NAD. VS past 24 hrs -as above   HEENT:   Moist oral mucosa. Conjunctiva clear b/l.   Neck : supple  Respiratory: Lungs CTAB.  Gastrointestinal:  Soft, nontender. No rebound. No rigidity. BS present	  Cardiovascular: RRR ,S1S2 present  Neurologic: Non-focal. Moving all extremities.  Extremities: No edema. No erythema. No calf tenderness.  Skin: No rashes, No ecchymoses, No cyanosis.	  wounds ,skin lesions-See skin assesment flow sheet   LABS:                        12.6   10.51 )-----------( 175      ( 24 May 2025 07:10 )             38.9     05-24    137  |  97  |  39[H]  ----------------------------<  266[H]  3.2[L]   |  32[H]  |  1.50[H]    Ca    9.1      24 May 2025 07:10    TPro  7.3  /  Alb  2.9[L]  /  TBili  0.4  /  DBili  x   /  AST  20  /  ALT  32  /  AlkPhos  80  05-24    CAPILLARY BLOOD GLUCOSE      POCT Blood Glucose.: 358 mg/dL (24 May 2025 12:39)  POCT Blood Glucose.: 314 mg/dL (24 May 2025 08:22)  POCT Blood Glucose.: 264 mg/dL (23 May 2025 21:22)  POCT Blood Glucose.: 276 mg/dL (23 May 2025 17:25)      Urinalysis Basic - ( 24 May 2025 07:10 )    Color: x / Appearance: x / SG: x / pH: x  Gluc: 266 mg/dL / Ketone: x  / Bili: x / Urobili: x   Blood: x / Protein: x / Nitrite: x   Leuk Esterase: x / RBC: x / WBC x   Sq Epi: x / Non Sq Epi: x / Bacteria: x        Urinalysis with Rflx Culture (collected 22 May 2025 03:50)    Culture - Blood (collected 22 May 2025 01:15)  Source: Blood Blood-Peripheral  Preliminary Report (24 May 2025 06:01):    No growth at 48 Hours    Culture - Blood (collected 22 May 2025 01:15)  Source: Blood Blood-Peripheral  Preliminary Report (24 May 2025 06:01):    No growth at 48 Hours      RADIOLOGY & ADDITIONAL TESTS:   reviewed elctronically  ASSESSMENT/PLAN: 	    25 minutes aggregate time was spent on this visit, 50% visit time spent in care co-ordination with other attendings and counselling patient .I have discussed care plan with patient / HCP/family member sister on a phone  ,who expressed understanding of problems treatment and their effect and side effects, alternatives in details. I have asked if they have any questions and concerns and appropriately addressed them to best of my ability. ACP-Advance care planning was discussed , pallitaive care issues ,CMO ,GOC ,MOLST  form ,advance directives were reviewed .All questions were answered to the best of my knowledge - 25 m

## 2025-05-24 NOTE — PROGRESS NOTE ADULT - ASSESSMENT
. Patient is a 74yoM PMH DM2, PAD, hypertension, COPD, CKD,  HFpEF, Hx of R foot ulcer with osteomyelitis admitted for CHF vs PNA. CT scan not convincing for PNA, however given age and comorbidities will continue abx to cover for PNA. Patient also with hx of MRSA wound cultures. Currently with open wound and erythema on RLE ankle.  Cough and SOB improved, no new complaint, no fever.   R elbow swelling and bursitis seen by ortho, no intervention at this time. RLE wound stable with MRSA in the past, so in contact isolation and started dapto until 5/26( will switch to doxycycline orally)  # Pneumonia ruled out by negative study   # R elbow swelling 2/2 to bursitis   # RLE Wound with MRSA in the past   - Contact isolation   - Blood cultures NGTD will follow   - Negative urine legionella and strep   - Continue Ceftriaxone 1gm daily   - Continue daptomycin 450mg daily  - Continue local wound care for R ankle and R elbow

## 2025-05-24 NOTE — DIETITIAN INITIAL EVALUATION ADULT - ORAL INTAKE PTA/DIET HISTORY
patient seen in bed. reports with good PO intake . reports follows [facility] N[facility] style diet PTA from [facility]. eating well PTA. states food not very salty at [facility]  no difficulties chewing swallowing no food allergies. A1c 10.0% on insulin protocol PTA patient accepted written verbal handout and information on portions, whole grains , and HBV proteins in diet .

## 2025-05-24 NOTE — PROGRESS NOTE ADULT - PROBLEM SELECTOR PLAN 3
REANNA -pre renal   - Baseline creatinine 1.1  - BUN/Cr 33/1.6  - Lasix as above 40 mg q 12 hrs   - Monitor BMP  - Avoid nephrotoxic medications as able

## 2025-05-24 NOTE — PROGRESS NOTE ADULT - SUBJECTIVE AND OBJECTIVE BOX
Date/Time Patient Seen:  		  Referring MD:   Data Reviewed	       Patient is a 74y old  Male who presents with a chief complaint of CHF exacerbation, PNA (23 May 2025 11:22)      Subjective/HPI     PAST MEDICAL & SURGICAL HISTORY:  Prostate cancer    Type II diabetes mellitus    Chronic obstructive pulmonary disease (COPD)    CHF (congestive heart failure)    Renal insufficiency    H/O migraine    Insomnia    Constipation    S/P foot surgery          Medication list         MEDICATIONS  (STANDING):  cefTRIAXone   IVPB 1000 milliGRAM(s) IV Intermittent every 24 hours  cetirizine 10 milliGRAM(s) Oral daily  chlorhexidine 2% Cloths 1 Application(s) Topical <User Schedule>  DAPTOmycin IVPB 450 milliGRAM(s) IV Intermittent every 24 hours  dextrose 5%. 1000 milliLiter(s) (50 mL/Hr) IV Continuous <Continuous>  dextrose 5%. 1000 milliLiter(s) (100 mL/Hr) IV Continuous <Continuous>  dextrose 50% Injectable 25 Gram(s) IV Push once  dextrose 50% Injectable 12.5 Gram(s) IV Push once  dextrose 50% Injectable 25 Gram(s) IV Push once  ferrous    sulfate 325 milliGRAM(s) Oral daily  fluticasone propionate/ salmeterol 250-50 MICROgram(s) Diskus 1 Dose(s) Inhalation two times a day  furosemide   Injectable 40 milliGRAM(s) IV Push two times a day  glucagon  Injectable 1 milliGRAM(s) IntraMuscular once  heparin   Injectable 5000 Unit(s) SubCutaneous every 8 hours  insulin glargine Injectable (LANTUS) 24 Unit(s) SubCutaneous at bedtime  insulin lispro (ADMELOG) corrective regimen sliding scale   SubCutaneous three times a day before meals  insulin lispro (ADMELOG) corrective regimen sliding scale   SubCutaneous at bedtime  insulin lispro Injectable (ADMELOG) 10 Unit(s) SubCutaneous three times a day before meals  metoprolol succinate ER 25 milliGRAM(s) Oral daily  montelukast 10 milliGRAM(s) Oral daily  pantoprazole    Tablet 40 milliGRAM(s) Oral before breakfast  polyethylene glycol 3350 17 Gram(s) Oral daily  predniSONE   Tablet 10 milliGRAM(s) Oral daily  pregabalin 150 milliGRAM(s) Oral two times a day  rosuvastatin 40 milliGRAM(s) Oral at bedtime  senna 2 Tablet(s) Oral at bedtime  sertraline 100 milliGRAM(s) Oral daily  tiotropium 2.5 MICROgram(s) Inhaler 2 Puff(s) Inhalation daily    MEDICATIONS  (PRN):  acetaminophen     Tablet .. 650 milliGRAM(s) Oral every 6 hours PRN Temp greater or equal to 38C (100.4F), Mild Pain (1 - 3)  albuterol/ipratropium for Nebulization 3 milliLiter(s) Nebulizer every 6 hours PRN Shortness of Breath and/or Wheezing  benzonatate 100 milliGRAM(s) Oral three times a day PRN Cough  clonazePAM Oral Disintegrating Tablet 0.75 milliGRAM(s) Oral two times a day PRN anxiety  dextrose Oral Gel 15 Gram(s) Oral once PRN Blood Glucose LESS THAN 70 milliGRAM(s)/deciliter  melatonin 3 milliGRAM(s) Oral at bedtime PRN Insomnia  oxyCODONE    IR 2.5 milliGRAM(s) Oral every 4 hours PRN Moderate Pain (4 - 6)  oxyCODONE    IR 5 milliGRAM(s) Oral every 6 hours PRN Severe Pain (7 - 10)         Vitals log        ICU Vital Signs Last 24 Hrs  T(C): 36.7 (24 May 2025 04:49), Max: 37.2 (23 May 2025 21:32)  T(F): 98.1 (24 May 2025 04:49), Max: 99 (23 May 2025 21:32)  HR: 97 (24 May 2025 04:49) (85 - 97)  BP: 119/76 (24 May 2025 04:49) (94/62 - 120/67)  BP(mean): --  ABP: --  ABP(mean): --  RR: 18 (24 May 2025 04:49) (18 - 18)  SpO2: 92% (24 May 2025 04:49) (92% - 95%)    O2 Parameters below as of 24 May 2025 04:49  Patient On (Oxygen Delivery Method): room air                 Input and Output:  I&O's Detail    23 May 2025 07:01  -  24 May 2025 07:00  --------------------------------------------------------  IN:  Total IN: 0 mL    OUT:    Voided (mL): 300 mL  Total OUT: 300 mL    Total NET: -300 mL          Lab Data                        12.6   10.51 )-----------( 175      ( 24 May 2025 07:10 )             38.9     05-24    137  |  97  |  39[H]  ----------------------------<  266[H]  3.2[L]   |  32[H]  |  1.50[H]    Ca    9.1      24 May 2025 07:10    TPro  7.3  /  Alb  2.9[L]  /  TBili  0.4  /  DBili  x   /  AST  20  /  ALT  32  /  AlkPhos  80  05-24            Review of Systems	      Objective     Physical Examination    heart s1s2  lung dc bs  head nc  head at      Pertinent Lab findings & Imaging      Jl:  NO   Adequate UO     I&O's Detail    23 May 2025 07:01  -  24 May 2025 07:00  --------------------------------------------------------  IN:  Total IN: 0 mL    OUT:    Voided (mL): 300 mL  Total OUT: 300 mL    Total NET: -300 mL               Discussed with:     Cultures:	        Radiology

## 2025-05-24 NOTE — PROGRESS NOTE ADULT - PROBLEM SELECTOR PLAN 9
Chronic, on home insulin- Takes 10 units premeal and  Lantus SC 24 units bedtime   - Will obtain A1C, f/u results   -Basal: 7 units premeal 3x day and 16 units lanatus SC  bedtime   -Low ISS  -Regular finger sticks  -Consistent carb diet  -Hypoglycemic protocol.

## 2025-05-24 NOTE — DIETITIAN INITIAL EVALUATION ADULT - PERTINENT MEDS FT
MEDICATIONS  (STANDING):  cefTRIAXone   IVPB 1000 milliGRAM(s) IV Intermittent every 24 hours  cetirizine 10 milliGRAM(s) Oral daily  chlorhexidine 2% Cloths 1 Application(s) Topical <User Schedule>  DAPTOmycin IVPB 450 milliGRAM(s) IV Intermittent every 24 hours  dextrose 5%. 1000 milliLiter(s) (50 mL/Hr) IV Continuous <Continuous>  dextrose 5%. 1000 milliLiter(s) (100 mL/Hr) IV Continuous <Continuous>  dextrose 50% Injectable 25 Gram(s) IV Push once  dextrose 50% Injectable 12.5 Gram(s) IV Push once  dextrose 50% Injectable 25 Gram(s) IV Push once  ferrous    sulfate 325 milliGRAM(s) Oral daily  fluticasone propionate/ salmeterol 250-50 MICROgram(s) Diskus 1 Dose(s) Inhalation two times a day  furosemide   Injectable 40 milliGRAM(s) IV Push two times a day  glucagon  Injectable 1 milliGRAM(s) IntraMuscular once  heparin   Injectable 5000 Unit(s) SubCutaneous every 8 hours  insulin glargine Injectable (LANTUS) 24 Unit(s) SubCutaneous at bedtime  insulin lispro (ADMELOG) corrective regimen sliding scale   SubCutaneous three times a day before meals  insulin lispro (ADMELOG) corrective regimen sliding scale   SubCutaneous at bedtime  insulin lispro Injectable (ADMELOG) 10 Unit(s) SubCutaneous three times a day before meals  metoprolol succinate ER 25 milliGRAM(s) Oral daily  montelukast 10 milliGRAM(s) Oral daily  pantoprazole    Tablet 40 milliGRAM(s) Oral before breakfast  polyethylene glycol 3350 17 Gram(s) Oral daily  predniSONE   Tablet 10 milliGRAM(s) Oral daily  pregabalin 150 milliGRAM(s) Oral two times a day  rosuvastatin 40 milliGRAM(s) Oral at bedtime  senna 2 Tablet(s) Oral at bedtime  sertraline 100 milliGRAM(s) Oral daily  tiotropium 2.5 MICROgram(s) Inhaler 2 Puff(s) Inhalation daily    MEDICATIONS  (PRN):  acetaminophen     Tablet .. 650 milliGRAM(s) Oral every 6 hours PRN Temp greater or equal to 38C (100.4F), Mild Pain (1 - 3)  albuterol/ipratropium for Nebulization 3 milliLiter(s) Nebulizer every 6 hours PRN Shortness of Breath and/or Wheezing  benzonatate 100 milliGRAM(s) Oral three times a day PRN Cough  clonazePAM Oral Disintegrating Tablet 0.75 milliGRAM(s) Oral two times a day PRN anxiety  dextrose Oral Gel 15 Gram(s) Oral once PRN Blood Glucose LESS THAN 70 milliGRAM(s)/deciliter  melatonin 3 milliGRAM(s) Oral at bedtime PRN Insomnia  oxyCODONE    IR 2.5 milliGRAM(s) Oral every 4 hours PRN Moderate Pain (4 - 6)  oxyCODONE    IR 5 milliGRAM(s) Oral every 6 hours PRN Severe Pain (7 - 10)

## 2025-05-24 NOTE — DIETITIAN INITIAL EVALUATION ADULT - ORAL NUTRITION SUPPLEMENTS
recommend ensure max  protein daily 150kcals and 30 gms protein left message with MD to activate diet

## 2025-05-24 NOTE — DIETITIAN INITIAL EVALUATION ADULT - PROBLEM SELECTOR PLAN 6
Chronic  -  /67 on admission  - Continue home Toprol XL  w/ hold parameters  Lasix 40 mg 2x day    #HLD  - Continue home statin
Breast reduction

## 2025-05-25 DIAGNOSIS — S81.801A UNSPECIFIED OPEN WOUND, RIGHT LOWER LEG, INITIAL ENCOUNTER: ICD-10-CM

## 2025-05-25 LAB
ALBUMIN SERPL ELPH-MCNC: 2.8 G/DL — LOW (ref 3.3–5)
ALP SERPL-CCNC: 77 U/L — SIGNIFICANT CHANGE UP (ref 40–120)
ALT FLD-CCNC: 30 U/L — SIGNIFICANT CHANGE UP (ref 12–78)
ANION GAP SERPL CALC-SCNC: 10 MMOL/L — SIGNIFICANT CHANGE UP (ref 5–17)
AST SERPL-CCNC: 18 U/L — SIGNIFICANT CHANGE UP (ref 15–37)
BASOPHILS # BLD AUTO: 0.11 K/UL — SIGNIFICANT CHANGE UP (ref 0–0.2)
BASOPHILS NFR BLD AUTO: 1 % — SIGNIFICANT CHANGE UP (ref 0–2)
BILIRUB SERPL-MCNC: 0.4 MG/DL — SIGNIFICANT CHANGE UP (ref 0.2–1.2)
BUN SERPL-MCNC: 40 MG/DL — HIGH (ref 7–23)
CALCIUM SERPL-MCNC: 8.9 MG/DL — SIGNIFICANT CHANGE UP (ref 8.5–10.1)
CHLORIDE SERPL-SCNC: 100 MMOL/L — SIGNIFICANT CHANGE UP (ref 96–108)
CO2 SERPL-SCNC: 28 MMOL/L — SIGNIFICANT CHANGE UP (ref 22–31)
CREAT SERPL-MCNC: 1.4 MG/DL — HIGH (ref 0.5–1.3)
EGFR: 53 ML/MIN/1.73M2 — LOW
EGFR: 53 ML/MIN/1.73M2 — LOW
EOSINOPHIL # BLD AUTO: 0.32 K/UL — SIGNIFICANT CHANGE UP (ref 0–0.5)
EOSINOPHIL NFR BLD AUTO: 3 % — SIGNIFICANT CHANGE UP (ref 0–6)
GLUCOSE BLDC GLUCOMTR-MCNC: 194 MG/DL — HIGH (ref 70–99)
GLUCOSE BLDC GLUCOMTR-MCNC: 225 MG/DL — HIGH (ref 70–99)
GLUCOSE BLDC GLUCOMTR-MCNC: 247 MG/DL — HIGH (ref 70–99)
GLUCOSE BLDC GLUCOMTR-MCNC: 249 MG/DL — HIGH (ref 70–99)
GLUCOSE SERPL-MCNC: 179 MG/DL — HIGH (ref 70–99)
HCT VFR BLD CALC: 39 % — SIGNIFICANT CHANGE UP (ref 39–50)
HGB BLD-MCNC: 12.6 G/DL — LOW (ref 13–17)
LYMPHOCYTES # BLD AUTO: 3.68 K/UL — HIGH (ref 1–3.3)
LYMPHOCYTES # BLD AUTO: 34 % — SIGNIFICANT CHANGE UP (ref 13–44)
MAGNESIUM SERPL-MCNC: 2.1 MG/DL — SIGNIFICANT CHANGE UP (ref 1.6–2.6)
MCHC RBC-ENTMCNC: 27.8 PG — SIGNIFICANT CHANGE UP (ref 27–34)
MCHC RBC-ENTMCNC: 32.3 G/DL — SIGNIFICANT CHANGE UP (ref 32–36)
MCV RBC AUTO: 85.9 FL — SIGNIFICANT CHANGE UP (ref 80–100)
MONOCYTES # BLD AUTO: 1.19 K/UL — HIGH (ref 0–0.9)
MONOCYTES NFR BLD AUTO: 11 % — SIGNIFICANT CHANGE UP (ref 2–14)
NEUTROPHILS # BLD AUTO: 5.19 K/UL — SIGNIFICANT CHANGE UP (ref 1.8–7.4)
NEUTROPHILS NFR BLD AUTO: 48 % — SIGNIFICANT CHANGE UP (ref 43–77)
NRBC BLD AUTO-RTO: SIGNIFICANT CHANGE UP /100 WBCS (ref 0–0)
NT-PROBNP SERPL-SCNC: 89 PG/ML — SIGNIFICANT CHANGE UP (ref 0–125)
PHOSPHATE SERPL-MCNC: 3.3 MG/DL — SIGNIFICANT CHANGE UP (ref 2.5–4.5)
PLATELET # BLD AUTO: 165 K/UL — SIGNIFICANT CHANGE UP (ref 150–400)
POTASSIUM SERPL-MCNC: 3.7 MMOL/L — SIGNIFICANT CHANGE UP (ref 3.5–5.3)
POTASSIUM SERPL-SCNC: 3.7 MMOL/L — SIGNIFICANT CHANGE UP (ref 3.5–5.3)
PROT SERPL-MCNC: 7 G/DL — SIGNIFICANT CHANGE UP (ref 6–8.3)
RBC # BLD: 4.54 M/UL — SIGNIFICANT CHANGE UP (ref 4.2–5.8)
RBC # FLD: 19.7 % — HIGH (ref 10.3–14.5)
SODIUM SERPL-SCNC: 138 MMOL/L — SIGNIFICANT CHANGE UP (ref 135–145)
WBC # BLD: 10.81 K/UL — HIGH (ref 3.8–10.5)
WBC # FLD AUTO: 10.81 K/UL — HIGH (ref 3.8–10.5)

## 2025-05-25 PROCEDURE — 99222 1ST HOSP IP/OBS MODERATE 55: CPT

## 2025-05-25 RX ORDER — BISACODYL 5 MG
10 TABLET, DELAYED RELEASE (ENTERIC COATED) ORAL ONCE
Refills: 0 | Status: DISCONTINUED | OUTPATIENT
Start: 2025-05-25 | End: 2025-05-27

## 2025-05-25 RX ORDER — POLYETHYLENE GLYCOL 3350 17 G/17G
17 POWDER, FOR SOLUTION ORAL
Refills: 0 | Status: DISCONTINUED | OUTPATIENT
Start: 2025-05-25 | End: 2025-05-27

## 2025-05-25 RX ADMIN — OXYCODONE HYDROCHLORIDE 5 MILLIGRAM(S): 30 TABLET ORAL at 05:09

## 2025-05-25 RX ADMIN — Medication 20 MILLIEQUIVALENT(S): at 06:54

## 2025-05-25 RX ADMIN — ROSUVASTATIN CALCIUM 40 MILLIGRAM(S): 20 TABLET, FILM COATED ORAL at 21:24

## 2025-05-25 RX ADMIN — FUROSEMIDE 40 MILLIGRAM(S): 10 INJECTION INTRAMUSCULAR; INTRAVENOUS at 06:54

## 2025-05-25 RX ADMIN — SERTRALINE 100 MILLIGRAM(S): 100 TABLET, FILM COATED ORAL at 11:37

## 2025-05-25 RX ADMIN — IPRATROPIUM BROMIDE AND ALBUTEROL SULFATE 3 MILLILITER(S): .5; 2.5 SOLUTION RESPIRATORY (INHALATION) at 16:01

## 2025-05-25 RX ADMIN — FUROSEMIDE 40 MILLIGRAM(S): 10 INJECTION INTRAMUSCULAR; INTRAVENOUS at 17:29

## 2025-05-25 RX ADMIN — MUPIROCIN CALCIUM 1 APPLICATION(S): 20 CREAM TOPICAL at 06:53

## 2025-05-25 RX ADMIN — OXYCODONE HYDROCHLORIDE 5 MILLIGRAM(S): 30 TABLET ORAL at 20:20

## 2025-05-25 RX ADMIN — Medication 650 MILLIGRAM(S): at 04:50

## 2025-05-25 RX ADMIN — INSULIN LISPRO 4: 100 INJECTION, SOLUTION INTRAVENOUS; SUBCUTANEOUS at 17:46

## 2025-05-25 RX ADMIN — TIOTROPIUM BROMIDE INHALATION SPRAY 2 PUFF(S): 3.12 SPRAY, METERED RESPIRATORY (INHALATION) at 07:52

## 2025-05-25 RX ADMIN — POLYETHYLENE GLYCOL 3350 17 GRAM(S): 17 POWDER, FOR SOLUTION ORAL at 11:36

## 2025-05-25 RX ADMIN — PREDNISONE 10 MILLIGRAM(S): 20 TABLET ORAL at 06:54

## 2025-05-25 RX ADMIN — INSULIN LISPRO 10 UNIT(S): 100 INJECTION, SOLUTION INTRAVENOUS; SUBCUTANEOUS at 17:45

## 2025-05-25 RX ADMIN — CLONAZEPAM 0.75 MILLIGRAM(S): 0.5 TABLET ORAL at 04:51

## 2025-05-25 RX ADMIN — Medication 20 MILLIEQUIVALENT(S): at 17:30

## 2025-05-25 RX ADMIN — PREGABALIN 150 MILLIGRAM(S): 50 CAPSULE ORAL at 06:54

## 2025-05-25 RX ADMIN — Medication 10 MILLIGRAM(S): at 11:37

## 2025-05-25 RX ADMIN — MONTELUKAST SODIUM 10 MILLIGRAM(S): 10 TABLET ORAL at 11:37

## 2025-05-25 RX ADMIN — OXYCODONE HYDROCHLORIDE 5 MILLIGRAM(S): 30 TABLET ORAL at 04:09

## 2025-05-25 RX ADMIN — MUPIROCIN CALCIUM 1 APPLICATION(S): 20 CREAM TOPICAL at 17:30

## 2025-05-25 RX ADMIN — INSULIN LISPRO 2: 100 INJECTION, SOLUTION INTRAVENOUS; SUBCUTANEOUS at 08:43

## 2025-05-25 RX ADMIN — INSULIN GLARGINE-YFGN 24 UNIT(S): 100 INJECTION, SOLUTION SUBCUTANEOUS at 21:33

## 2025-05-25 RX ADMIN — HEPARIN SODIUM 5000 UNIT(S): 1000 INJECTION INTRAVENOUS; SUBCUTANEOUS at 13:57

## 2025-05-25 RX ADMIN — INSULIN LISPRO 10 UNIT(S): 100 INJECTION, SOLUTION INTRAVENOUS; SUBCUTANEOUS at 08:43

## 2025-05-25 RX ADMIN — Medication 1 TABLET(S): at 06:54

## 2025-05-25 RX ADMIN — Medication 650 MILLIGRAM(S): at 05:33

## 2025-05-25 RX ADMIN — INSULIN LISPRO 4: 100 INJECTION, SOLUTION INTRAVENOUS; SUBCUTANEOUS at 12:40

## 2025-05-25 RX ADMIN — HEPARIN SODIUM 5000 UNIT(S): 1000 INJECTION INTRAVENOUS; SUBCUTANEOUS at 21:24

## 2025-05-25 RX ADMIN — OXYCODONE HYDROCHLORIDE 5 MILLIGRAM(S): 30 TABLET ORAL at 21:03

## 2025-05-25 RX ADMIN — Medication 3 MILLIGRAM(S): at 00:04

## 2025-05-25 RX ADMIN — INSULIN LISPRO 10 UNIT(S): 100 INJECTION, SOLUTION INTRAVENOUS; SUBCUTANEOUS at 12:39

## 2025-05-25 RX ADMIN — Medication 1 APPLICATION(S): at 06:55

## 2025-05-25 RX ADMIN — Medication 100 MILLIGRAM(S): at 21:18

## 2025-05-25 RX ADMIN — Medication 40 MILLIGRAM(S): at 06:54

## 2025-05-25 RX ADMIN — Medication 2 TABLET(S): at 21:24

## 2025-05-25 RX ADMIN — Medication 1 DOSE(S): at 07:52

## 2025-05-25 RX ADMIN — Medication 325 MILLIGRAM(S): at 11:37

## 2025-05-25 RX ADMIN — Medication 1 DOSE(S): at 18:59

## 2025-05-25 RX ADMIN — PREGABALIN 150 MILLIGRAM(S): 50 CAPSULE ORAL at 17:44

## 2025-05-25 RX ADMIN — Medication 1 TABLET(S): at 17:30

## 2025-05-25 RX ADMIN — Medication 100 MILLIGRAM(S): at 15:43

## 2025-05-25 RX ADMIN — DAPTOMYCIN 118 MILLIGRAM(S): 500 INJECTION, POWDER, LYOPHILIZED, FOR SOLUTION INTRAVENOUS at 17:05

## 2025-05-25 RX ADMIN — HEPARIN SODIUM 5000 UNIT(S): 1000 INJECTION INTRAVENOUS; SUBCUTANEOUS at 07:00

## 2025-05-25 RX ADMIN — CEFTRIAXONE 100 MILLIGRAM(S): 500 INJECTION, POWDER, FOR SOLUTION INTRAMUSCULAR; INTRAVENOUS at 01:00

## 2025-05-25 RX ADMIN — CLONAZEPAM 0.75 MILLIGRAM(S): 0.5 TABLET ORAL at 14:38

## 2025-05-25 RX ADMIN — IPRATROPIUM BROMIDE AND ALBUTEROL SULFATE 3 MILLILITER(S): .5; 2.5 SOLUTION RESPIRATORY (INHALATION) at 23:40

## 2025-05-25 NOTE — OCCUPATIONAL THERAPY INITIAL EVALUATION ADULT - GENERAL OBSERVATIONS, REHAB EVAL
Pt received supine with raised HOB (+) [place] catheter, dressings to distal RLE and right elbow C/D/I, in NAD. Clear for OT eval per DAVINA Ruano. Pt agreed to participate with OT for eval and morteza well.

## 2025-05-25 NOTE — CONSULT NOTE ADULT - SUBJECTIVE AND OBJECTIVE BOX
S : 74y year old Male seen at bedside for right lower leg ulcer.  Pt is seen at the [facility]    Chief Complaint : Patient is a 74y old  Male who presents with a chief complaint of CHF exacerbation, PNA (25 May 2025 08:58)    HPI : HPI:  74yoM PMH DM2, PAD, hypertension, COPD, CKD,  HFpEF, Hx of Rt  foot ulcer with osteomyelitis, rt lower EXT wound ,Tri geminal Neuralgia,  BIBEMS from [facility] p/w exertional dyspnea and weight gain of about 30lbs over past 1 month. Patient states that for the last one week he has a productive cough along with SOB, BERGER, orthopnea. Patient reports 25-30lbs weight gain over the past month. Patient states that he is supposed to be taking 20mg lasix TID but only takes it BID (recently increased 1 month ago). Patient denies sick contacts. Denies fevers, chills, body aches, chest pain, palpitations, abdominal pain, n/v. Denies recent travel.     IN THE ED:  Temp  98.2 F , HR 87 ,  /67  ,RR 18 , SpO2 92% RA   S/P x  EKG:  Labs significant for: WBAC 8.15, H/H 11.8/36.7, BUN/Cr 33/1.6, Trop neg x2, proBNP 634  Imaging: CT chest: Mild tree-in-bud nodularity in the left lower lobe, which may be infectious/inflammatory.       (22 May 2025 00:02)      Patient admits to  (-) Fevers, (-) Chills, (-) Nausea, (-) Vomiting, (-) Shortness of Breath      PMH: Prostate cancer    Type II diabetes mellitus    Chronic obstructive pulmonary disease (COPD)    CHF (congestive heart failure)    Renal insufficiency    H/O migraine    Insomnia    Constipation      PSH:S/P foot surgery        Allergies:IODINE (Unknown)  tetracycline (Unknown)  vancomycin (Other)      Labs:                          12.6   10.81 )-----------( 165      ( 25 May 2025 07:10 )             39.0     WBC Trend  10.81[H] Date (05-25 @ 07:10)  10.51[H] Date (05-24 @ 07:10)  9.04 Date (05-23 @ 07:30)  10.38 Date (05-22 @ 08:05)  8.15 Date (05-21 @ 21:10)      Chem  05-25    138  |  100  |  40[H]  ----------------------------<  179[H]  3.7   |  28  |  1.40[H]    Ca    8.9      25 May 2025 07:10  Phos  3.3     05-25  Mg     2.1     05-25    TPro  7.0  /  Alb  2.8[L]  /  TBili  0.4  /  DBili  x   /  AST  18  /  ALT  30  /  AlkPhos  77  05-25          T(F): 98.6 (05-25-25 @ 11:30), Max: 98.6 (05-25-25 @ 11:30)  HR: 83 (05-25-25 @ 11:30) (78 - 83)  BP: 110/70 (05-25-25 @ 11:30) (105/66 - 110/70)  RR: 17 (05-25-25 @ 11:30) (17 - 18)  SpO2: 91% (05-25-25 @ 11:30) (91% - 91%)  Wt(kg): --    O:   General: Pleasant  male NAD & AOX3.    Integument:  Skin warm, dry and supple bilateral.    Noted anterior lateral right lower extremity healing ulcer, wound base scabbed over with positive periwound erythema, minimal edema, negative calor, negative drainage,   Vascular: Dorsalis Pedis and Posterior Tibial pulses 2/4.  Capillary re-fill time less then 3 seconds digits 1-5 bilateral.    Neuro: Protective sensation diminished to the level of the digits bilateral.  MSK: Muscle strength 5/5 all major muscle groups bilateral.

## 2025-05-25 NOTE — PROGRESS NOTE ADULT - SUBJECTIVE AND OBJECTIVE BOX
Patient is a 74y Male with a known history of :  CHF exacerbation [I50.9]    Pneumonia [J18.9]    HTN (hypertension) [I10]    T2DM (type 2 diabetes mellitus) [E11.9]    Need for prophylactic measure [Z29.9]    REANNA (acute kidney injury) [N17.9]    COPD exacerbation [J44.1]    History of COPD [Z87.09]    History of foot ulcer [Z87.2]    Wound, open, foot [S91.309A]    Olecranon bursitis [M70.20]    H/O temporal arteritis [Z87.39]    Hypokalemia [E87.6]    Bursitis of right elbow [M70.31]    Foot osteomyelitis [M86.9]    PAD (peripheral artery disease) [I73.9]    Chronic kidney disease, unspecified CKD stage [N18.9]      HPI:  74yoM PMH DM2, PAD, hypertension, COPD, CKD,  HFpEF, Hx of Rt  foot ulcer with osteomyelitis, rt lower EXT wound ,Tri geminal Neuralgia,  BIBEMS from [place] p/w exertional dyspnea and weight gain of about 30lbs over past 1 month. Patient states that for the last one week he has a productive cough along with SOB, BERGER, orthopnea. Patient reports 25-30lbs weight gain over the past month. Patient states that he is supposed to be taking 20mg lasix TID but only takes it BID (recently increased 1 month ago). Patient denies sick contacts. Denies fevers, chills, body aches, chest pain, palpitations, abdominal pain, n/v. Denies recent travel.     IN THE ED:  Temp  98.2 F , HR 87 ,  /67  ,RR 18 , SpO2 92% RA   S/P x  EKG:  Labs significant for: WBAC 8.15, H/H 11.8/36.7, BUN/Cr 33/1.6, Trop neg x2, proBNP 634  Imaging: CT chest: Mild tree-in-bud nodularity in the left lower lobe, which may be infectious/inflammatory.       (22 May 2025 00:02)      REVIEW OF SYSTEMS:    CONSTITUTIONAL: No fever, weight loss, or fatigue  EYES: No eye pain, visual disturbances, or discharge  ENMT:  No difficulty hearing, tinnitus, vertigo; No sinus or throat pain  NECK: No pain or stiffness  BREASTS: No pain, masses, or nipple discharge  RESPIRATORY: No cough, wheezing, chills or hemoptysis; No shortness of breath  CARDIOVASCULAR: No chest pain, palpitations, dizziness, or leg swelling  GASTROINTESTINAL: No abdominal or epigastric pain. No nausea, vomiting, or hematemesis; No diarrhea or constipation. No melena or hematochezia.  GENITOURINARY: No dysuria, frequency, hematuria, or incontinence  NEUROLOGICAL: No headaches, memory loss, loss of strength, numbness, or tremors  SKIN: No itching, burning, rashes, or lesions   LYMPH NODES: No enlarged glands  ENDOCRINE: No heat or cold intolerance; No hair loss  MUSCULOSKELETAL: No joint pain or swelling; No muscle, back, or extremity pain  PSYCHIATRIC: No depression, anxiety, mood swings, or difficulty sleeping  HEME/LYMPH: No easy bruising, or bleeding gums  ALLERGY AND IMMUNOLOGIC: No hives or eczema    MEDICATIONS  (STANDING):  cefTRIAXone   IVPB 1000 milliGRAM(s) IV Intermittent every 24 hours  cetirizine 10 milliGRAM(s) Oral daily  chlorhexidine 2% Cloths 1 Application(s) Topical <User Schedule>  DAPTOmycin IVPB 450 milliGRAM(s) IV Intermittent every 24 hours  dextrose 5%. 1000 milliLiter(s) (50 mL/Hr) IV Continuous <Continuous>  dextrose 5%. 1000 milliLiter(s) (100 mL/Hr) IV Continuous <Continuous>  dextrose 50% Injectable 25 Gram(s) IV Push once  dextrose 50% Injectable 12.5 Gram(s) IV Push once  dextrose 50% Injectable 25 Gram(s) IV Push once  ferrous    sulfate 325 milliGRAM(s) Oral daily  fluticasone propionate/ salmeterol 250-50 MICROgram(s) Diskus 1 Dose(s) Inhalation two times a day  furosemide    Tablet 40 milliGRAM(s) Oral every 12 hours  glucagon  Injectable 1 milliGRAM(s) IntraMuscular once  heparin   Injectable 5000 Unit(s) SubCutaneous every 8 hours  insulin glargine Injectable (LANTUS) 24 Unit(s) SubCutaneous at bedtime  insulin lispro (ADMELOG) corrective regimen sliding scale   SubCutaneous three times a day before meals  insulin lispro (ADMELOG) corrective regimen sliding scale   SubCutaneous at bedtime  insulin lispro Injectable (ADMELOG) 10 Unit(s) SubCutaneous three times a day before meals  lactobacillus acidophilus 1 Tablet(s) Oral every 12 hours  montelukast 10 milliGRAM(s) Oral daily  mupirocin 2% Nasal 1 Application(s) Both Nostrils two times a day  pantoprazole    Tablet 40 milliGRAM(s) Oral before breakfast  polyethylene glycol 3350 17 Gram(s) Oral daily  potassium chloride    Tablet ER 20 milliEquivalent(s) Oral two times a day  predniSONE   Tablet 10 milliGRAM(s) Oral daily  pregabalin 150 milliGRAM(s) Oral two times a day  rosuvastatin 40 milliGRAM(s) Oral at bedtime  senna 2 Tablet(s) Oral at bedtime  sertraline 100 milliGRAM(s) Oral daily  tiotropium 2.5 MICROgram(s) Inhaler 2 Puff(s) Inhalation daily    MEDICATIONS  (PRN):  acetaminophen     Tablet .. 650 milliGRAM(s) Oral every 6 hours PRN Temp greater or equal to 38C (100.4F), Mild Pain (1 - 3)  albuterol/ipratropium for Nebulization 3 milliLiter(s) Nebulizer every 6 hours PRN Shortness of Breath and/or Wheezing  aluminum hydroxide/magnesium hydroxide/simethicone Suspension 30 milliLiter(s) Oral every 4 hours PRN Dyspepsia  benzonatate 100 milliGRAM(s) Oral three times a day PRN Cough  clonazePAM Oral Disintegrating Tablet 0.75 milliGRAM(s) Oral two times a day PRN anxiety  dextrose Oral Gel 15 Gram(s) Oral once PRN Blood Glucose LESS THAN 70 milliGRAM(s)/deciliter  melatonin 3 milliGRAM(s) Oral at bedtime PRN Insomnia  ondansetron Injectable 4 milliGRAM(s) IV Push every 8 hours PRN Nausea and/or Vomiting  oxyCODONE    IR 2.5 milliGRAM(s) Oral every 4 hours PRN Moderate Pain (4 - 6)  oxyCODONE    IR 5 milliGRAM(s) Oral every 6 hours PRN Severe Pain (7 - 10)      ALLERGIES: IODINE (Unknown)  tetracycline (Unknown)  vancomycin (Other)      FAMILY HISTORY:      PHYSICAL EXAMINATION:  -----------------------------  T(C): 36.8 (05-25-25 @ 05:46), Max: 36.8 (05-25-25 @ 05:46)  HR: 78 (05-25-25 @ 05:46) (75 - 79)  BP: 105/66 (05-25-25 @ 05:46) (97/56 - 107/63)  RR: 18 (05-25-25 @ 05:46) (18 - 18)  SpO2: 91% (05-25-25 @ 05:46) (91% - 92%)  Wt(kg): --    05-24 @ 07:01  -  05-25 @ 07:00  --------------------------------------------------------  IN:  Total IN: 0 mL    OUT:    Voided (mL): 800 mL  Total OUT: 800 mL    Total NET: -800 mL            VITALS  T(C): 36.8 (05-25-25 @ 05:46), Max: 36.8 (05-25-25 @ 05:46)  HR: 78 (05-25-25 @ 05:46) (75 - 79)  BP: 105/66 (05-25-25 @ 05:46) (97/56 - 107/63)  RR: 18 (05-25-25 @ 05:46) (18 - 18)  SpO2: 91% (05-25-25 @ 05:46) (91% - 92%)    Constitutional: well developed, normal appearance, well groomed, well nourished, no deformities and no acute distress.   Eyes: the conjunctiva exhibited no abnormalities and the eyelids demonstrated no xanthelasmas.   HEENT: normal oral mucosa, no oral pallor and no oral cyanosis.   Neck: normal jugular venous A waves present, normal jugular venous V waves present and no jugular venous mahmood A waves.   Pulmonary: no respiratory distress, normal respiratory rhythm and effort, no accessory muscle use and lungs were clear to auscultation bilaterally.   Cardiovascular: heart rate and rhythm were normal, normal S1 and S2 and no murmur, gallop, rub, heave or thrill are present.   Abdomen: soft, non-tender, no hepato-splenomegaly and no abdominal mass palpated.   Musculoskeletal: the gait could not be assessed..   Extremities: no clubbing of the fingernails, no localized cyanosis, no petechial hemorrhages and no ischemic changes.   Skin: normal skin color and pigmentation, no rash, no venous stasis, no skin lesions, no skin ulcer and no xanthoma was observed.   Psychiatric: oriented to person, place, and time, the affect was normal, the mood was normal and not feeling anxious.     LABS:   --------  05-25    138  |  100  |  40[H]  ----------------------------<  179[H]  3.7   |  28  |  1.40[H]    Ca    8.9      25 May 2025 07:10  Phos  3.3     05-25  Mg     2.1     05-25    TPro  7.0  /  Alb  2.8[L]  /  TBili  0.4  /  DBili  x   /  AST  18  /  ALT  30  /  AlkPhos  77  05-25                         12.6   10.81 )-----------( 165      ( 25 May 2025 07:10 )             39.0                 RADIOLOGY:  -----------------    ECG:     ECHO:

## 2025-05-25 NOTE — OCCUPATIONAL THERAPY INITIAL EVALUATION ADULT - REHAB POTENTIAL, OT EVAL
pt appears to be at his baseline for ADLs and does not require further skilled OT- pt in agreement./none

## 2025-05-25 NOTE — CONSULT NOTE ADULT - PROBLEM SELECTOR RECOMMENDATION 9
A1c 10% adm CHF  Recommend endocrine-Perlman/JAUN on consult  Hospital regimen: lantus 16units HS; lispro 7 AC x3; low correction  FU appt: TBA  DSC recommendations: return to home regimen and CGM/glucose monitoring  diabetes education provided  Goal 100-180 mg/dL; 140-180 mg/dL in critical care areas
Chart reviewed and Patient evaluated  Discussed diagnosis and treatment with patient  Nothing to culture at this time  Applied dry sterile dressing  Cont local wound care  No surgical debridement at this time  Weight bearing as tolerated b/l  Podiatry stable from podiatry standpoint   will discuss care plan  with all  Attendings
lantus 24 units qhs added  admelog increased 10 units 3x/day before meals  cont low dose admelog corrective scale coverage qac/qhs  cont cons cho diet  diabetes education completed  goal bg 100-180 in hosp setting

## 2025-05-25 NOTE — OCCUPATIONAL THERAPY INITIAL EVALUATION ADULT - ADDITIONAL COMMENTS
Pt reports he lives at [facility]. Bathroom has a shower chair and grab bars. He reported he was able to feed himself, complete grooming tasks, and bed mobility without assist but had assistance for dressing/bathing and used a call bell system for assist PRN. Pt reported he was able to ambulate to/from bathroom with 1 person hand-held assist but used w/c for functional mobility longer distances. Pt reports he is incontinent.

## 2025-05-25 NOTE — OCCUPATIONAL THERAPY INITIAL EVALUATION ADULT - DIAGNOSIS, OT EVAL
Pls let pt know that thyroid lab is normal, so she actually doesn't need any thyroid medication again.   Tx, if q's let me know,  KP Pt requires assist for ADLs due to decreased flexibility and decreased balance.

## 2025-05-25 NOTE — PROGRESS NOTE ADULT - ASSESSMENT
REASON FOR VISIT  .. Management of problems listed below      EVENTS/CURRENT ISSUES.  .         REVIEW OF SYMPTOMS   Able to give ROS  Yes     RELIABILITY +/-   CONSTITUTIONAL Weakness Yes    ENDOCRINE  No heat or cold intolerance    ALLERGY No hives  Sore throat No stridor  RESP Shortness of breath YES   NEURO New weakness No   CARDIAC   Palpitations No         PHYSICAL EXAM    HEENT Unremarkable  atraumatic   RESP Fair air entry  Harsh breath sound   CARDIAC S1 S2 No S3     NO JVD    ABDOMEN No hepatosplenomegaly   PEDAL EDEMA present No calf tenderness  NO rash       BEST PRACTICE ISSUES.  . HOB ELEVATN.    .... Yes  . DIET  .   .... DASH 5/22  . FREE WATER.   ....   .  IV FLUID.  .....   . PHARMAC DVT PPLX .    .... hpsc 5/22   . NON PHARMAC DVT PPLX .      . STRESS ULCR PPLX .   .... PROTONIX 40 5/22  . DATE/DM MGMT.   ..... See under Endocrine section   GENERAL DATA .   . COVID.         .... scv2 5/21 (-)   . GOC.    ....    . ICU STAY.    .... no   . INFECTION PPLX .   .... mupirocin 5/23  .... CHLORHEXIDINE 2% 5/22  . ALLGY.   ....  tetracycline vanco iodine   . WT.   .... 5/25/2025 110   . BMI.  ....5/25/2025 bmi 34     XXXXXXXXXXXXXXXXXXXXXXX  VITALS/GAS EXCHANGE/DRIPS    ABGS.     .  VS/ PO/IO/ VENT/ DRIPS.   5/25/2025 afeb 90 120/90     XXXXXXXX[id number]  PROBLEM ASSESSMENT RECOMMENDATIONS.  RESP.   . GAS EXCHANGE .   .... target PO 90-95%     . COUGH   .... BENZONATATE 5/22 b 100.3 p     . COPD   .... DUONEB p 5/22   .... ADVAIR 250 5/22   .... MONTELUKAST 10 5/22   .... SPIRIVA 5/22   .... PREDNISONE 10 5/22     INFECTION.  . DATA  .... w 5/25/2025 w 10.8   .... CT ch 5/21/2025 cw 12/28/2024   ......... mild tib nodules left lo lobe may be infection or inflammn      . PNEUMONIA   .... ROCEPHIN 5/21    . SKIN SOFT TISSUE INFECTION  .... DAPTOMYCIN 5/22 d 450 x 7d      CARDIAC.  . CHF  .... pbnp 5/25/2025 pbnp 89   .... LASIX   ........ 4/24 l 40.2     HEMAT.  . DATA  .... Hb 5/25/2025 Hb 12.6   .... monitor     GI.   . DIET .   .... DASH 5/22    . LFT MONITORING   .... LFTS    5/25/2025  ........ AP    77   ........ AST  18  ........ ALT   30    RENAL.  . DATA  .... Na 5/25/2025 Na 138   .... K 5/25/2025 K 3.7   .... CO2 5/25/2025 co2 28   .... Cr 5/25/2025 Cr 1.4   .... monitor     ENDO.  . DM  .  .... INSULIN 5/23     XXXXXXXXXXXXXXXXXXXXXX   SUMMARY BASELINE .     . 74 m history of CHF, COPD, prostate cancer, renal insufficiency, diabetes, chronic neck pain,  right foot wound.    CC.   .5/22/2025 SHORTNESS OF BREATH   . 5/22/2025 WEIGHT GAIN   MAIN ISSUES.  . COPD   . PNEUMONIA   .... ROCEPHIN 5/21  . SKIN SOFT TISSUE INFECTION  .... DAPTOMYCIN 5/22 d 450 x 7d   . RLE ANKLE OPEN WOUND   . DM     PMH.         TIME SPENT.  . Over 36 minutes aggregate care time spent on encounter; activities included   direct patient care, counseling and/or coordinating care reviewing notes, lab data/ imaging , discussion with multidisciplinary team/ patient  /family and explaining in detail risks, benefits, alternatives  of the recommendations     MAGGIE BLACK 73 m 5/21/2025 1951

## 2025-05-25 NOTE — PROGRESS NOTE ADULT - SUBJECTIVE AND OBJECTIVE BOX
CHIEF COMPLAINT/ REASON FOR VISIT  .. Patient was seen to address the  issue listed under PROBLEM LIST which is located toward bottom of this note     WAYNE SERRANO    PLV 3WES 366 D1    Allergies    IODINE (Unknown)  tetracycline (Unknown)  vancomycin (Other)    Intolerances        PAST MEDICAL & SURGICAL HISTORY:  Prostate cancer      Type II diabetes mellitus      Chronic obstructive pulmonary disease (COPD)      CHF (congestive heart failure)      Renal insufficiency      H/O migraine      Insomnia      Constipation      S/P foot surgery          FAMILY HISTORY:      Home Medications:  albuterol 0.63 mg/3 mL (0.021%) inhalation solution: 3 milliliter(s) by nebulizer 3 times a day as needed for (22 May 2025 01:14)  clonazePAM 0.25 mg oral tablet, disintegrating: 3 tab(s) orally 2 times a day (22 May 2025 01:14)  ferrous sulfate 325 mg (65 mg elemental iron) oral tablet: 1 tab(s) orally once a day (22 May 2025 01:14)  furosemide 40 mg oral tablet: 1.5 tab(s) orally once a day (22 May 2025 01:14)  HumaLOG 100 units/mL injectable solution: 10 unit(s) injectable 3 times a day (with meals) ***sliding scale*** (22 May 2025 01:14)  insulin glargine 100 units/mL subcutaneous solution: 24 unit(s) subcutaneous once a day (at bedtime) (22 May 2025 01:14)  loratadine 10 mg oral tablet: 1 tab(s) orally once a day (in the morning) (22 May 2025 01:14)  melatonin 3 mg oral tablet: 1 tab(s) orally once a day (at bedtime) As needed Insomnia (22 May 2025 01:14)  metoprolol succinate 25 mg oral tablet, extended release: 1 tab(s) orally once a day (22 May 2025 01:14)  montelukast 10 mg oral tablet: 1 tab(s) orally once a day (22 May 2025 01:14)  Multiple Vitamins with Minerals oral tablet: 1 tab(s) orally once a day (22 May 2025 01:14)  oxyCODONE 10 mg oral tablet, extended release: 1 tab(s) orally every 12 hours (22 May 2025 01:14)  pantoprazole 40 mg oral delayed release tablet: 1 tab(s) orally once a day (before a meal) (22 May 2025 01:14)  polyethylene glycol 3350 oral powder for reconstitution: 17 gram(s) orally once a day (22 May 2025 01:14)  pregabalin 150 mg oral capsule: 1 cap(s) orally 2 times a day (22 May 2025 01:14)  rosuvastatin 40 mg oral tablet: 1 tab(s) orally once a day (22 May 2025 01:14)  sertraline 100 mg oral tablet: 1 tab(s) orally once a day (22 May 2025 01:14)  Spiriva 18 mcg inhalation capsule: 1 cap(s) inhaled once a day (22 May 2025 01:14)      MEDICATIONS  (STANDING):  cefTRIAXone   IVPB 1000 milliGRAM(s) IV Intermittent every 24 hours  cetirizine 10 milliGRAM(s) Oral daily  chlorhexidine 2% Cloths 1 Application(s) Topical <User Schedule>  DAPTOmycin IVPB 450 milliGRAM(s) IV Intermittent every 24 hours  dextrose 5%. 1000 milliLiter(s) (50 mL/Hr) IV Continuous <Continuous>  dextrose 5%. 1000 milliLiter(s) (100 mL/Hr) IV Continuous <Continuous>  dextrose 50% Injectable 25 Gram(s) IV Push once  dextrose 50% Injectable 12.5 Gram(s) IV Push once  dextrose 50% Injectable 25 Gram(s) IV Push once  ferrous    sulfate 325 milliGRAM(s) Oral daily  fluticasone propionate/ salmeterol 250-50 MICROgram(s) Diskus 1 Dose(s) Inhalation two times a day  furosemide    Tablet 40 milliGRAM(s) Oral every 12 hours  glucagon  Injectable 1 milliGRAM(s) IntraMuscular once  heparin   Injectable 5000 Unit(s) SubCutaneous every 8 hours  insulin glargine Injectable (LANTUS) 24 Unit(s) SubCutaneous at bedtime  insulin lispro (ADMELOG) corrective regimen sliding scale   SubCutaneous three times a day before meals  insulin lispro (ADMELOG) corrective regimen sliding scale   SubCutaneous at bedtime  insulin lispro Injectable (ADMELOG) 10 Unit(s) SubCutaneous three times a day before meals  lactobacillus acidophilus 1 Tablet(s) Oral every 12 hours  montelukast 10 milliGRAM(s) Oral daily  mupirocin 2% Nasal 1 Application(s) Both Nostrils two times a day  pantoprazole    Tablet 40 milliGRAM(s) Oral before breakfast  polyethylene glycol 3350 17 Gram(s) Oral daily  potassium chloride    Tablet ER 20 milliEquivalent(s) Oral two times a day  predniSONE   Tablet 10 milliGRAM(s) Oral daily  pregabalin 150 milliGRAM(s) Oral two times a day  rosuvastatin 40 milliGRAM(s) Oral at bedtime  senna 2 Tablet(s) Oral at bedtime  sertraline 100 milliGRAM(s) Oral daily  tiotropium 2.5 MICROgram(s) Inhaler 2 Puff(s) Inhalation daily    MEDICATIONS  (PRN):  acetaminophen     Tablet .. 650 milliGRAM(s) Oral every 6 hours PRN Temp greater or equal to 38C (100.4F), Mild Pain (1 - 3)  albuterol/ipratropium for Nebulization 3 milliLiter(s) Nebulizer every 6 hours PRN Shortness of Breath and/or Wheezing  aluminum hydroxide/magnesium hydroxide/simethicone Suspension 30 milliLiter(s) Oral every 4 hours PRN Dyspepsia  benzonatate 100 milliGRAM(s) Oral three times a day PRN Cough  clonazePAM Oral Disintegrating Tablet 0.75 milliGRAM(s) Oral two times a day PRN anxiety  dextrose Oral Gel 15 Gram(s) Oral once PRN Blood Glucose LESS THAN 70 milliGRAM(s)/deciliter  melatonin 3 milliGRAM(s) Oral at bedtime PRN Insomnia  ondansetron Injectable 4 milliGRAM(s) IV Push every 8 hours PRN Nausea and/or Vomiting  oxyCODONE    IR 2.5 milliGRAM(s) Oral every 4 hours PRN Moderate Pain (4 - 6)  oxyCODONE    IR 5 milliGRAM(s) Oral every 6 hours PRN Severe Pain (7 - 10)      Diet, DASH/TLC:   Sodium & Cholesterol Restricted  Consistent Carbohydrate No Snacks  1200mL Fluid Restriction ([id number])  Supplement Feeding Modality:  Oral  Ensure Max Cans or Servings Per Day:  1       Frequency:  Daily (05-24-25 @ 10:50) [Pending Verification By Attending]  Diet, DASH/TLC:   Sodium & Cholesterol Restricted  Consistent Carbohydrate Evening Snack  1200mL Fluid Restriction ([id number]) (05-22-25 @ 13:50) [Active]          Vital Signs Last 24 Hrs  T(C): 36.8 (25 May 2025 05:46), Max: 36.8 (25 May 2025 05:46)  T(F): 98.2 (25 May 2025 05:46), Max: 98.2 (25 May 2025 05:46)  HR: 78 (25 May 2025 05:46) (75 - 79)  BP: 105/66 (25 May 2025 05:46) (97/56 - 107/63)  BP(mean): --  RR: 18 (25 May 2025 05:46) (18 - 18)  SpO2: 91% (25 May 2025 05:46) (91% - 92%)    Parameters below as of 25 May 2025 05:46  Patient On (Oxygen Delivery Method): room air          05-24-25 @ 07:01  -  05-25-25 @ 07:00  --------------------------------------------------------  IN: 0 mL / OUT: 800 mL / NET: -800 mL              LABS:                        12.6   10.81 )-----------( 165      ( 25 May 2025 07:10 )             39.0     05-24    137  |  97  |  39[H]  ----------------------------<  266[H]  3.2[L]   |  32[H]  |  1.50[H]    Ca    9.1      24 May 2025 07:10    TPro  7.3  /  Alb  2.9[L]  /  TBili  0.4  /  DBili  x   /  AST  20  /  ALT  32  /  AlkPhos  80  05-24      Urinalysis Basic - ( 24 May 2025 07:10 )    Color: x / Appearance: x / SG: x / pH: x  Gluc: 266 mg/dL / Ketone: x  / Bili: x / Urobili: x   Blood: x / Protein: x / Nitrite: x   Leuk Esterase: x / RBC: x / WBC x   Sq Epi: x / Non Sq Epi: x / Bacteria: x            WBC:  WBC Count: 10.81 K/uL (05-25 @ 07:10)  WBC Count: 10.51 K/uL (05-24 @ 07:10)  WBC Count: 9.04 K/uL (05-23 @ 07:30)  WBC Count: 10.38 K/uL (05-22 @ 08:05)  WBC Count: 8.15 K/uL (05-21 @ 21:10)      MICROBIOLOGY:  RECENT CULTURES:  05-22 Blood Blood-Peripheral XXXX XXXX   No growth at 72 Hours                    Sodium:  Sodium: 137 mmol/L (05-24 @ 07:10)  Sodium: 137 mmol/L (05-22 @ 08:05)  Sodium: 136 mmol/L (05-21 @ 21:10)      1.50 mg/dL 05-24 @ 07:10  1.40 mg/dL 05-22 @ 08:05  1.60 mg/dL 05-21 @ 21:10      Hemoglobin:  Hemoglobin: 12.6 g/dL (05-25 @ 07:10)  Hemoglobin: 12.6 g/dL (05-24 @ 07:10)  Hemoglobin: 12.5 g/dL (05-23 @ 07:30)  Hemoglobin: 12.4 g/dL (05-22 @ 08:05)  Hemoglobin: 11.8 g/dL (05-21 @ 21:10)      Platelets: Platelet Count - Automated: 165 K/uL (05-25 @ 07:10)  Platelet Count - Automated: 175 K/uL (05-24 @ 07:10)  Platelet Count - Automated: 173 K/uL (05-23 @ 07:30)  Platelet Count - Automated: 158 K/uL (05-22 @ 08:05)  Platelet Count - Automated: 166 K/uL (05-21 @ 21:10)      LIVER FUNCTIONS - ( 24 May 2025 07:10 )  Alb: 2.9 g/dL / Pro: 7.3 g/dL / ALK PHOS: 80 U/L / ALT: 32 U/L / AST: 20 U/L / GGT: x             Urinalysis Basic - ( 24 May 2025 07:10 )    Color: x / Appearance: x / SG: x / pH: x  Gluc: 266 mg/dL / Ketone: x  / Bili: x / Urobili: x   Blood: x / Protein: x / Nitrite: x   Leuk Esterase: x / RBC: x / WBC x   Sq Epi: x / Non Sq Epi: x / Bacteria: x        RADIOLOGY & ADDITIONAL STUDIES:      MICROBIOLOGY:  RECENT CULTURES:  05-22 Blood Blood-Peripheral XXXX XXXX   No growth at 72 Hours

## 2025-05-25 NOTE — PROGRESS NOTE ADULT - SUBJECTIVE AND OBJECTIVE BOX
Neurology Follow up note    WAYNE [id number]    HPI:  74yoM PMH DM2, PAD, hypertension, COPD, CKD,  HFpEF, Hx of Rt  foot ulcer with osteomyelitis, rt lower EXT wound ,Tri geminal Neuralgia,  BIBEMS from [place] p/w exertional dyspnea and weight gain of about 30lbs over past 1 month. Patient states that for the last one week he has a productive cough along with SOB, BERGER, orthopnea. Patient reports 25-30lbs weight gain over the past month. Patient states that he is supposed to be taking 20mg lasix TID but only takes it BID (recently increased 1 month ago). Patient denies sick contacts. Denies fevers, chills, body aches, chest pain, palpitations, abdominal pain, n/v. Denies recent travel.     IN THE ED:  Temp  98.2 F , HR 87 ,  /67  ,RR 18 , SpO2 92% RA   S/P x  EKG:  Labs significant for: WBAC 8.15, H/H 11.8/36.7, BUN/Cr 33/1.6, Trop neg x2, proBNP 634  Imaging: CT chest: Mild tree-in-bud nodularity in the left lower lobe, which may be infectious/inflammatory.            Interval History -no HA    Patient is seen, chart was reviewed and case was discussed with the treatment team.  Pt is not in any distress.   Lying on bed comfortably.   No events reported overnight.       Vital Signs Last 24 Hrs  T(C): 37 (25 May 2025 11:30), Max: 37 (25 May 2025 11:30)  T(F): 98.6 (25 May 2025 11:30), Max: 98.6 (25 May 2025 11:30)  HR: 83 (25 May 2025 11:30) (78 - 83)  BP: 110/70 (25 May 2025 11:30) (105/66 - 110/70)  BP(mean): --  RR: 17 (25 May 2025 11:30) (17 - 18)  SpO2: 91% (25 May 2025 11:30) (91% - 91%)    Parameters below as of 25 May 2025 11:30  Patient On (Oxygen Delivery Method): room air    r            REVIEW OF SYSTEMS:    Constitutional: No fever, weight loss  Eyes: No eye pain, visual disturbances, or discharge  ENT:  No difficulty hearing, tinnitus, vertigo; No sinus or throat pain  Neck: No pain or stiffness  Respiratory: No cough, wheezing, chills or hemoptysis  Cardiovascular: No chest pain, palpitations,   Gastrointestinal: No abdominal or epigastric pain. No nausea, vomiting or hematemesis; No diarrhea or constipation.   Genitourinary: No dysuria, frequency, hematuria or incontinence  Neurological: No headaches,  or tremors  Psychiatric: No depression, anxiety, mood swings or difficulty sleeping  Musculoskeletal: No joint pain or swelling; No muscle, back or extremity pain  Skin: No itching, burning, rashes or lesions   Lymph Nodes: No enlarged glands  Endocrine: No heat or cold intolerance;   Allergy and Immunologic: No hives or eczema    On Neurological Examination:    Mental Status - Pt is alert, awake, oriented X3. Follows commands well and able to answer questions appropriately.Mood and affect  normal    Speech -  Normal.    Cranial Nerves - Pupils 3 mm equal and reactive to light, extraocular eye movements intact. Pt has no visual field deficit.  Pt has no  facial asymmetry. Facial sensation is intact.Tongue - is in midline.    Muscle tone - is normal     Motor Exam - 4/5 all over, No drift. No shaking or tremors.    Sensory Exam - Pin prick, temperature, joint position and vibration are IMPAIRED  on either side. Pt withdraws all extremities equally on stimulation. No asymmetry seen.     .        coordination:    Finger to nose: normal    .    Deep tendon Reflexes - 2 plus all over.        .    Neck Supple -  Yes.     MEDICATIONS    acetaminophen     Tablet .. 650 milliGRAM(s) Oral every 6 hours PRN  albuterol/ipratropium for Nebulization 3 milliLiter(s) Nebulizer every 6 hours PRN  aluminum hydroxide/magnesium hydroxide/simethicone Suspension 30 milliLiter(s) Oral every 4 hours PRN  benzonatate 100 milliGRAM(s) Oral three times a day PRN  cefTRIAXone   IVPB 1000 milliGRAM(s) IV Intermittent every 24 hours  cetirizine 10 milliGRAM(s) Oral daily  chlorhexidine 2% Cloths 1 Application(s) Topical <User Schedule>  clonazePAM Oral Disintegrating Tablet 0.75 milliGRAM(s) Oral two times a day PRN  DAPTOmycin IVPB 450 milliGRAM(s) IV Intermittent every 24 hours  dextrose 5%. 1000 milliLiter(s) IV Continuous <Continuous>  dextrose 5%. 1000 milliLiter(s) IV Continuous <Continuous>  dextrose 50% Injectable 25 Gram(s) IV Push once  dextrose 50% Injectable 12.5 Gram(s) IV Push once  dextrose 50% Injectable 25 Gram(s) IV Push once  dextrose Oral Gel 15 Gram(s) Oral once PRN  ferrous    sulfate 325 milliGRAM(s) Oral daily  fluticasone propionate/ salmeterol 250-50 MICROgram(s) Diskus 1 Dose(s) Inhalation two times a day  furosemide    Tablet 40 milliGRAM(s) Oral every 12 hours  glucagon  Injectable 1 milliGRAM(s) IntraMuscular once  heparin   Injectable 5000 Unit(s) SubCutaneous every 8 hours  insulin glargine Injectable (LANTUS) 24 Unit(s) SubCutaneous at bedtime  insulin lispro (ADMELOG) corrective regimen sliding scale   SubCutaneous three times a day before meals  insulin lispro (ADMELOG) corrective regimen sliding scale   SubCutaneous at bedtime  insulin lispro Injectable (ADMELOG) 10 Unit(s) SubCutaneous three times a day before meals  lactobacillus acidophilus 1 Tablet(s) Oral every 12 hours  melatonin 3 milliGRAM(s) Oral at bedtime PRN  montelukast 10 milliGRAM(s) Oral daily  mupirocin 2% Nasal 1 Application(s) Both Nostrils two times a day  ondansetron Injectable 4 milliGRAM(s) IV Push every 8 hours PRN  oxyCODONE    IR 2.5 milliGRAM(s) Oral every 4 hours PRN  oxyCODONE    IR 5 milliGRAM(s) Oral every 6 hours PRN  pantoprazole    Tablet 40 milliGRAM(s) Oral before breakfast  polyethylene glycol 3350 17 Gram(s) Oral daily  potassium chloride    Tablet ER 40 milliEquivalent(s) Oral every 4 hours  predniSONE   Tablet 10 milliGRAM(s) Oral daily  pregabalin 150 milliGRAM(s) Oral two times a day  rosuvastatin 40 milliGRAM(s) Oral at bedtime  senna 2 Tablet(s) Oral at bedtime  sertraline 100 milliGRAM(s) Oral daily  tiotropium 2.5 MICROgram(s) Inhaler 2 Puff(s) Inhalation daily      Allergies    IODINE (Unknown)  tetracycline (Unknown)  vancomycin (Other)    Intolerances                            12.6   10.81 )-----------( 165      ( 25 May 2025 07:10 )             39.0           Hemoglobin A1C:     Vitamin B12     RADIOLOGY    ASSESSMENT AND PLAN:      left sided HA-  RELATED TO TEMPORAL ARTERITIS  PN 0N LYRICA    CONTINUE   PREDNISONE   Physical therapy evaluation.  OOB to chair/ambulation with assistance only.  Advanced care planning was discussed with family.  Pain is accessed and addressed  Plan of care was discussed with family. Questions answered.  Would continue to follow.

## 2025-05-25 NOTE — OCCUPATIONAL THERAPY INITIAL EVALUATION ADULT - PERTINENT HX OF CURRENT PROBLEM, REHAB EVAL
Pt requires assist for ADLs due to decreased flexibility and decreased balance. Pt completed sit to stand and few steps along bedside minAx1 hand-held assist. Pt reports he was able to feed himself and to complete grooming tasks but has assist PRN for other ADLs at [facility]; pt appears to be at his baseline for ADLs and does not require further skilled OT- pt in agreement. Pt left supine with raised HOB, CBIR, in NAD; covering RN made aware [place] catheter noted to be dislodged. 75 y/o M PMH DM2, PAD, hypertension, COPD, CKD, HFpEF, Hx of Rt  foot ulcer with osteomyelitis, rt lower EXT wound ,Tri geminal Neuralgia,  BIBEMS from [place] p/w exertional dyspnea and weight gain of about 30lbs over past 1 month. Patient states that for the last one week he has a productive cough along with SOB, BERGER, orthopnea. Patient reports 25-30lbs weight gain over the past month. Patient states that he is supposed to be taking 20mg lasix TID but only takes it BID (recently increased 1 month ago). Patient denies sick contacts. Denies fevers, chills, body aches, chest pain, palpitations, abdominal pain, n/v. Denies recent travel.   Also of note, pt with right elbow olecranon bursitis- non-op management per ortho.

## 2025-05-25 NOTE — PROGRESS NOTE ADULT - SUBJECTIVE AND OBJECTIVE BOX
CAPILLARY BLOOD GLUCOSE      POCT Blood Glucose.: 197 mg/dL (24 May 2025 23:27)  POCT Blood Glucose.: 146 mg/dL (24 May 2025 21:34)  POCT Blood Glucose.: 181 mg/dL (24 May 2025 17:24)  POCT Blood Glucose.: 358 mg/dL (24 May 2025 12:39)  POCT Blood Glucose.: 314 mg/dL (24 May 2025 08:22)      Vital Signs Last 24 Hrs  T(C): 36.8 (25 May 2025 05:46), Max: 36.8 (25 May 2025 05:46)  T(F): 98.2 (25 May 2025 05:46), Max: 98.2 (25 May 2025 05:46)  HR: 78 (25 May 2025 05:46) (75 - 79)  BP: 105/66 (25 May 2025 05:46) (97/56 - 107/63)  BP(mean): --  RR: 18 (25 May 2025 05:46) (18 - 18)  SpO2: 91% (25 May 2025 05:46) (91% - 92%)    Parameters below as of 25 May 2025 05:46  Patient On (Oxygen Delivery Method): room air        General: WN/WD NAD  Respiratory: CTA B/L  CV: RRR, S1S2, no murmurs, rubs or gallops  Abdominal: Soft, NT, ND +BS, Last BM  Extremities: No edema, + peripheral pulses     05-24    137  |  97  |  39[H]  ----------------------------<  266[H]  3.2[L]   |  32[H]  |  1.50[H]    Ca    9.1      24 May 2025 07:10    TPro  7.3  /  Alb  2.9[L]  /  TBili  0.4  /  DBili  x   /  AST  20  /  ALT  32  /  AlkPhos  80  05-24      dextrose 50% Injectable 25 Gram(s) IV Push once  dextrose 50% Injectable 12.5 Gram(s) IV Push once  dextrose 50% Injectable 25 Gram(s) IV Push once  dextrose Oral Gel 15 Gram(s) Oral once PRN  glucagon  Injectable 1 milliGRAM(s) IntraMuscular once  insulin glargine Injectable (LANTUS) 24 Unit(s) SubCutaneous at bedtime  insulin lispro (ADMELOG) corrective regimen sliding scale   SubCutaneous three times a day before meals  insulin lispro (ADMELOG) corrective regimen sliding scale   SubCutaneous at bedtime  insulin lispro Injectable (ADMELOG) 10 Unit(s) SubCutaneous three times a day before meals  predniSONE   Tablet 10 milliGRAM(s) Oral daily  rosuvastatin 40 milliGRAM(s) Oral at bedtime

## 2025-05-25 NOTE — PROGRESS NOTE ADULT - PROBLEM SELECTOR PLAN 1
cont lantus 24 units qhs  cont admelog 10 units 3x/day before meals  cont low dose admelog corrective scale coverage qac/qhs  cont cons cho diet  diabetes education completed  goal bg 100-180 in hosp setting.

## 2025-05-25 NOTE — CONSULT NOTE ADULT - CONSULT REASON
chf  pna  weakness  sob  geronimo  cough
PNA
CHF exacerbation, dyspnea on exertion
R Elbow Olecranon Bursitis
dm2 uncontrolled
Right leg ulcer
74y A1C with Estimated Average Glucose Result: 10.0 % (05-22-25 @ 09:11)   diabetes mellitus uncontrolled type 2

## 2025-05-25 NOTE — CONSULT NOTE ADULT - REASON FOR ADMISSION
CHF exacerbation, PNA

## 2025-05-25 NOTE — CONSULT NOTE ADULT - CONSULT REQUESTED DATE/TIME
22-May-2025 08:48
23-May-2025 07:04
22-May-2025
22-May-2025 13:50
25-May-2025 14:48
22-May-2025 11:08
22-May-2025 16:16

## 2025-05-26 ENCOUNTER — RESULT REVIEW (OUTPATIENT)
Age: 75
End: 2025-05-26

## 2025-05-26 LAB
ALBUMIN SERPL ELPH-MCNC: 2.9 G/DL — LOW (ref 3.3–5)
ALP SERPL-CCNC: 78 U/L — SIGNIFICANT CHANGE UP (ref 40–120)
ALT FLD-CCNC: 28 U/L — SIGNIFICANT CHANGE UP (ref 12–78)
ANION GAP SERPL CALC-SCNC: 9 MMOL/L — SIGNIFICANT CHANGE UP (ref 5–17)
AST SERPL-CCNC: 13 U/L — LOW (ref 15–37)
BILIRUB SERPL-MCNC: 0.5 MG/DL — SIGNIFICANT CHANGE UP (ref 0.2–1.2)
BUN SERPL-MCNC: 37 MG/DL — HIGH (ref 7–23)
CALCIUM SERPL-MCNC: 9.1 MG/DL — SIGNIFICANT CHANGE UP (ref 8.5–10.1)
CHLORIDE SERPL-SCNC: 100 MMOL/L — SIGNIFICANT CHANGE UP (ref 96–108)
CK SERPL-CCNC: 113 U/L — SIGNIFICANT CHANGE UP (ref 26–308)
CO2 SERPL-SCNC: 28 MMOL/L — SIGNIFICANT CHANGE UP (ref 22–31)
CREAT SERPL-MCNC: 1.5 MG/DL — HIGH (ref 0.5–1.3)
EGFR: 49 ML/MIN/1.73M2 — LOW
EGFR: 49 ML/MIN/1.73M2 — LOW
GLUCOSE BLDC GLUCOMTR-MCNC: 214 MG/DL — HIGH (ref 70–99)
GLUCOSE BLDC GLUCOMTR-MCNC: 255 MG/DL — HIGH (ref 70–99)
GLUCOSE BLDC GLUCOMTR-MCNC: 263 MG/DL — HIGH (ref 70–99)
GLUCOSE BLDC GLUCOMTR-MCNC: 334 MG/DL — HIGH (ref 70–99)
GLUCOSE SERPL-MCNC: 248 MG/DL — HIGH (ref 70–99)
HCT VFR BLD CALC: 40 % — SIGNIFICANT CHANGE UP (ref 39–50)
HGB BLD-MCNC: 12.6 G/DL — LOW (ref 13–17)
MCHC RBC-ENTMCNC: 27.5 PG — SIGNIFICANT CHANGE UP (ref 27–34)
MCHC RBC-ENTMCNC: 31.5 G/DL — LOW (ref 32–36)
MCV RBC AUTO: 87.1 FL — SIGNIFICANT CHANGE UP (ref 80–100)
NRBC BLD AUTO-RTO: 0 /100 WBCS — SIGNIFICANT CHANGE UP (ref 0–0)
PHOSPHATE SERPL-MCNC: 3.5 MG/DL — SIGNIFICANT CHANGE UP (ref 2.5–4.5)
PLATELET # BLD AUTO: 158 K/UL — SIGNIFICANT CHANGE UP (ref 150–400)
POTASSIUM SERPL-MCNC: 4 MMOL/L — SIGNIFICANT CHANGE UP (ref 3.5–5.3)
POTASSIUM SERPL-SCNC: 4 MMOL/L — SIGNIFICANT CHANGE UP (ref 3.5–5.3)
PROT SERPL-MCNC: 7.2 G/DL — SIGNIFICANT CHANGE UP (ref 6–8.3)
RBC # BLD: 4.59 M/UL — SIGNIFICANT CHANGE UP (ref 4.2–5.8)
RBC # FLD: 19.8 % — HIGH (ref 10.3–14.5)
SODIUM SERPL-SCNC: 137 MMOL/L — SIGNIFICANT CHANGE UP (ref 135–145)
WBC # BLD: 9.32 K/UL — SIGNIFICANT CHANGE UP (ref 3.8–10.5)
WBC # FLD AUTO: 9.32 K/UL — SIGNIFICANT CHANGE UP (ref 3.8–10.5)

## 2025-05-26 PROCEDURE — 93306 TTE W/DOPPLER COMPLETE: CPT | Mod: 26

## 2025-05-26 PROCEDURE — 99232 SBSQ HOSP IP/OBS MODERATE 35: CPT

## 2025-05-26 PROCEDURE — 99233 SBSQ HOSP IP/OBS HIGH 50: CPT

## 2025-05-26 RX ORDER — CLONAZEPAM 0.5 MG/1
0.75 TABLET ORAL
Refills: 0 | Status: DISCONTINUED | OUTPATIENT
Start: 2025-05-26 | End: 2025-05-27

## 2025-05-26 RX ORDER — INSULIN GLARGINE-YFGN 100 [IU]/ML
30 INJECTION, SOLUTION SUBCUTANEOUS AT BEDTIME
Refills: 0 | Status: DISCONTINUED | OUTPATIENT
Start: 2025-05-26 | End: 2025-05-27

## 2025-05-26 RX ORDER — ALBUTEROL SULFATE 2.5 MG/3ML
2.5 VIAL, NEBULIZER (ML) INHALATION THREE TIMES A DAY
Refills: 0 | Status: DISCONTINUED | OUTPATIENT
Start: 2025-05-26 | End: 2025-05-26

## 2025-05-26 RX ORDER — SULFAMETHOXAZOLE/TRIMETHOPRIM 800-160 MG
1 TABLET ORAL EVERY 12 HOURS
Refills: 0 | Status: DISCONTINUED | OUTPATIENT
Start: 2025-05-26 | End: 2025-05-27

## 2025-05-26 RX ORDER — ALBUTEROL SULFATE 2.5 MG/3ML
2.5 VIAL, NEBULIZER (ML) INHALATION THREE TIMES A DAY
Refills: 0 | Status: DISCONTINUED | OUTPATIENT
Start: 2025-05-26 | End: 2025-05-27

## 2025-05-26 RX ADMIN — HEPARIN SODIUM 5000 UNIT(S): 1000 INJECTION INTRAVENOUS; SUBCUTANEOUS at 05:28

## 2025-05-26 RX ADMIN — Medication 1 TABLET(S): at 05:27

## 2025-05-26 RX ADMIN — CLONAZEPAM 0.75 MILLIGRAM(S): 0.5 TABLET ORAL at 18:13

## 2025-05-26 RX ADMIN — Medication 20 MILLIEQUIVALENT(S): at 18:14

## 2025-05-26 RX ADMIN — POLYETHYLENE GLYCOL 3350 17 GRAM(S): 17 POWDER, FOR SOLUTION ORAL at 05:28

## 2025-05-26 RX ADMIN — ROSUVASTATIN CALCIUM 40 MILLIGRAM(S): 20 TABLET, FILM COATED ORAL at 21:18

## 2025-05-26 RX ADMIN — Medication 2.5 MILLIGRAM(S): at 19:35

## 2025-05-26 RX ADMIN — Medication 1 DOSE(S): at 21:17

## 2025-05-26 RX ADMIN — FUROSEMIDE 40 MILLIGRAM(S): 10 INJECTION INTRAMUSCULAR; INTRAVENOUS at 18:14

## 2025-05-26 RX ADMIN — OXYCODONE HYDROCHLORIDE 5 MILLIGRAM(S): 30 TABLET ORAL at 14:43

## 2025-05-26 RX ADMIN — OXYCODONE HYDROCHLORIDE 5 MILLIGRAM(S): 30 TABLET ORAL at 21:19

## 2025-05-26 RX ADMIN — HEPARIN SODIUM 5000 UNIT(S): 1000 INJECTION INTRAVENOUS; SUBCUTANEOUS at 13:49

## 2025-05-26 RX ADMIN — Medication 40 MILLIGRAM(S): at 05:28

## 2025-05-26 RX ADMIN — Medication 20 MILLIEQUIVALENT(S): at 05:28

## 2025-05-26 RX ADMIN — INSULIN LISPRO 6: 100 INJECTION, SOLUTION INTRAVENOUS; SUBCUTANEOUS at 09:09

## 2025-05-26 RX ADMIN — INSULIN LISPRO 6: 100 INJECTION, SOLUTION INTRAVENOUS; SUBCUTANEOUS at 18:17

## 2025-05-26 RX ADMIN — MUPIROCIN CALCIUM 1 APPLICATION(S): 20 CREAM TOPICAL at 05:29

## 2025-05-26 RX ADMIN — INSULIN LISPRO 10 UNIT(S): 100 INJECTION, SOLUTION INTRAVENOUS; SUBCUTANEOUS at 18:17

## 2025-05-26 RX ADMIN — PREGABALIN 150 MILLIGRAM(S): 50 CAPSULE ORAL at 05:27

## 2025-05-26 RX ADMIN — FUROSEMIDE 40 MILLIGRAM(S): 10 INJECTION INTRAMUSCULAR; INTRAVENOUS at 05:28

## 2025-05-26 RX ADMIN — SERTRALINE 100 MILLIGRAM(S): 100 TABLET, FILM COATED ORAL at 13:49

## 2025-05-26 RX ADMIN — Medication 1 APPLICATION(S): at 05:30

## 2025-05-26 RX ADMIN — Medication 1 DOSE(S): at 05:34

## 2025-05-26 RX ADMIN — CLONAZEPAM 0.75 MILLIGRAM(S): 0.5 TABLET ORAL at 00:43

## 2025-05-26 RX ADMIN — POLYETHYLENE GLYCOL 3350 17 GRAM(S): 17 POWDER, FOR SOLUTION ORAL at 18:13

## 2025-05-26 RX ADMIN — INSULIN LISPRO 10 UNIT(S): 100 INJECTION, SOLUTION INTRAVENOUS; SUBCUTANEOUS at 13:01

## 2025-05-26 RX ADMIN — Medication 325 MILLIGRAM(S): at 13:48

## 2025-05-26 RX ADMIN — Medication 10 MILLIGRAM(S): at 13:48

## 2025-05-26 RX ADMIN — TIOTROPIUM BROMIDE INHALATION SPRAY 2 PUFF(S): 3.12 SPRAY, METERED RESPIRATORY (INHALATION) at 05:38

## 2025-05-26 RX ADMIN — CEFTRIAXONE 100 MILLIGRAM(S): 500 INJECTION, POWDER, FOR SOLUTION INTRAMUSCULAR; INTRAVENOUS at 00:34

## 2025-05-26 RX ADMIN — MUPIROCIN CALCIUM 1 APPLICATION(S): 20 CREAM TOPICAL at 18:14

## 2025-05-26 RX ADMIN — Medication 1 TABLET(S): at 18:20

## 2025-05-26 RX ADMIN — INSULIN LISPRO 8: 100 INJECTION, SOLUTION INTRAVENOUS; SUBCUTANEOUS at 13:01

## 2025-05-26 RX ADMIN — INSULIN GLARGINE-YFGN 30 UNIT(S): 100 INJECTION, SOLUTION SUBCUTANEOUS at 21:19

## 2025-05-26 RX ADMIN — Medication 2.5 MILLIGRAM(S): at 13:32

## 2025-05-26 RX ADMIN — PREGABALIN 150 MILLIGRAM(S): 50 CAPSULE ORAL at 18:14

## 2025-05-26 RX ADMIN — PREDNISONE 10 MILLIGRAM(S): 20 TABLET ORAL at 05:27

## 2025-05-26 RX ADMIN — HEPARIN SODIUM 5000 UNIT(S): 1000 INJECTION INTRAVENOUS; SUBCUTANEOUS at 21:18

## 2025-05-26 RX ADMIN — INSULIN LISPRO 10 UNIT(S): 100 INJECTION, SOLUTION INTRAVENOUS; SUBCUTANEOUS at 09:08

## 2025-05-26 RX ADMIN — OXYCODONE HYDROCHLORIDE 5 MILLIGRAM(S): 30 TABLET ORAL at 22:19

## 2025-05-26 RX ADMIN — MONTELUKAST SODIUM 10 MILLIGRAM(S): 10 TABLET ORAL at 13:49

## 2025-05-26 RX ADMIN — OXYCODONE HYDROCHLORIDE 5 MILLIGRAM(S): 30 TABLET ORAL at 05:27

## 2025-05-26 RX ADMIN — Medication 1 TABLET(S): at 18:14

## 2025-05-26 NOTE — PROGRESS NOTE ADULT - SUBJECTIVE AND OBJECTIVE BOX
CAPILLARY BLOOD GLUCOSE      POCT Blood Glucose.: 255 mg/dL (26 May 2025 17:27)  POCT Blood Glucose.: 334 mg/dL (26 May 2025 12:23)  POCT Blood Glucose.: 263 mg/dL (26 May 2025 08:13)  POCT Blood Glucose.: 225 mg/dL (25 May 2025 21:30)      Vital Signs Last 24 Hrs  T(C): 37.1 (26 May 2025 11:26), Max: 37.1 (26 May 2025 11:26)  T(F): 98.7 (26 May 2025 11:26), Max: 98.7 (26 May 2025 11:26)  HR: 93 (26 May 2025 11:26) (89 - 96)  BP: 99/65 (26 May 2025 11:26) (99/65 - 113/71)  BP(mean): --  RR: 17 (26 May 2025 11:26) (17 - 18)  SpO2: 92% (26 May 2025 11:26) (91% - 92%)    Parameters below as of 26 May 2025 11:26  Patient On (Oxygen Delivery Method): room air        Respiratory: CTA B/L  CV: RRR, S1S2, no murmurs, rubs or gallops  Abdominal: Soft, NT, ND +BS, Last BM  Extremities: No edema, + peripheral pulses     05-26    137  |  100  |  37[H]  ----------------------------<  248[H]  4.0   |  28  |  1.50[H]    Ca    9.1      26 May 2025 08:12  Phos  3.5     05-26  Mg     2.1     05-25    TPro  7.2  /  Alb  2.9[L]  /  TBili  0.5  /  DBili  x   /  AST  13[L]  /  ALT  28  /  AlkPhos  78  05-26      dextrose 50% Injectable 25 Gram(s) IV Push once  dextrose 50% Injectable 25 Gram(s) IV Push once  dextrose 50% Injectable 12.5 Gram(s) IV Push once  dextrose Oral Gel 15 Gram(s) Oral once PRN  glucagon  Injectable 1 milliGRAM(s) IntraMuscular once  insulin glargine Injectable (LANTUS) 24 Unit(s) SubCutaneous at bedtime  insulin lispro (ADMELOG) corrective regimen sliding scale   SubCutaneous three times a day before meals  insulin lispro (ADMELOG) corrective regimen sliding scale   SubCutaneous at bedtime  insulin lispro Injectable (ADMELOG) 10 Unit(s) SubCutaneous three times a day before meals  predniSONE   Tablet 10 milliGRAM(s) Oral daily  rosuvastatin 40 milliGRAM(s) Oral at bedtime

## 2025-05-26 NOTE — PROGRESS NOTE ADULT - PROBLEM SELECTOR PLAN 1
increase lantus 30 units qhs  cont admelog 10 units 3x/day before meals  cont mod dose admelog corrective scale coverage qac/qhs  cont cons cho diet  goal bg 100-180 in hosp setting

## 2025-05-26 NOTE — PROGRESS NOTE ADULT - PROBLEM SELECTOR PROBLEM 1
T2DM (type 2 diabetes mellitus)
T2DM (type 2 diabetes mellitus)
CHF exacerbation

## 2025-05-26 NOTE — PROGRESS NOTE ADULT - SUBJECTIVE AND OBJECTIVE BOX
[facility]  INFECTIOUS DISEASES   [address] [place] [address]  Tel: [phone]     Fax: [phone]  ========================================================  MD Panda Catherine Michelle, MD Shah, Kaushal, MD Sunjit, Jaspal, MD Sehrish Shahid, MD   ========================================================    [id number]  WAYNE SERRANO     CC: Patient is a 74y old  Male who presents with a chief complaint of CHF exacerbation, PNA (22 May 2025 10:14)    Follow up: RLE wound about the same, no new complaint, comfortable, no cough or SOB. No fever.     PAST MEDICAL & SURGICAL HISTORY:  Prostate cancer  Type II diabetes mellitus  Chronic obstructive pulmonary disease (COPD)  CHF (congestive heart failure)  Renal insufficiency  H/O migraine  Insomnia  Constipation  S/P foot surgery    Social Hx: No current smoking, EtOH or drugs     FAMILY HISTORY:  Noncontributory     Allergies  IODINE (Unknown)  tetracycline (Unknown)  vancomycin (Other)    MEDICATIONS  (STANDING):  albuterol    0.083% 2.5 milliGRAM(s) Nebulizer three times a day  cetirizine 10 milliGRAM(s) Oral daily  chlorhexidine 2% Cloths 1 Application(s) Topical <User Schedule>  dextrose 5%. 1000 milliLiter(s) (50 mL/Hr) IV Continuous <Continuous>  dextrose 5%. 1000 milliLiter(s) (100 mL/Hr) IV Continuous <Continuous>  dextrose 50% Injectable 25 Gram(s) IV Push once  dextrose 50% Injectable 12.5 Gram(s) IV Push once  dextrose 50% Injectable 25 Gram(s) IV Push once  ferrous    sulfate 325 milliGRAM(s) Oral daily  fluticasone propionate/ salmeterol 250-50 MICROgram(s) Diskus 1 Dose(s) Inhalation two times a day  furosemide    Tablet 40 milliGRAM(s) Oral every 12 hours  glucagon  Injectable 1 milliGRAM(s) IntraMuscular once  heparin   Injectable 5000 Unit(s) SubCutaneous every 8 hours  insulin glargine Injectable (LANTUS) 24 Unit(s) SubCutaneous at bedtime  insulin lispro (ADMELOG) corrective regimen sliding scale   SubCutaneous three times a day before meals  insulin lispro (ADMELOG) corrective regimen sliding scale   SubCutaneous at bedtime  insulin lispro Injectable (ADMELOG) 10 Unit(s) SubCutaneous three times a day before meals  lactobacillus acidophilus 1 Tablet(s) Oral every 12 hours  montelukast 10 milliGRAM(s) Oral daily  mupirocin 2% Nasal 1 Application(s) Both Nostrils two times a day  pantoprazole    Tablet 40 milliGRAM(s) Oral before breakfast  polyethylene glycol 3350 17 Gram(s) Oral two times a day  potassium chloride    Tablet ER 20 milliEquivalent(s) Oral two times a day  predniSONE   Tablet 10 milliGRAM(s) Oral daily  pregabalin 150 milliGRAM(s) Oral two times a day  rosuvastatin 40 milliGRAM(s) Oral at bedtime  senna 2 Tablet(s) Oral at bedtime  sertraline 100 milliGRAM(s) Oral daily  tiotropium 2.5 MICROgram(s) Inhaler 2 Puff(s) Inhalation daily  trimethoprim  160 mG/sulfamethoxazole 800 mG 1 Tablet(s) Oral every 12 hours     REVIEW OF SYSTEMS:  CONSTITUTIONAL:  No Fever or chills  HEENT:  No diplopia or blurred vision.  No sore throat or runny nose.  CARDIOVASCULAR:  No chest pain or palpitations   RESPIRATORY:  +cough, shortness of breath better  GASTROINTESTINAL:  No nausea, vomiting or diarrhea.  GENITOURINARY:  No dysuria, frequency or urgency. No Blood in urine  MUSCULOSKELETAL:  +R elbow swelling and redness   SKIN: +wound on lateral R ankle     Physical Exam:  Vital Signs Last 24 Hrs  T(C): 37.1 (26 May 2025 11:26), Max: 37.1 (26 May 2025 11:26)  T(F): 98.7 (26 May 2025 11:26), Max: 98.7 (26 May 2025 11:26)  HR: 93 (26 May 2025 11:26) (88 - 96)  BP: 99/65 (26 May 2025 11:26) (99/65 - 129/67)  BP(mean): --  RR: 17 (26 May 2025 11:26) (17 - 18)  SpO2: 92% (26 May 2025 11:26) (91% - 93%)  Parameters below as of 26 May 2025 11:26  Patient On (Oxygen Delivery Method): room air  GEN: NAD  HEENT: normocephalic and atraumatic. EOMI. PERRL.    NECK: Supple.  No lymphadenopathy   LUNGS: +crackles L lung   HEART: Regular rate and rhythm without murmur.  ABDOMEN: +distended. Soft, nontender. Positive bowel sounds.    EXTREMITIES: +R elbow bursitis and erythema with non open scab, +b/l LE pitting edema R>L  NEUROLOGIC: grossly intact.  PSYCHIATRIC: Appropriate affect .  SKIN: +R elbow closed wound, +RLE open ankle wound with mild, non pustular drainage and surrounding erythema     Labs:                        12.6   9.32  )-----------( 158      ( 26 May 2025 08:12 )             40.0     05-26    137  |  100  |  37[H]  ----------------------------<  248[H]  4.0   |  28  |  1.50[H]    Ca    9.1      26 May 2025 08:12  Phos  3.5     05-26  Mg     2.1     05-25    TPro  7.2  /  Alb  2.9[L]  /  TBili  0.5  /  DBili  x   /  AST  13[L]  /  ALT  28  /  AlkPhos  78  05-26    Urinalysis with Rflx Culture (collected 05-22-25 @ 03:50)    Culture - Blood (collected 05-22-25 @ 01:15)  Source: Blood Blood-Peripheral  Preliminary Report (05-26-25 @ 06:01):    No growth at 4 days    Culture - Blood (collected 05-22-25 @ 01:15)  Source: Blood Blood-Peripheral  Preliminary Report (05-26-25 @ 06:01):    No growth at 4 days    Culture - Tissue with Gram Stain (collected 10-03-24 @ 10:45)  Source: Tissue  Gram Stain (10-04-24 @ 17:51):    No polymorphonuclear cells seen per low power field    No organisms seen per oil power field  Final Report (10-08-24 @ 21:44):    Rare Methicillin Resistant Staphylococcus aureus  Organism: Methicillin resistant Staphylococcus aureus (10-08-24 @ 21:44)  Organism: Methicillin resistant Staphylococcus aureus (10-08-24 @ 21:44)    Sensitivities:      -  Clindamycin: R >4      -  Oxacillin: R >2      -  Gentamicin: S <=1 Should not be used as monotherapy      -  Daptomycin: S <=0.25      -  Linezolid: S 1      -  Vancomycin: S 1      -  Tetracycline: S <=1      Method Type: MANOJ      -  Penicillin: R 8      -  Rifampin: S <=1 Should not be used as monotherapy      -  Erythromycin: R >4      -  Trimethoprim/Sulfamethoxazole: S <=0.5/9.5    Culture - Wound Aerobic (collected 10-01-24 @ 11:00)  Source: Skin/Wound  Final Report (10-04-24 @ 22:25):    Few Methicillin Resistant Staphylococcus aureus    Commensal jameel consistent with body site  Organism: Methicillin resistant Staphylococcus aureus (10-04-24 @ 22:25)  Organism: Methicillin resistant Staphylococcus aureus (10-04-24 @ 22:25)    Sensitivities:      -  Clindamycin: R >4      -  Oxacillin: R >2      -  Gentamicin: S <=1 Should not be used as monotherapy      -  Daptomycin: S 0.5      -  Linezolid: S 2      -  Vancomycin: S 1      -  Tetracycline: S <=1      Method Type: MANOJ      -  Penicillin: R >8      -  Rifampin: S <=1 Should not be used as monotherapy      -  Erythromycin: R >4      -  Trimethoprim/Sulfamethoxazole: S <=0.5/9.5    WBC Count: 9.32 K/uL (05-26-25 @ 08:12)  WBC Count: 10.81 K/uL (05-25-25 @ 07:10)  WBC Count: 10.51 K/uL (05-24-25 @ 07:10)  WBC Count: 9.04 K/uL (05-23-25 @ 07:30)  WBC Count: 10.38 K/uL (05-22-25 @ 08:05)  WBC Count: 8.15 K/uL (05-21-25 @ 21:10)    Creatinine: 1.50 mg/dL (05-26-25 @ 08:12)  Creatinine: 1.40 mg/dL (05-25-25 @ 07:10)  Creatinine: 1.50 mg/dL (05-24-25 @ 07:10)  Creatinine: 1.40 mg/dL (05-22-25 @ 08:05)  Creatinine: 1.60 mg/dL (05-21-25 @ 21:10)    C-Reactive Protein: 48 mg/L (05-22-25 @ 14:25)    Ferritin: 215 ng/mL (05-22-25 @ 08:05)    Sedimentation Rate, Erythrocyte: 65 mm/hr (05-22-25 @ 14:25)     SARS-CoV-2: NotDetec (05-22-25 @ 00:15)    All imaging and other data have been reviewed.  < from: CT Chest No Cont (05.21.25 @ 22:41) >  IMPRESSION:  Mild tree-in-bud nodularity in the left lower lobe, which may be   infectious/inflammatory.    Assessment and Plan:   Patient is a 74yoM PMH DM2, PAD, hypertension, COPD, CKD,  HFpEF, Hx of R foot ulcer with osteomyelitis admitted for CHF vs PNA. CT scan not convincing for PNA, however given age and comorbidities will continue abx to cover for PNA. Patient also with hx of MRSA wound cultures. Currently with open wound and erythema on RLE ankle.    Cough and SOB improved, no new complaint, no fever.   R elbow swelling and bursitis seen by ortho, no intervention at this time. RLE wound stable with MRSA in the past, so in contact isolation and started dapto until 5/26( will switch to doxycycline orally)    # Pneumonia  # R elbow swelling   # RLE Wound with MRSA in the past     - Contact isolation   - Blood cultures NGTD   - Negative urine legionella and strep   - Completed 5days of Ceftriaxone 1gm daily   - On daptomycin 450mg daily  - Can switch to oral bactrim DS q12, last day 5/28  - Continue local wound care for R ankle and R elbow     Will follow PRN, Please call with any question.     Frieda Denton MD  Division of Infectious Diseases   Please call ID service at [phone] with any question.    50 minutes spent on total encounter assessing patient, examination, chart review, counseling and coordinating care by the attending physician/nurse/care manager.

## 2025-05-26 NOTE — PROGRESS NOTE ADULT - PROBLEM SELECTOR PLAN 1
Patient presents w/ acute on chronic Diastolic  CHF exacerbation likely 2/2 non compliance with meds   - CT chest: Mild tree-in-bud nodularity in the left lower lobe, which may be infectious/inflammatory  - Pro , Trop neg x2   - Will start Lasix 40mg IV BID  - TTE 10/24: EF 61%, grade 2 LV diastolic dysfunction, trace TR   - Will obtain repeat TTE, f/u results   - Will obtain A1C, lipid panel, TSH f/u results   - Strict I & O's and daily weights  - Cardiology ([place]  [facility]) consulted, will appreciate recs

## 2025-05-26 NOTE — PROGRESS NOTE ADULT - ASSESSMENT
REASON FOR VISIT  .. Management of problems listed below      EVENTS/CURRENT ISSUES.  . 5/26/2025 dapto changed to bactrim  . 5/26/2025 pneum on rocepin ssti leg dapto         REVIEW OF SYMPTOMS   Able to give ROS  Yes     RELIABILITY +/-   CONSTITUTIONAL Weakness Yes    ENDOCRINE  No heat or cold intolerance    ALLERGY No hives  Sore throat No stridor  RESP Shortness of breath YES   NEURO New weakness No   CARDIAC   Palpitations No         PHYSICAL EXAM    HEENT Unremarkable  atraumatic   RESP Fair air entry  Harsh breath sound   CARDIAC S1 S2 No S3     NO JVD    ABDOMEN No hepatosplenomegaly   PEDAL EDEMA present No calf tenderness  REASON FOR VISIT  .. Management of problems listed below        BEST PRACTICE ISSUES.  . HOB ELEVATN.    .... Yes  . DIET  .   .... dash 1.2 l fr 5/24  .... DASH 5/22  . FREE WATER.   ....   .  IV FLUID.  .....   . PHARMAC DVT PPLX .    .... hpsc 5/22   . NON PHARMAC DVT PPLX .      . STRESS ULCR PPLX .   .... PROTONIX 40 5/22  . DATE/DM MGMT.   ..... See under Endocrine section   GENERAL DATA .   . COVID.         .... scv2 5/21 (-)   . GOC.    ....    . ICU STAY.    .... no   . INFECTION PPLX .   .... mupirocin 5/23  .... CHLORHEXIDINE 2% 5/22  . ALLGY.   ....  tetracycline vanco iodine   . WT.   .... 5/25/2025 110   . BMI.  ....5/25/2025 bmi 34       [id number]  VITALS/GAS EXCHANGE/DRIPS    ABGS.     .  VS/ PO/IO/ VENT/ DRIPS.   5/26/2025 afeb 89 112/70   5/26/2025 ra 92%    XXXXXXXX[id number]  PROBLEM ASSESSMENT RECOMMENDATIONS.  RESP.   . GAS EXCHANGE .   .... target PO 90-95%     . COUGH   .... BENZONATATE 5/22 b 100.3 p     . COPD   .... DUONEB p 5/22   .... ADVAIR 250 5/22   .... MONTELUKAST 10 5/22   .... SPIRIVA 5/22   .... PREDNISONE 10 5/22     INFECTION.  . DATA  .... w 5/25-5/26/2025 w 10.8 - 9.3  .... CT ch 5/21/2025 cw 12/28/2024   ......... mild tib nodules left lo lobe may be infection or inflammn      . PNEUMONIA   .... ROCEPHIN 5/21    . SKIN SOFT TISSUE INFECTION  .... DAPTOMYCIN 5/22 d 450 x 7d      CARDIAC.  . CHF  .... pbnp 5/25/2025 pbnp 89   .... LASIX   ........ 4/24 l 40.2     HEMAT.  . DATA  .... Hb 5/25-5/26/2025 Hb 12.6 - 12.6   .... monitor     GI.   . DIET .   .... DASH 5/22    . LFT MONITORING   .... LFTS    5/25/2025  ........ AP    77   ........ AST  18  ........ ALT   30    RENAL.  . DATA  .... Na 5/25/2025 Na 138   .... K 5/25/2025 K 3.7   .... CO2 5/25/2025 co2 28   .... Cr 5/25-5/26/2025 Cr 1.4 - 1.5   .... monitor     ENDO.  . DM  .  .... INSULIN 5/23         XXXXXXXXXXXXXXXXXXXXXX   SUMMARY BASELINE .     . 74 m history of CHF, COPD, prostate cancer, renal insufficiency, diabetes, chronic neck pain,  right foot wound.    CC.   .5/22/2025 SHORTNESS OF BREATH   . 5/22/2025 WEIGHT GAIN   MAIN ISSUES.  . COPD   . PNEUMONIA   .... ROCEPHIN 5/21  . SKIN SOFT TISSUE INFECTION  .... DAPTOMYCIN 5/22- 5/26 d 450 x 7d   .... bactrim 5/26  . RLE ANKLE OPEN WOUND   . DM     PMH.       PROCEDURES/DEVICES .      DISCUSSIONS.  .... Discussed with primary care and relevant consultants on an ongoing basis       TIME SPENT.  . Over 36 minutes aggregate care time spent on encounter; activities included   direct patient care, counseling and/or coordinating care reviewing notes, lab data/ imaging , discussion with multidisciplinary team/ patient  /family and explaining in detail risks, benefits, alternatives  of the recommendations     MAGGIE BLACK 73 m 5/21/2025 1951

## 2025-05-26 NOTE — PROGRESS NOTE ADULT - PROBLEM SELECTOR PLAN 6
Chronic- not on home O2  - Continue home Spiriva, Symbicort, montelukast, loratadine interchange  - Duo nebs PRN    #Gerd  - Continue home Protonix
Wound care/podiatry  consult requested  ,turn and reposition every 2 hrs ,skin assessment and skin care as per floor protocol
Chronic- not on home O2  - Continue home Spiriva, Symbicort, montelukast, loratadine interchange  - Duo nebs PRN    #Gerd  - Continue home Protonix
Wound care/podiatry  consult requested  ,turn and reposition every 2 hrs ,skin assessment and skin care as per floor protocol

## 2025-05-26 NOTE — PROGRESS NOTE ADULT - PROBLEM SELECTOR PLAN 7
Chronic  - Continue home pregabalin and oxycodone PRN (primary team to obtain ISTOP)  Rt lower lateral leg open wound with drainage     #Anxiety and depression  - Continue home clonazepam .75mg BID PRN (primary team to obtain ISTOP) and sertraline
Chronic  - Continue home pregabalin and oxycodone PRN (primary team to obtain ISTOP)  Rt lower lateral leg open wound with drainage     #Anxiety and depression  - Continue home clonazepam .75mg BID PRN (primary team to obtain ISTOP) and sertraline
replaced , serial bmp
replaced , serial bmp

## 2025-05-26 NOTE — PROGRESS NOTE ADULT - SUBJECTIVE AND OBJECTIVE BOX
Neurology Follow up note    WAYNE [id number]    HPI:  74yoM PMH DM2, PAD, hypertension, COPD, CKD,  HFpEF, Hx of Rt  foot ulcer with osteomyelitis, rt lower EXT wound ,Tri geminal Neuralgia,  BIBEMS from [place] p/w exertional dyspnea and weight gain of about 30lbs over past 1 month. Patient states that for the last one week he has a productive cough along with SOB, BERGER, orthopnea. Patient reports 25-30lbs weight gain over the past month. Patient states that he is supposed to be taking 20mg lasix TID but only takes it BID (recently increased 1 month ago). Patient denies sick contacts. Denies fevers, chills, body aches, chest pain, palpitations, abdominal pain, n/v. Denies recent travel.     IN THE ED:  Temp  98.2 F , HR 87 ,  /67  ,RR 18 , SpO2 92% RA   S/P x  EKG:  Labs significant for: WBAC 8.15, H/H 11.8/36.7, BUN/Cr 33/1.6, Trop neg x2, proBNP 634  Imaging: CT chest: Mild tree-in-bud nodularity in the left lower lobe, which may be infectious/inflammatory.            Interval History -no HA    Patient is seen, chart was reviewed and case was discussed with the treatment team.  Pt is not in any distress.   Lying on bed comfortably.   No events reported overnight.     Vital Signs Last 24 Hrs  T(C): 37.1 (26 May 2025 11:26), Max: 37.1 (26 May 2025 11:26)  T(F): 98.7 (26 May 2025 11:26), Max: 98.7 (26 May 2025 11:26)  HR: 93 (26 May 2025 11:26) (88 - 96)  BP: 99/65 (26 May 2025 11:26) (99/65 - 129/67)  BP(mean): --  RR: 17 (26 May 2025 11:26) (17 - 18)  SpO2: 92% (26 May 2025 11:26) (91% - 93%)    Parameters below as of 26 May 2025 11:26  Patient On (Oxygen Delivery Method): room air      r            REVIEW OF SYSTEMS:    Constitutional: No fever, weight loss  Eyes: No eye pain, visual disturbances, or discharge  ENT:  No difficulty hearing, tinnitus, vertigo; No sinus or throat pain  Neck: No pain or stiffness  Respiratory: No cough, wheezing, chills or hemoptysis  Cardiovascular: No chest pain, palpitations,   Gastrointestinal: No abdominal or epigastric pain. No nausea, vomiting or hematemesis; No diarrhea or constipation.   Genitourinary: No dysuria, frequency, hematuria or incontinence  Neurological: No headaches,  or tremors  Psychiatric: No depression, anxiety, mood swings or difficulty sleeping  Musculoskeletal: No joint pain or swelling; No muscle, back or extremity pain  Skin: No itching, burning, rashes or lesions   Lymph Nodes: No enlarged glands  Endocrine: No heat or cold intolerance;   Allergy and Immunologic: No hives or eczema    On Neurological Examination:    Mental Status - Pt is alert, awake, oriented X3. Follows commands well and able to answer questions appropriately.Mood and affect  normal    Speech -  Normal.    Cranial Nerves - Pupils 3 mm equal and reactive to light, extraocular eye movements intact. Pt has no visual field deficit.  Pt has no  facial asymmetry. Facial sensation is intact.Tongue - is in midline.    Muscle tone - is normal     Motor Exam - 4/5 all over, No drift. No shaking or tremors.    Sensory Exam - Pin prick, temperature, joint position and vibration are IMPAIRED  on either side. Pt withdraws all extremities equally on stimulation. No asymmetry seen.     .        coordination:    Finger to nose: normal    .    Deep tendon Reflexes - 2 plus all over.        .    Neck Supple -  Yes.     MEDICATIONS    acetaminophen     Tablet .. 650 milliGRAM(s) Oral every 6 hours PRN  albuterol/ipratropium for Nebulization 3 milliLiter(s) Nebulizer every 6 hours PRN  aluminum hydroxide/magnesium hydroxide/simethicone Suspension 30 milliLiter(s) Oral every 4 hours PRN  benzonatate 100 milliGRAM(s) Oral three times a day PRN  cefTRIAXone   IVPB 1000 milliGRAM(s) IV Intermittent every 24 hours  cetirizine 10 milliGRAM(s) Oral daily  chlorhexidine 2% Cloths 1 Application(s) Topical <User Schedule>  clonazePAM Oral Disintegrating Tablet 0.75 milliGRAM(s) Oral two times a day PRN  DAPTOmycin IVPB 450 milliGRAM(s) IV Intermittent every 24 hours  dextrose 5%. 1000 milliLiter(s) IV Continuous <Continuous>  dextrose 5%. 1000 milliLiter(s) IV Continuous <Continuous>  dextrose 50% Injectable 25 Gram(s) IV Push once  dextrose 50% Injectable 12.5 Gram(s) IV Push once  dextrose 50% Injectable 25 Gram(s) IV Push once  dextrose Oral Gel 15 Gram(s) Oral once PRN  ferrous    sulfate 325 milliGRAM(s) Oral daily  fluticasone propionate/ salmeterol 250-50 MICROgram(s) Diskus 1 Dose(s) Inhalation two times a day  furosemide    Tablet 40 milliGRAM(s) Oral every 12 hours  glucagon  Injectable 1 milliGRAM(s) IntraMuscular once  heparin   Injectable 5000 Unit(s) SubCutaneous every 8 hours  insulin glargine Injectable (LANTUS) 24 Unit(s) SubCutaneous at bedtime  insulin lispro (ADMELOG) corrective regimen sliding scale   SubCutaneous three times a day before meals  insulin lispro (ADMELOG) corrective regimen sliding scale   SubCutaneous at bedtime  insulin lispro Injectable (ADMELOG) 10 Unit(s) SubCutaneous three times a day before meals  lactobacillus acidophilus 1 Tablet(s) Oral every 12 hours  melatonin 3 milliGRAM(s) Oral at bedtime PRN  montelukast 10 milliGRAM(s) Oral daily  mupirocin 2% Nasal 1 Application(s) Both Nostrils two times a day  ondansetron Injectable 4 milliGRAM(s) IV Push every 8 hours PRN  oxyCODONE    IR 2.5 milliGRAM(s) Oral every 4 hours PRN  oxyCODONE    IR 5 milliGRAM(s) Oral every 6 hours PRN  pantoprazole    Tablet 40 milliGRAM(s) Oral before breakfast  polyethylene glycol 3350 17 Gram(s) Oral daily  potassium chloride    Tablet ER 40 milliEquivalent(s) Oral every 4 hours  predniSONE   Tablet 10 milliGRAM(s) Oral daily  pregabalin 150 milliGRAM(s) Oral two times a day  rosuvastatin 40 milliGRAM(s) Oral at bedtime  senna 2 Tablet(s) Oral at bedtime  sertraline 100 milliGRAM(s) Oral daily  tiotropium 2.5 MICROgram(s) Inhaler 2 Puff(s) Inhalation daily      Allergies    IODINE (Unknown)  tetracycline (Unknown)  vancomycin (Other)    Intolerances                 05-26    137  |  100  |  37[H]  ----------------------------<  248[H]  4.0   |  28  |  1.50[H]    Ca    9.1      26 May 2025 08:12  Phos  3.5     05-26  Mg     2.1     05-25    TPro  7.2  /  Alb  2.9[L]  /  TBili  0.5  /  DBili  x   /  AST  13[L]  /  ALT  28  /  AlkPhos  78  05-26        Hemoglobin A1C:     Vitamin B12     RADIOLOGY    ASSESSMENT AND PLAN:      left sided HA-  RELATED TO TEMPORAL ARTERITIS  PN 0N LYRICA    CONTINUE   PREDNISONE   Physical therapy evaluation.  OOB to chair/ambulation with assistance only.  Advanced care planning was discussed with family.  Pain is accessed and addressed  Plan of care was discussed with family. Questions answered.  Would continue to follow.

## 2025-05-26 NOTE — PROGRESS NOTE ADULT - SUBJECTIVE AND OBJECTIVE BOX
CHIEF COMPLAINT/ REASON FOR VISIT  .. Patient was seen to address the  issue listed under PROBLEM LIST which is located toward bottom of this note     WAYNE SERRANO    [facility] [id number]    Allergies    IODINE (Unknown)  tetracycline (Unknown)  vancomycin (Other)    Intolerances        PAST MEDICAL & SURGICAL HISTORY:  Prostate cancer      Type II diabetes mellitus      Chronic obstructive pulmonary disease (COPD)      CHF (congestive heart failure)      Renal insufficiency      H/O migraine      Insomnia      Constipation      S/P foot surgery          FAMILY HISTORY:      Home Medications:  albuterol 0.63 mg/3 mL (0.021%) inhalation solution: 3 milliliter(s) by nebulizer 3 times a day as needed for (22 May 2025 01:14)  clonazePAM 0.25 mg oral tablet, disintegrating: 3 tab(s) orally 2 times a day (22 May 2025 01:14)  ferrous sulfate 325 mg (65 mg elemental iron) oral tablet: 1 tab(s) orally once a day (22 May 2025 01:14)  furosemide 40 mg oral tablet: 1.5 tab(s) orally once a day (22 May 2025 01:14)  HumaLOG 100 units/mL injectable solution: 10 unit(s) injectable 3 times a day (with meals) ***sliding scale*** (22 May 2025 01:14)  insulin glargine 100 units/mL subcutaneous solution: 24 unit(s) subcutaneous once a day (at bedtime) (22 May 2025 01:14)  loratadine 10 mg oral tablet: 1 tab(s) orally once a day (in the morning) (22 May 2025 01:14)  melatonin 3 mg oral tablet: 1 tab(s) orally once a day (at bedtime) As needed Insomnia (22 May 2025 01:14)  metoprolol succinate 25 mg oral tablet, extended release: 1 tab(s) orally once a day (22 May 2025 01:14)  montelukast 10 mg oral tablet: 1 tab(s) orally once a day (22 May 2025 01:14)  Multiple Vitamins with Minerals oral tablet: 1 tab(s) orally once a day (22 May 2025 01:14)  oxyCODONE 10 mg oral tablet, extended release: 1 tab(s) orally every 12 hours (22 May 2025 01:14)  pantoprazole 40 mg oral delayed release tablet: 1 tab(s) orally once a day (before a meal) (22 May 2025 01:14)  polyethylene glycol 3350 oral powder for reconstitution: 17 gram(s) orally once a day (22 May 2025 01:14)  pregabalin 150 mg oral capsule: 1 cap(s) orally 2 times a day (22 May 2025 01:14)  rosuvastatin 40 mg oral tablet: 1 tab(s) orally once a day (22 May 2025 01:14)  sertraline 100 mg oral tablet: 1 tab(s) orally once a day (22 May 2025 01:14)  Spiriva 18 mcg inhalation capsule: 1 cap(s) inhaled once a day (22 May 2025 01:14)      MEDICATIONS  (STANDING):  cetirizine 10 milliGRAM(s) Oral daily  chlorhexidine 2% Cloths 1 Application(s) Topical <User Schedule>  DAPTOmycin IVPB 450 milliGRAM(s) IV Intermittent every 24 hours  dextrose 5%. 1000 milliLiter(s) (50 mL/Hr) IV Continuous <Continuous>  dextrose 5%. 1000 milliLiter(s) (100 mL/Hr) IV Continuous <Continuous>  dextrose 50% Injectable 25 Gram(s) IV Push once  dextrose 50% Injectable 12.5 Gram(s) IV Push once  dextrose 50% Injectable 25 Gram(s) IV Push once  ferrous    sulfate 325 milliGRAM(s) Oral daily  fluticasone propionate/ salmeterol 250-50 MICROgram(s) Diskus 1 Dose(s) Inhalation two times a day  furosemide    Tablet 40 milliGRAM(s) Oral every 12 hours  glucagon  Injectable 1 milliGRAM(s) IntraMuscular once  heparin   Injectable 5000 Unit(s) SubCutaneous every 8 hours  insulin glargine Injectable (LANTUS) 24 Unit(s) SubCutaneous at bedtime  insulin lispro (ADMELOG) corrective regimen sliding scale   SubCutaneous three times a day before meals  insulin lispro (ADMELOG) corrective regimen sliding scale   SubCutaneous at bedtime  insulin lispro Injectable (ADMELOG) 10 Unit(s) SubCutaneous three times a day before meals  lactobacillus acidophilus 1 Tablet(s) Oral every 12 hours  montelukast 10 milliGRAM(s) Oral daily  mupirocin 2% Nasal 1 Application(s) Both Nostrils two times a day  pantoprazole    Tablet 40 milliGRAM(s) Oral before breakfast  polyethylene glycol 3350 17 Gram(s) Oral two times a day  potassium chloride    Tablet ER 20 milliEquivalent(s) Oral two times a day  predniSONE   Tablet 10 milliGRAM(s) Oral daily  pregabalin 150 milliGRAM(s) Oral two times a day  rosuvastatin 40 milliGRAM(s) Oral at bedtime  senna 2 Tablet(s) Oral at bedtime  sertraline 100 milliGRAM(s) Oral daily  tiotropium 2.5 MICROgram(s) Inhaler 2 Puff(s) Inhalation daily    MEDICATIONS  (PRN):  acetaminophen     Tablet .. 650 milliGRAM(s) Oral every 6 hours PRN Temp greater or equal to 38C (100.4F), Mild Pain (1 - 3)  albuterol/ipratropium for Nebulization 3 milliLiter(s) Nebulizer every 6 hours PRN Shortness of Breath and/or Wheezing  aluminum hydroxide/magnesium hydroxide/simethicone Suspension 30 milliLiter(s) Oral every 4 hours PRN Dyspepsia  benzonatate 100 milliGRAM(s) Oral three times a day PRN Cough  bisacodyl Suppository 10 milliGRAM(s) Rectal once PRN Constipation  clonazePAM Oral Disintegrating Tablet 0.75 milliGRAM(s) Oral two times a day PRN anxiety  dextrose Oral Gel 15 Gram(s) Oral once PRN Blood Glucose LESS THAN 70 milliGRAM(s)/deciliter  melatonin 3 milliGRAM(s) Oral at bedtime PRN Insomnia  ondansetron Injectable 4 milliGRAM(s) IV Push every 8 hours PRN Nausea and/or Vomiting  oxyCODONE    IR 2.5 milliGRAM(s) Oral every 4 hours PRN Moderate Pain (4 - 6)  oxyCODONE    IR 5 milliGRAM(s) Oral every 6 hours PRN Severe Pain (7 - 10)      Diet, DASH/TLC:   Sodium & Cholesterol Restricted  Consistent Carbohydrate No Snacks  1200mL Fluid Restriction ([id number])  Supplement Feeding Modality:  Oral  Ensure Max Cans or Servings Per Day:  1       Frequency:  Daily (05-24-25 @ 10:50) [Active]          Vital Signs Last 24 Hrs  T(C): 36.6 (26 May 2025 05:16), Max: 37 (25 May 2025 11:30)  T(F): 97.8 (26 May 2025 05:16), Max: 98.6 (25 May 2025 11:30)  HR: 89 (26 May 2025 05:16) (83 - 96)  BP: 113/71 (26 May 2025 05:16) (110/70 - 129/67)  BP(mean): --  RR: 18 (26 May 2025 05:16) (17 - 18)  SpO2: 92% (26 May 2025 05:16) (91% - 93%)    Parameters below as of 26 May 2025 05:16  Patient On (Oxygen Delivery Method): room air          05-26-25 @ 07:01  -  05-26-25 @ 09:29  --------------------------------------------------------  IN: 0 mL / OUT: 600 mL / NET: -600 mL              LABS:                        12.6   9.32  )-----------( 158      ( 26 May 2025 08:12 )             40.0     05-26    137  |  100  |  37[H]  ----------------------------<  248[H]  4.0   |  28  |  1.50[H]    Ca    9.1      26 May 2025 08:12  Phos  3.5     05-26  Mg     2.1     05-25    TPro  7.2  /  Alb  2.9[L]  /  TBili  0.5  /  DBili  x   /  AST  13[L]  /  ALT  28  /  AlkPhos  78  05-26      Urinalysis Basic - ( 26 May 2025 08:12 )    Color: x / Appearance: x / SG: x / pH: x  Gluc: 248 mg/dL / Ketone: x  / Bili: x / Urobili: x   Blood: x / Protein: x / Nitrite: x   Leuk Esterase: x / RBC: x / WBC x   Sq Epi: x / Non Sq Epi: x / Bacteria: x            WBC:  WBC Count: 9.32 K/uL (05-26 @ 08:12)  WBC Count: 10.81 K/uL (05-25 @ 07:10)  WBC Count: 10.51 K/uL (05-24 @ 07:10)  WBC Count: 9.04 K/uL (05-23 @ 07:30)      MICROBIOLOGY:  RECENT CULTURES:  05-22 Blood Blood-Peripheral XXXX XXXX   No growth at 4 days                    Sodium:  Sodium: 137 mmol/L (05-26 @ 08:12)  Sodium: 138 mmol/L (05-25 @ 07:10)  Sodium: 137 mmol/L (05-24 @ 07:10)      1.50 mg/dL 05-26 @ 08:12  1.40 mg/dL 05-25 @ 07:10  1.50 mg/dL 05-24 @ 07:10      Hemoglobin:  Hemoglobin: 12.6 g/dL (05-26 @ 08:12)  Hemoglobin: 12.6 g/dL (05-25 @ 07:10)  Hemoglobin: 12.6 g/dL (05-24 @ 07:10)  Hemoglobin: 12.5 g/dL (05-23 @ 07:30)      Platelets: Platelet Count - Automated: 158 K/uL (05-26 @ 08:12)  Platelet Count - Automated: 165 K/uL (05-25 @ 07:10)  Platelet Count - Automated: 175 K/uL (05-24 @ 07:10)  Platelet Count - Automated: 173 K/uL (05-23 @ 07:30)      LIVER FUNCTIONS - ( 26 May 2025 08:12 )  Alb: 2.9 g/dL / Pro: 7.2 g/dL / ALK PHOS: 78 U/L / ALT: 28 U/L / AST: 13 U/L / GGT: x             Urinalysis Basic - ( 26 May 2025 08:12 )    Color: x / Appearance: x / SG: x / pH: x  Gluc: 248 mg/dL / Ketone: x  / Bili: x / Urobili: x   Blood: x / Protein: x / Nitrite: x   Leuk Esterase: x / RBC: x / WBC x   Sq Epi: x / Non Sq Epi: x / Bacteria: x        RADIOLOGY & ADDITIONAL STUDIES:      MICROBIOLOGY:  RECENT CULTURES:  05-22 Blood Blood-Peripheral XXXX XXXX   No growth at 4 days

## 2025-05-26 NOTE — PROGRESS NOTE ADULT - SUBJECTIVE AND OBJECTIVE BOX
Patient is a 74y Male with a known history of :  CHF exacerbation [I50.9]    Pneumonia [J18.9]    HTN (hypertension) [I10]    T2DM (type 2 diabetes mellitus) [E11.9]    Need for prophylactic measure [Z29.9]    REANNA (acute kidney injury) [N17.9]    COPD exacerbation [J44.1]    History of COPD [Z87.09]    History of foot ulcer [Z87.2]    Wound, open, foot [S91.309A]    Olecranon bursitis [M70.20]    H/O temporal arteritis [Z87.39]    Hypokalemia [E87.6]    Bursitis of right elbow [M70.31]    Foot osteomyelitis [M86.9]    PAD (peripheral artery disease) [I73.9]    Chronic kidney disease, unspecified CKD stage [N18.9]    Leg wound, right [S81.801A]      HPI:  74yoM PMH DM2, PAD, hypertension, COPD, CKD,  HFpEF, Hx of Rt  foot ulcer with osteomyelitis, rt lower EXT wound ,Tri geminal Neuralgia,  [facility] [employer] p/w exertional dyspnea and weight gain of about 30lbs over past 1 month. Patient states that for the last one week he has a productive cough along with SOB, BERGER, orthopnea. Patient reports 25-30lbs weight gain over the past month. Patient states that he is supposed to be taking 20mg lasix TID but only takes it BID (recently increased 1 month ago). Patient denies sick contacts. Denies fevers, chills, body aches, chest pain, palpitations, abdominal pain, n/v. Denies recent travel.     IN THE ED:  Temp  98.2 F , HR 87 ,  /67  ,RR 18 , SpO2 92% RA   S/P x  EKG:  Labs significant for: WBAC 8.15, H/H 11.8/36.7, BUN/Cr 33/1.6, Trop neg x2, proBNP 634  Imaging: CT chest: Mild tree-in-bud nodularity in the left lower lobe, which may be infectious/inflammatory.       (22 May 2025 00:02)      REVIEW OF SYSTEMS:    CONSTITUTIONAL: No fever, weight loss, or fatigue  EYES: No eye pain, visual disturbances, or discharge  ENMT:  No difficulty hearing, tinnitus, vertigo; No sinus or throat pain  NECK: No pain or stiffness  BREASTS: No pain, masses, or nipple discharge  RESPIRATORY: No cough, wheezing, chills or hemoptysis; No shortness of breath  CARDIOVASCULAR: No chest pain, palpitations, dizziness, or leg swelling  GASTROINTESTINAL: No abdominal or epigastric pain. No nausea, vomiting, or hematemesis; No diarrhea or constipation. No melena or hematochezia.  GENITOURINARY: No dysuria, frequency, hematuria, or incontinence  NEUROLOGICAL: No headaches, memory loss, loss of strength, numbness, or tremors  SKIN: No itching, burning, rashes, or lesions   LYMPH NODES: No enlarged glands  ENDOCRINE: No heat or cold intolerance; No hair loss  MUSCULOSKELETAL: No joint pain or swelling; No muscle, back, or extremity pain  PSYCHIATRIC: No depression, anxiety, mood swings, or difficulty sleeping  HEME/LYMPH: No easy bruising, or bleeding gums  ALLERGY AND IMMUNOLOGIC: No hives or eczema    MEDICATIONS  (STANDING):  albuterol   0.5% 2.5 milliGRAM(s) Nebulizer three times a day  cetirizine 10 milliGRAM(s) Oral daily  chlorhexidine 2% Cloths 1 Application(s) Topical <User Schedule>  DAPTOmycin IVPB 450 milliGRAM(s) IV Intermittent every 24 hours  dextrose 5%. 1000 milliLiter(s) (50 mL/Hr) IV Continuous <Continuous>  dextrose 5%. 1000 milliLiter(s) (100 mL/Hr) IV Continuous <Continuous>  dextrose 50% Injectable 25 Gram(s) IV Push once  dextrose 50% Injectable 12.5 Gram(s) IV Push once  dextrose 50% Injectable 25 Gram(s) IV Push once  ferrous    sulfate 325 milliGRAM(s) Oral daily  fluticasone propionate/ salmeterol 250-50 MICROgram(s) Diskus 1 Dose(s) Inhalation two times a day  furosemide    Tablet 40 milliGRAM(s) Oral every 12 hours  glucagon  Injectable 1 milliGRAM(s) IntraMuscular once  heparin   Injectable 5000 Unit(s) SubCutaneous every 8 hours  insulin glargine Injectable (LANTUS) 24 Unit(s) SubCutaneous at bedtime  insulin lispro (ADMELOG) corrective regimen sliding scale   SubCutaneous three times a day before meals  insulin lispro (ADMELOG) corrective regimen sliding scale   SubCutaneous at bedtime  insulin lispro Injectable (ADMELOG) 10 Unit(s) SubCutaneous three times a day before meals  lactobacillus acidophilus 1 Tablet(s) Oral every 12 hours  montelukast 10 milliGRAM(s) Oral daily  mupirocin 2% Nasal 1 Application(s) Both Nostrils two times a day  pantoprazole    Tablet 40 milliGRAM(s) Oral before breakfast  polyethylene glycol 3350 17 Gram(s) Oral two times a day  potassium chloride    Tablet ER 20 milliEquivalent(s) Oral two times a day  predniSONE   Tablet 10 milliGRAM(s) Oral daily  pregabalin 150 milliGRAM(s) Oral two times a day  rosuvastatin 40 milliGRAM(s) Oral at bedtime  senna 2 Tablet(s) Oral at bedtime  sertraline 100 milliGRAM(s) Oral daily  tiotropium 2.5 MICROgram(s) Inhaler 2 Puff(s) Inhalation daily    MEDICATIONS  (PRN):  acetaminophen     Tablet .. 650 milliGRAM(s) Oral every 6 hours PRN Temp greater or equal to 38C (100.4F), Mild Pain (1 - 3)  albuterol/ipratropium for Nebulization 3 milliLiter(s) Nebulizer every 6 hours PRN Shortness of Breath and/or Wheezing  aluminum hydroxide/magnesium hydroxide/simethicone Suspension 30 milliLiter(s) Oral every 4 hours PRN Dyspepsia  benzonatate 100 milliGRAM(s) Oral three times a day PRN Cough  bisacodyl Suppository 10 milliGRAM(s) Rectal once PRN Constipation  clonazePAM Oral Disintegrating Tablet 0.75 milliGRAM(s) Oral two times a day PRN anxiety  dextrose Oral Gel 15 Gram(s) Oral once PRN Blood Glucose LESS THAN 70 milliGRAM(s)/deciliter  melatonin 3 milliGRAM(s) Oral at bedtime PRN Insomnia  ondansetron Injectable 4 milliGRAM(s) IV Push every 8 hours PRN Nausea and/or Vomiting  oxyCODONE    IR 2.5 milliGRAM(s) Oral every 4 hours PRN Moderate Pain (4 - 6)  oxyCODONE    IR 5 milliGRAM(s) Oral every 6 hours PRN Severe Pain (7 - 10)      ALLERGIES: IODINE (Unknown)  tetracycline (Unknown)  vancomycin (Other)      FAMILY HISTORY:      PHYSICAL EXAMINATION:  -----------------------------  T(C): 36.6 (05-26-25 @ 05:16), Max: 37 (05-25-25 @ 11:30)  HR: 89 (05-26-25 @ 05:16) (83 - 96)  BP: 113/71 (05-26-25 @ 05:16) (110/70 - 129/67)  RR: 18 (05-26-25 @ 05:16) (17 - 18)  SpO2: 92% (05-26-25 @ 05:16) (91% - 93%)  Wt(kg): --    05-26 @ 07:01  -  05-26 @ 10:31  --------------------------------------------------------  IN:  Total IN: 0 mL    OUT:    Voided (mL): 600 mL  Total OUT: 600 mL    Total NET: -600 mL            VITALS  T(C): 36.6 (05-26-25 @ 05:16), Max: 37 (05-25-25 @ 11:30)  HR: 89 (05-26-25 @ 05:16) (83 - 96)  BP: 113/71 (05-26-25 @ 05:16) (110/70 - 129/67)  RR: 18 (05-26-25 @ 05:16) (17 - 18)  SpO2: 92% (05-26-25 @ 05:16) (91% - 93%)    Constitutional: well developed, normal appearance, well groomed, well nourished, no deformities and no acute distress.   Eyes: the conjunctiva exhibited no abnormalities and the eyelids demonstrated no xanthelasmas.   HEENT: normal oral mucosa, no oral pallor and no oral cyanosis.   Neck: normal jugular venous A waves present, normal jugular venous V waves present and no jugular venous mahmood A waves.   Pulmonary: no respiratory distress, normal respiratory rhythm and effort, no accessory muscle use and lungs were clear to auscultation bilaterally.   Cardiovascular: heart rate and rhythm were normal, normal S1 and S2 and no murmur, gallop, rub, heave or thrill are present.   Abdomen: soft, non-tender, no hepato-splenomegaly and no abdominal mass palpated.   Musculoskeletal: the gait could not be assessed..   Extremities: no clubbing of the fingernails, no localized cyanosis, no petechial hemorrhages and no ischemic changes.   Skin: normal skin color and pigmentation, no rash, no venous stasis, no skin lesions, no skin ulcer and no xanthoma was observed.   Psychiatric: oriented to person, place, and time, the affect was normal, the mood was normal and not feeling anxious.     LABS:   --------  05-26    137  |  100  |  37[H]  ----------------------------<  248[H]  4.0   |  28  |  1.50[H]    Ca    9.1      26 May 2025 08:12  Phos  3.5     05-26  Mg     2.1     05-25    TPro  7.2  /  Alb  2.9[L]  /  TBili  0.5  /  DBili  x   /  AST  13[L]  /  ALT  28  /  AlkPhos  78  05-26                         12.6   9.32  )-----------( 158      ( 26 May 2025 08:12 )             40.0                 RADIOLOGY:  -----------------    ECG:     ECHO:

## 2025-05-27 ENCOUNTER — TRANSCRIPTION ENCOUNTER (OUTPATIENT)
Age: 75
End: 2025-05-27

## 2025-05-27 VITALS
TEMPERATURE: 99 F | HEART RATE: 93 BPM | SYSTOLIC BLOOD PRESSURE: 108 MMHG | DIASTOLIC BLOOD PRESSURE: 62 MMHG | OXYGEN SATURATION: 96 % | RESPIRATION RATE: 17 BRPM

## 2025-05-27 LAB
ANION GAP SERPL CALC-SCNC: 11 MMOL/L — SIGNIFICANT CHANGE UP (ref 5–17)
BUN SERPL-MCNC: 39 MG/DL — HIGH (ref 7–23)
CALCIUM SERPL-MCNC: 9.2 MG/DL — SIGNIFICANT CHANGE UP (ref 8.5–10.1)
CHLORIDE SERPL-SCNC: 100 MMOL/L — SIGNIFICANT CHANGE UP (ref 96–108)
CO2 SERPL-SCNC: 26 MMOL/L — SIGNIFICANT CHANGE UP (ref 22–31)
CREAT SERPL-MCNC: 1.6 MG/DL — HIGH (ref 0.5–1.3)
CULTURE RESULTS: SIGNIFICANT CHANGE UP
CULTURE RESULTS: SIGNIFICANT CHANGE UP
EGFR: 45 ML/MIN/1.73M2 — LOW
EGFR: 45 ML/MIN/1.73M2 — LOW
GLUCOSE BLDC GLUCOMTR-MCNC: 250 MG/DL — HIGH (ref 70–99)
GLUCOSE BLDC GLUCOMTR-MCNC: 406 MG/DL — HIGH (ref 70–99)
GLUCOSE SERPL-MCNC: 243 MG/DL — HIGH (ref 70–99)
HCT VFR BLD CALC: 40.5 % — SIGNIFICANT CHANGE UP (ref 39–50)
HGB BLD-MCNC: 12.9 G/DL — LOW (ref 13–17)
MCHC RBC-ENTMCNC: 27.9 PG — SIGNIFICANT CHANGE UP (ref 27–34)
MCHC RBC-ENTMCNC: 31.9 G/DL — LOW (ref 32–36)
MCV RBC AUTO: 87.7 FL — SIGNIFICANT CHANGE UP (ref 80–100)
NRBC BLD AUTO-RTO: 0 /100 WBCS — SIGNIFICANT CHANGE UP (ref 0–0)
PLATELET # BLD AUTO: 162 K/UL — SIGNIFICANT CHANGE UP (ref 150–400)
POTASSIUM SERPL-MCNC: 4.1 MMOL/L — SIGNIFICANT CHANGE UP (ref 3.5–5.3)
POTASSIUM SERPL-SCNC: 4.1 MMOL/L — SIGNIFICANT CHANGE UP (ref 3.5–5.3)
RBC # BLD: 4.62 M/UL — SIGNIFICANT CHANGE UP (ref 4.2–5.8)
RBC # FLD: 20 % — HIGH (ref 10.3–14.5)
SODIUM SERPL-SCNC: 137 MMOL/L — SIGNIFICANT CHANGE UP (ref 135–145)
SPECIMEN SOURCE: SIGNIFICANT CHANGE UP
SPECIMEN SOURCE: SIGNIFICANT CHANGE UP
WBC # BLD: 9.7 K/UL — SIGNIFICANT CHANGE UP (ref 3.8–10.5)
WBC # FLD AUTO: 9.7 K/UL — SIGNIFICANT CHANGE UP (ref 3.8–10.5)

## 2025-05-27 PROCEDURE — 97161 PT EVAL LOW COMPLEX 20 MIN: CPT

## 2025-05-27 PROCEDURE — 97165 OT EVAL LOW COMPLEX 30 MIN: CPT

## 2025-05-27 PROCEDURE — 82550 ASSAY OF CK (CPK): CPT

## 2025-05-27 PROCEDURE — 87640 STAPH A DNA AMP PROBE: CPT

## 2025-05-27 PROCEDURE — 82746 ASSAY OF FOLIC ACID SERUM: CPT

## 2025-05-27 PROCEDURE — 83036 HEMOGLOBIN GLYCOSYLATED A1C: CPT

## 2025-05-27 PROCEDURE — 99232 SBSQ HOSP IP/OBS MODERATE 35: CPT

## 2025-05-27 PROCEDURE — 87040 BLOOD CULTURE FOR BACTERIA: CPT

## 2025-05-27 PROCEDURE — 82607 VITAMIN B-12: CPT

## 2025-05-27 PROCEDURE — 85027 COMPLETE CBC AUTOMATED: CPT

## 2025-05-27 PROCEDURE — 84443 ASSAY THYROID STIM HORMONE: CPT

## 2025-05-27 PROCEDURE — 85025 COMPLETE CBC W/AUTO DIFF WBC: CPT

## 2025-05-27 PROCEDURE — 71250 CT THORAX DX C-: CPT

## 2025-05-27 PROCEDURE — 80061 LIPID PANEL: CPT

## 2025-05-27 PROCEDURE — 82962 GLUCOSE BLOOD TEST: CPT

## 2025-05-27 PROCEDURE — 80048 BASIC METABOLIC PNL TOTAL CA: CPT

## 2025-05-27 PROCEDURE — 85610 PROTHROMBIN TIME: CPT

## 2025-05-27 PROCEDURE — 87641 MR-STAPH DNA AMP PROBE: CPT

## 2025-05-27 PROCEDURE — 99285 EMERGENCY DEPT VISIT HI MDM: CPT

## 2025-05-27 PROCEDURE — 83540 ASSAY OF IRON: CPT

## 2025-05-27 PROCEDURE — 81003 URINALYSIS AUTO W/O SCOPE: CPT

## 2025-05-27 PROCEDURE — 93306 TTE W/DOPPLER COMPLETE: CPT

## 2025-05-27 PROCEDURE — 73070 X-RAY EXAM OF ELBOW: CPT

## 2025-05-27 PROCEDURE — 71045 X-RAY EXAM CHEST 1 VIEW: CPT

## 2025-05-27 PROCEDURE — 93005 ELECTROCARDIOGRAM TRACING: CPT

## 2025-05-27 PROCEDURE — 0225U NFCT DS DNA&RNA 21 SARSCOV2: CPT

## 2025-05-27 PROCEDURE — 85652 RBC SED RATE AUTOMATED: CPT

## 2025-05-27 PROCEDURE — 94640 AIRWAY INHALATION TREATMENT: CPT

## 2025-05-27 PROCEDURE — 83550 IRON BINDING TEST: CPT

## 2025-05-27 PROCEDURE — 87899 AGENT NOS ASSAY W/OPTIC: CPT

## 2025-05-27 PROCEDURE — 84100 ASSAY OF PHOSPHORUS: CPT

## 2025-05-27 PROCEDURE — 94760 N-INVAS EAR/PLS OXIMETRY 1: CPT

## 2025-05-27 PROCEDURE — 83735 ASSAY OF MAGNESIUM: CPT

## 2025-05-27 PROCEDURE — 83880 ASSAY OF NATRIURETIC PEPTIDE: CPT

## 2025-05-27 PROCEDURE — 85730 THROMBOPLASTIN TIME PARTIAL: CPT

## 2025-05-27 PROCEDURE — 82728 ASSAY OF FERRITIN: CPT

## 2025-05-27 PROCEDURE — 80053 COMPREHEN METABOLIC PANEL: CPT

## 2025-05-27 PROCEDURE — 93306 TTE W/DOPPLER COMPLETE: CPT | Mod: 26

## 2025-05-27 PROCEDURE — 36415 COLL VENOUS BLD VENIPUNCTURE: CPT

## 2025-05-27 PROCEDURE — 86140 C-REACTIVE PROTEIN: CPT

## 2025-05-27 PROCEDURE — 84484 ASSAY OF TROPONIN QUANT: CPT

## 2025-05-27 RX ORDER — ASPIRIN 325 MG
81 TABLET ORAL DAILY
Refills: 0 | Status: DISCONTINUED | OUTPATIENT
Start: 2025-05-27 | End: 2025-05-27

## 2025-05-27 RX ORDER — BUTYROSPERMUM PARKII(SHEA BUTTER), SIMMONDSIA CHINENSIS (JOJOBA) SEED OIL, ALOE BARBADENSIS LEAF EXTRACT .01; 1; 3.5 G/100G; G/100G; G/100G
1 LIQUID TOPICAL
Qty: 0 | Refills: 0 | DISCHARGE

## 2025-05-27 RX ORDER — EMPAGLIFLOZIN 25 MG/1
1 TABLET, FILM COATED ORAL
Qty: 0 | Refills: 0 | DISCHARGE

## 2025-05-27 RX ORDER — SENNA 187 MG
1 TABLET ORAL
Qty: 0 | Refills: 0 | DISCHARGE
Start: 2025-05-27

## 2025-05-27 RX ORDER — POLYETHYLENE GLYCOL 3350 17 G/17G
17 POWDER, FOR SOLUTION ORAL
Qty: 0 | Refills: 0 | DISCHARGE
Start: 2025-05-27

## 2025-05-27 RX ORDER — BUDESONIDE AND FORMOTEROL FUMARATE DIHYDRATE 80; 4.5 UG/1; UG/1
2 AEROSOL RESPIRATORY (INHALATION)
Qty: 1 | Refills: 0
Start: 2025-05-27 | End: 2025-06-25

## 2025-05-27 RX ORDER — SERTRALINE 100 MG/1
1 TABLET, FILM COATED ORAL
Qty: 0 | Refills: 0 | DISCHARGE
Start: 2025-05-27

## 2025-05-27 RX ORDER — SULFAMETHOXAZOLE/TRIMETHOPRIM 800-160 MG
1 TABLET ORAL
Qty: 6 | Refills: 0
Start: 2025-05-27 | End: 2025-05-29

## 2025-05-27 RX ORDER — ASPIRIN 325 MG
1 TABLET ORAL
Qty: 14 | Refills: 0
Start: 2025-05-27 | End: 2025-06-09

## 2025-05-27 RX ORDER — ALBUTEROL SULFATE 2.5 MG/3ML
3 VIAL, NEBULIZER (ML) INHALATION
Qty: 270 | Refills: 0
Start: 2025-05-27 | End: 2025-06-25

## 2025-05-27 RX ORDER — PREGABALIN 50 MG/1
1 CAPSULE ORAL
Qty: 0 | Refills: 0 | DISCHARGE
Start: 2025-05-27

## 2025-05-27 RX ORDER — EMPAGLIFLOZIN 25 MG/1
1 TABLET, FILM COATED ORAL
Qty: 30 | Refills: 0
Start: 2025-05-27 | End: 2025-06-25

## 2025-05-27 RX ORDER — SENNA 187 MG
1 TABLET ORAL
Qty: 30 | Refills: 0
Start: 2025-05-27 | End: 2025-06-25

## 2025-05-27 RX ORDER — FUROSEMIDE 10 MG/ML
1 INJECTION INTRAMUSCULAR; INTRAVENOUS
Qty: 28 | Refills: 0
Start: 2025-05-27 | End: 2025-06-09

## 2025-05-27 RX ORDER — FUROSEMIDE 10 MG/ML
1 INJECTION INTRAMUSCULAR; INTRAVENOUS
Qty: 0 | Refills: 0 | DISCHARGE
Start: 2025-05-27

## 2025-05-27 RX ORDER — OXYCODONE HYDROCHLORIDE 30 MG/1
2 TABLET ORAL
Qty: 0 | Refills: 0 | DISCHARGE
Start: 2025-05-27

## 2025-05-27 RX ORDER — LORATADINE 5 MG/5ML
1 SOLUTION ORAL
Qty: 30 | Refills: 0
Start: 2025-05-27 | End: 2025-06-25

## 2025-05-27 RX ORDER — CYST/ALA/Q10/PHOS.SER/DHA/BROC 100-20-50
1 POWDER (GRAM) ORAL
Qty: 30 | Refills: 0
Start: 2025-05-27 | End: 2025-06-25

## 2025-05-27 RX ORDER — SERTRALINE 100 MG/1
1 TABLET, FILM COATED ORAL
Qty: 30 | Refills: 0
Start: 2025-05-27 | End: 2025-06-25

## 2025-05-27 RX ORDER — OXYCODONE HYDROCHLORIDE 30 MG/1
2 TABLET ORAL
Qty: 120 | Refills: 0
Start: 2025-05-27 | End: 2025-06-25

## 2025-05-27 RX ORDER — SULFAMETHOXAZOLE/TRIMETHOPRIM 800-160 MG
1 TABLET ORAL
Qty: 0 | Refills: 0 | DISCHARGE
Start: 2025-05-27

## 2025-05-27 RX ORDER — INSULIN GLARGINE-YFGN 100 [IU]/ML
36 INJECTION, SOLUTION SUBCUTANEOUS
Qty: 0 | Refills: 0 | DISCHARGE
Start: 2025-05-27

## 2025-05-27 RX ORDER — MONTELUKAST SODIUM 10 MG/1
1 TABLET ORAL
Qty: 30 | Refills: 0
Start: 2025-05-27 | End: 2025-06-25

## 2025-05-27 RX ORDER — POLYETHYLENE GLYCOL 3350 17 G/17G
17 POWDER, FOR SOLUTION ORAL
Qty: 1 | Refills: 0
Start: 2025-05-27 | End: 2025-06-25

## 2025-05-27 RX ORDER — ROSUVASTATIN CALCIUM 20 MG/1
1 TABLET, FILM COATED ORAL
Qty: 30 | Refills: 0
Start: 2025-05-27 | End: 2025-06-25

## 2025-05-27 RX ORDER — FERROUS SULFATE 137(45) MG
1 TABLET, EXTENDED RELEASE ORAL
Qty: 30 | Refills: 0
Start: 2025-05-27 | End: 2025-06-25

## 2025-05-27 RX ORDER — MELATONIN 5 MG
1 TABLET ORAL
Qty: 30 | Refills: 0
Start: 2025-05-27 | End: 2025-06-25

## 2025-05-27 RX ORDER — MONTELUKAST SODIUM 10 MG/1
1 TABLET ORAL
Qty: 0 | Refills: 0 | DISCHARGE
Start: 2025-05-27

## 2025-05-27 RX ORDER — PREDNISONE 20 MG/1
1 TABLET ORAL
Qty: 0 | Refills: 0 | DISCHARGE
Start: 2025-05-27

## 2025-05-27 RX ORDER — BUTYROSPERMUM PARKII(SHEA BUTTER), SIMMONDSIA CHINENSIS (JOJOBA) SEED OIL, ALOE BARBADENSIS LEAF EXTRACT .01; 1; 3.5 G/100G; G/100G; G/100G
1 LIQUID TOPICAL
Qty: 30 | Refills: 0
Start: 2025-05-27 | End: 2025-06-25

## 2025-05-27 RX ORDER — PREDNISONE 20 MG/1
1 TABLET ORAL
Qty: 30 | Refills: 0
Start: 2025-05-27 | End: 2025-06-25

## 2025-05-27 RX ADMIN — MUPIROCIN CALCIUM 1 APPLICATION(S): 20 CREAM TOPICAL at 05:35

## 2025-05-27 RX ADMIN — Medication 10 MILLIGRAM(S): at 11:32

## 2025-05-27 RX ADMIN — INSULIN LISPRO 12: 100 INJECTION, SOLUTION INTRAVENOUS; SUBCUTANEOUS at 12:25

## 2025-05-27 RX ADMIN — PREGABALIN 150 MILLIGRAM(S): 50 CAPSULE ORAL at 05:35

## 2025-05-27 RX ADMIN — Medication 2.5 MILLIGRAM(S): at 07:16

## 2025-05-27 RX ADMIN — SERTRALINE 100 MILLIGRAM(S): 100 TABLET, FILM COATED ORAL at 11:33

## 2025-05-27 RX ADMIN — TIOTROPIUM BROMIDE INHALATION SPRAY 2 PUFF(S): 3.12 SPRAY, METERED RESPIRATORY (INHALATION) at 11:31

## 2025-05-27 RX ADMIN — Medication 2.5 MILLIGRAM(S): at 14:04

## 2025-05-27 RX ADMIN — Medication 1 TABLET(S): at 05:36

## 2025-05-27 RX ADMIN — Medication 3 MILLIGRAM(S): at 01:50

## 2025-05-27 RX ADMIN — POLYETHYLENE GLYCOL 3350 17 GRAM(S): 17 POWDER, FOR SOLUTION ORAL at 05:34

## 2025-05-27 RX ADMIN — OXYCODONE HYDROCHLORIDE 5 MILLIGRAM(S): 30 TABLET ORAL at 11:32

## 2025-05-27 RX ADMIN — HEPARIN SODIUM 5000 UNIT(S): 1000 INJECTION INTRAVENOUS; SUBCUTANEOUS at 05:34

## 2025-05-27 RX ADMIN — MONTELUKAST SODIUM 10 MILLIGRAM(S): 10 TABLET ORAL at 11:32

## 2025-05-27 RX ADMIN — Medication 325 MILLIGRAM(S): at 11:32

## 2025-05-27 RX ADMIN — OXYCODONE HYDROCHLORIDE 5 MILLIGRAM(S): 30 TABLET ORAL at 12:30

## 2025-05-27 RX ADMIN — Medication 20 MILLIEQUIVALENT(S): at 05:36

## 2025-05-27 RX ADMIN — INSULIN LISPRO 10 UNIT(S): 100 INJECTION, SOLUTION INTRAVENOUS; SUBCUTANEOUS at 08:18

## 2025-05-27 RX ADMIN — Medication 81 MILLIGRAM(S): at 11:33

## 2025-05-27 RX ADMIN — Medication 1 APPLICATION(S): at 05:37

## 2025-05-27 RX ADMIN — HEPARIN SODIUM 5000 UNIT(S): 1000 INJECTION INTRAVENOUS; SUBCUTANEOUS at 13:16

## 2025-05-27 RX ADMIN — CLONAZEPAM 0.75 MILLIGRAM(S): 0.5 TABLET ORAL at 00:31

## 2025-05-27 RX ADMIN — FUROSEMIDE 40 MILLIGRAM(S): 10 INJECTION INTRAMUSCULAR; INTRAVENOUS at 05:35

## 2025-05-27 RX ADMIN — Medication 1 DOSE(S): at 11:31

## 2025-05-27 RX ADMIN — Medication 40 MILLIGRAM(S): at 05:36

## 2025-05-27 RX ADMIN — INSULIN LISPRO 10 UNIT(S): 100 INJECTION, SOLUTION INTRAVENOUS; SUBCUTANEOUS at 12:25

## 2025-05-27 RX ADMIN — INSULIN LISPRO 4: 100 INJECTION, SOLUTION INTRAVENOUS; SUBCUTANEOUS at 08:19

## 2025-05-27 RX ADMIN — CLONAZEPAM 0.75 MILLIGRAM(S): 0.5 TABLET ORAL at 12:32

## 2025-05-27 RX ADMIN — PREDNISONE 10 MILLIGRAM(S): 20 TABLET ORAL at 05:36

## 2025-05-27 NOTE — DISCHARGE NOTE PROVIDER - NSDCFUADDINST_GEN_ALL_CORE_FT
Wound, right le - seen by wound care team  Apply dry sterile dressing  Cont local wound care  No surgical debridement at this time  Weight bearing as tolerated b/l le

## 2025-05-27 NOTE — PROGRESS NOTE ADULT - SUBJECTIVE AND OBJECTIVE BOX
[facility]  INFECTIOUS DISEASES   [address] [place] [address]  Tel: [phone]     Fax: [phone]  ========================================================  MD Panda Catherine Michelle, MD Shah, Kaushal, MD Sunjit, Jaspal, MD Sehrish Shahid, MD   ========================================================    [id number]  WAYNE SERRANO     CC: Patient is a 74y old  Male who presents with a chief complaint of CHF exacerbation, PNA (22 May 2025 10:14)    Follow up: RLE wound about the same, no new complaint, comfortable, no cough or SOB. No fever.     PAST MEDICAL & SURGICAL HISTORY:  Prostate cancer  Type II diabetes mellitus  Chronic obstructive pulmonary disease (COPD)  CHF (congestive heart failure)  Renal insufficiency  H/O migraine  Insomnia  Constipation  S/P foot surgery    Social Hx: No current smoking, EtOH or drugs     FAMILY HISTORY:  Noncontributory     Allergies  IODINE (Unknown)  tetracycline (Unknown)  vancomycin (Other)    MEDICATIONS  (STANDING):  albuterol    0.083% 2.5 milliGRAM(s) Nebulizer three times a day  cetirizine 10 milliGRAM(s) Oral daily  chlorhexidine 2% Cloths 1 Application(s) Topical <User Schedule>  dextrose 5%. 1000 milliLiter(s) (50 mL/Hr) IV Continuous <Continuous>  dextrose 5%. 1000 milliLiter(s) (100 mL/Hr) IV Continuous <Continuous>  dextrose 50% Injectable 25 Gram(s) IV Push once  dextrose 50% Injectable 12.5 Gram(s) IV Push once  dextrose 50% Injectable 25 Gram(s) IV Push once  ferrous    sulfate 325 milliGRAM(s) Oral daily  fluticasone propionate/ salmeterol 250-50 MICROgram(s) Diskus 1 Dose(s) Inhalation two times a day  furosemide    Tablet 40 milliGRAM(s) Oral every 12 hours  glucagon  Injectable 1 milliGRAM(s) IntraMuscular once  heparin   Injectable 5000 Unit(s) SubCutaneous every 8 hours  insulin glargine Injectable (LANTUS) 24 Unit(s) SubCutaneous at bedtime  insulin lispro (ADMELOG) corrective regimen sliding scale   SubCutaneous three times a day before meals  insulin lispro (ADMELOG) corrective regimen sliding scale   SubCutaneous at bedtime  insulin lispro Injectable (ADMELOG) 10 Unit(s) SubCutaneous three times a day before meals  lactobacillus acidophilus 1 Tablet(s) Oral every 12 hours  montelukast 10 milliGRAM(s) Oral daily  mupirocin 2% Nasal 1 Application(s) Both Nostrils two times a day  pantoprazole    Tablet 40 milliGRAM(s) Oral before breakfast  polyethylene glycol 3350 17 Gram(s) Oral two times a day  potassium chloride    Tablet ER 20 milliEquivalent(s) Oral two times a day  predniSONE   Tablet 10 milliGRAM(s) Oral daily  pregabalin 150 milliGRAM(s) Oral two times a day  rosuvastatin 40 milliGRAM(s) Oral at bedtime  senna 2 Tablet(s) Oral at bedtime  sertraline 100 milliGRAM(s) Oral daily  tiotropium 2.5 MICROgram(s) Inhaler 2 Puff(s) Inhalation daily  trimethoprim  160 mG/sulfamethoxazole 800 mG 1 Tablet(s) Oral every 12 hours     REVIEW OF SYSTEMS:  CONSTITUTIONAL:  No Fever or chills  HEENT:  No diplopia or blurred vision.  No sore throat or runny nose.  CARDIOVASCULAR:  No chest pain or palpitations   RESPIRATORY:  no cough, shortness of breath better  GASTROINTESTINAL:  No nausea, vomiting or diarrhea.  GENITOURINARY:  No dysuria, frequency or urgency. No Blood in urine  MUSCULOSKELETAL:  +R elbow swelling and redness   SKIN: +wound on lateral R ankle     Physical Exam:  Vital Signs Last 24 Hrs  T(C): 37.2 (27 May 2025 10:43), Max: 37.2 (27 May 2025 10:43)  T(F): 98.9 (27 May 2025 10:43), Max: 98.9 (27 May 2025 10:43)  HR: 93 (27 May 2025 10:43) (90 - 99)  BP: 108/62 (27 May 2025 10:43) (108/62 - 116/77)  BP(mean): --  RR: 17 (27 May 2025 10:43) (17 - 18)  SpO2: 96% (27 May 2025 10:43) (89% - 96%)  Parameters below as of 27 May 2025 10:43  Patient On (Oxygen Delivery Method): room air  GEN: NAD  HEENT: normocephalic and atraumatic. EOMI. PERRL.    NECK: Supple.  No lymphadenopathy   LUNGS: +crackles L lung   HEART: Regular rate and rhythm without murmur.  ABDOMEN: +distended. Soft, nontender. Positive bowel sounds.    EXTREMITIES: +R elbow bursitis and erythema with non open scab, +b/l LE pitting edema R>L  NEUROLOGIC: grossly intact.  PSYCHIATRIC: Appropriate affect .  SKIN: +R elbow closed wound, +RLE open ankle wound with mild, non pustular drainage and surrounding erythema     Labs:                        12.9   9.70  )-----------( 162      ( 27 May 2025 07:50 )             40.5     05-27    137  |  100  |  39[H]  ----------------------------<  243[H]  4.1   |  26  |  1.60[H]    Ca    9.2      27 May 2025 07:50  Phos  3.5     05-26    TPro  7.2  /  Alb  2.9[L]  /  TBili  0.5  /  DBili  x   /  AST  13[L]  /  ALT  28  /  AlkPhos  78  05-26    Urinalysis with Rflx Culture (collected 05-22-25 @ 03:50)    Culture - Blood (collected 05-22-25 @ 01:15)  Source: Blood Blood-Peripheral  Final Report (05-27-25 @ 06:00):    No growth at 5 days    Culture - Blood (collected 05-22-25 @ 01:15)  Source: Blood Blood-Peripheral  Final Report (05-27-25 @ 06:00):    No growth at 5 days    Urinalysis with Rflx Culture (collected 12-28-24 @ 13:12)    Culture - Blood (collected 12-28-24 @ 10:33)  Source: .Blood BLOOD  Final Report (01-02-25 @ 18:00):    No growth at 5 days    Culture - Blood (collected 12-28-24 @ 10:31)  Source: .Blood BLOOD  Final Report (01-02-25 @ 18:00):    No growth at 5 days    Culture - Tissue with Gram Stain (collected 10-03-24 @ 10:45)  Source: Tissue  Gram Stain (10-04-24 @ 17:51):    No polymorphonuclear cells seen per low power field    No organisms seen per oil power field  Final Report (10-08-24 @ 21:44):    Rare Methicillin Resistant Staphylococcus aureus  Organism: Methicillin resistant Staphylococcus aureus (10-08-24 @ 21:44)  Organism: Methicillin resistant Staphylococcus aureus (10-08-24 @ 21:44)    Sensitivities:      -  Clindamycin: R >4      -  Oxacillin: R >2      -  Gentamicin: S <=1 Should not be used as monotherapy      -  Daptomycin: S <=0.25      -  Linezolid: S 1      -  Vancomycin: S 1      -  Tetracycline: S <=1      Method Type: MANOJ      -  Penicillin: R 8      -  Rifampin: S <=1 Should not be used as monotherapy      -  Erythromycin: R >4      -  Trimethoprim/Sulfamethoxazole: S <=0.5/9.5    Culture - Blood (collected 10-01-24 @ 13:05)  Source: .Blood BLOOD  Final Report (10-06-24 @ 19:00):    No growth at 5 days    Culture - Blood (collected 10-01-24 @ 13:05)  Source: .Blood BLOOD  Final Report (10-06-24 @ 19:00):    No growth at 5 days    Culture - Wound Aerobic (collected 10-01-24 @ 11:00)  Source: Skin/Wound  Final Report (10-04-24 @ 22:25):    Few Methicillin Resistant Staphylococcus aureus    Commensal jameel consistent with body site  Organism: Methicillin resistant Staphylococcus aureus (10-04-24 @ 22:25)  Organism: Methicillin resistant Staphylococcus aureus (10-04-24 @ 22:25)    Sensitivities:      -  Clindamycin: R >4      -  Oxacillin: R >2      -  Gentamicin: S <=1 Should not be used as monotherapy      -  Daptomycin: S 0.5      -  Linezolid: S 2      -  Vancomycin: S 1      -  Tetracycline: S <=1      Method Type: MANOJ      -  Penicillin: R >8      -  Rifampin: S <=1 Should not be used as monotherapy      -  Erythromycin: R >4      -  Trimethoprim/Sulfamethoxazole: S <=0.5/9.5    WBC Count: 9.70 K/uL (05-27-25 @ 07:50)  WBC Count: 9.32 K/uL (05-26-25 @ 08:12)  WBC Count: 10.81 K/uL (05-25-25 @ 07:10)  WBC Count: 10.51 K/uL (05-24-25 @ 07:10)  WBC Count: 9.04 K/uL (05-23-25 @ 07:30)    Creatinine: 1.60 mg/dL (05-27-25 @ 07:50)  Creatinine: 1.50 mg/dL (05-26-25 @ 08:12)  Creatinine: 1.40 mg/dL (05-25-25 @ 07:10)  Creatinine: 1.50 mg/dL (05-24-25 @ 07:10)    C-Reactive Protein: 48 mg/L (05-22-25 @ 14:25)    Ferritin: 215 ng/mL (05-22-25 @ 08:05)    Sedimentation Rate, Erythrocyte: 65 mm/hr (05-22-25 @ 14:25)     SARS-CoV-2: NotDetec (05-22-25 @ 00:15)    All imaging and other data have been reviewed.  < from: CT Chest No Cont (05.21.25 @ 22:41) >  IMPRESSION:  Mild tree-in-bud nodularity in the left lower lobe, which may be   infectious/inflammatory.    Assessment and Plan:   Patient is a 74yoM PMH DM2, PAD, hypertension, COPD, CKD,  HFpEF, Hx of R foot ulcer with osteomyelitis admitted for CHF vs PNA. CT scan not convincing for PNA, however given age and comorbidities will continue abx to cover for PNA. Patient also with hx of MRSA wound cultures. Currently with open wound and erythema on RLE ankle.    Cough and SOB improved, no new complaint, no fever.   R elbow swelling and bursitis seen by ortho, no intervention at this time. RLE wound stable with MRSA in the past, so in contact isolation.    # Pneumonia  # R elbow swelling   # RLE Wound with MRSA in the past     - Contact isolation   - Blood cultures NGTD   - Negative urine legionella and strep   - Completed 5days of Ceftriaxone 1gm daily   - Was on daptomycin 450mg daily for leg wound  - Switched to oral bactrim DS q12 yesterday, last day 5/28  - Continue local wound care and wrapping for R elbow, avoid trauma  - Continue wound care for the leg wound     Will sign off, Please call with any question.     Frieda Denton MD  Division of Infectious Diseases   Please call ID service at [phone] with any question.    50 minutes spent on total encounter assessing patient, examination, chart review, counseling and coordinating care by the attending physician/nurse/care manager.

## 2025-05-27 NOTE — DISCHARGE NOTE NURSING/CASE MANAGEMENT/SOCIAL WORK - FINANCIAL ASSISTANCE
[facility] provides services at a reduced cost to those who are determined to be eligible through [facility]’s financial assistance program. Information regarding [facility]’s financial assistance program can be found by going to [email or web address] or by calling [phone].

## 2025-05-27 NOTE — PROGRESS NOTE ADULT - REASON FOR ADMISSION
CHF exacerbation, PNA

## 2025-05-27 NOTE — CASE MANAGEMENT PROGRESS NOTE - NSCMPROGRESSNOTE_GEN_ALL_CORE
FINAL DISCHARGE NOTE  DC back to [facility]; with home care (declined by [facility]; [facility]) re-sent referral to [facility]; [facility] pending acceptance; Timothy Owen arranged at 4p; discussed IMM and appeal rights; patient sttaed he does NOT wish to appeal his DC;

## 2025-05-27 NOTE — PROGRESS NOTE ADULT - SUBJECTIVE AND OBJECTIVE BOX
CHIEF COMPLAINT/ REASON FOR VISIT  .. Patient was seen to address the  issue listed under PROBLEM LIST which is located toward bottom of this note     WAYNE SERRANO    PLV [id number] [id number] D1    Allergies    IODINE (Unknown)  tetracycline (Unknown)  vancomycin (Other)    Intolerances        PAST MEDICAL & SURGICAL HISTORY:  Prostate cancer      Type II diabetes mellitus      Chronic obstructive pulmonary disease (COPD)      CHF (congestive heart failure)      Renal insufficiency      H/O migraine      Insomnia      Constipation      S/P foot surgery          FAMILY HISTORY:      Home Medications:  albuterol 0.63 mg/3 mL (0.021%) inhalation solution: 3 milliliter(s) by nebulizer 3 times a day as needed for (22 May 2025 01:14)  ferrous sulfate 325 mg (65 mg elemental iron) oral tablet: 1 tab(s) orally once a day (22 May 2025 01:14)  HumaLOG 100 units/mL injectable solution: 15 unit(s) injectable 3 times a day (with meals) ***sliding scale*** (27 May 2025 08:39)  insulin glargine 100 units/mL subcutaneous solution: 36 unit(s) subcutaneous once a day (at bedtime) (27 May 2025 08:39)  Jardiance 10 mg oral tablet: 1 tab(s) orally once a day (27 May 2025 08:51)  loratadine 10 mg oral tablet: 1 tab(s) orally once a day (in the morning) (22 May 2025 01:14)  melatonin 3 mg oral tablet: 1 tab(s) orally once a day (at bedtime) (27 May 2025 08:39)  montelukast 10 mg oral tablet: 1 tab(s) orally once a day (22 May 2025 01:14)  Multiple Vitamins with Minerals oral tablet: 1 tab(s) orally once a day (22 May 2025 01:14)  oxyCODONE 5 mg oral tablet: 2 tab(s) orally 2 times a day as needed for Severe Pain (7 - 10) (27 May 2025 08:39)  pantoprazole 40 mg oral delayed release tablet: 1 tab(s) orally once a day (before a meal) (22 May 2025 01:14)  polyethylene glycol 3350 oral powder for reconstitution: 17 gram(s) orally once a day as needed for  constipation (27 May 2025 08:39)  predniSONE 10 mg oral tablet: 1 tab(s) orally once a day (27 May 2025 08:39)  pregabalin 150 mg oral capsule: 1 cap(s) orally 2 times a day (22 May 2025 01:14)  rosuvastatin 40 mg oral tablet: 1 tab(s) orally once a day (22 May 2025 01:14)  saccharomyces boulardii lyo 250 mg oral capsule: 1 cap(s) orally once a day (27 May 2025 08:39)  senna leaf extract oral tablet: 1 tab(s) orally once a day (at bedtime) (27 May 2025 08:39)  sertraline 100 mg oral tablet: 1 tab(s) orally once a day (22 May 2025 01:14)  Spiriva 18 mcg inhalation capsule: 1 cap(s) inhaled once a day (22 May 2025 01:14)      MEDICATIONS  (STANDING):  albuterol    0.083% 2.5 milliGRAM(s) Nebulizer three times a day  aspirin enteric coated 81 milliGRAM(s) Oral daily  cetirizine 10 milliGRAM(s) Oral daily  chlorhexidine 2% Cloths 1 Application(s) Topical <User Schedule>  dextrose 5%. 1000 milliLiter(s) (50 mL/Hr) IV Continuous <Continuous>  dextrose 5%. 1000 milliLiter(s) (100 mL/Hr) IV Continuous <Continuous>  dextrose 50% Injectable 25 Gram(s) IV Push once  dextrose 50% Injectable 12.5 Gram(s) IV Push once  dextrose 50% Injectable 25 Gram(s) IV Push once  ferrous    sulfate 325 milliGRAM(s) Oral daily  fluticasone propionate/ salmeterol 250-50 MICROgram(s) Diskus 1 Dose(s) Inhalation two times a day  furosemide    Tablet 40 milliGRAM(s) Oral every 12 hours  glucagon  Injectable 1 milliGRAM(s) IntraMuscular once  heparin   Injectable 5000 Unit(s) SubCutaneous every 8 hours  insulin glargine Injectable (LANTUS) 30 Unit(s) SubCutaneous at bedtime  insulin lispro (ADMELOG) corrective regimen sliding scale   SubCutaneous three times a day before meals  insulin lispro (ADMELOG) corrective regimen sliding scale   SubCutaneous at bedtime  insulin lispro Injectable (ADMELOG) 10 Unit(s) SubCutaneous three times a day before meals  lactobacillus acidophilus 1 Tablet(s) Oral every 12 hours  montelukast 10 milliGRAM(s) Oral daily  mupirocin 2% Nasal 1 Application(s) Both Nostrils two times a day  pantoprazole    Tablet 40 milliGRAM(s) Oral before breakfast  polyethylene glycol 3350 17 Gram(s) Oral two times a day  potassium chloride    Tablet ER 20 milliEquivalent(s) Oral two times a day  predniSONE   Tablet 10 milliGRAM(s) Oral daily  pregabalin 150 milliGRAM(s) Oral two times a day  rosuvastatin 40 milliGRAM(s) Oral at bedtime  senna 2 Tablet(s) Oral at bedtime  sertraline 100 milliGRAM(s) Oral daily  tiotropium 2.5 MICROgram(s) Inhaler 2 Puff(s) Inhalation daily  trimethoprim  160 mG/sulfamethoxazole 800 mG 1 Tablet(s) Oral every 12 hours    MEDICATIONS  (PRN):  acetaminophen     Tablet .. 650 milliGRAM(s) Oral every 6 hours PRN Temp greater or equal to 38C (100.4F), Mild Pain (1 - 3)  albuterol/ipratropium for Nebulization 3 milliLiter(s) Nebulizer every 6 hours PRN Shortness of Breath and/or Wheezing  aluminum hydroxide/magnesium hydroxide/simethicone Suspension 30 milliLiter(s) Oral every 4 hours PRN Dyspepsia  benzonatate 100 milliGRAM(s) Oral three times a day PRN Cough  bisacodyl Suppository 10 milliGRAM(s) Rectal once PRN Constipation  clonazePAM  Tablet 0.75 milliGRAM(s) Oral two times a day PRN anxiety  dextrose Oral Gel 15 Gram(s) Oral once PRN Blood Glucose LESS THAN 70 milliGRAM(s)/deciliter  melatonin 3 milliGRAM(s) Oral at bedtime PRN Insomnia  ondansetron Injectable 4 milliGRAM(s) IV Push every 8 hours PRN Nausea and/or Vomiting  oxyCODONE    IR 5 milliGRAM(s) Oral every 6 hours PRN Severe Pain (7 - 10)  oxyCODONE    IR 2.5 milliGRAM(s) Oral every 4 hours PRN Moderate Pain (4 - 6)      Diet, DASH/TLC:   Sodium & Cholesterol Restricted  Consistent Carbohydrate No Snacks  1200mL Fluid Restriction ([id number])  Supplement Feeding Modality:  Oral  Ensure Max Cans or Servings Per Day:  1       Frequency:  Daily (05-24-25 @ 10:50) [Active]          Vital Signs Last 24 Hrs  T(C): 36.5 (27 May 2025 05:17), Max: 37.1 (26 May 2025 11:26)  T(F): 97.7 (27 May 2025 05:17), Max: 98.7 (26 May 2025 11:26)  HR: 99 (27 May 2025 05:17) (90 - 99)  BP: 116/77 (27 May 2025 05:17) (99/65 - 116/77)  BP(mean): --  RR: 18 (27 May 2025 05:17) (17 - 18)  SpO2: 89% (27 May 2025 05:17) (89% - 96%)    Parameters below as of 27 May 2025 05:17  Patient On (Oxygen Delivery Method): room air          05-26-25 @ 07:01  -  05-27-25 @ 07:00  --------------------------------------------------------  IN: 0 mL / OUT: 1200 mL / NET: -1200 mL              LABS:                        12.6   9.32  )-----------( 158      ( 26 May 2025 08:12 )             40.0     05-26    137  |  100  |  37[H]  ----------------------------<  248[H]  4.0   |  28  |  1.50[H]    Ca    9.1      26 May 2025 08:12  Phos  3.5     05-26    TPro  7.2  /  Alb  2.9[L]  /  TBili  0.5  /  DBili  x   /  AST  13[L]  /  ALT  28  /  AlkPhos  78  05-26      Urinalysis Basic - ( 26 May 2025 08:12 )    Color: x / Appearance: x / SG: x / pH: x  Gluc: 248 mg/dL / Ketone: x  / Bili: x / Urobili: x   Blood: x / Protein: x / Nitrite: x   Leuk Esterase: x / RBC: x / WBC x   Sq Epi: x / Non Sq Epi: x / Bacteria: x            WBC:  WBC Count: 9.32 K/uL (05-26 @ 08:12)  WBC Count: 10.81 K/uL (05-25 @ 07:10)  WBC Count: 10.51 K/uL (05-24 @ 07:10)      MICROBIOLOGY:  RECENT CULTURES:  05-22 Blood Blood-Peripheral XXXX XXXX   No growth at 5 days                    Sodium:  Sodium: 137 mmol/L (05-26 @ 08:12)  Sodium: 138 mmol/L (05-25 @ 07:10)  Sodium: 137 mmol/L (05-24 @ 07:10)      1.50 mg/dL 05-26 @ 08:12  1.40 mg/dL 05-25 @ 07:10  1.50 mg/dL 05-24 @ 07:10      Hemoglobin:  Hemoglobin: 12.6 g/dL (05-26 @ 08:12)  Hemoglobin: 12.6 g/dL (05-25 @ 07:10)  Hemoglobin: 12.6 g/dL (05-24 @ 07:10)      Platelets: Platelet Count - Automated: 158 K/uL (05-26 @ 08:12)  Platelet Count - Automated: 165 K/uL (05-25 @ 07:10)  Platelet Count - Automated: 175 K/uL (05-24 @ 07:10)      LIVER FUNCTIONS - ( 26 May 2025 08:12 )  Alb: 2.9 g/dL / Pro: 7.2 g/dL / ALK PHOS: 78 U/L / ALT: 28 U/L / AST: 13 U/L / GGT: x             Urinalysis Basic - ( 26 May 2025 08:12 )    Color: x / Appearance: x / SG: x / pH: x  Gluc: 248 mg/dL / Ketone: x  / Bili: x / Urobili: x   Blood: x / Protein: x / Nitrite: x   Leuk Esterase: x / RBC: x / WBC x   Sq Epi: x / Non Sq Epi: x / Bacteria: x        RADIOLOGY & ADDITIONAL STUDIES:      MICROBIOLOGY:  RECENT CULTURES:  05-22 Blood Blood-Peripheral XXXX XXXX   No growth at 5 days

## 2025-05-27 NOTE — DISCHARGE NOTE PROVIDER - NSDCCAREPROVSEEN_GEN_ALL_CORE_FT
Maci, María Joseph, Jason Dueñas, Antionette Cruz, Megan Baltazar, Chencho Johnson, Betina Denton, Frieda Armstrong, Ahmet Ordonez, Wendie Quiroga, Ortega Chavez, Chace English, Federico Rocha, Neeral Perlman, Chinedu Diaz, Ariela Tripp, Mart Luke, Marquita Mccartney, Birendra K Weil, Kellie

## 2025-05-27 NOTE — PROGRESS NOTE ADULT - NS ATTEND AMEND GEN_ALL_CORE FT
74yoM PMH HTN, HLD, DM2, PAD, COPD, CKD,  HFpEF, hx of R foot ulcer with osteomyelitis    - No clear evidence of acute ischemia, trops negative x 2.  - Monitor closely for the development of anginal symptoms or clinical signs of ischemia.   - No acute changes on EKG compared to previous.  - continue home statin    - Previous TTE shows EF 61% with mod grade 2 diastolic dysfunction and trace MR/TR.  - Per patient home lasix recently increased to 20mg TID, however, only takes BID  - c/w IV lasix 40mg BID  - continue home BB  - Strict I/Os, daily weights.    - BP well controlled, monitor routine hemodynamics.    - infectious work-up per primary team
I have personally seen and examined the patient in detail.  I have spoken to the provider regarding the assessment and plan of care.  I have made changes to the note accordingly.
74yoM PMH HTN, HLD, DM2, PAD, COPD, CKD,  HFpEF, hx of R foot ulcer with osteomyelitis    - Appears compensated from HF POV.   - po lasix  - Please continue to maintain strict I/Os, monitor daily weights, Cr, and K.   - Previous TTE shows EF 61% with mod grade 2 diastolic dysfunction and trace MR/TR.    - No clear evidence of acute ischemia, trops negative x 2.  - No acute changes on EKG compared to previous.  - Continue home statin fro Hx of HLD.  Will benefit from antiplatelet for Hx of PAD  - BP labile with readings at 90's  - Hold home BB

## 2025-05-27 NOTE — DISCHARGE NOTE NURSING/CASE MANAGEMENT/SOCIAL WORK - PATIENT PORTAL LINK FT
You can access the FollowMyHealth Patient Portal offered by [facility] by registering at the following website: [email or web address]. By joining [facility]’s FollowMyHealth portal, you will also be able to view your health information using other applications (apps) compatible with our system.

## 2025-05-27 NOTE — DISCHARGE NOTE PROVIDER - CARE PROVIDERS DIRECT ADDRESSES
,DirectAddress_Unknown,DirectAddress_Unknown,[email or web address]

## 2025-05-27 NOTE — PROGRESS NOTE ADULT - SUBJECTIVE AND OBJECTIVE BOX
[facility] -- Erick Hogue, Aj Odonnell Savella, , Nancy Quevedo  Office # [phone]    Follow Up:      Subjective/Observations:     REVIEW OF SYSTEMS: All other review of systems is negative unless indicated above  PAST MEDICAL & SURGICAL HISTORY:  Prostate cancer      Type II diabetes mellitus      Chronic obstructive pulmonary disease (COPD)      CHF (congestive heart failure)      Renal insufficiency      H/O migraine      Insomnia      Constipation      S/P foot surgery        MEDICATIONS  (STANDING):  albuterol    0.083% 2.5 milliGRAM(s) Nebulizer three times a day  cetirizine 10 milliGRAM(s) Oral daily  chlorhexidine 2% Cloths 1 Application(s) Topical <User Schedule>  dextrose 5%. 1000 milliLiter(s) (50 mL/Hr) IV Continuous <Continuous>  dextrose 5%. 1000 milliLiter(s) (100 mL/Hr) IV Continuous <Continuous>  dextrose 50% Injectable 25 Gram(s) IV Push once  dextrose 50% Injectable 12.5 Gram(s) IV Push once  dextrose 50% Injectable 25 Gram(s) IV Push once  ferrous    sulfate 325 milliGRAM(s) Oral daily  fluticasone propionate/ salmeterol 250-50 MICROgram(s) Diskus 1 Dose(s) Inhalation two times a day  furosemide    Tablet 40 milliGRAM(s) Oral every 12 hours  glucagon  Injectable 1 milliGRAM(s) IntraMuscular once  heparin   Injectable 5000 Unit(s) SubCutaneous every 8 hours  insulin glargine Injectable (LANTUS) 30 Unit(s) SubCutaneous at bedtime  insulin lispro (ADMELOG) corrective regimen sliding scale   SubCutaneous three times a day before meals  insulin lispro (ADMELOG) corrective regimen sliding scale   SubCutaneous at bedtime  insulin lispro Injectable (ADMELOG) 10 Unit(s) SubCutaneous three times a day before meals  lactobacillus acidophilus 1 Tablet(s) Oral every 12 hours  montelukast 10 milliGRAM(s) Oral daily  mupirocin 2% Nasal 1 Application(s) Both Nostrils two times a day  pantoprazole    Tablet 40 milliGRAM(s) Oral before breakfast  polyethylene glycol 3350 17 Gram(s) Oral two times a day  potassium chloride    Tablet ER 20 milliEquivalent(s) Oral two times a day  predniSONE   Tablet 10 milliGRAM(s) Oral daily  pregabalin 150 milliGRAM(s) Oral two times a day  rosuvastatin 40 milliGRAM(s) Oral at bedtime  senna 2 Tablet(s) Oral at bedtime  sertraline 100 milliGRAM(s) Oral daily  tiotropium 2.5 MICROgram(s) Inhaler 2 Puff(s) Inhalation daily  trimethoprim  160 mG/sulfamethoxazole 800 mG 1 Tablet(s) Oral every 12 hours    MEDICATIONS  (PRN):  acetaminophen     Tablet .. 650 milliGRAM(s) Oral every 6 hours PRN Temp greater or equal to 38C (100.4F), Mild Pain (1 - 3)  albuterol/ipratropium for Nebulization 3 milliLiter(s) Nebulizer every 6 hours PRN Shortness of Breath and/or Wheezing  aluminum hydroxide/magnesium hydroxide/simethicone Suspension 30 milliLiter(s) Oral every 4 hours PRN Dyspepsia  benzonatate 100 milliGRAM(s) Oral three times a day PRN Cough  bisacodyl Suppository 10 milliGRAM(s) Rectal once PRN Constipation  clonazePAM  Tablet 0.75 milliGRAM(s) Oral two times a day PRN anxiety  dextrose Oral Gel 15 Gram(s) Oral once PRN Blood Glucose LESS THAN 70 milliGRAM(s)/deciliter  melatonin 3 milliGRAM(s) Oral at bedtime PRN Insomnia  ondansetron Injectable 4 milliGRAM(s) IV Push every 8 hours PRN Nausea and/or Vomiting  oxyCODONE    IR 5 milliGRAM(s) Oral every 6 hours PRN Severe Pain (7 - 10)  oxyCODONE    IR 2.5 milliGRAM(s) Oral every 4 hours PRN Moderate Pain (4 - 6)    Allergies    IODINE (Unknown)  tetracycline (Unknown)  vancomycin (Other)    Intolerances      Vital Signs Last 24 Hrs  T(C): 36.5 (27 May 2025 05:17), Max: 37.1 (26 May 2025 11:26)  T(F): 97.7 (27 May 2025 05:17), Max: 98.7 (26 May 2025 11:26)  HR: 99 (27 May 2025 05:17) (90 - 99)  BP: 116/77 (27 May 2025 05:17) (99/65 - 116/77)  BP(mean): --  RR: 18 (27 May 2025 05:17) (17 - 18)  SpO2: 89% (27 May 2025 05:17) (89% - 96%)    Parameters below as of 27 May 2025 05:17  Patient On (Oxygen Delivery Method): room air      I&O's Summary    26 May 2025 07:01  -  27 May 2025 07:00  --------------------------------------------------------  IN: 0 mL / OUT: 1200 mL / NET: -1200 mL          PHYSICAL EXAM:  TELE:   Constitutional: NAD, awake and alert, well-developed  HEENT: Moist Mucous Membranes, Anicteric  Pulmonary: Non-labored, breath sounds are clear bilaterally, No wheezing, rales or rhonchi  Cardiovascular: Regular, S1 and S2, No murmurs, rubs, gallops or clicks  Gastrointestinal: Bowel Sounds present, soft, nontender.   Neurological: AAOx3, no focal neuro deficits  Lymph: No peripheral edema. No lymphadenopathy.  Skin: No visible rashes or ulcers.  Psych:  Mood & affect appropriate    LABS: All Labs Reviewed:                        12.6   9.32  )-----------( 158      ( 26 May 2025 08:12 )             40.0                         12.6   10.81 )-----------( 165      ( 25 May 2025 07:10 )             39.0     26 May 2025 08:12    137    |  100    |  37     ----------------------------<  248    4.0     |  28     |  1.50   25 May 2025 07:10    138    |  100    |  40     ----------------------------<  179    3.7     |  28     |  1.40     Ca    9.1        26 May 2025 08:12  Ca    8.9        25 May 2025 07:10  Phos  3.5       26 May 2025 08:12  Phos  3.3       25 May 2025 07:10  Mg     2.1       25 May 2025 07:10    TPro  7.2    /  Alb  2.9    /  TBili  0.5    /  DBili  x      /  AST  13     /  ALT  28     /  AlkPhos  78     26 May 2025 08:12  TPro  7.0    /  Alb  2.8    /  TBili  0.4    /  DBili  x      /  AST  18     /  ALT  30     /  AlkPhos  77     25 May 2025 07:10          12 Lead ECG:   Ventricular Rate 75 BPM    Atrial Rate 75 BPM    P-R Interval 210 ms    QRS Duration 94 ms    Q-T Interval 398 ms    QTC Calculation(Bazett) 444 ms    P Axis -1 degrees    R Axis -66 degrees    T Axis 39 degrees    Diagnosis Line Sinus rhythm with 1st degree AV block  Left axis deviation  Low voltage QRS  Septal infarct (cited on or before 26-AUG-2023)    Confirmed by NICO ODONNELL ([id number]) on 5/22/2025 5:14:20 PM (05-21-25 @ 23:33)      TRANSTHORACIC ECHOCARDIOGRAM REPORT  ________________________________________________________________________________                                      _______       Pt. Name:       WAYNE SERRANO Study Date:    10/9/2024  MRN:            [id number]     YOB: 1950  Accession #:    [id number]     Age:           74 years  Account#:       [id number]    Gender:        M  Heart Rate:                   Height:        70.08 in (178.00 cm)  Rhythm:                       Weight:        220.46 lb (100.00 kg)  Blood Pressure: 122/51 mmHg   BSA/BMI:       2.18 m² / 31.56 kg/m²  ________________________________________________________________________________________  Referring Physician:    [id number] Ariela Diaz  Interpreting Physician: Jason Joseph MD  Primary Sonographer:    Jenny Trent [facility]    CPT:               ECHO TTE WO CON COMP W DOPP - 20447.m  Indication(s):     Chronic ischemic heart disease, unspecified - I25.9  Procedure:         Transthoracic echocardiogram with 2-D, M-mode and complete                     spectral and color flow Doppler.  Ordering Location: [facility]  Admission Status:  Inpatient  Study Information: Image quality for this study is technically difficult.    _______________________________________________________________________________________     CONCLUSIONS:      1. Technically difficult image quality.   2. Left ventricular systolic function is normal with an ejection fraction of 61 % by Valencia's method of disks.   3. There is moderate (grade 2) left ventricular diastolic dysfunction.   4. Trace mitral regurgitation.   5. Trace tricuspid regurgitation.    ________________________________________________________________________________________  FINDINGS:     Left Ventricle:  Left ventricular systolic function is normal with a calculated ejection fraction of 61 % by the Valencia's biplane method of disks. Mild left ventricular hypertrophy. There is moderate (grade 2) left ventricular diastolic dysfunction.     Right Ventricle:  The right ventricle is not well visualized.     Left Atrium:  The left atrium is normal in size.     Right Atrium:  The right atrium is normal in size.     Aortic Valve:  The aortic valve is tricuspid with normal leaflet excursion. There is no aortic valve stenosis. There is no evidence of aortic regurgitation.     Mitral Valve:  Structurally normal mitral valve with normal leaflet excursion. There is no mitral valve stenosis. There is trace mitral regurgitation.     Tricuspid Valve:  Structurally normaltricuspid valve with normal leaflet excursion. There is trace tricuspid regurgitation. Estimated pulmonary artery systolic pressure is 26 mmHg.     Pulmonic Valve:  The pulmonic valve was not well visualized.     Aorta:  The aortic root at the sinuses of Valsalva is normal in size.     Pericardium:  No pericardial effusion seen.     Systemic Veins:  The inferior vena cava is dilated measuring 2.79 cm in diameter, (dilated >2.1cm) with abnormal inspiratory collapse (abnormal <50%) consistent with elevated right atrial pressure (~15, range 10-20mmHg).  ____________________________________________________________________  QUANTITATIVE DATA:  Left Ventricle Measurements: (Indexed to BSA)     IVSd (2D):   1.3 cm  LVPWd (2D):  1.2 cm  LVIDd (2D):  5.0 cm  LVIDs (2D):  3.3 cm  LV Mass:     243 g  111.5 g/m²  LV Vol d, MOD A2C: 78.9 ml 36.24 ml/m²  LV Vol d, MOD A4C: 94.4 ml 43.37 ml/m²  LV Vol d, MOD BP:  86.7 ml 39.80 ml/m²  LV Vol s, MOD A2C: 31.6 ml 14.51 ml/m²  LV Vol s, MOD A4C: 34.8 ml 16.01 ml/m²  LV Vol s, MOD BP:  34.2 ml 15.70 ml/m²  LVOT SV MOD BP:    52.5 ml  LV EF% MOD BP:     61 %     MV E Vmax:    0.83 m/s  MV A Vmax:    1.09 m/s  MV E/A:       0.76  e' lateral:   9.00 cm/s  e' medial:    5.20 cm/s  E/e' lateral: 9.24  E/e' medial:  15.99  E/e' Average: 11.71  MV DT:        351 msec    Aorta Measurements: (Normal range) (Indexed to BSA)     Ao Root d 3.48 cm (3.1 - 3.7 cm) 1.60 cm/m²            Left Atrium Measurements: (Indexed to BSA)  LA Diam 2D: 3.81 cm         Mitral Valve Measurements:     MV E Vmax: 0.8 m/s         MR Peak Gradient: 10.2 mmHg  MV A Vmax: 1.1 m/s  MV E/A:    0.8       Tricuspid Valve Measurements:     TR Vmax:          1.6 m/s  TR Peak Gradient: 10.8 mmHg  RA Pressure:      15 mmHg  PASP:    26 mmHg    ________________________________________________________________________________________  Electronically signed on 10/10/2024 at 9:09:53 AM by Jason Joseph MD         *** Final ***      [facility] -- Erick Hogue, Aj Odonnell Savella, , Nancy Quevedo  Office # [phone]    Follow Up:  HFpEF    Subjective/Observations: Pt seen and assessed at bedside. Endorses feeling well. Denies chest pain, palpitations, SOB/BERGER, leg pain/edema or any other complaints. No acute events.    REVIEW OF SYSTEMS: All other review of systems is negative unless indicated above  PAST MEDICAL & SURGICAL HISTORY:  Prostate cancer      Type II diabetes mellitus      Chronic obstructive pulmonary disease (COPD)      CHF (congestive heart failure)      Renal insufficiency      H/O migraine      Insomnia      Constipation      S/P foot surgery        MEDICATIONS  (STANDING):  albuterol    0.083% 2.5 milliGRAM(s) Nebulizer three times a day  cetirizine 10 milliGRAM(s) Oral daily  chlorhexidine 2% Cloths 1 Application(s) Topical <User Schedule>  dextrose 5%. 1000 milliLiter(s) (50 mL/Hr) IV Continuous <Continuous>  dextrose 5%. 1000 milliLiter(s) (100 mL/Hr) IV Continuous <Continuous>  dextrose 50% Injectable 25 Gram(s) IV Push once  dextrose 50% Injectable 12.5 Gram(s) IV Push once  dextrose 50% Injectable 25 Gram(s) IV Push once  ferrous    sulfate 325 milliGRAM(s) Oral daily  fluticasone propionate/ salmeterol 250-50 MICROgram(s) Diskus 1 Dose(s) Inhalation two times a day  furosemide    Tablet 40 milliGRAM(s) Oral every 12 hours  glucagon  Injectable 1 milliGRAM(s) IntraMuscular once  heparin   Injectable 5000 Unit(s) SubCutaneous every 8 hours  insulin glargine Injectable (LANTUS) 30 Unit(s) SubCutaneous at bedtime  insulin lispro (ADMELOG) corrective regimen sliding scale   SubCutaneous three times a day before meals  insulin lispro (ADMELOG) corrective regimen sliding scale   SubCutaneous at bedtime  insulin lispro Injectable (ADMELOG) 10 Unit(s) SubCutaneous three times a day before meals  lactobacillus acidophilus 1 Tablet(s) Oral every 12 hours  montelukast 10 milliGRAM(s) Oral daily  mupirocin 2% Nasal 1 Application(s) Both Nostrils two times a day  pantoprazole    Tablet 40 milliGRAM(s) Oral before breakfast  polyethylene glycol 3350 17 Gram(s) Oral two times a day  potassium chloride    Tablet ER 20 milliEquivalent(s) Oral two times a day  predniSONE   Tablet 10 milliGRAM(s) Oral daily  pregabalin 150 milliGRAM(s) Oral two times a day  rosuvastatin 40 milliGRAM(s) Oral at bedtime  senna 2 Tablet(s) Oral at bedtime  sertraline 100 milliGRAM(s) Oral daily  tiotropium 2.5 MICROgram(s) Inhaler 2 Puff(s) Inhalation daily  trimethoprim  160 mG/sulfamethoxazole 800 mG 1 Tablet(s) Oral every 12 hours    MEDICATIONS  (PRN):  acetaminophen     Tablet .. 650 milliGRAM(s) Oral every 6 hours PRN Temp greater or equal to 38C (100.4F), Mild Pain (1 - 3)  albuterol/ipratropium for Nebulization 3 milliLiter(s) Nebulizer every 6 hours PRN Shortness of Breath and/or Wheezing  aluminum hydroxide/magnesium hydroxide/simethicone Suspension 30 milliLiter(s) Oral every 4 hours PRN Dyspepsia  benzonatate 100 milliGRAM(s) Oral three times a day PRN Cough  bisacodyl Suppository 10 milliGRAM(s) Rectal once PRN Constipation  clonazePAM  Tablet 0.75 milliGRAM(s) Oral two times a day PRN anxiety  dextrose Oral Gel 15 Gram(s) Oral once PRN Blood Glucose LESS THAN 70 milliGRAM(s)/deciliter  melatonin 3 milliGRAM(s) Oral at bedtime PRN Insomnia  ondansetron Injectable 4 milliGRAM(s) IV Push every 8 hours PRN Nausea and/or Vomiting  oxyCODONE    IR 5 milliGRAM(s) Oral every 6 hours PRN Severe Pain (7 - 10)  oxyCODONE    IR 2.5 milliGRAM(s) Oral every 4 hours PRN Moderate Pain (4 - 6)    Allergies    IODINE (Unknown)  tetracycline (Unknown)  vancomycin (Other)    Intolerances      Vital Signs Last 24 Hrs  T(C): 36.5 (27 May 2025 05:17), Max: 37.1 (26 May 2025 11:26)  T(F): 97.7 (27 May 2025 05:17), Max: 98.7 (26 May 2025 11:26)  HR: 99 (27 May 2025 05:17) (90 - 99)  BP: 116/77 (27 May 2025 05:17) (99/65 - 116/77)  BP(mean): --  RR: 18 (27 May 2025 05:17) (17 - 18)  SpO2: 89% (27 May 2025 05:17) (89% - 96%)    Parameters below as of 27 May 2025 05:17  Patient On (Oxygen Delivery Method): room air      I&O's Summary    26 May 2025 07:01  -  27 May 2025 07:00  --------------------------------------------------------  IN: 0 mL / OUT: 1200 mL / NET: -1200 mL          PHYSICAL EXAM:  TELE:   Constitutional: NAD, awake and alert, well-developed  HEENT: Moist Mucous Membranes, Anicteric  Pulmonary: Non-labored, breath sounds are clear bilaterally, No wheezing, rales or rhonchi  Cardiovascular: Regular, S1 and S2, No murmurs, rubs, gallops or clicks  Gastrointestinal: Bowel Sounds present, soft, nontender.   Neurological: AAOx3, no focal neuro deficits  Lymph: No peripheral edema. No lymphadenopathy.  Skin: No visible rashes or ulcers.  Psych:  Mood & affect appropriate    LABS: All Labs Reviewed:                        12.6   9.32  )-----------( 158      ( 26 May 2025 08:12 )             40.0                         12.6   10.81 )-----------( 165      ( 25 May 2025 07:10 )             39.0     26 May 2025 08:12    137    |  100    |  37     ----------------------------<  248    4.0     |  28     |  1.50   25 May 2025 07:10    138    |  100    |  40     ----------------------------<  179    3.7     |  28     |  1.40     Ca    9.1        26 May 2025 08:12  Ca    8.9        25 May 2025 07:10  Phos  3.5       26 May 2025 08:12  Phos  3.3       25 May 2025 07:10  Mg     2.1       25 May 2025 07:10    TPro  7.2    /  Alb  2.9    /  TBili  0.5    /  DBili  x      /  AST  13     /  ALT  28     /  AlkPhos  78     26 May 2025 08:12  TPro  7.0    /  Alb  2.8    /  TBili  0.4    /  DBili  x      /  AST  18     /  ALT  30     /  AlkPhos  77     25 May 2025 07:10          12 Lead ECG:   Ventricular Rate 75 BPM    Atrial Rate 75 BPM    P-R Interval 210 ms    QRS Duration 94 ms    Q-T Interval 398 ms    QTC Calculation(Bazett) 444 ms    P Axis -1 degrees    R Axis -66 degrees    T Axis 39 degrees    Diagnosis Line Sinus rhythm with 1st degree AV block  Left axis deviation  Low voltage QRS  Septal infarct (cited on or before 26-AUG-2023)    Confirmed by NICO ODONNELL ([id number]) on 5/22/2025 5:14:20 PM (05-21-25 @ 23:33)      TRANSTHORACIC ECHOCARDIOGRAM REPORT  ________________________________________________________________________________                                      _______       Pt. Name:       WAYNE SERRANO Study Date:    10/9/2024  MRN:            [id number]     YOB: 1950  Accession #:    [id number]     Age:           74 years  Account#:       [id number]    Gender:        M  Heart Rate:                   Height:        70.08 in (178.00 cm)  Rhythm:                       Weight:        220.46 lb (100.00 kg)  Blood Pressure: 122/51 mmHg   BSA/BMI:       2.18 m² / 31.56 kg/m²  ________________________________________________________________________________________  Referring Physician:    [id number] Ariela Diaz  Interpreting Physician: Jason Joseph MD  Primary Sonographer:    Jenny Trent [facility] [[email or web address]]    CPT:               ECHO TTE WO CON COMP W DOPP - 53260.m  Indication(s):     Chronic ischemic heart disease, unspecified - I25.9  Procedure:         Transthoracic echocardiogram with 2-D, M-mode and complete                     spectral and color flow Doppler.  Ordering Location: [facility]  Admission Status:  Inpatient  Study Information: Image quality for this study is technically difficult.    _______________________________________________________________________________________     CONCLUSIONS:      1. Technically difficult image quality.   2. Left ventricular systolic function is normal with an ejection fraction of 61 % by Valencia's method of disks.   3. There is moderate (grade 2) left ventricular diastolic dysfunction.   4. Trace mitral regurgitation.   5. Trace tricuspid regurgitation.    ________________________________________________________________________________________  FINDINGS:     Left Ventricle:  Left ventricular systolic function is normal with a calculated ejection fraction of 61 % by the Valencia's biplane method of disks. Mild left ventricular hypertrophy. There is moderate (grade 2) left ventricular diastolic dysfunction.     Right Ventricle:  The right ventricle is not well visualized.     Left Atrium:  The left atrium is normal in size.     Right Atrium:  The right atrium is normal in size.     Aortic Valve:  The aortic valve is tricuspid with normal leaflet excursion. There is no aortic valve stenosis. There is no evidence of aortic regurgitation.     Mitral Valve:  Structurally normal mitral valve with normal leaflet excursion. There is no mitral valve stenosis. There is trace mitral regurgitation.     Tricuspid Valve:  Structurally normaltricuspid valve with normal leaflet excursion. There is trace tricuspid regurgitation. Estimated pulmonary artery systolic pressure is 26 mmHg.     Pulmonic Valve:  The pulmonic valve was not well visualized.     Aorta:  The aortic root at the sinuses of Valsalva is normal in size.     Pericardium:  No pericardial effusion seen.     Systemic Veins:  The inferior vena cava is dilated measuring 2.79 cm in diameter, (dilated >2.1cm) with abnormal inspiratory collapse (abnormal <50%) consistent with elevated right atrial pressure (~15, range 10-20mmHg).  ____________________________________________________________________  QUANTITATIVE DATA:  Left Ventricle Measurements: (Indexed to BSA)     IVSd (2D):   1.3 cm  LVPWd (2D):  1.2 cm  LVIDd (2D):  5.0 cm  LVIDs (2D):  3.3 cm  LV Mass:     243 g  111.5 g/m²  LV Vol d, MOD A2C: 78.9 ml 36.24 ml/m²  LV Vol d, MOD A4C: 94.4 ml 43.37 ml/m²  LV Vol d, MOD BP:  86.7 ml 39.80 ml/m²  LV Vol s, MOD A2C: 31.6 ml 14.51 ml/m²  LV Vol s, MOD A4C: 34.8 ml 16.01 ml/m²  LV Vol s, MOD BP:  34.2 ml 15.70 ml/m²  LVOT SV MOD BP:    52.5 ml  LV EF% MOD BP:     61 %     MV E Vmax:    0.83 m/s  MV A Vmax:    1.09 m/s  MV E/A:       0.76  e' lateral:   9.00 cm/s  e' medial:    5.20 cm/s  E/e' lateral: 9.24  E/e' medial:  15.99  E/e' Average: 11.71  MV DT:        351 msec    Aorta Measurements: (Normal range) (Indexed to BSA)     Ao Root d 3.48 cm (3.1 - 3.7 cm) 1.60 cm/m²            Left Atrium Measurements: (Indexed to BSA)  LA Diam 2D: 3.81 cm         Mitral Valve Measurements:     MV E Vmax: 0.8 m/s         MR Peak Gradient: 10.2 mmHg  MV A Vmax: 1.1 m/s  MV E/A:    0.8       Tricuspid Valve Measurements:     TR Vmax:          1.6 m/s  TR Peak Gradient: 10.8 mmHg  RA Pressure:      15 mmHg  PASP:    26 mmHg    ________________________________________________________________________________________  Electronically signed on 10/10/2024 at 9:09:53 AM by Jason Joseph MD         *** Final ***

## 2025-05-27 NOTE — DISCHARGE NOTE PROVIDER - HOSPITAL COURSE
. Patient is a 74yoM PMH DM2, PAD, hypertension, COPD, CKD,  HFpEF, Hx of R foot ulcer with osteomyelitis admitted for CHF vs PNA. CT scan not convincing for PNA, however given age and comorbidities will continue abx to cover for PNA. Patient also with hx of MRSA wound cultures. Currently with open wound and erythema on RLE ankle.  Cough and SOB improved, no new complaint, no fever.   R elbow swelling and bursitis seen by ortho, no intervention at this time. RLE wound stable with MRSA in the past, so in contact isolation and started dapto until 5/26( will switch to doxycycline orally)  # Pneumonia ruled out by negative study   # R elbow swelling 2/2 to bursitis   # RLE Wound with MRSA in the past   - Contact isolation   - Blood cultures NGTD will follow   - Negative urine legionella and strep   - Continue Ceftriaxone 1gm daily   - Continue daptomycin 450mg daily  - Continue local wound care for R ankle and R elbow          Problem/Plan - 1:  ·  Problem: CHF exacerbation.   ·  Plan: Patient presents w/ acute on chronic Diastolic  CHF exacerbation likely 2/2 non compliance with meds   - CT chest: Mild tree-in-bud nodularity in the left lower lobe, which may be infectious/inflammatory  - Pro , Trop neg x2   - Will start Lasix 40mg IV BID  - TTE 10/24: EF 61%, grade 2 LV diastolic dysfunction, trace TR   - Will obtain repeat TTE, f/u results   - Will obtain A1C, lipid panel, TSH f/u results   - Strict I & O's and daily weights  - Cardiology ([facility]) consulted, will appreciate recs.    Problem/Plan - 2:  ·  Problem: Pneumonia.   ·  Plan: ; questionable pna? RVP- NEG   - Patient afebrile, no leukocytosis present    - CT chest: Mild tree-in-bud nodularity in the left lower lobe, which may be infectious/inflammatory  - Will start Rocephin and Azithro   - F/u RVP panel   - Follow up blood and urine cultures, legionella urine antigen, strep pneumo urine antigen  - ID (Dr. Denton) consulted, will appreciate recs  - Pulm (Dr. Marie) consulted, will appreciate recs.    Problem/Plan - 3:  ·  Problem: REANNA (acute kidney injury).   ·  Plan: REANNA -pre renal   - Baseline creatinine 1.1  - BUN/Cr 33/1.6  - Lasix as above 40 mg q 12 hrs   - Monitor BMP  - Avoid nephrotoxic medications as able.    Problem/Plan - 4:  ·  Problem: Bursitis of right elbow.   ·  Plan: pain management , PT/OT ordered.    Problem/Plan - 5:  ·  Problem: Foot osteomyelitis.   ·  Plan: as per ID cons.    Problem/Plan - 6:  ·  Problem: Wound, open, foot.   ·  Plan: Wound care/podiatry  consult requested  ,turn and reposition every 2 hrs ,skin assessment and skin care as per floor protocol.    Problem/Plan - 7:  ·  Problem: Hypokalemia.   ·  Plan: replaced , serial bmp.    Problem/Plan - 8:  ·  Problem: HTN (hypertension).   ·  Plan: Chronic  -  /67 on admission  - Continue home Toprol XL  w/ hold parameters  Lasix 40 mg 2x day    #HLD  - Continue home statin.    Problem/Plan - 9:  ·  Problem: T2DM (type 2 diabetes mellitus).   ·  Plan: Chronic, on home insulin- Takes 10 units premeal and  Lantus SC 24 units bedtime   - Will obtain A1C, f/u results   -Basal: 7 units premeal 3x day and 16 units lanatus SC  bedtime   -Low ISS  -Regular finger sticks  -Consistent carb diet  -Hypoglycemic protocol.    Problem/Plan - 10:  ·  Problem: Chronic kidney disease, unspecified CKD stage.   ·  Plan; serial bmp , ins/outs.

## 2025-05-27 NOTE — GOALS OF CARE CONVERSATION - ADVANCED CARE PLANNING - CONVERSATION DETAILS
Palliative care team met with patient to discuss ACP. Patient has a HCP on chart with son Scott as primary health care agent and son Jaren as alternate agent. Patient wanted to update his HCP. New HCP completed naming son Jaren as primary health care agent and patient's sister Lida Horne as alternate agent ([phone]). Copy placed on chart.     Inquired about patient's wishes regarding extent of medical care to be provided including thoughts regarding cardiopulmonary resuscitation and mechanical ventilation/intubation. Patient states his wishes are for CPR.    All questions answered

## 2025-05-27 NOTE — PROGRESS NOTE ADULT - ASSESSMENT
REASON FOR VISIT  .. Management of problems listed below      EVENTS/CURRENT ISSUES.  . 5/26/2025 dapto changed to bactrim  . 5/26/2025 pneum on rocepin ssti leg dapto         REVIEW OF SYMPTOMS   Able to give ROS  Yes     RELIABILITY +/-   CONSTITUTIONAL Weakness Yes    ENDOCRINE  No heat or cold intolerance    ALLERGY No hives  Sore throat No stridor  RESP Shortness of breath YES   NEURO New weakness No   CARDIAC   Palpitations No         PHYSICAL EXAM    HEENT Unremarkable  atraumatic   RESP Fair air entry  Harsh breath sound   CARDIAC S1 S2 No S3     NO JVD    ABDOMEN No hepatosplenomegaly   PEDAL EDEMA present No calf tenderness  REASON FOR VISIT  .. Management of problems listed below        BEST PRACTICE ISSUES.  . HOB ELEVATN.    .... Yes  . DIET  .   .... dash 1.2 l fr 5/24  .... DASH 5/22  . FREE WATER.   ....   .  IV FLUID.  .....   . PHARMAC DVT PPLX .    .... hpsc 5/22   . NON PHARMAC DVT PPLX .      . STRESS ULCR PPLX .   .... PROTONIX 40 5/22  . DATE/DM MGMT.   ..... See under Endocrine section   GENERAL DATA .   . COVID.         .... scv2 5/21 (-)   . GOC.    ....    . ICU STAY.    .... no   . INFECTION PPLX .   .... mupirocin 5/23  .... CHLORHEXIDINE 2% 5/22  . ALLGY.   ....  tetracycline vanco iodine   . WT.   .... 5/25/2025 110   . BMI.  ....5/25/2025 bmi 34       XXXXXXXX[id number]  PROBLEM ASSESSMENT RECOMMENDATIONS.  RESP.   . GAS EXCHANGE .   .... target PO 90-95%     . COUGH   .... BENZONATATE 5/22 b 100.3 p     . COPD   .... DUONEB p 5/22   .... ADVAIR 250 5/22   .... MONTELUKAST 10 5/22   .... SPIRIVA 5/22   .... PREDNISONE 10 5/22     INFECTION.  . DATA  .... w 5/25-5/26/2025 w 10.8 - 9.3  .... CT ch 5/21/2025 cw 12/28/2024   ......... mild tib nodules left lo lobe may be infection or inflammn      . PNEUMONIA   .... ROCEPHIN 5/21    . SKIN SOFT TISSUE INFECTION  .... DAPTOMYCIN 5/22 d 450 x 7d      CARDIAC.  . CHF  .... pbnp 5/25/2025 pbnp 89   .... LASIX   ........ 4/24 l 40.2     HEMAT.  . DATA  .... Hb 5/25-5/26/2025 Hb 12.6 - 12.6   .... monitor     GI.   . DIET .   .... DASH 5/22    . LFT MONITORING   .... LFTS    5/25/2025  ........ AP    77   ........ AST  18  ........ ALT   30    RENAL.  . DATA  .... Na 5/25/2025 Na 138   .... K 5/25/2025 K 3.7   .... CO2 5/25/2025 co2 28   .... Cr 5/25-5/26/2025 Cr 1.4 - 1.5   .... monitor     ENDO.  . DM  .  .... INSULIN 5/23       XXXXXXXXXXXXXXXXXXXXXX   SUMMARY BASELINE .     . 74 m history of CHF, COPD, prostate cancer, renal insufficiency, diabetes, chronic neck pain,  right foot wound.    CC.   .5/22/2025 SHORTNESS OF BREATH   . 5/22/2025 WEIGHT GAIN   MAIN ISSUES.  . COPD   . PNEUMONIA   .... ROCEPHIN 5/21  . SKIN SOFT TISSUE INFECTION  .... DAPTOMYCIN 5/22- 5/26 d 450 x 7d   .... bactrim 5/26  . RLE ANKLE OPEN WOUND   . DM     PMH.       PROCEDURES/DEVICES .      DISCUSSIONS.  .... Discussed with primary care and relevant consultants on an ongoing basis       TIME SPENT.  . Over 36 minutes aggregate care time spent on encounter; activities included   direct patient care, counseling and/or coordinating care reviewing notes, lab data/ imaging , discussion with multidisciplinary team/ patient  /family and explaining in detail risks, benefits, alternatives  of the recommendations     MAGGIE BLACK 73 m 5/21/2025 1951

## 2025-05-27 NOTE — DISCHARGE NOTE PROVIDER - NSDCCPCAREPLAN_GEN_ALL_CORE_FT
PRINCIPAL DISCHARGE DIAGNOSIS  Diagnosis: Pneumonia  Assessment and Plan of Treatment:       SECONDARY DISCHARGE DIAGNOSES  Diagnosis: Bursitis of right elbow  Assessment and Plan of Treatment:     Diagnosis: Wound, open, foot  Assessment and Plan of Treatment:     Diagnosis: Hypokalemia  Assessment and Plan of Treatment:     Diagnosis: REANNA (acute kidney injury)  Assessment and Plan of Treatment:     Diagnosis: CHF exacerbation  Assessment and Plan of Treatment: mild diastolic poa    Diagnosis: T2DM (type 2 diabetes mellitus)  Assessment and Plan of Treatment:     Diagnosis: PAD (peripheral artery disease)  Assessment and Plan of Treatment:     Diagnosis: Foot osteomyelitis  Assessment and Plan of Treatment: chronic    Diagnosis: HTN (hypertension)  Assessment and Plan of Treatment:     Diagnosis: CHF exacerbation  Assessment and Plan of Treatment:

## 2025-05-27 NOTE — DISCHARGE NOTE PROVIDER - CARE PROVIDER_API CALL
Abdi Ballesteros  Cardiovascular Disease  [address] [place] [address]  Phone: [phone]  Fax: [phone]  Follow Up Time: 1 week    Munir SandovalErik  Podiatric Medicine  [address] [place] [address]  Phone: [phone]  Fax: [phone]  Follow Up Time: 1 week    Frieda Denton  Infectious Disease  [address] [place] [address]  Phone: [phone]  Fax: [phone]  Follow Up Time: 2 weeks

## 2025-05-27 NOTE — PROGRESS NOTE ADULT - SUBJECTIVE AND OBJECTIVE BOX
PROGRESS NOTE  Patient is a 74y old  Male who presents with a chief complaint of CHF exacerbation, PNA (27 May 2025 11:35)      OVERNIGHT      HPI:  74yoM PMH DM2, PAD, hypertension, COPD, CKD,  HFpEF, Hx of Rt  foot ulcer with osteomyelitis, rt lower EXT wound ,Tri geminal Neuralgia,  BIBEMS from [facility] p/w exertional dyspnea and weight gain of about 30lbs over past 1 month. Patient states that for the last one week he has a productive cough along with SOB, BERGER, orthopnea. Patient reports 25-30lbs weight gain over the past month. Patient states that he is supposed to be taking 20mg lasix TID but only takes it BID (recently increased 1 month ago). Patient denies sick contacts. Denies fevers, chills, body aches, chest pain, palpitations, abdominal pain, n/v. Denies recent travel.     IN THE ED:  Temp  98.2 F , HR 87 ,  /67  ,RR 18 , SpO2 92% RA   S/P x  EKG:  Labs significant for: WBAC 8.15, H/H 11.8/36.7, BUN/Cr 33/1.6, Trop neg x2, proBNP 634  Imaging: CT chest: Mild tree-in-bud nodularity in the left lower lobe, which may be infectious/inflammatory.       (22 May 2025 00:02)    PAST MEDICAL & SURGICAL HISTORY:  Prostate cancer      Type II diabetes mellitus      Chronic obstructive pulmonary disease (COPD)      CHF (congestive heart failure)      Renal insufficiency      H/O migraine      Insomnia      Constipation      S/P foot surgery          MEDICATIONS  (STANDING):  albuterol    0.083% 2.5 milliGRAM(s) Nebulizer three times a day  aspirin enteric coated 81 milliGRAM(s) Oral daily  cetirizine 10 milliGRAM(s) Oral daily  chlorhexidine 2% Cloths 1 Application(s) Topical <User Schedule>  dextrose 5%. 1000 milliLiter(s) (50 mL/Hr) IV Continuous <Continuous>  dextrose 5%. 1000 milliLiter(s) (100 mL/Hr) IV Continuous <Continuous>  dextrose 50% Injectable 25 Gram(s) IV Push once  dextrose 50% Injectable 12.5 Gram(s) IV Push once  dextrose 50% Injectable 25 Gram(s) IV Push once  ferrous    sulfate 325 milliGRAM(s) Oral daily  fluticasone propionate/ salmeterol 250-50 MICROgram(s) Diskus 1 Dose(s) Inhalation two times a day  furosemide    Tablet 40 milliGRAM(s) Oral every 12 hours  glucagon  Injectable 1 milliGRAM(s) IntraMuscular once  heparin   Injectable 5000 Unit(s) SubCutaneous every 8 hours  insulin glargine Injectable (LANTUS) 30 Unit(s) SubCutaneous at bedtime  insulin lispro (ADMELOG) corrective regimen sliding scale   SubCutaneous three times a day before meals  insulin lispro (ADMELOG) corrective regimen sliding scale   SubCutaneous at bedtime  insulin lispro Injectable (ADMELOG) 10 Unit(s) SubCutaneous three times a day before meals  lactobacillus acidophilus 1 Tablet(s) Oral every 12 hours  montelukast 10 milliGRAM(s) Oral daily  mupirocin 2% Nasal 1 Application(s) Both Nostrils two times a day  pantoprazole    Tablet 40 milliGRAM(s) Oral before breakfast  polyethylene glycol 3350 17 Gram(s) Oral two times a day  potassium chloride    Tablet ER 20 milliEquivalent(s) Oral two times a day  predniSONE   Tablet 10 milliGRAM(s) Oral daily  pregabalin 150 milliGRAM(s) Oral two times a day  rosuvastatin 40 milliGRAM(s) Oral at bedtime  senna 2 Tablet(s) Oral at bedtime  sertraline 100 milliGRAM(s) Oral daily  tiotropium 2.5 MICROgram(s) Inhaler 2 Puff(s) Inhalation daily  trimethoprim  160 mG/sulfamethoxazole 800 mG 1 Tablet(s) Oral every 12 hours    MEDICATIONS  (PRN):  acetaminophen     Tablet .. 650 milliGRAM(s) Oral every 6 hours PRN Temp greater or equal to 38C (100.4F), Mild Pain (1 - 3)  albuterol/ipratropium for Nebulization 3 milliLiter(s) Nebulizer every 6 hours PRN Shortness of Breath and/or Wheezing  aluminum hydroxide/magnesium hydroxide/simethicone Suspension 30 milliLiter(s) Oral every 4 hours PRN Dyspepsia  benzonatate 100 milliGRAM(s) Oral three times a day PRN Cough  bisacodyl Suppository 10 milliGRAM(s) Rectal once PRN Constipation  clonazePAM  Tablet 0.75 milliGRAM(s) Oral two times a day PRN anxiety  dextrose Oral Gel 15 Gram(s) Oral once PRN Blood Glucose LESS THAN 70 milliGRAM(s)/deciliter  melatonin 3 milliGRAM(s) Oral at bedtime PRN Insomnia  ondansetron Injectable 4 milliGRAM(s) IV Push every 8 hours PRN Nausea and/or Vomiting  oxyCODONE    IR 5 milliGRAM(s) Oral every 6 hours PRN Severe Pain (7 - 10)  oxyCODONE    IR 2.5 milliGRAM(s) Oral every 4 hours PRN Moderate Pain (4 - 6)      OBJECTIVE    T(C): 37.2 (05-27-25 @ 10:43), Max: 37.2 (05-27-25 @ 10:43)  HR: 93 (05-27-25 @ 10:43) (90 - 99)  BP: 108/62 (05-27-25 @ 10:43) (108/62 - 116/77)  RR: 17 (05-27-25 @ 10:43) (17 - 18)  SpO2: 96% (05-27-25 @ 10:43) (89% - 96%)  Wt(kg): --  I&O's Summary    26 May 2025 07:01  -  27 May 2025 07:00  --------------------------------------------------------  IN: 0 mL / OUT: 1200 mL / NET: -1200 mL          REVIEW OF SYSTEMS:  CONSTITUTIONAL: No fever, weight loss, or fatigue  EYES: No eye pain, visual disturbances, or discharge  ENMT:   No sinus or throat pain  NECK: No pain or stiffness  RESPIRATORY: No cough, wheezing, chills or hemoptysis; No shortness of breath  CARDIOVASCULAR: No chest pain, palpitations, dizziness, or leg swelling  GASTROINTESTINAL: No abdominal pain. No nausea, vomiting; No diarrhea or constipation. No melena or hematochezia.  GENITOURINARY: No dysuria, frequency, hematuria, or incontinence  NEUROLOGICAL: No headaches, memory loss, loss of strength, numbness, or tremors  SKIN: No itching, burning, rashes, or lesions   MUSCULOSKELETAL: No joint pain or swelling; No muscle, back, or extremity pain    PHYSICAL EXAM:  Appearance: NAD. VS past 24 hrs -as above   HEENT:   Moist oral mucosa. Conjunctiva clear b/l.   Neck : supple  Respiratory: Lungs CTAB.  Gastrointestinal:  Soft, nontender. No rebound. No rigidity. BS present	  Cardiovascular: RRR ,S1S2 present  Neurologic: Non-focal. Moving all extremities.  Extremities: No edema. No erythema. No calf tenderness.  Skin: No rashes, No ecchymoses, No cyanosis.	  wounds ,skin lesions-See skin assesment flow sheet   LABS:                        12.9   9.70  )-----------( 162      ( 27 May 2025 07:50 )             40.5     05-27    137  |  100  |  39[H]  ----------------------------<  243[H]  4.1   |  26  |  1.60[H]    Ca    9.2      27 May 2025 07:50  Phos  3.5     05-26    TPro  7.2  /  Alb  2.9[L]  /  TBili  0.5  /  DBili  x   /  AST  13[L]  /  ALT  28  /  AlkPhos  78  05-26    CAPILLARY BLOOD GLUCOSE      POCT Blood Glucose.: 406 mg/dL (27 May 2025 12:09)  POCT Blood Glucose.: 250 mg/dL (27 May 2025 08:01)  POCT Blood Glucose.: 214 mg/dL (26 May 2025 21:16)  POCT Blood Glucose.: 255 mg/dL (26 May 2025 17:27)      Urinalysis Basic - ( 27 May 2025 07:50 )    Color: x / Appearance: x / SG: x / pH: x  Gluc: 243 mg/dL / Ketone: x  / Bili: x / Urobili: x   Blood: x / Protein: x / Nitrite: x   Leuk Esterase: x / RBC: x / WBC x   Sq Epi: x / Non Sq Epi: x / Bacteria: x        Urinalysis with Rflx Culture (collected 22 May 2025 03:50)    Culture - Blood (collected 22 May 2025 01:15)  Source: Blood Blood-Peripheral  Final Report (27 May 2025 06:00):    No growth at 5 days    Culture - Blood (collected 22 May 2025 01:15)  Source: Blood Blood-Peripheral  Final Report (27 May 2025 06:00):    No growth at 5 days      RADIOLOGY & ADDITIONAL TESTS:   reviewed elctronically  ASSESSMENT/PLAN: 	Form for [facility] was completed by me , rx sent   Patient was seen and examined on a day of discharge . Plan of care , discharge medications and recommendations discussed with consultants and clearance for discharge obtained .Social service , case management  and medical staff are aware of plan. Family is notified. Discharge summary  is  prepared electronically-see separate document prepared by me .75minutes spent on this visit, 50% visit time spent in care co-ordination with other attendings and counselling patient  I have discussed care plan with patient and HCP,expressed understanding of problems treatment and their effect and side effects, alternatives in detail,I have asked if they have any questions and concerns and appropriately addressed them to best of my ability

## 2025-05-27 NOTE — PROGRESS NOTE ADULT - ASSESSMENT
74yoM PMH HTN, HLD, DM2, PAD, COPD, CKD,  HFpEF, hx of R foot ulcer with osteomyelitis    HFpEF, HTN  - Pro-BNP mildly elevated but CT chest not consistent with CHF, more infectious/inflammatory  - Now with slightly higher creatine, would switch IV Lasix to PO 40 mg q12H  - Monitor renal function  - Previous TTE shows EF 61% with mod grade 2 diastolic dysfunction and trace MR/TR.    - No known Hx of CAD  - Patient is not complaining of any cardiac symptoms at this time.  - No clear evidence of acute ischemia, trops negative x 2.  - No acute changes on EKG compared to previous.  - Continue home statin fro Hx of HLD.  Will benefit from antiplatelet for Hx of PAD    - BP labile with readings at 90's  - Hold home BB    - Monitor and replete lytes, keep K>4, Mg>2.  - Will continue to follow.   74yoM PMH HTN, HLD, DM2, PAD, COPD, CKD,  HFpEF, hx of R foot ulcer with osteomyelitis    HFpEF, HTN  - Pro-BNP mildly elevated but CT chest not consistent with CHF, more infectious/inflammatory  - Now with slightly higher creatine,  IV Lasix transitioned to 40mg PO BID  - Monitor renal function  - Previous TTE shows EF 61% with mod grade 2 diastolic dysfunction and trace MR/TR.    - No known Hx of CAD  - Patient is not complaining of any cardiac symptoms at this time.  - No clear evidence of acute ischemia, trops negative x 2.  - No acute changes on EKG compared to previous.  - Continue home statin fro Hx of HLD.  Will benefit from antiplatelet for Hx of PAD    - BP labile with readings at 90's  - Hold home BB    - Monitor and replete lytes, keep K>4, Mg>2.  - Will continue to follow.    Betina Johnson PA-C  Cardiology  Available on TEAMS

## 2025-05-27 NOTE — DISCHARGE NOTE PROVIDER - PROVIDER TOKENS
PROVIDER:[TOKEN:[[id number]],FOLLOWUP:[1 week]],PROVIDER:[TOKEN:[[id number]],FOLLOWUP:[1 week]],PROVIDER:[TOKEN:[[id number]],FOLLOWUP:[2 weeks]]

## 2025-05-27 NOTE — DISCHARGE NOTE PROVIDER - NSDCMRMEDTOKEN_GEN_ALL_CORE_FT
albuterol 0.63 mg/3 mL (0.021%) inhalation solution: 3 milliliter(s) by nebulizer 3 times a day as needed for  aspirin 81 mg oral delayed release tablet: 1 tab(s) orally once a day  ferrous sulfate 325 mg (65 mg elemental iron) oral tablet: 1 tab(s) orally once a day  furosemide 40 mg oral tablet: 1 tab(s) orally every 12 hours  HumaLOG 100 units/mL injectable solution: 15 unit(s) injectable 3 times a day (before meals) ***sliding scale***  insulin glargine 100 units/mL subcutaneous solution: 36 unit(s) subcutaneous once a day (at bedtime)  Jardiance 10 mg oral tablet: 1 tab(s) orally once a day  loratadine 10 mg oral tablet: 1 tab(s) orally once a day (in the morning)  melatonin 3 mg oral tablet: 1 tab(s) orally once a day (at bedtime)  montelukast 10 mg oral tablet: 1 tab(s) orally once a day  Multiple Vitamins with Minerals oral tablet: 1 tab(s) orally once a day  oxyCODONE 5 mg oral tablet: 2 tab(s) orally 2 times a day as needed for Severe Pain (7 - 10)  pantoprazole 40 mg oral delayed release tablet: 1 tab(s) orally once a day (before a meal)  polyethylene glycol 3350 oral powder for reconstitution: 17 gram(s) orally once a day as needed for  constipation  potassium chloride 20 mEq oral tablet, extended release: 1 tab(s) orally 2 times a day  predniSONE 10 mg oral tablet: 1 tab(s) orally once a day  pregabalin 150 mg oral capsule: 1 cap(s) orally 2 times a day  rosuvastatin 40 mg oral tablet: 1 tab(s) orally once a day  saccharomyces boulardii lyo 250 mg oral capsule: 1 cap(s) orally once a day  senna leaf extract oral tablet: 1 tab(s) orally once a day (at bedtime)  sertraline 100 mg oral tablet: 1 tab(s) orally once a day  Spiriva 18 mcg inhalation capsule: 1 cap(s) inhaled once a day  sulfamethoxazole-trimethoprim 800 mg-160 mg oral tablet: 1 tab(s) orally every 12 hours  Symbicort 160 mcg-4.5 mcg/inh inhalation aerosol: 2 puff(s) inhaled 2 times a day   albuterol 0.63 mg/3 mL (0.021%) inhalation solution: 3 milliliter(s) by nebulizer 3 times a day  aspirin 81 mg oral delayed release tablet: 1 tab(s) orally once a day  ferrous sulfate 325 mg (65 mg elemental iron) oral tablet: 1 tab(s) orally once a day  furosemide 40 mg oral tablet: 1 tab(s) orally every 12 hours  HumaLOG 100 units/mL injectable solution: 15 unit(s) injectable 3 times a day (before meals) ***sliding scale***  insulin glargine 100 units/mL subcutaneous solution: 36 unit(s) subcutaneous once a day (at bedtime)  Jardiance 10 mg oral tablet: 1 tab(s) orally once a day  loratadine 10 mg oral tablet: 1 tab(s) orally once a day (in the morning)  melatonin 3 mg oral tablet: 1 tab(s) orally once a day (at bedtime)  montelukast 10 mg oral tablet: 1 tab(s) orally once a day  Multiple Vitamins with Minerals oral tablet: 1 tab(s) orally once a day  oxyCODONE 5 mg oral tablet: 2 tab(s) orally 2 times a day as needed for Severe Pain (7 - 10) MDD: 4  pantoprazole 40 mg oral delayed release tablet: 1 tab(s) orally once a day (before a meal)  polyethylene glycol 3350 oral powder for reconstitution: 17 gram(s) orally once a day as needed for  constipation  potassium chloride 20 mEq oral tablet, extended release: 1 tab(s) orally 2 times a day  predniSONE 10 mg oral tablet: 1 tab(s) orally once a day  pregabalin 150 mg oral capsule: 1 cap(s) orally 2 times a day  rosuvastatin 40 mg oral tablet: 1 tab(s) orally once a day  saccharomyces boulardii lyo 250 mg oral capsule: 1 cap(s) orally once a day  senna leaf extract oral tablet: 1 tab(s) orally once a day (at bedtime)  sertraline 100 mg oral tablet: 1 tab(s) orally once a day MDD: 1  sulfamethoxazole-trimethoprim 800 mg-160 mg oral tablet: 1 tab(s) orally every 12 hours  Symbicort 160 mcg-4.5 mcg/inh inhalation aerosol: 2 puff(s) inhaled 2 times a day

## 2025-05-27 NOTE — DISCHARGE NOTE NURSING/CASE MANAGEMENT/SOCIAL WORK - NSDCVIVACCINE_GEN_ALL_CORE_FT
Tdap; 28-Dec-2024 10:22; Dania Lewis (RN); [employer]; [id number] (Exp. Date: 31-May-2026); IntraMuscular; Deltoid Left.; 0.5 milliLiter(s); VIS (VIS Published: 09-May-2013, VIS Presented: 28-Dec-2024);

## 2025-06-02 RX ORDER — AMOXICILLIN AND CLAVULANATE POTASSIUM 500; 125 MG/1; MG/1
1 TABLET, FILM COATED ORAL
Refills: 0 | DISCHARGE
Start: 2025-06-02 | End: 2025-06-09

## 2025-06-03 ENCOUNTER — INPATIENT (INPATIENT)
Facility: HOSPITAL | Age: 75
LOS: 6 days | Discharge: NURSING, REHAB OR LONG-TERM CARE | DRG: 603 | End: 2025-06-10
Attending: INTERNAL MEDICINE | Admitting: INTERNAL MEDICINE
Payer: COMMERCIAL

## 2025-06-03 VITALS
SYSTOLIC BLOOD PRESSURE: 110 MMHG | WEIGHT: 229.94 LBS | HEIGHT: 70 IN | RESPIRATION RATE: 16 BRPM | TEMPERATURE: 98 F | HEART RATE: 89 BPM | OXYGEN SATURATION: 92 % | DIASTOLIC BLOOD PRESSURE: 57 MMHG

## 2025-06-03 DIAGNOSIS — E11.9 TYPE 2 DIABETES MELLITUS WITHOUT COMPLICATIONS: ICD-10-CM

## 2025-06-03 DIAGNOSIS — N17.9 ACUTE KIDNEY FAILURE, UNSPECIFIED: ICD-10-CM

## 2025-06-03 DIAGNOSIS — S81.801A UNSPECIFIED OPEN WOUND, RIGHT LOWER LEG, INITIAL ENCOUNTER: ICD-10-CM

## 2025-06-03 DIAGNOSIS — Z98.890 OTHER SPECIFIED POSTPROCEDURAL STATES: Chronic | ICD-10-CM

## 2025-06-03 DIAGNOSIS — J44.9 CHRONIC OBSTRUCTIVE PULMONARY DISEASE, UNSPECIFIED: ICD-10-CM

## 2025-06-03 DIAGNOSIS — L03.90 CELLULITIS, UNSPECIFIED: ICD-10-CM

## 2025-06-03 DIAGNOSIS — K59.00 CONSTIPATION, UNSPECIFIED: ICD-10-CM

## 2025-06-03 DIAGNOSIS — Z29.9 ENCOUNTER FOR PROPHYLACTIC MEASURES, UNSPECIFIED: ICD-10-CM

## 2025-06-03 DIAGNOSIS — I73.9 PERIPHERAL VASCULAR DISEASE, UNSPECIFIED: ICD-10-CM

## 2025-06-03 DIAGNOSIS — I25.10 ATHEROSCLEROTIC HEART DISEASE OF NATIVE CORONARY ARTERY WITHOUT ANGINA PECTORIS: ICD-10-CM

## 2025-06-03 DIAGNOSIS — L03.115 CELLULITIS OF RIGHT LOWER LIMB: ICD-10-CM

## 2025-06-03 DIAGNOSIS — I50.9 HEART FAILURE, UNSPECIFIED: ICD-10-CM

## 2025-06-03 LAB
ALBUMIN SERPL ELPH-MCNC: 3.1 G/DL — LOW (ref 3.3–5)
ALP SERPL-CCNC: 80 U/L — SIGNIFICANT CHANGE UP (ref 40–120)
ALT FLD-CCNC: 36 U/L — SIGNIFICANT CHANGE UP (ref 12–78)
ANION GAP SERPL CALC-SCNC: 13 MMOL/L — SIGNIFICANT CHANGE UP (ref 5–17)
APTT BLD: 29 SEC — SIGNIFICANT CHANGE UP (ref 26.1–36.8)
AST SERPL-CCNC: 17 U/L — SIGNIFICANT CHANGE UP (ref 15–37)
BASOPHILS # BLD AUTO: 0 K/UL — SIGNIFICANT CHANGE UP (ref 0–0.2)
BASOPHILS NFR BLD AUTO: 0 % — SIGNIFICANT CHANGE UP (ref 0–2)
BILIRUB SERPL-MCNC: 0.4 MG/DL — SIGNIFICANT CHANGE UP (ref 0.2–1.2)
BUN SERPL-MCNC: 42 MG/DL — HIGH (ref 7–23)
CALCIUM SERPL-MCNC: 8.7 MG/DL — SIGNIFICANT CHANGE UP (ref 8.5–10.1)
CHLORIDE SERPL-SCNC: 98 MMOL/L — SIGNIFICANT CHANGE UP (ref 96–108)
CO2 SERPL-SCNC: 27 MMOL/L — SIGNIFICANT CHANGE UP (ref 22–31)
CREAT SERPL-MCNC: 2 MG/DL — HIGH (ref 0.5–1.3)
EGFR: 35 ML/MIN/1.73M2 — LOW
EGFR: 35 ML/MIN/1.73M2 — LOW
EOSINOPHIL # BLD AUTO: 0 K/UL — SIGNIFICANT CHANGE UP (ref 0–0.5)
EOSINOPHIL NFR BLD AUTO: 0 % — SIGNIFICANT CHANGE UP (ref 0–6)
GLUCOSE SERPL-MCNC: 266 MG/DL — HIGH (ref 70–99)
HCT VFR BLD CALC: 38.6 % — LOW (ref 39–50)
HGB BLD-MCNC: 12.5 G/DL — LOW (ref 13–17)
INR BLD: 1.05 RATIO — SIGNIFICANT CHANGE UP (ref 0.85–1.16)
LACTATE SERPL-SCNC: 2.1 MMOL/L — HIGH (ref 0.7–2)
LACTATE SERPL-SCNC: 3.1 MMOL/L — HIGH (ref 0.7–2)
LYMPHOCYTES # BLD AUTO: 1.45 K/UL — SIGNIFICANT CHANGE UP (ref 1–3.3)
LYMPHOCYTES # BLD AUTO: 15 % — SIGNIFICANT CHANGE UP (ref 13–44)
MCHC RBC-ENTMCNC: 28.2 PG — SIGNIFICANT CHANGE UP (ref 27–34)
MCHC RBC-ENTMCNC: 32.4 G/DL — SIGNIFICANT CHANGE UP (ref 32–36)
MCV RBC AUTO: 87.1 FL — SIGNIFICANT CHANGE UP (ref 80–100)
MONOCYTES # BLD AUTO: 0.96 K/UL — HIGH (ref 0–0.9)
MONOCYTES NFR BLD AUTO: 10 % — SIGNIFICANT CHANGE UP (ref 2–14)
NEUTROPHILS # BLD AUTO: 7.13 K/UL — SIGNIFICANT CHANGE UP (ref 1.8–7.4)
NEUTROPHILS NFR BLD AUTO: 74 % — SIGNIFICANT CHANGE UP (ref 43–77)
NRBC BLD AUTO-RTO: SIGNIFICANT CHANGE UP /100 WBCS (ref 0–0)
PLATELET # BLD AUTO: 160 K/UL — SIGNIFICANT CHANGE UP (ref 150–400)
POTASSIUM SERPL-MCNC: 3.9 MMOL/L — SIGNIFICANT CHANGE UP (ref 3.5–5.3)
POTASSIUM SERPL-SCNC: 3.9 MMOL/L — SIGNIFICANT CHANGE UP (ref 3.5–5.3)
PROT SERPL-MCNC: 7.2 G/DL — SIGNIFICANT CHANGE UP (ref 6–8.3)
PROTHROM AB SERPL-ACNC: 12.4 SEC — SIGNIFICANT CHANGE UP (ref 9.9–13.4)
RBC # BLD: 4.43 M/UL — SIGNIFICANT CHANGE UP (ref 4.2–5.8)
RBC # FLD: 18.9 % — HIGH (ref 10.3–14.5)
SODIUM SERPL-SCNC: 138 MMOL/L — SIGNIFICANT CHANGE UP (ref 135–145)
WBC # BLD: 9.64 K/UL — SIGNIFICANT CHANGE UP (ref 3.8–10.5)
WBC # FLD AUTO: 9.64 K/UL — SIGNIFICANT CHANGE UP (ref 3.8–10.5)

## 2025-06-03 PROCEDURE — 73590 X-RAY EXAM OF LOWER LEG: CPT | Mod: 26,RT

## 2025-06-03 PROCEDURE — 99285 EMERGENCY DEPT VISIT HI MDM: CPT

## 2025-06-03 PROCEDURE — 73610 X-RAY EXAM OF ANKLE: CPT | Mod: 26,RT

## 2025-06-03 PROCEDURE — 73620 X-RAY EXAM OF FOOT: CPT | Mod: 26,RT

## 2025-06-03 PROCEDURE — 71045 X-RAY EXAM CHEST 1 VIEW: CPT | Mod: 26

## 2025-06-03 RX ORDER — SODIUM CHLORIDE 9 G/1000ML
1000 INJECTION, SOLUTION INTRAVENOUS
Refills: 0 | Status: DISCONTINUED | OUTPATIENT
Start: 2025-06-03 | End: 2025-06-10

## 2025-06-03 RX ORDER — POLYETHYLENE GLYCOL 3350 17 G/17G
17 POWDER, FOR SOLUTION ORAL DAILY
Refills: 0 | Status: DISCONTINUED | OUTPATIENT
Start: 2025-06-03 | End: 2025-06-10

## 2025-06-03 RX ORDER — DEXTROSE 50 % IN WATER 50 %
12.5 SYRINGE (ML) INTRAVENOUS ONCE
Refills: 0 | Status: DISCONTINUED | OUTPATIENT
Start: 2025-06-03 | End: 2025-06-10

## 2025-06-03 RX ORDER — DEXTROSE 50 % IN WATER 50 %
25 SYRINGE (ML) INTRAVENOUS ONCE
Refills: 0 | Status: DISCONTINUED | OUTPATIENT
Start: 2025-06-03 | End: 2025-06-10

## 2025-06-03 RX ORDER — HEPARIN SODIUM 1000 [USP'U]/ML
5000 INJECTION INTRAVENOUS; SUBCUTANEOUS EVERY 8 HOURS
Refills: 0 | Status: DISCONTINUED | OUTPATIENT
Start: 2025-06-03 | End: 2025-06-10

## 2025-06-03 RX ORDER — IPRATROPIUM BROMIDE AND ALBUTEROL SULFATE .5; 2.5 MG/3ML; MG/3ML
3 SOLUTION RESPIRATORY (INHALATION) EVERY 8 HOURS
Refills: 0 | Status: DISCONTINUED | OUTPATIENT
Start: 2025-06-03 | End: 2025-06-10

## 2025-06-03 RX ORDER — PIPERACILLIN-TAZO-DEXTROSE,ISO 3.375G/5
3.38 IV SOLUTION, PIGGYBACK PREMIX FROZEN(ML) INTRAVENOUS EVERY 8 HOURS
Refills: 0 | Status: COMPLETED | OUTPATIENT
Start: 2025-06-04 | End: 2025-06-08

## 2025-06-03 RX ORDER — OXYCODONE HYDROCHLORIDE 30 MG/1
10 TABLET ORAL
Refills: 0 | Status: DISCONTINUED | OUTPATIENT
Start: 2025-06-03 | End: 2025-06-10

## 2025-06-03 RX ORDER — ROSUVASTATIN CALCIUM 20 MG/1
40 TABLET, FILM COATED ORAL AT BEDTIME
Refills: 0 | Status: DISCONTINUED | OUTPATIENT
Start: 2025-06-03 | End: 2025-06-10

## 2025-06-03 RX ORDER — PIPERACILLIN-TAZO-DEXTROSE,ISO 3.375G/5
3.38 IV SOLUTION, PIGGYBACK PREMIX FROZEN(ML) INTRAVENOUS ONCE
Refills: 0 | Status: COMPLETED | OUTPATIENT
Start: 2025-06-03 | End: 2025-06-04

## 2025-06-03 RX ORDER — PREDNISONE 20 MG/1
10 TABLET ORAL DAILY
Refills: 0 | Status: DISCONTINUED | OUTPATIENT
Start: 2025-06-03 | End: 2025-06-10

## 2025-06-03 RX ORDER — PREGABALIN 50 MG/1
150 CAPSULE ORAL
Refills: 0 | Status: COMPLETED | OUTPATIENT
Start: 2025-06-03 | End: 2025-06-10

## 2025-06-03 RX ORDER — INSULIN LISPRO 100 U/ML
INJECTION, SOLUTION INTRAVENOUS; SUBCUTANEOUS AT BEDTIME
Refills: 0 | Status: DISCONTINUED | OUTPATIENT
Start: 2025-06-03 | End: 2025-06-04

## 2025-06-03 RX ORDER — FERROUS SULFATE 137(45) MG
325 TABLET, EXTENDED RELEASE ORAL DAILY
Refills: 0 | Status: DISCONTINUED | OUTPATIENT
Start: 2025-06-03 | End: 2025-06-10

## 2025-06-03 RX ORDER — SERTRALINE 100 MG/1
100 TABLET, FILM COATED ORAL DAILY
Refills: 0 | Status: DISCONTINUED | OUTPATIENT
Start: 2025-06-03 | End: 2025-06-10

## 2025-06-03 RX ORDER — INSULIN LISPRO 100 U/ML
INJECTION, SOLUTION INTRAVENOUS; SUBCUTANEOUS
Refills: 0 | Status: DISCONTINUED | OUTPATIENT
Start: 2025-06-03 | End: 2025-06-04

## 2025-06-03 RX ORDER — BUDESONIDE 0.25 MG/2ML
0.5 SUSPENSION RESPIRATORY (INHALATION)
Refills: 0 | Status: DISCONTINUED | OUTPATIENT
Start: 2025-06-03 | End: 2025-06-06

## 2025-06-03 RX ORDER — ASPIRIN 325 MG
81 TABLET ORAL DAILY
Refills: 0 | Status: DISCONTINUED | OUTPATIENT
Start: 2025-06-03 | End: 2025-06-10

## 2025-06-03 RX ORDER — OXYCODONE HYDROCHLORIDE 30 MG/1
5 TABLET ORAL EVERY 8 HOURS
Refills: 0 | Status: DISCONTINUED | OUTPATIENT
Start: 2025-06-03 | End: 2025-06-07

## 2025-06-03 RX ORDER — BUTYROSPERMUM PARKII(SHEA BUTTER), SIMMONDSIA CHINENSIS (JOJOBA) SEED OIL, ALOE BARBADENSIS LEAF EXTRACT .01; 1; 3.5 G/100G; G/100G; G/100G
250 LIQUID TOPICAL
Refills: 0 | Status: DISCONTINUED | OUTPATIENT
Start: 2025-06-03 | End: 2025-06-10

## 2025-06-03 RX ORDER — GLUCAGON 3 MG/1
1 POWDER NASAL ONCE
Refills: 0 | Status: DISCONTINUED | OUTPATIENT
Start: 2025-06-03 | End: 2025-06-10

## 2025-06-03 RX ORDER — DAPTOMYCIN 500 MG/10ML
500 INJECTION, POWDER, LYOPHILIZED, FOR SOLUTION INTRAVENOUS ONCE
Refills: 0 | Status: COMPLETED | OUTPATIENT
Start: 2025-06-03 | End: 2025-06-03

## 2025-06-03 RX ORDER — MELATONIN 5 MG
3 TABLET ORAL AT BEDTIME
Refills: 0 | Status: DISCONTINUED | OUTPATIENT
Start: 2025-06-03 | End: 2025-06-10

## 2025-06-03 RX ORDER — CYST/ALA/Q10/PHOS.SER/DHA/BROC 100-20-50
1 POWDER (GRAM) ORAL DAILY
Refills: 0 | Status: DISCONTINUED | OUTPATIENT
Start: 2025-06-03 | End: 2025-06-10

## 2025-06-03 RX ORDER — SENNA 187 MG
1 TABLET ORAL AT BEDTIME
Refills: 0 | Status: DISCONTINUED | OUTPATIENT
Start: 2025-06-03 | End: 2025-06-10

## 2025-06-03 RX ORDER — PIPERACILLIN-TAZO-DEXTROSE,ISO 3.375G/5
3.38 IV SOLUTION, PIGGYBACK PREMIX FROZEN(ML) INTRAVENOUS ONCE
Refills: 0 | Status: DISCONTINUED | OUTPATIENT
Start: 2025-06-04 | End: 2025-06-04

## 2025-06-03 RX ORDER — MAGNESIUM, ALUMINUM HYDROXIDE 200-200 MG
30 TABLET,CHEWABLE ORAL EVERY 4 HOURS
Refills: 0 | Status: DISCONTINUED | OUTPATIENT
Start: 2025-06-03 | End: 2025-06-10

## 2025-06-03 RX ORDER — DEXTROSE 50 % IN WATER 50 %
15 SYRINGE (ML) INTRAVENOUS ONCE
Refills: 0 | Status: DISCONTINUED | OUTPATIENT
Start: 2025-06-03 | End: 2025-06-10

## 2025-06-03 RX ORDER — DAPTOMYCIN 500 MG/10ML
450 INJECTION, POWDER, LYOPHILIZED, FOR SOLUTION INTRAVENOUS ONCE
Refills: 0 | Status: DISCONTINUED | OUTPATIENT
Start: 2025-06-03 | End: 2025-06-03

## 2025-06-03 RX ORDER — CEFTRIAXONE 500 MG/1
1000 INJECTION, POWDER, FOR SOLUTION INTRAMUSCULAR; INTRAVENOUS ONCE
Refills: 0 | Status: COMPLETED | OUTPATIENT
Start: 2025-06-03 | End: 2025-06-03

## 2025-06-03 RX ORDER — BISACODYL 5 MG
10 TABLET, DELAYED RELEASE (ENTERIC COATED) ORAL DAILY
Refills: 0 | Status: DISCONTINUED | OUTPATIENT
Start: 2025-06-03 | End: 2025-06-10

## 2025-06-03 RX ORDER — ACETAMINOPHEN 500 MG/5ML
650 LIQUID (ML) ORAL EVERY 6 HOURS
Refills: 0 | Status: DISCONTINUED | OUTPATIENT
Start: 2025-06-03 | End: 2025-06-10

## 2025-06-03 RX ORDER — MONTELUKAST SODIUM 10 MG/1
10 TABLET ORAL DAILY
Refills: 0 | Status: DISCONTINUED | OUTPATIENT
Start: 2025-06-03 | End: 2025-06-10

## 2025-06-03 RX ORDER — INSULIN GLARGINE-YFGN 100 [IU]/ML
10 INJECTION, SOLUTION SUBCUTANEOUS AT BEDTIME
Refills: 0 | Status: DISCONTINUED | OUTPATIENT
Start: 2025-06-03 | End: 2025-06-06

## 2025-06-03 RX ORDER — ONDANSETRON HCL/PF 4 MG/2 ML
4 VIAL (ML) INJECTION EVERY 8 HOURS
Refills: 0 | Status: DISCONTINUED | OUTPATIENT
Start: 2025-06-03 | End: 2025-06-10

## 2025-06-03 RX ADMIN — INSULIN GLARGINE-YFGN 10 UNIT(S): 100 INJECTION, SOLUTION SUBCUTANEOUS at 23:10

## 2025-06-03 RX ADMIN — OXYCODONE HYDROCHLORIDE 10 MILLIGRAM(S): 30 TABLET ORAL at 23:11

## 2025-06-03 RX ADMIN — IPRATROPIUM BROMIDE AND ALBUTEROL SULFATE 3 MILLILITER(S): .5; 2.5 SOLUTION RESPIRATORY (INHALATION) at 23:11

## 2025-06-03 RX ADMIN — DAPTOMYCIN 120 MILLIGRAM(S): 500 INJECTION, POWDER, LYOPHILIZED, FOR SOLUTION INTRAVENOUS at 17:10

## 2025-06-03 RX ADMIN — Medication 60 MILLILITER(S): at 19:57

## 2025-06-03 RX ADMIN — Medication 1000 MILLILITER(S): at 17:10

## 2025-06-03 RX ADMIN — CEFTRIAXONE 100 MILLIGRAM(S): 500 INJECTION, POWDER, FOR SOLUTION INTRAMUSCULAR; INTRAVENOUS at 16:24

## 2025-06-03 RX ADMIN — HEPARIN SODIUM 5000 UNIT(S): 1000 INJECTION INTRAVENOUS; SUBCUTANEOUS at 23:11

## 2025-06-03 RX ADMIN — BUDESONIDE 0.5 MILLIGRAM(S): 0.25 SUSPENSION RESPIRATORY (INHALATION) at 20:03

## 2025-06-03 RX ADMIN — ROSUVASTATIN CALCIUM 40 MILLIGRAM(S): 20 TABLET, FILM COATED ORAL at 23:11

## 2025-06-03 RX ADMIN — Medication 1 TABLET(S): at 23:11

## 2025-06-03 NOTE — ED ADULT NURSE REASSESSMENT NOTE - NS ED NURSE REASSESS COMMENT FT1
2000- Received report from DAVINA Simmons for continuity of care. VSS. Pt is in stable condition. Reported neck pain. Will administered pain meds.

## 2025-06-03 NOTE — ED ADULT NURSE NOTE - RESPIRATORY ASSESSMENT
[Respiration, Rhythm And Depth] : normal respiratory rhythm and effort [Auscultation Breath Sounds / Voice Sounds] : lungs were clear to auscultation bilaterally [Heart Rate And Rhythm] : heart rate was normal and rhythm regular [Clean] : clean [Dry] : dry [Healing Well] : healing well [No Edema] : no edema - - -

## 2025-06-03 NOTE — H&P ADULT - ADDITIONAL PE
2+ b/l lower extremity edema, no deformities, wound to right anterior shin with eschar and surrounding erythema, some warmth, no fluctuance, distal aspect of foot is erythematous though he states is chronic.

## 2025-06-03 NOTE — ED ADULT NURSE REASSESSMENT NOTE - NSFALLRISKINTERV_ED_ALL_ED
Assistance OOB with selected safe patient handling equipment if applicable/Assistance with ambulation/Communicate fall risk and risk factors to all staff, patient, and family/Monitor gait and stability/Provide visual cue: yellow wristband, yellow gown, etc/Reinforce activity limits and safety measures with patient and family/Call bell, personal items and telephone in reach/Instruct patient to call for assistance before getting out of bed/chair/stretcher/Non-slip footwear applied when patient is off stretcher/[place] to call system/Physically safe environment - no spills, clutter or unnecessary equipment/Purposeful Proactive Rounding/Room/bathroom lighting operational, light cord in reach

## 2025-06-03 NOTE — ED ADULT NURSE NOTE - NSFALLHARMRISKINTERV_ED_ALL_ED

## 2025-06-03 NOTE — ED PROVIDER NOTE - CLINICAL SUMMARY MEDICAL DECISION MAKING FREE TEXT BOX
Exam with erythema localized to the right lower extremity with some tenderness there surrounding an eschar type wound, concern for possible superimposed cellulitis.  Will check x-rays though given acute nature to this per patient, less likely osteomyelitis.  No fluctuance to suggest abscess.  There is redness to the distal aspect of his foot as well though he states this is chronic.  Case discussed with podiatry resident, will be down to evaluate patient.  Will start antibiotics at this time.

## 2025-06-03 NOTE — ED PROVIDER NOTE - CADM POA PRESS ULCER
RRT called on patient for chest pain.  Patient states pain is 10/10 on left side.  Per patient pain increases when taking breaths in.  RRT note in Epic.  Patient rating pain 4/10 after nitro given.  Requesting daughters be notified.  Message left with daughter Christina.      No

## 2025-06-03 NOTE — ED PROVIDER NOTE - OBJECTIVE STATEMENT
Patient with a past medical history of diabetes, COPD, heart failure, CKD, hypertension, history of right foot ulcer is presenting for wound to right lower extremity.  Was recently admitted here for shortness of breath as well as for bursitis and concern for a wound.  Was sent back to facility.  They sent him to the ER today due to worsening swelling and redness localized to his wound of the right lower extremity.  Endorsing pain to the site.  Denies fevers, chest pain, nausea or vomiting.  States that he is having some chronic shortness of breath though unchanged today.  Also endorses chronic cough.

## 2025-06-03 NOTE — ED ADULT TRIAGE NOTE - CHIEF COMPLAINT QUOTE
Pt brought in by EMS from [facility] with concern for worsening pedal edema, wound on R lower leg. 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

## 2025-06-03 NOTE — ED PROVIDER NOTE - WR ORDER STATUS 2
Alert-The patient is alert, awake and responds to voice. The patient is oriented to time, place, and person. The triage nurse is able to obtain subjective information.
Performed

## 2025-06-03 NOTE — H&P ADULT - PROBLEM SELECTOR PLAN 8
- Pt p/w edema/erythema of RLE  - EKG with SR first degree, LAD, PRWP, inferior infarct  -C/w home statin for HLD  - Obtain LE dopplers  - Known HFpEF on Lasix 40mg PO daily   - Previous TTE 05/2025 with EF 60-65% and trace TR  - Would give 60mg IV lasix x 1, then resume PO regimen. Pro BNP is normal.   - Strict I/Os, daily weights .

## 2025-06-03 NOTE — H&P ADULT - PROBLEM SELECTOR PLAN 1
#RLE cellulitis  #RLE wound  #Tinea pedis  -suggest daptomycin for now  -can continue Zosyn for now  -clotrimazole ointment between the toes for 2 weeks  -baseline CPK  -repeat MRSA nasal PCR  -obtain R lower leg CT to rule out abscess  -blood cultures pending  -Podiatry follow up   -leg elevation  -contact isolation for now

## 2025-06-03 NOTE — CONSULT NOTE ADULT - SUBJECTIVE AND OBJECTIVE BOX
[facility]     Patient is a 74y old  Male who presents with a chief complaint of rle swelling (03 Jun 2025 18:32)      HPI:  Patient with a past medical history of diabetes, COPD, heart failure, CKD, hypertension, history of right foot ulcer is presenting for wound to right lower extremity.  Was recently admitted here for shortness of breath as well as for bursitis and concern for a wound.  Was sent back to facility.  They sent him to the ER today due to worsening swelling and redness localized to his wound of the right lower extremity.  Endorsing pain to the site.  Denies fevers, chest pain, nausea or vomiting.  States that he is having some chronic shortness of breath though unchanged today.  Also endorses chronic cough. (03 Jun 2025 18:32)      HPI:    74y Male for Cardiology Consult    PAST MEDICAL & SURGICAL HISTORY:  Prostate cancer      Type II diabetes mellitus      Chronic obstructive pulmonary disease (COPD)      CHF (congestive heart failure)      Renal insufficiency      H/O migraine      Insomnia      Constipation      S/P foot surgery          FAMILY HISTORY:      SOCIAL HISTORY:   Alcohol:  Smoking:    Allergies    IODINE (Unknown)  tetracycline (Unknown)  vancomycin (Other)    Intolerances        MEDICATIONS  (STANDING):  albuterol/ipratropium for Nebulization 3 milliLiter(s) Nebulizer every 8 hours  aspirin enteric coated 81 milliGRAM(s) Oral daily  budesonide    Inhalation Suspension 0.5 milliGRAM(s) Inhalation two times a day  dextrose 5%. 1000 milliLiter(s) (50 mL/Hr) IV Continuous <Continuous>  dextrose 5%. 1000 milliLiter(s) (100 mL/Hr) IV Continuous <Continuous>  dextrose 50% Injectable 25 Gram(s) IV Push once  dextrose 50% Injectable 12.5 Gram(s) IV Push once  dextrose 50% Injectable 25 Gram(s) IV Push once  ferrous    sulfate 325 milliGRAM(s) Oral daily  glucagon  Injectable 1 milliGRAM(s) IntraMuscular once  heparin   Injectable 5000 Unit(s) SubCutaneous every 8 hours  insulin glargine Injectable (LANTUS) 10 Unit(s) SubCutaneous at bedtime  insulin lispro (ADMELOG) corrective regimen sliding scale   SubCutaneous three times a day before meals  insulin lispro (ADMELOG) corrective regimen sliding scale   SubCutaneous at bedtime  montelukast 10 milliGRAM(s) Oral daily  multivitamin/minerals 1 Tablet(s) Oral daily  pantoprazole    Tablet 40 milliGRAM(s) Oral before breakfast  predniSONE   Tablet 10 milliGRAM(s) Oral daily  pregabalin 150 milliGRAM(s) Oral two times a day  rosuvastatin 40 milliGRAM(s) Oral at bedtime  saccharomyces boulardii 250 milliGRAM(s) Oral two times a day  senna 1 Tablet(s) Oral at bedtime  sertraline 100 milliGRAM(s) Oral daily  sodium chloride 0.9%. 1000 milliLiter(s) (60 mL/Hr) IV Continuous <Continuous>    MEDICATIONS  (PRN):  acetaminophen     Tablet .. 650 milliGRAM(s) Oral every 6 hours PRN Temp greater or equal to 38C (100.4F), Mild Pain (1 - 3)  aluminum hydroxide/magnesium hydroxide/simethicone Suspension 30 milliLiter(s) Oral every 4 hours PRN Dyspepsia  bisacodyl Suppository 10 milliGRAM(s) Rectal daily PRN Constipation  dextrose Oral Gel 15 Gram(s) Oral once PRN Blood Glucose LESS THAN 70 milliGRAM(s)/deciliter  melatonin 3 milliGRAM(s) Oral at bedtime PRN Insomnia  ondansetron Injectable 4 milliGRAM(s) IV Push every 8 hours PRN Nausea and/or Vomiting  oxyCODONE    IR 10 milliGRAM(s) Oral two times a day PRN Severe Pain (7 - 10)  oxyCODONE    IR 5 milliGRAM(s) Oral every 8 hours PRN Moderate Pain (4 - 6)  polyethylene glycol 3350 17 Gram(s) Oral daily PRN for constipation      REVIEW OF SYSTEMS:  CONSTITUTIONAL: No fever, weight loss, chills, shakes, or fat  RESPIRATORY: No cough, wheezing, hemoptysis, or shortness of breath  CARDIOVASCULAR: No chest pain, dyspnea, palpitations, dizziness, syncope, paroxysmal nocturnal dyspnea, orthopnea, or arm or leg swelling  GASTROINTESTINAL: No abdominal  or epigastric pain, nausea, vomiting, hematemesis, diarrhea, constipation, melena or bright red bloo  NEUROLOGICAL: No headaches, memory loss, slurred speech, limb weakness, loss of strength, numbness, or tremors  SKIN: No itching, burning, rashes, or lesions  ENDOCRINE: No heat or cold intolerance, or hair loss  MUSCULOSKELETAL: No joint pain or swelling, muscle, back, or extremity pain  HEME/LYMPH: No easy bruising or bleeding gums  ALLERY AND IMMUNOLOGIC: No hives or rash.    Vital Signs Last 24 Hrs  T(C): 36.6 (03 Jun 2025 21:30), Max: 36.9 (03 Jun 2025 14:28)  T(F): 97.8 (03 Jun 2025 21:30), Max: 98.5 (03 Jun 2025 14:28)  HR: 81 (03 Jun 2025 21:30) (81 - 89)  BP: 137/72 (03 Jun 2025 21:30) (110/57 - 137/72)  BP(mean): --  RR: 16 (03 Jun 2025 21:30) (16 - 16)  SpO2: 96% (03 Jun 2025 21:30) (92% - 96%)    Parameters below as of 03 Jun 2025 21:30  Patient On (Oxygen Delivery Method): room air        PHYSICAL EXAM:  HEAD:  Atraumatic, Normocephalic  EYES: EOMI, PERRLA, conjunctiva and sclera clear  NECK: Supple and normal thyroid.  No JVD or carotid bruit.   HEART: S1, S2 regular , 1/6 soft ejection systolic murmur in mitral area , no thrill and no gallops .  PULMONARY: Bilateral vesicular breathing , few scattered ronchi and few scattered rales are present .  ABDOMEN: Soft nontender and positive bowl sounds   EXTREMITIES:  No clubbing, cyanosis, or pedal  edema  NEUROLOGICAL: AAOX3 , no focal deficit .    LABS:                        12.5   9.64  )-----------( 160      ( 03 Jun 2025 14:45 )             38.6     06-03    138  |  98  |  42[H]  ----------------------------<  266[H]  3.9   |  27  |  2.00[H]    Ca    8.7      03 Jun 2025 14:45    TPro  7.2  /  Alb  3.1[L]  /  TBili  0.4  /  DBili  x   /  AST  17  /  ALT  36  /  AlkPhos  80  06-03        PT/INR - ( 03 Jun 2025 14:45 )   PT: 12.4 sec;   INR: 1.05 ratio         PTT - ( 03 Jun 2025 14:45 )  PTT:29.0 sec  Urinalysis Basic - ( 03 Jun 2025 14:45 )    Color: x / Appearance: x / SG: x / pH: x  Gluc: 266 mg/dL / Ketone: x  / Bili: x / Urobili: x   Blood: x / Protein: x / Nitrite: x   Leuk Esterase: x / RBC: x / WBC x   Sq Epi: x / Non Sq Epi: x / Bacteria: x      BNP      EKG:  ECHO:  IMAGING:    Assessment and Plan :     Will continue to follow during hospital course and give further recommendations as needed . Thanks for your referral . if any questions please contact me at office ([phone] [phone])

## 2025-06-03 NOTE — ED PROVIDER NOTE - PROGRESS NOTE DETAILS
Case discussed with Dr. Coreas from podiatry, advising admission for IV antibiotics.  Discussed with Dr. Diaz for admission.  Patient updated as well.  Chace Thornton MD.

## 2025-06-03 NOTE — ED ADULT NURSE NOTE - CHIEF COMPLAINT QUOTE
Pt brought in by EMS from [facility] with concern for worsening pedal edema, wound on R lower leg. 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

## 2025-06-03 NOTE — ED ADULT TRIAGE NOTE - TEMPERATURE IN FAHRENHEIT (DEGREES F)
I have reviewed discharge instructions with the patient. The patient verbalized understanding. Patient left ED via Discharge Method: ambulatory to Home with Family. Opportunity for questions and clarification provided. Patient given 0 scripts. To continue your aftercare when you leave the hospital, you may receive an automated call from our care team to check in on how you are doing. This is a free service and part of our promise to provide the best care and service to meet your aftercare needs.  If you have questions, or wish to unsubscribe from this service please call [phone]. Thank you for Choosing our [facility] Emergency Department.        Antonia Tony RN  12/03/22 9847
98.5

## 2025-06-03 NOTE — ED ADULT NURSE NOTE - OBJECTIVE STATEMENT
A/E decreased to bases,  NOted wet productive cough.  PT states this has been happening x 1 week.  Noted BLE swelling.  More predominantly to R LE.  Noted wound to R outer aspect leg. R LE reddened,   Cap refill decreased to toes, although < 5 secs.

## 2025-06-03 NOTE — H&P ADULT - PROBLEM SELECTOR PLAN 2
There is concern patient has right lower leg cellulitis rule out abscess  Continue IV abx and f/u ID consult  Lactate is wnl  Recs elevation right lower leg  Will consider surgical intervention if symptoms persist  Podiatry will follow up while in house

## 2025-06-03 NOTE — ED PROVIDER NOTE - PHYSICAL EXAMINATION
Constitutional: Awake, Alert, non-toxic. No acute distress.  HEAD: Normocephalic, atraumatic.   EYES: PERRL, EOM intact, conjunctiva and sclera are clear bilaterally.  ENT: External ears normal. No rhinorrhea, no tracheal deviation   NECK: Supple, non-tender  CARDIOVASCULAR: regular rate and rhythm.  RESPIRATORY: Normal respiratory effort; breath sounds CTAB, no wheezes or rales. Speaking in full sentences. No accessory muscle use. wet cough heard though lungs sound clear  ABDOMEN: Soft; non-tender, non-distended. No rebound or guarding.   MSK:  2+ b/l lower extremity edema, no deformities, wound to right anterior shin with eschar and surrounding erythema, some warmth, no fluctuance, distal aspect of foot is erythematous though he states is chronic.  SKIN: see msk above  NEURO: A&O x3. Sensory and motor functions are grossly intact. Speech is normal. No facial droop.

## 2025-06-03 NOTE — H&P ADULT - NSHPLABSRESULTS_GEN_ALL_CORE
< from: Xray Elbow AP + Lateral, Right (05.22.25 @ 14:20) >      INTERPRETATION:  Right elbow. 2 views. Patient has local wound.    There is a large elbow effusion. Is also swelling dorsal to the elbow.    There may be subcutaneous air in the antecubital fossa. There are mild   degenerative changes. No active bone finding.    IMPRESSION: Large elbow effusion.  There is also swelling dorsal to the elbow. Possible subcutaneous air in   the antecubital fossa. No acute bony finding.    < end of copied text >    < from: CT Chest No Cont (05.21.25 @ 22:41) >    LUNGS AND LARGE AIRWAYS: Biapical pleural-parenchymal scarring. There is   debris within the trachea and left mainstem bronchus. Mild bronchial wall   thickening. Mild tree-in-bud nodularity in the left lower lobe. Chronic   interstitial changes at lung bases with bronchiectasis.  PLEURA: No pleural effusion.  VESSELS: Aortic calcifications. Coronary artery calcifications.  HEART: Heart size is normal. No pericardial effusion.  MEDIASTINUM AND ANGIE: No lymphadenopathy.  CHEST WALL AND LOWER NECK: Within normal limits.  VISUALIZED UPPER ABDOMEN: Within normal limits.  BONES: Degenerative changes. Chronic left-sided rib fractures.    IMPRESSION:  Mild tree-in-bud nodularity in the left lower lobe, which may be   infectious/inflammatory.    < end of copied text >

## 2025-06-03 NOTE — CONSULT NOTE ADULT - SUBJECTIVE AND OBJECTIVE BOX
Patient is a 74y old  Male who presents with a chief complaint of Right lower leg pain and redness    HPI :74yoM PMH DM2, PAD, hypertension, COPD, CKD,  HFpEF, Hx of Rt  foot ulcer with osteomyelitis, rt lower EXT wound ,Tri geminal Neuralgia who presents to the Ed for right lower leg pain and redness. Patient has been noticed it for few days and it has been getting worse.     PAST MEDICAL & SURGICAL HISTORY:  Prostate cancer      Type II diabetes mellitus      Chronic obstructive pulmonary disease (COPD)      CHF (congestive heart failure)      Renal insufficiency      H/O migraine      Insomnia      Constipation      S/P foot surgery          MEDICATIONS  (STANDING):  DAPTOmycin IVPB 500 milliGRAM(s) IV Intermittent Once    MEDICATIONS  (PRN):      Allergies    IODINE (Unknown)  tetracycline (Unknown)  vancomycin (Other)    Intolerances        VITALS:    Vital Signs Last 24 Hrs  T(C): 36.9 (03 Jun 2025 14:28), Max: 36.9 (03 Jun 2025 14:28)  T(F): 98.5 (03 Jun 2025 14:28), Max: 98.5 (03 Jun 2025 14:28)  HR: 86 (03 Jun 2025 15:38) (86 - 89)  BP: 110/57 (03 Jun 2025 14:28) (110/57 - 110/57)  BP(mean): --  RR: 16 (03 Jun 2025 14:28) (16 - 16)  SpO2: 93% (03 Jun 2025 15:38) (92% - 93%)    Parameters below as of 03 Jun 2025 15:38  Patient On (Oxygen Delivery Method): room air        LABS:                          12.5   9.64  )-----------( 160      ( 03 Jun 2025 14:45 )             38.6       06-03    138  |  98  |  42[H]  ----------------------------<  266[H]  3.9   |  27  |  2.00[H]    Ca    8.7      03 Jun 2025 14:45    TPro  7.2  /  Alb  3.1[L]  /  TBili  0.4  /  DBili  x   /  AST  17  /  ALT  36  /  AlkPhos  80  06-03      CAPILLARY BLOOD GLUCOSE          PT/INR - ( 03 Jun 2025 14:45 )   PT: 12.4 sec;   INR: 1.05 ratio         PTT - ( 03 Jun 2025 14:45 )  PTT:29.0 sec    LOWER EXTREMITY PHYSICAL EXAM:  Integument : Skin warm, dry and supple bilateral  Right lower leg with redness, swelling and pain on palpation, partial thickness wound covered with eschar. All consistent with cellulitis   Vascular : DP and PT weakly palpable 1/4 bilaterally  Capillary refill time less than 3s digits 1-5 bilaterally. Moderate to severe edema bilaterally with right is greater than left  MSK : Muscle strenth 4/5 all major muscle group bilaterally

## 2025-06-03 NOTE — H&P ADULT - TIME BILLING
75minutes spent on this visit, 50% visit time spent in care co-ordination with other attendings and counselling patient ,writing admission orders ( see complete and current orders and order section) ,requesting necessary consults ,informing family about status & plan of care .I have discussed care plan with [facility] wellness/admitting /nursing   department ,outpatient PCP , hospital consultants , ER physician & med staff .

## 2025-06-04 DIAGNOSIS — J69.0 PNEUMONITIS DUE TO INHALATION OF FOOD AND VOMIT: ICD-10-CM

## 2025-06-04 LAB
ALBUMIN SERPL ELPH-MCNC: 3 G/DL — LOW (ref 3.3–5)
ALP SERPL-CCNC: 73 U/L — SIGNIFICANT CHANGE UP (ref 40–120)
ALT FLD-CCNC: 32 U/L — SIGNIFICANT CHANGE UP (ref 12–78)
ANION GAP SERPL CALC-SCNC: 7 MMOL/L — SIGNIFICANT CHANGE UP (ref 5–17)
AST SERPL-CCNC: 14 U/L — LOW (ref 15–37)
BASOPHILS # BLD AUTO: 0.04 K/UL — SIGNIFICANT CHANGE UP (ref 0–0.2)
BASOPHILS NFR BLD AUTO: 0.4 % — SIGNIFICANT CHANGE UP (ref 0–2)
BILIRUB SERPL-MCNC: 0.5 MG/DL — SIGNIFICANT CHANGE UP (ref 0.2–1.2)
BUN SERPL-MCNC: 36 MG/DL — HIGH (ref 7–23)
CALCIUM SERPL-MCNC: 9.2 MG/DL — SIGNIFICANT CHANGE UP (ref 8.5–10.1)
CHLORIDE SERPL-SCNC: 104 MMOL/L — SIGNIFICANT CHANGE UP (ref 96–108)
CO2 SERPL-SCNC: 30 MMOL/L — SIGNIFICANT CHANGE UP (ref 22–31)
CREAT SERPL-MCNC: 1.7 MG/DL — HIGH (ref 0.5–1.3)
EGFR: 42 ML/MIN/1.73M2 — LOW
EGFR: 42 ML/MIN/1.73M2 — LOW
EOSINOPHIL # BLD AUTO: 0.04 K/UL — SIGNIFICANT CHANGE UP (ref 0–0.5)
EOSINOPHIL NFR BLD AUTO: 0.4 % — SIGNIFICANT CHANGE UP (ref 0–6)
FLUAV AG NPH QL: SIGNIFICANT CHANGE UP
FLUBV AG NPH QL: SIGNIFICANT CHANGE UP
GLUCOSE SERPL-MCNC: 137 MG/DL — HIGH (ref 70–99)
HCT VFR BLD CALC: 37.9 % — LOW (ref 39–50)
HGB BLD-MCNC: 12.1 G/DL — LOW (ref 13–17)
IMM GRANULOCYTES NFR BLD AUTO: 2.3 % — HIGH (ref 0–0.9)
LACTATE SERPL-SCNC: 1.6 MMOL/L — SIGNIFICANT CHANGE UP (ref 0.7–2)
LYMPHOCYTES # BLD AUTO: 2.99 K/UL — SIGNIFICANT CHANGE UP (ref 1–3.3)
LYMPHOCYTES # BLD AUTO: 31.5 % — SIGNIFICANT CHANGE UP (ref 13–44)
MAGNESIUM SERPL-MCNC: 2.5 MG/DL — SIGNIFICANT CHANGE UP (ref 1.6–2.6)
MCHC RBC-ENTMCNC: 28.2 PG — SIGNIFICANT CHANGE UP (ref 27–34)
MCHC RBC-ENTMCNC: 31.9 G/DL — LOW (ref 32–36)
MCV RBC AUTO: 88.3 FL — SIGNIFICANT CHANGE UP (ref 80–100)
MONOCYTES # BLD AUTO: 1.44 K/UL — HIGH (ref 0–0.9)
MONOCYTES NFR BLD AUTO: 15.2 % — HIGH (ref 2–14)
NEUTROPHILS # BLD AUTO: 4.77 K/UL — SIGNIFICANT CHANGE UP (ref 1.8–7.4)
NEUTROPHILS NFR BLD AUTO: 50.2 % — SIGNIFICANT CHANGE UP (ref 43–77)
NRBC BLD AUTO-RTO: 0 /100 WBCS — SIGNIFICANT CHANGE UP (ref 0–0)
NT-PROBNP SERPL-SCNC: 123 PG/ML — SIGNIFICANT CHANGE UP (ref 0–125)
PHOSPHATE SERPL-MCNC: 2.9 MG/DL — SIGNIFICANT CHANGE UP (ref 2.5–4.5)
PLATELET # BLD AUTO: 143 K/UL — LOW (ref 150–400)
POTASSIUM SERPL-MCNC: 3.4 MMOL/L — LOW (ref 3.5–5.3)
POTASSIUM SERPL-SCNC: 3.4 MMOL/L — LOW (ref 3.5–5.3)
PROT SERPL-MCNC: 6.9 G/DL — SIGNIFICANT CHANGE UP (ref 6–8.3)
RBC # BLD: 4.29 M/UL — SIGNIFICANT CHANGE UP (ref 4.2–5.8)
RBC # FLD: 19 % — HIGH (ref 10.3–14.5)
RSV RNA NPH QL NAA+NON-PROBE: SIGNIFICANT CHANGE UP
SARS-COV-2 RNA SPEC QL NAA+PROBE: SIGNIFICANT CHANGE UP
SODIUM SERPL-SCNC: 141 MMOL/L — SIGNIFICANT CHANGE UP (ref 135–145)
SOURCE RESPIRATORY: SIGNIFICANT CHANGE UP
WBC # BLD: 9.5 K/UL — SIGNIFICANT CHANGE UP (ref 3.8–10.5)
WBC # FLD AUTO: 9.5 K/UL — SIGNIFICANT CHANGE UP (ref 3.8–10.5)

## 2025-06-04 PROCEDURE — 93010 ELECTROCARDIOGRAM REPORT: CPT

## 2025-06-04 PROCEDURE — 93970 EXTREMITY STUDY: CPT | Mod: 26

## 2025-06-04 PROCEDURE — 99223 1ST HOSP IP/OBS HIGH 75: CPT

## 2025-06-04 PROCEDURE — 76376 3D RENDER W/INTRP POSTPROCES: CPT | Mod: 26

## 2025-06-04 PROCEDURE — 71045 X-RAY EXAM CHEST 1 VIEW: CPT | Mod: 26

## 2025-06-04 PROCEDURE — 73700 CT LOWER EXTREMITY W/O DYE: CPT | Mod: 26,RT

## 2025-06-04 RX ORDER — CLOTRIMAZOLE 1 G/100G
1 CREAM TOPICAL
Refills: 0 | Status: DISCONTINUED | OUTPATIENT
Start: 2025-06-04 | End: 2025-06-10

## 2025-06-04 RX ORDER — FUROSEMIDE 10 MG/ML
60 INJECTION INTRAMUSCULAR; INTRAVENOUS DAILY
Refills: 0 | Status: DISCONTINUED | OUTPATIENT
Start: 2025-06-04 | End: 2025-06-08

## 2025-06-04 RX ORDER — INSULIN LISPRO 100 U/ML
INJECTION, SOLUTION INTRAVENOUS; SUBCUTANEOUS
Refills: 0 | Status: DISCONTINUED | OUTPATIENT
Start: 2025-06-04 | End: 2025-06-10

## 2025-06-04 RX ORDER — DAPTOMYCIN 500 MG/10ML
400 INJECTION, POWDER, LYOPHILIZED, FOR SOLUTION INTRAVENOUS EVERY 24 HOURS
Refills: 0 | Status: COMPLETED | OUTPATIENT
Start: 2025-06-04 | End: 2025-06-09

## 2025-06-04 RX ORDER — INSULIN LISPRO 100 U/ML
INJECTION, SOLUTION INTRAVENOUS; SUBCUTANEOUS AT BEDTIME
Refills: 0 | Status: DISCONTINUED | OUTPATIENT
Start: 2025-06-04 | End: 2025-06-10

## 2025-06-04 RX ORDER — CLONAZEPAM 0.5 MG/1
0.5 TABLET ORAL THREE TIMES A DAY
Refills: 0 | Status: DISCONTINUED | OUTPATIENT
Start: 2025-06-04 | End: 2025-06-10

## 2025-06-04 RX ADMIN — Medication 325 MILLIGRAM(S): at 11:42

## 2025-06-04 RX ADMIN — PREGABALIN 150 MILLIGRAM(S): 50 CAPSULE ORAL at 17:16

## 2025-06-04 RX ADMIN — MONTELUKAST SODIUM 10 MILLIGRAM(S): 10 TABLET ORAL at 11:43

## 2025-06-04 RX ADMIN — FUROSEMIDE 60 MILLIGRAM(S): 10 INJECTION INTRAMUSCULAR; INTRAVENOUS at 16:59

## 2025-06-04 RX ADMIN — Medication 1 TABLET(S): at 11:43

## 2025-06-04 RX ADMIN — Medication 3 MILLIGRAM(S): at 02:56

## 2025-06-04 RX ADMIN — Medication 25 GRAM(S): at 08:28

## 2025-06-04 RX ADMIN — IPRATROPIUM BROMIDE AND ALBUTEROL SULFATE 3 MILLILITER(S): .5; 2.5 SOLUTION RESPIRATORY (INHALATION) at 15:09

## 2025-06-04 RX ADMIN — BUDESONIDE 0.5 MILLIGRAM(S): 0.25 SUSPENSION RESPIRATORY (INHALATION) at 08:02

## 2025-06-04 RX ADMIN — DAPTOMYCIN 116 MILLIGRAM(S): 500 INJECTION, POWDER, LYOPHILIZED, FOR SOLUTION INTRAVENOUS at 20:20

## 2025-06-04 RX ADMIN — PREGABALIN 150 MILLIGRAM(S): 50 CAPSULE ORAL at 07:02

## 2025-06-04 RX ADMIN — HEPARIN SODIUM 5000 UNIT(S): 1000 INJECTION INTRAVENOUS; SUBCUTANEOUS at 16:59

## 2025-06-04 RX ADMIN — Medication 25 GRAM(S): at 16:58

## 2025-06-04 RX ADMIN — INSULIN GLARGINE-YFGN 10 UNIT(S): 100 INJECTION, SOLUTION SUBCUTANEOUS at 22:32

## 2025-06-04 RX ADMIN — IPRATROPIUM BROMIDE AND ALBUTEROL SULFATE 3 MILLILITER(S): .5; 2.5 SOLUTION RESPIRATORY (INHALATION) at 08:02

## 2025-06-04 RX ADMIN — Medication 40 MILLIEQUIVALENT(S): at 11:59

## 2025-06-04 RX ADMIN — BUDESONIDE 0.5 MILLIGRAM(S): 0.25 SUSPENSION RESPIRATORY (INHALATION) at 22:32

## 2025-06-04 RX ADMIN — Medication 200 GRAM(S): at 02:56

## 2025-06-04 RX ADMIN — Medication 25 GRAM(S): at 22:33

## 2025-06-04 RX ADMIN — Medication 40 MILLIGRAM(S): at 08:29

## 2025-06-04 RX ADMIN — ROSUVASTATIN CALCIUM 40 MILLIGRAM(S): 20 TABLET, FILM COATED ORAL at 22:49

## 2025-06-04 RX ADMIN — Medication 81 MILLIGRAM(S): at 11:42

## 2025-06-04 RX ADMIN — INSULIN LISPRO 8: 100 INJECTION, SOLUTION INTRAVENOUS; SUBCUTANEOUS at 12:00

## 2025-06-04 RX ADMIN — BUTYROSPERMUM PARKII(SHEA BUTTER), SIMMONDSIA CHINENSIS (JOJOBA) SEED OIL, ALOE BARBADENSIS LEAF EXTRACT 250 MILLIGRAM(S): .01; 1; 3.5 LIQUID TOPICAL at 07:02

## 2025-06-04 RX ADMIN — BUTYROSPERMUM PARKII(SHEA BUTTER), SIMMONDSIA CHINENSIS (JOJOBA) SEED OIL, ALOE BARBADENSIS LEAF EXTRACT 250 MILLIGRAM(S): .01; 1; 3.5 LIQUID TOPICAL at 17:16

## 2025-06-04 RX ADMIN — OXYCODONE HYDROCHLORIDE 10 MILLIGRAM(S): 30 TABLET ORAL at 00:15

## 2025-06-04 RX ADMIN — OXYCODONE HYDROCHLORIDE 5 MILLIGRAM(S): 30 TABLET ORAL at 11:59

## 2025-06-04 RX ADMIN — HEPARIN SODIUM 5000 UNIT(S): 1000 INJECTION INTRAVENOUS; SUBCUTANEOUS at 07:03

## 2025-06-04 RX ADMIN — SERTRALINE 100 MILLIGRAM(S): 100 TABLET, FILM COATED ORAL at 11:43

## 2025-06-04 RX ADMIN — HEPARIN SODIUM 5000 UNIT(S): 1000 INJECTION INTRAVENOUS; SUBCUTANEOUS at 22:40

## 2025-06-04 RX ADMIN — PREDNISONE 10 MILLIGRAM(S): 20 TABLET ORAL at 07:02

## 2025-06-04 RX ADMIN — INSULIN LISPRO 6: 100 INJECTION, SOLUTION INTRAVENOUS; SUBCUTANEOUS at 17:10

## 2025-06-04 NOTE — CONSULT NOTE ADULT - ATTENDING COMMENTS
reviewed medical history, physical exam and care plan   note read and reviewed
Patient is a 73 y/o M with a past medical history of diabetes, COPD, heart failure, CKD, hypertension, history of right foot ulcer is presenting for wound to right lower extremity.  Was recently admitted here for shortness of breath as well as for bursitis and concern for a wound.  Was sent back to facility.  They sent him to the ER today due to worsening swelling and redness localized to his wound of the right lower extremity. Admitted for RLE cellulitis.     - Pt p/w edema/erythema of RLE  - Abx as per primary for cellulitis   - Pt recently admitted for SOB  - Patient without symptoms of angina at this time or SOB but with a slight cough   - EKG with SR first degree, LAD, PRWP, inferior infarct  -C/w home statin for HLD  - Obtain LE dopplers  - Known HFpEF on Lasix 40mg PO daily   - Previous TTE 05/2025 with EF 60-65% and trace TR  - Would give 60mg IV lasix x 1, then resume PO regimen. Pro BNP is normal.   - Strict I/Os, daily weights

## 2025-06-04 NOTE — CARE COORDINATION ASSESSMENT. - NS SW HOME EQUIPMENT
Patient states he has a RW and wheelchair at [facility].  Typically can ambulate with assist but its easier to just stay in wheelchair/walker/wheelchair

## 2025-06-04 NOTE — CARE COORDINATION ASSESSMENT. - NSCAREPROVIDERS_GEN_ALL_CORE_FT
CARE PROVIDERS:  Accepting Physician: Ariela Diaz  Administration: April Bright  Administration: Monroe Frias  Administration: David Jay  Administration: Marian Carter  Admitting: Ariela Diaz  Attending: Ariela Diaz  Case Management: Giovani Conley  Consultant: Victoriano Foley  Consultant: Nick Maciel  Consultant: Megan Mosqueda  Consultant: Flakito Coreas  ED Attending: Chace Thornton ED Nurse: Marika Vela  Nurse: Afia Vaughn  Nurse: Gabrielle Christianson  Nurse: Reyes Prieto  Nurse: Marika Vela  Nurse: Gabrielle Barney  Ordered: ServiceAccount, SCMMLM  Ordered: ServiceAccount, SCMMLM  Ordered: ServiceAccount, SCMMLM  Ordered: Doctor, Unknown  Override: Codey Gonzalez  PCA/Nursing Assistant: Oralia Howard  Primary Team: Awa Ace  Respiratory Therapy: Trent Yates  UR// Supp. Assoc.: Alistair Park  UR// Supp. Assoc.: Ani Mondragon

## 2025-06-04 NOTE — CONSULT NOTE ADULT - ASSESSMENT
Patient with a past medical history of diabetes, COPD, heart failure, CKD, hypertension, history of right foot ulcer is presenting for wound to right lower extremity.  Was recently admitted here for shortness of breath as well as for bursitis and concern for a wound.  Was sent back to facility.  They sent him to the ER today due to worsening swelling and redness localized to his wound of the right lower extremity.  Endorsing pain to the site.  Denies fevers, chest pain, nausea or vomiting.  States that he is having some chronic shortness of breath though unchanged today.  Also endorses chronic cough. (03 Jun 2025 18:32)      CKD sTAGE 3 and ACUTE RENAL FAILURE:   Serum creatinine is  at  1.7   , approximating GFR at   ml/min. id overload furosemide   Injectable 60 milliGRAM(s) IV Push daily  There is no progression . No uremic symptoms  No evidence of anemia .  Fluid status stable.  Will continue to avoid nephrotoxic drugs.  Patient remains asymptomatic.   Continue current therapy.  Additional evaluation:   ECG,    echocardiogram,     CXR,  will obtained recent   renal ultrasound to evalaute kidney size and possible stones ,  potassium chloride    Tablet ER 20 milliEquivalent(s) Oral daily    BP monitoring,continue current antihypertensive meds, low salt diet,followup with PMD in 1-2 weeks      Admit for septic workup and ID evaluation,send blood and urine cx,serial lactate levels,monitor vitals closley,ivfs hydration,monitor urine output and renal profile,iv abx as per id cons  piperacillin/tazobactam IVPB.. 3.375 Gram(s) IV Intermittent every 8 hours

## 2025-06-04 NOTE — CARE COORDINATION ASSESSMENT. - PRO ARRIVE FROM
[facility]  ph: [phone]  fax: [phone]    short form or 3122 pending medical course  meds to IP   Discharged 5/27 with [facility]/assisted living facility

## 2025-06-04 NOTE — CARE COORDINATION ASSESSMENT. - READMITTED REASON YN
Patient previously admitted with PN and discharged back to [facility] with HA services from [facility].  Patient returns with celluliltis/Yes

## 2025-06-04 NOTE — CONSULT NOTE ADULT - SUBJECTIVE AND OBJECTIVE BOX
[facility]  INFECTIOUS DISEASES - [address], [place] [address]  Tel: [phone]     Fax: [phone]  =======================================================    [id number]  WAYNE SERRANO     CC: Patient is a 74y old  Male who presents with a chief complaint of rle swelling (04 Jun 2025 12:42)    HPI:  74 year old male with past medical history of diabetes, COPD, heart failure, CKD, hypertension, history of right foot ulcer, who presented for wound to right lower extremity.  Was recently admitted here for shortness of breath as well as for bursitis and concern for a wound.  Was sent back to facility.  They sent him to the ER today due to worsening swelling and redness localized to his wound of the right lower extremity.  Endorsing pain to the site.  Denies fevers, chest pain, nausea or vomiting.  States that he is having some chronic shortness of breath and increased cough. Denies any fevers. Of note was admitted last month, received antibiotics for possible pneumonia and RLE open wound and erythema. Also with R elbow swelling and bursitis.      PAST MEDICAL & SURGICAL HISTORY:  Prostate cancer      Type II diabetes mellitus      Chronic obstructive pulmonary disease (COPD)      CHF (congestive heart failure)      Renal insufficiency      H/O migraine      Insomnia      Constipation      S/P foot surgery          Social Hx:     FAMILY HISTORY:      Allergies    IODINE (Unknown)  tetracycline (Unknown)  vancomycin (Other)    Intolerances        Antibiotics:  MEDICATIONS  (STANDING):  albuterol/ipratropium for Nebulization 3 milliLiter(s) Nebulizer every 8 hours  aspirin enteric coated 81 milliGRAM(s) Oral daily  budesonide    Inhalation Suspension 0.5 milliGRAM(s) Inhalation two times a day  DAPTOmycin IVPB 400 milliGRAM(s) IV Intermittent every 24 hours  dextrose 5%. 1000 milliLiter(s) (50 mL/Hr) IV Continuous <Continuous>  dextrose 5%. 1000 milliLiter(s) (100 mL/Hr) IV Continuous <Continuous>  dextrose 50% Injectable 25 Gram(s) IV Push once  dextrose 50% Injectable 12.5 Gram(s) IV Push once  dextrose 50% Injectable 25 Gram(s) IV Push once  ferrous    sulfate 325 milliGRAM(s) Oral daily  glucagon  Injectable 1 milliGRAM(s) IntraMuscular once  heparin   Injectable 5000 Unit(s) SubCutaneous every 8 hours  insulin glargine Injectable (LANTUS) 10 Unit(s) SubCutaneous at bedtime  insulin lispro (ADMELOG) corrective regimen sliding scale   SubCutaneous at bedtime  insulin lispro (ADMELOG) corrective regimen sliding scale.   SubCutaneous three times a day before meals  montelukast 10 milliGRAM(s) Oral daily  multivitamin/minerals 1 Tablet(s) Oral daily  pantoprazole    Tablet 40 milliGRAM(s) Oral before breakfast  piperacillin/tazobactam IVPB.. 3.375 Gram(s) IV Intermittent every 8 hours  predniSONE   Tablet 10 milliGRAM(s) Oral daily  pregabalin 150 milliGRAM(s) Oral two times a day  rosuvastatin 40 milliGRAM(s) Oral at bedtime  saccharomyces boulardii 250 milliGRAM(s) Oral two times a day  senna 1 Tablet(s) Oral at bedtime  sertraline 100 milliGRAM(s) Oral daily  sodium chloride 0.9%. 1000 milliLiter(s) (60 mL/Hr) IV Continuous <Continuous>    MEDICATIONS  (PRN):  acetaminophen     Tablet .. 650 milliGRAM(s) Oral every 6 hours PRN Temp greater or equal to 38C (100.4F), Mild Pain (1 - 3)  aluminum hydroxide/magnesium hydroxide/simethicone Suspension 30 milliLiter(s) Oral every 4 hours PRN Dyspepsia  bisacodyl Suppository 10 milliGRAM(s) Rectal daily PRN Constipation  dextrose Oral Gel 15 Gram(s) Oral once PRN Blood Glucose LESS THAN 70 milliGRAM(s)/deciliter  melatonin 3 milliGRAM(s) Oral at bedtime PRN Insomnia  ondansetron Injectable 4 milliGRAM(s) IV Push every 8 hours PRN Nausea and/or Vomiting  oxyCODONE    IR 10 milliGRAM(s) Oral two times a day PRN Severe Pain (7 - 10)  oxyCODONE    IR 5 milliGRAM(s) Oral every 8 hours PRN Moderate Pain (4 - 6)  polyethylene glycol 3350 17 Gram(s) Oral daily PRN for constipation       REVIEW OF SYSTEMS:  CONSTITUTIONAL:  No Fever or chills  CARDIOVASCULAR:  No chest pain   RESPIRATORY:  see history  GASTROINTESTINAL:  No nausea, vomiting or diarrhea.  GENITOURINARY:  No dysuria  MUSCULOSKELETAL: + RLE pain  NEUROLOGIC:  No headache or dizziness      Physical Exam:  Vital Signs Last 24 Hrs  T(C): 36.6 (04 Jun 2025 11:56), Max: 36.9 (03 Jun 2025 14:28)  T(F): 97.8 (04 Jun 2025 11:56), Max: 98.5 (03 Jun 2025 14:28)  HR: 80 (04 Jun 2025 11:56) (74 - 89)  BP: 130/72 (04 Jun 2025 11:56) (110/57 - 137/72)  BP(mean): --  RR: 18 (04 Jun 2025 11:56) (16 - 18)  SpO2: 96% (04 Jun 2025 11:56) (92% - 96%)    Parameters below as of 04 Jun 2025 11:56  Patient On (Oxygen Delivery Method): room air      Height (cm): 177.8 (06-03 @ 14:28)  Weight (kg): 104.3 (06-03 @ 14:28)  BMI (kg/m2): 33 (06-03 @ 14:28)  BSA (m2): 2.21 (06-03 @ 14:28)  GEN: NAD  HEENT: normocephalic and atraumatic.   NECK: Supple.   LUNGS: Normal respiratory effort  HEART: Regular rate and rhythm   ABDOMEN: Soft, nontender, and nondistended.    EXTREMITIES: B/L lower leg edema  NEUROLOGIC: grossly intact.  PSYCHIATRIC: Appropriate affect .  SKIN: + redness on R lower leg, scab noted but no drainage noted, no increased warmth  + pustule on L lower leg    Labs:  06-04    141  |  104  |  36[H]  ----------------------------<  137[H]  3.4[L]   |  30  |  1.70[H]    Ca    9.2      04 Jun 2025 06:05  Phos  2.9     06-04  Mg     2.5     06-04    TPro  6.9  /  Alb  3.0[L]  /  TBili  0.5  /  DBili  x   /  AST  14[L]  /  ALT  32  /  AlkPhos  73  06-04                          12.1   9.50  )-----------( 143      ( 04 Jun 2025 06:05 )             37.9     PT/INR - ( 03 Jun 2025 14:45 )   PT: 12.4 sec;   INR: 1.05 ratio         PTT - ( 03 Jun 2025 14:45 )  PTT:29.0 sec  Urinalysis Basic - ( 04 Jun 2025 06:05 )    Color: x / Appearance: x / SG: x / pH: x  Gluc: 137 mg/dL / Ketone: x  / Bili: x / Urobili: x   Blood: x / Protein: x / Nitrite: x   Leuk Esterase: x / RBC: x / WBC x   Sq Epi: x / Non Sq Epi: x / Bacteria: x      LIVER FUNCTIONS - ( 04 Jun 2025 06:05 )  Alb: 3.0 g/dL / Pro: 6.9 g/dL / ALK PHOS: 73 U/L / ALT: 32 U/L / AST: 14 U/L / GGT: x                   C-Reactive Protein: 48 mg/L (05-22-25 @ 14:25)    Sedimentation Rate, Erythrocyte: 65 mm/hr (05-22-25 @ 14:25)    SARS-CoV-2: NotDetec (05-22-25 @ 00:15)      RECENT CULTURES:  05-22 @ 01:15 Blood Blood-Peripheral     No growth at 5 days        05-22 @ 00:15          NotDetec        All imaging and other data have been reviewed.    < from: Xray Ankle Complete 3 Views, Right (06.03.25 @ 16:01) >  Right Tibia and Fibula.    Frontal and lateral projections obtained by portable technique fail to   demonstrate any evidence of fracture. A right knee replacement is noted   in position. No destructive bony lesion is noted. No abnormal periosteal   reaction can be appreciated. There is diffuse soft tissue edema. Some   vascular calcification is noted. No opaque foreign body or air within the   soft tissue is identified.    IMPRESSION: No evidence of fracture or bony destruction of theright   tibia or fibula.    Right Ankle.    Frontal, oblique and lateral projections taken by portable technique   demonstrate satisfactory alignment of the bony structures. The ankle   mortise is well maintained. No acute fracture is noted. There isno   evidence of destructive bony lesion. Some vascular calcification is again   noted. No opaque foreign body or air within the soft tissue is noted.   Diffuse soft tissue edema is again appreciated.    IMPRESSION: No evidence of fracture or bony destruction of the right   ankle.    --- End of Report ---    < end of copied text >

## 2025-06-04 NOTE — CARE COORDINATION ASSESSMENT. - OTHER PERTINENT DISCHARGE PLANNING INFORMATION:
Met with patient at bedside to discuss the role of case management with verbalized understanding.  Patient seen in ED and anticipate inpatient admission for right foot cellulitis.  Patient is a resident of [facility] with return criteria noted above.  Recently discharged with [facility] services from [facility] which will need reinstatement on D/C.  Will monitor for additional transition needs and remain available.  PCP Dr Angelito Humphrey

## 2025-06-04 NOTE — CONSULT NOTE ADULT - SUBJECTIVE AND OBJECTIVE BOX
[facility]         Amador Suarez Patel, Savella, Sae      [phone] (office)    Reason for Consult:    Interval HPI: Patient seen and examined at bedside. No acute events overnight.     HPI:  Patient is a 75 y/o M with a past medical history of diabetes, COPD, heart failure, CKD, hypertension, history of right foot ulcer is presenting for wound to right lower extremity.  Was recently admitted here for shortness of breath as well as for bursitis and concern for a wound.  Was sent back to facility.  They sent him to the ER today due to worsening swelling and redness localized to his wound of the right lower extremity.  Endorsing pain to the site.  Denies fevers, chest pain, nausea or vomiting.  States that he is having some chronic shortness of breath though unchanged today.  Also endorses chronic cough. (2025 18:32)      PAST MEDICAL & SURGICAL HISTORY:  Prostate cancer      Type II diabetes mellitus      Chronic obstructive pulmonary disease (COPD)      CHF (congestive heart failure)      Renal insufficiency      H/O migraine      Insomnia      Constipation      S/P foot surgery          SOCIAL HISTORY: No active tobacco, alcohol or illicit drug use    FAMILY HISTORY:      Home Medications:  albuterol 0.63 mg/3 mL (0.021%) inhalation solution: 3 milliliter(s) by nebulizer 3 times a day as needed for  shortness of breath and/or wheezing (2025 08:48)  amoxicillin-clavulanate 875 mg-125 mg oral tablet: 1 tab(s) orally 2 times a day for 7 days (2025 08:48)  Artificial Tears ophthalmic solution: 1 drop(s) in each eye 2 times a day (2025 08:48)  ascorbic acid 500 mg oral tablet: 1 tab(s) orally 2 times a day (2025 08:48)  aspirin 81 mg oral tablet: 1 tab(s) orally once a day (2025 08:48)  clonazePAM 0.25 mg oral tablet, disintegratin tab(s) orally 3 times a day (2025 08:48)  ferrous sulfate 325 mg (65 mg elemental iron) oral tablet: 1 tab(s) orally once a day (2025 08:48)  furosemide 40 mg oral tablet: 1 tab(s) orally every 12 hours (2025 08:48)  HumaLOG 100 units/mL subcutaneous solution: Inject subcutaneously 3 times a day using sliding scale (:48)  HumaLOG KwikPen 100 units/mL injectable solution: Inject 15 units subcutaneously 3 times a day (before each meal) in addition to sliding scale (:48)  Jardiance 10 mg oral tablet: 1 tab(s) orally once a day (:48)  Lantus Solostar Pen 100 units/mL subcutaneous solution: 36 unit(s) subcutaneously once a day (at bedtime) (:48)  loperamide 2 mg oral capsule: 1 cap(s) orally after each loose BM every 6 hours as needed **No more than 3 capsules (6mg) a day** (48)  loratadine 10 mg oral tablet: 1 tab(s) orally once a day (48)  Melatonin 3 mg oral tablet: 1 tab(s) orally once a day (at bedtime) (:48)  montelukast 10 mg oral tablet: 1 tab(s) orally once a day (at bedtime) (:48)  multivitamin: 1 tab(s) orally once a day (:48)  oxyCODONE 5 mg oral tablet: 2 tab(s) orally 2 times a day MDD: 4 tablets (48)  pantoprazole 40 mg oral delayed release tablet: 1 tab(s) orally once a day (:48)  Polyethylene Glycol 3350: Mix 17grams into 8oz of water and drink orally once a day as needed (:48)  Potassium Chloride (Eqv-Klor-Con M20) 20 mEq oral tablet, extended release: 1 tab(s) orally 2 times a day (:48)  predniSONE 10 mg oral tablet: 1 tab(s) orally once a day with food or milk for 1 month, then after 1 month take 5mg orally once a day (:48)  pregabalin 150 mg oral capsule: 1 cap(s) orally 2 times a day MDD: 2 capsules (:48)  ramelteon 8 mg oral tablet: 1 tab(s) orally once a day (at bedtime) (2025 08:48)  rosuvastatin 40 mg oral tablet: 1 tab(s) orally once a day (at bedtime) (2025 08:48)  saccharomyces boulardii lyo 250 mg oral capsule: 1 cap(s) orally once a day (2025 08:48)  Saline Mist 0.65% nasal spray: 1 spray(s) in each nostril 2 times a day (2025 08:48)  Senna 8.6 mg oral tablet: 1 tab(s) orally once a day (at bedtime) (2025 08:48)  sertraline 100 mg oral tablet: 1.5 tab(s) orally once a day (150mg) (2025 08:48)  Symbicort 160 mcg-4.5 mcg/inh inhalation aerosol: 2 puff(s) inhaled 2 times a day (2025 08:48)      MEDICATIONS  (STANDING):  albuterol/ipratropium for Nebulization 3 milliLiter(s) Nebulizer every 8 hours  aspirin enteric coated 81 milliGRAM(s) Oral daily  budesonide    Inhalation Suspension 0.5 milliGRAM(s) Inhalation two times a day  DAPTOmycin IVPB 400 milliGRAM(s) IV Intermittent every 24 hours  dextrose 5%. 1000 milliLiter(s) (50 mL/Hr) IV Continuous <Continuous>  dextrose 5%. 1000 milliLiter(s) (100 mL/Hr) IV Continuous <Continuous>  dextrose 50% Injectable 25 Gram(s) IV Push once  dextrose 50% Injectable 12.5 Gram(s) IV Push once  dextrose 50% Injectable 25 Gram(s) IV Push once  ferrous    sulfate 325 milliGRAM(s) Oral daily  glucagon  Injectable 1 milliGRAM(s) IntraMuscular once  heparin   Injectable 5000 Unit(s) SubCutaneous every 8 hours  insulin glargine Injectable (LANTUS) 10 Unit(s) SubCutaneous at bedtime  insulin lispro (ADMELOG) corrective regimen sliding scale   SubCutaneous at bedtime  insulin lispro (ADMELOG) corrective regimen sliding scale.   SubCutaneous three times a day before meals  montelukast 10 milliGRAM(s) Oral daily  multivitamin/minerals 1 Tablet(s) Oral daily  pantoprazole    Tablet 40 milliGRAM(s) Oral before breakfast  piperacillin/tazobactam IVPB.. 3.375 Gram(s) IV Intermittent every 8 hours  predniSONE   Tablet 10 milliGRAM(s) Oral daily  pregabalin 150 milliGRAM(s) Oral two times a day  rosuvastatin 40 milliGRAM(s) Oral at bedtime  saccharomyces boulardii 250 milliGRAM(s) Oral two times a day  senna 1 Tablet(s) Oral at bedtime  sertraline 100 milliGRAM(s) Oral daily  sodium chloride 0.9%. 1000 milliLiter(s) (60 mL/Hr) IV Continuous <Continuous>    MEDICATIONS  (PRN):  acetaminophen     Tablet .. 650 milliGRAM(s) Oral every 6 hours PRN Temp greater or equal to 38C (100.4F), Mild Pain (1 - 3)  aluminum hydroxide/magnesium hydroxide/simethicone Suspension 30 milliLiter(s) Oral every 4 hours PRN Dyspepsia  bisacodyl Suppository 10 milliGRAM(s) Rectal daily PRN Constipation  dextrose Oral Gel 15 Gram(s) Oral once PRN Blood Glucose LESS THAN 70 milliGRAM(s)/deciliter  melatonin 3 milliGRAM(s) Oral at bedtime PRN Insomnia  ondansetron Injectable 4 milliGRAM(s) IV Push every 8 hours PRN Nausea and/or Vomiting  oxyCODONE    IR 10 milliGRAM(s) Oral two times a day PRN Severe Pain (7 - 10)  oxyCODONE    IR 5 milliGRAM(s) Oral every 8 hours PRN Moderate Pain (4 - 6)  polyethylene glycol 3350 17 Gram(s) Oral daily PRN for constipation      Allergies    IODINE (Unknown)  tetracycline (Unknown)  vancomycin (Other)    Intolerances        REVIEW OF SYSTEMS: Negative except as per HPI.    VITAL SIGNS:   Vital Signs Last 24 Hrs  T(C): 36.6 (2025 11:56), Max: 36.9 (2025 14:28)  T(F): 97.8 (2025 11:56), Max: 98.5 (2025 14:28)  HR: 80 (2025 11:56) (74 - 89)  BP: 130/72 (2025 11:56) (110/57 - 137/72)  BP(mean): --  RR: 18 (2025 11:56) (16 - 18)  SpO2: 96% (2025 11:56) (92% - 96%)    Parameters below as of 2025 11:56  Patient On (Oxygen Delivery Method): room air        I&O's Summary      PHYSICAL EXAM:  Constitutional: NAD, well-developed  HEENT NC/AT, moist mucous membranes  Pulmonary: Non-labored, breath sounds are clear bilaterally, no wheezing, rales or rhonchi  Cardiovascular: +S1, S2, RRR, no murmur  Gastrointestinal: Soft, nontender, nondistended, normoactive bowel sounds  Extremities: No peripheral edema   Neurological: Alert, strength and sensitivity are grossly intact  Skin: No obvious lesions/rashes  Psych: Mood & affect appropriate    LABS: All Labs Reviewed:                        12.1   9.50  )-----------( 143      ( 2025 06:05 )             37.9                         12.5   9.64  )-----------( 160      ( 2025 14:45 )             38.6     2025 06:05    141    |  104    |  36     ----------------------------<  137    3.4     |  30     |  1.70   2025 14:45    138    |  98     |  42     ----------------------------<  266    3.9     |  27     |  2.00     Ca    9.2        2025 06:05  Ca    8.7        2025 14:45  Phos  2.9       2025 06:05  Mg     2.5       2025 06:05    TPro  6.9    /  Alb  3.0    /  TBili  0.5    /  DBili  x      /  AST  14     /  ALT  32     /  AlkPhos  73     2025 06:05  TPro  7.2    /  Alb  3.1    /  TBili  0.4    /  DBili  x      /  AST  17     /  ALT  36     /  AlkPhos  80     2025 14:45    PT/INR - ( 2025 14:45 )   PT: 12.4 sec;   INR: 1.05 ratio         PTT - ( 2025 14:45 )  PTT:29.0 sec      Blood Culture:        [facility]         Amador Suarez Patel, Savella, Sae      [phone] (office)    Reason for Consult:    Interval HPI: Patient seen and examined at bedside. No acute events overnight. Pt states his RLE began to have erythema and edema and he got concerned. He was just admitted here recently in May for SOB and wound. Pt states that Dr. Rowland at [place] is his outpatient cardiologist. Pt notes that he recently increased his Lasix but pt wasn't able to tolerate it so it was lowered again. Pt currently denies SOB, CP, HA, dizziness.     HPI:  Patient is a 73 y/o M with a past medical history of diabetes, COPD, heart failure, CKD, hypertension, history of right foot ulcer is presenting for wound to right lower extremity.  Was recently admitted here for shortness of breath as well as for bursitis and concern for a wound.  Was sent back to facility.  They sent him to the ER today due to worsening swelling and redness localized to his wound of the right lower extremity.  Endorsing pain to the site.  Denies fevers, chest pain, nausea or vomiting.  States that he is having some chronic shortness of breath though unchanged today.  Also endorses chronic cough. (2025 18:32)      PAST MEDICAL & SURGICAL HISTORY:  Prostate cancer      Type II diabetes mellitus      Chronic obstructive pulmonary disease (COPD)      CHF (congestive heart failure)      Renal insufficiency      H/O migraine      Insomnia      Constipation      S/P foot surgery          SOCIAL HISTORY: No active tobacco, alcohol or illicit drug use    FAMILY HISTORY:      Home Medications:  albuterol 0.63 mg/3 mL (0.021%) inhalation solution: 3 milliliter(s) by nebulizer 3 times a day as needed for  shortness of breath and/or wheezing (2025 08:48)  amoxicillin-clavulanate 875 mg-125 mg oral tablet: 1 tab(s) orally 2 times a day for 7 days (2025 08:48)  Artificial Tears ophthalmic solution: 1 drop(s) in each eye 2 times a day (2025 08:48)  ascorbic acid 500 mg oral tablet: 1 tab(s) orally 2 times a day (2025 08:48)  aspirin 81 mg oral tablet: 1 tab(s) orally once a day (2025 08:48)  clonazePAM 0.25 mg oral tablet, disintegratin tab(s) orally 3 times a day (:48)  ferrous sulfate 325 mg (65 mg elemental iron) oral tablet: 1 tab(s) orally once a day (:48)  furosemide 40 mg oral tablet: 1 tab(s) orally every 12 hours (:48)  HumaLOG 100 units/mL subcutaneous solution: Inject subcutaneously 3 times a day using sliding scale (48)  HumaLOG KwikPen 100 units/mL injectable solution: Inject 15 units subcutaneously 3 times a day (before each meal) in addition to sliding scale (:48)  Jardiance 10 mg oral tablet: 1 tab(s) orally once a day (:48)  Lantus Solostar Pen 100 units/mL subcutaneous solution: 36 unit(s) subcutaneously once a day (at bedtime) (:48)  loperamide 2 mg oral capsule: 1 cap(s) orally after each loose BM every 6 hours as needed **No more than 3 capsules (6mg) a day** (:48)  loratadine 10 mg oral tablet: 1 tab(s) orally once a day (:48)  Melatonin 3 mg oral tablet: 1 tab(s) orally once a day (at bedtime) (:48)  montelukast 10 mg oral tablet: 1 tab(s) orally once a day (at bedtime) (:48)  multivitamin: 1 tab(s) orally once a day (:48)  oxyCODONE 5 mg oral tablet: 2 tab(s) orally 2 times a day MDD: 4 tablets (:48)  pantoprazole 40 mg oral delayed release tablet: 1 tab(s) orally once a day (:48)  Polyethylene Glycol 3350: Mix 17grams into 8oz of water and drink orally once a day as needed (:48)  Potassium Chloride (Eqv-Klor-Con M20) 20 mEq oral tablet, extended release: 1 tab(s) orally 2 times a day (:48)  predniSONE 10 mg oral tablet: 1 tab(s) orally once a day with food or milk for 1 month, then after 1 month take 5mg orally once a day (2025 08:48)  pregabalin 150 mg oral capsule: 1 cap(s) orally 2 times a day MDD: 2 capsules (2025 08:48)  ramelteon 8 mg oral tablet: 1 tab(s) orally once a day (at bedtime) (2025 08:48)  rosuvastatin 40 mg oral tablet: 1 tab(s) orally once a day (at bedtime) (2025 08:48)  saccharomyces boulardii lyo 250 mg oral capsule: 1 cap(s) orally once a day (2025 08:48)  Saline Mist 0.65% nasal spray: 1 spray(s) in each nostril 2 times a day (2025 08:48)  Senna 8.6 mg oral tablet: 1 tab(s) orally once a day (at bedtime) (2025 08:48)  sertraline 100 mg oral tablet: 1.5 tab(s) orally once a day (150mg) (2025 08:48)  Symbicort 160 mcg-4.5 mcg/inh inhalation aerosol: 2 puff(s) inhaled 2 times a day (2025 08:48)      MEDICATIONS  (STANDING):  albuterol/ipratropium for Nebulization 3 milliLiter(s) Nebulizer every 8 hours  aspirin enteric coated 81 milliGRAM(s) Oral daily  budesonide    Inhalation Suspension 0.5 milliGRAM(s) Inhalation two times a day  DAPTOmycin IVPB 400 milliGRAM(s) IV Intermittent every 24 hours  dextrose 5%. 1000 milliLiter(s) (50 mL/Hr) IV Continuous <Continuous>  dextrose 5%. 1000 milliLiter(s) (100 mL/Hr) IV Continuous <Continuous>  dextrose 50% Injectable 25 Gram(s) IV Push once  dextrose 50% Injectable 12.5 Gram(s) IV Push once  dextrose 50% Injectable 25 Gram(s) IV Push once  ferrous    sulfate 325 milliGRAM(s) Oral daily  glucagon  Injectable 1 milliGRAM(s) IntraMuscular once  heparin   Injectable 5000 Unit(s) SubCutaneous every 8 hours  insulin glargine Injectable (LANTUS) 10 Unit(s) SubCutaneous at bedtime  insulin lispro (ADMELOG) corrective regimen sliding scale   SubCutaneous at bedtime  insulin lispro (ADMELOG) corrective regimen sliding scale.   SubCutaneous three times a day before meals  montelukast 10 milliGRAM(s) Oral daily  multivitamin/minerals 1 Tablet(s) Oral daily  pantoprazole    Tablet 40 milliGRAM(s) Oral before breakfast  piperacillin/tazobactam IVPB.. 3.375 Gram(s) IV Intermittent every 8 hours  predniSONE   Tablet 10 milliGRAM(s) Oral daily  pregabalin 150 milliGRAM(s) Oral two times a day  rosuvastatin 40 milliGRAM(s) Oral at bedtime  saccharomyces boulardii 250 milliGRAM(s) Oral two times a day  senna 1 Tablet(s) Oral at bedtime  sertraline 100 milliGRAM(s) Oral daily  sodium chloride 0.9%. 1000 milliLiter(s) (60 mL/Hr) IV Continuous <Continuous>    MEDICATIONS  (PRN):  acetaminophen     Tablet .. 650 milliGRAM(s) Oral every 6 hours PRN Temp greater or equal to 38C (100.4F), Mild Pain (1 - 3)  aluminum hydroxide/magnesium hydroxide/simethicone Suspension 30 milliLiter(s) Oral every 4 hours PRN Dyspepsia  bisacodyl Suppository 10 milliGRAM(s) Rectal daily PRN Constipation  dextrose Oral Gel 15 Gram(s) Oral once PRN Blood Glucose LESS THAN 70 milliGRAM(s)/deciliter  melatonin 3 milliGRAM(s) Oral at bedtime PRN Insomnia  ondansetron Injectable 4 milliGRAM(s) IV Push every 8 hours PRN Nausea and/or Vomiting  oxyCODONE    IR 10 milliGRAM(s) Oral two times a day PRN Severe Pain (7 - 10)  oxyCODONE    IR 5 milliGRAM(s) Oral every 8 hours PRN Moderate Pain (4 - 6)  polyethylene glycol 3350 17 Gram(s) Oral daily PRN for constipation      Allergies    IODINE (Unknown)  tetracycline (Unknown)  vancomycin (Other)    Intolerances        REVIEW OF SYSTEMS: Negative except as per HPI.    VITAL SIGNS:   Vital Signs Last 24 Hrs  T(C): 36.6 (2025 11:56), Max: 36.9 (2025 14:28)  T(F): 97.8 (2025 11:56), Max: 98.5 (2025 14:28)  HR: 80 (2025 11:56) (74 - 89)  BP: 130/72 (2025 11:56) (110/57 - 137/72)  BP(mean): --  RR: 18 (2025 11:56) (16 - 18)  SpO2: 96% (2025 11:56) (92% - 96%)    Parameters below as of 2025 11:56  Patient On (Oxygen Delivery Method): room air        I&O's Summary      PHYSICAL EXAM:  Constitutional: Elderly male, resting in bed  HEENT NC/AT, moist mucous membranes  Pulmonary: Non-labored, breath sounds are clear bilaterally  Cardiovascular: +S1, S2, RRR  Gastrointestinal: Soft, +distended   Extremities: b/l LE edema R>L with erythema and healing abrasion   Neurological: Alert, answers question appropriately   Psych: Mood & affect appropriate    LABS: All Labs Reviewed:                        12.1   9.50  )-----------( 143      ( 2025 06:05 )             37.9                         12.5   9.64  )-----------( 160      ( 2025 14:45 )             38.6     2025 06:05    141    |  104    |  36     ----------------------------<  137    3.4     |  30     |  1.70   2025 14:45    138    |  98     |  42     ----------------------------<  266    3.9     |  27     |  2.00     Ca    9.2        2025 06:05  Ca    8.7        2025 14:45  Phos  2.9       2025 06:05  Mg     2.5       2025 06:05    TPro  6.9    /  Alb  3.0    /  TBili  0.5    /  DBili  x      /  AST  14     /  ALT  32     /  AlkPhos  73     2025 06:05  TPro  7.2    /  Alb  3.1    /  TBili  0.4    /  DBili  x      /  AST  17     /  ALT  36     /  AlkPhos  80     2025 14:45    PT/INR - ( 2025 14:45 )   PT: 12.4 sec;   INR: 1.05 ratio         PTT - ( 2025 14:45 )  PTT:29.0 sec           [facility]         Amador Suarez Patel, Savella, Sae      [phone] (office)    Reason for Consult: LE edema     Interval HPI: Patient seen and examined at bedside. No acute events overnight. Pt states his RLE began to have erythema and edema and he got concerned. He was just admitted here recently in May for SOB and wound. Pt states that Dr. Rowland at [place] is his outpatient cardiologist. Pt notes that he recently increased his Lasix but pt wasn't able to tolerate it so it was lowered again. Pt currently denies SOB, CP, HA, dizziness.     HPI:  Patient is a 73 y/o M with a past medical history of diabetes, COPD, heart failure, CKD, hypertension, history of right foot ulcer is presenting for wound to right lower extremity.  Was recently admitted here for shortness of breath as well as for bursitis and concern for a wound.  Was sent back to facility.  They sent him to the ER today due to worsening swelling and redness localized to his wound of the right lower extremity.  Endorsing pain to the site.  Denies fevers, chest pain, nausea or vomiting.  States that he is having some chronic shortness of breath though unchanged today.  Also endorses chronic cough. (2025 18:32)      PAST MEDICAL & SURGICAL HISTORY:  Prostate cancer      Type II diabetes mellitus      Chronic obstructive pulmonary disease (COPD)      CHF (congestive heart failure)      Renal insufficiency      H/O migraine      Insomnia      Constipation      S/P foot surgery          SOCIAL HISTORY: No active tobacco, alcohol or illicit drug use    FAMILY HISTORY:      Home Medications:  albuterol 0.63 mg/3 mL (0.021%) inhalation solution: 3 milliliter(s) by nebulizer 3 times a day as needed for  shortness of breath and/or wheezing (2025 08:48)  amoxicillin-clavulanate 875 mg-125 mg oral tablet: 1 tab(s) orally 2 times a day for 7 days (2025 08:48)  Artificial Tears ophthalmic solution: 1 drop(s) in each eye 2 times a day (2025 08:48)  ascorbic acid 500 mg oral tablet: 1 tab(s) orally 2 times a day (2025 08:48)  aspirin 81 mg oral tablet: 1 tab(s) orally once a day (2025 08:48)  clonazePAM 0.25 mg oral tablet, disintegratin tab(s) orally 3 times a day (:48)  ferrous sulfate 325 mg (65 mg elemental iron) oral tablet: 1 tab(s) orally once a day (:48)  furosemide 40 mg oral tablet: 1 tab(s) orally every 12 hours (:48)  HumaLOG 100 units/mL subcutaneous solution: Inject subcutaneously 3 times a day using sliding scale (48)  HumaLOG KwikPen 100 units/mL injectable solution: Inject 15 units subcutaneously 3 times a day (before each meal) in addition to sliding scale (:48)  Jardiance 10 mg oral tablet: 1 tab(s) orally once a day (48)  Lantus Solostar Pen 100 units/mL subcutaneous solution: 36 unit(s) subcutaneously once a day (at bedtime) (:48)  loperamide 2 mg oral capsule: 1 cap(s) orally after each loose BM every 6 hours as needed **No more than 3 capsules (6mg) a day** (:48)  loratadine 10 mg oral tablet: 1 tab(s) orally once a day (:48)  Melatonin 3 mg oral tablet: 1 tab(s) orally once a day (at bedtime) (48)  montelukast 10 mg oral tablet: 1 tab(s) orally once a day (at bedtime) (:48)  multivitamin: 1 tab(s) orally once a day (:48)  oxyCODONE 5 mg oral tablet: 2 tab(s) orally 2 times a day MDD: 4 tablets (:48)  pantoprazole 40 mg oral delayed release tablet: 1 tab(s) orally once a day (:48)  Polyethylene Glycol 3350: Mix 17grams into 8oz of water and drink orally once a day as needed (:48)  Potassium Chloride (Eqv-Klor-Con M20) 20 mEq oral tablet, extended release: 1 tab(s) orally 2 times a day (:48)  predniSONE 10 mg oral tablet: 1 tab(s) orally once a day with food or milk for 1 month, then after 1 month take 5mg orally once a day (2025 08:48)  pregabalin 150 mg oral capsule: 1 cap(s) orally 2 times a day MDD: 2 capsules (2025 08:48)  ramelteon 8 mg oral tablet: 1 tab(s) orally once a day (at bedtime) (2025 08:48)  rosuvastatin 40 mg oral tablet: 1 tab(s) orally once a day (at bedtime) (2025 08:48)  saccharomyces boulardii lyo 250 mg oral capsule: 1 cap(s) orally once a day (2025 08:48)  Saline Mist 0.65% nasal spray: 1 spray(s) in each nostril 2 times a day (2025 08:48)  Senna 8.6 mg oral tablet: 1 tab(s) orally once a day (at bedtime) (2025 08:48)  sertraline 100 mg oral tablet: 1.5 tab(s) orally once a day (150mg) (2025 08:48)  Symbicort 160 mcg-4.5 mcg/inh inhalation aerosol: 2 puff(s) inhaled 2 times a day (2025 08:48)      MEDICATIONS  (STANDING):  albuterol/ipratropium for Nebulization 3 milliLiter(s) Nebulizer every 8 hours  aspirin enteric coated 81 milliGRAM(s) Oral daily  budesonide    Inhalation Suspension 0.5 milliGRAM(s) Inhalation two times a day  DAPTOmycin IVPB 400 milliGRAM(s) IV Intermittent every 24 hours  dextrose 5%. 1000 milliLiter(s) (50 mL/Hr) IV Continuous <Continuous>  dextrose 5%. 1000 milliLiter(s) (100 mL/Hr) IV Continuous <Continuous>  dextrose 50% Injectable 25 Gram(s) IV Push once  dextrose 50% Injectable 12.5 Gram(s) IV Push once  dextrose 50% Injectable 25 Gram(s) IV Push once  ferrous    sulfate 325 milliGRAM(s) Oral daily  glucagon  Injectable 1 milliGRAM(s) IntraMuscular once  heparin   Injectable 5000 Unit(s) SubCutaneous every 8 hours  insulin glargine Injectable (LANTUS) 10 Unit(s) SubCutaneous at bedtime  insulin lispro (ADMELOG) corrective regimen sliding scale   SubCutaneous at bedtime  insulin lispro (ADMELOG) corrective regimen sliding scale.   SubCutaneous three times a day before meals  montelukast 10 milliGRAM(s) Oral daily  multivitamin/minerals 1 Tablet(s) Oral daily  pantoprazole    Tablet 40 milliGRAM(s) Oral before breakfast  piperacillin/tazobactam IVPB.. 3.375 Gram(s) IV Intermittent every 8 hours  predniSONE   Tablet 10 milliGRAM(s) Oral daily  pregabalin 150 milliGRAM(s) Oral two times a day  rosuvastatin 40 milliGRAM(s) Oral at bedtime  saccharomyces boulardii 250 milliGRAM(s) Oral two times a day  senna 1 Tablet(s) Oral at bedtime  sertraline 100 milliGRAM(s) Oral daily  sodium chloride 0.9%. 1000 milliLiter(s) (60 mL/Hr) IV Continuous <Continuous>    MEDICATIONS  (PRN):  acetaminophen     Tablet .. 650 milliGRAM(s) Oral every 6 hours PRN Temp greater or equal to 38C (100.4F), Mild Pain (1 - 3)  aluminum hydroxide/magnesium hydroxide/simethicone Suspension 30 milliLiter(s) Oral every 4 hours PRN Dyspepsia  bisacodyl Suppository 10 milliGRAM(s) Rectal daily PRN Constipation  dextrose Oral Gel 15 Gram(s) Oral once PRN Blood Glucose LESS THAN 70 milliGRAM(s)/deciliter  melatonin 3 milliGRAM(s) Oral at bedtime PRN Insomnia  ondansetron Injectable 4 milliGRAM(s) IV Push every 8 hours PRN Nausea and/or Vomiting  oxyCODONE    IR 10 milliGRAM(s) Oral two times a day PRN Severe Pain (7 - 10)  oxyCODONE    IR 5 milliGRAM(s) Oral every 8 hours PRN Moderate Pain (4 - 6)  polyethylene glycol 3350 17 Gram(s) Oral daily PRN for constipation      Allergies    IODINE (Unknown)  tetracycline (Unknown)  vancomycin (Other)    Intolerances        REVIEW OF SYSTEMS: Negative except as per HPI.    VITAL SIGNS:   Vital Signs Last 24 Hrs  T(C): 36.6 (2025 11:56), Max: 36.9 (2025 14:28)  T(F): 97.8 (2025 11:56), Max: 98.5 (2025 14:28)  HR: 80 (2025 11:56) (74 - 89)  BP: 130/72 (2025 11:56) (110/57 - 137/72)  BP(mean): --  RR: 18 (2025 11:56) (16 - 18)  SpO2: 96% (2025 11:56) (92% - 96%)    Parameters below as of 2025 11:56  Patient On (Oxygen Delivery Method): room air        I&O's Summary      PHYSICAL EXAM:  Constitutional: Elderly male, resting in bed  HEENT NC/AT, moist mucous membranes  Pulmonary: Non-labored, breath sounds are clear bilaterally  Cardiovascular: +S1, S2, RRR  Gastrointestinal: Soft, +distended   Extremities: b/l LE edema R>L with erythema and healing abrasion   Neurological: Alert, answers question appropriately   Psych: Mood & affect appropriate    LABS: All Labs Reviewed:                        12.1   9.50  )-----------( 143      ( 2025 06:05 )             37.9                         12.5   9.64  )-----------( 160      ( 2025 14:45 )             38.6     2025 06:05    141    |  104    |  36     ----------------------------<  137    3.4     |  30     |  1.70   2025 14:45    138    |  98     |  42     ----------------------------<  266    3.9     |  27     |  2.00     Ca    9.2        2025 06:05  Ca    8.7        2025 14:45  Phos  2.9       2025 06:05  Mg     2.5       2025 06:05    TPro  6.9    /  Alb  3.0    /  TBili  0.5    /  DBili  x      /  AST  14     /  ALT  32     /  AlkPhos  73     2025 06:05  TPro  7.2    /  Alb  3.1    /  TBili  0.4    /  DBili  x      /  AST  17     /  ALT  36     /  AlkPhos  80     2025 14:45    PT/INR - ( 2025 14:45 )   PT: 12.4 sec;   INR: 1.05 ratio         PTT - ( 2025 14:45 )  PTT:29.0 sec           [facility]         Amador Suarez Patel, Savella, Sae      [phone] (office)    Reason for Consult: LE edema     Interval HPI: Patient seen and examined at bedside. No acute events overnight. Pt states his RLE began to have erythema and edema and he got concerned. He was just admitted here recently in May for SOB and wound. Pt states that Dr. Rwoland at [facility] is his outpatient cardiologist. Pt notes that he recently increased his Lasix but pt wasn't able to tolerate it so it was lowered again. Pt currently denies SOB, CP, HA, dizziness.     HPI:  Patient is a 75 y/o M with a past medical history of diabetes, COPD, heart failure, CKD, hypertension, history of right foot ulcer is presenting for wound to right lower extremity.  Was recently admitted here for shortness of breath as well as for bursitis and concern for a wound.  Was sent back to facility.  They sent him to the ER today due to worsening swelling and redness localized to his wound of the right lower extremity.  Endorsing pain to the site.  Denies fevers, chest pain, nausea or vomiting.  States that he is having some chronic shortness of breath though unchanged today.  Also endorses chronic cough. (2025 18:32)      PAST MEDICAL & SURGICAL HISTORY:  Prostate cancer      Type II diabetes mellitus      Chronic obstructive pulmonary disease (COPD)      CHF (congestive heart failure)      Renal insufficiency      H/O migraine      Insomnia      Constipation      S/P foot surgery          SOCIAL HISTORY: No active tobacco, alcohol or illicit drug use    FAMILY HISTORY:      Home Medications:  albuterol 0.63 mg/3 mL (0.021%) inhalation solution: 3 milliliter(s) by nebulizer 3 times a day as needed for  shortness of breath and/or wheezing (2025 08:48)  amoxicillin-clavulanate 875 mg-125 mg oral tablet: 1 tab(s) orally 2 times a day for 7 days (2025 08:48)  Artificial Tears ophthalmic solution: 1 drop(s) in each eye 2 times a day (2025 08:48)  ascorbic acid 500 mg oral tablet: 1 tab(s) orally 2 times a day (2025 08:48)  aspirin 81 mg oral tablet: 1 tab(s) orally once a day (2025 08:48)  clonazePAM 0.25 mg oral tablet, disintegratin tab(s) orally 3 times a day (:48)  ferrous sulfate 325 mg (65 mg elemental iron) oral tablet: 1 tab(s) orally once a day (:48)  furosemide 40 mg oral tablet: 1 tab(s) orally every 12 hours (:48)  HumaLOG 100 units/mL subcutaneous solution: Inject subcutaneously 3 times a day using sliding scale (48)  HumaLOG KwikPen 100 units/mL injectable solution: Inject 15 units subcutaneously 3 times a day (before each meal) in addition to sliding scale (:48)  Jardiance 10 mg oral tablet: 1 tab(s) orally once a day (48)  Lantus Solostar Pen 100 units/mL subcutaneous solution: 36 unit(s) subcutaneously once a day (at bedtime) (:48)  loperamide 2 mg oral capsule: 1 cap(s) orally after each loose BM every 6 hours as needed **No more than 3 capsules (6mg) a day** (:48)  loratadine 10 mg oral tablet: 1 tab(s) orally once a day (:48)  Melatonin 3 mg oral tablet: 1 tab(s) orally once a day (at bedtime) (48)  montelukast 10 mg oral tablet: 1 tab(s) orally once a day (at bedtime) (:48)  multivitamin: 1 tab(s) orally once a day (:48)  oxyCODONE 5 mg oral tablet: 2 tab(s) orally 2 times a day MDD: 4 tablets (:48)  pantoprazole 40 mg oral delayed release tablet: 1 tab(s) orally once a day (:48)  Polyethylene Glycol 3350: Mix 17grams into 8oz of water and drink orally once a day as needed (:48)  Potassium Chloride (Eqv-Klor-Con M20) 20 mEq oral tablet, extended release: 1 tab(s) orally 2 times a day (:48)  predniSONE 10 mg oral tablet: 1 tab(s) orally once a day with food or milk for 1 month, then after 1 month take 5mg orally once a day (2025 08:48)  pregabalin 150 mg oral capsule: 1 cap(s) orally 2 times a day MDD: 2 capsules (2025 08:48)  ramelteon 8 mg oral tablet: 1 tab(s) orally once a day (at bedtime) (2025 08:48)  rosuvastatin 40 mg oral tablet: 1 tab(s) orally once a day (at bedtime) (2025 08:48)  saccharomyces boulardii lyo 250 mg oral capsule: 1 cap(s) orally once a day (2025 08:48)  Saline Mist 0.65% nasal spray: 1 spray(s) in each nostril 2 times a day (2025 08:48)  Senna 8.6 mg oral tablet: 1 tab(s) orally once a day (at bedtime) (2025 08:48)  sertraline 100 mg oral tablet: 1.5 tab(s) orally once a day (150mg) (2025 08:48)  Symbicort 160 mcg-4.5 mcg/inh inhalation aerosol: 2 puff(s) inhaled 2 times a day (2025 08:48)      MEDICATIONS  (STANDING):  albuterol/ipratropium for Nebulization 3 milliLiter(s) Nebulizer every 8 hours  aspirin enteric coated 81 milliGRAM(s) Oral daily  budesonide    Inhalation Suspension 0.5 milliGRAM(s) Inhalation two times a day  DAPTOmycin IVPB 400 milliGRAM(s) IV Intermittent every 24 hours  dextrose 5%. 1000 milliLiter(s) (50 mL/Hr) IV Continuous <Continuous>  dextrose 5%. 1000 milliLiter(s) (100 mL/Hr) IV Continuous <Continuous>  dextrose 50% Injectable 25 Gram(s) IV Push once  dextrose 50% Injectable 12.5 Gram(s) IV Push once  dextrose 50% Injectable 25 Gram(s) IV Push once  ferrous    sulfate 325 milliGRAM(s) Oral daily  glucagon  Injectable 1 milliGRAM(s) IntraMuscular once  heparin   Injectable 5000 Unit(s) SubCutaneous every 8 hours  insulin glargine Injectable (LANTUS) 10 Unit(s) SubCutaneous at bedtime  insulin lispro (ADMELOG) corrective regimen sliding scale   SubCutaneous at bedtime  insulin lispro (ADMELOG) corrective regimen sliding scale.   SubCutaneous three times a day before meals  montelukast 10 milliGRAM(s) Oral daily  multivitamin/minerals 1 Tablet(s) Oral daily  pantoprazole    Tablet 40 milliGRAM(s) Oral before breakfast  piperacillin/tazobactam IVPB.. 3.375 Gram(s) IV Intermittent every 8 hours  predniSONE   Tablet 10 milliGRAM(s) Oral daily  pregabalin 150 milliGRAM(s) Oral two times a day  rosuvastatin 40 milliGRAM(s) Oral at bedtime  saccharomyces boulardii 250 milliGRAM(s) Oral two times a day  senna 1 Tablet(s) Oral at bedtime  sertraline 100 milliGRAM(s) Oral daily  sodium chloride 0.9%. 1000 milliLiter(s) (60 mL/Hr) IV Continuous <Continuous>    MEDICATIONS  (PRN):  acetaminophen     Tablet .. 650 milliGRAM(s) Oral every 6 hours PRN Temp greater or equal to 38C (100.4F), Mild Pain (1 - 3)  aluminum hydroxide/magnesium hydroxide/simethicone Suspension 30 milliLiter(s) Oral every 4 hours PRN Dyspepsia  bisacodyl Suppository 10 milliGRAM(s) Rectal daily PRN Constipation  dextrose Oral Gel 15 Gram(s) Oral once PRN Blood Glucose LESS THAN 70 milliGRAM(s)/deciliter  melatonin 3 milliGRAM(s) Oral at bedtime PRN Insomnia  ondansetron Injectable 4 milliGRAM(s) IV Push every 8 hours PRN Nausea and/or Vomiting  oxyCODONE    IR 10 milliGRAM(s) Oral two times a day PRN Severe Pain (7 - 10)  oxyCODONE    IR 5 milliGRAM(s) Oral every 8 hours PRN Moderate Pain (4 - 6)  polyethylene glycol 3350 17 Gram(s) Oral daily PRN for constipation      Allergies    IODINE (Unknown)  tetracycline (Unknown)  vancomycin (Other)    Intolerances        REVIEW OF SYSTEMS: Negative except as per HPI.    VITAL SIGNS:   Vital Signs Last 24 Hrs  T(C): 36.6 (2025 11:56), Max: 36.9 (2025 14:28)  T(F): 97.8 (2025 11:56), Max: 98.5 (2025 14:28)  HR: 80 (2025 11:56) (74 - 89)  BP: 130/72 (2025 11:56) (110/57 - 137/72)  BP(mean): --  RR: 18 (2025 11:56) (16 - 18)  SpO2: 96% (2025 11:56) (92% - 96%)    Parameters below as of 2025 11:56  Patient On (Oxygen Delivery Method): room air        I&O's Summary      PHYSICAL EXAM:  Constitutional: Elderly male, resting in bed  HEENT NC/AT, moist mucous membranes  Pulmonary: Non-labored, breath sounds are clear bilaterally  Cardiovascular: +S1, S2, RRR  Gastrointestinal: Soft, +distended   Extremities: b/l LE edema R>L with erythema and healing abrasion   Neurological: Alert, answers question appropriately   Psych: Mood & affect appropriate    LABS: All Labs Reviewed:                        12.1   9.50  )-----------( 143      ( 2025 06:05 )             37.9                         12.5   9.64  )-----------( 160      ( 2025 14:45 )             38.6     2025 06:05    141    |  104    |  36     ----------------------------<  137    3.4     |  30     |  1.70   2025 14:45    138    |  98     |  42     ----------------------------<  266    3.9     |  27     |  2.00     Ca    9.2        2025 06:05  Ca    8.7        2025 14:45  Phos  2.9       2025 06:05  Mg     2.5       2025 06:05    TPro  6.9    /  Alb  3.0    /  TBili  0.5    /  DBili  x      /  AST  14     /  ALT  32     /  AlkPhos  73     2025 06:05  TPro  7.2    /  Alb  3.1    /  TBili  0.4    /  DBili  x      /  AST  17     /  ALT  36     /  AlkPhos  80     2025 14:45    PT/INR - ( 2025 14:45 )   PT: 12.4 sec;   INR: 1.05 ratio         PTT - ( 2025 14:45 )  PTT:29.0 sec

## 2025-06-04 NOTE — CONSULT NOTE ADULT - ASSESSMENT
Assessment: Patient is a 75 y/o M with a past medical history of diabetes, COPD, heart failure, CKD, hypertension, history of right foot ulcer is presenting for wound to right lower extremity.  Was recently admitted here for shortness of breath as well as for bursitis and concern for a wound.  Was sent back to facility.  They sent him to the ER today due to worsening swelling and redness localized to his wound of the right lower extremity. Admitted for RLE cellulitis.     Plan:   -Pt p/w edema/erythema of RLE  -Pt recently admitted for SOB  - Patient without symptoms of angina or heart failure at this time.  - No clear evidence of acute ischemia, trops negative x 2. Will follow up third set.  - His CKs are flat, suggesting against acute atherosclerotic plaque rupture.  - Biomarker trend is not consistent with plaque rupture but rather demand ischemia. Monitor closely for the development of anginal symptoms or clinical signs of ischemia.   - No acute changes on EKG compared to previous.  -C/w home statin     - No meaningful evidence of volume overload.  -Hx HFpEF  - Previous TTE 05/2025 with EF 60-65% and trace TR  - Strict I/Os, daily weights.    - BP well controlled, monitor routine hemodynamics.  - Hx HTN  -On home Lasix    - Monitor and replete lytes, keep K>4, Mg>2.      - Other cardiovascular workup will depend on clinical course.  - All other workup per primary team.  - Will continue to follow.             Assessment: Patient is a 73 y/o M with a past medical history of diabetes, COPD, heart failure, CKD, hypertension, history of right foot ulcer is presenting for wound to right lower extremity.  Was recently admitted here for shortness of breath as well as for bursitis and concern for a wound.  Was sent back to facility.  They sent him to the ER today due to worsening swelling and redness localized to his wound of the right lower extremity. Admitted for RLE cellulitis.     Plan:   -Pt p/w edema/erythema of RLE  -Pt recently admitted for SOB  - Patient without symptoms of angina at this time.  - No clear evidence of acute ischemia, trops negative x 2. Will follow up third set.   - EKG with SR, 84bpm, low voltage   -C/w home statin for HLD    - No meaningful evidence of volume overload.  -Hx HFpEF  - Previous TTE 05/2025 with EF 60-65% and trace TR  - Strict I/Os, daily weights.    - BP well controlled, monitor routine hemodynamics.  - Hx HTN  -On home Lasix  -Was on BB but was dc d/t soft BP on previous admission     - Monitor and replete lytes, keep K>4, Mg>2.      - Other cardiovascular workup will depend on clinical course.  - All other workup per primary team.  - Will continue to follow.             Assessment: Patient is a 73 y/o M with a past medical history of diabetes, COPD, heart failure, CKD, hypertension, history of right foot ulcer is presenting for wound to right lower extremity.  Was recently admitted here for shortness of breath as well as for bursitis and concern for a wound.  Was sent back to facility.  They sent him to the ER today due to worsening swelling and redness localized to his wound of the right lower extremity. Admitted for RLE cellulitis.     Plan:   -Pt p/w edema/erythema of RLE  -Pt recently admitted for SOB  - Patient without symptoms of angina at this time or SOB  - EKG with SR, 84bpm, low voltage, unchanged from previous EKG  -C/w home statin for HLD  -Rest of tx per primary team     - Pt with worsening b/l LE edema   -LE doppler ordered   -Hx HFpEF  - Previous TTE 05/2025 with EF 60-65% and trace TR  -Hold home PO Lasix and start 60mg IV Lasix daily  - Strict I/Os, daily weights    - BP well controlled, monitor routine hemodynamics  - Hx HTN  -Was on BB but was dc d/t soft BP on previous admission     - Monitor and replete lytes, keep K>4, Mg>2.    - Other cardiovascular workup will depend on clinical course.  - All other workup per primary team.  - Will continue to follow.

## 2025-06-04 NOTE — CONSULT NOTE ADULT - SUBJECTIVE AND OBJECTIVE BOX
Patient is a 74y Male whom presented to the hospital with     PAST MEDICAL & SURGICAL HISTORY:  Prostate cancer      Type II diabetes mellitus      Chronic obstructive pulmonary disease (COPD)      CHF (congestive heart failure)      Renal insufficiency      H/O migraine      Insomnia      Constipation      S/P foot surgery          MEDICATIONS  (STANDING):  albuterol/ipratropium for Nebulization 3 milliLiter(s) Nebulizer every 8 hours  ascorbic acid 500 milliGRAM(s) Oral daily  aspirin enteric coated 81 milliGRAM(s) Oral daily  budesonide    Inhalation Suspension 0.5 milliGRAM(s) Inhalation two times a day  clotrimazole 1% Cream 1 Application(s) Topical two times a day  DAPTOmycin IVPB 400 milliGRAM(s) IV Intermittent every 24 hours  dextrose 5%. 1000 milliLiter(s) (100 mL/Hr) IV Continuous <Continuous>  dextrose 5%. 1000 milliLiter(s) (50 mL/Hr) IV Continuous <Continuous>  dextrose 50% Injectable 25 Gram(s) IV Push once  dextrose 50% Injectable 12.5 Gram(s) IV Push once  dextrose 50% Injectable 25 Gram(s) IV Push once  ferrous    sulfate 325 milliGRAM(s) Oral daily  furosemide   Injectable 60 milliGRAM(s) IV Push daily  glucagon  Injectable 1 milliGRAM(s) IntraMuscular once  heparin   Injectable 5000 Unit(s) SubCutaneous every 8 hours  insulin glargine Injectable (LANTUS) 10 Unit(s) SubCutaneous at bedtime  insulin lispro (ADMELOG) corrective regimen sliding scale   SubCutaneous at bedtime  insulin lispro (ADMELOG) corrective regimen sliding scale.   SubCutaneous three times a day before meals  montelukast 10 milliGRAM(s) Oral daily  multivitamin/minerals 1 Tablet(s) Oral daily  pantoprazole    Tablet 40 milliGRAM(s) Oral before breakfast  piperacillin/tazobactam IVPB.. 3.375 Gram(s) IV Intermittent every 8 hours  potassium chloride    Tablet ER 20 milliEquivalent(s) Oral daily  predniSONE   Tablet 10 milliGRAM(s) Oral daily  pregabalin 150 milliGRAM(s) Oral two times a day  rosuvastatin 40 milliGRAM(s) Oral at bedtime  saccharomyces boulardii 250 milliGRAM(s) Oral two times a day  senna 1 Tablet(s) Oral at bedtime  sertraline 100 milliGRAM(s) Oral daily      Allergies    IODINE (Unknown)  tetracycline (Unknown)  vancomycin (Other)    Intolerances        SOCIAL HISTORY:  Denies ETOh,Smoking,     FAMILY HISTORY:      REVIEW OF SYSTEMS:    CONSTITUTIONAL: No weakness, fevers or chills  EYES/ENT: No visual changes;  no throat pain   NECK: No pain or stiffness  RESPIRATORY: No cough, wheezing, hemoptysis; No shortness of breath  CARDIOVASCULAR: No chest pain or palpitations  GASTROINTESTINAL: No abdominal or epigastric pain. No nausea, vomiting,     No diarrhea or constipation. No melena   GENITOURINARY: No dysuria, frequency or hematuria  NEUROLOGICAL: No numbness or weakness  SKIN: dry      VITAL:  T(C): , Max: 36.7 (06-04-25 @ 05:00)  T(F): , Max: 98 (06-04-25 @ 05:00)  HR: 72 (06-04-25 @ 15:00)  BP: 130/72 (06-04-25 @ 11:56)  BP(mean): --  RR: 18 (06-04-25 @ 11:56)  SpO2: 96% (06-04-25 @ 11:56)  Wt(kg): --    I and O's:        PHYSICAL EXAM:    Constitutional: NAD  HEENT: conjunctive   clear   Neck:  No JVD  Respiratory: CTAB  Cardiovascular: S1 and S2  Gastrointestinal: BS+, soft, NT/ND  Extremities: No peripheral edema  Neurological: A/O x 3, no focal deficits  Psychiatric: Normal mood, normal affect  : No Parikh  Skin: No rashes  Access: Not applicable    LABS:                        12.1   9.50  )-----------( 143      ( 04 Jun 2025 06:05 )             37.9     06-04    141  |  104  |  36[H]  ----------------------------<  137[H]  3.4[L]   |  30  |  1.70[H]    Ca    9.2      04 Jun 2025 06:05  Phos  2.9     06-04  Mg     2.5     06-04    TPro  6.9  /  Alb  3.0[L]  /  TBili  0.5  /  DBili  x   /  AST  14[L]  /  ALT  32  /  AlkPhos  73  06-04      Urine Studies:  Urinalysis Basic - ( 04 Jun 2025 06:05 )    Color: x / Appearance: x / SG: x / pH: x  Gluc: 137 mg/dL / Ketone: x  / Bili: x / Urobili: x   Blood: x / Protein: x / Nitrite: x   Leuk Esterase: x / RBC: x / WBC x   Sq Epi: x / Non Sq Epi: x / Bacteria: x            RADIOLOGY & ADDITIONAL STUDIES:                   Patient is a 74y Male whom presented to the hospital with kayleen     PAST MEDICAL & SURGICAL HISTORY:  Prostate cancer      Type II diabetes mellitus      Chronic obstructive pulmonary disease (COPD)      CHF (congestive heart failure)      Renal insufficiency      H/O migraine      Insomnia      Constipation      S/P foot surgery          MEDICATIONS  (STANDING):  albuterol/ipratropium for Nebulization 3 milliLiter(s) Nebulizer every 8 hours  ascorbic acid 500 milliGRAM(s) Oral daily  aspirin enteric coated 81 milliGRAM(s) Oral daily  budesonide    Inhalation Suspension 0.5 milliGRAM(s) Inhalation two times a day  clotrimazole 1% Cream 1 Application(s) Topical two times a day  DAPTOmycin IVPB 400 milliGRAM(s) IV Intermittent every 24 hours  dextrose 5%. 1000 milliLiter(s) (100 mL/Hr) IV Continuous <Continuous>  dextrose 5%. 1000 milliLiter(s) (50 mL/Hr) IV Continuous <Continuous>  dextrose 50% Injectable 25 Gram(s) IV Push once  dextrose 50% Injectable 12.5 Gram(s) IV Push once  dextrose 50% Injectable 25 Gram(s) IV Push once  ferrous    sulfate 325 milliGRAM(s) Oral daily  furosemide   Injectable 60 milliGRAM(s) IV Push daily  glucagon  Injectable 1 milliGRAM(s) IntraMuscular once  heparin   Injectable 5000 Unit(s) SubCutaneous every 8 hours  insulin glargine Injectable (LANTUS) 10 Unit(s) SubCutaneous at bedtime  insulin lispro (ADMELOG) corrective regimen sliding scale   SubCutaneous at bedtime  insulin lispro (ADMELOG) corrective regimen sliding scale.   SubCutaneous three times a day before meals  montelukast 10 milliGRAM(s) Oral daily  multivitamin/minerals 1 Tablet(s) Oral daily  pantoprazole    Tablet 40 milliGRAM(s) Oral before breakfast  piperacillin/tazobactam IVPB.. 3.375 Gram(s) IV Intermittent every 8 hours  potassium chloride    Tablet ER 20 milliEquivalent(s) Oral daily  predniSONE   Tablet 10 milliGRAM(s) Oral daily  pregabalin 150 milliGRAM(s) Oral two times a day  rosuvastatin 40 milliGRAM(s) Oral at bedtime  saccharomyces boulardii 250 milliGRAM(s) Oral two times a day  senna 1 Tablet(s) Oral at bedtime  sertraline 100 milliGRAM(s) Oral daily      Allergies    IODINE (Unknown)  tetracycline (Unknown)  vancomycin (Other)    Intolerances        SOCIAL HISTORY:  Denies ETOh,Smoking,     FAMILY HISTORY:      REVIEW OF SYSTEMS:    CONSTITUTIONAL: No weakness, fevers or chills  RESPIRATORY: No cough, wheezing, hemoptysis; No shortness of breath  CARDIOVASCULAR: No chest pain or palpitations  GASTROINTESTINAL: No abdominal or epigastric pain. No nausea, vomiting,     No diarrhea or constipation. No melena   SKIN: dry      VITAL:  T(C): , Max: 36.7 (06-04-25 @ 05:00)  T(F): , Max: 98 (06-04-25 @ 05:00)  HR: 72 (06-04-25 @ 15:00)  BP: 130/72 (06-04-25 @ 11:56)  BP(mean): --  RR: 18 (06-04-25 @ 11:56)  SpO2: 96% (06-04-25 @ 11:56)  Wt(kg): --    I and O's:        PHYSICAL EXAM:    Constitutional: NAD  HEENT: conjunctive   clear   Neck:  No JVD  Respiratory: CTAB  Cardiovascular: S1 and S2  Gastrointestinal: BS+, soft, NT/ND  Extremities: No peripheral edema    LABS:                        12.1   9.50  )-----------( 143      ( 04 Jun 2025 06:05 )             37.9     06-04    141  |  104  |  36[H]  ----------------------------<  137[H]  3.4[L]   |  30  |  1.70[H]    Ca    9.2      04 Jun 2025 06:05  Phos  2.9     06-04  Mg     2.5     06-04    TPro  6.9  /  Alb  3.0[L]  /  TBili  0.5  /  DBili  x   /  AST  14[L]  /  ALT  32  /  AlkPhos  73  06-04      Urine Studies:  Urinalysis Basic - ( 04 Jun 2025 06:05 )    Color: x / Appearance: x / SG: x / pH: x  Gluc: 137 mg/dL / Ketone: x  / Bili: x / Urobili: x   Blood: x / Protein: x / Nitrite: x   Leuk Esterase: x / RBC: x / WBC x   Sq Epi: x / Non Sq Epi: x / Bacteria: x            RADIOLOGY & ADDITIONAL STUDIES:

## 2025-06-04 NOTE — CARE COORDINATION ASSESSMENT. - PATIENT WAS INVOLVED WITH A HOMECARE AGENCY PRIOR TO ADMISSION?
Discharged 5/27 with [facility].  Requests referral to same on D/C/Yes

## 2025-06-04 NOTE — PROGRESS NOTE ADULT - SUBJECTIVE AND OBJECTIVE BOX
PROGRESS NOTE  Patient is a 74y old  Male who presents with a chief complaint of rle swelling (04 Jun 2025 14:31)    Chart and available morning labs /imaging are reviewed electronically , urgent issues addressed . More information  is being added upon completion of rounds , when more information is collected and management discussed with consultants , medical staff and social service/case management on the floor   OVERNIGHT  No new issues reported by medical staff . All above noted Patient is resting in a bed comfortably . .No distress noted     HPI:  Patient with a past medical history of diabetes, COPD, heart failure, CKD, hypertension, history of right foot ulcer is presenting for wound to right lower extremity.  Was recently admitted here for shortness of breath as well as for bursitis and concern for a wound.  Was sent back to facility.  They sent him to the ER today due to worsening swelling and redness localized to his wound of the right lower extremity.  Endorsing pain to the site.  Denies fevers, chest pain, nausea or vomiting.  States that he is having some chronic shortness of breath though unchanged today.  Also endorses chronic cough. (03 Jun 2025 18:32)    PAST MEDICAL & SURGICAL HISTORY:  Prostate cancer      Type II diabetes mellitus      Chronic obstructive pulmonary disease (COPD)      CHF (congestive heart failure)      Renal insufficiency      H/O migraine      Insomnia      Constipation      S/P foot surgery          MEDICATIONS  (STANDING):  albuterol/ipratropium for Nebulization 3 milliLiter(s) Nebulizer every 8 hours  ascorbic acid 500 milliGRAM(s) Oral daily  aspirin enteric coated 81 milliGRAM(s) Oral daily  budesonide    Inhalation Suspension 0.5 milliGRAM(s) Inhalation two times a day  clotrimazole 1% Cream 1 Application(s) Topical two times a day  DAPTOmycin IVPB 400 milliGRAM(s) IV Intermittent every 24 hours  dextrose 5%. 1000 milliLiter(s) (100 mL/Hr) IV Continuous <Continuous>  dextrose 5%. 1000 milliLiter(s) (50 mL/Hr) IV Continuous <Continuous>  dextrose 50% Injectable 25 Gram(s) IV Push once  dextrose 50% Injectable 12.5 Gram(s) IV Push once  dextrose 50% Injectable 25 Gram(s) IV Push once  ferrous    sulfate 325 milliGRAM(s) Oral daily  furosemide   Injectable 60 milliGRAM(s) IV Push daily  glucagon  Injectable 1 milliGRAM(s) IntraMuscular once  heparin   Injectable 5000 Unit(s) SubCutaneous every 8 hours  insulin glargine Injectable (LANTUS) 10 Unit(s) SubCutaneous at bedtime  insulin lispro (ADMELOG) corrective regimen sliding scale   SubCutaneous at bedtime  insulin lispro (ADMELOG) corrective regimen sliding scale.   SubCutaneous three times a day before meals  montelukast 10 milliGRAM(s) Oral daily  multivitamin/minerals 1 Tablet(s) Oral daily  pantoprazole    Tablet 40 milliGRAM(s) Oral before breakfast  piperacillin/tazobactam IVPB.. 3.375 Gram(s) IV Intermittent every 8 hours  predniSONE   Tablet 10 milliGRAM(s) Oral daily  pregabalin 150 milliGRAM(s) Oral two times a day  rosuvastatin 40 milliGRAM(s) Oral at bedtime  saccharomyces boulardii 250 milliGRAM(s) Oral two times a day  senna 1 Tablet(s) Oral at bedtime  sertraline 100 milliGRAM(s) Oral daily  sodium chloride 0.9%. 1000 milliLiter(s) (60 mL/Hr) IV Continuous <Continuous>    MEDICATIONS  (PRN):  acetaminophen     Tablet .. 650 milliGRAM(s) Oral every 6 hours PRN Temp greater or equal to 38C (100.4F), Mild Pain (1 - 3)  aluminum hydroxide/magnesium hydroxide/simethicone Suspension 30 milliLiter(s) Oral every 4 hours PRN Dyspepsia  bisacodyl Suppository 10 milliGRAM(s) Rectal daily PRN Constipation  clonazePAM  Tablet 0.5 milliGRAM(s) Oral three times a day PRN ANXIETY  dextrose Oral Gel 15 Gram(s) Oral once PRN Blood Glucose LESS THAN 70 milliGRAM(s)/deciliter  melatonin 3 milliGRAM(s) Oral at bedtime PRN Insomnia  ondansetron Injectable 4 milliGRAM(s) IV Push every 8 hours PRN Nausea and/or Vomiting  oxyCODONE    IR 10 milliGRAM(s) Oral two times a day PRN Severe Pain (7 - 10)  oxyCODONE    IR 5 milliGRAM(s) Oral every 8 hours PRN Moderate Pain (4 - 6)  polyethylene glycol 3350 17 Gram(s) Oral daily PRN for constipation      OBJECTIVE    T(C): 36.6 (06-04-25 @ 11:56), Max: 36.7 (06-04-25 @ 05:00)  HR: 72 (06-04-25 @ 15:00) (72 - 81)  BP: 130/72 (06-04-25 @ 11:56) (120/61 - 137/72)  RR: 18 (06-04-25 @ 11:56) (16 - 18)  SpO2: 96% (06-04-25 @ 11:56) (93% - 96%)  Wt(kg): --  I&O's Summary        REVIEW OF SYSTEMS:  CONSTITUTIONAL: No fever, weight loss, or fatigue  EYES: No eye pain, visual disturbances, or discharge  ENMT:   No sinus or throat pain  NECK: No pain or stiffness  RESPIRATORY: No cough, wheezing, chills or hemoptysis; No shortness of breath  CARDIOVASCULAR: No chest pain, palpitations, dizziness, or leg swelling  GASTROINTESTINAL: No abdominal pain. No nausea, vomiting; No diarrhea or constipation. No melena or hematochezia.  GENITOURINARY: No dysuria, frequency, hematuria, or incontinence  NEUROLOGICAL: No headaches, memory loss, loss of strength, numbness, or tremors  SKIN: No itching, burning, rashes, or lesions   MUSCULOSKELETAL: No joint pain or swelling; No muscle, back, or extremity pain    PHYSICAL EXAM:  Appearance: NAD. VS past 24 hrs -as above   HEENT:   Moist oral mucosa. Conjunctiva clear b/l.   Neck : supple  Respiratory: Lungs CTAB.  Gastrointestinal:  Soft, nontender. No rebound. No rigidity. BS present	  Cardiovascular: RRR ,S1S2 present  Neurologic: Non-focal. Moving all extremities.  Extremities: No edema. No erythema. No calf tenderness.  Skin: No rashes, No ecchymoses, No cyanosis.	  wounds ,skin lesions-See skin assesment flow sheet   LABS:                        12.1   9.50  )-----------( 143      ( 04 Jun 2025 06:05 )             37.9     06-04    141  |  104  |  36[H]  ----------------------------<  137[H]  3.4[L]   |  30  |  1.70[H]    Ca    9.2      04 Jun 2025 06:05  Phos  2.9     06-04  Mg     2.5     06-04    TPro  6.9  /  Alb  3.0[L]  /  TBili  0.5  /  DBili  x   /  AST  14[L]  /  ALT  32  /  AlkPhos  73  06-04    CAPILLARY BLOOD GLUCOSE      POCT Blood Glucose.: 271 mg/dL (04 Jun 2025 17:04)  POCT Blood Glucose.: 340 mg/dL (04 Jun 2025 11:25)  POCT Blood Glucose.: 147 mg/dL (04 Jun 2025 07:58)  POCT Blood Glucose.: 244 mg/dL (03 Jun 2025 22:48)    PT/INR - ( 03 Jun 2025 14:45 )   PT: 12.4 sec;   INR: 1.05 ratio         PTT - ( 03 Jun 2025 14:45 )  PTT:29.0 sec  Urinalysis Basic - ( 04 Jun 2025 06:05 )    Color: x / Appearance: x / SG: x / pH: x  Gluc: 137 mg/dL / Ketone: x  / Bili: x / Urobili: x   Blood: x / Protein: x / Nitrite: x   Leuk Esterase: x / RBC: x / WBC x   Sq Epi: x / Non Sq Epi: x / Bacteria: x        RADIOLOGY & ADDITIONAL TESTS:   reviewed elctronically  ASSESSMENT/PLAN: 	    25 minutes aggregate time was spent on this visit, 50% visit time spent in care co-ordination with other attendings and counselling patient .I have discussed care plan with patient / HCP/family member ,who expressed understanding of problems treatment and their effect and side effects, alternatives in details. I have asked if they have any questions and concerns and appropriately addressed them to best of my ability.

## 2025-06-04 NOTE — CONSULT NOTE ADULT - ASSESSMENT
74 year old male with past medical history of diabetes, COPD, heart failure, CKD, hypertension, history of right foot ulcer, who presented for wound to right lower extremity.  Was recently admitted here for shortness of breath as well as for bursitis and concern for a wound.  Was sent back to facility.  They sent him to the ER today due to worsening swelling and redness localized to his wound of the right lower extremity.  Endorsing pain to the site.  Denies fevers, chest pain, nausea or vomiting.  States that he is having some chronic shortness of breath and increased cough. Denies any fevers. Of note was admitted last month, received antibiotics for possible pneumonia and RLE open wound and erythema. Also with R elbow swelling and bursitis.    Concern for RLE cellulitis, but suspect skin changes also due to venous stasis. RLE wound appears scabbed, no active drainage but also noted to have small pustule on L lower leg. Recent MRSA nasal PCR positive. No fever and no leukocytosis.    #RLE cellulitis  #RLE wound  #Tinea pedis  #MRSA nasal PCR positive  #Cough    -suggest daptomycin for now  -can continue Zosyn for now  -baseline CPK  -repeat MRSA nasal PCR  -obtain R lower leg CT to rule out abscess  -suggest COVID/FLU/RSV and CXR  -blood cultures pending  -Podiatry follow up   -leg elevation    Thank you for courtesy of this consult.     Will follow.  Discussed with the primary team.     Megan Mosqueda MD  Division of Infectious Diseases   Cell [phone] between 8am and 6pm   After 6pm and weekends please call ID service at [phone].     55 minutes spent on total encounter assessing patient, examination, chart review, counseling and coordinating care by the attending physician/nurse/care manager.  74 year old male with past medical history of diabetes, COPD, heart failure, CKD, hypertension, history of right foot ulcer, who presented for wound to right lower extremity.  Was recently admitted here for shortness of breath as well as for bursitis and concern for a wound.  Was sent back to facility.  They sent him to the ER today due to worsening swelling and redness localized to his wound of the right lower extremity.  Endorsing pain to the site.  Denies fevers, chest pain, nausea or vomiting.  States that he is having some chronic shortness of breath and increased cough. Denies any fevers. Of note was admitted last month, received antibiotics for possible pneumonia and RLE open wound and erythema. Also with R elbow swelling and bursitis.    Concern for RLE cellulitis, but suspect skin changes also due to venous stasis. RLE wound appears scabbed, no active drainage but also noted to have small pustule on L lower leg. Recent MRSA nasal PCR positive. No fever and no leukocytosis.    #RLE cellulitis  #RLE wound  #Tinea pedis  #MRSA nasal PCR positive  #Cough    -suggest daptomycin for now  -can continue Zosyn for now  -clotrimazole ointment between the toes for 2 weeks  -baseline CPK  -repeat MRSA nasal PCR  -obtain R lower leg CT to rule out abscess  -suggest COVID/FLU/RSV and CXR  -blood cultures pending  -Podiatry follow up   -leg elevation    Thank you for courtesy of this consult.     Will follow.  Discussed with the primary team.     Megan Mosqueda MD  Division of Infectious Diseases   Cell [phone] between 8am and 6pm   After 6pm and weekends please call ID service at [phone].     75 minutes spent on total encounter assessing patient, examination, chart review, counseling and coordinating care by the attending physician/nurse/care manager.  74 year old male with past medical history of diabetes, COPD, heart failure, CKD, hypertension, history of right foot ulcer, who presented for wound to right lower extremity.  Was recently admitted here for shortness of breath as well as for bursitis and concern for a wound.  Was sent back to facility.  They sent him to the ER today due to worsening swelling and redness localized to his wound of the right lower extremity.  Endorsing pain to the site.  Denies fevers, chest pain, nausea or vomiting.  States that he is having some chronic shortness of breath and increased cough. Denies any fevers. Of note was admitted last month, received antibiotics for possible pneumonia and RLE open wound and erythema. Also with R elbow swelling and bursitis.    Concern for RLE cellulitis, but suspect skin changes also due to venous stasis. RLE wound appears scabbed, no active drainage but also noted to have small pustule on L lower leg. Recent MRSA nasal PCR positive. No fever and no leukocytosis.    #RLE cellulitis  #RLE wound  #Tinea pedis  #MRSA nasal PCR positive  #Cough    -suggest daptomycin for now  -can continue Zosyn for now  -clotrimazole ointment between the toes for 2 weeks  -baseline CPK  -repeat MRSA nasal PCR  -obtain R lower leg CT to rule out abscess  -suggest COVID/FLU/RSV and CXR  -blood cultures pending  -Podiatry follow up   -leg elevation  -contact isolation for now    Thank you for courtesy of this consult.     Will follow.  Discussed with the primary team.     Megan Mosqueda MD  Division of Infectious Diseases   Cell [phone] between 8am and 6pm   After 6pm and weekends please call ID service at [phone].     75 minutes spent on total encounter assessing patient, examination, chart review, counseling and coordinating care by the attending physician/nurse/care manager.

## 2025-06-04 NOTE — CONSULT NOTE ADULT - SUBJECTIVE AND OBJECTIVE BOX
CHIEF COMPLAINT/ REASON FOR VISIT  .. Patient was seen to address the  issue listed under PROBLEM LIST which is located toward bottom of this note     WAYNE SERRANO    [facility] 06    Allergies    IODINE (Unknown)  tetracycline (Unknown)  vancomycin (Other)    Intolerances        PAST MEDICAL & SURGICAL HISTORY:  Prostate cancer      Type II diabetes mellitus      Chronic obstructive pulmonary disease (COPD)      CHF (congestive heart failure)      Renal insufficiency      H/O migraine      Insomnia      Constipation      S/P foot surgery          FAMILY HISTORY:      Home Medications:  albuterol 0.63 mg/3 mL (0.021%) inhalation solution: 3 milliliter(s) by nebulizer 3 times a day as needed for  shortness of breath and/or wheezing (2025 08:48)  amoxicillin-clavulanate 875 mg-125 mg oral tablet: 1 tab(s) orally 2 times a day for 7 days (:48)  Artificial Tears ophthalmic solution: 1 drop(s) in each eye 2 times a day (:48)  ascorbic acid 500 mg oral tablet: 1 tab(s) orally 2 times a day (:48)  aspirin 81 mg oral tablet: 1 tab(s) orally once a day (:48)  clonazePAM 0.25 mg oral tablet, disintegratin tab(s) orally 3 times a day (:48)  ferrous sulfate 325 mg (65 mg elemental iron) oral tablet: 1 tab(s) orally once a day (:48)  furosemide 40 mg oral tablet: 1 tab(s) orally every 12 hours (:48)  HumaLOG 100 units/mL subcutaneous solution: Inject subcutaneously 3 times a day using sliding scale (:48)  HumaLOG KwikPen 100 units/mL injectable solution: Inject 15 units subcutaneously 3 times a day (before each meal) in addition to sliding scale (:48)  Jardiance 10 mg oral tablet: 1 tab(s) orally once a day (:48)  Lantus Solostar Pen 100 units/mL subcutaneous solution: 36 unit(s) subcutaneously once a day (at bedtime) (2025 08:48)  loperamide 2 mg oral capsule: 1 cap(s) orally after each loose BM every 6 hours as needed **No more than 3 capsules (6mg) a day** (2025 08:48)  loratadine 10 mg oral tablet: 1 tab(s) orally once a day (:48)  Melatonin 3 mg oral tablet: 1 tab(s) orally once a day (at bedtime) (:48)  montelukast 10 mg oral tablet: 1 tab(s) orally once a day (at bedtime) (:48)  multivitamin: 1 tab(s) orally once a day (:48)  oxyCODONE 5 mg oral tablet: 2 tab(s) orally 2 times a day MDD: 4 tablets (:48)  pantoprazole 40 mg oral delayed release tablet: 1 tab(s) orally once a day (:48)  Polyethylene Glycol 3350: Mix 17grams into 8oz of water and drink orally once a day as needed (:48)  Potassium Chloride (Eqv-Klor-Con M20) 20 mEq oral tablet, extended release: 1 tab(s) orally 2 times a day (:48)  predniSONE 10 mg oral tablet: 1 tab(s) orally once a day with food or milk for 1 month, then after 1 month take 5mg orally once a day (:48)  pregabalin 150 mg oral capsule: 1 cap(s) orally 2 times a day MDD: 2 capsules (:48)  ramelteon 8 mg oral tablet: 1 tab(s) orally once a day (at bedtime) (:48)  rosuvastatin 40 mg oral tablet: 1 tab(s) orally once a day (at bedtime) (:48)  saccharomyces boulardii lyo 250 mg oral capsule: 1 cap(s) orally once a day (:48)  Saline Mist 0.65% nasal spray: 1 spray(s) in each nostril 2 times a day (:48)  Senna 8.6 mg oral tablet: 1 tab(s) orally once a day (at bedtime) (:48)  sertraline 100 mg oral tablet: 1.5 tab(s) orally once a day (150mg) (2025 08:48)  Symbicort 160 mcg-4.5 mcg/inh inhalation aerosol: 2 puff(s) inhaled 2 times a day (2025 08:48)      MEDICATIONS  (STANDING):  albuterol/ipratropium for Nebulization 3 milliLiter(s) Nebulizer every 8 hours  ascorbic acid 500 milliGRAM(s) Oral daily  aspirin enteric coated 81 milliGRAM(s) Oral daily  budesonide    Inhalation Suspension 0.5 milliGRAM(s) Inhalation two times a day  clotrimazole 1% Cream 1 Application(s) Topical two times a day  DAPTOmycin IVPB 400 milliGRAM(s) IV Intermittent every 24 hours  dextrose 5%. 1000 milliLiter(s) (100 mL/Hr) IV Continuous <Continuous>  dextrose 5%. 1000 milliLiter(s) (50 mL/Hr) IV Continuous <Continuous>  dextrose 50% Injectable 25 Gram(s) IV Push once  dextrose 50% Injectable 12.5 Gram(s) IV Push once  dextrose 50% Injectable 25 Gram(s) IV Push once  ferrous    sulfate 325 milliGRAM(s) Oral daily  furosemide   Injectable 60 milliGRAM(s) IV Push daily  glucagon  Injectable 1 milliGRAM(s) IntraMuscular once  heparin   Injectable 5000 Unit(s) SubCutaneous every 8 hours  insulin glargine Injectable (LANTUS) 10 Unit(s) SubCutaneous at bedtime  insulin lispro (ADMELOG) corrective regimen sliding scale   SubCutaneous at bedtime  insulin lispro (ADMELOG) corrective regimen sliding scale.   SubCutaneous three times a day before meals  montelukast 10 milliGRAM(s) Oral daily  multivitamin/minerals 1 Tablet(s) Oral daily  pantoprazole    Tablet 40 milliGRAM(s) Oral before breakfast  piperacillin/tazobactam IVPB.. 3.375 Gram(s) IV Intermittent every 8 hours  potassium chloride    Tablet ER 20 milliEquivalent(s) Oral daily  predniSONE   Tablet 10 milliGRAM(s) Oral daily  pregabalin 150 milliGRAM(s) Oral two times a day  rosuvastatin 40 milliGRAM(s) Oral at bedtime  saccharomyces boulardii 250 milliGRAM(s) Oral two times a day  senna 1 Tablet(s) Oral at bedtime  sertraline 100 milliGRAM(s) Oral daily    MEDICATIONS  (PRN):  acetaminophen     Tablet .. 650 milliGRAM(s) Oral every 6 hours PRN Temp greater or equal to 38C (100.4F), Mild Pain (1 - 3)  aluminum hydroxide/magnesium hydroxide/simethicone Suspension 30 milliLiter(s) Oral every 4 hours PRN Dyspepsia  bisacodyl Suppository 10 milliGRAM(s) Rectal daily PRN Constipation  clonazePAM  Tablet 0.5 milliGRAM(s) Oral three times a day PRN ANXIETY  dextrose Oral Gel 15 Gram(s) Oral once PRN Blood Glucose LESS THAN 70 milliGRAM(s)/deciliter  melatonin 3 milliGRAM(s) Oral at bedtime PRN Insomnia  ondansetron Injectable 4 milliGRAM(s) IV Push every 8 hours PRN Nausea and/or Vomiting  oxyCODONE    IR 10 milliGRAM(s) Oral two times a day PRN Severe Pain (7 - 10)  oxyCODONE    IR 5 milliGRAM(s) Oral every 8 hours PRN Moderate Pain (4 - 6)  polyethylene glycol 3350 17 Gram(s) Oral daily PRN for constipation      Diet, Consistent Carbohydrate w/Evening Snack:   DASH/TLC Sodium & Cholesterol Restricted (25 @ 18:09) [Active]          Vital Signs Last 24 Hrs  T(C): 36.6 (2025 11:56), Max: 36.7 (2025 05:00)  T(F): 97.8 (2025 11:56), Max: 98 (2025 05:00)  HR: 72 (2025 15:00) (72 - 81)  BP: 130/72 (2025 11:56) (120/61 - 137/72)  BP(mean): --  RR: 18 (2025 11:56) (16 - 18)  SpO2: 96% (2025 11:56) (93% - 96%)    Parameters below as of 2025 11:56  Patient On (Oxygen Delivery Method): room air                  LABS:                        12.1   9.50  )-----------( 143      ( 2025 06:05 )             37.9     06-04    141  |  104  |  36[H]  ----------------------------<  137[H]  3.4[L]   |  30  |  1.70[H]    Ca    9.2      2025 06:05  Phos  2.9     -04  Mg     2.5     -    TPro  6.9  /  Alb  3.0[L]  /  TBili  0.5  /  DBili  x   /  AST  14[L]  /  ALT  32  /  AlkPhos  73  -04    PT/INR - ( 2025 14:45 )   PT: 12.4 sec;   INR: 1.05 ratio         PTT - ( 2025 14:45 )  PTT:29.0 sec  Urinalysis Basic - ( 2025 06:05 )    Color: x / Appearance: x / SG: x / pH: x  Gluc: 137 mg/dL / Ketone: x  / Bili: x / Urobili: x   Blood: x / Protein: x / Nitrite: x   Leuk Esterase: x / RBC: x / WBC x   Sq Epi: x / Non Sq Epi: x / Bacteria: x            WBC:  WBC Count: 9.50 K/uL ( @ 06:05)  WBC Count: 9.64 K/uL ( @ 14:45)      MICROBIOLOGY:  RECENT CULTURES:              PT/INR - ( 2025 14:45 )   PT: 12.4 sec;   INR: 1.05 ratio         PTT - ( 2025 14:45 )  PTT:29.0 sec    Sodium:  Sodium: 141 mmol/L ( @ 06:05)  Sodium: 138 mmol/L ( @ 14:45)      1.70 mg/dL  @ 06:05  2.00 mg/dL  @ 14:45      Hemoglobin:  Hemoglobin: 12.1 g/dL ( @ 06:05)  Hemoglobin: 12.5 g/dL ( @ 14:45)      Platelets: Platelet Count - Automated: 143 K/uL ( @ 06:05)  Platelet Count - Automated: 160 K/uL ( @ 14:45)      LIVER FUNCTIONS - ( 2025 06:05 )  Alb: 3.0 g/dL / Pro: 6.9 g/dL / ALK PHOS: 73 U/L / ALT: 32 U/L / AST: 14 U/L / GGT: x             Urinalysis Basic - ( 2025 06:05 )    Color: x / Appearance: x / SG: x / pH: x  Gluc: 137 mg/dL / Ketone: x  / Bili: x / Urobili: x   Blood: x / Protein: x / Nitrite: x   Leuk Esterase: x / RBC: x / WBC x   Sq Epi: x / Non Sq Epi: x / Bacteria: x        RADIOLOGY & ADDITIONAL STUDIES:      MICROBIOLOGY:  RECENT CULTURES:

## 2025-06-04 NOTE — CONSULT NOTE ADULT - ASSESSMENT
Initial evaluation/Pulmonary Critical Care Consultation requested by DR CHURCH on 6/4/2025  from Dr Benjamín Costa    Patient examined chart reviewed    HOSPITAL ADMISSION   PATIENT CAME  FROM (if information available)      REASON FOR VISIT  .. Management of problems listed below      CC.   . 6/3/2025  Pt brought in by EMS from [facility] with concern for worsening pedal edema, wound on R lower leg. 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  edema, wound check  OVERALL PRESENTATION.  . 6/3/2025  Patient with a past medical history of diabetes, COPD, heart failure, CKD, hypertension, history of right foot ulcer is presenting for wound to right lower extremity.  Was recently admitted here for shortness of breath as well as for bursitis and concern for a wound.  Was sent back to facility.  They sent him to the ER today due to worsening swelling and redness localized to his wound of the right lower extremity.  Endorsing pain to the site.  Denies fevers, chest pain, nausea or vomiting.  States that he is having some chronic shortness of breath though unchanged today.  Also endorses chronic cough.  PMH.      PAST HOSPITAL STAYS .  Home Medications:   * Patient Currently Takes Medications as of 27-May-2025 11:17 documented in Structured Notes  · montelukast 10 mg oral tablet: 1 tab(s) orally once a day  · melatonin 3 mg oral tablet: 1 tab(s) orally once a day (at bedtime)  · senna leaf extract oral tablet: 1 tab(s) orally once a day (at bedtime)  · saccharomyces boulardii lyo 250 mg oral capsule: 1 cap(s) orally once a day  · sertraline 100 mg oral tablet: 1 tab(s) orally once a day MDD: 1  · furosemide 40 mg oral tablet: 1 tab(s) orally every 12 hours  · sulfamethoxazole-trimethoprim 800 mg-160 mg oral tablet: 1 tab(s) orally every 12 hours  · aspirin 81 mg oral delayed release tablet: 1 tab(s) orally once a day  · potassium chloride 20 mEq oral tablet, extended release: 1 tab(s) orally 2 times a day  · albuterol 0.63 mg/3 mL (0.021%) inhalation solution: 3 milliliter(s) by nebulizer 3 times a day  · predniSONE 10 mg oral tablet: 1 tab(s) orally once a day  · oxyCODONE 5 mg oral tablet: 2 tab(s) orally 2 times a day as needed for Severe Pain (7 - 10) MDD: 4  · loratadine 10 mg oral tablet: 1 tab(s) orally once a day (in the morning)  · Multiple Vitamins with Minerals oral tablet: 1 tab(s) orally once a day  · ferrous sulfate 325 mg (65 mg elemental iron) oral tablet: 1 tab(s) orally once a day  · pantoprazole 40 mg oral delayed release tablet: 1 tab(s) orally once a day (before a meal)  · Symbicort 160 mcg-4.5 mcg/inh inhalation aerosol: 2 puff(s) inhaled 2 times a day  · rosuvastatin 40 mg oral tablet: 1 tab(s) orally once a day  · polyethylene glycol 3350 oral powder for reconstitution: 17 gram(s) orally once a day as needed for  constipation  · Jardiance 10 mg oral tablet: 1 tab(s) orally once a day  · pregabalin 150 mg oral capsule: 1 cap(s) orally 2 times a day  · insulin glargine 100 units/mL subcutaneous solution: 36 unit(s) subcutaneous once a day (at bedtime)  · HumaLOG 100 units/mL injectable solution: 15 unit(s) injectable 3 times a day (before meals) ***sliding scale***  ER MGMT .        BEST PRACTICE ISSUES.  . HOB ELEVATN.    .... Yes  . DIET  .   .... CONS CARB 6/3   . FREE WATER.   ....   .  IV FLUID.  .....   . PHARMAC DVT PPLX .    .... HPSC 6/3  . NON PHARMAC DVT PPLX .      . STRESS ULCR PPLX .   .... PROPRANOLOL 40 6/4/2025   . DATE/DM MGMT.   ..... See under Endocrine section   GENERAL DATA .   . COVID.         .... scv26/4/2025 (-)    . GOC.    ....    . ICU STAY.    .... no   . INFECTION PPLX .   ....   . ALLGY.   .... TETRACYCLINE  ..... VANCOMYCIN  .... IODINE    . WT.   .... 6/4/2025 104 k  . BMI.  .... 6/4/2025 33      [id number]  VITALS/GAS EXCHANGE/DRIPS    ABGS.     .  VS/ PO/IO/ VENT/ DRIPS.   6/4/2025 afeb 80 130/70   6/4/2025 ra 96%     [id number]  PROBLEM ASSESSMENT RECOMMENDATIONS.  RESP.   . GAS EXCHANGE .   .... target PO 90-95%     . SOB  .... DD COPD CHF VTE PNEUM    . RO DVT   .... Venous duplx 6/4/2025 (-)     . COPD   .... DUONEB 6/4/2025   .... PULMICORT 6/4/2025   .... MONTELULKAST 6/4/2025   .... PREDNISONE 10 6/4/2025     INFECTION.  . DATA  .... w 6/4/2025 w 9.5  .... cxr 6/4/2025 cw 5/21/2025   ........ bibasal atelectasis   ........ interval increase of left lower lobe opacity concerning for pneumonia   .... Flu ab 6/4/2025 (-)      . VENOUS STASIS CHANGES RLE    . CELLULITIS R LOWER LEG 6/4/2025   .... XR R foot 6/4/2025 No emphysema     . ANTIBIO   .... DAPTOMYCIN 6/4/2025 D 400/d x 7d Dr Mosqueda   .... ZOSYN 6/4/2025 Z x 7d     CARDIAC.  . CAD    .... ASA 81 6/3   .... ROSUVASTATIN 40 6/3       . CHF  .... pbnp 6/4/2025 pbnp 123  .... tte 5/26/2025   ......... n lvsf ef 60% ivc rap 3   .... LASIX 60 IV/d 6/4/2025      HEMAT.  . DATA  .... Hb 6/4/2025 Hb 12.1  .... Plt 6/4/2025 plt 143  .... inr 6/3/2025 inr 105  .... monitor     GI.   . DIET .   .... 6/4/2025 CONS CARB    . LFT MONITORING   .... LFTS   6/4/2025  ........ AP   73   ........ AST 14  ........ ALT  32  .... monitor     RENAL.  . CKD  .... Na 6/4/2025 Na 141   .... K 6/4/2025 K 3.4   .... CO2 6/4/2025 co2 30   .... Cr 5/27-6/4/2025 Cr 1.6-1.7   ....  monitor     ENDO.  . DM  .  .... INSULIN GLARIGINE 10 HS 6/4/2025     NEURO.  . ANXIETY   .... CLONAZEPAM 0.5 tid 6/4/2025     . PAIN  .... PREGABALIN 150. 2 6/3    . DEPRESSION  .... SERTRALINE 100 6/2       XXXXXXXXXXXXXXXXXXXXXX   SUMMARY BASELINE .     . 74 m history of DM CHF, COPD, prostate cancer, renal insufficiency, diabetes, chronic neck pain,  right foot wound from [facility] who had been recently admitted with SOB   CC.   . 6/3/2025 PEDAL EDEMA  . 6/3/2025 WOUND R LOWER LEG   . 6/3/2025 CHRONIC SHORTNESS OF BREATH  . 6/3/2025 CHRONIC COUGH   MAIN ISSUES.  . COPD  . SHORTNESS OF BREATH  . PEDAL EDEMA   .... Venous duplx 6/4/2025 (-)   . VENOUS STASIS CHANGES RLE  . CELLULITIS R LOWER LEG 6/4/2025   .... XR R foot 6/4/2025 No emphysema   . ANTIBIO   .... DAPTOMYCIN 6/4/2025 D 400/d x 7d Dr Mosqueda   .... ZOSYN 6/4/2025 Z x 7d   . CKD   .... Cr 5/27-6/4/2025 Cr 1.6-1.7   . DM    PMH.   . LUNG NODULES  .... CT ch 5/21/2025 cw 12/28/2024   ......... mild tib nodules left lo lobe may be infection or inflammn    . PNEUMONIA   .... ROCEPHIN 5/21  . SKIN SOFT TISSUE INFECTION  .... DAPTOMYCIN 5/22- 5/26 d 450 x 7d   .... bactrim 5/26  . RLE ANKLE OPEN WOUND   . DM     PROCEDURES/DEVICES .      DISCUSSIONS.  .... Discussed with primary care and relevant consultants on an ongoing basis       TIME SPENT.  . Over 55  minutes aggregate care time spent on encounter; activities included   direct patient care, counseling and/or coordinating care reviewing notes, lab data/ imaging , discussion with multidisciplinary team/ patient  /family and explaining in detail risks, benefits, alternatives  of the recommendations     MAGGIE BLACK 74 m 6/3/2025 1951

## 2025-06-04 NOTE — PROGRESS NOTE ADULT - SUBJECTIVE AND OBJECTIVE BOX
PROGRESS NOTE   Patient is a 74y old  Male who presents with a chief complaint of rle swelling (04 Jun 2025 14:08)      HPI:  Patient with a past medical history of diabetes, COPD, heart failure, CKD, hypertension, history of right foot ulcer is presenting for wound to right lower extremity.  Was recently admitted here for shortness of breath as well as for bursitis and concern for a wound.  Was sent back to facility.  They sent him to the ER today due to worsening swelling and redness localized to his wound of the right lower extremity.  Endorsing pain to the site.  Denies fevers, chest pain, nausea or vomiting.  States that he is having some chronic shortness of breath though unchanged today.  Also endorses chronic cough. (03 Jun 2025 18:32)      Vital Signs Last 24 Hrs  T(C): 36.6 (04 Jun 2025 11:56), Max: 36.7 (04 Jun 2025 05:00)  T(F): 97.8 (04 Jun 2025 11:56), Max: 98 (04 Jun 2025 05:00)  HR: 80 (04 Jun 2025 11:56) (74 - 86)  BP: 130/72 (04 Jun 2025 11:56) (120/61 - 137/72)  BP(mean): --  RR: 18 (04 Jun 2025 11:56) (16 - 18)  SpO2: 96% (04 Jun 2025 11:56) (93% - 96%)    Parameters below as of 04 Jun 2025 11:56  Patient On (Oxygen Delivery Method): room air                              12.1   9.50  )-----------( 143      ( 04 Jun 2025 06:05 )             37.9               06-04    141  |  104  |  36[H]  ----------------------------<  137[H]  3.4[L]   |  30  |  1.70[H]    Ca    9.2      04 Jun 2025 06:05  Phos  2.9     06-04  Mg     2.5     06-04    TPro  6.9  /  Alb  3.0[L]  /  TBili  0.5  /  DBili  x   /  AST  14[L]  /  ALT  32  /  AlkPhos  73  06-04      LOWER EXTREMITY PHYSICAL EXAM:  Integument : Skin warm, dry and supple bilateral  Right lower leg with redness, swelling and pain on palpation, partial thickness wound covered with eschar. All consistent with cellulitis   The area of concern has responded favorably with current treatment  Vascular : DP and PT weakly palpable 1/4 bilaterally  Capillary refill time less than 3s digits 1-5 bilaterally. Moderate to severe edema bilaterally with right is greater than left  MSK : Muscle strenth 4/5 all major muscle group bilaterally

## 2025-06-04 NOTE — CONSULT NOTE ADULT - SUBJECTIVE AND OBJECTIVE BOX
Patient is a 74y old  Male who presents with a chief complaint of rle swelling (03 Jun 2025 23:48)      Reason For Consult:     HPI:  Patient with a past medical history of diabetes, COPD, heart failure, CKD, hypertension, history of right foot ulcer is presenting for wound to right lower extremity.  Was recently admitted here for shortness of breath as well as for bursitis and concern for a wound.  Was sent back to facility.  They sent him to the ER today due to worsening swelling and redness localized to his wound of the right lower extremity.  Endorsing pain to the site.  Denies fevers, chest pain, nausea or vomiting.  States that he is having some chronic shortness of breath though unchanged today.  Also endorses chronic cough. (03 Jun 2025 18:32)      PAST MEDICAL & SURGICAL HISTORY:  Prostate cancer      Type II diabetes mellitus      Chronic obstructive pulmonary disease (COPD)      CHF (congestive heart failure)      Renal insufficiency      H/O migraine      Insomnia      Constipation      S/P foot surgery          FAMILY HISTORY:        Social History:    MEDICATIONS  (STANDING):  albuterol/ipratropium for Nebulization 3 milliLiter(s) Nebulizer every 8 hours  aspirin enteric coated 81 milliGRAM(s) Oral daily  budesonide    Inhalation Suspension 0.5 milliGRAM(s) Inhalation two times a day  DAPTOmycin IVPB 400 milliGRAM(s) IV Intermittent every 24 hours  dextrose 5%. 1000 milliLiter(s) (50 mL/Hr) IV Continuous <Continuous>  dextrose 5%. 1000 milliLiter(s) (100 mL/Hr) IV Continuous <Continuous>  dextrose 50% Injectable 25 Gram(s) IV Push once  dextrose 50% Injectable 12.5 Gram(s) IV Push once  dextrose 50% Injectable 25 Gram(s) IV Push once  ferrous    sulfate 325 milliGRAM(s) Oral daily  glucagon  Injectable 1 milliGRAM(s) IntraMuscular once  heparin   Injectable 5000 Unit(s) SubCutaneous every 8 hours  insulin glargine Injectable (LANTUS) 10 Unit(s) SubCutaneous at bedtime  insulin lispro (ADMELOG) corrective regimen sliding scale   SubCutaneous three times a day before meals  insulin lispro (ADMELOG) corrective regimen sliding scale   SubCutaneous at bedtime  montelukast 10 milliGRAM(s) Oral daily  multivitamin/minerals 1 Tablet(s) Oral daily  pantoprazole    Tablet 40 milliGRAM(s) Oral before breakfast  piperacillin/tazobactam IVPB.. 3.375 Gram(s) IV Intermittent every 8 hours  potassium chloride    Tablet ER 40 milliEquivalent(s) Oral once  predniSONE   Tablet 10 milliGRAM(s) Oral daily  pregabalin 150 milliGRAM(s) Oral two times a day  rosuvastatin 40 milliGRAM(s) Oral at bedtime  saccharomyces boulardii 250 milliGRAM(s) Oral two times a day  senna 1 Tablet(s) Oral at bedtime  sertraline 100 milliGRAM(s) Oral daily  sodium chloride 0.9%. 1000 milliLiter(s) (60 mL/Hr) IV Continuous <Continuous>    MEDICATIONS  (PRN):  acetaminophen     Tablet .. 650 milliGRAM(s) Oral every 6 hours PRN Temp greater or equal to 38C (100.4F), Mild Pain (1 - 3)  aluminum hydroxide/magnesium hydroxide/simethicone Suspension 30 milliLiter(s) Oral every 4 hours PRN Dyspepsia  bisacodyl Suppository 10 milliGRAM(s) Rectal daily PRN Constipation  dextrose Oral Gel 15 Gram(s) Oral once PRN Blood Glucose LESS THAN 70 milliGRAM(s)/deciliter  melatonin 3 milliGRAM(s) Oral at bedtime PRN Insomnia  ondansetron Injectable 4 milliGRAM(s) IV Push every 8 hours PRN Nausea and/or Vomiting  oxyCODONE    IR 10 milliGRAM(s) Oral two times a day PRN Severe Pain (7 - 10)  oxyCODONE    IR 5 milliGRAM(s) Oral every 8 hours PRN Moderate Pain (4 - 6)  polyethylene glycol 3350 17 Gram(s) Oral daily PRN for constipation        T(C): 36.4 (06-04-25 @ 08:42), Max: 36.9 (06-03-25 @ 14:28)  HR: 77 (06-04-25 @ 08:42) (74 - 89)  BP: 126/72 (06-04-25 @ 08:42) (110/57 - 137/72)  RR: 18 (06-04-25 @ 08:42) (16 - 18)  SpO2: 93% (06-04-25 @ 08:42) (92% - 96%)  Wt(kg): --    PHYSICAL EXAM:  CHEST/LUNG: Clear to percussion bilaterally; No rales, rhonchi, wheezing, or rubs  HEART: Regular rate and rhythm; No murmurs, rubs, or gallops  ABDOMEN: Soft, Nontender, Nondistended; Bowel sounds present  EXTREMITIES:  le erythema    CAPILLARY BLOOD GLUCOSE      POCT Blood Glucose.: 147 mg/dL (04 Jun 2025 07:58)  POCT Blood Glucose.: 244 mg/dL (03 Jun 2025 22:48)                            12.1   9.50  )-----------( 143      ( 04 Jun 2025 06:05 )             37.9       CMP:  06-04 @ 06:05  SGPT 32  Albumin 3.0   Alk Phos 73   Anion Gap 7   SGOT 14   Total Bili 0.5   BUN 36   Calcium Total 9.2   CO2 30   Chloride 104   Creatinine 1.70   eGFR if AA --   eGFR if non AA --   Glucose 137   Potassium 3.4   Protein 6.9   Sodium 141      Thyroid Function Tests:      Diabetes Tests:       Radiology:

## 2025-06-05 LAB
ANION GAP SERPL CALC-SCNC: 9 MMOL/L — SIGNIFICANT CHANGE UP (ref 5–17)
APPEARANCE UR: CLEAR — SIGNIFICANT CHANGE UP
BILIRUB UR-MCNC: NEGATIVE — SIGNIFICANT CHANGE UP
BUN SERPL-MCNC: 27 MG/DL — HIGH (ref 7–23)
CALCIUM SERPL-MCNC: 9 MG/DL — SIGNIFICANT CHANGE UP (ref 8.5–10.1)
CHLORIDE SERPL-SCNC: 103 MMOL/L — SIGNIFICANT CHANGE UP (ref 96–108)
CK SERPL-CCNC: 139 U/L — SIGNIFICANT CHANGE UP (ref 26–308)
CO2 SERPL-SCNC: 30 MMOL/L — SIGNIFICANT CHANGE UP (ref 22–31)
COLOR SPEC: YELLOW — SIGNIFICANT CHANGE UP
CREAT SERPL-MCNC: 1.6 MG/DL — HIGH (ref 0.5–1.3)
CRP SERPL-MCNC: 57 MG/L — HIGH
DIFF PNL FLD: ABNORMAL
EGFR: 45 ML/MIN/1.73M2 — LOW
EGFR: 45 ML/MIN/1.73M2 — LOW
ERYTHROCYTE [SEDIMENTATION RATE] IN BLOOD: 56 MM/HR — HIGH (ref 0–15)
GLUCOSE SERPL-MCNC: 151 MG/DL — HIGH (ref 70–99)
GLUCOSE UR QL: >=1000 MG/DL
HCT VFR BLD CALC: 37.5 % — LOW (ref 39–50)
HGB BLD-MCNC: 12 G/DL — LOW (ref 13–17)
KETONES UR QL: NEGATIVE MG/DL — SIGNIFICANT CHANGE UP
LEUKOCYTE ESTERASE UR-ACNC: NEGATIVE — SIGNIFICANT CHANGE UP
MAGNESIUM SERPL-MCNC: 2.2 MG/DL — SIGNIFICANT CHANGE UP (ref 1.6–2.6)
MCHC RBC-ENTMCNC: 28 PG — SIGNIFICANT CHANGE UP (ref 27–34)
MCHC RBC-ENTMCNC: 32 G/DL — SIGNIFICANT CHANGE UP (ref 32–36)
MCV RBC AUTO: 87.6 FL — SIGNIFICANT CHANGE UP (ref 80–100)
NITRITE UR-MCNC: NEGATIVE — SIGNIFICANT CHANGE UP
NRBC BLD AUTO-RTO: 0 /100 WBCS — SIGNIFICANT CHANGE UP (ref 0–0)
NT-PROBNP SERPL-SCNC: 294 PG/ML — HIGH (ref 0–125)
PH UR: 6.5 — SIGNIFICANT CHANGE UP (ref 5–8)
PHOSPHATE SERPL-MCNC: 3.1 MG/DL — SIGNIFICANT CHANGE UP (ref 2.5–4.5)
PLATELET # BLD AUTO: 153 K/UL — SIGNIFICANT CHANGE UP (ref 150–400)
POTASSIUM SERPL-MCNC: 2.8 MMOL/L — CRITICAL LOW (ref 3.5–5.3)
POTASSIUM SERPL-SCNC: 2.8 MMOL/L — CRITICAL LOW (ref 3.5–5.3)
PROT UR-MCNC: SIGNIFICANT CHANGE UP MG/DL
RBC # BLD: 4.28 M/UL — SIGNIFICANT CHANGE UP (ref 4.2–5.8)
RBC # FLD: 18.8 % — HIGH (ref 10.3–14.5)
SODIUM SERPL-SCNC: 142 MMOL/L — SIGNIFICANT CHANGE UP (ref 135–145)
SP GR SPEC: 1.01 — SIGNIFICANT CHANGE UP (ref 1–1.03)
UROBILINOGEN FLD QL: 0.2 MG/DL — SIGNIFICANT CHANGE UP (ref 0.2–1)
WBC # BLD: 8.83 K/UL — SIGNIFICANT CHANGE UP (ref 3.8–10.5)
WBC # FLD AUTO: 8.83 K/UL — SIGNIFICANT CHANGE UP (ref 3.8–10.5)

## 2025-06-05 PROCEDURE — 99232 SBSQ HOSP IP/OBS MODERATE 35: CPT

## 2025-06-05 PROCEDURE — 99222 1ST HOSP IP/OBS MODERATE 55: CPT

## 2025-06-05 RX ORDER — PHENAZOPYRIDINE HCL 100 MG
100 TABLET ORAL THREE TIMES A DAY
Refills: 0 | Status: COMPLETED | OUTPATIENT
Start: 2025-06-05 | End: 2025-06-07

## 2025-06-05 RX ORDER — LACTOBACILLUS ACIDOPHILUS/PECT 75 MM-100
1 CAPSULE ORAL
Refills: 0 | Status: DISCONTINUED | OUTPATIENT
Start: 2025-06-05 | End: 2025-06-10

## 2025-06-05 RX ADMIN — INSULIN GLARGINE-YFGN 10 UNIT(S): 100 INJECTION, SOLUTION SUBCUTANEOUS at 21:37

## 2025-06-05 RX ADMIN — PREDNISONE 10 MILLIGRAM(S): 20 TABLET ORAL at 06:36

## 2025-06-05 RX ADMIN — Medication 100 MILLIGRAM(S): at 21:35

## 2025-06-05 RX ADMIN — CLONAZEPAM 0.5 MILLIGRAM(S): 0.5 TABLET ORAL at 14:17

## 2025-06-05 RX ADMIN — IPRATROPIUM BROMIDE AND ALBUTEROL SULFATE 3 MILLILITER(S): .5; 2.5 SOLUTION RESPIRATORY (INHALATION) at 07:22

## 2025-06-05 RX ADMIN — FUROSEMIDE 60 MILLIGRAM(S): 10 INJECTION INTRAMUSCULAR; INTRAVENOUS at 06:35

## 2025-06-05 RX ADMIN — BUTYROSPERMUM PARKII(SHEA BUTTER), SIMMONDSIA CHINENSIS (JOJOBA) SEED OIL, ALOE BARBADENSIS LEAF EXTRACT 250 MILLIGRAM(S): .01; 1; 3.5 LIQUID TOPICAL at 06:37

## 2025-06-05 RX ADMIN — INSULIN LISPRO 2: 100 INJECTION, SOLUTION INTRAVENOUS; SUBCUTANEOUS at 21:37

## 2025-06-05 RX ADMIN — PREGABALIN 150 MILLIGRAM(S): 50 CAPSULE ORAL at 06:35

## 2025-06-05 RX ADMIN — OXYCODONE HYDROCHLORIDE 10 MILLIGRAM(S): 30 TABLET ORAL at 21:48

## 2025-06-05 RX ADMIN — Medication 81 MILLIGRAM(S): at 12:43

## 2025-06-05 RX ADMIN — Medication 3 MILLIGRAM(S): at 21:44

## 2025-06-05 RX ADMIN — Medication 25 GRAM(S): at 23:25

## 2025-06-05 RX ADMIN — HEPARIN SODIUM 5000 UNIT(S): 1000 INJECTION INTRAVENOUS; SUBCUTANEOUS at 06:35

## 2025-06-05 RX ADMIN — Medication 3 MILLIGRAM(S): at 03:12

## 2025-06-05 RX ADMIN — IPRATROPIUM BROMIDE AND ALBUTEROL SULFATE 3 MILLILITER(S): .5; 2.5 SOLUTION RESPIRATORY (INHALATION) at 14:09

## 2025-06-05 RX ADMIN — Medication 25 GRAM(S): at 06:36

## 2025-06-05 RX ADMIN — PREGABALIN 150 MILLIGRAM(S): 50 CAPSULE ORAL at 17:46

## 2025-06-05 RX ADMIN — Medication 1 TABLET(S): at 12:43

## 2025-06-05 RX ADMIN — CLOTRIMAZOLE 1 APPLICATION(S): 1 CREAM TOPICAL at 06:36

## 2025-06-05 RX ADMIN — Medication 40 MILLIEQUIVALENT(S): at 14:17

## 2025-06-05 RX ADMIN — DAPTOMYCIN 116 MILLIGRAM(S): 500 INJECTION, POWDER, LYOPHILIZED, FOR SOLUTION INTRAVENOUS at 17:44

## 2025-06-05 RX ADMIN — MONTELUKAST SODIUM 10 MILLIGRAM(S): 10 TABLET ORAL at 12:43

## 2025-06-05 RX ADMIN — Medication 1 TABLET(S): at 17:46

## 2025-06-05 RX ADMIN — Medication 325 MILLIGRAM(S): at 12:42

## 2025-06-05 RX ADMIN — OXYCODONE HYDROCHLORIDE 10 MILLIGRAM(S): 30 TABLET ORAL at 22:18

## 2025-06-05 RX ADMIN — BUDESONIDE 0.5 MILLIGRAM(S): 0.25 SUSPENSION RESPIRATORY (INHALATION) at 07:22

## 2025-06-05 RX ADMIN — Medication 40 MILLIGRAM(S): at 06:36

## 2025-06-05 RX ADMIN — ROSUVASTATIN CALCIUM 40 MILLIGRAM(S): 20 TABLET, FILM COATED ORAL at 21:35

## 2025-06-05 RX ADMIN — HEPARIN SODIUM 5000 UNIT(S): 1000 INJECTION INTRAVENOUS; SUBCUTANEOUS at 21:36

## 2025-06-05 RX ADMIN — Medication 25 GRAM(S): at 14:17

## 2025-06-05 RX ADMIN — BUTYROSPERMUM PARKII(SHEA BUTTER), SIMMONDSIA CHINENSIS (JOJOBA) SEED OIL, ALOE BARBADENSIS LEAF EXTRACT 250 MILLIGRAM(S): .01; 1; 3.5 LIQUID TOPICAL at 17:47

## 2025-06-05 RX ADMIN — INSULIN LISPRO 6: 100 INJECTION, SOLUTION INTRAVENOUS; SUBCUTANEOUS at 17:45

## 2025-06-05 RX ADMIN — SERTRALINE 100 MILLIGRAM(S): 100 TABLET, FILM COATED ORAL at 12:43

## 2025-06-05 RX ADMIN — BUDESONIDE 0.5 MILLIGRAM(S): 0.25 SUSPENSION RESPIRATORY (INHALATION) at 19:16

## 2025-06-05 RX ADMIN — HEPARIN SODIUM 5000 UNIT(S): 1000 INJECTION INTRAVENOUS; SUBCUTANEOUS at 14:17

## 2025-06-05 RX ADMIN — INSULIN LISPRO 2: 100 INJECTION, SOLUTION INTRAVENOUS; SUBCUTANEOUS at 08:14

## 2025-06-05 RX ADMIN — CLOTRIMAZOLE 1 APPLICATION(S): 1 CREAM TOPICAL at 21:43

## 2025-06-05 RX ADMIN — Medication 100 MILLIEQUIVALENT(S): at 12:07

## 2025-06-05 RX ADMIN — INSULIN LISPRO 10: 100 INJECTION, SOLUTION INTRAVENOUS; SUBCUTANEOUS at 12:08

## 2025-06-05 RX ADMIN — IPRATROPIUM BROMIDE AND ALBUTEROL SULFATE 3 MILLILITER(S): .5; 2.5 SOLUTION RESPIRATORY (INHALATION) at 19:16

## 2025-06-05 RX ADMIN — Medication 500 MILLIGRAM(S): at 12:43

## 2025-06-05 RX ADMIN — OXYCODONE HYDROCHLORIDE 10 MILLIGRAM(S): 30 TABLET ORAL at 10:25

## 2025-06-05 RX ADMIN — Medication 20 MILLIEQUIVALENT(S): at 12:42

## 2025-06-05 NOTE — PROGRESS NOTE ADULT - SUBJECTIVE AND OBJECTIVE BOX
PROGRESS NOTE  Patient is a 74y old  Male who presents with a chief complaint of rle swelling (05 Jun 2025 15:56)    Chart and available morning labs /imaging are reviewed electronically , urgent issues addressed . More information  is being added upon completion of rounds , when more information is collected and management discussed with consultants , medical staff and social service/case management on the floor   OVERNIGHT  No new issues reported by medical staff . All above noted Patient is resting in a bed comfortably .Confused ,poor mentation .No distress noted     HPI:  Patient with a past medical history of diabetes, COPD, heart failure, CKD, hypertension, history of right foot ulcer is presenting for wound to right lower extremity.  Was recently admitted here for shortness of breath as well as for bursitis and concern for a wound.  Was sent back to facility.  They sent him to the ER today due to worsening swelling and redness localized to his wound of the right lower extremity.  Endorsing pain to the site.  Denies fevers, chest pain, nausea or vomiting.  States that he is having some chronic shortness of breath though unchanged today.  Also endorses chronic cough. (03 Jun 2025 18:32)    PAST MEDICAL & SURGICAL HISTORY:  Prostate cancer      Type II diabetes mellitus      Chronic obstructive pulmonary disease (COPD)      CHF (congestive heart failure)      Renal insufficiency      H/O migraine      Insomnia      Constipation      S/P foot surgery          MEDICATIONS  (STANDING):  albuterol/ipratropium for Nebulization 3 milliLiter(s) Nebulizer every 8 hours  ascorbic acid 500 milliGRAM(s) Oral daily  aspirin enteric coated 81 milliGRAM(s) Oral daily  budesonide    Inhalation Suspension 0.5 milliGRAM(s) Inhalation two times a day  clotrimazole 1% Cream 1 Application(s) Topical two times a day  DAPTOmycin IVPB 400 milliGRAM(s) IV Intermittent every 24 hours  dextrose 5%. 1000 milliLiter(s) (50 mL/Hr) IV Continuous <Continuous>  dextrose 5%. 1000 milliLiter(s) (100 mL/Hr) IV Continuous <Continuous>  dextrose 50% Injectable 25 Gram(s) IV Push once  dextrose 50% Injectable 12.5 Gram(s) IV Push once  dextrose 50% Injectable 25 Gram(s) IV Push once  ferrous    sulfate 325 milliGRAM(s) Oral daily  furosemide   Injectable 60 milliGRAM(s) IV Push daily  glucagon  Injectable 1 milliGRAM(s) IntraMuscular once  heparin   Injectable 5000 Unit(s) SubCutaneous every 8 hours  insulin glargine Injectable (LANTUS) 10 Unit(s) SubCutaneous at bedtime  insulin lispro (ADMELOG) corrective regimen sliding scale   SubCutaneous at bedtime  insulin lispro (ADMELOG) corrective regimen sliding scale.   SubCutaneous three times a day before meals  lactobacillus acidophilus 1 Tablet(s) Oral two times a day with meals  montelukast 10 milliGRAM(s) Oral daily  multivitamin/minerals 1 Tablet(s) Oral daily  pantoprazole    Tablet 40 milliGRAM(s) Oral before breakfast  phenazopyridine 100 milliGRAM(s) Oral three times a day  piperacillin/tazobactam IVPB.. 3.375 Gram(s) IV Intermittent every 8 hours  potassium chloride    Tablet ER 20 milliEquivalent(s) Oral daily  potassium chloride    Tablet ER 20 milliEquivalent(s) Oral once  predniSONE   Tablet 10 milliGRAM(s) Oral daily  pregabalin 150 milliGRAM(s) Oral two times a day  rosuvastatin 40 milliGRAM(s) Oral at bedtime  saccharomyces boulardii 250 milliGRAM(s) Oral two times a day  senna 1 Tablet(s) Oral at bedtime  sertraline 100 milliGRAM(s) Oral daily    MEDICATIONS  (PRN):  acetaminophen     Tablet .. 650 milliGRAM(s) Oral every 6 hours PRN Temp greater or equal to 38C (100.4F), Mild Pain (1 - 3)  aluminum hydroxide/magnesium hydroxide/simethicone Suspension 30 milliLiter(s) Oral every 4 hours PRN Dyspepsia  bisacodyl Suppository 10 milliGRAM(s) Rectal daily PRN Constipation  clonazePAM  Tablet 0.5 milliGRAM(s) Oral three times a day PRN ANXIETY  dextrose Oral Gel 15 Gram(s) Oral once PRN Blood Glucose LESS THAN 70 milliGRAM(s)/deciliter  melatonin 3 milliGRAM(s) Oral at bedtime PRN Insomnia  ondansetron Injectable 4 milliGRAM(s) IV Push every 8 hours PRN Nausea and/or Vomiting  oxyCODONE    IR 10 milliGRAM(s) Oral two times a day PRN Severe Pain (7 - 10)  oxyCODONE    IR 5 milliGRAM(s) Oral every 8 hours PRN Moderate Pain (4 - 6)  polyethylene glycol 3350 17 Gram(s) Oral daily PRN for constipation      OBJECTIVE    T(C): 36.4 (06-05-25 @ 13:53), Max: 36.6 (06-04-25 @ 23:55)  HR: 102 (06-05-25 @ 14:20) (49 - 106)  BP: 130/77 (06-05-25 @ 13:53) (118/68 - 130/77)  RR: 18 (06-05-25 @ 13:53) (17 - 18)  SpO2: 95% (06-05-25 @ 14:20) (91% - 96%)  Wt(kg): --  I&O's Summary        REVIEW OF SYSTEMS:  Patient is  unable to provide any information/ROS  due to baseline mental status.     PHYSICAL EXAM:  Appearance: NAD. VS past 24 hrs -as above   HEENT:   Moist oral mucosa. Conjunctiva clear b/l.   Neck : supple  Respiratory: Lungs CTAB.  Gastrointestinal:  Soft, nontender. No rebound. No rigidity. BS present	  Cardiovascular: RRR ,S1S2 present  Neurologic: Non-focal. Moving all extremities.  Extremities: No edema. No erythema. No calf tenderness.  Skin: No rashes, No ecchymoses, No cyanosis.	  wounds ,skin lesions-See skin assesment flow sheet   LABS:                        12.0   8.83  )-----------( 153      ( 05 Jun 2025 07:05 )             37.5     06-05    142  |  103  |  27[H]  ----------------------------<  151[H]  2.8[LL]   |  30  |  1.60[H]    Ca    9.0      05 Jun 2025 07:05  Phos  3.1     06-05  Mg     2.2     06-05    TPro  6.9  /  Alb  3.0[L]  /  TBili  0.5  /  DBili  x   /  AST  14[L]  /  ALT  32  /  AlkPhos  73  06-04    CAPILLARY BLOOD GLUCOSE      POCT Blood Glucose.: 254 mg/dL (05 Jun 2025 16:55)  POCT Blood Glucose.: 351 mg/dL (05 Jun 2025 11:53)  POCT Blood Glucose.: 183 mg/dL (05 Jun 2025 07:45)  POCT Blood Glucose.: 176 mg/dL (04 Jun 2025 22:31)      Urinalysis Basic - ( 05 Jun 2025 07:05 )    Color: x / Appearance: x / SG: x / pH: x  Gluc: 151 mg/dL / Ketone: x  / Bili: x / Urobili: x   Blood: x / Protein: x / Nitrite: x   Leuk Esterase: x / RBC: x / WBC x   Sq Epi: x / Non Sq Epi: x / Bacteria: x        Culture - Blood (collected 03 Jun 2025 15:00)  Source: Blood Blood-Peripheral  Preliminary Report (05 Jun 2025 01:02):    No growth at 24 hours    Culture - Blood (collected 03 Jun 2025 14:45)  Source: Blood Blood-Peripheral  Preliminary Report (05 Jun 2025 01:02):    No growth at 24 hours      RADIOLOGY & ADDITIONAL TESTS:   reviewed electronically  ASSESSMENT/PLAN: 	    25 minutes aggregate time was spent on this visit, 50% visit time spent in care co-ordination with other attendings and counselling patient .I have discussed care plan with patient / HCP/family member ,who expressed understanding of problems treatment and their effect and side effects, alternatives in details. I have asked if they have any questions and concerns and appropriately addressed them to best of my ability.

## 2025-06-05 NOTE — CASE MANAGEMENT PROGRESS NOTE - NSCMPROGRESSNOTE_GEN_ALL_CORE
Discussed pt on rounds, pt remains acute, awaiting further testing, on IV Zosyn, pending PT eval. CM will continue to collaborate with interdisciplinary team and remain available to assist.

## 2025-06-05 NOTE — PROGRESS NOTE ADULT - SUBJECTIVE AND OBJECTIVE BOX
[facility]  INFECTIOUS DISEASES - [address], [place] [address]  Tel: [phone]     Fax: [phone]  =======================================================    MAGGIE WAYNE [id number]    Follow up: No fevers.     Allergies:  IODINE (Unknown)  tetracycline (Unknown)  vancomycin (Other)      Antibiotics:  acetaminophen     Tablet .. 650 milliGRAM(s) Oral every 6 hours PRN  albuterol/ipratropium for Nebulization 3 milliLiter(s) Nebulizer every 8 hours  aluminum hydroxide/magnesium hydroxide/simethicone Suspension 30 milliLiter(s) Oral every 4 hours PRN  ascorbic acid 500 milliGRAM(s) Oral daily  aspirin enteric coated 81 milliGRAM(s) Oral daily  bisacodyl Suppository 10 milliGRAM(s) Rectal daily PRN  budesonide    Inhalation Suspension 0.5 milliGRAM(s) Inhalation two times a day  clonazePAM  Tablet 0.5 milliGRAM(s) Oral three times a day PRN  clotrimazole 1% Cream 1 Application(s) Topical two times a day  DAPTOmycin IVPB 400 milliGRAM(s) IV Intermittent every 24 hours  dextrose 5%. 1000 milliLiter(s) IV Continuous <Continuous>  dextrose 5%. 1000 milliLiter(s) IV Continuous <Continuous>  dextrose 50% Injectable 25 Gram(s) IV Push once  dextrose 50% Injectable 12.5 Gram(s) IV Push once  dextrose 50% Injectable 25 Gram(s) IV Push once  dextrose Oral Gel 15 Gram(s) Oral once PRN  ferrous    sulfate 325 milliGRAM(s) Oral daily  furosemide   Injectable 60 milliGRAM(s) IV Push daily  glucagon  Injectable 1 milliGRAM(s) IntraMuscular once  heparin   Injectable 5000 Unit(s) SubCutaneous every 8 hours  insulin glargine Injectable (LANTUS) 10 Unit(s) SubCutaneous at bedtime  insulin lispro (ADMELOG) corrective regimen sliding scale   SubCutaneous at bedtime  insulin lispro (ADMELOG) corrective regimen sliding scale.   SubCutaneous three times a day before meals  lactobacillus acidophilus 1 Tablet(s) Oral two times a day with meals  melatonin 3 milliGRAM(s) Oral at bedtime PRN  montelukast 10 milliGRAM(s) Oral daily  multivitamin/minerals 1 Tablet(s) Oral daily  ondansetron Injectable 4 milliGRAM(s) IV Push every 8 hours PRN  oxyCODONE    IR 10 milliGRAM(s) Oral two times a day PRN  oxyCODONE    IR 5 milliGRAM(s) Oral every 8 hours PRN  pantoprazole    Tablet 40 milliGRAM(s) Oral before breakfast  phenazopyridine 100 milliGRAM(s) Oral three times a day  piperacillin/tazobactam IVPB.. 3.375 Gram(s) IV Intermittent every 8 hours  polyethylene glycol 3350 17 Gram(s) Oral daily PRN  potassium chloride    Tablet ER 20 milliEquivalent(s) Oral daily  potassium chloride    Tablet ER 20 milliEquivalent(s) Oral once  predniSONE   Tablet 10 milliGRAM(s) Oral daily  pregabalin 150 milliGRAM(s) Oral two times a day  rosuvastatin 40 milliGRAM(s) Oral at bedtime  saccharomyces boulardii 250 milliGRAM(s) Oral two times a day  senna 1 Tablet(s) Oral at bedtime  sertraline 100 milliGRAM(s) Oral daily       REVIEW OF SYSTEMS:  CONSTITUTIONAL:  No Fever or chills  CARDIOVASCULAR:  No chest pain   RESPIRATORY:  + cough  GASTROINTESTINAL:  No nausea, vomiting or diarrhea.  GENITOURINARY:  ? dysuria  MUSCULOSKELETAL: + RLE pain  NEUROLOGIC:  No headache or dizziness       Physical Exam:  ICU Vital Signs Last 24 Hrs  T(C): 36.4 (05 Jun 2025 13:53), Max: 36.6 (04 Jun 2025 23:55)  T(F): 97.6 (05 Jun 2025 13:53), Max: 97.9 (05 Jun 2025 04:33)  HR: 102 (05 Jun 2025 14:20) (49 - 106)  BP: 130/77 (05 Jun 2025 13:53) (118/68 - 130/77)  BP(mean): --  ABP: --  ABP(mean): --  RR: 18 (05 Jun 2025 13:53) (17 - 18)  SpO2: 95% (05 Jun 2025 14:20) (91% - 96%)    O2 Parameters below as of 05 Jun 2025 14:20  Patient On (Oxygen Delivery Method): room air        GEN: NAD  HEENT: normocephalic and atraumatic.   NECK: Supple.   LUNGS: Normal respiratory effort  HEART: Tachycardic  ABDOMEN: Soft, nontender, and nondistended.    EXTREMITIES: B/L lower leg edema--improving  NEUROLOGIC: AO x 3  PSYCHIATRIC: Appropriate affect .  SKIN: + redness on R lower leg, scab noted but no drainage noted, no increased warmth  + pustule on L lower leg      Labs:  06-05    142  |  103  |  27[H]  ----------------------------<  151[H]  2.8[LL]   |  30  |  1.60[H]    Ca    9.0      05 Jun 2025 07:05  Phos  3.1     06-05  Mg     2.2     06-05    TPro  6.9  /  Alb  3.0[L]  /  TBili  0.5  /  DBili  x   /  AST  14[L]  /  ALT  32  /  AlkPhos  73  06-04                          12.0   8.83  )-----------( 153      ( 05 Jun 2025 07:05 )             37.5       Urinalysis Basic - ( 05 Jun 2025 07:05 )    Color: x / Appearance: x / SG: x / pH: x  Gluc: 151 mg/dL / Ketone: x  / Bili: x / Urobili: x   Blood: x / Protein: x / Nitrite: x   Leuk Esterase: x / RBC: x / WBC x   Sq Epi: x / Non Sq Epi: x / Bacteria: x      LIVER FUNCTIONS - ( 04 Jun 2025 06:05 )  Alb: 3.0 g/dL / Pro: 6.9 g/dL / ALK PHOS: 73 U/L / ALT: 32 U/L / AST: 14 U/L / GGT: x             RECENT CULTURES:  06-03 @ 15:00 Blood Blood-Peripheral     No growth at 24 hours        06-03 @ 14:45 Blood Blood-Peripheral     No growth at 24 hours              All imaging and data are reviewed.       < from: CT Lower Extremity No Cont, Right (06.04.25 @ 18:27) >  FINDINGS:    Osseous: Partially visualized and otherwise incompletely evaluated   segments knee total arthroplasty. No acute fracture or dislocation.   Mineralization within normal limits. Partially visualized advanced   midfoot arthrosis.    Soft tissues: Heterogeneous but diffuse subcutaneous inflammatory   stranding. No discrete sizable hyperattenuating hematoma or drainable   capsular fluid collection. Heterogeneous chronic muscular atrophy. No   tracking emphysema.    IMPRESSION:    Right lower leg cellulitis in the proper clinical setting. No tracking   soft tissue emphysema or drainable mature abscess.    --- End of Report ---      < end of copied text >    < from: Xray Chest 1 View- PORTABLE-Urgent (Xray Chest 1 View- PORTABLE-Urgent .) (06.04.25 @ 14:50) >    FINDINGS:  6/3/2025 6:40 PM:    The heart is not well assessed on an AP film.  Bibasilar atelectasis.  There are no pleural effusions. Pleural thickening along the chest wall   bilaterally.  There is no pneumothorax.    6/14/2025 2:35 PM:  Interval increase of left lower lobe opacity, concerning for infiltration.  Bilateral pleural thickening without pleural effusion.    IMPRESSION:    Bibasilar atelectasis.  Interval increase of left lower lobe opacity, concerning for pneumonia.    --- End of Report ---    < end of copied text >

## 2025-06-05 NOTE — DIETITIAN INITIAL EVALUATION ADULT - NSFNSADHERENCEPTAFT_GEN_A_CORE
75 y/o M with a past medical history of diabetes, COPD, heart failure, CKD, hypertension, history of right foot ulcer is presenting for wound to right lower extremity.  Was recently admitted here for shortness of breath as well as for bursitis and concern for a wound.  Was sent back to facility.  They sent him to the ER today due to worsening swelling and redness localized to his wound of the right lower extremity. Admitted for RLE cellulitis.     Presenting with LE edema, admitted for cellulitis   - EKG with SR, 84bpm, low voltage, unchanged from previous EKG  -C/w home statin for HLD    -Hx HFpEF  - Previous TTE 05/2025 with EF 60-65% and trace TR  -Hold home PO Lasix and start 60mg IV Lasix daily  - Strict I/Os, daily weights    -bp stable   -Was on BB but was dc d/t soft BP on previous admission     - Monitor and replete lytes, keep K>4, Mg>2.    - Other cardiovascular workup will depend on clinical course.  - All other workup per primary team.  - Will continue to follow.

## 2025-06-05 NOTE — DIETITIAN INITIAL EVALUATION ADULT - PERTINENT MEDS FT
MEDICATIONS  (STANDING):  albuterol/ipratropium for Nebulization 3 milliLiter(s) Nebulizer every 8 hours  ascorbic acid 500 milliGRAM(s) Oral daily  aspirin enteric coated 81 milliGRAM(s) Oral daily  budesonide    Inhalation Suspension 0.5 milliGRAM(s) Inhalation two times a day  clotrimazole 1% Cream 1 Application(s) Topical two times a day  DAPTOmycin IVPB 400 milliGRAM(s) IV Intermittent every 24 hours  dextrose 5%. 1000 milliLiter(s) (50 mL/Hr) IV Continuous <Continuous>  dextrose 5%. 1000 milliLiter(s) (100 mL/Hr) IV Continuous <Continuous>  dextrose 50% Injectable 25 Gram(s) IV Push once  dextrose 50% Injectable 12.5 Gram(s) IV Push once  dextrose 50% Injectable 25 Gram(s) IV Push once  ferrous    sulfate 325 milliGRAM(s) Oral daily  furosemide   Injectable 60 milliGRAM(s) IV Push daily  glucagon  Injectable 1 milliGRAM(s) IntraMuscular once  heparin   Injectable 5000 Unit(s) SubCutaneous every 8 hours  insulin glargine Injectable (LANTUS) 10 Unit(s) SubCutaneous at bedtime  insulin lispro (ADMELOG) corrective regimen sliding scale   SubCutaneous at bedtime  insulin lispro (ADMELOG) corrective regimen sliding scale.   SubCutaneous three times a day before meals  montelukast 10 milliGRAM(s) Oral daily  multivitamin/minerals 1 Tablet(s) Oral daily  pantoprazole    Tablet 40 milliGRAM(s) Oral before breakfast  piperacillin/tazobactam IVPB.. 3.375 Gram(s) IV Intermittent every 8 hours  potassium chloride    Tablet ER 20 milliEquivalent(s) Oral daily  predniSONE   Tablet 10 milliGRAM(s) Oral daily  pregabalin 150 milliGRAM(s) Oral two times a day  rosuvastatin 40 milliGRAM(s) Oral at bedtime  saccharomyces boulardii 250 milliGRAM(s) Oral two times a day  senna 1 Tablet(s) Oral at bedtime  sertraline 100 milliGRAM(s) Oral daily    MEDICATIONS  (PRN):  acetaminophen     Tablet .. 650 milliGRAM(s) Oral every 6 hours PRN Temp greater or equal to 38C (100.4F), Mild Pain (1 - 3)  aluminum hydroxide/magnesium hydroxide/simethicone Suspension 30 milliLiter(s) Oral every 4 hours PRN Dyspepsia  bisacodyl Suppository 10 milliGRAM(s) Rectal daily PRN Constipation  clonazePAM  Tablet 0.5 milliGRAM(s) Oral three times a day PRN ANXIETY  dextrose Oral Gel 15 Gram(s) Oral once PRN Blood Glucose LESS THAN 70 milliGRAM(s)/deciliter  melatonin 3 milliGRAM(s) Oral at bedtime PRN Insomnia  ondansetron Injectable 4 milliGRAM(s) IV Push every 8 hours PRN Nausea and/or Vomiting  oxyCODONE    IR 10 milliGRAM(s) Oral two times a day PRN Severe Pain (7 - 10)  oxyCODONE    IR 5 milliGRAM(s) Oral every 8 hours PRN Moderate Pain (4 - 6)  polyethylene glycol 3350 17 Gram(s) Oral daily PRN for constipation

## 2025-06-05 NOTE — PROGRESS NOTE ADULT - SUBJECTIVE AND OBJECTIVE BOX
CHIEF COMPLAINT/ REASON FOR VISIT  .. Patient was seen to address the  issue listed under PROBLEM LIST which is located toward bottom of this note     WAYNE SERRANO    PLV 2EAS 214 D1    Allergies    IODINE (Unknown)  tetracycline (Unknown)  vancomycin (Other)    Intolerances        PAST MEDICAL & SURGICAL HISTORY:  Prostate cancer      Type II diabetes mellitus      Chronic obstructive pulmonary disease (COPD)      CHF (congestive heart failure)      Renal insufficiency      H/O migraine      Insomnia      Constipation      S/P foot surgery          FAMILY HISTORY:      Home Medications:  albuterol 0.63 mg/3 mL (0.021%) inhalation solution: 3 milliliter(s) by nebulizer 3 times a day as needed for  shortness of breath and/or wheezing (2025 08:48)  amoxicillin-clavulanate 875 mg-125 mg oral tablet: 1 tab(s) orally 2 times a day for 7 days (:48)  Artificial Tears ophthalmic solution: 1 drop(s) in each eye 2 times a day (:48)  ascorbic acid 500 mg oral tablet: 1 tab(s) orally 2 times a day (:48)  aspirin 81 mg oral tablet: 1 tab(s) orally once a day (:48)  clonazePAM 0.25 mg oral tablet, disintegratin tab(s) orally 3 times a day (:48)  ferrous sulfate 325 mg (65 mg elemental iron) oral tablet: 1 tab(s) orally once a day (:48)  furosemide 40 mg oral tablet: 1 tab(s) orally every 12 hours (:48)  HumaLOG 100 units/mL subcutaneous solution: Inject subcutaneously 3 times a day using sliding scale (:48)  HumaLOG KwikPen 100 units/mL injectable solution: Inject 15 units subcutaneously 3 times a day (before each meal) in addition to sliding scale (:48)  Jardiance 10 mg oral tablet: 1 tab(s) orally once a day (:48)  Lantus Solostar Pen 100 units/mL subcutaneous solution: 36 unit(s) subcutaneously once a day (at bedtime) (2025 08:48)  loperamide 2 mg oral capsule: 1 cap(s) orally after each loose BM every 6 hours as needed **No more than 3 capsules (6mg) a day** (2025 08:48)  loratadine 10 mg oral tablet: 1 tab(s) orally once a day (:48)  Melatonin 3 mg oral tablet: 1 tab(s) orally once a day (at bedtime) (:48)  montelukast 10 mg oral tablet: 1 tab(s) orally once a day (at bedtime) (:48)  multivitamin: 1 tab(s) orally once a day (:48)  oxyCODONE 5 mg oral tablet: 2 tab(s) orally 2 times a day MDD: 4 tablets (:48)  pantoprazole 40 mg oral delayed release tablet: 1 tab(s) orally once a day (:48)  Polyethylene Glycol 3350: Mix 17grams into 8oz of water and drink orally once a day as needed (:48)  Potassium Chloride (Eqv-Klor-Con M20) 20 mEq oral tablet, extended release: 1 tab(s) orally 2 times a day (:48)  predniSONE 10 mg oral tablet: 1 tab(s) orally once a day with food or milk for 1 month, then after 1 month take 5mg orally once a day (:48)  pregabalin 150 mg oral capsule: 1 cap(s) orally 2 times a day MDD: 2 capsules (:48)  ramelteon 8 mg oral tablet: 1 tab(s) orally once a day (at bedtime) (:48)  rosuvastatin 40 mg oral tablet: 1 tab(s) orally once a day (at bedtime) (:48)  saccharomyces boulardii lyo 250 mg oral capsule: 1 cap(s) orally once a day (:48)  Saline Mist 0.65% nasal spray: 1 spray(s) in each nostril 2 times a day (:48)  Senna 8.6 mg oral tablet: 1 tab(s) orally once a day (at bedtime) (:48)  sertraline 100 mg oral tablet: 1.5 tab(s) orally once a day (150mg) (2025 08:48)  Symbicort 160 mcg-4.5 mcg/inh inhalation aerosol: 2 puff(s) inhaled 2 times a day (2025 08:48)      MEDICATIONS  (STANDING):  albuterol/ipratropium for Nebulization 3 milliLiter(s) Nebulizer every 8 hours  ascorbic acid 500 milliGRAM(s) Oral daily  aspirin enteric coated 81 milliGRAM(s) Oral daily  budesonide    Inhalation Suspension 0.5 milliGRAM(s) Inhalation two times a day  clotrimazole 1% Cream 1 Application(s) Topical two times a day  DAPTOmycin IVPB 400 milliGRAM(s) IV Intermittent every 24 hours  dextrose 5%. 1000 milliLiter(s) (50 mL/Hr) IV Continuous <Continuous>  dextrose 5%. 1000 milliLiter(s) (100 mL/Hr) IV Continuous <Continuous>  dextrose 50% Injectable 25 Gram(s) IV Push once  dextrose 50% Injectable 12.5 Gram(s) IV Push once  dextrose 50% Injectable 25 Gram(s) IV Push once  ferrous    sulfate 325 milliGRAM(s) Oral daily  furosemide   Injectable 60 milliGRAM(s) IV Push daily  glucagon  Injectable 1 milliGRAM(s) IntraMuscular once  heparin   Injectable 5000 Unit(s) SubCutaneous every 8 hours  insulin glargine Injectable (LANTUS) 10 Unit(s) SubCutaneous at bedtime  insulin lispro (ADMELOG) corrective regimen sliding scale   SubCutaneous at bedtime  insulin lispro (ADMELOG) corrective regimen sliding scale.   SubCutaneous three times a day before meals  montelukast 10 milliGRAM(s) Oral daily  multivitamin/minerals 1 Tablet(s) Oral daily  pantoprazole    Tablet 40 milliGRAM(s) Oral before breakfast  piperacillin/tazobactam IVPB.. 3.375 Gram(s) IV Intermittent every 8 hours  potassium chloride    Tablet ER 20 milliEquivalent(s) Oral daily  predniSONE   Tablet 10 milliGRAM(s) Oral daily  pregabalin 150 milliGRAM(s) Oral two times a day  rosuvastatin 40 milliGRAM(s) Oral at bedtime  saccharomyces boulardii 250 milliGRAM(s) Oral two times a day  senna 1 Tablet(s) Oral at bedtime  sertraline 100 milliGRAM(s) Oral daily    MEDICATIONS  (PRN):  acetaminophen     Tablet .. 650 milliGRAM(s) Oral every 6 hours PRN Temp greater or equal to 38C (100.4F), Mild Pain (1 - 3)  aluminum hydroxide/magnesium hydroxide/simethicone Suspension 30 milliLiter(s) Oral every 4 hours PRN Dyspepsia  bisacodyl Suppository 10 milliGRAM(s) Rectal daily PRN Constipation  clonazePAM  Tablet 0.5 milliGRAM(s) Oral three times a day PRN ANXIETY  dextrose Oral Gel 15 Gram(s) Oral once PRN Blood Glucose LESS THAN 70 milliGRAM(s)/deciliter  melatonin 3 milliGRAM(s) Oral at bedtime PRN Insomnia  ondansetron Injectable 4 milliGRAM(s) IV Push every 8 hours PRN Nausea and/or Vomiting  oxyCODONE    IR 10 milliGRAM(s) Oral two times a day PRN Severe Pain (7 - 10)  oxyCODONE    IR 5 milliGRAM(s) Oral every 8 hours PRN Moderate Pain (4 - 6)  polyethylene glycol 3350 17 Gram(s) Oral daily PRN for constipation      Diet, Consistent Carbohydrate w/Evening Snack:   DASH/TLC Sodium & Cholesterol Restricted (25 @ 18:09) [Active]          Vital Signs Last 24 Hrs  T(C): 36.6 (2025 04:33), Max: 36.6 (2025 11:56)  T(F): 97.9 (2025 04:33), Max: 97.9 (2025 04:33)  HR: 85 (2025 04:33) (72 - 86)  BP: 118/68 (2025 04:33) (118/68 - 130/72)  BP(mean): --  RR: 18 (2025 04:33) (17 - 18)  SpO2: 91% (2025 04:33) (91% - 96%)    Parameters below as of 2025 04:33  Patient On (Oxygen Delivery Method): room air                  LABS:                        12.0   8.83  )-----------( 153      ( 2025 07:05 )             37.5     06-04    141  |  104  |  36[H]  ----------------------------<  137[H]  3.4[L]   |  30  |  1.70[H]    Ca    9.2      2025 06:05  Phos  3.1       Mg     2.2         TPro  6.9  /  Alb  3.0[L]  /  TBili  0.5  /  DBili  x   /  AST  14[L]  /  ALT  32  /  AlkPhos  73  04    PT/INR - ( 2025 14:45 )   PT: 12.4 sec;   INR: 1.05 ratio         PTT - ( 2025 14:45 )  PTT:29.0 sec  Urinalysis Basic - ( 2025 06:05 )    Color: x / Appearance: x / SG: x / pH: x  Gluc: 137 mg/dL / Ketone: x  / Bili: x / Urobili: x   Blood: x / Protein: x / Nitrite: x   Leuk Esterase: x / RBC: x / WBC x   Sq Epi: x / Non Sq Epi: x / Bacteria: x            WBC:  WBC Count: 8.83 K/uL ( @ 07:05)  WBC Count: 9.50 K/uL ( @ 06:05)  WBC Count: 9.64 K/uL ( @ 14:45)      MICROBIOLOGY:  RECENT CULTURES:   Blood Blood-Peripheral XXXX XXXX   No growth at 24 hours     Blood Blood-Peripheral XXXX XXXX   No growth at 24 hours                PT/INR - ( 2025 14:45 )   PT: 12.4 sec;   INR: 1.05 ratio         PTT - ( 2025 14:45 )  PTT:29.0 sec    Sodium:  Sodium: 141 mmol/L ( @ 06:05)  Sodium: 138 mmol/L ( @ 14:45)      1.70 mg/dL  @ 06:05  2.00 mg/dL  @ 14:45      Hemoglobin:  Hemoglobin: 12.0 g/dL ( @ 07:05)  Hemoglobin: 12.1 g/dL ( @ 06:05)  Hemoglobin: 12.5 g/dL ( @ 14:45)      Platelets: Platelet Count - Automated: 153 K/uL ( @ 07:05)  Platelet Count - Automated: 143 K/uL ( @ 06:05)  Platelet Count - Automated: 160 K/uL ( @ 14:45)      LIVER FUNCTIONS - ( 2025 06:05 )  Alb: 3.0 g/dL / Pro: 6.9 g/dL / ALK PHOS: 73 U/L / ALT: 32 U/L / AST: 14 U/L / GGT: x             Urinalysis Basic - ( 2025 06:05 )    Color: x / Appearance: x / SG: x / pH: x  Gluc: 137 mg/dL / Ketone: x  / Bili: x / Urobili: x   Blood: x / Protein: x / Nitrite: x   Leuk Esterase: x / RBC: x / WBC x   Sq Epi: x / Non Sq Epi: x / Bacteria: x        RADIOLOGY & ADDITIONAL STUDIES:      MICROBIOLOGY:  RECENT CULTURES:   Blood Blood-Peripheral XXXX XXXX   No growth at 24 hours     Blood Blood-Peripheral XXXX XXXX   No growth at 24 hours

## 2025-06-05 NOTE — DIETITIAN INITIAL EVALUATION ADULT - ENERGY INTAKE
75 YO M with of diabetes, COPD, heart failure, CKD, hypertension, history of right foot ulcer is presenting for wound to right lower extremity.  Was recently admitted here for shortness of breath as well as for bursitis and concern for a wound.  Was sent back to facility.  They sent him to the ER today due to worsening swelling and redness localized to his wound of the right lower extremity.  Endorsing pain to the site.  Denies fevers, chest pain, nausea or vomiting.  States that he is having some chronic shortness of breath though unchanged today.  Also endorses chronic cough. (03 Jun 2025 18:32)    PAST MEDICAL & SURGICAL HISTORY:  Prostate cancer      Type II diabetes mellitus      Chronic obstructive pulmonary disease (COPD)      CHF (congestive heart failure)      Renal insufficiency      H/O migraine      Insomnia      Constipation      S/P foot surgery         Adequate (%)

## 2025-06-05 NOTE — CONSULT NOTE ADULT - SUBJECTIVE AND OBJECTIVE BOX
Chief Complaint: Malignant cutaneous wound    HPI: 73 yo WM, recently admitted for    PAST MEDICAL & SURGICAL HISTORY:  Prostate cancer      Type II diabetes mellitus      Chronic obstructive pulmonary disease (COPD)      CHF (congestive heart failure)      Renal insufficiency      H/O migraine      Insomnia      Constipation      S/P foot surgery          Allergies    IODINE (Unknown)  tetracycline (Unknown)  vancomycin (Other)    Intolerances        MEDICATIONS  (STANDING):  albuterol/ipratropium for Nebulization 3 milliLiter(s) Nebulizer every 8 hours  ascorbic acid 500 milliGRAM(s) Oral daily  aspirin enteric coated 81 milliGRAM(s) Oral daily  budesonide    Inhalation Suspension 0.5 milliGRAM(s) Inhalation two times a day  clotrimazole 1% Cream 1 Application(s) Topical two times a day  DAPTOmycin IVPB 400 milliGRAM(s) IV Intermittent every 24 hours  dextrose 5%. 1000 milliLiter(s) (50 mL/Hr) IV Continuous <Continuous>  dextrose 5%. 1000 milliLiter(s) (100 mL/Hr) IV Continuous <Continuous>  dextrose 50% Injectable 25 Gram(s) IV Push once  dextrose 50% Injectable 12.5 Gram(s) IV Push once  dextrose 50% Injectable 25 Gram(s) IV Push once  ferrous    sulfate 325 milliGRAM(s) Oral daily  furosemide   Injectable 60 milliGRAM(s) IV Push daily  glucagon  Injectable 1 milliGRAM(s) IntraMuscular once  heparin   Injectable 5000 Unit(s) SubCutaneous every 8 hours  insulin glargine Injectable (LANTUS) 10 Unit(s) SubCutaneous at bedtime  insulin lispro (ADMELOG) corrective regimen sliding scale   SubCutaneous at bedtime  insulin lispro (ADMELOG) corrective regimen sliding scale.   SubCutaneous three times a day before meals  montelukast 10 milliGRAM(s) Oral daily  multivitamin/minerals 1 Tablet(s) Oral daily  pantoprazole    Tablet 40 milliGRAM(s) Oral before breakfast  piperacillin/tazobactam IVPB.. 3.375 Gram(s) IV Intermittent every 8 hours  potassium chloride    Tablet ER 20 milliEquivalent(s) Oral daily  potassium chloride    Tablet ER 40 milliEquivalent(s) Oral once  potassium chloride  10 mEq/100 mL IVPB 10 milliEquivalent(s) IV Intermittent every 1 hour  predniSONE   Tablet 10 milliGRAM(s) Oral daily  pregabalin 150 milliGRAM(s) Oral two times a day  rosuvastatin 40 milliGRAM(s) Oral at bedtime  saccharomyces boulardii 250 milliGRAM(s) Oral two times a day  senna 1 Tablet(s) Oral at bedtime  sertraline 100 milliGRAM(s) Oral daily    MEDICATIONS  (PRN):  acetaminophen     Tablet .. 650 milliGRAM(s) Oral every 6 hours PRN Temp greater or equal to 38C (100.4F), Mild Pain (1 - 3)  aluminum hydroxide/magnesium hydroxide/simethicone Suspension 30 milliLiter(s) Oral every 4 hours PRN Dyspepsia  bisacodyl Suppository 10 milliGRAM(s) Rectal daily PRN Constipation  clonazePAM  Tablet 0.5 milliGRAM(s) Oral three times a day PRN ANXIETY  dextrose Oral Gel 15 Gram(s) Oral once PRN Blood Glucose LESS THAN 70 milliGRAM(s)/deciliter  melatonin 3 milliGRAM(s) Oral at bedtime PRN Insomnia  ondansetron Injectable 4 milliGRAM(s) IV Push every 8 hours PRN Nausea and/or Vomiting  oxyCODONE    IR 10 milliGRAM(s) Oral two times a day PRN Severe Pain (7 - 10)  oxyCODONE    IR 5 milliGRAM(s) Oral every 8 hours PRN Moderate Pain (4 - 6)  polyethylene glycol 3350 17 Gram(s) Oral daily PRN for constipation      FAMILY HISTORY:          ROS:  CONSTITUTIONAL: No fever, weight loss, or fatigue  EYES: No eye pain, visual disturbances, or discharge  ENMT:  No difficulty hearing, tinnitus, vertigo; No sinus or throat pain  NECK: No pain or stiffness  BREASTS: No pain, masses, or nipple discharge  RESPIRATORY: No cough, wheezing, chills or hemoptysis; No shortness of breath  CARDIOVASCULAR: No chest pain, palpitations, dizziness, or leg swelling  GASTROINTESTINAL: No abdominal or epigastric pain. No nausea, vomiting, or hematemesis; No diarrhea or constipation. No melena or hematochezia.  GENITOURINARY: No dysuria, frequency, hematuria, or incontinence  NEUROLOGICAL: No headaches, memory loss, loss of strength, numbness, or tremors  SKIN: No itching, burning, rashes, or lesions   LYMPH NODES: No enlarged glands  ENDOCRINE: No heat or cold intolerance; No hair loss  MUSCULOSKELETAL: No joint pain or swelling; No muscle, back, or extremity pain  PSYCHIATRIC: No depression, anxiety, mood swings, or difficulty sleeping  HEME/LYMPH: No easy bruising, or bleeding gums  ALLERGY AND IMMUNOLOGIC: No hives or eczema    PHYSICAL EXAM-      Vital Signs Last 24 Hrs  T(C): 36.6 (05 Jun 2025 04:33), Max: 36.6 (04 Jun 2025 23:55)  T(F): 97.9 (05 Jun 2025 04:33), Max: 97.9 (05 Jun 2025 04:33)  HR: 49 (05 Jun 2025 08:00) (49 - 86)  BP: 118/68 (05 Jun 2025 04:33) (118/68 - 124/73)  BP(mean): --  RR: 18 (05 Jun 2025 04:33) (17 - 18)  SpO2: 93% (05 Jun 2025 08:00) (91% - 96%)    Parameters below as of 05 Jun 2025 08:00  Patient On (Oxygen Delivery Method): room air        Constitutional: well developed, well nourished, no apparent distress, alert, oriented x 3.   Pulmonary: no respiratory distress, normal respiratory rhythm and effort, lungs are clear to auscultation/percussion. No CVA tenderness.  Cardiovascular: heart rate normal, normal sinus rhythm; no murmurs, gallops, rubs, heaves or thrills   Abdomen: soft, non-tender, +BS, no guarding/rebound/rigidity.                            12.0   8.83  )-----------( 153      ( 05 Jun 2025 07:05 )             37.5     06-05    142  |  103  |  27[H]  ----------------------------<  151[H]  2.8[LL]   |  30  |  1.60[H]    Ca    9.0      05 Jun 2025 07:05  Phos  3.1     06-05  Mg     2.2     06-05    TPro  6.9  /  Alb  3.0[L]  /  TBili  0.5  /  DBili  x   /  AST  14[L]  /  ALT  32  /  AlkPhos  73  06-04      Radiology   Chief Complaint: Malignant cutaneous wound    HPI: 75 yo WM, recently admitted for RLE lateral wound with cellulitis with increasing erythema.  Was asked to see pt regarding a malignant cutaneous wound(?).    PAST MEDICAL & SURGICAL HISTORY:  Prostate cancer      Type II diabetes mellitus      Chronic obstructive pulmonary disease (COPD)      CHF (congestive heart failure)      Renal insufficiency      H/O migraine      Insomnia      Constipation      S/P foot surgery          Allergies    IODINE (Unknown)  tetracycline (Unknown)  vancomycin (Other)    Intolerances        MEDICATIONS  (STANDING):  albuterol/ipratropium for Nebulization 3 milliLiter(s) Nebulizer every 8 hours  ascorbic acid 500 milliGRAM(s) Oral daily  aspirin enteric coated 81 milliGRAM(s) Oral daily  budesonide    Inhalation Suspension 0.5 milliGRAM(s) Inhalation two times a day  clotrimazole 1% Cream 1 Application(s) Topical two times a day  DAPTOmycin IVPB 400 milliGRAM(s) IV Intermittent every 24 hours  dextrose 5%. 1000 milliLiter(s) (50 mL/Hr) IV Continuous <Continuous>  dextrose 5%. 1000 milliLiter(s) (100 mL/Hr) IV Continuous <Continuous>  dextrose 50% Injectable 25 Gram(s) IV Push once  dextrose 50% Injectable 12.5 Gram(s) IV Push once  dextrose 50% Injectable 25 Gram(s) IV Push once  ferrous    sulfate 325 milliGRAM(s) Oral daily  furosemide   Injectable 60 milliGRAM(s) IV Push daily  glucagon  Injectable 1 milliGRAM(s) IntraMuscular once  heparin   Injectable 5000 Unit(s) SubCutaneous every 8 hours  insulin glargine Injectable (LANTUS) 10 Unit(s) SubCutaneous at bedtime  insulin lispro (ADMELOG) corrective regimen sliding scale   SubCutaneous at bedtime  insulin lispro (ADMELOG) corrective regimen sliding scale.   SubCutaneous three times a day before meals  montelukast 10 milliGRAM(s) Oral daily  multivitamin/minerals 1 Tablet(s) Oral daily  pantoprazole    Tablet 40 milliGRAM(s) Oral before breakfast  piperacillin/tazobactam IVPB.. 3.375 Gram(s) IV Intermittent every 8 hours  potassium chloride    Tablet ER 20 milliEquivalent(s) Oral daily  potassium chloride    Tablet ER 40 milliEquivalent(s) Oral once  potassium chloride  10 mEq/100 mL IVPB 10 milliEquivalent(s) IV Intermittent every 1 hour  predniSONE   Tablet 10 milliGRAM(s) Oral daily  pregabalin 150 milliGRAM(s) Oral two times a day  rosuvastatin 40 milliGRAM(s) Oral at bedtime  saccharomyces boulardii 250 milliGRAM(s) Oral two times a day  senna 1 Tablet(s) Oral at bedtime  sertraline 100 milliGRAM(s) Oral daily    MEDICATIONS  (PRN):  acetaminophen     Tablet .. 650 milliGRAM(s) Oral every 6 hours PRN Temp greater or equal to 38C (100.4F), Mild Pain (1 - 3)  aluminum hydroxide/magnesium hydroxide/simethicone Suspension 30 milliLiter(s) Oral every 4 hours PRN Dyspepsia  bisacodyl Suppository 10 milliGRAM(s) Rectal daily PRN Constipation  clonazePAM  Tablet 0.5 milliGRAM(s) Oral three times a day PRN ANXIETY  dextrose Oral Gel 15 Gram(s) Oral once PRN Blood Glucose LESS THAN 70 milliGRAM(s)/deciliter  melatonin 3 milliGRAM(s) Oral at bedtime PRN Insomnia  ondansetron Injectable 4 milliGRAM(s) IV Push every 8 hours PRN Nausea and/or Vomiting  oxyCODONE    IR 10 milliGRAM(s) Oral two times a day PRN Severe Pain (7 - 10)  oxyCODONE    IR 5 milliGRAM(s) Oral every 8 hours PRN Moderate Pain (4 - 6)  polyethylene glycol 3350 17 Gram(s) Oral daily PRN for constipation      FAMILY HISTORY:          ROS:  CONSTITUTIONAL: No fever, weight loss, or fatigue  EYES: No eye pain, visual disturbances, or discharge  ENMT:  No difficulty hearing, tinnitus, vertigo; No sinus or throat pain  NECK: No pain or stiffness  RESPIRATORY: No cough, wheezing, chills or hemoptysis;   CARDIOVASCULAR: No chest pain, palpitations, dizziness, or leg swelling  GASTROINTESTINAL: No abdominal or epigastric pain. No nausea, vomiting, or hematemesis; No diarrhea or constipation. No melena or hematochezia.  GENITOURINARY: No dysuria, frequency, hematuria, or incontinence  NEUROLOGICAL: No headaches, memory loss, loss of strength, numbness, or tremors  SKIN: No itching, burning, rash   LYMPH NODES: No enlarged glands  ENDOCRINE: No heat or cold intolerance; No hair loss  MUSCULOSKELETAL: No joint pain or swelling; No muscle, back, or extremity pain  PSYCHIATRIC: No depression, anxiety, mood swings, or difficulty sleeping  HEME/LYMPH: No easy bruising, or bleeding gums  ALLERGY AND IMMUNOLOGIC: No hives or eczema    PHYSICAL EXAM-      Vital Signs Last 24 Hrs  T(C): 36.6 (05 Jun 2025 04:33), Max: 36.6 (04 Jun 2025 23:55)  T(F): 97.9 (05 Jun 2025 04:33), Max: 97.9 (05 Jun 2025 04:33)  HR: 49 (05 Jun 2025 08:00) (49 - 86)  BP: 118/68 (05 Jun 2025 04:33) (118/68 - 124/73)  BP(mean): --  RR: 18 (05 Jun 2025 04:33) (17 - 18)  SpO2: 93% (05 Jun 2025 08:00) (91% - 96%)    Parameters below as of 05 Jun 2025 08:00  Patient On (Oxygen Delivery Method): room air        Constitutional: elderly WM, NAD, well developed, well nourished,  alert, oriented x 3.      RLE-oval 2.5x1cm superficial wound covered completely by adherent eschar; the wound is completely surrounded by 8cm ecchymotic skin with mild to mod erythema.                          12.0   8.83  )-----------( 153      ( 05 Jun 2025 07:05 )             37.5     06-05    142  |  103  |  27[H]  ----------------------------<  151[H]  2.8[LL]   |  30  |  1.60[H]    Ca    9.0      05 Jun 2025 07:05  Phos  3.1     06-05  Mg     2.2     06-05    TPro  6.9  /  Alb  3.0[L]  /  TBili  0.5  /  DBili  x   /  AST  14[L]  /  ALT  32  /  AlkPhos  73  06-04      Radiology   Chief Complaint: Malignant cutaneous wound    HPI: 73 yo WM, recently admitted for RLE lateral wound with cellulitis with increasing erythema.  Was asked to see pt regarding a malignant cutaneous wound(?).    PAST MEDICAL & SURGICAL HISTORY:  Prostate cancer      Type II diabetes mellitus      Chronic obstructive pulmonary disease (COPD)      CHF (congestive heart failure)      Renal insufficiency      H/O migraine      Insomnia      Constipation      S/P foot surgery          Allergies    IODINE (Unknown)  tetracycline (Unknown)  vancomycin (Other)    Intolerances        MEDICATIONS  (STANDING):  albuterol/ipratropium for Nebulization 3 milliLiter(s) Nebulizer every 8 hours  ascorbic acid 500 milliGRAM(s) Oral daily  aspirin enteric coated 81 milliGRAM(s) Oral daily  budesonide    Inhalation Suspension 0.5 milliGRAM(s) Inhalation two times a day  clotrimazole 1% Cream 1 Application(s) Topical two times a day  DAPTOmycin IVPB 400 milliGRAM(s) IV Intermittent every 24 hours  dextrose 5%. 1000 milliLiter(s) (50 mL/Hr) IV Continuous <Continuous>  dextrose 5%. 1000 milliLiter(s) (100 mL/Hr) IV Continuous <Continuous>  dextrose 50% Injectable 25 Gram(s) IV Push once  dextrose 50% Injectable 12.5 Gram(s) IV Push once  dextrose 50% Injectable 25 Gram(s) IV Push once  ferrous    sulfate 325 milliGRAM(s) Oral daily  furosemide   Injectable 60 milliGRAM(s) IV Push daily  glucagon  Injectable 1 milliGRAM(s) IntraMuscular once  heparin   Injectable 5000 Unit(s) SubCutaneous every 8 hours  insulin glargine Injectable (LANTUS) 10 Unit(s) SubCutaneous at bedtime  insulin lispro (ADMELOG) corrective regimen sliding scale   SubCutaneous at bedtime  insulin lispro (ADMELOG) corrective regimen sliding scale.   SubCutaneous three times a day before meals  montelukast 10 milliGRAM(s) Oral daily  multivitamin/minerals 1 Tablet(s) Oral daily  pantoprazole    Tablet 40 milliGRAM(s) Oral before breakfast  piperacillin/tazobactam IVPB.. 3.375 Gram(s) IV Intermittent every 8 hours  potassium chloride    Tablet ER 20 milliEquivalent(s) Oral daily  potassium chloride    Tablet ER 40 milliEquivalent(s) Oral once  potassium chloride  10 mEq/100 mL IVPB 10 milliEquivalent(s) IV Intermittent every 1 hour  predniSONE   Tablet 10 milliGRAM(s) Oral daily  pregabalin 150 milliGRAM(s) Oral two times a day  rosuvastatin 40 milliGRAM(s) Oral at bedtime  saccharomyces boulardii 250 milliGRAM(s) Oral two times a day  senna 1 Tablet(s) Oral at bedtime  sertraline 100 milliGRAM(s) Oral daily    MEDICATIONS  (PRN):  acetaminophen     Tablet .. 650 milliGRAM(s) Oral every 6 hours PRN Temp greater or equal to 38C (100.4F), Mild Pain (1 - 3)  aluminum hydroxide/magnesium hydroxide/simethicone Suspension 30 milliLiter(s) Oral every 4 hours PRN Dyspepsia  bisacodyl Suppository 10 milliGRAM(s) Rectal daily PRN Constipation  clonazePAM  Tablet 0.5 milliGRAM(s) Oral three times a day PRN ANXIETY  dextrose Oral Gel 15 Gram(s) Oral once PRN Blood Glucose LESS THAN 70 milliGRAM(s)/deciliter  melatonin 3 milliGRAM(s) Oral at bedtime PRN Insomnia  ondansetron Injectable 4 milliGRAM(s) IV Push every 8 hours PRN Nausea and/or Vomiting  oxyCODONE    IR 10 milliGRAM(s) Oral two times a day PRN Severe Pain (7 - 10)  oxyCODONE    IR 5 milliGRAM(s) Oral every 8 hours PRN Moderate Pain (4 - 6)  polyethylene glycol 3350 17 Gram(s) Oral daily PRN for constipation      FAMILY HISTORY:          ROS:  CONSTITUTIONAL: No fever, weight loss, or fatigue  EYES: No eye pain, visual disturbances, or discharge  ENMT:  No difficulty hearing, tinnitus, vertigo; No sinus or throat pain  NECK: No pain or stiffness  RESPIRATORY: No cough, wheezing, chills or hemoptysis;   CARDIOVASCULAR: No chest pain, palpitations, dizziness, or leg swelling  GASTROINTESTINAL: No abdominal or epigastric pain. No nausea, vomiting, or hematemesis; No diarrhea or constipation. No melena or hematochezia.  GENITOURINARY: No dysuria, frequency, hematuria, or incontinence  NEUROLOGICAL: No headaches, memory loss, loss of strength, numbness, or tremors  SKIN: No itching, burning, rash   LYMPH NODES: No enlarged glands  ENDOCRINE: No heat or cold intolerance; No hair loss  MUSCULOSKELETAL: No joint pain or swelling; No muscle, back, or extremity pain  PSYCHIATRIC: No depression, anxiety, mood swings, or difficulty sleeping  HEME/LYMPH: No easy bruising, or bleeding gums  ALLERGY AND IMMUNOLOGIC: No hives or eczema    PHYSICAL EXAM-      Vital Signs Last 24 Hrs  T(C): 36.6 (05 Jun 2025 04:33), Max: 36.6 (04 Jun 2025 23:55)  T(F): 97.9 (05 Jun 2025 04:33), Max: 97.9 (05 Jun 2025 04:33)  HR: 49 (05 Jun 2025 08:00) (49 - 86)  BP: 118/68 (05 Jun 2025 04:33) (118/68 - 124/73)  BP(mean): --  RR: 18 (05 Jun 2025 04:33) (17 - 18)  SpO2: 93% (05 Jun 2025 08:00) (91% - 96%)    Parameters below as of 05 Jun 2025 08:00  Patient On (Oxygen Delivery Method): room air        Constitutional: elderly WM, NAD, well developed, well nourished,  alert, oriented x 3.      RLE-oval 2.5x1cm superficial wound covered completely by adherent eschar; the wound is completely surrounded by 8cm ecchymotic skin with mild to mod erythema.; no drainage                          12.0   8.83  )-----------( 153      ( 05 Jun 2025 07:05 )             37.5     06-05    142  |  103  |  27[H]  ----------------------------<  151[H]  2.8[LL]   |  30  |  1.60[H]    Ca    9.0      05 Jun 2025 07:05  Phos  3.1     06-05  Mg     2.2     06-05    TPro  6.9  /  Alb  3.0[L]  /  TBili  0.5  /  DBili  x   /  AST  14[L]  /  ALT  32  /  AlkPhos  73  06-04      Radiology

## 2025-06-05 NOTE — DIETITIAN INITIAL EVALUATION ADULT - OTHER INFO
75 YO M with of diabetes, COPD, heart failure, CKD, hypertension, history of right foot ulcer is presenting for wound to right lower extremity.  Was recently admitted here for shortness of breath as well as for bursitis and concern for a wound.   73 YO M with of diabetes, COPD, heart failure, CKD, hypertension, history of right foot ulcer is presenting for wound to right lower extremity.  Was recently admitted here for shortness of breath as well as for bursitis and concern for a wound.  Was sent back to facility.  They sent him to the ER y due to worsening swelling and redness localized to his wound of the right lower extremity.  Endorsing pain to the site.  Denies fevers, chest pain, nausea or vomiting, having chronic cough and SOB.  at time of RD visit to pts bedside, reports he appetite is good, 5'10" and wt currnelty ~ 230 # UBW is 210-220#, had a " messy BM last night "

## 2025-06-05 NOTE — PROGRESS NOTE ADULT - ASSESSMENT
REASON FOR VISIT  .. Management of problems listed below      CC.   . 6/3/2025  Pt brought in by EMS from [facility] with concern for worsening pedal edema, wound on R lower leg. 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  edema, wound check  OVERALL PRESENTATION.  . 6/3/2025  Patient with a past medical history of diabetes, COPD, heart failure, CKD, hypertension, history of right foot ulcer is presenting for wound to right lower extremity.  Was recently admitted here for shortness of breath as well as for bursitis and concern for a wound.  Was sent back to facility.  They sent him to the ER today due to worsening swelling and redness localized to his wound of the right lower extremity.  Endorsing pain to the site.  Denies fevers, chest pain, nausea or vomiting.  States that he is having some chronic shortness of breath though unchanged today.  Also endorses chronic cough.  PMH.      PAST HOSPITAL STAYS .  Home Medications:   * Patient Currently Takes Medications as of 27-May-2025 11:17 documented in Structured Notes  · montelukast 10 mg oral tablet: 1 tab(s) orally once a day  · melatonin 3 mg oral tablet: 1 tab(s) orally once a day (at bedtime)  · senna leaf extract oral tablet: 1 tab(s) orally once a day (at bedtime)  · saccharomyces boulardii lyo 250 mg oral capsule: 1 cap(s) orally once a day  · sertraline 100 mg oral tablet: 1 tab(s) orally once a day MDD: 1  · furosemide 40 mg oral tablet: 1 tab(s) orally every 12 hours  · sulfamethoxazole-trimethoprim 800 mg-160 mg oral tablet: 1 tab(s) orally every 12 hours  · aspirin 81 mg oral delayed release tablet: 1 tab(s) orally once a day  · potassium chloride 20 mEq oral tablet, extended release: 1 tab(s) orally 2 times a day  · albuterol 0.63 mg/3 mL (0.021%) inhalation solution: 3 milliliter(s) by nebulizer 3 times a day  · predniSONE 10 mg oral tablet: 1 tab(s) orally once a day  · oxyCODONE 5 mg oral tablet: 2 tab(s) orally 2 times a day as needed for Severe Pain (7 - 10) MDD: 4  · loratadine 10 mg oral tablet: 1 tab(s) orally once a day (in the morning)  · Multiple Vitamins with Minerals oral tablet: 1 tab(s) orally once a day  · ferrous sulfate 325 mg (65 mg elemental iron) oral tablet: 1 tab(s) orally once a day  · pantoprazole 40 mg oral delayed release tablet: 1 tab(s) orally once a day (before a meal)  · Symbicort 160 mcg-4.5 mcg/inh inhalation aerosol: 2 puff(s) inhaled 2 times a day  · rosuvastatin 40 mg oral tablet: 1 tab(s) orally once a day  · polyethylene glycol 3350 oral powder for reconstitution: 17 gram(s) orally once a day as needed for  constipation  · Jardiance 10 mg oral tablet: 1 tab(s) orally once a day  · pregabalin 150 mg oral capsule: 1 cap(s) orally 2 times a day  · insulin glargine 100 units/mL subcutaneous solution: 36 unit(s) subcutaneous once a day (at bedtime)  · HumaLOG 100 units/mL injectable solution: 15 unit(s) injectable 3 times a day (before meals) ***sliding scale***  ER MGMT .      BEST PRACTICE ISSUES.  . HOB ELEVATN.    .... Yes  . DIET  .   .... CONS CARB 6/3   .....   . PHARMAC DVT PPLX .    .... HPSC 6/3  . NON PHARMAC DVT PPLX .      . STRESS ULCR PPLX .   .... PROPRANOLOL 40 6/4/2025   . DATE/DM MGMT.   ..... See under Endocrine section   GENERAL DATA .   . COVID.         .... scv26/4/2025 (-)    . GOC.    ....    . ICU STAY.    .... no   . INFECTION PPLX .   ....   . ALLGY.   .... TETRACYCLINE  ..... VANCOMYCIN  .... IODINE    . WT.   .... 6/4/2025 104 k  . BMI.  .... 6/4/2025 33      XXXXXXXXXXXXXXXXXXXXXXX  VITALS/GAS EXCHANGE/DRIPS    ABGS.     .  VS/ PO/IO/ VENT/ DRIPS.   6/5/2025 afeb 104 130/70   6/5/2025 ra 97%    XXXXXXXX[id number]  PROBLEM ASSESSMENT RECOMMENDATIONS.  RESP.   . GAS EXCHANGE .   .... target PO 90-95%     . SOB  .... 2/2 COPD CHF PNEUM    . RO DVT   .... Venous duplx 6/4/2025 (-)     . COPD   .... DUONEB 6/4/2025   .... PULMICORT 6/4/2025   .... MONTELULKAST 6/4/2025   .... PREDNISONE 10 6/4/2025     INFECTION.  . DATA  .... esr 6/5/2025 56   .... w 6/4/2025 w 9.5  .... cxr 6/4/2025 cw 5/21/2025   ........ bibasal atelectasis   ........ interval increase of left lower lobe opacity concerning for pneumonia   .... Flu ab 6/4/2025 (-)      . VENOUS STASIS CHANGES RLE    . PNEUMONIA   .... cxr 6/4/2025 cw 5/21/2025   ........  increase of left lower lobe opacity concerning for pneumonia     . CELLULITIS R LOWER LEG 6/4/2025   .... XR R foot 6/4/2025 No emphysema     . ANTIBIO   .... DAPTOMYCIN 6/4/2025 D 400/d x 7d Dr Mosqueda   .... ZOSYN 6/4/2025 Z x 7d     CARDIAC.  . CAD    .... ASA 81 6/3   .... ROSUVASTATIN 40 6/3       . CHF  .... pbnp 6/4/2025 pbnp 123  .... tte 5/26/2025   ......... n lvsf ef 60% ivc rap 3   .... LASIX 60 IV/d 6/4/2025    .... kcl 20 6/4     HEMAT.  . DATA  .... Hb 6/4/2025 Hb 12.1  .... Plt 6/4/2025 plt 143  .... inr 6/3/2025 inr 105  .... monitor     GI.   . DIET .   .... 6/4/2025 CONS CARB    . LFT MONITORING   .... LFTS   6/4/2025  ........ AP   73   ........ AST 14  ........ ALT  32  .... monitor     RENAL.  . CKD  .... Na 6/4/2025 Na 141   .... CO2 6/4/2025 co2 30   .... Cr 5/27-6/4-6/5/2025 Cr 1.6-1.7 - 1.6   ....  monitor     . HYPOKALEMIA   .... K 6/4-6/5/2025 K 3.4 - 2.8    ENDO.  . DM  .  .... INSULIN GLARIGINE 10 HS 6/4/2025     NEURO.  . ANXIETY   .... CLONAZEPAM 0.5 tid 6/4/2025     . PAIN  .... PREGABALIN 150. 2 6/3    . DEPRESSION  .... SERTRALINE 100 6/2   ....     XXXXXXXXXXXXXXXXXXXXXX   SUMMARY BASELINE .     .. 74 m history of DM CHF, COPD, prostate cancer, renal insufficiency, diabetes, chronic neck pain,  right foot wound from [facility] who had been recently admitted with SOB   CC.   . 6/3/2025 PEDAL EDEMA  . 6/3/2025 WOUND R LOWER LEG   . 6/3/2025 CHRONIC SHORTNESS OF BREATH  . 6/3/2025 CHRONIC COUGH   MAIN ISSUES.  . COPD  . SHORTNESS OF BREATH  . PEDAL EDEMA   .... Venous duplx 6/4/2025 (-)   . VENOUS STASIS CHANGES RLE  . CELLULITIS R LOWER LEG 6/4/2025   .... XR R foot 6/4/2025 No emphysema   . PNEUMONIA   .... cxr 6/4/2025 cw 5/21/2025  incr of lll  opacity  . ANTIBIO   .... DAPTOMYCIN 6/4/2025 D 400/d x 7d Dr Mosqueda   .... ZOSYN 6/4/2025 Z x 7d   . CKD   .... Cr 5/27-6/4/2025 Cr 1.6-1.7   . HYPOKALEMIA   .... K 6/4-6/5/2025 K 3.4 - 2.8  . DM    PMH.   . LUNG NODULES  .... CT ch 5/21/2025 cw 12/28/2024   ......... mild tib nodules left lo lobe may be infection or inflammn    . PNEUMONIA   .... ROCEPHIN 5/21  . SKIN SOFT TISSUE INFECTION  .... DAPTOMYCIN 5/22- 5/26 d 450 x 7d   .... bactrim 5/26  . RLE ANKLE OPEN WOUND   . DM       DISCUSSIONS.  .... Discussed with primary care and relevant consultants on an ongoing basis       TIME SPENT.  . Over 36 minutes aggregate care time spent on encounter; activities included   direct patient care, counseling and/or coordinating care reviewing notes, lab data/ imaging , discussion with multidisciplinary team/ patient  /family and explaining in detail risks, benefits, alternatives  of the recommendations     MAGGIE BLACK 73 m 6/3/2025 1951

## 2025-06-05 NOTE — PROGRESS NOTE ADULT - SUBJECTIVE AND OBJECTIVE BOX
[facility] -- Erick Hogue Pannella, Patel, Savella Goodger, Cohen  Office # [phone]      Follow Up:  le edema     Subjective/Observations:     No events overnight resting comfortably in bed.  No complaints of chest pain, dyspnea, or palpitations reported. No signs of orthopnea or PND.    REVIEW OF SYSTEMS: All other review of systems is negative unless indicated above    PAST MEDICAL & SURGICAL HISTORY:  Prostate cancer      Type II diabetes mellitus      Chronic obstructive pulmonary disease (COPD)      CHF (congestive heart failure)      Renal insufficiency      H/O migraine      Insomnia      Constipation      S/P foot surgery          MEDICATIONS  (STANDING):  albuterol/ipratropium for Nebulization 3 milliLiter(s) Nebulizer every 8 hours  ascorbic acid 500 milliGRAM(s) Oral daily  aspirin enteric coated 81 milliGRAM(s) Oral daily  budesonide    Inhalation Suspension 0.5 milliGRAM(s) Inhalation two times a day  clotrimazole 1% Cream 1 Application(s) Topical two times a day  DAPTOmycin IVPB 400 milliGRAM(s) IV Intermittent every 24 hours  dextrose 5%. 1000 milliLiter(s) (50 mL/Hr) IV Continuous <Continuous>  dextrose 5%. 1000 milliLiter(s) (100 mL/Hr) IV Continuous <Continuous>  dextrose 50% Injectable 25 Gram(s) IV Push once  dextrose 50% Injectable 12.5 Gram(s) IV Push once  dextrose 50% Injectable 25 Gram(s) IV Push once  ferrous    sulfate 325 milliGRAM(s) Oral daily  furosemide   Injectable 60 milliGRAM(s) IV Push daily  glucagon  Injectable 1 milliGRAM(s) IntraMuscular once  heparin   Injectable 5000 Unit(s) SubCutaneous every 8 hours  insulin glargine Injectable (LANTUS) 10 Unit(s) SubCutaneous at bedtime  insulin lispro (ADMELOG) corrective regimen sliding scale   SubCutaneous at bedtime  insulin lispro (ADMELOG) corrective regimen sliding scale.   SubCutaneous three times a day before meals  montelukast 10 milliGRAM(s) Oral daily  multivitamin/minerals 1 Tablet(s) Oral daily  pantoprazole    Tablet 40 milliGRAM(s) Oral before breakfast  piperacillin/tazobactam IVPB.. 3.375 Gram(s) IV Intermittent every 8 hours  potassium chloride    Tablet ER 20 milliEquivalent(s) Oral daily  predniSONE   Tablet 10 milliGRAM(s) Oral daily  pregabalin 150 milliGRAM(s) Oral two times a day  rosuvastatin 40 milliGRAM(s) Oral at bedtime  saccharomyces boulardii 250 milliGRAM(s) Oral two times a day  senna 1 Tablet(s) Oral at bedtime  sertraline 100 milliGRAM(s) Oral daily    MEDICATIONS  (PRN):  acetaminophen     Tablet .. 650 milliGRAM(s) Oral every 6 hours PRN Temp greater or equal to 38C (100.4F), Mild Pain (1 - 3)  aluminum hydroxide/magnesium hydroxide/simethicone Suspension 30 milliLiter(s) Oral every 4 hours PRN Dyspepsia  bisacodyl Suppository 10 milliGRAM(s) Rectal daily PRN Constipation  clonazePAM  Tablet 0.5 milliGRAM(s) Oral three times a day PRN ANXIETY  dextrose Oral Gel 15 Gram(s) Oral once PRN Blood Glucose LESS THAN 70 milliGRAM(s)/deciliter  melatonin 3 milliGRAM(s) Oral at bedtime PRN Insomnia  ondansetron Injectable 4 milliGRAM(s) IV Push every 8 hours PRN Nausea and/or Vomiting  oxyCODONE    IR 10 milliGRAM(s) Oral two times a day PRN Severe Pain (7 - 10)  oxyCODONE    IR 5 milliGRAM(s) Oral every 8 hours PRN Moderate Pain (4 - 6)  polyethylene glycol 3350 17 Gram(s) Oral daily PRN for constipation      Allergies    IODINE (Unknown)  tetracycline (Unknown)  vancomycin (Other)    Intolerances        Vital Signs Last 24 Hrs  T(C): 36.6 (2025 04:33), Max: 36.6 (2025 11:56)  T(F): 97.9 (2025 04:33), Max: 97.9 (2025 04:33)  HR: 85 (2025 04:33) (72 - 86)  BP: 118/68 (2025 04:33) (118/68 - 130/72)  BP(mean): --  RR: 18 (2025 04:33) (17 - 18)  SpO2: 91% (2025 04:33) (91% - 96%)    Parameters below as of 2025 04:33  Patient On (Oxygen Delivery Method): room air        I&O's Summary        PHYSICAL EXAM:  TELE:   Constitutional: NAD, awake and alert, well-developed  HEENT: Moist Mucous Membranes, Anicteric  Pulmonary: Non-labored, breath sounds are clear bilaterally, No wheezing, crackles or rhonchi  Cardiovascular: Regular, S1 and S2 nl, No murmurs, rubs, gallops or clicks  Gastrointestinal: Bowel Sounds present, soft, nontender.   Lymph: +peripheral edema.  Skin: No visible rashes or ulcers.  Psych:  Mood & affect appropriate    LABS: All Labs Reviewed:                        12.1   950  )-----------( 143      ( 2025 06:05 )             37.9                         12.5   9.64  )-----------( 160      ( 2025 14:45 )             38.6     2025 06:05    141    |  104    |  36     ----------------------------<  137    3.4     |  30     |  1.70   2025 14:45    138    |  98     |  42     ----------------------------<  266    3.9     |  27     |  2.00     Ca    9.2        2025 06:05  Ca    8.7        2025 14:45  Phos  2.9       2025 06:05  Mg     2.5       2025 06:05    TPro  6.9    /  Alb  3.0    /  TBili  0.5    /  DBili  x      /  AST  14     /  ALT  32     /  AlkPhos  73     2025 06:05  TPro  7.2    /  Alb  3.1    /  TBili  0.4    /  DBili  x      /  AST  17     /  ALT  36     /  AlkPhos  80     2025 14:45    PT/INR - ( 2025 14:45 )   PT: 12.4 sec;   INR: 1.05 ratio         PTT - ( 2025 14:45 )  PTT:29.0 sec         EC Lead ECG:   Ventricular Rate 84 BPM    Atrial Rate 84 BPM    P-R Interval 234 ms    QRS Duration 94 ms    Q-T Interval 396 ms    QTC Calculation(Bazett) 467 ms    P Axis 21 degrees    R Axis -66 degrees    T Axis 41 degrees    Diagnosis Line Sinus rhythm with 1st degree AV block  Left axis deviation  Low voltage QRS  Inferior infarct , age undetermined  Cannot rule out Anteroseptal infarct (cited on or before 26-AUG-2023)  Abnormal ECG    Confirmed by Amber Quevedo ([id number]) on 2025 1:14:42 PM (25 @ 09:45)      TRANSTHORACIC ECHOCARDIOGRAM REPORT  ________________________________________________________________________________                                      _______       Pt. Name:       WAYNE SERRANO Study Date:    2025  MRN:            [id number]     YOB: 1950  Accession #:    [id number]     Age:           74 years  Account#:       [id number]    Gender:        M  Heart Rate:                   Height:        70.08 in (178.00 cm)  Rhythm:                       Weight:        242.50 lb (110.00 kg)  Blood Pressure: 99/65 mmHg    BSA/BMI:       2.27 m² / 34.72 kg/m²  ________________________________________________________________________________________  Referring Physician:    [id number] Mart Tripp  Interpreting Physician: Amber Quevedo MD  Primary Sonographer:    Checo Broderick    CPT:               ECHO TTE WO CON COMP W DOPP - 85231.m  Indication(s):     Dyspnea, unspecified - R06.00  Procedure:         Transthoracic echocardiogram with 2-D, M-mode and complete             spectral and color flow Doppler.  Ordering Location: [facility]  Admission Status:  Inpatient  Study Information: Image quality for this study is technically difficult.                     Technically difficult study secondary to lung interference.    _______________________________________________________________________________________     CONCLUSIONS:      1. Technically difficult image quality.   2. Left ventricular endocardium is not well visualized; however, the left ventricular systolic function appears grossly normal.   3. Left ventricular systolic function is normal with an ejection fraction visually estimated at 60 to 65 %.   4. The right ventricle is not well visualized. probably normal right ventricular systolic function.   5. The inferior vena cava is normal in size measuring 1.22 cm in diameter, (normal <2.1cm) with normal inspiratory collapse (normal >50%) consistent with normal right atrial pressure (~3, range 0-5mmHg).    ________________________________________________________________________________________  FINDINGS:     Left Ventricle:  Left ventricular systolic function is normal with an ejection fraction visually estimated at 60 to 65%. There is poor visualization of the endocardial borders to determine thepresence of wall motion abnormalities. Left ventricular endocardium is not well visualized; however, the left ventricular systolic function appears grossly normal. There is normal left ventricular diastolic function.     Right Ventricle:  The right ventricle is not well visualized. Right ventricular systolic function is probably normal.     Left Atrium:  The left atrium is normal in size with an indexed volume of 12.22 ml/m².     Right Atrium:  The right atrium was not well visualized.     Interatrial Septum:  The interatrial septum was not well visualized.     Aortic Valve:  The aortic valve was not well visualized. The aortic valve anatomy cannot be determined with normal systolic excursion.     Mitral Valve:  The mitral valve was not well visualized. There is mild leaflet calcification.     Tricuspid Valve:  The tricuspid valve was not well visualized. There is trace tricuspid regurgitation.     Pulmonic Valve:  The pulmonic valve was not well visualized.     Aorta:  The aortic root atthe sinuses of Valsalva is normal in size, measuring 3.20 cm (indexed 1.41 cm/m²). The ascending aorta is normal in size, measuring 3.00 cm (indexed 1.32 cm/m²).     Systemic Veins:  The inferior vena cava is normal in size measuring 1.22 cm in diameter, (normal <2.1cm) with normal inspiratory collapse (normal >50%) consistent with normal right atrial pressure (~3, range 0-5mmHg).  ____________________________________________________________________  QUANTITATIVE DATA:  Left Ventricle Measurements: (Indexed to BSA)     IVSd (2D):   1.4 cm  LVPWd (2D):  1.3 cm  LVIDd (2D):  3.4 cm  LVIDs (2D):  2.2 cm  LV Mass:     158 g  69.7 g/m²  Visualized LV EF%: 60 to 65%     MV E Vmax:    0.56 m/s  MV A Vmax:    1.17 m/s  MV E/A:       0.47  e' lateral:  6.31 cm/s  e' medial:    3.26 cm/s  E/e' lateral: 8.80  E/e' medial:  17.02  E/e' Average: 11.60  MV DT:        156 msec    Aorta Measurements: (Normal range) (Indexed to BSA)     Ao Root d     3.20 cm (3.1 - 3.7 cm) 1.41 cm/m²  Ao Asc d, 2D: 3.00  Ao Asc prox:  3.00 cm                1.32 cm/m²            Left Atrium Measurements: (Indexed to BSA)  LA Diam 2D: 2.60 cm            LVOT / RVOT/ Qp/Qs Data: (Indexed to BSA)  LVOT Diameter,s: 2.20 cm  LVOT Area:       3.80 cm²    Mitral Valve Measurements:     MV E Vmax: 0.6 m/s  MV A Vmax: 1.2 m/s  MV E/A:    0.5       Tricuspid Valve Measurements:     RA Pressure: 3 mmHg    ________________________________________________________________________________________  Electronically signed on 2025 at 8:22:27 AM by Amber Quevedo MD         *** Final ***      Radiology:         [facility] -- Erick Hogue Pannella, Patel, Savella Goodger, Cohen  Office # [phone]      Follow Up:  le edema     Subjective/Observations:     No events overnight resting comfortably in bed.  No complaints of chest pain, dyspnea, or palpitations reported. No signs of orthopnea or PND.    REVIEW OF SYSTEMS: All other review of systems is negative unless indicated above    PAST MEDICAL & SURGICAL HISTORY:  Prostate cancer      Type II diabetes mellitus      Chronic obstructive pulmonary disease (COPD)      CHF (congestive heart failure)      Renal insufficiency      H/O migraine      Insomnia      Constipation      S/P foot surgery          MEDICATIONS  (STANDING):  albuterol/ipratropium for Nebulization 3 milliLiter(s) Nebulizer every 8 hours  ascorbic acid 500 milliGRAM(s) Oral daily  aspirin enteric coated 81 milliGRAM(s) Oral daily  budesonide    Inhalation Suspension 0.5 milliGRAM(s) Inhalation two times a day  clotrimazole 1% Cream 1 Application(s) Topical two times a day  DAPTOmycin IVPB 400 milliGRAM(s) IV Intermittent every 24 hours  dextrose 5%. 1000 milliLiter(s) (50 mL/Hr) IV Continuous <Continuous>  dextrose 5%. 1000 milliLiter(s) (100 mL/Hr) IV Continuous <Continuous>  dextrose 50% Injectable 25 Gram(s) IV Push once  dextrose 50% Injectable 12.5 Gram(s) IV Push once  dextrose 50% Injectable 25 Gram(s) IV Push once  ferrous    sulfate 325 milliGRAM(s) Oral daily  furosemide   Injectable 60 milliGRAM(s) IV Push daily  glucagon  Injectable 1 milliGRAM(s) IntraMuscular once  heparin   Injectable 5000 Unit(s) SubCutaneous every 8 hours  insulin glargine Injectable (LANTUS) 10 Unit(s) SubCutaneous at bedtime  insulin lispro (ADMELOG) corrective regimen sliding scale   SubCutaneous at bedtime  insulin lispro (ADMELOG) corrective regimen sliding scale.   SubCutaneous three times a day before meals  montelukast 10 milliGRAM(s) Oral daily  multivitamin/minerals 1 Tablet(s) Oral daily  pantoprazole    Tablet 40 milliGRAM(s) Oral before breakfast  piperacillin/tazobactam IVPB.. 3.375 Gram(s) IV Intermittent every 8 hours  potassium chloride    Tablet ER 20 milliEquivalent(s) Oral daily  predniSONE   Tablet 10 milliGRAM(s) Oral daily  pregabalin 150 milliGRAM(s) Oral two times a day  rosuvastatin 40 milliGRAM(s) Oral at bedtime  saccharomyces boulardii 250 milliGRAM(s) Oral two times a day  senna 1 Tablet(s) Oral at bedtime  sertraline 100 milliGRAM(s) Oral daily    MEDICATIONS  (PRN):  acetaminophen     Tablet .. 650 milliGRAM(s) Oral every 6 hours PRN Temp greater or equal to 38C (100.4F), Mild Pain (1 - 3)  aluminum hydroxide/magnesium hydroxide/simethicone Suspension 30 milliLiter(s) Oral every 4 hours PRN Dyspepsia  bisacodyl Suppository 10 milliGRAM(s) Rectal daily PRN Constipation  clonazePAM  Tablet 0.5 milliGRAM(s) Oral three times a day PRN ANXIETY  dextrose Oral Gel 15 Gram(s) Oral once PRN Blood Glucose LESS THAN 70 milliGRAM(s)/deciliter  melatonin 3 milliGRAM(s) Oral at bedtime PRN Insomnia  ondansetron Injectable 4 milliGRAM(s) IV Push every 8 hours PRN Nausea and/or Vomiting  oxyCODONE    IR 10 milliGRAM(s) Oral two times a day PRN Severe Pain (7 - 10)  oxyCODONE    IR 5 milliGRAM(s) Oral every 8 hours PRN Moderate Pain (4 - 6)  polyethylene glycol 3350 17 Gram(s) Oral daily PRN for constipation      Allergies    IODINE (Unknown)  tetracycline (Unknown)  vancomycin (Other)    Intolerances        Vital Signs Last 24 Hrs  T(C): 36.6 (2025 04:33), Max: 36.6 (2025 11:56)  T(F): 97.9 (2025 04:33), Max: 97.9 (2025 04:33)  HR: 85 (2025 04:33) (72 - 86)  BP: 118/68 (2025 04:33) (118/68 - 130/72)  BP(mean): --  RR: 18 (2025 04:33) (17 - 18)  SpO2: 91% (2025 04:33) (91% - 96%)    Parameters below as of 2025 04:33  Patient On (Oxygen Delivery Method): room air        I&O's Summary        PHYSICAL EXAM:  Constitutional: NAD, awake and alert, obese   HEENT: Moist Mucous Membranes, Anicteric  Pulmonary: Non-labored, breath sounds are clear bilaterally, No wheezing, crackles or rhonchi  Cardiovascular: Regular, S1 and S2 nl, No murmurs, rubs, gallops or clicks  Gastrointestinal: Bowel Sounds present, soft, nontender.   Lymph: +peripheral edema.  Skin: No visible rashes or ulcers.  Psych:  Mood & affect appropriate    LABS: All Labs Reviewed:                        12.1   9.50  )-----------( 143      ( 2025 06:05 )             37.9                         12.5   9.64  )-----------( 160      ( 2025 14:45 )             38.6     2025 06:05    141    |  104    |  36     ----------------------------<  137    3.4     |  30     |  1.70   2025 14:45    138    |  98     |  42     ----------------------------<  266    3.9     |  27     |  2.00     Ca    9.2        2025 06:05  Ca    8.7        2025 14:45  Phos  2.9       2025 06:05  Mg     2.5       2025 06:05    TPro  6.9    /  Alb  3.0    /  TBili  0.5    /  DBili  x      /  AST  14     /  ALT  32     /  AlkPhos  73     2025 06:05  TPro  7.2    /  Alb  3.1    /  TBili  0.4    /  DBili  x      /  AST  17     /  ALT  36     /  AlkPhos  80     2025 14:45    PT/INR - ( 2025 14:45 )   PT: 12.4 sec;   INR: 1.05 ratio         PTT - ( 2025 14:45 )  PTT:29.0 sec         EC Lead ECG:   Ventricular Rate 84 BPM    Atrial Rate 84 BPM    P-R Interval 234 ms    QRS Duration 94 ms    Q-T Interval 396 ms    QTC Calculation(Bazett) 467 ms    P Axis 21 degrees    R Axis -66 degrees    T Axis 41 degrees    Diagnosis Line Sinus rhythm with 1st degree AV block  Left axis deviation  Low voltage QRS  Inferior infarct , age undetermined  Cannot rule out Anteroseptal infarct (cited on or before 26-AUG-2023)  Abnormal ECG    Confirmed by Amber Quevedo ([id number]) on 2025 1:14:42 PM (25 @ 09:45)      TRANSTHORACIC ECHOCARDIOGRAM REPORT  ________________________________________________________________________________                                      _______       Pt. Name:       WAYNE SERRANO Study Date:    2025  MRN:            [id number]     YOB: 1950  Accession #:    [id number]     Age:           74 years  Account#:       [id number]    Gender:        M  Heart Rate:                   Height:        70.08 in (178.00 cm)  Rhythm:                       Weight:        242.50 lb (110.00 kg)  Blood Pressure: 99/65 mmHg    BSA/BMI:       2.27 m² / 34.72 kg/m²  ________________________________________________________________________________________  Referring Physician:    [id number] Mart Tripp  Interpreting Physician: Amber Quevedo MD  Primary Sonographer:    Checo Broderick    CPT:               ECHO TTE WO CON COMP W DOPP - 35471.m  Indication(s):     Dyspnea, unspecified - R06.00  Procedure:         Transthoracic echocardiogram with 2-D, M-mode and complete             spectral and color flow Doppler.  Ordering Location: [facility]  Admission Status:  Inpatient  Study Information: Image quality for this study is technically difficult.                     Technically difficult study secondary to lung interference.    _______________________________________________________________________________________     CONCLUSIONS:      1. Technically difficult image quality.   2. Left ventricular endocardium is not well visualized; however, the left ventricular systolic function appears grossly normal.   3. Left ventricular systolic function is normal with an ejection fraction visually estimated at 60 to 65 %.   4. The right ventricle is not well visualized. probably normal right ventricular systolic function.   5. The inferior vena cava is normal in size measuring 1.22 cm in diameter, (normal <2.1cm) with normal inspiratory collapse (normal >50%) consistent with normal right atrial pressure (~3, range 0-5mmHg).    ________________________________________________________________________________________  FINDINGS:     Left Ventricle:  Left ventricular systolic function is normal with an ejection fraction visually estimated at 60 to 65%. There is poor visualization of the endocardial borders to determine thepresence of wall motion abnormalities. Left ventricular endocardium is not well visualized; however, the left ventricular systolic function appears grossly normal. There is normal left ventricular diastolic function.     Right Ventricle:  The right ventricle is not well visualized. Right ventricular systolic function is probably normal.     Left Atrium:  The left atrium is normal in size with an indexed volume of 12.22 ml/m².     Right Atrium:  The right atrium was not well visualized.     Interatrial Septum:  The interatrial septum was not well visualized.     Aortic Valve:  The aortic valve was not well visualized. The aortic valve anatomy cannot be determined with normal systolic excursion.     Mitral Valve:  The mitral valve was not well visualized. There is mild leaflet calcification.     Tricuspid Valve:  The tricuspid valve was not well visualized. There is trace tricuspid regurgitation.     Pulmonic Valve:  The pulmonic valve was not well visualized.     Aorta:  The aortic root atthe sinuses of Valsalva is normal in size, measuring 3.20 cm (indexed 1.41 cm/m²). The ascending aorta is normal in size, measuring 3.00 cm (indexed 1.32 cm/m²).     Systemic Veins:  The inferior vena cava is normal in size measuring 1.22 cm in diameter, (normal <2.1cm) with normal inspiratory collapse (normal >50%) consistent with normal right atrial pressure (~3, range 0-5mmHg).  ____________________________________________________________________  QUANTITATIVE DATA:  Left Ventricle Measurements: (Indexed to BSA)     IVSd (2D):   1.4 cm  LVPWd (2D):  1.3 cm  LVIDd (2D):  3.4 cm  LVIDs (2D):  2.2 cm  LV Mass:     158 g  69.7 g/m²  Visualized LV EF%: 60 to 65%     MV E Vmax:    0.56 m/s  MV A Vmax:    1.17 m/s  MV E/A:       0.47  e' lateral:  6.31 cm/s  e' medial:    3.26 cm/s  E/e' lateral: 8.80  E/e' medial:  17.02  E/e' Average: 11.60  MV DT:        156 msec    Aorta Measurements: (Normal range) (Indexed to BSA)     Ao Root d     3.20 cm (3.1 - 3.7 cm) 1.41 cm/m²  Ao Asc d, 2D: 3.00  Ao Asc prox:  3.00 cm                1.32 cm/m²            Left Atrium Measurements: (Indexed to BSA)  LA Diam 2D: 2.60 cm            LVOT / RVOT/ Qp/Qs Data: (Indexed to BSA)  LVOT Diameter,s: 2.20 cm  LVOT Area:       3.80 cm²    Mitral Valve Measurements:     MV E Vmax: 0.6 m/s  MV A Vmax: 1.2 m/s  MV E/A:    0.5       Tricuspid Valve Measurements:     RA Pressure: 3 mmHg    ________________________________________________________________________________________  Electronically signed on 2025 at 8:22:27 AM by Amber Quevedo MD         *** Final ***      Radiology:

## 2025-06-05 NOTE — PROGRESS NOTE ADULT - SUBJECTIVE AND OBJECTIVE BOX
PROGRESS NOTE   Patient is a 74y old  Male who presents with a chief complaint of rle swelling (05 Jun 2025 09:11)      HPI:  Patient with a past medical history of diabetes, COPD, heart failure, CKD, hypertension, history of right foot ulcer is presenting for wound to right lower extremity.  Was recently admitted here for shortness of breath as well as for bursitis and concern for a wound.  Was sent back to facility.  They sent him to the ER today due to worsening swelling and redness localized to his wound of the right lower extremity.  Endorsing pain to the site.  Denies fevers, chest pain, nausea or vomiting.  States that he is having some chronic shortness of breath though unchanged today.  Also endorses chronic cough. (03 Jun 2025 18:32)      Vital Signs Last 24 Hrs  T(C): 36.6 (05 Jun 2025 04:33), Max: 36.6 (04 Jun 2025 11:56)  T(F): 97.9 (05 Jun 2025 04:33), Max: 97.9 (05 Jun 2025 04:33)  HR: 49 (05 Jun 2025 08:00) (49 - 86)  BP: 118/68 (05 Jun 2025 04:33) (118/68 - 130/72)  BP(mean): --  RR: 18 (05 Jun 2025 04:33) (17 - 18)  SpO2: 93% (05 Jun 2025 08:00) (91% - 96%)    Parameters below as of 05 Jun 2025 08:00  Patient On (Oxygen Delivery Method): room air                              12.0   8.83  )-----------( 153      ( 05 Jun 2025 07:05 )             37.5               06-05    142  |  103  |  27[H]  ----------------------------<  151[H]  2.8[LL]   |  30  |  1.60[H]    Ca    9.0      05 Jun 2025 07:05  Phos  3.1     06-05  Mg     2.2     06-05    TPro  6.9  /  Alb  3.0[L]  /  TBili  0.5  /  DBili  x   /  AST  14[L]  /  ALT  32  /  AlkPhos  73  06-04      PHYSICAL EXAM  LOWER EXTREMITY PHYSICAL EXAM:  Integument : Skin warm, dry and supple bilateral  Right lower leg with redness, swelling and pain on palpation, partial thickness wound covered with eschar. All consistent with cellulitis   The area of concern has responded favorably with current treatment  Vascular : DP and PT weakly palpable 1/4 bilaterally  Capillary refill time less than 3s digits 1-5 bilaterally. Moderate to severe edema bilaterally with right is greater than left  MSK : Muscle strenth 4/5 all major muscle group bilaterally

## 2025-06-05 NOTE — DIETITIAN INITIAL EVALUATION ADULT - PERTINENT LABORATORY DATA
06-04    141  |  104  |  36[H]  ----------------------------<  137[H]  3.4[L]   |  30  |  1.70[H]    Ca    9.2      04 Jun 2025 06:05  Phos  3.1     06-05  Mg     2.2     06-05    TPro  6.9  /  Alb  3.0[L]  /  TBili  0.5  /  DBili  x   /  AST  14[L]  /  ALT  32  /  AlkPhos  73  06-04  POCT Blood Glucose.: 183 mg/dL (06-05-25 @ 07:45)  A1C with Estimated Average Glucose Result: 10.0 % (05-22-25 @ 09:11)  A1C with Estimated Average Glucose Result: 10.6 % (12-29-24 @ 06:43)  A1C with Estimated Average Glucose Result: 7.3 % (10-02-24 @ 06:07)   06-04    141  |  104  |  36[H]  ----------------------------<  137[H]  3.4[L]   |  30  |  1.70[H]    Ca    9.2      04 Jun 2025 06:05  Phos  3.1     06-05  Mg     2.2     06-05    TPro  6.9  /  Alb  3.0[L]  /  TBili  0.5  /  DBili  x   /  AST  14[L]  /  ALT  32  /  AlkPhos  73  06-04  POCT Blood Glucose.: 183 mg/dL (06-05-25 @ 07:45)  A1C with Estimated Average Glucose Result: 10.0 % (05-22-25 @ 09:11)- c/w poor DM control  A1C with Estimated Average Glucose Result: 10.6 % (12-29-24 @ 06:43)  A1C with Estimated Average Glucose Result: 7.3 % (10-02-24 @ 06:07)

## 2025-06-05 NOTE — PROGRESS NOTE ADULT - SUBJECTIVE AND OBJECTIVE BOX
Patient is a 74y Male whom presented to the hospital with     PAST MEDICAL & SURGICAL HISTORY:  Prostate cancer      Type II diabetes mellitus      Chronic obstructive pulmonary disease (COPD)      CHF (congestive heart failure)      Renal insufficiency      H/O migraine      Insomnia      Constipation      S/P foot surgery          MEDICATIONS  (STANDING):  albuterol/ipratropium for Nebulization 3 milliLiter(s) Nebulizer every 8 hours  ascorbic acid 500 milliGRAM(s) Oral daily  aspirin enteric coated 81 milliGRAM(s) Oral daily  budesonide    Inhalation Suspension 0.5 milliGRAM(s) Inhalation two times a day  clotrimazole 1% Cream 1 Application(s) Topical two times a day  DAPTOmycin IVPB 400 milliGRAM(s) IV Intermittent every 24 hours  dextrose 5%. 1000 milliLiter(s) (100 mL/Hr) IV Continuous <Continuous>  dextrose 5%. 1000 milliLiter(s) (50 mL/Hr) IV Continuous <Continuous>  dextrose 50% Injectable 25 Gram(s) IV Push once  dextrose 50% Injectable 12.5 Gram(s) IV Push once  dextrose 50% Injectable 25 Gram(s) IV Push once  ferrous    sulfate 325 milliGRAM(s) Oral daily  furosemide   Injectable 60 milliGRAM(s) IV Push daily  glucagon  Injectable 1 milliGRAM(s) IntraMuscular once  heparin   Injectable 5000 Unit(s) SubCutaneous every 8 hours  insulin glargine Injectable (LANTUS) 10 Unit(s) SubCutaneous at bedtime  insulin lispro (ADMELOG) corrective regimen sliding scale   SubCutaneous at bedtime  insulin lispro (ADMELOG) corrective regimen sliding scale.   SubCutaneous three times a day before meals  montelukast 10 milliGRAM(s) Oral daily  multivitamin/minerals 1 Tablet(s) Oral daily  pantoprazole    Tablet 40 milliGRAM(s) Oral before breakfast  piperacillin/tazobactam IVPB.. 3.375 Gram(s) IV Intermittent every 8 hours  potassium chloride    Tablet ER 20 milliEquivalent(s) Oral daily  predniSONE   Tablet 10 milliGRAM(s) Oral daily  pregabalin 150 milliGRAM(s) Oral two times a day  rosuvastatin 40 milliGRAM(s) Oral at bedtime  saccharomyces boulardii 250 milliGRAM(s) Oral two times a day  senna 1 Tablet(s) Oral at bedtime  sertraline 100 milliGRAM(s) Oral daily      Allergies    IODINE (Unknown)  tetracycline (Unknown)  vancomycin (Other)    Intolerances        SOCIAL HISTORY:  Denies ETOh,Smoking,     FAMILY HISTORY:      REVIEW OF SYSTEMS:    CONSTITUTIONAL: No weakness, fevers or chills  EYES/ENT: No visual changes;  no throat pain   NECK: No pain or stiffness  RESPIRATORY: No cough, wheezing, hemoptysis; No shortness of breath  CARDIOVASCULAR: No chest pain or palpitations  GASTROINTESTINAL: No abdominal or epigastric pain. No nausea, vomiting,     No diarrhea or constipation. No melena   GENITOURINARY: No dysuria, frequency or hematuria  NEUROLOGICAL: No numbness or weakness  SKIN: dry      VITAL:  T(C): , Max: 36.7 (06-04-25 @ 05:00)  T(F): , Max: 98 (06-04-25 @ 05:00)  HR: 72 (06-04-25 @ 15:00)  BP: 130/72 (06-04-25 @ 11:56)  BP(mean): --  RR: 18 (06-04-25 @ 11:56)  SpO2: 96% (06-04-25 @ 11:56)  Wt(kg): --    I and O's:        PHYSICAL EXAM:    Constitutional: NAD  HEENT: conjunctive   clear   Neck:  No JVD  Respiratory: CTAB  Cardiovascular: S1 and S2  Gastrointestinal: BS+, soft, NT/ND  Extremities: No peripheral edema  Neurological: A/O x 3, no focal deficits  Psychiatric: Normal mood, normal affect  : No Parikh  Skin: No rashes  Access: Not applicable    LABS:                        12.1   9.50  )-----------( 143      ( 04 Jun 2025 06:05 )             37.9     06-04    141  |  104  |  36[H]  ----------------------------<  137[H]  3.4[L]   |  30  |  1.70[H]    Ca    9.2      04 Jun 2025 06:05  Phos  2.9     06-04  Mg     2.5     06-04    TPro  6.9  /  Alb  3.0[L]  /  TBili  0.5  /  DBili  x   /  AST  14[L]  /  ALT  32  /  AlkPhos  73  06-04      Urine Studies:  Urinalysis Basic - ( 04 Jun 2025 06:05 )    Color: x / Appearance: x / SG: x / pH: x  Gluc: 137 mg/dL / Ketone: x  / Bili: x / Urobili: x   Blood: x / Protein: x / Nitrite: x   Leuk Esterase: x / RBC: x / WBC x   Sq Epi: x / Non Sq Epi: x / Bacteria: x            RADIOLOGY & ADDITIONAL STUDIES:                   Patient is a 74y Male whom presented to the hospital with kayleen     PAST MEDICAL & SURGICAL HISTORY:  Prostate cancer      Type II diabetes mellitus      Chronic obstructive pulmonary disease (COPD)      CHF (congestive heart failure)      Renal insufficiency      H/O migraine      Insomnia      Constipation      S/P foot surgery          MEDICATIONS  (STANDING):  albuterol/ipratropium for Nebulization 3 milliLiter(s) Nebulizer every 8 hours  ascorbic acid 500 milliGRAM(s) Oral daily  aspirin enteric coated 81 milliGRAM(s) Oral daily  budesonide    Inhalation Suspension 0.5 milliGRAM(s) Inhalation two times a day  clotrimazole 1% Cream 1 Application(s) Topical two times a day  DAPTOmycin IVPB 400 milliGRAM(s) IV Intermittent every 24 hours  dextrose 5%. 1000 milliLiter(s) (100 mL/Hr) IV Continuous <Continuous>  dextrose 5%. 1000 milliLiter(s) (50 mL/Hr) IV Continuous <Continuous>  dextrose 50% Injectable 25 Gram(s) IV Push once  dextrose 50% Injectable 12.5 Gram(s) IV Push once  dextrose 50% Injectable 25 Gram(s) IV Push once  ferrous    sulfate 325 milliGRAM(s) Oral daily  furosemide   Injectable 60 milliGRAM(s) IV Push daily  glucagon  Injectable 1 milliGRAM(s) IntraMuscular once  heparin   Injectable 5000 Unit(s) SubCutaneous every 8 hours  insulin glargine Injectable (LANTUS) 10 Unit(s) SubCutaneous at bedtime  insulin lispro (ADMELOG) corrective regimen sliding scale   SubCutaneous at bedtime  insulin lispro (ADMELOG) corrective regimen sliding scale.   SubCutaneous three times a day before meals  montelukast 10 milliGRAM(s) Oral daily  multivitamin/minerals 1 Tablet(s) Oral daily  pantoprazole    Tablet 40 milliGRAM(s) Oral before breakfast  piperacillin/tazobactam IVPB.. 3.375 Gram(s) IV Intermittent every 8 hours  potassium chloride    Tablet ER 20 milliEquivalent(s) Oral daily  predniSONE   Tablet 10 milliGRAM(s) Oral daily  pregabalin 150 milliGRAM(s) Oral two times a day  rosuvastatin 40 milliGRAM(s) Oral at bedtime  saccharomyces boulardii 250 milliGRAM(s) Oral two times a day  senna 1 Tablet(s) Oral at bedtime  sertraline 100 milliGRAM(s) Oral daily      Allergies    IODINE (Unknown)  tetracycline (Unknown)  vancomycin (Other)    Intolerances        SOCIAL HISTORY:  Denies ETOh,Smoking,     FAMILY HISTORY:      REVIEW OF SYSTEMS:    CONSTITUTIONAL: No weakness, fevers or chills  RESPIRATORY: No cough, wheezing, hemoptysis; No shortness of breath  CARDIOVASCULAR: No chest pain or palpitations  GASTROINTESTINAL: No abdominal or epigastric pain. No nausea, vomiting,     No diarrhea or constipation. No melena   GENITOURINARY: No dysuria, frequency or hematuria  NEUROLOGICAL: No numbness or weakness  SKIN: dry      VITAL:  T(C): , Max: 36.7 (06-04-25 @ 05:00)  T(F): , Max: 98 (06-04-25 @ 05:00)  HR: 72 (06-04-25 @ 15:00)  BP: 130/72 (06-04-25 @ 11:56)  BP(mean): --  RR: 18 (06-04-25 @ 11:56)  SpO2: 96% (06-04-25 @ 11:56)  Wt(kg): --    I and O's:        PHYSICAL EXAM:    Constitutional: NAD  HEENT: conjunctive   clear   Neck:  No JVD  Respiratory: CTAB  Cardiovascular: S1 and S2  Gastrointestinal: BS+, soft, NT/ND  Extremities: No peripheral edema    LABS:                        12.1   9.50  )-----------( 143      ( 04 Jun 2025 06:05 )             37.9     06-04    141  |  104  |  36[H]  ----------------------------<  137[H]  3.4[L]   |  30  |  1.70[H]    Ca    9.2      04 Jun 2025 06:05  Phos  2.9     06-04  Mg     2.5     06-04    TPro  6.9  /  Alb  3.0[L]  /  TBili  0.5  /  DBili  x   /  AST  14[L]  /  ALT  32  /  AlkPhos  73  06-04      Urine Studies:  Urinalysis Basic - ( 04 Jun 2025 06:05 )    Color: x / Appearance: x / SG: x / pH: x  Gluc: 137 mg/dL / Ketone: x  / Bili: x / Urobili: x   Blood: x / Protein: x / Nitrite: x   Leuk Esterase: x / RBC: x / WBC x   Sq Epi: x / Non Sq Epi: x / Bacteria: x            RADIOLOGY & ADDITIONAL STUDIES:

## 2025-06-05 NOTE — DIETITIAN INITIAL EVALUATION ADULT - NSFNSPHYEXAMSKINFT_GEN_A_CORE
Pressure Injury 1: Left:, heel, Stage I  Pressure Injury 2: Right:, heel, Stage I  Pressure Injury 3: Right:, elbow, Stage I, with scab wound. Per pt he has bursitis  Pressure Injury 4: Left:, elbow, Stage I  Pressure Injury 5: sacrum, Stage I  Pressure Injury 6: none, none  Pressure Injury 7: none, none  Pressure Injury 8: none, none  Pressure Injury 9: none, none  Pressure Injury 10: none, none  Pressure Injury 11: none, none Pressure Injury 1: Left:, heel, Stage I  Pressure Injury 2: Right:, heel, Stage I  Pressure Injury 3: Right:, elbow, Stage I, with scab wound. Per pt he has bursitis  Pressure Injury 4: Left:, elbow, Stage I  Pressure Injury 5: sacrum, Stage I

## 2025-06-05 NOTE — PROGRESS NOTE ADULT - ASSESSMENT
74 year old male with past medical history of diabetes, COPD, heart failure, CKD, hypertension, history of right foot ulcer, who presented for wound to right lower extremity.  Was recently admitted here for shortness of breath as well as for bursitis and concern for a wound.  Was sent back to facility.  They sent him to the ER today due to worsening swelling and redness localized to his wound of the right lower extremity.  Endorsing pain to the site.  Denies fevers, chest pain, nausea or vomiting.  States that he is having some chronic shortness of breath and increased cough. Denies any fevers. Of note was admitted last month, received antibiotics for possible pneumonia and RLE open wound and erythema. Also with R elbow swelling and bursitis.    Concern for RLE cellulitis, but suspect skin changes also due to venous stasis. Recent MRSA nasal PCR positive although no evidence of abscess on CT. Swelling improved today. No fever and no leukocytosis. Blood cultures currently no growth. CXR showed LLL opacity, although unclear if acute given recent lung imaging showed concern for pneumonia in the same location.     #RLE cellulitis  #RLE wound  #Tinea pedis  #LLL pneumonia  #MRSA nasal PCR positive  #Cough    -continye daptomycin  -continue Zosyn  -clotrimazole ointment between the toes for 2 weeks  -suggest urine strep and legionella antigen negative  -baseline CPK  -repeat MRSA nasal PCR  -follow cultures to completion  -leg elevation  -contact isolation  -discussed with Dr. Joe Mosqueda MD  Division of Infectious Diseases   Cell [phone] between 8am and 6pm   After 6pm and weekends please call ID service at [phone].     35 minutes spent on total encounter assessing patient, examination, chart review, counseling and coordinating care by the attending physician/nurse/care manager.

## 2025-06-06 DIAGNOSIS — E11.65 TYPE 2 DIABETES MELLITUS WITH HYPERGLYCEMIA: ICD-10-CM

## 2025-06-06 DIAGNOSIS — L97.811 NON-PRESSURE CHRONIC ULCER OF OTHER PART OF RIGHT LOWER LEG LIMITED TO BREAKDOWN OF SKIN: ICD-10-CM

## 2025-06-06 LAB
ANION GAP SERPL CALC-SCNC: 12 MMOL/L — SIGNIFICANT CHANGE UP (ref 5–17)
BUN SERPL-MCNC: 30 MG/DL — HIGH (ref 7–23)
CALCIUM SERPL-MCNC: 9 MG/DL — SIGNIFICANT CHANGE UP (ref 8.5–10.1)
CHLORIDE SERPL-SCNC: 99 MMOL/L — SIGNIFICANT CHANGE UP (ref 96–108)
CO2 SERPL-SCNC: 27 MMOL/L — SIGNIFICANT CHANGE UP (ref 22–31)
CREAT SERPL-MCNC: 1.8 MG/DL — HIGH (ref 0.5–1.3)
CULTURE RESULTS: NO GROWTH — SIGNIFICANT CHANGE UP
EGFR: 39 ML/MIN/1.73M2 — LOW
EGFR: 39 ML/MIN/1.73M2 — LOW
GLUCOSE SERPL-MCNC: 334 MG/DL — HIGH (ref 70–99)
HCT VFR BLD CALC: 40.7 % — SIGNIFICANT CHANGE UP (ref 39–50)
HGB BLD-MCNC: 12.9 G/DL — LOW (ref 13–17)
LEGIONELLA AG UR QL: NEGATIVE — SIGNIFICANT CHANGE UP
MCHC RBC-ENTMCNC: 27.7 PG — SIGNIFICANT CHANGE UP (ref 27–34)
MCHC RBC-ENTMCNC: 31.7 G/DL — LOW (ref 32–36)
MCV RBC AUTO: 87.5 FL — SIGNIFICANT CHANGE UP (ref 80–100)
MRSA PCR RESULT.: DETECTED
NRBC BLD AUTO-RTO: 0 /100 WBCS — SIGNIFICANT CHANGE UP (ref 0–0)
PLATELET # BLD AUTO: 147 K/UL — LOW (ref 150–400)
POTASSIUM SERPL-MCNC: 3.3 MMOL/L — LOW (ref 3.5–5.3)
POTASSIUM SERPL-SCNC: 3.3 MMOL/L — LOW (ref 3.5–5.3)
RBC # BLD: 4.65 M/UL — SIGNIFICANT CHANGE UP (ref 4.2–5.8)
RBC # FLD: 19 % — HIGH (ref 10.3–14.5)
S AUREUS DNA NOSE QL NAA+PROBE: DETECTED
S PNEUM AG UR QL: NEGATIVE — SIGNIFICANT CHANGE UP
SODIUM SERPL-SCNC: 138 MMOL/L — SIGNIFICANT CHANGE UP (ref 135–145)
SPECIMEN SOURCE: SIGNIFICANT CHANGE UP
WBC # BLD: 8.38 K/UL — SIGNIFICANT CHANGE UP (ref 3.8–10.5)
WBC # FLD AUTO: 8.38 K/UL — SIGNIFICANT CHANGE UP (ref 3.8–10.5)

## 2025-06-06 PROCEDURE — 99232 SBSQ HOSP IP/OBS MODERATE 35: CPT

## 2025-06-06 PROCEDURE — 99222 1ST HOSP IP/OBS MODERATE 55: CPT

## 2025-06-06 PROCEDURE — 70360 X-RAY EXAM OF NECK: CPT | Mod: 26

## 2025-06-06 PROCEDURE — 71250 CT THORAX DX C-: CPT | Mod: 26

## 2025-06-06 PROCEDURE — 71045 X-RAY EXAM CHEST 1 VIEW: CPT | Mod: 26

## 2025-06-06 RX ORDER — BUDESONIDE AND FORMOTEROL FUMARATE DIHYDRATE 80; 4.5 UG/1; UG/1
2 AEROSOL RESPIRATORY (INHALATION)
Refills: 0 | Status: DISCONTINUED | OUTPATIENT
Start: 2025-06-06 | End: 2025-06-10

## 2025-06-06 RX ORDER — TIOTROPIUM BROMIDE INHALATION SPRAY 3.12 UG/1
2 SPRAY, METERED RESPIRATORY (INHALATION) DAILY
Refills: 0 | Status: DISCONTINUED | OUTPATIENT
Start: 2025-06-06 | End: 2025-06-10

## 2025-06-06 RX ORDER — INSULIN LISPRO 100 U/ML
5 INJECTION, SOLUTION INTRAVENOUS; SUBCUTANEOUS
Refills: 0 | Status: DISCONTINUED | OUTPATIENT
Start: 2025-06-06 | End: 2025-06-07

## 2025-06-06 RX ORDER — INSULIN GLARGINE-YFGN 100 [IU]/ML
15 INJECTION, SOLUTION SUBCUTANEOUS AT BEDTIME
Refills: 0 | Status: DISCONTINUED | OUTPATIENT
Start: 2025-06-06 | End: 2025-06-07

## 2025-06-06 RX ADMIN — HEPARIN SODIUM 5000 UNIT(S): 1000 INJECTION INTRAVENOUS; SUBCUTANEOUS at 23:17

## 2025-06-06 RX ADMIN — HEPARIN SODIUM 5000 UNIT(S): 1000 INJECTION INTRAVENOUS; SUBCUTANEOUS at 15:10

## 2025-06-06 RX ADMIN — INSULIN LISPRO 6: 100 INJECTION, SOLUTION INTRAVENOUS; SUBCUTANEOUS at 08:21

## 2025-06-06 RX ADMIN — CLOTRIMAZOLE 1 APPLICATION(S): 1 CREAM TOPICAL at 18:01

## 2025-06-06 RX ADMIN — Medication 650 MILLIGRAM(S): at 17:36

## 2025-06-06 RX ADMIN — IPRATROPIUM BROMIDE AND ALBUTEROL SULFATE 3 MILLILITER(S): .5; 2.5 SOLUTION RESPIRATORY (INHALATION) at 07:27

## 2025-06-06 RX ADMIN — SERTRALINE 100 MILLIGRAM(S): 100 TABLET, FILM COATED ORAL at 12:27

## 2025-06-06 RX ADMIN — BUTYROSPERMUM PARKII(SHEA BUTTER), SIMMONDSIA CHINENSIS (JOJOBA) SEED OIL, ALOE BARBADENSIS LEAF EXTRACT 250 MILLIGRAM(S): .01; 1; 3.5 LIQUID TOPICAL at 05:50

## 2025-06-06 RX ADMIN — Medication 25 GRAM(S): at 06:44

## 2025-06-06 RX ADMIN — Medication 100 MILLIGRAM(S): at 23:18

## 2025-06-06 RX ADMIN — BUDESONIDE 0.5 MILLIGRAM(S): 0.25 SUSPENSION RESPIRATORY (INHALATION) at 07:28

## 2025-06-06 RX ADMIN — PREDNISONE 10 MILLIGRAM(S): 20 TABLET ORAL at 05:50

## 2025-06-06 RX ADMIN — Medication 40 MILLIEQUIVALENT(S): at 18:14

## 2025-06-06 RX ADMIN — INSULIN LISPRO 4: 100 INJECTION, SOLUTION INTRAVENOUS; SUBCUTANEOUS at 17:22

## 2025-06-06 RX ADMIN — CLONAZEPAM 0.5 MILLIGRAM(S): 0.5 TABLET ORAL at 00:56

## 2025-06-06 RX ADMIN — INSULIN LISPRO 5 UNIT(S): 100 INJECTION, SOLUTION INTRAVENOUS; SUBCUTANEOUS at 12:24

## 2025-06-06 RX ADMIN — MONTELUKAST SODIUM 10 MILLIGRAM(S): 10 TABLET ORAL at 12:26

## 2025-06-06 RX ADMIN — FUROSEMIDE 60 MILLIGRAM(S): 10 INJECTION INTRAMUSCULAR; INTRAVENOUS at 05:51

## 2025-06-06 RX ADMIN — Medication 650 MILLIGRAM(S): at 16:36

## 2025-06-06 RX ADMIN — PREGABALIN 150 MILLIGRAM(S): 50 CAPSULE ORAL at 17:20

## 2025-06-06 RX ADMIN — CLONAZEPAM 0.5 MILLIGRAM(S): 0.5 TABLET ORAL at 15:08

## 2025-06-06 RX ADMIN — IPRATROPIUM BROMIDE AND ALBUTEROL SULFATE 3 MILLILITER(S): .5; 2.5 SOLUTION RESPIRATORY (INHALATION) at 13:33

## 2025-06-06 RX ADMIN — Medication 1 TABLET(S): at 12:26

## 2025-06-06 RX ADMIN — Medication 25 GRAM(S): at 15:10

## 2025-06-06 RX ADMIN — PREGABALIN 150 MILLIGRAM(S): 50 CAPSULE ORAL at 05:50

## 2025-06-06 RX ADMIN — Medication 81 MILLIGRAM(S): at 12:27

## 2025-06-06 RX ADMIN — HEPARIN SODIUM 5000 UNIT(S): 1000 INJECTION INTRAVENOUS; SUBCUTANEOUS at 05:49

## 2025-06-06 RX ADMIN — OXYCODONE HYDROCHLORIDE 10 MILLIGRAM(S): 30 TABLET ORAL at 10:21

## 2025-06-06 RX ADMIN — ROSUVASTATIN CALCIUM 40 MILLIGRAM(S): 20 TABLET, FILM COATED ORAL at 23:17

## 2025-06-06 RX ADMIN — Medication 1 TABLET(S): at 08:22

## 2025-06-06 RX ADMIN — INSULIN LISPRO 5 UNIT(S): 100 INJECTION, SOLUTION INTRAVENOUS; SUBCUTANEOUS at 17:23

## 2025-06-06 RX ADMIN — BUTYROSPERMUM PARKII(SHEA BUTTER), SIMMONDSIA CHINENSIS (JOJOBA) SEED OIL, ALOE BARBADENSIS LEAF EXTRACT 250 MILLIGRAM(S): .01; 1; 3.5 LIQUID TOPICAL at 17:20

## 2025-06-06 RX ADMIN — Medication 1 TABLET(S): at 17:21

## 2025-06-06 RX ADMIN — Medication 100 MILLIGRAM(S): at 13:14

## 2025-06-06 RX ADMIN — Medication 20 MILLIEQUIVALENT(S): at 12:26

## 2025-06-06 RX ADMIN — INSULIN LISPRO 8: 100 INJECTION, SOLUTION INTRAVENOUS; SUBCUTANEOUS at 12:23

## 2025-06-06 RX ADMIN — Medication 500 MILLIGRAM(S): at 12:27

## 2025-06-06 RX ADMIN — Medication 100 MILLIGRAM(S): at 05:50

## 2025-06-06 RX ADMIN — Medication 325 MILLIGRAM(S): at 12:26

## 2025-06-06 RX ADMIN — Medication 40 MILLIGRAM(S): at 05:50

## 2025-06-06 RX ADMIN — CLOTRIMAZOLE 1 APPLICATION(S): 1 CREAM TOPICAL at 06:37

## 2025-06-06 RX ADMIN — Medication 1 TABLET(S): at 23:19

## 2025-06-06 RX ADMIN — IPRATROPIUM BROMIDE AND ALBUTEROL SULFATE 3 MILLILITER(S): .5; 2.5 SOLUTION RESPIRATORY (INHALATION) at 20:36

## 2025-06-06 RX ADMIN — INSULIN GLARGINE-YFGN 15 UNIT(S): 100 INJECTION, SOLUTION SUBCUTANEOUS at 23:18

## 2025-06-06 RX ADMIN — DAPTOMYCIN 116 MILLIGRAM(S): 500 INJECTION, POWDER, LYOPHILIZED, FOR SOLUTION INTRAVENOUS at 16:36

## 2025-06-06 RX ADMIN — OXYCODONE HYDROCHLORIDE 10 MILLIGRAM(S): 30 TABLET ORAL at 11:21

## 2025-06-06 NOTE — CONSULT NOTE ADULT - PROBLEM SELECTOR RECOMMENDATION 9
mdii dosages reduced during hospitalization  cont lantus 10 units qhs  change mod dose admelog corrective scale coverage qac/qhs  jardiance on hold  cont cons cho diet  goal bg 100-180 in hosp setting
Discussed diagnosis and treatment with patient  Reviewed Xray right foot : No acute emphysematous changes  There is concern patient has right lower leg cellulitis rule out abscess  Applied light compression dressing on lower leg  Recs IV abx and ID consult  Recs elevation   Medical management as per Primary Team  Will consider surgical intervention if symptoms persist  Thank you for the consult  Discussed with all attendings
IV abx as per ID/med team
Type 2  A1c 10.0 % adm Cellulitis RLE  Recommend endocrine-Perlman/RD on consult  Hospital regimen: Lantus 15 Units HS  Humalog 5 Units TID with meals  Admelog ISS TIDAC  cc diet and accucheck ACHS  FU appt: at [facility]. Will check with Dr. Diaz for Endocrine recs for outpatient  DSC recommendations: return to home regimen;  Humalog TID SS  Humalog KwikPen 15 Units TID with meals  Lantus 36 Units HS  Jardiance 10 md daily  glucose monitoring  diabetes education provided  Goal 100-180 mg/dL; 140-180 mg/dL in critical care areas  Contact # [phone]

## 2025-06-06 NOTE — CONSULT NOTE ADULT - ASSESSMENT
Physical Exam:   Vital Signs Last 24 Hrs  T(C): 36.6 (06 Jun 2025 05:52), Max: 36.6 (05 Jun 2025 20:07)  T(F): 97.8 (06 Jun 2025 05:52), Max: 97.9 (05 Jun 2025 20:07)  HR: 80 (06 Jun 2025 07:27) (75 - 106)  BP: 125/68 (06 Jun 2025 05:52) (121/72 - 130/77)  BP(mean): --  RR: 18 (06 Jun 2025 05:52) (18 - 18)  SpO2: 92% (06 Jun 2025 07:27) (91% - 95%)    Parameters below as of 06 Jun 2025 07:27  Patient On (Oxygen Delivery Method): room air               CAPILLARY BLOOD GLUCOSE      POCT Blood Glucose.: 253 mg/dL (06 Jun 2025 07:48)  POCT Blood Glucose.: 262 mg/dL (05 Jun 2025 21:17)  POCT Blood Glucose.: 254 mg/dL (05 Jun 2025 16:55)  POCT Blood Glucose.: 351 mg/dL (05 Jun 2025 11:53)        DIET: CC  >50%

## 2025-06-06 NOTE — SWALLOW BEDSIDE ASSESSMENT ADULT - SWALLOW EVAL: DIAGNOSIS
Pt presenting without clinical signs/symptoms of oropharyngeal dysphagia. Pt self-fed PO trials of regular solids and thin liquids. Functional oral manipulation and adequate oral clearance. No overt s/s penetration/aspiration, even with serial sips and large volumes of liquids. Although no overt s/s penetration/aspiration observed, given pt's complaints of coughing with intake PTA on occasion and COPD dx, pt at risk for dysphagia. Additionally, chest imaging with concerns for lower lobe pneumonia. Therefore, recommend objective imaging via MBS. Continue regular/thin liquids in the interim.

## 2025-06-06 NOTE — SWALLOW BEDSIDE ASSESSMENT ADULT - COMMENTS
Imaging:  XR Chest 6/4/25: "Bibasilar atelectasis.  Interval increase of left lower lobe opacity, concerning for pneumonia."    Clinical swallow evaluation order received and appreciated. Chart reviewed, events noted. Pt received upright in bed, awake and alert. Oriented x 3 Adequate command following. Vocal quality clear and dry. On RA. No complaints of pain. Clinical swallow evaluation completed. See report for details. Pt left as received in NAD. DAVINA Koroma & Dr. Diaz notified. Pt also educated re: results/recommendations. Will continue to follow.

## 2025-06-06 NOTE — CONSULT NOTE ADULT - CONSULT REQUESTED DATE/TIME
04-Jun-2025 12:44
04-Jun-2025 19:11
05-Jun-2025
03-Jun-2025 23:48
04-Jun-2025 20:20
04-Jun-2025 10:22
04-Jun-2025 14:09
03-Jun-2025
06-Jun-2025 10:10

## 2025-06-06 NOTE — PROGRESS NOTE ADULT - ASSESSMENT
75 y/o M with a past medical history of diabetes, COPD, heart failure, CKD, hypertension, history of right foot ulcer is presenting for wound to right lower extremity.  Was recently admitted here for shortness of breath as well as for bursitis and concern for a wound.  Was sent back to facility.  They sent him to the ER today due to worsening swelling and redness localized to his wound of the right lower extremity. Admitted for RLE cellulitis.     Presenting with LE edema, admitted for cellulitis   - EKG with SR, 84bpm, low voltage, unchanged from previous EKG  -C/w home statin for HLD    -Hx HFpEF  - Previous TTE 05/2025 with EF 60-65% and trace TR  -on iv lasix   - Strict I/Os, daily weights    -bp stable   -Was on BB but was dc d/t soft BP on previous admission     - Monitor and replete lytes, keep K>4, Mg>2.    - Other cardiovascular workup will depend on clinical course.  - All other workup per primary team.  - Will continue to follow.      73 y/o M with a past medical history of diabetes, COPD, heart failure, CKD, hypertension, history of right foot ulcer is presenting for wound to right lower extremity.  Was recently admitted here for shortness of breath as well as for bursitis and concern for a wound.  Was sent back to facility.  They sent him to the ER today due to worsening swelling and redness localized to his wound of the right lower extremity. Admitted for RLE cellulitis.     Presenting with LE edema, admitted for cellulitis   - EKG with SR, 84bpm, low voltage, unchanged from previous EKG  -C/w home statin for HLD    -Hx HFpEF  - Previous TTE 05/2025 with EF 60-65% and trace TR  -on iv lasix , anticipate changing to po tomorrow   - Strict I/Os, daily weights    -bp stable   -Was on BB but was dc d/t soft BP on previous admission     -Anival followed by renal fu recs    - Monitor and replete lytes, keep K>4, Mg>2.    - Other cardiovascular workup will depend on clinical course.  - All other workup per primary team.  - Will continue to follow.

## 2025-06-06 NOTE — CHART NOTE - NSCHARTNOTEFT_GEN_A_CORE
Called by RN for pt c/o shortness of breath and choking sensation in his chest   - Pt evaluated at bedside, ronnie Called by RN for pt c/o shortness of breath and choking sensation in his chest   - Pt evaluated at bedside, non-productive cough on presentation   - Denied shortness of breath, chest pain, or palpitations but endorsed sensation of something stuck at his upper chest Called by RN for pt c/o shortness of breath and choking sensation in his chest   - Pt evaluated at bedside, non-productive cough on presentation   - Denied shortness of breath, chest pain, or palpitations but endorsed sensation of something stuck at his upper chest  - States symptoms started after having lunch, persistent coughing with stuck sensation  - Passed S&S eval today; regular and thin liquids with aspiration precautions, oral care   - Vital signs stable, saturating well on room air   - Some improvement in symptoms after receiving chest PT   - Will obtain XR Neck and Chest: no obvious foreign body seen on personal read - pending official  - CT Chest prelim read with mild segmental/subsegmental bronchial wall thickening and secretions in the right lower lobe suggest mild bronchitis. No discernible foreign body in the airways.  - Start duuonebs Called by RN for pt c/o shortness of breath and choking sensation in his chest   - Pt evaluated at bedside, non-productive cough on presentation   - Denied shortness of breath, chest pain, or palpitations but endorsed sensation of something stuck at his upper chest  - States symptoms started after having lunch, persistent coughing with stuck sensation  - Passed S&S eval today; regular and thin liquids with aspiration precautions, oral care   - Vital signs stable, saturating well on room air   - Some improvement in symptoms after receiving chest PT   - Will obtain XR Neck and Chest: no obvious foreign body seen on personal read, slight worsened LLL opacity - pending official read   - CT Chest prelim read with mild segmental/subsegmental bronchial wall thickening and secretions in the right lower lobe suggest mild bronchitis. No discernible foreign body in the airways.  - Continue with duonebs; start sodium chloride 3% neb   - Chest PT, Robitussin   - Continue with current antibiotic regimen   - RN to call with changes

## 2025-06-06 NOTE — PROGRESS NOTE ADULT - ASSESSMENT
REASON FOR VISIT  .. Management of problems listed below      EVENTS/CURRENT ISSUES.  . 6/6/2025 started symbicort spiriva         REVIEW OF SYMPTOMS   Able to give ROS  Yes     RELIABILITY +/-   CONSTITUTIONAL Weakness Yes    ENDOCRINE  No heat or cold intolerance    ALLERGY No hives  Sore throat No stridor  RESP Shortness of breath YES   NEURO New weakness No   CARDIAC   Palpitations No         PHYSICAL EXAM    HEENT Unremarkable  atraumatic   RESP Fair air entry  Harsh breath sound   CARDIAC S1 S2 No S3     NO JVD    ABDOMEN No hepatosplenomegaly   PEDAL EDEMA present No calf tenderness      REASON FOR VISIT  .. Management of problems listed below      CC.   . 6/3/2025  Pt brought in by EMS from [facility] with concern for worsening pedal edema, wound on R lower leg. 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  edema, wound check  OVERALL PRESENTATION.  . 6/3/2025  Patient with a past medical history of diabetes, COPD, heart failure, CKD, hypertension, history of right foot ulcer is presenting for wound to right lower extremity.  Was recently admitted here for shortness of breath as well as for bursitis and concern for a wound.  Was sent back to facility.  They sent him to the ER today due to worsening swelling and redness localized to his wound of the right lower extremity.  Endorsing pain to the site.  Denies fevers, chest pain, nausea or vomiting.  States that he is having some chronic shortness of breath though unchanged today.  Also endorses chronic cough.  PMH.      PAST HOSPITAL STAYS .  Home Medications:   * Patient Currently Takes Medications as of 27-May-2025 11:17 documented in Structured Notes  · montelukast 10 mg oral tablet: 1 tab(s) orally once a day  · melatonin 3 mg oral tablet: 1 tab(s) orally once a day (at bedtime)  · senna leaf extract oral tablet: 1 tab(s) orally once a day (at bedtime)  · saccharomyces boulardii lyo 250 mg oral capsule: 1 cap(s) orally once a day  · sertraline 100 mg oral tablet: 1 tab(s) orally once a day MDD: 1  · furosemide 40 mg oral tablet: 1 tab(s) orally every 12 hours  · sulfamethoxazole-trimethoprim 800 mg-160 mg oral tablet: 1 tab(s) orally every 12 hours  · aspirin 81 mg oral delayed release tablet: 1 tab(s) orally once a day  · potassium chloride 20 mEq oral tablet, extended release: 1 tab(s) orally 2 times a day  · albuterol 0.63 mg/3 mL (0.021%) inhalation solution: 3 milliliter(s) by nebulizer 3 times a day  · predniSONE 10 mg oral tablet: 1 tab(s) orally once a day  · oxyCODONE 5 mg oral tablet: 2 tab(s) orally 2 times a day as needed for Severe Pain (7 - 10) MDD: 4  · loratadine 10 mg oral tablet: 1 tab(s) orally once a day (in the morning)  · Multiple Vitamins with Minerals oral tablet: 1 tab(s) orally once a day  · ferrous sulfate 325 mg (65 mg elemental iron) oral tablet: 1 tab(s) orally once a day  · pantoprazole 40 mg oral delayed release tablet: 1 tab(s) orally once a day (before a meal)  · Symbicort 160 mcg-4.5 mcg/inh inhalation aerosol: 2 puff(s) inhaled 2 times a day  · rosuvastatin 40 mg oral tablet: 1 tab(s) orally once a day  · polyethylene glycol 3350 oral powder for reconstitution: 17 gram(s) orally once a day as needed for  constipation  · Jardiance 10 mg oral tablet: 1 tab(s) orally once a day  · pregabalin 150 mg oral capsule: 1 cap(s) orally 2 times a day  · insulin glargine 100 units/mL subcutaneous solution: 36 unit(s) subcutaneous once a day (at bedtime)  · HumaLOG 100 units/mL injectable solution: 15 unit(s) injectable 3 times a day (before meals) ***sliding scale***  ER MGMT .      BEST PRACTICE ISSUES.  . HOB ELEVATN.    .... Yes  . DIET  .   .... CONS CARB  6/3   .....   . PHARMAC DVT PPLX .    .... HPSC 6/3  . NON PHARMAC DVT PPLX .      . STRESS ULCR PPLX .   .... PROPRANOLOL 40 6/4/2025   . DATE/DM MGMT.   ..... See under Endocrine section   GENERAL DATA .   . COVID.         .... scv26/4/2025 (-)    . GOC.    ....    . ICU STAY.    .... no   . INFECTION PPLX .   ....   . ALLGY.   .... TETRACYCLINE  ..... VANCOMYCIN  .... IODINE    . WT.   .... 6/4/2025 104 k  . BMI.  .... 6/4/2025 33      XXXXXXXXXXXXXXXXXXXXXXX  VITALS/GAS EXCHANGE/DRIPS    ABGS.     .  VS/ PO/IO/ VENT/ DRIPS.   6/6/2025 afeb 76 118/80   6/6/2025 ra 92%    XXXXXXXX[id number]  PROBLEM ASSESSMENT RECOMMENDATIONS.  RESP.   . GAS EXCHANGE .   .... target PO 90-95%     . SOB  .... 2/2 COPD CHF PNEUM    . RO DVT   .... Venous duplx 6/4/2025 (-)     . COPD   .... DUONEB 6/4/2025   .... PULMICORT 6/4/2025   .... MONTELULKAST 6/4/2025   .... PREDNISONE 10 6/4/2025     INFECTION.  . DATA  .... esr 6/5/2025 56   .... w 6/4/2025 w 9.5  .... cxr 6/4/2025 cw 5/21/2025   ........ bibasal atelectasis   ........ interval increase of left lower lobe opacity concerning for pneumonia   .... Flu ab 6/4/2025 (-)    .... strep (-) 6/6/2025  .... legn (-) 6/6/2025   .... mrsa 6/6/2025 (+)     . VENOUS STASIS CHANGES RLE    . PNEUMONIA   .... cxr 6/4/2025 cw 5/21/2025   ........  increase of left lower lobe opacity concerning for pneumonia     . CELLULITIS R LOWER LEG 6/4/2025   .... XR R foot 6/4/2025 No emphysema     . ANTIBIO   .... DAPTOMYCIN 6/4/2025 D 400/d x 7d Dr Mosqueda   .... ZOSYN 6/4/2025 Z x 7d     CARDIAC.  . CAD    .... ASA 81 6/3   .... ROSUVASTATIN 40 6/3       . CHF  .... pbnp 6/4/2025 pbnp 123  .... tte 5/26/2025   ......... n lvsf ef 60% ivc rap 3   .... LASIX 60 IV/d 6/4/2025    .... kcl 20 6/4     HEMAT.  . DATA  .... Hb 6/4/2025 Hb 12.1  .... Plt 6/4/2025 plt 143  .... inr 6/3/2025 inr 105  .... monitor     GI.   . DIET .   .... 6/4/2025 CONS CARB    . LFT MONITORING   .... LFTS   6/4/2025  ........ AP   73   ........ AST 14  ........ ALT  32  .... monitor     RENAL.  . CKD  .... Na 6/4/2025 Na 141   .... CO2 6/4/2025 co2 30   .... Cr 5/27-6/4-6/5/2025   .... Cr 1.6-1.7 - 1.6   ....  monitor     . HYPOKALEMIA   .... K 6/4-6/5/2025 K 3.4 - 2.8    ENDO.  . DM  .  .... INSULIN GLARIGINE 10 HS 6/4/2025     NEURO.  . ANXIETY   .... CLONAZEPAM 0.5 tid 6/4/2025     . PAIN  .... PREGABALIN 150. 2 6/3    . DEPRESSION  .... SERTRALINE 100 6/2   ....       XXXXXXXXXXXXXXXXXXXXXX   SUMMARY BASELINE .     .. 74 m history of DM CHF, COPD, prostate cancer, renal insufficiency, diabetes, chronic neck pain,  right foot wound from [facility] who had been recently admitted with SOB   CC.   . 6/3/2025 PEDAL EDEMA  . 6/3/2025 WOUND R LOWER LEG   . 6/3/2025 CHRONIC SHORTNESS OF BREATH  . 6/3/2025 CHRONIC COUGH   MAIN ISSUES.  . COPD  . SHORTNESS OF BREATH  . PEDAL EDEMA   .... Venous duplx 6/4/2025 (-)   . VENOUS STASIS CHANGES RLE  . CELLULITIS R LOWER LEG 6/4/2025   .... XR R foot 6/4/2025 No emphysema   . PNEUMONIA   .... cxr 6/4/2025 cw 5/21/2025  incr of lll  opacity  . ANTIBIO   .... DAPTOMYCIN 6/4/2025 D 400/d x 7d Dr Mosqueda   .... ZOSYN 6/4/2025 Z x 7d   . CKD   .... Cr 5/27-6/4/2025 Cr 1.6-1.7   . HYPOKALEMIA   .... K 6/4-6/5/2025 K 3.4 - 2.8  . DM    PMH.   . LUNG NODULES  .... CT ch 5/21/2025 cw 12/28/2024   ......... mild tib nodules left lo lobe may be infection or inflammn    . PNEUMONIA   .... ROCEPHIN 5/21  . SKIN SOFT TISSUE INFECTION  .... DAPTOMYCIN 5/22- 5/26 d 450 x 7d   .... bactrim 5/26  . RLE ANKLE OPEN WOUND   . DM       DISCUSSIONS.  .... Discussed with primary care and relevant consultants on an ongoing basis       TIME SPENT.  . Over 36 minutes aggregate care time spent on encounter; activities included   direct patient care, counseling and/or coordinating care reviewing notes, lab data/ imaging , discussion with multidisciplinary team/ patient  /family and explaining in detail risks, benefits, alternatives  of the recommendations     MAGGIE BLACK 73 [address] 6/3/2025 1951

## 2025-06-06 NOTE — CONSULT NOTE ADULT - REASON FOR ADMISSION
rle swelling

## 2025-06-06 NOTE — CONSULT NOTE ADULT - CONSULT REASON
kayleen
RLE swelling
SOB
pneu
malignant cutaneous wound
Right lower leg cellulitis
dm2 uncontrolled
LE edema
74y A1C with Estimated Average Glucose Result: 10.0 % (05-22-25 @ 09:11)   diabetes mellitus uncontrolled type 2

## 2025-06-06 NOTE — CONSULT NOTE ADULT - SUBJECTIVE AND OBJECTIVE BOX
Patient is a 74y old  Male who presents with a chief complaint of rle swelling1 (06 Jun 2025 09:28)    Type 2: DX about 25 years ago. Neuropathy known complications. Resided at [facility] and has regular F/U with Dr. Humphrey last visit 2 weeks ago. Last known A1C 10.1% and this admission A1C 10%.  Rx home Humalog TIDAC SS, Humalog Kwikpen 15 Units TID AC, Lantus 36 units HS & Jardiance. No recent Hx DKA/HHS, [facility] Glucometer checks 3-4x daily, BS range 150-400, denies hypoglycemia. Pt states that he's been on a prednisone taper prescribed by his neurologist. Discussed BS target and A1C goal <7%.  Pt with limited physical activity due to comorbid conditions however states he will begin to use equipment that he can do in a sitting position in efforts to reduce A1C. Reports diet at facility is not diabetic friendly. Pathophysiology of diabetes discussed and complications associated with persistent hyperglycemia.    reviewed CC diet and carb identification/portion control, priortizing lean protein and nonstarchy vegetables, provided diabetes daily meal planning guide    diabetes education provided- A1c measure and BG targets  fasting, <180 2 hours postmeal. inhospital BGM frequency and insulin administration, s/s of hyperglycemia/hypoglycemia and management, glycemic control and preventing complications, consistent carb diet, balanced plate method, consistent meal planning, provider f/u      HPI:  Patient with a past medical history of diabetes, COPD, heart failure, CKD, hypertension, history of right foot ulcer is presenting for wound to right lower extremity.  Was recently admitted here for shortness of breath as well as for bursitis and concern for a wound.  Was sent back to facility.  They sent him to the ER today due to worsening swelling and redness localized to his wound of the right lower extremity.  Endorsing pain to the site.  Denies fevers, chest pain, nausea or vomiting.  States that he is having some chronic shortness of breath though unchanged today.  Also endorses chronic cough. (03 Jun 2025 18:32)      PAST MEDICAL & SURGICAL HISTORY:  Prostate cancer      Type II diabetes mellitus      Chronic obstructive pulmonary disease (COPD)      CHF (congestive heart failure)      Renal insufficiency      H/O migraine      Insomnia      Constipation      S/P foot surgery          Allergies    IODINE (Unknown)  tetracycline (Unknown)  vancomycin (Other)    Intolerances        MEDICATIONS  (STANDING):  albuterol/ipratropium for Nebulization 3 milliLiter(s) Nebulizer every 8 hours  ascorbic acid 500 milliGRAM(s) Oral daily  aspirin enteric coated 81 milliGRAM(s) Oral daily  budesonide    Inhalation Suspension 0.5 milliGRAM(s) Inhalation two times a day  clotrimazole 1% Cream 1 Application(s) Topical two times a day  DAPTOmycin IVPB 400 milliGRAM(s) IV Intermittent every 24 hours  dextrose 5%. 1000 milliLiter(s) (50 mL/Hr) IV Continuous <Continuous>  dextrose 5%. 1000 milliLiter(s) (100 mL/Hr) IV Continuous <Continuous>  dextrose 50% Injectable 12.5 Gram(s) IV Push once  dextrose 50% Injectable 25 Gram(s) IV Push once  dextrose 50% Injectable 25 Gram(s) IV Push once  ferrous    sulfate 325 milliGRAM(s) Oral daily  fluticasone propionate/ salmeterol 250-50 MICROgram(s) Diskus 1 Dose(s) Inhalation two times a day  furosemide   Injectable 60 milliGRAM(s) IV Push daily  glucagon  Injectable 1 milliGRAM(s) IntraMuscular once  heparin   Injectable 5000 Unit(s) SubCutaneous every 8 hours  insulin glargine Injectable (LANTUS) 15 Unit(s) SubCutaneous at bedtime  insulin lispro (ADMELOG) corrective regimen sliding scale   SubCutaneous at bedtime  insulin lispro (ADMELOG) corrective regimen sliding scale.   SubCutaneous three times a day before meals  insulin lispro Injectable (ADMELOG) 5 Unit(s) SubCutaneous three times a day before meals  lactobacillus acidophilus 1 Tablet(s) Oral two times a day with meals  montelukast 10 milliGRAM(s) Oral daily  multivitamin/minerals 1 Tablet(s) Oral daily  pantoprazole    Tablet 40 milliGRAM(s) Oral before breakfast  phenazopyridine 100 milliGRAM(s) Oral three times a day  piperacillin/tazobactam IVPB.. 3.375 Gram(s) IV Intermittent every 8 hours  potassium chloride    Tablet ER 20 milliEquivalent(s) Oral daily  predniSONE   Tablet 10 milliGRAM(s) Oral daily  pregabalin 150 milliGRAM(s) Oral two times a day  rosuvastatin 40 milliGRAM(s) Oral at bedtime  saccharomyces boulardii 250 milliGRAM(s) Oral two times a day  senna 1 Tablet(s) Oral at bedtime  sertraline 100 milliGRAM(s) Oral daily  tiotropium 2.5 MICROgram(s) Inhaler 2 Puff(s) Inhalation daily

## 2025-06-06 NOTE — CASE MANAGEMENT PROGRESS NOTE - NSCMPROGRESSNOTE_GEN_ALL_CORE
Discussed pt on rounds, pt remains acute, awaiting further testing, on IV Daptomycin to 6/11, IV Zosyn to 6/11, pending PT eval. CM will continue to collaborate with interdisciplinary team and remain available to assist.

## 2025-06-06 NOTE — PROGRESS NOTE ADULT - SUBJECTIVE AND OBJECTIVE BOX
PROGRESS NOTE  Patient is a 74y old  Male who presents with a chief complaint of rle swelling (06 Jun 2025 14:53)    Chart and available morning labs /imaging are reviewed electronically , urgent issues addressed . More information  is being added upon completion of rounds , when more information is collected and management discussed with consultants , medical staff and social service/case management on the floor   OVERNIGHT    No new issues reported by medical staff . All above noted Patient is resting in a bed comfortably .Confused ,poor mentation .No distress noted   HPI:  Patient with a past medical history of diabetes, COPD, heart failure, CKD, hypertension, history of right foot ulcer is presenting for wound to right lower extremity.  Was recently admitted here for shortness of breath as well as for bursitis and concern for a wound.  Was sent back to facility.  They sent him to the ER today due to worsening swelling and redness localized to his wound of the right lower extremity.  Endorsing pain to the site.  Denies fevers, chest pain, nausea or vomiting.  States that he is having some chronic shortness of breath though unchanged today.  Also endorses chronic cough. (03 Jun 2025 18:32)    PAST MEDICAL & SURGICAL HISTORY:  Prostate cancer      Type II diabetes mellitus      Chronic obstructive pulmonary disease (COPD)      CHF (congestive heart failure)      Renal insufficiency      H/O migraine      Insomnia      Constipation      S/P foot surgery          MEDICATIONS  (STANDING):  albuterol/ipratropium for Nebulization 3 milliLiter(s) Nebulizer every 8 hours  ascorbic acid 500 milliGRAM(s) Oral daily  aspirin enteric coated 81 milliGRAM(s) Oral daily  budesonide 160 MICROgram(s)/formoterol 4.5 MICROgram(s) Inhaler 2 Puff(s) Inhalation two times a day  clotrimazole 1% Cream 1 Application(s) Topical two times a day  DAPTOmycin IVPB 400 milliGRAM(s) IV Intermittent every 24 hours  dextrose 5%. 1000 milliLiter(s) (50 mL/Hr) IV Continuous <Continuous>  dextrose 5%. 1000 milliLiter(s) (100 mL/Hr) IV Continuous <Continuous>  dextrose 50% Injectable 25 Gram(s) IV Push once  dextrose 50% Injectable 12.5 Gram(s) IV Push once  dextrose 50% Injectable 25 Gram(s) IV Push once  ferrous    sulfate 325 milliGRAM(s) Oral daily  furosemide   Injectable 60 milliGRAM(s) IV Push daily  glucagon  Injectable 1 milliGRAM(s) IntraMuscular once  heparin   Injectable 5000 Unit(s) SubCutaneous every 8 hours  insulin glargine Injectable (LANTUS) 15 Unit(s) SubCutaneous at bedtime  insulin lispro (ADMELOG) corrective regimen sliding scale   SubCutaneous at bedtime  insulin lispro (ADMELOG) corrective regimen sliding scale.   SubCutaneous three times a day before meals  insulin lispro Injectable (ADMELOG) 5 Unit(s) SubCutaneous three times a day before meals  lactobacillus acidophilus 1 Tablet(s) Oral two times a day with meals  montelukast 10 milliGRAM(s) Oral daily  multivitamin/minerals 1 Tablet(s) Oral daily  pantoprazole    Tablet 40 milliGRAM(s) Oral before breakfast  phenazopyridine 100 milliGRAM(s) Oral three times a day  piperacillin/tazobactam IVPB.. 3.375 Gram(s) IV Intermittent every 8 hours  potassium chloride    Tablet ER 20 milliEquivalent(s) Oral daily  potassium chloride    Tablet ER 40 milliEquivalent(s) Oral once  predniSONE   Tablet 10 milliGRAM(s) Oral daily  pregabalin 150 milliGRAM(s) Oral two times a day  rosuvastatin 40 milliGRAM(s) Oral at bedtime  saccharomyces boulardii 250 milliGRAM(s) Oral two times a day  senna 1 Tablet(s) Oral at bedtime  sertraline 100 milliGRAM(s) Oral daily  tiotropium 2.5 MICROgram(s) Inhaler 2 Puff(s) Inhalation daily    MEDICATIONS  (PRN):  acetaminophen     Tablet .. 650 milliGRAM(s) Oral every 6 hours PRN Temp greater or equal to 38C (100.4F), Mild Pain (1 - 3)  aluminum hydroxide/magnesium hydroxide/simethicone Suspension 30 milliLiter(s) Oral every 4 hours PRN Dyspepsia  bisacodyl Suppository 10 milliGRAM(s) Rectal daily PRN Constipation  clonazePAM  Tablet 0.5 milliGRAM(s) Oral three times a day PRN ANXIETY  dextrose Oral Gel 15 Gram(s) Oral once PRN Blood Glucose LESS THAN 70 milliGRAM(s)/deciliter  melatonin 3 milliGRAM(s) Oral at bedtime PRN Insomnia  ondansetron Injectable 4 milliGRAM(s) IV Push every 8 hours PRN Nausea and/or Vomiting  oxyCODONE    IR 10 milliGRAM(s) Oral two times a day PRN Severe Pain (7 - 10)  oxyCODONE    IR 5 milliGRAM(s) Oral every 8 hours PRN Moderate Pain (4 - 6)  polyethylene glycol 3350 17 Gram(s) Oral daily PRN for constipation      OBJECTIVE    T(C): 36.3 (06-06-25 @ 12:31), Max: 36.6 (06-05-25 @ 20:07)  HR: 75 (06-06-25 @ 13:33) (75 - 102)  BP: 118/68 (06-06-25 @ 12:31) (118/68 - 125/68)  RR: 18 (06-06-25 @ 12:31) (18 - 18)  SpO2: 92% (06-06-25 @ 13:33) (91% - 95%)  Wt(kg): --  I&O's Summary    05 Jun 2025 07:01  -  06 Jun 2025 07:00  --------------------------------------------------------  IN: 0 mL / OUT: 450 mL / NET: -450 mL          REVIEW OF SYSTEMS:  CONSTITUTIONAL: No fever, weight loss, or fatigue  A 10-point review of systems was obtained.   Review of systems otherwise unchanged compared to prior visit except as previously noted   PHYSICAL EXAM:  Appearance: NAD. VS past 24 hrs -as above   HEENT:   Moist oral mucosa. Conjunctiva clear b/l.   Neck : supple  Respiratory: Lungs CTAB.  Gastrointestinal:  Soft, nontender. No rebound. No rigidity. BS present	  Cardiovascular: RRR ,S1S2 present  Neurologic: Non-focal. Moving all extremities.  Extremities: No edema. No erythema. No calf tenderness.  Skin: No rashes, No ecchymoses, No cyanosis.	  wounds ,skin lesions-See skin assesment flow sheet   LABS:                        12.9   8.38  )-----------( 147      ( 06 Jun 2025 09:05 )             40.7     06-06    138  |  99  |  30[H]  ----------------------------<  334[H]  3.3[L]   |  27  |  1.80[H]    Ca    9.0      06 Jun 2025 09:05  Phos  3.1     06-05  Mg     2.2     06-05      CAPILLARY BLOOD GLUCOSE      POCT Blood Glucose.: 333 mg/dL (06 Jun 2025 11:38)  POCT Blood Glucose.: 253 mg/dL (06 Jun 2025 07:48)  POCT Blood Glucose.: 262 mg/dL (05 Jun 2025 21:17)  POCT Blood Glucose.: 254 mg/dL (05 Jun 2025 16:55)      Urinalysis Basic - ( 06 Jun 2025 09:05 )    Color: x / Appearance: x / SG: x / pH: x  Gluc: 334 mg/dL / Ketone: x  / Bili: x / Urobili: x   Blood: x / Protein: x / Nitrite: x   Leuk Esterase: x / RBC: x / WBC x   Sq Epi: x / Non Sq Epi: x / Bacteria: x        Culture - Blood (collected 03 Jun 2025 15:00)  Source: Blood Blood-Peripheral  Preliminary Report (06 Jun 2025 01:02):    No growth at 48 Hours    Culture - Blood (collected 03 Jun 2025 14:45)  Source: Blood Blood-Peripheral  Preliminary Report (06 Jun 2025 01:02):    No growth at 48 Hours      RADIOLOGY & ADDITIONAL TESTS:   reviewed elctronically  ASSESSMENT/PLAN: 	    25 minutes aggregate time was spent on this visit, 50% visit time spent in care co-ordination with other attendings and counselling patient .I have discussed care plan with patient / HCP/family member ,who expressed understanding of problems treatment and their effect and side effects, alternatives in details. I have asked if they have any questions and concerns and appropriately addressed them to best of my ability.

## 2025-06-06 NOTE — PROGRESS NOTE ADULT - ASSESSMENT
74 year old male with past medical history of diabetes, COPD, heart failure, CKD, hypertension, history of right foot ulcer, who presented for wound to right lower extremity.  Was recently admitted here for shortness of breath as well as for bursitis and concern for a wound.  Was sent back to facility.  They sent him to the ER today due to worsening swelling and redness localized to his wound of the right lower extremity.  Endorsing pain to the site.  Denies fevers, chest pain, nausea or vomiting.  States that he is having some chronic shortness of breath and increased cough. Denies any fevers. Of note was admitted last month, received antibiotics for possible pneumonia and RLE open wound and erythema. Also with R elbow swelling and bursitis.    Concern for RLE cellulitis, but suspect skin changes also due to venous stasis. Recent MRSA nasal PCR positive although no evidence of abscess on CT. Swelling improving. No fever and no leukocytosis. Urine strep and legionella antigen negative. Blood cultures remain no growth. CXR showed LLL opacity, although unclear if acute given recent lung imaging showed concern for pneumonia in the same location.     #RLE cellulitis  #RLE wound  #Tinea pedis  #LLL pneumonia  #MRSA nasal PCR positive  #Cough    -continye daptomycin--if continues to improve can switch to doxycyline 100mg PO BID to complete 10 days  -continue Zosyn until 6/8  -clotrimazole ointment between the toes for 2 weeks  -MRSA nasal PCR pending  -follow cultures to completion  -leg elevation  -contact isolation  -I will be covered by Dr. Anirudh Hoover this weekend, 6/7-6/8/25    Megan Mosqueda MD  Division of Infectious Diseases   Cell [phone] between 8am and 6pm   After 6pm and weekends please call ID service at [phone].     35 minutes spent on total encounter assessing patient, examination, chart review, counseling and coordinating care by the attending physician/nurse/care manager.

## 2025-06-06 NOTE — PROGRESS NOTE ADULT - SUBJECTIVE AND OBJECTIVE BOX
Patient is a 74y Male whom presented to the hospital with kayleen     PAST MEDICAL & SURGICAL HISTORY:  Prostate cancer      Type II diabetes mellitus      Chronic obstructive pulmonary disease (COPD)      CHF (congestive heart failure)      Renal insufficiency      H/O migraine      Insomnia      Constipation      S/P foot surgery          MEDICATIONS  (STANDING):  albuterol/ipratropium for Nebulization 3 milliLiter(s) Nebulizer every 8 hours  ascorbic acid 500 milliGRAM(s) Oral daily  aspirin enteric coated 81 milliGRAM(s) Oral daily  budesonide    Inhalation Suspension 0.5 milliGRAM(s) Inhalation two times a day  clotrimazole 1% Cream 1 Application(s) Topical two times a day  DAPTOmycin IVPB 400 milliGRAM(s) IV Intermittent every 24 hours  dextrose 5%. 1000 milliLiter(s) (100 mL/Hr) IV Continuous <Continuous>  dextrose 5%. 1000 milliLiter(s) (50 mL/Hr) IV Continuous <Continuous>  dextrose 50% Injectable 25 Gram(s) IV Push once  dextrose 50% Injectable 12.5 Gram(s) IV Push once  dextrose 50% Injectable 25 Gram(s) IV Push once  ferrous    sulfate 325 milliGRAM(s) Oral daily  furosemide   Injectable 60 milliGRAM(s) IV Push daily  glucagon  Injectable 1 milliGRAM(s) IntraMuscular once  heparin   Injectable 5000 Unit(s) SubCutaneous every 8 hours  insulin glargine Injectable (LANTUS) 10 Unit(s) SubCutaneous at bedtime  insulin lispro (ADMELOG) corrective regimen sliding scale   SubCutaneous at bedtime  insulin lispro (ADMELOG) corrective regimen sliding scale.   SubCutaneous three times a day before meals  montelukast 10 milliGRAM(s) Oral daily  multivitamin/minerals 1 Tablet(s) Oral daily  pantoprazole    Tablet 40 milliGRAM(s) Oral before breakfast  piperacillin/tazobactam IVPB.. 3.375 Gram(s) IV Intermittent every 8 hours  potassium chloride    Tablet ER 20 milliEquivalent(s) Oral daily  predniSONE   Tablet 10 milliGRAM(s) Oral daily  pregabalin 150 milliGRAM(s) Oral two times a day  rosuvastatin 40 milliGRAM(s) Oral at bedtime  saccharomyces boulardii 250 milliGRAM(s) Oral two times a day  senna 1 Tablet(s) Oral at bedtime  sertraline 100 milliGRAM(s) Oral daily      Allergies    IODINE (Unknown)  tetracycline (Unknown)  vancomycin (Other)    Intolerances        SOCIAL HISTORY:  Denies ETOh,Smoking,     FAMILY HISTORY:      REVIEW OF SYSTEMS:    CONSTITUTIONAL: No weakness, fevers or chills  RESPIRATORY: No cough, wheezing, hemoptysis; No shortness of breath  CARDIOVASCULAR: No chest pain or palpitations  GASTROINTESTINAL: No abdominal or epigastric pain. No nausea, vomiting,     No diarrhea or constipation. No melena   GENITOURINARY: No dysuria, frequency or hematuria  NEUROLOGICAL: No numbness or weakness  SKIN: dry      VITAL:  T(C): , Max: 36.7 (06-04-25 @ 05:00)  T(F): , Max: 98 (06-04-25 @ 05:00)  HR: 72 (06-04-25 @ 15:00)  BP: 130/72 (06-04-25 @ 11:56)  BP(mean): --  RR: 18 (06-04-25 @ 11:56)  SpO2: 96% (06-04-25 @ 11:56)  Wt(kg): --    I and O's:        PHYSICAL EXAM:    Constitutional: NAD  HEENT: conjunctive   clear   Neck:  No JVD  Respiratory: CTAB  Cardiovascular: S1 and S2  Gastrointestinal: BS+, soft, NT/ND  Extremities: No peripheral edema    LABS:                        12.1   9.50  )-----------( 143      ( 04 Jun 2025 06:05 )             37.9     06-04    141  |  104  |  36[H]  ----------------------------<  137[H]  3.4[L]   |  30  |  1.70[H]    Ca    9.2      04 Jun 2025 06:05  Phos  2.9     06-04  Mg     2.5     06-04    TPro  6.9  /  Alb  3.0[L]  /  TBili  0.5  /  DBili  x   /  AST  14[L]  /  ALT  32  /  AlkPhos  73  06-04      Urine Studies:  Urinalysis Basic - ( 04 Jun 2025 06:05 )    Color: x / Appearance: x / SG: x / pH: x  Gluc: 137 mg/dL / Ketone: x  / Bili: x / Urobili: x   Blood: x / Protein: x / Nitrite: x   Leuk Esterase: x / RBC: x / WBC x   Sq Epi: x / Non Sq Epi: x / Bacteria: x            RADIOLOGY & ADDITIONAL STUDIES:                   Patient is a 74y Male whom presented to the hospital with kayleen     PAST MEDICAL & SURGICAL HISTORY:  Prostate cancer      Type II diabetes mellitus      Chronic obstructive pulmonary disease (COPD)      CHF (congestive heart failure)      Renal insufficiency      H/O migraine      Insomnia      Constipation      S/P foot surgery          MEDICATIONS  (STANDING):  albuterol/ipratropium for Nebulization 3 milliLiter(s) Nebulizer every 8 hours  ascorbic acid 500 milliGRAM(s) Oral daily  aspirin enteric coated 81 milliGRAM(s) Oral daily  budesonide    Inhalation Suspension 0.5 milliGRAM(s) Inhalation two times a day  clotrimazole 1% Cream 1 Application(s) Topical two times a day  DAPTOmycin IVPB 400 milliGRAM(s) IV Intermittent every 24 hours  dextrose 5%. 1000 milliLiter(s) (100 mL/Hr) IV Continuous <Continuous>  dextrose 5%. 1000 milliLiter(s) (50 mL/Hr) IV Continuous <Continuous>  dextrose 50% Injectable 25 Gram(s) IV Push once  dextrose 50% Injectable 12.5 Gram(s) IV Push once  dextrose 50% Injectable 25 Gram(s) IV Push once  ferrous    sulfate 325 milliGRAM(s) Oral daily  furosemide   Injectable 60 milliGRAM(s) IV Push daily  glucagon  Injectable 1 milliGRAM(s) IntraMuscular once  heparin   Injectable 5000 Unit(s) SubCutaneous every 8 hours  insulin glargine Injectable (LANTUS) 10 Unit(s) SubCutaneous at bedtime  insulin lispro (ADMELOG) corrective regimen sliding scale   SubCutaneous at bedtime  insulin lispro (ADMELOG) corrective regimen sliding scale.   SubCutaneous three times a day before meals  montelukast 10 milliGRAM(s) Oral daily  multivitamin/minerals 1 Tablet(s) Oral daily  pantoprazole    Tablet 40 milliGRAM(s) Oral before breakfast  piperacillin/tazobactam IVPB.. 3.375 Gram(s) IV Intermittent every 8 hours  potassium chloride    Tablet ER 20 milliEquivalent(s) Oral daily  predniSONE   Tablet 10 milliGRAM(s) Oral daily  pregabalin 150 milliGRAM(s) Oral two times a day  rosuvastatin 40 milliGRAM(s) Oral at bedtime  saccharomyces boulardii 250 milliGRAM(s) Oral two times a day  senna 1 Tablet(s) Oral at bedtime  sertraline 100 milliGRAM(s) Oral daily      Allergies    IODINE (Unknown)  tetracycline (Unknown)  vancomycin (Other)    Intolerances        SOCIAL HISTORY:  Denies ETOh,Smoking,     FAMILY HISTORY:      REVIEW OF SYSTEMS:    CONSTITUTIONAL: No weakness, fevers or chills  RESPIRATORY: No cough, wheezing, hemoptysis; No shortness of breath  CARDIOVASCULAR: No chest pain or palpitations  GASTROINTESTINAL: No abdominal or epigastric pain. No nausea, vomiting,     No diarrhea or constipation. No melena   GENITOURINARY: No dysuria, frequency or hematuria  NEUROLOGICAL: No numbness or weakness  SKIN: dry                          12.9   8.38  )-----------( 147      ( 06 Jun 2025 09:05 )             40.7       CBC Full  -  ( 06 Jun 2025 09:05 )  WBC Count : 8.38 K/uL  RBC Count : 4.65 M/uL  Hemoglobin : 12.9 g/dL  Hematocrit : 40.7 %  Platelet Count - Automated : 147 K/uL  Mean Cell Volume : 87.5 fl  Mean Cell Hemoglobin : 27.7 pg  Mean Cell Hemoglobin Concentration : 31.7 g/dL  Auto Neutrophil # : x  Auto Lymphocyte # : x  Auto Monocyte # : x  Auto Eosinophil # : x  Auto Basophil # : x  Auto Neutrophil % : x  Auto Lymphocyte % : x  Auto Monocyte % : x  Auto Eosinophil % : x  Auto Basophil % : x      06-06    138  |  99  |  30[H]  ----------------------------<  334[H]  3.3[L]   |  27  |  1.80[H]    Ca    9.0      06 Jun 2025 09:05  Phos  3.1     06-05  Mg     2.2     06-05        CAPILLARY BLOOD GLUCOSE      POCT Blood Glucose.: 333 mg/dL (06 Jun 2025 11:38)  POCT Blood Glucose.: 253 mg/dL (06 Jun 2025 07:48)  POCT Blood Glucose.: 262 mg/dL (05 Jun 2025 21:17)  POCT Blood Glucose.: 254 mg/dL (05 Jun 2025 16:55)      Vital Signs Last 24 Hrs  T(C): 36.3 (06 Jun 2025 12:31), Max: 36.6 (05 Jun 2025 20:07)  T(F): 97.4 (06 Jun 2025 12:31), Max: 97.9 (05 Jun 2025 20:07)  HR: 75 (06 Jun 2025 13:33) (75 - 102)  BP: 118/68 (06 Jun 2025 12:31) (118/68 - 125/68)  BP(mean): --  RR: 18 (06 Jun 2025 12:31) (18 - 18)  SpO2: 92% (06 Jun 2025 13:33) (91% - 95%)    Parameters below as of 06 Jun 2025 13:33  Patient On (Oxygen Delivery Method): room air        Urinalysis Basic - ( 06 Jun 2025 09:05 )    Color: x / Appearance: x / SG: x / pH: x  Gluc: 334 mg/dL / Ketone: x  / Bili: x / Urobili: x   Blood: x / Protein: x / Nitrite: x   Leuk Esterase: x / RBC: x / WBC x   Sq Epi: x / Non Sq Epi: x / Bacteria: x            PHYSICAL EXAM:    Constitutional: NAD  HEENT: conjunctive   clear   Neck:  No JVD  Respiratory: CTAB  Cardiovascular: S1 and S2  Gastrointestinal: BS+, soft, NT/ND  Extremities: No peripheral edema    LABS:                        12.1   9.50  )-----------( 143      ( 04 Jun 2025 06:05 )             37.9     06-04    141  |  104  |  36[H]  ----------------------------<  137[H]  3.4[L]   |  30  |  1.70[H]    Ca    9.2      04 Jun 2025 06:05  Phos  2.9     06-04  Mg     2.5     06-04    TPro  6.9  /  Alb  3.0[L]  /  TBili  0.5  /  DBili  x   /  AST  14[L]  /  ALT  32  /  AlkPhos  73  06-04      Urine Studies:  Urinalysis Basic - ( 04 Jun 2025 06:05 )    Color: x / Appearance: x / SG: x / pH: x  Gluc: 137 mg/dL / Ketone: x  / Bili: x / Urobili: x   Blood: x / Protein: x / Nitrite: x   Leuk Esterase: x / RBC: x / WBC x   Sq Epi: x / Non Sq Epi: x / Bacteria: x            RADIOLOGY & ADDITIONAL STUDIES:

## 2025-06-06 NOTE — SWALLOW BEDSIDE ASSESSMENT ADULT - H & P REVIEW
Per charting, "Patient with a past medical history of diabetes, COPD, heart failure, CKD, hypertension, history of right foot ulcer is presenting for wound to right lower extremity.  Was recently admitted here for shortness of breath as well as for bursitis and concern for a wound.  Was sent back to facility.  They sent him to the ER today due to worsening swelling and redness localized to his wound of the right lower extremity.  Endorsing pain to the site.  Denies fevers, chest pain, nausea or vomiting.  States that he is having some chronic shortness of breath though unchanged today.  Also endorses chronic cough."/yes

## 2025-06-06 NOTE — PROGRESS NOTE ADULT - SUBJECTIVE AND OBJECTIVE BOX
PROGRESS NOTE   Patient is a 74y old  Male who presents with a chief complaint of rle swelling (05 Jun 2025 15:56)      HPI:  Patient with a past medical history of diabetes, COPD, heart failure, CKD, hypertension, history of right foot ulcer is presenting for wound to right lower extremity.  Was recently admitted here for shortness of breath as well as for bursitis and concern for a wound.  Was sent back to facility.  They sent him to the ER today due to worsening swelling and redness localized to his wound of the right lower extremity.  Endorsing pain to the site.  Denies fevers, chest pain, nausea or vomiting.  States that he is having some chronic shortness of breath though unchanged today.  Also endorses chronic cough. (03 Jun 2025 18:32)      Vital Signs Last 24 Hrs  T(C): 36.6 (06 Jun 2025 05:52), Max: 36.6 (05 Jun 2025 20:07)  T(F): 97.8 (06 Jun 2025 05:52), Max: 97.9 (05 Jun 2025 20:07)  HR: 75 (06 Jun 2025 05:52) (49 - 106)  BP: 125/68 (06 Jun 2025 05:52) (121/72 - 130/77)  BP(mean): --  RR: 18 (06 Jun 2025 05:52) (18 - 18)  SpO2: 94% (06 Jun 2025 05:52) (91% - 95%)    Parameters below as of 06 Jun 2025 05:52  Patient On (Oxygen Delivery Method): room air                              12.0   8.83  )-----------( 153      ( 05 Jun 2025 07:05 )             37.5               06-05    142  |  103  |  27[H]  ----------------------------<  151[H]  2.8[LL]   |  30  |  1.60[H]    Ca    9.0      05 Jun 2025 07:05  Phos  3.1     06-05  Mg     2.2     06-05        LOWER EXTREMITY PHYSICAL EXAM:  Integument : Skin warm, dry and supple bilateral  Right lower leg with redness, swelling and pain on palpation, partial thickness wound covered with eschar. All consistent with cellulitis   The area of concern has responded favorably with current treatment  Vascular : DP and PT weakly palpable 1/4 bilaterally  Capillary refill time less than 3s digits 1-5 bilaterally. Moderate to severe edema bilaterally with right is greater than left  MSK : Muscle strenth 4/5 all major muscle group bilaterally

## 2025-06-06 NOTE — PHYSICAL THERAPY INITIAL EVALUATION ADULT - ADDITIONAL COMMENTS
Pt is a resident at [facility] and transferred to wheelchair w/ 1 person assist. Pt stating he ambulates short distances w/ RW and assist and required assist for transfers and ADLs. Pt is mostly in wheelchair.  Aide assists with transfers and ADL's.

## 2025-06-06 NOTE — PROGRESS NOTE ADULT - SUBJECTIVE AND OBJECTIVE BOX
[facility]  INFECTIOUS DISEASES - [address], [place] [address]  Tel: [phone]     Fax: [phone]  =======================================================    MAGGIEWAYNE [id number]    Follow up: No fevers.     Allergies:  IODINE (Unknown)  tetracycline (Unknown)  vancomycin (Other)      Antibiotics:  acetaminophen     Tablet .. 650 milliGRAM(s) Oral every 6 hours PRN  albuterol/ipratropium for Nebulization 3 milliLiter(s) Nebulizer every 8 hours  aluminum hydroxide/magnesium hydroxide/simethicone Suspension 30 milliLiter(s) Oral every 4 hours PRN  ascorbic acid 500 milliGRAM(s) Oral daily  aspirin enteric coated 81 milliGRAM(s) Oral daily  bisacodyl Suppository 10 milliGRAM(s) Rectal daily PRN  budesonide 160 MICROgram(s)/formoterol 4.5 MICROgram(s) Inhaler 2 Puff(s) Inhalation two times a day  clonazePAM  Tablet 0.5 milliGRAM(s) Oral three times a day PRN  clotrimazole 1% Cream 1 Application(s) Topical two times a day  DAPTOmycin IVPB 400 milliGRAM(s) IV Intermittent every 24 hours  dextrose 5%. 1000 milliLiter(s) IV Continuous <Continuous>  dextrose 5%. 1000 milliLiter(s) IV Continuous <Continuous>  dextrose 50% Injectable 25 Gram(s) IV Push once  dextrose 50% Injectable 12.5 Gram(s) IV Push once  dextrose 50% Injectable 25 Gram(s) IV Push once  dextrose Oral Gel 15 Gram(s) Oral once PRN  ferrous    sulfate 325 milliGRAM(s) Oral daily  furosemide   Injectable 60 milliGRAM(s) IV Push daily  glucagon  Injectable 1 milliGRAM(s) IntraMuscular once  heparin   Injectable 5000 Unit(s) SubCutaneous every 8 hours  insulin glargine Injectable (LANTUS) 15 Unit(s) SubCutaneous at bedtime  insulin lispro (ADMELOG) corrective regimen sliding scale   SubCutaneous at bedtime  insulin lispro (ADMELOG) corrective regimen sliding scale.   SubCutaneous three times a day before meals  insulin lispro Injectable (ADMELOG) 5 Unit(s) SubCutaneous three times a day before meals  lactobacillus acidophilus 1 Tablet(s) Oral two times a day with meals  melatonin 3 milliGRAM(s) Oral at bedtime PRN  montelukast 10 milliGRAM(s) Oral daily  multivitamin/minerals 1 Tablet(s) Oral daily  ondansetron Injectable 4 milliGRAM(s) IV Push every 8 hours PRN  oxyCODONE    IR 10 milliGRAM(s) Oral two times a day PRN  oxyCODONE    IR 5 milliGRAM(s) Oral every 8 hours PRN  pantoprazole    Tablet 40 milliGRAM(s) Oral before breakfast  phenazopyridine 100 milliGRAM(s) Oral three times a day  piperacillin/tazobactam IVPB.. 3.375 Gram(s) IV Intermittent every 8 hours  polyethylene glycol 3350 17 Gram(s) Oral daily PRN  potassium chloride    Tablet ER 40 milliEquivalent(s) Oral once  potassium chloride    Tablet ER 20 milliEquivalent(s) Oral daily  predniSONE   Tablet 10 milliGRAM(s) Oral daily  pregabalin 150 milliGRAM(s) Oral two times a day  rosuvastatin 40 milliGRAM(s) Oral at bedtime  saccharomyces boulardii 250 milliGRAM(s) Oral two times a day  senna 1 Tablet(s) Oral at bedtime  sertraline 100 milliGRAM(s) Oral daily  tiotropium 2.5 MICROgram(s) Inhaler 2 Puff(s) Inhalation daily       REVIEW OF SYSTEMS:  CONSTITUTIONAL:  No Fever or chills  CARDIOVASCULAR:  No chest pain   RESPIRATORY:  + cough  GASTROINTESTINAL:  No nausea, vomiting or diarrhea.  GENITOURINARY:  No dysuria  MUSCULOSKELETAL: + RLE pain  NEUROLOGIC:  No headache or dizziness       Physical Exam:  ICU Vital Signs Last 24 Hrs  T(C): 36.3 (06 Jun 2025 12:31), Max: 36.6 (05 Jun 2025 20:07)  T(F): 97.4 (06 Jun 2025 12:31), Max: 97.9 (05 Jun 2025 20:07)  HR: 76 (06 Jun 2025 12:31) (75 - 102)  BP: 118/68 (06 Jun 2025 12:31) (118/68 - 125/68)  BP(mean): --  ABP: --  ABP(mean): --  RR: 18 (06 Jun 2025 12:31) (18 - 18)  SpO2: 91% (06 Jun 2025 12:31) (91% - 95%)    O2 Parameters below as of 06 Jun 2025 12:31  Patient On (Oxygen Delivery Method): room air        GEN: NAD  HEENT: normocephalic and atraumatic.   NECK: Supple.   LUNGS: Normal respiratory effort  HEART: Regular rate and rhythm  ABDOMEN: Soft, nontender, and nondistended.    EXTREMITIES: B/L lower leg edema--improving  NEUROLOGIC: AO x 3  PSYCHIATRIC: Appropriate affect .  SKIN: + redness on R lower leg, scab noted but no drainage noted, no increased warmth  + pustule on L lower leg      Labs:  06-06    138  |  99  |  30[H]  ----------------------------<  334[H]  3.3[L]   |  27  |  1.80[H]    Ca    9.0      06 Jun 2025 09:05  Phos  3.1     06-05  Mg     2.2     06-05                            12.9   8.38  )-----------( 147      ( 06 Jun 2025 09:05 )             40.7       Urinalysis Basic - ( 06 Jun 2025 09:05 )    Color: x / Appearance: x / SG: x / pH: x  Gluc: 334 mg/dL / Ketone: x  / Bili: x / Urobili: x   Blood: x / Protein: x / Nitrite: x   Leuk Esterase: x / RBC: x / WBC x   Sq Epi: x / Non Sq Epi: x / Bacteria: x          RECENT CULTURES:  06-03 @ 15:00 Blood Blood-Peripheral     No growth at 48 Hours        06-03 @ 14:45 Blood Blood-Peripheral     No growth at 48 Hours              All imaging and data are reviewed.

## 2025-06-06 NOTE — PROGRESS NOTE ADULT - SUBJECTIVE AND OBJECTIVE BOX
[facility] -- Erick Hogue Pannella, Patel, Savella Goodger, Cohen  Office # [phone]      Follow Up:    le edema    Subjective/Observations:   No events overnight resting comfortably in bed.  No complaints of chest pain, dyspnea, or palpitations reported. No signs of orthopnea or PND.      REVIEW OF SYSTEMS: All other review of systems is negative unless indicated above    PAST MEDICAL & SURGICAL HISTORY:  Prostate cancer      Type II diabetes mellitus      Chronic obstructive pulmonary disease (COPD)      CHF (congestive heart failure)      Renal insufficiency      H/O migraine      Insomnia      Constipation      S/P foot surgery          MEDICATIONS  (STANDING):  albuterol/ipratropium for Nebulization 3 milliLiter(s) Nebulizer every 8 hours  ascorbic acid 500 milliGRAM(s) Oral daily  aspirin enteric coated 81 milliGRAM(s) Oral daily  budesonide    Inhalation Suspension 0.5 milliGRAM(s) Inhalation two times a day  clotrimazole 1% Cream 1 Application(s) Topical two times a day  DAPTOmycin IVPB 400 milliGRAM(s) IV Intermittent every 24 hours  dextrose 5%. 1000 milliLiter(s) (50 mL/Hr) IV Continuous <Continuous>  dextrose 5%. 1000 milliLiter(s) (100 mL/Hr) IV Continuous <Continuous>  dextrose 50% Injectable 12.5 Gram(s) IV Push once  dextrose 50% Injectable 25 Gram(s) IV Push once  dextrose 50% Injectable 25 Gram(s) IV Push once  ferrous    sulfate 325 milliGRAM(s) Oral daily  furosemide   Injectable 60 milliGRAM(s) IV Push daily  glucagon  Injectable 1 milliGRAM(s) IntraMuscular once  heparin   Injectable 5000 Unit(s) SubCutaneous every 8 hours  insulin glargine Injectable (LANTUS) 15 Unit(s) SubCutaneous at bedtime  insulin lispro (ADMELOG) corrective regimen sliding scale   SubCutaneous at bedtime  insulin lispro (ADMELOG) corrective regimen sliding scale.   SubCutaneous three times a day before meals  insulin lispro Injectable (ADMELOG) 5 Unit(s) SubCutaneous three times a day before meals  lactobacillus acidophilus 1 Tablet(s) Oral two times a day with meals  montelukast 10 milliGRAM(s) Oral daily  multivitamin/minerals 1 Tablet(s) Oral daily  pantoprazole    Tablet 40 milliGRAM(s) Oral before breakfast  phenazopyridine 100 milliGRAM(s) Oral three times a day  piperacillin/tazobactam IVPB.. 3.375 Gram(s) IV Intermittent every 8 hours  potassium chloride    Tablet ER 20 milliEquivalent(s) Oral daily  predniSONE   Tablet 10 milliGRAM(s) Oral daily  pregabalin 150 milliGRAM(s) Oral two times a day  rosuvastatin 40 milliGRAM(s) Oral at bedtime  saccharomyces boulardii 250 milliGRAM(s) Oral two times a day  senna 1 Tablet(s) Oral at bedtime  sertraline 100 milliGRAM(s) Oral daily    MEDICATIONS  (PRN):  acetaminophen     Tablet .. 650 milliGRAM(s) Oral every 6 hours PRN Temp greater or equal to 38C (100.4F), Mild Pain (1 - 3)  aluminum hydroxide/magnesium hydroxide/simethicone Suspension 30 milliLiter(s) Oral every 4 hours PRN Dyspepsia  bisacodyl Suppository 10 milliGRAM(s) Rectal daily PRN Constipation  clonazePAM  Tablet 0.5 milliGRAM(s) Oral three times a day PRN ANXIETY  dextrose Oral Gel 15 Gram(s) Oral once PRN Blood Glucose LESS THAN 70 milliGRAM(s)/deciliter  melatonin 3 milliGRAM(s) Oral at bedtime PRN Insomnia  ondansetron Injectable 4 milliGRAM(s) IV Push every 8 hours PRN Nausea and/or Vomiting  oxyCODONE    IR 10 milliGRAM(s) Oral two times a day PRN Severe Pain (7 - 10)  oxyCODONE    IR 5 milliGRAM(s) Oral every 8 hours PRN Moderate Pain (4 - 6)  polyethylene glycol 3350 17 Gram(s) Oral daily PRN for constipation      Allergies    IODINE (Unknown)  tetracycline (Unknown)  vancomycin (Other)    Intolerances        Vital Signs Last 24 Hrs  T(C): 36.6 (2025 05:52), Max: 36.6 (2025 20:07)  T(F): 97.8 (2025 05:52), Max: 97.9 (2025 20:07)  HR: 75 (2025 05:52) (75 - 106)  BP: 125/68 (2025 05:52) (121/72 - 130/77)  BP(mean): --  RR: 18 (2025 05:52) (18 - 18)  SpO2: 94% (2025 05:52) (91% - 95%)    Parameters below as of 2025 05:52  Patient On (Oxygen Delivery Method): room air        I&O's Summary    2025 07:01  -  2025 07:00  --------------------------------------------------------  IN: 0 mL / OUT: 450 mL / NET: -450 mL          PHYSICAL EXAM:  TELE:   Constitutional: NAD, awake and alert, obese   HEENT: Moist Mucous Membranes, Anicteric  Pulmonary: Non-labored, breath sounds are clear bilaterally, No wheezing, crackles or rhonchi  Cardiovascular: Regular, S1 and S2 nl, No murmurs, rubs, gallops or clicks  Gastrointestinal: Bowel Sounds present, soft, nontender.   Lymph: +peripheral edema.  Skin: No visible rashes or ulcers.  Psych:  Mood & affect appropriate    LABS: All Labs Reviewed:                        12.0   8.83  )-----------( 153      ( 2025 07:05 )             37.5                         12.1   9.50  )-----------( 143      ( 2025 06:05 )             37.9                         12.5   9.64  )-----------( 160      ( 2025 14:45 )             38.6     2025 07:05    142    |  103    |  27     ----------------------------<  151    2.8     |  30     |  1.60   2025 06:05    141    |  104    |  36     ----------------------------<  137    3.4     |  30     |  1.70   2025 14:45    138    |  98     |  42     ----------------------------<  266    3.9     |  27     |  2.00     Ca    9.0        2025 07:05  Ca    9.2        2025 06:05  Ca    8.7        2025 14:45  Phos  3.1       2025 07:05  Phos  2.9       2025 06:05  Mg     2.2       2025 07:05  Mg     2.5       2025 06:05    TPro  6.9    /  Alb  3.0    /  TBili  0.5    /  DBili  x      /  AST  14     /  ALT  32     /  AlkPhos  73     2025 06:05  TPro  7.2    /  Alb  3.1    /  TBili  0.4    /  DBili  x      /  AST  17     /  ALT  36     /  AlkPhos  80     2025 14:45             EC Lead ECG:   Ventricular Rate 84 BPM    Atrial Rate 84 BPM    P-R Interval 234 ms    QRS Duration 94 ms    Q-T Interval 396 ms    QTC Calculation(Bazett) 467 ms    P Axis 21 degrees    R Axis -66 degrees    T Axis 41 degrees    Diagnosis Line Sinus rhythm with 1st degree AV block  Left axis deviation  Low voltage QRS  Inferior infarct , age undetermined  Cannot rule out Anteroseptal infarct (cited on or before 26-AUG-2023)  Abnormal ECG    Confirmed by Amber Quevedo ([id number]) on 2025 1:14:42 PM (25 @ 09:45)      TRANSTHORACIC ECHOCARDIOGRAM REPORT  ________________________________________________________________________________                                      _______       Pt. Name:       WAYNE SERRANO Study Date:    2025  MRN:            [id number]     YOB: 1950  Accession #:    [id number]     Age:           74 years  Account#:       [id number]    Gender:        M  Heart Rate:                   Height:        70.08 in (178.00 cm)  Rhythm:                       Weight:        242.50 lb (110.00 kg)  Blood Pressure: 99/65 mmHg    BSA/BMI:       2.27 m² / 34.72 kg/m²  ________________________________________________________________________________________  Referring Physician:    [id number] Mart Tripp  Interpreting Physician: Amber Quevedo MD  Primary Sonographer:    Checo Broderick    CPT:               ECHO TTE WO CON COMP W DOPP - 31428.m  Indication(s):     Dyspnea, unspecified - R06.00  Procedure:         Transthoracic echocardiogram with 2-D, M-mode and complete             spectral and color flow Doppler.  Ordering Location: [facility]  Admission Status:  Inpatient  Study Information: Image quality for this study is technically difficult.                     Technically difficult study secondary to lung interference.    _______________________________________________________________________________________     CONCLUSIONS:      1. Technically difficult image quality.   2. Left ventricular endocardium is not well visualized; however, the left ventricular systolic function appears grossly normal.   3. Left ventricular systolic function is normal with an ejection fraction visually estimated at 60 to 65 %.   4. The right ventricle is not well visualized. probably normal right ventricular systolic function.   5. The inferior vena cava is normal in size measuring 1.22 cm in diameter, (normal <2.1cm) with normal inspiratory collapse (normal >50%) consistent with normal right atrial pressure (~3, range 0-5mmHg).    ________________________________________________________________________________________  FINDINGS:     Left Ventricle:  Left ventricular systolic function is normal with an ejection fraction visually estimated at 60 to 65%. There is poor visualization of the endocardial borders to determine thepresence of wall motion abnormalities. Left ventricular endocardium is not well visualized; however, the left ventricular systolic function appears grossly normal. There is normal left ventricular diastolic function.     Right Ventricle:  The right ventricle is not well visualized. Right ventricular systolic function is probably normal.     Left Atrium:  The left atrium is normal in size with an indexed volume of 12.22 ml/m².     Right Atrium:  The right atrium was not well visualized.     Interatrial Septum:  The interatrial septum was not well visualized.     Aortic Valve:  The aortic valve was not well visualized. The aortic valve anatomy cannot be determined with normal systolic excursion.     Mitral Valve:  The mitral valve was not well visualized. There is mild leaflet calcification.     Tricuspid Valve:  The tricuspid valve was not well visualized. There is trace tricuspid regurgitation.     Pulmonic Valve:  The pulmonic valve was not well visualized.     Aorta:  The aortic root atthe sinuses of Valsalva is normal in size, measuring 3.20 cm (indexed 1.41 cm/m²). The ascending aorta is normal in size, measuring 3.00 cm (indexed 1.32 cm/m²).     Systemic Veins:  The inferior vena cava is normal in size measuring 1.22 cm in diameter, (normal <2.1cm) with normal inspiratory collapse (normal >50%) consistent with normal right atrial pressure (~3, range 0-5mmHg).  ____________________________________________________________________  QUANTITATIVE DATA:  Left Ventricle Measurements: (Indexed to BSA)     IVSd (2D):   1.4 cm  LVPWd (2D):  1.3 cm  LVIDd (2D):  3.4 cm  LVIDs (2D):  2.2 cm  LV Mass:     158 g  69.7 g/m²  Visualized LV EF%: 60 to 65%     MV E Vmax:    0.56 m/s  MV A Vmax:    1.17 m/s  MV E/A:       0.47  e' lateral:  6.31 cm/s  e' medial:    3.26 cm/s  E/e' lateral: 8.80  E/e' medial:  17.02  E/e' Average: 11.60  MV DT:        156 msec    Aorta Measurements: (Normal range) (Indexed to BSA)     Ao Root d     3.20 cm (3.1 - 3.7 cm) 1.41 cm/m²  Ao Asc d, 2D: 3.00  Ao Asc prox:  3.00 cm                1.32 cm/m²            Left Atrium Measurements: (Indexed to BSA)  LA Diam 2D: 2.60 cm            LVOT / RVOT/ Qp/Qs Data: (Indexed to BSA)  LVOT Diameter,s: 2.20 cm  LVOT Area:       3.80 cm²    Mitral Valve Measurements:     MV E Vmax: 0.6 m/s  MV A Vmax: 1.2 m/s  MV E/A:    0.5       Tricuspid Valve Measurements:     RA Pressure: 3 mmHg    ________________________________________________________________________________________  Electronically signed on 2025 at 8:22:27 AM by Amber Quevedo MD         *** Final ***      Radiology:         [facility] -- Erick Hogue Pannella, Patel, Savella Goodger, Cohen  Office # [phone]      Follow Up:    le edema    Subjective/Observations:   No events overnight resting comfortably in bed.  No complaints of chest pain, dyspnea, or palpitations reported. No signs of orthopnea or PND.      REVIEW OF SYSTEMS: All other review of systems is negative unless indicated above    PAST MEDICAL & SURGICAL HISTORY:  Prostate cancer      Type II diabetes mellitus      Chronic obstructive pulmonary disease (COPD)      CHF (congestive heart failure)      Renal insufficiency      H/O migraine      Insomnia      Constipation      S/P foot surgery          MEDICATIONS  (STANDING):  albuterol/ipratropium for Nebulization 3 milliLiter(s) Nebulizer every 8 hours  ascorbic acid 500 milliGRAM(s) Oral daily  aspirin enteric coated 81 milliGRAM(s) Oral daily  budesonide    Inhalation Suspension 0.5 milliGRAM(s) Inhalation two times a day  clotrimazole 1% Cream 1 Application(s) Topical two times a day  DAPTOmycin IVPB 400 milliGRAM(s) IV Intermittent every 24 hours  dextrose 5%. 1000 milliLiter(s) (50 mL/Hr) IV Continuous <Continuous>  dextrose 5%. 1000 milliLiter(s) (100 mL/Hr) IV Continuous <Continuous>  dextrose 50% Injectable 12.5 Gram(s) IV Push once  dextrose 50% Injectable 25 Gram(s) IV Push once  dextrose 50% Injectable 25 Gram(s) IV Push once  ferrous    sulfate 325 milliGRAM(s) Oral daily  furosemide   Injectable 60 milliGRAM(s) IV Push daily  glucagon  Injectable 1 milliGRAM(s) IntraMuscular once  heparin   Injectable 5000 Unit(s) SubCutaneous every 8 hours  insulin glargine Injectable (LANTUS) 15 Unit(s) SubCutaneous at bedtime  insulin lispro (ADMELOG) corrective regimen sliding scale   SubCutaneous at bedtime  insulin lispro (ADMELOG) corrective regimen sliding scale.   SubCutaneous three times a day before meals  insulin lispro Injectable (ADMELOG) 5 Unit(s) SubCutaneous three times a day before meals  lactobacillus acidophilus 1 Tablet(s) Oral two times a day with meals  montelukast 10 milliGRAM(s) Oral daily  multivitamin/minerals 1 Tablet(s) Oral daily  pantoprazole    Tablet 40 milliGRAM(s) Oral before breakfast  phenazopyridine 100 milliGRAM(s) Oral three times a day  piperacillin/tazobactam IVPB.. 3.375 Gram(s) IV Intermittent every 8 hours  potassium chloride    Tablet ER 20 milliEquivalent(s) Oral daily  predniSONE   Tablet 10 milliGRAM(s) Oral daily  pregabalin 150 milliGRAM(s) Oral two times a day  rosuvastatin 40 milliGRAM(s) Oral at bedtime  saccharomyces boulardii 250 milliGRAM(s) Oral two times a day  senna 1 Tablet(s) Oral at bedtime  sertraline 100 milliGRAM(s) Oral daily    MEDICATIONS  (PRN):  acetaminophen     Tablet .. 650 milliGRAM(s) Oral every 6 hours PRN Temp greater or equal to 38C (100.4F), Mild Pain (1 - 3)  aluminum hydroxide/magnesium hydroxide/simethicone Suspension 30 milliLiter(s) Oral every 4 hours PRN Dyspepsia  bisacodyl Suppository 10 milliGRAM(s) Rectal daily PRN Constipation  clonazePAM  Tablet 0.5 milliGRAM(s) Oral three times a day PRN ANXIETY  dextrose Oral Gel 15 Gram(s) Oral once PRN Blood Glucose LESS THAN 70 milliGRAM(s)/deciliter  melatonin 3 milliGRAM(s) Oral at bedtime PRN Insomnia  ondansetron Injectable 4 milliGRAM(s) IV Push every 8 hours PRN Nausea and/or Vomiting  oxyCODONE    IR 10 milliGRAM(s) Oral two times a day PRN Severe Pain (7 - 10)  oxyCODONE    IR 5 milliGRAM(s) Oral every 8 hours PRN Moderate Pain (4 - 6)  polyethylene glycol 3350 17 Gram(s) Oral daily PRN for constipation      Allergies    IODINE (Unknown)  tetracycline (Unknown)  vancomycin (Other)    Intolerances        Vital Signs Last 24 Hrs  T(C): 36.6 (2025 05:52), Max: 36.6 (2025 20:07)  T(F): 97.8 (2025 05:52), Max: 97.9 (2025 20:07)  HR: 75 (2025 05:52) (75 - 106)  BP: 125/68 (2025 05:52) (121/72 - 130/77)  BP(mean): --  RR: 18 (2025 05:52) (18 - 18)  SpO2: 94% (2025 05:52) (91% - 95%)    Parameters below as of 2025 05:52  Patient On (Oxygen Delivery Method): room air        I&O's Summary    2025 07:01  -  2025 07:00  --------------------------------------------------------  IN: 0 mL / OUT: 450 mL / NET: -450 mL          PHYSICAL EXAM:  TELE: not on tele   Constitutional: NAD, awake and alert, obese   HEENT: Moist Mucous Membranes, Anicteric  Pulmonary: Non-labored, breath sounds are clear bilaterally, No wheezing, crackles or rhonchi  Cardiovascular: Regular, S1 and S2 nl, No murmurs, rubs, gallops or clicks  Gastrointestinal: Bowel Sounds present, soft, nontender.   Lymph: +peripheral edema.  Skin: No visible rashes or ulcers.  Psych:  Mood & affect appropriate    LABS: All Labs Reviewed:                        12.0   8.83  )-----------( 153      ( 2025 07:05 )             37.5                         12.1   9.50  )-----------( 143      ( 2025 06:05 )             37.9                         12.5   9.64  )-----------( 160      ( 2025 14:45 )             38.6     2025 07:05    142    |  103    |  27     ----------------------------<  151    2.8     |  30     |  1.60   2025 06:05    141    |  104    |  36     ----------------------------<  137    3.4     |  30     |  1.70   2025 14:45    138    |  98     |  42     ----------------------------<  266    3.9     |  27     |  2.00     Ca    9.0        2025 07:05  Ca    9.2        2025 06:05  Ca    8.7        2025 14:45  Phos  3.1       2025 07:05  Phos  2.9       2025 06:05  Mg     2.2       2025 07:05  Mg     2.5       2025 06:05    TPro  6.9    /  Alb  3.0    /  TBili  0.5    /  DBili  x      /  AST  14     /  ALT  32     /  AlkPhos  73     2025 06:05  TPro  7.2    /  Alb  3.1    /  TBili  0.4    /  DBili  x      /  AST  17     /  ALT  36     /  AlkPhos  80     2025 14:45             EC Lead ECG:   Ventricular Rate 84 BPM    Atrial Rate 84 BPM    P-R Interval 234 ms    QRS Duration 94 ms    Q-T Interval 396 ms    QTC Calculation(Bazett) 467 ms    P Axis 21 degrees    R Axis -66 degrees    T Axis 41 degrees    Diagnosis Line Sinus rhythm with 1st degree AV block  Left axis deviation  Low voltage QRS  Inferior infarct , age undetermined  Cannot rule out Anteroseptal infarct (cited on or before 26-AUG-2023)  Abnormal ECG    Confirmed by Amber Quevedo ([id number]) on 2025 1:14:42 PM (25 @ 09:45)      TRANSTHORACIC ECHOCARDIOGRAM REPORT  ________________________________________________________________________________                                      _______       Pt. Name:       WAYNE SERRANO Study Date:    2025  MRN:            [id number]     YOB: 1950  Accession #:    [id number]     Age:           74 years  Account#:       [id number]    Gender:        M  Heart Rate:                   Height:        70.08 in (178.00 cm)  Rhythm:                       Weight:        242.50 lb (110.00 kg)  Blood Pressure: 99/65 mmHg    BSA/BMI:       2.27 m² / 34.72 kg/m²  ________________________________________________________________________________________  Referring Physician:    [id number] Mart Tripp  Interpreting Physician: Amber Quevedo MD  Primary Sonographer:    Checo Broderick    CPT:               ECHO TTE WO CON COMP W DOPP - 30170.m  Indication(s):     Dyspnea, unspecified - R06.00  Procedure:         Transthoracic echocardiogram with 2-D, M-mode and complete             spectral and color flow Doppler.  Ordering Location: [facility]  Admission Status:  Inpatient  Study Information: Image quality for this study is technically difficult.                     Technically difficult study secondary to lung interference.    _______________________________________________________________________________________     CONCLUSIONS:      1. Technically difficult image quality.   2. Left ventricular endocardium is not well visualized; however, the left ventricular systolic function appears grossly normal.   3. Left ventricular systolic function is normal with an ejection fraction visually estimated at 60 to 65 %.   4. The right ventricle is not well visualized. probably normal right ventricular systolic function.   5. The inferior vena cava is normal in size measuring 1.22 cm in diameter, (normal <2.1cm) with normal inspiratory collapse (normal >50%) consistent with normal right atrial pressure (~3, range 0-5mmHg).    ________________________________________________________________________________________  FINDINGS:     Left Ventricle:  Left ventricular systolic function is normal with an ejection fraction visually estimated at 60 to 65%. There is poor visualization of the endocardial borders to determine thepresence of wall motion abnormalities. Left ventricular endocardium is not well visualized; however, the left ventricular systolic function appears grossly normal. There is normal left ventricular diastolic function.     Right Ventricle:  The right ventricle is not well visualized. Right ventricular systolic function is probably normal.     Left Atrium:  The left atrium is normal in size with an indexed volume of 12.22 ml/m².     Right Atrium:  The right atrium was not well visualized.     Interatrial Septum:  The interatrial septum was not well visualized.     Aortic Valve:  The aortic valve was not well visualized. The aortic valve anatomy cannot be determined with normal systolic excursion.     Mitral Valve:  The mitral valve was not well visualized. There is mild leaflet calcification.     Tricuspid Valve:  The tricuspid valve was not well visualized. There is trace tricuspid regurgitation.     Pulmonic Valve:  The pulmonic valve was not well visualized.     Aorta:  The aortic root atthe sinuses of Valsalva is normal in size, measuring 3.20 cm (indexed 1.41 cm/m²). The ascending aorta is normal in size, measuring 3.00 cm (indexed 1.32 cm/m²).     Systemic Veins:  The inferior vena cava is normal in size measuring 1.22 cm in diameter, (normal <2.1cm) with normal inspiratory collapse (normal >50%) consistent with normal right atrial pressure (~3, range 0-5mmHg).  ____________________________________________________________________  QUANTITATIVE DATA:  Left Ventricle Measurements: (Indexed to BSA)     IVSd (2D):   1.4 cm  LVPWd (2D):  1.3 cm  LVIDd (2D):  3.4 cm  LVIDs (2D):  2.2 cm  LV Mass:     158 g  69.7 g/m²  Visualized LV EF%: 60 to 65%     MV E Vmax:    0.56 m/s  MV A Vmax:    1.17 m/s  MV E/A:       0.47  e' lateral:  6.31 cm/s  e' medial:    3.26 cm/s  E/e' lateral: 8.80  E/e' medial:  17.02  E/e' Average: 11.60  MV DT:        156 msec    Aorta Measurements: (Normal range) (Indexed to BSA)     Ao Root d     3.20 cm (3.1 - 3.7 cm) 1.41 cm/m²  Ao Asc d, 2D: 3.00  Ao Asc prox:  3.00 cm                1.32 cm/m²            Left Atrium Measurements: (Indexed to BSA)  LA Diam 2D: 2.60 cm            LVOT / RVOT/ Qp/Qs Data: (Indexed to BSA)  LVOT Diameter,s: 2.20 cm  LVOT Area:       3.80 cm²    Mitral Valve Measurements:     MV E Vmax: 0.6 m/s  MV A Vmax: 1.2 m/s  MV E/A:    0.5       Tricuspid Valve Measurements:     RA Pressure: 3 mmHg    ________________________________________________________________________________________  Electronically signed on 2025 at 8:22:27 AM by Amber Quevedo MD         *** Final ***      Radiology:

## 2025-06-06 NOTE — CONSULT NOTE ADULT - PROBLEM SELECTOR PROBLEM 1
Type II diabetes mellitus
Cellulitis of right lower leg
Cellulitis of right lower leg
Type 2 diabetes mellitus with hyperglycemia

## 2025-06-06 NOTE — PROGRESS NOTE ADULT - SUBJECTIVE AND OBJECTIVE BOX
CHIEF COMPLAINT/ REASON FOR VISIT  .. Patient was seen to address the  issue listed under PROBLEM LIST which is located toward bottom of this note     WAYNE SERRANO    PLV 2EAS 214 D1    Allergies    IODINE (Unknown)  tetracycline (Unknown)  vancomycin (Other)    Intolerances        PAST MEDICAL & SURGICAL HISTORY:  Prostate cancer      Type II diabetes mellitus      Chronic obstructive pulmonary disease (COPD)      CHF (congestive heart failure)      Renal insufficiency      H/O migraine      Insomnia      Constipation      S/P foot surgery          FAMILY HISTORY:      Home Medications:  albuterol 0.63 mg/3 mL (0.021%) inhalation solution: 3 milliliter(s) by nebulizer 3 times a day as needed for  shortness of breath and/or wheezing (2025 08:48)  amoxicillin-clavulanate 875 mg-125 mg oral tablet: 1 tab(s) orally 2 times a day for 7 days (:48)  Artificial Tears ophthalmic solution: 1 drop(s) in each eye 2 times a day (:48)  ascorbic acid 500 mg oral tablet: 1 tab(s) orally 2 times a day (:48)  aspirin 81 mg oral tablet: 1 tab(s) orally once a day (:48)  clonazePAM 0.25 mg oral tablet, disintegratin tab(s) orally 3 times a day (:48)  ferrous sulfate 325 mg (65 mg elemental iron) oral tablet: 1 tab(s) orally once a day (:48)  furosemide 40 mg oral tablet: 1 tab(s) orally every 12 hours (:48)  HumaLOG 100 units/mL subcutaneous solution: Inject subcutaneously 3 times a day using sliding scale (:48)  HumaLOG KwikPen 100 units/mL injectable solution: Inject 15 units subcutaneously 3 times a day (before each meal) in addition to sliding scale (:48)  Jardiance 10 mg oral tablet: 1 tab(s) orally once a day (:48)  Lantus Solostar Pen 100 units/mL subcutaneous solution: 36 unit(s) subcutaneously once a day (at bedtime) (2025 08:48)  loperamide 2 mg oral capsule: 1 cap(s) orally after each loose BM every 6 hours as needed **No more than 3 capsules (6mg) a day** (2025 08:48)  loratadine 10 mg oral tablet: 1 tab(s) orally once a day (:48)  Melatonin 3 mg oral tablet: 1 tab(s) orally once a day (at bedtime) (:48)  montelukast 10 mg oral tablet: 1 tab(s) orally once a day (at bedtime) (:48)  multivitamin: 1 tab(s) orally once a day (:48)  oxyCODONE 5 mg oral tablet: 2 tab(s) orally 2 times a day MDD: 4 tablets (:48)  pantoprazole 40 mg oral delayed release tablet: 1 tab(s) orally once a day (:48)  Polyethylene Glycol 3350: Mix 17grams into 8oz of water and drink orally once a day as needed (:48)  Potassium Chloride (Eqv-Klor-Con M20) 20 mEq oral tablet, extended release: 1 tab(s) orally 2 times a day (:48)  predniSONE 10 mg oral tablet: 1 tab(s) orally once a day with food or milk for 1 month, then after 1 month take 5mg orally once a day (:48)  pregabalin 150 mg oral capsule: 1 cap(s) orally 2 times a day MDD: 2 capsules (:48)  ramelteon 8 mg oral tablet: 1 tab(s) orally once a day (at bedtime) (:48)  rosuvastatin 40 mg oral tablet: 1 tab(s) orally once a day (at bedtime) (:48)  saccharomyces boulardii lyo 250 mg oral capsule: 1 cap(s) orally once a day (:48)  Saline Mist 0.65% nasal spray: 1 spray(s) in each nostril 2 times a day (:48)  Senna 8.6 mg oral tablet: 1 tab(s) orally once a day (at bedtime) (:48)  sertraline 100 mg oral tablet: 1.5 tab(s) orally once a day (150mg) (2025 08:48)  Symbicort 160 mcg-4.5 mcg/inh inhalation aerosol: 2 puff(s) inhaled 2 times a day (2025 08:48)      MEDICATIONS  (STANDING):  albuterol/ipratropium for Nebulization 3 milliLiter(s) Nebulizer every 8 hours  ascorbic acid 500 milliGRAM(s) Oral daily  aspirin enteric coated 81 milliGRAM(s) Oral daily  budesonide    Inhalation Suspension 0.5 milliGRAM(s) Inhalation two times a day  clotrimazole 1% Cream 1 Application(s) Topical two times a day  DAPTOmycin IVPB 400 milliGRAM(s) IV Intermittent every 24 hours  dextrose 5%. 1000 milliLiter(s) (50 mL/Hr) IV Continuous <Continuous>  dextrose 5%. 1000 milliLiter(s) (100 mL/Hr) IV Continuous <Continuous>  dextrose 50% Injectable 25 Gram(s) IV Push once  dextrose 50% Injectable 12.5 Gram(s) IV Push once  dextrose 50% Injectable 25 Gram(s) IV Push once  ferrous    sulfate 325 milliGRAM(s) Oral daily  furosemide   Injectable 60 milliGRAM(s) IV Push daily  glucagon  Injectable 1 milliGRAM(s) IntraMuscular once  heparin   Injectable 5000 Unit(s) SubCutaneous every 8 hours  insulin glargine Injectable (LANTUS) 10 Unit(s) SubCutaneous at bedtime  insulin lispro (ADMELOG) corrective regimen sliding scale   SubCutaneous at bedtime  insulin lispro (ADMELOG) corrective regimen sliding scale.   SubCutaneous three times a day before meals  lactobacillus acidophilus 1 Tablet(s) Oral two times a day with meals  montelukast 10 milliGRAM(s) Oral daily  multivitamin/minerals 1 Tablet(s) Oral daily  pantoprazole    Tablet 40 milliGRAM(s) Oral before breakfast  phenazopyridine 100 milliGRAM(s) Oral three times a day  piperacillin/tazobactam IVPB.. 3.375 Gram(s) IV Intermittent every 8 hours  potassium chloride    Tablet ER 20 milliEquivalent(s) Oral daily  predniSONE   Tablet 10 milliGRAM(s) Oral daily  pregabalin 150 milliGRAM(s) Oral two times a day  rosuvastatin 40 milliGRAM(s) Oral at bedtime  saccharomyces boulardii 250 milliGRAM(s) Oral two times a day  senna 1 Tablet(s) Oral at bedtime  sertraline 100 milliGRAM(s) Oral daily    MEDICATIONS  (PRN):  acetaminophen     Tablet .. 650 milliGRAM(s) Oral every 6 hours PRN Temp greater or equal to 38C (100.4F), Mild Pain (1 - 3)  aluminum hydroxide/magnesium hydroxide/simethicone Suspension 30 milliLiter(s) Oral every 4 hours PRN Dyspepsia  bisacodyl Suppository 10 milliGRAM(s) Rectal daily PRN Constipation  clonazePAM  Tablet 0.5 milliGRAM(s) Oral three times a day PRN ANXIETY  dextrose Oral Gel 15 Gram(s) Oral once PRN Blood Glucose LESS THAN 70 milliGRAM(s)/deciliter  melatonin 3 milliGRAM(s) Oral at bedtime PRN Insomnia  ondansetron Injectable 4 milliGRAM(s) IV Push every 8 hours PRN Nausea and/or Vomiting  oxyCODONE    IR 10 milliGRAM(s) Oral two times a day PRN Severe Pain (7 - 10)  oxyCODONE    IR 5 milliGRAM(s) Oral every 8 hours PRN Moderate Pain (4 - 6)  polyethylene glycol 3350 17 Gram(s) Oral daily PRN for constipation      Diet, Consistent Carbohydrate w/Evening Snack:   DASH/TLC Sodium & Cholesterol Restricted (25 @ 18:09) [Active]          Vital Signs Last 24 Hrs  T(C): 36.6 (2025 05:52), Max: 36.6 (2025 20:07)  T(F): 97.8 (2025 05:52), Max: 97.9 (2025 20:07)  HR: 75 (2025 05:52) (49 - 106)  BP: 125/68 (2025 05:52) (121/72 - 130/77)  BP(mean): --  RR: 18 (2025 05:52) (18 - 18)  SpO2: 94% (2025 05:52) (91% - 95%)    Parameters below as of 2025 05:52  Patient On (Oxygen Delivery Method): room air          25 @ 07:01  -  25 @ 07:00  --------------------------------------------------------  IN: 0 mL / OUT: 450 mL / NET: -450 mL              LABS:                        12.0   8.83  )-----------( 153      ( 2025 07:05 )             37.5         142  |  103  |  27[H]  ----------------------------<  151[H]  2.8[LL]   |  30  |  1.60[H]    Ca    9.0      2025 07:05  Phos  3.1       Mg     2.2             Urinalysis Basic - ( 2025 17:50 )    Color: Yellow / Appearance: Clear / S.014 / pH: x  Gluc: x / Ketone: x  / Bili: Negative / Urobili: 0.2 mg/dL   Blood: x / Protein: Trace mg/dL / Nitrite: Negative   Leuk Esterase: Negative / RBC: 3 /HPF / WBC 2 /HPF   Sq Epi: x / Non Sq Epi: x / Bacteria: Occasional /HPF            WBC:  WBC Count: 8.83 K/uL ( @ 07:05)  WBC Count: 9.50 K/uL ( @ 06:05)  WBC Count: 9.64 K/uL ( @ 14:45)      MICROBIOLOGY:  RECENT CULTURES:   Blood Blood-Peripheral XXXX XXXX   No growth at 48 Hours     Blood Blood-Peripheral XXXX XXXX   No growth at 48 Hours                    Sodium:  Sodium: 142 mmol/L ( @ 07:05)  Sodium: 141 mmol/L ( @ 06:05)  Sodium: 138 mmol/L ( @ 14:45)      1.60 mg/dL  @ 07:05  1.70 mg/dL  @ 06:05  2.00 mg/dL  @ 14:45      Hemoglobin:  Hemoglobin: 12.0 g/dL ( @ 07:05)  Hemoglobin: 12.1 g/dL ( @ 06:05)  Hemoglobin: 12.5 g/dL (06-03 @ 14:45)      Platelets: Platelet Count - Automated: 153 K/uL ( @ 07:05)  Platelet Count - Automated: 143 K/uL ( @ 06:05)  Platelet Count - Automated: 160 K/uL ( @ 14:45)          Urinalysis Basic - ( 2025 17:50 )    Color: Yellow / Appearance: Clear / S.014 / pH: x  Gluc: x / Ketone: x  / Bili: Negative / Urobili: 0.2 mg/dL   Blood: x / Protein: Trace mg/dL / Nitrite: Negative   Leuk Esterase: Negative / RBC: 3 /HPF / WBC 2 /HPF   Sq Epi: x / Non Sq Epi: x / Bacteria: Occasional /HPF        RADIOLOGY & ADDITIONAL STUDIES:      MICROBIOLOGY:  RECENT CULTURES:   Blood Blood-Peripheral XXXX XXXX   No growth at 48 Hours     Blood Blood-Peripheral XXXX XXXX   No growth at 48 Hours

## 2025-06-06 NOTE — PROGRESS NOTE ADULT - SUBJECTIVE AND OBJECTIVE BOX
CAPILLARY BLOOD GLUCOSE      POCT Blood Glucose.: 262 mg/dL (05 Jun 2025 21:17)  POCT Blood Glucose.: 254 mg/dL (05 Jun 2025 16:55)  POCT Blood Glucose.: 351 mg/dL (05 Jun 2025 11:53)  POCT Blood Glucose.: 183 mg/dL (05 Jun 2025 07:45)      Vital Signs Last 24 Hrs  T(C): 36.6 (06 Jun 2025 05:52), Max: 36.6 (05 Jun 2025 20:07)  T(F): 97.8 (06 Jun 2025 05:52), Max: 97.9 (05 Jun 2025 20:07)  HR: 75 (06 Jun 2025 05:52) (49 - 106)  BP: 125/68 (06 Jun 2025 05:52) (121/72 - 130/77)  BP(mean): --  RR: 18 (06 Jun 2025 05:52) (18 - 18)  SpO2: 94% (06 Jun 2025 05:52) (91% - 95%)    Parameters below as of 06 Jun 2025 05:52  Patient On (Oxygen Delivery Method): room air        General: WN/WD NAD  Respiratory: CTA B/L  CV: RRR, S1S2, no murmurs, rubs or gallops  Abdominal: Soft, NT, ND +BS, Last BM  Extremities: No edema, + peripheral pulses     06-05    142  |  103  |  27[H]  ----------------------------<  151[H]  2.8[LL]   |  30  |  1.60[H]    Ca    9.0      05 Jun 2025 07:05  Phos  3.1     06-05  Mg     2.2     06-05        dextrose 50% Injectable 12.5 Gram(s) IV Push once  dextrose 50% Injectable 25 Gram(s) IV Push once  dextrose 50% Injectable 25 Gram(s) IV Push once  dextrose Oral Gel 15 Gram(s) Oral once PRN  glucagon  Injectable 1 milliGRAM(s) IntraMuscular once  insulin glargine Injectable (LANTUS) 10 Unit(s) SubCutaneous at bedtime  insulin lispro (ADMELOG) corrective regimen sliding scale   SubCutaneous at bedtime  insulin lispro (ADMELOG) corrective regimen sliding scale.   SubCutaneous three times a day before meals  predniSONE   Tablet 10 milliGRAM(s) Oral daily  rosuvastatin 40 milliGRAM(s) Oral at bedtime

## 2025-06-06 NOTE — SWALLOW BEDSIDE ASSESSMENT ADULT - SLP PERTINENT HISTORY OF CURRENT PROBLEM
Pt seen by this service at [facility] 1/13/24 for a clinical swallow evaluation with recommendations for regular and thin liquids. Pt reports occasional coughing jags that are "mostly when I lay down," "but when I eat sometimes but not often."

## 2025-06-06 NOTE — CONSULT NOTE ADULT - PROVIDER SPECIALTY LIST ADULT
Cardiology
Podiatry
Nephrology
Wound Care
Infectious Disease
Cardiology
Endocrinology
Pulmonology
Diabetes

## 2025-06-07 LAB
ANION GAP SERPL CALC-SCNC: 10 MMOL/L — SIGNIFICANT CHANGE UP (ref 5–17)
BUN SERPL-MCNC: 32 MG/DL — HIGH (ref 7–23)
CALCIUM SERPL-MCNC: 9.2 MG/DL — SIGNIFICANT CHANGE UP (ref 8.5–10.1)
CHLORIDE SERPL-SCNC: 99 MMOL/L — SIGNIFICANT CHANGE UP (ref 96–108)
CO2 SERPL-SCNC: 29 MMOL/L — SIGNIFICANT CHANGE UP (ref 22–31)
CREAT SERPL-MCNC: 1.8 MG/DL — HIGH (ref 0.5–1.3)
EGFR: 39 ML/MIN/1.73M2 — LOW
EGFR: 39 ML/MIN/1.73M2 — LOW
GLUCOSE SERPL-MCNC: 226 MG/DL — HIGH (ref 70–99)
HCT VFR BLD CALC: 38.5 % — LOW (ref 39–50)
HGB BLD-MCNC: 12.3 G/DL — LOW (ref 13–17)
MAGNESIUM SERPL-MCNC: 2.2 MG/DL — SIGNIFICANT CHANGE UP (ref 1.6–2.6)
MCHC RBC-ENTMCNC: 28.2 PG — SIGNIFICANT CHANGE UP (ref 27–34)
MCHC RBC-ENTMCNC: 31.9 G/DL — LOW (ref 32–36)
MCV RBC AUTO: 88.3 FL — SIGNIFICANT CHANGE UP (ref 80–100)
NRBC BLD AUTO-RTO: 0 /100 WBCS — SIGNIFICANT CHANGE UP (ref 0–0)
NT-PROBNP SERPL-SCNC: 131 PG/ML — HIGH (ref 0–125)
PHOSPHATE SERPL-MCNC: 3 MG/DL — SIGNIFICANT CHANGE UP (ref 2.5–4.5)
PLATELET # BLD AUTO: 150 K/UL — SIGNIFICANT CHANGE UP (ref 150–400)
POTASSIUM SERPL-MCNC: 3.1 MMOL/L — LOW (ref 3.5–5.3)
POTASSIUM SERPL-SCNC: 3.1 MMOL/L — LOW (ref 3.5–5.3)
RBC # BLD: 4.36 M/UL — SIGNIFICANT CHANGE UP (ref 4.2–5.8)
RBC # FLD: 18.3 % — HIGH (ref 10.3–14.5)
SODIUM SERPL-SCNC: 138 MMOL/L — SIGNIFICANT CHANGE UP (ref 135–145)
WBC # BLD: 7.56 K/UL — SIGNIFICANT CHANGE UP (ref 3.8–10.5)
WBC # FLD AUTO: 7.56 K/UL — SIGNIFICANT CHANGE UP (ref 3.8–10.5)

## 2025-06-07 PROCEDURE — 99232 SBSQ HOSP IP/OBS MODERATE 35: CPT

## 2025-06-07 RX ORDER — DEXTROMETHORPHAN HBR, GUAIFENESIN 200 MG/10ML
200 LIQUID ORAL EVERY 6 HOURS
Refills: 0 | Status: DISCONTINUED | OUTPATIENT
Start: 2025-06-07 | End: 2025-06-10

## 2025-06-07 RX ORDER — INSULIN LISPRO 100 U/ML
10 INJECTION, SOLUTION INTRAVENOUS; SUBCUTANEOUS
Refills: 0 | Status: DISCONTINUED | OUTPATIENT
Start: 2025-06-07 | End: 2025-06-09

## 2025-06-07 RX ORDER — INSULIN LISPRO 100 U/ML
8 INJECTION, SOLUTION INTRAVENOUS; SUBCUTANEOUS
Refills: 0 | Status: DISCONTINUED | OUTPATIENT
Start: 2025-06-07 | End: 2025-06-07

## 2025-06-07 RX ORDER — INSULIN GLARGINE-YFGN 100 [IU]/ML
20 INJECTION, SOLUTION SUBCUTANEOUS AT BEDTIME
Refills: 0 | Status: DISCONTINUED | OUTPATIENT
Start: 2025-06-07 | End: 2025-06-10

## 2025-06-07 RX ADMIN — Medication 25 GRAM(S): at 23:41

## 2025-06-07 RX ADMIN — ROSUVASTATIN CALCIUM 40 MILLIGRAM(S): 20 TABLET, FILM COATED ORAL at 21:58

## 2025-06-07 RX ADMIN — PREGABALIN 150 MILLIGRAM(S): 50 CAPSULE ORAL at 05:40

## 2025-06-07 RX ADMIN — Medication 500 MILLIGRAM(S): at 12:12

## 2025-06-07 RX ADMIN — OXYCODONE HYDROCHLORIDE 10 MILLIGRAM(S): 30 TABLET ORAL at 12:17

## 2025-06-07 RX ADMIN — Medication 100 MILLIGRAM(S): at 14:54

## 2025-06-07 RX ADMIN — HEPARIN SODIUM 5000 UNIT(S): 1000 INJECTION INTRAVENOUS; SUBCUTANEOUS at 14:54

## 2025-06-07 RX ADMIN — OXYCODONE HYDROCHLORIDE 10 MILLIGRAM(S): 30 TABLET ORAL at 00:06

## 2025-06-07 RX ADMIN — CLONAZEPAM 0.5 MILLIGRAM(S): 0.5 TABLET ORAL at 17:30

## 2025-06-07 RX ADMIN — INSULIN LISPRO 6: 100 INJECTION, SOLUTION INTRAVENOUS; SUBCUTANEOUS at 08:25

## 2025-06-07 RX ADMIN — Medication 1 TABLET(S): at 08:26

## 2025-06-07 RX ADMIN — DEXTROMETHORPHAN HBR, GUAIFENESIN 200 MILLIGRAM(S): 200 LIQUID ORAL at 06:59

## 2025-06-07 RX ADMIN — DEXTROMETHORPHAN HBR, GUAIFENESIN 200 MILLIGRAM(S): 200 LIQUID ORAL at 23:41

## 2025-06-07 RX ADMIN — OXYCODONE HYDROCHLORIDE 10 MILLIGRAM(S): 30 TABLET ORAL at 01:06

## 2025-06-07 RX ADMIN — BUTYROSPERMUM PARKII(SHEA BUTTER), SIMMONDSIA CHINENSIS (JOJOBA) SEED OIL, ALOE BARBADENSIS LEAF EXTRACT 250 MILLIGRAM(S): .01; 1; 3.5 LIQUID TOPICAL at 17:29

## 2025-06-07 RX ADMIN — HEPARIN SODIUM 5000 UNIT(S): 1000 INJECTION INTRAVENOUS; SUBCUTANEOUS at 21:58

## 2025-06-07 RX ADMIN — CLONAZEPAM 0.5 MILLIGRAM(S): 0.5 TABLET ORAL at 06:59

## 2025-06-07 RX ADMIN — Medication 40 MILLIGRAM(S): at 06:59

## 2025-06-07 RX ADMIN — DAPTOMYCIN 116 MILLIGRAM(S): 500 INJECTION, POWDER, LYOPHILIZED, FOR SOLUTION INTRAVENOUS at 17:28

## 2025-06-07 RX ADMIN — CLOTRIMAZOLE 1 APPLICATION(S): 1 CREAM TOPICAL at 06:21

## 2025-06-07 RX ADMIN — IPRATROPIUM BROMIDE AND ALBUTEROL SULFATE 3 MILLILITER(S): .5; 2.5 SOLUTION RESPIRATORY (INHALATION) at 19:58

## 2025-06-07 RX ADMIN — Medication 81 MILLIGRAM(S): at 12:11

## 2025-06-07 RX ADMIN — Medication 25 GRAM(S): at 14:55

## 2025-06-07 RX ADMIN — Medication 1 TABLET(S): at 12:12

## 2025-06-07 RX ADMIN — INSULIN LISPRO 6: 100 INJECTION, SOLUTION INTRAVENOUS; SUBCUTANEOUS at 17:24

## 2025-06-07 RX ADMIN — Medication 25 GRAM(S): at 06:59

## 2025-06-07 RX ADMIN — Medication 325 MILLIGRAM(S): at 12:12

## 2025-06-07 RX ADMIN — FUROSEMIDE 60 MILLIGRAM(S): 10 INJECTION INTRAMUSCULAR; INTRAVENOUS at 05:39

## 2025-06-07 RX ADMIN — HEPARIN SODIUM 5000 UNIT(S): 1000 INJECTION INTRAVENOUS; SUBCUTANEOUS at 05:40

## 2025-06-07 RX ADMIN — CLOTRIMAZOLE 1 APPLICATION(S): 1 CREAM TOPICAL at 17:34

## 2025-06-07 RX ADMIN — BUDESONIDE AND FORMOTEROL FUMARATE DIHYDRATE 2 PUFF(S): 80; 4.5 AEROSOL RESPIRATORY (INHALATION) at 18:23

## 2025-06-07 RX ADMIN — Medication 4 MILLILITER(S): at 07:18

## 2025-06-07 RX ADMIN — Medication 4 MILLILITER(S): at 19:59

## 2025-06-07 RX ADMIN — IPRATROPIUM BROMIDE AND ALBUTEROL SULFATE 3 MILLILITER(S): .5; 2.5 SOLUTION RESPIRATORY (INHALATION) at 07:18

## 2025-06-07 RX ADMIN — INSULIN LISPRO 10 UNIT(S): 100 INJECTION, SOLUTION INTRAVENOUS; SUBCUTANEOUS at 17:24

## 2025-06-07 RX ADMIN — INSULIN LISPRO 5 UNIT(S): 100 INJECTION, SOLUTION INTRAVENOUS; SUBCUTANEOUS at 12:09

## 2025-06-07 RX ADMIN — BUTYROSPERMUM PARKII(SHEA BUTTER), SIMMONDSIA CHINENSIS (JOJOBA) SEED OIL, ALOE BARBADENSIS LEAF EXTRACT 250 MILLIGRAM(S): .01; 1; 3.5 LIQUID TOPICAL at 05:39

## 2025-06-07 RX ADMIN — SERTRALINE 100 MILLIGRAM(S): 100 TABLET, FILM COATED ORAL at 12:12

## 2025-06-07 RX ADMIN — INSULIN GLARGINE-YFGN 20 UNIT(S): 100 INJECTION, SOLUTION SUBCUTANEOUS at 23:20

## 2025-06-07 RX ADMIN — Medication 20 MILLIEQUIVALENT(S): at 12:12

## 2025-06-07 RX ADMIN — OXYCODONE HYDROCHLORIDE 5 MILLIGRAM(S): 30 TABLET ORAL at 21:58

## 2025-06-07 RX ADMIN — INSULIN LISPRO 5 UNIT(S): 100 INJECTION, SOLUTION INTRAVENOUS; SUBCUTANEOUS at 08:25

## 2025-06-07 RX ADMIN — DEXTROMETHORPHAN HBR, GUAIFENESIN 200 MILLIGRAM(S): 200 LIQUID ORAL at 12:13

## 2025-06-07 RX ADMIN — PREGABALIN 150 MILLIGRAM(S): 50 CAPSULE ORAL at 17:29

## 2025-06-07 RX ADMIN — IPRATROPIUM BROMIDE AND ALBUTEROL SULFATE 3 MILLILITER(S): .5; 2.5 SOLUTION RESPIRATORY (INHALATION) at 13:39

## 2025-06-07 RX ADMIN — Medication 1 TABLET(S): at 17:29

## 2025-06-07 RX ADMIN — OXYCODONE HYDROCHLORIDE 10 MILLIGRAM(S): 30 TABLET ORAL at 13:20

## 2025-06-07 RX ADMIN — INSULIN LISPRO 12: 100 INJECTION, SOLUTION INTRAVENOUS; SUBCUTANEOUS at 12:09

## 2025-06-07 RX ADMIN — MONTELUKAST SODIUM 10 MILLIGRAM(S): 10 TABLET ORAL at 12:13

## 2025-06-07 RX ADMIN — Medication 100 MILLIGRAM(S): at 05:40

## 2025-06-07 RX ADMIN — PREDNISONE 10 MILLIGRAM(S): 20 TABLET ORAL at 05:39

## 2025-06-07 RX ADMIN — BUDESONIDE AND FORMOTEROL FUMARATE DIHYDRATE 2 PUFF(S): 80; 4.5 AEROSOL RESPIRATORY (INHALATION) at 07:40

## 2025-06-07 RX ADMIN — Medication 25 GRAM(S): at 00:01

## 2025-06-07 RX ADMIN — Medication 1 TABLET(S): at 21:58

## 2025-06-07 RX ADMIN — DEXTROMETHORPHAN HBR, GUAIFENESIN 200 MILLIGRAM(S): 200 LIQUID ORAL at 17:29

## 2025-06-07 RX ADMIN — TIOTROPIUM BROMIDE INHALATION SPRAY 2 PUFF(S): 3.12 SPRAY, METERED RESPIRATORY (INHALATION) at 07:40

## 2025-06-07 NOTE — PROGRESS NOTE ADULT - SUBJECTIVE AND OBJECTIVE BOX
PROGRESS NOTE  Patient is a 74y old  Male who presents with a chief complaint of rle swelling (07 Jun 2025 13:58)    Chart and available morning labs /imaging are reviewed electronically , urgent issues addressed . More information  is being added upon completion of rounds , when more information is collected and management discussed with consultants , medical staff and social service/case management on the floor   OVERNIGHT  No new issues reported by medical staff . All above noted Patient is resting in a bed comfortably .No distress noted     HPI:  Patient with a past medical history of diabetes, COPD, heart failure, CKD, hypertension, history of right foot ulcer is presenting for wound to right lower extremity.  Was recently admitted here for shortness of breath as well as for bursitis and concern for a wound.  Was sent back to facility.  They sent him to the ER today due to worsening swelling and redness localized to his wound of the right lower extremity.  Endorsing pain to the site.  Denies fevers, chest pain, nausea or vomiting.  States that he is having some chronic shortness of breath though unchanged today.  Also endorses chronic cough. (03 Jun 2025 18:32)    PAST MEDICAL & SURGICAL HISTORY:  Prostate cancer      Type II diabetes mellitus      Chronic obstructive pulmonary disease (COPD)      CHF (congestive heart failure)      Renal insufficiency      H/O migraine      Insomnia      Constipation      S/P foot surgery          MEDICATIONS  (STANDING):  albuterol/ipratropium for Nebulization 3 milliLiter(s) Nebulizer every 8 hours  ascorbic acid 500 milliGRAM(s) Oral daily  aspirin enteric coated 81 milliGRAM(s) Oral daily  budesonide 160 MICROgram(s)/formoterol 4.5 MICROgram(s) Inhaler 2 Puff(s) Inhalation two times a day  clotrimazole 1% Cream 1 Application(s) Topical two times a day  DAPTOmycin IVPB 400 milliGRAM(s) IV Intermittent every 24 hours  dextrose 5%. 1000 milliLiter(s) (50 mL/Hr) IV Continuous <Continuous>  dextrose 5%. 1000 milliLiter(s) (100 mL/Hr) IV Continuous <Continuous>  dextrose 50% Injectable 25 Gram(s) IV Push once  dextrose 50% Injectable 12.5 Gram(s) IV Push once  dextrose 50% Injectable 25 Gram(s) IV Push once  ferrous    sulfate 325 milliGRAM(s) Oral daily  furosemide   Injectable 60 milliGRAM(s) IV Push daily  glucagon  Injectable 1 milliGRAM(s) IntraMuscular once  guaiFENesin Oral Liquid (Sugar-Free) 200 milliGRAM(s) Oral every 6 hours  heparin   Injectable 5000 Unit(s) SubCutaneous every 8 hours  insulin glargine Injectable (LANTUS) 20 Unit(s) SubCutaneous at bedtime  insulin lispro (ADMELOG) corrective regimen sliding scale   SubCutaneous at bedtime  insulin lispro (ADMELOG) corrective regimen sliding scale.   SubCutaneous three times a day before meals  insulin lispro Injectable (ADMELOG) 10 Unit(s) SubCutaneous three times a day before meals  lactobacillus acidophilus 1 Tablet(s) Oral two times a day with meals  montelukast 10 milliGRAM(s) Oral daily  multivitamin/minerals 1 Tablet(s) Oral daily  pantoprazole    Tablet 40 milliGRAM(s) Oral before breakfast  piperacillin/tazobactam IVPB.. 3.375 Gram(s) IV Intermittent every 8 hours  potassium chloride    Tablet ER 20 milliEquivalent(s) Oral daily  predniSONE   Tablet 10 milliGRAM(s) Oral daily  pregabalin 150 milliGRAM(s) Oral two times a day  rosuvastatin 40 milliGRAM(s) Oral at bedtime  saccharomyces boulardii 250 milliGRAM(s) Oral two times a day  senna 1 Tablet(s) Oral at bedtime  sertraline 100 milliGRAM(s) Oral daily  sodium chloride 3%  Inhalation 4 milliLiter(s) Inhalation every 12 hours  tiotropium 2.5 MICROgram(s) Inhaler 2 Puff(s) Inhalation daily    MEDICATIONS  (PRN):  acetaminophen     Tablet .. 650 milliGRAM(s) Oral every 6 hours PRN Temp greater or equal to 38C (100.4F), Mild Pain (1 - 3)  aluminum hydroxide/magnesium hydroxide/simethicone Suspension 30 milliLiter(s) Oral every 4 hours PRN Dyspepsia  bisacodyl Suppository 10 milliGRAM(s) Rectal daily PRN Constipation  clonazePAM  Tablet 0.5 milliGRAM(s) Oral three times a day PRN ANXIETY  dextrose Oral Gel 15 Gram(s) Oral once PRN Blood Glucose LESS THAN 70 milliGRAM(s)/deciliter  melatonin 3 milliGRAM(s) Oral at bedtime PRN Insomnia  ondansetron Injectable 4 milliGRAM(s) IV Push every 8 hours PRN Nausea and/or Vomiting  oxyCODONE    IR 10 milliGRAM(s) Oral two times a day PRN Severe Pain (7 - 10)  oxyCODONE    IR 5 milliGRAM(s) Oral every 8 hours PRN Moderate Pain (4 - 6)  polyethylene glycol 3350 17 Gram(s) Oral daily PRN for constipation      OBJECTIVE    T(C): 36.4 (06-07-25 @ 12:18), Max: 36.7 (06-07-25 @ 05:25)  HR: 77 (06-07-25 @ 13:32) (77 - 99)  BP: 113/75 (06-07-25 @ 12:18) (113/75 - 119/68)  RR: 19 (06-07-25 @ 12:18) (19 - 19)  SpO2: 97% (06-07-25 @ 13:32) (92% - 97%)  Wt(kg): --  I&O's Summary        REVIEW OF SYSTEMS:  CONSTITUTIONAL: No fever, weight loss, or fatigue  EYES: No eye pain, visual disturbances, or discharge  ENMT:   No sinus or throat pain  NECK: No pain or stiffness  RESPIRATORY: No cough, wheezing, chills or hemoptysis; No shortness of breath  CARDIOVASCULAR: No chest pain, palpitations, dizziness, or leg swelling  GASTROINTESTINAL: No abdominal pain. No nausea, vomiting; No diarrhea or constipation. No melena or hematochezia.  GENITOURINARY: No dysuria, frequency, hematuria, or incontinence  NEUROLOGICAL: No headaches, memory loss, loss of strength, numbness, or tremors  SKIN: No itching, burning, rashes, or lesions   MUSCULOSKELETAL: No joint pain or swelling; No muscle, back, or extremity pain    PHYSICAL EXAM:  Appearance: NAD. VS past 24 hrs -as above   HEENT:   Moist oral mucosa. Conjunctiva clear b/l.   Neck : supple  Respiratory: Lungs CTAB.  Gastrointestinal:  Soft, nontender. No rebound. No rigidity. BS present	  Cardiovascular: RRR ,S1S2 present  Neurologic: Non-focal. Moving all extremities.  Extremities: No edema. No erythema. No calf tenderness.  Skin: No rashes, No ecchymoses, No cyanosis.	  wounds ,skin lesions-See skin assesment flow sheet   LABS:                        12.3   7.56  )-----------( 150      ( 07 Jun 2025 07:15 )             38.5     06-07    138  |  99  |  32[H]  ----------------------------<  226[H]  3.1[L]   |  29  |  1.80[H]    Ca    9.2      07 Jun 2025 07:15  Phos  3.0     06-07  Mg     2.2     06-07      CAPILLARY BLOOD GLUCOSE      POCT Blood Glucose.: 252 mg/dL (07 Jun 2025 16:54)  POCT Blood Glucose.: 406 mg/dL (07 Jun 2025 11:43)  POCT Blood Glucose.: 292 mg/dL (07 Jun 2025 08:10)  POCT Blood Glucose.: 194 mg/dL (06 Jun 2025 23:16)  POCT Blood Glucose.: 198 mg/dL (06 Jun 2025 21:29)      Urinalysis Basic - ( 07 Jun 2025 07:15 )    Color: x / Appearance: x / SG: x / pH: x  Gluc: 226 mg/dL / Ketone: x  / Bili: x / Urobili: x   Blood: x / Protein: x / Nitrite: x   Leuk Esterase: x / RBC: x / WBC x   Sq Epi: x / Non Sq Epi: x / Bacteria: x        Culture - Urine (collected 05 Jun 2025 17:50)  Source: Clean Catch Clean Catch (Midstream)  Final Report (06 Jun 2025 23:48):    No growth    Culture - Blood (collected 03 Jun 2025 15:00)  Source: Blood Blood-Peripheral  Preliminary Report (07 Jun 2025 01:01):    No growth at 72 Hours    Culture - Blood (collected 03 Jun 2025 14:45)  Source: Blood Blood-Peripheral  Preliminary Report (07 Jun 2025 01:01):    No growth at 72 Hours      RADIOLOGY & ADDITIONAL TESTS:   reviewed elctronically  ASSESSMENT/PLAN: 	  25 minutes aggregate time was spent on this visit, 50% visit time spent in care co-ordination with other attendings and counselling patient .I have discussed care plan with patient / HCP/family member ,who expressed understanding of problems treatment and their effect and side effects, alternatives in details. I have asked if they have any questions and concerns and appropriately addressed them to best of my ability.

## 2025-06-07 NOTE — PROGRESS NOTE ADULT - SUBJECTIVE AND OBJECTIVE BOX
PROGRESS NOTE   Patient is a 74y old  Male who presents with a chief complaint of rle swelling (07 Jun 2025 09:25)      HPI:  Patient with a past medical history of diabetes, COPD, heart failure, CKD, hypertension, history of right foot ulcer is presenting for wound to right lower extremity.  Was recently admitted here for shortness of breath as well as for bursitis and concern for a wound.  Was sent back to facility.  They sent him to the ER today due to worsening swelling and redness localized to his wound of the right lower extremity.  Endorsing pain to the site.  Denies fevers, chest pain, nausea or vomiting.  States that he is having some chronic shortness of breath though unchanged today.  Also endorses chronic cough. (03 Jun 2025 18:32)      Vital Signs Last 24 Hrs  T(C): 36.7 (07 Jun 2025 05:25), Max: 36.7 (06 Jun 2025 20:15)  T(F): 98.1 (07 Jun 2025 05:25), Max: 98.1 (06 Jun 2025 20:15)  HR: 82 (07 Jun 2025 05:25) (75 - 103)  BP: 119/68 (07 Jun 2025 05:25) (117/72 - 119/68)  BP(mean): --  RR: 19 (07 Jun 2025 05:25) (18 - 19)  SpO2: 92% (07 Jun 2025 05:25) (91% - 93%)    Parameters below as of 07 Jun 2025 05:25  Patient On (Oxygen Delivery Method): room air                              12.3   7.56  )-----------( 150      ( 07 Jun 2025 07:15 )             38.5               06-06    138  |  99  |  30[H]  ----------------------------<  334[H]  3.3[L]   |  27  |  1.80[H]    Ca    9.0      06 Jun 2025 09:05        PHYSICAL EXAM  LOWER EXTREMITY PHYSICAL EXAM:  Integument : Skin warm, dry and supple bilateral  Right lower leg with redness, swelling and pain on palpation, partial thickness wound covered with eschar. All consistent with cellulitis   The area of concern has responded favorably with current treatment  Vascular : DP and PT weakly palpable 1/4 bilaterally  Capillary refill time less than 3s digits 1-5 bilaterally. Moderate to severe edema bilaterally with right is greater than left  MSK : Muscle strenth 4/5 all major muscle group bilaterally

## 2025-06-07 NOTE — PROVIDER CONTACT NOTE (OTHER) - ASSESSMENT
patient asymptomatic
pt A&O x4, complaining of difficulty breathing. pt appears to be in distress, coughing frequently, red in the face. leaning over in chair. pt states that he feels he has something stuck in his throat, like he is choking.

## 2025-06-07 NOTE — PROGRESS NOTE ADULT - ASSESSMENT
REASON FOR VISIT  .. Management of problems listed below      EVENTS/CURRENT ISSUES.  . 6/6/2025 started symbicort spiriva         REVIEW OF SYMPTOMS   Able to give ROS  Yes     RELIABILITY +/-   CONSTITUTIONAL Weakness Yes    ENDOCRINE  No heat or cold intolerance    ALLERGY No hives  Sore throat No stridor  RESP Shortness of breath YES   NEURO New weakness No   CARDIAC   Palpitations No         PHYSICAL EXAM    HEENT Unremarkable  atraumatic   RESP Fair air entry  Harsh breath sound   CARDIAC S1 S2 No S3     NO JVD    ABDOMEN No hepatosplenomegaly   PEDAL EDEMA present No calf tenderness      REASON FOR VISIT  .. Management of problems listed below      CC.   . 6/3/2025  Pt brought in by EMS from [facility] with concern for worsening pedal edema, wound on R lower leg. 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  edema, wound check  OVERALL PRESENTATION.  . 6/3/2025  Patient with a past medical history of diabetes, COPD, heart failure, CKD, hypertension, history of right foot ulcer is presenting for wound to right lower extremity.  Was recently admitted here for shortness of breath as well as for bursitis and concern for a wound.  Was sent back to facility.  They sent him to the ER today due to worsening swelling and redness localized to his wound of the right lower extremity.  Endorsing pain to the site.  Denies fevers, chest pain, nausea or vomiting.  States that he is having some chronic shortness of breath though unchanged today.  Also endorses chronic cough.  PMH.      PAST HOSPITAL STAYS .  Home Medications:   * Patient Currently Takes Medications as of 27-May-2025 11:17 documented in Structured Notes  · montelukast 10 mg oral tablet: 1 tab(s) orally once a day  · melatonin 3 mg oral tablet: 1 tab(s) orally once a day (at bedtime)  · senna leaf extract oral tablet: 1 tab(s) orally once a day (at bedtime)  · saccharomyces boulardii lyo 250 mg oral capsule: 1 cap(s) orally once a day  · sertraline 100 mg oral tablet: 1 tab(s) orally once a day MDD: 1  · furosemide 40 mg oral tablet: 1 tab(s) orally every 12 hours  · sulfamethoxazole-trimethoprim 800 mg-160 mg oral tablet: 1 tab(s) orally every 12 hours  · aspirin 81 mg oral delayed release tablet: 1 tab(s) orally once a day  · potassium chloride 20 mEq oral tablet, extended release: 1 tab(s) orally 2 times a day  · albuterol 0.63 mg/3 mL (0.021%) inhalation solution: 3 milliliter(s) by nebulizer 3 times a day  · predniSONE 10 mg oral tablet: 1 tab(s) orally once a day  · oxyCODONE 5 mg oral tablet: 2 tab(s) orally 2 times a day as needed for Severe Pain (7 - 10) MDD: 4  · loratadine 10 mg oral tablet: 1 tab(s) orally once a day (in the morning)  · Multiple Vitamins with Minerals oral tablet: 1 tab(s) orally once a day  · ferrous sulfate 325 mg (65 mg elemental iron) oral tablet: 1 tab(s) orally once a day  · pantoprazole 40 mg oral delayed release tablet: 1 tab(s) orally once a day (before a meal)  · Symbicort 160 mcg-4.5 mcg/inh inhalation aerosol: 2 puff(s) inhaled 2 times a day  · rosuvastatin 40 mg oral tablet: 1 tab(s) orally once a day  · polyethylene glycol 3350 oral powder for reconstitution: 17 gram(s) orally once a day as needed for  constipation  · Jardiance 10 mg oral tablet: 1 tab(s) orally once a day  · pregabalin 150 mg oral capsule: 1 cap(s) orally 2 times a day  · insulin glargine 100 units/mL subcutaneous solution: 36 unit(s) subcutaneous once a day (at bedtime)  · HumaLOG 100 units/mL injectable solution: 15 unit(s) injectable 3 times a day (before meals) ***sliding scale***  ER MGMT .    BEST PRACTICE ISSUES.  . HOB ELEVATN.    .... Yes  . DIET  .   .... CONS CARB  6/3   .....   . PHARMAC DVT PPLX .    .... HPSC 6/3  . NON PHARMAC DVT PPLX .      . STRESS ULCR PPLX .   .... PROPRANOLOL 40 6/4/2025   . DATE/DM MGMT.   ..... See under Endocrine section   GENERAL DATA .   . COVID.         .... scv26/4/2025 (-)    . GOC.    ....    . ICU STAY.    .... no   . INFECTION PPLX .   ....   . ALLGY.   .... TETRACYCLINE  ..... VANCOMYCIN  .... IODINE    . WT.   .... 6/4/2025 104 k  . BMI.  .... 6/4/2025 33      [id number]  VITALS/GAS EXCHANGE/DRIPS    ABGS.     .  VS/ PO/IO/ VENT/ DRIPS.   6/7/2025 afeb 82 119/68   6/7/2025 ra 92%     XXXX[id number]  PROBLEM ASSESSMENT RECOMMENDATIONS.  RESP.   . GAS EXCHANGE .   .... target PO 90-95%     . SOB  .... 2/2 COPD CHF PNEUM    . RO DVT   .... Venous duplx 6/4/2025 (-)     . COPD   .... DUONEB 6/4/2025   .... PULMICORT 6/4/2025   .... MONTELULKAST 6/4/2025   .... PREDNISONE 10 6/4/2025     INFECTION.  . DATA  .... esr 6/5/2025 56   .... w 6/4/2025 w 9.5  .... cxr 6/4/2025 cw 5/21/2025   ........ bibasal atelectasis   ........ interval increase of left lower lobe opacity concerning for pneumonia   .... Flu ab 6/4/2025 (-)    .... strep (-) 6/6/2025  .... legn (-) 6/6/2025   .... mrsa 6/6/2025 (+)     . VENOUS STASIS CHANGES RLE    . PNEUMONIA   .... cxr 6/4/2025 cw 5/21/2025   ........  increase of left lower lobe opacity concerning for pneumonia     . CELLULITIS R LOWER LEG 6/4/2025   .... XR R foot 6/4/2025 No emphysema     . ANTIBIO   .... DAPTOMYCIN 6/4/2025 D 400/d x 7d Dr Mosqueda   .... ZOSYN 6/4/2025 Z x 7d     CARDIAC.  . CAD    .... ASA 81 6/3   .... ROSUVASTATIN 40 6/3       . CHF  .... pbnp 6/4/2025 pbnp 123  .... tte 5/26/2025   ......... n lvsf ef 60% ivc rap 3   .... LASIX 60 IV/d 6/4/2025    .... kcl 20 6/4     HEMAT.  . DATA  .... Hb 6/4/2025 Hb 12.1  .... Plt 6/4/2025 plt 143  .... inr 6/3/2025 inr 105  .... monitor     GI.   . DIET .   .... 6/4/2025 CONS CARB    . LFT MONITORING   .... LFTS   6/4/2025  ........ AP   73   ........ AST 14  ........ ALT  32  .... monitor     RENAL.  . CKD  .... Na 6/4/2025 Na 141   .... CO2 6/4/2025 co2 30   .... Cr 5/27-6/4-6/5/2025   .... Cr 1.6-1.7 - 1.6   ....  monitor     . HYPOKALEMIA   .... K 6/4-6/5/2025 K 3.4 - 2.8    ENDO.  . DM  .  .... INSULIN GLARIGINE 10 HS 6/4/2025     NEURO.  . ANXIETY   .... CLONAZEPAM 0.5 tid 6/4/2025     . PAIN  .... PREGABALIN 150. 2 6/3    . DEPRESSION  .... SERTRALINE 100 6/2   ....     XXXXXXXXXXXXXXXXXXXXXX   SUMMARY BASELINE .     .. 74 m history of DM CHF, COPD, prostate cancer, renal insufficiency, diabetes, chronic neck pain,  right foot wound from [facility] who had been recently admitted with SOB   CC.   . 6/3/2025 PEDAL EDEMA  . 6/3/2025 WOUND R LOWER LEG   . 6/3/2025 CHRONIC SHORTNESS OF BREATH  . 6/3/2025 CHRONIC COUGH   MAIN ISSUES.  . COPD  . SHORTNESS OF BREATH  . PEDAL EDEMA   .... Venous duplx 6/4/2025 (-)   . VENOUS STASIS CHANGES RLE  . CELLULITIS R LOWER LEG 6/4/2025   .... XR R foot 6/4/2025 No emphysema   . PNEUMONIA   .... cxr 6/4/2025 cw 5/21/2025  incr of lll  opacity  . ANTIBIO   .... DAPTOMYCIN 6/4/2025 D 400/d x 7d Dr Mosqueda   .... ZOSYN 6/4/2025 Z x 7d   . CKD   .... Cr 5/27-6/4/2025 Cr 1.6-1.7   . HYPOKALEMIA   .... K 6/4-6/5/2025 K 3.4 - 2.8  . DM    PMH.   . LUNG NODULES  .... CT ch 5/21/2025 cw 12/28/2024   ......... mild tib nodules left lo lobe may be infection or inflammn    . PNEUMONIA   .... ROCEPHIN 5/21  . SKIN SOFT TISSUE INFECTION  .... DAPTOMYCIN 5/22- 5/26 d 450 x 7d   .... bactrim 5/26  . RLE ANKLE OPEN WOUND   . DM       DISCUSSIONS.  .... Discussed with primary care and relevant consultants on an ongoing basis     TIME SPENT.  . Over 36 minutes aggregate care time spent on encounter; activities included   direct patient care, counseling and/or coordinating care reviewing notes, lab data/ imaging , discussion with multidisciplinary team/ patient  /family and explaining in detail risks, benefits, alternatives  of the recommendations     MAGGIE BLACK 73 hernesto 6/3/2025 1951

## 2025-06-07 NOTE — PROGRESS NOTE ADULT - SUBJECTIVE AND OBJECTIVE BOX
Patient is a 74y Male whom presented to the hospital with kayleen     PAST MEDICAL & SURGICAL HISTORY:  Prostate cancer      Type II diabetes mellitus      Chronic obstructive pulmonary disease (COPD)      CHF (congestive heart failure)      Renal insufficiency      H/O migraine      Insomnia      Constipation      S/P foot surgery          MEDICATIONS  (STANDING):  albuterol/ipratropium for Nebulization 3 milliLiter(s) Nebulizer every 8 hours  ascorbic acid 500 milliGRAM(s) Oral daily  aspirin enteric coated 81 milliGRAM(s) Oral daily  budesonide    Inhalation Suspension 0.5 milliGRAM(s) Inhalation two times a day  clotrimazole 1% Cream 1 Application(s) Topical two times a day  DAPTOmycin IVPB 400 milliGRAM(s) IV Intermittent every 24 hours  dextrose 5%. 1000 milliLiter(s) (100 mL/Hr) IV Continuous <Continuous>  dextrose 5%. 1000 milliLiter(s) (50 mL/Hr) IV Continuous <Continuous>  dextrose 50% Injectable 25 Gram(s) IV Push once  dextrose 50% Injectable 12.5 Gram(s) IV Push once  dextrose 50% Injectable 25 Gram(s) IV Push once  ferrous    sulfate 325 milliGRAM(s) Oral daily  furosemide   Injectable 60 milliGRAM(s) IV Push daily  glucagon  Injectable 1 milliGRAM(s) IntraMuscular once  heparin   Injectable 5000 Unit(s) SubCutaneous every 8 hours  insulin glargine Injectable (LANTUS) 10 Unit(s) SubCutaneous at bedtime  insulin lispro (ADMELOG) corrective regimen sliding scale   SubCutaneous at bedtime  insulin lispro (ADMELOG) corrective regimen sliding scale.   SubCutaneous three times a day before meals  montelukast 10 milliGRAM(s) Oral daily  multivitamin/minerals 1 Tablet(s) Oral daily  pantoprazole    Tablet 40 milliGRAM(s) Oral before breakfast  piperacillin/tazobactam IVPB.. 3.375 Gram(s) IV Intermittent every 8 hours  potassium chloride    Tablet ER 20 milliEquivalent(s) Oral daily  predniSONE   Tablet 10 milliGRAM(s) Oral daily  pregabalin 150 milliGRAM(s) Oral two times a day  rosuvastatin 40 milliGRAM(s) Oral at bedtime  saccharomyces boulardii 250 milliGRAM(s) Oral two times a day  senna 1 Tablet(s) Oral at bedtime  sertraline 100 milliGRAM(s) Oral daily      Allergies    IODINE (Unknown)  tetracycline (Unknown)  vancomycin (Other)    Intolerances        SOCIAL HISTORY:  Denies ETOh,Smoking,     FAMILY HISTORY:      REVIEW OF SYSTEMS:    CONSTITUTIONAL: No weakness, fevers or chills  RESPIRATORY: No cough, wheezing, hemoptysis; No shortness of breath  CARDIOVASCULAR: No chest pain or palpitations  GASTROINTESTINAL: No abdominal or epigastric pain. No nausea, vomiting,     No diarrhea or constipation. No melena                               12.3   7.56  )-----------( 150      ( 07 Jun 2025 07:15 )             38.5       CBC Full  -  ( 07 Jun 2025 07:15 )  WBC Count : 7.56 K/uL  RBC Count : 4.36 M/uL  Hemoglobin : 12.3 g/dL  Hematocrit : 38.5 %  Platelet Count - Automated : 150 K/uL  Mean Cell Volume : 88.3 fl  Mean Cell Hemoglobin : 28.2 pg  Mean Cell Hemoglobin Concentration : 31.9 g/dL  Auto Neutrophil # : x  Auto Lymphocyte # : x  Auto Monocyte # : x  Auto Eosinophil # : x  Auto Basophil # : x  Auto Neutrophil % : x  Auto Lymphocyte % : x  Auto Monocyte % : x  Auto Eosinophil % : x  Auto Basophil % : x      06-07    138  |  99  |  32[H]  ----------------------------<  226[H]  3.1[L]   |  29  |  1.80[H]    Ca    9.2      07 Jun 2025 07:15  Phos  3.0     06-07  Mg     2.2     06-07        CAPILLARY BLOOD GLUCOSE      POCT Blood Glucose.: 406 mg/dL (07 Jun 2025 11:43)  POCT Blood Glucose.: 292 mg/dL (07 Jun 2025 08:10)  POCT Blood Glucose.: 194 mg/dL (06 Jun 2025 23:16)  POCT Blood Glucose.: 198 mg/dL (06 Jun 2025 21:29)  POCT Blood Glucose.: 248 mg/dL (06 Jun 2025 16:58)      Vital Signs Last 24 Hrs  T(C): 36.4 (07 Jun 2025 12:18), Max: 36.7 (06 Jun 2025 20:15)  T(F): 97.6 (07 Jun 2025 12:18), Max: 98.1 (06 Jun 2025 20:15)  HR: 78 (07 Jun 2025 12:18) (78 - 103)  BP: 113/75 (07 Jun 2025 12:18) (113/75 - 119/68)  BP(mean): --  RR: 19 (07 Jun 2025 12:18) (18 - 19)  SpO2: 94% (07 Jun 2025 12:18) (92% - 95%)    Parameters below as of 07 Jun 2025 12:18  Patient On (Oxygen Delivery Method): nasal cannula        Urinalysis Basic - ( 07 Jun 2025 07:15 )    Color: x / Appearance: x / SG: x / pH: x  Gluc: 226 mg/dL / Ketone: x  / Bili: x / Urobili: x   Blood: x / Protein: x / Nitrite: x   Leuk Esterase: x / RBC: x / WBC x   Sq Epi: x / Non Sq Epi: x / Bacteria: x                PHYSICAL EXAM:    Constitutional: NAD  HEENT: conjunctive   clear   Neck:  No JVD  Respiratory: CTAB  Cardiovascular: S1 and S2  Gastrointestinal: BS+, soft, NT/ND  Extremities: No peripheral edema    LABS:                        12.1   9.50  )-----------( 143      ( 04 Jun 2025 06:05 )             37.9     06-04    141  |  104  |  36[H]  ----------------------------<  137[H]  3.4[L]   |  30  |  1.70[H]    Ca    9.2      04 Jun 2025 06:05  Phos  2.9     06-04  Mg     2.5     06-04    TPro  6.9  /  Alb  3.0[L]  /  TBili  0.5  /  DBili  x   /  AST  14[L]  /  ALT  32  /  AlkPhos  73  06-04      Urine Studies:  Urinalysis Basic - ( 04 Jun 2025 06:05 )    Color: x / Appearance: x / SG: x / pH: x  Gluc: 137 mg/dL / Ketone: x  / Bili: x / Urobili: x   Blood: x / Protein: x / Nitrite: x   Leuk Esterase: x / RBC: x / WBC x   Sq Epi: x / Non Sq Epi: x / Bacteria: x            RADIOLOGY & ADDITIONAL STUDIES:

## 2025-06-07 NOTE — PROGRESS NOTE ADULT - SUBJECTIVE AND OBJECTIVE BOX
CHIEF COMPLAINT/ REASON FOR VISIT  .. Patient was seen to address the  issue listed under PROBLEM LIST which is located toward bottom of this note     WAYNE SERRANO    PLV 2EAS 214 D1    Allergies    IODINE (Unknown)  tetracycline (Unknown)  vancomycin (Other)    Intolerances        PAST MEDICAL & SURGICAL HISTORY:  Prostate cancer      Type II diabetes mellitus      Chronic obstructive pulmonary disease (COPD)      CHF (congestive heart failure)      Renal insufficiency      H/O migraine      Insomnia      Constipation      S/P foot surgery          FAMILY HISTORY:      Home Medications:  albuterol 0.63 mg/3 mL (0.021%) inhalation solution: 3 milliliter(s) by nebulizer 3 times a day as needed for  shortness of breath and/or wheezing (2025 08:48)  amoxicillin-clavulanate 875 mg-125 mg oral tablet: 1 tab(s) orally 2 times a day for 7 days (:48)  Artificial Tears ophthalmic solution: 1 drop(s) in each eye 2 times a day (:48)  ascorbic acid 500 mg oral tablet: 1 tab(s) orally 2 times a day (:48)  aspirin 81 mg oral tablet: 1 tab(s) orally once a day (:48)  clonazePAM 0.25 mg oral tablet, disintegratin tab(s) orally 3 times a day (:48)  ferrous sulfate 325 mg (65 mg elemental iron) oral tablet: 1 tab(s) orally once a day (:48)  furosemide 40 mg oral tablet: 1 tab(s) orally every 12 hours (:48)  HumaLOG 100 units/mL subcutaneous solution: Inject subcutaneously 3 times a day using sliding scale (:48)  HumaLOG KwikPen 100 units/mL injectable solution: Inject 15 units subcutaneously 3 times a day (before each meal) in addition to sliding scale (:48)  Jardiance 10 mg oral tablet: 1 tab(s) orally once a day (:48)  Lantus Solostar Pen 100 units/mL subcutaneous solution: 36 unit(s) subcutaneously once a day (at bedtime) (2025 08:48)  loperamide 2 mg oral capsule: 1 cap(s) orally after each loose BM every 6 hours as needed **No more than 3 capsules (6mg) a day** (2025 08:48)  loratadine 10 mg oral tablet: 1 tab(s) orally once a day (:48)  Melatonin 3 mg oral tablet: 1 tab(s) orally once a day (at bedtime) (:48)  montelukast 10 mg oral tablet: 1 tab(s) orally once a day (at bedtime) (:48)  multivitamin: 1 tab(s) orally once a day (:48)  oxyCODONE 5 mg oral tablet: 2 tab(s) orally 2 times a day MDD: 4 tablets (:48)  pantoprazole 40 mg oral delayed release tablet: 1 tab(s) orally once a day (:48)  Polyethylene Glycol 3350: Mix 17grams into 8oz of water and drink orally once a day as needed (:48)  Potassium Chloride (Eqv-Klor-Con M20) 20 mEq oral tablet, extended release: 1 tab(s) orally 2 times a day (:48)  predniSONE 10 mg oral tablet: 1 tab(s) orally once a day with food or milk for 1 month, then after 1 month take 5mg orally once a day (:48)  pregabalin 150 mg oral capsule: 1 cap(s) orally 2 times a day MDD: 2 capsules (:48)  ramelteon 8 mg oral tablet: 1 tab(s) orally once a day (at bedtime) (:48)  rosuvastatin 40 mg oral tablet: 1 tab(s) orally once a day (at bedtime) (:48)  saccharomyces boulardii lyo 250 mg oral capsule: 1 cap(s) orally once a day (:48)  Saline Mist 0.65% nasal spray: 1 spray(s) in each nostril 2 times a day (:48)  Senna 8.6 mg oral tablet: 1 tab(s) orally once a day (at bedtime) (:48)  sertraline 100 mg oral tablet: 1.5 tab(s) orally once a day (150mg) (2025 08:48)  Symbicort 160 mcg-4.5 mcg/inh inhalation aerosol: 2 puff(s) inhaled 2 times a day (2025 08:48)      MEDICATIONS  (STANDING):  albuterol/ipratropium for Nebulization 3 milliLiter(s) Nebulizer every 8 hours  ascorbic acid 500 milliGRAM(s) Oral daily  aspirin enteric coated 81 milliGRAM(s) Oral daily  budesonide 160 MICROgram(s)/formoterol 4.5 MICROgram(s) Inhaler 2 Puff(s) Inhalation two times a day  clotrimazole 1% Cream 1 Application(s) Topical two times a day  DAPTOmycin IVPB 400 milliGRAM(s) IV Intermittent every 24 hours  dextrose 5%. 1000 milliLiter(s) (100 mL/Hr) IV Continuous <Continuous>  dextrose 5%. 1000 milliLiter(s) (50 mL/Hr) IV Continuous <Continuous>  dextrose 50% Injectable 25 Gram(s) IV Push once  dextrose 50% Injectable 12.5 Gram(s) IV Push once  dextrose 50% Injectable 25 Gram(s) IV Push once  ferrous    sulfate 325 milliGRAM(s) Oral daily  furosemide   Injectable 60 milliGRAM(s) IV Push daily  glucagon  Injectable 1 milliGRAM(s) IntraMuscular once  guaiFENesin Oral Liquid (Sugar-Free) 200 milliGRAM(s) Oral every 6 hours  heparin   Injectable 5000 Unit(s) SubCutaneous every 8 hours  insulin glargine Injectable (LANTUS) 15 Unit(s) SubCutaneous at bedtime  insulin lispro (ADMELOG) corrective regimen sliding scale   SubCutaneous at bedtime  insulin lispro (ADMELOG) corrective regimen sliding scale.   SubCutaneous three times a day before meals  insulin lispro Injectable (ADMELOG) 5 Unit(s) SubCutaneous three times a day before meals  lactobacillus acidophilus 1 Tablet(s) Oral two times a day with meals  montelukast 10 milliGRAM(s) Oral daily  multivitamin/minerals 1 Tablet(s) Oral daily  pantoprazole    Tablet 40 milliGRAM(s) Oral before breakfast  phenazopyridine 100 milliGRAM(s) Oral three times a day  piperacillin/tazobactam IVPB.. 3.375 Gram(s) IV Intermittent every 8 hours  potassium chloride    Tablet ER 20 milliEquivalent(s) Oral daily  predniSONE   Tablet 10 milliGRAM(s) Oral daily  pregabalin 150 milliGRAM(s) Oral two times a day  rosuvastatin 40 milliGRAM(s) Oral at bedtime  saccharomyces boulardii 250 milliGRAM(s) Oral two times a day  senna 1 Tablet(s) Oral at bedtime  sertraline 100 milliGRAM(s) Oral daily  sodium chloride 3%  Inhalation 4 milliLiter(s) Inhalation every 12 hours  tiotropium 2.5 MICROgram(s) Inhaler 2 Puff(s) Inhalation daily    MEDICATIONS  (PRN):  acetaminophen     Tablet .. 650 milliGRAM(s) Oral every 6 hours PRN Temp greater or equal to 38C (100.4F), Mild Pain (1 - 3)  aluminum hydroxide/magnesium hydroxide/simethicone Suspension 30 milliLiter(s) Oral every 4 hours PRN Dyspepsia  bisacodyl Suppository 10 milliGRAM(s) Rectal daily PRN Constipation  clonazePAM  Tablet 0.5 milliGRAM(s) Oral three times a day PRN ANXIETY  dextrose Oral Gel 15 Gram(s) Oral once PRN Blood Glucose LESS THAN 70 milliGRAM(s)/deciliter  melatonin 3 milliGRAM(s) Oral at bedtime PRN Insomnia  ondansetron Injectable 4 milliGRAM(s) IV Push every 8 hours PRN Nausea and/or Vomiting  oxyCODONE    IR 10 milliGRAM(s) Oral two times a day PRN Severe Pain (7 - 10)  oxyCODONE    IR 5 milliGRAM(s) Oral every 8 hours PRN Moderate Pain (4 - 6)  polyethylene glycol 3350 17 Gram(s) Oral daily PRN for constipation      Diet, Consistent Carbohydrate w/Evening Snack:   DASH/TLC Sodium & Cholesterol Restricted (25 @ 18:09) [Active]          Vital Signs Last 24 Hrs  T(C): 36.7 (2025 05:25), Max: 36.7 (2025 20:15)  T(F): 98.1 (2025 05:25), Max: 98.1 (2025 20:15)  HR: 82 (2025 05:25) (75 - 103)  BP: 119/68 (2025 05:25) (117/72 - 119/68)  BP(mean): --  RR: 19 (2025 05:25) (18 - 19)  SpO2: 92% (2025 05:25) (91% - 93%)    Parameters below as of 2025 05:25  Patient On (Oxygen Delivery Method): room air                  LABS:                        12.3   7.56  )-----------( 150      ( 2025 07:15 )             38.5         138  |  99  |  30[H]  ----------------------------<  334[H]  3.3[L]   |  27  |  1.80[H]    Ca    9.0      2025 09:05        Urinalysis Basic - ( 2025 09:05 )    Color: x / Appearance: x / SG: x / pH: x  Gluc: 334 mg/dL / Ketone: x  / Bili: x / Urobili: x   Blood: x / Protein: x / Nitrite: x   Leuk Esterase: x / RBC: x / WBC x   Sq Epi: x / Non Sq Epi: x / Bacteria: x            WBC:  WBC Count: 7.56 K/uL ( @ 07:15)  WBC Count: 8.38 K/uL ( @ 09:05)  WBC Count: 8.83 K/uL ( @ 07:05)  WBC Count: 9.50 K/uL ( @ 06:05)  WBC Count: 9.64 K/uL ( @ 14:45)      MICROBIOLOGY:  RECENT CULTURES:   Clean Catch Clean Catch (Midstream) XXXX XXXX   No growth     Blood Blood-Peripheral XXXX XXXX   No growth at 72 Hours     Blood Blood-Peripheral XXXX XXXX   No growth at 72 Hours                    Sodium:  Sodium: 138 mmol/L ( @ 09:05)  Sodium: 142 mmol/L ( @ 07:05)  Sodium: 141 mmol/L ( @ 06:05)  Sodium: 138 mmol/L ( @ 14:45)      1.80 mg/dL  @ 09:05  1.60 mg/dL  @ 07:05  1.70 mg/dL  @ 06:05  2.00 mg/dL  @ 14:45      Hemoglobin:  Hemoglobin: 12.3 g/dL ( @ 07:15)  Hemoglobin: 12.9 g/dL ( @ 09:05)  Hemoglobin: 12.0 g/dL ( @ 07:05)  Hemoglobin: 12.1 g/dL ( @ 06:05)  Hemoglobin: 12.5 g/dL ( @ 14:45)      Platelets: Platelet Count - Automated: 150 K/uL ( @ 07:15)  Platelet Count - Automated: 147 K/uL ( @ 09:05)  Platelet Count - Automated: 153 K/uL ( @ 07:05)  Platelet Count - Automated: 143 K/uL ( @ 06:05)  Platelet Count - Automated: 160 K/uL ( @ 14:45)          Urinalysis Basic - ( 2025 09:05 )    Color: x / Appearance: x / SG: x / pH: x  Gluc: 334 mg/dL / Ketone: x  / Bili: x / Urobili: x   Blood: x / Protein: x / Nitrite: x   Leuk Esterase: x / RBC: x / WBC x   Sq Epi: x / Non Sq Epi: x / Bacteria: x        RADIOLOGY & ADDITIONAL STUDIES:      MICROBIOLOGY:  RECENT CULTURES:   Clean Catch Clean Catch (Midstream) XXXX XXXX   No growth     Blood Blood-Peripheral XXXX XXXX   No growth at 72 Hours     Blood Blood-Peripheral XXXX XXXX   No growth at 72 Hours

## 2025-06-07 NOTE — PROGRESS NOTE ADULT - ASSESSMENT
75 y/o M with a past medical history of diabetes, COPD, heart failure, CKD, hypertension, history of right foot ulcer is presenting for wound to right lower extremity.  Was recently admitted here for shortness of breath as well as for bursitis and concern for a wound.  Was sent back to facility.  They sent him to the ER today due to worsening swelling and redness localized to his wound of the right lower extremity. Admitted for RLE cellulitis.     - Pt presenting with LE edema, admitted for cellulitis   - Hx HFpEF  - Previous TTE 05/2025 with EF 60-65% and trace TR  - on iv Lasix 60 mg daily, would continue for now and reevaluate in am   - Creatinine:  <--1.80,  <--1.80,  <--1.60  - Follow renal recommendations.   - Strict I/Os, daily weights    - EKG with SR, 84bpm, low voltage, unchanged from previous EKG  - No evidence of any active ischemia   - Continue aspirin   - Continue statin for HLD    - BP stable and controlled   - Was on BB but was dc d/t soft BP on previous admission     - Monitor and replete lytes, keep K>4, Mg>2.  - Will continue to follow.    Alcon Leonardo, MS FNP, AGACNP  Nurse Practitioner- Cardiology   Please call on TEAMS

## 2025-06-07 NOTE — PROGRESS NOTE ADULT - SUBJECTIVE AND OBJECTIVE BOX
[facility] -- Erick Hogue Pannella, Patel, Savella, Goodger, Cohen: Office # [phone]    Follow Up: BLE edema       Subjective/Observations: Patient seen and examined. Patient awake, alert, resting in bed. No complaints of chest pain, dyspnea, palpitations or dizziness. No signs of orthopnea or PND. Tolerating O2 via nasal cannula.     REVIEW OF SYSTEMS: All other review of systems are negative unless indicated above    PAST MEDICAL & SURGICAL HISTORY:  Prostate cancer      Type II diabetes mellitus      Chronic obstructive pulmonary disease (COPD)      CHF (congestive heart failure)      Renal insufficiency      H/O migraine      Insomnia      Constipation      S/P foot surgery          MEDICATIONS  (STANDING):  albuterol/ipratropium for Nebulization 3 milliLiter(s) Nebulizer every 8 hours  ascorbic acid 500 milliGRAM(s) Oral daily  aspirin enteric coated 81 milliGRAM(s) Oral daily  budesonide 160 MICROgram(s)/formoterol 4.5 MICROgram(s) Inhaler 2 Puff(s) Inhalation two times a day  clotrimazole 1% Cream 1 Application(s) Topical two times a day  DAPTOmycin IVPB 400 milliGRAM(s) IV Intermittent every 24 hours  dextrose 5%. 1000 milliLiter(s) (100 mL/Hr) IV Continuous <Continuous>  dextrose 5%. 1000 milliLiter(s) (50 mL/Hr) IV Continuous <Continuous>  dextrose 50% Injectable 25 Gram(s) IV Push once  dextrose 50% Injectable 12.5 Gram(s) IV Push once  dextrose 50% Injectable 25 Gram(s) IV Push once  ferrous    sulfate 325 milliGRAM(s) Oral daily  furosemide   Injectable 60 milliGRAM(s) IV Push daily  glucagon  Injectable 1 milliGRAM(s) IntraMuscular once  guaiFENesin Oral Liquid (Sugar-Free) 200 milliGRAM(s) Oral every 6 hours  heparin   Injectable 5000 Unit(s) SubCutaneous every 8 hours  insulin glargine Injectable (LANTUS) 15 Unit(s) SubCutaneous at bedtime  insulin lispro (ADMELOG) corrective regimen sliding scale   SubCutaneous at bedtime  insulin lispro (ADMELOG) corrective regimen sliding scale.   SubCutaneous three times a day before meals  insulin lispro Injectable (ADMELOG) 5 Unit(s) SubCutaneous three times a day before meals  lactobacillus acidophilus 1 Tablet(s) Oral two times a day with meals  montelukast 10 milliGRAM(s) Oral daily  multivitamin/minerals 1 Tablet(s) Oral daily  pantoprazole    Tablet 40 milliGRAM(s) Oral before breakfast  phenazopyridine 100 milliGRAM(s) Oral three times a day  piperacillin/tazobactam IVPB.. 3.375 Gram(s) IV Intermittent every 8 hours  potassium chloride    Tablet ER 20 milliEquivalent(s) Oral daily  predniSONE   Tablet 10 milliGRAM(s) Oral daily  pregabalin 150 milliGRAM(s) Oral two times a day  rosuvastatin 40 milliGRAM(s) Oral at bedtime  saccharomyces boulardii 250 milliGRAM(s) Oral two times a day  senna 1 Tablet(s) Oral at bedtime  sertraline 100 milliGRAM(s) Oral daily  sodium chloride 3%  Inhalation 4 milliLiter(s) Inhalation every 12 hours  tiotropium 2.5 MICROgram(s) Inhaler 2 Puff(s) Inhalation daily    MEDICATIONS  (PRN):  acetaminophen     Tablet .. 650 milliGRAM(s) Oral every 6 hours PRN Temp greater or equal to 38C (100.4F), Mild Pain (1 - 3)  aluminum hydroxide/magnesium hydroxide/simethicone Suspension 30 milliLiter(s) Oral every 4 hours PRN Dyspepsia  bisacodyl Suppository 10 milliGRAM(s) Rectal daily PRN Constipation  clonazePAM  Tablet 0.5 milliGRAM(s) Oral three times a day PRN ANXIETY  dextrose Oral Gel 15 Gram(s) Oral once PRN Blood Glucose LESS THAN 70 milliGRAM(s)/deciliter  melatonin 3 milliGRAM(s) Oral at bedtime PRN Insomnia  ondansetron Injectable 4 milliGRAM(s) IV Push every 8 hours PRN Nausea and/or Vomiting  oxyCODONE    IR 10 milliGRAM(s) Oral two times a day PRN Severe Pain (7 - 10)  oxyCODONE    IR 5 milliGRAM(s) Oral every 8 hours PRN Moderate Pain (4 - 6)  polyethylene glycol 3350 17 Gram(s) Oral daily PRN for constipation    Allergies    IODINE (Unknown)  tetracycline (Unknown)  vancomycin (Other)    Intolerances      Vital Signs Last 24 Hrs  T(C): 36.7 (07 Jun 2025 05:25), Max: 36.7 (06 Jun 2025 20:15)  T(F): 98.1 (07 Jun 2025 05:25), Max: 98.1 (06 Jun 2025 20:15)  HR: 92 (07 Jun 2025 07:35) (75 - 103)  BP: 119/68 (07 Jun 2025 05:25) (117/72 - 119/68)  BP(mean): --  RR: 19 (07 Jun 2025 05:25) (18 - 19)  SpO2: 95% (07 Jun 2025 07:35) (91% - 95%)    Parameters below as of 07 Jun 2025 07:35  Patient On (Oxygen Delivery Method): room air      I&O's Summary        TELE: Not on telemetry   PHYSICAL EXAM:  Constitutional: NAD, awake and alert, Obese   HEENT: Moist Mucous Membranes, Anicteric  Pulmonary: Non-labored, breath sounds are clear bilaterally, No wheezing, rales or rhonchi  Cardiovascular: Regular, S1 and S2, No murmurs, No rubs, gallops or clicks  Gastrointestinal:  soft, nontender, nondistended   Lymph: Trace-+1 LE peripheral edema. No lymphadenopathy.   Skin: No visible rashes or ulcers.  Psych:  Mood & affect appropriate      LABS: All Labs Reviewed:                        12.3   7.56  )-----------( 150      ( 07 Jun 2025 07:15 )             38.5                         12.9   8.38  )-----------( 147      ( 06 Jun 2025 09:05 )             40.7                         12.0   8.83  )-----------( 153      ( 05 Jun 2025 07:05 )             37.5     07 Jun 2025 07:15    138    |  99     |  32     ----------------------------<  226    3.1     |  29     |  1.80   06 Jun 2025 09:05    138    |  99     |  30     ----------------------------<  334    3.3     |  27     |  1.80   05 Jun 2025 07:05    142    |  103    |  27     ----------------------------<  151    2.8     |  30     |  1.60     Ca    9.2        07 Jun 2025 07:15  Ca    9.0        06 Jun 2025 09:05  Ca    9.0        05 Jun 2025 07:05  Phos  3.0       07 Jun 2025 07:15  Phos  3.1       05 Jun 2025 07:05  Mg     2.2       07 Jun 2025 07:15  Mg     2.2       05 Jun 2025 07:05       Cholesterol: 109 mg/dL (05-22-25 @ 08:05)  HDL Cholesterol: 43 mg/dL (05-22-25 @ 08:05)  Triglycerides, Serum: 111 mg/dL (05-22-25 @ 08:05)    12 Lead ECG:   Ventricular Rate 84 BPM    Atrial Rate 84 BPM    P-R Interval 234 ms    QRS Duration 94 ms    Q-T Interval 396 ms    QTC Calculation(Bazett) 467 ms    P Axis 21 degrees    R Axis -66 degrees    T Axis 41 degrees    Diagnosis Line Sinus rhythm with 1st degree AV block  Left axis deviation  Low voltage QRS  Inferior infarct , age undetermined  Cannot rule out Anteroseptal infarct (cited on or before 26-AUG-2023)  Abnormal ECG    Confirmed by Amber Quevedo ([id number]) on 6/4/2025 1:14:42 PM (06-04-25 @ 09:45)      TRANSTHORACIC ECHOCARDIOGRAM REPORT  ________________________________________________________________________________                                      _______       Pt. Name:       WAYNE SERRANO Study Date:    5/26/2025  MRN:            [id number]     YOB: 1950  Accession #:    [id number]     Age:           74 years  Account#:       [id number]    Gender:        M  Heart Rate:                   Height:        70.08 in (178.00 cm)  Rhythm:                       Weight:        242.50 lb (110.00 kg)  Blood Pressure: 99/65 mmHg    BSA/BMI:       2.27 m² / 34.72 kg/m²  ________________________________________________________________________________________  Referring Physician:    [id number] Mart Tripp  Interpreting Physician: Amber Quevedo MD  Primary Sonographer:    Checo Broderick    CPT:               ECHO TTE WO CON COMP W DOPP - 06783.m  Indication(s):     Dyspnea, unspecified - R06.00  Procedure:         Transthoracic echocardiogram with 2-D, M-mode and complete             spectral and color flow Doppler.  Ordering Location: [facility]  Admission Status:  Inpatient  Study Information: Image quality for this study is technically difficult.                     Technically difficult study secondary to lung interference.    _______________________________________________________________________________________     CONCLUSIONS:      1. Technically difficult image quality.   2. Left ventricular endocardium is not well visualized; however, the left ventricular systolic function appears grossly normal.   3. Left ventricular systolic function is normal with an ejection fraction visually estimated at 60 to 65 %.   4. The right ventricle is not well visualized. probably normal right ventricular systolic function.   5. The inferior vena cava is normal in size measuring 1.22 cm in diameter, (normal <2.1cm) with normal inspiratory collapse (normal >50%) consistent with normal right atrial pressure (~3, range 0-5mmHg).    ________________________________________________________________________________________  FINDINGS:     Left Ventricle:  Left ventricular systolic function is normal with an ejection fraction visually estimated at 60 to 65%. There is poor visualization of the endocardial borders to determine thepresence of wall motion abnormalities. Left ventricular endocardium is not well visualized; however, the left ventricular systolic function appears grossly normal. There is normal left ventricular diastolic function.     Right Ventricle:  The right ventricle is not well visualized. Right ventricular systolic function is probably normal.     Left Atrium:  The left atrium is normal in size with an indexed volume of 12.22 ml/m².     Right Atrium:  The right atrium was not well visualized.     Interatrial Septum:  The interatrial septum was not well visualized.     Aortic Valve:  The aortic valve was not well visualized. The aortic valve anatomy cannot be determined with normal systolic excursion.     Mitral Valve:  The mitral valve was not well visualized. There is mild leaflet calcification.     Tricuspid Valve:  The tricuspid valve was not well visualized. There is trace tricuspid regurgitation.     Pulmonic Valve:  The pulmonic valve was not well visualized.     Aorta:  The aortic root atthe sinuses of Valsalva is normal in size, measuring 3.20 cm (indexed 1.41 cm/m²). The ascending aorta is normal in size, measuring 3.00 cm (indexed 1.32 cm/m²).     Systemic Veins:  The inferior vena cava is normal in size measuring 1.22 cm in diameter, (normal <2.1cm) with normal inspiratory collapse (normal >50%) consistent with normal right atrial pressure (~3, range 0-5mmHg).  ____________________________________________________________________  QUANTITATIVE DATA:  Left Ventricle Measurements: (Indexed to BSA)     IVSd (2D):   1.4 cm  LVPWd (2D):  1.3 cm  LVIDd (2D):  3.4 cm  LVIDs (2D):  2.2 cm  LV Mass:     158 g  69.7 g/m²  Visualized LV EF%: 60 to 65%     MV E Vmax:    0.56 m/s  MV A Vmax:    1.17 m/s  MV E/A:       0.47  e' lateral:  6.31 cm/s  e' medial:    3.26 cm/s  E/e' lateral: 8.80  E/e' medial:  17.02  E/e' Average: 11.60  MV DT:        156 msec    Aorta Measurements: (Normal range) (Indexed to BSA)     Ao Root d     3.20 cm (3.1 - 3.7 cm) 1.41 cm/m²  Ao Asc d, 2D: 3.00  Ao Asc prox:  3.00 cm                1.32 cm/m²            Left Atrium Measurements: (Indexed to BSA)  LA Diam 2D: 2.60 cm            LVOT / RVOT/ Qp/Qs Data: (Indexed to BSA)  LVOT Diameter,s: 2.20 cm  LVOT Area:       3.80 cm²    Mitral Valve Measurements:     MV E Vmax: 0.6 m/s  MV A Vmax: 1.2 m/s  MV E/A:    0.5       Tricuspid Valve Measurements:     RA Pressure: 3 mmHg    ________________________________________________________________________________________  Electronically signed on 5/27/2025 at 8:22:27 AM by Amber Quevedo MD         *** Final ***

## 2025-06-07 NOTE — PROGRESS NOTE ADULT - ASSESSMENT
Patient with a past medical history of diabetes, COPD, heart failure, CKD, hypertension, history of right foot ulcer is presenting for wound to right lower extremity.  Was recently admitted here for shortness of breath as well as for bursitis and concern for a wound.  Was sent back to facility.  They sent him to the ER today due to worsening swelling and redness localized to his wound of the right lower extremity.  Endorsing pain to the site.  Denies fevers, chest pain, nausea or vomiting.  States that he is having some chronic shortness of breath though unchanged today.  Also endorses chronic cough. (03 Jun 2025 18:32)      CKD sTAGE 3 and ACUTE RENAL FAILURE:   Serum creatinine is  at  1.8   , approximating GFR at   ml/min. id overload furosemide   Injectable 60 milliGRAM(s) IV Push daily  There is no progression . No uremic symptoms  No evidence of anemia .  Fluid status stable.  Will continue to avoid nephrotoxic drugs.  Patient remains asymptomatic.   Continue current therapy.  Additional evaluation:   ECG,    echocardiogram,     CXR,  will obtained recent   renal ultrasound to evalaute kidney size and possible stones ,  potassium chloride    Tablet ER 20 milliEquivalent(s) Oral daily prn     BP monitoring,continue current antihypertensive meds, low salt diet,followup with PMD in 1-2 weeks      Admit for septic workup and ID evaluation,send blood and urine cx,serial lactate levels,monitor vitals closley,ivfs hydration,monitor urine output and renal profile,iv abx as per id cons  piperacillin/tazobactam IVPB.. 3.375 Gram(s) IV Intermittent every 8 hours

## 2025-06-08 LAB
ANION GAP SERPL CALC-SCNC: 11 MMOL/L — SIGNIFICANT CHANGE UP (ref 5–17)
BUN SERPL-MCNC: 28 MG/DL — HIGH (ref 7–23)
CALCIUM SERPL-MCNC: 9.1 MG/DL — SIGNIFICANT CHANGE UP (ref 8.5–10.1)
CHLORIDE SERPL-SCNC: 100 MMOL/L — SIGNIFICANT CHANGE UP (ref 96–108)
CO2 SERPL-SCNC: 27 MMOL/L — SIGNIFICANT CHANGE UP (ref 22–31)
CREAT SERPL-MCNC: 1.7 MG/DL — HIGH (ref 0.5–1.3)
EGFR: 42 ML/MIN/1.73M2 — LOW
EGFR: 42 ML/MIN/1.73M2 — LOW
GLUCOSE SERPL-MCNC: 265 MG/DL — HIGH (ref 70–99)
HCT VFR BLD CALC: 37.1 % — LOW (ref 39–50)
HGB BLD-MCNC: 11.9 G/DL — LOW (ref 13–17)
MCHC RBC-ENTMCNC: 28.5 PG — SIGNIFICANT CHANGE UP (ref 27–34)
MCHC RBC-ENTMCNC: 32.1 G/DL — SIGNIFICANT CHANGE UP (ref 32–36)
MCV RBC AUTO: 88.8 FL — SIGNIFICANT CHANGE UP (ref 80–100)
NRBC BLD AUTO-RTO: 0 /100 WBCS — SIGNIFICANT CHANGE UP (ref 0–0)
PLATELET # BLD AUTO: 139 K/UL — LOW (ref 150–400)
POTASSIUM SERPL-MCNC: 3.4 MMOL/L — LOW (ref 3.5–5.3)
POTASSIUM SERPL-SCNC: 3.4 MMOL/L — LOW (ref 3.5–5.3)
RBC # BLD: 4.18 M/UL — LOW (ref 4.2–5.8)
RBC # FLD: 18.4 % — HIGH (ref 10.3–14.5)
SODIUM SERPL-SCNC: 138 MMOL/L — SIGNIFICANT CHANGE UP (ref 135–145)
WBC # BLD: 8.74 K/UL — SIGNIFICANT CHANGE UP (ref 3.8–10.5)
WBC # FLD AUTO: 8.74 K/UL — SIGNIFICANT CHANGE UP (ref 3.8–10.5)

## 2025-06-08 PROCEDURE — 99232 SBSQ HOSP IP/OBS MODERATE 35: CPT

## 2025-06-08 PROCEDURE — 99233 SBSQ HOSP IP/OBS HIGH 50: CPT

## 2025-06-08 RX ADMIN — BUTYROSPERMUM PARKII(SHEA BUTTER), SIMMONDSIA CHINENSIS (JOJOBA) SEED OIL, ALOE BARBADENSIS LEAF EXTRACT 250 MILLIGRAM(S): .01; 1; 3.5 LIQUID TOPICAL at 06:29

## 2025-06-08 RX ADMIN — PREGABALIN 150 MILLIGRAM(S): 50 CAPSULE ORAL at 17:15

## 2025-06-08 RX ADMIN — HEPARIN SODIUM 5000 UNIT(S): 1000 INJECTION INTRAVENOUS; SUBCUTANEOUS at 14:34

## 2025-06-08 RX ADMIN — Medication 20 MILLIEQUIVALENT(S): at 17:08

## 2025-06-08 RX ADMIN — IPRATROPIUM BROMIDE AND ALBUTEROL SULFATE 3 MILLILITER(S): .5; 2.5 SOLUTION RESPIRATORY (INHALATION) at 19:52

## 2025-06-08 RX ADMIN — Medication 20 MILLIEQUIVALENT(S): at 11:41

## 2025-06-08 RX ADMIN — CLONAZEPAM 0.5 MILLIGRAM(S): 0.5 TABLET ORAL at 11:51

## 2025-06-08 RX ADMIN — TIOTROPIUM BROMIDE INHALATION SPRAY 2 PUFF(S): 3.12 SPRAY, METERED RESPIRATORY (INHALATION) at 08:21

## 2025-06-08 RX ADMIN — IPRATROPIUM BROMIDE AND ALBUTEROL SULFATE 3 MILLILITER(S): .5; 2.5 SOLUTION RESPIRATORY (INHALATION) at 15:30

## 2025-06-08 RX ADMIN — INSULIN LISPRO 6: 100 INJECTION, SOLUTION INTRAVENOUS; SUBCUTANEOUS at 17:09

## 2025-06-08 RX ADMIN — IPRATROPIUM BROMIDE AND ALBUTEROL SULFATE 3 MILLILITER(S): .5; 2.5 SOLUTION RESPIRATORY (INHALATION) at 07:45

## 2025-06-08 RX ADMIN — Medication 25 GRAM(S): at 06:28

## 2025-06-08 RX ADMIN — Medication 40 MILLIGRAM(S): at 06:29

## 2025-06-08 RX ADMIN — INSULIN LISPRO 8: 100 INJECTION, SOLUTION INTRAVENOUS; SUBCUTANEOUS at 11:52

## 2025-06-08 RX ADMIN — Medication 4 MILLILITER(S): at 07:33

## 2025-06-08 RX ADMIN — HEPARIN SODIUM 5000 UNIT(S): 1000 INJECTION INTRAVENOUS; SUBCUTANEOUS at 06:28

## 2025-06-08 RX ADMIN — BUDESONIDE AND FORMOTEROL FUMARATE DIHYDRATE 2 PUFF(S): 80; 4.5 AEROSOL RESPIRATORY (INHALATION) at 22:24

## 2025-06-08 RX ADMIN — CLONAZEPAM 0.5 MILLIGRAM(S): 0.5 TABLET ORAL at 01:23

## 2025-06-08 RX ADMIN — Medication 1 TABLET(S): at 17:15

## 2025-06-08 RX ADMIN — Medication 1 TABLET(S): at 22:26

## 2025-06-08 RX ADMIN — Medication 81 MILLIGRAM(S): at 11:40

## 2025-06-08 RX ADMIN — Medication 1 TABLET(S): at 08:16

## 2025-06-08 RX ADMIN — Medication 4 MILLILITER(S): at 19:52

## 2025-06-08 RX ADMIN — MONTELUKAST SODIUM 10 MILLIGRAM(S): 10 TABLET ORAL at 11:41

## 2025-06-08 RX ADMIN — SERTRALINE 100 MILLIGRAM(S): 100 TABLET, FILM COATED ORAL at 11:41

## 2025-06-08 RX ADMIN — Medication 325 MILLIGRAM(S): at 11:41

## 2025-06-08 RX ADMIN — OXYCODONE HYDROCHLORIDE 10 MILLIGRAM(S): 30 TABLET ORAL at 08:17

## 2025-06-08 RX ADMIN — Medication 20 MILLIEQUIVALENT(S): at 14:32

## 2025-06-08 RX ADMIN — INSULIN GLARGINE-YFGN 20 UNIT(S): 100 INJECTION, SOLUTION SUBCUTANEOUS at 22:34

## 2025-06-08 RX ADMIN — PREDNISONE 10 MILLIGRAM(S): 20 TABLET ORAL at 06:29

## 2025-06-08 RX ADMIN — BUDESONIDE AND FORMOTEROL FUMARATE DIHYDRATE 2 PUFF(S): 80; 4.5 AEROSOL RESPIRATORY (INHALATION) at 08:21

## 2025-06-08 RX ADMIN — OXYCODONE HYDROCHLORIDE 10 MILLIGRAM(S): 30 TABLET ORAL at 22:25

## 2025-06-08 RX ADMIN — OXYCODONE HYDROCHLORIDE 10 MILLIGRAM(S): 30 TABLET ORAL at 23:25

## 2025-06-08 RX ADMIN — Medication 500 MILLIGRAM(S): at 11:41

## 2025-06-08 RX ADMIN — ROSUVASTATIN CALCIUM 40 MILLIGRAM(S): 20 TABLET, FILM COATED ORAL at 22:25

## 2025-06-08 RX ADMIN — PREGABALIN 150 MILLIGRAM(S): 50 CAPSULE ORAL at 06:29

## 2025-06-08 RX ADMIN — DEXTROMETHORPHAN HBR, GUAIFENESIN 200 MILLIGRAM(S): 200 LIQUID ORAL at 11:40

## 2025-06-08 RX ADMIN — INSULIN LISPRO 10 UNIT(S): 100 INJECTION, SOLUTION INTRAVENOUS; SUBCUTANEOUS at 17:09

## 2025-06-08 RX ADMIN — Medication 25 GRAM(S): at 14:34

## 2025-06-08 RX ADMIN — Medication 25 GRAM(S): at 22:26

## 2025-06-08 RX ADMIN — Medication 3 MILLIGRAM(S): at 01:23

## 2025-06-08 RX ADMIN — DEXTROMETHORPHAN HBR, GUAIFENESIN 200 MILLIGRAM(S): 200 LIQUID ORAL at 06:28

## 2025-06-08 RX ADMIN — INSULIN LISPRO 10 UNIT(S): 100 INJECTION, SOLUTION INTRAVENOUS; SUBCUTANEOUS at 11:52

## 2025-06-08 RX ADMIN — DEXTROMETHORPHAN HBR, GUAIFENESIN 200 MILLIGRAM(S): 200 LIQUID ORAL at 17:16

## 2025-06-08 RX ADMIN — INSULIN LISPRO 10 UNIT(S): 100 INJECTION, SOLUTION INTRAVENOUS; SUBCUTANEOUS at 08:13

## 2025-06-08 RX ADMIN — HEPARIN SODIUM 5000 UNIT(S): 1000 INJECTION INTRAVENOUS; SUBCUTANEOUS at 22:34

## 2025-06-08 RX ADMIN — OXYCODONE HYDROCHLORIDE 10 MILLIGRAM(S): 30 TABLET ORAL at 08:45

## 2025-06-08 RX ADMIN — DAPTOMYCIN 116 MILLIGRAM(S): 500 INJECTION, POWDER, LYOPHILIZED, FOR SOLUTION INTRAVENOUS at 16:37

## 2025-06-08 RX ADMIN — BUTYROSPERMUM PARKII(SHEA BUTTER), SIMMONDSIA CHINENSIS (JOJOBA) SEED OIL, ALOE BARBADENSIS LEAF EXTRACT 250 MILLIGRAM(S): .01; 1; 3.5 LIQUID TOPICAL at 17:15

## 2025-06-08 RX ADMIN — FUROSEMIDE 60 MILLIGRAM(S): 10 INJECTION INTRAMUSCULAR; INTRAVENOUS at 06:28

## 2025-06-08 RX ADMIN — CLOTRIMAZOLE 1 APPLICATION(S): 1 CREAM TOPICAL at 17:17

## 2025-06-08 RX ADMIN — CLOTRIMAZOLE 1 APPLICATION(S): 1 CREAM TOPICAL at 06:52

## 2025-06-08 RX ADMIN — INSULIN LISPRO 6: 100 INJECTION, SOLUTION INTRAVENOUS; SUBCUTANEOUS at 08:13

## 2025-06-08 RX ADMIN — Medication 1 TABLET(S): at 11:40

## 2025-06-08 NOTE — PROGRESS NOTE ADULT - SUBJECTIVE AND OBJECTIVE BOX
[facility] -- Erick Hogue, Amador, Lesli Rocha, , Nancy Quevedo  Office # [phone]    Follow Up:  Edema    Subjective/Observations: Remains on NC at 2L/min, though, able to tolerate RA.  Denies any form of respiratory or cardiac discomfort.  Admits to have resolved edema.      REVIEW OF SYSTEMS: All other review of systems is negative unless indicated above  PAST MEDICAL & SURGICAL HISTORY:  Prostate cancer      Type II diabetes mellitus      Chronic obstructive pulmonary disease (COPD)      CHF (congestive heart failure)      Renal insufficiency      H/O migraine      Insomnia      Constipation      S/P foot surgery    MEDICATIONS  (STANDING):  albuterol/ipratropium for Nebulization 3 milliLiter(s) Nebulizer every 8 hours  ascorbic acid 500 milliGRAM(s) Oral daily  aspirin enteric coated 81 milliGRAM(s) Oral daily  budesonide 160 MICROgram(s)/formoterol 4.5 MICROgram(s) Inhaler 2 Puff(s) Inhalation two times a day  clotrimazole 1% Cream 1 Application(s) Topical two times a day  DAPTOmycin IVPB 400 milliGRAM(s) IV Intermittent every 24 hours  dextrose 5%. 1000 milliLiter(s) (100 mL/Hr) IV Continuous <Continuous>  dextrose 5%. 1000 milliLiter(s) (50 mL/Hr) IV Continuous <Continuous>  dextrose 50% Injectable 25 Gram(s) IV Push once  dextrose 50% Injectable 12.5 Gram(s) IV Push once  dextrose 50% Injectable 25 Gram(s) IV Push once  ferrous    sulfate 325 milliGRAM(s) Oral daily  glucagon  Injectable 1 milliGRAM(s) IntraMuscular once  guaiFENesin Oral Liquid (Sugar-Free) 200 milliGRAM(s) Oral every 6 hours  heparin   Injectable 5000 Unit(s) SubCutaneous every 8 hours  insulin glargine Injectable (LANTUS) 20 Unit(s) SubCutaneous at bedtime  insulin lispro (ADMELOG) corrective regimen sliding scale   SubCutaneous at bedtime  insulin lispro (ADMELOG) corrective regimen sliding scale.   SubCutaneous three times a day before meals  insulin lispro Injectable (ADMELOG) 10 Unit(s) SubCutaneous three times a day before meals  lactobacillus acidophilus 1 Tablet(s) Oral two times a day with meals  montelukast 10 milliGRAM(s) Oral daily  multivitamin/minerals 1 Tablet(s) Oral daily  pantoprazole    Tablet 40 milliGRAM(s) Oral before breakfast  piperacillin/tazobactam IVPB.. 3.375 Gram(s) IV Intermittent every 8 hours  potassium chloride    Tablet ER 20 milliEquivalent(s) Oral daily  potassium chloride    Tablet ER 20 milliEquivalent(s) Oral every 2 hours  predniSONE   Tablet 10 milliGRAM(s) Oral daily  pregabalin 150 milliGRAM(s) Oral two times a day  rosuvastatin 40 milliGRAM(s) Oral at bedtime  saccharomyces boulardii 250 milliGRAM(s) Oral two times a day  senna 1 Tablet(s) Oral at bedtime  sertraline 100 milliGRAM(s) Oral daily  sodium chloride 3%  Inhalation 4 milliLiter(s) Inhalation every 12 hours  tiotropium 2.5 MICROgram(s) Inhaler 2 Puff(s) Inhalation daily    MEDICATIONS  (PRN):  acetaminophen     Tablet .. 650 milliGRAM(s) Oral every 6 hours PRN Temp greater or equal to 38C (100.4F), Mild Pain (1 - 3)  aluminum hydroxide/magnesium hydroxide/simethicone Suspension 30 milliLiter(s) Oral every 4 hours PRN Dyspepsia  bisacodyl Suppository 10 milliGRAM(s) Rectal daily PRN Constipation  clonazePAM  Tablet 0.5 milliGRAM(s) Oral three times a day PRN ANXIETY  dextrose Oral Gel 15 Gram(s) Oral once PRN Blood Glucose LESS THAN 70 milliGRAM(s)/deciliter  melatonin 3 milliGRAM(s) Oral at bedtime PRN Insomnia  ondansetron Injectable 4 milliGRAM(s) IV Push every 8 hours PRN Nausea and/or Vomiting  oxyCODONE    IR 10 milliGRAM(s) Oral two times a day PRN Severe Pain (7 - 10)  oxyCODONE    IR 5 milliGRAM(s) Oral every 8 hours PRN Moderate Pain (4 - 6)  polyethylene glycol 3350 17 Gram(s) Oral daily PRN for constipation    Allergies    IODINE (Unknown)  tetracycline (Unknown)  vancomycin (Other)    Intolerances      Vital Signs Last 24 Hrs  T(C): 36.3 (08 Jun 2025 12:33), Max: 36.4 (08 Jun 2025 05:37)  T(F): 97.4 (08 Jun 2025 12:33), Max: 97.5 (08 Jun 2025 05:37)  HR: 73 (08 Jun 2025 12:33) (73 - 86)  BP: 103/66 (08 Jun 2025 12:33) (103/66 - 121/73)  BP(mean): --  RR: 18 (08 Jun 2025 12:33) (18 - 19)  SpO2: 93% (08 Jun 2025 12:33) (92% - 94%)    Parameters below as of 08 Jun 2025 07:45  Patient On (Oxygen Delivery Method): nasal cannula      I&O's Summary    08 Jun 2025 07:01  -  08 Jun 2025 16:57  --------------------------------------------------------  IN: 0 mL / OUT: 1400 mL / NET: -1400 mL        PHYSICAL EXAM:  TELE: Not on tele  Constitutional: NAD, awake and alert  HEENT: Moist Mucous Membranes, Anicteric  Pulmonary: Non-labored, breath sounds are clear bilaterally, No wheezing, rales or rhonchi  Cardiovascular: Regular, S1 and S2, No murmurs, rubs, gallops or clicks  Gastrointestinal: Bowel Sounds present, soft, nontender.   Lymph: No peripheral edema. No lymphadenopathy.  Skin: No visible rashes or ulcers.  RLE cellulitis resolved  Psych:  Mood & affect appropriate  LABS: All Labs Reviewed:                        11.9   8.74  )-----------( 139      ( 08 Jun 2025 08:15 )             37.1                         12.3   7.56  )-----------( 150      ( 07 Jun 2025 07:15 )             38.5                         12.9   8.38  )-----------( 147      ( 06 Jun 2025 09:05 )             40.7     08 Jun 2025 08:15    138    |  100    |  28     ----------------------------<  265    3.4     |  27     |  1.70   07 Jun 2025 07:15    138    |  99     |  32     ----------------------------<  226    3.1     |  29     |  1.80   06 Jun 2025 09:05    138    |  99     |  30     ----------------------------<  334    3.3     |  27     |  1.80     Ca    9.1        08 Jun 2025 08:15  Ca    9.2        07 Jun 2025 07:15  Ca    9.0        06 Jun 2025 09:05  Phos  3.0       07 Jun 2025 07:15  Mg     2.2       07 Jun 2025 07:15    12 Lead ECG:   Ventricular Rate 84 BPM    Atrial Rate 84 BPM    P-R Interval 234 ms    QRS Duration 94 ms    Q-T Interval 396 ms    QTC Calculation(Bazett) 467 ms    P Axis 21 degrees    R Axis -66 degrees    T Axis 41 degrees    Diagnosis Line Sinus rhythm with 1st degree AV block  Left axis deviation  Low voltage QRS  Inferior infarct , age undetermined  Cannot rule out Anteroseptal infarct (cited on or before 26-AUG-2023)  Abnormal ECG    Confirmed by Amber Quevedo ([id number]) on 6/4/2025 1:14:42 PM (06-04-25 @ 09:45)    TRANSTHORACIC ECHOCARDIOGRAM REPORT  ________________________________________________________________________________                                      _______  Pt. Name:       WAYNE SERRANO Study Date:    5/26/2025  MRN:            [id number]     YOB: 1950  Accession #:    [id number]     Age:           74 years  Account#:       [id number]    Gender:        M  Heart Rate:                   Height:        70.08 in (178.00 cm)  Rhythm:                       Weight:        242.50 lb (110.00 kg)  Blood Pressure: 99/65 mmHg    BSA/BMI:       2.27 m² / 34.72 kg/m²  ________________________________________________________________________________________  Referring Physician:    [id number] Mart Tripp  Interpreting Physician: Amber Quevedo MD  Primary Sonographer:    Checo Broderick    CPT:               ECHO TTE WO CON COMP W DOPP - 21406.m  Indication(s):     Dyspnea, unspecified - R06.00  Procedure:         Transthoracic echocardiogram with 2-D, M-mode and complete             spectral and color flow Doppler.  Ordering Location: [facility]  Admission Status:  Inpatient  Study Information: Image quality for this study is technically difficult.                     Technically difficult study secondary to lung interference.    _______________________________________________________________________________________     CONCLUSIONS:      1. Technically difficult image quality.   2. Left ventricular endocardium is not well visualized; however, the left ventricular systolic function appears grossly normal.   3. Left ventricular systolic function is normal with an ejection fraction visually estimated at 60 to 65 %.   4. The right ventricle is not well visualized. probably normal right ventricular systolic function.   5. The inferior vena cava is normal in size measuring 1.22 cm in diameter, (normal <2.1cm) with normal inspiratory collapse (normal >50%) consistent with normal right atrial pressure (~3, range 0-5mmHg).    ________________________________________________________________________________________  FINDINGS:     Left Ventricle:  Left ventricular systolic function is normal with an ejection fraction visually estimated at 60 to 65%. There is poor visualization of the endocardial borders to determine thepresence of wall motion abnormalities. Left ventricular endocardium is not well visualized; however, the left ventricular systolic function appears grossly normal. There is normal left ventricular diastolic function.     Right Ventricle:  The right ventricle is not well visualized. Right ventricular systolic function is probably normal.     Left Atrium:  The left atrium is normal in size with an indexed volume of 12.22 ml/m².     Right Atrium:  The right atrium was not well visualized.     Interatrial Septum:  The interatrial septum was not well visualized.     Aortic Valve:  The aortic valve was not well visualized. The aortic valve anatomy cannot be determined with normal systolic excursion.     Mitral Valve:  The mitral valve was not well visualized. There is mild leaflet calcification.     Tricuspid Valve:  The tricuspid valve was not well visualized. There is trace tricuspid regurgitation.     Pulmonic Valve:  The pulmonic valve was not well visualized.     Aorta:  The aortic root atthe sinuses of Valsalva is normal in size, measuring 3.20 cm (indexed 1.41 cm/m²). The ascending aorta is normal in size, measuring 3.00 cm (indexed 1.32 cm/m²).     Systemic Veins:  The inferior vena cava is normal in size measuring 1.22 cm in diameter, (normal <2.1cm) with normal inspiratory collapse (normal >50%) consistent with normal right atrial pressure (~3, range 0-5mmHg).  ____________________________________________________________________  QUANTITATIVE DATA:  Left Ventricle Measurements: (Indexed to BSA)     IVSd (2D):   1.4 cm  LVPWd (2D):  1.3 cm  LVIDd (2D):  3.4 cm  LVIDs (2D):  2.2 cm  LV Mass:     158 g  69.7 g/m²  Visualized LV EF%: 60 to 65%     MV E Vmax:    0.56 m/s  MV A Vmax:    1.17 m/s  MV E/A:       0.47  e' lateral:  6.31 cm/s  e' medial:    3.26 cm/s  E/e' lateral: 8.80  E/e' medial:  17.02  E/e' Average: 11.60  MV DT:        156 msec    Aorta Measurements: (Normal range) (Indexed to BSA)     Ao Root d     3.20 cm (3.1 - 3.7 cm) 1.41 cm/m²  Ao Asc d, 2D: 3.00  Ao Asc prox:  3.00 cm                1.32 cm/m²            Left Atrium Measurements: (Indexed to BSA)  LA Diam 2D: 2.60 cm            LVOT / RVOT/ Qp/Qs Data: (Indexed to BSA)  LVOT Diameter,s: 2.20 cm  LVOT Area:       3.80 cm²    Mitral Valve Measurements:     MV E Vmax: 0.6 m/s  MV A Vmax: 1.2 m/s  MV E/A:    0.5    Tricuspid Valve Measurements:     RA Pressure: 3 mmHg  ________________________________________________________________________________________  Electronically signed on 5/27/2025 at 8:22:27 AM by Amber Quevedo MD     *** Final ***

## 2025-06-08 NOTE — PROGRESS NOTE ADULT - ASSESSMENT
73 y/o M with a past medical history of diabetes, COPD, heart failure, CKD, hypertension, history of right foot ulcer is presenting for wound to right lower extremity.  Was recently admitted here for shortness of breath as well as for bursitis and concern for a wound.  Was sent back to facility.  They sent him to the ER today due to worsening swelling and redness localized to his wound of the right lower extremity. Admitted for RLE cellulitis.     LE edema  - Edema appears to be more from cellulitis  - D/C IV Lasix 60 mg IV  and start home dose of 40 mg PO q12H  - Continue Abx per ID    - Hx HFpEF  - Previous TTE 05/2025 with EF 60-65% and trace TR  - No clear evidence of volume overload  - Wean and D/C O2    - EKG with SR, 84bpm, low voltage, unchanged from previous EKG  - No evidence of any active ischemia   - Continue home aspirin for now (indication?)   - Continue statin for HLD    - BP stable and controlled   - Continue to hold home BB.  BP initially low    - Monitor and replete lytes, keep K>4, Mg>2.  - Will continue to follow.    María Lux DNP, NP-C, AGACNP-C  Cardiology   Call TEAMS

## 2025-06-08 NOTE — PROGRESS NOTE ADULT - SUBJECTIVE AND OBJECTIVE BOX
CAPILLARY BLOOD GLUCOSE      POCT Blood Glucose.: 206 mg/dL (07 Jun 2025 22:59)  POCT Blood Glucose.: 252 mg/dL (07 Jun 2025 16:54)  POCT Blood Glucose.: 406 mg/dL (07 Jun 2025 11:43)  POCT Blood Glucose.: 292 mg/dL (07 Jun 2025 08:10)      Vital Signs Last 24 Hrs  T(C): 36.4 (08 Jun 2025 05:37), Max: 36.4 (07 Jun 2025 12:18)  T(F): 97.5 (08 Jun 2025 05:37), Max: 97.6 (07 Jun 2025 12:18)  HR: 75 (08 Jun 2025 05:37) (75 - 97)  BP: 121/73 (08 Jun 2025 05:37) (113/75 - 121/73)  BP(mean): --  RR: 19 (08 Jun 2025 05:37) (19 - 19)  SpO2: 94% (08 Jun 2025 05:37) (92% - 97%)    Parameters below as of 08 Jun 2025 05:37  Patient On (Oxygen Delivery Method): nasal cannula        Respiratory: CTA B/L  CV: RRR, S1S2, no murmurs, rubs or gallops  Abdominal: Soft, NT, ND +BS, Last BM  Extremities: No edema, + peripheral pulses     06-07    138  |  99  |  32[H]  ----------------------------<  226[H]  3.1[L]   |  29  |  1.80[H]    Ca    9.2      07 Jun 2025 07:15  Phos  3.0     06-07  Mg     2.2     06-07        dextrose 50% Injectable 25 Gram(s) IV Push once  dextrose 50% Injectable 12.5 Gram(s) IV Push once  dextrose 50% Injectable 25 Gram(s) IV Push once  dextrose Oral Gel 15 Gram(s) Oral once PRN  glucagon  Injectable 1 milliGRAM(s) IntraMuscular once  insulin glargine Injectable (LANTUS) 20 Unit(s) SubCutaneous at bedtime  insulin lispro (ADMELOG) corrective regimen sliding scale   SubCutaneous at bedtime  insulin lispro (ADMELOG) corrective regimen sliding scale.   SubCutaneous three times a day before meals  insulin lispro Injectable (ADMELOG) 10 Unit(s) SubCutaneous three times a day before meals  predniSONE   Tablet 10 milliGRAM(s) Oral daily  rosuvastatin 40 milliGRAM(s) Oral at bedtime

## 2025-06-08 NOTE — PROGRESS NOTE ADULT - SUBJECTIVE AND OBJECTIVE BOX
Patient is a 74y Male with a known history of :  Cellulitis of right lower leg [L03.115]    Chronic obstructive pulmonary disease (COPD) [J44.9]    Type II diabetes mellitus [E11.9]    CHF (congestive heart failure) [I50.9]    Constipation [K59.00]    Leg wound, right [S81.801A]    Acute kidney injury superimposed on CKD [N17.9]    Prophylactic measure [Z29.9]    CAD (coronary artery disease) [I25.10]    PAD (peripheral artery disease) [I73.9]    Pneumonia, aspiration [J69.0]    Type 2 diabetes mellitus with hyperglycemia [E11.65]    Non-pressure chronic ulcer of other part of right lower leg limited to breakdown of skin [L97.811]      HPI:  Patient with a past medical history of diabetes, COPD, heart failure, CKD, hypertension, history of right foot ulcer is presenting for wound to right lower extremity.  Was recently admitted here for shortness of breath as well as for bursitis and concern for a wound.  Was sent back to facility.  They sent him to the ER today due to worsening swelling and redness localized to his wound of the right lower extremity.  Endorsing pain to the site.  Denies fevers, chest pain, nausea or vomiting.  States that he is having some chronic shortness of breath though unchanged today.  Also endorses chronic cough. (03 Jun 2025 18:32)      REVIEW OF SYSTEMS:    CONSTITUTIONAL: No fever, weight loss, or fatigue  EYES: No eye pain, visual disturbances, or discharge  ENMT:  No difficulty hearing, tinnitus, vertigo; No sinus or throat pain  NECK: No pain or stiffness  BREASTS: No pain, masses, or nipple discharge  RESPIRATORY: No cough, wheezing, chills or hemoptysis; No shortness of breath  CARDIOVASCULAR: No chest pain, palpitations, dizziness, or leg swelling  GASTROINTESTINAL: No abdominal or epigastric pain. No nausea, vomiting, or hematemesis; No diarrhea or constipation. No melena or hematochezia.  GENITOURINARY: No dysuria, frequency, hematuria, or incontinence  NEUROLOGICAL: No headaches, memory loss, loss of strength, numbness, or tremors  SKIN: No itching, burning, rashes, or lesions   LYMPH NODES: No enlarged glands  ENDOCRINE: No heat or cold intolerance; No hair loss  MUSCULOSKELETAL: No joint pain or swelling; No muscle, back, or extremity pain  PSYCHIATRIC: No depression, anxiety, mood swings, or difficulty sleeping  HEME/LYMPH: No easy bruising, or bleeding gums  ALLERGY AND IMMUNOLOGIC: No hives or eczema    MEDICATIONS  (STANDING):  albuterol/ipratropium for Nebulization 3 milliLiter(s) Nebulizer every 8 hours  ascorbic acid 500 milliGRAM(s) Oral daily  aspirin enteric coated 81 milliGRAM(s) Oral daily  budesonide 160 MICROgram(s)/formoterol 4.5 MICROgram(s) Inhaler 2 Puff(s) Inhalation two times a day  clotrimazole 1% Cream 1 Application(s) Topical two times a day  DAPTOmycin IVPB 400 milliGRAM(s) IV Intermittent every 24 hours  dextrose 5%. 1000 milliLiter(s) (50 mL/Hr) IV Continuous <Continuous>  dextrose 5%. 1000 milliLiter(s) (100 mL/Hr) IV Continuous <Continuous>  dextrose 50% Injectable 25 Gram(s) IV Push once  dextrose 50% Injectable 12.5 Gram(s) IV Push once  dextrose 50% Injectable 25 Gram(s) IV Push once  ferrous    sulfate 325 milliGRAM(s) Oral daily  furosemide   Injectable 60 milliGRAM(s) IV Push daily  glucagon  Injectable 1 milliGRAM(s) IntraMuscular once  guaiFENesin Oral Liquid (Sugar-Free) 200 milliGRAM(s) Oral every 6 hours  heparin   Injectable 5000 Unit(s) SubCutaneous every 8 hours  insulin glargine Injectable (LANTUS) 20 Unit(s) SubCutaneous at bedtime  insulin lispro (ADMELOG) corrective regimen sliding scale   SubCutaneous at bedtime  insulin lispro (ADMELOG) corrective regimen sliding scale.   SubCutaneous three times a day before meals  insulin lispro Injectable (ADMELOG) 10 Unit(s) SubCutaneous three times a day before meals  lactobacillus acidophilus 1 Tablet(s) Oral two times a day with meals  montelukast 10 milliGRAM(s) Oral daily  multivitamin/minerals 1 Tablet(s) Oral daily  pantoprazole    Tablet 40 milliGRAM(s) Oral before breakfast  piperacillin/tazobactam IVPB.. 3.375 Gram(s) IV Intermittent every 8 hours  potassium chloride    Tablet ER 20 milliEquivalent(s) Oral daily  potassium chloride    Tablet ER 20 milliEquivalent(s) Oral every 2 hours  predniSONE   Tablet 10 milliGRAM(s) Oral daily  pregabalin 150 milliGRAM(s) Oral two times a day  rosuvastatin 40 milliGRAM(s) Oral at bedtime  saccharomyces boulardii 250 milliGRAM(s) Oral two times a day  senna 1 Tablet(s) Oral at bedtime  sertraline 100 milliGRAM(s) Oral daily  sodium chloride 3%  Inhalation 4 milliLiter(s) Inhalation every 12 hours  tiotropium 2.5 MICROgram(s) Inhaler 2 Puff(s) Inhalation daily    MEDICATIONS  (PRN):  acetaminophen     Tablet .. 650 milliGRAM(s) Oral every 6 hours PRN Temp greater or equal to 38C (100.4F), Mild Pain (1 - 3)  aluminum hydroxide/magnesium hydroxide/simethicone Suspension 30 milliLiter(s) Oral every 4 hours PRN Dyspepsia  bisacodyl Suppository 10 milliGRAM(s) Rectal daily PRN Constipation  clonazePAM  Tablet 0.5 milliGRAM(s) Oral three times a day PRN ANXIETY  dextrose Oral Gel 15 Gram(s) Oral once PRN Blood Glucose LESS THAN 70 milliGRAM(s)/deciliter  melatonin 3 milliGRAM(s) Oral at bedtime PRN Insomnia  ondansetron Injectable 4 milliGRAM(s) IV Push every 8 hours PRN Nausea and/or Vomiting  oxyCODONE    IR 10 milliGRAM(s) Oral two times a day PRN Severe Pain (7 - 10)  oxyCODONE    IR 5 milliGRAM(s) Oral every 8 hours PRN Moderate Pain (4 - 6)  polyethylene glycol 3350 17 Gram(s) Oral daily PRN for constipation      ALLERGIES: IODINE (Unknown)  tetracycline (Unknown)  vancomycin (Other)      FAMILY HISTORY:      PHYSICAL EXAMINATION:  -----------------------------  T(C): 36.4 (06-08-25 @ 05:37), Max: 36.4 (06-07-25 @ 12:18)  HR: 81 (06-08-25 @ 07:45) (75 - 86)  BP: 121/73 (06-08-25 @ 05:37) (113/75 - 121/73)  RR: 19 (06-08-25 @ 05:37) (19 - 19)  SpO2: 93% (06-08-25 @ 07:45) (92% - 97%)  Wt(kg): --        VITALS  T(C): 36.4 (06-08-25 @ 05:37), Max: 36.4 (06-07-25 @ 12:18)  HR: 81 (06-08-25 @ 07:45) (75 - 86)  BP: 121/73 (06-08-25 @ 05:37) (113/75 - 121/73)  RR: 19 (06-08-25 @ 05:37) (19 - 19)  SpO2: 93% (06-08-25 @ 07:45) (92% - 97%)    Constitutional: well developed, normal appearance, well groomed, well nourished, no deformities and no acute distress.   Eyes: the conjunctiva exhibited no abnormalities and the eyelids demonstrated no xanthelasmas.   HEENT: normal oral mucosa, no oral pallor and no oral cyanosis.   Neck: normal jugular venous A waves present, normal jugular venous V waves present and no jugular venous mahmood A waves.   Pulmonary: no respiratory distress, normal respiratory rhythm and effort, no accessory muscle use and lungs were clear to auscultation bilaterally.   Cardiovascular: heart rate and rhythm were normal, normal S1 and S2 and no murmur, gallop, rub, heave or thrill are present.   Abdomen: soft, non-tender, no hepato-splenomegaly and no abdominal mass palpated.   Musculoskeletal: the gait could not be assessed..   Extremities: no clubbing of the fingernails, no localized cyanosis, no petechial hemorrhages and no ischemic changes.   Skin: normal skin color and pigmentation, no rash, no venous stasis, no skin lesions, no skin ulcer and no xanthoma was observed.   Psychiatric: oriented to person, place, and time, the affect was normal, the mood was normal and not feeling anxious.     LABS:   --------  06-08    138  |  100  |  28[H]  ----------------------------<  265[H]  3.4[L]   |  27  |  1.70[H]    Ca    9.1      08 Jun 2025 08:15  Phos  3.0     06-07  Mg     2.2     06-07                           11.9   8.74  )-----------( 139      ( 08 Jun 2025 08:15 )             37.1                 RADIOLOGY:  -----------------    ECG:     ECHO:

## 2025-06-08 NOTE — PROGRESS NOTE ADULT - SUBJECTIVE AND OBJECTIVE BOX
CHIEF COMPLAINT/ REASON FOR VISIT  .. Patient was seen to address the  issue listed under PROBLEM LIST which is located toward bottom of this note     WAYNE SERRANO    PLV 2EAS 214 D1    Allergies    IODINE (Unknown)  tetracycline (Unknown)  vancomycin (Other)    Intolerances        PAST MEDICAL & SURGICAL HISTORY:  Prostate cancer      Type II diabetes mellitus      Chronic obstructive pulmonary disease (COPD)      CHF (congestive heart failure)      Renal insufficiency      H/O migraine      Insomnia      Constipation      S/P foot surgery          FAMILY HISTORY:      Home Medications:  albuterol 0.63 mg/3 mL (0.021%) inhalation solution: 3 milliliter(s) by nebulizer 3 times a day as needed for  shortness of breath and/or wheezing (2025 08:48)  amoxicillin-clavulanate 875 mg-125 mg oral tablet: 1 tab(s) orally 2 times a day for 7 days (:48)  Artificial Tears ophthalmic solution: 1 drop(s) in each eye 2 times a day (:48)  ascorbic acid 500 mg oral tablet: 1 tab(s) orally 2 times a day (:48)  aspirin 81 mg oral tablet: 1 tab(s) orally once a day (:48)  clonazePAM 0.25 mg oral tablet, disintegratin tab(s) orally 3 times a day (:48)  ferrous sulfate 325 mg (65 mg elemental iron) oral tablet: 1 tab(s) orally once a day (:48)  furosemide 40 mg oral tablet: 1 tab(s) orally every 12 hours (:48)  HumaLOG 100 units/mL subcutaneous solution: Inject subcutaneously 3 times a day using sliding scale (:48)  HumaLOG KwikPen 100 units/mL injectable solution: Inject 15 units subcutaneously 3 times a day (before each meal) in addition to sliding scale (:48)  Jardiance 10 mg oral tablet: 1 tab(s) orally once a day (:48)  Lantus Solostar Pen 100 units/mL subcutaneous solution: 36 unit(s) subcutaneously once a day (at bedtime) (2025 08:48)  loperamide 2 mg oral capsule: 1 cap(s) orally after each loose BM every 6 hours as needed **No more than 3 capsules (6mg) a day** (2025 08:48)  loratadine 10 mg oral tablet: 1 tab(s) orally once a day (:48)  Melatonin 3 mg oral tablet: 1 tab(s) orally once a day (at bedtime) (:48)  montelukast 10 mg oral tablet: 1 tab(s) orally once a day (at bedtime) (:48)  multivitamin: 1 tab(s) orally once a day (:48)  oxyCODONE 5 mg oral tablet: 2 tab(s) orally 2 times a day MDD: 4 tablets (:48)  pantoprazole 40 mg oral delayed release tablet: 1 tab(s) orally once a day (:48)  Polyethylene Glycol 3350: Mix 17grams into 8oz of water and drink orally once a day as needed (:48)  Potassium Chloride (Eqv-Klor-Con M20) 20 mEq oral tablet, extended release: 1 tab(s) orally 2 times a day (:48)  predniSONE 10 mg oral tablet: 1 tab(s) orally once a day with food or milk for 1 month, then after 1 month take 5mg orally once a day (:48)  pregabalin 150 mg oral capsule: 1 cap(s) orally 2 times a day MDD: 2 capsules (:48)  ramelteon 8 mg oral tablet: 1 tab(s) orally once a day (at bedtime) (:48)  rosuvastatin 40 mg oral tablet: 1 tab(s) orally once a day (at bedtime) (:48)  saccharomyces boulardii lyo 250 mg oral capsule: 1 cap(s) orally once a day (:48)  Saline Mist 0.65% nasal spray: 1 spray(s) in each nostril 2 times a day (:48)  Senna 8.6 mg oral tablet: 1 tab(s) orally once a day (at bedtime) (:48)  sertraline 100 mg oral tablet: 1.5 tab(s) orally once a day (150mg) (2025 08:48)  Symbicort 160 mcg-4.5 mcg/inh inhalation aerosol: 2 puff(s) inhaled 2 times a day (2025 08:48)      MEDICATIONS  (STANDING):  albuterol/ipratropium for Nebulization 3 milliLiter(s) Nebulizer every 8 hours  ascorbic acid 500 milliGRAM(s) Oral daily  aspirin enteric coated 81 milliGRAM(s) Oral daily  budesonide 160 MICROgram(s)/formoterol 4.5 MICROgram(s) Inhaler 2 Puff(s) Inhalation two times a day  clotrimazole 1% Cream 1 Application(s) Topical two times a day  DAPTOmycin IVPB 400 milliGRAM(s) IV Intermittent every 24 hours  dextrose 5%. 1000 milliLiter(s) (50 mL/Hr) IV Continuous <Continuous>  dextrose 5%. 1000 milliLiter(s) (100 mL/Hr) IV Continuous <Continuous>  dextrose 50% Injectable 25 Gram(s) IV Push once  dextrose 50% Injectable 12.5 Gram(s) IV Push once  dextrose 50% Injectable 25 Gram(s) IV Push once  ferrous    sulfate 325 milliGRAM(s) Oral daily  furosemide   Injectable 60 milliGRAM(s) IV Push daily  glucagon  Injectable 1 milliGRAM(s) IntraMuscular once  guaiFENesin Oral Liquid (Sugar-Free) 200 milliGRAM(s) Oral every 6 hours  heparin   Injectable 5000 Unit(s) SubCutaneous every 8 hours  insulin glargine Injectable (LANTUS) 20 Unit(s) SubCutaneous at bedtime  insulin lispro (ADMELOG) corrective regimen sliding scale   SubCutaneous at bedtime  insulin lispro (ADMELOG) corrective regimen sliding scale.   SubCutaneous three times a day before meals  insulin lispro Injectable (ADMELOG) 10 Unit(s) SubCutaneous three times a day before meals  lactobacillus acidophilus 1 Tablet(s) Oral two times a day with meals  montelukast 10 milliGRAM(s) Oral daily  multivitamin/minerals 1 Tablet(s) Oral daily  pantoprazole    Tablet 40 milliGRAM(s) Oral before breakfast  piperacillin/tazobactam IVPB.. 3.375 Gram(s) IV Intermittent every 8 hours  potassium chloride    Tablet ER 20 milliEquivalent(s) Oral daily  predniSONE   Tablet 10 milliGRAM(s) Oral daily  pregabalin 150 milliGRAM(s) Oral two times a day  rosuvastatin 40 milliGRAM(s) Oral at bedtime  saccharomyces boulardii 250 milliGRAM(s) Oral two times a day  senna 1 Tablet(s) Oral at bedtime  sertraline 100 milliGRAM(s) Oral daily  sodium chloride 3%  Inhalation 4 milliLiter(s) Inhalation every 12 hours  tiotropium 2.5 MICROgram(s) Inhaler 2 Puff(s) Inhalation daily    MEDICATIONS  (PRN):  acetaminophen     Tablet .. 650 milliGRAM(s) Oral every 6 hours PRN Temp greater or equal to 38C (100.4F), Mild Pain (1 - 3)  aluminum hydroxide/magnesium hydroxide/simethicone Suspension 30 milliLiter(s) Oral every 4 hours PRN Dyspepsia  bisacodyl Suppository 10 milliGRAM(s) Rectal daily PRN Constipation  clonazePAM  Tablet 0.5 milliGRAM(s) Oral three times a day PRN ANXIETY  dextrose Oral Gel 15 Gram(s) Oral once PRN Blood Glucose LESS THAN 70 milliGRAM(s)/deciliter  melatonin 3 milliGRAM(s) Oral at bedtime PRN Insomnia  ondansetron Injectable 4 milliGRAM(s) IV Push every 8 hours PRN Nausea and/or Vomiting  oxyCODONE    IR 10 milliGRAM(s) Oral two times a day PRN Severe Pain (7 - 10)  oxyCODONE    IR 5 milliGRAM(s) Oral every 8 hours PRN Moderate Pain (4 - 6)  polyethylene glycol 3350 17 Gram(s) Oral daily PRN for constipation      Diet, Consistent Carbohydrate w/Evening Snack:   DASH/TLC Sodium & Cholesterol Restricted (25 @ 18:09) [Active]          Vital Signs Last 24 Hrs  T(C): 36.4 (2025 05:37), Max: 36.4 (2025 12:18)  T(F): 97.5 (2025 05:37), Max: 97.6 (2025 12:18)  HR: 81 (2025 07:45) (75 - 86)  BP: 121/73 (2025 05:37) (113/75 - 121/73)  BP(mean): --  RR: 19 (2025 05:37) (19 - 19)  SpO2: 93% (2025 07:45) (92% - 97%)    Parameters below as of 2025 07:45  Patient On (Oxygen Delivery Method): nasal cannula                  LABS:                        11.9   8.74  )-----------( 139      ( 2025 08:15 )             37.1         138  |  100  |  28[H]  ----------------------------<  265[H]  3.4[L]   |  27  |  1.70[H]    Ca    9.1      2025 08:15  Phos  3.0       Mg     2.2             Urinalysis Basic - ( 2025 08:15 )    Color: x / Appearance: x / SG: x / pH: x  Gluc: 265 mg/dL / Ketone: x  / Bili: x / Urobili: x   Blood: x / Protein: x / Nitrite: x   Leuk Esterase: x / RBC: x / WBC x   Sq Epi: x / Non Sq Epi: x / Bacteria: x            WBC:  WBC Count: 8.74 K/uL ( @ 08:15)  WBC Count: 7.56 K/uL ( @ 07:15)  WBC Count: 8.38 K/uL ( @ 09:05)  WBC Count: 8.83 K/uL ( @ 07:05)      MICROBIOLOGY:  RECENT CULTURES:   Clean Catch Clean Catch (Midstream) XXXX XXXX   No growth     Blood Blood-Peripheral XXXX XXXX   No growth at 4 days     Blood Blood-Peripheral XXXX XXXX   No growth at 4 days                    Sodium:  Sodium: 138 mmol/L ( @ 08:15)  Sodium: 138 mmol/L ( @ 07:15)  Sodium: 138 mmol/L ( @ 09:05)  Sodium: 142 mmol/L ( @ 07:05)      1.70 mg/dL  @ 08:15  1.80 mg/dL  @ 07:15  1.80 mg/dL  @ 09:05  1.60 mg/dL  @ 07:05      Hemoglobin:  Hemoglobin: 11.9 g/dL ( @ 08:15)  Hemoglobin: 12.3 g/dL ( @ 07:15)  Hemoglobin: 12.9 g/dL ( @ 09:05)  Hemoglobin: 12.0 g/dL ( @ 07:05)      Platelets: Platelet Count - Automated: 139 K/uL ( @ 08:15)  Platelet Count - Automated: 150 K/uL ( @ 07:15)  Platelet Count - Automated: 147 K/uL ( @ 09:05)  Platelet Count - Automated: 153 K/uL ( @ 07:05)          Urinalysis Basic - ( 2025 08:15 )    Color: x / Appearance: x / SG: x / pH: x  Gluc: 265 mg/dL / Ketone: x  / Bili: x / Urobili: x   Blood: x / Protein: x / Nitrite: x   Leuk Esterase: x / RBC: x / WBC x   Sq Epi: x / Non Sq Epi: x / Bacteria: x        RADIOLOGY & ADDITIONAL STUDIES:      MICROBIOLOGY:  RECENT CULTURES:   Clean Catch Clean Catch (Midstream) XXXX XXXX   No growth     Blood Blood-Peripheral XXXX XXXX   No growth at 4 days     Blood Blood-Peripheral XXXX XXXX   No growth at 4 days

## 2025-06-08 NOTE — PROGRESS NOTE ADULT - ASSESSMENT
REASON FOR VISIT  .. Management of problems listed below      EVENTS/CURRENT ISSUES.  . 6/6/2025 started symbicort spiriva         REVIEW OF SYMPTOMS   Able to give ROS  Yes     RELIABILITY +/-   CONSTITUTIONAL Weakness Yes    ENDOCRINE  No heat or cold intolerance    ALLERGY No hives  Sore throat No stridor  RESP Shortness of breath YES   NEURO New weakness No   CARDIAC   Palpitations No         PHYSICAL EXAM    HEENT Unremarkable  atraumatic   RESP Fair air entry  Harsh breath sound   CARDIAC S1 S2 No S3     NO JVD    ABDOMEN No hepatosplenomegaly   PEDAL EDEMA present No calf tenderness      REASON FOR VISIT  .. Management of problems listed below      CC.   . 6/3/2025  Pt brought in by EMS from [facility] with concern for worsening pedal edema, wound on R lower leg. 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  edema, wound check  OVERALL PRESENTATION.  . 6/3/2025  Patient with a past medical history of diabetes, COPD, heart failure, CKD, hypertension, history of right foot ulcer is presenting for wound to right lower extremity.  Was recently admitted here for shortness of breath as well as for bursitis and concern for a wound.  Was sent back to facility.  They sent him to the ER today due to worsening swelling and redness localized to his wound of the right lower extremity.  Endorsing pain to the site.  Denies fevers, chest pain, nausea or vomiting.  States that he is having some chronic shortness of breath though unchanged today.  Also endorses chronic cough.  PMH.      PAST HOSPITAL STAYS .  Home Medications:   * Patient Currently Takes Medications as of 27-May-2025 11:17 documented in Structured Notes  · montelukast 10 mg oral tablet: 1 tab(s) orally once a day  · melatonin 3 mg oral tablet: 1 tab(s) orally once a day (at bedtime)  · senna leaf extract oral tablet: 1 tab(s) orally once a day (at bedtime)  · saccharomyces boulardii lyo 250 mg oral capsule: 1 cap(s) orally once a day  · sertraline 100 mg oral tablet: 1 tab(s) orally once a day MDD: 1  · furosemide 40 mg oral tablet: 1 tab(s) orally every 12 hours  · sulfamethoxazole-trimethoprim 800 mg-160 mg oral tablet: 1 tab(s) orally every 12 hours  · aspirin 81 mg oral delayed release tablet: 1 tab(s) orally once a day  · potassium chloride 20 mEq oral tablet, extended release: 1 tab(s) orally 2 times a day  · albuterol 0.63 mg/3 mL (0.021%) inhalation solution: 3 milliliter(s) by nebulizer 3 times a day  · predniSONE 10 mg oral tablet: 1 tab(s) orally once a day  · oxyCODONE 5 mg oral tablet: 2 tab(s) orally 2 times a day as needed for Severe Pain (7 - 10) MDD: 4  · loratadine 10 mg oral tablet: 1 tab(s) orally once a day (in the morning)  · Multiple Vitamins with Minerals oral tablet: 1 tab(s) orally once a day  · ferrous sulfate 325 mg (65 mg elemental iron) oral tablet: 1 tab(s) orally once a day  · pantoprazole 40 mg oral delayed release tablet: 1 tab(s) orally once a day (before a meal)  · Symbicort 160 mcg-4.5 mcg/inh inhalation aerosol: 2 puff(s) inhaled 2 times a day  · rosuvastatin 40 mg oral tablet: 1 tab(s) orally once a day  · polyethylene glycol 3350 oral powder for reconstitution: 17 gram(s) orally once a day as needed for  constipation  · Jardiance 10 mg oral tablet: 1 tab(s) orally once a day  · pregabalin 150 mg oral capsule: 1 cap(s) orally 2 times a day  · insulin glargine 100 units/mL subcutaneous solution: 36 unit(s) subcutaneous once a day (at bedtime)  · HumaLOG 100 units/mL injectable solution: 15 unit(s) injectable 3 times a day (before meals) ***sliding scale***  ER MGMT .      BEST PRACTICE ISSUES.  . HOB ELEVATN.    .... Yes  . DIET  .   .... CONS CARB  6/3   .....   . PHARMAC DVT PPLX .    .... HPSC 6/3  . NON PHARMAC DVT PPLX .      . STRESS ULCR PPLX .   .... PROPRANOLOL 40 6/4/2025   . DATE/DM MGMT.   ..... See under Endocrine section   GENERAL DATA .   . COVID.         .... scv26/4/2025 (-)    . GOC.    ....    . ICU STAY.    .... no   . INFECTION PPLX .   ....   . ALLGY.   .... TETRACYCLINE  ..... VANCOMYCIN  .... IODINE    . WT.   .... 6/4/2025 104 k  . BMI.  .... 6/4/2025 33      [id number]  VITALS/GAS EXCHANGE/DRIPS    ABGS.     .  VS/ PO/IO/ VENT/ DRIPS.   6/8/2025 afeb 73 100/60   6/8/2025 nc 93%    [id number]  PROBLEM ASSESSMENT RECOMMENDATIONS.  RESP.   . GAS EXCHANGE .   .... target PO 90-95%     . SOB  .... 2/2 COPD CHF PNEUM    . RO DVT   .... Venous duplx 6/4/2025 (-)     . COPD   .... DUONEB 6/4/2025   .... PULMICORT 6/4/2025   .... MONTELULKAST 6/4/2025   .... PREDNISONE 10 6/4/2025     INFECTION.  . DATA  .... esr 6/5/2025 56   .... w 6/4/2025 w 9.5  .... cxr 6/4/2025 cw 5/21/2025   ........ bibasal atelectasis   ........ interval increase of left lower lobe opacity concerning for pneumonia   .... Flu ab 6/4/2025 (-)    .... strep (-) 6/6/2025  .... legn (-) 6/6/2025   .... mrsa 6/6/2025 (+)     . VENOUS STASIS CHANGES RLE    . PNEUMONIA   .... cxr 6/4/2025 cw 5/21/2025   ........  increase of left lower lobe opacity concerning for pneumonia     . CELLULITIS R LOWER LEG 6/4/2025   .... XR R foot 6/4/2025 No emphysema     . ANTIBIO   .... DAPTOMYCIN 6/4/2025 D 400/d x 7d Dr Mosqueda   .... ZOSYN 6/4/2025 Z x 7d     CARDIAC.  . CAD    .... ASA 81 6/3   .... ROSUVASTATIN 40 6/3       . CHF  .... pbnp 6/4/2025 pbnp 123  .... tte 5/26/2025   ......... n lvsf ef 60% ivc rap 3   .... LASIX 60 IV/d 6/4/2025    .... kcl 20 6/4     HEMAT.  . DATA  .... Hb 6/4/2025 Hb 12.1  .... Plt 6/4/2025 plt 143  .... inr 6/3/2025 inr 105  .... monitor     GI.   . DIET .   .... 6/4/2025 CONS CARB    . LFT MONITORING   .... LFTS   6/4/2025  ........ AP   73   ........ AST 14  ........ ALT  32  .... monitor     RENAL.  . CKD  .... Na 6/4/2025 Na 141   .... CO2 6/4/2025 co2 30   .... Cr 5/27-6/4-6/5/2025   .... Cr 1.6-1.7 - 1.6   ....  monitor     . HYPOKALEMIA   .... K 6/4-6/5/2025 K 3.4 - 2.8    ENDO.  . DM  .  .... INSULIN GLARIGINE 10 HS 6/4/2025     NEURO.  . ANXIETY   .... CLONAZEPAM 0.5 tid 6/4/2025     . PAIN  .... PREGABALIN 150. 2 6/3    . DEPRESSION  .... SERTRALINE 100 6/2   ....     XXXXXXXXXXXXXXXXXXXXXX   SUMMARY BASELINE .     .. 74 m history of DM CHF, COPD, prostate cancer, renal insufficiency, diabetes, chronic neck pain,  right foot wound from [facility] who had been recently admitted with SOB   CC.   . 6/3/2025 PEDAL EDEMA  . 6/3/2025 WOUND R LOWER LEG   . 6/3/2025 CHRONIC SHORTNESS OF BREATH  . 6/3/2025 CHRONIC COUGH   MAIN ISSUES.  . COPD  . SHORTNESS OF BREATH  . PEDAL EDEMA   .... Venous duplx 6/4/2025 (-)   . VENOUS STASIS CHANGES RLE  . CELLULITIS R LOWER LEG 6/4/2025   .... XR R foot 6/4/2025 No emphysema   . PNEUMONIA   .... cxr 6/4/2025 cw 5/21/2025  incr of lll  opacity  . ANTIBIO   .... DAPTOMYCIN 6/4/2025 D 400/d x 7d Dr Mosqueda   .... ZOSYN 6/4/2025 Z x 7d   . CKD   .... Cr 5/27-6/4/2025 Cr 1.6-1.7   . HYPOKALEMIA   .... K 6/4-6/5/2025 K 3.4 - 2.8  . DM    PMH.   . LUNG NODULES  .... CT ch 5/21/2025 cw 12/28/2024   ......... mild tib nodules left lo lobe may be infection or inflammn    . PNEUMONIA   .... ROCEPHIN 5/21  . SKIN SOFT TISSUE INFECTION  .... DAPTOMYCIN 5/22- 5/26 d 450 x 7d   .... bactrim 5/26  . RLE ANKLE OPEN WOUND   . DM       DISCUSSIONS.  .... Discussed with primary care and relevant consultants on an ongoing basis       TIME SPENT.  . Over 36 minutes aggregate care time spent on encounter; activities included   direct patient care, counseling and/or coordinating care reviewing notes, lab data/ imaging , discussion with multidisciplinary team/ patient  /family and explaining in detail risks, benefits, alternatives  of the recommendations     MAGGIE BLACK 73 hernesto 6/3/2025 1951

## 2025-06-08 NOTE — PROGRESS NOTE ADULT - ASSESSMENT
Patient with a past medical history of diabetes, COPD, heart failure, CKD, hypertension, history of right foot ulcer is presenting for wound to right lower extremity.  Was recently admitted here for shortness of breath as well as for bursitis and concern for a wound.  Was sent back to facility.  They sent him to the ER today due to worsening swelling and redness localized to his wound of the right lower extremity.  Endorsing pain to the site.  Denies fevers, chest pain, nausea or vomiting.  States that he is having some chronic shortness of breath though unchanged today.  Also endorses chronic cough. (03 Jun 2025 18:32)      CKD sTAGE 3 and ACUTE RENAL FAILURE:   Serum creatinine is  at  1.7   , approximating GFR at   ml/min. id overload furosemide   Injectable 60 milliGRAM(s) IV Push daily  There is no progression . No uremic symptoms  No evidence of anemia .  Fluid status stable.  Will continue to avoid nephrotoxic drugs.  Patient remains asymptomatic.   Continue current therapy.  Additional evaluation:   ECG,    echocardiogram,     CXR,  will obtained recent   renal ultrasound to evalaute kidney size and possible stones ,  potassium chloride    Tablet ER 20 milliEquivalent(s) Oral daily prn     BP monitoring,continue current antihypertensive meds, low salt diet,followup with PMD in 1-2 weeks      Admit for septic workup and ID evaluation,send blood and urine cx,serial lactate levels,monitor vitals closley,ivfs hydration,monitor urine output and renal profile,iv abx as per id cons  piperacillin/tazobactam IVPB.. 3.375 Gram(s) IV Intermittent every 8 hours

## 2025-06-08 NOTE — PROGRESS NOTE ADULT - SUBJECTIVE AND OBJECTIVE BOX
Patient is a 74y Male whom presented to the hospital with kayleen     PAST MEDICAL & SURGICAL HISTORY:  Prostate cancer      Type II diabetes mellitus      Chronic obstructive pulmonary disease (COPD)      CHF (congestive heart failure)      Renal insufficiency      H/O migraine      Insomnia      Constipation      S/P foot surgery          MEDICATIONS  (STANDING):  albuterol/ipratropium for Nebulization 3 milliLiter(s) Nebulizer every 8 hours  ascorbic acid 500 milliGRAM(s) Oral daily  aspirin enteric coated 81 milliGRAM(s) Oral daily  budesonide    Inhalation Suspension 0.5 milliGRAM(s) Inhalation two times a day  clotrimazole 1% Cream 1 Application(s) Topical two times a day  DAPTOmycin IVPB 400 milliGRAM(s) IV Intermittent every 24 hours  dextrose 5%. 1000 milliLiter(s) (100 mL/Hr) IV Continuous <Continuous>  dextrose 5%. 1000 milliLiter(s) (50 mL/Hr) IV Continuous <Continuous>  dextrose 50% Injectable 25 Gram(s) IV Push once  dextrose 50% Injectable 12.5 Gram(s) IV Push once  dextrose 50% Injectable 25 Gram(s) IV Push once  ferrous    sulfate 325 milliGRAM(s) Oral daily  furosemide   Injectable 60 milliGRAM(s) IV Push daily  glucagon  Injectable 1 milliGRAM(s) IntraMuscular once  heparin   Injectable 5000 Unit(s) SubCutaneous every 8 hours  insulin glargine Injectable (LANTUS) 10 Unit(s) SubCutaneous at bedtime  insulin lispro (ADMELOG) corrective regimen sliding scale   SubCutaneous at bedtime  insulin lispro (ADMELOG) corrective regimen sliding scale.   SubCutaneous three times a day before meals  montelukast 10 milliGRAM(s) Oral daily  multivitamin/minerals 1 Tablet(s) Oral daily  pantoprazole    Tablet 40 milliGRAM(s) Oral before breakfast  piperacillin/tazobactam IVPB.. 3.375 Gram(s) IV Intermittent every 8 hours  potassium chloride    Tablet ER 20 milliEquivalent(s) Oral daily  predniSONE   Tablet 10 milliGRAM(s) Oral daily  pregabalin 150 milliGRAM(s) Oral two times a day  rosuvastatin 40 milliGRAM(s) Oral at bedtime  saccharomyces boulardii 250 milliGRAM(s) Oral two times a day  senna 1 Tablet(s) Oral at bedtime  sertraline 100 milliGRAM(s) Oral daily      Allergies    IODINE (Unknown)  tetracycline (Unknown)  vancomycin (Other)    Intolerances        SOCIAL HISTORY:  Denies ETOh,Smoking,     FAMILY HISTORY:      REVIEW OF SYSTEMS:    CONSTITUTIONAL: No weakness, fevers or chills  RESPIRATORY: No cough, wheezing, hemoptysis; No shortness of breath  CARDIOVASCULAR: No chest pain or palpitations  GASTROINTESTINAL: No abdominal or epigastric pain. No nausea, vomiting,     No diarrhea or constipation. No melena                                                  11.9   8.74  )-----------( 139      ( 08 Jun 2025 08:15 )             37.1       CBC Full  -  ( 08 Jun 2025 08:15 )  WBC Count : 8.74 K/uL  RBC Count : 4.18 M/uL  Hemoglobin : 11.9 g/dL  Hematocrit : 37.1 %  Platelet Count - Automated : 139 K/uL  Mean Cell Volume : 88.8 fl  Mean Cell Hemoglobin : 28.5 pg  Mean Cell Hemoglobin Concentration : 32.1 g/dL  Auto Neutrophil # : x  Auto Lymphocyte # : x  Auto Monocyte # : x  Auto Eosinophil # : x  Auto Basophil # : x  Auto Neutrophil % : x  Auto Lymphocyte % : x  Auto Monocyte % : x  Auto Eosinophil % : x  Auto Basophil % : x      06-08    138  |  100  |  28[H]  ----------------------------<  265[H]  3.4[L]   |  27  |  1.70[H]    Ca    9.1      08 Jun 2025 08:15  Phos  3.0     06-07  Mg     2.2     06-07        CAPILLARY BLOOD GLUCOSE      POCT Blood Glucose.: 310 mg/dL (08 Jun 2025 11:47)  POCT Blood Glucose.: 256 mg/dL (08 Jun 2025 08:04)  POCT Blood Glucose.: 206 mg/dL (07 Jun 2025 22:59)  POCT Blood Glucose.: 252 mg/dL (07 Jun 2025 16:54)      Vital Signs Last 24 Hrs  T(C): 36.3 (08 Jun 2025 12:33), Max: 36.4 (08 Jun 2025 05:37)  T(F): 97.4 (08 Jun 2025 12:33), Max: 97.5 (08 Jun 2025 05:37)  HR: 73 (08 Jun 2025 12:33) (73 - 86)  BP: 103/6 (08 Jun 2025 12:33) (103/6 - 121/73)  BP(mean): --  RR: 18 (08 Jun 2025 12:33) (18 - 19)  SpO2: 93% (08 Jun 2025 12:33) (92% - 97%)    Parameters below as of 08 Jun 2025 07:45  Patient On (Oxygen Delivery Method): nasal cannula        Urinalysis Basic - ( 08 Jun 2025 08:15 )    Color: x / Appearance: x / SG: x / pH: x  Gluc: 265 mg/dL / Ketone: x  / Bili: x / Urobili: x   Blood: x / Protein: x / Nitrite: x   Leuk Esterase: x / RBC: x / WBC x   Sq Epi: x / Non Sq Epi: x / Bacteria: x                PHYSICAL EXAM:    Constitutional: NAD  HEENT: conjunctive   clear   Neck:  No JVD  Respiratory: CTAB  Cardiovascular: S1 and S2  Gastrointestinal: BS+, soft, NT/ND  Extremities: No peripheral edema    LABS:                        12.1   9.50  )-----------( 143      ( 04 Jun 2025 06:05 )             37.9     06-04    141  |  104  |  36[H]  ----------------------------<  137[H]  3.4[L]   |  30  |  1.70[H]    Ca    9.2      04 Jun 2025 06:05  Phos  2.9     06-04  Mg     2.5     06-04    TPro  6.9  /  Alb  3.0[L]  /  TBili  0.5  /  DBili  x   /  AST  14[L]  /  ALT  32  /  AlkPhos  73  06-04      Urine Studies:  Urinalysis Basic - ( 04 Jun 2025 06:05 )    Color: x / Appearance: x / SG: x / pH: x  Gluc: 137 mg/dL / Ketone: x  / Bili: x / Urobili: x   Blood: x / Protein: x / Nitrite: x   Leuk Esterase: x / RBC: x / WBC x   Sq Epi: x / Non Sq Epi: x / Bacteria: x            RADIOLOGY & ADDITIONAL STUDIES:

## 2025-06-09 LAB
ANION GAP SERPL CALC-SCNC: 10 MMOL/L — SIGNIFICANT CHANGE UP (ref 5–17)
BUN SERPL-MCNC: 22 MG/DL — SIGNIFICANT CHANGE UP (ref 7–23)
CALCIUM SERPL-MCNC: 9.3 MG/DL — SIGNIFICANT CHANGE UP (ref 8.5–10.1)
CHLORIDE SERPL-SCNC: 105 MMOL/L — SIGNIFICANT CHANGE UP (ref 96–108)
CO2 SERPL-SCNC: 26 MMOL/L — SIGNIFICANT CHANGE UP (ref 22–31)
CREAT SERPL-MCNC: 1.5 MG/DL — HIGH (ref 0.5–1.3)
CULTURE RESULTS: SIGNIFICANT CHANGE UP
CULTURE RESULTS: SIGNIFICANT CHANGE UP
EGFR: 49 ML/MIN/1.73M2 — LOW
EGFR: 49 ML/MIN/1.73M2 — LOW
GLUCOSE SERPL-MCNC: 181 MG/DL — HIGH (ref 70–99)
HCT VFR BLD CALC: 37 % — LOW (ref 39–50)
HGB BLD-MCNC: 11.5 G/DL — LOW (ref 13–17)
MCHC RBC-ENTMCNC: 28 PG — SIGNIFICANT CHANGE UP (ref 27–34)
MCHC RBC-ENTMCNC: 31.1 G/DL — LOW (ref 32–36)
MCV RBC AUTO: 90.2 FL — SIGNIFICANT CHANGE UP (ref 80–100)
NRBC BLD AUTO-RTO: 0 /100 WBCS — SIGNIFICANT CHANGE UP (ref 0–0)
PLATELET # BLD AUTO: 133 K/UL — LOW (ref 150–400)
POTASSIUM SERPL-MCNC: 4.1 MMOL/L — SIGNIFICANT CHANGE UP (ref 3.5–5.3)
POTASSIUM SERPL-SCNC: 4.1 MMOL/L — SIGNIFICANT CHANGE UP (ref 3.5–5.3)
RBC # BLD: 4.1 M/UL — LOW (ref 4.2–5.8)
RBC # FLD: 18.4 % — HIGH (ref 10.3–14.5)
SODIUM SERPL-SCNC: 141 MMOL/L — SIGNIFICANT CHANGE UP (ref 135–145)
SPECIMEN SOURCE: SIGNIFICANT CHANGE UP
SPECIMEN SOURCE: SIGNIFICANT CHANGE UP
WBC # BLD: 7.6 K/UL — SIGNIFICANT CHANGE UP (ref 3.8–10.5)
WBC # FLD AUTO: 7.6 K/UL — SIGNIFICANT CHANGE UP (ref 3.8–10.5)

## 2025-06-09 PROCEDURE — 99233 SBSQ HOSP IP/OBS HIGH 50: CPT

## 2025-06-09 PROCEDURE — 99232 SBSQ HOSP IP/OBS MODERATE 35: CPT

## 2025-06-09 RX ORDER — DOXYCYCLINE HYCLATE 100 MG
100 TABLET ORAL EVERY 12 HOURS
Refills: 0 | Status: DISCONTINUED | OUTPATIENT
Start: 2025-06-10 | End: 2025-06-10

## 2025-06-09 RX ORDER — MUPIROCIN CALCIUM 20 MG/G
1 CREAM TOPICAL
Refills: 0 | Status: DISCONTINUED | OUTPATIENT
Start: 2025-06-09 | End: 2025-06-10

## 2025-06-09 RX ORDER — INSULIN LISPRO 100 U/ML
15 INJECTION, SOLUTION INTRAVENOUS; SUBCUTANEOUS
Refills: 0 | Status: DISCONTINUED | OUTPATIENT
Start: 2025-06-09 | End: 2025-06-10

## 2025-06-09 RX ADMIN — Medication 650 MILLIGRAM(S): at 22:30

## 2025-06-09 RX ADMIN — DEXTROMETHORPHAN HBR, GUAIFENESIN 200 MILLIGRAM(S): 200 LIQUID ORAL at 06:11

## 2025-06-09 RX ADMIN — Medication 1 TABLET(S): at 21:55

## 2025-06-09 RX ADMIN — INSULIN LISPRO 15 UNIT(S): 100 INJECTION, SOLUTION INTRAVENOUS; SUBCUTANEOUS at 17:11

## 2025-06-09 RX ADMIN — DEXTROMETHORPHAN HBR, GUAIFENESIN 200 MILLIGRAM(S): 200 LIQUID ORAL at 12:02

## 2025-06-09 RX ADMIN — Medication 650 MILLIGRAM(S): at 21:55

## 2025-06-09 RX ADMIN — Medication 20 MILLIEQUIVALENT(S): at 12:01

## 2025-06-09 RX ADMIN — INSULIN LISPRO 15 UNIT(S): 100 INJECTION, SOLUTION INTRAVENOUS; SUBCUTANEOUS at 12:03

## 2025-06-09 RX ADMIN — HEPARIN SODIUM 5000 UNIT(S): 1000 INJECTION INTRAVENOUS; SUBCUTANEOUS at 21:54

## 2025-06-09 RX ADMIN — Medication 325 MILLIGRAM(S): at 12:01

## 2025-06-09 RX ADMIN — DEXTROMETHORPHAN HBR, GUAIFENESIN 200 MILLIGRAM(S): 200 LIQUID ORAL at 17:09

## 2025-06-09 RX ADMIN — MONTELUKAST SODIUM 10 MILLIGRAM(S): 10 TABLET ORAL at 12:02

## 2025-06-09 RX ADMIN — DEXTROMETHORPHAN HBR, GUAIFENESIN 200 MILLIGRAM(S): 200 LIQUID ORAL at 00:13

## 2025-06-09 RX ADMIN — CLOTRIMAZOLE 1 APPLICATION(S): 1 CREAM TOPICAL at 06:20

## 2025-06-09 RX ADMIN — Medication 1 TABLET(S): at 12:01

## 2025-06-09 RX ADMIN — HEPARIN SODIUM 5000 UNIT(S): 1000 INJECTION INTRAVENOUS; SUBCUTANEOUS at 06:11

## 2025-06-09 RX ADMIN — BUDESONIDE AND FORMOTEROL FUMARATE DIHYDRATE 2 PUFF(S): 80; 4.5 AEROSOL RESPIRATORY (INHALATION) at 17:34

## 2025-06-09 RX ADMIN — Medication 40 MILLIGRAM(S): at 06:11

## 2025-06-09 RX ADMIN — INSULIN LISPRO 6: 100 INJECTION, SOLUTION INTRAVENOUS; SUBCUTANEOUS at 12:02

## 2025-06-09 RX ADMIN — CLONAZEPAM 0.5 MILLIGRAM(S): 0.5 TABLET ORAL at 00:26

## 2025-06-09 RX ADMIN — INSULIN LISPRO 2: 100 INJECTION, SOLUTION INTRAVENOUS; SUBCUTANEOUS at 07:55

## 2025-06-09 RX ADMIN — PREGABALIN 150 MILLIGRAM(S): 50 CAPSULE ORAL at 17:09

## 2025-06-09 RX ADMIN — IPRATROPIUM BROMIDE AND ALBUTEROL SULFATE 3 MILLILITER(S): .5; 2.5 SOLUTION RESPIRATORY (INHALATION) at 21:30

## 2025-06-09 RX ADMIN — Medication 500 MILLIGRAM(S): at 12:01

## 2025-06-09 RX ADMIN — Medication 3 MILLIGRAM(S): at 21:55

## 2025-06-09 RX ADMIN — Medication 1 TABLET(S): at 17:10

## 2025-06-09 RX ADMIN — Medication 4 MILLILITER(S): at 07:18

## 2025-06-09 RX ADMIN — INSULIN GLARGINE-YFGN 20 UNIT(S): 100 INJECTION, SOLUTION SUBCUTANEOUS at 21:54

## 2025-06-09 RX ADMIN — MUPIROCIN CALCIUM 1 APPLICATION(S): 20 CREAM TOPICAL at 17:10

## 2025-06-09 RX ADMIN — SERTRALINE 100 MILLIGRAM(S): 100 TABLET, FILM COATED ORAL at 12:02

## 2025-06-09 RX ADMIN — IPRATROPIUM BROMIDE AND ALBUTEROL SULFATE 3 MILLILITER(S): .5; 2.5 SOLUTION RESPIRATORY (INHALATION) at 13:20

## 2025-06-09 RX ADMIN — Medication 4 MILLILITER(S): at 21:30

## 2025-06-09 RX ADMIN — ROSUVASTATIN CALCIUM 40 MILLIGRAM(S): 20 TABLET, FILM COATED ORAL at 21:55

## 2025-06-09 RX ADMIN — BUTYROSPERMUM PARKII(SHEA BUTTER), SIMMONDSIA CHINENSIS (JOJOBA) SEED OIL, ALOE BARBADENSIS LEAF EXTRACT 250 MILLIGRAM(S): .01; 1; 3.5 LIQUID TOPICAL at 06:12

## 2025-06-09 RX ADMIN — Medication 1 TABLET(S): at 07:54

## 2025-06-09 RX ADMIN — INSULIN LISPRO 10 UNIT(S): 100 INJECTION, SOLUTION INTRAVENOUS; SUBCUTANEOUS at 07:55

## 2025-06-09 RX ADMIN — PREGABALIN 150 MILLIGRAM(S): 50 CAPSULE ORAL at 06:11

## 2025-06-09 RX ADMIN — INSULIN LISPRO 4: 100 INJECTION, SOLUTION INTRAVENOUS; SUBCUTANEOUS at 17:11

## 2025-06-09 RX ADMIN — BUTYROSPERMUM PARKII(SHEA BUTTER), SIMMONDSIA CHINENSIS (JOJOBA) SEED OIL, ALOE BARBADENSIS LEAF EXTRACT 250 MILLIGRAM(S): .01; 1; 3.5 LIQUID TOPICAL at 17:09

## 2025-06-09 RX ADMIN — OXYCODONE HYDROCHLORIDE 10 MILLIGRAM(S): 30 TABLET ORAL at 12:01

## 2025-06-09 RX ADMIN — CLOTRIMAZOLE 1 APPLICATION(S): 1 CREAM TOPICAL at 17:21

## 2025-06-09 RX ADMIN — DAPTOMYCIN 116 MILLIGRAM(S): 500 INJECTION, POWDER, LYOPHILIZED, FOR SOLUTION INTRAVENOUS at 17:08

## 2025-06-09 RX ADMIN — BUDESONIDE AND FORMOTEROL FUMARATE DIHYDRATE 2 PUFF(S): 80; 4.5 AEROSOL RESPIRATORY (INHALATION) at 06:49

## 2025-06-09 RX ADMIN — PREDNISONE 10 MILLIGRAM(S): 20 TABLET ORAL at 06:12

## 2025-06-09 RX ADMIN — IPRATROPIUM BROMIDE AND ALBUTEROL SULFATE 3 MILLILITER(S): .5; 2.5 SOLUTION RESPIRATORY (INHALATION) at 07:18

## 2025-06-09 RX ADMIN — CLONAZEPAM 0.5 MILLIGRAM(S): 0.5 TABLET ORAL at 17:09

## 2025-06-09 RX ADMIN — HEPARIN SODIUM 5000 UNIT(S): 1000 INJECTION INTRAVENOUS; SUBCUTANEOUS at 13:04

## 2025-06-09 RX ADMIN — OXYCODONE HYDROCHLORIDE 10 MILLIGRAM(S): 30 TABLET ORAL at 12:33

## 2025-06-09 RX ADMIN — TIOTROPIUM BROMIDE INHALATION SPRAY 2 PUFF(S): 3.12 SPRAY, METERED RESPIRATORY (INHALATION) at 06:49

## 2025-06-09 RX ADMIN — Medication 81 MILLIGRAM(S): at 12:01

## 2025-06-09 NOTE — PROGRESS NOTE ADULT - PROBLEM SELECTOR PLAN 4
continue home medications

## 2025-06-09 NOTE — PROGRESS NOTE ADULT - SUBJECTIVE AND OBJECTIVE BOX
CHIEF COMPLAINT/ REASON FOR VISIT  .. Patient was seen to address the  issue listed under PROBLEM LIST which is located toward bottom of this note     WAYNE SERRANO    PLV 2EAS 214 D1    Allergies    IODINE (Unknown)  tetracycline (Unknown)  vancomycin (Other)    Intolerances        PAST MEDICAL & SURGICAL HISTORY:  Prostate cancer      Type II diabetes mellitus      Chronic obstructive pulmonary disease (COPD)      CHF (congestive heart failure)      Renal insufficiency      H/O migraine      Insomnia      Constipation      S/P foot surgery          FAMILY HISTORY:      Home Medications:  albuterol 0.63 mg/3 mL (0.021%) inhalation solution: 3 milliliter(s) by nebulizer 3 times a day as needed for  shortness of breath and/or wheezing (2025 08:48)  amoxicillin-clavulanate 875 mg-125 mg oral tablet: 1 tab(s) orally 2 times a day for 7 days (:48)  Artificial Tears ophthalmic solution: 1 drop(s) in each eye 2 times a day (:48)  ascorbic acid 500 mg oral tablet: 1 tab(s) orally 2 times a day (:48)  aspirin 81 mg oral tablet: 1 tab(s) orally once a day (:48)  clonazePAM 0.25 mg oral tablet, disintegratin tab(s) orally 3 times a day (:48)  ferrous sulfate 325 mg (65 mg elemental iron) oral tablet: 1 tab(s) orally once a day (:48)  furosemide 40 mg oral tablet: 1 tab(s) orally every 12 hours (:48)  HumaLOG 100 units/mL subcutaneous solution: Inject subcutaneously 3 times a day using sliding scale (:48)  HumaLOG KwikPen 100 units/mL injectable solution: Inject 15 units subcutaneously 3 times a day (before each meal) in addition to sliding scale (:48)  Jardiance 10 mg oral tablet: 1 tab(s) orally once a day (:48)  Lantus Solostar Pen 100 units/mL subcutaneous solution: 36 unit(s) subcutaneously once a day (at bedtime) (2025 08:48)  loperamide 2 mg oral capsule: 1 cap(s) orally after each loose BM every 6 hours as needed **No more than 3 capsules (6mg) a day** (2025 08:48)  loratadine 10 mg oral tablet: 1 tab(s) orally once a day (:48)  Melatonin 3 mg oral tablet: 1 tab(s) orally once a day (at bedtime) (:48)  montelukast 10 mg oral tablet: 1 tab(s) orally once a day (at bedtime) (:48)  multivitamin: 1 tab(s) orally once a day (:48)  oxyCODONE 5 mg oral tablet: 2 tab(s) orally 2 times a day MDD: 4 tablets (:48)  pantoprazole 40 mg oral delayed release tablet: 1 tab(s) orally once a day (:48)  Polyethylene Glycol 3350: Mix 17grams into 8oz of water and drink orally once a day as needed (:48)  Potassium Chloride (Eqv-Klor-Con M20) 20 mEq oral tablet, extended release: 1 tab(s) orally 2 times a day (:48)  predniSONE 10 mg oral tablet: 1 tab(s) orally once a day with food or milk for 1 month, then after 1 month take 5mg orally once a day (:48)  pregabalin 150 mg oral capsule: 1 cap(s) orally 2 times a day MDD: 2 capsules (:48)  ramelteon 8 mg oral tablet: 1 tab(s) orally once a day (at bedtime) (:48)  rosuvastatin 40 mg oral tablet: 1 tab(s) orally once a day (at bedtime) (:48)  saccharomyces boulardii lyo 250 mg oral capsule: 1 cap(s) orally once a day (:48)  Saline Mist 0.65% nasal spray: 1 spray(s) in each nostril 2 times a day (:48)  Senna 8.6 mg oral tablet: 1 tab(s) orally once a day (at bedtime) (:48)  sertraline 100 mg oral tablet: 1.5 tab(s) orally once a day (150mg) (2025 08:48)  Symbicort 160 mcg-4.5 mcg/inh inhalation aerosol: 2 puff(s) inhaled 2 times a day (2025 08:48)      MEDICATIONS  (STANDING):  albuterol/ipratropium for Nebulization 3 milliLiter(s) Nebulizer every 8 hours  ascorbic acid 500 milliGRAM(s) Oral daily  aspirin enteric coated 81 milliGRAM(s) Oral daily  budesonide 160 MICROgram(s)/formoterol 4.5 MICROgram(s) Inhaler 2 Puff(s) Inhalation two times a day  clotrimazole 1% Cream 1 Application(s) Topical two times a day  DAPTOmycin IVPB 400 milliGRAM(s) IV Intermittent every 24 hours  dextrose 5%. 1000 milliLiter(s) (100 mL/Hr) IV Continuous <Continuous>  dextrose 5%. 1000 milliLiter(s) (50 mL/Hr) IV Continuous <Continuous>  dextrose 50% Injectable 25 Gram(s) IV Push once  dextrose 50% Injectable 12.5 Gram(s) IV Push once  dextrose 50% Injectable 25 Gram(s) IV Push once  ferrous    sulfate 325 milliGRAM(s) Oral daily  glucagon  Injectable 1 milliGRAM(s) IntraMuscular once  guaiFENesin Oral Liquid (Sugar-Free) 200 milliGRAM(s) Oral every 6 hours  heparin   Injectable 5000 Unit(s) SubCutaneous every 8 hours  insulin glargine Injectable (LANTUS) 20 Unit(s) SubCutaneous at bedtime  insulin lispro (ADMELOG) corrective regimen sliding scale   SubCutaneous at bedtime  insulin lispro (ADMELOG) corrective regimen sliding scale.   SubCutaneous three times a day before meals  insulin lispro Injectable (ADMELOG) 10 Unit(s) SubCutaneous three times a day before meals  lactobacillus acidophilus 1 Tablet(s) Oral two times a day with meals  montelukast 10 milliGRAM(s) Oral daily  multivitamin/minerals 1 Tablet(s) Oral daily  pantoprazole    Tablet 40 milliGRAM(s) Oral before breakfast  potassium chloride    Tablet ER 20 milliEquivalent(s) Oral daily  predniSONE   Tablet 10 milliGRAM(s) Oral daily  pregabalin 150 milliGRAM(s) Oral two times a day  rosuvastatin 40 milliGRAM(s) Oral at bedtime  saccharomyces boulardii 250 milliGRAM(s) Oral two times a day  senna 1 Tablet(s) Oral at bedtime  sertraline 100 milliGRAM(s) Oral daily  sodium chloride 3%  Inhalation 4 milliLiter(s) Inhalation every 12 hours  tiotropium 2.5 MICROgram(s) Inhaler 2 Puff(s) Inhalation daily    MEDICATIONS  (PRN):  acetaminophen     Tablet .. 650 milliGRAM(s) Oral every 6 hours PRN Temp greater or equal to 38C (100.4F), Mild Pain (1 - 3)  aluminum hydroxide/magnesium hydroxide/simethicone Suspension 30 milliLiter(s) Oral every 4 hours PRN Dyspepsia  bisacodyl Suppository 10 milliGRAM(s) Rectal daily PRN Constipation  clonazePAM  Tablet 0.5 milliGRAM(s) Oral three times a day PRN ANXIETY  dextrose Oral Gel 15 Gram(s) Oral once PRN Blood Glucose LESS THAN 70 milliGRAM(s)/deciliter  melatonin 3 milliGRAM(s) Oral at bedtime PRN Insomnia  ondansetron Injectable 4 milliGRAM(s) IV Push every 8 hours PRN Nausea and/or Vomiting  oxyCODONE    IR 10 milliGRAM(s) Oral two times a day PRN Severe Pain (7 - 10)  oxyCODONE    IR 5 milliGRAM(s) Oral every 8 hours PRN Moderate Pain (4 - 6)  polyethylene glycol 3350 17 Gram(s) Oral daily PRN for constipation      Diet, Consistent Carbohydrate w/Evening Snack:   DASH/TLC Sodium & Cholesterol Restricted (25 @ 18:09) [Active]          Vital Signs Last 24 Hrs  T(C): 36.6 (2025 05:44), Max: 36.7 (2025 20:49)  T(F): 97.9 (2025 05:44), Max: 98 (2025 20:49)  HR: 72 (2025 05:44) (72 - 82)  BP: 147/84 (2025 05:44) (103/66 - 147/84)  BP(mean): --  RR: 19 (2025 05:44) (18 - 19)  SpO2: 97% (2025 05:44) (93% - 99%)    Parameters below as of 2025 05:44  Patient On (Oxygen Delivery Method): nasal cannula          25 @ 07:01  -  25 @ 07:00  --------------------------------------------------------  IN: 0 mL / OUT: 2800 mL / NET: -2800 mL              LABS:                        11.5   7.60  )-----------( 133      ( 2025 07:40 )             37.0         138  |  100  |  28[H]  ----------------------------<  265[H]  3.4[L]   |  27  |  1.70[H]    Ca    9.1      2025 08:15        Urinalysis Basic - ( 2025 08:15 )    Color: x / Appearance: x / SG: x / pH: x  Gluc: 265 mg/dL / Ketone: x  / Bili: x / Urobili: x   Blood: x / Protein: x / Nitrite: x   Leuk Esterase: x / RBC: x / WBC x   Sq Epi: x / Non Sq Epi: x / Bacteria: x            WBC:  WBC Count: 7.60 K/uL ( @ 07:40)  WBC Count: 8.74 K/uL ( @ 08:15)  WBC Count: 7.56 K/uL ( @ 07:15)  WBC Count: 8.38 K/uL ( @ 09:05)      MICROBIOLOGY:  RECENT CULTURES:   Clean Catch Clean Catch (Midstream) XXXX XXXX   No growth     Blood Blood-Peripheral XXXX XXXX   No growth at 5 days     Blood Blood-Peripheral XXXX XXXX   No growth at 5 days                    Sodium:  Sodium: 138 mmol/L ( @ 08:15)  Sodium: 138 mmol/L ( @ 07:15)  Sodium: 138 mmol/L ( @ 09:05)      1.70 mg/dL  08:15  1.80 mg/dL  07:15  1.80 mg/dL  @ 09:05      Hemoglobin:  Hemoglobin: 11.5 g/dL ( @ 07:40)  Hemoglobin: 11.9 g/dL ( @ 08:15)  Hemoglobin: 12.3 g/dL ( @ 07:15)  Hemoglobin: 12.9 g/dL ( @ 09:05)      Platelets: Platelet Count - Automated: 133 K/uL ( @ 07:40)  Platelet Count - Automated: 139 K/uL ( @ 08:15)  Platelet Count - Automated: 150 K/uL ( @ 07:15)  Platelet Count - Automated: 147 K/uL ( @ 09:05)          Urinalysis Basic - ( 2025 08:15 )    Color: x / Appearance: x / SG: x / pH: x  Gluc: 265 mg/dL / Ketone: x  / Bili: x / Urobili: x   Blood: x / Protein: x / Nitrite: x   Leuk Esterase: x / RBC: x / WBC x   Sq Epi: x / Non Sq Epi: x / Bacteria: x        RADIOLOGY & ADDITIONAL STUDIES:      MICROBIOLOGY:  RECENT CULTURES:   Clean Catch Clean Catch (Midstream) XXXX XXXX   No growth     Blood Blood-Peripheral XXXX XXXX   No growth at 5 days     Blood Blood-Peripheral XXXX XXXX   No growth at 5 days

## 2025-06-09 NOTE — PROGRESS NOTE ADULT - PROBLEM SELECTOR PROBLEM 2
Leg wound, right

## 2025-06-09 NOTE — PROGRESS NOTE ADULT - PROBLEM SELECTOR PLAN 6
pulm eval , pulse oximetry , serial imaging

## 2025-06-09 NOTE — PROGRESS NOTE ADULT - SUBJECTIVE AND OBJECTIVE BOX
PROGRESS NOTE   Patient is a 74y old  Male who presents with a chief complaint of rle swelling (08 Jun 2025 16:57)      HPI:  Patient with a past medical history of diabetes, COPD, heart failure, CKD, hypertension, history of right foot ulcer is presenting for wound to right lower extremity.  Was recently admitted here for shortness of breath as well as for bursitis and concern for a wound.  Was sent back to facility.  They sent him to the ER today due to worsening swelling and redness localized to his wound of the right lower extremity.  Endorsing pain to the site.  Denies fevers, chest pain, nausea or vomiting.  States that he is having some chronic shortness of breath though unchanged today.  Also endorses chronic cough. (03 Jun 2025 18:32)      Vital Signs Last 24 Hrs  T(C): 36.6 (09 Jun 2025 05:44), Max: 36.7 (08 Jun 2025 20:49)  T(F): 97.9 (09 Jun 2025 05:44), Max: 98 (08 Jun 2025 20:49)  HR: 72 (09 Jun 2025 05:44) (72 - 82)  BP: 147/84 (09 Jun 2025 05:44) (103/66 - 147/84)  BP(mean): --  RR: 19 (09 Jun 2025 05:44) (18 - 19)  SpO2: 97% (09 Jun 2025 05:44) (93% - 99%)    Parameters below as of 09 Jun 2025 05:44  Patient On (Oxygen Delivery Method): nasal cannula                              11.5   7.60  )-----------( 133      ( 09 Jun 2025 07:40 )             37.0               06-08    138  |  100  |  28[H]  ----------------------------<  265[H]  3.4[L]   |  27  |  1.70[H]    Ca    9.1      08 Jun 2025 08:15        PHYSICAL EXAM  LOWER EXTREMITY PHYSICAL EXAM:  Integument : Skin warm, dry and supple bilateral  Right lower leg with redness, swelling and pain on palpation, partial thickness wound covered with eschar. All consistent with cellulitis   The area of concern has responded favorably with current treatment  Vascular : DP and PT weakly palpable 1/4 bilaterally  Capillary refill time less than 3s digits 1-5 bilaterally. Moderate to severe edema bilaterally with right is greater than left  MSK : Muscle strenth 4/5 all major muscle group bilaterally

## 2025-06-09 NOTE — PROGRESS NOTE ADULT - SUBJECTIVE AND OBJECTIVE BOX
Patient is a 74y Male whom presented to the hospital with kayleen     PAST MEDICAL & SURGICAL HISTORY:  Prostate cancer      Type II diabetes mellitus      Chronic obstructive pulmonary disease (COPD)      CHF (congestive heart failure)      Renal insufficiency      H/O migraine      Insomnia      Constipation      S/P foot surgery          MEDICATIONS  (STANDING):  albuterol/ipratropium for Nebulization 3 milliLiter(s) Nebulizer every 8 hours  ascorbic acid 500 milliGRAM(s) Oral daily  aspirin enteric coated 81 milliGRAM(s) Oral daily  budesonide    Inhalation Suspension 0.5 milliGRAM(s) Inhalation two times a day  clotrimazole 1% Cream 1 Application(s) Topical two times a day  DAPTOmycin IVPB 400 milliGRAM(s) IV Intermittent every 24 hours  dextrose 5%. 1000 milliLiter(s) (100 mL/Hr) IV Continuous <Continuous>  dextrose 5%. 1000 milliLiter(s) (50 mL/Hr) IV Continuous <Continuous>  dextrose 50% Injectable 25 Gram(s) IV Push once  dextrose 50% Injectable 12.5 Gram(s) IV Push once  dextrose 50% Injectable 25 Gram(s) IV Push once  ferrous    sulfate 325 milliGRAM(s) Oral daily  furosemide   Injectable 60 milliGRAM(s) IV Push daily  glucagon  Injectable 1 milliGRAM(s) IntraMuscular once  heparin   Injectable 5000 Unit(s) SubCutaneous every 8 hours  insulin glargine Injectable (LANTUS) 10 Unit(s) SubCutaneous at bedtime  insulin lispro (ADMELOG) corrective regimen sliding scale   SubCutaneous at bedtime  insulin lispro (ADMELOG) corrective regimen sliding scale.   SubCutaneous three times a day before meals  montelukast 10 milliGRAM(s) Oral daily  multivitamin/minerals 1 Tablet(s) Oral daily  pantoprazole    Tablet 40 milliGRAM(s) Oral before breakfast  piperacillin/tazobactam IVPB.. 3.375 Gram(s) IV Intermittent every 8 hours  potassium chloride    Tablet ER 20 milliEquivalent(s) Oral daily  predniSONE   Tablet 10 milliGRAM(s) Oral daily  pregabalin 150 milliGRAM(s) Oral two times a day  rosuvastatin 40 milliGRAM(s) Oral at bedtime  saccharomyces boulardii 250 milliGRAM(s) Oral two times a day  senna 1 Tablet(s) Oral at bedtime  sertraline 100 milliGRAM(s) Oral daily      Allergies    IODINE (Unknown)  tetracycline (Unknown)  vancomycin (Other)    Intolerances        SOCIAL HISTORY:  Denies ETOh,Smoking,     FAMILY HISTORY:      REVIEW OF SYSTEMS:    CONSTITUTIONAL: No weakness, fevers or chills  RESPIRATORY: No cough, wheezing, hemoptysis; No shortness of breath  CARDIOVASCULAR: No chest pain or palpitations  GASTROINTESTINAL: No abdominal or epigastric pain. No nausea, vomiting,     No diarrhea or constipation. No melena                             11.5   7.60  )-----------( 133      ( 09 Jun 2025 07:40 )             37.0       CBC Full  -  ( 09 Jun 2025 07:40 )  WBC Count : 7.60 K/uL  RBC Count : 4.10 M/uL  Hemoglobin : 11.5 g/dL  Hematocrit : 37.0 %  Platelet Count - Automated : 133 K/uL  Mean Cell Volume : 90.2 fl  Mean Cell Hemoglobin : 28.0 pg  Mean Cell Hemoglobin Concentration : 31.1 g/dL  Auto Neutrophil # : x  Auto Lymphocyte # : x  Auto Monocyte # : x  Auto Eosinophil # : x  Auto Basophil # : x  Auto Neutrophil % : x  Auto Lymphocyte % : x  Auto Monocyte % : x  Auto Eosinophil % : x  Auto Basophil % : x      06-09    141  |  105  |  22  ----------------------------<  181[H]  4.1   |  26  |  1.50[H]    Ca    9.3      09 Jun 2025 07:40        CAPILLARY BLOOD GLUCOSE      POCT Blood Glucose.: 259 mg/dL (09 Jun 2025 12:00)  POCT Blood Glucose.: 187 mg/dL (09 Jun 2025 07:44)  POCT Blood Glucose.: 243 mg/dL (08 Jun 2025 21:22)  POCT Blood Glucose.: 290 mg/dL (08 Jun 2025 16:59)      Vital Signs Last 24 Hrs  T(C): 36.6 (09 Jun 2025 05:44), Max: 36.7 (08 Jun 2025 20:49)  T(F): 97.9 (09 Jun 2025 05:44), Max: 98 (08 Jun 2025 20:49)  HR: 75 (09 Jun 2025 07:24) (72 - 82)  BP: 147/84 (09 Jun 2025 05:44) (119/66 - 147/84)  BP(mean): --  RR: 19 (09 Jun 2025 05:44) (19 - 19)  SpO2: 97% (09 Jun 2025 07:24) (97% - 99%)    Parameters below as of 09 Jun 2025 07:24  Patient On (Oxygen Delivery Method): nasal cannula        Urinalysis Basic - ( 09 Jun 2025 07:40 )    Color: x / Appearance: x / SG: x / pH: x  Gluc: 181 mg/dL / Ketone: x  / Bili: x / Urobili: x   Blood: x / Protein: x / Nitrite: x   Leuk Esterase: x / RBC: x / WBC x   Sq Epi: x / Non Sq Epi: x / Bacteria: x                    PHYSICAL EXAM:    Constitutional: NAD  HEENT: conjunctive   clear   Neck:  No JVD  Respiratory: CTAB  Cardiovascular: S1 and S2  Gastrointestinal: BS+, soft, NT/ND  Extremities: No peripheral edema    LABS:                        12.1   9.50  )-----------( 143      ( 04 Jun 2025 06:05 )             37.9     06-04    141  |  104  |  36[H]  ----------------------------<  137[H]  3.4[L]   |  30  |  1.70[H]    Ca    9.2      04 Jun 2025 06:05  Phos  2.9     06-04  Mg     2.5     06-04    TPro  6.9  /  Alb  3.0[L]  /  TBili  0.5  /  DBili  x   /  AST  14[L]  /  ALT  32  /  AlkPhos  73  06-04      Urine Studies:  Urinalysis Basic - ( 04 Jun 2025 06:05 )    Color: x / Appearance: x / SG: x / pH: x  Gluc: 137 mg/dL / Ketone: x  / Bili: x / Urobili: x   Blood: x / Protein: x / Nitrite: x   Leuk Esterase: x / RBC: x / WBC x   Sq Epi: x / Non Sq Epi: x / Bacteria: x            RADIOLOGY & ADDITIONAL STUDIES:

## 2025-06-09 NOTE — PROGRESS NOTE ADULT - SUBJECTIVE AND OBJECTIVE BOX
[facility]  INFECTIOUS DISEASES - [address], [place] [address]  Tel: [phone]     Fax: [phone]  =======================================================    WAYNE SERRANO [id number]    Follow up: No fevers. Breathing improved.    Allergies:  IODINE (Unknown)  tetracycline (Unknown)  vancomycin (Other)      Antibiotics:  acetaminophen     Tablet .. 650 milliGRAM(s) Oral every 6 hours PRN  albuterol/ipratropium for Nebulization 3 milliLiter(s) Nebulizer every 8 hours  aluminum hydroxide/magnesium hydroxide/simethicone Suspension 30 milliLiter(s) Oral every 4 hours PRN  ascorbic acid 500 milliGRAM(s) Oral daily  aspirin enteric coated 81 milliGRAM(s) Oral daily  bisacodyl Suppository 10 milliGRAM(s) Rectal daily PRN  budesonide 160 MICROgram(s)/formoterol 4.5 MICROgram(s) Inhaler 2 Puff(s) Inhalation two times a day  clonazePAM  Tablet 0.5 milliGRAM(s) Oral three times a day PRN  clotrimazole 1% Cream 1 Application(s) Topical two times a day  DAPTOmycin IVPB 400 milliGRAM(s) IV Intermittent every 24 hours  dextrose 5%. 1000 milliLiter(s) IV Continuous <Continuous>  dextrose 5%. 1000 milliLiter(s) IV Continuous <Continuous>  dextrose 50% Injectable 25 Gram(s) IV Push once  dextrose 50% Injectable 12.5 Gram(s) IV Push once  dextrose 50% Injectable 25 Gram(s) IV Push once  dextrose Oral Gel 15 Gram(s) Oral once PRN  ferrous    sulfate 325 milliGRAM(s) Oral daily  glucagon  Injectable 1 milliGRAM(s) IntraMuscular once  guaiFENesin Oral Liquid (Sugar-Free) 200 milliGRAM(s) Oral every 6 hours  heparin   Injectable 5000 Unit(s) SubCutaneous every 8 hours  insulin glargine Injectable (LANTUS) 20 Unit(s) SubCutaneous at bedtime  insulin lispro (ADMELOG) corrective regimen sliding scale   SubCutaneous at bedtime  insulin lispro (ADMELOG) corrective regimen sliding scale.   SubCutaneous three times a day before meals  insulin lispro Injectable (ADMELOG) 15 Unit(s) SubCutaneous three times a day before meals  lactobacillus acidophilus 1 Tablet(s) Oral two times a day with meals  melatonin 3 milliGRAM(s) Oral at bedtime PRN  montelukast 10 milliGRAM(s) Oral daily  multivitamin/minerals 1 Tablet(s) Oral daily  mupirocin 2% Nasal 1 Application(s) Both Nostrils two times a day  ondansetron Injectable 4 milliGRAM(s) IV Push every 8 hours PRN  oxyCODONE    IR 10 milliGRAM(s) Oral two times a day PRN  oxyCODONE    IR 5 milliGRAM(s) Oral every 8 hours PRN  pantoprazole    Tablet 40 milliGRAM(s) Oral before breakfast  polyethylene glycol 3350 17 Gram(s) Oral daily PRN  potassium chloride    Tablet ER 20 milliEquivalent(s) Oral daily  predniSONE   Tablet 10 milliGRAM(s) Oral daily  pregabalin 150 milliGRAM(s) Oral two times a day  rosuvastatin 40 milliGRAM(s) Oral at bedtime  saccharomyces boulardii 250 milliGRAM(s) Oral two times a day  senna 1 Tablet(s) Oral at bedtime  sertraline 100 milliGRAM(s) Oral daily  sodium chloride 3%  Inhalation 4 milliLiter(s) Inhalation every 12 hours  tiotropium 2.5 MICROgram(s) Inhaler 2 Puff(s) Inhalation daily       REVIEW OF SYSTEMS:  CONSTITUTIONAL:  No Fever or chills  CARDIOVASCULAR:  No chest pain   RESPIRATORY:  + cough  GASTROINTESTINAL:  No nausea, vomiting or diarrhea.  GENITOURINARY:  No dysuria  MUSCULOSKELETAL: + RLE pain  NEUROLOGIC:  No headache or dizziness       Physical Exam:  ICU Vital Signs Last 24 Hrs  T(C): 36.6 (09 Jun 2025 05:44), Max: 36.7 (08 Jun 2025 20:49)  T(F): 97.9 (09 Jun 2025 05:44), Max: 98 (08 Jun 2025 20:49)  HR: 77 (09 Jun 2025 12:57) (72 - 82)  BP: 147/84 (09 Jun 2025 05:44) (119/66 - 147/84)  BP(mean): --  ABP: --  ABP(mean): --  RR: 19 (09 Jun 2025 05:44) (19 - 19)  SpO2: 98% (09 Jun 2025 12:57) (97% - 99%)    O2 Parameters below as of 09 Jun 2025 12:57  Patient On (Oxygen Delivery Method): nasal cannula      GEN: NAD  HEENT: normocephalic and atraumatic.   NECK: Supple.   LUNGS: Normal respiratory effort  HEART: Regular rate and rhythm  ABDOMEN: Soft, nontender, and nondistended.    EXTREMITIES: B/L lower leg edema--much improved  NEUROLOGIC: AO x 3  PSYCHIATRIC: Appropriate affect .  SKIN: + redness on R lower leg--much improved, scab noted but no drainage noted      Labs:  06-09    141  |  105  |  22  ----------------------------<  181[H]  4.1   |  26  |  1.50[H]    Ca    9.3      09 Jun 2025 07:40                            11.5   7.60  )-----------( 133      ( 09 Jun 2025 07:40 )             37.0       Urinalysis Basic - ( 09 Jun 2025 07:40 )    Color: x / Appearance: x / SG: x / pH: x  Gluc: 181 mg/dL / Ketone: x  / Bili: x / Urobili: x   Blood: x / Protein: x / Nitrite: x   Leuk Esterase: x / RBC: x / WBC x   Sq Epi: x / Non Sq Epi: x / Bacteria: x          RECENT CULTURES:  06-05 @ 17:50 Clean Catch Clean Catch (Midstream)     No growth        06-03 @ 15:00 Blood Blood-Peripheral     No growth at 5 days        06-03 @ 14:45 Blood Blood-Peripheral     No growth at 5 days              All imaging and data are reviewed.     < from: CT Chest No Cont (06.06.25 @ 23:57) >    FINDINGS:  Lungs: Scattered areas of subsegmental atelectasis.  No  discernible foreign body in the airways.  Pleural spaces: No pneumothorax. No pleural effusion.  Heart: Normal heart size. Coronary artery calcifications. No pericardial   effusion.  Esophagus: No discernible foreign body in the esophagus.  Lymph nodes: No enlarged lymph nodes.  Vasculature: Aortic calcifications.    Kidneys: Left renal cyst.  Intestine: Colonic diverticulosis. No diverticulitis.  Bones/joints: Unremarkable. No acute fracture.  Soft tissues: Gynecomastia.    IMPRESSION:  No discernible foreign body.    Scattered areas of subsegmental atelectasis.    --- End of Report ---          < end of copied text >

## 2025-06-09 NOTE — PROGRESS NOTE ADULT - ASSESSMENT
Physical Exam:   Vital Signs Last 24 Hrs  T(C): 36.6 (09 Jun 2025 05:44), Max: 36.7 (08 Jun 2025 20:49)  T(F): 97.9 (09 Jun 2025 05:44), Max: 98 (08 Jun 2025 20:49)  HR: 75 (09 Jun 2025 07:24) (72 - 82)  BP: 147/84 (09 Jun 2025 05:44) (103/66 - 147/84)  BP(mean): --  RR: 19 (09 Jun 2025 05:44) (18 - 19)  SpO2: 97% (09 Jun 2025 07:24) (93% - 99%)    Parameters below as of 09 Jun 2025 07:24  Patient On (Oxygen Delivery Method): nasal cannula       CAPILLARY BLOOD GLUCOSE      POCT Blood Glucose.: 187 mg/dL (09 Jun 2025 07:44)  POCT Blood Glucose.: 243 mg/dL (08 Jun 2025 21:22)  POCT Blood Glucose.: 290 mg/dL (08 Jun 2025 16:59)  POCT Blood Glucose.: 310 mg/dL (08 Jun 2025 11:47)        DIET: CC  >50%

## 2025-06-09 NOTE — PROGRESS NOTE ADULT - PROBLEM/PLAN-10
DISPLAY PLAN FREE TEXT
Stable

## 2025-06-09 NOTE — PROGRESS NOTE ADULT - ASSESSMENT
Patient with a past medical history of diabetes, COPD, heart failure, CKD, hypertension, history of right foot ulcer is presenting for wound to right lower extremity.  Was recently admitted here for shortness of breath as well as for bursitis and concern for a wound.  Was sent back to facility.  They sent him to the ER today due to worsening swelling and redness localized to his wound of the right lower extremity.  Endorsing pain to the site.  Denies fevers, chest pain, nausea or vomiting.  States that he is having some chronic shortness of breath though unchanged today.  Also endorses chronic cough. (03 Jun 2025 18:32)      CKD sTAGE 3 and ACUTE RENAL FAILURE:   Serum creatinine is  controlled    , approximating GFR at   ml/min. id overload furosemide   Injectable 60 milliGRAM(s) IV Push daily  There is no progression . No uremic symptoms  No evidence of anemia .  Fluid status stable.  Will continue to avoid nephrotoxic drugs.  Patient remains asymptomatic.   Continue current therapy.  Additional evaluation:   ECG,    echocardiogram,     CXR,  will obtained recent   renal ultrasound to evalaute kidney size and possible stones ,  potassium chloride    Tablet ER 20 milliEquivalent(s) Oral daily prn     BP monitoring,continue current antihypertensive meds, low salt diet,followup with PMD in 1-2 weeks      Admit for septic workup and ID evaluation,send blood and urine cx,serial lactate levels,monitor vitals closley,ivfs hydration,monitor urine output and renal profile,iv abx as per id cons  piperacillin/tazobactam IVPB.. 3.375 Gram(s) IV Intermittent every 8 hours

## 2025-06-09 NOTE — PROGRESS NOTE ADULT - SUBJECTIVE AND OBJECTIVE BOX
[facility] -- Erick Hogue Pannella, Patel, Savella, Goodger, Cohen: Office # [phone]    Follow Up:  Edema    Subjective/Observations: Remains on NC at 2L/min, though, able to tolerate RA.  Denies any form of respiratory or cardiac discomfort.       REVIEW OF SYSTEMS: All other review of systems are negative unless indicated above    PAST MEDICAL & SURGICAL HISTORY:  Prostate cancer      Type II diabetes mellitus      Chronic obstructive pulmonary disease (COPD)      CHF (congestive heart failure)      Renal insufficiency      H/O migraine      Insomnia      Constipation      S/P foot surgery          MEDICATIONS  (STANDING):  albuterol/ipratropium for Nebulization 3 milliLiter(s) Nebulizer every 8 hours  ascorbic acid 500 milliGRAM(s) Oral daily  aspirin enteric coated 81 milliGRAM(s) Oral daily  budesonide 160 MICROgram(s)/formoterol 4.5 MICROgram(s) Inhaler 2 Puff(s) Inhalation two times a day  clotrimazole 1% Cream 1 Application(s) Topical two times a day  DAPTOmycin IVPB 400 milliGRAM(s) IV Intermittent every 24 hours  dextrose 5%. 1000 milliLiter(s) (100 mL/Hr) IV Continuous <Continuous>  dextrose 5%. 1000 milliLiter(s) (50 mL/Hr) IV Continuous <Continuous>  dextrose 50% Injectable 25 Gram(s) IV Push once  dextrose 50% Injectable 12.5 Gram(s) IV Push once  dextrose 50% Injectable 25 Gram(s) IV Push once  ferrous    sulfate 325 milliGRAM(s) Oral daily  glucagon  Injectable 1 milliGRAM(s) IntraMuscular once  guaiFENesin Oral Liquid (Sugar-Free) 200 milliGRAM(s) Oral every 6 hours  heparin   Injectable 5000 Unit(s) SubCutaneous every 8 hours  insulin glargine Injectable (LANTUS) 20 Unit(s) SubCutaneous at bedtime  insulin lispro (ADMELOG) corrective regimen sliding scale   SubCutaneous at bedtime  insulin lispro (ADMELOG) corrective regimen sliding scale.   SubCutaneous three times a day before meals  insulin lispro Injectable (ADMELOG) 10 Unit(s) SubCutaneous three times a day before meals  lactobacillus acidophilus 1 Tablet(s) Oral two times a day with meals  montelukast 10 milliGRAM(s) Oral daily  multivitamin/minerals 1 Tablet(s) Oral daily  pantoprazole    Tablet 40 milliGRAM(s) Oral before breakfast  potassium chloride    Tablet ER 20 milliEquivalent(s) Oral daily  predniSONE   Tablet 10 milliGRAM(s) Oral daily  pregabalin 150 milliGRAM(s) Oral two times a day  rosuvastatin 40 milliGRAM(s) Oral at bedtime  saccharomyces boulardii 250 milliGRAM(s) Oral two times a day  senna 1 Tablet(s) Oral at bedtime  sertraline 100 milliGRAM(s) Oral daily  sodium chloride 3%  Inhalation 4 milliLiter(s) Inhalation every 12 hours  tiotropium 2.5 MICROgram(s) Inhaler 2 Puff(s) Inhalation daily    MEDICATIONS  (PRN):  acetaminophen     Tablet .. 650 milliGRAM(s) Oral every 6 hours PRN Temp greater or equal to 38C (100.4F), Mild Pain (1 - 3)  aluminum hydroxide/magnesium hydroxide/simethicone Suspension 30 milliLiter(s) Oral every 4 hours PRN Dyspepsia  bisacodyl Suppository 10 milliGRAM(s) Rectal daily PRN Constipation  clonazePAM  Tablet 0.5 milliGRAM(s) Oral three times a day PRN ANXIETY  dextrose Oral Gel 15 Gram(s) Oral once PRN Blood Glucose LESS THAN 70 milliGRAM(s)/deciliter  melatonin 3 milliGRAM(s) Oral at bedtime PRN Insomnia  ondansetron Injectable 4 milliGRAM(s) IV Push every 8 hours PRN Nausea and/or Vomiting  oxyCODONE    IR 10 milliGRAM(s) Oral two times a day PRN Severe Pain (7 - 10)  oxyCODONE    IR 5 milliGRAM(s) Oral every 8 hours PRN Moderate Pain (4 - 6)  polyethylene glycol 3350 17 Gram(s) Oral daily PRN for constipation    Allergies    IODINE (Unknown)  tetracycline (Unknown)  vancomycin (Other)    Intolerances      Vital Signs Last 24 Hrs  T(C): 36.6 (09 Jun 2025 05:44), Max: 36.7 (08 Jun 2025 20:49)  T(F): 97.9 (09 Jun 2025 05:44), Max: 98 (08 Jun 2025 20:49)  HR: 72 (09 Jun 2025 05:44) (72 - 82)  BP: 147/84 (09 Jun 2025 05:44) (103/66 - 147/84)  BP(mean): --  RR: 19 (09 Jun 2025 05:44) (18 - 19)  SpO2: 97% (09 Jun 2025 05:44) (93% - 99%)    Parameters below as of 09 Jun 2025 05:44  Patient On (Oxygen Delivery Method): nasal cannula      I&O's Summary    08 Jun 2025 07:01  -  09 Jun 2025 07:00  --------------------------------------------------------  IN: 0 mL / OUT: 2800 mL / NET: -2800 mL          TELE: Off cardiac monitor   PHYSICAL EXAM:  Constitutional: NAD, awake and alert  HEENT: Moist Mucous Membranes, Anicteric  Pulmonary: Non-labored, breath sounds are clear bilaterally, No wheezing, rales or rhonchi  Cardiovascular: Regular, S1 and S2, No murmurs, No rubs, gallops or clicks  Gastrointestinal:  soft, nontender, nondistended   Lymph: + peripheral edema. No lymphadenopathy.   Skin: No visible rashes or ulcers.  Psych:  Mood & affect appropriate      LABS: All Labs Reviewed:                        11.5   7.60  )-----------( 133      ( 09 Jun 2025 07:40 )             37.0                         11.9   8.74  )-----------( 139      ( 08 Jun 2025 08:15 )             37.1                         12.3   7.56  )-----------( 150      ( 07 Jun 2025 07:15 )             38.5     08 Jun 2025 08:15    138    |  100    |  28     ----------------------------<  265    3.4     |  27     |  1.70   07 Jun 2025 07:15    138    |  99     |  32     ----------------------------<  226    3.1     |  29     |  1.80   06 Jun 2025 09:05    138    |  99     |  30     ----------------------------<  334    3.3     |  27     |  1.80     Ca    9.1        08 Jun 2025 08:15  Ca    9.2        07 Jun 2025 07:15  Ca    9.0        06 Jun 2025 09:05  Phos  3.0       07 Jun 2025 07:15  Mg     2.2       07 Jun 2025 07:15       Cholesterol: 109 mg/dL (05-22-25 @ 08:05)  HDL Cholesterol: 43 mg/dL (05-22-25 @ 08:05)  Triglycerides, Serum: 111 mg/dL (05-22-25 @ 08:05)    12 Lead ECG:   Ventricular Rate 84 BPM    Atrial Rate 84 BPM    P-R Interval 234 ms    QRS Duration 94 ms    Q-T Interval 396 ms    QTC Calculation(Bazett) 467 ms    P Axis 21 degrees    R Axis -66 degrees    T Axis 41 degrees    Diagnosis Line Sinus rhythm with 1st degree AV block  Left axis deviation  Low voltage QRS  Inferior infarct , age undetermined  Cannot rule out Anteroseptal infarct (cited on or before 26-AUG-2023)  Abnormal ECG    Confirmed by Amber Quevedo ([id number]) on 6/4/2025 1:14:42 PM (06-04-25 @ 09:45)      TRANSTHORACIC ECHOCARDIOGRAM REPORT  ________________________________________________________________________________                                      _______       Pt. Name:       WAYNE SERRANO Study Date:    5/26/2025  MRN:            [id number]     YOB: 1950  Accession #:    [id number]     Age:           74 years  Account#:       [id number]    Gender:        M  Heart Rate:                   Height:        70.08 in (178.00 cm)  Rhythm:                       Weight:        242.50 lb (110.00 kg)  Blood Pressure: 99/65 mmHg    BSA/BMI:       2.27 m² / 34.72 kg/m²  ________________________________________________________________________________________  Referring Physician:    [id number] Mart Tripp  Interpreting Physician: Amber Quevedo MD  Primary Sonographer:    Checo Broderick    CPT:               ECHO TTE WO CON COMP W DOPP - 77156.m  Indication(s):     Dyspnea, unspecified - R06.00  Procedure:         Transthoracic echocardiogram with 2-D, M-mode and complete             spectral and color flow Doppler.  Ordering Location: [facility]  Admission Status:  Inpatient  Study Information: Image quality for this study is technically difficult.                     Technically difficult study secondary to lung interference.    _______________________________________________________________________________________     CONCLUSIONS:      1. Technically difficult image quality.   2. Left ventricular endocardium is not well visualized; however, the left ventricular systolic function appears grossly normal.   3. Left ventricular systolic function is normal with an ejection fraction visually estimated at 60 to 65 %.   4. The right ventricle is not well visualized. probably normal right ventricular systolic function.   5. The inferior vena cava is normal in size measuring 1.22 cm in diameter, (normal <2.1cm) with normal inspiratory collapse (normal >50%) consistent with normal right atrial pressure (~3, range 0-5mmHg).    ________________________________________________________________________________________  FINDINGS:     Left Ventricle:  Left ventricular systolic function is normal with an ejection fraction visually estimated at 60 to 65%. There is poor visualization of the endocardial borders to determine thepresence of wall motion abnormalities. Left ventricular endocardium is not well visualized; however, the left ventricular systolic function appears grossly normal. There is normal left ventricular diastolic function.     Right Ventricle:  The right ventricle is not well visualized. Right ventricular systolic function is probably normal.     Left Atrium:  The left atrium is normal in size with an indexed volume of 12.22 ml/m².     Right Atrium:  The right atrium was not well visualized.     Interatrial Septum:  The interatrial septum was not well visualized.     Aortic Valve:  The aortic valve was not well visualized. The aortic valve anatomy cannot be determined with normal systolic excursion.     Mitral Valve:  The mitral valve was not well visualized. There is mild leaflet calcification.     Tricuspid Valve:  The tricuspid valve was not well visualized. There is trace tricuspid regurgitation.     Pulmonic Valve:  The pulmonic valve was not well visualized.     Aorta:  The aortic root atthe sinuses of Valsalva is normal in size, measuring 3.20 cm (indexed 1.41 cm/m²). The ascending aorta is normal in size, measuring 3.00 cm (indexed 1.32 cm/m²).     Systemic Veins:  The inferior vena cava is normal in size measuring 1.22 cm in diameter, (normal <2.1cm) with normal inspiratory collapse (normal >50%) consistent with normal right atrial pressure (~3, range 0-5mmHg).  ____________________________________________________________________  QUANTITATIVE DATA:  Left Ventricle Measurements: (Indexed to BSA)     IVSd (2D):   1.4 cm  LVPWd (2D):  1.3 cm  LVIDd (2D):  3.4 cm  LVIDs (2D):  2.2 cm  LV Mass:     158 g  69.7 g/m²  Visualized LV EF%: 60 to 65%     MV E Vmax:    0.56 m/s  MV A Vmax:    1.17 m/s  MV E/A:       0.47  e' lateral:  6.31 cm/s  e' medial:    3.26 cm/s  E/e' lateral: 8.80  E/e' medial:  17.02  E/e' Average: 11.60  MV DT:        156 msec    Aorta Measurements: (Normal range) (Indexed to BSA)     Ao Root d     3.20 cm (3.1 - 3.7 cm) 1.41 cm/m²  Ao Asc d, 2D: 3.00  Ao Asc prox:  3.00 cm                1.32 cm/m²            Left Atrium Measurements: (Indexed to BSA)  LA Diam 2D: 2.60 cm            LVOT / RVOT/ Qp/Qs Data: (Indexed to BSA)  LVOT Diameter,s: 2.20 cm  LVOT Area:       3.80 cm²    Mitral Valve Measurements:     MV E Vmax: 0.6 m/s  MV A Vmax: 1.2 m/s  MV E/A:    0.5       Tricuspid Valve Measurements:     RA Pressure: 3 mmHg    ________________________________________________________________________________________  Electronically signed on 5/27/2025 at 8:22:27 AM by Amber Quevedo MD         *** Final ***

## 2025-06-09 NOTE — PROGRESS NOTE ADULT - ASSESSMENT
REASON FOR VISIT  .. Management of problems listed below      EVENTS/CURRENT ISSUES.  . 6/6/2025 started symbicort spiriva         REVIEW OF SYMPTOMS   Able to give ROS  Yes     RELIABILITY +/-   CONSTITUTIONAL Weakness Yes    ENDOCRINE  No heat or cold intolerance    ALLERGY No hives  Sore throat No stridor  RESP Shortness of breath YES   NEURO New weakness No   CARDIAC   Palpitations No         PHYSICAL EXAM    HEENT Unremarkable  atraumatic   RESP Fair air entry  Harsh breath sound   CARDIAC S1 S2 No S3     NO JVD    ABDOMEN No hepatosplenomegaly   PEDAL EDEMA present No calf tenderness      REASON FOR VISIT  .. Management of problems listed below      CC.   . 6/3/2025  Pt brought in by EMS from [facility] with concern for worsening pedal edema, wound on R lower leg. 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  edema, wound check  OVERALL PRESENTATION.  . 6/3/2025  Patient with a past medical history of diabetes, COPD, heart failure, CKD, hypertension, history of right foot ulcer is presenting for wound to right lower extremity.  Was recently admitted here for shortness of breath as well as for bursitis and concern for a wound.  Was sent back to facility.  They sent him to the ER today due to worsening swelling and redness localized to his wound of the right lower extremity.  Endorsing pain to the site.  Denies fevers, chest pain, nausea or vomiting.  States that he is having some chronic shortness of breath though unchanged today.  Also endorses chronic cough.  PMH.      PAST HOSPITAL STAYS .  Home Medications:   * Patient Currently Takes Medications as of 27-May-2025 11:17 documented in Structured Notes  · montelukast 10 mg oral tablet: 1 tab(s) orally once a day  · melatonin 3 mg oral tablet: 1 tab(s) orally once a day (at bedtime)  · senna leaf extract oral tablet: 1 tab(s) orally once a day (at bedtime)  · saccharomyces boulardii lyo 250 mg oral capsule: 1 cap(s) orally once a day  · sertraline 100 mg oral tablet: 1 tab(s) orally once a day MDD: 1  · furosemide 40 mg oral tablet: 1 tab(s) orally every 12 hours  · sulfamethoxazole-trimethoprim 800 mg-160 mg oral tablet: 1 tab(s) orally every 12 hours  · aspirin 81 mg oral delayed release tablet: 1 tab(s) orally once a day  · potassium chloride 20 mEq oral tablet, extended release: 1 tab(s) orally 2 times a day  · albuterol 0.63 mg/3 mL (0.021%) inhalation solution: 3 milliliter(s) by nebulizer 3 times a day  · predniSONE 10 mg oral tablet: 1 tab(s) orally once a day  · oxyCODONE 5 mg oral tablet: 2 tab(s) orally 2 times a day as needed for Severe Pain (7 - 10) MDD: 4  · loratadine 10 mg oral tablet: 1 tab(s) orally once a day (in the morning)  · Multiple Vitamins with Minerals oral tablet: 1 tab(s) orally once a day  · ferrous sulfate 325 mg (65 mg elemental iron) oral tablet: 1 tab(s) orally once a day  · pantoprazole 40 mg oral delayed release tablet: 1 tab(s) orally once a day (before a meal)  · Symbicort 160 mcg-4.5 mcg/inh inhalation aerosol: 2 puff(s) inhaled 2 times a day  · rosuvastatin 40 mg oral tablet: 1 tab(s) orally once a day  · polyethylene glycol 3350 oral powder for reconstitution: 17 gram(s) orally once a day as needed for  constipation  · Jardiance 10 mg oral tablet: 1 tab(s) orally once a day  · pregabalin 150 mg oral capsule: 1 cap(s) orally 2 times a day  · insulin glargine 100 units/mL subcutaneous solution: 36 unit(s) subcutaneous once a day (at bedtime)  · HumaLOG 100 units/mL injectable solution: 15 unit(s) injectable 3 times a day (before meals) ***sliding scale***  ER MGMT .      BEST PRACTICE ISSUES.  . HOB ELEVATN.    .... Yes  . DIET  .   .... CONS CARB  6/3   .....   . PHARMAC DVT PPLX .    .... HPSC 6/3  . NON PHARMAC DVT PPLX .      . STRESS ULCR PPLX .   .... PROPRANOLOL 40 6/4/2025   . DATE/DM MGMT.   ..... See under Endocrine section   GENERAL DATA .   . COVID.         .... scv26/4/2025 (-)    . GOC.    ....    . ICU STAY.    .... no   . INFECTION PPLX .   ....   . ALLGY.   .... TETRACYCLINE  ..... VANCOMYCIN  .... IODINE    . WT.   .... 6/4/2025 104 k  . BMI.  .... 6/4/2025 33      XXXXXXXXXXXXXXXXXXXXXXX  VITALS/GAS EXCHANGE/DRIPS    ABGS.     .  VS/ PO/IO/ VENT/ DRIPS.  6/9/2025 afeb 75 120/70   6/9/2025 ra 96%      XXXXXXXXXXXXXXXXXXXXXXXXXXXXXXXXXXX  PROBLEM ASSESSMENT RECOMMENDATIONS.  RESP.   . GAS EXCHANGE .   .... target PO 90-95%     . SOB  .... 2/2 COPD CHF PNEUM    . RO DVT   .... Venous duplx 6/4/2025 (-)     . COPD   .... DUONEB 6/4/2025   .... PULMICORT 6/4/2025   .... MONTELULKAST 6/4/2025   .... PREDNISONE 10 6/4/2025     INFECTION.  . DATA  .... esr 6/5/2025 56   .... w 6/4/2025 w 9.5  .... cxr 6/4/2025 cw 5/21/2025   ........ bibasal atelectasis   ........ interval increase of left lower lobe opacity concerning for pneumonia   .... Flu ab 6/4/2025 (-)    .... strep (-) 6/6/2025  .... legn (-) 6/6/2025   .... mrsa 6/6/2025 (+)     . VENOUS STASIS CHANGES RLE    . PNEUMONIA   .... cxr 6/4/2025 cw 5/21/2025   ........  increase of left lower lobe opacity concerning for pneumonia     . CELLULITIS R LOWER LEG 6/4/2025   .... XR R foot 6/4/2025 No emphysema     . ANTIBIO   .... DAPTOMYCIN 6/4/2025 D 400/d x 7d Dr Mosqueda   .... ZOSYN 6/4/2025 Z x 7d     CARDIAC.  . CAD    .... ASA 81 6/3   .... ROSUVASTATIN 40 6/3       . CHF  .... pbnp 6/4/2025 pbnp 123  .... tte 5/26/2025   ......... n lvsf ef 60% ivc rap 3   .... LASIX 60 IV/d 6/4/2025    .... kcl 20 6/4     HEMAT.  . DATA  .... Hb 6/4/2025 Hb 12.1  .... Plt 6/4/2025 plt 143  .... inr 6/3/2025 inr 105  .... monitor     GI.   . DIET .   .... 6/4/2025 CONS CARB    . LFT MONITORING   .... LFTS   6/4/2025  ........ AP   73   ........ AST 14  ........ ALT  32  .... monitor     RENAL.  . CKD  .... Na 6/4/2025 Na 141   .... CO2 6/4/2025 co2 30   .... Cr 5/27-6/4-6/5/2025   .... Cr 1.6-1.7 - 1.6   ....  monitor     . HYPOKALEMIA   .... K 6/4-6/5/2025 K 3.4 - 2.8    ENDO.  . DM  .  .... INSULIN GLARIGINE 10 HS 6/4/2025     NEURO.  . ANXIETY   .... CLONAZEPAM 0.5 tid 6/4/2025     . PAIN  .... PREGABALIN 150. 2 6/3    . DEPRESSION  .... SERTRALINE 100 6/2   ....     XXXXXXXXXXXXXXXXXXXXXX   SUMMARY BASELINE .     .. 74 m history of DM CHF, COPD, prostate cancer, renal insufficiency, diabetes, chronic neck pain,  right foot wound from [facility] who had been recently admitted with SOB   CC.   . 6/3/2025 PEDAL EDEMA  . 6/3/2025 WOUND R LOWER LEG   . 6/3/2025 CHRONIC SHORTNESS OF BREATH  . 6/3/2025 CHRONIC COUGH   MAIN ISSUES.  . COPD  . SHORTNESS OF BREATH  . PEDAL EDEMA   .... Venous duplx 6/4/2025 (-)   . VENOUS STASIS CHANGES RLE  . CELLULITIS R LOWER LEG 6/4/2025   .... XR R foot 6/4/2025 No emphysema   . PNEUMONIA   .... cxr 6/4/2025 cw 5/21/2025  incr of lll  opacity  . ANTIBIO   .... DAPTOMYCIN 6/4/2025 D 400/d x 7d Dr Mosqueda   .... ZOSYN 6/4/2025 Z x 7d   . CKD   .... Cr 5/27-6/4/2025 Cr 1.6-1.7   . HYPOKALEMIA   .... K 6/4-6/5/2025 K 3.4 - 2.8  . DM    PMH.   . LUNG NODULES  .... CT ch 5/21/2025 cw 12/28/2024   ......... mild tib nodules left lo lobe may be infection or inflammn    . PNEUMONIA   .... ROCEPHIN 5/21  . SKIN SOFT TISSUE INFECTION  .... DAPTOMYCIN 5/22- 5/26 d 450 x 7d   .... bactrim 5/26  . RLE ANKLE OPEN WOUND   . DM       DISCUSSIONS.  .... Discussed with primary care and relevant consultants on an ongoing basis       TIME SPENT.  . Over 36 minutes aggregate care time spent on encounter; activities included   direct patient care, counseling and/or coordinating care reviewing notes, lab data/ imaging , discussion with multidisciplinary team/ patient  /family and explaining in detail risks, benefits, alternatives  of the recommendations     MAGGIE BLACK 73 her[address] 6/3/2025 1950

## 2025-06-09 NOTE — PHARMACOTHERAPY INTERVENTION NOTE - COMMENTS
Medication reconciliation completed on admission by pharmacy representative with MAR from facility. Updated medication list in OMR/prescription writer, discussed with Dr. Diaz.    Home Medications:  albuterol 0.63 mg/3 mL (0.021%) inhalation solution: 3 milliliter(s) by nebulizer 3 times a day as needed for  shortness of breath and/or wheezing (2025 08:48)  amoxicillin-clavulanate 875 mg-125 mg oral tablet: 1 tab(s) orally 2 times a day for 7 days (:48)  Artificial Tears ophthalmic solution: 1 drop(s) in each eye 2 times a day (:48)  ascorbic acid 500 mg oral tablet: 1 tab(s) orally 2 times a day (:48)  aspirin 81 mg oral tablet: 1 tab(s) orally once a day (:48)  clonazePAM 0.25 mg oral tablet, disintegratin tab(s) orally 3 times a day (:48)  ferrous sulfate 325 mg (65 mg elemental iron) oral tablet: 1 tab(s) orally once a day (:48)  furosemide 40 mg oral tablet: 1 tab(s) orally every 12 hours (:48)  HumaLOG 100 units/mL subcutaneous solution: Inject subcutaneously 3 times a day using sliding scale (:48)  HumaLOG KwikPen 100 units/mL injectable solution: Inject 15 units subcutaneously 3 times a day (before each meal) in addition to sliding scale (:48)  Jardiance 10 mg oral tablet: 1 tab(s) orally once a day (:48)  Lantus Solostar Pen 100 units/mL subcutaneous solution: 36 unit(s) subcutaneously once a day (at bedtime) (:48)  loperamide 2 mg oral capsule: 1 cap(s) orally after each loose BM every 6 hours as needed **No more than 3 capsules (6mg) a day** (:48)  loratadine 10 mg oral tablet: 1 tab(s) orally once a day (:48)  Melatonin 3 mg oral tablet: 1 tab(s) orally once a day (at bedtime) (2025 08:48)  montelukast 10 mg oral tablet: 1 tab(s) orally once a day (at bedtime) (:48)  multivitamin: 1 tab(s) orally once a day (:48)  oxyCODONE 5 mg oral tablet: 2 tab(s) orally 2 times a day MDD: 4 tablets (:48)  pantoprazole 40 mg oral delayed release tablet: 1 tab(s) orally once a day (:48)  Polyethylene Glycol 3350: Mix 17grams into 8oz of water and drink orally once a day as needed (:48)  Potassium Chloride (Eqv-Klor-Con M20) 20 mEq oral tablet, extended release: 1 tab(s) orally 2 times a day (:48)  predniSONE 10 mg oral tablet: 1 tab(s) orally once a day with food or milk for 1 month, then after 1 month take 5mg orally once a day (:48)  pregabalin 150 mg oral capsule: 1 cap(s) orally 2 times a day MDD: 2 capsules (:48)  ramelteon 8 mg oral tablet: 1 tab(s) orally once a day (at bedtime) (:48)  rosuvastatin 40 mg oral tablet: 1 tab(s) orally once a day (at bedtime) (:48)  saccharomyces boulardii lyo 250 mg oral capsule: 1 cap(s) orally once a day (:48)  Saline Mist 0.65% nasal spray: 1 spray(s) in each nostril 2 times a day (:48)  Senna 8.6 mg oral tablet: 1 tab(s) orally once a day (at bedtime) (:48)  sertraline 100 mg oral tablet: 1.5 tab(s) orally once a day (150mg) (:48)  Symbicort 160 mcg-4.5 mcg/inh inhalation aerosol: 2 puff(s) inhaled 2 times a day (:48)    Kristin Velázquez, PharmD  Clinical Pharmacy Specialist  [phone] or Teams
Patient is a 74 year old male with + MRSA swab. Discussed with Dr. Mosqueda and recommended addition of Bactroban BID x 5 days for decolonization. MD agreed and order entered.

## 2025-06-09 NOTE — SWALLOW VFSS/MBS ASSESSMENT ADULT - COMMENTS
MBS order received and appreciated, chart reviewed and events noted. Initial evaluation completed 6/6/25 with recommendations for regular and thin liquids & MBS. Upon transport arrival for MBS, pt declined completion. This service to s/o. Please re-consult if pt is in agreement.

## 2025-06-09 NOTE — PROGRESS NOTE ADULT - ASSESSMENT
74 year old male with past medical history of diabetes, COPD, heart failure, CKD, hypertension, history of right foot ulcer, who presented for wound to right lower extremity.  Was recently admitted here for shortness of breath as well as for bursitis and concern for a wound.  Was sent back to facility.  They sent him to the ER today due to worsening swelling and redness localized to his wound of the right lower extremity.  Endorsing pain to the site.  Denies fevers, chest pain, nausea or vomiting.  States that he is having some chronic shortness of breath and increased cough. Denies any fevers. Of note was admitted last month, received antibiotics for possible pneumonia and RLE open wound and erythema. Also with R elbow swelling and bursitis.    Concern for RLE cellulitis, but suspect skin changes also due to venous stasis. Recent MRSA nasal PCR positive although no evidence of abscess on CT. Swelling and erythema much improved. No fever and no leukocytosis. Urine strep and legionella antigen negative. Blood cultures remain no growth. CXR showed LLL opacity, although unclear if acute given recent lung imaging showed concern for pneumonia in the same location. CT chest showed no focal consolidation.    #RLE cellulitis  #RLE wound  #Tinea pedis  #LLL pneumonia  #MRSA nasal PCR positive    -continye daptomycin until today, then tomorrow switch to doxycycline 100mg PO BID x 7 days  -s/p Zosyn x 5 days  -clotrimazole ointment between the toes for 2 weeks  -nasal mupirocin per hospital protocol  -leg elevation  -contact isolation  -discussed with Dr. Diaz  -will sign off, please call ID if any questions or acute issues arise. Thank you.    Megan Mosqueda MD  Division of Infectious Diseases   Cell [phone] between 8am and 6pm   After 6pm and weekends please call ID service at [phone].     35 minutes spent on total encounter assessing patient, examination, chart review, counseling and coordinating care by the attending physician/nurse/care manager.

## 2025-06-09 NOTE — PROGRESS NOTE ADULT - SUBJECTIVE AND OBJECTIVE BOX
Patient is a 74y old  Male who presents with a chief complaint of rle swelling (09 Jun 2025 09:20)    updates: seen patient @ bedside, reports no new complaints, wants to be discharged today. reveiwed BG trends and insulin requirements, pt denies any hx of hypoglycemia with insulin regimen @ [facility], on prednisone taper.    HPI:  Patient with a past medical history of diabetes, COPD, heart failure, CKD, hypertension, history of right foot ulcer is presenting for wound to right lower extremity.  Was recently admitted here for shortness of breath as well as for bursitis and concern for a wound.  Was sent back to facility.  They sent him to the ER today due to worsening swelling and redness localized to his wound of the right lower extremity.  Endorsing pain to the site.  Denies fevers, chest pain, nausea or vomiting.  States that he is having some chronic shortness of breath though unchanged today.  Also endorses chronic cough. (03 Jun 2025 18:32)      PAST MEDICAL & SURGICAL HISTORY:  Prostate cancer      Type II diabetes mellitus      Chronic obstructive pulmonary disease (COPD)      CHF (congestive heart failure)      Renal insufficiency      H/O migraine      Insomnia      Constipation      S/P foot surgery        Allergies    IODINE (Unknown)  tetracycline (Unknown)  vancomycin (Other)    Intolerances        MEDICATIONS  (STANDING):  albuterol/ipratropium for Nebulization 3 milliLiter(s) Nebulizer every 8 hours  ascorbic acid 500 milliGRAM(s) Oral daily  aspirin enteric coated 81 milliGRAM(s) Oral daily  budesonide 160 MICROgram(s)/formoterol 4.5 MICROgram(s) Inhaler 2 Puff(s) Inhalation two times a day  clotrimazole 1% Cream 1 Application(s) Topical two times a day  DAPTOmycin IVPB 400 milliGRAM(s) IV Intermittent every 24 hours  dextrose 5%. 1000 milliLiter(s) (100 mL/Hr) IV Continuous <Continuous>  dextrose 5%. 1000 milliLiter(s) (50 mL/Hr) IV Continuous <Continuous>  dextrose 50% Injectable 25 Gram(s) IV Push once  dextrose 50% Injectable 12.5 Gram(s) IV Push once  dextrose 50% Injectable 25 Gram(s) IV Push once  ferrous    sulfate 325 milliGRAM(s) Oral daily  glucagon  Injectable 1 milliGRAM(s) IntraMuscular once  guaiFENesin Oral Liquid (Sugar-Free) 200 milliGRAM(s) Oral every 6 hours  heparin   Injectable 5000 Unit(s) SubCutaneous every 8 hours  insulin glargine Injectable (LANTUS) 20 Unit(s) SubCutaneous at bedtime  insulin lispro (ADMELOG) corrective regimen sliding scale   SubCutaneous at bedtime  insulin lispro (ADMELOG) corrective regimen sliding scale.   SubCutaneous three times a day before meals  insulin lispro Injectable (ADMELOG) 15 Unit(s) SubCutaneous three times a day before meals  lactobacillus acidophilus 1 Tablet(s) Oral two times a day with meals  montelukast 10 milliGRAM(s) Oral daily  multivitamin/minerals 1 Tablet(s) Oral daily  pantoprazole    Tablet 40 milliGRAM(s) Oral before breakfast  potassium chloride    Tablet ER 20 milliEquivalent(s) Oral daily  predniSONE   Tablet 10 milliGRAM(s) Oral daily  pregabalin 150 milliGRAM(s) Oral two times a day  rosuvastatin 40 milliGRAM(s) Oral at bedtime  saccharomyces boulardii 250 milliGRAM(s) Oral two times a day  senna 1 Tablet(s) Oral at bedtime  sertraline 100 milliGRAM(s) Oral daily  sodium chloride 3%  Inhalation 4 milliLiter(s) Inhalation every 12 hours  tiotropium 2.5 MICROgram(s) Inhaler 2 Puff(s) Inhalation daily

## 2025-06-09 NOTE — PROGRESS NOTE ADULT - PROBLEM SELECTOR PLAN 5
Admit to monitored unit for cardiac monitoring, obtain echo to evaluate LVEF, intravenous diuresis as per card consult , monitor ins/outs, monitor renal profile and electrolytes closely ,send 3 sets of enzymes, O2 supply, serial chest xrays, monitor weights and oral intake of fluids, nutritionist consult

## 2025-06-09 NOTE — PROGRESS NOTE ADULT - EYES
PERRL/EOMI/conjunctiva clear/normal
58 y/o male came to the ER for worsening lower ext. edema for past 1 + week, also reports that gets sob when lying at night, feels water in chest and that is going on for 6 months. pt. was recently discharged from [facility] with [facility]IRACLE mcclelland for MSSA bacteremia. pt. is on cefazolin 2 gram q 12 hrs till 09/07/22. pt. reports no cp, no fever, no abd. pain, no n/v/d. pt. is on long acting insulin Tresiba, pt. stated that in the hospital his units were lowered 50 % as his BG was running low.

## 2025-06-09 NOTE — PROGRESS NOTE ADULT - SUBJECTIVE AND OBJECTIVE BOX
PROGRESS NOTE  Patient is a 74y old  Male who presents with a chief complaint of rle swelling (09 Jun 2025 17:14)  Chart and available morning labs /imaging are reviewed electronically , urgent issues addressed . More information  is being added upon completion of rounds , when more information is collected and management discussed with consultants , medical staff and social service/case management on the floor   OVERNIGHT  No new issues reported by medical staff . All above noted Patient is resting in a bed comfortably .Confused ,poor mentation .No distress noted   HPI:  Patient with a past medical history of diabetes, COPD, heart failure, CKD, hypertension, history of right foot ulcer is presenting for wound to right lower extremity.  Was recently admitted here for shortness of breath as well as for bursitis and concern for a wound.  Was sent back to facility.  They sent him to the ER today due to worsening swelling and redness localized to his wound of the right lower extremity.  Endorsing pain to the site.  Denies fevers, chest pain, nausea or vomiting.  States that he is having some chronic shortness of breath though unchanged today.  Also endorses chronic cough. (03 Jun 2025 18:32)    PAST MEDICAL & SURGICAL HISTORY:  Prostate cancer      Type II diabetes mellitus      Chronic obstructive pulmonary disease (COPD)      CHF (congestive heart failure)      Renal insufficiency      H/O migraine      Insomnia      Constipation      S/P foot surgery          MEDICATIONS  (STANDING):  albuterol/ipratropium for Nebulization 3 milliLiter(s) Nebulizer every 8 hours  ascorbic acid 500 milliGRAM(s) Oral daily  aspirin enteric coated 81 milliGRAM(s) Oral daily  budesonide 160 MICROgram(s)/formoterol 4.5 MICROgram(s) Inhaler 2 Puff(s) Inhalation two times a day  clotrimazole 1% Cream 1 Application(s) Topical two times a day  dextrose 5%. 1000 milliLiter(s) (100 mL/Hr) IV Continuous <Continuous>  dextrose 5%. 1000 milliLiter(s) (50 mL/Hr) IV Continuous <Continuous>  dextrose 50% Injectable 25 Gram(s) IV Push once  dextrose 50% Injectable 12.5 Gram(s) IV Push once  dextrose 50% Injectable 25 Gram(s) IV Push once  ferrous    sulfate 325 milliGRAM(s) Oral daily  glucagon  Injectable 1 milliGRAM(s) IntraMuscular once  guaiFENesin Oral Liquid (Sugar-Free) 200 milliGRAM(s) Oral every 6 hours  heparin   Injectable 5000 Unit(s) SubCutaneous every 8 hours  insulin glargine Injectable (LANTUS) 20 Unit(s) SubCutaneous at bedtime  insulin lispro (ADMELOG) corrective regimen sliding scale   SubCutaneous at bedtime  insulin lispro (ADMELOG) corrective regimen sliding scale.   SubCutaneous three times a day before meals  insulin lispro Injectable (ADMELOG) 15 Unit(s) SubCutaneous three times a day before meals  lactobacillus acidophilus 1 Tablet(s) Oral two times a day with meals  montelukast 10 milliGRAM(s) Oral daily  multivitamin/minerals 1 Tablet(s) Oral daily  mupirocin 2% Nasal 1 Application(s) Both Nostrils two times a day  pantoprazole    Tablet 40 milliGRAM(s) Oral before breakfast  potassium chloride    Tablet ER 20 milliEquivalent(s) Oral daily  predniSONE   Tablet 10 milliGRAM(s) Oral daily  pregabalin 150 milliGRAM(s) Oral two times a day  rosuvastatin 40 milliGRAM(s) Oral at bedtime  saccharomyces boulardii 250 milliGRAM(s) Oral two times a day  senna 1 Tablet(s) Oral at bedtime  sertraline 100 milliGRAM(s) Oral daily  sodium chloride 3%  Inhalation 4 milliLiter(s) Inhalation every 12 hours  tiotropium 2.5 MICROgram(s) Inhaler 2 Puff(s) Inhalation daily    MEDICATIONS  (PRN):  acetaminophen     Tablet .. 650 milliGRAM(s) Oral every 6 hours PRN Temp greater or equal to 38C (100.4F), Mild Pain (1 - 3)  aluminum hydroxide/magnesium hydroxide/simethicone Suspension 30 milliLiter(s) Oral every 4 hours PRN Dyspepsia  bisacodyl Suppository 10 milliGRAM(s) Rectal daily PRN Constipation  clonazePAM  Tablet 0.5 milliGRAM(s) Oral three times a day PRN ANXIETY  dextrose Oral Gel 15 Gram(s) Oral once PRN Blood Glucose LESS THAN 70 milliGRAM(s)/deciliter  melatonin 3 milliGRAM(s) Oral at bedtime PRN Insomnia  ondansetron Injectable 4 milliGRAM(s) IV Push every 8 hours PRN Nausea and/or Vomiting  oxyCODONE    IR 10 milliGRAM(s) Oral two times a day PRN Severe Pain (7 - 10)  oxyCODONE    IR 5 milliGRAM(s) Oral every 8 hours PRN Moderate Pain (4 - 6)  polyethylene glycol 3350 17 Gram(s) Oral daily PRN for constipation      OBJECTIVE    T(C): 36.6 (06-09-25 @ 05:44), Max: 36.7 (06-08-25 @ 20:49)  HR: 77 (06-09-25 @ 12:57) (72 - 82)  BP: 147/84 (06-09-25 @ 05:44) (119/66 - 147/84)  RR: 19 (06-09-25 @ 05:44) (19 - 19)  SpO2: 98% (06-09-25 @ 12:57) (97% - 99%)  Wt(kg): --  I&O's Summary    08 Jun 2025 07:01  -  09 Jun 2025 07:00  --------------------------------------------------------  IN: 0 mL / OUT: 2800 mL / NET: -2800 mL    09 Jun 2025 07:01  -  09 Jun 2025 17:30  --------------------------------------------------------  IN: 0 mL / OUT: 900 mL / NET: -900 mL          REVIEW OF SYSTEMS:  CONSTITUTIONAL: No fever, weight loss, or fatigue  EYES: No eye pain, visual disturbances, or discharge  ENMT:   No sinus or throat pain  NECK: No pain or stiffness  RESPIRATORY: No cough, wheezing, chills or hemoptysis; No shortness of breath  CARDIOVASCULAR: No chest pain, palpitations, dizziness, or leg swelling  GASTROINTESTINAL: No abdominal pain. No nausea, vomiting; No diarrhea or constipation. No melena or hematochezia.  GENITOURINARY: No dysuria, frequency, hematuria, or incontinence  NEUROLOGICAL: No headaches, memory loss, loss of strength, numbness, or tremors  SKIN: No itching, burning, rashes, or lesions   MUSCULOSKELETAL: No joint pain or swelling; No muscle, back, or extremity pain    PHYSICAL EXAM:  Appearance: NAD. VS past 24 hrs -as above   HEENT:   Moist oral mucosa. Conjunctiva clear b/l.   Neck : supple  Respiratory: Lungs CTAB.  Gastrointestinal:  Soft, nontender. No rebound. No rigidity. BS present	  Cardiovascular: RRR ,S1S2 present  Neurologic: Non-focal. Moving all extremities.  Extremities: No edema. No erythema. No calf tenderness.  Skin: No rashes, No ecchymoses, No cyanosis.	  wounds ,skin lesions-See skin assesment flow sheet   LABS:                        11.5   7.60  )-----------( 133      ( 09 Jun 2025 07:40 )             37.0     06-09    141  |  105  |  22  ----------------------------<  181[H]  4.1   |  26  |  1.50[H]    Ca    9.3      09 Jun 2025 07:40      CAPILLARY BLOOD GLUCOSE      POCT Blood Glucose.: 217 mg/dL (09 Jun 2025 17:03)  POCT Blood Glucose.: 259 mg/dL (09 Jun 2025 12:00)  POCT Blood Glucose.: 187 mg/dL (09 Jun 2025 07:44)  POCT Blood Glucose.: 243 mg/dL (08 Jun 2025 21:22)      Urinalysis Basic - ( 09 Jun 2025 07:40 )    Color: x / Appearance: x / SG: x / pH: x  Gluc: 181 mg/dL / Ketone: x  / Bili: x / Urobili: x   Blood: x / Protein: x / Nitrite: x   Leuk Esterase: x / RBC: x / WBC x   Sq Epi: x / Non Sq Epi: x / Bacteria: x        Culture - Urine (collected 05 Jun 2025 17:50)  Source: Clean Catch Clean Catch (Midstream)  Final Report (06 Jun 2025 23:48):    No growth    Culture - Blood (collected 03 Jun 2025 15:00)  Source: Blood Blood-Peripheral  Final Report (09 Jun 2025 01:00):    No growth at 5 days    Culture - Blood (collected 03 Jun 2025 14:45)  Source: Blood Blood-Peripheral  Final Report (09 Jun 2025 01:00):    No growth at 5 days      RADIOLOGY & ADDITIONAL TESTS:   reviewed elctronically  ASSESSMENT/PLAN: 	    25 minutes aggregate time was spent on this visit, 50% visit time spent in care co-ordination with other attendings and counselling patient .I have discussed care plan with patient / HCP/family member ,who expressed understanding of problems treatment and their effect and side effects, alternatives in details. I have asked if they have any questions and concerns and appropriately addressed them to best of my ability.

## 2025-06-09 NOTE — CASE MANAGEMENT PROGRESS NOTE - NSCMPROGRESSNOTE_GEN_ALL_CORE
Pt refused PT eval x 4 as per PT notes. CM spoke with Page from [facility] [phone] who states prior to admission pt was able to transfer and walk independently. Per Page the pt cannot return to the assisted living facility without PT eval. CM to remain available.

## 2025-06-09 NOTE — PROGRESS NOTE ADULT - PROBLEM SELECTOR PLAN 9
serial bmp , ins/outs , nephrology eval

## 2025-06-09 NOTE — PROGRESS NOTE ADULT - PROBLEM SELECTOR PROBLEM 3
Type II diabetes mellitus

## 2025-06-09 NOTE — PROGRESS NOTE ADULT - ASSESSMENT
75 y/o M with a past medical history of diabetes, COPD, heart failure, CKD, hypertension, history of right foot ulcer is presenting for wound to right lower extremity.  Was recently admitted here for shortness of breath as well as for bursitis and concern for a wound.  Was sent back to facility.  They sent him to the ER today due to worsening swelling and redness localized to his wound of the right lower extremity. Admitted for RLE cellulitis.     LE edema  - Edema appears to be more from cellulitis  - D/C IV Lasix 60 mg IV  and start home dose of 40 mg PO q12H  - Continue Abx per ID    - Hx HFpEF  - Previous TTE 05/2025 with EF 60-65% and trace TR  - No clear evidence of volume overload  - Wean and D/C O2    - EKG with SR, 84bpm, low voltage, unchanged from previous EKG  - No evidence of any active ischemia   - Continue home aspirin for now (indication?)   - Continue statin for HLD    - BP stable and controlled   - Continue to hold home BB.  BP initially low    - Monitor and replete lytes, keep K>4, Mg>2.  - Will continue to follow.    Pastor Romero DNP, AGACNP-BC  Cardiology   Call Teams

## 2025-06-10 ENCOUNTER — TRANSCRIPTION ENCOUNTER (OUTPATIENT)
Age: 75
End: 2025-06-10

## 2025-06-10 VITALS
SYSTOLIC BLOOD PRESSURE: 132 MMHG | HEART RATE: 88 BPM | TEMPERATURE: 98 F | OXYGEN SATURATION: 93 % | RESPIRATION RATE: 20 BRPM | DIASTOLIC BLOOD PRESSURE: 66 MMHG

## 2025-06-10 LAB
ANION GAP SERPL CALC-SCNC: 6 MMOL/L — SIGNIFICANT CHANGE UP (ref 5–17)
BUN SERPL-MCNC: 24 MG/DL — HIGH (ref 7–23)
CALCIUM SERPL-MCNC: 9.3 MG/DL — SIGNIFICANT CHANGE UP (ref 8.5–10.1)
CHLORIDE SERPL-SCNC: 105 MMOL/L — SIGNIFICANT CHANGE UP (ref 96–108)
CO2 SERPL-SCNC: 29 MMOL/L — SIGNIFICANT CHANGE UP (ref 22–31)
CREAT SERPL-MCNC: 1.4 MG/DL — HIGH (ref 0.5–1.3)
EGFR: 53 ML/MIN/1.73M2 — LOW
EGFR: 53 ML/MIN/1.73M2 — LOW
GLUCOSE SERPL-MCNC: 213 MG/DL — HIGH (ref 70–99)
HCT VFR BLD CALC: 37.5 % — LOW (ref 39–50)
HGB BLD-MCNC: 11.7 G/DL — LOW (ref 13–17)
MCHC RBC-ENTMCNC: 28.3 PG — SIGNIFICANT CHANGE UP (ref 27–34)
MCHC RBC-ENTMCNC: 31.2 G/DL — LOW (ref 32–36)
MCV RBC AUTO: 90.6 FL — SIGNIFICANT CHANGE UP (ref 80–100)
NRBC BLD AUTO-RTO: 0 /100 WBCS — SIGNIFICANT CHANGE UP (ref 0–0)
PLATELET # BLD AUTO: 130 K/UL — LOW (ref 150–400)
POTASSIUM SERPL-MCNC: 4.7 MMOL/L — SIGNIFICANT CHANGE UP (ref 3.5–5.3)
POTASSIUM SERPL-SCNC: 4.7 MMOL/L — SIGNIFICANT CHANGE UP (ref 3.5–5.3)
RBC # BLD: 4.14 M/UL — LOW (ref 4.2–5.8)
RBC # FLD: 18.6 % — HIGH (ref 10.3–14.5)
SODIUM SERPL-SCNC: 140 MMOL/L — SIGNIFICANT CHANGE UP (ref 135–145)
WBC # BLD: 9.27 K/UL — SIGNIFICANT CHANGE UP (ref 3.8–10.5)
WBC # FLD AUTO: 9.27 K/UL — SIGNIFICANT CHANGE UP (ref 3.8–10.5)

## 2025-06-10 PROCEDURE — 86140 C-REACTIVE PROTEIN: CPT

## 2025-06-10 PROCEDURE — 99285 EMERGENCY DEPT VISIT HI MDM: CPT | Mod: 25

## 2025-06-10 PROCEDURE — 87040 BLOOD CULTURE FOR BACTERIA: CPT

## 2025-06-10 PROCEDURE — 87640 STAPH A DNA AMP PROBE: CPT

## 2025-06-10 PROCEDURE — 94640 AIRWAY INHALATION TREATMENT: CPT

## 2025-06-10 PROCEDURE — 83735 ASSAY OF MAGNESIUM: CPT

## 2025-06-10 PROCEDURE — 76376 3D RENDER W/INTRP POSTPROCES: CPT

## 2025-06-10 PROCEDURE — 70360 X-RAY EXAM OF NECK: CPT

## 2025-06-10 PROCEDURE — 87637 SARSCOV2&INF A&B&RSV AMP PRB: CPT

## 2025-06-10 PROCEDURE — 82550 ASSAY OF CK (CPK): CPT

## 2025-06-10 PROCEDURE — 85025 COMPLETE CBC W/AUTO DIFF WBC: CPT

## 2025-06-10 PROCEDURE — 83880 ASSAY OF NATRIURETIC PEPTIDE: CPT

## 2025-06-10 PROCEDURE — 71250 CT THORAX DX C-: CPT

## 2025-06-10 PROCEDURE — 93005 ELECTROCARDIOGRAM TRACING: CPT

## 2025-06-10 PROCEDURE — 87899 AGENT NOS ASSAY W/OPTIC: CPT

## 2025-06-10 PROCEDURE — 84100 ASSAY OF PHOSPHORUS: CPT

## 2025-06-10 PROCEDURE — 87641 MR-STAPH DNA AMP PROBE: CPT

## 2025-06-10 PROCEDURE — 96374 THER/PROPH/DIAG INJ IV PUSH: CPT

## 2025-06-10 PROCEDURE — 85610 PROTHROMBIN TIME: CPT

## 2025-06-10 PROCEDURE — 97116 GAIT TRAINING THERAPY: CPT

## 2025-06-10 PROCEDURE — 92610 EVALUATE SWALLOWING FUNCTION: CPT

## 2025-06-10 PROCEDURE — 87086 URINE CULTURE/COLONY COUNT: CPT

## 2025-06-10 PROCEDURE — 82962 GLUCOSE BLOOD TEST: CPT

## 2025-06-10 PROCEDURE — 85652 RBC SED RATE AUTOMATED: CPT

## 2025-06-10 PROCEDURE — 73700 CT LOWER EXTREMITY W/O DYE: CPT

## 2025-06-10 PROCEDURE — 73610 X-RAY EXAM OF ANKLE: CPT

## 2025-06-10 PROCEDURE — 83605 ASSAY OF LACTIC ACID: CPT

## 2025-06-10 PROCEDURE — 85730 THROMBOPLASTIN TIME PARTIAL: CPT

## 2025-06-10 PROCEDURE — 80053 COMPREHEN METABOLIC PANEL: CPT

## 2025-06-10 PROCEDURE — 93970 EXTREMITY STUDY: CPT

## 2025-06-10 PROCEDURE — 99232 SBSQ HOSP IP/OBS MODERATE 35: CPT

## 2025-06-10 PROCEDURE — 94760 N-INVAS EAR/PLS OXIMETRY 1: CPT

## 2025-06-10 PROCEDURE — 71045 X-RAY EXAM CHEST 1 VIEW: CPT

## 2025-06-10 PROCEDURE — 81001 URINALYSIS AUTO W/SCOPE: CPT

## 2025-06-10 PROCEDURE — 80048 BASIC METABOLIC PNL TOTAL CA: CPT

## 2025-06-10 PROCEDURE — 73620 X-RAY EXAM OF FOOT: CPT

## 2025-06-10 PROCEDURE — 85027 COMPLETE CBC AUTOMATED: CPT

## 2025-06-10 PROCEDURE — 36415 COLL VENOUS BLD VENIPUNCTURE: CPT

## 2025-06-10 PROCEDURE — 97162 PT EVAL MOD COMPLEX 30 MIN: CPT

## 2025-06-10 PROCEDURE — 73590 X-RAY EXAM OF LOWER LEG: CPT

## 2025-06-10 RX ORDER — DOXYCYCLINE HYCLATE 100 MG
2 TABLET ORAL
Qty: 28 | Refills: 0
Start: 2025-06-10 | End: 2025-06-16

## 2025-06-10 RX ORDER — SENNA 187 MG
1 TABLET ORAL
Refills: 0 | DISCHARGE

## 2025-06-10 RX ORDER — SENNA 187 MG
1 TABLET ORAL
Qty: 0 | Refills: 0 | DISCHARGE
Start: 2025-06-10

## 2025-06-10 RX ORDER — POLYETHYLENE GLYCOL 3350 17 G/17G
0 POWDER, FOR SOLUTION ORAL
Refills: 0 | DISCHARGE

## 2025-06-10 RX ORDER — POLYETHYLENE GLYCOL 3350 17 G/17G
17 POWDER, FOR SOLUTION ORAL
Qty: 0 | Refills: 0 | DISCHARGE
Start: 2025-06-10

## 2025-06-10 RX ORDER — INSULIN GLARGINE-YFGN 100 [IU]/ML
36 INJECTION, SOLUTION SUBCUTANEOUS
Refills: 0 | DISCHARGE

## 2025-06-10 RX ORDER — INSULIN GLARGINE-YFGN 100 [IU]/ML
20 INJECTION, SOLUTION SUBCUTANEOUS
Qty: 1 | Refills: 0
Start: 2025-06-10 | End: 2025-06-19

## 2025-06-10 RX ADMIN — PREDNISONE 10 MILLIGRAM(S): 20 TABLET ORAL at 05:54

## 2025-06-10 RX ADMIN — Medication 1 TABLET(S): at 07:52

## 2025-06-10 RX ADMIN — MUPIROCIN CALCIUM 1 APPLICATION(S): 20 CREAM TOPICAL at 05:54

## 2025-06-10 RX ADMIN — CLONAZEPAM 0.5 MILLIGRAM(S): 0.5 TABLET ORAL at 11:03

## 2025-06-10 RX ADMIN — PREGABALIN 150 MILLIGRAM(S): 50 CAPSULE ORAL at 05:55

## 2025-06-10 RX ADMIN — Medication 20 MILLIEQUIVALENT(S): at 11:02

## 2025-06-10 RX ADMIN — Medication 4 MILLILITER(S): at 07:21

## 2025-06-10 RX ADMIN — Medication 325 MILLIGRAM(S): at 11:02

## 2025-06-10 RX ADMIN — IPRATROPIUM BROMIDE AND ALBUTEROL SULFATE 3 MILLILITER(S): .5; 2.5 SOLUTION RESPIRATORY (INHALATION) at 13:28

## 2025-06-10 RX ADMIN — IPRATROPIUM BROMIDE AND ALBUTEROL SULFATE 3 MILLILITER(S): .5; 2.5 SOLUTION RESPIRATORY (INHALATION) at 07:21

## 2025-06-10 RX ADMIN — BUTYROSPERMUM PARKII(SHEA BUTTER), SIMMONDSIA CHINENSIS (JOJOBA) SEED OIL, ALOE BARBADENSIS LEAF EXTRACT 250 MILLIGRAM(S): .01; 1; 3.5 LIQUID TOPICAL at 06:28

## 2025-06-10 RX ADMIN — CLOTRIMAZOLE 1 APPLICATION(S): 1 CREAM TOPICAL at 06:27

## 2025-06-10 RX ADMIN — HEPARIN SODIUM 5000 UNIT(S): 1000 INJECTION INTRAVENOUS; SUBCUTANEOUS at 13:51

## 2025-06-10 RX ADMIN — Medication 500 MILLIGRAM(S): at 11:03

## 2025-06-10 RX ADMIN — Medication 1 TABLET(S): at 11:02

## 2025-06-10 RX ADMIN — DEXTROMETHORPHAN HBR, GUAIFENESIN 200 MILLIGRAM(S): 200 LIQUID ORAL at 00:29

## 2025-06-10 RX ADMIN — TIOTROPIUM BROMIDE INHALATION SPRAY 2 PUFF(S): 3.12 SPRAY, METERED RESPIRATORY (INHALATION) at 06:01

## 2025-06-10 RX ADMIN — BUDESONIDE AND FORMOTEROL FUMARATE DIHYDRATE 2 PUFF(S): 80; 4.5 AEROSOL RESPIRATORY (INHALATION) at 07:53

## 2025-06-10 RX ADMIN — Medication 100 MILLIGRAM(S): at 05:55

## 2025-06-10 RX ADMIN — OXYCODONE HYDROCHLORIDE 10 MILLIGRAM(S): 30 TABLET ORAL at 06:30

## 2025-06-10 RX ADMIN — DEXTROMETHORPHAN HBR, GUAIFENESIN 200 MILLIGRAM(S): 200 LIQUID ORAL at 05:53

## 2025-06-10 RX ADMIN — INSULIN LISPRO 4: 100 INJECTION, SOLUTION INTRAVENOUS; SUBCUTANEOUS at 07:52

## 2025-06-10 RX ADMIN — SERTRALINE 100 MILLIGRAM(S): 100 TABLET, FILM COATED ORAL at 11:03

## 2025-06-10 RX ADMIN — INSULIN LISPRO 15 UNIT(S): 100 INJECTION, SOLUTION INTRAVENOUS; SUBCUTANEOUS at 07:52

## 2025-06-10 RX ADMIN — OXYCODONE HYDROCHLORIDE 10 MILLIGRAM(S): 30 TABLET ORAL at 13:51

## 2025-06-10 RX ADMIN — Medication 81 MILLIGRAM(S): at 11:03

## 2025-06-10 RX ADMIN — HEPARIN SODIUM 5000 UNIT(S): 1000 INJECTION INTRAVENOUS; SUBCUTANEOUS at 05:54

## 2025-06-10 RX ADMIN — INSULIN LISPRO 15 UNIT(S): 100 INJECTION, SOLUTION INTRAVENOUS; SUBCUTANEOUS at 11:56

## 2025-06-10 RX ADMIN — Medication 40 MILLIGRAM(S): at 06:34

## 2025-06-10 RX ADMIN — MONTELUKAST SODIUM 10 MILLIGRAM(S): 10 TABLET ORAL at 11:02

## 2025-06-10 RX ADMIN — OXYCODONE HYDROCHLORIDE 10 MILLIGRAM(S): 30 TABLET ORAL at 14:45

## 2025-06-10 RX ADMIN — DEXTROMETHORPHAN HBR, GUAIFENESIN 200 MILLIGRAM(S): 200 LIQUID ORAL at 11:02

## 2025-06-10 RX ADMIN — OXYCODONE HYDROCHLORIDE 10 MILLIGRAM(S): 30 TABLET ORAL at 05:54

## 2025-06-10 RX ADMIN — INSULIN LISPRO 8: 100 INJECTION, SOLUTION INTRAVENOUS; SUBCUTANEOUS at 11:56

## 2025-06-10 NOTE — DISCHARGE NOTE PROVIDER - NSDCCPCAREPLAN_GEN_ALL_CORE_FT
PRINCIPAL DISCHARGE DIAGNOSIS  Diagnosis: Cellulitis of right lower leg  Assessment and Plan of Treatment:       SECONDARY DISCHARGE DIAGNOSES  Diagnosis: Leg wound, right  Assessment and Plan of Treatment:     Diagnosis: Type II diabetes mellitus  Assessment and Plan of Treatment:     Diagnosis: Chronic obstructive pulmonary disease (COPD)  Assessment and Plan of Treatment:     Diagnosis: Pneumonia, aspiration  Assessment and Plan of Treatment:     Diagnosis: CHF (congestive heart failure)  Assessment and Plan of Treatment:     Diagnosis: CAD (coronary artery disease)  Assessment and Plan of Treatment:     Diagnosis: PAD (peripheral artery disease)  Assessment and Plan of Treatment:     Diagnosis: REANNA (acute kidney injury)  Assessment and Plan of Treatment:     Diagnosis: Constipation  Assessment and Plan of Treatment:     Diagnosis: Non-pressure chronic ulcer of other part of right lower leg limited to breakdown of skin  Assessment and Plan of Treatment:     Diagnosis: Cellulitis of right lower leg  Assessment and Plan of Treatment:

## 2025-06-10 NOTE — DISCHARGE NOTE NURSING/CASE MANAGEMENT/SOCIAL WORK - NSDCPEFALRISK_GEN_ALL_CORE
For information on Fall & Injury Prevention, visit: [email or web address] OR  [email or web address] OR  https://www.cdc.gov/steadi/patient.html

## 2025-06-10 NOTE — CASE MANAGEMENT PROGRESS NOTE - NSCMPROGRESSNOTE_GEN_ALL_CORE
Per MD pt is medically cleared for discharge today 6/10/25. CM met with patient to discuss transition of care.  Pt is to be transitioned to [facility] assisted living facility [phone] with [facility] [phone]. CM explained home care services, expectations, process, insurance provisions and home safety with pt verbalizing understanding. Pt states he receives assistance from family and from staff at assisted living facility. CM confirmed with home care agency, pt case is being accepted and home care was made aware of DC plan today. Pt aware of plan and in agreement / verbalizes understanding. IMM discussed / given. Pt verbalized understanding. Confirmed pharmacy is [facility] pharmacy. community MD is Dr. Humphrey. Pt stated the assisted living facility would make his own follow up appointments. Per pt DMEs in assisted living facility include walker and wheelchair. Pt request [facility] for transport. CM sent appropriate referral. Pt informed insurance coverage for transportation may vary based on insurance plan, which may lead to out of pocket costs and pt may be billed by transportation provider. Pt in agreement to call [facility] with payment [phone]. All questions answered. CM discussed plan with MD and RN. CM to remain available through hospitalization.

## 2025-06-10 NOTE — DISCHARGE NOTE NURSING/CASE MANAGEMENT/SOCIAL WORK - FINANCIAL ASSISTANCE
[facility] provides services at a reduced cost to those who are determined to be eligible through [facility]’s financial assistance program. Information regarding [facility]’s financial assistance program can be found by going to [email or web address] or by calling [phone].

## 2025-06-10 NOTE — DISCHARGE NOTE PROVIDER - CARE PROVIDER_API CALL
Nick Maciel  Podiatric Medicine and Surgery  [address] [place] [address]  Phone: [phone]  Fax: [phone]  Follow Up Time: 1 week    Megan Mosqueda  Internal Medicine  [address] [place] [address]  Phone: [phone]  Fax: [phone]  Follow Up Time: 1 week    Abdi Ballesteros  Cardiovascular Disease  [address] [place] [address]  Phone: [phone]  Fax: [phone]  Follow Up Time: 2 weeks    Pahlavan, Mohsen  Nephrology  [address], [address]  [place] [address]  Phone: [phone]  Fax: [phone]  Follow Up Time: 1 month

## 2025-06-10 NOTE — PROGRESS NOTE ADULT - ASSESSMENT
REASON FOR VISIT  .. Management of problems listed below      EVENTS/CURRENT ISSUES.  . 6/6/2025 started symbicort spiriva         REVIEW OF SYMPTOMS   Able to give ROS  Yes     RELIABILITY +/-   CONSTITUTIONAL Weakness Yes    ENDOCRINE  No heat or cold intolerance    ALLERGY No hives  Sore throat No stridor  RESP Shortness of breath YES   NEURO New weakness No   CARDIAC   Palpitations No         PHYSICAL EXAM    HEENT Unremarkable  atraumatic   RESP Fair air entry  Harsh breath sound   CARDIAC S1 S2 No S3     NO JVD    ABDOMEN No hepatosplenomegaly   PEDAL EDEMA present No calf tenderness      REASON FOR VISIT  .. Management of problems listed below      CC.   . 6/3/2025  Pt brought in by EMS from [facility] with concern for worsening pedal edema, wound on R lower leg. 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  edema, wound check  OVERALL PRESENTATION.  . 6/3/2025  Patient with a past medical history of diabetes, COPD, heart failure, CKD, hypertension, history of right foot ulcer is presenting for wound to right lower extremity.  Was recently admitted here for shortness of breath as well as for bursitis and concern for a wound.  Was sent back to facility.  They sent him to the ER today due to worsening swelling and redness localized to his wound of the right lower extremity.  Endorsing pain to the site.  Denies fevers, chest pain, nausea or vomiting.  States that he is having some chronic shortness of breath though unchanged today.  Also endorses chronic cough.  PMH.      PAST HOSPITAL STAYS .  Home Medications:   * Patient Currently Takes Medications as of 27-May-2025 11:17 documented in Structured Notes  · montelukast 10 mg oral tablet: 1 tab(s) orally once a day  · melatonin 3 mg oral tablet: 1 tab(s) orally once a day (at bedtime)  · senna leaf extract oral tablet: 1 tab(s) orally once a day (at bedtime)  · saccharomyces boulardii lyo 250 mg oral capsule: 1 cap(s) orally once a day  · sertraline 100 mg oral tablet: 1 tab(s) orally once a day MDD: 1  · furosemide 40 mg oral tablet: 1 tab(s) orally every 12 hours  · sulfamethoxazole-trimethoprim 800 mg-160 mg oral tablet: 1 tab(s) orally every 12 hours  · aspirin 81 mg oral delayed release tablet: 1 tab(s) orally once a day  · potassium chloride 20 mEq oral tablet, extended release: 1 tab(s) orally 2 times a day  · albuterol 0.63 mg/3 mL (0.021%) inhalation solution: 3 milliliter(s) by nebulizer 3 times a day  · predniSONE 10 mg oral tablet: 1 tab(s) orally once a day  · oxyCODONE 5 mg oral tablet: 2 tab(s) orally 2 times a day as needed for Severe Pain (7 - 10) MDD: 4  · loratadine 10 mg oral tablet: 1 tab(s) orally once a day (in the morning)  · Multiple Vitamins with Minerals oral tablet: 1 tab(s) orally once a day  · ferrous sulfate 325 mg (65 mg elemental iron) oral tablet: 1 tab(s) orally once a day  · pantoprazole 40 mg oral delayed release tablet: 1 tab(s) orally once a day (before a meal)  · Symbicort 160 mcg-4.5 mcg/inh inhalation aerosol: 2 puff(s) inhaled 2 times a day  · rosuvastatin 40 mg oral tablet: 1 tab(s) orally once a day  · polyethylene glycol 3350 oral powder for reconstitution: 17 gram(s) orally once a day as needed for  constipation  · Jardiance 10 mg oral tablet: 1 tab(s) orally once a day  · pregabalin 150 mg oral capsule: 1 cap(s) orally 2 times a day  · insulin glargine 100 units/mL subcutaneous solution: 36 unit(s) subcutaneous once a day (at bedtime)  · HumaLOG 100 units/mL injectable solution: 15 unit(s) injectable 3 times a day (before meals) ***sliding scale***  ER MGMT .      BEST PRACTICE ISSUES.  . HOB ELEVATN.    .... Yes  . DIET  .   .... CONS CARB  6/3   .....   . PHARMAC DVT PPLX .    .... HPSC 6/3  . NON PHARMAC DVT PPLX .      . STRESS ULCR PPLX .   .... PROPRANOLOL 40 6/4/2025   . DATE/DM MGMT.   ..... See under Endocrine section   GENERAL DATA .   . COVID.         .... scv26/4/2025 (-)    . GOC.    ....    . ICU STAY.    .... no   . INFECTION PPLX .   ....   . ALLGY.   .... TETRACYCLINE  ..... VANCOMYCIN  .... IODINE    . WT.   .... 6/4/2025 104 k  . BMI.  .... 6/4/2025 33      [id number]  PROBLEM ASSESSMENT RECOMMENDATIONS.  RESP.   . GAS EXCHANGE .   .... target PO 90-95%     . SOB  .... 2/2 COPD CHF PNEUM    . RO DVT   .... Venous duplx 6/4/2025 (-)     . COPD   .... DUONEB 6/4/2025   .... PULMICORT 6/4/2025   .... MONTELULKAST 6/4/2025   .... PREDNISONE 10 6/4/2025     INFECTION.  . DATA  .... esr 6/5/2025 56   .... w 6/4/2025 w 9.5  .... cxr 6/4/2025 cw 5/21/2025   ........ bibasal atelectasis   ........ interval increase of left lower lobe opacity concerning for pneumonia   .... Flu ab 6/4/2025 (-)    .... strep (-) 6/6/2025  .... legn (-) 6/6/2025   .... mrsa 6/6/2025 (+)     . VENOUS STASIS CHANGES RLE    . PNEUMONIA   .... cxr 6/4/2025 cw 5/21/2025   ........  increase of left lower lobe opacity concerning for pneumonia     . CELLULITIS R LOWER LEG 6/4/2025   .... XR R foot 6/4/2025 No emphysema     . ANTIBIO   .... DAPTOMYCIN 6/4/2025 D 400/d x 7d Dr Mosqueda   .... ZOSYN 6/4/2025 Z x 7d     CARDIAC.  . CAD    .... ASA 81 6/3   .... ROSUVASTATIN 40 6/3       . CHF  .... pbnp 6/4/2025 pbnp 123  .... tte 5/26/2025   ......... n lvsf ef 60% ivc rap 3   .... LASIX 60 IV/d 6/4/2025    .... kcl 20 6/4     HEMAT.  . DATA  .... Hb 6/4/2025 Hb 12.1  .... Plt 6/4/2025 plt 143  .... inr 6/3/2025 inr 105  .... monitor     GI.   . DIET .   .... 6/4/2025 CONS CARB    . LFT MONITORING   .... LFTS   6/4/2025  ........ AP   73   ........ AST 14  ........ ALT  32  .... monitor     RENAL.  . CKD  .... Na 6/4/2025 Na 141   .... CO2 6/4/2025 co2 30   .... Cr 5/27-6/4-6/5/2025   .... Cr 1.6-1.7 - 1.6   ....  monitor     . HYPOKALEMIA   .... K 6/4-6/5/2025 K 3.4 - 2.8    ENDO.  . DM  .  .... INSULIN GLARIGINE 10 HS 6/4/2025     NEURO.  . ANXIETY   .... CLONAZEPAM 0.5 tid 6/4/2025     . PAIN  .... PREGABALIN 150. 2 6/3    . DEPRESSION  .... SERTRALINE 100 6/2     [id number]   SUMMARY BASELINE .     .. 74 m history of DM CHF, COPD, prostate cancer, renal insufficiency, diabetes, chronic neck pain,  right foot wound from [facility] who had been recently admitted with SOB   CC.   . 6/3/2025 PEDAL EDEMA  . 6/3/2025 WOUND R LOWER LEG   . 6/3/2025 CHRONIC SHORTNESS OF BREATH  . 6/3/2025 CHRONIC COUGH   MAIN ISSUES.  . COPD  . SHORTNESS OF BREATH  . PEDAL EDEMA   .... Venous duplx 6/4/2025 (-)   . VENOUS STASIS CHANGES RLE  . CELLULITIS R LOWER LEG 6/4/2025   .... XR R foot 6/4/2025 No emphysema   . PNEUMONIA   .... cxr 6/4/2025 cw 5/21/2025  incr of lll  opacity  . ANTIBIO   .... DAPTOMYCIN 6/4/2025 D 400/d x 7d Dr Mosqueda   .... ZOSYN 6/4/2025 Z x 7d   . CKD   .... Cr 5/27-6/4/2025 Cr 1.6-1.7   . HYPOKALEMIA   .... K 6/4-6/5/2025 K 3.4 - 2.8  . DM    PMH.   . LUNG NODULES  .... CT ch 5/21/2025 cw 12/28/2024   ......... mild tib nodules left lo lobe may be infection or inflammn    . PNEUMONIA   .... ROCEPHIN 5/21  . SKIN SOFT TISSUE INFECTION  .... DAPTOMYCIN 5/22- 5/26 d 450 x 7d   .... bactrim 5/26  . RLE ANKLE OPEN WOUND   . DM       DISCUSSIONS.  .... Discussed with primary care and relevant consultants on an ongoing basis       TIME SPENT.  . Over 36 minutes aggregate care time spent on encounter; activities included   direct patient care, counseling and/or coordinating care reviewing notes, lab data/ imaging , discussion with multidisciplinary team/ patient  /family and explaining in detail risks, benefits, alternatives  of the recommendations     MAGGIE eric 6/3/2025 1951

## 2025-06-10 NOTE — PROGRESS NOTE ADULT - PROVIDER SPECIALTY LIST ADULT
Infectious Disease
Pulmonology
Cardiology
Hospitalist
Infectious Disease
Nephrology
Nephrology
Podiatry
Pulmonology
Pulmonology
Cardiology
Cardiology
Hospitalist
Hospitalist
Infectious Disease
Nephrology
Nephrology
Pulmonology
Cardiology
Endocrinology
Nephrology
Nephrology
Podiatry
Internal Medicine
Diabetes
Hospitalist

## 2025-06-10 NOTE — DISCHARGE NOTE NURSING/CASE MANAGEMENT/SOCIAL WORK - NSSCCARECORD_GEN_ALL_CORE
[facility] Home Care Agency/Durable Medical Equipment Agency/Community [facility]

## 2025-06-10 NOTE — PROGRESS NOTE ADULT - NUTRITIONAL ASSESSMENT
MEDICATIONS  (STANDING):  albuterol/ipratropium for Nebulization 3 milliLiter(s) Nebulizer every 8 hours  ascorbic acid 500 milliGRAM(s) Oral daily  aspirin enteric coated 81 milliGRAM(s) Oral daily  budesonide 160 MICROgram(s)/formoterol 4.5 MICROgram(s) Inhaler 2 Puff(s) Inhalation two times a day  clotrimazole 1% Cream 1 Application(s) Topical two times a day  DAPTOmycin IVPB 400 milliGRAM(s) IV Intermittent every 24 hours  ferrous    sulfate 325 milliGRAM(s) Oral daily  furosemide   Injectable 60 milliGRAM(s) IV Push daily  glucagon  Injectable 1 milliGRAM(s) IntraMuscular once  guaiFENesin Oral Liquid (Sugar-Free) 200 milliGRAM(s) Oral every 6 hours  heparin   Injectable 5000 Unit(s) SubCutaneous every 8 hours  insulin glargine Injectable (LANTUS) 15 Unit(s) SubCutaneous at bedtime  insulin lispro (ADMELOG) corrective regimen sliding scale   SubCutaneous at bedtime  insulin lispro (ADMELOG) corrective regimen sliding scale.   SubCutaneous three times a day before meals  insulin lispro Injectable (ADMELOG) 5 Unit(s) SubCutaneous three times a day before meals  lactobacillus acidophilus 1 Tablet(s) Oral two times a day with meals  montelukast 10 milliGRAM(s) Oral daily  multivitamin/minerals 1 Tablet(s) Oral daily  pantoprazole    Tablet 40 milliGRAM(s) Oral before breakfast  phenazopyridine 100 milliGRAM(s) Oral three times a day  piperacillin/tazobactam IVPB.. 3.375 Gram(s) IV Intermittent every 8 hours  potassium chloride    Tablet ER 20 milliEquivalent(s) Oral daily  predniSONE   Tablet 10 milliGRAM(s) Oral daily  pregabalin 150 milliGRAM(s) Oral two times a day  rosuvastatin 40 milliGRAM(s) Oral at bedtime  saccharomyces boulardii 250 milliGRAM(s) Oral two times a day  senna 1 Tablet(s) Oral at bedtime  sertraline 100 milliGRAM(s) Oral daily  sodium chloride 3%  Inhalation 4 milliLiter(s) Inhalation every 12 hours  tiotropium 2.5 MICROgram(s) Inhaler 2 Puff(s) Inhalation daily
MEDICATIONS  (STANDING):  albuterol/ipratropium for Nebulization 3 milliLiter(s) Nebulizer every 8 hours  ascorbic acid 500 milliGRAM(s) Oral daily  aspirin enteric coated 81 milliGRAM(s) Oral daily  budesonide    Inhalation Suspension 0.5 milliGRAM(s) Inhalation two times a day  clotrimazole 1% Cream 1 Application(s) Topical two times a day  DAPTOmycin IVPB 400 milliGRAM(s) IV Intermittent every 24 hours  dextrose 5%. 1000 milliLiter(s) (50 mL/Hr) IV Continuous <Continuous>  dextrose 5%. 1000 milliLiter(s) (100 mL/Hr) IV Continuous <Continuous>  dextrose 50% Injectable 25 Gram(s) IV Push once  dextrose 50% Injectable 12.5 Gram(s) IV Push once  dextrose 50% Injectable 25 Gram(s) IV Push once  ferrous    sulfate 325 milliGRAM(s) Oral daily  furosemide   Injectable 60 milliGRAM(s) IV Push daily  glucagon  Injectable 1 milliGRAM(s) IntraMuscular once  heparin   Injectable 5000 Unit(s) SubCutaneous every 8 hours  insulin glargine Injectable (LANTUS) 10 Unit(s) SubCutaneous at bedtime  insulin lispro (ADMELOG) corrective regimen sliding scale   SubCutaneous at bedtime  insulin lispro (ADMELOG) corrective regimen sliding scale.   SubCutaneous three times a day before meals  lactobacillus acidophilus 1 Tablet(s) Oral two times a day with meals  montelukast 10 milliGRAM(s) Oral daily  multivitamin/minerals 1 Tablet(s) Oral daily  pantoprazole    Tablet 40 milliGRAM(s) Oral before breakfast  phenazopyridine 100 milliGRAM(s) Oral three times a day  piperacillin/tazobactam IVPB.. 3.375 Gram(s) IV Intermittent every 8 hours  potassium chloride    Tablet ER 20 milliEquivalent(s) Oral daily  predniSONE   Tablet 10 milliGRAM(s) Oral daily  pregabalin 150 milliGRAM(s) Oral two times a day  rosuvastatin 40 milliGRAM(s) Oral at bedtime  saccharomyces boulardii 250 milliGRAM(s) Oral two times a day  senna 1 Tablet(s) Oral at bedtime  sertraline 100 milliGRAM(s) Oral daily
MEDICATIONS  (STANDING):  albuterol/ipratropium for Nebulization 3 milliLiter(s) Nebulizer every 8 hours  ascorbic acid 500 milliGRAM(s) Oral daily  aspirin enteric coated 81 milliGRAM(s) Oral daily  budesonide 160 MICROgram(s)/formoterol 4.5 MICROgram(s) Inhaler 2 Puff(s) Inhalation two times a day  clotrimazole 1% Cream 1 Application(s) Topical two times a day  DAPTOmycin IVPB 400 milliGRAM(s) IV Intermittent every 24 hours  ferrous    sulfate 325 milliGRAM(s) Oral daily  furosemide   Injectable 60 milliGRAM(s) IV Push daily  glucagon  Injectable 1 milliGRAM(s) IntraMuscular once  guaiFENesin Oral Liquid (Sugar-Free) 200 milliGRAM(s) Oral every 6 hours  heparin   Injectable 5000 Unit(s) SubCutaneous every 8 hours  insulin glargine Injectable (LANTUS) 15 Unit(s) SubCutaneous at bedtime  insulin lispro (ADMELOG) corrective regimen sliding scale   SubCutaneous at bedtime  insulin lispro (ADMELOG) corrective regimen sliding scale.   SubCutaneous three times a day before meals  insulin lispro Injectable (ADMELOG) 5 Unit(s) SubCutaneous three times a day before meals  lactobacillus acidophilus 1 Tablet(s) Oral two times a day with meals  montelukast 10 milliGRAM(s) Oral daily  multivitamin/minerals 1 Tablet(s) Oral daily  pantoprazole    Tablet 40 milliGRAM(s) Oral before breakfast  phenazopyridine 100 milliGRAM(s) Oral three times a day  piperacillin/tazobactam IVPB.. 3.375 Gram(s) IV Intermittent every 8 hours  potassium chloride    Tablet ER 20 milliEquivalent(s) Oral daily  predniSONE   Tablet 10 milliGRAM(s) Oral daily  pregabalin 150 milliGRAM(s) Oral two times a day  rosuvastatin 40 milliGRAM(s) Oral at bedtime  saccharomyces boulardii 250 milliGRAM(s) Oral two times a day  senna 1 Tablet(s) Oral at bedtime  sertraline 100 milliGRAM(s) Oral daily  sodium chloride 3%  Inhalation 4 milliLiter(s) Inhalation every 12 hours  tiotropium 2.5 MICROgram(s) Inhaler 2 Puff(s) Inhalation daily
MEDICATIONS  (STANDING):  albuterol/ipratropium for Nebulization 3 milliLiter(s) Nebulizer every 8 hours  ascorbic acid 500 milliGRAM(s) Oral daily  aspirin enteric coated 81 milliGRAM(s) Oral daily  budesonide    Inhalation Suspension 0.5 milliGRAM(s) Inhalation two times a day  clotrimazole 1% Cream 1 Application(s) Topical two times a day  DAPTOmycin IVPB 400 milliGRAM(s) IV Intermittent every 24 hours  dextrose 5%. 1000 milliLiter(s) (50 mL/Hr) IV Continuous <Continuous>  dextrose 5%. 1000 milliLiter(s) (100 mL/Hr) IV Continuous <Continuous>  dextrose 50% Injectable 25 Gram(s) IV Push once  dextrose 50% Injectable 12.5 Gram(s) IV Push once  dextrose 50% Injectable 25 Gram(s) IV Push once  ferrous    sulfate 325 milliGRAM(s) Oral daily  furosemide   Injectable 60 milliGRAM(s) IV Push daily  glucagon  Injectable 1 milliGRAM(s) IntraMuscular once  heparin   Injectable 5000 Unit(s) SubCutaneous every 8 hours  insulin glargine Injectable (LANTUS) 10 Unit(s) SubCutaneous at bedtime  insulin lispro (ADMELOG) corrective regimen sliding scale   SubCutaneous at bedtime  insulin lispro (ADMELOG) corrective regimen sliding scale.   SubCutaneous three times a day before meals  lactobacillus acidophilus 1 Tablet(s) Oral two times a day with meals  montelukast 10 milliGRAM(s) Oral daily  multivitamin/minerals 1 Tablet(s) Oral daily  pantoprazole    Tablet 40 milliGRAM(s) Oral before breakfast  phenazopyridine 100 milliGRAM(s) Oral three times a day  piperacillin/tazobactam IVPB.. 3.375 Gram(s) IV Intermittent every 8 hours  potassium chloride    Tablet ER 20 milliEquivalent(s) Oral daily  predniSONE   Tablet 10 milliGRAM(s) Oral daily  pregabalin 150 milliGRAM(s) Oral two times a day  rosuvastatin 40 milliGRAM(s) Oral at bedtime  saccharomyces boulardii 250 milliGRAM(s) Oral two times a day  senna 1 Tablet(s) Oral at bedtime  sertraline 100 milliGRAM(s) Oral daily

## 2025-06-10 NOTE — DISCHARGE NOTE NURSING/CASE MANAGEMENT/SOCIAL WORK - NSDCVIVACCINE_GEN_ALL_CORE_FT
Tdap; 28-Dec-2024 10:22; Dania Lewis (RN); [employer]; [id number] (Exp. Date: 31-May-2026); IntraMuscular; Deltoid Left.; 0.5 milliLiter(s); VIS (VIS Published: 09-May-2013, VIS Presented: 28-Dec-2024);

## 2025-06-10 NOTE — PROGRESS NOTE ADULT - SUBJECTIVE AND OBJECTIVE BOX
Patient is a 74y Male whom presented to the hospital with kayleen     PAST MEDICAL & SURGICAL HISTORY:  Prostate cancer      Type II diabetes mellitus      Chronic obstructive pulmonary disease (COPD)      CHF (congestive heart failure)      Renal insufficiency      H/O migraine      Insomnia      Constipation      S/P foot surgery          MEDICATIONS  (STANDING):  albuterol/ipratropium for Nebulization 3 milliLiter(s) Nebulizer every 8 hours  ascorbic acid 500 milliGRAM(s) Oral daily  aspirin enteric coated 81 milliGRAM(s) Oral daily  budesonide    Inhalation Suspension 0.5 milliGRAM(s) Inhalation two times a day  clotrimazole 1% Cream 1 Application(s) Topical two times a day  DAPTOmycin IVPB 400 milliGRAM(s) IV Intermittent every 24 hours  dextrose 5%. 1000 milliLiter(s) (100 mL/Hr) IV Continuous <Continuous>  dextrose 5%. 1000 milliLiter(s) (50 mL/Hr) IV Continuous <Continuous>  dextrose 50% Injectable 25 Gram(s) IV Push once  dextrose 50% Injectable 12.5 Gram(s) IV Push once  dextrose 50% Injectable 25 Gram(s) IV Push once  ferrous    sulfate 325 milliGRAM(s) Oral daily  furosemide   Injectable 60 milliGRAM(s) IV Push daily  glucagon  Injectable 1 milliGRAM(s) IntraMuscular once  heparin   Injectable 5000 Unit(s) SubCutaneous every 8 hours  insulin glargine Injectable (LANTUS) 10 Unit(s) SubCutaneous at bedtime  insulin lispro (ADMELOG) corrective regimen sliding scale   SubCutaneous at bedtime  insulin lispro (ADMELOG) corrective regimen sliding scale.   SubCutaneous three times a day before meals  montelukast 10 milliGRAM(s) Oral daily  multivitamin/minerals 1 Tablet(s) Oral daily  pantoprazole    Tablet 40 milliGRAM(s) Oral before breakfast  piperacillin/tazobactam IVPB.. 3.375 Gram(s) IV Intermittent every 8 hours  potassium chloride    Tablet ER 20 milliEquivalent(s) Oral daily  predniSONE   Tablet 10 milliGRAM(s) Oral daily  pregabalin 150 milliGRAM(s) Oral two times a day  rosuvastatin 40 milliGRAM(s) Oral at bedtime  saccharomyces boulardii 250 milliGRAM(s) Oral two times a day  senna 1 Tablet(s) Oral at bedtime  sertraline 100 milliGRAM(s) Oral daily      Allergies    IODINE (Unknown)  tetracycline (Unknown)  vancomycin (Other)    Intolerances        SOCIAL HISTORY:  Denies ETOh,Smoking,     FAMILY HISTORY:      REVIEW OF SYSTEMS:    CONSTITUTIONAL: No weakness, fevers or chills  RESPIRATORY: No cough, wheezing, hemoptysis; No shortness of breath  CARDIOVASCULAR: No chest pain or palpitations  GASTROINTESTINAL: No abdominal or epigastric pain. No nausea, vomiting,     No diarrhea or constipation. No melena                             11.5   7.60  )-----------( 133      ( 09 Jun 2025 07:40 )             37.0       CBC Full  -  ( 09 Jun 2025 07:40 )  WBC Count : 7.60 K/uL  RBC Count : 4.10 M/uL  Hemoglobin : 11.5 g/dL  Hematocrit : 37.0 %  Platelet Count - Automated : 133 K/uL  Mean Cell Volume : 90.2 fl  Mean Cell Hemoglobin : 28.0 pg  Mean Cell Hemoglobin Concentration : 31.1 g/dL  Auto Neutrophil # : x  Auto Lymphocyte # : x  Auto Monocyte # : x  Auto Eosinophil # : x  Auto Basophil # : x  Auto Neutrophil % : x  Auto Lymphocyte % : x  Auto Monocyte % : x  Auto Eosinophil % : x  Auto Basophil % : x      06-09    141  |  105  |  22  ----------------------------<  181[H]  4.1   |  26  |  1.50[H]    Ca    9.3      09 Jun 2025 07:40        CAPILLARY BLOOD GLUCOSE      POCT Blood Glucose.: 259 mg/dL (09 Jun 2025 12:00)  POCT Blood Glucose.: 187 mg/dL (09 Jun 2025 07:44)  POCT Blood Glucose.: 243 mg/dL (08 Jun 2025 21:22)  POCT Blood Glucose.: 290 mg/dL (08 Jun 2025 16:59)      Vital Signs Last 24 Hrs  T(C): 36.6 (09 Jun 2025 05:44), Max: 36.7 (08 Jun 2025 20:49)  T(F): 97.9 (09 Jun 2025 05:44), Max: 98 (08 Jun 2025 20:49)  HR: 75 (09 Jun 2025 07:24) (72 - 82)  BP: 147/84 (09 Jun 2025 05:44) (119/66 - 147/84)  BP(mean): --  RR: 19 (09 Jun 2025 05:44) (19 - 19)  SpO2: 97% (09 Jun 2025 07:24) (97% - 99%)    Parameters below as of 09 Jun 2025 07:24  Patient On (Oxygen Delivery Method): nasal cannula        Urinalysis Basic - ( 09 Jun 2025 07:40 )    Color: x / Appearance: x / SG: x / pH: x  Gluc: 181 mg/dL / Ketone: x  / Bili: x / Urobili: x   Blood: x / Protein: x / Nitrite: x   Leuk Esterase: x / RBC: x / WBC x   Sq Epi: x / Non Sq Epi: x / Bacteria: x                    PHYSICAL EXAM:    Constitutional: NAD  HEENT: conjunctive   clear   Neck:  No JVD  Respiratory: CTAB  Cardiovascular: S1 and S2  Gastrointestinal: BS+, soft, NT/ND  Extremities: No peripheral edema    LABS:                        12.1   9.50  )-----------( 143      ( 04 Jun 2025 06:05 )             37.9     06-04    141  |  104  |  36[H]  ----------------------------<  137[H]  3.4[L]   |  30  |  1.70[H]    Ca    9.2      04 Jun 2025 06:05  Phos  2.9     06-04  Mg     2.5     06-04    TPro  6.9  /  Alb  3.0[L]  /  TBili  0.5  /  DBili  x   /  AST  14[L]  /  ALT  32  /  AlkPhos  73  06-04      Urine Studies:  Urinalysis Basic - ( 04 Jun 2025 06:05 )    Color: x / Appearance: x / SG: x / pH: x  Gluc: 137 mg/dL / Ketone: x  / Bili: x / Urobili: x   Blood: x / Protein: x / Nitrite: x   Leuk Esterase: x / RBC: x / WBC x   Sq Epi: x / Non Sq Epi: x / Bacteria: x            RADIOLOGY & ADDITIONAL STUDIES:                   Patient is a 74y Male whom presented to the hospital with kayleen     PAST MEDICAL & SURGICAL HISTORY:  Prostate cancer      Type II diabetes mellitus      Chronic obstructive pulmonary disease (COPD)      CHF (congestive heart failure)      Renal insufficiency      H/O migraine      Insomnia      Constipation      S/P foot surgery          MEDICATIONS  (STANDING):  albuterol/ipratropium for Nebulization 3 milliLiter(s) Nebulizer every 8 hours  ascorbic acid 500 milliGRAM(s) Oral daily  aspirin enteric coated 81 milliGRAM(s) Oral daily  budesonide    Inhalation Suspension 0.5 milliGRAM(s) Inhalation two times a day  clotrimazole 1% Cream 1 Application(s) Topical two times a day  DAPTOmycin IVPB 400 milliGRAM(s) IV Intermittent every 24 hours  dextrose 5%. 1000 milliLiter(s) (100 mL/Hr) IV Continuous <Continuous>  dextrose 5%. 1000 milliLiter(s) (50 mL/Hr) IV Continuous <Continuous>  dextrose 50% Injectable 25 Gram(s) IV Push once  dextrose 50% Injectable 12.5 Gram(s) IV Push once  dextrose 50% Injectable 25 Gram(s) IV Push once  ferrous    sulfate 325 milliGRAM(s) Oral daily  furosemide   Injectable 60 milliGRAM(s) IV Push daily  glucagon  Injectable 1 milliGRAM(s) IntraMuscular once  heparin   Injectable 5000 Unit(s) SubCutaneous every 8 hours  insulin glargine Injectable (LANTUS) 10 Unit(s) SubCutaneous at bedtime  insulin lispro (ADMELOG) corrective regimen sliding scale   SubCutaneous at bedtime  insulin lispro (ADMELOG) corrective regimen sliding scale.   SubCutaneous three times a day before meals  montelukast 10 milliGRAM(s) Oral daily  multivitamin/minerals 1 Tablet(s) Oral daily  pantoprazole    Tablet 40 milliGRAM(s) Oral before breakfast  piperacillin/tazobactam IVPB.. 3.375 Gram(s) IV Intermittent every 8 hours  potassium chloride    Tablet ER 20 milliEquivalent(s) Oral daily  predniSONE   Tablet 10 milliGRAM(s) Oral daily  pregabalin 150 milliGRAM(s) Oral two times a day  rosuvastatin 40 milliGRAM(s) Oral at bedtime  saccharomyces boulardii 250 milliGRAM(s) Oral two times a day  senna 1 Tablet(s) Oral at bedtime  sertraline 100 milliGRAM(s) Oral daily      Allergies    IODINE (Unknown)  tetracycline (Unknown)  vancomycin (Other)    Intolerances        SOCIAL HISTORY:  Denies ETOh,Smoking,     FAMILY HISTORY:      REVIEW OF SYSTEMS:    CONSTITUTIONAL: No weakness, fevers or chills  RESPIRATORY: No cough, wheezing, hemoptysis; No shortness of breath  CARDIOVASCULAR: No chest pain or palpitations  GASTROINTESTINAL: No abdominal or epigastric pain. No nausea, vomiting,     No diarrhea or constipation. No melena                               11.7   9.27  )-----------( 130      ( 10 Justo 2025 07:46 )             37.5       CBC Full  -  ( 10 Justo 2025 07:46 )  WBC Count : 9.27 K/uL  RBC Count : 4.14 M/uL  Hemoglobin : 11.7 g/dL  Hematocrit : 37.5 %  Platelet Count - Automated : 130 K/uL  Mean Cell Volume : 90.6 fl  Mean Cell Hemoglobin : 28.3 pg  Mean Cell Hemoglobin Concentration : 31.2 g/dL  Auto Neutrophil # : x  Auto Lymphocyte # : x  Auto Monocyte # : x  Auto Eosinophil # : x  Auto Basophil # : x  Auto Neutrophil % : x  Auto Lymphocyte % : x  Auto Monocyte % : x  Auto Eosinophil % : x  Auto Basophil % : x      06-10    140  |  105  |  24[H]  ----------------------------<  213[H]  4.7   |  29  |  1.40[H]    Ca    9.3      10 Justo 2025 07:46        CAPILLARY BLOOD GLUCOSE      POCT Blood Glucose.: 319 mg/dL (10 Justo 2025 11:31)  POCT Blood Glucose.: 205 mg/dL (10 Justo 2025 07:28)  POCT Blood Glucose.: 167 mg/dL (09 Jun 2025 21:08)      Vital Signs Last 24 Hrs  T(C): 36.7 (10 Justo 2025 15:50), Max: 36.8 (09 Jun 2025 21:33)  T(F): 98 (10 Justo 2025 15:50), Max: 98.2 (09 Jun 2025 21:33)  HR: 88 (10 Justo 2025 15:50) (73 - 89)  BP: 132/66 (10 Justo 2025 15:50) (120/66 - 132/66)  BP(mean): --  RR: 20 (10 Justo 2025 15:50) (18 - 20)  SpO2: 93% (10 Justo 2025 15:50) (91% - 98%)    Parameters below as of 10 Justo 2025 15:50  Patient On (Oxygen Delivery Method): room air        Urinalysis Basic - ( 10 Justo 2025 07:46 )    Color: x / Appearance: x / SG: x / pH: x  Gluc: 213 mg/dL / Ketone: x  / Bili: x / Urobili: x   Blood: x / Protein: x / Nitrite: x   Leuk Esterase: x / RBC: x / WBC x   Sq Epi: x / Non Sq Epi: x / Bacteria: x                    PHYSICAL EXAM:    Constitutional: NAD  HEENT: conjunctive   clear   Neck:  No JVD  Respiratory: CTAB  Cardiovascular: S1 and S2  Gastrointestinal: BS+, soft, NT/ND  Extremities: No peripheral edema    LABS:                        12.1   9.50  )-----------( 143      ( 04 Jun 2025 06:05 )             37.9     06-04    141  |  104  |  36[H]  ----------------------------<  137[H]  3.4[L]   |  30  |  1.70[H]    Ca    9.2      04 Jun 2025 06:05  Phos  2.9     06-04  Mg     2.5     06-04    TPro  6.9  /  Alb  3.0[L]  /  TBili  0.5  /  DBili  x   /  AST  14[L]  /  ALT  32  /  AlkPhos  73  06-04      Urine Studies:  Urinalysis Basic - ( 04 Jun 2025 06:05 )    Color: x / Appearance: x / SG: x / pH: x  Gluc: 137 mg/dL / Ketone: x  / Bili: x / Urobili: x   Blood: x / Protein: x / Nitrite: x   Leuk Esterase: x / RBC: x / WBC x   Sq Epi: x / Non Sq Epi: x / Bacteria: x            RADIOLOGY & ADDITIONAL STUDIES:

## 2025-06-10 NOTE — PROGRESS NOTE ADULT - ASSESSMENT
Patient with a past medical history of diabetes, COPD, heart failure, CKD, hypertension, history of right foot ulcer is presenting for wound to right lower extremity.  Was recently admitted here for shortness of breath as well as for bursitis and concern for a wound.  Was sent back to facility.  They sent him to the ER today due to worsening swelling and redness localized to his wound of the right lower extremity.  Endorsing pain to the site.  Denies fevers, chest pain, nausea or vomiting.  States that he is having some chronic shortness of breath though unchanged today.  Also endorses chronic cough. (03 Jun 2025 18:32)      CKD sTAGE 3 and ACUTE RENAL FAILURE:   Serum creatinine is  controlled    , approximating GFR at   ml/min. id overload furosemide   Injectable 60 milliGRAM(s) IV Push daily  There is no progression . No uremic symptoms  No evidence of anemia .  Fluid status stable.  Will continue to avoid nephrotoxic drugs.  Patient remains asymptomatic.   Continue current therapy.  Additional evaluation:   ECG,    echocardiogram,     CXR,  will obtained recent   renal ultrasound to evalaute kidney size and possible stones ,  potassium chloride    Tablet ER 20 milliEquivalent(s) Oral daily prn     BP monitoring,continue current antihypertensive meds, low salt diet,followup with PMD in 1-2 weeks      Admit for septic workup and ID evaluation,send blood and urine cx,serial lactate levels,monitor vitals closley,ivfs hydration,monitor urine output and renal profile,iv abx as per id cons  piperacillin/tazobactam IVPB.. 3.375 Gram(s) IV Intermittent every 8 hours   Patient with a past medical history of diabetes, COPD, heart failure, CKD, hypertension, history of right foot ulcer is presenting for wound to right lower extremity.  Was recently admitted here for shortness of breath as well as for bursitis and concern for a wound.  Was sent back to facility.  They sent him to the ER today due to worsening swelling and redness localized to his wound of the right lower extremity.  Endorsing pain to the site.  Denies fevers, chest pain, nausea or vomiting.  States that he is having some chronic shortness of breath though unchanged today.  Also endorses chronic cough. (03 Jun 2025 18:32)      CKD sTAGE 3 and ACUTE RENAL FAILURE:   Serum creatinine is  controlled    , approximating GFR at   ml/min. id overload furosemide   Injectable 60 milliGRAM(s) IV Push daily  There is no progression . No uremic symptoms   evidence of anemia .  Fluid status stable.  Will continue to avoid nephrotoxic drugs.  Patient remains asymptomatic.   Continue current therapy.  Additional evaluation:   ECG,    echocardiogram,     CXR,  will obtained recent   renal ultrasound to evalaute kidney size and possible stones ,  potassium chloride    Tablet ER 20 milliEquivalent(s) Oral daily prn     BP monitoring,continue current antihypertensive meds, low salt diet,followup with PMD in 1-2 weeks      Admit for septic workup and ID evaluation,send blood and urine cx,serial lactate levels,monitor vitals closley,ivfs hydration,monitor urine output and renal profile,iv abx as per id cons  piperacillin/tazobactam IVPB.. 3.375 Gram(s) IV Intermittent every 8 hours

## 2025-06-10 NOTE — PROGRESS NOTE ADULT - ASSESSMENT
75 y/o M with a past medical history of diabetes, COPD, heart failure, CKD, hypertension, history of right foot ulcer is presenting for wound to right lower extremity.  Was recently admitted here for shortness of breath as well as for bursitis and concern for a wound.  Was sent back to facility.  They sent him to the ER today due to worsening swelling and redness localized to his wound of the right lower extremity. Admitted for RLE cellulitis.     LE edema  - Edema appears to be more from cellulitis  - S/p IV Lasix 60 mg IV   - Please start home dose of 40 mg PO q12H  - Continue Abx per ID    - Hx HFpEF  - Previous TTE 05/2025 with EF 60-65% and trace TR  - No clear evidence of volume overload    - EKG with SR, 84bpm, low voltage, unchanged from previous EKG  - No evidence of any active ischemia   - Continue home aspirin for now (indication?)   - Continue statin for HLD    - BP stable and controlled   - Continue to hold home BB.    - BP initially low    - Monitor and replete lytes, keep K>4, Mg>2.  - Will continue to follow.    Alcon Leonardo, MS FNP, AGACNP  Nurse Practitioner- Cardiology   Please call on TEAMS

## 2025-06-10 NOTE — PROGRESS NOTE ADULT - NS ATTEND AMEND GEN_ALL_CORE FT
73 y/o M with a past medical history of diabetes, COPD, heart failure, CKD, hypertension, history of right foot ulcer is presenting for wound to right lower extremity.  Was recently admitted here for shortness of breath as well as for bursitis and concern for a wound.  Was sent back to facility.  They sent him to the ER today due to worsening swelling and redness localized to his wound of the right lower extremity. Admitted for RLE cellulitis.     - Pt presenting with LE edema, admitted for cellulitis   - Hx HFpEF  - Previous TTE 05/2025 with EF 60-65% and trace TR  - on iv Lasix 60 mg daily, would continue for now and reevaluate in am   - cr stable   - Please continue to maintain strict I/Os, monitor daily weights, Cr, and K.     - EKG with SR, 84bpm, low voltage, unchanged from previous EKG  - Continue aspirin   - Continue statin for HLD  - Was on BB but was dc d/t soft BP on previous admission
75 y/o M with a past medical history of diabetes, COPD, heart failure, CKD, hypertension, history of right foot ulcer is presenting for wound to right lower extremity.  Was recently admitted here for shortness of breath as well as for bursitis and concern for a wound.  Was sent back to facility.  They sent him to the ER today due to worsening swelling and redness localized to his wound of the right lower extremity. Admitted for RLE cellulitis.     - Edema appears to be more from cellulitis  - now sig improved   - Please initiate PO lasix   - Continue Abx per ID  - Previous TTE 05/2025 with EF 60-65% and trace TR    - EKG with SR, 84bpm, low voltage, unchanged from previous EKG  - No evidence of any active ischemia   - Continue home aspirin for now (indication?)   - Continue statin for HLD    - Continue to hold home BB. BPs labile with one recent episode of hypotension noted
73 y/o M with a past medical history of diabetes, COPD, heart failure, CKD, hypertension, history of right foot ulcer is presenting for wound to right lower extremity.  Was recently admitted here for shortness of breath as well as for bursitis and concern for a wound.  Was sent back to facility.  They sent him to the ER today due to worsening swelling and redness localized to his wound of the right lower extremity. Admitted for RLE cellulitis.     adm with edema/cellulitis  s/p iv lasix would change to po  normal ef on echo  no acute ischemia     bp controlled
75 y/o M with a past medical history of diabetes, COPD, heart failure, CKD, hypertension, history of right foot ulcer is presenting for wound to right lower extremity.  Was recently admitted here for shortness of breath as well as for bursitis and concern for a wound.  Was sent back to facility.  They sent him to the ER today due to worsening swelling and redness localized to his wound of the right lower extremity. Admitted for RLE cellulitis.     - Edema appears to be more from cellulitis  - now sig improved   - transition to po lasix   - Continue Abx per ID  - Previous TTE 05/2025 with EF 60-65% and trace TR    - EKG with SR, 84bpm, low voltage, unchanged from previous EKG  - No evidence of any active ischemia   - Continue home aspirin for now (indication?)   - Continue statin for HLD    - Continue to hold home BB.  BP initially low
74m dm, COPD, heart failure, CKD, hypertension, history of right foot ulcer admitted for RLE cellulitis.     adm with cellultis  no acute ischemia  degree of superimposed vol ol adding iv lasix  bp stable
73 y/o M with a past medical history of diabetes, COPD, heart failure, CKD, hypertension, history of right foot ulcer is presenting for wound to right lower extremity.  Was recently admitted here for shortness of breath as well as for bursitis and concern for a wound.  Was sent back to facility.  They sent him to the ER today due to worsening swelling and redness localized to his wound of the right lower extremity. Admitted for RLE cellulitis.     - EKG with SR, 84bpm, low voltage, unchanged from previous EKG  -C/w home statin for HLD    -Hx HFpEF  - Previous TTE 05/2025 with EF 60-65% and trace TR  -on iv lasix cont for now    Please continue to maintain strict I/Os, monitor daily weights, Cr, and K.     -Anival followed by renal fu recs  Further cardiac workup will depend on clinical course.

## 2025-06-10 NOTE — DISCHARGE NOTE PROVIDER - NSDCCAREPROVSEEN_GEN_ALL_CORE_FT
Maci, María Foley, Victoriano Maciel, Nick Quiroga, Ortega Romero, Pastor Coreas, Flakito Baxter, Valeria Perlman, Chinedu Diaz, Ariela Costa, Benjamín Quevedo, Amber Mosqueda, Megan

## 2025-06-10 NOTE — DISCHARGE NOTE PROVIDER - NSDCMRMEDTOKEN_GEN_ALL_CORE_FT
albuterol 0.63 mg/3 mL (0.021%) inhalation solution: 3 milliliter(s) by nebulizer 3 times a day as needed for  shortness of breath and/or wheezing  Artificial Tears ophthalmic solution: 1 drop(s) in each eye 2 times a day  ascorbic acid 500 mg oral tablet: 1 tab(s) orally 2 times a day  aspirin 81 mg oral tablet: 1 tab(s) orally once a day  clonazePAM 0.25 mg oral tablet, disintegratin tab(s) orally 3 times a day  doxycycline monohydrate 50 mg oral capsule: 2 cap(s) orally every 12 hours  ferrous sulfate 325 mg (65 mg elemental iron) oral tablet: 1 tab(s) orally once a day  furosemide 40 mg oral tablet: 1 tab(s) orally every 12 hours  HumaLOG 100 units/mL subcutaneous solution: subcutaneous 3 times a day (before meals) Inject subcutaneously 3 times a day using sliding scale  HumaLOG KwikPen 100 units/mL injectable solution: injectable 3 times a day (before meals) Inject 15 units subcutaneously 3 times a day (before each meal) in addition to sliding scale  Jardiance 10 mg oral tablet: 1 tab(s) orally once a day  Lantus Solostar Pen 100 units/mL subcutaneous solution: 20 unit(s) subcutaneously once a day (at bedtime)  loperamide 2 mg oral capsule: 1 cap(s) orally after each loose BM every 6 hours as needed **No more than 3 capsules (6mg) a day**  loratadine 10 mg oral tablet: 1 tab(s) orally once a day  Melatonin 3 mg oral tablet: 1 tab(s) orally once a day (at bedtime)  montelukast 10 mg oral tablet: 1 tab(s) orally once a day (at bedtime)  multivitamin: 1 tab(s) orally once a day  oxyCODONE 5 mg oral tablet: 2 tab(s) orally 2 times a day MDD: 4 tablets  pantoprazole 40 mg oral delayed release tablet: 1 tab(s) orally once a day  polyethylene glycol 3350 oral powder for reconstitution: 17 gram(s) orally once a day As needed for constipation  Potassium Chloride (Eqv-Klor-Con M20) 20 mEq oral tablet, extended release: 1 tab(s) orally 2 times a day  predniSONE 10 mg oral tablet: 1 tab(s) orally once a day with food or milk for 1 month, then after 1 month take 5mg orally once a day  pregabalin 150 mg oral capsule: 1 cap(s) orally 2 times a day MDD: 2 capsules  ramelteon 8 mg oral tablet: 1 tab(s) orally once a day (at bedtime)  rosuvastatin 40 mg oral tablet: 1 tab(s) orally once a day (at bedtime)  saccharomyces boulardii lyo 250 mg oral capsule: 1 cap(s) orally once a day  Saline Mist 0.65% nasal spray: 1 spray(s) in each nostril 2 times a day  senna leaf extract oral tablet: 1 tab(s) orally once a day (at bedtime)  sertraline 100 mg oral tablet: 1.5 tab(s) orally once a day (150mg)  Symbicort 160 mcg-4.5 mcg/inh inhalation aerosol: 2 puff(s) inhaled 2 times a day

## 2025-06-10 NOTE — PROGRESS NOTE ADULT - SUBJECTIVE AND OBJECTIVE BOX
PROGRESS NOTE  Patient is a 74y old  Male who presents with a chief complaint of rle swelling (10 Justo 2025 13:38)      OVERNIGHT      HPI:  Patient with a past medical history of diabetes, COPD, heart failure, CKD, hypertension, history of right foot ulcer is presenting for wound to right lower extremity.  Was recently admitted here for shortness of breath as well as for bursitis and concern for a wound.  Was sent back to facility.  They sent him to the ER today due to worsening swelling and redness localized to his wound of the right lower extremity.  Endorsing pain to the site.  Denies fevers, chest pain, nausea or vomiting.  States that he is having some chronic shortness of breath though unchanged today.  Also endorses chronic cough. (03 Jun 2025 18:32)    PAST MEDICAL & SURGICAL HISTORY:  Prostate cancer      Type II diabetes mellitus      Chronic obstructive pulmonary disease (COPD)      CHF (congestive heart failure)      Renal insufficiency      H/O migraine      Insomnia      Constipation      S/P foot surgery          MEDICATIONS  (STANDING):  albuterol/ipratropium for Nebulization 3 milliLiter(s) Nebulizer every 8 hours  ascorbic acid 500 milliGRAM(s) Oral daily  aspirin enteric coated 81 milliGRAM(s) Oral daily  budesonide 160 MICROgram(s)/formoterol 4.5 MICROgram(s) Inhaler 2 Puff(s) Inhalation two times a day  clotrimazole 1% Cream 1 Application(s) Topical two times a day  dextrose 5%. 1000 milliLiter(s) (50 mL/Hr) IV Continuous <Continuous>  dextrose 5%. 1000 milliLiter(s) (100 mL/Hr) IV Continuous <Continuous>  dextrose 50% Injectable 25 Gram(s) IV Push once  dextrose 50% Injectable 12.5 Gram(s) IV Push once  dextrose 50% Injectable 25 Gram(s) IV Push once  doxycycline monohydrate Capsule 100 milliGRAM(s) Oral every 12 hours  ferrous    sulfate 325 milliGRAM(s) Oral daily  glucagon  Injectable 1 milliGRAM(s) IntraMuscular once  guaiFENesin Oral Liquid (Sugar-Free) 200 milliGRAM(s) Oral every 6 hours  heparin   Injectable 5000 Unit(s) SubCutaneous every 8 hours  insulin glargine Injectable (LANTUS) 20 Unit(s) SubCutaneous at bedtime  insulin lispro (ADMELOG) corrective regimen sliding scale   SubCutaneous at bedtime  insulin lispro (ADMELOG) corrective regimen sliding scale.   SubCutaneous three times a day before meals  insulin lispro Injectable (ADMELOG) 15 Unit(s) SubCutaneous three times a day before meals  lactobacillus acidophilus 1 Tablet(s) Oral two times a day with meals  montelukast 10 milliGRAM(s) Oral daily  multivitamin/minerals 1 Tablet(s) Oral daily  mupirocin 2% Nasal 1 Application(s) Both Nostrils two times a day  pantoprazole    Tablet 40 milliGRAM(s) Oral before breakfast  potassium chloride    Tablet ER 20 milliEquivalent(s) Oral daily  predniSONE   Tablet 10 milliGRAM(s) Oral daily  pregabalin 150 milliGRAM(s) Oral two times a day  rosuvastatin 40 milliGRAM(s) Oral at bedtime  saccharomyces boulardii 250 milliGRAM(s) Oral two times a day  senna 1 Tablet(s) Oral at bedtime  sertraline 100 milliGRAM(s) Oral daily  sodium chloride 3%  Inhalation 4 milliLiter(s) Inhalation every 12 hours  tiotropium 2.5 MICROgram(s) Inhaler 2 Puff(s) Inhalation daily    MEDICATIONS  (PRN):  acetaminophen     Tablet .. 650 milliGRAM(s) Oral every 6 hours PRN Temp greater or equal to 38C (100.4F), Mild Pain (1 - 3)  aluminum hydroxide/magnesium hydroxide/simethicone Suspension 30 milliLiter(s) Oral every 4 hours PRN Dyspepsia  bisacodyl Suppository 10 milliGRAM(s) Rectal daily PRN Constipation  clonazePAM  Tablet 0.5 milliGRAM(s) Oral three times a day PRN ANXIETY  dextrose Oral Gel 15 Gram(s) Oral once PRN Blood Glucose LESS THAN 70 milliGRAM(s)/deciliter  melatonin 3 milliGRAM(s) Oral at bedtime PRN Insomnia  ondansetron Injectable 4 milliGRAM(s) IV Push every 8 hours PRN Nausea and/or Vomiting  oxyCODONE    IR 5 milliGRAM(s) Oral every 8 hours PRN Moderate Pain (4 - 6)  polyethylene glycol 3350 17 Gram(s) Oral daily PRN for constipation      OBJECTIVE    T(C): 36.6 (06-10-25 @ 11:34), Max: 36.8 (06-09-25 @ 21:33)  HR: 82 (06-10-25 @ 11:34) (73 - 89)  BP: 128/72 (06-10-25 @ 11:34) (120/66 - 128/72)  RR: 18 (06-10-25 @ 11:34) (18 - 18)  SpO2: 93% (06-10-25 @ 11:34) (91% - 98%)  Wt(kg): --  I&O's Summary    09 Jun 2025 07:01  -  10 Justo 2025 07:00  --------------------------------------------------------  IN: 0 mL / OUT: 900 mL / NET: -900 mL          REVIEW OF SYSTEMS:  CONSTITUTIONAL: No fever, weight loss, or fatigue  EYES: No eye pain, visual disturbances, or discharge  ENMT:   No sinus or throat pain  NECK: No pain or stiffness  RESPIRATORY: No cough, wheezing, chills or hemoptysis; No shortness of breath  CARDIOVASCULAR: No chest pain, palpitations, dizziness, or leg swelling  GASTROINTESTINAL: No abdominal pain. No nausea, vomiting; No diarrhea or constipation. No melena or hematochezia.  GENITOURINARY: No dysuria, frequency, hematuria, or incontinence  NEUROLOGICAL: No headaches, memory loss, loss of strength, numbness, or tremors  SKIN: No itching, burning, rashes, or lesions   MUSCULOSKELETAL: No joint pain or swelling; No muscle, back, or extremity pain    PHYSICAL EXAM:  Appearance: NAD. VS past 24 hrs -as above   HEENT:   Moist oral mucosa. Conjunctiva clear b/l.   Neck : supple  Respiratory: Lungs CTAB.  Gastrointestinal:  Soft, nontender. No rebound. No rigidity. BS present	  Cardiovascular: RRR ,S1S2 present  Neurologic: Non-focal. Moving all extremities.  Extremities: No edema. No erythema. No calf tenderness.  Skin: No rashes, No ecchymoses, No cyanosis.	  wounds ,skin lesions-See skin assesment flow sheet   LABS:                        11.7   9.27  )-----------( 130      ( 10 Justo 2025 07:46 )             37.5     06-10    140  |  105  |  24[H]  ----------------------------<  213[H]  4.7   |  29  |  1.40[H]    Ca    9.3      10 Justo 2025 07:46      CAPILLARY BLOOD GLUCOSE      POCT Blood Glucose.: 319 mg/dL (10 Justo 2025 11:31)  POCT Blood Glucose.: 205 mg/dL (10 Justo 2025 07:28)  POCT Blood Glucose.: 167 mg/dL (09 Jun 2025 21:08)  POCT Blood Glucose.: 217 mg/dL (09 Jun 2025 17:03)      Urinalysis Basic - ( 10 Justo 2025 07:46 )    Color: x / Appearance: x / SG: x / pH: x  Gluc: 213 mg/dL / Ketone: x  / Bili: x / Urobili: x   Blood: x / Protein: x / Nitrite: x   Leuk Esterase: x / RBC: x / WBC x   Sq Epi: x / Non Sq Epi: x / Bacteria: x        Culture - Urine (collected 05 Jun 2025 17:50)  Source: Clean Catch Clean Catch (Midstream)  Final Report (06 Jun 2025 23:48):    No growth    Culture - Blood (collected 03 Jun 2025 15:00)  Source: Blood Blood-Peripheral  Final Report (09 Jun 2025 01:00):    No growth at 5 days    Culture - Blood (collected 03 Jun 2025 14:45)  Source: Blood Blood-Peripheral  Final Report (09 Jun 2025 01:00):    No growth at 5 days      RADIOLOGY & ADDITIONAL TESTS:   reviewed elctronically  ASSESSMENT/PLAN: 	    Patient was seen and examined on a day of discharge . Plan of care , discharge medications and recommendations discussed with consultants and clearance for discharge obtained .Social service , case management  and medical staff are aware of plan. Family is notified. Discharge summary  is  prepared electronically-see separate document prepared by me .75minutes spent on this visit, 50% visit time spent in care co-ordination with other attendings and counselling patient  I have discussed care plan with patient and HCP,expressed understanding of problems treatment and their effect and side effects, alternatives in detail,I have asked if they have any questions and concerns and appropriately addressed them to best of my ability Form for NANCI completed and rx sent to the pharmacy

## 2025-06-10 NOTE — PROGRESS NOTE ADULT - ATTENDING COMMENTS
reviewed medical history, physical exam and care plan   note read and reviewed

## 2025-06-10 NOTE — DISCHARGE NOTE NURSING/CASE MANAGEMENT/SOCIAL WORK - PATIENT PORTAL LINK FT
You can access the FollowMyHealth Patient Portal offered by [facility] by registering at the following website: [email or web address]. By joining [employer]’s FollowMyHealth portal, you will also be able to view your health information using other applications (apps) compatible with our system.

## 2025-06-10 NOTE — CASE MANAGEMENT PROGRESS NOTE - NSCMPROGRESSNOTE_GEN_ALL_CORE
CM spoke riley Mccurdy from [facility] [phone] to discuss pt return to facility. CM to fax form / PT notes to [phone]. CM spoke riley Mccurdy from [facility] at [facility] [phone] to discuss pt return to facility. CM to fax form / PT notes to [phone] as per assisted living facility request.

## 2025-06-10 NOTE — DISCHARGE NOTE NURSING/CASE MANAGEMENT/SOCIAL WORK - MODE OF TRANSPORTATION
Pt. Received oxycodone at 1400. Pt is lethargic and not following any commands. Rt arm no movement even with pain.  Left arm moves spontaneously, but not to command TENZIN. Suctioned for white oral secretions. Lisa GUTIÉRREZ callled and to continue same plan of care.     Ambulette

## 2025-06-10 NOTE — PROGRESS NOTE ADULT - SUBJECTIVE AND OBJECTIVE BOX
[facility] -- Erick Hogue Pannella, Patel, Savella, Goodger, Cohen: Office # [phone]    Follow Up:  Edema    Subjective/Observations: Patient seen and examined. Patient awake, alert, resting in bed. No complaints of chest pain, dyspnea, palpitations or dizziness. No signs of orthopnea or PND. Tolerating room air.    REVIEW OF SYSTEMS: All other review of systems are negative unless indicated above    PAST MEDICAL & SURGICAL HISTORY:  Prostate cancer      Type II diabetes mellitus      Chronic obstructive pulmonary disease (COPD)      CHF (congestive heart failure)      Renal insufficiency      H/O migraine      Insomnia      Constipation      S/P foot surgery          MEDICATIONS  (STANDING):  albuterol/ipratropium for Nebulization 3 milliLiter(s) Nebulizer every 8 hours  ascorbic acid 500 milliGRAM(s) Oral daily  aspirin enteric coated 81 milliGRAM(s) Oral daily  budesonide 160 MICROgram(s)/formoterol 4.5 MICROgram(s) Inhaler 2 Puff(s) Inhalation two times a day  clotrimazole 1% Cream 1 Application(s) Topical two times a day  dextrose 5%. 1000 milliLiter(s) (50 mL/Hr) IV Continuous <Continuous>  dextrose 5%. 1000 milliLiter(s) (100 mL/Hr) IV Continuous <Continuous>  dextrose 50% Injectable 25 Gram(s) IV Push once  dextrose 50% Injectable 12.5 Gram(s) IV Push once  dextrose 50% Injectable 25 Gram(s) IV Push once  doxycycline monohydrate Capsule 100 milliGRAM(s) Oral every 12 hours  ferrous    sulfate 325 milliGRAM(s) Oral daily  glucagon  Injectable 1 milliGRAM(s) IntraMuscular once  guaiFENesin Oral Liquid (Sugar-Free) 200 milliGRAM(s) Oral every 6 hours  heparin   Injectable 5000 Unit(s) SubCutaneous every 8 hours  insulin glargine Injectable (LANTUS) 20 Unit(s) SubCutaneous at bedtime  insulin lispro (ADMELOG) corrective regimen sliding scale   SubCutaneous at bedtime  insulin lispro (ADMELOG) corrective regimen sliding scale.   SubCutaneous three times a day before meals  insulin lispro Injectable (ADMELOG) 15 Unit(s) SubCutaneous three times a day before meals  lactobacillus acidophilus 1 Tablet(s) Oral two times a day with meals  montelukast 10 milliGRAM(s) Oral daily  multivitamin/minerals 1 Tablet(s) Oral daily  mupirocin 2% Nasal 1 Application(s) Both Nostrils two times a day  pantoprazole    Tablet 40 milliGRAM(s) Oral before breakfast  potassium chloride    Tablet ER 20 milliEquivalent(s) Oral daily  predniSONE   Tablet 10 milliGRAM(s) Oral daily  pregabalin 150 milliGRAM(s) Oral two times a day  rosuvastatin 40 milliGRAM(s) Oral at bedtime  saccharomyces boulardii 250 milliGRAM(s) Oral two times a day  senna 1 Tablet(s) Oral at bedtime  sertraline 100 milliGRAM(s) Oral daily  sodium chloride 3%  Inhalation 4 milliLiter(s) Inhalation every 12 hours  tiotropium 2.5 MICROgram(s) Inhaler 2 Puff(s) Inhalation daily    MEDICATIONS  (PRN):  acetaminophen     Tablet .. 650 milliGRAM(s) Oral every 6 hours PRN Temp greater or equal to 38C (100.4F), Mild Pain (1 - 3)  aluminum hydroxide/magnesium hydroxide/simethicone Suspension 30 milliLiter(s) Oral every 4 hours PRN Dyspepsia  bisacodyl Suppository 10 milliGRAM(s) Rectal daily PRN Constipation  clonazePAM  Tablet 0.5 milliGRAM(s) Oral three times a day PRN ANXIETY  dextrose Oral Gel 15 Gram(s) Oral once PRN Blood Glucose LESS THAN 70 milliGRAM(s)/deciliter  melatonin 3 milliGRAM(s) Oral at bedtime PRN Insomnia  ondansetron Injectable 4 milliGRAM(s) IV Push every 8 hours PRN Nausea and/or Vomiting  oxyCODONE    IR 10 milliGRAM(s) Oral two times a day PRN Severe Pain (7 - 10)  oxyCODONE    IR 5 milliGRAM(s) Oral every 8 hours PRN Moderate Pain (4 - 6)  polyethylene glycol 3350 17 Gram(s) Oral daily PRN for constipation    Allergies    IODINE (Unknown)  tetracycline (Unknown)  vancomycin (Other)    Intolerances      Vital Signs Last 24 Hrs  T(C): 36.5 (10 Justo 2025 04:47), Max: 36.8 (09 Jun 2025 21:33)  T(F): 97.7 (10 Justo 2025 04:47), Max: 98.2 (09 Jun 2025 21:33)  HR: 73 (10 Justo 2025 07:21) (73 - 89)  BP: 122/70 (10 Justo 2025 04:47) (120/66 - 122/70)  BP(mean): --  RR: 18 (10 Justo 2025 04:47) (18 - 18)  SpO2: 91% (10 Justo 2025 07:21) (91% - 98%)    Parameters below as of 10 Justo 2025 07:21  Patient On (Oxygen Delivery Method): room air      I&O's Summary    09 Jun 2025 07:01  -  10 Justo 2025 07:00  --------------------------------------------------------  IN: 0 mL / OUT: 900 mL / NET: -900 mL          TELE: Not on telemetry   PHYSICAL EXAM:  Constitutional: NAD, awake and alert  HEENT: Moist Mucous Membranes, Anicteric  Pulmonary: Non-labored, breath sounds are clear bilaterally, Diminished at bases No wheezing, rales or rhonchi  Cardiovascular: Regular, S1 and S2, No murmurs, No rubs, gallops or clicks  Gastrointestinal:  soft, nontender, nondistended   Lymph: Trace peripheral edema. No lymphadenopathy.   Skin: No visible rashes or ulcers.  Psych:  Mood & affect appropriate      LABS: All Labs Reviewed:                        11.7   9.27  )-----------( 130      ( 10 Justo 2025 07:46 )             37.5                         11.5   7.60  )-----------( 133      ( 09 Jun 2025 07:40 )             37.0                         11.9   8.74  )-----------( 139      ( 08 Jun 2025 08:15 )             37.1     10 Justo 2025 07:46    140    |  105    |  24     ----------------------------<  213    4.7     |  29     |  1.40   09 Jun 2025 07:40    141    |  105    |  22     ----------------------------<  181    4.1     |  26     |  1.50   08 Jun 2025 08:15    138    |  100    |  28     ----------------------------<  265    3.4     |  27     |  1.70     Ca    9.3        10 Justo 2025 07:46  Ca    9.3        09 Jun 2025 07:40  Ca    9.1        08 Jun 2025 08:15       Cholesterol: 109 mg/dL (05-22-25 @ 08:05)  HDL Cholesterol: 43 mg/dL (05-22-25 @ 08:05)  Triglycerides, Serum: 111 mg/dL (05-22-25 @ 08:05)    12 Lead ECG:   Ventricular Rate 84 BPM    Atrial Rate 84 BPM    P-R Interval 234 ms    QRS Duration 94 ms    Q-T Interval 396 ms    QTC Calculation(Bazett) 467 ms    P Axis 21 degrees    R Axis -66 degrees    T Axis 41 degrees    Diagnosis Line Sinus rhythm with 1st degree AV block  Left axis deviation  Low voltage QRS  Inferior infarct , age undetermined  Cannot rule out Anteroseptal infarct (cited on or before 26-AUG-2023)  Abnormal ECG    Confirmed by Amber Quevedo ([id number]) on 6/4/2025 1:14:42 PM (06-04-25 @ 09:45)      TRANSTHORACIC ECHOCARDIOGRAM REPORT  ________________________________________________________________________________                                      _______       Pt. Name:       WAYNE SERRANO Study Date:    5/26/2025  MRN:            [id number]     YOB: 1950  Accession #:    [id number]     Age:           74 years  Account#:       [id number]    Gender:        M  Heart Rate:                   Height:        70.08 in (178.00 cm)  Rhythm:                       Weight:        242.50 lb (110.00 kg)  Blood Pressure: 99/65 mmHg    BSA/BMI:       2.27 m² / 34.72 kg/m²  ________________________________________________________________________________________  Referring Physician:    [id number] Mart Tripp  Interpreting Physician: Amber Quevedo MD  Primary Sonographer:    Checo Broderick    CPT:               ECHO TTE WO CON COMP W DOPP - 79517.m  Indication(s):     Dyspnea, unspecified - R06.00  Procedure:         Transthoracic echocardiogram with 2-D, M-mode and complete             spectral and color flow Doppler.  Ordering Location: [facility]  Admission Status:  Inpatient  Study Information: Image quality for this study is technically difficult.                     Technically difficult study secondary to lung interference.    _______________________________________________________________________________________     CONCLUSIONS:      1. Technically difficult image quality.   2. Left ventricular endocardium is not well visualized; however, the left ventricular systolic function appears grossly normal.   3. Left ventricular systolic function is normal with an ejection fraction visually estimated at 60 to 65 %.   4. The right ventricle is not well visualized. probably normal right ventricular systolic function.   5. The inferior vena cava is normal in size measuring 1.22 cm in diameter, (normal <2.1cm) with normal inspiratory collapse (normal >50%) consistent with normal right atrial pressure (~3, range 0-5mmHg).    ________________________________________________________________________________________  FINDINGS:     Left Ventricle:  Left ventricular systolic function is normal with an ejection fraction visually estimated at 60 to 65%. There is poor visualization of the endocardial borders to determine thepresence of wall motion abnormalities. Left ventricular endocardium is not well visualized; however, the left ventricular systolic function appears grossly normal. There is normal left ventricular diastolic function.     Right Ventricle:  The right ventricle is not well visualized. Right ventricular systolic function is probably normal.     Left Atrium:  The left atrium is normal in size with an indexed volume of 12.22 ml/m².     Right Atrium:  The right atrium was not well visualized.     Interatrial Septum:  The interatrial septum was not well visualized.     Aortic Valve:  The aortic valve was not well visualized. The aortic valve anatomy cannot be determined with normal systolic excursion.     Mitral Valve:  The mitral valve was not well visualized. There is mild leaflet calcification.     Tricuspid Valve:  The tricuspid valve was not well visualized. There is trace tricuspid regurgitation.     Pulmonic Valve:  The pulmonic valve was not well visualized.     Aorta:  The aortic root atthe sinuses of Valsalva is normal in size, measuring 3.20 cm (indexed 1.41 cm/m²). The ascending aorta is normal in size, measuring 3.00 cm (indexed 1.32 cm/m²).     Systemic Veins:  The inferior vena cava is normal in size measuring 1.22 cm in diameter, (normal <2.1cm) with normal inspiratory collapse (normal >50%) consistent with normal right atrial pressure (~3, range 0-5mmHg).  ____________________________________________________________________  QUANTITATIVE DATA:  Left Ventricle Measurements: (Indexed to BSA)     IVSd (2D):   1.4 cm  LVPWd (2D):  1.3 cm  LVIDd (2D):  3.4 cm  LVIDs (2D):  2.2 cm  LV Mass:     158 g  69.7 g/m²  Visualized LV EF%: 60 to 65%     MV E Vmax:    0.56 m/s  MV A Vmax:    1.17 m/s  MV E/A:       0.47  e' lateral:  6.31 cm/s  e' medial:    3.26 cm/s  E/e' lateral: 8.80  E/e' medial:  17.02  E/e' Average: 11.60  MV DT:        156 msec    Aorta Measurements: (Normal range) (Indexed to BSA)     Ao Root d     3.20 cm (3.1 - 3.7 cm) 1.41 cm/m²  Ao Asc d, 2D: 3.00  Ao Asc prox:  3.00 cm                1.32 cm/m²            Left Atrium Measurements: (Indexed to BSA)  LA Diam 2D: 2.60 cm            LVOT / RVOT/ Qp/Qs Data: (Indexed to BSA)  LVOT Diameter,s: 2.20 cm  LVOT Area:       3.80 cm²    Mitral Valve Measurements:     MV E Vmax: 0.6 m/s  MV A Vmax: 1.2 m/s  MV E/A:    0.5       Tricuspid Valve Measurements:     RA Pressure: 3 mmHg    ________________________________________________________________________________________  Electronically signed on 5/27/2025 at 8:22:27 AM by Amber Quevedo MD         *** Final ***

## 2025-06-10 NOTE — PROGRESS NOTE ADULT - SUBJECTIVE AND OBJECTIVE BOX
CAPILLARY BLOOD GLUCOSE      POCT Blood Glucose.: 167 mg/dL (09 Jun 2025 21:08)  POCT Blood Glucose.: 217 mg/dL (09 Jun 2025 17:03)  POCT Blood Glucose.: 259 mg/dL (09 Jun 2025 12:00)  POCT Blood Glucose.: 187 mg/dL (09 Jun 2025 07:44)      Vital Signs Last 24 Hrs  T(C): 36.5 (10 Justo 2025 04:47), Max: 36.8 (09 Jun 2025 21:33)  T(F): 97.7 (10 Justo 2025 04:47), Max: 98.2 (09 Jun 2025 21:33)  HR: 76 (10 Justo 2025 04:47) (75 - 89)  BP: 122/70 (10 Justo 2025 04:47) (120/66 - 123/77)  BP(mean): --  RR: 18 (10 Justo 2025 04:47) (18 - 18)  SpO2: 98% (10 Justo 2025 04:47) (94% - 98%)    Parameters below as of 10 Justo 2025 04:47  Patient On (Oxygen Delivery Method): room air        Respiratory: CTA B/L  CV: RRR, S1S2, no murmurs, rubs or gallops  Abdominal: Soft, NT, ND +BS, Last BM  Extremities: No edema, + peripheral pulses     06-09    141  |  105  |  22  ----------------------------<  181[H]  4.1   |  26  |  1.50[H]    Ca    9.3      09 Jun 2025 07:40        dextrose 50% Injectable 25 Gram(s) IV Push once  dextrose 50% Injectable 12.5 Gram(s) IV Push once  dextrose 50% Injectable 25 Gram(s) IV Push once  dextrose Oral Gel 15 Gram(s) Oral once PRN  glucagon  Injectable 1 milliGRAM(s) IntraMuscular once  insulin glargine Injectable (LANTUS) 20 Unit(s) SubCutaneous at bedtime  insulin lispro (ADMELOG) corrective regimen sliding scale   SubCutaneous at bedtime  insulin lispro (ADMELOG) corrective regimen sliding scale.   SubCutaneous three times a day before meals  insulin lispro Injectable (ADMELOG) 15 Unit(s) SubCutaneous three times a day before meals  predniSONE   Tablet 10 milliGRAM(s) Oral daily  rosuvastatin 40 milliGRAM(s) Oral at bedtime

## 2025-06-10 NOTE — DISCHARGE NOTE PROVIDER - CARE PROVIDERS DIRECT ADDRESSES
[email or web address]

## 2025-06-10 NOTE — DISCHARGE NOTE PROVIDER - NSDCFUADDINST_GEN_ALL_CORE_FT
Consider MRI right lower leg if symptoms persist  Recs elevation right lower leg  Will consider surgical intervention if symptoms persist  Patient will follow up with Dr. Nick Maciel 3-5 days after discharge. Please call [phone] for any questions.  Stable from podiatry standpoint  Dressings : 4x4 gauze, abd pads, klings on right lower leg. Frequency : 3 times per week

## 2025-06-10 NOTE — PROGRESS NOTE ADULT - PROBLEM SELECTOR PLAN 1
Discussed diagnosis and treatment with patient  There is concern patient has right lower leg cellulitis rule out abscess  Continue IV abx and f/u ID consult  CT ruled out abscess right lower leg  Consider MRI right lower leg if symptoms persist  Recs elevation right lower leg  Medical management as per Primary Team  Will consider surgical intervention if symptoms persist  Podiatry will follow up while in house  Patient will follow up with Dr. Nick Maciel 3-5 days after discharge. Please call [phone] for any questions.  Stable from podiatry standpoint  Dressings : 4x4 gauze, abd pads, klings on right lower leg. Frequency : 3 times per week  Discussed with all attendings.
glucocorticoid dosage increased  mdii dosages intensified  pre-meal admelog increased 10 units 3x/day before meals  lantus increased 20 units qhs  cont mod dose admelog corrective scale coverage qac/qhs  cont cons cho diet  goal bg 100-180 in hosp setting
lantus increased 20 units qhs  admelog increased 15 units 3x/day before meals  cont admelog mod dose corrective scale coverage qac/qhs  cont cons cho diet  bg goal 100-180 in icu setting  diabetes education completed   return to [place] jardiance 10 mg daily, Lantus 36 units @ HS, humalog 15 units+ ISS TIDAC and glucose monitoring, lifestyle and diet modificaitons
Discussed diagnosis and treatment with patient  There is concern patient has right lower leg cellulitis rule out abscess  Continue IV abx and f/u ID consult  CT ruled out abscess right lower leg  Consider MRI right lower leg if symptoms persist  Recs elevation right lower leg  Medical management as per Primary Team  Will consider surgical intervention if symptoms persist  Podiatry will follow up while in house  Patient will follow up with Dr. Nick Maciel 3-5 days after discharge. Please call [phone] for any questions.  Stable from podiatry standpoint  Dressings : 4x4 gauze, abd pads, klings on right lower leg. Frequency : 3 times per week  Discussed with all attendings.
Discussed diagnosis and treatment with patient  There is concern patient has right lower leg cellulitis rule out abscess  Continue IV abx and f/u ID consult  CT ruled out abscess right lower leg  Consider MRI right lower leg if symptoms persist  Recs elevation right lower leg  Medical management as per Primary Team  Will consider surgical intervention if symptoms persist  Podiatry will follow up while in house  Patient will follow up with Dr. Nick Maciel 3-5 days after discharge. Please call [phone] for any questions.  Discussed with all attendings.
Discussed diagnosis and treatment with patient  Reviewed Xray right foot : No acute emphysematous changes  There is concern patient has right lower leg cellulitis rule out abscess  Continue IV abx and f/u ID consult  Consider MRI right lower leg if symptoms persist  Recs elevation right lower leg  Medical management as per Primary Team  Will consider surgical intervention if symptoms persist  Podiatry will follow up while in house  Discussed with all attendings.
Discussed diagnosis and treatment with patient  There is concern patient has right lower leg cellulitis rule out abscess  Continue IV abx and f/u ID consult  Consider MRI right lower leg if symptoms persist  Recs elevation right lower leg  Medical management as per Primary Team  Will consider surgical intervention if symptoms persist  Podiatry will follow up while in house  Patient will follow up with Dr. Nick Maciel 3-5 days after discharge. Please call [phone] for any questions.  Discussed with all attendings.
Type 2 A1c 10% adm cellulitis  lantus 20 units @ HS  increase admelog premeal to 15 units TIDAC  admelog mod ISS AHCS  CC diet and accuchek ACHS  Recommend endocrine-[facility] onconsult  FU appt: Dr Humphrey  DSC recommendations: return to [place] jardiance 10 mg daily, Lantus 36 units @ HS, humalog 15 units+ ISS TIDAC and glucose monitoring, lifestyle and diet modificaitons  diabetes education provided as documented above  Diabetes support info and cell # [phone] given   Goal 100-180 mg/dL; 140-180 mg/dL in critical care areas
increase lantus 15 units qhs  add admelog 5 units 3x/day before meals  cont mod dose admelog corrective scale coverage qac/qhs  jardiance on hold during hospitalization  cont cons cho diet  goal bg 100-180 in hosp setting
Discussed diagnosis and treatment with patient  Reviewed Xray right foot : No acute emphysematous changes  There is concern patient has right lower leg cellulitis rule out abscess  Continue IV abx and f/u ID consult  Lactate is wnl  Recs elevation right lower leg  Medical management as per Primary Team  Will consider surgical intervention if symptoms persist  Podiatry will follow up while in house  Discussed with all attendings.
#RLE cellulitis  #RLE wound  #Tinea pedis  -suggest daptomycin for now  -can continue Zosyn for now  -clotrimazole ointment between the toes for 2 weeks  -baseline CPK  -repeat MRSA nasal PCR  -obtain R lower leg CT to rule out abscess  -blood cultures pending  -Podiatry follow up   -leg elevation  -contact isolation for now

## 2025-06-10 NOTE — PROGRESS NOTE ADULT - PROBLEM SELECTOR PROBLEM 1
Cellulitis of right lower leg
Type II diabetes mellitus
Cellulitis of right lower leg
Type 2 diabetes mellitus with hyperglycemia
Cellulitis of right lower leg
Cellulitis of right lower leg

## 2025-06-10 NOTE — PROGRESS NOTE ADULT - SUBJECTIVE AND OBJECTIVE BOX
CHIEF COMPLAINT/ REASON FOR VISIT  .. Patient was seen to address the  issue listed under PROBLEM LIST which is located toward bottom of this note     WAYNE SERRANO    PLV 2EAS 214 D1    Allergies    IODINE (Unknown)  tetracycline (Unknown)  vancomycin (Other)    Intolerances        PAST MEDICAL & SURGICAL HISTORY:  Prostate cancer      Type II diabetes mellitus      Chronic obstructive pulmonary disease (COPD)      CHF (congestive heart failure)      Renal insufficiency      H/O migraine      Insomnia      Constipation      S/P foot surgery          FAMILY HISTORY:      Home Medications:  albuterol 0.63 mg/3 mL (0.021%) inhalation solution: 3 milliliter(s) by nebulizer 3 times a day as needed for  shortness of breath and/or wheezing (2025 08:48)  amoxicillin-clavulanate 875 mg-125 mg oral tablet: 1 tab(s) orally 2 times a day for 7 days (:48)  Artificial Tears ophthalmic solution: 1 drop(s) in each eye 2 times a day (:48)  ascorbic acid 500 mg oral tablet: 1 tab(s) orally 2 times a day (:48)  aspirin 81 mg oral tablet: 1 tab(s) orally once a day (:48)  clonazePAM 0.25 mg oral tablet, disintegratin tab(s) orally 3 times a day (:48)  ferrous sulfate 325 mg (65 mg elemental iron) oral tablet: 1 tab(s) orally once a day (:48)  furosemide 40 mg oral tablet: 1 tab(s) orally every 12 hours (:48)  HumaLOG 100 units/mL subcutaneous solution: Inject subcutaneously 3 times a day using sliding scale (:48)  HumaLOG KwikPen 100 units/mL injectable solution: Inject 15 units subcutaneously 3 times a day (before each meal) in addition to sliding scale (:48)  Jardiance 10 mg oral tablet: 1 tab(s) orally once a day (:48)  Lantus Solostar Pen 100 units/mL subcutaneous solution: 36 unit(s) subcutaneously once a day (at bedtime) (2025 08:48)  loperamide 2 mg oral capsule: 1 cap(s) orally after each loose BM every 6 hours as needed **No more than 3 capsules (6mg) a day** (2025 08:48)  loratadine 10 mg oral tablet: 1 tab(s) orally once a day (:48)  Melatonin 3 mg oral tablet: 1 tab(s) orally once a day (at bedtime) (:48)  montelukast 10 mg oral tablet: 1 tab(s) orally once a day (at bedtime) (:48)  multivitamin: 1 tab(s) orally once a day (:48)  oxyCODONE 5 mg oral tablet: 2 tab(s) orally 2 times a day MDD: 4 tablets (:48)  pantoprazole 40 mg oral delayed release tablet: 1 tab(s) orally once a day (:48)  Polyethylene Glycol 3350: Mix 17grams into 8oz of water and drink orally once a day as needed (:48)  Potassium Chloride (Eqv-Klor-Con M20) 20 mEq oral tablet, extended release: 1 tab(s) orally 2 times a day (:48)  predniSONE 10 mg oral tablet: 1 tab(s) orally once a day with food or milk for 1 month, then after 1 month take 5mg orally once a day (:48)  pregabalin 150 mg oral capsule: 1 cap(s) orally 2 times a day MDD: 2 capsules (:48)  ramelteon 8 mg oral tablet: 1 tab(s) orally once a day (at bedtime) (:48)  rosuvastatin 40 mg oral tablet: 1 tab(s) orally once a day (at bedtime) (:48)  saccharomyces boulardii lyo 250 mg oral capsule: 1 cap(s) orally once a day (:48)  Saline Mist 0.65% nasal spray: 1 spray(s) in each nostril 2 times a day (:48)  Senna 8.6 mg oral tablet: 1 tab(s) orally once a day (at bedtime) (:48)  sertraline 100 mg oral tablet: 1.5 tab(s) orally once a day (150mg) (2025 08:48)  Symbicort 160 mcg-4.5 mcg/inh inhalation aerosol: 2 puff(s) inhaled 2 times a day (2025 08:48)      MEDICATIONS  (STANDING):  albuterol/ipratropium for Nebulization 3 milliLiter(s) Nebulizer every 8 hours  ascorbic acid 500 milliGRAM(s) Oral daily  aspirin enteric coated 81 milliGRAM(s) Oral daily  budesonide 160 MICROgram(s)/formoterol 4.5 MICROgram(s) Inhaler 2 Puff(s) Inhalation two times a day  clotrimazole 1% Cream 1 Application(s) Topical two times a day  dextrose 5%. 1000 milliLiter(s) (50 mL/Hr) IV Continuous <Continuous>  dextrose 5%. 1000 milliLiter(s) (100 mL/Hr) IV Continuous <Continuous>  dextrose 50% Injectable 25 Gram(s) IV Push once  dextrose 50% Injectable 12.5 Gram(s) IV Push once  dextrose 50% Injectable 25 Gram(s) IV Push once  doxycycline monohydrate Capsule 100 milliGRAM(s) Oral every 12 hours  ferrous    sulfate 325 milliGRAM(s) Oral daily  glucagon  Injectable 1 milliGRAM(s) IntraMuscular once  guaiFENesin Oral Liquid (Sugar-Free) 200 milliGRAM(s) Oral every 6 hours  heparin   Injectable 5000 Unit(s) SubCutaneous every 8 hours  insulin glargine Injectable (LANTUS) 20 Unit(s) SubCutaneous at bedtime  insulin lispro (ADMELOG) corrective regimen sliding scale   SubCutaneous at bedtime  insulin lispro (ADMELOG) corrective regimen sliding scale.   SubCutaneous three times a day before meals  insulin lispro Injectable (ADMELOG) 15 Unit(s) SubCutaneous three times a day before meals  lactobacillus acidophilus 1 Tablet(s) Oral two times a day with meals  montelukast 10 milliGRAM(s) Oral daily  multivitamin/minerals 1 Tablet(s) Oral daily  mupirocin 2% Nasal 1 Application(s) Both Nostrils two times a day  pantoprazole    Tablet 40 milliGRAM(s) Oral before breakfast  potassium chloride    Tablet ER 20 milliEquivalent(s) Oral daily  predniSONE   Tablet 10 milliGRAM(s) Oral daily  pregabalin 150 milliGRAM(s) Oral two times a day  rosuvastatin 40 milliGRAM(s) Oral at bedtime  saccharomyces boulardii 250 milliGRAM(s) Oral two times a day  senna 1 Tablet(s) Oral at bedtime  sertraline 100 milliGRAM(s) Oral daily  sodium chloride 3%  Inhalation 4 milliLiter(s) Inhalation every 12 hours  tiotropium 2.5 MICROgram(s) Inhaler 2 Puff(s) Inhalation daily    MEDICATIONS  (PRN):  acetaminophen     Tablet .. 650 milliGRAM(s) Oral every 6 hours PRN Temp greater or equal to 38C (100.4F), Mild Pain (1 - 3)  aluminum hydroxide/magnesium hydroxide/simethicone Suspension 30 milliLiter(s) Oral every 4 hours PRN Dyspepsia  bisacodyl Suppository 10 milliGRAM(s) Rectal daily PRN Constipation  clonazePAM  Tablet 0.5 milliGRAM(s) Oral three times a day PRN ANXIETY  dextrose Oral Gel 15 Gram(s) Oral once PRN Blood Glucose LESS THAN 70 milliGRAM(s)/deciliter  melatonin 3 milliGRAM(s) Oral at bedtime PRN Insomnia  ondansetron Injectable 4 milliGRAM(s) IV Push every 8 hours PRN Nausea and/or Vomiting  oxyCODONE    IR 10 milliGRAM(s) Oral two times a day PRN Severe Pain (7 - 10)  oxyCODONE    IR 5 milliGRAM(s) Oral every 8 hours PRN Moderate Pain (4 - 6)  polyethylene glycol 3350 17 Gram(s) Oral daily PRN for constipation      Diet, Consistent Carbohydrate w/Evening Snack:   DASH/TLC Sodium & Cholesterol Restricted (25 @ 18:09) [Active]          Vital Signs Last 24 Hrs  T(C): 36.5 (10 Justo 2025 04:47), Max: 36.8 (2025 21:33)  T(F): 97.7 (10 Justo 2025 04:47), Max: 98.2 (2025 21:33)  HR: 76 (10 Justo 2025 04:47) (75 - 89)  BP: 122/70 (10 Justo 2025 04:47) (120/66 - 123/77)  BP(mean): --  RR: 18 (10 Justo 2025 04:47) (18 - 18)  SpO2: 98% (10 Justo 2025 04:47) (94% - 98%)    Parameters below as of 10 Justo 2025 04:47  Patient On (Oxygen Delivery Method): room air          25 @ 07:01  -  06-10-25 @ 07:00  --------------------------------------------------------  IN: 0 mL / OUT: 900 mL / NET: -900 mL              LABS:                        11.7   9.27  )-----------( 130      ( 10 Justo 2025 07:46 )             37.5     06-10    140  |  105  |  24[H]  ----------------------------<  213[H]  4.7   |  29  |  1.40[H]    Ca    9.3      10 Justo 2025 07:46        Urinalysis Basic - ( 10 Justo 2025 07:46 )    Color: x / Appearance: x / SG: x / pH: x  Gluc: 213 mg/dL / Ketone: x  / Bili: x / Urobili: x   Blood: x / Protein: x / Nitrite: x   Leuk Esterase: x / RBC: x / WBC x   Sq Epi: x / Non Sq Epi: x / Bacteria: x            WBC:  WBC Count: 9.27 K/uL (06-10 @ 07:46)  WBC Count: 7.60 K/uL ( @ 07:40)  WBC Count: 8.74 K/uL ( @ 08:15)  WBC Count: 7.56 K/uL ( @ 07:15)  WBC Count: 8.38 K/uL ( @ 09:05)      MICROBIOLOGY:  RECENT CULTURES:   Clean Catch Clean Catch (Midstream) XXXX XXXX   No growth     Blood Blood-Peripheral XXXX XXXX   No growth at 5 days     Blood Blood-Peripheral XXXX XXXX   No growth at 5 days                    Sodium:  Sodium: 140 mmol/L (06-10 @ 07:46)  Sodium: 141 mmol/L ( @ 07:40)  Sodium: 138 mmol/L ( @ 08:15)  Sodium: 138 mmol/L ( @ 07:15)  Sodium: 138 mmol/L ( @ 09:05)      1.40 mg/dL 06-10 @ 07:46  1.50 mg/dL  07:40  1.70 mg/dL  08:15  1.80 mg/dL  07:15  1.80 mg/dL  09:05      Hemoglobin:  Hemoglobin: 11.7 g/dL (06-10 @ 07:46)  Hemoglobin: 11.5 g/dL ( @ 07:40)  Hemoglobin: 11.9 g/dL ( @ 08:15)  Hemoglobin: 12.3 g/dL ( @ 07:15)  Hemoglobin: 12.9 g/dL ( @ 09:05)      Platelets: Platelet Count - Automated: 130 K/uL (06-10 @ 07:46)  Platelet Count - Automated: 133 K/uL ( @ 07:40)  Platelet Count - Automated: 139 K/uL ( @ 08:15)  Platelet Count - Automated: 150 K/uL ( @ 07:15)  Platelet Count - Automated: 147 K/uL ( @ 09:05)          Urinalysis Basic - ( 10 Justo 2025 07:46 )    Color: x / Appearance: x / SG: x / pH: x  Gluc: 213 mg/dL / Ketone: x  / Bili: x / Urobili: x   Blood: x / Protein: x / Nitrite: x   Leuk Esterase: x / RBC: x / WBC x   Sq Epi: x / Non Sq Epi: x / Bacteria: x        RADIOLOGY & ADDITIONAL STUDIES:      MICROBIOLOGY:  RECENT CULTURES:   Clean Catch Clean Catch (Midstream) XXXX XXXX   No growth     Blood Blood-Peripheral XXXX XXXX   No growth at 5 days     Blood Blood-Peripheral XXXX XXXX   No growth at 5 days

## 2025-06-10 NOTE — PROGRESS NOTE ADULT - SUBJECTIVE AND OBJECTIVE BOX
PROGRESS NOTE   Patient is a 74y old  Male who presents with a chief complaint of rle swelling (10 Justo 2025 11:38)      HPI:  Patient with a past medical history of diabetes, COPD, heart failure, CKD, hypertension, history of right foot ulcer is presenting for wound to right lower extremity.  Was recently admitted here for shortness of breath as well as for bursitis and concern for a wound.  Was sent back to facility.  They sent him to the ER today due to worsening swelling and redness localized to his wound of the right lower extremity.  Endorsing pain to the site.  Denies fevers, chest pain, nausea or vomiting.  States that he is having some chronic shortness of breath though unchanged today.  Also endorses chronic cough. (03 Jun 2025 18:32)      Vital Signs Last 24 Hrs  T(C): 36.6 (10 Justo 2025 11:34), Max: 36.8 (09 Jun 2025 21:33)  T(F): 97.9 (10 Justo 2025 11:34), Max: 98.2 (09 Jun 2025 21:33)  HR: 82 (10 Justo 2025 11:34) (73 - 89)  BP: 128/72 (10 Justo 2025 11:34) (120/66 - 128/72)  BP(mean): --  RR: 18 (10 Justo 2025 11:34) (18 - 18)  SpO2: 93% (10 Justo 2025 11:34) (91% - 98%)    Parameters below as of 10 Justo 2025 11:34  Patient On (Oxygen Delivery Method): room air                              11.7   9.27  )-----------( 130      ( 10 Justo 2025 07:46 )             37.5               06-10    140  |  105  |  24[H]  ----------------------------<  213[H]  4.7   |  29  |  1.40[H]    Ca    9.3      10 Justo 2025 07:46    LOWER EXTREMITY PHYSICAL EXAM:  Integument : Skin warm, dry and supple bilateral  Right lower leg with redness, swelling and pain on palpation, partial thickness wound covered with eschar. All consistent with cellulitis   The area of concern has responded favorably with current treatment  Vascular : DP and PT weakly palpable 1/4 bilaterally  Capillary refill time less than 3s digits 1-5 bilaterally. Moderate to severe edema bilaterally with right is greater than left  MSK : Muscle strenth 4/5 all major muscle group bilaterally

## 2025-06-10 NOTE — DISCHARGE NOTE PROVIDER - PROVIDER TOKENS
PROVIDER:[TOKEN:[[id number]],FOLLOWUP:[1 week]],PROVIDER:[TOKEN:[[id number]],FOLLOWUP:[1 week]],PROVIDER:[TOKEN:[[id number]],FOLLOWUP:[2 weeks]],PROVIDER:[TOKEN:[[id number]],FOLLOWUP:[1 month]]

## 2025-06-14 ENCOUNTER — INPATIENT (INPATIENT)
Facility: HOSPITAL | Age: 75
LOS: 4 days | Discharge: HOME HEALTH | DRG: 603 | End: 2025-06-19
Attending: STUDENT IN AN ORGANIZED HEALTH CARE EDUCATION/TRAINING PROGRAM | Admitting: HOSPITALIST
Payer: COMMERCIAL

## 2025-06-14 VITALS
DIASTOLIC BLOOD PRESSURE: 67 MMHG | OXYGEN SATURATION: 91 % | HEART RATE: 78 BPM | SYSTOLIC BLOOD PRESSURE: 114 MMHG | HEIGHT: 70 IN | TEMPERATURE: 98 F | RESPIRATION RATE: 18 BRPM | WEIGHT: 190.04 LBS

## 2025-06-14 DIAGNOSIS — Z98.890 OTHER SPECIFIED POSTPROCEDURAL STATES: Chronic | ICD-10-CM

## 2025-06-14 DIAGNOSIS — L03.115 CELLULITIS OF RIGHT LOWER LIMB: ICD-10-CM

## 2025-06-14 LAB
ALBUMIN SERPL ELPH-MCNC: 3.2 G/DL — LOW (ref 3.3–5)
ALP SERPL-CCNC: 72 U/L — SIGNIFICANT CHANGE UP (ref 40–120)
ALT FLD-CCNC: 31 U/L — SIGNIFICANT CHANGE UP (ref 12–78)
ANION GAP SERPL CALC-SCNC: 6 MMOL/L — SIGNIFICANT CHANGE UP (ref 5–17)
APPEARANCE UR: CLEAR — SIGNIFICANT CHANGE UP
APTT BLD: 31.4 SEC — SIGNIFICANT CHANGE UP (ref 26.1–36.8)
AST SERPL-CCNC: 20 U/L — SIGNIFICANT CHANGE UP (ref 15–37)
BASOPHILS # BLD AUTO: 0.03 K/UL — SIGNIFICANT CHANGE UP (ref 0–0.2)
BASOPHILS NFR BLD AUTO: 0.4 % — SIGNIFICANT CHANGE UP (ref 0–2)
BILIRUB SERPL-MCNC: 0.5 MG/DL — SIGNIFICANT CHANGE UP (ref 0.2–1.2)
BILIRUB UR-MCNC: NEGATIVE — SIGNIFICANT CHANGE UP
BUN SERPL-MCNC: 38 MG/DL — HIGH (ref 7–23)
CALCIUM SERPL-MCNC: 9.4 MG/DL — SIGNIFICANT CHANGE UP (ref 8.5–10.1)
CHLORIDE SERPL-SCNC: 102 MMOL/L — SIGNIFICANT CHANGE UP (ref 96–108)
CO2 SERPL-SCNC: 27 MMOL/L — SIGNIFICANT CHANGE UP (ref 22–31)
COLOR SPEC: YELLOW — SIGNIFICANT CHANGE UP
CREAT SERPL-MCNC: 1.64 MG/DL — HIGH (ref 0.5–1.3)
DIFF PNL FLD: NEGATIVE — SIGNIFICANT CHANGE UP
EGFR: 44 ML/MIN/1.73M2 — LOW
EGFR: 44 ML/MIN/1.73M2 — LOW
EOSINOPHIL # BLD AUTO: 0.03 K/UL — SIGNIFICANT CHANGE UP (ref 0–0.5)
EOSINOPHIL NFR BLD AUTO: 0.4 % — SIGNIFICANT CHANGE UP (ref 0–6)
GLUCOSE BLDC GLUCOMTR-MCNC: 270 MG/DL — HIGH (ref 70–99)
GLUCOSE SERPL-MCNC: 230 MG/DL — HIGH (ref 70–99)
GLUCOSE UR QL: >=1000 MG/DL
HCT VFR BLD CALC: 38.3 % — LOW (ref 39–50)
HGB BLD-MCNC: 12.2 G/DL — LOW (ref 13–17)
IMM GRANULOCYTES # BLD AUTO: 0.28 K/UL — HIGH (ref 0–0.07)
IMM GRANULOCYTES NFR BLD AUTO: 3.4 % — HIGH (ref 0–0.9)
INR BLD: 1.05 RATIO — SIGNIFICANT CHANGE UP (ref 0.85–1.16)
KETONES UR QL: NEGATIVE MG/DL — SIGNIFICANT CHANGE UP
LACTATE SERPL-SCNC: 2 MMOL/L — SIGNIFICANT CHANGE UP (ref 0.7–2)
LEUKOCYTE ESTERASE UR-ACNC: NEGATIVE — SIGNIFICANT CHANGE UP
LYMPHOCYTES # BLD AUTO: 1.08 K/UL — SIGNIFICANT CHANGE UP (ref 1–3.3)
LYMPHOCYTES NFR BLD AUTO: 13.3 % — SIGNIFICANT CHANGE UP (ref 13–44)
MCHC RBC-ENTMCNC: 29 PG — SIGNIFICANT CHANGE UP (ref 27–34)
MCHC RBC-ENTMCNC: 31.9 G/DL — LOW (ref 32–36)
MCV RBC AUTO: 91 FL — SIGNIFICANT CHANGE UP (ref 80–100)
MONOCYTES # BLD AUTO: 0.63 K/UL — SIGNIFICANT CHANGE UP (ref 0–0.9)
MONOCYTES NFR BLD AUTO: 7.7 % — SIGNIFICANT CHANGE UP (ref 2–14)
NEUTROPHILS # BLD AUTO: 6.08 K/UL — SIGNIFICANT CHANGE UP (ref 1.8–7.4)
NEUTROPHILS NFR BLD AUTO: 74.8 % — SIGNIFICANT CHANGE UP (ref 43–77)
NITRITE UR-MCNC: NEGATIVE — SIGNIFICANT CHANGE UP
NRBC # BLD AUTO: 0 K/UL — SIGNIFICANT CHANGE UP (ref 0–0)
NRBC # FLD: 0 K/UL — SIGNIFICANT CHANGE UP (ref 0–0)
NRBC BLD AUTO-RTO: 0 /100 WBCS — SIGNIFICANT CHANGE UP (ref 0–0)
PH UR: 5 — SIGNIFICANT CHANGE UP (ref 5–8)
PLATELET # BLD AUTO: 139 K/UL — LOW (ref 150–400)
PMV BLD: 10.1 FL — SIGNIFICANT CHANGE UP (ref 7–13)
POTASSIUM SERPL-MCNC: 3.9 MMOL/L — SIGNIFICANT CHANGE UP (ref 3.5–5.3)
POTASSIUM SERPL-SCNC: 3.9 MMOL/L — SIGNIFICANT CHANGE UP (ref 3.5–5.3)
PROT SERPL-MCNC: 7.6 GM/DL — SIGNIFICANT CHANGE UP (ref 6–8.3)
PROT UR-MCNC: NEGATIVE MG/DL — SIGNIFICANT CHANGE UP
PROTHROM AB SERPL-ACNC: 12.4 SEC — SIGNIFICANT CHANGE UP (ref 9.9–13.4)
RBC # BLD: 4.21 M/UL — SIGNIFICANT CHANGE UP (ref 4.2–5.8)
RBC # FLD: 18.4 % — HIGH (ref 10.3–14.5)
SODIUM SERPL-SCNC: 135 MMOL/L — SIGNIFICANT CHANGE UP (ref 135–145)
SP GR SPEC: 1.02 — SIGNIFICANT CHANGE UP (ref 1–1.03)
UROBILINOGEN FLD QL: 0.2 MG/DL — SIGNIFICANT CHANGE UP (ref 0.2–1)
WBC # BLD: 8.13 K/UL — SIGNIFICANT CHANGE UP (ref 3.8–10.5)
WBC # FLD AUTO: 8.13 K/UL — SIGNIFICANT CHANGE UP (ref 3.8–10.5)

## 2025-06-14 PROCEDURE — 85652 RBC SED RATE AUTOMATED: CPT

## 2025-06-14 PROCEDURE — 99223 1ST HOSP IP/OBS HIGH 75: CPT

## 2025-06-14 PROCEDURE — 86140 C-REACTIVE PROTEIN: CPT

## 2025-06-14 PROCEDURE — 71045 X-RAY EXAM CHEST 1 VIEW: CPT | Mod: 26

## 2025-06-14 PROCEDURE — 93925 LOWER EXTREMITY STUDY: CPT

## 2025-06-14 PROCEDURE — 85027 COMPLETE CBC AUTOMATED: CPT

## 2025-06-14 PROCEDURE — 82550 ASSAY OF CK (CPK): CPT

## 2025-06-14 PROCEDURE — 85025 COMPLETE CBC W/AUTO DIFF WBC: CPT

## 2025-06-14 PROCEDURE — 86036 ANCA SCREEN EACH ANTIBODY: CPT

## 2025-06-14 PROCEDURE — 86431 RHEUMATOID FACTOR QUANT: CPT

## 2025-06-14 PROCEDURE — 82962 GLUCOSE BLOOD TEST: CPT

## 2025-06-14 PROCEDURE — 93971 EXTREMITY STUDY: CPT | Mod: 26,RT

## 2025-06-14 PROCEDURE — 86038 ANTINUCLEAR ANTIBODIES: CPT

## 2025-06-14 PROCEDURE — 99285 EMERGENCY DEPT VISIT HI MDM: CPT | Mod: FS

## 2025-06-14 PROCEDURE — 80048 BASIC METABOLIC PNL TOTAL CA: CPT

## 2025-06-14 PROCEDURE — 73630 X-RAY EXAM OF FOOT: CPT | Mod: RT

## 2025-06-14 PROCEDURE — 97116 GAIT TRAINING THERAPY: CPT | Mod: GP

## 2025-06-14 PROCEDURE — 93010 ELECTROCARDIOGRAM REPORT: CPT

## 2025-06-14 PROCEDURE — 0225U NFCT DS DNA&RNA 21 SARSCOV2: CPT

## 2025-06-14 PROCEDURE — 36415 COLL VENOUS BLD VENIPUNCTURE: CPT

## 2025-06-14 PROCEDURE — 93923 UPR/LXTR ART STDY 3+ LVLS: CPT

## 2025-06-14 PROCEDURE — 86160 COMPLEMENT ANTIGEN: CPT

## 2025-06-14 PROCEDURE — 80202 ASSAY OF VANCOMYCIN: CPT

## 2025-06-14 PROCEDURE — 94640 AIRWAY INHALATION TREATMENT: CPT

## 2025-06-14 PROCEDURE — 81003 URINALYSIS AUTO W/O SCOPE: CPT

## 2025-06-14 PROCEDURE — 97162 PT EVAL MOD COMPLEX 30 MIN: CPT | Mod: GP

## 2025-06-14 RX ORDER — SODIUM CHLORIDE 9 G/1000ML
1000 INJECTION, SOLUTION INTRAVENOUS
Refills: 0 | Status: DISCONTINUED | OUTPATIENT
Start: 2025-06-14 | End: 2025-06-19

## 2025-06-14 RX ORDER — CLINDAMYCIN PHOSPHATE 150 MG/ML
900 VIAL (ML) INJECTION ONCE
Refills: 0 | Status: COMPLETED | OUTPATIENT
Start: 2025-06-14 | End: 2025-06-14

## 2025-06-14 RX ORDER — ENOXAPARIN SODIUM 100 MG/ML
40 INJECTION SUBCUTANEOUS EVERY 24 HOURS
Refills: 0 | Status: DISCONTINUED | OUTPATIENT
Start: 2025-06-14 | End: 2025-06-19

## 2025-06-14 RX ORDER — INSULIN LISPRO 100 U/ML
INJECTION, SOLUTION INTRAVENOUS; SUBCUTANEOUS
Refills: 0 | Status: DISCONTINUED | OUTPATIENT
Start: 2025-06-14 | End: 2025-06-19

## 2025-06-14 RX ORDER — SENNA 187 MG
1 TABLET ORAL AT BEDTIME
Refills: 0 | Status: DISCONTINUED | OUTPATIENT
Start: 2025-06-14 | End: 2025-06-19

## 2025-06-14 RX ORDER — PREDNISONE 20 MG/1
10 TABLET ORAL DAILY
Refills: 0 | Status: DISCONTINUED | OUTPATIENT
Start: 2025-06-14 | End: 2025-06-19

## 2025-06-14 RX ORDER — VANCOMYCIN HCL IN 5 % DEXTROSE 1.5G/250ML
1250 PLASTIC BAG, INJECTION (ML) INTRAVENOUS EVERY 12 HOURS
Refills: 0 | Status: DISCONTINUED | OUTPATIENT
Start: 2025-06-14 | End: 2025-06-15

## 2025-06-14 RX ORDER — INSULIN LISPRO 100 U/ML
5 INJECTION, SOLUTION INTRAVENOUS; SUBCUTANEOUS
Refills: 0 | Status: DISCONTINUED | OUTPATIENT
Start: 2025-06-14 | End: 2025-06-15

## 2025-06-14 RX ORDER — PIPERACILLIN-TAZO-DEXTROSE,ISO 3.375G/5
3.38 IV SOLUTION, PIGGYBACK PREMIX FROZEN(ML) INTRAVENOUS ONCE
Refills: 0 | Status: COMPLETED | OUTPATIENT
Start: 2025-06-14 | End: 2025-06-14

## 2025-06-14 RX ORDER — ACETAMINOPHEN 500 MG/5ML
650 LIQUID (ML) ORAL EVERY 6 HOURS
Refills: 0 | Status: DISCONTINUED | OUTPATIENT
Start: 2025-06-14 | End: 2025-06-19

## 2025-06-14 RX ORDER — CEFTRIAXONE 500 MG/1
1000 INJECTION, POWDER, FOR SOLUTION INTRAMUSCULAR; INTRAVENOUS ONCE
Refills: 0 | Status: DISCONTINUED | OUTPATIENT
Start: 2025-06-14 | End: 2025-06-14

## 2025-06-14 RX ORDER — DEXTROSE 50 % IN WATER 50 %
12.5 SYRINGE (ML) INTRAVENOUS ONCE
Refills: 0 | Status: DISCONTINUED | OUTPATIENT
Start: 2025-06-14 | End: 2025-06-19

## 2025-06-14 RX ORDER — ONDANSETRON HCL/PF 4 MG/2 ML
4 VIAL (ML) INJECTION EVERY 8 HOURS
Refills: 0 | Status: DISCONTINUED | OUTPATIENT
Start: 2025-06-14 | End: 2025-06-19

## 2025-06-14 RX ORDER — PREGABALIN 50 MG/1
150 CAPSULE ORAL
Refills: 0 | Status: DISCONTINUED | OUTPATIENT
Start: 2025-06-14 | End: 2025-06-19

## 2025-06-14 RX ORDER — FUROSEMIDE 10 MG/ML
40 INJECTION INTRAMUSCULAR; INTRAVENOUS EVERY 12 HOURS
Refills: 0 | Status: DISCONTINUED | OUTPATIENT
Start: 2025-06-14 | End: 2025-06-19

## 2025-06-14 RX ORDER — CLONAZEPAM 0.5 MG/1
0.5 TABLET ORAL THREE TIMES A DAY
Refills: 0 | Status: DISCONTINUED | OUTPATIENT
Start: 2025-06-14 | End: 2025-06-19

## 2025-06-14 RX ORDER — PIPERACILLIN-TAZO-DEXTROSE,ISO 3.375G/5
3.38 IV SOLUTION, PIGGYBACK PREMIX FROZEN(ML) INTRAVENOUS EVERY 8 HOURS
Refills: 0 | Status: DISCONTINUED | OUTPATIENT
Start: 2025-06-15 | End: 2025-06-15

## 2025-06-14 RX ORDER — DEXTROSE 50 % IN WATER 50 %
25 SYRINGE (ML) INTRAVENOUS ONCE
Refills: 0 | Status: DISCONTINUED | OUTPATIENT
Start: 2025-06-14 | End: 2025-06-19

## 2025-06-14 RX ORDER — POLYETHYLENE GLYCOL 3350 17 G/17G
17 POWDER, FOR SOLUTION ORAL DAILY
Refills: 0 | Status: DISCONTINUED | OUTPATIENT
Start: 2025-06-14 | End: 2025-06-19

## 2025-06-14 RX ORDER — IPRATROPIUM BROMIDE AND ALBUTEROL SULFATE .5; 2.5 MG/3ML; MG/3ML
3 SOLUTION RESPIRATORY (INHALATION) EVERY 6 HOURS
Refills: 0 | Status: DISCONTINUED | OUTPATIENT
Start: 2025-06-14 | End: 2025-06-19

## 2025-06-14 RX ORDER — INSULIN GLARGINE-YFGN 100 [IU]/ML
10 INJECTION, SOLUTION SUBCUTANEOUS EVERY MORNING
Refills: 0 | Status: DISCONTINUED | OUTPATIENT
Start: 2025-06-15 | End: 2025-06-15

## 2025-06-14 RX ORDER — DIPHENHYDRAMINE HCL 12.5MG/5ML
25 ELIXIR ORAL EVERY 12 HOURS
Refills: 0 | Status: DISCONTINUED | OUTPATIENT
Start: 2025-06-14 | End: 2025-06-16

## 2025-06-14 RX ORDER — ROSUVASTATIN CALCIUM 20 MG/1
40 TABLET, FILM COATED ORAL AT BEDTIME
Refills: 0 | Status: DISCONTINUED | OUTPATIENT
Start: 2025-06-14 | End: 2025-06-19

## 2025-06-14 RX ORDER — SERTRALINE 100 MG/1
150 TABLET, FILM COATED ORAL DAILY
Refills: 0 | Status: DISCONTINUED | OUTPATIENT
Start: 2025-06-14 | End: 2025-06-19

## 2025-06-14 RX ORDER — VANCOMYCIN HCL IN 5 % DEXTROSE 1.5G/250ML
1750 PLASTIC BAG, INJECTION (ML) INTRAVENOUS EVERY 24 HOURS
Refills: 0 | Status: DISCONTINUED | OUTPATIENT
Start: 2025-06-14 | End: 2025-06-14

## 2025-06-14 RX ORDER — INSULIN LISPRO 100 U/ML
INJECTION, SOLUTION INTRAVENOUS; SUBCUTANEOUS AT BEDTIME
Refills: 0 | Status: DISCONTINUED | OUTPATIENT
Start: 2025-06-14 | End: 2025-06-19

## 2025-06-14 RX ORDER — OXYCODONE HYDROCHLORIDE 30 MG/1
10 TABLET ORAL
Refills: 0 | Status: DISCONTINUED | OUTPATIENT
Start: 2025-06-14 | End: 2025-06-19

## 2025-06-14 RX ORDER — CEFTRIAXONE 500 MG/1
1000 INJECTION, POWDER, FOR SOLUTION INTRAMUSCULAR; INTRAVENOUS ONCE
Refills: 0 | Status: COMPLETED | OUTPATIENT
Start: 2025-06-14 | End: 2025-06-14

## 2025-06-14 RX ORDER — ALBUTEROL SULFATE 2.5 MG/3ML
1 VIAL, NEBULIZER (ML) INHALATION EVERY 4 HOURS
Refills: 0 | Status: DISCONTINUED | OUTPATIENT
Start: 2025-06-14 | End: 2025-06-19

## 2025-06-14 RX ORDER — GLUCAGON 3 MG/1
1 POWDER NASAL ONCE
Refills: 0 | Status: DISCONTINUED | OUTPATIENT
Start: 2025-06-14 | End: 2025-06-19

## 2025-06-14 RX ORDER — DEXTROSE 50 % IN WATER 50 %
15 SYRINGE (ML) INTRAVENOUS ONCE
Refills: 0 | Status: DISCONTINUED | OUTPATIENT
Start: 2025-06-14 | End: 2025-06-19

## 2025-06-14 RX ORDER — MELATONIN 5 MG
3 TABLET ORAL AT BEDTIME
Refills: 0 | Status: DISCONTINUED | OUTPATIENT
Start: 2025-06-14 | End: 2025-06-19

## 2025-06-14 RX ORDER — ASPIRIN 325 MG
81 TABLET ORAL DAILY
Refills: 0 | Status: DISCONTINUED | OUTPATIENT
Start: 2025-06-14 | End: 2025-06-19

## 2025-06-14 RX ORDER — MAGNESIUM, ALUMINUM HYDROXIDE 200-200 MG
30 TABLET,CHEWABLE ORAL EVERY 4 HOURS
Refills: 0 | Status: DISCONTINUED | OUTPATIENT
Start: 2025-06-14 | End: 2025-06-19

## 2025-06-14 RX ORDER — PIPERACILLIN-TAZO-DEXTROSE,ISO 3.375G/5
3.38 IV SOLUTION, PIGGYBACK PREMIX FROZEN(ML) INTRAVENOUS ONCE
Refills: 0 | Status: COMPLETED | OUTPATIENT
Start: 2025-06-15 | End: 2025-06-15

## 2025-06-14 RX ADMIN — CLONAZEPAM 0.5 MILLIGRAM(S): 0.5 TABLET ORAL at 22:32

## 2025-06-14 RX ADMIN — Medication 1 TABLET(S): at 22:33

## 2025-06-14 RX ADMIN — INSULIN LISPRO 2: 100 INJECTION, SOLUTION INTRAVENOUS; SUBCUTANEOUS at 23:10

## 2025-06-14 RX ADMIN — OXYCODONE HYDROCHLORIDE 10 MILLIGRAM(S): 30 TABLET ORAL at 23:12

## 2025-06-14 RX ADMIN — CEFTRIAXONE 1000 MILLIGRAM(S): 500 INJECTION, POWDER, FOR SOLUTION INTRAMUSCULAR; INTRAVENOUS at 17:44

## 2025-06-14 RX ADMIN — OXYCODONE HYDROCHLORIDE 10 MILLIGRAM(S): 30 TABLET ORAL at 23:42

## 2025-06-14 RX ADMIN — Medication 83.33 MILLIGRAM(S): at 22:26

## 2025-06-14 RX ADMIN — ENOXAPARIN SODIUM 40 MILLIGRAM(S): 100 INJECTION SUBCUTANEOUS at 22:35

## 2025-06-14 RX ADMIN — ROSUVASTATIN CALCIUM 40 MILLIGRAM(S): 20 TABLET, FILM COATED ORAL at 22:32

## 2025-06-14 RX ADMIN — Medication 25 MILLIGRAM(S): at 21:43

## 2025-06-14 RX ADMIN — Medication 200 GRAM(S): at 20:54

## 2025-06-14 RX ADMIN — PREGABALIN 150 MILLIGRAM(S): 50 CAPSULE ORAL at 22:33

## 2025-06-14 RX ADMIN — Medication 100 MILLIGRAM(S): at 17:44

## 2025-06-14 NOTE — ED PROVIDER NOTE - CLINICAL SUMMARY MEDICAL DECISION MAKING FREE TEXT BOX
Right leg cellulitis, failing outpatient treatment with doxycycline.  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      Plan: EKG, chest x-ray, labs including pan culture, lactate.  IV ceftriaxone and clindamycin. RLE venous doppler.   Patient  warrants inpatient management for IV antibiotics, inpatient ID consult. 73 yo WM, PMH incl: CHF on Lasix 40 qd, CRI, COPD, DM, prostate CA; BIBA from A/L facility re: painful erythematous swelling RLE not responding to po Doxycycline x past few dd.  Denies F/C, cp, SOB, new injury/ wounds to RLE.    Right leg cellulitis, failing outpatient treatment with doxycycline.  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    Plan: EKG, chest x-ray, labs including pan culture, lactate.  IV ceftriaxone and clindamycin. RLE venous doppler.   Patient  warrants inpatient management for IV antibiotics, inpatient ID consult.    1756:  No leukocytosis. Sono unremarkable. Pt given ceftriaxone and clinda given pts h/o of MRSA. Informed pt and sister of the results and plan who area agreeable. -David Alfonso PA-C

## 2025-06-14 NOTE — ED PROVIDER NOTE - CONSTITUTIONAL, MLM
Elderly, overweight white male, awake, alert, oriented to person, place, time/situation and in no apparent distress. normal...

## 2025-06-14 NOTE — ED PROVIDER NOTE - PROGRESS NOTE DETAILS
No leukocytosis. Sono unremarkable. Pt given ceftriaxone and clinda given pts h/o of MRSA. Informed pt and sister of the results and plan who area agreeable. -David Alfonso PA-C

## 2025-06-14 NOTE — ED PROVIDER NOTE - PHYSICAL EXAMINATION
JAREK Simmons MD:   Gen'l: Older WM adult, alert, no respiratory discomfort, no sentence shortening, non-toxic  Head: NC/AT  Eyes: PERRL, EOMI  ENT: O/P clear, mmm  CV: RRR, normal radial pulse  Lungs: CTA, normal respirations  GI: soft, NT, BS+  : Deferred, no flank nor CVAT  Neck: NT, supple w/o pain  MSK: COTTER x 4,  B/L SLR 35 degrees w/o pain, normal motor  Skin: R lower leg + erythema involving lower 1/2, + associated tactile warmth, no blisters nor open wound. R calf mild TTP.  Neuro: A+O x 4, CN 2 -12 intact, normal speech, no focal motor/sensory deficits

## 2025-06-14 NOTE — H&P ADULT - ASSESSMENT
74/M with a hx of HTN CHF DM COPD presents with doxycycle resistent RLE cellulitis     RLE cellulitis   Zosyn and Vacno now  Pt has hx of Red Man syndrome, infuse 1250 mg IV BID vanco over 3 hours to avoid this. Pre med with benadryl   Infectious disease consult   Pt does not have contraindication to MRI. Will defer to ID   Vascular consult for PAD, TcPO2 studies   Daily labs    CHF  At baseline, asymptomatic  Cont lasix    COPD/Asthma  Duonebs  At baseline     HTN   Cont home meds     DM  Glargine 10 units daily, aspart 5 units with meals, sliding scale     Lovenox for DVT PPx     I spent 87 mins caring for this pt

## 2025-06-14 NOTE — ED ADULT NURSE NOTE - CHIEF COMPLAINT QUOTE
[facility] w/out improvement. Denies fever/ chills.

## 2025-06-14 NOTE — ED PROVIDER NOTE - ATTENDING APP SHARED VISIT CONTRIBUTION OF CARE
MIRA Simmons MD, ED Attending physician:  This was a shared visit with [employer].  I reviewed and verified the documentation and independently performed the documented history/exam/mdm.

## 2025-06-14 NOTE — PATIENT PROFILE ADULT - FALL HARM RISK - HARM RISK INTERVENTIONS
Assistance with ambulation/Assistance OOB with selected safe patient handling equipment/Communicate Risk of Fall with Harm to all staff/Discuss with provider need for PT consult/Monitor gait and stability/Reinforce activity limits and safety measures with patient and family/Tailored Fall Risk Interventions/Visual Cue: Yellow wristband and red socks/Bed in lowest position, wheels locked, appropriate side rails in place/Call bell, personal items and telephone in reach/Instruct patient to call for assistance before getting out of bed or chair/Non-slip footwear when patient is out of bed/[place] to call system/Physically safe environment - no spills, clutter or unnecessary equipment/Purposeful Proactive Rounding/Room/bathroom lighting operational, light cord in reach

## 2025-06-14 NOTE — H&P ADULT - HISTORY OF PRESENT ILLNESS
Patient is a 74y old  Male who presents with a chief complaint of RLE infection, He has a hx of CHF, DM, COPD, renal insufficiecy, and recent admission at an OSH for RLE cellulitis. He was given several days of an unknown IV abx and then sent home of doxycycline. He took his meds as prescribed and was DC to home this past tuesday. However, he cont to have pain, fevers and worsening redness of his R ankle and foot. He denies fevers today, no CP SHOB, no abd pain, No N/V. He is concerned that he may have arterial disease but has not had a vascular workup.     PAST MEDICAL & SURGICAL HISTORY:  Prostate cancer  Type II diabetes mellitus  Chronic obstructive pulmonary disease (COPD)  CHF (congestive heart failure)  Renal insufficiency  H/O migraine  Insomnia      Constipation      S/P foot surgery        FAMILY HISTORY:    Social History:      Allergies    tetracycline (Unknown)  vancomycin (Other)  IODINE (Unknown)    Intolerances        MEDICATIONS  (STANDING):  albuterol/ipratropium for Nebulization 3 milliLiter(s) Nebulizer every 6 hours  aspirin  chewable 81 milliGRAM(s) Oral daily  clonazePAM Oral Disintegrating Tablet 0.5 milliGRAM(s) Oral three times a day  dextrose 5%. 1000 milliLiter(s) (50 mL/Hr) IV Continuous <Continuous>  dextrose 5%. 1000 milliLiter(s) (100 mL/Hr) IV Continuous <Continuous>  dextrose 50% Injectable 25 Gram(s) IV Push once  dextrose 50% Injectable 12.5 Gram(s) IV Push once  dextrose 50% Injectable 25 Gram(s) IV Push once  enoxaparin Injectable 40 milliGRAM(s) SubCutaneous every 24 hours  furosemide    Tablet 40 milliGRAM(s) Oral every 12 hours  glucagon  Injectable 1 milliGRAM(s) IntraMuscular once  insulin glargine Injectable (LANTUS) 10 Unit(s) SubCutaneous every morning  insulin lispro (ADMELOG) corrective regimen sliding scale   SubCutaneous three times a day before meals  insulin lispro (ADMELOG) corrective regimen sliding scale   SubCutaneous at bedtime  insulin lispro Injectable (ADMELOG) 5 Unit(s) SubCutaneous three times a day before meals  oxyCODONE    IR 10 milliGRAM(s) Oral two times a day  piperacillin/tazobactam IVPB.. 3.375 Gram(s) IV Intermittent every 8 hours  predniSONE   Tablet 10 milliGRAM(s) Oral daily  pregabalin 150 milliGRAM(s) Oral two times a day  rosuvastatin 40 milliGRAM(s) Oral at bedtime  senna 1 Tablet(s) Oral at bedtime  sertraline 150 milliGRAM(s) Oral daily  vancomycin  IVPB 1250 milliGRAM(s) IV Intermittent every 12 hours    MEDICATIONS  (PRN):  acetaminophen     Tablet .. 650 milliGRAM(s) Oral every 6 hours PRN Temp greater or equal to 38C (100.4F), Mild Pain (1 - 3)  albuterol    90 MICROgram(s) HFA Inhaler 1 Puff(s) Inhalation every 4 hours PRN Shortness of Breath and/or Wheezing  aluminum hydroxide/magnesium hydroxide/simethicone Suspension 30 milliLiter(s) Oral every 4 hours PRN Dyspepsia  dextrose Oral Gel 15 Gram(s) Oral once PRN Blood Glucose LESS THAN 70 milliGRAM(s)/deciliter  diphenhydrAMINE Injectable 25 milliGRAM(s) IV Push every 12 hours PRN prior to vancomycin  melatonin 3 milliGRAM(s) Oral at bedtime PRN Insomnia  ondansetron Injectable 4 milliGRAM(s) IV Push every 8 hours PRN Nausea and/or Vomiting  polyethylene glycol 3350 17 Gram(s) Oral daily PRN for constipation      ROS:  General:  No fevers, chills, or unexplained weight loss  Skin: No rash or bothersome skin lesions  Musculoskeletal:r ankle and foot swelling, redness and pain   Eyes: No visual changes or eye pain  Ears: No hearing loss , otorrhea or ear pain  Nose, Mouth, Throat: No nasal congestion, rhinorrhea, oral lesions, postnasal drip or sore throat  Cardio: No chest pain or palpitations. no lower extremity edema. no syncope. no claudication.   Respiratory: No cough, shortness of breath or wheezing   GI: No diarrhea, constipation, blood in stools, abdominal pain, vomiting or heartburn  : No urinary frequency, hematuria, incontinence, or dysuria  Neurologic: No headaches, parasthesias, confusion, dysarthria or gait instability  Psychiatric:  No anxiety or depression  Lymphatic:  No easy bruising, easy bleeding or swollen glands  Allergic: No itching, sneezing , watery eyes, clear rhinorrhea or recurrent infections    PEx  T(C): 36.8 (25 @ 23:45), Max: 36.8 (25 @ 23:45)  HR: 79 (25 @ 23:45) (70 - 79)  BP: 115/70 (25 @ 23:45) (105/52 - 135/72)  RR: 17 (25 @ 23:45) (14 - 18)  SpO2: 94% (25 @ 23:45) (91% - 98%)  Wt(kg): --  General:     no acute distress, no identifying marks , scars, or tattoos.  Skin: no rash or prominent lesions  Head: normocephalic, atraumatic     Sinuses: non-tender  Nose: no external lesions, mucosa non-inflamed, septum and turbinates normal  Throat: no erythema, exudates or lesions.  Neck: Supple without lymphadenopathy. Thyroid no thyromegaly, no palpable thyroid nodules, no palpable nodules or masses, carotid arteries no bruits.   Breasts: No palpable masses or lesions.  Heart: RRR, no murmur or gallop.  Normal S1, S2.  No S3, S4.   Lungs: CTA bilaterally, no wheezes, rhonchi, rales.  Breathing unlabored.   Chest wall: Normal insp   Abdomen:  Soft, NT/ND, normal bowel sounds, no HSM, no masses.  No peritoneal signs.   Back: spine normal without deformity or tenderness.  Normal ROM   : Exam normal.  no inguinal hernias.  Extremities: erythema of r ankle, foot. Full circumference erythema of R lower leg. Inflammation, increased warmth. Small healing wound on medial aspect of foot, just proximal of great toe. No drainage   Musculoskeletal: Normal gait and station. No decreased range of motion, instability, atrophy or abnormal strength or tone in the head, neck, spine, ribs, pelvis or extremities.   Neurologic: CN 2-12 normal. Sensation to pain, touch and proprioception normal. DTRs normal in upper and lower extremities. No pathologic reflexes.  Motor normal.  Psychiatric: Oriented X3, intact recent and remote memory, judgement and insight, normal mood and affect.                          12.2   8.13  )-----------( 139      ( 2025 14:39 )             38.3         135  |  102  |  38[H]  ----------------------------<  230[H]  3.9   |  27  |  1.64[H]    Ca    9.4      2025 14:39    TPro  7.6  /  Alb  3.2[L]  /  TBili  0.5  /  DBili  x   /  AST  20  /  ALT  31  /  AlkPhos  72  06-14    CAPILLARY BLOOD GLUCOSE      POCT Blood Glucose.: 270 mg/dL (2025 22:20)    PT/INR - ( 2025 14:39 )   PT: 12.4 sec;   INR: 1.05 ratio         PTT - ( 2025 14:39 )  PTT:31.4 sec  Urinalysis Basic - ( 2025 17:57 )    Color: Yellow / Appearance: Clear / S.021 / pH: x  Gluc: x / Ketone: x  / Bili: Negative / Urobili: 0.2 mg/dL   Blood: x / Protein: Negative mg/dL / Nitrite: Negative   Leuk Esterase: Negative / RBC: x / WBC x   Sq Epi: x / Non Sq Epi: x / Bacteria: x          Radiology/Imaging, I have personally reviewed:  
no

## 2025-06-14 NOTE — ED ADULT NURSE NOTE - OBJECTIVE STATEMENT
Pt [facility]S [facility] for eval of RLE cellulitis. Recently seen in [facility] and dced home on oral dox w/out improvement. Denies fever/chills.

## 2025-06-14 NOTE — ED ADULT TRIAGE NOTE - CHIEF COMPLAINT QUOTE
[facility] of RLE cellulitis. [facility] seen in [facility] and dced home on oral dox w/out improvement. Denies fever/ chills.

## 2025-06-14 NOTE — ED PROVIDER NOTE - OBJECTIVE STATEMENT
74-year-old male with a past medical history of migraines, insomnia, renal insufficiency, CHF on 40 mg of Lasix a day, COPD, diabetes, prostate cancer presents with swelling, redness to the right lower extremity that started approximately on Wednesday.  Patient states that the swelling for started and the redness began on Friday.  Patient was recently released from [facility] on Tuesday and was discharged back to his facility on doxycycline for the cellulitis of his right lower extremity.  Patient denies any fevers )but states that when he gets sick he does not normally get fever), chills, chest pain, shortness of breath, recent injuries to the legs or new wounds.  Patient also states that he noticed some increasing abdominal distention patient states that he is unsure if to change CHF or reaction from doxycycline that he was placed on.  Patient states that he had those reaction before along with some contractions of his face from being on tetracyclines and also gets pregnant syndrome from vancomycin.  -David Alfonso PA-C 74-year-old male with a past medical history of migraines, insomnia, renal insufficiency, CHF on 40 mg of Lasix a day, COPD, diabetes, prostate cancer presents with swelling, redness to the right lower extremity that started approximately on Wednesday.  Patient states that the swelling for started and the redness began on Friday.  Patient was recently released from [facility] on Tuesday and was discharged back to his facility on doxycycline for the cellulitis of his right lower extremity.  Patient denies any fevers )but states that when he gets sick he does not normally get fever), chills, chest pain, shortness of breath, recent injuries to the legs or new wounds.  Patient also states that he noticed some increasing abdominal distention patient states that he is unsure if to change CHF or reaction from doxycycline that he was placed on.  Patient states that he had those reaction before along with some contractions of his face from being on tetracyclines and also gets red man syndrome from vancomycin.  -David Alfonso PA-C 74-year-old male with a past medical history of migraines, insomnia, renal insufficiency, CHF on 40 mg of Lasix a day, COPD, diabetes, prostate cancer presents with swelling, redness to the right lower extremity that started approximately on Wednesday.  Patient states that the swelling for started and the redness began on Friday.  Patient was recently released from [facility] on Tuesday and was discharged back to his facility on doxycycline for the cellulitis of his right lower extremity.  Patient denies any fevers )but states that when he gets sick he does not normally get fever), chills, chest pain, shortness of breath, recent injuries to the legs or new wounds.  Patient also states that he noticed some increasing abdominal distention patient states that he is unsure if to change CHF or reaction from doxycycline that he was placed on.  Patient states that he had those reaction before along with some contractions of his face from being on tetracyclines and also gets red man syndrome from vancomycin.  -David Simmons MD, ED Attdg:  Case d/w ED PA.  Pt seen/evaluated. 74-year-old male with a past medical history of migraines, insomnia, renal insufficiency, CHF on 40 mg of Lasix a day, COPD, diabetes, prostate cancer presents with swelling, redness to the right lower extremity that started approximately on Wednesday.  Patient states that the swelling for started and the redness began on Friday.  Patient was recently released from [facility] on Tuesday and was discharged back to his facility on doxycycline for the cellulitis of his right lower extremity.  Patient denies any fevers )but states that when he gets sick he does not normally get fever), chills, chest pain, shortness of breath, recent injuries to the legs or new wounds.  Patient also states that he noticed some increasing abdominal distention patient states that he is unsure if to change CHF or reaction from doxycycline that he was placed on.  Patient states that he had those reaction before along with some contractions of his face from being on tetracyclines and also gets red man syndrome from vancomycin.  -David Simmons MD, ED Attdg:  Case d/w ED PA.  Pt seen/evaluated.  73 yo WM, PMH incl: CHF on Lasix 40 qd, CRI, COPD, DM, prostate CA; BIBA from [facility] facility re: painful erythematous swelling RLE not responding to po Doxycycline x past few dd.  Denies F/C, cp, SOB, new injury/ wounds to RLE.  Prior MRSA infection in his records.   All: Vanco ("red man syndrome")

## 2025-06-14 NOTE — ED PROVIDER NOTE - SKIN, MLM
RLE with swelling, redness to the mid/distal RLE with both blanching and non blanching hot, dry and intact. No evidence of rash. Mild R calf ttp. .

## 2025-06-14 NOTE — ED ADULT TRIAGE NOTE - HOW PATIENT ADDRESSED, PROFILE
HUB to read description below.    Called patient in regards to upcoming appointment with Alda Cardona. Unfortunately, the provider is still out of office. The patient can reschedule with Sara Clement or Grover Ventura on [facility] for 30 minutes. If they feel like the appointment is not needed they can cancel and schedule as needed.   We apologize for any inconvenience this may cause.     Thank you!  
berry

## 2025-06-15 LAB
ANION GAP SERPL CALC-SCNC: 5 MMOL/L — SIGNIFICANT CHANGE UP (ref 5–17)
BUN SERPL-MCNC: 34 MG/DL — HIGH (ref 7–23)
CALCIUM SERPL-MCNC: 9.4 MG/DL — SIGNIFICANT CHANGE UP (ref 8.5–10.1)
CHLORIDE SERPL-SCNC: 106 MMOL/L — SIGNIFICANT CHANGE UP (ref 96–108)
CO2 SERPL-SCNC: 32 MMOL/L — HIGH (ref 22–31)
CREAT SERPL-MCNC: 1.74 MG/DL — HIGH (ref 0.5–1.3)
CRP SERPL-MCNC: 58.4 MG/L — HIGH (ref 0–5)
EGFR: 41 ML/MIN/1.73M2 — LOW
EGFR: 41 ML/MIN/1.73M2 — LOW
ERYTHROCYTE [SEDIMENTATION RATE] IN BLOOD: 58 MM/HR — HIGH (ref 0–15)
GLUCOSE SERPL-MCNC: 149 MG/DL — HIGH (ref 70–99)
HCT VFR BLD CALC: 35.5 % — LOW (ref 39–50)
HGB BLD-MCNC: 11 G/DL — LOW (ref 13–17)
MCHC RBC-ENTMCNC: 28.3 PG — SIGNIFICANT CHANGE UP (ref 27–34)
MCHC RBC-ENTMCNC: 31 G/DL — LOW (ref 32–36)
MCV RBC AUTO: 91.3 FL — SIGNIFICANT CHANGE UP (ref 80–100)
NRBC # BLD AUTO: 0 K/UL — SIGNIFICANT CHANGE UP (ref 0–0)
NRBC # FLD: 0 K/UL — SIGNIFICANT CHANGE UP (ref 0–0)
NRBC BLD AUTO-RTO: 0 /100 WBCS — SIGNIFICANT CHANGE UP (ref 0–0)
PLATELET # BLD AUTO: 134 K/UL — LOW (ref 150–400)
PMV BLD: 10.1 FL — SIGNIFICANT CHANGE UP (ref 7–13)
POTASSIUM SERPL-MCNC: 4.1 MMOL/L — SIGNIFICANT CHANGE UP (ref 3.5–5.3)
POTASSIUM SERPL-SCNC: 4.1 MMOL/L — SIGNIFICANT CHANGE UP (ref 3.5–5.3)
RBC # BLD: 3.89 M/UL — LOW (ref 4.2–5.8)
RBC # FLD: 18.6 % — HIGH (ref 10.3–14.5)
RHEUMATOID FACT SERPL-ACNC: <10 IU/ML — SIGNIFICANT CHANGE UP (ref 0–13)
SODIUM SERPL-SCNC: 143 MMOL/L — SIGNIFICANT CHANGE UP (ref 135–145)
VANCOMYCIN TROUGH SERPL-MCNC: 23.8 UG/ML — HIGH (ref 10–20)
WBC # BLD: 8.45 K/UL — SIGNIFICANT CHANGE UP (ref 3.8–10.5)
WBC # FLD AUTO: 8.45 K/UL — SIGNIFICANT CHANGE UP (ref 3.8–10.5)

## 2025-06-15 PROCEDURE — 73630 X-RAY EXAM OF FOOT: CPT | Mod: 26,RT

## 2025-06-15 PROCEDURE — 99233 SBSQ HOSP IP/OBS HIGH 50: CPT

## 2025-06-15 RX ORDER — MONTELUKAST SODIUM 10 MG/1
10 TABLET ORAL AT BEDTIME
Refills: 0 | Status: DISCONTINUED | OUTPATIENT
Start: 2025-06-15 | End: 2025-06-19

## 2025-06-15 RX ORDER — OXYCODONE HYDROCHLORIDE 30 MG/1
2 TABLET ORAL
Refills: 0 | DISCHARGE

## 2025-06-15 RX ORDER — MELATONIN 5 MG
1 TABLET ORAL
Refills: 0 | DISCHARGE

## 2025-06-15 RX ORDER — CEFTRIAXONE 500 MG/1
2000 INJECTION, POWDER, FOR SOLUTION INTRAMUSCULAR; INTRAVENOUS EVERY 24 HOURS
Refills: 0 | Status: DISCONTINUED | OUTPATIENT
Start: 2025-06-15 | End: 2025-06-15

## 2025-06-15 RX ORDER — FERROUS SULFATE 137(45) MG
1 TABLET, EXTENDED RELEASE ORAL
Refills: 0 | DISCHARGE

## 2025-06-15 RX ORDER — INSULIN GLARGINE-YFGN 100 [IU]/ML
35 INJECTION, SOLUTION SUBCUTANEOUS AT BEDTIME
Refills: 0 | Status: DISCONTINUED | OUTPATIENT
Start: 2025-06-15 | End: 2025-06-19

## 2025-06-15 RX ORDER — EMPAGLIFLOZIN 25 MG/1
1 TABLET, FILM COATED ORAL
Refills: 0 | DISCHARGE

## 2025-06-15 RX ORDER — CLONAZEPAM 0.5 MG/1
2 TABLET ORAL
Refills: 0 | DISCHARGE

## 2025-06-15 RX ORDER — INSULIN LISPRO 100 U/ML
10 INJECTION, SOLUTION INTRAVENOUS; SUBCUTANEOUS
Refills: 0 | Status: DISCONTINUED | OUTPATIENT
Start: 2025-06-15 | End: 2025-06-15

## 2025-06-15 RX ORDER — ROSUVASTATIN CALCIUM 20 MG/1
1 TABLET, FILM COATED ORAL
Refills: 0 | DISCHARGE

## 2025-06-15 RX ORDER — FUROSEMIDE 10 MG/ML
1 INJECTION INTRAMUSCULAR; INTRAVENOUS
Refills: 0 | DISCHARGE

## 2025-06-15 RX ORDER — BUTYROSPERMUM PARKII(SHEA BUTTER), SIMMONDSIA CHINENSIS (JOJOBA) SEED OIL, ALOE BARBADENSIS LEAF EXTRACT .01; 1; 3.5 G/100G; G/100G; G/100G
250 LIQUID TOPICAL DAILY
Refills: 0 | Status: DISCONTINUED | OUTPATIENT
Start: 2025-06-15 | End: 2025-06-19

## 2025-06-15 RX ORDER — SODIUM CHLORIDE 0.65 %
1 AEROSOL, SPRAY (ML) NASAL
Refills: 0 | DISCHARGE

## 2025-06-15 RX ORDER — LANOLIN/MINERAL OIL/PETROLATUM
1 OINTMENT (GRAM) OPHTHALMIC (EYE)
Refills: 0 | DISCHARGE

## 2025-06-15 RX ORDER — FERROUS SULFATE 137(45) MG
325 TABLET, EXTENDED RELEASE ORAL DAILY
Refills: 0 | Status: DISCONTINUED | OUTPATIENT
Start: 2025-06-15 | End: 2025-06-19

## 2025-06-15 RX ORDER — ASPIRIN 325 MG
1 TABLET ORAL
Refills: 0 | DISCHARGE

## 2025-06-15 RX ORDER — VANCOMYCIN HCL IN 5 % DEXTROSE 1.5G/250ML
1000 PLASTIC BAG, INJECTION (ML) INTRAVENOUS EVERY 12 HOURS
Refills: 0 | Status: DISCONTINUED | OUTPATIENT
Start: 2025-06-15 | End: 2025-06-16

## 2025-06-15 RX ORDER — PREGABALIN 50 MG/1
1 CAPSULE ORAL
Refills: 0 | DISCHARGE

## 2025-06-15 RX ORDER — BUTYROSPERMUM PARKII(SHEA BUTTER), SIMMONDSIA CHINENSIS (JOJOBA) SEED OIL, ALOE BARBADENSIS LEAF EXTRACT .01; 1; 3.5 G/100G; G/100G; G/100G
1 LIQUID TOPICAL
Refills: 0 | DISCHARGE

## 2025-06-15 RX ORDER — B1/B2/B3/B5/B6/B12/VIT C/FOLIC 500-0.5 MG
1 TABLET ORAL DAILY
Refills: 0 | Status: DISCONTINUED | OUTPATIENT
Start: 2025-06-15 | End: 2025-06-19

## 2025-06-15 RX ORDER — ALBUTEROL SULFATE 2.5 MG/3ML
3 VIAL, NEBULIZER (ML) INHALATION
Refills: 0 | DISCHARGE

## 2025-06-15 RX ORDER — CEFTRIAXONE 500 MG/1
2000 INJECTION, POWDER, FOR SOLUTION INTRAMUSCULAR; INTRAVENOUS EVERY 24 HOURS
Refills: 0 | Status: DISCONTINUED | OUTPATIENT
Start: 2025-06-15 | End: 2025-06-19

## 2025-06-15 RX ORDER — MONTELUKAST SODIUM 10 MG/1
1 TABLET ORAL
Refills: 0 | DISCHARGE

## 2025-06-15 RX ORDER — BUDESONIDE AND FORMOTEROL FUMARATE DIHYDRATE 80; 4.5 UG/1; UG/1
2 AEROSOL RESPIRATORY (INHALATION)
Refills: 0 | DISCHARGE

## 2025-06-15 RX ORDER — LOPERAMIDE HCL 1 MG/7.5ML
1 SOLUTION ORAL
Refills: 0 | DISCHARGE

## 2025-06-15 RX ORDER — B1/B2/B3/B5/B6/B12/VIT C/FOLIC 500-0.5 MG
1 TABLET ORAL
Refills: 0 | DISCHARGE

## 2025-06-15 RX ORDER — RAMELTEON 8 MG/1
1 TABLET, FILM COATED ORAL
Refills: 0 | DISCHARGE

## 2025-06-15 RX ORDER — INSULIN LISPRO 100 U/ML
15 INJECTION, SOLUTION INTRAVENOUS; SUBCUTANEOUS
Refills: 0 | Status: DISCONTINUED | OUTPATIENT
Start: 2025-06-15 | End: 2025-06-19

## 2025-06-15 RX ORDER — SERTRALINE 100 MG/1
1.5 TABLET, FILM COATED ORAL
Refills: 0 | DISCHARGE

## 2025-06-15 RX ORDER — INSULIN LISPRO 100 U/ML
0 INJECTION, SOLUTION INTRAVENOUS; SUBCUTANEOUS
Qty: 0 | Refills: 0 | DISCHARGE

## 2025-06-15 RX ORDER — LORATADINE 5 MG/5ML
1 SOLUTION ORAL
Refills: 0 | DISCHARGE

## 2025-06-15 RX ADMIN — CEFTRIAXONE 2000 MILLIGRAM(S): 500 INJECTION, POWDER, FOR SOLUTION INTRAMUSCULAR; INTRAVENOUS at 17:16

## 2025-06-15 RX ADMIN — Medication 25 GRAM(S): at 01:49

## 2025-06-15 RX ADMIN — CLONAZEPAM 0.5 MILLIGRAM(S): 0.5 TABLET ORAL at 14:32

## 2025-06-15 RX ADMIN — INSULIN LISPRO 8 UNIT(S): 100 INJECTION, SOLUTION INTRAVENOUS; SUBCUTANEOUS at 17:16

## 2025-06-15 RX ADMIN — ENOXAPARIN SODIUM 40 MILLIGRAM(S): 100 INJECTION SUBCUTANEOUS at 23:06

## 2025-06-15 RX ADMIN — IPRATROPIUM BROMIDE AND ALBUTEROL SULFATE 3 MILLILITER(S): .5; 2.5 SOLUTION RESPIRATORY (INHALATION) at 21:15

## 2025-06-15 RX ADMIN — FUROSEMIDE 40 MILLIGRAM(S): 10 INJECTION INTRAMUSCULAR; INTRAVENOUS at 05:29

## 2025-06-15 RX ADMIN — OXYCODONE HYDROCHLORIDE 10 MILLIGRAM(S): 30 TABLET ORAL at 09:39

## 2025-06-15 RX ADMIN — INSULIN LISPRO 2: 100 INJECTION, SOLUTION INTRAVENOUS; SUBCUTANEOUS at 07:54

## 2025-06-15 RX ADMIN — PREGABALIN 150 MILLIGRAM(S): 50 CAPSULE ORAL at 09:39

## 2025-06-15 RX ADMIN — ROSUVASTATIN CALCIUM 40 MILLIGRAM(S): 20 TABLET, FILM COATED ORAL at 21:54

## 2025-06-15 RX ADMIN — IPRATROPIUM BROMIDE AND ALBUTEROL SULFATE 3 MILLILITER(S): .5; 2.5 SOLUTION RESPIRATORY (INHALATION) at 14:06

## 2025-06-15 RX ADMIN — IPRATROPIUM BROMIDE AND ALBUTEROL SULFATE 3 MILLILITER(S): .5; 2.5 SOLUTION RESPIRATORY (INHALATION) at 02:11

## 2025-06-15 RX ADMIN — Medication 250 MILLIGRAM(S): at 23:59

## 2025-06-15 RX ADMIN — Medication 81 MILLIGRAM(S): at 09:39

## 2025-06-15 RX ADMIN — INSULIN GLARGINE-YFGN 10 UNIT(S): 100 INJECTION, SOLUTION SUBCUTANEOUS at 07:56

## 2025-06-15 RX ADMIN — INSULIN LISPRO 5 UNIT(S): 100 INJECTION, SOLUTION INTRAVENOUS; SUBCUTANEOUS at 11:58

## 2025-06-15 RX ADMIN — Medication 25 MILLIGRAM(S): at 23:23

## 2025-06-15 RX ADMIN — PREGABALIN 150 MILLIGRAM(S): 50 CAPSULE ORAL at 21:53

## 2025-06-15 RX ADMIN — CLONAZEPAM 0.5 MILLIGRAM(S): 0.5 TABLET ORAL at 23:04

## 2025-06-15 RX ADMIN — Medication 1 TABLET(S): at 21:53

## 2025-06-15 RX ADMIN — PREDNISONE 10 MILLIGRAM(S): 20 TABLET ORAL at 09:39

## 2025-06-15 RX ADMIN — INSULIN GLARGINE-YFGN 12 UNIT(S): 100 INJECTION, SOLUTION SUBCUTANEOUS at 21:56

## 2025-06-15 RX ADMIN — FUROSEMIDE 40 MILLIGRAM(S): 10 INJECTION INTRAMUSCULAR; INTRAVENOUS at 17:16

## 2025-06-15 RX ADMIN — OXYCODONE HYDROCHLORIDE 10 MILLIGRAM(S): 30 TABLET ORAL at 22:23

## 2025-06-15 RX ADMIN — Medication 3.38 GRAM(S): at 01:40

## 2025-06-15 RX ADMIN — INSULIN LISPRO 4: 100 INJECTION, SOLUTION INTRAVENOUS; SUBCUTANEOUS at 11:58

## 2025-06-15 RX ADMIN — OXYCODONE HYDROCHLORIDE 10 MILLIGRAM(S): 30 TABLET ORAL at 10:24

## 2025-06-15 RX ADMIN — Medication 83.33 MILLIGRAM(S): at 10:29

## 2025-06-15 RX ADMIN — IPRATROPIUM BROMIDE AND ALBUTEROL SULFATE 3 MILLILITER(S): .5; 2.5 SOLUTION RESPIRATORY (INHALATION) at 09:06

## 2025-06-15 RX ADMIN — MONTELUKAST SODIUM 10 MILLIGRAM(S): 10 TABLET ORAL at 21:53

## 2025-06-15 RX ADMIN — INSULIN LISPRO 5 UNIT(S): 100 INJECTION, SOLUTION INTRAVENOUS; SUBCUTANEOUS at 07:55

## 2025-06-15 RX ADMIN — CLONAZEPAM 0.5 MILLIGRAM(S): 0.5 TABLET ORAL at 05:29

## 2025-06-15 RX ADMIN — SERTRALINE 150 MILLIGRAM(S): 100 TABLET, FILM COATED ORAL at 09:38

## 2025-06-15 RX ADMIN — Medication 25 MILLIGRAM(S): at 09:41

## 2025-06-15 RX ADMIN — INSULIN LISPRO 8: 100 INJECTION, SOLUTION INTRAVENOUS; SUBCUTANEOUS at 17:15

## 2025-06-15 RX ADMIN — OXYCODONE HYDROCHLORIDE 10 MILLIGRAM(S): 30 TABLET ORAL at 21:53

## 2025-06-15 NOTE — PROGRESS NOTE ADULT - TIME BILLING
I spent a total of 80 minutes on the date of this encounter coordinating the patient's care. This includes reviewing prior documentation, results and imaging in addition to completing a full history and physical examination on the patient. Further tests, medications, and procedures have been ordered as indicated. Laboratory results and the plan of care were communicated to the patient and/or their family member. Supporting documentation was completed and added to the patient's chart. I spent a total of 55 minutes on the date of this encounter coordinating the patient's care. This includes reviewing prior documentation, results and imaging in addition to completing a full history and physical examination on the patient. Further tests, medications, and procedures have been ordered as indicated. Laboratory results and the plan of care were communicated to the patient and/or their family member. Supporting documentation was completed and added to the patient's chart.

## 2025-06-15 NOTE — PROVIDER CONTACT NOTE (OTHER) - BACKGROUND
Previous dose, benadryl IV was given prior to IV Vanco, and IV Vanco was infused over 3 hours. NP Erik notified, given ok to give benadryl prior and infuse IV Vanco over 3 hours.

## 2025-06-15 NOTE — DIETITIAN INITIAL EVALUATION ADULT - ADD RECOMMEND
1) C/w CHO Consistent Diet AND initiate DASH/TLC diet  2) Add ensure max BID (provides 150kcal, 30g protein) and Wilfred BID (provides 90 kcal, 2.5 g collagen, 7 g L-Arginine, 7 g L-Glutamine/ 1 packet) to promote wound healing. Wilfred is intended to support tissue building and collagen formation in the dietary management of wounds, which has been clinically shown to support wound healing (including pressure injuries, diabetic foot ulcers surgical incisions, burns, and other acute/chronic wounds) by enhancing collagen formation in as little as 2 weeks.  3) Monitor bowel movements, if no BM for >3 days, consider implementing bowel regimen.  4) Encourage protein-rich foods, maximize food preferences  5) MVI w/ minerals daily to ensure 100% RDA met  6) Monitor lytes/ min and replete prn.  7) Please obtain daily weights  8) Monitor and optimize BG levels between 100-180 mg/dL by medical management  9) Please provide diabetes nutrition education as soon as medically feasible  10) Encourage f/u with outpatient dietitian and endocrinologist for diabetes management  11) Confirm goals of care regarding nutrition support  RD will continue to monitor PO intake, labs, hydration, and wt prn. Nutrition recommendations to continue upon discharge. Goal to continue to meet >/= 80% ENN via tolerated route. RD to F/U prn for changes to nutrition dc plan.     1) C/w CHO Consistent Diet AND initiate DASH/TLC diet  2) Add ensure max BID (provides 150kcal, 30g protein) and Wilfred BID (provides 90 kcal, 2.5 g collagen, 7 g L-Arginine, 7 g L-Glutamine/ 1 packet) to promote wound healing. Wilfred is intended to support tissue building and collagen formation in the dietary management of wounds, which has been clinically shown to support wound healing (including pressure injuries, diabetic foot ulcers surgical incisions, burns, and other acute/chronic wounds) by enhancing collagen formation in as little as 2 weeks.  3) Monitor bowel movements, if no BM for >3 days, consider implementing bowel regimen.  4) Encourage protein-rich foods, maximize food preferences  5) Recommend to add MVI w/minerals, Vit C 500 mg BID, add Zinc Sulfate 220 mg x 10 days to promote wound healing.  6) Monitor lytes/ min and replete prn.  7) Please obtain daily weights  8) Monitor and optimize BG levels between 100-180 mg/dL by medical management  9) Please provide diabetes nutrition education as soon as medically feasible  10) Encourage f/u with outpatient dietitian and endocrinologist for diabetes management  11) Confirm goals of care regarding nutrition support  RD will continue to monitor PO intake, labs, hydration, and wt prn. Nutrition recommendations to continue upon discharge. Goal to continue to meet >/= 80% ENN via tolerated route. RD to F/U prn for changes to nutrition dc plan.

## 2025-06-15 NOTE — DIETITIAN INITIAL EVALUATION ADULT - PHYSCIAL ASSESSMENT
NFPE reveals no muscle/fat wasting at this time; edema could be masking losses, skewing appearance./obese/overweight no

## 2025-06-15 NOTE — DIETITIAN INITIAL EVALUATION ADULT - ORAL INTAKE PTA/DIET HISTORY
Resides at [facility]. Endorses "very good" appetite; consumes ~3 meals/day w/ snacks and likely consuming >75% of ENN. Does NOT follow specific diet or consume ONS at [facility]; noted w/ no diet hx within shadow paper work. W/ T2DM - on Jardiance and humalog

## 2025-06-15 NOTE — DIETITIAN INITIAL EVALUATION ADULT - DIET TYPE
DASH/TLC (sodium and cholesterol restricted diet)/consistent carbohydrate (evening snack)/supplement (specify)

## 2025-06-15 NOTE — CONSULT NOTE ADULT - ASSESSMENT
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   In a patient with hx of vasculitis, another vasculitis or autoimmune process should be consider.  MICHAEL 2024: R 1.05, L 1.01      Recommendations:  - Please obtain MICHAEL/PVR and Adup  - Please obtain dermatology consult to rule out vasculitis  - please consider autoimmune pathology  - rest of care as per primary team    Case discussed with Dr. Valdez

## 2025-06-15 NOTE — DIETITIAN INITIAL EVALUATION ADULT - OTHER CALCULATIONS
Energy/fluid needs based on bed scale wt of 232# taken on 6/15/25 ; protein needs based on IBW of 166#. Noted w/ lower fluid needs 2/2 severe edema - NOT on fluid restriction

## 2025-06-15 NOTE — CONSULT NOTE ADULT - SUBJECTIVE AND OBJECTIVE BOX
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 He was given several days of an unknown IV abx and then sent home of doxycycline. He took his meds as prescribed and was DC to home this past tuesday. However, he continued to have pain, fevers and worsening redness of his R ankle and foot. He denies fevers today, no CP SHOB, no abd pain, No N/V. He is concerned that he may have arterial disease but has not had a vascular workup.   As per the patient he had a chronic wound in the medial aspect of the foot at the MTP joint after bunionectomy and he believes is related to that the infection, however, he also noticed new redness in the left foot that started yesterday.   He walks some steps, but mostly uses wheelchair.    Vitals:  T(C): 36.6 (06-15 @ 08:05), Max: 36.8 (06-14 @ 23:45)  HR: 76 (06-15 @ 09:15) (70 - 80)  BP: 120/68 (06-15 @ 08:05) (105/52 - 135/72)  RR: 17 (06-15 @ 08:05) (14 - 17)  SpO2: 94% (06-15 @ 09:15) (92% - 98%)    06-14 @ 07:01  -  06-15 @ 07:00  --------------------------------------------------------  IN:  Total IN: 0 mL    OUT:    Voided (mL): 400 mL  Total OUT: 400 mL    Total NET: -400 mL      Physical Exam:    General: AAOx3, Well developed, NAD  Chest: Normal respiratory effort  Heart: RRR  Abdomen: Soft, NTND, no masses  Neuro/Psych: No localized deficits. Normal spech, normal tone  Skin: Normal, no rashes, no lesions noted.   Extremities: Warm, bilateral 2+ pitting edema  RLE: purple/red patch in above the ankle, petechia and erythema in the dorsum of the foot, going into both malleolus, strongly palpable PT/DP pulses  LLE:purple/red petechia in the dorsum of the foot, extending up to the ankle, strongly palpable PT/DP pulses    06-15 @ 06:43                    11.0  CBC: 8.45>)-------(<134                     35.5                 143 | 106 | 34    CMP:  ----------------------< 149               4.1 | 32 | 1.74                      Ca:9.4  Phos:-  Mg:-               -|      |-        LFTs:  ------|-|-----             -|      |-  06-14 @ 14:39                    12.2  CBC: 8.13>)-------(<139                     38.3                 135 | 102 | 38    CMP:  ----------------------< 230               3.9 | 27 | 1.64                      Ca:9.4  Phos:-  Mg:-               0.5|      |20        LFTs:  ------|72|-----             -|      |-      Urinalysis with Rflx Culture (collected 06-14-25 @ 17:57)      Current Inpatient Medications:  acetaminophen     Tablet .. 650 milliGRAM(s) Oral every 6 hours PRN  albuterol    90 MICROgram(s) HFA Inhaler 1 Puff(s) Inhalation every 4 hours PRN  albuterol/ipratropium for Nebulization 3 milliLiter(s) Nebulizer every 6 hours  aluminum hydroxide/magnesium hydroxide/simethicone Suspension 30 milliLiter(s) Oral every 4 hours PRN  ascorbic acid 500 milliGRAM(s) Oral daily  aspirin  chewable 81 milliGRAM(s) Oral daily  cefTRIAXone Injectable. 2000 milliGRAM(s) IV Push every 24 hours  clonazePAM Oral Disintegrating Tablet 0.5 milliGRAM(s) Oral three times a day  dextrose 5%. 1000 milliLiter(s) (50 mL/Hr) IV Continuous <Continuous>  dextrose 5%. 1000 milliLiter(s) (100 mL/Hr) IV Continuous <Continuous>  dextrose 50% Injectable 25 Gram(s) IV Push once  dextrose 50% Injectable 12.5 Gram(s) IV Push once  dextrose 50% Injectable 25 Gram(s) IV Push once  dextrose Oral Gel 15 Gram(s) Oral once PRN  diphenhydrAMINE Injectable 25 milliGRAM(s) IV Push every 12 hours PRN  enoxaparin Injectable 40 milliGRAM(s) SubCutaneous every 24 hours  ferrous    sulfate 325 milliGRAM(s) Oral daily  fluticasone propionate/ salmeterol 250-50 MICROgram(s) Diskus 1 Dose(s) Inhalation two times a day  furosemide    Tablet 40 milliGRAM(s) Oral every 12 hours  glucagon  Injectable 1 milliGRAM(s) IntraMuscular once  insulin glargine Injectable (LANTUS) 10 Unit(s) SubCutaneous every morning  insulin lispro (ADMELOG) corrective regimen sliding scale   SubCutaneous three times a day before meals  insulin lispro (ADMELOG) corrective regimen sliding scale   SubCutaneous at bedtime  insulin lispro Injectable (ADMELOG) 5 Unit(s) SubCutaneous three times a day before meals  melatonin 3 milliGRAM(s) Oral at bedtime PRN  montelukast 10 milliGRAM(s) Oral at bedtime  multivitamin 1 Tablet(s) Oral daily  ondansetron Injectable 4 milliGRAM(s) IV Push every 8 hours PRN  oxyCODONE    IR 10 milliGRAM(s) Oral two times a day  pantoprazole    Tablet 40 milliGRAM(s) Oral before breakfast  polyethylene glycol 3350 17 Gram(s) Oral daily PRN  predniSONE   Tablet 10 milliGRAM(s) Oral daily  pregabalin 150 milliGRAM(s) Oral two times a day  rosuvastatin 40 milliGRAM(s) Oral at bedtime  saccharomyces boulardii 250 milliGRAM(s) Oral daily  senna 1 Tablet(s) Oral at bedtime  sertraline 150 milliGRAM(s) Oral daily  vancomycin  IVPB 1250 milliGRAM(s) IV Intermittent every 12 hours

## 2025-06-15 NOTE — DIETITIAN INITIAL EVALUATION ADULT - NSFNSGIIOFT_GEN_A_CORE
I&O's Detail    14 Jun 2025 07:01  -  15 Jun 2025 07:00  --------------------------------------------------------  IN:  Total IN: 0 mL    OUT:    Voided (mL): 400 mL  Total OUT: 400 mL    Total NET: -400 mL

## 2025-06-15 NOTE — CONSULT NOTE ADULT - SUBJECTIVE AND OBJECTIVE BOX
Date of consult: 6/15/25     HPI:  74-year-old  Male with  PMHx of CHF, DM, COPD, renal insufficiency and recent admission at an OSH  presents with a chief complaint of RLE erythema and pain. Podiatry was consulted for same. Pt reports that he noticed redness to RLE about 2 - 3 weeks ago. Pt was discharged from [facility] for same om Tuesday, 6/10 where he received several days of an unknown IV abx and then sent home of doxycycline. He took his meds as prescribed. However, he cont to have pain, fevers and worsening redness of his R ankle and foot. He denies fevers today, no CP SHOB, no abd pain, No N/V. He is concerned that he may have arterial disease but has not had a vascular workup. Pt denies chills and headache.    PMH  Prostate cancer  Type II diabetes mellitus  Chronic obstructive pulmonary disease (COPD)  CHF (congestive heart failure)  Renal insufficiency  H/O migraine  Insomnia  Constipation  S/P foot surgery      FAMILY HISTORY:    Social History:      Allergies    tetracycline (Unknown)  vancomycin (Other)  IODINE (Unknown)      Allergies:tetracycline (Unknown)  vancomycin (Other)  IODINE (Unknown)      Labs:                          11.0   8.45  )-----------( 134      ( 15 Justo 2025 06:43 )             35.5     WBC Trend  8.45 Date (06-15 @ 06:43)  8.13 Date (06-14 @ 14:39)  9.27 Date (06-10 @ 07:46)  7.60 Date (06-09 @ 07:40)  8.74 Date (06-08 @ 08:15)  7.56 Date (06-07 @ 07:15)  8.38 Date (06-06 @ 09:05)  8.83 Date (06-05 @ 07:05)  9.50 Date (06-04 @ 06:05)  9.64 Date (06-03 @ 14:45)  9.70 Date (05-27 @ 07:50)  9.32 Date (05-26 @ 08:12)  10.81[H] Date (05-25 @ 07:10)  10.51[H] Date (05-24 @ 07:10)  9.04 Date (05-23 @ 07:30)      Chem  06-15    143  |  106  |  34[H]  ----------------------------<  149[H]  4.1   |  32[H]  |  1.74[H]    Ca    9.4      15 Justo 2025 06:43    TPro  7.6  /  Alb  3.2[L]  /  TBili  0.5  /  DBili  x   /  AST  20  /  ALT  31  /  AlkPhos  72  06-14          T(F): 97.9 (06-15-25 @ 08:05), Max: 98.2 (06-14-25 @ 23:45)  HR: 76 (06-15-25 @ 09:15) (70 - 80)  BP: 120/68 (06-15-25 @ 08:05) (105/52 - 135/72)  RR: 17 (06-15-25 @ 08:05) (14 - 18)  SpO2: 94% (06-15-25 @ 09:15) (91% - 98%)  Wt(kg): --    REVIEW OF SYSTEMS:    CONSTITUTIONAL: No weakness, fevers or chills  EYES: No visual changes  RESPIRATORY: No cough, wheezing; No shortness of breath  CARDIOVASCULAR: No chest pain or palpitations  GASTROINTESTINAL: No abdominal or epigastric pain. No nausea, vomiting; No diarrhea or constipation.   GENITOURINARY: No dysuria, frequency or hematuria  NEUROLOGICAL: No numbness or weakness  SKIN: See physical examination.  All other review of systems is negative unless indicated above    Physical Exam:   Constitutional: NAD, alert;  Lower Extremity Focus  Derm:  Skin warm, dry and supple bilateral.    Right: small scab lesion to incision site s/p Hallux Bunionectomy, diffuse  +1 pitting edema to  Beto LE,  + Erythema to RLE, diffuse ecchymosis to beto LE, Warmth to touch (R>L)  Vascular: Dorsalis Pedis and Posterior Tibial pulses 1/4.  Capillary re-fill time less then 3 seconds digits 1-5 bilateral.    Neuro: Protective sensation diminished to the level of the digits bilateral.  MSK: Muscle strength 5/5 all major muscle groups bilateral. Pain to palpation of RLE         Date of consult: 6/15/25     HPI:  74-year-old  Male with  PMHx of CHF, DM, COPD, renal insufficiency and recent admission at an OSH  presents with a chief complaint of RLE erythema and pain. Podiatry was consulted for same. Pt reports that he noticed redness to RLE about 2 - 3 weeks ago. Pt was discharged from [facility] for same om Tuesday, 6/10 where he received several days of an unknown IV abx and then sent home of doxycycline. He took his meds as prescribed. However, he cont to have pain, fevers and worsening redness of his R ankle and foot. He denies fevers today, no CP SHOB, no abd pain, No N/V. He is concerned that he may have arterial disease but has not had a vascular workup. Pt denies chills and headache.    PMH  Prostate cancer  Type II diabetes mellitus  Chronic obstructive pulmonary disease (COPD)  CHF (congestive heart failure)  Renal insufficiency  H/O migraine  Insomnia  Constipation  S/P foot surgery      FAMILY HISTORY:    Social History:      Allergies    tetracycline (Unknown)  vancomycin (Other)  IODINE (Unknown)      Allergies:tetracycline (Unknown)  vancomycin (Other)  IODINE (Unknown)      Labs:                          11.0   8.45  )-----------( 134      ( 15 Justo 2025 06:43 )             35.5     WBC Trend  8.45 Date (06-15 @ 06:43)  8.13 Date (06-14 @ 14:39)  9.27 Date (06-10 @ 07:46)  7.60 Date (06-09 @ 07:40)  8.74 Date (06-08 @ 08:15)  7.56 Date (06-07 @ 07:15)  8.38 Date (06-06 @ 09:05)  8.83 Date (06-05 @ 07:05)  9.50 Date (06-04 @ 06:05)  9.64 Date (06-03 @ 14:45)  9.70 Date (05-27 @ 07:50)  9.32 Date (05-26 @ 08:12)  10.81[H] Date (05-25 @ 07:10)  10.51[H] Date (05-24 @ 07:10)  9.04 Date (05-23 @ 07:30)      Chem  06-15    143  |  106  |  34[H]  ----------------------------<  149[H]  4.1   |  32[H]  |  1.74[H]    Ca    9.4      15 Justo 2025 06:43    TPro  7.6  /  Alb  3.2[L]  /  TBili  0.5  /  DBili  x   /  AST  20  /  ALT  31  /  AlkPhos  72  06-14          T(F): 97.9 (06-15-25 @ 08:05), Max: 98.2 (06-14-25 @ 23:45)  HR: 76 (06-15-25 @ 09:15) (70 - 80)  BP: 120/68 (06-15-25 @ 08:05) (105/52 - 135/72)  RR: 17 (06-15-25 @ 08:05) (14 - 18)  SpO2: 94% (06-15-25 @ 09:15) (91% - 98%)  Wt(kg): --    REVIEW OF SYSTEMS:    CONSTITUTIONAL: No weakness, fevers or chills  EYES: No visual changes  RESPIRATORY: No cough, wheezing; No shortness of breath  CARDIOVASCULAR: No chest pain or palpitations  GASTROINTESTINAL: No abdominal or epigastric pain. No nausea, vomiting; No diarrhea or constipation.   GENITOURINARY: No dysuria, frequency or hematuria  NEUROLOGICAL: No numbness or weakness  SKIN: See physical examination.  All other review of systems is negative unless indicated above    Physical Exam:   Constitutional: NAD, alert;  Lower Extremity Focus  Derm:  Skin warm, dry and supple bilateral.    Right: small scab lesion to incision site s/p Hallux Bunionectomy, diffuse  +1 pitting edema to  Beto LE,  + Erythema to RLE, diffuse petechiae  to beto LE, Warmth to touch (R>L)  Vascular: Dorsalis Pedis and Posterior Tibial pulses 1/4.  Capillary re-fill time less then 3 seconds digits 1-5 bilateral.    Neuro: Protective sensation diminished to the level of the digits bilateral.  MSK: Muscle strength 5/5 all major muscle groups bilateral. Pain to palpation of RLE

## 2025-06-15 NOTE — DIETITIAN INITIAL EVALUATION ADULT - PERTINENT MEDS FT
MEDICATIONS  (STANDING):  albuterol/ipratropium for Nebulization 3 milliLiter(s) Nebulizer every 6 hours  aspirin  chewable 81 milliGRAM(s) Oral daily  cefTRIAXone Injectable. 2000 milliGRAM(s) IV Push every 24 hours  clonazePAM Oral Disintegrating Tablet 0.5 milliGRAM(s) Oral three times a day  dextrose 5%. 1000 milliLiter(s) (50 mL/Hr) IV Continuous <Continuous>  dextrose 5%. 1000 milliLiter(s) (100 mL/Hr) IV Continuous <Continuous>  dextrose 50% Injectable 25 Gram(s) IV Push once  dextrose 50% Injectable 12.5 Gram(s) IV Push once  dextrose 50% Injectable 25 Gram(s) IV Push once  enoxaparin Injectable 40 milliGRAM(s) SubCutaneous every 24 hours  furosemide    Tablet 40 milliGRAM(s) Oral every 12 hours  glucagon  Injectable 1 milliGRAM(s) IntraMuscular once  insulin glargine Injectable (LANTUS) 10 Unit(s) SubCutaneous every morning  insulin lispro (ADMELOG) corrective regimen sliding scale   SubCutaneous three times a day before meals  insulin lispro (ADMELOG) corrective regimen sliding scale   SubCutaneous at bedtime  insulin lispro Injectable (ADMELOG) 5 Unit(s) SubCutaneous three times a day before meals  oxyCODONE    IR 10 milliGRAM(s) Oral two times a day  predniSONE   Tablet 10 milliGRAM(s) Oral daily  pregabalin 150 milliGRAM(s) Oral two times a day  rosuvastatin 40 milliGRAM(s) Oral at bedtime  senna 1 Tablet(s) Oral at bedtime  sertraline 150 milliGRAM(s) Oral daily  vancomycin  IVPB 1250 milliGRAM(s) IV Intermittent every 12 hours    MEDICATIONS  (PRN):  acetaminophen     Tablet .. 650 milliGRAM(s) Oral every 6 hours PRN Temp greater or equal to 38C (100.4F), Mild Pain (1 - 3)  albuterol    90 MICROgram(s) HFA Inhaler 1 Puff(s) Inhalation every 4 hours PRN Shortness of Breath and/or Wheezing  aluminum hydroxide/magnesium hydroxide/simethicone Suspension 30 milliLiter(s) Oral every 4 hours PRN Dyspepsia  dextrose Oral Gel 15 Gram(s) Oral once PRN Blood Glucose LESS THAN 70 milliGRAM(s)/deciliter  diphenhydrAMINE Injectable 25 milliGRAM(s) IV Push every 12 hours PRN prior to vancomycin  melatonin 3 milliGRAM(s) Oral at bedtime PRN Insomnia  ondansetron Injectable 4 milliGRAM(s) IV Push every 8 hours PRN Nausea and/or Vomiting  polyethylene glycol 3350 17 Gram(s) Oral daily PRN for constipation    Home Medications:  albuterol 0.63 mg/3 mL (0.021%) inhalation solution: 3 milliliter(s) by nebulizer 3 times a day as needed for  shortness of breath and/or wheezing (:48)  Artificial Tears ophthalmic solution: 1 drop(s) in each eye 2 times a day (:48)  ascorbic acid 500 mg oral tablet: 1 tab(s) orally 2 times a day (:48)  aspirin 81 mg oral tablet: 1 tab(s) orally once a day (:48)  clonazePAM 0.25 mg oral tablet, disintegratin tab(s) orally 3 times a day (:48)  ferrous sulfate 325 mg (65 mg elemental iron) oral tablet: 1 tab(s) orally once a day (:48)  furosemide 40 mg oral tablet: 1 tab(s) orally every 12 hours (:48)  HumaLOG 100 units/mL subcutaneous solution: subcutaneous 3 times a day (before meals) Inject subcutaneously 3 times a day using sliding scale (10 Justo 2025 11:34)  HumaLOG KwikPen 100 units/mL injectable solution: injectable 3 times a day (before meals) Inject 15 units subcutaneously 3 times a day (before each meal) in addition to sliding scale (10 Justo 2025 11:34)  Jardiance 10 mg oral tablet: 1 tab(s) orally once a day (:48)  loperamide 2 mg oral capsule: 1 cap(s) orally after each loose BM every 6 hours as needed **No more than 3 capsules (6mg) a day** (:48)  loratadine 10 mg oral tablet: 1 tab(s) orally once a day (:48)  Melatonin 3 mg oral tablet: 1 tab(s) orally once a day (at bedtime) (:48)  montelukast 10 mg oral tablet: 1 tab(s) orally once a day (at bedtime) (2025 08:48)  multivitamin: 1 tab(s) orally once a day (:48)  oxyCODONE 5 mg oral tablet: 2 tab(s) orally 2 times a day MDD: 4 tablets (2025 08:48)  pantoprazole 40 mg oral delayed release tablet: 1 tab(s) orally once a day (:48)  polyethylene glycol 3350 oral powder for reconstitution: 17 gram(s) orally once a day As needed for constipation (10 Justo 2025 11:34)  Potassium Chloride (Eqv-Klor-Con M20) 20 mEq oral tablet, extended release: 1 tab(s) orally 2 times a day (:48)  predniSONE 10 mg oral tablet: 1 tab(s) orally once a day with food or milk for 1 month, then after 1 month take 5mg orally once a day (:48)  pregabalin 150 mg oral capsule: 1 cap(s) orally 2 times a day MDD: 2 capsules (:48)  ramelteon 8 mg oral tablet: 1 tab(s) orally once a day (at bedtime) (:48)  rosuvastatin 40 mg oral tablet: 1 tab(s) orally once a day (at bedtime) (:48)  saccharomyces boulardii lyo 250 mg oral capsule: 1 cap(s) orally once a day (2025 08:48)  Saline Mist 0.65% nasal spray: 1 spray(s) in each nostril 2 times a day (:48)  senna leaf extract oral tablet: 1 tab(s) orally once a day (at bedtime) (10 Justo 2025 11:34)  sertraline 100 mg oral tablet: 1.5 tab(s) orally once a day (150mg) (2025 08:48)  Symbicort 160 mcg-4.5 mcg/inh inhalation aerosol: 2 puff(s) inhaled 2 times a day (2025 08:48)

## 2025-06-15 NOTE — DIETITIAN INITIAL EVALUATION ADULT - PERTINENT LABORATORY DATA
06-15    143  |  106  |  34[H]  ----------------------------<  149[H]  4.1   |  32[H]  |  1.74[H]    Ca    9.4      15 Justo 2025 06:43    TPro  7.6  /  Alb  3.2[L]  /  TBili  0.5  /  DBili  x   /  AST  20  /  ALT  31  /  AlkPhos  72  06-14  POCT Blood Glucose.: 168 mg/dL (06-15-25 @ 07:36)  A1C with Estimated Average Glucose Result: 10.0 % (05-22-25 @ 09:11)  A1C with Estimated Average Glucose Result: 10.6 % (12-29-24 @ 06:43)  A1C with Estimated Average Glucose Result: 7.3 % (10-02-24 @ 06:07)

## 2025-06-15 NOTE — CONSULT NOTE ADULT - SUBJECTIVE AND OBJECTIVE BOX
Patient is a 74y old  Male who presents with a chief complaint of DFU (14 Jun 2025 22:00)    HPI:  Patient is a 74y old  Male who presents with a chief complaint of RLE infection, He has a hx of CHF, DM, COPD, renal insufficiecy, and recent admission at an OSH for RLE cellulitis. He was given several days of an unknown IV abx and then sent home of doxycycline. He took his meds as prescribed and was DC to home this past tuesday. However, he cont to have pain, fevers and worsening redness of his R ankle and foot. He denies fevers today, no CP SHOB, no abd pain, No N/V. He is concerned that he may have arterial disease but has not had a vascular workup. (14 Jun 2025 22:00)  Above HPI reviewed and reconciled with patient    74M with diabetes, COPD, heart failure, CKD, hypertension, history of right foot ulcer presents 6/14 with worsening RLE swelling and pain  Denies any fever or chills, abdominal pain, cough, n/v, diarrhea or dysuria  Recently was admitted 6/5/2025 for RLE Cellulitis, CT did not show abscess, was treated with IV daptomycin and transition to oral Doxy  Afebrile on admission, on RA  No leukocytosis  Cr 1.74  UA negative  CXR clear  US without DVT    PAST MEDICAL & SURGICAL HISTORY:  Prostate cancer      Type II diabetes mellitus      Chronic obstructive pulmonary disease (COPD)      CHF (congestive heart failure)      Renal insufficiency      H/O migraine      Insomnia      Constipation      S/P foot surgery        FAMILY HISTORY:    Social Hx:    Allergies  tetracycline (Unknown)  vancomycin (Other)  IODINE (Unknown)    ANTIMICROBIALS (past 90 days)  MEDICATIONS  (STANDING):    cefTRIAXone Injectable.   1000 milliGRAM(s) IV Push (06-14-25 @ 17:44)    clindamycin IVPB   100 mL/Hr IV Intermittent (06-14-25 @ 17:44)    piperacillin/tazobactam IVPB.   200 mL/Hr IV Intermittent (06-14-25 @ 20:54)    piperacillin/tazobactam IVPB.-   25 mL/Hr IV Intermittent (06-15-25 @ 01:49)    vancomycin  IVPB   83.33 mL/Hr IV Intermittent (06-14-25 @ 22:26)        ACTIVE ANTIMICROBIALS  piperacillin/tazobactam IVPB.. 3.375 every 8 hours (6/14 --- )  vancomycin  IVPB 1250 every 12 hours (6/14 --- )    MEDICATIONS  (STANDING):  acetaminophen     Tablet .. 650 every 6 hours PRN  albuterol    90 MICROgram(s) HFA Inhaler 1 every 4 hours PRN  albuterol/ipratropium for Nebulization 3 every 6 hours  aluminum hydroxide/magnesium hydroxide/simethicone Suspension 30 every 4 hours PRN  aspirin  chewable 81 daily  clonazePAM Oral Disintegrating Tablet 0.5 three times a day  dextrose 50% Injectable 25 once  dextrose 50% Injectable 12.5 once  dextrose 50% Injectable 25 once  dextrose Oral Gel 15 once PRN  diphenhydrAMINE Injectable 25 every 12 hours PRN  enoxaparin Injectable 40 every 24 hours  furosemide    Tablet 40 every 12 hours  glucagon  Injectable 1 once  insulin glargine Injectable (LANTUS) 10 every morning  insulin lispro (ADMELOG) corrective regimen sliding scale  three times a day before meals  insulin lispro (ADMELOG) corrective regimen sliding scale  at bedtime  insulin lispro Injectable (ADMELOG) 5 three times a day before meals  melatonin 3 at bedtime PRN  ondansetron Injectable 4 every 8 hours PRN  oxyCODONE    IR 10 two times a day  polyethylene glycol 3350 17 daily PRN  predniSONE   Tablet 10 daily  pregabalin 150 two times a day  rosuvastatin 40 at bedtime  senna 1 at bedtime  sertraline 150 daily      REVIEW OF SYSTEMS  [  ] ROS unobtainable because:    [ x ] All other systems negative except as noted below    Constitutional:  [ ] fever [ ] chills  [ ] weight loss  [ ]night sweat  [ ]poor appetite/PO intake [ ]fatigue   Skin:  [ ] rash [ ] phlebitis	  Eyes: [ ] icterus [ ] pain  [ ] discharge	  ENMT: [ ] sore throat  [ ] thrush [ ] ulcers [ ] exudates [ ]anosmia  Respiratory: [ ] dyspnea [ ] hemoptysis [ ] cough [ ] sputum	  Cardiovascular:  [ ] chest pain [ ] palpitations [ ] edema	  Gastrointestinal:  [ ] nausea [ ] vomiting [ ] diarrhea [ ] constipation [ ] pain	  Genitourinary:  [ ] dysuria [ ] frequency [ ] hematuria [ ] discharge [ ] flank pain  [ ] incontinence  Musculoskeletal:  [ ] myalgias [ ] arthralgias [ ] arthritis  [ ] back pain  Neurological:  [ ] headache [ ] weakness [ ] seizures  [ ] confusion/altered mental status    Vital Signs Last 24 Hrs  T(C): 36.6 (15 Justo 2025 08:05), Max: 36.8 (14 Jun 2025 23:45)  T(F): 97.9 (15 Justo 2025 08:05), Max: 98.2 (14 Jun 2025 23:45)  HR: 76 (15 Justo 2025 08:05) (70 - 80)  BP: 120/68 (15 Justo 2025 08:05) (105/52 - 135/72)  BP(mean): 80 (15 Justo 2025 05:25) (64 - 80)  RR: 17 (15 Justo 2025 08:05) (14 - 18)  SpO2: 92% (15 Justo 2025 08:05) (91% - 98%)    Parameters below as of 15 Justo 2025 08:05  Patient On (Oxygen Delivery Method): room air        Physical Exam:  Constitutional: frail, NAD  Head/Eyes: no icterus  LUNGS:  CTA  CVS:  regular rhythm  Abd:  soft, non-tender; non-distended  Ext:  RLE swelling, erythema  Vascular:  IV site no erythema tenderness or discharge  Neuro: AAO X 3, non- focal    Labs: all available labs reviewed                        11.0   8.45  )-----------( 134      ( 15 Justo 2025 06:43 )             35.5     06-15    143  |  106  |  34[H]  ----------------------------<  149[H]  4.1   |  32[H]  |  1.74[H]    Ca    9.4      15 Justo 2025 06:43    TPro  7.6  /  Alb  3.2[L]  /  TBili  0.5  /  DBili  x   /  AST  20  /  ALT  31  /  AlkPhos  72  06-14     LIVER FUNCTIONS - ( 14 Jun 2025 14:39 )  Alb: 3.2 g/dL / Pro: 7.6 gm/dL / ALK PHOS: 72 U/L / ALT: 31 U/L / AST: 20 U/L / GGT: x           Urinalysis Basic - ( 15 Justo 2025 06:43 )    Color: x / Appearance: x / SG: x / pH: x  Gluc: 149 mg/dL / Ketone: x  / Bili: x / Urobili: x   Blood: x / Protein: x / Nitrite: x   Leuk Esterase: x / RBC: x / WBC x   Sq Epi: x / Non Sq Epi: x / Bacteria: x          Radiology: all available radiological tests reviewed    Advanced directives addressed: full resuscitation

## 2025-06-15 NOTE — CONSULT NOTE ADULT - ASSESSMENT
Assessment:  74M with diabetes, COPD, heart failure, CKD, hypertension, history of right foot ulcer presents 6/14 with worsening RLE swelling and pain  Denies any fever or chills, abdominal pain, cough, n/v, diarrhea or dysuria  Recently was admitted 6/5/2025 for RLE Cellulitis, CT did not show abscess, was treated with IV daptomycin and transition to oral Doxy  Afebrile on admission, on RA  No leukocytosis  Cr 1.74  UA negative  CXR clear  US without DVT    Antimicrobials:  piperacillin/tazobactam IVPB.. 3.375 every 8 hours (6/14 --- ) CTX (6/15 --- )  vancomycin  IVPB 1250 every 12 hours (6/14 --- )    Impression:   #RLE Cellulitis  #Tetracycline Allergy, but tolerates Doxy  #Vanco Surya Syndrome     Recommendations:  - continue empiric Vanco 1250mg q12 (Day #2)  - please check Vancomycin trough before 4th sequential dose  - switch empiric Zosyn to CTX 2G q24 (Day #2)  - monitor temperature curve  - trend WBC  - reason for abx use reviewed with patient  - side effects of antibiotic discussed, tolerating abx well so far  - Prior cultures reviewed. An epidemiologic assessment was performed. There is a significant risk for resistant microorganisms to spread to family members, and/or healthcare staff. Isolation precautions based on infection control policy. Will reconsider further isolation measures based on new culture results and other clinical data as appropriate Appropriate cultures collected and an appropriate broad spectrum antibiotic therapy will be considered  - follow up BCx x2  - consider MRI RLE with contrast if able  - rest per primary team    Clinical team may change from intravenous to oral antibiotics when the following criteria are met:   1. Patient is clinically improving/stable       a)	Improved signs and symptoms of infection from initial presentation       b)	Afebrile for 24 hours       c)	Leukocytosis trending towards normal range   2. Patient is tolerating oral intake   3. Initial/repeat blood cultures are negative OR do not need to wait for preliminary blood cultures to result    Cannot advise changing to oral antibiotic therapy until culture sensitivity is available.   Assessment:  74M with diabetes, COPD, heart failure, CKD, hypertension, history of right foot ulcer presents 6/14 with worsening RLE swelling and pain  Denies any fever or chills, abdominal pain, cough, n/v, diarrhea or dysuria  Recently was admitted 6/5/2025 for RLE Cellulitis, CT did not show abscess, was treated with IV daptomycin and transition to oral Doxy  Afebrile on admission, on RA  No leukocytosis  Cr 1.74  UA negative  CXR clear  US without DVT    Antimicrobials:  piperacillin/tazobactam IVPB.. 3.375 every 8 hours (6/14 --- ) CTX (6/15 --- )  vancomycin  IVPB 1250 every 12 hours (6/14 --- )    Impression:   #RLE Cellulitis  #CKD  #Tetracycline Allergy, but tolerates Doxy  #Vanco Surya Syndrome     Recommendations:  - continue empiric Vanco 1250mg q12 (Day #2)  - please check Vancomycin trough before 4th sequential dose  - switch empiric Zosyn to CTX 2G q24 (Day #2)  - monitor temperature curve  - trend WBC  - reason for abx use reviewed with patient  - side effects of antibiotic discussed, tolerating abx well so far  - Prior cultures reviewed. An epidemiologic assessment was performed. There is a significant risk for resistant microorganisms to spread to family members, and/or healthcare staff. Isolation precautions based on infection control policy. Will reconsider further isolation measures based on new culture results and other clinical data as appropriate Appropriate cultures collected and an appropriate broad spectrum antibiotic therapy will be considered  - follow up BCx x2  - consider MRI RLE with contrast if able  - rest per primary team    Clinical team may change from intravenous to oral antibiotics when the following criteria are met:   1. Patient is clinically improving/stable       a)	Improved signs and symptoms of infection from initial presentation       b)	Afebrile for 24 hours       c)	Leukocytosis trending towards normal range   2. Patient is tolerating oral intake   3. Initial/repeat blood cultures are negative OR do not need to wait for preliminary blood cultures to result    Cannot advise changing to oral antibiotic therapy until culture sensitivity is available.

## 2025-06-15 NOTE — DIETITIAN INITIAL EVALUATION ADULT - FEEDING SKILL
Subjective   Neil Pride is a 28 y.o. male.     History of Present Illness   3rd f/u for low back pain , see previous notes. Pt got in with [facility] PT, initial session last week went well and patient was doing exercises at home over the weekend. Back pain seemed to be improving. Patient then went back to PT yesterday and particular manipulation sent his back into severe spasms making it hard to walk. Spasms lasted all night. Feels a little better today. PT Jam called this morning to discuss.PT concerned about spondylolisthesis due to patient's response. XR of thoracic and lumbar spine were normal. Due to go back to PT tomorrow.   Pt states PT was pressing on vertebrae of lumbar spine and during release he experienced the significant pain. Pain is as bad or possibly worse than where he started  Pt not ready to return to work at this time     The following portions of the patient's history were reviewed and updated as appropriate: allergies, current medications, past family history, past medical history, past social history, past surgical history and problem list.    Review of Systems   Constitutional: Negative.  Negative for chills, diaphoresis, fatigue and fever.   HENT: Negative.  Negative for congestion, ear discharge, ear pain, hearing loss, nosebleeds, postnasal drip, sinus pressure, sneezing and sore throat.    Eyes: Negative.    Respiratory: Negative.  Negative for cough, chest tightness, shortness of breath and wheezing.    Cardiovascular: Negative.  Negative for chest pain, palpitations and leg swelling.   Gastrointestinal: Negative for abdominal distention, abdominal pain, blood in stool, constipation, diarrhea, nausea and vomiting.   Genitourinary: Negative.  Negative for difficulty urinating, dysuria, flank pain, frequency, hematuria and urgency.   Musculoskeletal: Positive for back pain and gait problem. Negative for arthralgias, joint swelling, myalgias, neck pain and neck stiffness.  "  Skin: Negative.  Negative for color change, pallor, rash and wound.   Neurological: Negative for dizziness, syncope, weakness, light-headedness, numbness and headaches.       Objective    Blood pressure 122/80, pulse 76, temperature 98.8 °F (37.1 °C), resp. rate 18, height 177.8 cm (70\"), weight 97.4 kg (214 lb 12.8 oz).     Physical Exam   Constitutional: He is oriented to person, place, and time. He appears well-developed and well-nourished.   HENT:   Head: Normocephalic and atraumatic.   Right Ear: External ear normal.   Left Ear: External ear normal.   Nose: Nose normal.   Mouth/Throat: Oropharynx is clear and moist. No oropharyngeal exudate.   Eyes: Conjunctivae are normal.   Neck: Normal range of motion. Neck supple. No tracheal deviation present. No thyromegaly present.   Cardiovascular: Normal rate, regular rhythm, normal heart sounds and intact distal pulses. Exam reveals no gallop and no friction rub.   No murmur heard.  Pulmonary/Chest: Effort normal and breath sounds normal. No respiratory distress. He has no wheezes. He has no rales. He exhibits no tenderness.   Musculoskeletal: He exhibits no edema or deformity.        Thoracic back: He exhibits decreased range of motion, tenderness and bony tenderness.        Lumbar back: He exhibits decreased range of motion, tenderness, bony tenderness, pain and spasm.   Lymphadenopathy:     He has no cervical adenopathy.   Neurological: He Acute midline low back pain without sciatica  -     HYDROcodone-acetaminophen (NORCO) 5-325 MG per tablet; Take 1 tablet by mouth Every 6 (Six) Hours As Needed for Moderate Pain .    continue with PT as " scheduled   If not improving or continuing to experience significant pain, may need to progress to MRI  Will provide temporary pain management. Risk and benefits of medication discussed. Only take as directed. Pt agrees. Encourage patient to not take pain medication or muscle relaxer prior to PT so he does not mask any pain response. Pt agrees.             Tray set-up, open all containers; meal encouragement/minimal assistance

## 2025-06-15 NOTE — DIETITIAN INITIAL EVALUATION ADULT - OTHER INFO
74y old  Male who presents with a chief complaint of RLE infection, He has a hx of CHF, DM, COPD, renal insufficiecy, and recent admission at an OSH for RLE cellulitis. He was given several days of an unknown IV abx and then sent home of doxycycline. He took his meds as prescribed and was DC to home this past tuesday. However, he cont to have pain, fevers and worsening redness of his R ankle and foot  Admit for RLE cellulitis, CHF     Seen by nutr services on multiple admissions; has NOT been dx'd w/ malnutrition previously. W/ T2DM - 1 POCT elevated x 24 hrs (270, 168) and hgb A1C of 10% on 5/22/25 - indicates poor glycemic control for age. Given 4 units of ademlog x 24 hrs. Endorses continued good appetite since admit (x 1 day). Reports usual dry wt at ~210-220# x 2-3 mo ago; now believes wt ~230#. Bed scale wt of 232# taken by RD on 6/15/25 ; recent wt gain likely due to shift in fluid retention - not clin sig. NFPE reveals no muscle/fat wasting - appears overweight/obese; edema could be masking losses, skewing appearance (+3 L/R leg edema). C/w CHO Consistent Diet AND initiate DASH/TLC diet as tolerated. Add ensure max BID (provides 150kcal, 30g protein) and Wilfred BID 2/2 PI (provides 90 kcal, 2.5 g collagen, 7 g L-Arginine, 7 g L-Glutamine/ 1 packet) to promote wound healing. Wilfred is intended to support tissue building and collagen formation in the dietary management of wounds, which has been clinically shown to support wound healing (including pressure injuries, diabetic foot ulcers surgical incisions, burns, and other acute/chronic wounds) by enhancing collagen formation in as little as 2 weeks. Encourage protein-rich foods, maximize food preferences. Monitor and optimize BG levels between 100-180 mg/dL by medical management. Please provide DM education as soon as medically feasible. See below for other recommendations.

## 2025-06-15 NOTE — PROGRESS NOTE ADULT - SUBJECTIVE AND OBJECTIVE BOX
HPI: 74y old  Male who presented with a chief complaint of RLE infection, He has a hx of CHF, DM, COPD, renal insufficiency and recent admission at an [facility] for RLE cellulitis. He was treated with daptomycin IV abx and then sent home of doxycycline. He took his meds as prescribed and was DC to home this past tuesday. However, he cont to have pain, fevers and worsening redness of his R ankle and foot. He denies fevers today, no CP SHOB, no abd pain, No N/V. He is concerned that he may have arterial disease but has not had a vascular workup.           MEDICATIONS  (STANDING):  albuterol/ipratropium for Nebulization 3 milliLiter(s) Nebulizer every 6 hours  aspirin  chewable 81 milliGRAM(s) Oral daily  cefTRIAXone Injectable. 2000 milliGRAM(s) IV Push every 24 hours  clonazePAM Oral Disintegrating Tablet 0.5 milliGRAM(s) Oral three times a day  dextrose 5%. 1000 milliLiter(s) (50 mL/Hr) IV Continuous <Continuous>  dextrose 5%. 1000 milliLiter(s) (100 mL/Hr) IV Continuous <Continuous>  dextrose 50% Injectable 25 Gram(s) IV Push once  dextrose 50% Injectable 12.5 Gram(s) IV Push once  dextrose 50% Injectable 25 Gram(s) IV Push once  enoxaparin Injectable 40 milliGRAM(s) SubCutaneous every 24 hours  furosemide    Tablet 40 milliGRAM(s) Oral every 12 hours  glucagon  Injectable 1 milliGRAM(s) IntraMuscular once  insulin glargine Injectable (LANTUS) 10 Unit(s) SubCutaneous every morning  insulin lispro (ADMELOG) corrective regimen sliding scale   SubCutaneous three times a day before meals  insulin lispro (ADMELOG) corrective regimen sliding scale   SubCutaneous at bedtime  insulin lispro Injectable (ADMELOG) 5 Unit(s) SubCutaneous three times a day before meals  oxyCODONE    IR 10 milliGRAM(s) Oral two times a day  predniSONE   Tablet 10 milliGRAM(s) Oral daily  pregabalin 150 milliGRAM(s) Oral two times a day  rosuvastatin 40 milliGRAM(s) Oral at bedtime  senna 1 Tablet(s) Oral at bedtime  sertraline 150 milliGRAM(s) Oral daily  vancomycin  IVPB 1250 milliGRAM(s) IV Intermittent every 12 hours    MEDICATIONS  (PRN):  acetaminophen     Tablet .. 650 milliGRAM(s) Oral every 6 hours PRN Temp greater or equal to 38C (100.4F), Mild Pain (1 - 3)  albuterol    90 MICROgram(s) HFA Inhaler 1 Puff(s) Inhalation every 4 hours PRN Shortness of Breath and/or Wheezing  aluminum hydroxide/magnesium hydroxide/simethicone Suspension 30 milliLiter(s) Oral every 4 hours PRN Dyspepsia  dextrose Oral Gel 15 Gram(s) Oral once PRN Blood Glucose LESS THAN 70 milliGRAM(s)/deciliter  diphenhydrAMINE Injectable 25 milliGRAM(s) IV Push every 12 hours PRN prior to vancomycin  melatonin 3 milliGRAM(s) Oral at bedtime PRN Insomnia  ondansetron Injectable 4 milliGRAM(s) IV Push every 8 hours PRN Nausea and/or Vomiting  polyethylene glycol 3350 17 Gram(s) Oral daily PRN for constipation      Vital Signs Last 24 Hrs  T(C): 36.6 (15 Justo 2025 08:05), Max: 36.8 (14 Jun 2025 23:45)  T(F): 97.9 (15 Justo 2025 08:05), Max: 98.2 (14 Jun 2025 23:45)  HR: 76 (15 Justo 2025 08:05) (70 - 80)  BP: 120/68 (15 Justo 2025 08:05) (105/52 - 135/72)  BP(mean): 80 (15 Justo 2025 05:25) (64 - 80)  RR: 17 (15 Justo 2025 08:05) (14 - 18)  SpO2: 92% (15 Justo 2025 08:05) (91% - 98%)    Parameters below as of 15 Justo 2025 08:05  Patient On (Oxygen Delivery Method): room air        GEN: NAD, comfortable, resting in bed  HEENT: NC/AT, EOMI, PERRLA, MMM, clear conjunctiva and sclera, normal hearing, no nasal discharge, throat clear, no thrush, normal dentition.   NECK: supple, no JVD, no LAD, no thyromegaly  BACK:  ROM intact, no spinal/paraspinal tenderness  CV: S1S2, RRR, no mumur  RESP: good air movement, CTABL, no rales, rhonchi or wheezing, respirations unlabored  ABD: +BS, soft, ND, NT, no guarding, no rigidity, no HSM  EXT: +2 radial and pedal pulses, no edema, no calve tenderness  SKIN: No visible Rashes or Ulcers  MSK: ROM intact in all extremities  NEURO:  sensory and CN 2-12 Grossly intact, no motor deficits, no, saddle anesthesia, AOx3  PSYCH: normal behavior         LABS:                          11.0   8.45  )-----------( 134      ( 15 Justo 2025 06:43 )             35.5     15 Justo 2025 06:43    143    |  106    |  34     ----------------------------<  149    4.1     |  32     |  1.74     Ca    9.4        15 Justo 2025 06:43    TPro  7.6    /  Alb  3.2    /  TBili  0.5    /  DBili  x      /  AST  20     /  ALT  31     /  AlkPhos  72     14 Jun 2025 14:39    LIVER FUNCTIONS - ( 14 Jun 2025 14:39 )  Alb: 3.2 g/dL / Pro: 7.6 gm/dL / ALK PHOS: 72 U/L / ALT: 31 U/L / AST: 20 U/L / GGT: x           PT/INR - ( 14 Jun 2025 14:39 )   PT: 12.4 sec;   INR: 1.05 ratio         PTT - ( 14 Jun 2025 14:39 )  PTT:31.4 sec  CAPILLARY BLOOD GLUCOSE      POCT Blood Glucose.: 168 mg/dL (15 Justo 2025 07:36)  POCT Blood Glucose.: 270 mg/dL (14 Jun 2025 22:20)        Urinalysis Basic - ( 15 Justo 2025 06:43 )    Color: x / Appearance: x / SG: x / pH: x  Gluc: 149 mg/dL / Ketone: x  / Bili: x / Urobili: x   Blood: x / Protein: x / Nitrite: x   Leuk Esterase: x / RBC: x / WBC x   Sq Epi: x / Non Sq Epi: x / Bacteria: x        RADIOLOGY:      VTE ppx:   GOC:   From:   DISPO:  HOSPITALIST ATTENDING PROGRESS NOTE    Chart and meds reviewed.  Patient seen and examined    CC: RLE cellulitis     Subjective: pt seen and examined at bedside this AM, awake, alert, had no complaints    All other systems reviewed and found to be negative with the exception of what has been described above.    MEDICATIONS  (STANDING):  albuterol/ipratropium for Nebulization 3 milliLiter(s) Nebulizer every 6 hours  aspirin  chewable 81 milliGRAM(s) Oral daily  cefTRIAXone Injectable. 2000 milliGRAM(s) IV Push every 24 hours  clonazePAM Oral Disintegrating Tablet 0.5 milliGRAM(s) Oral three times a day  dextrose 5%. 1000 milliLiter(s) (50 mL/Hr) IV Continuous <Continuous>  dextrose 5%. 1000 milliLiter(s) (100 mL/Hr) IV Continuous <Continuous>  dextrose 50% Injectable 25 Gram(s) IV Push once  dextrose 50% Injectable 12.5 Gram(s) IV Push once  dextrose 50% Injectable 25 Gram(s) IV Push once  enoxaparin Injectable 40 milliGRAM(s) SubCutaneous every 24 hours  furosemide    Tablet 40 milliGRAM(s) Oral every 12 hours  glucagon  Injectable 1 milliGRAM(s) IntraMuscular once  insulin glargine Injectable (LANTUS) 10 Unit(s) SubCutaneous every morning  insulin lispro (ADMELOG) corrective regimen sliding scale   SubCutaneous three times a day before meals  insulin lispro (ADMELOG) corrective regimen sliding scale   SubCutaneous at bedtime  insulin lispro Injectable (ADMELOG) 5 Unit(s) SubCutaneous three times a day before meals  oxyCODONE    IR 10 milliGRAM(s) Oral two times a day  predniSONE   Tablet 10 milliGRAM(s) Oral daily  pregabalin 150 milliGRAM(s) Oral two times a day  rosuvastatin 40 milliGRAM(s) Oral at bedtime  senna 1 Tablet(s) Oral at bedtime  sertraline 150 milliGRAM(s) Oral daily  vancomycin  IVPB 1250 milliGRAM(s) IV Intermittent every 12 hours    MEDICATIONS  (PRN):  acetaminophen     Tablet .. 650 milliGRAM(s) Oral every 6 hours PRN Temp greater or equal to 38C (100.4F), Mild Pain (1 - 3)  albuterol    90 MICROgram(s) HFA Inhaler 1 Puff(s) Inhalation every 4 hours PRN Shortness of Breath and/or Wheezing  aluminum hydroxide/magnesium hydroxide/simethicone Suspension 30 milliLiter(s) Oral every 4 hours PRN Dyspepsia  dextrose Oral Gel 15 Gram(s) Oral once PRN Blood Glucose LESS THAN 70 milliGRAM(s)/deciliter  diphenhydrAMINE Injectable 25 milliGRAM(s) IV Push every 12 hours PRN prior to vancomycin  melatonin 3 milliGRAM(s) Oral at bedtime PRN Insomnia  ondansetron Injectable 4 milliGRAM(s) IV Push every 8 hours PRN Nausea and/or Vomiting  polyethylene glycol 3350 17 Gram(s) Oral daily PRN for constipation      Vital Signs Last 24 Hrs  T(C): 36.6 (15 Justo 2025 08:05), Max: 36.8 (14 Jun 2025 23:45)  T(F): 97.9 (15 Justo 2025 08:05), Max: 98.2 (14 Jun 2025 23:45)  HR: 76 (15 Justo 2025 08:05) (70 - 80)  BP: 120/68 (15 Justo 2025 08:05) (105/52 - 135/72)  BP(mean): 80 (15 Justo 2025 05:25) (64 - 80)  RR: 17 (15 Justo 2025 08:05) (14 - 18)  SpO2: 92% (15 Justo 2025 08:05) (91% - 98%)    Parameters below as of 15 Justo 2025 08:05  Patient On (Oxygen Delivery Method): room air      PHYSICAL EXAM:  GENERAL: NAD, lying in bed comfortably  HEAD:  Atraumatic, Normocephalic  EYES: conjunctiva and sclera clear  ENT: Moist mucous membranes  NECK: Supple, No JVD  CHEST/LUNG: Clear to auscultation bilaterally; No rales, rhonchi, wheezing. Unlabored respirations  HEART: Regular rate and rhythm; No murmurs  ABDOMEN: Bowel sounds present; Soft, Nontender, Nondistended.   EXTREMITIES:  2+ Peripheral Pulses, brisk capillary refill. No clubbing, cyanosis, or edema  NERVOUS SYSTEM:  Alert & Oriented X3, speech clear. No deficits   MSK: FROM all 4 extremities, full and equal strength  SKIN:  mid to distal RLE erythema with closed wound with black eschar to lateral aspect of leg, there is healed wound to R hallux, no oozing, non blanching purpuric rash to b/l feet         LABS:                          11.0   8.45  )-----------( 134      ( 15 Justo 2025 06:43 )             35.5     15 Justo 2025 06:43    143    |  106    |  34     ----------------------------<  149    4.1     |  32     |  1.74     Ca    9.4        15 Justo 2025 06:43    TPro  7.6    /  Alb  3.2    /  TBili  0.5    /  DBili  x      /  AST  20     /  ALT  31     /  AlkPhos  72     14 Jun 2025 14:39    LIVER FUNCTIONS - ( 14 Jun 2025 14:39 )  Alb: 3.2 g/dL / Pro: 7.6 gm/dL / ALK PHOS: 72 U/L / ALT: 31 U/L / AST: 20 U/L / GGT: x           PT/INR - ( 14 Jun 2025 14:39 )   PT: 12.4 sec;   INR: 1.05 ratio         PTT - ( 14 Jun 2025 14:39 )  PTT:31.4 sec  CAPILLARY BLOOD GLUCOSE      POCT Blood Glucose.: 168 mg/dL (15 Justo 2025 07:36)  POCT Blood Glucose.: 270 mg/dL (14 Jun 2025 22:20)              RADIOLOGY:

## 2025-06-15 NOTE — CONSULT NOTE ADULT - ASSESSMENT
A: 74-year-old Male was seen for the followin. Cellulitis to RLE  2. pain to RLE      P:   Chart reviewed and Patient evaluated;  Discussed diagnosis and treatment with patient. Discussed importance of daily foot examinations, proper shoe gear, importance of tight glycemic control.   X-rays reviewed : on wet read showing no soft tissue emphysema, moderate subluxation of right 1st metatarsal. official read is pending  Applied Bacitracin  with dry sterile dressing  WBAT  to RLE  Antibiotics as per ID  All additional care per Med appreciated  Patient demonstrated verbal understanding of all interventions and tolerated interventions well without any complications.   Podiatry will follow while in house      Case D/W attending Dr. Pandya   A: 74-year-old Male was seen for the followin. Cellulitis to RLE superimposed by vasculitis   2. Pain to RLE      P:   Chart reviewed and Patient evaluated;  Discussed diagnosis and treatment with patient. Discussed importance of daily foot examinations, proper shoe gear, importance of tight glycemic control.   X-rays reviewed : on wet read showing no soft tissue emphysema, moderate subluxation of right 1st metatarsal. official read is pending  Applied Bacitracin  with dry sterile dressing  WBAT  to RLE  Antibiotics as per ID  All additional care per Med appreciated  Patient demonstrated verbal understanding of all interventions and tolerated interventions well without any complications.   Podiatry will follow while in house      Case D/W attending Dr. Pandya

## 2025-06-15 NOTE — PROGRESS NOTE ADULT - ASSESSMENT
74/M with a hx of HTN CHF DM COPD presents with doxycycle resistent RLE cellulitis     RLE cellulitis with closed wound w/eschar to lateral leg   B/l RLE purpuric rash   -Pt was recently admitted at [facility] from 6/3-6/10, treated with zosyn and dapto, d/c on doxycycline   -s/p Zosyn and Vanco in ER  -ID consult appreciated, switched from zosyn to ceftriaxone   -Continue ceftriaxone + vancomycin   -Pt has hx of Red Man syndrome, infuse vanco 3 hours to avoid this. Pre med with benadryl   -MRI ordered   -Check ESR, CRP, CHASE, ANCA, RF, C3, C4  -Podiatry consult   -Vascular consult for PAD    HFpEF   -chronic, stable   -TTE from 5/26/25: preserved EF, 60-65%, valves not well visualized   -euvolemic    COPD/Asthma  -Duonebs  -on symbicort at home, interch with advair here     HTN   -Cont home meds     DM  -Glargine 10 units daily, aspart 5 units with meals, sliding scale     Lovenox for DVT PPx

## 2025-06-16 LAB
ADD ON TEST-SPECIMEN IN LAB: SIGNIFICANT CHANGE UP
ADD ON TEST-SPECIMEN IN LAB: SIGNIFICANT CHANGE UP
ANION GAP SERPL CALC-SCNC: 6 MMOL/L — SIGNIFICANT CHANGE UP (ref 5–17)
BASOPHILS # BLD AUTO: 0.04 K/UL — SIGNIFICANT CHANGE UP (ref 0–0.2)
BASOPHILS NFR BLD AUTO: 0.5 % — SIGNIFICANT CHANGE UP (ref 0–2)
BUN SERPL-MCNC: 41 MG/DL — HIGH (ref 7–23)
C3 SERPL-MCNC: 140 MG/DL — SIGNIFICANT CHANGE UP (ref 81–157)
C4 SERPL-MCNC: 34 MG/DL — SIGNIFICANT CHANGE UP (ref 13–39)
CALCIUM SERPL-MCNC: 8.9 MG/DL — SIGNIFICANT CHANGE UP (ref 8.5–10.1)
CHLORIDE SERPL-SCNC: 103 MMOL/L — SIGNIFICANT CHANGE UP (ref 96–108)
CK SERPL-CCNC: 56 U/L — SIGNIFICANT CHANGE UP (ref 26–308)
CO2 SERPL-SCNC: 28 MMOL/L — SIGNIFICANT CHANGE UP (ref 22–31)
CREAT SERPL-MCNC: 1.42 MG/DL — HIGH (ref 0.5–1.3)
EGFR: 52 ML/MIN/1.73M2 — LOW
EGFR: 52 ML/MIN/1.73M2 — LOW
EOSINOPHIL # BLD AUTO: 0.24 K/UL — SIGNIFICANT CHANGE UP (ref 0–0.5)
EOSINOPHIL NFR BLD AUTO: 3 % — SIGNIFICANT CHANGE UP (ref 0–6)
GLUCOSE SERPL-MCNC: 198 MG/DL — HIGH (ref 70–99)
HCT VFR BLD CALC: 35 % — LOW (ref 39–50)
HGB BLD-MCNC: 11.1 G/DL — LOW (ref 13–17)
IMM GRANULOCYTES # BLD AUTO: 0.12 K/UL — HIGH (ref 0–0.07)
IMM GRANULOCYTES NFR BLD AUTO: 1.5 % — HIGH (ref 0–0.9)
LYMPHOCYTES # BLD AUTO: 2.95 K/UL — SIGNIFICANT CHANGE UP (ref 1–3.3)
LYMPHOCYTES NFR BLD AUTO: 36.4 % — SIGNIFICANT CHANGE UP (ref 13–44)
MCHC RBC-ENTMCNC: 28.8 PG — SIGNIFICANT CHANGE UP (ref 27–34)
MCHC RBC-ENTMCNC: 31.7 G/DL — LOW (ref 32–36)
MCV RBC AUTO: 90.9 FL — SIGNIFICANT CHANGE UP (ref 80–100)
MONOCYTES # BLD AUTO: 0.97 K/UL — HIGH (ref 0–0.9)
MONOCYTES NFR BLD AUTO: 12 % — SIGNIFICANT CHANGE UP (ref 2–14)
NEUTROPHILS # BLD AUTO: 3.78 K/UL — SIGNIFICANT CHANGE UP (ref 1.8–7.4)
NEUTROPHILS NFR BLD AUTO: 46.6 % — SIGNIFICANT CHANGE UP (ref 43–77)
NRBC # BLD AUTO: 0 K/UL — SIGNIFICANT CHANGE UP (ref 0–0)
NRBC # FLD: 0 K/UL — SIGNIFICANT CHANGE UP (ref 0–0)
NRBC BLD AUTO-RTO: 0 /100 WBCS — SIGNIFICANT CHANGE UP (ref 0–0)
PLATELET # BLD AUTO: 141 K/UL — LOW (ref 150–400)
PMV BLD: 10.1 FL — SIGNIFICANT CHANGE UP (ref 7–13)
POTASSIUM SERPL-MCNC: 3 MMOL/L — LOW (ref 3.5–5.3)
POTASSIUM SERPL-SCNC: 3 MMOL/L — LOW (ref 3.5–5.3)
RAPID RVP RESULT: SIGNIFICANT CHANGE UP
RBC # BLD: 3.85 M/UL — LOW (ref 4.2–5.8)
RBC # FLD: 18.3 % — HIGH (ref 10.3–14.5)
SARS-COV-2 RNA SPEC QL NAA+PROBE: SIGNIFICANT CHANGE UP
SODIUM SERPL-SCNC: 137 MMOL/L — SIGNIFICANT CHANGE UP (ref 135–145)
WBC # BLD: 8.1 K/UL — SIGNIFICANT CHANGE UP (ref 3.8–10.5)
WBC # FLD AUTO: 8.1 K/UL — SIGNIFICANT CHANGE UP (ref 3.8–10.5)

## 2025-06-16 PROCEDURE — 93923 UPR/LXTR ART STDY 3+ LVLS: CPT | Mod: 26

## 2025-06-16 PROCEDURE — 99233 SBSQ HOSP IP/OBS HIGH 50: CPT

## 2025-06-16 PROCEDURE — 93925 LOWER EXTREMITY STUDY: CPT | Mod: 26

## 2025-06-16 RX ORDER — DAPTOMYCIN 500 MG/10ML
450 INJECTION, POWDER, LYOPHILIZED, FOR SOLUTION INTRAVENOUS EVERY 24 HOURS
Refills: 0 | Status: DISCONTINUED | OUTPATIENT
Start: 2025-06-16 | End: 2025-06-19

## 2025-06-16 RX ORDER — NYSTATIN 100000 [USP'U]/G
1 CREAM TOPICAL
Refills: 0 | Status: DISCONTINUED | OUTPATIENT
Start: 2025-06-16 | End: 2025-06-19

## 2025-06-16 RX ADMIN — FUROSEMIDE 40 MILLIGRAM(S): 10 INJECTION INTRAMUSCULAR; INTRAVENOUS at 17:16

## 2025-06-16 RX ADMIN — Medication 40 MILLIGRAM(S): at 05:57

## 2025-06-16 RX ADMIN — Medication 1 TABLET(S): at 21:30

## 2025-06-16 RX ADMIN — NYSTATIN 1 APPLICATION(S): 100000 CREAM TOPICAL at 22:57

## 2025-06-16 RX ADMIN — PREGABALIN 150 MILLIGRAM(S): 50 CAPSULE ORAL at 21:30

## 2025-06-16 RX ADMIN — INSULIN LISPRO 4: 100 INJECTION, SOLUTION INTRAVENOUS; SUBCUTANEOUS at 12:06

## 2025-06-16 RX ADMIN — FUROSEMIDE 40 MILLIGRAM(S): 10 INJECTION INTRAMUSCULAR; INTRAVENOUS at 05:51

## 2025-06-16 RX ADMIN — INSULIN LISPRO 8: 100 INJECTION, SOLUTION INTRAVENOUS; SUBCUTANEOUS at 16:51

## 2025-06-16 RX ADMIN — DAPTOMYCIN 118 MILLIGRAM(S): 500 INJECTION, POWDER, LYOPHILIZED, FOR SOLUTION INTRAVENOUS at 11:47

## 2025-06-16 RX ADMIN — Medication 40 MILLIEQUIVALENT(S): at 12:07

## 2025-06-16 RX ADMIN — MONTELUKAST SODIUM 10 MILLIGRAM(S): 10 TABLET ORAL at 21:30

## 2025-06-16 RX ADMIN — INSULIN LISPRO 2: 100 INJECTION, SOLUTION INTRAVENOUS; SUBCUTANEOUS at 21:29

## 2025-06-16 RX ADMIN — CLONAZEPAM 0.5 MILLIGRAM(S): 0.5 TABLET ORAL at 05:51

## 2025-06-16 RX ADMIN — INSULIN LISPRO 10 UNIT(S): 100 INJECTION, SOLUTION INTRAVENOUS; SUBCUTANEOUS at 16:51

## 2025-06-16 RX ADMIN — ROSUVASTATIN CALCIUM 40 MILLIGRAM(S): 20 TABLET, FILM COATED ORAL at 21:30

## 2025-06-16 RX ADMIN — INSULIN GLARGINE-YFGN 18 UNIT(S): 100 INJECTION, SOLUTION SUBCUTANEOUS at 21:29

## 2025-06-16 RX ADMIN — PREGABALIN 150 MILLIGRAM(S): 50 CAPSULE ORAL at 09:21

## 2025-06-16 RX ADMIN — BUTYROSPERMUM PARKII(SHEA BUTTER), SIMMONDSIA CHINENSIS (JOJOBA) SEED OIL, ALOE BARBADENSIS LEAF EXTRACT 250 MILLIGRAM(S): .01; 1; 3.5 LIQUID TOPICAL at 09:23

## 2025-06-16 RX ADMIN — OXYCODONE HYDROCHLORIDE 10 MILLIGRAM(S): 30 TABLET ORAL at 21:32

## 2025-06-16 RX ADMIN — SERTRALINE 150 MILLIGRAM(S): 100 TABLET, FILM COATED ORAL at 09:22

## 2025-06-16 RX ADMIN — ENOXAPARIN SODIUM 40 MILLIGRAM(S): 100 INJECTION SUBCUTANEOUS at 21:34

## 2025-06-16 RX ADMIN — IPRATROPIUM BROMIDE AND ALBUTEROL SULFATE 3 MILLILITER(S): .5; 2.5 SOLUTION RESPIRATORY (INHALATION) at 04:14

## 2025-06-16 RX ADMIN — Medication 40 MILLIEQUIVALENT(S): at 13:16

## 2025-06-16 RX ADMIN — Medication 1 TABLET(S): at 09:23

## 2025-06-16 RX ADMIN — Medication 1 DOSE(S): at 08:07

## 2025-06-16 RX ADMIN — IPRATROPIUM BROMIDE AND ALBUTEROL SULFATE 3 MILLILITER(S): .5; 2.5 SOLUTION RESPIRATORY (INHALATION) at 21:49

## 2025-06-16 RX ADMIN — Medication 325 MILLIGRAM(S): at 09:22

## 2025-06-16 RX ADMIN — IPRATROPIUM BROMIDE AND ALBUTEROL SULFATE 3 MILLILITER(S): .5; 2.5 SOLUTION RESPIRATORY (INHALATION) at 08:07

## 2025-06-16 RX ADMIN — INSULIN LISPRO 2: 100 INJECTION, SOLUTION INTRAVENOUS; SUBCUTANEOUS at 08:06

## 2025-06-16 RX ADMIN — OXYCODONE HYDROCHLORIDE 10 MILLIGRAM(S): 30 TABLET ORAL at 10:31

## 2025-06-16 RX ADMIN — PREDNISONE 10 MILLIGRAM(S): 20 TABLET ORAL at 09:22

## 2025-06-16 RX ADMIN — CEFTRIAXONE 2000 MILLIGRAM(S): 500 INJECTION, POWDER, FOR SOLUTION INTRAMUSCULAR; INTRAVENOUS at 17:16

## 2025-06-16 RX ADMIN — CLONAZEPAM 0.5 MILLIGRAM(S): 0.5 TABLET ORAL at 13:16

## 2025-06-16 RX ADMIN — INSULIN LISPRO 8 UNIT(S): 100 INJECTION, SOLUTION INTRAVENOUS; SUBCUTANEOUS at 08:06

## 2025-06-16 RX ADMIN — Medication 100 MILLILITER(S): at 09:59

## 2025-06-16 RX ADMIN — INSULIN LISPRO 8 UNIT(S): 100 INJECTION, SOLUTION INTRAVENOUS; SUBCUTANEOUS at 12:07

## 2025-06-16 RX ADMIN — OXYCODONE HYDROCHLORIDE 10 MILLIGRAM(S): 30 TABLET ORAL at 09:21

## 2025-06-16 RX ADMIN — Medication 81 MILLIGRAM(S): at 09:23

## 2025-06-16 RX ADMIN — IPRATROPIUM BROMIDE AND ALBUTEROL SULFATE 3 MILLILITER(S): .5; 2.5 SOLUTION RESPIRATORY (INHALATION) at 15:35

## 2025-06-16 RX ADMIN — Medication 1 DOSE(S): at 21:49

## 2025-06-16 RX ADMIN — CLONAZEPAM 0.5 MILLIGRAM(S): 0.5 TABLET ORAL at 21:31

## 2025-06-16 RX ADMIN — Medication 500 MILLIGRAM(S): at 09:22

## 2025-06-16 NOTE — PHYSICAL THERAPY INITIAL EVALUATION ADULT - MARITAL STATUS
nearest relative is beatriz Eastman residing in [place] and his sister Lida LittlejohnAudieHorne living in [facility]

## 2025-06-16 NOTE — PHYSICAL THERAPY INITIAL EVALUATION ADULT - PERTINENT HX OF CURRENT PROBLEM, REHAB EVAL
74y old  Male who presents with a chief complaint of RLE infection, He has a hx of CHF, DM, COPD, renal insufficiency, and recent admission at an outside hospital  for RLE cellulitis. He was given several days of an unknown IV abx and then sent home of doxycycline. He took his meds as prescribed and was DC to home this past Tuesday. However, he cont to have pain, fevers and worsening redness of his R ankle and foot. He denies fevers today, no CP SOB, no abd pain, No N/V. He is concerned that he may have arterial disease but has not had a vascular workup.

## 2025-06-16 NOTE — PROGRESS NOTE ADULT - ASSESSMENT
74/M with a hx of HTN CHF DM COPD presents with doxycycle resistent RLE cellulitis     RLE cellulitis with closed wound w/eschar to lateral leg   B/l RLE purpuric rash   -Pt was recently admitted at [facility] from 6/3-6/10, treated with zosyn and dapto, d/c on doxycycline   -s/p Zosyn and Vanco in ER  -ID consult appreciated, switched from zosyn to ceftriaxone   -Continue ceftriaxone + vancomycin   -Pt has hx of Red Man syndrome, infuse vanco 3 hours to avoid this. Pre med with benadryl   -MRI ordered   -Check ESR, CRP, CHASE, ANCA, RF, C3, C4  -Podiatry consult   -Vascular consult for PAD    HFpEF   -chronic, stable   -TTE from 5/26/25: preserved EF, 60-65%, valves not well visualized   -euvolemic    COPD/Asthma  -Duonebs  -on symbicort at home, interch with advair here     HTN   -Cont home meds     DM  -Glargine 10 units daily, aspart 5 units with meals, sliding scale     Lovenox for DVT PPx      74/M with a hx of HTN CHF DM COPD presents with doxycycle resistent RLE cellulitis     RLE cellulitis with closed wound w/eschar to lateral leg   B/l RLE purpuric rash   -Pt was recently admitted at [facility] from 6/3-6/10, treated with zosyn and dapto, d/c on doxycycline   -s/p Zosyn and Vanco in ER  -ID consult appreciated, switched from zosyn to ceftriaxone, vanco d/c started daptomycin   -Continue ceftriaxone + daptomycin   -Pt has hx of Red Man syndrome, infuse vanco 3 hours to avoid this. Pre med with benadryl -- vanco d/c now  -MRI ordered initially, d/w radiology Ct angio recommended, pt reports contrast allergy, when asked he said in 1985 he was being seen for kidney stones and a dye was injected for study when he had a severe reaction   -Check ESR 58, CRP 58.4, F/u CHASE, ANCA, RF, C3, C4  -d/w derm today, pt will need skin bx for evaluation in office  -Podiatry consult appreciated  -Vascular consult for PAD appreciated, US duplex arterial, MICHAEL ordered     HFpEF   -chronic, stable   -TTE from 5/26/25: preserved EF, 60-65%, valves not well visualized   -euvolemic    COPD/Asthma  -Duonebs  -on symbicort at home, interch with advair here     HTN   -Cont home meds     DM  -Glargine 10 units daily, aspart 5 units with meals, sliding scale     Lovenox for DVT PPx

## 2025-06-16 NOTE — PROGRESS NOTE ADULT - ASSESSMENT
Assessment:  74M with diabetes, COPD, heart failure, CKD, hypertension, history of right foot ulcer presents 6/14 with worsening RLE swelling and pain  Denies any fever or chills, abdominal pain, cough, n/v, diarrhea or dysuria  Recently was admitted 6/5/2025 for RLE Cellulitis, CT did not show abscess, was treated with IV daptomycin and transition to oral Doxy  Afebrile on admission, on RA  No leukocytosis  Cr 1.74  UA negative  CXR clear  US without DVT  BCx 6/14 no growth    Antimicrobials:  piperacillin/tazobactam IVPB.. 3.375 every 8 hours (6/14 --- ) CTX (6/15 --- )  vancomycin  IVPB 1250 every 12 hours (6/14 --- )    Impression:   #RLE Cellulitis  #CKD  #Tetracycline Allergy, but tolerates Doxy  #Vanco Surya Syndrome     Recommendations:  - continue empiric Vanco 1000mg q12 (Day #3)  - please check Vancomycin trough before 4th sequential dose  - continue empiric CTX 2G q24 (Day #3)  - monitor temperature curve  - trend WBC  - reason for abx use reviewed with patient  - side effects of antibiotic discussed, tolerating abx well so far  - Prior cultures reviewed. An epidemiologic assessment was performed. There is a significant risk for resistant microorganisms to spread to family members, and/or healthcare staff. Isolation precautions based on infection control policy. Will reconsider further isolation measures based on new culture results and other clinical data as appropriate Appropriate cultures collected and an appropriate broad spectrum antibiotic therapy will be considered  - pending CT LE   - Vascular team following  - agree with Derm eval r/o vasculitis  - rest per primary team    Clinical team may change from intravenous to oral antibiotics when the following criteria are met:   1. Patient is clinically improving/stable       a)	Improved signs and symptoms of infection from initial presentation       b)	Afebrile for 24 hours       c)	Leukocytosis trending towards normal range   2. Patient is tolerating oral intake   3. Initial/repeat blood cultures are negative OR do not need to wait for preliminary blood cultures to result    Cannot advise changing to oral antibiotic therapy until culture sensitivity is available. Assessment:  74M with diabetes, COPD, heart failure, CKD, hypertension, history of right foot ulcer presents 6/14 with worsening RLE swelling and pain  Denies any fever or chills, abdominal pain, cough, n/v, diarrhea or dysuria  Recently was admitted 6/5/2025 for RLE Cellulitis, CT did not show abscess, was treated with IV daptomycin and transition to oral Doxy  Afebrile on admission, on RA  No leukocytosis  Cr 1.74  UA negative  CXR clear  US without DVT  BCx 6/14 no growth    Antimicrobials:  piperacillin/tazobactam IVPB.. 3.375 every 8 hours (6/14 --- ) CTX (6/15 --- )  vancomycin  IVPB 1250 every 12 hours (6/14 --- ) Dapto (6/16 -- )    Impression:   #RLE Cellulitis  #CKD  #Tetracycline Allergy, but tolerates Doxy  #Vanco Surya Syndrome   - remains afebrile, team concern about vanco reaction with new cough and congestion?, thought patient remains comfortable on RA, could this be viral?  - RLE erythema still significant, also has purpuric rash on LLE    Recommendations:  - switch empiric Vanco to Dapto 450mg q24 (Day #3)   - continue empiric CTX 2G q24 (Day #3)  - monitor temperature curve  - trend WBC  - reason for abx use reviewed with patient  - side effects of antibiotic discussed, tolerating abx well so far  - Prior cultures reviewed. An epidemiologic assessment was performed. There is a significant risk for resistant microorganisms to spread to family members, and/or healthcare staff. Isolation precautions based on infection control policy. Will reconsider further isolation measures based on new culture results and other clinical data as appropriate Appropriate cultures collected and an appropriate broad spectrum antibiotic therapy will be considered  - check baseline CPK  - check Full RVP swab  - pending CT LE   - Vascular team following  - agree with Derm eval r/o vasculitis  - rest per primary team    Clinical team may change from intravenous to oral antibiotics when the following criteria are met:   1. Patient is clinically improving/stable       a)	Improved signs and symptoms of infection from initial presentation       b)	Afebrile for 24 hours       c)	Leukocytosis trending towards normal range   2. Patient is tolerating oral intake   3. Initial/repeat blood cultures are negative OR do not need to wait for preliminary blood cultures to result    Cannot advise changing to oral antibiotic therapy until culture sensitivity is available.

## 2025-06-16 NOTE — PROGRESS NOTE ADULT - ASSESSMENT
A: 74-year-old Male was seen for the following:   -Cellulitis RLE    -Pain to RLE  -DM2      P:   Chart reviewed and Patient evaluated;  Discussed diagnosis and treatment with patient  X-rays reviewed : on wet read showing no acute radiographic findings. official read is pending  Applied Bacitracin to scab lesion on the right medial 1st mpj and lateral lower leg; scab lesions appear stable without any SOI  WBAT  to RLE  Antibiotics as per ID  Vascular reccs appreciated; possible vasculitis  Dermatology eval pending  All additional care per Med appreciated  Patient demonstrated verbal understanding of all interventions and tolerated interventions well without any complications.   Podiatry will follow while in house  Case D/W attending Dr. Pandya

## 2025-06-16 NOTE — PROGRESS NOTE ADULT - SUBJECTIVE AND OBJECTIVE BOX
SURGERY DAILY PROGRESS NOTE:     Subjective:  Patient seen and examined at bedside during am rounds. AVSS. Denies any fevers, chills, n/v/d, chest pain or shortness of breath  Patient tolerated diet, having BMs, passing gas    Objective:    MEDICATIONS  (STANDING):  albuterol/ipratropium for Nebulization 3 milliLiter(s) Nebulizer every 6 hours  ascorbic acid 500 milliGRAM(s) Oral daily  aspirin  chewable 81 milliGRAM(s) Oral daily  cefTRIAXone Injectable. 2000 milliGRAM(s) IV Push every 24 hours  clonazePAM Oral Disintegrating Tablet 0.5 milliGRAM(s) Oral three times a day  dextrose 5%. 1000 milliLiter(s) (100 mL/Hr) IV Continuous <Continuous>  dextrose 5%. 1000 milliLiter(s) (50 mL/Hr) IV Continuous <Continuous>  dextrose 50% Injectable 25 Gram(s) IV Push once  dextrose 50% Injectable 12.5 Gram(s) IV Push once  dextrose 50% Injectable 25 Gram(s) IV Push once  enoxaparin Injectable 40 milliGRAM(s) SubCutaneous every 24 hours  ferrous    sulfate 325 milliGRAM(s) Oral daily  fluticasone propionate/ salmeterol 250-50 MICROgram(s) Diskus 1 Dose(s) Inhalation two times a day  furosemide    Tablet 40 milliGRAM(s) Oral every 12 hours  glucagon  Injectable 1 milliGRAM(s) IntraMuscular once  insulin glargine Injectable (LANTUS) 12 Unit(s) SubCutaneous at bedtime  insulin lispro (ADMELOG) corrective regimen sliding scale   SubCutaneous three times a day before meals  insulin lispro (ADMELOG) corrective regimen sliding scale   SubCutaneous at bedtime  insulin lispro Injectable (ADMELOG) 8 Unit(s) SubCutaneous three times a day before meals  montelukast 10 milliGRAM(s) Oral at bedtime  multivitamin 1 Tablet(s) Oral daily  oxyCODONE    IR 10 milliGRAM(s) Oral two times a day  pantoprazole    Tablet 40 milliGRAM(s) Oral before breakfast  predniSONE   Tablet 10 milliGRAM(s) Oral daily  pregabalin 150 milliGRAM(s) Oral two times a day  rosuvastatin 40 milliGRAM(s) Oral at bedtime  saccharomyces boulardii 250 milliGRAM(s) Oral daily  senna 1 Tablet(s) Oral at bedtime  sertraline 150 milliGRAM(s) Oral daily  vancomycin  IVPB 1000 milliGRAM(s) IV Intermittent every 12 hours    MEDICATIONS  (PRN):  acetaminophen     Tablet .. 650 milliGRAM(s) Oral every 6 hours PRN Temp greater or equal to 38C (100.4F), Mild Pain (1 - 3)  albuterol    90 MICROgram(s) HFA Inhaler 1 Puff(s) Inhalation every 4 hours PRN Shortness of Breath and/or Wheezing  aluminum hydroxide/magnesium hydroxide/simethicone Suspension 30 milliLiter(s) Oral every 4 hours PRN Dyspepsia  dextrose Oral Gel 15 Gram(s) Oral once PRN Blood Glucose LESS THAN 70 milliGRAM(s)/deciliter  diphenhydrAMINE Injectable 25 milliGRAM(s) IV Push every 12 hours PRN prior to vancomycin  melatonin 3 milliGRAM(s) Oral at bedtime PRN Insomnia  ondansetron Injectable 4 milliGRAM(s) IV Push every 8 hours PRN Nausea and/or Vomiting  polyethylene glycol 3350 17 Gram(s) Oral daily PRN for constipation      Vital Signs Last 24 Hrs  T(C): 37.4 (15 Jsuto 2025 23:46), Max: 37.4 (15 Justo 2025 23:46)  T(F): 99.3 (15 Justo 2025 23:46), Max: 99.3 (15 Justo 2025 23:46)  HR: 80 (15 Justo 2025 23:46) (75 - 80)  BP: 119/62 (15 Justo 2025 23:46) (108/67 - 120/68)  BP(mean): 69 (15 Justo 2025 16:36) (69 - 80)  RR: 17 (15 Justo 2025 23:46) (17 - 17)  SpO2: 93% (15 Justo 2025 23:46) (91% - 96%)    Parameters below as of 15 Justo 2025 23:46  Patient On (Oxygen Delivery Method): room air          PHYSICAL EXAM     General: AAOx3, Well developed, NAD  Chest: Normal respiratory effort  Heart: RRR  Abdomen: Soft, NTND, no masses  Neuro/Psych: No localized deficits. Normal spech, normal tone  Skin: Normal, no rashes, no lesions noted.   Extremities: Warm, bilateral 2+ pitting edema  RLE: purple/red patch in above the ankle, petechia and erythema in the dorsum of the foot, going into both malleolus, strongly palpable PT/DP pulses  LLE:purple/red petechia in the dorsum of the foot, extending up to the ankle, strongly palpable PT/DP pulses    I&O's Detail    14 Jun 2025 07:01  -  15 Justo 2025 07:00  --------------------------------------------------------  IN:  Total IN: 0 mL    OUT:    Voided (mL): 400 mL  Total OUT: 400 mL    Total NET: -400 mL      15 Justo 2025 07:01  -  16 Jun 2025 04:50  --------------------------------------------------------  IN:    Oral Fluid: 900 mL  Total IN: 900 mL    OUT:    Voided (mL): 2150 mL  Total OUT: 2150 mL    Total NET: -1250 mL          Daily     Daily     LABS:                        11.0   8.45  )-----------( 134      ( 15 Justo 2025 06:43 )             35.5     06-15    143  |  106  |  34[H]  ----------------------------<  149[H]  4.1   |  32[H]  |  1.74[H]    Ca    9.4      15 Justo 2025 06:43    TPro  7.6  /  Alb  3.2[L]  /  TBili  0.5  /  DBili  x   /  AST  20  /  ALT  31  /  AlkPhos  72  06-14    PT/INR - ( 14 Jun 2025 14:39 )   PT: 12.4 sec;   INR: 1.05 ratio         PTT - ( 14 Jun 2025 14:39 )  PTT:31.4 sec  Urinalysis Basic - ( 15 Justo 2025 06:43 )    Color: x / Appearance: x / SG: x / pH: x  Gluc: 149 mg/dL / Ketone: x  / Bili: x / Urobili: x   Blood: x / Protein: x / Nitrite: x   Leuk Esterase: x / RBC: x / WBC x   Sq Epi: x / Non Sq Epi: x / Bacteria: x

## 2025-06-16 NOTE — PROGRESS NOTE ADULT - ASSESSMENT
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   In a patient with hx of vasculitis, another vasculitis or autoimmune process should be consider.  MICHAEL 2024: R 1.05, L 1.01      Recommendations:  - Please obtain MICHAEL/PVR and Adup  - Please obtain dermatology consult to rule out vasculitis  - please consider autoimmune pathology  - rest of care as per primary team    Case discussed with Vascular team

## 2025-06-16 NOTE — PHYSICAL THERAPY INITIAL EVALUATION ADULT - ADDITIONAL COMMENTS
pt has resided at [facility] past 2 years pt has resided at [facility] past 2 years, reports his room is long distance from  and takes a wheelchair to go to/from DR at facility

## 2025-06-16 NOTE — PHYSICAL THERAPY INITIAL EVALUATION ADULT - ACTIVE RANGE OF MOTION EXAMINATION, REHAB EVAL
emilia. upper extremity Active ROM was WNL (within normal limits)/bilateral  lower extremity Active ROM was WFL (within functional limits)

## 2025-06-16 NOTE — PROGRESS NOTE ADULT - SUBJECTIVE AND OBJECTIVE BOX
Date of Service:06-16-25 @ 09:42  Interval History/ROS: Afebrile overnight, comfortable on RA, BCx negative    REVIEW OF SYSTEMS  [  ] ROS unobtainable because:    [ x ] All other systems negative except as noted below    Constitutional:  [ ] fever [ ] chills  [ ] weight loss  [ ]night sweat  [ ]poor appetite/PO intake [ ]fatigue   Skin:  [ ] rash [ ] phlebitis	  Eyes: [ ] icterus [ ] pain  [ ] discharge	  ENMT: [ ] sore throat  [ ] thrush [ ] ulcers [ ] exudates [ ]anosmia  Respiratory: [ ] dyspnea [ ] hemoptysis [ ] cough [ ] sputum	  Cardiovascular:  [ ] chest pain [ ] palpitations [ ] edema	  Gastrointestinal:  [ ] nausea [ ] vomiting [ ] diarrhea [ ] constipation [ ] pain	  Genitourinary:  [ ] dysuria [ ] frequency [ ] hematuria [ ] discharge [ ] flank pain  [ ] incontinence  Musculoskeletal:  [ ] myalgias [ ] arthralgias [ ] arthritis  [ ] back pain  Neurological:  [ ] headache [ ] weakness [ ] seizures  [ ] confusion/altered mental status    Allergies  tetracycline (Unknown)  vancomycin (Other)  IODINE (Unknown)        ANTIMICROBIALS:    cefTRIAXone Injectable. 2000 every 24 hours  vancomycin  IVPB 1000 every 12 hours        OTHER MEDS: MEDICATIONS  (STANDING):  acetaminophen     Tablet .. 650 every 6 hours PRN  albuterol    90 MICROgram(s) HFA Inhaler 1 every 4 hours PRN  albuterol/ipratropium for Nebulization 3 every 6 hours  aluminum hydroxide/magnesium hydroxide/simethicone Suspension 30 every 4 hours PRN  aspirin  chewable 81 daily  clonazePAM Oral Disintegrating Tablet 0.5 three times a day  dextrose 50% Injectable 25 once  dextrose 50% Injectable 12.5 once  dextrose 50% Injectable 25 once  dextrose Oral Gel 15 once PRN  diphenhydrAMINE Injectable 25 every 12 hours PRN  enoxaparin Injectable 40 every 24 hours  fluticasone propionate/ salmeterol 250-50 MICROgram(s) Diskus 1 two times a day  furosemide    Tablet 40 every 12 hours  glucagon  Injectable 1 once  insulin glargine Injectable (LANTUS) 12 at bedtime  insulin lispro (ADMELOG) corrective regimen sliding scale  three times a day before meals  insulin lispro (ADMELOG) corrective regimen sliding scale  at bedtime  insulin lispro Injectable (ADMELOG) 8 three times a day before meals  melatonin 3 at bedtime PRN  montelukast 10 at bedtime  ondansetron Injectable 4 every 8 hours PRN  oxyCODONE    IR 10 two times a day  pantoprazole    Tablet 40 before breakfast  polyethylene glycol 3350 17 daily PRN  predniSONE   Tablet 10 daily  pregabalin 150 two times a day  rosuvastatin 40 at bedtime  senna 1 at bedtime  sertraline 150 daily      Vital Signs Last 24 Hrs  T(F): 98.9 (06-16-25 @ 08:17), Max: 99.3 (06-15-25 @ 23:46)    Vital Signs Last 24 Hrs  HR: 83 (06-16-25 @ 08:31) (73 - 86)  BP: 126/69 (06-16-25 @ 08:17) (110/57 - 126/69)  RR: 18 (06-16-25 @ 08:17)  SpO2: 94% (06-16-25 @ 08:17) (91% - 96%)  Wt(kg): --    EXAM:    Constitutional: frail, NAD  Head/Eyes: no icterus  LUNGS:  CTA  CVS:  regular rhythm  Abd:  soft, non-tender; non-distended  Ext:  RLE swelling, erythema  Vascular:  IV site no erythema tenderness or discharge  Neuro: AAO X 3, non- focal    Labs:                        11.0   8.45  )-----------( 134      ( 15 Justo 2025 06:43 )             35.5     06-15    143  |  106  |  34[H]  ----------------------------<  149[H]  4.1   |  32[H]  |  1.74[H]    Ca    9.4      15 Justo 2025 06:43    TPro  7.6  /  Alb  3.2[L]  /  TBili  0.5  /  DBili  x   /  AST  20  /  ALT  31  /  AlkPhos  72  06-14      WBC Trend:  WBC Count: 8.45 (06-15-25 @ 06:43)  WBC Count: 8.13 (06-14-25 @ 14:39)      Creatine Trend:  Creatinine: 1.74 (06-15)  Creatinine: 1.64 (06-14)  Creatinine: 1.40 (06-10)      Liver Biochemical Testing Trend:  Alanine Aminotransferase (ALT/SGPT): 31 (06-14)  Alanine Aminotransferase (ALT/SGPT): 32 (06-04)  Alanine Aminotransferase (ALT/SGPT): 36 (06-03)  Alanine Aminotransferase (ALT/SGPT): 28 (05-26)  Alanine Aminotransferase (ALT/SGPT): 30 (05-25)  Aspartate Aminotransferase (AST/SGOT): 20 (06-14-25 @ 14:39)  Aspartate Aminotransferase (AST/SGOT): 14 (06-04-25 @ 06:05)  Aspartate Aminotransferase (AST/SGOT): 17 (06-03-25 @ 14:45)  Aspartate Aminotransferase (AST/SGOT): 13 (05-26-25 @ 08:12)  Aspartate Aminotransferase (AST/SGOT): 18 (05-25-25 @ 07:10)  Bilirubin Total: 0.5 (06-14)  Bilirubin Total: 0.5 (06-04)  Bilirubin Total: 0.4 (06-03)  Bilirubin Total: 0.5 (05-26)  Bilirubin Total: 0.4 (05-25)      Trend LDH      Urinalysis Basic - ( 15 Justo 2025 06:43 )    Color: x / Appearance: x / SG: x / pH: x  Gluc: 149 mg/dL / Ketone: x  / Bili: x / Urobili: x   Blood: x / Protein: x / Nitrite: x   Leuk Esterase: x / RBC: x / WBC x   Sq Epi: x / Non Sq Epi: x / Bacteria: x        MICROBIOLOGY:  Vancomycin Level, Trough: 23.8 (06-15 @ 21:34)    MRSA PCR Result.: Detected (06-06-25 @ 08:17)  MRSA PCR Result.: Detected (05-23-25 @ 18:44)  MRSA PCR Result.: Detected (12-30-24 @ 16:30)      Urinalysis with Rflx Culture (collected 14 Jun 2025 17:57)    Culture - Blood (collected 14 Jun 2025 14:39)  Source: Blood Blood-Peripheral  Preliminary Report:    No growth at 24 hours    Culture - Blood (collected 14 Jun 2025 14:39)  Source: Blood Blood-Peripheral  Preliminary Report:    No growth at 24 hours    Culture - Urine (collected 05 Jun 2025 17:50)  Source: Clean Catch Clean Catch (Midstream)  Final Report:    No growth    Culture - Blood (collected 03 Jun 2025 15:00)  Source: Blood Blood-Peripheral  Final Report:    No growth at 5 days    Culture - Blood (collected 03 Jun 2025 14:45)  Source: Blood Blood-Peripheral  Final Report:    No growth at 5 days    Urinalysis with Rflx Culture (collected 22 May 2025 03:50)    Culture - Blood (collected 22 May 2025 01:15)  Source: Blood Blood-Peripheral  Final Report:    No growth at 5 days    Culture - Blood (collected 22 May 2025 01:15)  Source: Blood Blood-Peripheral  Final Report:    No growth at 5 days    Urinalysis with Rflx Culture (collected 28 Dec 2024 13:12)        C-Reactive Protein: 58.4 (06-15)      Lactate, Blood: 2.0 (06-14 @ 17:17)    Sedimentation Rate, Erythrocyte: 58 mm/hr (06-15-25 @ 11:53)  Sedimentation Rate, Erythrocyte: 56 mm/hr (06-05-25 @ 07:05)  Sedimentation Rate, Erythrocyte: 65 mm/hr (05-22-25 @ 14:25)  Sedimentation Rate, Erythrocyte: 65 mm/hr (09-24-24 @ 14:10)      RADIOLOGY:  imaging below personally reviewed     Date of Service:06-16-25 @ 09:42  Interval History/ROS: Afebrile overnight, comfortable on RA, BCx negative  Feels chest congestion and cough, denies any fever or chills  Still has RLE pain and erythema    REVIEW OF SYSTEMS  [  ] ROS unobtainable because:    [ x ] All other systems negative except as noted below    Constitutional:  [ ] fever [ ] chills  [ ] weight loss  [ ]night sweat  [ ]poor appetite/PO intake [ ]fatigue   Skin:  [ ] rash [ ] phlebitis	  Eyes: [ ] icterus [ ] pain  [ ] discharge	  ENMT: [ ] sore throat  [ ] thrush [ ] ulcers [ ] exudates [ ]anosmia  Respiratory: [ ] dyspnea [ ] hemoptysis [ ] cough [ ] sputum	  Cardiovascular:  [ ] chest pain [ ] palpitations [ ] edema	  Gastrointestinal:  [ ] nausea [ ] vomiting [ ] diarrhea [ ] constipation [ ] pain	  Genitourinary:  [ ] dysuria [ ] frequency [ ] hematuria [ ] discharge [ ] flank pain  [ ] incontinence  Musculoskeletal:  [ ] myalgias [ ] arthralgias [ ] arthritis  [ ] back pain  Neurological:  [ ] headache [ ] weakness [ ] seizures  [ ] confusion/altered mental status    Allergies  tetracycline (Unknown)  vancomycin (Other)  IODINE (Unknown)        ANTIMICROBIALS:    cefTRIAXone Injectable. 2000 every 24 hours  vancomycin  IVPB 1000 every 12 hours        OTHER MEDS: MEDICATIONS  (STANDING):  acetaminophen     Tablet .. 650 every 6 hours PRN  albuterol    90 MICROgram(s) HFA Inhaler 1 every 4 hours PRN  albuterol/ipratropium for Nebulization 3 every 6 hours  aluminum hydroxide/magnesium hydroxide/simethicone Suspension 30 every 4 hours PRN  aspirin  chewable 81 daily  clonazePAM Oral Disintegrating Tablet 0.5 three times a day  dextrose 50% Injectable 25 once  dextrose 50% Injectable 12.5 once  dextrose 50% Injectable 25 once  dextrose Oral Gel 15 once PRN  diphenhydrAMINE Injectable 25 every 12 hours PRN  enoxaparin Injectable 40 every 24 hours  fluticasone propionate/ salmeterol 250-50 MICROgram(s) Diskus 1 two times a day  furosemide    Tablet 40 every 12 hours  glucagon  Injectable 1 once  insulin glargine Injectable (LANTUS) 12 at bedtime  insulin lispro (ADMELOG) corrective regimen sliding scale  three times a day before meals  insulin lispro (ADMELOG) corrective regimen sliding scale  at bedtime  insulin lispro Injectable (ADMELOG) 8 three times a day before meals  melatonin 3 at bedtime PRN  montelukast 10 at bedtime  ondansetron Injectable 4 every 8 hours PRN  oxyCODONE    IR 10 two times a day  pantoprazole    Tablet 40 before breakfast  polyethylene glycol 3350 17 daily PRN  predniSONE   Tablet 10 daily  pregabalin 150 two times a day  rosuvastatin 40 at bedtime  senna 1 at bedtime  sertraline 150 daily      Vital Signs Last 24 Hrs  T(F): 98.9 (06-16-25 @ 08:17), Max: 99.3 (06-15-25 @ 23:46)    Vital Signs Last 24 Hrs  HR: 83 (06-16-25 @ 08:31) (73 - 86)  BP: 126/69 (06-16-25 @ 08:17) (110/57 - 126/69)  RR: 18 (06-16-25 @ 08:17)  SpO2: 94% (06-16-25 @ 08:17) (91% - 96%)  Wt(kg): --    EXAM:    Constitutional: frail, NAD  Head/Eyes: no icterus  LUNGS:  CTA  CVS:  regular rhythm  Abd:  soft, non-tender; non-distended  Ext:  RLE swelling, erythema  Vascular:  IV site no erythema tenderness or discharge  Neuro: AAO X 3, non- focal    Labs:                        11.0   8.45  )-----------( 134      ( 15 Justo 2025 06:43 )             35.5     06-15    143  |  106  |  34[H]  ----------------------------<  149[H]  4.1   |  32[H]  |  1.74[H]    Ca    9.4      15 Justo 2025 06:43    TPro  7.6  /  Alb  3.2[L]  /  TBili  0.5  /  DBili  x   /  AST  20  /  ALT  31  /  AlkPhos  72  06-14      WBC Trend:  WBC Count: 8.45 (06-15-25 @ 06:43)  WBC Count: 8.13 (06-14-25 @ 14:39)      Creatine Trend:  Creatinine: 1.74 (06-15)  Creatinine: 1.64 (06-14)  Creatinine: 1.40 (06-10)      Liver Biochemical Testing Trend:  Alanine Aminotransferase (ALT/SGPT): 31 (06-14)  Alanine Aminotransferase (ALT/SGPT): 32 (06-04)  Alanine Aminotransferase (ALT/SGPT): 36 (06-03)  Alanine Aminotransferase (ALT/SGPT): 28 (05-26)  Alanine Aminotransferase (ALT/SGPT): 30 (05-25)  Aspartate Aminotransferase (AST/SGOT): 20 (06-14-25 @ 14:39)  Aspartate Aminotransferase (AST/SGOT): 14 (06-04-25 @ 06:05)  Aspartate Aminotransferase (AST/SGOT): 17 (06-03-25 @ 14:45)  Aspartate Aminotransferase (AST/SGOT): 13 (05-26-25 @ 08:12)  Aspartate Aminotransferase (AST/SGOT): 18 (05-25-25 @ 07:10)  Bilirubin Total: 0.5 (06-14)  Bilirubin Total: 0.5 (06-04)  Bilirubin Total: 0.4 (06-03)  Bilirubin Total: 0.5 (05-26)  Bilirubin Total: 0.4 (05-25)      Trend LDH      Urinalysis Basic - ( 15 Justo 2025 06:43 )    Color: x / Appearance: x / SG: x / pH: x  Gluc: 149 mg/dL / Ketone: x  / Bili: x / Urobili: x   Blood: x / Protein: x / Nitrite: x   Leuk Esterase: x / RBC: x / WBC x   Sq Epi: x / Non Sq Epi: x / Bacteria: x        MICROBIOLOGY:  Vancomycin Level, Trough: 23.8 (06-15 @ 21:34)    MRSA PCR Result.: Detected (06-06-25 @ 08:17)  MRSA PCR Result.: Detected (05-23-25 @ 18:44)  MRSA PCR Result.: Detected (12-30-24 @ 16:30)      Urinalysis with Rflx Culture (collected 14 Jun 2025 17:57)    Culture - Blood (collected 14 Jun 2025 14:39)  Source: Blood Blood-Peripheral  Preliminary Report:    No growth at 24 hours    Culture - Blood (collected 14 Jun 2025 14:39)  Source: Blood Blood-Peripheral  Preliminary Report:    No growth at 24 hours    Culture - Urine (collected 05 Jun 2025 17:50)  Source: Clean Catch Clean Catch (Midstream)  Final Report:    No growth    Culture - Blood (collected 03 Jun 2025 15:00)  Source: Blood Blood-Peripheral  Final Report:    No growth at 5 days    Culture - Blood (collected 03 Jun 2025 14:45)  Source: Blood Blood-Peripheral  Final Report:    No growth at 5 days    Urinalysis with Rflx Culture (collected 22 May 2025 03:50)    Culture - Blood (collected 22 May 2025 01:15)  Source: Blood Blood-Peripheral  Final Report:    No growth at 5 days    Culture - Blood (collected 22 May 2025 01:15)  Source: Blood Blood-Peripheral  Final Report:    No growth at 5 days    Urinalysis with Rflx Culture (collected 28 Dec 2024 13:12)        C-Reactive Protein: 58.4 (06-15)      Lactate, Blood: 2.0 (06-14 @ 17:17)    Sedimentation Rate, Erythrocyte: 58 mm/hr (06-15-25 @ 11:53)  Sedimentation Rate, Erythrocyte: 56 mm/hr (06-05-25 @ 07:05)  Sedimentation Rate, Erythrocyte: 65 mm/hr (05-22-25 @ 14:25)  Sedimentation Rate, Erythrocyte: 65 mm/hr (09-24-24 @ 14:10)      RADIOLOGY:  imaging below personally reviewed     Adequate: hears normal conversation without difficulty

## 2025-06-16 NOTE — PHYSICAL THERAPY INITIAL EVALUATION ADULT - GENERAL OBSERVATIONS, REHAB EVAL
Pt seen on 3rd attempt after finished lunch meal , resting supine in bed but reports condom catheter dislodged during AM care routines and worried about urinary dribbling/incontinence , reports he has PULL-UPS and POISE PADS to reinforce the PULL-UP he likes to wear . Pt with obese abdomen , L blepharoptosis ,cellulitis receding R lower leg/foot, scattered spotty rash noted L foot/toes

## 2025-06-16 NOTE — PHYSICAL THERAPY INITIAL EVALUATION ADULT - LEVEL OF INDEPENDENCE: SIT/SUPINE, REHAB EVAL
out of bed to [facility] in hallway for transport to Ultrasound DEPT , chair with alarm set up at bedside and NA requested to change bed linens after encounter

## 2025-06-16 NOTE — PROGRESS NOTE ADULT - SUBJECTIVE AND OBJECTIVE BOX
HOSPITALIST ATTENDING PROGRESS NOTE    Chart and meds reviewed.  Patient seen and examined    CC: RLE cellulitis     Subjective:     All other systems reviewed and found to be negative with the exception of what has been described above.    Vital Signs Last 24 Hrs  T(C): 37.2 (16 Jun 2025 08:17), Max: 37.4 (15 Justo 2025 23:46)  T(F): 98.9 (16 Jun 2025 08:17), Max: 99.3 (15 Justo 2025 23:46)  HR: 83 (16 Jun 2025 08:31) (73 - 86)  BP: 126/69 (16 Jun 2025 08:17) (110/57 - 126/69)  BP(mean): 87 (16 Jun 2025 05:50) (69 - 87)  RR: 18 (16 Jun 2025 08:17) (17 - 18)  SpO2: 94% (16 Jun 2025 08:17) (91% - 96%)    Parameters below as of 16 Jun 2025 08:17  Patient On (Oxygen Delivery Method): room air    PHYSICAL EXAM:  GENERAL: NAD, lying in bed comfortably  HEAD:  Atraumatic, Normocephalic  EYES: conjunctiva and sclera clear  ENT: Moist mucous membranes  NECK: Supple, No JVD  CHEST/LUNG: Clear to auscultation bilaterally; No rales, rhonchi, wheezing. Unlabored respirations  HEART: Regular rate and rhythm; No murmurs  ABDOMEN: Bowel sounds present; Soft, Nontender, Nondistended.   EXTREMITIES:  2+ Peripheral Pulses, brisk capillary refill. No clubbing, cyanosis, or edema  NERVOUS SYSTEM:  Alert & Oriented X3, speech clear. No deficits   MSK: FROM all 4 extremities, full and equal strength  SKIN:  mid to distal RLE erythema with closed wound with black eschar to lateral aspect of leg, there is healed wound to R hallux, no oozing, non blanching purpuric rash to b/l feet     MEDICATIONS  (STANDING):  albuterol/ipratropium for Nebulization 3 milliLiter(s) Nebulizer every 6 hours  ascorbic acid 500 milliGRAM(s) Oral daily  aspirin  chewable 81 milliGRAM(s) Oral daily  cefTRIAXone Injectable. 2000 milliGRAM(s) IV Push every 24 hours  clonazePAM Oral Disintegrating Tablet 0.5 milliGRAM(s) Oral three times a day  DAPTOmycin IVPB 450 milliGRAM(s) IV Intermittent every 24 hours  dextrose 5%. 1000 milliLiter(s) (50 mL/Hr) IV Continuous <Continuous>  dextrose 5%. 1000 milliLiter(s) (100 mL/Hr) IV Continuous <Continuous>  dextrose 50% Injectable 25 Gram(s) IV Push once  dextrose 50% Injectable 12.5 Gram(s) IV Push once  dextrose 50% Injectable 25 Gram(s) IV Push once  enoxaparin Injectable 40 milliGRAM(s) SubCutaneous every 24 hours  ferrous    sulfate 325 milliGRAM(s) Oral daily  fluticasone propionate/ salmeterol 250-50 MICROgram(s) Diskus 1 Dose(s) Inhalation two times a day  furosemide    Tablet 40 milliGRAM(s) Oral every 12 hours  glucagon  Injectable 1 milliGRAM(s) IntraMuscular once  insulin glargine Injectable (LANTUS) 18 Unit(s) SubCutaneous at bedtime  insulin lispro (ADMELOG) corrective regimen sliding scale   SubCutaneous three times a day before meals  insulin lispro (ADMELOG) corrective regimen sliding scale   SubCutaneous at bedtime  insulin lispro Injectable (ADMELOG) 10 Unit(s) SubCutaneous three times a day before meals  montelukast 10 milliGRAM(s) Oral at bedtime  multivitamin 1 Tablet(s) Oral daily  oxyCODONE    IR 10 milliGRAM(s) Oral two times a day  pantoprazole    Tablet 40 milliGRAM(s) Oral before breakfast  predniSONE   Tablet 10 milliGRAM(s) Oral daily  pregabalin 150 milliGRAM(s) Oral two times a day  rosuvastatin 40 milliGRAM(s) Oral at bedtime  saccharomyces boulardii 250 milliGRAM(s) Oral daily  senna 1 Tablet(s) Oral at bedtime  sertraline 150 milliGRAM(s) Oral daily  sodium chloride 0.9%. 1000 milliLiter(s) (100 mL/Hr) IV Continuous <Continuous>    MEDICATIONS  (PRN):  acetaminophen     Tablet .. 650 milliGRAM(s) Oral every 6 hours PRN Temp greater or equal to 38C (100.4F), Mild Pain (1 - 3)  albuterol    90 MICROgram(s) HFA Inhaler 1 Puff(s) Inhalation every 4 hours PRN Shortness of Breath and/or Wheezing  aluminum hydroxide/magnesium hydroxide/simethicone Suspension 30 milliLiter(s) Oral every 4 hours PRN Dyspepsia  dextrose Oral Gel 15 Gram(s) Oral once PRN Blood Glucose LESS THAN 70 milliGRAM(s)/deciliter  melatonin 3 milliGRAM(s) Oral at bedtime PRN Insomnia  ondansetron Injectable 4 milliGRAM(s) IV Push every 8 hours PRN Nausea and/or Vomiting  polyethylene glycol 3350 17 Gram(s) Oral daily PRN for constipation      LABS:                                         11.1   8.10  )-----------( 141      ( 16 Jun 2025 10:36 )             35.0   06-16    137  |  103  |  41[H]  ----------------------------<  198[H]  3.0[L]   |  28  |  1.42[H]    Ca    8.9      16 Jun 2025 10:36    TPro  7.6  /  Alb  3.2[L]  /  TBili  0.5  /  DBili  x   /  AST  20  /  ALT  31  /  AlkPhos  72  06-14            RADIOLOGY:       HOSPITALIST ATTENDING PROGRESS NOTE    Chart and meds reviewed.  Patient seen and examined    CC: RLE cellulitis     Subjective: Patient seen today, feels well, erythema to RLE improving, purpuric rash to feet appears less today     All other systems reviewed and found to be negative with the exception of what has been described above.    Vital Signs Last 24 Hrs  T(C): 37.2 (16 Jun 2025 08:17), Max: 37.4 (15 Justo 2025 23:46)  T(F): 98.9 (16 Jun 2025 08:17), Max: 99.3 (15 Justo 2025 23:46)  HR: 89 (16 Jun 2025 15:37) (73 - 89)  BP: 126/69 (16 Jun 2025 08:17) (119/62 - 126/69)  BP(mean): 87 (16 Jun 2025 05:50) (87 - 87)  RR: 18 (16 Jun 2025 08:17) (17 - 18)  SpO2: 94% (16 Jun 2025 08:17) (93% - 96%)    Parameters below as of 16 Jun 2025 08:17  Patient On (Oxygen Delivery Method): room air        PHYSICAL EXAM:  GENERAL: NAD, lying in bed comfortably  HEAD:  Atraumatic, Normocephalic  EYES: conjunctiva and sclera clear  ENT: Moist mucous membranes  NECK: Supple, No JVD  CHEST/LUNG: Clear to auscultation bilaterally; No rales, rhonchi, wheezing. Unlabored respirations  HEART: Regular rate and rhythm; No murmurs  ABDOMEN: Bowel sounds present; Soft, Nontender, Nondistended.   EXTREMITIES:  2+ Peripheral Pulses, brisk capillary refill. No clubbing, cyanosis, or edema  NERVOUS SYSTEM:  Alert & Oriented X3, speech clear. No deficits   MSK: FROM all 4 extremities, full and equal strength  SKIN:  mid to distal RLE erythema with closed wound with black eschar to lateral aspect of leg, there is healed wound to R hallux, no oozing, non blanching purpuric rash to b/l feet     MEDICATIONS  (STANDING):  albuterol/ipratropium for Nebulization 3 milliLiter(s) Nebulizer every 6 hours  ascorbic acid 500 milliGRAM(s) Oral daily  aspirin  chewable 81 milliGRAM(s) Oral daily  cefTRIAXone Injectable. 2000 milliGRAM(s) IV Push every 24 hours  clonazePAM Oral Disintegrating Tablet 0.5 milliGRAM(s) Oral three times a day  DAPTOmycin IVPB 450 milliGRAM(s) IV Intermittent every 24 hours  dextrose 5%. 1000 milliLiter(s) (50 mL/Hr) IV Continuous <Continuous>  dextrose 5%. 1000 milliLiter(s) (100 mL/Hr) IV Continuous <Continuous>  dextrose 50% Injectable 25 Gram(s) IV Push once  dextrose 50% Injectable 12.5 Gram(s) IV Push once  dextrose 50% Injectable 25 Gram(s) IV Push once  enoxaparin Injectable 40 milliGRAM(s) SubCutaneous every 24 hours  ferrous    sulfate 325 milliGRAM(s) Oral daily  fluticasone propionate/ salmeterol 250-50 MICROgram(s) Diskus 1 Dose(s) Inhalation two times a day  furosemide    Tablet 40 milliGRAM(s) Oral every 12 hours  glucagon  Injectable 1 milliGRAM(s) IntraMuscular once  insulin glargine Injectable (LANTUS) 18 Unit(s) SubCutaneous at bedtime  insulin lispro (ADMELOG) corrective regimen sliding scale   SubCutaneous three times a day before meals  insulin lispro (ADMELOG) corrective regimen sliding scale   SubCutaneous at bedtime  insulin lispro Injectable (ADMELOG) 10 Unit(s) SubCutaneous three times a day before meals  montelukast 10 milliGRAM(s) Oral at bedtime  multivitamin 1 Tablet(s) Oral daily  oxyCODONE    IR 10 milliGRAM(s) Oral two times a day  pantoprazole    Tablet 40 milliGRAM(s) Oral before breakfast  predniSONE   Tablet 10 milliGRAM(s) Oral daily  pregabalin 150 milliGRAM(s) Oral two times a day  rosuvastatin 40 milliGRAM(s) Oral at bedtime  saccharomyces boulardii 250 milliGRAM(s) Oral daily  senna 1 Tablet(s) Oral at bedtime  sertraline 150 milliGRAM(s) Oral daily  sodium chloride 0.9%. 1000 milliLiter(s) (100 mL/Hr) IV Continuous <Continuous>    MEDICATIONS  (PRN):  acetaminophen     Tablet .. 650 milliGRAM(s) Oral every 6 hours PRN Temp greater or equal to 38C (100.4F), Mild Pain (1 - 3)  albuterol    90 MICROgram(s) HFA Inhaler 1 Puff(s) Inhalation every 4 hours PRN Shortness of Breath and/or Wheezing  aluminum hydroxide/magnesium hydroxide/simethicone Suspension 30 milliLiter(s) Oral every 4 hours PRN Dyspepsia  dextrose Oral Gel 15 Gram(s) Oral once PRN Blood Glucose LESS THAN 70 milliGRAM(s)/deciliter  melatonin 3 milliGRAM(s) Oral at bedtime PRN Insomnia  ondansetron Injectable 4 milliGRAM(s) IV Push every 8 hours PRN Nausea and/or Vomiting  polyethylene glycol 3350 17 Gram(s) Oral daily PRN for constipation      LABS:                                         11.1   8.10  )-----------( 141      ( 16 Jun 2025 10:36 )             35.0   06-16    137  |  103  |  41[H]  ----------------------------<  198[H]  3.0[L]   |  28  |  1.42[H]    Ca    8.9      16 Jun 2025 10:36    TPro  7.6  /  Alb  3.2[L]  /  TBili  0.5  /  DBili  x   /  AST  20  /  ALT  31  /  AlkPhos  72  06-14            RADIOLOGY:

## 2025-06-16 NOTE — PROGRESS NOTE ADULT - SUBJECTIVE AND OBJECTIVE BOX
6/16/25: pt seen by podiatry team today AM, resting bedside. No overnight events. NAD    PMH  Prostate cancer  Type II diabetes mellitus  Chronic obstructive pulmonary disease (COPD)  CHF (congestive heart failure)  Renal insufficiency  H/O migraine  Insomnia  Constipation  S/P foot surgery      FAMILY HISTORY:    Social History:      Allergies    tetracycline (Unknown)  vancomycin (Other)  IODINE (Unknown)      Allergies:tetracycline (Unknown)  vancomycin (Other)  IODINE (Unknown)      Labs:                        11.1   8.10  )-----------( 141      ( 16 Jun 2025 10:36 )             35.0         137  |  103  |  41[H]  ----------------------------<  198[H]  3.0[L]   |  28  |  1.42[H]    Ca    8.9      16 Jun 2025 10:36    TPro  7.6  /  Alb  3.2[L]  /  TBili  0.5  /  DBili  x   /  AST  20  /  ALT  31  /  AlkPhos  72  06-14    Vital Signs Last 24 Hrs  T(C): 37.2 (16 Jun 2025 08:17), Max: 37.4 (15 Justo 2025 23:46)  T(F): 98.9 (16 Jun 2025 08:17), Max: 99.3 (15 Justo 2025 23:46)  HR: 83 (16 Jun 2025 08:31) (73 - 86)  BP: 126/69 (16 Jun 2025 08:17) (110/57 - 126/69)  BP(mean): 87 (16 Jun 2025 05:50) (69 - 87)  RR: 18 (16 Jun 2025 08:17) (17 - 18)  SpO2: 94% (16 Jun 2025 08:17) (91% - 96%)    Parameters below as of 16 Jun 2025 08:17  Patient On (Oxygen Delivery Method): room air      REVIEW OF SYSTEMS:  CONSTITUTIONAL: No weakness, fevers or chills  EYES: No visual changes  RESPIRATORY: No cough, wheezing; No shortness of breath  CARDIOVASCULAR: No chest pain or palpitations  GASTROINTESTINAL: No abdominal or epigastric pain. No nausea, vomiting; No diarrhea or constipation.   GENITOURINARY: No dysuria, frequency or hematuria  NEUROLOGICAL: No numbness or weakness  SKIN: See physical examination.  All other review of systems is negative unless indicated above    Physical Exam:   Constitutional: NAD, alert;  Lower Extremity Focus  Derm:  Skin warm, dry and supple bilateral.    Right: small scab lesion to medial 1st MPJ,  diffuse  +1 pitting edema to  Beto LE,  diffuse erythema proximal to right ankle, diffuse petechiae  to beto LE,   Vascular: Dorsalis Pedis and Posterior Tibial pulses 1/4  Neuro: Protective sensation diminished to the level of the digits bilateral.  MSK: Muscle strength 5/5 all major muscle groups bilateral    < from: CT Lower Extremity No Cont, Right (06.04.25 @ 18:27) >  IMPRESSION:    Right lower leg cellulitis in the proper clinical setting. No tracking   soft tissue emphysema or drainable mature abscess.    --- End of Report ---    < end of copied text >

## 2025-06-17 LAB
ANA TITR SER: NEGATIVE — SIGNIFICANT CHANGE UP
ANION GAP SERPL CALC-SCNC: 7 MMOL/L — SIGNIFICANT CHANGE UP (ref 5–17)
AUTO DIFF PNL BLD: NEGATIVE — SIGNIFICANT CHANGE UP
BASOPHILS # BLD AUTO: 0.04 K/UL — SIGNIFICANT CHANGE UP (ref 0–0.2)
BASOPHILS NFR BLD AUTO: 0.5 % — SIGNIFICANT CHANGE UP (ref 0–2)
BUN SERPL-MCNC: 43 MG/DL — HIGH (ref 7–23)
C-ANCA SER-ACNC: NEGATIVE — SIGNIFICANT CHANGE UP
CALCIUM SERPL-MCNC: 9.1 MG/DL — SIGNIFICANT CHANGE UP (ref 8.5–10.1)
CHLORIDE SERPL-SCNC: 104 MMOL/L — SIGNIFICANT CHANGE UP (ref 96–108)
CO2 SERPL-SCNC: 27 MMOL/L — SIGNIFICANT CHANGE UP (ref 22–31)
CREAT SERPL-MCNC: 1.4 MG/DL — HIGH (ref 0.5–1.3)
EGFR: 53 ML/MIN/1.73M2 — LOW
EGFR: 53 ML/MIN/1.73M2 — LOW
EOSINOPHIL # BLD AUTO: 0.22 K/UL — SIGNIFICANT CHANGE UP (ref 0–0.5)
EOSINOPHIL NFR BLD AUTO: 3 % — SIGNIFICANT CHANGE UP (ref 0–6)
GLUCOSE SERPL-MCNC: 234 MG/DL — HIGH (ref 70–99)
HCT VFR BLD CALC: 37 % — LOW (ref 39–50)
HGB BLD-MCNC: 11.6 G/DL — LOW (ref 13–17)
IMM GRANULOCYTES # BLD AUTO: 0.18 K/UL — HIGH (ref 0–0.07)
IMM GRANULOCYTES NFR BLD AUTO: 2.4 % — HIGH (ref 0–0.9)
LYMPHOCYTES # BLD AUTO: 2.66 K/UL — SIGNIFICANT CHANGE UP (ref 1–3.3)
LYMPHOCYTES NFR BLD AUTO: 36 % — SIGNIFICANT CHANGE UP (ref 13–44)
MCHC RBC-ENTMCNC: 28.6 PG — SIGNIFICANT CHANGE UP (ref 27–34)
MCHC RBC-ENTMCNC: 31.4 G/DL — LOW (ref 32–36)
MCV RBC AUTO: 91.1 FL — SIGNIFICANT CHANGE UP (ref 80–100)
MONOCYTES # BLD AUTO: 0.84 K/UL — SIGNIFICANT CHANGE UP (ref 0–0.9)
MONOCYTES NFR BLD AUTO: 11.4 % — SIGNIFICANT CHANGE UP (ref 2–14)
NEUTROPHILS # BLD AUTO: 3.44 K/UL — SIGNIFICANT CHANGE UP (ref 1.8–7.4)
NEUTROPHILS NFR BLD AUTO: 46.7 % — SIGNIFICANT CHANGE UP (ref 43–77)
NRBC # BLD AUTO: 0 K/UL — SIGNIFICANT CHANGE UP (ref 0–0)
NRBC # FLD: 0 K/UL — SIGNIFICANT CHANGE UP (ref 0–0)
NRBC BLD AUTO-RTO: 0 /100 WBCS — SIGNIFICANT CHANGE UP (ref 0–0)
P-ANCA SER-ACNC: NEGATIVE — SIGNIFICANT CHANGE UP
PLATELET # BLD AUTO: 143 K/UL — LOW (ref 150–400)
PMV BLD: 9.6 FL — SIGNIFICANT CHANGE UP (ref 7–13)
POTASSIUM SERPL-MCNC: 3.5 MMOL/L — SIGNIFICANT CHANGE UP (ref 3.5–5.3)
POTASSIUM SERPL-SCNC: 3.5 MMOL/L — SIGNIFICANT CHANGE UP (ref 3.5–5.3)
RBC # BLD: 4.06 M/UL — LOW (ref 4.2–5.8)
RBC # FLD: 18.5 % — HIGH (ref 10.3–14.5)
SODIUM SERPL-SCNC: 138 MMOL/L — SIGNIFICANT CHANGE UP (ref 135–145)
WBC # BLD: 7.38 K/UL — SIGNIFICANT CHANGE UP (ref 3.8–10.5)
WBC # FLD AUTO: 7.38 K/UL — SIGNIFICANT CHANGE UP (ref 3.8–10.5)

## 2025-06-17 PROCEDURE — 99232 SBSQ HOSP IP/OBS MODERATE 35: CPT

## 2025-06-17 RX ADMIN — OXYCODONE HYDROCHLORIDE 10 MILLIGRAM(S): 30 TABLET ORAL at 11:33

## 2025-06-17 RX ADMIN — INSULIN LISPRO 8: 100 INJECTION, SOLUTION INTRAVENOUS; SUBCUTANEOUS at 18:34

## 2025-06-17 RX ADMIN — INSULIN LISPRO 4: 100 INJECTION, SOLUTION INTRAVENOUS; SUBCUTANEOUS at 22:08

## 2025-06-17 RX ADMIN — OXYCODONE HYDROCHLORIDE 10 MILLIGRAM(S): 30 TABLET ORAL at 12:06

## 2025-06-17 RX ADMIN — Medication 500 MILLIGRAM(S): at 11:34

## 2025-06-17 RX ADMIN — FUROSEMIDE 40 MILLIGRAM(S): 10 INJECTION INTRAMUSCULAR; INTRAVENOUS at 18:47

## 2025-06-17 RX ADMIN — Medication 81 MILLIGRAM(S): at 11:33

## 2025-06-17 RX ADMIN — CLONAZEPAM 0.5 MILLIGRAM(S): 0.5 TABLET ORAL at 22:07

## 2025-06-17 RX ADMIN — CLONAZEPAM 0.5 MILLIGRAM(S): 0.5 TABLET ORAL at 13:07

## 2025-06-17 RX ADMIN — PREDNISONE 10 MILLIGRAM(S): 20 TABLET ORAL at 11:33

## 2025-06-17 RX ADMIN — Medication 650 MILLIGRAM(S): at 01:52

## 2025-06-17 RX ADMIN — OXYCODONE HYDROCHLORIDE 10 MILLIGRAM(S): 30 TABLET ORAL at 22:57

## 2025-06-17 RX ADMIN — ROSUVASTATIN CALCIUM 40 MILLIGRAM(S): 20 TABLET, FILM COATED ORAL at 22:07

## 2025-06-17 RX ADMIN — INSULIN LISPRO 12 UNIT(S): 100 INJECTION, SOLUTION INTRAVENOUS; SUBCUTANEOUS at 13:06

## 2025-06-17 RX ADMIN — CEFTRIAXONE 2000 MILLIGRAM(S): 500 INJECTION, POWDER, FOR SOLUTION INTRAMUSCULAR; INTRAVENOUS at 18:45

## 2025-06-17 RX ADMIN — BUTYROSPERMUM PARKII(SHEA BUTTER), SIMMONDSIA CHINENSIS (JOJOBA) SEED OIL, ALOE BARBADENSIS LEAF EXTRACT 250 MILLIGRAM(S): .01; 1; 3.5 LIQUID TOPICAL at 11:35

## 2025-06-17 RX ADMIN — OXYCODONE HYDROCHLORIDE 10 MILLIGRAM(S): 30 TABLET ORAL at 22:07

## 2025-06-17 RX ADMIN — NYSTATIN 1 APPLICATION(S): 100000 CREAM TOPICAL at 11:35

## 2025-06-17 RX ADMIN — PREGABALIN 150 MILLIGRAM(S): 50 CAPSULE ORAL at 11:33

## 2025-06-17 RX ADMIN — NYSTATIN 1 APPLICATION(S): 100000 CREAM TOPICAL at 22:09

## 2025-06-17 RX ADMIN — PREGABALIN 150 MILLIGRAM(S): 50 CAPSULE ORAL at 22:07

## 2025-06-17 RX ADMIN — INSULIN LISPRO 12 UNIT(S): 100 INJECTION, SOLUTION INTRAVENOUS; SUBCUTANEOUS at 18:33

## 2025-06-17 RX ADMIN — Medication 650 MILLIGRAM(S): at 02:32

## 2025-06-17 RX ADMIN — Medication 3 MILLIGRAM(S): at 01:52

## 2025-06-17 RX ADMIN — ENOXAPARIN SODIUM 40 MILLIGRAM(S): 100 INJECTION SUBCUTANEOUS at 22:08

## 2025-06-17 RX ADMIN — CLONAZEPAM 0.5 MILLIGRAM(S): 0.5 TABLET ORAL at 05:56

## 2025-06-17 RX ADMIN — Medication 1 DOSE(S): at 20:49

## 2025-06-17 RX ADMIN — Medication 1 DOSE(S): at 08:36

## 2025-06-17 RX ADMIN — Medication 325 MILLIGRAM(S): at 11:34

## 2025-06-17 RX ADMIN — Medication 1 TABLET(S): at 11:34

## 2025-06-17 RX ADMIN — IPRATROPIUM BROMIDE AND ALBUTEROL SULFATE 3 MILLILITER(S): .5; 2.5 SOLUTION RESPIRATORY (INHALATION) at 20:49

## 2025-06-17 RX ADMIN — IPRATROPIUM BROMIDE AND ALBUTEROL SULFATE 3 MILLILITER(S): .5; 2.5 SOLUTION RESPIRATORY (INHALATION) at 08:36

## 2025-06-17 RX ADMIN — INSULIN LISPRO 4: 100 INJECTION, SOLUTION INTRAVENOUS; SUBCUTANEOUS at 08:50

## 2025-06-17 RX ADMIN — INSULIN LISPRO 10 UNIT(S): 100 INJECTION, SOLUTION INTRAVENOUS; SUBCUTANEOUS at 08:51

## 2025-06-17 RX ADMIN — INSULIN GLARGINE-YFGN 27 UNIT(S): 100 INJECTION, SOLUTION SUBCUTANEOUS at 22:09

## 2025-06-17 RX ADMIN — Medication 40 MILLIGRAM(S): at 05:57

## 2025-06-17 RX ADMIN — INSULIN LISPRO 2: 100 INJECTION, SOLUTION INTRAVENOUS; SUBCUTANEOUS at 13:05

## 2025-06-17 RX ADMIN — MONTELUKAST SODIUM 10 MILLIGRAM(S): 10 TABLET ORAL at 22:07

## 2025-06-17 RX ADMIN — IPRATROPIUM BROMIDE AND ALBUTEROL SULFATE 3 MILLILITER(S): .5; 2.5 SOLUTION RESPIRATORY (INHALATION) at 03:50

## 2025-06-17 RX ADMIN — IPRATROPIUM BROMIDE AND ALBUTEROL SULFATE 3 MILLILITER(S): .5; 2.5 SOLUTION RESPIRATORY (INHALATION) at 13:41

## 2025-06-17 RX ADMIN — SERTRALINE 150 MILLIGRAM(S): 100 TABLET, FILM COATED ORAL at 11:34

## 2025-06-17 RX ADMIN — FUROSEMIDE 40 MILLIGRAM(S): 10 INJECTION INTRAMUSCULAR; INTRAVENOUS at 05:56

## 2025-06-17 RX ADMIN — DAPTOMYCIN 118 MILLIGRAM(S): 500 INJECTION, POWDER, LYOPHILIZED, FOR SOLUTION INTRAVENOUS at 11:35

## 2025-06-17 NOTE — PROGRESS NOTE ADULT - SUBJECTIVE AND OBJECTIVE BOX
HOSPITALIST ATTENDING PROGRESS NOTE    Chart and meds reviewed.  Patient seen and examined    CC: RLE cellulitis     Subjective: Patient seen today, feels well, erythema to RLE slowly improving, no complaints     All other systems reviewed and found to be negative with the exception of what has been described above.    Vital Signs Last 24 Hrs  T(C): 36.7 (17 Jun 2025 15:53), Max: 36.7 (17 Jun 2025 15:53)  T(F): 98.1 (17 Jun 2025 15:53), Max: 98.1 (17 Jun 2025 15:53)  HR: 70 (17 Jun 2025 15:53) (69 - 102)  BP: 101/57 (17 Jun 2025 15:53) (101/57 - 137/80)  RR: 18 (17 Jun 2025 15:53) (18 - 19)  SpO2: 94% (17 Jun 2025 15:53) (91% - 94%)    Parameters below as of 17 Jun 2025 15:53  Patient On (Oxygen Delivery Method): room air      PHYSICAL EXAM:  GENERAL: NAD, lying in bed comfortably  HEAD:  Atraumatic, Normocephalic  EYES: conjunctiva and sclera clear  ENT: Moist mucous membranes  NECK: Supple, No JVD  CHEST/LUNG: Clear to auscultation bilaterally; No rales, rhonchi, wheezing. Unlabored respirations  HEART: Regular rate and rhythm; No murmurs  ABDOMEN: Bowel sounds present; Soft, Nontender, Nondistended.   EXTREMITIES:  2+ Peripheral Pulses, brisk capillary refill. No clubbing, cyanosis, or edema  NERVOUS SYSTEM:  Alert & Oriented X3, speech clear. No deficits   MSK: FROM all 4 extremities, full and equal strength  SKIN:  mid to distal RLE erythema with closed wound with black eschar to lateral aspect of leg, there is healed wound to R hallux, no oozing, non blanching purpuric rash to b/l feet     MEDICATIONS  (STANDING):  albuterol/ipratropium for Nebulization 3 milliLiter(s) Nebulizer every 6 hours  ascorbic acid 500 milliGRAM(s) Oral daily  aspirin  chewable 81 milliGRAM(s) Oral daily  cefTRIAXone Injectable. 2000 milliGRAM(s) IV Push every 24 hours  clonazePAM Oral Disintegrating Tablet 0.5 milliGRAM(s) Oral three times a day  DAPTOmycin IVPB 450 milliGRAM(s) IV Intermittent every 24 hours  dextrose 5%. 1000 milliLiter(s) (50 mL/Hr) IV Continuous <Continuous>  dextrose 5%. 1000 milliLiter(s) (100 mL/Hr) IV Continuous <Continuous>  dextrose 50% Injectable 25 Gram(s) IV Push once  dextrose 50% Injectable 12.5 Gram(s) IV Push once  dextrose 50% Injectable 25 Gram(s) IV Push once  enoxaparin Injectable 40 milliGRAM(s) SubCutaneous every 24 hours  ferrous    sulfate 325 milliGRAM(s) Oral daily  fluticasone propionate/ salmeterol 250-50 MICROgram(s) Diskus 1 Dose(s) Inhalation two times a day  furosemide    Tablet 40 milliGRAM(s) Oral every 12 hours  glucagon  Injectable 1 milliGRAM(s) IntraMuscular once  insulin glargine Injectable (LANTUS) 18 Unit(s) SubCutaneous at bedtime  insulin lispro (ADMELOG) corrective regimen sliding scale   SubCutaneous three times a day before meals  insulin lispro (ADMELOG) corrective regimen sliding scale   SubCutaneous at bedtime  insulin lispro Injectable (ADMELOG) 10 Unit(s) SubCutaneous three times a day before meals  montelukast 10 milliGRAM(s) Oral at bedtime  multivitamin 1 Tablet(s) Oral daily  oxyCODONE    IR 10 milliGRAM(s) Oral two times a day  pantoprazole    Tablet 40 milliGRAM(s) Oral before breakfast  predniSONE   Tablet 10 milliGRAM(s) Oral daily  pregabalin 150 milliGRAM(s) Oral two times a day  rosuvastatin 40 milliGRAM(s) Oral at bedtime  saccharomyces boulardii 250 milliGRAM(s) Oral daily  senna 1 Tablet(s) Oral at bedtime  sertraline 150 milliGRAM(s) Oral daily  sodium chloride 0.9%. 1000 milliLiter(s) (100 mL/Hr) IV Continuous <Continuous>    MEDICATIONS  (PRN):  acetaminophen     Tablet .. 650 milliGRAM(s) Oral every 6 hours PRN Temp greater or equal to 38C (100.4F), Mild Pain (1 - 3)  albuterol    90 MICROgram(s) HFA Inhaler 1 Puff(s) Inhalation every 4 hours PRN Shortness of Breath and/or Wheezing  aluminum hydroxide/magnesium hydroxide/simethicone Suspension 30 milliLiter(s) Oral every 4 hours PRN Dyspepsia  dextrose Oral Gel 15 Gram(s) Oral once PRN Blood Glucose LESS THAN 70 milliGRAM(s)/deciliter  melatonin 3 milliGRAM(s) Oral at bedtime PRN Insomnia  ondansetron Injectable 4 milliGRAM(s) IV Push every 8 hours PRN Nausea and/or Vomiting  polyethylene glycol 3350 17 Gram(s) Oral daily PRN for constipation      LABS:                                     11.6   7.38  )-----------( 143      ( 17 Jun 2025 06:32 )             37.0   06-17    138  |  104  |  43[H]  ----------------------------<  234[H]  3.5   |  27  |  1.40[H]    Ca    9.1      17 Jun 2025 06:32                RADIOLOGY:

## 2025-06-17 NOTE — PROGRESS NOTE ADULT - SUBJECTIVE AND OBJECTIVE BOX
SURGERY DAILY PROGRESS NOTE:     Subjective:  Patient seen and examined at bedside during am rounds. AVSS. Denies any fevers, chills, n/v/d, chest pain or shortness of breath. IV abx running, MICHAEL/Adup performed pending read.     Objective:    MEDICATIONS  (STANDING):  albuterol/ipratropium for Nebulization 3 milliLiter(s) Nebulizer every 6 hours  ascorbic acid 500 milliGRAM(s) Oral daily  aspirin  chewable 81 milliGRAM(s) Oral daily  cefTRIAXone Injectable. 2000 milliGRAM(s) IV Push every 24 hours  clonazePAM Oral Disintegrating Tablet 0.5 milliGRAM(s) Oral three times a day  DAPTOmycin IVPB 450 milliGRAM(s) IV Intermittent every 24 hours  dextrose 5%. 1000 milliLiter(s) (50 mL/Hr) IV Continuous <Continuous>  dextrose 5%. 1000 milliLiter(s) (100 mL/Hr) IV Continuous <Continuous>  dextrose 50% Injectable 25 Gram(s) IV Push once  dextrose 50% Injectable 12.5 Gram(s) IV Push once  dextrose 50% Injectable 25 Gram(s) IV Push once  enoxaparin Injectable 40 milliGRAM(s) SubCutaneous every 24 hours  ferrous    sulfate 325 milliGRAM(s) Oral daily  fluticasone propionate/ salmeterol 250-50 MICROgram(s) Diskus 1 Dose(s) Inhalation two times a day  furosemide    Tablet 40 milliGRAM(s) Oral every 12 hours  glucagon  Injectable 1 milliGRAM(s) IntraMuscular once  insulin glargine Injectable (LANTUS) 18 Unit(s) SubCutaneous at bedtime  insulin lispro (ADMELOG) corrective regimen sliding scale   SubCutaneous three times a day before meals  insulin lispro (ADMELOG) corrective regimen sliding scale   SubCutaneous at bedtime  insulin lispro Injectable (ADMELOG) 10 Unit(s) SubCutaneous three times a day before meals  montelukast 10 milliGRAM(s) Oral at bedtime  multivitamin 1 Tablet(s) Oral daily  nystatin Powder 1 Application(s) Topical two times a day  oxyCODONE    IR 10 milliGRAM(s) Oral two times a day  pantoprazole    Tablet 40 milliGRAM(s) Oral before breakfast  predniSONE   Tablet 10 milliGRAM(s) Oral daily  pregabalin 150 milliGRAM(s) Oral two times a day  rosuvastatin 40 milliGRAM(s) Oral at bedtime  saccharomyces boulardii 250 milliGRAM(s) Oral daily  senna 1 Tablet(s) Oral at bedtime  sertraline 150 milliGRAM(s) Oral daily    MEDICATIONS  (PRN):  acetaminophen     Tablet .. 650 milliGRAM(s) Oral every 6 hours PRN Temp greater or equal to 38C (100.4F), Mild Pain (1 - 3)  albuterol    90 MICROgram(s) HFA Inhaler 1 Puff(s) Inhalation every 4 hours PRN Shortness of Breath and/or Wheezing  aluminum hydroxide/magnesium hydroxide/simethicone Suspension 30 milliLiter(s) Oral every 4 hours PRN Dyspepsia  dextrose Oral Gel 15 Gram(s) Oral once PRN Blood Glucose LESS THAN 70 milliGRAM(s)/deciliter  melatonin 3 milliGRAM(s) Oral at bedtime PRN Insomnia  ondansetron Injectable 4 milliGRAM(s) IV Push every 8 hours PRN Nausea and/or Vomiting  polyethylene glycol 3350 17 Gram(s) Oral daily PRN for constipation      Vital Signs Last 24 Hrs  T(C): 36.5 (16 Jun 2025 23:42), Max: 37.2 (16 Jun 2025 08:17)  T(F): 97.7 (16 Jun 2025 23:42), Max: 98.9 (16 Jun 2025 08:17)  HR: 102 (16 Jun 2025 23:42) (73 - 102)  BP: 137/80 (16 Jun 2025 23:42) (103/57 - 137/80)  BP(mean): 87 (16 Jun 2025 05:50) (87 - 87)  RR: 19 (16 Jun 2025 23:42) (18 - 19)  SpO2: 93% (16 Jun 2025 23:42) (93% - 94%)    Parameters below as of 16 Jun 2025 23:42  Patient On (Oxygen Delivery Method): room air          PHYSICAL EXAM   General: AAOx3, Well developed, NAD  Chest: Normal respiratory effort  Heart: RRR  Abdomen: Soft, NTND, no masses  Extremities: Warm, bilateral 2+ pitting edema  RLE: purple/red patch in above the ankle, petechia and erythema in the dorsum of the foot, going into both malleolus, strongly palpable PT/DP pulses  LLE:purple/red petechia in the dorsum of the foot, extending up to the ankle, strongly palpable      I&O's Detail    15 Justo 2025 07:01  -  16 Jun 2025 07:00  --------------------------------------------------------  IN:    Oral Fluid: 900 mL  Total IN: 900 mL    OUT:    Voided (mL): 2650 mL  Total OUT: 2650 mL    Total NET: -1750 mL      16 Jun 2025 07:01  -  17 Jun 2025 03:18  --------------------------------------------------------  IN:    Oral Fluid: 790 mL  Total IN: 790 mL    OUT:    Voided (mL): 2850 mL  Total OUT: 2850 mL    Total NET: -2060 mL          Daily     Daily     LABS:                        11.1   8.10  )-----------( 141      ( 16 Jun 2025 10:36 )             35.0     06-16    137  |  103  |  41[H]  ----------------------------<  198[H]  3.0[L]   |  28  |  1.42[H]    Ca    8.9      16 Jun 2025 10:36        Urinalysis Basic - ( 16 Jun 2025 10:36 )    Color: x / Appearance: x / SG: x / pH: x  Gluc: 198 mg/dL / Ketone: x  / Bili: x / Urobili: x   Blood: x / Protein: x / Nitrite: x   Leuk Esterase: x / RBC: x / WBC x   Sq Epi: x / Non Sq Epi: x / Bacteria: x

## 2025-06-17 NOTE — PROGRESS NOTE ADULT - ASSESSMENT
A: 74-year-old Male was seen for the following:   -Cellulitis RLE    -Pain to RLE  -DM2      P:   Chart reviewed and Patient evaluated;  Discussed diagnosis and treatment with patient  CT reviewed : on wet read showing no acute radiographic findings. official read is pending  Applied Bacitracin to scab lesion on the right medial 1st mpJ and lateral lower leg; scab lesions appear stable without any SOI  WBAT  to RLE  Antibiotics as per ID  Vascular reccs appreciated; possible vasculitis  Dermatology eval pending  All additional care per Med appreciated  No acute podiatric surgical intervention warranted at this time. Pt to follow Dr. Pandya as outpatient within one week after discharge  Patient demonstrated verbal understanding of all interventions and tolerated interventions well without any complications.   Podiatry will sign off with nursing order. Please reconsult when needed    Case D/W attending Dr. Pandya   A: 74-year-old Male was seen for the following:   -Cellulitis RLE    -Pain to RLE  -DM2      P:   Chart reviewed and Patient evaluated;  Discussed diagnosis and treatment with patient  CT reviewed : on wet read showing no acute radiographic findings. official read is pending  Applied Bacitracin to scab lesion on the right medial 1st mpJ and lateral lower leg; scab lesions appear stable without any SOI  WBAT  to RLE  Antibiotics as per ID  Vascular reccs appreciated; possible vasculitis  Dermatology eval pending  All additional care per Med appreciated  No acute podiatric surgical intervention warranted at this time. Pt to follow Dr. Pandya as outpatient within one week after discharge  Patient demonstrated verbal understanding of all interventions and tolerated interventions well without any complications.   Podiatry will sign off with nursing order. Please reconsult when needed    Case D/W attending Dr. Pandya      Compression instructions to RLE  Please apply 2 Ace compressions to RLE    Thank you A: 74-year-old Male was seen for the following:   -Cellulitis RLE    -Pain to RLE  -DM2      P:   Chart reviewed and Patient evaluated;  Discussed diagnosis and treatment with patient  CT reviewed : on wet read showing no acute radiographic findings. official read is pending  Applied Bacitracin to scab lesion on the right medial 1st mpJ and lateral lower leg; scab lesions appear stable without any SOI  WBAT  to RLE  Antibiotics as per ID  Vascular reccs appreciated; possible vasculitis  Dermatology eval pending  All additional care per Med appreciated  No acute podiatric surgical intervention warranted at this time. Pt to follow Dr. Pandya as outpatient within one week after discharge  Patient demonstrated verbal understanding of all interventions and tolerated interventions well without any complications.   Podiatry will sign off with nursing order. Please reconsult when needed    seen with  attending Dr. Pandya      Compression instructions to RLE  Please apply 2 Ace compressions to RLE    Thank you

## 2025-06-17 NOTE — PROGRESS NOTE ADULT - ASSESSMENT
74/M with a hx of HTN CHF DM COPD presents with doxycycle resistent RLE cellulitis     RLE cellulitis with closed wound w/eschar to lateral leg   B/l RLE purpuric rash   -Pt was recently admitted at [facility] from 6/3-6/10, treated with zosyn and dapto, d/c on doxycycline   -s/p Zosyn and Vanco in ER  -ID consult appreciated, switched from zosyn to ceftriaxone, vanco d/c started daptomycin   -Continue ceftriaxone + daptomycin   -Pt has hx of Red Man syndrome, infuse vanco 3 hours to avoid this. Pre med with benadryl -- vanco d/c now  -MRI ordered initially, d/w radiology Ct angio recommended, pt reports contrast allergy, when asked he said in 1985 he was being seen for kidney stones and a dye was injected for study when he had a severe reaction   -Check ESR 58, CRP 58.4, CHASE normal, ANCA pending, RF normal, C3 and C4 wnl   -d/w derm today, sent pictures, pt will need skin bx for evaluation in office once discharged   -Podiatry consult appreciated  -Vascular consult for PAD appreciated, US duplex arterial, MICHAEL pending     HFpEF   -chronic, stable   -TTE from 5/26/25: preserved EF, 60-65%, valves not well visualized   -euvolemic    COPD/Asthma  -Duonebs  -on symbicort at home, interch with advair here     HTN   -Cont home meds     DM  -Glargine 10 units daily, aspart 5 units with meals, sliding scale     Lovenox for DVT PPx

## 2025-06-17 NOTE — PROGRESS NOTE ADULT - SUBJECTIVE AND OBJECTIVE BOX
6/17/25: Pt was seen by podiatry team with the attending present. Pt was resting comfortably bedside. No acute events.      PMH  Prostate cancer  Type II diabetes mellitus  Chronic obstructive pulmonary disease (COPD)  CHF (congestive heart failure)  Renal insufficiency  H/O migraine  Insomnia  Constipation  S/P foot surgery      FAMILY HISTORY:    Social History:      Allergies    tetracycline (Unknown)  vancomycin (Other)  IODINE (Unknown)      Allergies: Tetracycline (Unknown)  vancomycin (Other)  IODINE (Unknown)      Labs:                        11.6   7.38  )-----------( 143      ( 17 Jun 2025 06:32 )             37.0     06-16    137  |  103  |  41[H]  ----------------------------<  198[H]  3.0[L]   |  28  |  1.42[H]    Ca    8.9      16 Jun 2025 10:36    Vital Signs Last 24 Hrs  T(C): 36.5 (16 Jun 2025 23:42), Max: 37.2 (16 Jun 2025 08:17)  T(F): 97.7 (16 Jun 2025 23:42), Max: 98.9 (16 Jun 2025 08:17)  HR: 79 (17 Jun 2025 06:16) (77 - 102)  BP: 134/70 (17 Jun 2025 06:16) (103/57 - 137/80)  BP(mean): --  RR: 18 (17 Jun 2025 06:16) (18 - 19)  SpO2: 93% (17 Jun 2025 06:16) (93% - 94%)    Parameters below as of 17 Jun 2025 06:16  Patient On (Oxygen Delivery Method): room air      REVIEW OF SYSTEMS:  CONSTITUTIONAL: No weakness, fevers or chills  EYES: No visual changes  RESPIRATORY: No cough, wheezing; No shortness of breath  CARDIOVASCULAR: No chest pain or palpitations  GASTROINTESTINAL: No abdominal or epigastric pain. No nausea, vomiting; No diarrhea or constipation.   GENITOURINARY: No dysuria, frequency or hematuria  NEUROLOGICAL: No numbness or weakness  SKIN: See physical examination.  All other review of systems is negative unless indicated above    Physical Exam:   Constitutional: NAD, alert;  Lower Extremity Focus  Derm:  Skin warm, dry and supple bilateral.    Right: small scab lesion to medial 1st MPJ,  diffuse  +1 pitting edema to  Beto LE,  diffuse erythema proximal to right ankle, diffuse petechiae  to beto LE,   Vascular: Dorsalis Pedis and Posterior Tibial pulses 1/4  Neuro: Protective sensation diminished to the level of the digits bilateral.  MSK: Muscle strength 5/5 all major muscle groups bilateral    < from: CT Lower Extremity No Cont, Right (06.04.25 @ 18:27) >  IMPRESSION:    Right lower leg cellulitis in the proper clinical setting. No tracking   soft tissue emphysema or drainable mature abscess.    --- End of Report ---    < end of copied text >

## 2025-06-17 NOTE — PROGRESS NOTE ADULT - ASSESSMENT
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   In a patient with hx of vasculitis, another vasculitis or autoimmune process should be consider.  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   In a patient with hx of vasculitis, another vasculitis or autoimmune process should be consider.  MICHAEL 2024: R 1.05, L 1.01    MICHAEL 06/16/25  The right MICHAEL of 1.05 and the left MICHAEL of 1.01 are normal.    The right TBI of 0.95 and the left TBI of 1.01 are normal.      Recommendations:  - MICHAEL/PVR and adup reviewed, no vascular pathology identified  - No vascular surgery intervention  - Dermatology consult to rule out vasculitis  - Rheumatology consult for vasculitis/autoimmune process  - Rest of care as per primary team  - Please re consult as necessary      Case discussed with Vascular team

## 2025-06-18 PROCEDURE — 99232 SBSQ HOSP IP/OBS MODERATE 35: CPT

## 2025-06-18 RX ORDER — CEFPODOXIME PROXETIL 200 MG/1
1 TABLET, FILM COATED ORAL
Qty: 12 | Refills: 0
Start: 2025-06-18 | End: 2025-06-23

## 2025-06-18 RX ORDER — INSULIN GLARGINE-YFGN 100 [IU]/ML
28 INJECTION, SOLUTION SUBCUTANEOUS
Qty: 1 | Refills: 0
Start: 2025-06-18 | End: 2025-06-27

## 2025-06-18 RX ORDER — EMPAGLIFLOZIN 25 MG/1
1 TABLET, FILM COATED ORAL
Qty: 0 | Refills: 0 | DISCHARGE

## 2025-06-18 RX ORDER — FERROUS SULFATE 137(45) MG
1 TABLET, EXTENDED RELEASE ORAL
Qty: 0 | Refills: 0 | DISCHARGE
Start: 2025-06-18

## 2025-06-18 RX ORDER — DOXYCYCLINE HYCLATE 100 MG
1 TABLET ORAL
Qty: 12 | Refills: 0
Start: 2025-06-18 | End: 2025-06-23

## 2025-06-18 RX ORDER — BUDESONIDE AND FORMOTEROL FUMARATE DIHYDRATE 80; 4.5 UG/1; UG/1
2 AEROSOL RESPIRATORY (INHALATION)
Qty: 0 | Refills: 0 | DISCHARGE

## 2025-06-18 RX ADMIN — Medication 325 MILLIGRAM(S): at 10:35

## 2025-06-18 RX ADMIN — Medication 1 DOSE(S): at 09:13

## 2025-06-18 RX ADMIN — IPRATROPIUM BROMIDE AND ALBUTEROL SULFATE 3 MILLILITER(S): .5; 2.5 SOLUTION RESPIRATORY (INHALATION) at 02:28

## 2025-06-18 RX ADMIN — Medication 3 MILLIGRAM(S): at 23:25

## 2025-06-18 RX ADMIN — DAPTOMYCIN 118 MILLIGRAM(S): 500 INJECTION, POWDER, LYOPHILIZED, FOR SOLUTION INTRAVENOUS at 12:47

## 2025-06-18 RX ADMIN — Medication 1 DOSE(S): at 20:30

## 2025-06-18 RX ADMIN — INSULIN LISPRO 2: 100 INJECTION, SOLUTION INTRAVENOUS; SUBCUTANEOUS at 09:23

## 2025-06-18 RX ADMIN — Medication 3 MILLIGRAM(S): at 01:22

## 2025-06-18 RX ADMIN — INSULIN LISPRO 15 UNIT(S): 100 INJECTION, SOLUTION INTRAVENOUS; SUBCUTANEOUS at 12:46

## 2025-06-18 RX ADMIN — INSULIN LISPRO 6: 100 INJECTION, SOLUTION INTRAVENOUS; SUBCUTANEOUS at 18:00

## 2025-06-18 RX ADMIN — OXYCODONE HYDROCHLORIDE 10 MILLIGRAM(S): 30 TABLET ORAL at 21:32

## 2025-06-18 RX ADMIN — INSULIN LISPRO 12 UNIT(S): 100 INJECTION, SOLUTION INTRAVENOUS; SUBCUTANEOUS at 09:27

## 2025-06-18 RX ADMIN — Medication 1 TABLET(S): at 10:35

## 2025-06-18 RX ADMIN — Medication 81 MILLIGRAM(S): at 10:35

## 2025-06-18 RX ADMIN — BUTYROSPERMUM PARKII(SHEA BUTTER), SIMMONDSIA CHINENSIS (JOJOBA) SEED OIL, ALOE BARBADENSIS LEAF EXTRACT 250 MILLIGRAM(S): .01; 1; 3.5 LIQUID TOPICAL at 10:36

## 2025-06-18 RX ADMIN — INSULIN GLARGINE-YFGN 35 UNIT(S): 100 INJECTION, SOLUTION SUBCUTANEOUS at 21:34

## 2025-06-18 RX ADMIN — IPRATROPIUM BROMIDE AND ALBUTEROL SULFATE 3 MILLILITER(S): .5; 2.5 SOLUTION RESPIRATORY (INHALATION) at 09:13

## 2025-06-18 RX ADMIN — INSULIN LISPRO 15 UNIT(S): 100 INJECTION, SOLUTION INTRAVENOUS; SUBCUTANEOUS at 18:00

## 2025-06-18 RX ADMIN — NYSTATIN 1 APPLICATION(S): 100000 CREAM TOPICAL at 21:38

## 2025-06-18 RX ADMIN — ROSUVASTATIN CALCIUM 40 MILLIGRAM(S): 20 TABLET, FILM COATED ORAL at 21:32

## 2025-06-18 RX ADMIN — CLONAZEPAM 0.5 MILLIGRAM(S): 0.5 TABLET ORAL at 06:12

## 2025-06-18 RX ADMIN — OXYCODONE HYDROCHLORIDE 10 MILLIGRAM(S): 30 TABLET ORAL at 10:31

## 2025-06-18 RX ADMIN — OXYCODONE HYDROCHLORIDE 10 MILLIGRAM(S): 30 TABLET ORAL at 11:07

## 2025-06-18 RX ADMIN — IPRATROPIUM BROMIDE AND ALBUTEROL SULFATE 3 MILLILITER(S): .5; 2.5 SOLUTION RESPIRATORY (INHALATION) at 20:30

## 2025-06-18 RX ADMIN — CEFTRIAXONE 2000 MILLIGRAM(S): 500 INJECTION, POWDER, FOR SOLUTION INTRAMUSCULAR; INTRAVENOUS at 18:27

## 2025-06-18 RX ADMIN — CLONAZEPAM 0.5 MILLIGRAM(S): 0.5 TABLET ORAL at 14:49

## 2025-06-18 RX ADMIN — FUROSEMIDE 40 MILLIGRAM(S): 10 INJECTION INTRAMUSCULAR; INTRAVENOUS at 06:11

## 2025-06-18 RX ADMIN — Medication 650 MILLIGRAM(S): at 01:22

## 2025-06-18 RX ADMIN — SERTRALINE 150 MILLIGRAM(S): 100 TABLET, FILM COATED ORAL at 10:31

## 2025-06-18 RX ADMIN — FUROSEMIDE 40 MILLIGRAM(S): 10 INJECTION INTRAMUSCULAR; INTRAVENOUS at 18:28

## 2025-06-18 RX ADMIN — Medication 650 MILLIGRAM(S): at 23:25

## 2025-06-18 RX ADMIN — Medication 1 TABLET(S): at 21:32

## 2025-06-18 RX ADMIN — PREGABALIN 150 MILLIGRAM(S): 50 CAPSULE ORAL at 21:32

## 2025-06-18 RX ADMIN — NYSTATIN 1 APPLICATION(S): 100000 CREAM TOPICAL at 10:30

## 2025-06-18 RX ADMIN — PREGABALIN 150 MILLIGRAM(S): 50 CAPSULE ORAL at 10:31

## 2025-06-18 RX ADMIN — MONTELUKAST SODIUM 10 MILLIGRAM(S): 10 TABLET ORAL at 21:32

## 2025-06-18 RX ADMIN — IPRATROPIUM BROMIDE AND ALBUTEROL SULFATE 3 MILLILITER(S): .5; 2.5 SOLUTION RESPIRATORY (INHALATION) at 14:31

## 2025-06-18 RX ADMIN — Medication 40 MILLIGRAM(S): at 06:12

## 2025-06-18 RX ADMIN — Medication 500 MILLIGRAM(S): at 10:35

## 2025-06-18 RX ADMIN — Medication 650 MILLIGRAM(S): at 02:15

## 2025-06-18 RX ADMIN — ENOXAPARIN SODIUM 40 MILLIGRAM(S): 100 INJECTION SUBCUTANEOUS at 21:34

## 2025-06-18 RX ADMIN — PREDNISONE 10 MILLIGRAM(S): 20 TABLET ORAL at 10:31

## 2025-06-18 RX ADMIN — CLONAZEPAM 0.5 MILLIGRAM(S): 0.5 TABLET ORAL at 21:32

## 2025-06-18 RX ADMIN — INSULIN LISPRO 2: 100 INJECTION, SOLUTION INTRAVENOUS; SUBCUTANEOUS at 12:46

## 2025-06-18 NOTE — PROGRESS NOTE ADULT - SUBJECTIVE AND OBJECTIVE BOX
Date of Service:06-18-25 @ 11:18  Interval History/ROS: Afebrile overnight, comfortable on RA, BCx negative, no leukocytosis  Reports no fever or chills, leg pain is better    REVIEW OF SYSTEMS  [  ] ROS unobtainable because:    [ x ] All other systems negative except as noted below    Constitutional:  [ ] fever [ ] chills  [ ] weight loss  [ ]night sweat  [ ]poor appetite/PO intake [ ]fatigue   Skin:  [ ] rash [ ] phlebitis	  Eyes: [ ] icterus [ ] pain  [ ] discharge	  ENMT: [ ] sore throat  [ ] thrush [ ] ulcers [ ] exudates [ ]anosmia  Respiratory: [ ] dyspnea [ ] hemoptysis [ ] cough [ ] sputum	  Cardiovascular:  [ ] chest pain [ ] palpitations [ ] edema	  Gastrointestinal:  [ ] nausea [ ] vomiting [ ] diarrhea [ ] constipation [ ] pain	  Genitourinary:  [ ] dysuria [ ] frequency [ ] hematuria [ ] discharge [ ] flank pain  [ ] incontinence  Musculoskeletal:  [ ] myalgias [ ] arthralgias [ ] arthritis  [ ] back pain  Neurological:  [ ] headache [ ] weakness [ ] seizures  [ ] confusion/altered mental status    Allergies  tetracycline (Unknown)  vancomycin (Other)  IODINE (Unknown)        ANTIMICROBIALS:    cefTRIAXone Injectable. 2000 every 24 hours  DAPTOmycin IVPB 450 every 24 hours        OTHER MEDS: MEDICATIONS  (STANDING):  acetaminophen     Tablet .. 650 every 6 hours PRN  albuterol    90 MICROgram(s) HFA Inhaler 1 every 4 hours PRN  albuterol/ipratropium for Nebulization 3 every 6 hours  aluminum hydroxide/magnesium hydroxide/simethicone Suspension 30 every 4 hours PRN  aspirin  chewable 81 daily  clonazePAM Oral Disintegrating Tablet 0.5 three times a day  dextrose 50% Injectable 25 once  dextrose 50% Injectable 12.5 once  dextrose 50% Injectable 25 once  dextrose Oral Gel 15 once PRN  enoxaparin Injectable 40 every 24 hours  fluticasone propionate/ salmeterol 250-50 MICROgram(s) Diskus 1 two times a day  furosemide    Tablet 40 every 12 hours  glucagon  Injectable 1 once  insulin glargine Injectable (LANTUS) 35 at bedtime  insulin lispro (ADMELOG) corrective regimen sliding scale  three times a day before meals  insulin lispro (ADMELOG) corrective regimen sliding scale  at bedtime  insulin lispro Injectable (ADMELOG) 15 three times a day before meals  melatonin 3 at bedtime PRN  montelukast 10 at bedtime  ondansetron Injectable 4 every 8 hours PRN  oxyCODONE    IR 10 two times a day  pantoprazole    Tablet 40 before breakfast  polyethylene glycol 3350 17 daily PRN  predniSONE   Tablet 10 daily  pregabalin 150 two times a day  rosuvastatin 40 at bedtime  senna 1 at bedtime  sertraline 150 daily      Vital Signs Last 24 Hrs  T(F): 98.1 (06-18-25 @ 08:11), Max: 99.3 (06-15-25 @ 23:46)    Vital Signs Last 24 Hrs  HR: 80 (06-18-25 @ 09:15) (70 - 105)  BP: 117/65 (06-18-25 @ 08:11) (101/57 - 121/68)  RR: 18 (06-18-25 @ 08:11)  SpO2: 96% (06-18-25 @ 09:15) (91% - 97%)  Wt(kg): --    EXAM:    Constitutional: frail, NAD  Head/Eyes: no icterus  LUNGS:  CTA  CVS:  regular rhythm  Abd:  soft, non-tender; non-distended  Ext:  RLE swelling, erythema  Vascular:  IV site no erythema tenderness or discharge  Neuro: AAO X 3, non- focal      Labs:                        11.6   7.38  )-----------( 143      ( 17 Jun 2025 06:32 )             37.0     06-17    138  |  104  |  43[H]  ----------------------------<  234[H]  3.5   |  27  |  1.40[H]    Ca    9.1      17 Jun 2025 06:32        WBC Trend:  WBC Count: 7.38 (06-17-25 @ 06:32)  WBC Count: 8.10 (06-16-25 @ 10:36)  WBC Count: 8.45 (06-15-25 @ 06:43)  WBC Count: 8.13 (06-14-25 @ 14:39)      Creatine Trend:  Creatinine: 1.40 (06-17)  Creatinine: 1.42 (06-16)  Creatinine: 1.74 (06-15)  Creatinine: 1.64 (06-14)      Liver Biochemical Testing Trend:  Alanine Aminotransferase (ALT/SGPT): 31 (06-14)  Alanine Aminotransferase (ALT/SGPT): 32 (06-04)  Alanine Aminotransferase (ALT/SGPT): 36 (06-03)  Alanine Aminotransferase (ALT/SGPT): 28 (05-26)  Alanine Aminotransferase (ALT/SGPT): 30 (05-25)  Aspartate Aminotransferase (AST/SGOT): 20 (06-14-25 @ 14:39)  Aspartate Aminotransferase (AST/SGOT): 14 (06-04-25 @ 06:05)  Aspartate Aminotransferase (AST/SGOT): 17 (06-03-25 @ 14:45)  Aspartate Aminotransferase (AST/SGOT): 13 (05-26-25 @ 08:12)  Aspartate Aminotransferase (AST/SGOT): 18 (05-25-25 @ 07:10)  Bilirubin Total: 0.5 (06-14)  Bilirubin Total: 0.5 (06-04)  Bilirubin Total: 0.4 (06-03)  Bilirubin Total: 0.5 (05-26)  Bilirubin Total: 0.4 (05-25)      Trend LDH      Urinalysis Basic - ( 17 Jun 2025 06:32 )    Color: x / Appearance: x / SG: x / pH: x  Gluc: 234 mg/dL / Ketone: x  / Bili: x / Urobili: x   Blood: x / Protein: x / Nitrite: x   Leuk Esterase: x / RBC: x / WBC x   Sq Epi: x / Non Sq Epi: x / Bacteria: x        MICROBIOLOGY:  Vancomycin Level, Trough: 23.8 (06-15 @ 21:34)    MRSA PCR Result.: Detected (06-06-25 @ 08:17)  MRSA PCR Result.: Detected (05-23-25 @ 18:44)  MRSA PCR Result.: Detected (12-30-24 @ 16:30)      Urinalysis with Rflx Culture (collected 14 Jun 2025 17:57)    Culture - Blood (collected 14 Jun 2025 14:39)  Source: Blood Blood-Peripheral  Preliminary Report:    No growth at 72 Hours    Culture - Blood (collected 14 Jun 2025 14:39)  Source: Blood Blood-Peripheral  Preliminary Report:    No growth at 72 Hours    Culture - Urine (collected 05 Jun 2025 17:50)  Source: Clean Catch Clean Catch (Midstream)  Final Report:    No growth    Culture - Blood (collected 03 Jun 2025 15:00)  Source: Blood Blood-Peripheral  Final Report:    No growth at 5 days    Culture - Blood (collected 03 Jun 2025 14:45)  Source: Blood Blood-Peripheral  Final Report:    No growth at 5 days    Urinalysis with Rflx Culture (collected 22 May 2025 03:50)    Culture - Blood (collected 22 May 2025 01:15)  Source: Blood Blood-Peripheral  Final Report:    No growth at 5 days    Culture - Blood (collected 22 May 2025 01:15)  Source: Blood Blood-Peripheral  Final Report:    No growth at 5 days    Urinalysis with Rflx Culture (collected 28 Dec 2024 13:12)      Rapid RVP Result: NotDetec (06-16 @ 10:00)          C-Reactive Protein: 58.4 (06-15)      Sedimentation Rate, Erythrocyte: 58 mm/hr (06-15-25 @ 11:53)  Sedimentation Rate, Erythrocyte: 56 mm/hr (06-05-25 @ 07:05)  Sedimentation Rate, Erythrocyte: 65 mm/hr (05-22-25 @ 14:25)  Sedimentation Rate, Erythrocyte: 65 mm/hr (09-24-24 @ 14:10)      RADIOLOGY:  imaging below personally reviewed     Date of Service:06-18-25 @ 11:18  Interval History/ROS: Afebrile overnight, comfortable on RA, BCx negative, no leukocytosis  Reports no fever or chills, leg pain is better, erythema improving    REVIEW OF SYSTEMS  [  ] ROS unobtainable because:    [ x ] All other systems negative except as noted below    Constitutional:  [ ] fever [ ] chills  [ ] weight loss  [ ]night sweat  [ ]poor appetite/PO intake [ ]fatigue   Skin:  [ ] rash [ ] phlebitis	  Eyes: [ ] icterus [ ] pain  [ ] discharge	  ENMT: [ ] sore throat  [ ] thrush [ ] ulcers [ ] exudates [ ]anosmia  Respiratory: [ ] dyspnea [ ] hemoptysis [ ] cough [ ] sputum	  Cardiovascular:  [ ] chest pain [ ] palpitations [ ] edema	  Gastrointestinal:  [ ] nausea [ ] vomiting [ ] diarrhea [ ] constipation [ ] pain	  Genitourinary:  [ ] dysuria [ ] frequency [ ] hematuria [ ] discharge [ ] flank pain  [ ] incontinence  Musculoskeletal:  [ ] myalgias [ ] arthralgias [ ] arthritis  [ ] back pain  Neurological:  [ ] headache [ ] weakness [ ] seizures  [ ] confusion/altered mental status    Allergies  tetracycline (Unknown)  vancomycin (Other)  IODINE (Unknown)        ANTIMICROBIALS:    cefTRIAXone Injectable. 2000 every 24 hours  DAPTOmycin IVPB 450 every 24 hours        OTHER MEDS: MEDICATIONS  (STANDING):  acetaminophen     Tablet .. 650 every 6 hours PRN  albuterol    90 MICROgram(s) HFA Inhaler 1 every 4 hours PRN  albuterol/ipratropium for Nebulization 3 every 6 hours  aluminum hydroxide/magnesium hydroxide/simethicone Suspension 30 every 4 hours PRN  aspirin  chewable 81 daily  clonazePAM Oral Disintegrating Tablet 0.5 three times a day  dextrose 50% Injectable 25 once  dextrose 50% Injectable 12.5 once  dextrose 50% Injectable 25 once  dextrose Oral Gel 15 once PRN  enoxaparin Injectable 40 every 24 hours  fluticasone propionate/ salmeterol 250-50 MICROgram(s) Diskus 1 two times a day  furosemide    Tablet 40 every 12 hours  glucagon  Injectable 1 once  insulin glargine Injectable (LANTUS) 35 at bedtime  insulin lispro (ADMELOG) corrective regimen sliding scale  three times a day before meals  insulin lispro (ADMELOG) corrective regimen sliding scale  at bedtime  insulin lispro Injectable (ADMELOG) 15 three times a day before meals  melatonin 3 at bedtime PRN  montelukast 10 at bedtime  ondansetron Injectable 4 every 8 hours PRN  oxyCODONE    IR 10 two times a day  pantoprazole    Tablet 40 before breakfast  polyethylene glycol 3350 17 daily PRN  predniSONE   Tablet 10 daily  pregabalin 150 two times a day  rosuvastatin 40 at bedtime  senna 1 at bedtime  sertraline 150 daily      Vital Signs Last 24 Hrs  T(F): 98.1 (06-18-25 @ 08:11), Max: 99.3 (06-15-25 @ 23:46)    Vital Signs Last 24 Hrs  HR: 80 (06-18-25 @ 09:15) (70 - 105)  BP: 117/65 (06-18-25 @ 08:11) (101/57 - 121/68)  RR: 18 (06-18-25 @ 08:11)  SpO2: 96% (06-18-25 @ 09:15) (91% - 97%)  Wt(kg): --    EXAM:    Constitutional: frail, NAD  Head/Eyes: no icterus  LUNGS:  CTA  CVS:  regular rhythm  Abd:  soft, non-tender; non-distended  Ext:  RLE swelling, erythema  Vascular:  IV site no erythema tenderness or discharge  Neuro: AAO X 3, non- focal      Labs:                        11.6   7.38  )-----------( 143      ( 17 Jun 2025 06:32 )             37.0     06-17    138  |  104  |  43[H]  ----------------------------<  234[H]  3.5   |  27  |  1.40[H]    Ca    9.1      17 Jun 2025 06:32        WBC Trend:  WBC Count: 7.38 (06-17-25 @ 06:32)  WBC Count: 8.10 (06-16-25 @ 10:36)  WBC Count: 8.45 (06-15-25 @ 06:43)  WBC Count: 8.13 (06-14-25 @ 14:39)      Creatine Trend:  Creatinine: 1.40 (06-17)  Creatinine: 1.42 (06-16)  Creatinine: 1.74 (06-15)  Creatinine: 1.64 (06-14)      Liver Biochemical Testing Trend:  Alanine Aminotransferase (ALT/SGPT): 31 (06-14)  Alanine Aminotransferase (ALT/SGPT): 32 (06-04)  Alanine Aminotransferase (ALT/SGPT): 36 (06-03)  Alanine Aminotransferase (ALT/SGPT): 28 (05-26)  Alanine Aminotransferase (ALT/SGPT): 30 (05-25)  Aspartate Aminotransferase (AST/SGOT): 20 (06-14-25 @ 14:39)  Aspartate Aminotransferase (AST/SGOT): 14 (06-04-25 @ 06:05)  Aspartate Aminotransferase (AST/SGOT): 17 (06-03-25 @ 14:45)  Aspartate Aminotransferase (AST/SGOT): 13 (05-26-25 @ 08:12)  Aspartate Aminotransferase (AST/SGOT): 18 (05-25-25 @ 07:10)  Bilirubin Total: 0.5 (06-14)  Bilirubin Total: 0.5 (06-04)  Bilirubin Total: 0.4 (06-03)  Bilirubin Total: 0.5 (05-26)  Bilirubin Total: 0.4 (05-25)      Trend LDH      Urinalysis Basic - ( 17 Jun 2025 06:32 )    Color: x / Appearance: x / SG: x / pH: x  Gluc: 234 mg/dL / Ketone: x  / Bili: x / Urobili: x   Blood: x / Protein: x / Nitrite: x   Leuk Esterase: x / RBC: x / WBC x   Sq Epi: x / Non Sq Epi: x / Bacteria: x        MICROBIOLOGY:  Vancomycin Level, Trough: 23.8 (06-15 @ 21:34)    MRSA PCR Result.: Detected (06-06-25 @ 08:17)  MRSA PCR Result.: Detected (05-23-25 @ 18:44)  MRSA PCR Result.: Detected (12-30-24 @ 16:30)      Urinalysis with Rflx Culture (collected 14 Jun 2025 17:57)    Culture - Blood (collected 14 Jun 2025 14:39)  Source: Blood Blood-Peripheral  Preliminary Report:    No growth at 72 Hours    Culture - Blood (collected 14 Jun 2025 14:39)  Source: Blood Blood-Peripheral  Preliminary Report:    No growth at 72 Hours    Culture - Urine (collected 05 Jun 2025 17:50)  Source: Clean Catch Clean Catch (Midstream)  Final Report:    No growth    Culture - Blood (collected 03 Jun 2025 15:00)  Source: Blood Blood-Peripheral  Final Report:    No growth at 5 days    Culture - Blood (collected 03 Jun 2025 14:45)  Source: Blood Blood-Peripheral  Final Report:    No growth at 5 days    Urinalysis with Rflx Culture (collected 22 May 2025 03:50)    Culture - Blood (collected 22 May 2025 01:15)  Source: Blood Blood-Peripheral  Final Report:    No growth at 5 days    Culture - Blood (collected 22 May 2025 01:15)  Source: Blood Blood-Peripheral  Final Report:    No growth at 5 days    Urinalysis with Rflx Culture (collected 28 Dec 2024 13:12)      Rapid RVP Result: NotDetec (06-16 @ 10:00)          C-Reactive Protein: 58.4 (06-15)      Sedimentation Rate, Erythrocyte: 58 mm/hr (06-15-25 @ 11:53)  Sedimentation Rate, Erythrocyte: 56 mm/hr (06-05-25 @ 07:05)  Sedimentation Rate, Erythrocyte: 65 mm/hr (05-22-25 @ 14:25)  Sedimentation Rate, Erythrocyte: 65 mm/hr (09-24-24 @ 14:10)      RADIOLOGY:  imaging below personally reviewed

## 2025-06-18 NOTE — PROGRESS NOTE ADULT - ASSESSMENT
Assessment:  74M with diabetes, COPD, heart failure, CKD, hypertension, history of right foot ulcer presents 6/14 with worsening RLE swelling and pain  Denies any fever or chills, abdominal pain, cough, n/v, diarrhea or dysuria  Recently was admitted 6/5/2025 for RLE Cellulitis, CT did not show abscess, was treated with IV daptomycin and transition to oral Doxy  Afebrile on admission, on RA  No leukocytosis  Cr 1.74  UA negative  CXR clear  US without DVT  BCx 6/14 no growth  Full RVP negative    Antimicrobials:  piperacillin/tazobactam IVPB.. 3.375 every 8 hours (6/14 --- ) CTX (6/15 --- )  vancomycin  IVPB 1250 every 12 hours (6/14 --- ) Dapto (6/16 -- )    Impression:   #RLE Cellulitis  #CKD  #Tetracycline Allergy, but tolerates Doxy  #Vanco Surya Syndrome   - team concern about vanco reaction with new cough and congestion?, thought patient remains comfortable on RA, could this be viral? Full RVP negative  - remains afebrile, RLE erythema slowly improving, also has purpuric rash on LLE    Recommendations:  - continue empiric Dapto 450mg q24 (Day #5)   - continue empiric CTX 2G q24 (Day #5)  - monitor temperature curve  - trend WBC  - reason for abx use reviewed with patient  - side effects of antibiotic discussed, tolerating abx well so far  - Prior cultures reviewed. An epidemiologic assessment was performed. There is a significant risk for resistant microorganisms to spread to family members, and/or healthcare staff. Isolation precautions based on infection control policy. Will reconsider further isolation measures based on new culture results and other clinical data as appropriate Appropriate cultures collected and an appropriate broad spectrum antibiotic therapy will be considered  - Vascular team following  - Derm eval r/o vasculitis; planned for outpatient biopsy  - rest per primary team    Clinical team may change from intravenous to oral antibiotics when the following criteria are met:   1. Patient is clinically improving/stable       a)	Improved signs and symptoms of infection from initial presentation       b)	Afebrile for 24 hours       c)	Leukocytosis trending towards normal range   2. Patient is tolerating oral intake   3. Initial/repeat blood cultures are negative OR do not need to wait for preliminary blood cultures to result    Cannot advise changing to oral antibiotic therapy until culture sensitivity is available. Assessment:  74M with diabetes, COPD, heart failure, CKD, hypertension, history of right foot ulcer presents 6/14 with worsening RLE swelling and pain  Denies any fever or chills, abdominal pain, cough, n/v, diarrhea or dysuria  Recently was admitted 6/5/2025 for RLE Cellulitis, CT did not show abscess, was treated with IV daptomycin and transition to oral Doxy  Afebrile on admission, on RA  No leukocytosis  Cr 1.74  UA negative  CXR clear  US without DVT  BCx 6/14 no growth  Full RVP negative    Antimicrobials:  piperacillin/tazobactam IVPB.. 3.375 every 8 hours (6/14 --- ) CTX (6/15 --- )  vancomycin  IVPB 1250 every 12 hours (6/14 --- ) Dapto (6/16 -- )    Impression:   #RLE Cellulitis  #CKD  #Tetracycline Allergy, but tolerates Doxy  #Vanco Surya Syndrome   - team concern about vanco reaction with new cough and congestion?, thought patient remains comfortable on RA, could this be viral? Full RVP negative  - remains afebrile, RLE erythema slowly improving, also has purpuric rash on LLE    Recommendations:  - continue empiric Dapto 450mg q24 (Day #5)   - continue empiric CTX 2G q24 (Day #5)  - monitor temperature curve  - trend WBC  - reason for abx use reviewed with patient  - side effects of antibiotic discussed, tolerating abx well so far  - Prior cultures reviewed. An epidemiologic assessment was performed. There is a significant risk for resistant microorganisms to spread to family members, and/or healthcare staff. Isolation precautions based on infection control policy. Will reconsider further isolation measures based on new culture results and other clinical data as appropriate Appropriate cultures collected and an appropriate broad spectrum antibiotic therapy will be considered  - Vascular team following  - Derm eval r/o vasculitis; planned for outpatient biopsy  - if continues to improve, can transition to PO antibiotic once ready for discharge; Doxy 100mg q12 and Cefpodoxime 400mg q12 to complete 10-day course  - rest per primary team    Clinical team may change from intravenous to oral antibiotics when the following criteria are met:   1. Patient is clinically improving/stable       a)	Improved signs and symptoms of infection from initial presentation       b)	Afebrile for 24 hours       c)	Leukocytosis trending towards normal range   2. Patient is tolerating oral intake   3. Initial/repeat blood cultures are negative OR do not need to wait for preliminary blood cultures to result    When above criteria met may change iv antibiotics to an oral agent as above

## 2025-06-18 NOTE — PROGRESS NOTE ADULT - PROVIDER SPECIALTY LIST ADULT
Hospitalist
Infectious Disease
Podiatry
Hospitalist
Hospitalist
Vascular Surgery
Infectious Disease
Podiatry
Vascular Surgery
Hospitalist

## 2025-06-18 NOTE — PROGRESS NOTE ADULT - ASSESSMENT
74/M with a hx of HTN CHF DM COPD presents with doxycycle resistent RLE cellulitis     RLE cellulitis with closed wound w/eschar to lateral leg   B/l RLE purpuric rash   -Pt was recently admitted at [facility] from 6/3-6/10, treated with zosyn and dapto, d/c on doxycycline   -s/p Zosyn and Vanco in ER  -ID consult appreciated, switched from zosyn to ceftriaxone, vanco d/c started daptomycin   -Continue ceftriaxone + daptomycin   -Pt has hx of Red Man syndrome, infuse vanco 3 hours to avoid this. Pre med with benadryl -- vanco d/c now  -MRI ordered initially, d/w radiology Ct angio recommended, pt reports contrast allergy, when asked he said in 1985 he was being seen for kidney stones and a dye was injected for study when he had a severe reaction   -Check ESR 58, CRP 58.4, CHASE normal, ANCA pending, RF normal, C3 and C4 wnl   -d/w derm today, sent pictures, pt will need skin bx for evaluation in office once discharged   -Podiatry consult appreciated  -Vascular consult for PAD appreciated, no interventions indicated  -cellulitis improved, will hold off on imaging   -Plan for d/c tomorrow on vantin + doxy and f/u derm on Friday     HFpEF   -chronic, stable   -TTE from 5/26/25: preserved EF, 60-65%, valves not well visualized   -euvolemic    COPD/Asthma  -Duonebs  -on symbicort at home, interch with advair here     HTN   -Cont home meds     DM  -Glargine 10 units daily, aspart 5 units with meals, sliding scale     Lovenox for DVT PPx

## 2025-06-18 NOTE — PROGRESS NOTE ADULT - SUBJECTIVE AND OBJECTIVE BOX
HOSPITALIST ATTENDING PROGRESS NOTE    Chart and meds reviewed.  Patient seen and examined    CC: RLE cellulitis     Subjective: Patient seen today, feels well, no complaints     All other systems reviewed and found to be negative with the exception of what has been described above.    Vital Signs Last 24 Hrs  T(C): 36.7 (18 Jun 2025 08:11), Max: 36.7 (18 Jun 2025 08:11)  T(F): 98.1 (18 Jun 2025 08:11), Max: 98.1 (18 Jun 2025 08:11)  HR: 80 (18 Jun 2025 09:15) (73 - 105)  BP: 117/65 (18 Jun 2025 08:11) (113/61 - 121/68)  BP(mean): 80 (18 Jun 2025 08:11) (80 - 80)  RR: 18 (18 Jun 2025 08:11) (18 - 20)  SpO2: 96% (18 Jun 2025 09:15) (91% - 97%)    Parameters below as of 18 Jun 2025 09:15  Patient On (Oxygen Delivery Method): room air    PHYSICAL EXAM:  GENERAL: NAD, lying in bed comfortably  HEAD:  Atraumatic, Normocephalic  EYES: conjunctiva and sclera clear  ENT: Moist mucous membranes  NECK: Supple, No JVD  CHEST/LUNG: Clear to auscultation bilaterally; No rales, rhonchi, wheezing. Unlabored respirations  HEART: Regular rate and rhythm; No murmurs  ABDOMEN: Bowel sounds present; Soft, Nontender, Nondistended.   EXTREMITIES:  2+ Peripheral Pulses, brisk capillary refill. No clubbing, cyanosis, or edema  NERVOUS SYSTEM:  Alert & Oriented X3, speech clear. No deficits   MSK: FROM all 4 extremities, full and equal strength  SKIN:  mid to distal RLE erythema with closed wound with black eschar to lateral aspect of leg, there is healed wound to R hallux, no oozing, non blanching purpuric rash to b/l feet     MEDICATIONS  (STANDING):  albuterol/ipratropium for Nebulization 3 milliLiter(s) Nebulizer every 6 hours  ascorbic acid 500 milliGRAM(s) Oral daily  aspirin  chewable 81 milliGRAM(s) Oral daily  cefTRIAXone Injectable. 2000 milliGRAM(s) IV Push every 24 hours  clonazePAM Oral Disintegrating Tablet 0.5 milliGRAM(s) Oral three times a day  DAPTOmycin IVPB 450 milliGRAM(s) IV Intermittent every 24 hours  dextrose 5%. 1000 milliLiter(s) (50 mL/Hr) IV Continuous <Continuous>  dextrose 5%. 1000 milliLiter(s) (100 mL/Hr) IV Continuous <Continuous>  dextrose 50% Injectable 25 Gram(s) IV Push once  dextrose 50% Injectable 12.5 Gram(s) IV Push once  dextrose 50% Injectable 25 Gram(s) IV Push once  enoxaparin Injectable 40 milliGRAM(s) SubCutaneous every 24 hours  ferrous    sulfate 325 milliGRAM(s) Oral daily  fluticasone propionate/ salmeterol 250-50 MICROgram(s) Diskus 1 Dose(s) Inhalation two times a day  furosemide    Tablet 40 milliGRAM(s) Oral every 12 hours  glucagon  Injectable 1 milliGRAM(s) IntraMuscular once  insulin glargine Injectable (LANTUS) 18 Unit(s) SubCutaneous at bedtime  insulin lispro (ADMELOG) corrective regimen sliding scale   SubCutaneous three times a day before meals  insulin lispro (ADMELOG) corrective regimen sliding scale   SubCutaneous at bedtime  insulin lispro Injectable (ADMELOG) 10 Unit(s) SubCutaneous three times a day before meals  montelukast 10 milliGRAM(s) Oral at bedtime  multivitamin 1 Tablet(s) Oral daily  oxyCODONE    IR 10 milliGRAM(s) Oral two times a day  pantoprazole    Tablet 40 milliGRAM(s) Oral before breakfast  predniSONE   Tablet 10 milliGRAM(s) Oral daily  pregabalin 150 milliGRAM(s) Oral two times a day  rosuvastatin 40 milliGRAM(s) Oral at bedtime  saccharomyces boulardii 250 milliGRAM(s) Oral daily  senna 1 Tablet(s) Oral at bedtime  sertraline 150 milliGRAM(s) Oral daily  sodium chloride 0.9%. 1000 milliLiter(s) (100 mL/Hr) IV Continuous <Continuous>    MEDICATIONS  (PRN):  acetaminophen     Tablet .. 650 milliGRAM(s) Oral every 6 hours PRN Temp greater or equal to 38C (100.4F), Mild Pain (1 - 3)  albuterol    90 MICROgram(s) HFA Inhaler 1 Puff(s) Inhalation every 4 hours PRN Shortness of Breath and/or Wheezing  aluminum hydroxide/magnesium hydroxide/simethicone Suspension 30 milliLiter(s) Oral every 4 hours PRN Dyspepsia  dextrose Oral Gel 15 Gram(s) Oral once PRN Blood Glucose LESS THAN 70 milliGRAM(s)/deciliter  melatonin 3 milliGRAM(s) Oral at bedtime PRN Insomnia  ondansetron Injectable 4 milliGRAM(s) IV Push every 8 hours PRN Nausea and/or Vomiting  polyethylene glycol 3350 17 Gram(s) Oral daily PRN for constipation      LABS:                                        11.6   7.38  )-----------( 143      ( 17 Jun 2025 06:32 )             37.0   06-17    138  |  104  |  43[H]  ----------------------------<  234[H]  3.5   |  27  |  1.40[H]    Ca    9.1      17 Jun 2025 06:32        CAPILLARY BLOOD GLUCOSE      POCT Blood Glucose.: 184 mg/dL (18 Jun 2025 12:07)  POCT Blood Glucose.: 190 mg/dL (18 Jun 2025 08:20)  POCT Blood Glucose.: 332 mg/dL (17 Jun 2025 22:00)  POCT Blood Glucose.: 305 mg/dL (17 Jun 2025 17:42)            RADIOLOGY:

## 2025-06-19 ENCOUNTER — TRANSCRIPTION ENCOUNTER (OUTPATIENT)
Age: 75
End: 2025-06-19

## 2025-06-19 VITALS
HEART RATE: 98 BPM | SYSTOLIC BLOOD PRESSURE: 118 MMHG | DIASTOLIC BLOOD PRESSURE: 66 MMHG | RESPIRATION RATE: 18 BRPM | TEMPERATURE: 98 F | OXYGEN SATURATION: 93 %

## 2025-06-19 PROCEDURE — 99239 HOSP IP/OBS DSCHRG MGMT >30: CPT

## 2025-06-19 RX ORDER — INSULIN LISPRO 100 U/ML
15 INJECTION, SOLUTION INTRAVENOUS; SUBCUTANEOUS
Qty: 5 | Refills: 0
Start: 2025-06-19 | End: 2025-07-18

## 2025-06-19 RX ORDER — INSULIN GLARGINE-YFGN 100 [IU]/ML
32 INJECTION, SOLUTION SUBCUTANEOUS
Qty: 5 | Refills: 0
Start: 2025-06-19 | End: 2025-07-18

## 2025-06-19 RX ADMIN — INSULIN LISPRO 2: 100 INJECTION, SOLUTION INTRAVENOUS; SUBCUTANEOUS at 08:25

## 2025-06-19 RX ADMIN — Medication 1 DOSE(S): at 08:08

## 2025-06-19 RX ADMIN — CLONAZEPAM 0.5 MILLIGRAM(S): 0.5 TABLET ORAL at 14:06

## 2025-06-19 RX ADMIN — INSULIN LISPRO 15 UNIT(S): 100 INJECTION, SOLUTION INTRAVENOUS; SUBCUTANEOUS at 11:53

## 2025-06-19 RX ADMIN — CLONAZEPAM 0.5 MILLIGRAM(S): 0.5 TABLET ORAL at 06:18

## 2025-06-19 RX ADMIN — OXYCODONE HYDROCHLORIDE 10 MILLIGRAM(S): 30 TABLET ORAL at 10:44

## 2025-06-19 RX ADMIN — SERTRALINE 150 MILLIGRAM(S): 100 TABLET, FILM COATED ORAL at 10:43

## 2025-06-19 RX ADMIN — BUTYROSPERMUM PARKII(SHEA BUTTER), SIMMONDSIA CHINENSIS (JOJOBA) SEED OIL, ALOE BARBADENSIS LEAF EXTRACT 250 MILLIGRAM(S): .01; 1; 3.5 LIQUID TOPICAL at 10:44

## 2025-06-19 RX ADMIN — Medication 325 MILLIGRAM(S): at 10:43

## 2025-06-19 RX ADMIN — Medication 81 MILLIGRAM(S): at 10:44

## 2025-06-19 RX ADMIN — PREGABALIN 150 MILLIGRAM(S): 50 CAPSULE ORAL at 10:43

## 2025-06-19 RX ADMIN — NYSTATIN 1 APPLICATION(S): 100000 CREAM TOPICAL at 10:44

## 2025-06-19 RX ADMIN — Medication 650 MILLIGRAM(S): at 00:25

## 2025-06-19 RX ADMIN — INSULIN LISPRO 15 UNIT(S): 100 INJECTION, SOLUTION INTRAVENOUS; SUBCUTANEOUS at 08:26

## 2025-06-19 RX ADMIN — FUROSEMIDE 40 MILLIGRAM(S): 10 INJECTION INTRAMUSCULAR; INTRAVENOUS at 06:18

## 2025-06-19 RX ADMIN — IPRATROPIUM BROMIDE AND ALBUTEROL SULFATE 3 MILLILITER(S): .5; 2.5 SOLUTION RESPIRATORY (INHALATION) at 08:07

## 2025-06-19 RX ADMIN — IPRATROPIUM BROMIDE AND ALBUTEROL SULFATE 3 MILLILITER(S): .5; 2.5 SOLUTION RESPIRATORY (INHALATION) at 01:11

## 2025-06-19 RX ADMIN — DAPTOMYCIN 118 MILLIGRAM(S): 500 INJECTION, POWDER, LYOPHILIZED, FOR SOLUTION INTRAVENOUS at 11:54

## 2025-06-19 RX ADMIN — Medication 40 MILLIGRAM(S): at 06:18

## 2025-06-19 RX ADMIN — Medication 500 MILLIGRAM(S): at 10:43

## 2025-06-19 RX ADMIN — Medication 1 TABLET(S): at 10:43

## 2025-06-19 RX ADMIN — IPRATROPIUM BROMIDE AND ALBUTEROL SULFATE 3 MILLILITER(S): .5; 2.5 SOLUTION RESPIRATORY (INHALATION) at 13:52

## 2025-06-19 RX ADMIN — PREDNISONE 10 MILLIGRAM(S): 20 TABLET ORAL at 10:44

## 2025-06-19 NOTE — DISCHARGE NOTE NURSING/CASE MANAGEMENT/SOCIAL WORK - FINANCIAL ASSISTANCE
[facility] provides services at a reduced cost to those who are determined to be eligible through [facility]’s financial assistance program. Information regarding [facility]’s financial assistance program can be found by going to [email or web address] or by calling [phone].

## 2025-06-19 NOTE — DISCHARGE NOTE PROVIDER - HOSPITAL COURSE
74y old  Male who presents with a chief complaint of RLE infection, He has a hx of CHF, DM, COPD, renal insufficiecy, and recent admission at an OSH for RLE cellulitis. He was given several days of an unknown IV abx and then sent home of doxycycline. He took his meds as prescribed and was DC to home this past tuesday. However, he cont to have pain, fevers and worsening redness of his R ankle and foot. He denies fevers today, no CP SHOB, no abd pain, No N/V. He is concerned that he may have arterial disease but has not had a vascular workup.     Subjective: pt seen today, doing well, no overnight events     Vital Signs Last 24 Hrs  T(C): 36.5 (19 Jun 2025 07:35), Max: 36.6 (18 Jun 2025 23:37)  T(F): 97.7 (19 Jun 2025 07:35), Max: 97.9 (18 Jun 2025 23:37)  HR: 66 (19 Jun 2025 08:18) (66 - 89)  BP: 106/51 (19 Jun 2025 07:35) (106/51 - 136/70)  RR: 18 (19 Jun 2025 07:35) (18 - 19)  SpO2: 94% (19 Jun 2025 08:18) (93% - 94%)    Parameters below as of 19 Jun 2025 08:18  Patient On (Oxygen Delivery Method): room air    PHYSICAL EXAM:  GENERAL: NAD, lying in bed comfortably  HEAD:  Atraumatic, Normocephalic  EYES: conjunctiva and sclera clear  ENT: Moist mucous membranes  NECK: Supple, No JVD  CHEST/LUNG: Clear to auscultation bilaterally; No rales, rhonchi, wheezing. Unlabored respirations  HEART: Regular rate and rhythm; No murmurs  ABDOMEN: Bowel sounds present; Soft, Nontender, Nondistended.   EXTREMITIES:  2+ Peripheral Pulses, brisk capillary refill. No clubbing, cyanosis, or edema  NERVOUS SYSTEM:  Alert & Oriented X3, speech clear. No deficits   MSK: FROM all 4 extremities, full and equal strength  SKIN: RLE erythema improved, b/l feet purpuric rash improved       RLE cellulitis with closed wound w/eschar to lateral leg   B/l RLE purpuric rash   -Pt was recently admitted at [facility] from 6/3-6/10, treated with zosyn and dapto, d/c on doxycycline   -s/p Zosyn and Vanco in ER  -ID consult appreciated, switched from zosyn to ceftriaxone, vanco d/c started daptomycin   -Continue ceftriaxone + daptomycin   -Pt has hx of Red Man syndrome, infuse vanco 3 hours to avoid this. Pre med with benadryl -- vanco d/c now  -MRI ordered initially, d/w radiology Ct angio recommended, pt reports contrast allergy, when asked he said in 1985 he was being seen for kidney stones and a dye was injected for study when he had a severe reaction   -Check ESR 58, CRP 58.4, CHASE normal, ANCA pending, RF normal, C3 and C4 wnl   -d/w derm today, sent pictures, pt will need skin bx for evaluation in office once discharged   -Podiatry consult appreciated  -Vascular consult for PAD appreciated, no interventions indicated  -cellulitis improved, will hold off on imaging   -Plan for d/c  on vantin + doxy and f/u derm on Friday     HFpEF   -chronic, stable   -TTE from 5/26/25: preserved EF, 60-65%, valves not well visualized   -euvolemic    COPD/Asthma  -Duonebs  -on symbicort at home, interch with advair here     HTN   -Cont home meds     DM  -Glargine 30, pre-meal 15

## 2025-06-19 NOTE — DISCHARGE NOTE NURSING/CASE MANAGEMENT/SOCIAL WORK - PATIENT PORTAL LINK FT
You can access the FollowMyHealth Patient Portal offered by [facility] by registering at the following website: [email or web address]. By joining [employer]’s FollowMyHealth portal, you will also be able to view your health information using other applications (apps) compatible with our system.

## 2025-06-19 NOTE — DISCHARGE NOTE PROVIDER - NSDCCPCAREPLAN_GEN_ALL_CORE_FT
PRINCIPAL DISCHARGE DIAGNOSIS  Diagnosis: Cellulitis of right lower extremity  Assessment and Plan of Treatment: Now improved, continue vantin and doxycycline.      SECONDARY DISCHARGE DIAGNOSES  Diagnosis: Acute purpuric eruption  Assessment and Plan of Treatment: Plan for follow up with dermatology tomorrow, Friday 6/20/25 at 8:00am.

## 2025-06-19 NOTE — DISCHARGE NOTE NURSING/CASE MANAGEMENT/SOCIAL WORK - NSDCPEFALRISK_GEN_ALL_CORE
For information on Fall & Injury Prevention, visit: [email or web address] OR  [email or web address] OR  https://www.cdc.gov/steadi/patient.html

## 2025-06-19 NOTE — DISCHARGE NOTE PROVIDER - NSDCMRMEDTOKEN_GEN_ALL_CORE_FT
albuterol 0.63 mg/3 mL (0.021%) inhalation solution: 3 milliliter(s) by nebulizer 3 times a day as needed for  shortness of breath and/or wheezing  Artificial Tears ophthalmic solution: 1 drop(s) in each eye 2 times a day  ascorbic acid 500 mg oral tablet: 1 tab(s) orally 2 times a day  aspirin 81 mg oral tablet: 1 tab(s) orally once a day  budesonide-formoterol 160 mcg-4.5 mcg/inh inhalation aerosol: 2 puff(s) inhaled 2 times a day  cefpodoxime 200 mg oral tablet: 1 tab(s) orally 2 times a day  clonazePAM 0.25 mg oral tablet, disintegratin tab(s) orally 3 times a day  doxycycline hyclate 100 mg oral capsule: 1 cap(s) orally 2 times a day  ferrous sulfate 325 mg (65 mg elemental iron) oral tablet: 1 tab(s) orally once a day  furosemide 40 mg oral tablet: 1 tab(s) orally every 12 hours  HumaLOG KwikPen 100 units/mL injectable solution: 15 unit(s) subcutaneous 3 times a day Inject 15 units subcutaneously 3 times a day (before each meal) in addition to sliding scale    2 Unit(s) if Glucose 151 - 200  4 Unit(s) if Glucose 201 - 250  6 Unit(s) if Glucose 251 - 300  8 Unit(s) if Glucose 301 - 350  10 Unit(s) if Glucose 351 - 400  12 Unit(s) if Glucose Greater Than 400  Jardiance 10 mg oral tablet: 1 tab(s) orally once a day  Lantus Solostar Pen 100 units/mL subcutaneous solution: 32 unit(s) subcutaneously once a day (at bedtime)  loratadine 10 mg oral tablet: 1 tab(s) orally once a day  Melatonin 3 mg oral tablet: 1 tab(s) orally once a day (at bedtime)  montelukast 10 mg oral tablet: 1 tab(s) orally once a day (at bedtime)  multivitamin: 1 tab(s) orally once a day  oxyCODONE 5 mg oral tablet: 2 tab(s) orally 2 times a day MDD: 4 tablets  pantoprazole 40 mg oral delayed release tablet: 1 tab(s) orally once a day  polyethylene glycol 3350 oral powder for reconstitution: 17 gram(s) orally once a day As needed for constipation  Potassium Chloride (Eqv-Klor-Con M20) 20 mEq oral tablet, extended release: 1 tab(s) orally 2 times a day  predniSONE 10 mg oral tablet: 1 tab(s) orally once a day with food or milk for 1 month, then after 1 month take 5mg orally once a day  pregabalin 150 mg oral capsule: 1 cap(s) orally 2 times a day MDD: 2 capsules  ramelteon 8 mg oral tablet: 1 tab(s) orally once a day (at bedtime)  rosuvastatin 40 mg oral tablet: 1 tab(s) orally once a day (at bedtime)  saccharomyces boulardii lyo 250 mg oral capsule: 1 cap(s) orally once a day  Saline Mist 0.65% nasal spray: 1 spray(s) in each nostril 2 times a day  senna leaf extract oral tablet: 1 tab(s) orally once a day (at bedtime)  sertraline 100 mg oral tablet: 1.5 tab(s) orally once a day (150mg)

## 2025-06-19 NOTE — DISCHARGE NOTE PROVIDER - CARE PROVIDER_API CALL
Larissa Tripp  Dermatology  [address] [place] [address], [address] [address]  Phone: [phone]  Fax: [phone]  Scheduled Appointment: 06/20/2025 08:00 AM

## 2025-06-19 NOTE — DISCHARGE NOTE PROVIDER - NSDCCAREPROVSEEN_GEN_ALL_CORE_FT
Jon, Sean Hoffman, Maia Gloria, Elena Crain, Ry Tyler, Damion Marrero, Aarti Cardenas, Cassie Pandya, Jerry Gatica, Tucker

## 2025-06-19 NOTE — DISCHARGE NOTE NURSING/CASE MANAGEMENT/SOCIAL WORK - NSDCVIVACCINE_GEN_ALL_CORE_FT
Tdap; 28-Dec-2024 10:22; Dania Lewis (RN); [employer]; [id number] (Exp. Date: 31-May-2026); IntraMuscular; Deltoid Left.; 0.5 milliLiter(s); VIS (VIS Published: 09-May-2013, VIS Presented: 28-Dec-2024);

## 2025-06-20 ENCOUNTER — NON-APPOINTMENT (OUTPATIENT)
Age: 75
End: 2025-06-20

## 2025-06-20 ENCOUNTER — APPOINTMENT (OUTPATIENT)
Dept: DERMATOLOGY | Facility: CLINIC | Age: 75
End: 2025-06-20
Payer: MEDICARE

## 2025-06-20 PROBLEM — D48.5 NEOPLASM OF UNCERTAIN BEHAVIOR OF SKIN OF LOWER LEG: Status: ACTIVE | Noted: 2025-06-20

## 2025-06-20 PROBLEM — L03.90 CELLULITIS: Status: ACTIVE | Noted: 2025-06-20

## 2025-06-20 PROBLEM — L30.9 DERMATITIS: Status: ACTIVE | Noted: 2025-06-20

## 2025-06-20 PROBLEM — D48.5 NEOPLASM OF UNCERTAIN BEHAVIOR OF SKIN: Status: ACTIVE | Noted: 2025-06-20

## 2025-06-20 PROCEDURE — 11104 PUNCH BX SKIN SINGLE LESION: CPT

## 2025-06-20 PROCEDURE — 99204 OFFICE O/P NEW MOD 45 MIN: CPT | Mod: 25

## 2025-06-20 PROCEDURE — 11105 PUNCH BX SKIN EA SEP/ADDL: CPT

## 2025-06-24 LAB — CORE LAB BIOPSY: NORMAL

## 2025-06-25 DIAGNOSIS — Z79.84 LONG TERM (CURRENT) USE OF ORAL HYPOGLYCEMIC DRUGS: ICD-10-CM

## 2025-06-25 DIAGNOSIS — J44.9 CHRONIC OBSTRUCTIVE PULMONARY DISEASE, UNSPECIFIED: ICD-10-CM

## 2025-06-25 DIAGNOSIS — Z79.890 HORMONE REPLACEMENT THERAPY: ICD-10-CM

## 2025-06-25 DIAGNOSIS — E03.9 HYPOTHYROIDISM, UNSPECIFIED: ICD-10-CM

## 2025-06-25 DIAGNOSIS — Z79.82 LONG TERM (CURRENT) USE OF ASPIRIN: ICD-10-CM

## 2025-06-25 DIAGNOSIS — Z79.4 LONG TERM (CURRENT) USE OF INSULIN: ICD-10-CM

## 2025-06-25 DIAGNOSIS — I50.32 CHRONIC DIASTOLIC (CONGESTIVE) HEART FAILURE: ICD-10-CM

## 2025-06-25 DIAGNOSIS — J45.909 UNSPECIFIED ASTHMA, UNCOMPLICATED: ICD-10-CM

## 2025-06-25 DIAGNOSIS — Z91.041 RADIOGRAPHIC DYE ALLERGY STATUS: ICD-10-CM

## 2025-06-25 DIAGNOSIS — I77.6 ARTERITIS, UNSPECIFIED: ICD-10-CM

## 2025-06-25 DIAGNOSIS — I11.0 HYPERTENSIVE HEART DISEASE WITH HEART FAILURE: ICD-10-CM

## 2025-06-25 DIAGNOSIS — E11.69 TYPE 2 DIABETES MELLITUS WITH OTHER SPECIFIED COMPLICATION: ICD-10-CM

## 2025-06-25 DIAGNOSIS — Z79.899 OTHER LONG TERM (CURRENT) DRUG THERAPY: ICD-10-CM

## 2025-06-25 DIAGNOSIS — L03.115 CELLULITIS OF RIGHT LOWER LIMB: ICD-10-CM

## 2025-06-25 DIAGNOSIS — Z88.0 ALLERGY STATUS TO PENICILLIN: ICD-10-CM

## 2025-06-25 DIAGNOSIS — Z79.52 LONG TERM (CURRENT) USE OF SYSTEMIC STEROIDS: ICD-10-CM

## 2025-07-03 ENCOUNTER — APPOINTMENT (OUTPATIENT)
Dept: DERMATOLOGY | Facility: CLINIC | Age: 75
End: 2025-07-03
Payer: COMMERCIAL

## 2025-07-03 PROBLEM — R60.0 LOWER EXTREMITY EDEMA: Status: ACTIVE | Noted: 2025-07-03

## 2025-07-03 PROBLEM — D69.0 IGA MEDIATED LEUKOCYTOCLASTIC VASCULITIS: Status: ACTIVE | Noted: 2025-07-03

## 2025-07-03 PROCEDURE — 99205 OFFICE O/P NEW HI 60 MIN: CPT

## 2025-07-08 ENCOUNTER — NON-APPOINTMENT (OUTPATIENT)
Age: 75
End: 2025-07-08

## 2025-07-11 RX ORDER — AMOXICILLIN AND CLAVULANATE POTASSIUM 500; 125 MG/1; MG/1
1 TABLET, FILM COATED ORAL
Refills: 0 | DISCHARGE
Start: 2025-07-11 | End: 2025-07-18

## 2025-07-14 ENCOUNTER — INPATIENT (INPATIENT)
Facility: HOSPITAL | Age: 75
LOS: 6 days | Discharge: NURSING, REHAB OR LONG-TERM CARE | DRG: 603 | End: 2025-07-21
Attending: INTERNAL MEDICINE | Admitting: INTERNAL MEDICINE
Payer: MEDICARE

## 2025-07-14 VITALS
DIASTOLIC BLOOD PRESSURE: 74 MMHG | HEIGHT: 70 IN | HEART RATE: 86 BPM | WEIGHT: 233.91 LBS | SYSTOLIC BLOOD PRESSURE: 114 MMHG | RESPIRATION RATE: 19 BRPM | TEMPERATURE: 98 F | OXYGEN SATURATION: 94 %

## 2025-07-14 DIAGNOSIS — L03.115 CELLULITIS OF RIGHT LOWER LIMB: ICD-10-CM

## 2025-07-14 DIAGNOSIS — I50.9 HEART FAILURE, UNSPECIFIED: ICD-10-CM

## 2025-07-14 DIAGNOSIS — J44.9 CHRONIC OBSTRUCTIVE PULMONARY DISEASE, UNSPECIFIED: ICD-10-CM

## 2025-07-14 DIAGNOSIS — E87.6 HYPOKALEMIA: ICD-10-CM

## 2025-07-14 DIAGNOSIS — Z98.890 OTHER SPECIFIED POSTPROCEDURAL STATES: Chronic | ICD-10-CM

## 2025-07-14 DIAGNOSIS — E11.9 TYPE 2 DIABETES MELLITUS WITHOUT COMPLICATIONS: ICD-10-CM

## 2025-07-14 DIAGNOSIS — I87.2 VENOUS INSUFFICIENCY (CHRONIC) (PERIPHERAL): ICD-10-CM

## 2025-07-14 DIAGNOSIS — Z29.9 ENCOUNTER FOR PROPHYLACTIC MEASURES, UNSPECIFIED: ICD-10-CM

## 2025-07-14 LAB
ALBUMIN SERPL ELPH-MCNC: 3.1 G/DL — LOW (ref 3.3–5)
ALP SERPL-CCNC: 89 U/L — SIGNIFICANT CHANGE UP (ref 40–120)
ALT FLD-CCNC: 27 U/L — SIGNIFICANT CHANGE UP (ref 12–78)
ANION GAP SERPL CALC-SCNC: 6 MMOL/L — SIGNIFICANT CHANGE UP (ref 5–17)
APTT BLD: 28.1 SEC — SIGNIFICANT CHANGE UP (ref 26.1–36.8)
AST SERPL-CCNC: 19 U/L — SIGNIFICANT CHANGE UP (ref 15–37)
BASOPHILS # BLD AUTO: 0.03 K/UL — SIGNIFICANT CHANGE UP (ref 0–0.2)
BASOPHILS NFR BLD AUTO: 0.4 % — SIGNIFICANT CHANGE UP (ref 0–2)
BILIRUB SERPL-MCNC: 0.5 MG/DL — SIGNIFICANT CHANGE UP (ref 0.2–1.2)
BUN SERPL-MCNC: 38 MG/DL — HIGH (ref 7–23)
CALCIUM SERPL-MCNC: 9.4 MG/DL — SIGNIFICANT CHANGE UP (ref 8.5–10.1)
CHLORIDE SERPL-SCNC: 92 MMOL/L — LOW (ref 96–108)
CO2 SERPL-SCNC: 37 MMOL/L — HIGH (ref 22–31)
CREAT SERPL-MCNC: 2.2 MG/DL — HIGH (ref 0.5–1.3)
EGFR: 31 ML/MIN/1.73M2 — LOW
EGFR: 31 ML/MIN/1.73M2 — LOW
EOSINOPHIL # BLD AUTO: 0.17 K/UL — SIGNIFICANT CHANGE UP (ref 0–0.5)
EOSINOPHIL NFR BLD AUTO: 2.4 % — SIGNIFICANT CHANGE UP (ref 0–6)
GLUCOSE BLDC GLUCOMTR-MCNC: 194 MG/DL — HIGH (ref 70–99)
GLUCOSE SERPL-MCNC: 298 MG/DL — HIGH (ref 70–99)
HCT VFR BLD CALC: 40 % — SIGNIFICANT CHANGE UP (ref 39–50)
HGB BLD-MCNC: 12.5 G/DL — LOW (ref 13–17)
IMM GRANULOCYTES # BLD AUTO: 0.12 K/UL — HIGH (ref 0–0.07)
IMM GRANULOCYTES NFR BLD AUTO: 1.7 % — HIGH (ref 0–0.9)
INR BLD: 1.09 RATIO — SIGNIFICANT CHANGE UP (ref 0.85–1.16)
LACTATE SERPL-SCNC: 2 MMOL/L — SIGNIFICANT CHANGE UP (ref 0.7–2)
LYMPHOCYTES # BLD AUTO: 1.27 K/UL — SIGNIFICANT CHANGE UP (ref 1–3.3)
LYMPHOCYTES NFR BLD AUTO: 17.9 % — SIGNIFICANT CHANGE UP (ref 13–44)
MCHC RBC-ENTMCNC: 28.3 PG — SIGNIFICANT CHANGE UP (ref 27–34)
MCHC RBC-ENTMCNC: 31.3 G/DL — LOW (ref 32–36)
MCV RBC AUTO: 90.7 FL — SIGNIFICANT CHANGE UP (ref 80–100)
MONOCYTES # BLD AUTO: 1.06 K/UL — HIGH (ref 0–0.9)
MONOCYTES NFR BLD AUTO: 14.9 % — HIGH (ref 2–14)
NEUTROPHILS # BLD AUTO: 4.45 K/UL — SIGNIFICANT CHANGE UP (ref 1.8–7.4)
NEUTROPHILS NFR BLD AUTO: 62.7 % — SIGNIFICANT CHANGE UP (ref 43–77)
NRBC # BLD AUTO: 0 K/UL — SIGNIFICANT CHANGE UP (ref 0–0)
NRBC # FLD: 0 K/UL — SIGNIFICANT CHANGE UP (ref 0–0)
NRBC BLD AUTO-RTO: 0 /100 WBCS — SIGNIFICANT CHANGE UP (ref 0–0)
PLATELET # BLD AUTO: 140 K/UL — LOW (ref 150–400)
PMV BLD: 10.2 FL — SIGNIFICANT CHANGE UP (ref 7–13)
POTASSIUM SERPL-MCNC: 3.1 MMOL/L — LOW (ref 3.5–5.3)
POTASSIUM SERPL-SCNC: 3.1 MMOL/L — LOW (ref 3.5–5.3)
PROT SERPL-MCNC: 8 G/DL — SIGNIFICANT CHANGE UP (ref 6–8.3)
PROTHROM AB SERPL-ACNC: 12.9 SEC — SIGNIFICANT CHANGE UP (ref 9.9–13.4)
RAPID RVP RESULT: SIGNIFICANT CHANGE UP
RBC # BLD: 4.41 M/UL — SIGNIFICANT CHANGE UP (ref 4.2–5.8)
RBC # FLD: 16.3 % — HIGH (ref 10.3–14.5)
SARS-COV-2 RNA SPEC QL NAA+PROBE: SIGNIFICANT CHANGE UP
SODIUM SERPL-SCNC: 135 MMOL/L — SIGNIFICANT CHANGE UP (ref 135–145)
WBC # BLD: 7.1 K/UL — SIGNIFICANT CHANGE UP (ref 3.8–10.5)
WBC # FLD AUTO: 7.1 K/UL — SIGNIFICANT CHANGE UP (ref 3.8–10.5)

## 2025-07-14 PROCEDURE — 0225U NFCT DS DNA&RNA 21 SARSCOV2: CPT

## 2025-07-14 PROCEDURE — 87040 BLOOD CULTURE FOR BACTERIA: CPT

## 2025-07-14 PROCEDURE — 71250 CT THORAX DX C-: CPT

## 2025-07-14 PROCEDURE — 85025 COMPLETE CBC W/AUTO DIFF WBC: CPT

## 2025-07-14 PROCEDURE — 76775 US EXAM ABDO BACK WALL LIM: CPT | Mod: 26

## 2025-07-14 PROCEDURE — 93970 EXTREMITY STUDY: CPT

## 2025-07-14 PROCEDURE — 83605 ASSAY OF LACTIC ACID: CPT

## 2025-07-14 PROCEDURE — 85730 THROMBOPLASTIN TIME PARTIAL: CPT

## 2025-07-14 PROCEDURE — 85610 PROTHROMBIN TIME: CPT

## 2025-07-14 PROCEDURE — 71250 CT THORAX DX C-: CPT | Mod: 26

## 2025-07-14 PROCEDURE — 76775 US EXAM ABDO BACK WALL LIM: CPT

## 2025-07-14 PROCEDURE — 36415 COLL VENOUS BLD VENIPUNCTURE: CPT

## 2025-07-14 PROCEDURE — 80053 COMPREHEN METABOLIC PANEL: CPT

## 2025-07-14 PROCEDURE — 93970 EXTREMITY STUDY: CPT | Mod: 26

## 2025-07-14 PROCEDURE — 93010 ELECTROCARDIOGRAM REPORT: CPT

## 2025-07-14 PROCEDURE — 71045 X-RAY EXAM CHEST 1 VIEW: CPT | Mod: 26

## 2025-07-14 PROCEDURE — 71045 X-RAY EXAM CHEST 1 VIEW: CPT

## 2025-07-14 PROCEDURE — 99285 EMERGENCY DEPT VISIT HI MDM: CPT

## 2025-07-14 RX ORDER — B1/B2/B3/B5/B6/B12/VIT C/FOLIC 500-0.5 MG
1 TABLET ORAL DAILY
Refills: 0 | Status: DISCONTINUED | OUTPATIENT
Start: 2025-07-14 | End: 2025-07-21

## 2025-07-14 RX ORDER — SODIUM CHLORIDE 9 G/1000ML
1000 INJECTION, SOLUTION INTRAVENOUS
Refills: 0 | Status: DISCONTINUED | OUTPATIENT
Start: 2025-07-14 | End: 2025-07-21

## 2025-07-14 RX ORDER — PREDNISONE 20 MG/1
5 TABLET ORAL DAILY
Refills: 0 | Status: DISCONTINUED | OUTPATIENT
Start: 2025-07-14 | End: 2025-07-21

## 2025-07-14 RX ORDER — OXYCODONE HYDROCHLORIDE 30 MG/1
10 TABLET ORAL
Refills: 0 | Status: DISCONTINUED | OUTPATIENT
Start: 2025-07-14 | End: 2025-07-21

## 2025-07-14 RX ORDER — INSULIN LISPRO 100 U/ML
INJECTION, SOLUTION INTRAVENOUS; SUBCUTANEOUS AT BEDTIME
Refills: 0 | Status: DISCONTINUED | OUTPATIENT
Start: 2025-07-14 | End: 2025-07-17

## 2025-07-14 RX ORDER — PREGABALIN 50 MG/1
150 CAPSULE ORAL
Refills: 0 | Status: COMPLETED | OUTPATIENT
Start: 2025-07-14 | End: 2025-07-21

## 2025-07-14 RX ORDER — PREDNISONE 20 MG/1
1 TABLET ORAL
Refills: 0 | DISCHARGE

## 2025-07-14 RX ORDER — INSULIN LISPRO 100 U/ML
INJECTION, SOLUTION INTRAVENOUS; SUBCUTANEOUS
Refills: 0 | Status: DISCONTINUED | OUTPATIENT
Start: 2025-07-14 | End: 2025-07-17

## 2025-07-14 RX ORDER — LACTOBACILLUS ACIDOPHILUS/PECT 75 MM-100
1 CAPSULE ORAL EVERY 12 HOURS
Refills: 0 | Status: DISCONTINUED | OUTPATIENT
Start: 2025-07-14 | End: 2025-07-21

## 2025-07-14 RX ORDER — POLYETHYLENE GLYCOL 3350 17 G/17G
17 POWDER, FOR SOLUTION ORAL DAILY
Refills: 0 | Status: DISCONTINUED | OUTPATIENT
Start: 2025-07-14 | End: 2025-07-21

## 2025-07-14 RX ORDER — DEXTROSE 50 % IN WATER 50 %
25 SYRINGE (ML) INTRAVENOUS ONCE
Refills: 0 | Status: DISCONTINUED | OUTPATIENT
Start: 2025-07-14 | End: 2025-07-21

## 2025-07-14 RX ORDER — CLONAZEPAM 0.5 MG/1
0.5 TABLET ORAL
Refills: 0 | Status: COMPLETED | OUTPATIENT
Start: 2025-07-14 | End: 2025-07-21

## 2025-07-14 RX ORDER — PIPERACILLIN-TAZO-DEXTROSE,ISO 3.375G/5
3.38 IV SOLUTION, PIGGYBACK PREMIX FROZEN(ML) INTRAVENOUS ONCE
Refills: 0 | Status: DISCONTINUED | OUTPATIENT
Start: 2025-07-14 | End: 2025-07-14

## 2025-07-14 RX ORDER — ROSUVASTATIN CALCIUM 20 MG/1
40 TABLET, FILM COATED ORAL AT BEDTIME
Refills: 0 | Status: DISCONTINUED | OUTPATIENT
Start: 2025-07-14 | End: 2025-07-21

## 2025-07-14 RX ORDER — BUDESONIDE AND FORMOTEROL FUMARATE DIHYDRATE 80; 4.5 UG/1; UG/1
2 AEROSOL RESPIRATORY (INHALATION)
Refills: 0 | DISCHARGE

## 2025-07-14 RX ORDER — FERROUS SULFATE 137(45) MG
325 TABLET, EXTENDED RELEASE ORAL DAILY
Refills: 0 | Status: DISCONTINUED | OUTPATIENT
Start: 2025-07-14 | End: 2025-07-21

## 2025-07-14 RX ORDER — DEXTROSE 50 % IN WATER 50 %
12.5 SYRINGE (ML) INTRAVENOUS ONCE
Refills: 0 | Status: DISCONTINUED | OUTPATIENT
Start: 2025-07-14 | End: 2025-07-21

## 2025-07-14 RX ORDER — MELATONIN 5 MG
3 TABLET ORAL AT BEDTIME
Refills: 0 | Status: DISCONTINUED | OUTPATIENT
Start: 2025-07-14 | End: 2025-07-21

## 2025-07-14 RX ORDER — LANOLIN/MINERAL OIL/PETROLATUM
1 OINTMENT (GRAM) OPHTHALMIC (EYE)
Refills: 0 | Status: DISCONTINUED | OUTPATIENT
Start: 2025-07-14 | End: 2025-07-21

## 2025-07-14 RX ORDER — ASPIRIN 325 MG
1 TABLET ORAL
Refills: 0 | DISCHARGE

## 2025-07-14 RX ORDER — OXYCODONE HYDROCHLORIDE 30 MG/1
5 TABLET ORAL EVERY 6 HOURS
Refills: 0 | Status: DISCONTINUED | OUTPATIENT
Start: 2025-07-14 | End: 2025-07-16

## 2025-07-14 RX ORDER — SODIUM CHLORIDE 0.65 %
1 AEROSOL, SPRAY (ML) NASAL
Refills: 0 | Status: DISCONTINUED | OUTPATIENT
Start: 2025-07-14 | End: 2025-07-21

## 2025-07-14 RX ORDER — SERTRALINE 100 MG/1
150 TABLET, FILM COATED ORAL DAILY
Refills: 0 | Status: DISCONTINUED | OUTPATIENT
Start: 2025-07-14 | End: 2025-07-21

## 2025-07-14 RX ORDER — LOPERAMIDE HCL 1 MG/7.5ML
1 SOLUTION ORAL
Refills: 0 | DISCHARGE

## 2025-07-14 RX ORDER — ONDANSETRON HCL/PF 4 MG/2 ML
4 VIAL (ML) INJECTION EVERY 8 HOURS
Refills: 0 | Status: DISCONTINUED | OUTPATIENT
Start: 2025-07-14 | End: 2025-07-21

## 2025-07-14 RX ORDER — BUDESONIDE 0.25 MG/2ML
0.5 SUSPENSION RESPIRATORY (INHALATION)
Refills: 0 | Status: DISCONTINUED | OUTPATIENT
Start: 2025-07-14 | End: 2025-07-15

## 2025-07-14 RX ORDER — PIPERACILLIN-TAZO-DEXTROSE,ISO 3.375G/5
3.38 IV SOLUTION, PIGGYBACK PREMIX FROZEN(ML) INTRAVENOUS EVERY 8 HOURS
Refills: 0 | Status: DISCONTINUED | OUTPATIENT
Start: 2025-07-14 | End: 2025-07-15

## 2025-07-14 RX ORDER — GLUCAGON 3 MG/1
1 POWDER NASAL ONCE
Refills: 0 | Status: DISCONTINUED | OUTPATIENT
Start: 2025-07-14 | End: 2025-07-21

## 2025-07-14 RX ORDER — ASPIRIN 325 MG
81 TABLET ORAL DAILY
Refills: 0 | Status: DISCONTINUED | OUTPATIENT
Start: 2025-07-14 | End: 2025-07-21

## 2025-07-14 RX ORDER — IPRATROPIUM BROMIDE AND ALBUTEROL SULFATE .5; 2.5 MG/3ML; MG/3ML
3 SOLUTION RESPIRATORY (INHALATION) EVERY 6 HOURS
Refills: 0 | Status: DISCONTINUED | OUTPATIENT
Start: 2025-07-14 | End: 2025-07-21

## 2025-07-14 RX ORDER — SENNA 187 MG
2 TABLET ORAL AT BEDTIME
Refills: 0 | Status: DISCONTINUED | OUTPATIENT
Start: 2025-07-14 | End: 2025-07-21

## 2025-07-14 RX ORDER — MAGNESIUM, ALUMINUM HYDROXIDE 200-200 MG
30 TABLET,CHEWABLE ORAL EVERY 4 HOURS
Refills: 0 | Status: DISCONTINUED | OUTPATIENT
Start: 2025-07-14 | End: 2025-07-21

## 2025-07-14 RX ORDER — MONTELUKAST SODIUM 10 MG/1
10 TABLET ORAL AT BEDTIME
Refills: 0 | Status: DISCONTINUED | OUTPATIENT
Start: 2025-07-14 | End: 2025-07-21

## 2025-07-14 RX ORDER — ACETAMINOPHEN 500 MG/5ML
650 LIQUID (ML) ORAL EVERY 6 HOURS
Refills: 0 | Status: DISCONTINUED | OUTPATIENT
Start: 2025-07-14 | End: 2025-07-21

## 2025-07-14 RX ORDER — DEXTROSE 50 % IN WATER 50 %
15 SYRINGE (ML) INTRAVENOUS ONCE
Refills: 0 | Status: DISCONTINUED | OUTPATIENT
Start: 2025-07-14 | End: 2025-07-21

## 2025-07-14 RX ORDER — TIOTROPIUM BROMIDE INHALATION SPRAY 3.12 UG/1
2 SPRAY, METERED RESPIRATORY (INHALATION) DAILY
Refills: 0 | Status: DISCONTINUED | OUTPATIENT
Start: 2025-07-14 | End: 2025-07-21

## 2025-07-14 RX ORDER — PIPERACILLIN-TAZO-DEXTROSE,ISO 3.375G/5
3.38 IV SOLUTION, PIGGYBACK PREMIX FROZEN(ML) INTRAVENOUS ONCE
Refills: 0 | Status: COMPLETED | OUTPATIENT
Start: 2025-07-14 | End: 2025-07-14

## 2025-07-14 RX ORDER — HEPARIN SODIUM 1000 [USP'U]/ML
5000 INJECTION INTRAVENOUS; SUBCUTANEOUS EVERY 12 HOURS
Refills: 0 | Status: DISCONTINUED | OUTPATIENT
Start: 2025-07-14 | End: 2025-07-21

## 2025-07-14 RX ADMIN — Medication 1 DROP(S): at 21:46

## 2025-07-14 RX ADMIN — Medication 200 GRAM(S): at 14:19

## 2025-07-14 RX ADMIN — Medication 25 GRAM(S): at 21:46

## 2025-07-14 RX ADMIN — MONTELUKAST SODIUM 10 MILLIGRAM(S): 10 TABLET ORAL at 23:58

## 2025-07-14 RX ADMIN — Medication 1 TABLET(S): at 20:43

## 2025-07-14 RX ADMIN — PREGABALIN 150 MILLIGRAM(S): 50 CAPSULE ORAL at 20:43

## 2025-07-14 RX ADMIN — CLONAZEPAM 0.5 MILLIGRAM(S): 0.5 TABLET ORAL at 20:44

## 2025-07-14 RX ADMIN — Medication 40 MILLIEQUIVALENT(S): at 20:44

## 2025-07-14 RX ADMIN — ROSUVASTATIN CALCIUM 40 MILLIGRAM(S): 20 TABLET, FILM COATED ORAL at 23:51

## 2025-07-14 RX ADMIN — Medication 3300 MILLILITER(S): at 14:19

## 2025-07-14 RX ADMIN — HEPARIN SODIUM 5000 UNIT(S): 1000 INJECTION INTRAVENOUS; SUBCUTANEOUS at 20:42

## 2025-07-15 DIAGNOSIS — S81.802A UNSPECIFIED OPEN WOUND, LEFT LOWER LEG, INITIAL ENCOUNTER: ICD-10-CM

## 2025-07-15 DIAGNOSIS — E87.6 HYPOKALEMIA: ICD-10-CM

## 2025-07-15 DIAGNOSIS — D69.0 ALLERGIC PURPURA: ICD-10-CM

## 2025-07-15 LAB
ALBUMIN SERPL ELPH-MCNC: 2.6 G/DL — LOW (ref 3.3–5)
ALP SERPL-CCNC: 71 U/L — SIGNIFICANT CHANGE UP (ref 40–120)
ALT FLD-CCNC: 21 U/L — SIGNIFICANT CHANGE UP (ref 12–78)
ANION GAP SERPL CALC-SCNC: 6 MMOL/L — SIGNIFICANT CHANGE UP (ref 5–17)
APPEARANCE UR: CLEAR — SIGNIFICANT CHANGE UP
AST SERPL-CCNC: 15 U/L — SIGNIFICANT CHANGE UP (ref 15–37)
BACTERIA # UR AUTO: ABNORMAL /HPF
BASOPHILS # BLD AUTO: 0.04 K/UL — SIGNIFICANT CHANGE UP (ref 0–0.2)
BASOPHILS NFR BLD AUTO: 0.6 % — SIGNIFICANT CHANGE UP (ref 0–2)
BILIRUB SERPL-MCNC: 0.6 MG/DL — SIGNIFICANT CHANGE UP (ref 0.2–1.2)
BILIRUB UR-MCNC: NEGATIVE — SIGNIFICANT CHANGE UP
BUN SERPL-MCNC: 31 MG/DL — HIGH (ref 7–23)
CALCIUM SERPL-MCNC: 8.8 MG/DL — SIGNIFICANT CHANGE UP (ref 8.5–10.1)
CHLORIDE SERPL-SCNC: 101 MMOL/L — SIGNIFICANT CHANGE UP (ref 96–108)
CO2 SERPL-SCNC: 32 MMOL/L — HIGH (ref 22–31)
COLOR SPEC: YELLOW — SIGNIFICANT CHANGE UP
CREAT ?TM UR-MCNC: 45 MG/DL — SIGNIFICANT CHANGE UP
CREAT SERPL-MCNC: 1.7 MG/DL — HIGH (ref 0.5–1.3)
DIFF PNL FLD: NEGATIVE — SIGNIFICANT CHANGE UP
EGFR: 42 ML/MIN/1.73M2 — LOW
EGFR: 42 ML/MIN/1.73M2 — LOW
EOSINOPHIL # BLD AUTO: 0.16 K/UL — SIGNIFICANT CHANGE UP (ref 0–0.5)
EOSINOPHIL NFR BLD AUTO: 2.2 % — SIGNIFICANT CHANGE UP (ref 0–6)
EPI CELLS # UR: PRESENT
GLUCOSE BLDC GLUCOMTR-MCNC: 153 MG/DL — HIGH (ref 70–99)
GLUCOSE BLDC GLUCOMTR-MCNC: 216 MG/DL — HIGH (ref 70–99)
GLUCOSE BLDC GLUCOMTR-MCNC: 264 MG/DL — HIGH (ref 70–99)
GLUCOSE BLDC GLUCOMTR-MCNC: 316 MG/DL — HIGH (ref 70–99)
GLUCOSE SERPL-MCNC: 172 MG/DL — HIGH (ref 70–99)
GLUCOSE UR QL: >=1000 MG/DL
HCT VFR BLD CALC: 35.3 % — LOW (ref 39–50)
HGB BLD-MCNC: 11.1 G/DL — LOW (ref 13–17)
IMM GRANULOCYTES # BLD AUTO: 0.15 K/UL — HIGH (ref 0–0.07)
IMM GRANULOCYTES NFR BLD AUTO: 2.1 % — HIGH (ref 0–0.9)
KETONES UR QL: NEGATIVE MG/DL — SIGNIFICANT CHANGE UP
LEGIONELLA AG UR QL: NEGATIVE — SIGNIFICANT CHANGE UP
LEUKOCYTE ESTERASE UR-ACNC: NEGATIVE — SIGNIFICANT CHANGE UP
LYMPHOCYTES # BLD AUTO: 2.53 K/UL — SIGNIFICANT CHANGE UP (ref 1–3.3)
LYMPHOCYTES NFR BLD AUTO: 35.3 % — SIGNIFICANT CHANGE UP (ref 13–44)
MAGNESIUM SERPL-MCNC: 2.3 MG/DL — SIGNIFICANT CHANGE UP (ref 1.6–2.6)
MCHC RBC-ENTMCNC: 28.6 PG — SIGNIFICANT CHANGE UP (ref 27–34)
MCHC RBC-ENTMCNC: 31.4 G/DL — LOW (ref 32–36)
MCV RBC AUTO: 91 FL — SIGNIFICANT CHANGE UP (ref 80–100)
MONOCYTES # BLD AUTO: 1.39 K/UL — HIGH (ref 0–0.9)
MONOCYTES NFR BLD AUTO: 19.4 % — HIGH (ref 2–14)
NEUTROPHILS # BLD AUTO: 2.89 K/UL — SIGNIFICANT CHANGE UP (ref 1.8–7.4)
NEUTROPHILS NFR BLD AUTO: 40.4 % — LOW (ref 43–77)
NITRITE UR-MCNC: NEGATIVE — SIGNIFICANT CHANGE UP
NRBC # BLD AUTO: 0 K/UL — SIGNIFICANT CHANGE UP (ref 0–0)
NRBC # FLD: 0 K/UL — SIGNIFICANT CHANGE UP (ref 0–0)
NRBC BLD AUTO-RTO: 0 /100 WBCS — SIGNIFICANT CHANGE UP (ref 0–0)
PH UR: 8 — SIGNIFICANT CHANGE UP (ref 5–8)
PHOSPHATE SERPL-MCNC: 2.6 MG/DL — SIGNIFICANT CHANGE UP (ref 2.5–4.5)
PLATELET # BLD AUTO: 120 K/UL — LOW (ref 150–400)
PMV BLD: 10.6 FL — SIGNIFICANT CHANGE UP (ref 7–13)
POTASSIUM SERPL-MCNC: 2.9 MMOL/L — CRITICAL LOW (ref 3.5–5.3)
POTASSIUM SERPL-MCNC: 3.7 MMOL/L — SIGNIFICANT CHANGE UP (ref 3.5–5.3)
POTASSIUM SERPL-SCNC: 2.9 MMOL/L — CRITICAL LOW (ref 3.5–5.3)
POTASSIUM SERPL-SCNC: 3.7 MMOL/L — SIGNIFICANT CHANGE UP (ref 3.5–5.3)
PROT SERPL-MCNC: 6.6 G/DL — SIGNIFICANT CHANGE UP (ref 6–8.3)
PROT UR-MCNC: 100 MG/DL
RBC # BLD: 3.88 M/UL — LOW (ref 4.2–5.8)
RBC # FLD: 16.4 % — HIGH (ref 10.3–14.5)
RBC CASTS # UR COMP ASSIST: SIGNIFICANT CHANGE UP /HPF (ref 0–4)
S PNEUM AG UR QL: NEGATIVE — SIGNIFICANT CHANGE UP
SODIUM SERPL-SCNC: 139 MMOL/L — SIGNIFICANT CHANGE UP (ref 135–145)
SODIUM UR-SCNC: 82 MMOL/L — SIGNIFICANT CHANGE UP
SP GR SPEC: 1.02 — SIGNIFICANT CHANGE UP (ref 1–1.03)
UROBILINOGEN FLD QL: 0.2 MG/DL — SIGNIFICANT CHANGE UP (ref 0.2–1)
WBC # BLD: 7.24 K/UL — SIGNIFICANT CHANGE UP (ref 3.8–10.5)
WBC # FLD AUTO: 7.24 K/UL — SIGNIFICANT CHANGE UP (ref 3.8–10.5)
WBC UR QL: SIGNIFICANT CHANGE UP /HPF (ref 0–5)

## 2025-07-15 PROCEDURE — 99222 1ST HOSP IP/OBS MODERATE 55: CPT

## 2025-07-15 PROCEDURE — 83605 ASSAY OF LACTIC ACID: CPT

## 2025-07-15 PROCEDURE — 76775 US EXAM ABDO BACK WALL LIM: CPT

## 2025-07-15 PROCEDURE — 87040 BLOOD CULTURE FOR BACTERIA: CPT

## 2025-07-15 PROCEDURE — 80053 COMPREHEN METABOLIC PANEL: CPT

## 2025-07-15 PROCEDURE — 99233 SBSQ HOSP IP/OBS HIGH 50: CPT

## 2025-07-15 PROCEDURE — 84300 ASSAY OF URINE SODIUM: CPT

## 2025-07-15 PROCEDURE — G0545: CPT

## 2025-07-15 PROCEDURE — 85025 COMPLETE CBC W/AUTO DIFF WBC: CPT

## 2025-07-15 PROCEDURE — 99223 1ST HOSP IP/OBS HIGH 75: CPT

## 2025-07-15 PROCEDURE — 36415 COLL VENOUS BLD VENIPUNCTURE: CPT

## 2025-07-15 PROCEDURE — 92610 EVALUATE SWALLOWING FUNCTION: CPT

## 2025-07-15 PROCEDURE — 85610 PROTHROMBIN TIME: CPT

## 2025-07-15 PROCEDURE — 84100 ASSAY OF PHOSPHORUS: CPT

## 2025-07-15 PROCEDURE — 93005 ELECTROCARDIOGRAM TRACING: CPT

## 2025-07-15 PROCEDURE — 93970 EXTREMITY STUDY: CPT

## 2025-07-15 PROCEDURE — 71250 CT THORAX DX C-: CPT

## 2025-07-15 PROCEDURE — 0225U NFCT DS DNA&RNA 21 SARSCOV2: CPT

## 2025-07-15 PROCEDURE — 83735 ASSAY OF MAGNESIUM: CPT

## 2025-07-15 PROCEDURE — 94640 AIRWAY INHALATION TREATMENT: CPT

## 2025-07-15 PROCEDURE — 87899 AGENT NOS ASSAY W/OPTIC: CPT

## 2025-07-15 PROCEDURE — 87070 CULTURE OTHR SPECIMN AEROBIC: CPT

## 2025-07-15 PROCEDURE — 71045 X-RAY EXAM CHEST 1 VIEW: CPT

## 2025-07-15 PROCEDURE — 81001 URINALYSIS AUTO W/SCOPE: CPT

## 2025-07-15 PROCEDURE — 82570 ASSAY OF URINE CREATININE: CPT

## 2025-07-15 PROCEDURE — 85730 THROMBOPLASTIN TIME PARTIAL: CPT

## 2025-07-15 PROCEDURE — 84132 ASSAY OF SERUM POTASSIUM: CPT

## 2025-07-15 PROCEDURE — 82962 GLUCOSE BLOOD TEST: CPT

## 2025-07-15 RX ORDER — INSULIN LISPRO 100 U/ML
8 INJECTION, SOLUTION INTRAVENOUS; SUBCUTANEOUS
Refills: 0 | Status: DISCONTINUED | OUTPATIENT
Start: 2025-07-15 | End: 2025-07-19

## 2025-07-15 RX ORDER — FUROSEMIDE 10 MG/ML
40 INJECTION INTRAMUSCULAR; INTRAVENOUS EVERY 12 HOURS
Refills: 0 | Status: DISCONTINUED | OUTPATIENT
Start: 2025-07-15 | End: 2025-07-21

## 2025-07-15 RX ORDER — DAPTOMYCIN 500 MG/10ML
400 INJECTION, POWDER, LYOPHILIZED, FOR SOLUTION INTRAVENOUS EVERY 24 HOURS
Refills: 0 | Status: COMPLETED | OUTPATIENT
Start: 2025-07-15 | End: 2025-07-21

## 2025-07-15 RX ORDER — IPRATROPIUM BROMIDE AND ALBUTEROL SULFATE .5; 2.5 MG/3ML; MG/3ML
3 SOLUTION RESPIRATORY (INHALATION)
Refills: 0 | Status: DISCONTINUED | OUTPATIENT
Start: 2025-07-15 | End: 2025-07-21

## 2025-07-15 RX ORDER — INSULIN GLARGINE-YFGN 100 [IU]/ML
20 INJECTION, SOLUTION SUBCUTANEOUS AT BEDTIME
Refills: 0 | Status: DISCONTINUED | OUTPATIENT
Start: 2025-07-15 | End: 2025-07-18

## 2025-07-15 RX ADMIN — Medication 40 MILLIEQUIVALENT(S): at 09:33

## 2025-07-15 RX ADMIN — SERTRALINE 150 MILLIGRAM(S): 100 TABLET, FILM COATED ORAL at 11:51

## 2025-07-15 RX ADMIN — INSULIN LISPRO 4: 100 INJECTION, SOLUTION INTRAVENOUS; SUBCUTANEOUS at 11:54

## 2025-07-15 RX ADMIN — PREGABALIN 150 MILLIGRAM(S): 50 CAPSULE ORAL at 05:40

## 2025-07-15 RX ADMIN — Medication 1 TABLET(S): at 05:40

## 2025-07-15 RX ADMIN — IPRATROPIUM BROMIDE AND ALBUTEROL SULFATE 3 MILLILITER(S): .5; 2.5 SOLUTION RESPIRATORY (INHALATION) at 20:31

## 2025-07-15 RX ADMIN — TIOTROPIUM BROMIDE INHALATION SPRAY 2 PUFF(S): 3.12 SPRAY, METERED RESPIRATORY (INHALATION) at 12:41

## 2025-07-15 RX ADMIN — INSULIN LISPRO 3: 100 INJECTION, SOLUTION INTRAVENOUS; SUBCUTANEOUS at 09:32

## 2025-07-15 RX ADMIN — CLONAZEPAM 0.5 MILLIGRAM(S): 0.5 TABLET ORAL at 12:41

## 2025-07-15 RX ADMIN — INSULIN LISPRO 8 UNIT(S): 100 INJECTION, SOLUTION INTRAVENOUS; SUBCUTANEOUS at 18:01

## 2025-07-15 RX ADMIN — Medication 40 MILLIGRAM(S): at 06:44

## 2025-07-15 RX ADMIN — Medication 1 TABLET(S): at 11:49

## 2025-07-15 RX ADMIN — MONTELUKAST SODIUM 10 MILLIGRAM(S): 10 TABLET ORAL at 23:53

## 2025-07-15 RX ADMIN — OXYCODONE HYDROCHLORIDE 10 MILLIGRAM(S): 30 TABLET ORAL at 11:55

## 2025-07-15 RX ADMIN — CLONAZEPAM 0.5 MILLIGRAM(S): 0.5 TABLET ORAL at 18:00

## 2025-07-15 RX ADMIN — BUDESONIDE 0.5 MILLIGRAM(S): 0.25 SUSPENSION RESPIRATORY (INHALATION) at 06:49

## 2025-07-15 RX ADMIN — PREDNISONE 5 MILLIGRAM(S): 20 TABLET ORAL at 05:40

## 2025-07-15 RX ADMIN — DAPTOMYCIN 116 MILLIGRAM(S): 500 INJECTION, POWDER, LYOPHILIZED, FOR SOLUTION INTRAVENOUS at 11:54

## 2025-07-15 RX ADMIN — Medication 500 MILLIGRAM(S): at 11:50

## 2025-07-15 RX ADMIN — Medication 1 TABLET(S): at 17:59

## 2025-07-15 RX ADMIN — Medication 100 MILLIEQUIVALENT(S): at 09:04

## 2025-07-15 RX ADMIN — ROSUVASTATIN CALCIUM 40 MILLIGRAM(S): 20 TABLET, FILM COATED ORAL at 23:54

## 2025-07-15 RX ADMIN — HEPARIN SODIUM 5000 UNIT(S): 1000 INJECTION INTRAVENOUS; SUBCUTANEOUS at 17:59

## 2025-07-15 RX ADMIN — INSULIN LISPRO 2: 100 INJECTION, SOLUTION INTRAVENOUS; SUBCUTANEOUS at 18:00

## 2025-07-15 RX ADMIN — Medication 81 MILLIGRAM(S): at 11:50

## 2025-07-15 RX ADMIN — INSULIN GLARGINE-YFGN 20 UNIT(S): 100 INJECTION, SOLUTION SUBCUTANEOUS at 23:45

## 2025-07-15 RX ADMIN — HEPARIN SODIUM 5000 UNIT(S): 1000 INJECTION INTRAVENOUS; SUBCUTANEOUS at 05:40

## 2025-07-15 RX ADMIN — Medication 40 MILLIEQUIVALENT(S): at 12:41

## 2025-07-15 RX ADMIN — Medication 1 DROP(S): at 18:41

## 2025-07-15 RX ADMIN — OXYCODONE HYDROCHLORIDE 10 MILLIGRAM(S): 30 TABLET ORAL at 00:05

## 2025-07-15 RX ADMIN — OXYCODONE HYDROCHLORIDE 10 MILLIGRAM(S): 30 TABLET ORAL at 12:28

## 2025-07-15 RX ADMIN — PREGABALIN 150 MILLIGRAM(S): 50 CAPSULE ORAL at 17:59

## 2025-07-15 RX ADMIN — Medication 25 GRAM(S): at 05:40

## 2025-07-15 RX ADMIN — Medication 325 MILLIGRAM(S): at 11:50

## 2025-07-15 RX ADMIN — BUDESONIDE 0.5 MILLIGRAM(S): 0.25 SUSPENSION RESPIRATORY (INHALATION) at 00:22

## 2025-07-15 RX ADMIN — POLYETHYLENE GLYCOL 3350 17 GRAM(S): 17 POWDER, FOR SOLUTION ORAL at 11:51

## 2025-07-16 LAB
CK SERPL-CCNC: 118 U/L — SIGNIFICANT CHANGE UP (ref 26–308)
GLUCOSE BLDC GLUCOMTR-MCNC: 197 MG/DL — HIGH (ref 70–99)
GLUCOSE BLDC GLUCOMTR-MCNC: 220 MG/DL — HIGH (ref 70–99)
GLUCOSE BLDC GLUCOMTR-MCNC: 224 MG/DL — HIGH (ref 70–99)
GLUCOSE BLDC GLUCOMTR-MCNC: 296 MG/DL — HIGH (ref 70–99)

## 2025-07-16 PROCEDURE — 82550 ASSAY OF CK (CPK): CPT

## 2025-07-16 PROCEDURE — 76775 US EXAM ABDO BACK WALL LIM: CPT

## 2025-07-16 PROCEDURE — 87040 BLOOD CULTURE FOR BACTERIA: CPT

## 2025-07-16 PROCEDURE — 83605 ASSAY OF LACTIC ACID: CPT

## 2025-07-16 PROCEDURE — 87899 AGENT NOS ASSAY W/OPTIC: CPT

## 2025-07-16 PROCEDURE — 71045 X-RAY EXAM CHEST 1 VIEW: CPT

## 2025-07-16 PROCEDURE — 97162 PT EVAL MOD COMPLEX 30 MIN: CPT

## 2025-07-16 PROCEDURE — 94640 AIRWAY INHALATION TREATMENT: CPT

## 2025-07-16 PROCEDURE — 99233 SBSQ HOSP IP/OBS HIGH 50: CPT

## 2025-07-16 PROCEDURE — 71250 CT THORAX DX C-: CPT

## 2025-07-16 PROCEDURE — 84100 ASSAY OF PHOSPHORUS: CPT

## 2025-07-16 PROCEDURE — 85730 THROMBOPLASTIN TIME PARTIAL: CPT

## 2025-07-16 PROCEDURE — 94760 N-INVAS EAR/PLS OXIMETRY 1: CPT

## 2025-07-16 PROCEDURE — 0225U NFCT DS DNA&RNA 21 SARSCOV2: CPT

## 2025-07-16 PROCEDURE — 80053 COMPREHEN METABOLIC PANEL: CPT

## 2025-07-16 PROCEDURE — 36415 COLL VENOUS BLD VENIPUNCTURE: CPT

## 2025-07-16 PROCEDURE — 93005 ELECTROCARDIOGRAM TRACING: CPT

## 2025-07-16 PROCEDURE — 85610 PROTHROMBIN TIME: CPT

## 2025-07-16 PROCEDURE — 85025 COMPLETE CBC W/AUTO DIFF WBC: CPT

## 2025-07-16 PROCEDURE — 87070 CULTURE OTHR SPECIMN AEROBIC: CPT

## 2025-07-16 PROCEDURE — 92610 EVALUATE SWALLOWING FUNCTION: CPT

## 2025-07-16 PROCEDURE — 84132 ASSAY OF SERUM POTASSIUM: CPT

## 2025-07-16 PROCEDURE — 83735 ASSAY OF MAGNESIUM: CPT

## 2025-07-16 PROCEDURE — 84300 ASSAY OF URINE SODIUM: CPT

## 2025-07-16 PROCEDURE — 93970 EXTREMITY STUDY: CPT

## 2025-07-16 PROCEDURE — 99232 SBSQ HOSP IP/OBS MODERATE 35: CPT

## 2025-07-16 PROCEDURE — G0545: CPT

## 2025-07-16 PROCEDURE — 82962 GLUCOSE BLOOD TEST: CPT

## 2025-07-16 PROCEDURE — 82570 ASSAY OF URINE CREATININE: CPT

## 2025-07-16 PROCEDURE — 81001 URINALYSIS AUTO W/SCOPE: CPT

## 2025-07-16 RX ADMIN — MONTELUKAST SODIUM 10 MILLIGRAM(S): 10 TABLET ORAL at 22:49

## 2025-07-16 RX ADMIN — CLONAZEPAM 0.5 MILLIGRAM(S): 0.5 TABLET ORAL at 12:48

## 2025-07-16 RX ADMIN — Medication 500 MILLIGRAM(S): at 11:43

## 2025-07-16 RX ADMIN — INSULIN GLARGINE-YFGN 20 UNIT(S): 100 INJECTION, SOLUTION SUBCUTANEOUS at 22:48

## 2025-07-16 RX ADMIN — FUROSEMIDE 40 MILLIGRAM(S): 10 INJECTION INTRAMUSCULAR; INTRAVENOUS at 17:15

## 2025-07-16 RX ADMIN — SERTRALINE 150 MILLIGRAM(S): 100 TABLET, FILM COATED ORAL at 11:43

## 2025-07-16 RX ADMIN — TIOTROPIUM BROMIDE INHALATION SPRAY 2 PUFF(S): 3.12 SPRAY, METERED RESPIRATORY (INHALATION) at 06:34

## 2025-07-16 RX ADMIN — INSULIN LISPRO 2: 100 INJECTION, SOLUTION INTRAVENOUS; SUBCUTANEOUS at 08:36

## 2025-07-16 RX ADMIN — OXYCODONE HYDROCHLORIDE 5 MILLIGRAM(S): 30 TABLET ORAL at 20:31

## 2025-07-16 RX ADMIN — OXYCODONE HYDROCHLORIDE 5 MILLIGRAM(S): 30 TABLET ORAL at 12:48

## 2025-07-16 RX ADMIN — HEPARIN SODIUM 5000 UNIT(S): 1000 INJECTION INTRAVENOUS; SUBCUTANEOUS at 06:16

## 2025-07-16 RX ADMIN — INSULIN LISPRO 8 UNIT(S): 100 INJECTION, SOLUTION INTRAVENOUS; SUBCUTANEOUS at 08:36

## 2025-07-16 RX ADMIN — ROSUVASTATIN CALCIUM 40 MILLIGRAM(S): 20 TABLET, FILM COATED ORAL at 22:49

## 2025-07-16 RX ADMIN — Medication 81 MILLIGRAM(S): at 11:44

## 2025-07-16 RX ADMIN — PREGABALIN 150 MILLIGRAM(S): 50 CAPSULE ORAL at 18:17

## 2025-07-16 RX ADMIN — OXYCODONE HYDROCHLORIDE 5 MILLIGRAM(S): 30 TABLET ORAL at 13:16

## 2025-07-16 RX ADMIN — PREGABALIN 150 MILLIGRAM(S): 50 CAPSULE ORAL at 06:20

## 2025-07-16 RX ADMIN — CLONAZEPAM 0.5 MILLIGRAM(S): 0.5 TABLET ORAL at 17:15

## 2025-07-16 RX ADMIN — INSULIN LISPRO 2: 100 INJECTION, SOLUTION INTRAVENOUS; SUBCUTANEOUS at 17:16

## 2025-07-16 RX ADMIN — Medication 1 DOSE(S): at 06:34

## 2025-07-16 RX ADMIN — HEPARIN SODIUM 5000 UNIT(S): 1000 INJECTION INTRAVENOUS; SUBCUTANEOUS at 17:15

## 2025-07-16 RX ADMIN — Medication 3 MILLIGRAM(S): at 22:49

## 2025-07-16 RX ADMIN — INSULIN LISPRO 8 UNIT(S): 100 INJECTION, SOLUTION INTRAVENOUS; SUBCUTANEOUS at 12:48

## 2025-07-16 RX ADMIN — CLONAZEPAM 0.5 MILLIGRAM(S): 0.5 TABLET ORAL at 08:36

## 2025-07-16 RX ADMIN — OXYCODONE HYDROCHLORIDE 10 MILLIGRAM(S): 30 TABLET ORAL at 09:22

## 2025-07-16 RX ADMIN — Medication 1 DROP(S): at 17:17

## 2025-07-16 RX ADMIN — INSULIN LISPRO 8 UNIT(S): 100 INJECTION, SOLUTION INTRAVENOUS; SUBCUTANEOUS at 17:16

## 2025-07-16 RX ADMIN — IPRATROPIUM BROMIDE AND ALBUTEROL SULFATE 3 MILLILITER(S): .5; 2.5 SOLUTION RESPIRATORY (INHALATION) at 13:35

## 2025-07-16 RX ADMIN — FUROSEMIDE 40 MILLIGRAM(S): 10 INJECTION INTRAMUSCULAR; INTRAVENOUS at 06:21

## 2025-07-16 RX ADMIN — Medication 40 MILLIGRAM(S): at 06:20

## 2025-07-16 RX ADMIN — POLYETHYLENE GLYCOL 3350 17 GRAM(S): 17 POWDER, FOR SOLUTION ORAL at 11:44

## 2025-07-16 RX ADMIN — PREDNISONE 5 MILLIGRAM(S): 20 TABLET ORAL at 06:19

## 2025-07-16 RX ADMIN — Medication 1 DROP(S): at 06:22

## 2025-07-16 RX ADMIN — Medication 1 TABLET(S): at 11:43

## 2025-07-16 RX ADMIN — OXYCODONE HYDROCHLORIDE 10 MILLIGRAM(S): 30 TABLET ORAL at 08:35

## 2025-07-16 RX ADMIN — INSULIN LISPRO 3: 100 INJECTION, SOLUTION INTRAVENOUS; SUBCUTANEOUS at 12:48

## 2025-07-16 RX ADMIN — IPRATROPIUM BROMIDE AND ALBUTEROL SULFATE 3 MILLILITER(S): .5; 2.5 SOLUTION RESPIRATORY (INHALATION) at 07:40

## 2025-07-16 RX ADMIN — Medication 2 TABLET(S): at 22:49

## 2025-07-16 RX ADMIN — Medication 20 MILLIEQUIVALENT(S): at 11:43

## 2025-07-16 RX ADMIN — DAPTOMYCIN 116 MILLIGRAM(S): 500 INJECTION, POWDER, LYOPHILIZED, FOR SOLUTION INTRAVENOUS at 11:43

## 2025-07-16 RX ADMIN — Medication 325 MILLIGRAM(S): at 11:44

## 2025-07-16 RX ADMIN — IPRATROPIUM BROMIDE AND ALBUTEROL SULFATE 3 MILLILITER(S): .5; 2.5 SOLUTION RESPIRATORY (INHALATION) at 21:23

## 2025-07-16 RX ADMIN — Medication 1 TABLET(S): at 17:15

## 2025-07-16 RX ADMIN — Medication 1 TABLET(S): at 06:19

## 2025-07-17 LAB
ANION GAP SERPL CALC-SCNC: 10 MMOL/L — SIGNIFICANT CHANGE UP (ref 5–17)
BUN SERPL-MCNC: 31 MG/DL — HIGH (ref 7–23)
CALCIUM SERPL-MCNC: 8.8 MG/DL — SIGNIFICANT CHANGE UP (ref 8.5–10.1)
CHLORIDE SERPL-SCNC: 103 MMOL/L — SIGNIFICANT CHANGE UP (ref 96–108)
CO2 SERPL-SCNC: 28 MMOL/L — SIGNIFICANT CHANGE UP (ref 22–31)
CREAT SERPL-MCNC: 1.6 MG/DL — HIGH (ref 0.5–1.3)
EGFR: 45 ML/MIN/1.73M2 — LOW
EGFR: 45 ML/MIN/1.73M2 — LOW
GLUCOSE BLDC GLUCOMTR-MCNC: 197 MG/DL — HIGH (ref 70–99)
GLUCOSE BLDC GLUCOMTR-MCNC: 198 MG/DL — HIGH (ref 70–99)
GLUCOSE BLDC GLUCOMTR-MCNC: 232 MG/DL — HIGH (ref 70–99)
GLUCOSE BLDC GLUCOMTR-MCNC: 284 MG/DL — HIGH (ref 70–99)
GLUCOSE SERPL-MCNC: 182 MG/DL — HIGH (ref 70–99)
HCT VFR BLD CALC: 38.4 % — LOW (ref 39–50)
HGB BLD-MCNC: 11.8 G/DL — LOW (ref 13–17)
MAGNESIUM SERPL-MCNC: 2 MG/DL — SIGNIFICANT CHANGE UP (ref 1.6–2.6)
MCHC RBC-ENTMCNC: 28.1 PG — SIGNIFICANT CHANGE UP (ref 27–34)
MCHC RBC-ENTMCNC: 30.7 G/DL — LOW (ref 32–36)
MCV RBC AUTO: 91.4 FL — SIGNIFICANT CHANGE UP (ref 80–100)
MRSA PCR RESULT.: DETECTED
NRBC # BLD AUTO: 0 K/UL — SIGNIFICANT CHANGE UP (ref 0–0)
NRBC # FLD: 0 K/UL — SIGNIFICANT CHANGE UP (ref 0–0)
NRBC BLD AUTO-RTO: 0 /100 WBCS — SIGNIFICANT CHANGE UP (ref 0–0)
PHOSPHATE SERPL-MCNC: 2.4 MG/DL — LOW (ref 2.5–4.5)
PLATELET # BLD AUTO: 136 K/UL — LOW (ref 150–400)
PMV BLD: 10.2 FL — SIGNIFICANT CHANGE UP (ref 7–13)
POTASSIUM SERPL-MCNC: 3 MMOL/L — LOW (ref 3.5–5.3)
POTASSIUM SERPL-MCNC: 4.2 MMOL/L — SIGNIFICANT CHANGE UP (ref 3.5–5.3)
POTASSIUM SERPL-SCNC: 3 MMOL/L — LOW (ref 3.5–5.3)
POTASSIUM SERPL-SCNC: 4.2 MMOL/L — SIGNIFICANT CHANGE UP (ref 3.5–5.3)
RBC # BLD: 4.2 M/UL — SIGNIFICANT CHANGE UP (ref 4.2–5.8)
RBC # FLD: 16.6 % — HIGH (ref 10.3–14.5)
S AUREUS DNA NOSE QL NAA+PROBE: DETECTED
SODIUM SERPL-SCNC: 141 MMOL/L — SIGNIFICANT CHANGE UP (ref 135–145)
WBC # BLD: 6.82 K/UL — SIGNIFICANT CHANGE UP (ref 3.8–10.5)
WBC # FLD AUTO: 6.82 K/UL — SIGNIFICANT CHANGE UP (ref 3.8–10.5)

## 2025-07-17 PROCEDURE — 85730 THROMBOPLASTIN TIME PARTIAL: CPT

## 2025-07-17 PROCEDURE — 94640 AIRWAY INHALATION TREATMENT: CPT

## 2025-07-17 PROCEDURE — 83605 ASSAY OF LACTIC ACID: CPT

## 2025-07-17 PROCEDURE — 97116 GAIT TRAINING THERAPY: CPT

## 2025-07-17 PROCEDURE — 84100 ASSAY OF PHOSPHORUS: CPT

## 2025-07-17 PROCEDURE — 82570 ASSAY OF URINE CREATININE: CPT

## 2025-07-17 PROCEDURE — 82550 ASSAY OF CK (CPK): CPT

## 2025-07-17 PROCEDURE — 85027 COMPLETE CBC AUTOMATED: CPT

## 2025-07-17 PROCEDURE — 85610 PROTHROMBIN TIME: CPT

## 2025-07-17 PROCEDURE — 36415 COLL VENOUS BLD VENIPUNCTURE: CPT

## 2025-07-17 PROCEDURE — 84132 ASSAY OF SERUM POTASSIUM: CPT

## 2025-07-17 PROCEDURE — 87641 MR-STAPH DNA AMP PROBE: CPT

## 2025-07-17 PROCEDURE — 94760 N-INVAS EAR/PLS OXIMETRY 1: CPT

## 2025-07-17 PROCEDURE — 0225U NFCT DS DNA&RNA 21 SARSCOV2: CPT

## 2025-07-17 PROCEDURE — 87899 AGENT NOS ASSAY W/OPTIC: CPT

## 2025-07-17 PROCEDURE — 92610 EVALUATE SWALLOWING FUNCTION: CPT

## 2025-07-17 PROCEDURE — 93970 EXTREMITY STUDY: CPT

## 2025-07-17 PROCEDURE — 80048 BASIC METABOLIC PNL TOTAL CA: CPT

## 2025-07-17 PROCEDURE — 97162 PT EVAL MOD COMPLEX 30 MIN: CPT

## 2025-07-17 PROCEDURE — 85025 COMPLETE CBC W/AUTO DIFF WBC: CPT

## 2025-07-17 PROCEDURE — 82962 GLUCOSE BLOOD TEST: CPT

## 2025-07-17 PROCEDURE — 87070 CULTURE OTHR SPECIMN AEROBIC: CPT

## 2025-07-17 PROCEDURE — 99233 SBSQ HOSP IP/OBS HIGH 50: CPT

## 2025-07-17 PROCEDURE — 97530 THERAPEUTIC ACTIVITIES: CPT

## 2025-07-17 PROCEDURE — 84300 ASSAY OF URINE SODIUM: CPT

## 2025-07-17 PROCEDURE — 71250 CT THORAX DX C-: CPT

## 2025-07-17 PROCEDURE — 83735 ASSAY OF MAGNESIUM: CPT

## 2025-07-17 PROCEDURE — 93005 ELECTROCARDIOGRAM TRACING: CPT

## 2025-07-17 PROCEDURE — 87640 STAPH A DNA AMP PROBE: CPT

## 2025-07-17 PROCEDURE — 80053 COMPREHEN METABOLIC PANEL: CPT

## 2025-07-17 PROCEDURE — G0545: CPT

## 2025-07-17 PROCEDURE — 99232 SBSQ HOSP IP/OBS MODERATE 35: CPT

## 2025-07-17 PROCEDURE — 71045 X-RAY EXAM CHEST 1 VIEW: CPT

## 2025-07-17 PROCEDURE — 76775 US EXAM ABDO BACK WALL LIM: CPT

## 2025-07-17 PROCEDURE — 81001 URINALYSIS AUTO W/SCOPE: CPT

## 2025-07-17 PROCEDURE — 87040 BLOOD CULTURE FOR BACTERIA: CPT

## 2025-07-17 RX ORDER — INSULIN LISPRO 100 U/ML
INJECTION, SOLUTION INTRAVENOUS; SUBCUTANEOUS
Refills: 0 | Status: DISCONTINUED | OUTPATIENT
Start: 2025-07-17 | End: 2025-07-21

## 2025-07-17 RX ORDER — MUPIROCIN CALCIUM 20 MG/G
1 CREAM TOPICAL
Refills: 0 | Status: DISCONTINUED | OUTPATIENT
Start: 2025-07-17 | End: 2025-07-21

## 2025-07-17 RX ORDER — INSULIN LISPRO 100 U/ML
INJECTION, SOLUTION INTRAVENOUS; SUBCUTANEOUS AT BEDTIME
Refills: 0 | Status: DISCONTINUED | OUTPATIENT
Start: 2025-07-17 | End: 2025-07-21

## 2025-07-17 RX ADMIN — HEPARIN SODIUM 5000 UNIT(S): 1000 INJECTION INTRAVENOUS; SUBCUTANEOUS at 06:16

## 2025-07-17 RX ADMIN — MONTELUKAST SODIUM 10 MILLIGRAM(S): 10 TABLET ORAL at 21:46

## 2025-07-17 RX ADMIN — Medication 500 MILLIGRAM(S): at 11:13

## 2025-07-17 RX ADMIN — PREGABALIN 150 MILLIGRAM(S): 50 CAPSULE ORAL at 06:15

## 2025-07-17 RX ADMIN — Medication 81 MILLIGRAM(S): at 11:13

## 2025-07-17 RX ADMIN — Medication 40 MILLIEQUIVALENT(S): at 11:35

## 2025-07-17 RX ADMIN — Medication 1 DOSE(S): at 20:04

## 2025-07-17 RX ADMIN — Medication 3 MILLIGRAM(S): at 21:46

## 2025-07-17 RX ADMIN — IPRATROPIUM BROMIDE AND ALBUTEROL SULFATE 3 MILLILITER(S): .5; 2.5 SOLUTION RESPIRATORY (INHALATION) at 13:34

## 2025-07-17 RX ADMIN — Medication 40 MILLIGRAM(S): at 06:15

## 2025-07-17 RX ADMIN — POLYETHYLENE GLYCOL 3350 17 GRAM(S): 17 POWDER, FOR SOLUTION ORAL at 11:13

## 2025-07-17 RX ADMIN — OXYCODONE HYDROCHLORIDE 10 MILLIGRAM(S): 30 TABLET ORAL at 20:34

## 2025-07-17 RX ADMIN — SERTRALINE 150 MILLIGRAM(S): 100 TABLET, FILM COATED ORAL at 11:13

## 2025-07-17 RX ADMIN — Medication 1 DOSE(S): at 10:27

## 2025-07-17 RX ADMIN — CLONAZEPAM 0.5 MILLIGRAM(S): 0.5 TABLET ORAL at 21:46

## 2025-07-17 RX ADMIN — Medication 1 DROP(S): at 10:28

## 2025-07-17 RX ADMIN — TIOTROPIUM BROMIDE INHALATION SPRAY 2 PUFF(S): 3.12 SPRAY, METERED RESPIRATORY (INHALATION) at 10:27

## 2025-07-17 RX ADMIN — INSULIN LISPRO 2: 100 INJECTION, SOLUTION INTRAVENOUS; SUBCUTANEOUS at 16:39

## 2025-07-17 RX ADMIN — HEPARIN SODIUM 5000 UNIT(S): 1000 INJECTION INTRAVENOUS; SUBCUTANEOUS at 17:22

## 2025-07-17 RX ADMIN — PREGABALIN 150 MILLIGRAM(S): 50 CAPSULE ORAL at 17:22

## 2025-07-17 RX ADMIN — FUROSEMIDE 40 MILLIGRAM(S): 10 INJECTION INTRAMUSCULAR; INTRAVENOUS at 17:22

## 2025-07-17 RX ADMIN — Medication 20 MILLIEQUIVALENT(S): at 11:13

## 2025-07-17 RX ADMIN — CLONAZEPAM 0.5 MILLIGRAM(S): 0.5 TABLET ORAL at 10:30

## 2025-07-17 RX ADMIN — FUROSEMIDE 40 MILLIGRAM(S): 10 INJECTION INTRAMUSCULAR; INTRAVENOUS at 06:15

## 2025-07-17 RX ADMIN — INSULIN LISPRO 1: 100 INJECTION, SOLUTION INTRAVENOUS; SUBCUTANEOUS at 07:44

## 2025-07-17 RX ADMIN — INSULIN LISPRO 8 UNIT(S): 100 INJECTION, SOLUTION INTRAVENOUS; SUBCUTANEOUS at 11:41

## 2025-07-17 RX ADMIN — INSULIN LISPRO 6: 100 INJECTION, SOLUTION INTRAVENOUS; SUBCUTANEOUS at 11:41

## 2025-07-17 RX ADMIN — CLONAZEPAM 0.5 MILLIGRAM(S): 0.5 TABLET ORAL at 15:23

## 2025-07-17 RX ADMIN — IPRATROPIUM BROMIDE AND ALBUTEROL SULFATE 3 MILLILITER(S): .5; 2.5 SOLUTION RESPIRATORY (INHALATION) at 20:01

## 2025-07-17 RX ADMIN — OXYCODONE HYDROCHLORIDE 10 MILLIGRAM(S): 30 TABLET ORAL at 20:04

## 2025-07-17 RX ADMIN — Medication 325 MILLIGRAM(S): at 11:13

## 2025-07-17 RX ADMIN — PREDNISONE 5 MILLIGRAM(S): 20 TABLET ORAL at 06:15

## 2025-07-17 RX ADMIN — ROSUVASTATIN CALCIUM 40 MILLIGRAM(S): 20 TABLET, FILM COATED ORAL at 21:46

## 2025-07-17 RX ADMIN — OXYCODONE HYDROCHLORIDE 10 MILLIGRAM(S): 30 TABLET ORAL at 07:17

## 2025-07-17 RX ADMIN — DAPTOMYCIN 116 MILLIGRAM(S): 500 INJECTION, POWDER, LYOPHILIZED, FOR SOLUTION INTRAVENOUS at 11:12

## 2025-07-17 RX ADMIN — Medication 1 TABLET(S): at 11:13

## 2025-07-17 RX ADMIN — Medication 1 TABLET(S): at 17:22

## 2025-07-17 RX ADMIN — INSULIN GLARGINE-YFGN 20 UNIT(S): 100 INJECTION, SOLUTION SUBCUTANEOUS at 21:45

## 2025-07-17 RX ADMIN — IPRATROPIUM BROMIDE AND ALBUTEROL SULFATE 3 MILLILITER(S): .5; 2.5 SOLUTION RESPIRATORY (INHALATION) at 07:32

## 2025-07-17 RX ADMIN — Medication 40 MILLIEQUIVALENT(S): at 15:26

## 2025-07-17 RX ADMIN — INSULIN LISPRO 8 UNIT(S): 100 INJECTION, SOLUTION INTRAVENOUS; SUBCUTANEOUS at 16:38

## 2025-07-17 RX ADMIN — INSULIN LISPRO 8 UNIT(S): 100 INJECTION, SOLUTION INTRAVENOUS; SUBCUTANEOUS at 07:45

## 2025-07-17 RX ADMIN — Medication 1 DROP(S): at 18:39

## 2025-07-17 RX ADMIN — Medication 1 TABLET(S): at 06:15

## 2025-07-17 RX ADMIN — Medication 2 TABLET(S): at 21:46

## 2025-07-18 LAB
ANION GAP SERPL CALC-SCNC: 10 MMOL/L — SIGNIFICANT CHANGE UP (ref 5–17)
BUN SERPL-MCNC: 28 MG/DL — HIGH (ref 7–23)
CALCIUM SERPL-MCNC: 8.8 MG/DL — SIGNIFICANT CHANGE UP (ref 8.5–10.1)
CHLORIDE SERPL-SCNC: 103 MMOL/L — SIGNIFICANT CHANGE UP (ref 96–108)
CO2 SERPL-SCNC: 26 MMOL/L — SIGNIFICANT CHANGE UP (ref 22–31)
CREAT SERPL-MCNC: 1.7 MG/DL — HIGH (ref 0.5–1.3)
EGFR: 42 ML/MIN/1.73M2 — LOW
EGFR: 42 ML/MIN/1.73M2 — LOW
GLUCOSE BLDC GLUCOMTR-MCNC: 203 MG/DL — HIGH (ref 70–99)
GLUCOSE BLDC GLUCOMTR-MCNC: 207 MG/DL — HIGH (ref 70–99)
GLUCOSE BLDC GLUCOMTR-MCNC: 213 MG/DL — HIGH (ref 70–99)
GLUCOSE BLDC GLUCOMTR-MCNC: 285 MG/DL — HIGH (ref 70–99)
GLUCOSE SERPL-MCNC: 259 MG/DL — HIGH (ref 70–99)
HCT VFR BLD CALC: 39 % — SIGNIFICANT CHANGE UP (ref 39–50)
HGB BLD-MCNC: 12.4 G/DL — LOW (ref 13–17)
MCHC RBC-ENTMCNC: 29.2 PG — SIGNIFICANT CHANGE UP (ref 27–34)
MCHC RBC-ENTMCNC: 31.8 G/DL — LOW (ref 32–36)
MCV RBC AUTO: 92 FL — SIGNIFICANT CHANGE UP (ref 80–100)
NRBC # BLD AUTO: 0 K/UL — SIGNIFICANT CHANGE UP (ref 0–0)
NRBC # FLD: 0 K/UL — SIGNIFICANT CHANGE UP (ref 0–0)
NRBC BLD AUTO-RTO: 0 /100 WBCS — SIGNIFICANT CHANGE UP (ref 0–0)
NT-PROBNP SERPL-SCNC: 225 PG/ML — HIGH (ref 0–125)
PLATELET # BLD AUTO: 144 K/UL — LOW (ref 150–400)
PMV BLD: 10.2 FL — SIGNIFICANT CHANGE UP (ref 7–13)
POTASSIUM SERPL-MCNC: 3.7 MMOL/L — SIGNIFICANT CHANGE UP (ref 3.5–5.3)
POTASSIUM SERPL-SCNC: 3.7 MMOL/L — SIGNIFICANT CHANGE UP (ref 3.5–5.3)
RBC # BLD: 4.24 M/UL — SIGNIFICANT CHANGE UP (ref 4.2–5.8)
RBC # FLD: 16.9 % — HIGH (ref 10.3–14.5)
SODIUM SERPL-SCNC: 139 MMOL/L — SIGNIFICANT CHANGE UP (ref 135–145)
WBC # BLD: 7.73 K/UL — SIGNIFICANT CHANGE UP (ref 3.8–10.5)
WBC # FLD AUTO: 7.73 K/UL — SIGNIFICANT CHANGE UP (ref 3.8–10.5)

## 2025-07-18 PROCEDURE — 99232 SBSQ HOSP IP/OBS MODERATE 35: CPT

## 2025-07-18 RX ORDER — TIOTROPIUM BROMIDE INHALATION SPRAY 3.12 UG/1
1 SPRAY, METERED RESPIRATORY (INHALATION)
Qty: 0 | Refills: 0 | DISCHARGE

## 2025-07-18 RX ORDER — SOD PHOS DI, MONO/K PHOS MONO 250 MG
1 TABLET ORAL ONCE
Refills: 0 | Status: COMPLETED | OUTPATIENT
Start: 2025-07-18 | End: 2025-07-18

## 2025-07-18 RX ORDER — INSULIN GLARGINE-YFGN 100 [IU]/ML
25 INJECTION, SOLUTION SUBCUTANEOUS AT BEDTIME
Refills: 0 | Status: DISCONTINUED | OUTPATIENT
Start: 2025-07-18 | End: 2025-07-21

## 2025-07-18 RX ADMIN — OXYCODONE HYDROCHLORIDE 10 MILLIGRAM(S): 30 TABLET ORAL at 21:55

## 2025-07-18 RX ADMIN — POLYETHYLENE GLYCOL 3350 17 GRAM(S): 17 POWDER, FOR SOLUTION ORAL at 11:21

## 2025-07-18 RX ADMIN — IPRATROPIUM BROMIDE AND ALBUTEROL SULFATE 3 MILLILITER(S): .5; 2.5 SOLUTION RESPIRATORY (INHALATION) at 19:51

## 2025-07-18 RX ADMIN — MUPIROCIN CALCIUM 1 APPLICATION(S): 20 CREAM TOPICAL at 17:47

## 2025-07-18 RX ADMIN — Medication 20 MILLIEQUIVALENT(S): at 17:46

## 2025-07-18 RX ADMIN — HEPARIN SODIUM 5000 UNIT(S): 1000 INJECTION INTRAVENOUS; SUBCUTANEOUS at 06:33

## 2025-07-18 RX ADMIN — IPRATROPIUM BROMIDE AND ALBUTEROL SULFATE 3 MILLILITER(S): .5; 2.5 SOLUTION RESPIRATORY (INHALATION) at 14:09

## 2025-07-18 RX ADMIN — Medication 1 DROP(S): at 06:34

## 2025-07-18 RX ADMIN — PREGABALIN 150 MILLIGRAM(S): 50 CAPSULE ORAL at 06:32

## 2025-07-18 RX ADMIN — DAPTOMYCIN 116 MILLIGRAM(S): 500 INJECTION, POWDER, LYOPHILIZED, FOR SOLUTION INTRAVENOUS at 11:23

## 2025-07-18 RX ADMIN — INSULIN LISPRO 8 UNIT(S): 100 INJECTION, SOLUTION INTRAVENOUS; SUBCUTANEOUS at 11:34

## 2025-07-18 RX ADMIN — INSULIN GLARGINE-YFGN 25 UNIT(S): 100 INJECTION, SOLUTION SUBCUTANEOUS at 21:56

## 2025-07-18 RX ADMIN — Medication 40 MILLIGRAM(S): at 06:33

## 2025-07-18 RX ADMIN — FUROSEMIDE 40 MILLIGRAM(S): 10 INJECTION INTRAMUSCULAR; INTRAVENOUS at 06:33

## 2025-07-18 RX ADMIN — HEPARIN SODIUM 5000 UNIT(S): 1000 INJECTION INTRAVENOUS; SUBCUTANEOUS at 17:47

## 2025-07-18 RX ADMIN — OXYCODONE HYDROCHLORIDE 10 MILLIGRAM(S): 30 TABLET ORAL at 09:16

## 2025-07-18 RX ADMIN — PREDNISONE 5 MILLIGRAM(S): 20 TABLET ORAL at 06:32

## 2025-07-18 RX ADMIN — PREGABALIN 150 MILLIGRAM(S): 50 CAPSULE ORAL at 17:46

## 2025-07-18 RX ADMIN — CLONAZEPAM 0.5 MILLIGRAM(S): 0.5 TABLET ORAL at 13:16

## 2025-07-18 RX ADMIN — ROSUVASTATIN CALCIUM 40 MILLIGRAM(S): 20 TABLET, FILM COATED ORAL at 21:56

## 2025-07-18 RX ADMIN — OXYCODONE HYDROCHLORIDE 10 MILLIGRAM(S): 30 TABLET ORAL at 10:16

## 2025-07-18 RX ADMIN — Medication 1 TABLET(S): at 11:22

## 2025-07-18 RX ADMIN — SERTRALINE 150 MILLIGRAM(S): 100 TABLET, FILM COATED ORAL at 11:21

## 2025-07-18 RX ADMIN — Medication 325 MILLIGRAM(S): at 11:21

## 2025-07-18 RX ADMIN — Medication 1 DROP(S): at 17:49

## 2025-07-18 RX ADMIN — MUPIROCIN CALCIUM 1 APPLICATION(S): 20 CREAM TOPICAL at 06:33

## 2025-07-18 RX ADMIN — CLONAZEPAM 0.5 MILLIGRAM(S): 0.5 TABLET ORAL at 09:02

## 2025-07-18 RX ADMIN — MONTELUKAST SODIUM 10 MILLIGRAM(S): 10 TABLET ORAL at 21:55

## 2025-07-18 RX ADMIN — Medication 500 MILLIGRAM(S): at 11:21

## 2025-07-18 RX ADMIN — TIOTROPIUM BROMIDE INHALATION SPRAY 2 PUFF(S): 3.12 SPRAY, METERED RESPIRATORY (INHALATION) at 06:34

## 2025-07-18 RX ADMIN — INSULIN LISPRO 8 UNIT(S): 100 INJECTION, SOLUTION INTRAVENOUS; SUBCUTANEOUS at 16:55

## 2025-07-18 RX ADMIN — Medication 1 TABLET(S): at 17:46

## 2025-07-18 RX ADMIN — Medication 1 DOSE(S): at 19:45

## 2025-07-18 RX ADMIN — FUROSEMIDE 40 MILLIGRAM(S): 10 INJECTION INTRAMUSCULAR; INTRAVENOUS at 17:49

## 2025-07-18 RX ADMIN — INSULIN LISPRO 6: 100 INJECTION, SOLUTION INTRAVENOUS; SUBCUTANEOUS at 11:34

## 2025-07-18 RX ADMIN — INSULIN LISPRO 4: 100 INJECTION, SOLUTION INTRAVENOUS; SUBCUTANEOUS at 07:47

## 2025-07-18 RX ADMIN — IPRATROPIUM BROMIDE AND ALBUTEROL SULFATE 3 MILLILITER(S): .5; 2.5 SOLUTION RESPIRATORY (INHALATION) at 07:31

## 2025-07-18 RX ADMIN — CLONAZEPAM 0.5 MILLIGRAM(S): 0.5 TABLET ORAL at 17:52

## 2025-07-18 RX ADMIN — Medication 1 PACKET(S): at 14:52

## 2025-07-18 RX ADMIN — Medication 81 MILLIGRAM(S): at 11:21

## 2025-07-18 RX ADMIN — OXYCODONE HYDROCHLORIDE 10 MILLIGRAM(S): 30 TABLET ORAL at 22:55

## 2025-07-18 RX ADMIN — INSULIN LISPRO 4: 100 INJECTION, SOLUTION INTRAVENOUS; SUBCUTANEOUS at 16:55

## 2025-07-18 RX ADMIN — INSULIN LISPRO 8 UNIT(S): 100 INJECTION, SOLUTION INTRAVENOUS; SUBCUTANEOUS at 07:47

## 2025-07-18 RX ADMIN — Medication 1 DOSE(S): at 06:33

## 2025-07-18 RX ADMIN — Medication 20 MILLIEQUIVALENT(S): at 06:32

## 2025-07-18 RX ADMIN — Medication 1 TABLET(S): at 06:33

## 2025-07-19 LAB
ANION GAP SERPL CALC-SCNC: 7 MMOL/L — SIGNIFICANT CHANGE UP (ref 5–17)
BUN SERPL-MCNC: 28 MG/DL — HIGH (ref 7–23)
CALCIUM SERPL-MCNC: 8.8 MG/DL — SIGNIFICANT CHANGE UP (ref 8.5–10.1)
CHLORIDE SERPL-SCNC: 105 MMOL/L — SIGNIFICANT CHANGE UP (ref 96–108)
CO2 SERPL-SCNC: 26 MMOL/L — SIGNIFICANT CHANGE UP (ref 22–31)
CREAT SERPL-MCNC: 1.5 MG/DL — HIGH (ref 0.5–1.3)
CULTURE RESULTS: SIGNIFICANT CHANGE UP
CULTURE RESULTS: SIGNIFICANT CHANGE UP
EGFR: 49 ML/MIN/1.73M2 — LOW
EGFR: 49 ML/MIN/1.73M2 — LOW
GLUCOSE BLDC GLUCOMTR-MCNC: 140 MG/DL — HIGH (ref 70–99)
GLUCOSE BLDC GLUCOMTR-MCNC: 141 MG/DL — HIGH (ref 70–99)
GLUCOSE BLDC GLUCOMTR-MCNC: 213 MG/DL — HIGH (ref 70–99)
GLUCOSE BLDC GLUCOMTR-MCNC: 312 MG/DL — HIGH (ref 70–99)
GLUCOSE SERPL-MCNC: 194 MG/DL — HIGH (ref 70–99)
HCT VFR BLD CALC: 48.5 % — SIGNIFICANT CHANGE UP (ref 39–50)
HGB BLD-MCNC: 14.9 G/DL — SIGNIFICANT CHANGE UP (ref 13–17)
MCHC RBC-ENTMCNC: 28.2 PG — SIGNIFICANT CHANGE UP (ref 27–34)
MCHC RBC-ENTMCNC: 30.7 G/DL — LOW (ref 32–36)
MCV RBC AUTO: 91.7 FL — SIGNIFICANT CHANGE UP (ref 80–100)
NRBC # BLD AUTO: 0 K/UL — SIGNIFICANT CHANGE UP (ref 0–0)
NRBC # FLD: 0 K/UL — SIGNIFICANT CHANGE UP (ref 0–0)
NRBC BLD AUTO-RTO: 0 /100 WBCS — SIGNIFICANT CHANGE UP (ref 0–0)
PLATELET # BLD AUTO: 114 K/UL — LOW (ref 150–400)
PMV BLD: 9.9 FL — SIGNIFICANT CHANGE UP (ref 7–13)
POTASSIUM SERPL-MCNC: 3.6 MMOL/L — SIGNIFICANT CHANGE UP (ref 3.5–5.3)
POTASSIUM SERPL-SCNC: 3.6 MMOL/L — SIGNIFICANT CHANGE UP (ref 3.5–5.3)
RBC # BLD: 5.29 M/UL — SIGNIFICANT CHANGE UP (ref 4.2–5.8)
RBC # FLD: 16.8 % — HIGH (ref 10.3–14.5)
SODIUM SERPL-SCNC: 138 MMOL/L — SIGNIFICANT CHANGE UP (ref 135–145)
SPECIMEN SOURCE: SIGNIFICANT CHANGE UP
SPECIMEN SOURCE: SIGNIFICANT CHANGE UP
WBC # BLD: 6.63 K/UL — SIGNIFICANT CHANGE UP (ref 3.8–10.5)
WBC # FLD AUTO: 6.63 K/UL — SIGNIFICANT CHANGE UP (ref 3.8–10.5)

## 2025-07-19 RX ORDER — INSULIN LISPRO 100 U/ML
10 INJECTION, SOLUTION INTRAVENOUS; SUBCUTANEOUS
Refills: 0 | Status: DISCONTINUED | OUTPATIENT
Start: 2025-07-19 | End: 2025-07-21

## 2025-07-19 RX ADMIN — Medication 1 TABLET(S): at 05:30

## 2025-07-19 RX ADMIN — PREDNISONE 5 MILLIGRAM(S): 20 TABLET ORAL at 05:30

## 2025-07-19 RX ADMIN — Medication 20 MILLIEQUIVALENT(S): at 17:16

## 2025-07-19 RX ADMIN — TIOTROPIUM BROMIDE INHALATION SPRAY 2 PUFF(S): 3.12 SPRAY, METERED RESPIRATORY (INHALATION) at 14:34

## 2025-07-19 RX ADMIN — PREGABALIN 150 MILLIGRAM(S): 50 CAPSULE ORAL at 17:16

## 2025-07-19 RX ADMIN — HEPARIN SODIUM 5000 UNIT(S): 1000 INJECTION INTRAVENOUS; SUBCUTANEOUS at 05:31

## 2025-07-19 RX ADMIN — Medication 1 DOSE(S): at 14:34

## 2025-07-19 RX ADMIN — INSULIN LISPRO 10 UNIT(S): 100 INJECTION, SOLUTION INTRAVENOUS; SUBCUTANEOUS at 12:17

## 2025-07-19 RX ADMIN — OXYCODONE HYDROCHLORIDE 10 MILLIGRAM(S): 30 TABLET ORAL at 09:30

## 2025-07-19 RX ADMIN — Medication 1 TABLET(S): at 17:16

## 2025-07-19 RX ADMIN — OXYCODONE HYDROCHLORIDE 10 MILLIGRAM(S): 30 TABLET ORAL at 21:53

## 2025-07-19 RX ADMIN — DAPTOMYCIN 116 MILLIGRAM(S): 500 INJECTION, POWDER, LYOPHILIZED, FOR SOLUTION INTRAVENOUS at 12:17

## 2025-07-19 RX ADMIN — MUPIROCIN CALCIUM 1 APPLICATION(S): 20 CREAM TOPICAL at 17:15

## 2025-07-19 RX ADMIN — INSULIN LISPRO 8: 100 INJECTION, SOLUTION INTRAVENOUS; SUBCUTANEOUS at 12:16

## 2025-07-19 RX ADMIN — HEPARIN SODIUM 5000 UNIT(S): 1000 INJECTION INTRAVENOUS; SUBCUTANEOUS at 17:15

## 2025-07-19 RX ADMIN — INSULIN LISPRO 4: 100 INJECTION, SOLUTION INTRAVENOUS; SUBCUTANEOUS at 08:30

## 2025-07-19 RX ADMIN — Medication 1 DROP(S): at 17:16

## 2025-07-19 RX ADMIN — Medication 1 TABLET(S): at 10:50

## 2025-07-19 RX ADMIN — MUPIROCIN CALCIUM 1 APPLICATION(S): 20 CREAM TOPICAL at 05:30

## 2025-07-19 RX ADMIN — MONTELUKAST SODIUM 10 MILLIGRAM(S): 10 TABLET ORAL at 21:53

## 2025-07-19 RX ADMIN — IPRATROPIUM BROMIDE AND ALBUTEROL SULFATE 3 MILLILITER(S): .5; 2.5 SOLUTION RESPIRATORY (INHALATION) at 14:02

## 2025-07-19 RX ADMIN — Medication 325 MILLIGRAM(S): at 10:50

## 2025-07-19 RX ADMIN — Medication 1 DROP(S): at 05:31

## 2025-07-19 RX ADMIN — Medication 500 MILLIGRAM(S): at 10:49

## 2025-07-19 RX ADMIN — INSULIN LISPRO 10 UNIT(S): 100 INJECTION, SOLUTION INTRAVENOUS; SUBCUTANEOUS at 08:31

## 2025-07-19 RX ADMIN — OXYCODONE HYDROCHLORIDE 10 MILLIGRAM(S): 30 TABLET ORAL at 08:47

## 2025-07-19 RX ADMIN — FUROSEMIDE 40 MILLIGRAM(S): 10 INJECTION INTRAMUSCULAR; INTRAVENOUS at 05:30

## 2025-07-19 RX ADMIN — SERTRALINE 150 MILLIGRAM(S): 100 TABLET, FILM COATED ORAL at 10:50

## 2025-07-19 RX ADMIN — CLONAZEPAM 0.5 MILLIGRAM(S): 0.5 TABLET ORAL at 18:23

## 2025-07-19 RX ADMIN — PREGABALIN 150 MILLIGRAM(S): 50 CAPSULE ORAL at 05:31

## 2025-07-19 RX ADMIN — INSULIN GLARGINE-YFGN 25 UNIT(S): 100 INJECTION, SOLUTION SUBCUTANEOUS at 21:54

## 2025-07-19 RX ADMIN — Medication 3 MILLIGRAM(S): at 03:29

## 2025-07-19 RX ADMIN — Medication 20 MILLIEQUIVALENT(S): at 05:31

## 2025-07-19 RX ADMIN — INSULIN LISPRO 10 UNIT(S): 100 INJECTION, SOLUTION INTRAVENOUS; SUBCUTANEOUS at 17:14

## 2025-07-19 RX ADMIN — Medication 40 MILLIGRAM(S): at 05:30

## 2025-07-19 RX ADMIN — Medication 81 MILLIGRAM(S): at 10:50

## 2025-07-19 RX ADMIN — CLONAZEPAM 0.5 MILLIGRAM(S): 0.5 TABLET ORAL at 10:50

## 2025-07-19 RX ADMIN — CLONAZEPAM 0.5 MILLIGRAM(S): 0.5 TABLET ORAL at 14:34

## 2025-07-19 RX ADMIN — IPRATROPIUM BROMIDE AND ALBUTEROL SULFATE 3 MILLILITER(S): .5; 2.5 SOLUTION RESPIRATORY (INHALATION) at 07:45

## 2025-07-19 RX ADMIN — OXYCODONE HYDROCHLORIDE 10 MILLIGRAM(S): 30 TABLET ORAL at 22:53

## 2025-07-19 RX ADMIN — IPRATROPIUM BROMIDE AND ALBUTEROL SULFATE 3 MILLILITER(S): .5; 2.5 SOLUTION RESPIRATORY (INHALATION) at 19:48

## 2025-07-19 RX ADMIN — ROSUVASTATIN CALCIUM 40 MILLIGRAM(S): 20 TABLET, FILM COATED ORAL at 21:53

## 2025-07-19 RX ADMIN — Medication 650 MILLIGRAM(S): at 03:29

## 2025-07-19 RX ADMIN — Medication 1 DOSE(S): at 20:14

## 2025-07-20 LAB
ANION GAP SERPL CALC-SCNC: 8 MMOL/L — SIGNIFICANT CHANGE UP (ref 5–17)
BUN SERPL-MCNC: 28 MG/DL — HIGH (ref 7–23)
CALCIUM SERPL-MCNC: 8.7 MG/DL — SIGNIFICANT CHANGE UP (ref 8.5–10.1)
CHLORIDE SERPL-SCNC: 103 MMOL/L — SIGNIFICANT CHANGE UP (ref 96–108)
CO2 SERPL-SCNC: 29 MMOL/L — SIGNIFICANT CHANGE UP (ref 22–31)
CREAT SERPL-MCNC: 1.4 MG/DL — HIGH (ref 0.5–1.3)
EGFR: 53 ML/MIN/1.73M2 — LOW
EGFR: 53 ML/MIN/1.73M2 — LOW
GLUCOSE BLDC GLUCOMTR-MCNC: 165 MG/DL — HIGH (ref 70–99)
GLUCOSE BLDC GLUCOMTR-MCNC: 203 MG/DL — HIGH (ref 70–99)
GLUCOSE BLDC GLUCOMTR-MCNC: 220 MG/DL — HIGH (ref 70–99)
GLUCOSE BLDC GLUCOMTR-MCNC: 222 MG/DL — HIGH (ref 70–99)
GLUCOSE SERPL-MCNC: 182 MG/DL — HIGH (ref 70–99)
HCT VFR BLD CALC: 36.2 % — LOW (ref 39–50)
HGB BLD-MCNC: 11.5 G/DL — LOW (ref 13–17)
MCHC RBC-ENTMCNC: 28.8 PG — SIGNIFICANT CHANGE UP (ref 27–34)
MCHC RBC-ENTMCNC: 31.8 G/DL — LOW (ref 32–36)
MCV RBC AUTO: 90.7 FL — SIGNIFICANT CHANGE UP (ref 80–100)
NRBC # BLD AUTO: 0 K/UL — SIGNIFICANT CHANGE UP (ref 0–0)
NRBC # FLD: 0 K/UL — SIGNIFICANT CHANGE UP (ref 0–0)
NRBC BLD AUTO-RTO: 0 /100 WBCS — SIGNIFICANT CHANGE UP (ref 0–0)
PLATELET # BLD AUTO: 149 K/UL — LOW (ref 150–400)
PMV BLD: 10.4 FL — SIGNIFICANT CHANGE UP (ref 7–13)
POTASSIUM SERPL-MCNC: 3.8 MMOL/L — SIGNIFICANT CHANGE UP (ref 3.5–5.3)
POTASSIUM SERPL-SCNC: 3.8 MMOL/L — SIGNIFICANT CHANGE UP (ref 3.5–5.3)
RBC # BLD: 3.99 M/UL — LOW (ref 4.2–5.8)
RBC # FLD: 16.6 % — HIGH (ref 10.3–14.5)
SODIUM SERPL-SCNC: 140 MMOL/L — SIGNIFICANT CHANGE UP (ref 135–145)
WBC # BLD: 7.79 K/UL — SIGNIFICANT CHANGE UP (ref 3.8–10.5)
WBC # FLD AUTO: 7.79 K/UL — SIGNIFICANT CHANGE UP (ref 3.8–10.5)

## 2025-07-20 PROCEDURE — 99233 SBSQ HOSP IP/OBS HIGH 50: CPT

## 2025-07-20 RX ORDER — PREGABALIN 50 MG/1
75 CAPSULE ORAL ONCE
Refills: 0 | Status: DISCONTINUED | OUTPATIENT
Start: 2025-07-20 | End: 2025-07-20

## 2025-07-20 RX ADMIN — Medication 40 MILLIGRAM(S): at 06:25

## 2025-07-20 RX ADMIN — CLONAZEPAM 0.5 MILLIGRAM(S): 0.5 TABLET ORAL at 09:22

## 2025-07-20 RX ADMIN — INSULIN LISPRO 10 UNIT(S): 100 INJECTION, SOLUTION INTRAVENOUS; SUBCUTANEOUS at 08:32

## 2025-07-20 RX ADMIN — HEPARIN SODIUM 5000 UNIT(S): 1000 INJECTION INTRAVENOUS; SUBCUTANEOUS at 06:38

## 2025-07-20 RX ADMIN — Medication 1 DOSE(S): at 06:40

## 2025-07-20 RX ADMIN — Medication 500 MILLIGRAM(S): at 15:06

## 2025-07-20 RX ADMIN — Medication 20 MILLIEQUIVALENT(S): at 06:25

## 2025-07-20 RX ADMIN — PREGABALIN 150 MILLIGRAM(S): 50 CAPSULE ORAL at 18:29

## 2025-07-20 RX ADMIN — Medication 650 MILLIGRAM(S): at 01:06

## 2025-07-20 RX ADMIN — INSULIN LISPRO 10 UNIT(S): 100 INJECTION, SOLUTION INTRAVENOUS; SUBCUTANEOUS at 17:43

## 2025-07-20 RX ADMIN — CLONAZEPAM 0.5 MILLIGRAM(S): 0.5 TABLET ORAL at 15:06

## 2025-07-20 RX ADMIN — Medication 1 DROP(S): at 18:30

## 2025-07-20 RX ADMIN — DAPTOMYCIN 116 MILLIGRAM(S): 500 INJECTION, POWDER, LYOPHILIZED, FOR SOLUTION INTRAVENOUS at 15:06

## 2025-07-20 RX ADMIN — Medication 81 MILLIGRAM(S): at 15:07

## 2025-07-20 RX ADMIN — Medication 325 MILLIGRAM(S): at 15:07

## 2025-07-20 RX ADMIN — INSULIN GLARGINE-YFGN 25 UNIT(S): 100 INJECTION, SOLUTION SUBCUTANEOUS at 22:33

## 2025-07-20 RX ADMIN — FUROSEMIDE 40 MILLIGRAM(S): 10 INJECTION INTRAMUSCULAR; INTRAVENOUS at 18:29

## 2025-07-20 RX ADMIN — OXYCODONE HYDROCHLORIDE 10 MILLIGRAM(S): 30 TABLET ORAL at 06:37

## 2025-07-20 RX ADMIN — ROSUVASTATIN CALCIUM 40 MILLIGRAM(S): 20 TABLET, FILM COATED ORAL at 22:34

## 2025-07-20 RX ADMIN — Medication 1 DROP(S): at 06:26

## 2025-07-20 RX ADMIN — PREDNISONE 5 MILLIGRAM(S): 20 TABLET ORAL at 06:26

## 2025-07-20 RX ADMIN — Medication 650 MILLIGRAM(S): at 23:34

## 2025-07-20 RX ADMIN — Medication 20 MILLIEQUIVALENT(S): at 18:29

## 2025-07-20 RX ADMIN — PREGABALIN 75 MILLIGRAM(S): 50 CAPSULE ORAL at 08:39

## 2025-07-20 RX ADMIN — MUPIROCIN CALCIUM 1 APPLICATION(S): 20 CREAM TOPICAL at 18:28

## 2025-07-20 RX ADMIN — FUROSEMIDE 40 MILLIGRAM(S): 10 INJECTION INTRAMUSCULAR; INTRAVENOUS at 06:25

## 2025-07-20 RX ADMIN — IPRATROPIUM BROMIDE AND ALBUTEROL SULFATE 3 MILLILITER(S): .5; 2.5 SOLUTION RESPIRATORY (INHALATION) at 19:24

## 2025-07-20 RX ADMIN — MUPIROCIN CALCIUM 1 APPLICATION(S): 20 CREAM TOPICAL at 06:26

## 2025-07-20 RX ADMIN — Medication 1 TABLET(S): at 06:26

## 2025-07-20 RX ADMIN — Medication 1 TABLET(S): at 18:28

## 2025-07-20 RX ADMIN — POLYETHYLENE GLYCOL 3350 17 GRAM(S): 17 POWDER, FOR SOLUTION ORAL at 15:12

## 2025-07-20 RX ADMIN — OXYCODONE HYDROCHLORIDE 10 MILLIGRAM(S): 30 TABLET ORAL at 20:24

## 2025-07-20 RX ADMIN — SERTRALINE 150 MILLIGRAM(S): 100 TABLET, FILM COATED ORAL at 15:07

## 2025-07-20 RX ADMIN — HEPARIN SODIUM 5000 UNIT(S): 1000 INJECTION INTRAVENOUS; SUBCUTANEOUS at 18:28

## 2025-07-20 RX ADMIN — OXYCODONE HYDROCHLORIDE 10 MILLIGRAM(S): 30 TABLET ORAL at 07:37

## 2025-07-20 RX ADMIN — Medication 3 MILLIGRAM(S): at 00:16

## 2025-07-20 RX ADMIN — INSULIN LISPRO 4: 100 INJECTION, SOLUTION INTRAVENOUS; SUBCUTANEOUS at 12:24

## 2025-07-20 RX ADMIN — MONTELUKAST SODIUM 10 MILLIGRAM(S): 10 TABLET ORAL at 22:34

## 2025-07-20 RX ADMIN — Medication 650 MILLIGRAM(S): at 22:34

## 2025-07-20 RX ADMIN — INSULIN LISPRO 4: 100 INJECTION, SOLUTION INTRAVENOUS; SUBCUTANEOUS at 08:32

## 2025-07-20 RX ADMIN — INSULIN LISPRO 10 UNIT(S): 100 INJECTION, SOLUTION INTRAVENOUS; SUBCUTANEOUS at 12:24

## 2025-07-20 RX ADMIN — Medication 1 DOSE(S): at 20:25

## 2025-07-20 RX ADMIN — CLONAZEPAM 0.5 MILLIGRAM(S): 0.5 TABLET ORAL at 18:28

## 2025-07-20 RX ADMIN — IPRATROPIUM BROMIDE AND ALBUTEROL SULFATE 3 MILLILITER(S): .5; 2.5 SOLUTION RESPIRATORY (INHALATION) at 13:50

## 2025-07-20 RX ADMIN — PREGABALIN 150 MILLIGRAM(S): 50 CAPSULE ORAL at 06:25

## 2025-07-20 RX ADMIN — Medication 650 MILLIGRAM(S): at 00:16

## 2025-07-20 RX ADMIN — Medication 1 TABLET(S): at 15:07

## 2025-07-20 RX ADMIN — Medication 2 TABLET(S): at 22:35

## 2025-07-20 RX ADMIN — IPRATROPIUM BROMIDE AND ALBUTEROL SULFATE 3 MILLILITER(S): .5; 2.5 SOLUTION RESPIRATORY (INHALATION) at 07:16

## 2025-07-20 RX ADMIN — TIOTROPIUM BROMIDE INHALATION SPRAY 2 PUFF(S): 3.12 SPRAY, METERED RESPIRATORY (INHALATION) at 06:40

## 2025-07-20 RX ADMIN — OXYCODONE HYDROCHLORIDE 10 MILLIGRAM(S): 30 TABLET ORAL at 21:24

## 2025-07-21 ENCOUNTER — TRANSCRIPTION ENCOUNTER (OUTPATIENT)
Age: 75
End: 2025-07-21

## 2025-07-21 VITALS
RESPIRATION RATE: 18 BRPM | SYSTOLIC BLOOD PRESSURE: 111 MMHG | HEART RATE: 92 BPM | DIASTOLIC BLOOD PRESSURE: 60 MMHG | TEMPERATURE: 98 F | OXYGEN SATURATION: 92 %

## 2025-07-21 LAB
ANION GAP SERPL CALC-SCNC: 8 MMOL/L — SIGNIFICANT CHANGE UP (ref 5–17)
BUN SERPL-MCNC: 26 MG/DL — HIGH (ref 7–23)
CALCIUM SERPL-MCNC: 9.2 MG/DL — SIGNIFICANT CHANGE UP (ref 8.5–10.1)
CHLORIDE SERPL-SCNC: 102 MMOL/L — SIGNIFICANT CHANGE UP (ref 96–108)
CO2 SERPL-SCNC: 30 MMOL/L — SIGNIFICANT CHANGE UP (ref 22–31)
CREAT SERPL-MCNC: 1.4 MG/DL — HIGH (ref 0.5–1.3)
EGFR: 53 ML/MIN/1.73M2 — LOW
EGFR: 53 ML/MIN/1.73M2 — LOW
GLUCOSE BLDC GLUCOMTR-MCNC: 145 MG/DL — HIGH (ref 70–99)
GLUCOSE BLDC GLUCOMTR-MCNC: 204 MG/DL — HIGH (ref 70–99)
GLUCOSE BLDC GLUCOMTR-MCNC: 210 MG/DL — HIGH (ref 70–99)
GLUCOSE SERPL-MCNC: 177 MG/DL — HIGH (ref 70–99)
HCT VFR BLD CALC: 39.6 % — SIGNIFICANT CHANGE UP (ref 39–50)
HGB BLD-MCNC: 12.5 G/DL — LOW (ref 13–17)
MCHC RBC-ENTMCNC: 28.9 PG — SIGNIFICANT CHANGE UP (ref 27–34)
MCHC RBC-ENTMCNC: 31.6 G/DL — LOW (ref 32–36)
MCV RBC AUTO: 91.7 FL — SIGNIFICANT CHANGE UP (ref 80–100)
NRBC # BLD AUTO: 0 K/UL — SIGNIFICANT CHANGE UP (ref 0–0)
NRBC # FLD: 0 K/UL — SIGNIFICANT CHANGE UP (ref 0–0)
NRBC BLD AUTO-RTO: 0 /100 WBCS — SIGNIFICANT CHANGE UP (ref 0–0)
PLATELET # BLD AUTO: 155 K/UL — SIGNIFICANT CHANGE UP (ref 150–400)
PMV BLD: 10.1 FL — SIGNIFICANT CHANGE UP (ref 7–13)
POTASSIUM SERPL-MCNC: 4.4 MMOL/L — SIGNIFICANT CHANGE UP (ref 3.5–5.3)
POTASSIUM SERPL-SCNC: 4.4 MMOL/L — SIGNIFICANT CHANGE UP (ref 3.5–5.3)
RBC # BLD: 4.32 M/UL — SIGNIFICANT CHANGE UP (ref 4.2–5.8)
RBC # FLD: 16.6 % — HIGH (ref 10.3–14.5)
SODIUM SERPL-SCNC: 140 MMOL/L — SIGNIFICANT CHANGE UP (ref 135–145)
WBC # BLD: 7.88 K/UL — SIGNIFICANT CHANGE UP (ref 3.8–10.5)
WBC # FLD AUTO: 7.88 K/UL — SIGNIFICANT CHANGE UP (ref 3.8–10.5)

## 2025-07-21 PROCEDURE — 82962 GLUCOSE BLOOD TEST: CPT

## 2025-07-21 PROCEDURE — 85610 PROTHROMBIN TIME: CPT

## 2025-07-21 PROCEDURE — 99232 SBSQ HOSP IP/OBS MODERATE 35: CPT

## 2025-07-21 PROCEDURE — 83880 ASSAY OF NATRIURETIC PEPTIDE: CPT

## 2025-07-21 PROCEDURE — 97162 PT EVAL MOD COMPLEX 30 MIN: CPT

## 2025-07-21 PROCEDURE — 80048 BASIC METABOLIC PNL TOTAL CA: CPT

## 2025-07-21 PROCEDURE — 85027 COMPLETE CBC AUTOMATED: CPT

## 2025-07-21 PROCEDURE — 96372 THER/PROPH/DIAG INJ SC/IM: CPT

## 2025-07-21 PROCEDURE — 0225U NFCT DS DNA&RNA 21 SARSCOV2: CPT

## 2025-07-21 PROCEDURE — 83605 ASSAY OF LACTIC ACID: CPT

## 2025-07-21 PROCEDURE — 99231 SBSQ HOSP IP/OBS SF/LOW 25: CPT

## 2025-07-21 PROCEDURE — 97530 THERAPEUTIC ACTIVITIES: CPT

## 2025-07-21 PROCEDURE — 36415 COLL VENOUS BLD VENIPUNCTURE: CPT

## 2025-07-21 PROCEDURE — 84100 ASSAY OF PHOSPHORUS: CPT

## 2025-07-21 PROCEDURE — 93970 EXTREMITY STUDY: CPT

## 2025-07-21 PROCEDURE — 85025 COMPLETE CBC W/AUTO DIFF WBC: CPT

## 2025-07-21 PROCEDURE — 92610 EVALUATE SWALLOWING FUNCTION: CPT

## 2025-07-21 PROCEDURE — 94640 AIRWAY INHALATION TREATMENT: CPT

## 2025-07-21 PROCEDURE — 82570 ASSAY OF URINE CREATININE: CPT

## 2025-07-21 PROCEDURE — 97116 GAIT TRAINING THERAPY: CPT

## 2025-07-21 PROCEDURE — 76775 US EXAM ABDO BACK WALL LIM: CPT

## 2025-07-21 PROCEDURE — 93005 ELECTROCARDIOGRAM TRACING: CPT

## 2025-07-21 PROCEDURE — 87899 AGENT NOS ASSAY W/OPTIC: CPT

## 2025-07-21 PROCEDURE — 87640 STAPH A DNA AMP PROBE: CPT

## 2025-07-21 PROCEDURE — 80053 COMPREHEN METABOLIC PANEL: CPT

## 2025-07-21 PROCEDURE — 99285 EMERGENCY DEPT VISIT HI MDM: CPT

## 2025-07-21 PROCEDURE — 87040 BLOOD CULTURE FOR BACTERIA: CPT

## 2025-07-21 PROCEDURE — 82550 ASSAY OF CK (CPK): CPT

## 2025-07-21 PROCEDURE — 71250 CT THORAX DX C-: CPT

## 2025-07-21 PROCEDURE — 94760 N-INVAS EAR/PLS OXIMETRY 1: CPT

## 2025-07-21 PROCEDURE — 84300 ASSAY OF URINE SODIUM: CPT

## 2025-07-21 PROCEDURE — 83735 ASSAY OF MAGNESIUM: CPT

## 2025-07-21 PROCEDURE — 87070 CULTURE OTHR SPECIMN AEROBIC: CPT

## 2025-07-21 PROCEDURE — 81001 URINALYSIS AUTO W/SCOPE: CPT

## 2025-07-21 PROCEDURE — 87641 MR-STAPH DNA AMP PROBE: CPT

## 2025-07-21 PROCEDURE — 84132 ASSAY OF SERUM POTASSIUM: CPT

## 2025-07-21 PROCEDURE — 71045 X-RAY EXAM CHEST 1 VIEW: CPT

## 2025-07-21 PROCEDURE — 85730 THROMBOPLASTIN TIME PARTIAL: CPT

## 2025-07-21 RX ORDER — INSULIN GLARGINE-YFGN 100 [IU]/ML
30 INJECTION, SOLUTION SUBCUTANEOUS
Qty: 0 | Refills: 0 | DISCHARGE
Start: 2025-07-21

## 2025-07-21 RX ORDER — INSULIN LISPRO 100 U/ML
10 INJECTION, SOLUTION INTRAVENOUS; SUBCUTANEOUS
Qty: 0 | Refills: 0 | DISCHARGE

## 2025-07-21 RX ORDER — INSULIN GLARGINE-YFGN 100 [IU]/ML
30 INJECTION, SOLUTION SUBCUTANEOUS AT BEDTIME
Refills: 0 | Status: DISCONTINUED | OUTPATIENT
Start: 2025-07-21 | End: 2025-07-21

## 2025-07-21 RX ORDER — INSULIN GLARGINE-YFGN 100 [IU]/ML
32 INJECTION, SOLUTION SUBCUTANEOUS
Refills: 0 | DISCHARGE

## 2025-07-21 RX ADMIN — PREGABALIN 150 MILLIGRAM(S): 50 CAPSULE ORAL at 06:07

## 2025-07-21 RX ADMIN — Medication 1 DROP(S): at 06:07

## 2025-07-21 RX ADMIN — MUPIROCIN CALCIUM 1 APPLICATION(S): 20 CREAM TOPICAL at 06:07

## 2025-07-21 RX ADMIN — FUROSEMIDE 40 MILLIGRAM(S): 10 INJECTION INTRAMUSCULAR; INTRAVENOUS at 06:06

## 2025-07-21 RX ADMIN — Medication 81 MILLIGRAM(S): at 12:11

## 2025-07-21 RX ADMIN — INSULIN LISPRO 4: 100 INJECTION, SOLUTION INTRAVENOUS; SUBCUTANEOUS at 12:12

## 2025-07-21 RX ADMIN — TIOTROPIUM BROMIDE INHALATION SPRAY 2 PUFF(S): 3.12 SPRAY, METERED RESPIRATORY (INHALATION) at 06:38

## 2025-07-21 RX ADMIN — Medication 500 MILLIGRAM(S): at 12:11

## 2025-07-21 RX ADMIN — PREDNISONE 5 MILLIGRAM(S): 20 TABLET ORAL at 06:07

## 2025-07-21 RX ADMIN — CLONAZEPAM 0.5 MILLIGRAM(S): 0.5 TABLET ORAL at 13:04

## 2025-07-21 RX ADMIN — SERTRALINE 150 MILLIGRAM(S): 100 TABLET, FILM COATED ORAL at 12:11

## 2025-07-21 RX ADMIN — INSULIN LISPRO 10 UNIT(S): 100 INJECTION, SOLUTION INTRAVENOUS; SUBCUTANEOUS at 12:12

## 2025-07-21 RX ADMIN — IPRATROPIUM BROMIDE AND ALBUTEROL SULFATE 3 MILLILITER(S): .5; 2.5 SOLUTION RESPIRATORY (INHALATION) at 13:31

## 2025-07-21 RX ADMIN — HEPARIN SODIUM 5000 UNIT(S): 1000 INJECTION INTRAVENOUS; SUBCUTANEOUS at 06:07

## 2025-07-21 RX ADMIN — Medication 40 MILLIGRAM(S): at 06:07

## 2025-07-21 RX ADMIN — INSULIN LISPRO 10 UNIT(S): 100 INJECTION, SOLUTION INTRAVENOUS; SUBCUTANEOUS at 08:02

## 2025-07-21 RX ADMIN — DAPTOMYCIN 116 MILLIGRAM(S): 500 INJECTION, POWDER, LYOPHILIZED, FOR SOLUTION INTRAVENOUS at 12:10

## 2025-07-21 RX ADMIN — Medication 1 TABLET(S): at 06:07

## 2025-07-21 RX ADMIN — INSULIN LISPRO 4: 100 INJECTION, SOLUTION INTRAVENOUS; SUBCUTANEOUS at 08:02

## 2025-07-21 RX ADMIN — Medication 325 MILLIGRAM(S): at 12:11

## 2025-07-21 RX ADMIN — Medication 1 TABLET(S): at 12:11

## 2025-07-21 RX ADMIN — Medication 1 DOSE(S): at 06:38

## 2025-07-21 RX ADMIN — OXYCODONE HYDROCHLORIDE 10 MILLIGRAM(S): 30 TABLET ORAL at 06:38

## 2025-07-21 RX ADMIN — Medication 20 MILLIEQUIVALENT(S): at 06:07

## 2025-07-21 RX ADMIN — CLONAZEPAM 0.5 MILLIGRAM(S): 0.5 TABLET ORAL at 08:01

## 2025-08-08 ENCOUNTER — EMERGENCY (EMERGENCY)
Facility: HOSPITAL | Age: 75
LOS: 0 days | Discharge: HOME | End: 2025-08-08
Attending: STUDENT IN AN ORGANIZED HEALTH CARE EDUCATION/TRAINING PROGRAM
Payer: COMMERCIAL

## 2025-08-08 VITALS
DIASTOLIC BLOOD PRESSURE: 71 MMHG | HEART RATE: 89 BPM | HEIGHT: 70 IN | OXYGEN SATURATION: 96 % | SYSTOLIC BLOOD PRESSURE: 146 MMHG | WEIGHT: 169.98 LBS | RESPIRATION RATE: 18 BRPM | TEMPERATURE: 98 F

## 2025-08-08 VITALS
TEMPERATURE: 98 F | SYSTOLIC BLOOD PRESSURE: 130 MMHG | HEART RATE: 90 BPM | OXYGEN SATURATION: 93 % | RESPIRATION RATE: 18 BRPM | DIASTOLIC BLOOD PRESSURE: 59 MMHG

## 2025-08-08 DIAGNOSIS — Z91.041 RADIOGRAPHIC DYE ALLERGY STATUS: ICD-10-CM

## 2025-08-08 DIAGNOSIS — Z88.1 ALLERGY STATUS TO OTHER ANTIBIOTIC AGENTS: ICD-10-CM

## 2025-08-08 DIAGNOSIS — R04.0 EPISTAXIS: ICD-10-CM

## 2025-08-08 DIAGNOSIS — Z98.890 OTHER SPECIFIED POSTPROCEDURAL STATES: Chronic | ICD-10-CM

## 2025-08-08 DIAGNOSIS — Z79.82 LONG TERM (CURRENT) USE OF ASPIRIN: ICD-10-CM

## 2025-08-08 LAB
APTT BLD: 30.1 SEC — SIGNIFICANT CHANGE UP (ref 26.1–36.8)
BASOPHILS # BLD AUTO: 0.06 K/UL — SIGNIFICANT CHANGE UP (ref 0–0.2)
BASOPHILS NFR BLD AUTO: 0.6 % — SIGNIFICANT CHANGE UP (ref 0–2)
BLD GP AB SCN SERPL QL: SIGNIFICANT CHANGE UP
EOSINOPHIL # BLD AUTO: 0.34 K/UL — SIGNIFICANT CHANGE UP (ref 0–0.5)
EOSINOPHIL NFR BLD AUTO: 3.2 % — SIGNIFICANT CHANGE UP (ref 0–6)
HCT VFR BLD CALC: 27.9 % — LOW (ref 39–50)
HCT VFR BLD CALC: 29.5 % — LOW (ref 39–50)
HGB BLD-MCNC: 8.8 G/DL — LOW (ref 13–17)
HGB BLD-MCNC: 9.3 G/DL — LOW (ref 13–17)
IMM GRANULOCYTES # BLD AUTO: 0.31 K/UL — HIGH (ref 0–0.07)
IMM GRANULOCYTES NFR BLD AUTO: 2.9 % — HIGH (ref 0–0.9)
INR BLD: 1.19 RATIO — HIGH (ref 0.85–1.16)
LYMPHOCYTES # BLD AUTO: 2.52 K/UL — SIGNIFICANT CHANGE UP (ref 1–3.3)
LYMPHOCYTES NFR BLD AUTO: 23.9 % — SIGNIFICANT CHANGE UP (ref 13–44)
MCHC RBC-ENTMCNC: 28.3 PG — SIGNIFICANT CHANGE UP (ref 27–34)
MCHC RBC-ENTMCNC: 28.4 PG — SIGNIFICANT CHANGE UP (ref 27–34)
MCHC RBC-ENTMCNC: 31.5 G/DL — LOW (ref 32–36)
MCHC RBC-ENTMCNC: 31.5 G/DL — LOW (ref 32–36)
MCV RBC AUTO: 89.7 FL — SIGNIFICANT CHANGE UP (ref 80–100)
MCV RBC AUTO: 90.2 FL — SIGNIFICANT CHANGE UP (ref 80–100)
MONOCYTES # BLD AUTO: 1.37 K/UL — HIGH (ref 0–0.9)
MONOCYTES NFR BLD AUTO: 13 % — SIGNIFICANT CHANGE UP (ref 2–14)
NEUTROPHILS # BLD AUTO: 5.96 K/UL — SIGNIFICANT CHANGE UP (ref 1.8–7.4)
NEUTROPHILS NFR BLD AUTO: 56.4 % — SIGNIFICANT CHANGE UP (ref 43–77)
NRBC # BLD AUTO: 0 K/UL — SIGNIFICANT CHANGE UP (ref 0–0)
NRBC # BLD AUTO: 0.02 K/UL — HIGH (ref 0–0)
NRBC # FLD: 0 K/UL — SIGNIFICANT CHANGE UP (ref 0–0)
NRBC # FLD: 0.02 K/UL — HIGH (ref 0–0)
NRBC BLD AUTO-RTO: 0 /100 WBCS — SIGNIFICANT CHANGE UP (ref 0–0)
NRBC BLD AUTO-RTO: 0 /100 WBCS — SIGNIFICANT CHANGE UP (ref 0–0)
PLATELET # BLD AUTO: 173 K/UL — SIGNIFICANT CHANGE UP (ref 150–400)
PLATELET # BLD AUTO: 186 K/UL — SIGNIFICANT CHANGE UP (ref 150–400)
PMV BLD: 10.3 FL — SIGNIFICANT CHANGE UP (ref 7–13)
PMV BLD: 9.9 FL — SIGNIFICANT CHANGE UP (ref 7–13)
PROTHROM AB SERPL-ACNC: 14 SEC — HIGH (ref 9.9–13.4)
RBC # BLD: 3.11 M/UL — LOW (ref 4.2–5.8)
RBC # BLD: 3.27 M/UL — LOW (ref 4.2–5.8)
RBC # FLD: 15.3 % — HIGH (ref 10.3–14.5)
RBC # FLD: 15.6 % — HIGH (ref 10.3–14.5)
WBC # BLD: 10.56 K/UL — HIGH (ref 3.8–10.5)
WBC # BLD: 10.86 K/UL — HIGH (ref 3.8–10.5)
WBC # FLD AUTO: 10.56 K/UL — HIGH (ref 3.8–10.5)
WBC # FLD AUTO: 10.86 K/UL — HIGH (ref 3.8–10.5)

## 2025-08-08 PROCEDURE — 85610 PROTHROMBIN TIME: CPT

## 2025-08-08 PROCEDURE — 86900 BLOOD TYPING SEROLOGIC ABO: CPT

## 2025-08-08 PROCEDURE — 36415 COLL VENOUS BLD VENIPUNCTURE: CPT

## 2025-08-08 PROCEDURE — 94640 AIRWAY INHALATION TREATMENT: CPT

## 2025-08-08 PROCEDURE — 85025 COMPLETE CBC W/AUTO DIFF WBC: CPT

## 2025-08-08 PROCEDURE — 86850 RBC ANTIBODY SCREEN: CPT

## 2025-08-08 PROCEDURE — 85027 COMPLETE CBC AUTOMATED: CPT

## 2025-08-08 PROCEDURE — 99284 EMERGENCY DEPT VISIT MOD MDM: CPT | Mod: 25

## 2025-08-08 PROCEDURE — 85730 THROMBOPLASTIN TIME PARTIAL: CPT

## 2025-08-08 PROCEDURE — 86901 BLOOD TYPING SEROLOGIC RH(D): CPT

## 2025-08-08 PROCEDURE — 99284 EMERGENCY DEPT VISIT MOD MDM: CPT

## 2025-08-08 RX ORDER — ACETAMINOPHEN 500 MG/5ML
650 LIQUID (ML) ORAL ONCE
Refills: 0 | Status: COMPLETED | OUTPATIENT
Start: 2025-08-08 | End: 2025-08-08

## 2025-08-08 RX ORDER — ALBUTEROL SULFATE 2.5 MG/3ML
2 VIAL, NEBULIZER (ML) INHALATION ONCE
Refills: 0 | Status: COMPLETED | OUTPATIENT
Start: 2025-08-08 | End: 2025-08-08

## 2025-08-08 RX ADMIN — Medication 650 MILLIGRAM(S): at 16:38

## 2025-08-08 RX ADMIN — Medication 2 PUFF(S): at 16:38

## 2025-08-21 ENCOUNTER — APPOINTMENT (OUTPATIENT)
Dept: UROLOGY | Facility: CLINIC | Age: 75
End: 2025-08-21
Payer: MEDICARE

## 2025-08-21 ENCOUNTER — APPOINTMENT (OUTPATIENT)
Dept: NEPHROLOGY | Facility: CLINIC | Age: 75
End: 2025-08-21
Payer: MEDICARE

## 2025-08-21 VITALS
TEMPERATURE: 98.1 F | OXYGEN SATURATION: 96 % | SYSTOLIC BLOOD PRESSURE: 102 MMHG | DIASTOLIC BLOOD PRESSURE: 70 MMHG | RESPIRATION RATE: 18 BRPM | HEART RATE: 88 BPM | HEIGHT: 70 IN

## 2025-08-21 DIAGNOSIS — C61 MALIGNANT NEOPLASM OF PROSTATE: ICD-10-CM

## 2025-08-21 DIAGNOSIS — N39.3 STRESS INCONTINENCE (FEMALE) (MALE): ICD-10-CM

## 2025-08-21 LAB
BILIRUB UR QL STRIP: NORMAL
CLARITY UR: CLEAR
COLLECTION METHOD: NORMAL
GLUCOSE UR-MCNC: 500
HCG UR QL: 0.2 EU/DL
HGB UR QL STRIP.AUTO: NORMAL
KETONES UR-MCNC: NORMAL
LEUKOCYTE ESTERASE UR QL STRIP: NORMAL
NITRITE UR QL STRIP: NORMAL
PH UR STRIP: 5.5
PROT UR STRIP-MCNC: NORMAL
SP GR UR STRIP: 1

## 2025-08-21 PROCEDURE — G2211 COMPLEX E/M VISIT ADD ON: CPT

## 2025-08-21 PROCEDURE — 99203 OFFICE O/P NEW LOW 30 MIN: CPT

## 2025-08-21 PROCEDURE — 81003 URINALYSIS AUTO W/O SCOPE: CPT | Mod: QW

## 2025-08-21 PROCEDURE — 99205 OFFICE O/P NEW HI 60 MIN: CPT

## 2025-08-21 RX ORDER — ROSUVASTATIN CALCIUM 40 MG/1
40 TABLET, FILM COATED ORAL
Refills: 0 | Status: ACTIVE | COMMUNITY

## 2025-08-21 RX ORDER — PREGABALIN 150 MG/1
150 CAPSULE ORAL
Refills: 0 | Status: ACTIVE | COMMUNITY

## 2025-08-21 RX ORDER — SENNOSIDES 8.6 MG/1
8.6 TABLET ORAL
Refills: 0 | Status: ACTIVE | COMMUNITY

## 2025-08-21 RX ORDER — POLYETHYLENE GLYCOL 3350 17 G/17G
POWDER, FOR SOLUTION ORAL
Refills: 0 | Status: ACTIVE | COMMUNITY

## 2025-08-21 RX ORDER — GUAIFENESIN/DEXTROMETHORPHAN 100-10MG/5
LIQUID (ML) ORAL
Refills: 0 | Status: ACTIVE | COMMUNITY

## 2025-08-21 RX ORDER — PANTOPRAZOLE SODIUM 40 MG/1
40 GRANULE, DELAYED RELEASE ORAL
Refills: 0 | Status: ACTIVE | COMMUNITY

## 2025-08-21 RX ORDER — RAMELTEON 8 MG/1
8 TABLET ORAL
Refills: 0 | Status: ACTIVE | COMMUNITY

## 2025-08-22 LAB
APPEARANCE: CLEAR
BACTERIA: NEGATIVE /HPF
BILIRUBIN URINE: NEGATIVE
BLOOD URINE: ABNORMAL
CAST: 0 /LPF
COLOR: YELLOW
EPITHELIAL CELLS: 1 /HPF
GLUCOSE QUALITATIVE U: >=1000 MG/DL
KETONES URINE: NEGATIVE MG/DL
LEUKOCYTE ESTERASE URINE: NEGATIVE
MICROSCOPIC-UA: NORMAL
NITRITE URINE: NEGATIVE
PH URINE: 6.5
PROTEIN URINE: NEGATIVE MG/DL
RED BLOOD CELLS URINE: 0 /HPF
REVIEW: NORMAL
SPECIFIC GRAVITY URINE: 1.01
UROBILINOGEN URINE: 0.2 MG/DL
WHITE BLOOD CELLS URINE: 0 /HPF

## 2025-08-25 LAB — BACTERIA UR CULT: NORMAL

## (undated) DEVICE — VENODYNE/SCD SLEEVE CALF LARGE

## (undated) DEVICE — FLOORMAT SURGISAFE ABSORBANT WHITE 36" X 72"

## (undated) DEVICE — DRSG TAPE MEDIPORE 4"

## (undated) DEVICE — POSITIONER CUSHION INSERT PRONE VIEW LG

## (undated) DEVICE — SUT MONOSOF 3-0 18" P-14

## (undated) DEVICE — DRSG TELFA 3 X 8

## (undated) DEVICE — SUT MONOCRYL 4-0 27" PS-2 UNDYED

## (undated) DEVICE — PLV-SCD MACHINE: Type: DURABLE MEDICAL EQUIPMENT

## (undated) DEVICE — GLV 8 PROTEXIS (WHITE)

## (undated) DEVICE — DRSG COMBINE 5X9"

## (undated) DEVICE — SUT POLYSORB 2-0 30" GS-22 UNDYED

## (undated) DEVICE — SUT MONOSOF 2-0 18" C-15

## (undated) DEVICE — GLV 7.5 PROTEXIS (WHITE)

## (undated) DEVICE — PLV/PSP-ESU FORCEFX F8J7721A: Type: DURABLE MEDICAL EQUIPMENT

## (undated) DEVICE — WARMING BLANKET UPPER ADULT

## (undated) DEVICE — DRSG TAPE MEDIPORE 3"

## (undated) DEVICE — SPECIMEN CONTAINER 4OZ

## (undated) DEVICE — SAW BLADE LINVATEC SAGITTAL 19.5X41.0X..4MM COARSE

## (undated) DEVICE — SAW BLADE LINVATEC SAGITTAL MIC 9.5X25.5X0.4MM

## (undated) DEVICE — DRAPE 3/4 SHEET W REINFORCEMENT 56X77"

## (undated) DEVICE — NDL HYPO SAFE 22G X 1.5" (BLACK)

## (undated) DEVICE — DRAPE TOWEL BLUE 17" X 24"

## (undated) DEVICE — SUT POLYSORB 3-0 30" V-20 UNDYED

## (undated) DEVICE — VENODYNE/SCD SLEEVE CALF MEDIUM

## (undated) DEVICE — SOL IRR POUR NS 0.9% 1000ML

## (undated) DEVICE — NDL HYPO SAFE 25G X 1.5" (ORANGE)

## (undated) DEVICE — PREP ALCOHOL PAD MED

## (undated) DEVICE — SYR LUER LOK 10CC

## (undated) DEVICE — DRSG KERLIX ROLL 4.5"

## (undated) DEVICE — BLADE SCALPEL SAFETYLOCK #15

## (undated) DEVICE — SUT POLYSORB 4-0 18" P-12 UNDYED

## (undated) DEVICE — ELCTR BOVIE TIP BLADE INSULATED 2.75" EDGE

## (undated) DEVICE — PLV/PSP-ESU FORCE2 FIL 16736T: Type: DURABLE MEDICAL EQUIPMENT

## (undated) DEVICE — DRSG CURITY GAUZE SPONGE 4 X 4" 12-PLY

## (undated) DEVICE — PREP BETADINE KIT

## (undated) DEVICE — NDL HYPO SAFE 18G X 1.5" (PINK)

## (undated) DEVICE — DRSG XEROFORM 1 X 8"

## (undated) DEVICE — BLADE SCALPEL SAFETYLOCK #10

## (undated) DEVICE — BUR MICROAIRE CARBIDE OVAL 4MM 8 FLUTE

## (undated) DEVICE — WARMING BLANKET LOWER ADULT

## (undated) DEVICE — PACK MINOR WITH LAP

## (undated) DEVICE — PACK LOWER EXTREMITY NS PLAINVI

## (undated) DEVICE — SOL IRR POUR H2O 1000ML

## (undated) DEVICE — SUT POLYSORB 2-0 18" V-20

## (undated) DEVICE — DRSG MASTISOL